# Patient Record
Sex: FEMALE | Race: BLACK OR AFRICAN AMERICAN | NOT HISPANIC OR LATINO | Employment: OTHER | URBAN - METROPOLITAN AREA
[De-identification: names, ages, dates, MRNs, and addresses within clinical notes are randomized per-mention and may not be internally consistent; named-entity substitution may affect disease eponyms.]

---

## 2017-01-03 ENCOUNTER — HOSPITAL ENCOUNTER (OUTPATIENT)
Dept: CARDIOLOGY | Facility: HOSPITAL | Age: 67
Discharge: HOME | End: 2017-01-03
Attending: INTERNAL MEDICINE
Payer: MEDICARE

## 2017-01-03 ENCOUNTER — TELEPHONE (OUTPATIENT)
Dept: CARDIOLOGY | Facility: CLINIC | Age: 67
End: 2017-01-03

## 2017-01-03 DIAGNOSIS — R01.1 MURMUR: ICD-10-CM

## 2017-01-03 DIAGNOSIS — R94.31 ABNORMAL ECG: ICD-10-CM

## 2017-01-03 LAB
DIASTOLIC DYSFUNCTION: YES
ESTIMATED PA SYSTOLIC PRESSURE: 23.43
MITRAL VALVE MOBILITY: NORMAL
RETIRED EF AND QEF - SEE NOTES: 65 (ref 55–65)
TRICUSPID VALVE REGURGITATION: ABNORMAL

## 2017-01-03 PROCEDURE — 93306 TTE W/DOPPLER COMPLETE: CPT | Mod: 26,,, | Performed by: INTERNAL MEDICINE

## 2017-01-03 PROCEDURE — 93306 TTE W/DOPPLER COMPLETE: CPT

## 2017-01-03 NOTE — TELEPHONE ENCOUNTER
Patient notified that echo is normal except for slow relaxation which is secondary to aging, weight or blood pressure and we will monitor this. No concerning findings.     Patient verbalized understanding.

## 2017-01-18 ENCOUNTER — PATIENT MESSAGE (OUTPATIENT)
Dept: FAMILY MEDICINE | Facility: CLINIC | Age: 67
End: 2017-01-18

## 2017-01-24 ENCOUNTER — OFFICE VISIT (OUTPATIENT)
Dept: INTERNAL MEDICINE | Facility: CLINIC | Age: 67
End: 2017-01-24
Payer: MEDICARE

## 2017-01-24 VITALS
BODY MASS INDEX: 34.5 KG/M2 | WEIGHT: 194.69 LBS | SYSTOLIC BLOOD PRESSURE: 120 MMHG | DIASTOLIC BLOOD PRESSURE: 80 MMHG | OXYGEN SATURATION: 94 % | HEART RATE: 96 BPM | HEIGHT: 63 IN | TEMPERATURE: 98 F

## 2017-01-24 DIAGNOSIS — J06.9 UPPER RESPIRATORY TRACT INFECTION, UNSPECIFIED TYPE: Primary | ICD-10-CM

## 2017-01-24 DIAGNOSIS — H60.21 ACUTE MALIGNANT OTITIS EXTERNA OF RIGHT EAR: ICD-10-CM

## 2017-01-24 DIAGNOSIS — I10 ESSENTIAL HYPERTENSION: ICD-10-CM

## 2017-01-24 DIAGNOSIS — E11.9 TYPE 2 DIABETES MELLITUS WITHOUT COMPLICATION, UNSPECIFIED LONG TERM INSULIN USE STATUS: ICD-10-CM

## 2017-01-24 PROCEDURE — 99213 OFFICE O/P EST LOW 20 MIN: CPT | Mod: S$GLB,,, | Performed by: INTERNAL MEDICINE

## 2017-01-24 PROCEDURE — 99999 PR PBB SHADOW E&M-EST. PATIENT-LVL III: CPT | Mod: PBBFAC,,, | Performed by: INTERNAL MEDICINE

## 2017-01-24 PROCEDURE — 1157F ADVNC CARE PLAN IN RCRD: CPT | Mod: S$GLB,,, | Performed by: INTERNAL MEDICINE

## 2017-01-24 PROCEDURE — 3074F SYST BP LT 130 MM HG: CPT | Mod: S$GLB,,, | Performed by: INTERNAL MEDICINE

## 2017-01-24 PROCEDURE — 4010F ACE/ARB THERAPY RXD/TAKEN: CPT | Mod: S$GLB,,, | Performed by: INTERNAL MEDICINE

## 2017-01-24 PROCEDURE — 1160F RVW MEDS BY RX/DR IN RCRD: CPT | Mod: S$GLB,,, | Performed by: INTERNAL MEDICINE

## 2017-01-24 PROCEDURE — 3079F DIAST BP 80-89 MM HG: CPT | Mod: S$GLB,,, | Performed by: INTERNAL MEDICINE

## 2017-01-24 PROCEDURE — 1159F MED LIST DOCD IN RCRD: CPT | Mod: S$GLB,,, | Performed by: INTERNAL MEDICINE

## 2017-01-24 PROCEDURE — 3046F HEMOGLOBIN A1C LEVEL >9.0%: CPT | Mod: S$GLB,,, | Performed by: INTERNAL MEDICINE

## 2017-01-24 PROCEDURE — 1126F AMNT PAIN NOTED NONE PRSNT: CPT | Mod: S$GLB,,, | Performed by: INTERNAL MEDICINE

## 2017-01-24 PROCEDURE — 2022F DILAT RTA XM EVC RTNOPTHY: CPT | Mod: S$GLB,,, | Performed by: INTERNAL MEDICINE

## 2017-01-24 RX ORDER — CODEINE PHOSPHATE AND GUAIFENESIN 10; 100 MG/5ML; MG/5ML
5 SOLUTION ORAL 3 TIMES DAILY PRN
Qty: 180 ML | Refills: 0 | Status: SHIPPED | OUTPATIENT
Start: 2017-01-24 | End: 2017-02-03

## 2017-01-24 RX ORDER — LEVOFLOXACIN 500 MG/1
500 TABLET, FILM COATED ORAL DAILY
Qty: 10 TABLET | Refills: 0 | Status: SHIPPED | OUTPATIENT
Start: 2017-01-24 | End: 2017-02-03

## 2017-01-24 NOTE — MR AVS SNAPSHOT
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       These Medications        Disp Refills Start End    guaifenesin-codeine 100-10 mg/5 ml (CHERATUSSIN AC)  mg/5 mL syrup 180 mL 0 2017 2/3/2017    Take 5 mLs by mouth 3 (three) times daily as needed for Cough. - Oral    Pharmacy: [facility] [address] Ph #: [phone]       levoFLOXacin (LEVAQUIN) 500 MG tablet 10 tablet 0 2017 2/3/2017    Take 1 tablet (500 mg total) by mouth once daily. - Oral    Pharmacy: [facility] [address] Ph #: [phone]         [facility]     [facility] Care [facility] -  Assistance  Registered nurses in the [facility] provide clinical advisement, health education, appointment booking, and other advisory services.  Call for this free service at [phone].             Medications           Message regarding Medications     Verify the changes and/or additions to your medication regime listed below are the same as discussed with your clinician today.  If any of these changes  or additions are incorrect, please notify your healthcare provider.        START taking these NEW medications        Refills    guaifenesin-codeine 100-10 mg/5 ml (CHERATUSSIN AC)  mg/5 mL syrup 0    Sig: Take 5 mLs by mouth 3 (three) times daily as needed for Cough.    Class: Normal    Route: Oral    levoFLOXacin (LEVAQUIN) 500 MG tablet 0    Sig: Take 1 tablet (500 mg total) by mouth once daily.    Class: Normal    Route: Oral           Verify that the below list of medications is an accurate representation of the medications you are currently taking.  If none reported, the list may be blank. If incorrect, please contact your healthcare provider. Carry this list with you in case of emergency.           Current Medications     amlodipine (NORVASC) 5 MG tablet take 1 tablet by mouth once daily    aspirin (ECOTRIN) 81 MG EC tablet Take 81 mg by mouth once daily.    azelastine (ASTELIN) 137 mcg (0.1 %) nasal spray 1 spray (137 mcg total) by Nasal route 2 (two) times daily.    blood sugar diagnostic Strp 1 strip by Misc.(Non-Drug; Combo Route) route 2 (two) times daily.    citalopram (CELEXA) 10 MG tablet Take 1 tablet (10 mg total) by mouth once daily.    ergocalciferol (ERGOCALCIFEROL) 50,000 unit Cap take 1 capsule by mouth every week    famotidine (PEPCID) 20 MG tablet take 1 tablet by mouth every evening    fluocinonide (LIDEX) 0.05 % external solution AAA scalp qd prn pruritus    gabapentin (NEURONTIN) 100 MG capsule take 1 capsule by mouth every evening    glimepiride (AMARYL) 4 MG tablet Take 1 tablet (4 mg total) by mouth daily with breakfast.    ketoconazole (NIZORAL) 2 % shampoo Wash hair with medicated shampoo at least 2x/week - let sit on scalp at least 5 minutes prior to rinsing    lancets (ONETOUCH DELICA LANCETS) 33 gauge Misc 1 lancet by Misc.(Non-Drug; Combo Route) route 2 (two) times daily.    lidocaine HCL 2% (XYLOCAINE) 2 % jelly Apply topically once daily.    losartan (COZAAR) 50 MG tablet  "take 1 tablet by mouth once daily    magnesium oxide (MAG-OX) 400 mg tablet take 1 tablet by mouth twice a day    meloxicam (MOBIC) 15 MG tablet Take 1 tablet (15 mg total) by mouth once daily.    methylPREDNISolone (MEDROL, DAMASO,) 4 mg tablet use as directed    MICROLET LANCET Misc USE ONCE DAILY    omeprazole (PRILOSEC) 20 MG capsule TAKE 2 CAPSULES BY MOUTH DAILY AS NEEDED    pravastatin (PRAVACHOL) 10 MG tablet take 1 tablet by mouth once daily    sitagliptin (JANUVIA) 50 MG Tab Take 1 tablet (50 mg total) by mouth once daily.    tramadol (ULTRAM) 50 mg tablet Take 50 mg by mouth every 6 (six) hours as needed for Pain.    trazodone (DESYREL) 50 MG tablet Take 1 tablet (50 mg total) by mouth nightly as needed.    guaifenesin-codeine 100-10 mg/5 ml (CHERATUSSIN AC)  mg/5 mL syrup Take 5 mLs by mouth 3 (three) times daily as needed for Cough.    levoFLOXacin (LEVAQUIN) 500 MG tablet Take 1 tablet (500 mg total) by mouth once daily.           Clinical Reference Information           Vital Signs - Last Recorded  Most recent update: 1/24/2017  4:52 PM by Zohra Perea LPN    BP Pulse Temp Ht Wt SpO2    120/80 (BP Location: Right arm, Patient Position: Sitting, BP Method: Manual) 96 97.8 °F (36.6 °C) (Oral) 5' 3" (1.6 m) 88.3 kg (194 lb 10.7 oz) (!) 94%    BMI                34.48 kg/m2          Blood Pressure          Most Recent Value    BP  120/80      Allergies as of 1/24/2017     Prednisone    Protonix [Pantoprazole]    Ace Inhibitors    Latex, Natural Rubber    Other      Immunizations Administered on Date of Encounter - 1/24/2017     None      "

## 2017-01-24 NOTE — PROGRESS NOTES
Subjective:       Patient ID: Chelly Ortiz is a 66 y.o. female.    Chief Complaint: Sinusitis; Otalgia; Cough; and Generalized Body Aches    HPI  Pt with 5 days of R earache, 3 days of nasal congestion, nonprod cough, chills, myalgias.  Got flu vax.  BSs c. 160.  Review of Systems    Objective:      Physical Exam   Constitutional: She appears well-developed. No distress.   HENT:   Head: Normocephalic.   Mouth/Throat: Oropharynx is clear and moist.   R ext aud canal inflamed  Sinuses nontender, TMs OK   Eyes: EOM are normal.   Neck: Normal range of motion. No tracheal deviation present.   Cardiovascular: Normal rate, regular rhythm and intact distal pulses.    Pulmonary/Chest: Effort normal and breath sounds normal. No respiratory distress.   Abdominal: She exhibits no distension.   Musculoskeletal: Normal range of motion. She exhibits no edema.   Neurological: She is alert. No cranial nerve deficit. She exhibits normal muscle tone. Coordination normal.   Skin: Skin is warm and dry. No rash noted. She is not diaphoretic. No erythema.   Psychiatric: She has a normal mood and affect. Her behavior is normal.   Vitals reviewed.      Assessment:       1. Upper respiratory tract infection, unspecified type    2. 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 Essential hypertension    4. Type 2 diabetes mellitus without complication, unspecified long term insulin use status        Plan:       Chelly was seen today for sinusitis, otalgia, cough and generalized body aches.    Diagnoses and all orders for this visit:    Upper respiratory tract infection, unspecified type  -     guaifenesin-codeine 100-10 mg/5 ml (CHERATUSSIN AC)  mg/5 mL syrup; Take 5 mLs by mouth 3 (three) times daily as needed for Cough.    Acute malignant otitis externa of right ear  -     levoFLOXacin (LEVAQUIN) 500 MG tablet; Take 1 tablet (500 mg total) by mouth once daily.    Essential hypertension   OK    Type 2 diabetes mellitus without  complication, unspecified long term insulin use status      Return in about 1 week (around 1/31/2017).

## 2017-01-25 DIAGNOSIS — E11.65 TYPE 2 DIABETES MELLITUS WITH HYPERGLYCEMIA, WITHOUT LONG-TERM CURRENT USE OF INSULIN: ICD-10-CM

## 2017-01-25 DIAGNOSIS — E78.5 DYSLIPIDEMIA: ICD-10-CM

## 2017-01-26 RX ORDER — AMLODIPINE BESYLATE 5 MG/1
5 TABLET ORAL DAILY
Qty: 90 TABLET | Refills: 3 | Status: SHIPPED | OUTPATIENT
Start: 2017-01-26 | End: 2017-11-19 | Stop reason: SDUPTHER

## 2017-01-26 RX ORDER — LOSARTAN POTASSIUM 50 MG/1
50 TABLET ORAL DAILY
Qty: 90 TABLET | Refills: 3 | Status: SHIPPED | OUTPATIENT
Start: 2017-01-26 | End: 2017-03-17

## 2017-01-26 RX ORDER — GLIMEPIRIDE 4 MG/1
4 TABLET ORAL
Qty: 90 TABLET | Refills: 3 | Status: SHIPPED | OUTPATIENT
Start: 2017-01-26 | End: 2017-11-19 | Stop reason: SDUPTHER

## 2017-01-26 RX ORDER — PRAVASTATIN SODIUM 10 MG/1
10 TABLET ORAL DAILY
Qty: 90 TABLET | Refills: 3 | Status: SHIPPED | OUTPATIENT
Start: 2017-01-26 | End: 2017-11-19 | Stop reason: SDUPTHER

## 2017-01-26 RX ORDER — GABAPENTIN 100 MG/1
CAPSULE ORAL
Qty: 30 CAPSULE | Refills: 2 | Status: SHIPPED | OUTPATIENT
Start: 2017-01-26 | End: 2017-04-13 | Stop reason: SDUPTHER

## 2017-01-30 RX ORDER — INSULIN PUMP SYRINGE, 3 ML
EACH MISCELLANEOUS
Qty: 1 EACH | Refills: 0 | Status: SHIPPED | OUTPATIENT
Start: 2017-01-30 | End: 2018-01-30

## 2017-01-30 RX ORDER — LANCETS
1 EACH MISCELLANEOUS DAILY
Qty: 100 EACH | Refills: 3 | Status: SHIPPED | OUTPATIENT
Start: 2017-01-30 | End: 2018-01-23 | Stop reason: SDUPTHER

## 2017-02-10 ENCOUNTER — OFFICE VISIT (OUTPATIENT)
Dept: FAMILY MEDICINE | Facility: CLINIC | Age: 67
End: 2017-02-10
Payer: MEDICARE

## 2017-02-10 ENCOUNTER — HOSPITAL ENCOUNTER (OUTPATIENT)
Dept: RADIOLOGY | Facility: HOSPITAL | Age: 67
Discharge: HOME | End: 2017-02-10
Attending: ORTHOPAEDIC SURGERY
Payer: MEDICARE

## 2017-02-10 ENCOUNTER — TELEPHONE (OUTPATIENT)
Dept: FAMILY MEDICINE | Facility: CLINIC | Age: 67
End: 2017-02-10

## 2017-02-10 VITALS
DIASTOLIC BLOOD PRESSURE: 78 MMHG | BODY MASS INDEX: 34.91 KG/M2 | WEIGHT: 197.06 LBS | OXYGEN SATURATION: 98 % | SYSTOLIC BLOOD PRESSURE: 124 MMHG | HEART RATE: 86 BPM | HEIGHT: 63 IN

## 2017-02-10 DIAGNOSIS — M25.562 LEFT MEDIAL KNEE PAIN: ICD-10-CM

## 2017-02-10 DIAGNOSIS — M17.12 ARTHRITIS OF LEFT KNEE: ICD-10-CM

## 2017-02-10 DIAGNOSIS — M25.562 LEFT MEDIAL KNEE PAIN: Primary | ICD-10-CM

## 2017-02-10 DIAGNOSIS — M25.561 KNEE PAIN, RIGHT: ICD-10-CM

## 2017-02-10 DIAGNOSIS — E11.9 TYPE 2 DIABETES MELLITUS WITHOUT COMPLICATION, UNSPECIFIED LONG TERM INSULIN USE STATUS: ICD-10-CM

## 2017-02-10 PROCEDURE — 73560 X-RAY EXAM OF KNEE 1 OR 2: CPT | Mod: 26,59,LT, | Performed by: RADIOLOGY

## 2017-02-10 PROCEDURE — 73560 X-RAY EXAM OF KNEE 1 OR 2: CPT | Mod: TC,59,LT,RT

## 2017-02-10 PROCEDURE — 3078F DIAST BP <80 MM HG: CPT | Mod: S$GLB,,, | Performed by: FAMILY MEDICINE

## 2017-02-10 PROCEDURE — 73562 X-RAY EXAM OF KNEE 3: CPT | Mod: 26,RT,, | Performed by: RADIOLOGY

## 2017-02-10 PROCEDURE — 1159F MED LIST DOCD IN RCRD: CPT | Mod: S$GLB,,, | Performed by: FAMILY MEDICINE

## 2017-02-10 PROCEDURE — 99999 PR PBB SHADOW E&M-EST. PATIENT-LVL V: CPT | Mod: PBBFAC,,, | Performed by: FAMILY MEDICINE

## 2017-02-10 PROCEDURE — 1160F RVW MEDS BY RX/DR IN RCRD: CPT | Mod: S$GLB,,, | Performed by: FAMILY MEDICINE

## 2017-02-10 PROCEDURE — 4010F ACE/ARB THERAPY RXD/TAKEN: CPT | Mod: S$GLB,,, | Performed by: FAMILY MEDICINE

## 2017-02-10 PROCEDURE — 2022F DILAT RTA XM EVC RTNOPTHY: CPT | Mod: S$GLB,,, | Performed by: FAMILY MEDICINE

## 2017-02-10 PROCEDURE — 73564 X-RAY EXAM KNEE 4 OR MORE: CPT | Mod: 26,LT,, | Performed by: RADIOLOGY

## 2017-02-10 PROCEDURE — 73564 X-RAY EXAM KNEE 4 OR MORE: CPT | Mod: TC,LT

## 2017-02-10 PROCEDURE — 3074F SYST BP LT 130 MM HG: CPT | Mod: S$GLB,,, | Performed by: FAMILY MEDICINE

## 2017-02-10 PROCEDURE — 99214 OFFICE O/P EST MOD 30 MIN: CPT | Mod: S$GLB,,, | Performed by: FAMILY MEDICINE

## 2017-02-10 PROCEDURE — 99499 UNLISTED E&M SERVICE: CPT | Mod: S$GLB,,, | Performed by: FAMILY MEDICINE

## 2017-02-10 PROCEDURE — 3046F HEMOGLOBIN A1C LEVEL >9.0%: CPT | Mod: S$GLB,,, | Performed by: FAMILY MEDICINE

## 2017-02-10 PROCEDURE — 1125F AMNT PAIN NOTED PAIN PRSNT: CPT | Mod: S$GLB,,, | Performed by: FAMILY MEDICINE

## 2017-02-10 PROCEDURE — 1157F ADVNC CARE PLAN IN RCRD: CPT | Mod: S$GLB,,, | Performed by: FAMILY MEDICINE

## 2017-02-10 RX ORDER — METHYLPREDNISOLONE 4 MG/1
TABLET ORAL
Qty: 1 PACKAGE | Refills: 0 | Status: SHIPPED | OUTPATIENT
Start: 2017-02-10 | End: 2017-03-27

## 2017-02-10 NOTE — PROGRESS NOTES
Office Visit    Patient Name: Chelly Ortiz    : 1950  MRN: [id number]    Subjective:  Chelly is a 66 y.o. female who presents today for:    Knee Pain (left) and Foot Pain (left)    67 yo patient of Dr Garcia here with left knee pain that came on suddenly on Tuesday.  Pain came on while she was laying down-- she felt a sudden squeezing sensation of the left knee that was not accompanied by redness or warmth but the next day she noticed it was swollen.  The swelling has persisted and she has pain with bending and straightening the leg.   Prior to Tuesday she was doing fine.  No complains of stiffness with walking but severe pain with going from sitting to standing and or extremes of ROM.  Does have history of knee arthritis and has problems with the left foot followed by Dr Swain podiatry.  No calf pain.  Tried putting some biofreeze on her knee and foot and this helped a little.  No locking catching or instability of the left knee      Past Medical History  Past Medical History   Diagnosis Date    Allergy     Cataract     DDD (degenerative disc disease), lumbar     Diabetes mellitus      diet controlled    GERD (gastroesophageal reflux disease)     History of subconjunctival hemorrhage 11     left eye    Hyperlipidemia     Hypertension     IBS (irritable bowel syndrome)     Obesity     MYRIAM (obstructive sleep apnea)      on CPAP    Rotator cuff impingement syndrome     Seasonal allergic conjunctivitis        Past Surgical History  Past Surgical History   Procedure Laterality Date     section       x2    Hysterectomy       ovaries intact, due to DUB    Cholecystectomy      Cataract extraction w/  intraocular lens implant  10/3/13     OD (dr. gardner)       Family History  Family History   Problem Relation Age of Onset    Mental illness Mother     Heart disease Father     Diabetes Sister     Amblyopia Neg Hx     Blindness Neg Hx     Cancer Neg Hx     Cataracts Neg Hx      "Glaucoma Neg Hx     Hypertension Neg Hx     Macular degeneration Neg Hx     Retinal detachment Neg Hx     Strabismus Neg Hx     Stroke Neg Hx     Thyroid disease Neg Hx        Social History  Social History     Social History    Marital status:      Spouse name: N/A    Number of children: N/A    Years of education: N/A     Occupational History    Not on file.     Social History Main Topics    Smoking status: Former Smoker     Quit date: 2/15/1983    Smokeless tobacco: Never Used    Alcohol use No    Drug use: No    Sexual activity: Not on file     Other Topics Concern    Not on file     Social History Narrative       Current Medications  Medications reviewed and updated.     Allergies   Review of patient's allergies indicates:   Allergen Reactions    Prednisone Other (See Comments)     hipper    Protonix [pantoprazole] Itching    Ace inhibitors Hives    Latex, natural rubber Itching    Other Hives     steriods       Review of Systems (Pertinent positives)  Review of Systems   Musculoskeletal: Positive for arthralgias and joint swelling.   Skin: Negative for color change.       Visit Vitals    /78 (BP Location: Left arm, Patient Position: Sitting, BP Method: Manual)    Pulse 86    Ht 5' 3" (1.6 m)    Wt 89.4 kg (197 lb 1.5 oz)    SpO2 98%    BMI 34.91 kg/m2       Physical Exam   Constitutional: She is oriented to person, place, and time. She appears well-developed and well-nourished. No distress.   HENT:   Head: Normocephalic and atraumatic.   Eyes: Conjunctivae are normal.   Musculoskeletal:        Left knee: She exhibits effusion. She exhibits no erythema. Tenderness found. Medial joint line tenderness noted.        Legs:  Neurological: She is alert and oriented to person, place, and time.   Skin: Skin is warm and dry.   Psychiatric: She has a normal mood and affect.   Vitals reviewed.        Assessment/Plan:  Chelly Ortiz is a 66 y.o. female who presents today for " :    Chelly was seen today for knee pain and foot pain.    Diagnoses and all orders for this visit:    Left medial knee pain  Comments:  ICE/ELEVATION/COMPRESSION  Orders:  -     X-Ray Knee Complete 4 or More Views Left; Future  -     methylPREDNISolone (MEDROL, DAMASO,) 4 mg tablet; use as directed  -     Ambulatory Referral to Physical/Occupational Therapy    Arthritis of left knee  Comments:  IF SYMPTOMS PERSIST AND DEPENDING ON IMAGING RESULTS WOULD RECOMMEND TRIAL OF CORTISONE INJECTION OF KNEE JOINT  Orders:  -     X-Ray Knee Complete 4 or More Views Left; Future  -     methylPREDNISolone (MEDROL, DAMASO,) 4 mg tablet; use as directed  -     Ambulatory Referral to Physical/Occupational Therapy    Type 2 diabetes mellitus without complication, unspecified long term insulin use status  Comments:  NEED TO LIMIT STEROID DOSE PACKS. FOLLOW UP DIABETIC VISIT PER PCP            ICD-10-CM ICD-9-CM    1. Left medial knee pain M25.562 719.46 X-Ray Knee Complete 4 or More Views Left      methylPREDNISolone (MEDROL, DAMASO,) 4 mg tablet      Ambulatory Referral to Physical/Occupational Therapy    ICE/ELEVATION/COMPRESSION   2. Arthritis of left knee M19.90 716.96 X-Ray Knee Complete 4 or More Views Left      methylPREDNISolone (MEDROL, DAMASO,) 4 mg tablet      Ambulatory Referral to Physical/Occupational Therapy    IF SYMPTOMS PERSIST AND DEPENDING ON IMAGING RESULTS WOULD RECOMMEND TRIAL OF CORTISONE INJECTION OF KNEE JOINT   3. Type 2 diabetes mellitus without complication, unspecified long term insulin use status E11.9 250.00     NEED TO LIMIT STEROID DOSE PACKS. FOLLOW UP DIABETIC VISIT PER PCP       Return for FOLLOW UP IF SYMPTOMS PERSIST.

## 2017-02-10 NOTE — MR AVS SNAPSHOT
[address]  [place] [address]  Phone: [phone]  Fax: [phone]                  Chelly Ortiz   2/10/2017 2:40 PM   Office Visit    Description:  Female : 1950   Provider:  Kathy Antony MD   Department:  [facility]           Reason for Visit     Knee Pain     Foot Pain           Diagnoses this Visit        Comments    Left medial knee pain    -  Primary ICE/ELEVATION/COMPRESSION    Arthritis of left knee     IF SYMPTOMS PERSIST AND DEPENDING ON IMAGING RESULTS WOULD RECOMMEND TRIAL OF CORTISONE INJECTION OF KNEE JOINT    Type 2 diabetes mellitus without complication, unspecified long term insulin use status     NEED TO LIMIT STEROID DOSE PACKS           To Do List           Future Appointments        Provider Department Dept Phone    2/10/2017 4:00 PM [facility] [phone]      Goals (5 Years of Data)     None      Follow-Up and Disposition     Return for FOLLOW UP IF SYMPTOMS PERSIST.       These Medications        Disp Refills Start End    methylPREDNISolone (MEDROL, DAMASO,) 4 mg tablet 1 Package 0 2/10/2017     use as directed    Pharmacy: [facility] - [place] - [address] [facility] #: [phone]         [facility]     [facility] Care [facility] -  Assistance  Registered nurses in the [facility] provide clinical advisement, health education, appointment booking, and other advisory services.  Call for this free service at [phone].             Medications           Message regarding Medications     Verify the changes and/or additions to your medication regime listed below are the same as discussed with your clinician today.  If any of these changes or additions are incorrect, please notify your healthcare provider.             Verify that the below list of medications is an accurate representation of the medications you are  currently taking.  If none reported, the list may be blank. If incorrect, please contact your healthcare provider. Carry this list with you in case of emergency.           Current Medications     amlodipine (NORVASC) 5 MG tablet Take 1 tablet (5 mg total) by mouth once daily.    aspirin (ECOTRIN) 81 MG EC tablet Take 81 mg by mouth once daily.    azelastine (ASTELIN) 137 mcg (0.1 %) nasal spray 1 spray (137 mcg total) by Nasal route 2 (two) times daily.    blood sugar diagnostic Strp 1 strip by Misc.(Non-Drug; Combo Route) route 2 (two) times daily.    blood-glucose meter kit Use as instructed    citalopram (CELEXA) 10 MG tablet Take 1 tablet (10 mg total) by mouth once daily.    ergocalciferol (ERGOCALCIFEROL) 50,000 unit Cap take 1 capsule by mouth every week    famotidine (PEPCID) 20 MG tablet take 1 tablet by mouth every evening    fluocinonide (LIDEX) 0.05 % external solution AAA scalp qd prn pruritus    gabapentin (NEURONTIN) 100 MG capsule take 1 capsule by mouth every evening    glimepiride (AMARYL) 4 MG tablet Take 1 tablet (4 mg total) by mouth daily with breakfast.    ketoconazole (NIZORAL) 2 % shampoo Wash hair with medicated shampoo at least 2x/week - let sit on scalp at least 5 minutes prior to rinsing    lancets (MICROLET LANCET) Misc Inject 1 lancet into the skin once daily.    lancets (ONETOUCH DELICA LANCETS) 33 gauge Misc 1 lancet by Misc.(Non-Drug; Combo Route) route 2 (two) times daily.    lidocaine HCL 2% (XYLOCAINE) 2 % jelly Apply topically once daily.    losartan (COZAAR) 50 MG tablet Take 1 tablet (50 mg total) by mouth once daily.    magnesium oxide (MAG-OX) 400 mg tablet take 1 tablet by mouth twice a day    meloxicam (MOBIC) 15 MG tablet Take 1 tablet (15 mg total) by mouth once daily.    methylPREDNISolone (MEDROL, DAMASO,) 4 mg tablet use as directed    omeprazole (PRILOSEC) 20 MG capsule TAKE 2 CAPSULES BY MOUTH DAILY AS NEEDED    pravastatin (PRAVACHOL) 10 MG tablet Take 1 tablet (10 mg  "total) by mouth once daily.    SITagliptan (JANUVIA) 50 MG Tab Take 1 tablet (50 mg total) by mouth once daily.    tramadol (ULTRAM) 50 mg tablet Take 50 mg by mouth every 6 (six) hours as needed for Pain.    trazodone (DESYREL) 50 MG tablet Take 1 tablet (50 mg total) by mouth nightly as needed.           Clinical Reference Information           Your Vitals Were     BP Pulse Height Weight SpO2 BMI    124/78 (BP Location: Left arm, Patient Position: Sitting, BP Method: Manual) 86 5' 3" (1.6 m) 89.4 kg (197 lb 1.5 oz) 98% 34.91 kg/m2      Blood Pressure          Most Recent Value    BP  124/78      Allergies as of 2/10/2017     Prednisone    Protonix [Pantoprazole]    Ace Inhibitors    Latex, Natural Rubber    Other      Immunizations Administered on Date of Encounter - 2/10/2017     None      Orders Placed During Today's Visit      Normal Orders This Visit    Ambulatory Referral to Physical/Occupational Therapy     Future Labs/Procedures Expected by Expires    X-Ray Knee Complete 4 or More Views Left  2/10/2017 2/10/2018      Language Assistance Services     ATTENTION: Language assistance services are available, free of charge. Please call [phone].      ATENCIÓN: Si coltla silvano, tiene a white disposición servicios gratuitos de asistencia lingüística. Llame al [phone].     CONOR Ý: N?u b?n nói Ti?ng Vi?t, có các d?ch v? h? tr? ngôn ng? mi?n phí dành cho b?n. G?i s? [phone].         [facility] complies with applicable Federal civil rights laws and does not discriminate on the basis of race, color, national origin, age, disability, or sex.        "

## 2017-02-15 ENCOUNTER — TELEPHONE (OUTPATIENT)
Dept: FAMILY MEDICINE | Facility: CLINIC | Age: 67
End: 2017-02-15

## 2017-02-16 ENCOUNTER — TELEPHONE (OUTPATIENT)
Dept: FAMILY MEDICINE | Facility: CLINIC | Age: 67
End: 2017-02-16

## 2017-02-16 NOTE — TELEPHONE ENCOUNTER
----- Message from Elba Kumar sent at 2/16/2017 11:41 AM CST -----  Contact: Self [phone]  Patient Returning Your Phone Call

## 2017-02-16 NOTE — TELEPHONE ENCOUNTER
----- Message from Marleny Beth sent at 2/15/2017 11:28 AM CST -----  Patient no. [phone]    Patient returned your call.

## 2017-02-16 NOTE — TELEPHONE ENCOUNTER
Returned patient's call. I reviewed xray results with her. She stated she cannot take the medrol dose pack. She doesn't like effects. She states that is causes her to have insomnia and she would like to know if something else could be called in. Please advise.

## 2017-02-17 RX ORDER — TRAMADOL HYDROCHLORIDE 50 MG/1
50 TABLET ORAL EVERY 6 HOURS PRN
Qty: 30 TABLET | Refills: 1 | Status: SHIPPED | OUTPATIENT
Start: 2017-02-17 | End: 2017-03-31

## 2017-02-17 NOTE — TELEPHONE ENCOUNTER
Called patient to inform her her prescription has been sent to the pharmacy. She verbalized understanding.

## 2017-02-17 NOTE — TELEPHONE ENCOUNTER
I refilled a prescription for tramadol that she has previously had and she can also take over the counter aleve at the prescription strength of 2 pills up to twice daily as needed for pain/swelling- she should take this with food and water to avoid irritation to stomach and kidneys thanks

## 2017-02-23 ENCOUNTER — PATIENT MESSAGE (OUTPATIENT)
Dept: FAMILY MEDICINE | Facility: CLINIC | Age: 67
End: 2017-02-23

## 2017-02-27 ENCOUNTER — TELEPHONE (OUTPATIENT)
Dept: FAMILY MEDICINE | Facility: CLINIC | Age: 67
End: 2017-02-27

## 2017-02-27 DIAGNOSIS — Z01.00 EXAMINATION OF EYES AND VISION: Primary | ICD-10-CM

## 2017-03-03 ENCOUNTER — CLINICAL SUPPORT (OUTPATIENT)
Dept: REHABILITATION | Facility: HOSPITAL | Age: 67
End: 2017-03-03
Attending: FAMILY MEDICINE
Payer: MEDICARE

## 2017-03-03 DIAGNOSIS — M25.662 DECREASED ROM OF LEFT KNEE: ICD-10-CM

## 2017-03-03 DIAGNOSIS — M25.562 LEFT KNEE PAIN, UNSPECIFIED CHRONICITY: ICD-10-CM

## 2017-03-03 PROCEDURE — G8978 MOBILITY CURRENT STATUS: HCPCS | Mod: CL,PN

## 2017-03-03 PROCEDURE — 97110 THERAPEUTIC EXERCISES: CPT | Mod: PN

## 2017-03-03 PROCEDURE — 97161 PT EVAL LOW COMPLEX 20 MIN: CPT | Mod: PN

## 2017-03-03 PROCEDURE — G8979 MOBILITY GOAL STATUS: HCPCS | Mod: CK,PN

## 2017-03-03 NOTE — PLAN OF CARE
TIME RECORD    Date: 03/03/2017    Start Time:  11:08 am   Stop Time:  11:43 am     PROCEDURES:    TIMED  Procedure Min.   TE 8'                     UNTIMED  Procedure Min.   Initial Evaluation 27'         Total Timed Minutes:  8'  Total Timed Units:  1  Total Untimed Units:  1  Charges Billed/# of units:  2 (1 IE, 1 TE)     OUTPATIENT PHYSICAL THERAPY   PATIENT EVALUATION  Onset Date: 1-2 months ago   Primary Diagnosis:   1. Left knee pain, unspecified chronicity     2. Decreased ROM of left knee       Treatment Diagnosis: (L) knee pain and swelling   Past Medical History:   Diagnosis Date    Allergy     Cataract     DDD (degenerative disc disease), lumbar     Diabetes mellitus     diet controlled    GERD (gastroesophageal reflux disease)     History of subconjunctival hemorrhage 12/2/11    left eye    Hyperlipidemia     Hypertension     IBS (irritable bowel syndrome)     Obesity     MYRIAM (obstructive sleep apnea)     on CPAP    Rotator cuff impingement syndrome     Seasonal allergic conjunctivitis      Precautions: DM, HTN  Prior Therapy: pt stated that she attended therapy in the past for her shoulder and (R) knee  Medications: Chelly Ortiz has a current medication list which includes the following prescription(s): amlodipine, aspirin, azelastine, blood sugar diagnostic, blood-glucose meter, citalopram, ergocalciferol, famotidine, fluocinonide, gabapentin, glimepiride, ketoconazole, lancets, lancets, lidocaine hcl 2%, losartan, magnesium oxide, meloxicam, methylprednisolone, omeprazole, pravastatin, sitagliptan, tramadol, and trazodone.  Nutrition:  Obese  History of Present Illness: 1-2 months  Prior Level of Function: Independent  Social History: Pt stated she is retired.   Place of Residence (Steps/Adaptations): Pt stated that she two steps to enter her two story house. Pt stated that she does not go upstairs very often. Pt stated that she does have difficulty ascending/descending stairs  because of (L) knee.   Functional Deficits Leading to Referral/Nature of Injury: difficulty bending (L) knee, difficulty kneeling, difficulty getting in/out of bed, difficulty getting on and off toilet, difficulty getting in/out of tub  Patient Therapy Goals:     Subjective     Chelly Ortiz states that her (L) knee is swollen and she is having difficulty bending her (L) knee. Pt stated that this started approximately 1-2 months ago. Pt denies particular injury. Pt stated initially felt tigtness/squeezing feeling in (L) knee. Pt stated that currently she is not experiencing much pain in (L) knee. Pt reported that she has been icing her (L) knee. Pt stated that she underwent xray on (L) knee. Pt reported that currently she is having difficulty kneeling, getting in bed, getting in/out tub, getting on and off toilet.    Pain:  Location: (L) knee    Description: Tight  Activities Which Increase Pain: Bending, Getting out of bed/chair and stairs, getting in/out of tub   Activities Which Decrease Pain: pain medication, ice, rest and elevation  Pain Scale: 0/10 at best 0/10 now  4/10 at worst    Objective     Palpation: pt c/o tenderness to palpation along medial (L) knee       Range of Motion/Strength: strength measurements taken within available ROM    Hip Right  Left  Pain/Dysfunction with Movement    AROM MMT AROM MMT    Flexion WFL 4+/5 WFL 4+/5    Extension NT NT NT NT Able to perform good bridge against gravity    Abduction WFL 4/5 WFL 4/5      Knee Right  Left   Pain/Dysfunction with Movement    AROM MMT AROM PROM MMT    Flexion 120 4/5 110 125 4/5    Extension 2 (lacking) 4/5 5 (lacking)  2 4/5      Ankle Right  Left  Pain/Dysfunction with Movement    AROM MMT AROM MMT    Plantarflexion WFL 5/5 WFL 5/5    Dorsiflexion WFL 5/5 WFL 5/5      Circumferential measurements:   cm Right Left   Mid patella 37.5 38.5   5 cm above patella 40.5 42   5 cm below patella 33 34     Flexibility: decreased hamstring and gastroc  flexibility   Gait: Without AD  Analysis: (B) toeing out noted  Bed Mobility:Independent  Transfers: Independent  Special Tests: (L) Baribe test IR and ER: negative; (L) Lachman test: negative; (L) Varus Stress test:  Negative; (L)  Valgus Stress test: negative  Other: FOTO knee survey: 65% limited  Treatment: Treatment to consist of manual therapy, (B) LE strengthening/stretching, IASTM, ASTYM, FDN, and modalities prn. POC was discussed with pt and pt verbalized understanding and was agreeable to POC. Pt was educated on and participated in HEP consisting of quad sets and SLR. Pt demo and verbalized understanding.     Assessment       Initial Assessment (Pertinent finding, problem list and factors affecting outcome): Ms. Ortiz is a 66 year old female with c/o (L) knee pain. Pt presents with decreased (L) knee AROM, decreased (B) LE strength/flexibility, c/o tenderness to palpation along medial (L) knee, and FOTO knee survey score of 65% limited. Pt will benefit from skilled PT to address the impairments listed above in order to return pt to her highest level of function with decreased pain and limitation.     History  Co-morbidities and personal factors that may impact the plan of care Examination  Body Structures and Functions, activity limitations and participation restrictions that may impact the plan of care Clinical Presentation   Decision Making/ Complexity Score   Co-morbidities:       DM, HTN          Personal Factors:    Body Regions: (B) LE    Body Systems:  ROM, strength, gait          Activity limitations: difficulty bending (L) knee, difficulty kneeling, difficulty getting in/out of bed, difficulty getting on and off toilet, difficulty getting in/out of tub      Participation Restrictions:          Stable and uncomplicated FOTO: 65% limited    Low           Rehab Potiential: good    Short Term Goals = Long Term Goals  1. Pt will independent with HEP   2. Pt will improve (B) LE strength to 5/5 on MMT  in order to improve functional mobility  3. Pt will improve FOTO knee survey score to < /45% limited in order to have improvement in functional mobility  4. Pt will improve (L) knee AROM = (R) knee AROM in order to be able to perform ADLs with less difficulty   5. Pt's (L) knee circumferential measurements will = (R) knee circumferential measurements in order to demo decreased swelling in (L) knee  6. Pt will report experiencing 0/10 pain in (L) knee when performing ADLs in order to be able to perform ADLs with less difficulty.     Plan     Certification Period: 3/3/17 to 5/3/17  Recommended Treatment Plan: 2-3 times per week for 8 weeks: Electrical Stimulation IFC, Gait Training, Group Therapy, Locomotor Training, Manual Therapy, Moist Heat/ Ice, Neuromuscular Re-ed, Patient Education, Self Care, Therapeutic Activites, Therapeutic Exercise and Other IASTM, ASTYM, FDN, and modalities prn  Other Recommendations: n/a      Therapist: Bessie Brambila, PT    I CERTIFY THE NEED FOR THESE SERVICES FURNISHED UNDER THIS PLAN OF TREATMENT AND WHILE UNDER MY CARE    Physician's comments: ________________________________________________________________________________________________________________________________________________      Physician's Name: ___________________________________

## 2017-03-06 ENCOUNTER — CLINICAL SUPPORT (OUTPATIENT)
Dept: REHABILITATION | Facility: HOSPITAL | Age: 67
End: 2017-03-06
Attending: FAMILY MEDICINE
Payer: MEDICARE

## 2017-03-06 DIAGNOSIS — M25.562 LEFT KNEE PAIN, UNSPECIFIED CHRONICITY: ICD-10-CM

## 2017-03-06 DIAGNOSIS — M25.662 DECREASED ROM OF LEFT KNEE: ICD-10-CM

## 2017-03-06 PROCEDURE — 97110 THERAPEUTIC EXERCISES: CPT | Mod: PN

## 2017-03-06 PROCEDURE — 97140 MANUAL THERAPY 1/> REGIONS: CPT | Mod: PN

## 2017-03-06 NOTE — PROGRESS NOTES
"TIME RECORD    Date:  03/06/2017    Start Time:  4:02 pm   Stop Time:  5:10 pm     PROCEDURES:    TIMED  Procedure Min.   TE supervised 35' NC   TE 8'   MT 15'             UNTIMED  Procedure Min.   CP 10'          Total Timed Minutes:  23'  Total Timed Units:  2  Total Untimed Units:  0  Charges Billed/# of units:  2 ( 1 TE, 1 MT)       Progress/Current Status    Subjective:     Patient ID: Chelly Ortiz is a 66 y.o. female.  Diagnosis:   1. Left knee pain, unspecified chronicity     2. Decreased ROM of left knee       Pain: 3 /10  Pt c/o experiencing 3/10 pain in (L) knee prior to therapy session.     Objective:     Pt initiated therapy session on stationary bike x 5' supervised by PT. Pt received MT consisting of (B) LE elevated on wedge for retrograde edema reduction to (L) knee, STM to (L) quad, (L) gastroc, (L) hamstring x 15'. Pt participated in therex per log below 1:1 with PT x 8' and supervised therex x 30'. CP to (L) knee at end of therapy session with (L) LE elevated on chair.     Date  3/6/17   VISIT 2   Gcode 2/10   FOTO 2/5   Cap Visit   Cap Total 61.84  169.42   MT 15'   Long Sit HS 3x30" strap    Gastroc Str. 3x30" stairs   Bike 5'   QS 5"x10   SAQ 2x10 B   SLR 1x10 B   SL Abd 2x10 B   Heel Slides -   Sada Hip Add 5"x10   LAQs 2x10 B   Seated Hip Flex 2x10 B   Cybex   Leg Press -   TKE -   Hip Abd -   Hip Flex -   Hip Ext -   HS Curls -   Knee Ext -   Step Ups -   Step Downs -   Gait -   CP 10'   Initials CK         Assessment:     Pt received VC for proper technique when performing therex. Pt tolerated therapy session without any c/o increased pain in (L) knee.     Patient Education/Response:     Pt was educated on and given handout on HEP consisting of quad sets, SAQ, and SL hip abduction. Pt demo and verbalized understanding.     Plans and Goals:   Continue per POC, progress pt as tolerated  Short Term Goals = Long Term Goals  1. Pt will independent with HEP   2. Pt will improve (B) LE strength " to 5/5 on MMT in order to improve functional mobility  3. Pt will improve FOTO knee survey score to < /45% limited in order to have improvement in functional mobility  4. Pt will improve (L) knee AROM = (R) knee AROM in order to be able to perform ADLs with less difficulty   5. Pt's (L) knee circumferential measurements will = (R) knee circumferential measurements in order to demo decreased swelling in (L) knee  6. Pt will report experiencing 0/10 pain in (L) knee when performing ADLs in order to be able to perform ADLs with less difficulty.

## 2017-03-09 ENCOUNTER — CLINICAL SUPPORT (OUTPATIENT)
Dept: REHABILITATION | Facility: HOSPITAL | Age: 67
End: 2017-03-09
Attending: FAMILY MEDICINE
Payer: MEDICARE

## 2017-03-09 DIAGNOSIS — M25.562 LEFT KNEE PAIN, UNSPECIFIED CHRONICITY: ICD-10-CM

## 2017-03-09 DIAGNOSIS — M25.662 DECREASED ROM OF LEFT KNEE: ICD-10-CM

## 2017-03-09 PROCEDURE — 97140 MANUAL THERAPY 1/> REGIONS: CPT | Mod: PN

## 2017-03-09 PROCEDURE — 97110 THERAPEUTIC EXERCISES: CPT | Mod: PN

## 2017-03-09 NOTE — PROGRESS NOTES
"TIME RECORD    Date:  03/09/2017    Start Time:  1200  Stop Time:  0115    PROCEDURES:    TIMED  Procedure Min.   TE supervised 35' NC   TE 15   MT 15'             UNTIMED  Procedure Min.   CP 10'          Total Timed Minutes:  30'  Total Timed Units:  2  Total Untimed Units:  0  Charges Billed/# of units:  2 ( 1 TE, 1 MT)       Progress/Current Status    Subjective:     Patient ID: Chelly Ortiz is a 66 y.o. female.  Diagnosis:   1. Left knee pain, unspecified chronicity     2. Decreased ROM of left knee       Pain:   Patient reports pain 3/10 L knee.  Objective:     Patient performed therex per log below with supervision x 35 minutes and 1:1 with PTA x 15 minutes with added standing exercises.  Pt received MT consisting of (B) LE elevated on wedge for retrograde edema reduction to (L) knee, STM to (L) quad, (L) gastroc, (L) hamstring x 15'. Pt CP to (L) knee at end of therapy session with (L) LE elevated on chair.     Date  3/9/17 3/6/17   VISIT 3 2   Gcode 3/10 2/10   FOTO 3/5 2/5   Cap Visit   Cap Total 61.84  231.26 61.84  169.42   MT 15' 15'   Long Sit HS 3x30"   strap 3x30" strap    Gastroc Str. Fitter L1  30"x3 3x30" stairs   Bike 7' 5'   QS 5"x10 5"x10   SAQ 3x10 B 2x10 B   SLR 2x10 B 1x10 B   SL Abd 2x10 B 2x10 B   Heel Slides  -   Sada Hip Add 5"x10 5"x10   LAQs 3x10 B 2x10 B   Seated Hip Flex 3x10 B 2x10 B   Cybex   Leg Press - -   TKE - -   Hip Abd // 2x10 -   Hip Flex // 2x10 -   Hip Ext // 2x10 -   HS Curls - -   Knee Ext - -   Step Ups - -   Step Downs - -   Gait - -   CP 10 10'   Initials CS 1/6 CK         Assessment:     Patient performed addition of standing therex with minimal difficulty.    Patient Education/Response:     Continue with HEP.    Plans and Goals:   Continue per POC, progress pt as tolerated  Short Term Goals = Long Term Goals  1. Pt will independent with HEP   2. Pt will improve (B) LE strength to 5/5 on MMT in order to improve functional mobility  3. Pt will improve FOTO knee " survey score to < /45% limited in order to have improvement in functional mobility  4. Pt will improve (L) knee AROM = (R) knee AROM in order to be able to perform ADLs with less difficulty   5. Pt's (L) knee circumferential measurements will = (R) knee circumferential measurements in order to demo decreased swelling in (L) knee  6. Pt will report experiencing 0/10 pain in (L) knee when performing ADLs in order to be able to perform ADLs with less difficulty.

## 2017-03-14 ENCOUNTER — CLINICAL SUPPORT (OUTPATIENT)
Dept: REHABILITATION | Facility: HOSPITAL | Age: 67
End: 2017-03-14
Attending: FAMILY MEDICINE
Payer: MEDICARE

## 2017-03-14 DIAGNOSIS — M25.562 LEFT KNEE PAIN, UNSPECIFIED CHRONICITY: ICD-10-CM

## 2017-03-14 DIAGNOSIS — M25.662 DECREASED ROM OF LEFT KNEE: ICD-10-CM

## 2017-03-14 PROCEDURE — 97110 THERAPEUTIC EXERCISES: CPT | Mod: PN

## 2017-03-14 PROCEDURE — 97140 MANUAL THERAPY 1/> REGIONS: CPT | Mod: PN

## 2017-03-14 NOTE — PROGRESS NOTES
"TIME RECORD    Date:  03/14/2017    Start Time:  100  Stop Time:  200    PROCEDURES:    TIMED  Procedure Min.   Manual therapy 15   Therex 25     Total Timed Minutes:  40  Total Timed Units:  3  Total Untimed Units:  0  Charges Billed/# of units:  3 MTx1, TEx2      Progress/Current Status    Subjective:     Patient ID: Chelly Ortiz is a 66 y.o. female.  Diagnosis:   1. Left knee pain, unspecified chronicity     2. Decreased ROM of left knee       Pain: 2.5 /10  "less swollen - but I can't tell the difference in the pain."    Objective:     Pt received MT consisting of (B) LE elevated on wedge for retrograde edema reduction to (L) knee, STM to (L) quad, (L) gastroc, (L) hamstring x 15'  Kinesiotape applied ro left knee for mechanical correction "double C". Patient performed therex per log below with 1:1 by PTA x 25 minutes and 1:1 with supervision x 10 minutes with added reps and trial with weight as noted.      Date  3/14/17 3/9/17 3/6/17   VISIT 4 3 2   Gcode 4/10 3/10 2/10   FOTO 4/5 3/5 2/5   Cap Visit   Cap Total   93.82  325.08 61.84  231.26 61.84  169.42   MT 15' 15' 15'   Long Sit HS Stairs  30:x3 B 3x30"   strap 3x30" strap    Gastroc Str. Stairs  30"x3 B Fitter L1  30"x3 3x30" stairs   Bike  7' 5'   QS 5"x12 5"x10 5"x10   SAQ 2#   2x10 B 3x10 B 2x10 B   SLR 2x12 B 2x10 B 1x10 B   SL Abd 2x12 B 2x10 B 2x10 B   Heel Slides   -   Sada Hip Add 5"x12 5"x10 5"x10   LAQs 2x15 B 3x10 B 2x10 B   Seated Hip Flex 2x15 B 3x10 B 2x10 B   Cybex   Leg Press  - -   TKE  - -   Hip Abd // 2x15 B // 2x10 -   Hip Flex // 2x15 B // 2x10 -   Hip Ext // 2x15 B // 2x10 -   HS Curls -- - -   Knee Ext -- - -   Step Ups -- - -   Step Downs -- - -   Gait -- - -   CP declined 10 10'   Initials DH 2/6 CS 1/6 CK       Assessment:     Patient able to increase activity with added weight and reps with reports of "pretty good" after treatment.    Patient Education/Response:     Patient educated to continue HEP.  Verbalized " understanding.      Plans and Goals:     Continue per POC, progress pt as tolerated.    Short Term Goals = Long Term Goals  1. Pt will independent with HEP   2. Pt will improve (B) LE strength to 5/5 on MMT in order to improve functional mobility  3. Pt will improve FOTO knee survey score to < /45% limited in order to have improvement in functional mobility  4. Pt will improve (L) knee AROM = (R) knee AROM in order to be able to perform ADLs with less difficulty   5. Pt's (L) knee circumferential measurements will = (R) knee circumferential measurements in order to demo decreased swelling in (L) knee  6. Pt will report experiencing 0/10 pain in (L) knee when performing ADLs in order to be able to perform ADLs with less difficulty.

## 2017-03-17 RX ORDER — LOSARTAN POTASSIUM 50 MG/1
TABLET ORAL
Qty: 90 TABLET | Refills: 3 | Status: SHIPPED | OUTPATIENT
Start: 2017-03-17 | End: 2017-11-19 | Stop reason: SDUPTHER

## 2017-03-21 ENCOUNTER — CLINICAL SUPPORT (OUTPATIENT)
Dept: REHABILITATION | Facility: HOSPITAL | Age: 67
End: 2017-03-21
Attending: FAMILY MEDICINE
Payer: MEDICARE

## 2017-03-21 DIAGNOSIS — M25.662 DECREASED ROM OF LEFT KNEE: ICD-10-CM

## 2017-03-21 DIAGNOSIS — M25.562 LEFT KNEE PAIN, UNSPECIFIED CHRONICITY: ICD-10-CM

## 2017-03-21 PROCEDURE — 97110 THERAPEUTIC EXERCISES: CPT | Mod: PN

## 2017-03-21 NOTE — PROGRESS NOTES
"TIME RECORD    Date:  03/21/2017    Start Time:  100  Stop Time:  200    PROCEDURES:    TIMED  Procedure Min.       Therex 55     Total Timed Minutes:  55  Total Timed Units:  4  Total Untimed Units:  0  Charges Billed/# of units:  , Roberto 4      Progress/Current Status    Subjective:     Patient ID: Chelly Ortiz is a 66 y.o. female.  Diagnosis:   1. Left knee pain, unspecified chronicity     2. Decreased ROM of left knee       Pain: 2.5 /10; 0/10 following interventions  Chelly reports her Left hip has began to hurt since beginning PT. Chelly declined, ice, manual therapy and Kinisiotape.     Objective:     Treatment initiated with Bike. Instructed patient to notify if pain occurs with any exercise. Pt DECLINED  MT consisting of (B) LE elevated on wedge for retrograde edema reduction to (L) knee, STM to (L) quad, (L) gastroc, (L) hamstring x0'  knee for mechanical . Patient was offered KT but declined. Patient performed therex per log below with 1:1 by PTA x 55 minutes. HS produced pain so SKC performed which abolished pain. Right closed chain Left hip flex also produced pain so also deferred and pain abolished.     Date  3/21/17 3/14/17 3/9/17 3/6/17   VISIT 5 4 3 2   Gcode 5/10 4/10 3/10 2/10   FOTO 5/5 4/5 3/5 2/5   Cap Visit   Cap Total 127.92  453.00   93.82  325.08 61.84  231.26 61.84  169.42   MT declined 15' 15' 15'   Supine HSS w/strap 3x30" Stairs  30:x3 B 3x30"   strap 3x30" strap    SKC stretch w/towel 3x30"      Gastroc Str. 3x30" EOS Stairs  30"x3 B Fitter L1  30"x3 3x30" stairs   Bike 5'  7' 5'   QS 5"x10 5"x12 5"x10 5"x10   SAQ 3#  2x10 B 2#   2x10 B 3x10 B 2x10 B   SLR 2x10 B 2x12 B 2x10 B 1x10 B   SL Abd  2x12 B 2x10 B 2x10 B   Heel Slides 2x10 (P)   -   Sada Hip Add 5"x12 5"x12 5"x10 5"x10   LAQs 2x15 B 2x15 B 3x10 B 2x10 B   Seated Hip Flex - 2x15 B 3x10 B 2x10 B   Cybex   Leg Press   - -   Heel raises //2x10 B      TKE   - -   Hip Abd //2x15 B // 2x15 B // 2x10 -   Hip Flex //2x15 B // 2x15 B // " 2x10 -   Hip Ext //2x15 B // 2x15 B // 2x10 -   HS Curls  -- - -   Knee Ext  -- - -   Step Ups  -- - -   Step Downs  -- - -   Gait  -- - -   CP declined declined 10 10'   Initials MB 3/6 DH 2/6 CS 1/6 CK       Assessment:     Patient tolerated fair. Pain produced with supine HS and Right SL Left hip abd so both were deferred upon patient reporting pain.     Patient Education/Response:     Patient educated to continue HEP.  Verbalized understanding. Reviewed importance of HEP and that exercises benefit her hip and LB.      Plans and Goals:     Continue per POC, progress pt as tolerated.    Short Term Goals = Long Term Goals  1. Pt will independent with HEP   2. Pt will improve (B) LE strength to 5/5 on MMT in order to improve functional mobility  3. Pt will improve FOTO knee survey score to < /45% limited in order to have improvement in functional mobility  4. Pt will improve (L) knee AROM = (R) knee AROM in order to be able to perform ADLs with less difficulty   5. Pt's (L) knee circumferential measurements will = (R) knee circumferential measurements in order to demo decreased swelling in (L) knee  6. Pt will report experiencing 0/10 pain in (L) knee when performing ADLs in order to be able to perform ADLs with less difficulty.

## 2017-03-23 ENCOUNTER — CLINICAL SUPPORT (OUTPATIENT)
Dept: REHABILITATION | Facility: HOSPITAL | Age: 67
End: 2017-03-23
Attending: FAMILY MEDICINE
Payer: MEDICARE

## 2017-03-23 DIAGNOSIS — M25.662 DECREASED ROM OF LEFT KNEE: ICD-10-CM

## 2017-03-23 DIAGNOSIS — M25.562 LEFT KNEE PAIN, UNSPECIFIED CHRONICITY: ICD-10-CM

## 2017-03-23 PROCEDURE — 97110 THERAPEUTIC EXERCISES: CPT | Mod: PN

## 2017-03-23 NOTE — PROGRESS NOTES
"TIME RECORD    Date:  03/23/2017    Start Time:  1:00  Stop Time:   1:45    PROCEDURES:    TIMED  Procedure Min.   TE supervised 15   Therex 30     Total Timed Minutes:  45  Total Timed Units:  3  Total Untimed Units:  0  Charges Billed/# of units:  , Roberto 2      Progress/Current Status    Subjective:     Patient ID: Chelly Ortiz is a 66 y.o. female.  Diagnosis:   1. Left knee pain, unspecified chronicity     2. Decreased ROM of left knee       Pain:  0/10 pre visit; 0/10 following interventions  Chelly reports her Left hip pain has gone. Chelly declined, ice, manual therapy and Kinisiotape.     Objective:     Treatment initiated with Bike. Instructed patient to notify if pain occurs with any exercise. Pt DECLINED  MT consisting of (B) LE elevated on wedge for retrograde edema reduction to (L) knee, STM to (L) quad, (L) gastroc, (L) hamstring x0'  knee for mechanical . Patient was offered KT but declined. Patient performed therex per log below with 1:1 by PTA x 45 minutes.      Date  3/23/17 3/21/17 3/14/17 3/9/17 3/6/17   VISIT 6 5 4 3 2   Gcode 6/10 5/10 4/10 3/10 2/10   FOTO 6/ 5/5 4/5 3/5 2/5   Cap Visit   Cap Total  127.92  453.00   93.82  325.08 61.84  231.26 61.84  169.42   MT  declined 15' 15' 15'   Supine HSS w/strap 3x30" 3x30" Stairs  30:x3 B 3x30"   strap 3x30" strap    SKC stretch w/towel  3x30"      Gastroc Str. 3x30"  EOS 3x30" EOS Stairs  30"x3 B Fitter L1  30"x3 3x30" stairs   Bike 5' 5'  7' 5'   QS  5"x10 5"x12 5"x10 5"x10   SAQ 3#  2x10 B 3#  2x10 B 2#   2x10 B 3x10 B 2x10 B   SLR 2x10 B 2x10 B 2x12 B 2x10 B 1x10 B   SL Abd 2x15 B  2x12 B 2x10 B 2x10 B   Heel Slides  2x10 (P)   -   Sada Hip Add 5"x12 5"x12 5"x12 5"x10 5"x10   LAQs 2x15 B 3# 2x15 B 2x15 B 3x10 B 2x10 B   Seated Hip Flex 2x15 B - 2x15 B 3x10 B 2x10 B   Cybex   Leg Press    - -   Heel raises 2 x 10 B //2x10 B      TKE    - -   Hip Abd 2x15 B //2x15 B // 2x15 B // 2x10 -   Hip Flex 2x15 B //2x15 B // 2x15 B // 2x10 -   Hip Ext 2x15 B " //2x15 B // 2x15 B // 2x10 -   HS Curls   -- - -   Knee Ext   -- - -   Step Ups   -- - -   Step Downs   -- - -   Gait   -- - -   CP  declined declined 10 10'   Initials MB 4/6 MB 3/6 DH 2/6 CS 1/6 CK       Assessment:     Patient tolerated well. No pain throughout treatment.    Patient Education/Response:     Patient educated to continue HEP.  Verbalized understanding. Reviewed importance of HEP and that exercises benefit her hip and LB.      Plans and Goals:     Continue per POC, progress pt as tolerated.    Short Term Goals = Long Term Goals  1. Pt will independent with HEP   2. Pt will improve (B) LE strength to 5/5 on MMT in order to improve functional mobility  3. Pt will improve FOTO knee survey score to < /45% limited in order to have improvement in functional mobility  4. Pt will improve (L) knee AROM = (R) knee AROM in order to be able to perform ADLs with less difficulty   5. Pt's (L) knee circumferential measurements will = (R) knee circumferential measurements in order to demo decreased swelling in (L) knee  6. Pt will report experiencing 0/10 pain in (L) knee when performing ADLs in order to be able to perform ADLs with less difficulty.

## 2017-03-27 ENCOUNTER — OFFICE VISIT (OUTPATIENT)
Dept: OPTOMETRY | Facility: CLINIC | Age: 67
End: 2017-03-27
Payer: MEDICARE

## 2017-03-27 DIAGNOSIS — H04.123 BILATERAL DRY EYES: ICD-10-CM

## 2017-03-27 DIAGNOSIS — E11.9 TYPE 2 DIABETES MELLITUS WITHOUT COMPLICATION, UNSPECIFIED LONG TERM INSULIN USE STATUS: Primary | ICD-10-CM

## 2017-03-27 DIAGNOSIS — H52.7 REFRACTIVE ERROR: ICD-10-CM

## 2017-03-27 DIAGNOSIS — H25.12 NUCLEAR SCLEROTIC CATARACT OF LEFT EYE: ICD-10-CM

## 2017-03-27 PROCEDURE — 99499 UNLISTED E&M SERVICE: CPT | Mod: S$GLB,,, | Performed by: OPTOMETRIST

## 2017-03-27 PROCEDURE — 92014 COMPRE OPH EXAM EST PT 1/>: CPT | Mod: S$GLB,,, | Performed by: OPTOMETRIST

## 2017-03-27 PROCEDURE — 99999 PR PBB SHADOW E&M-EST. PATIENT-LVL III: CPT | Mod: PBBFAC,,, | Performed by: OPTOMETRIST

## 2017-03-27 RX ORDER — FLUOROMETHOLONE 1 MG/ML
1 SUSPENSION/ DROPS OPHTHALMIC 3 TIMES DAILY
Qty: 5 ML | Refills: 0 | Status: SHIPPED | OUTPATIENT
Start: 2017-03-27 | End: 2017-03-29

## 2017-03-27 NOTE — PROGRESS NOTES
HPI     Concerns About Ocular Health    Additional comments: here for annual exam last seen by Dr Colvin 7/2016             Comments   Diabetic eye exam  Eyes ich burn and water, started in Jan. This year tried zaditor not much   help  Using AT prn with min relief  Last seen Dr Holden  4/11/14  No flashes or floaters  No diplopia  Diabetes  Pseudophakia od Dr Hurtado           Last edited by Agusto Holden, OD on 3/27/2017  2:23 PM. (History)        ROS     Negative for: Constitutional, Gastrointestinal, Neurological, Skin,   Genitourinary, Musculoskeletal, HENT, Endocrine, Cardiovascular, Eyes,   Respiratory, Psychiatric, Allergic/Imm, Heme/Lymph    Last edited by Agusto Holden, OD on 3/27/2017  2:21 PM. (History)        Assessment /Plan     For exam results, see Encounter Report.    Type 2 diabetes mellitus without complication, unspecified long term insulin use status    Bilateral dry eyes  -     fluorometholone 0.1% (FML) 0.1 % DrpS; Place 1 drop into both eyes 3 (three) times daily.  Dispense: 5 mL; Refill: 0    Nuclear sclerotic cataract of left eye    Refractive error      1. No diabetic retinopathy, no csme. Return in 1 year for dilated eye exam.  2. Causing discomfort, start fml drops 1 drop 3x/day x 10 days. Then restart otc allergy drops.  3. Educated pt on presence of cataracts and effects on vision. No surgery at this time. Recheck in one year.  4. Spec Rx given. Different lens options discussed with patient. RTC 1 year full exam.           3/29/17 fml too expensive, can try Maxitrol.

## 2017-03-28 ENCOUNTER — CLINICAL SUPPORT (OUTPATIENT)
Dept: REHABILITATION | Facility: HOSPITAL | Age: 67
End: 2017-03-28
Attending: FAMILY MEDICINE
Payer: MEDICARE

## 2017-03-28 DIAGNOSIS — M25.662 DECREASED ROM OF LEFT KNEE: ICD-10-CM

## 2017-03-28 PROCEDURE — G8980 MOBILITY D/C STATUS: HCPCS | Mod: CK,PN

## 2017-03-28 PROCEDURE — G8979 MOBILITY GOAL STATUS: HCPCS | Mod: CK,PN

## 2017-03-28 PROCEDURE — 97110 THERAPEUTIC EXERCISES: CPT | Mod: PN

## 2017-03-28 NOTE — PROGRESS NOTES
"TIME RECORD    Date:  03/28/2017    Start Time:  2:00 pm   Stop Time:  2:44 pm     PROCEDURES:    TIMED  Procedure Min.   TE supervised 5' NC   TE 39'                 UNTIMED  Procedure Min.             Total Timed Minutes:  39'  Total Timed Units:  3  Total Untimed Units:  0  Charges Billed/# of units:  3 TE      Progress/Current Status    Subjective:     Patient ID: Chelly Ortiz is a 66 y.o. female.  Diagnosis:   1. Decreased ROM of left knee       Pain: 0 /10  Pt reported that her (L) knee felt swollen prior to therapy session today.     Objective:     Treatment initiated with Bike x 5' supervised by PT. Objective measurements were taken and pt participated in therex per log below 1:1 with PT x 39'.     Date  3/28/17 3/23/17 3/21/17 3/14/17 3/9/17 3/6/17   VISIT 7 6 5 4 3 2   Gcode 7/10 6/10 5/10 4/10 3/10 2/10   FOTO 7 6/ 5/5 4/5 3/5 2/5   Cap Visit   Cap Total   127.92  453.00   93.82  325.08 61.84  231.26 61.84  169.42   MT   declined 15' 15' 15'   Supine HSS w/strap 3x30" B 3x30" 3x30" Stairs  30:x3 B 3x30"   strap 3x30" strap    SKC stretch w/towel -  3x30"      Gastroc Str.  3x30"  EOS 3x30" EOS Stairs  30"x3 B Fitter L1  30"x3 3x30" stairs   Bike 5' 5' 5'  7' 5'   QS 5"x15   5"x10 5"x12 5"x10 5"x10   SAQ - 3#  2x10 B 3#  2x10 B 2#   2x10 B 3x10 B 2x10 B   SLR RTB 2x10 2x10 B 2x10 B 2x12 B 2x10 B 1x10 B   SL Abd RTB 2x10 2x15 B  2x12 B 2x10 B 2x10 B   Heel Slides -  2x10 (P)   -   Sada Hip Add  5"x12 5"x12 5"x12 5"x10 5"x10   LAQs RTB 2x10 B 2x15 B 3# 2x15 B 2x15 B 3x10 B 2x10 B   Seated Hip Flex - 2x15 B - 2x15 B 3x10 B 2x10 B   Cybex   Leg Press -    - -   Heel raises - 2 x 10 B //2x10 B      TKE -    - -   Hip Abd 2x15 B 2x15 B //2x15 B // 2x15 B // 2x10 -   Hip Flex 2x15 B 2x15 B //2x15 B // 2x15 B // 2x10 -   Hip Ext 2x15 B 2x15 B //2x15 B // 2x15 B // 2x10 -   HS Curls -   -- - -   Knee Ext -   -- - -   Step Ups -   -- - -   Step Downs -   -- - -   Gait -   -- - -   CP   declined declined 10 10' "   Initials CK MB 4/6 MB 3/6 DH 2/6 CS 1/6 CK       Assessment:     See DC summary below    Patient Education/Response:     See DC summary below    Plans and Goals:     DC from PT     REHAB SERVICES OUTPATIENT DISCHARGE SUMMARY  Physical Therapy      Name:  Chelly Ortiz  Date:  3/28/17  Date of Evaluation:  3/3/17  Physician:  Kathy Antony MD  Total # Of Visits:  7  Diagnosis:    1. Decreased ROM of left knee         Physical/Functional Status:  At time of discharge, patient stated that she has not been experiencing pain in (L) knee. Pt demo improved (L) knee AROM, improved (B) LE strength, and improved FOTO knee survey score compared to initial evaluation. Pt was agreeable to being discharged with HEP at this time. See objective measurements below.     Range of Motion/Strength: 3/28/17     Hip Right   Left   Pain/Dysfunction with Movement     AROM MMT AROM MMT     Flexion WFL 5/5 WFL 4+/5     Extension NT NT NT NT Able to perform good bridge against gravity    Abduction WFL 5/5 WFL 5/5        Knee Right   Left     Pain/Dysfunction with Movement     AROM MMT AROM PROM MMT     Flexion 120 4+/5 120 NT 4+/5     Extension 2 (lacking) 4+/5 5 (lacking)  2 4+/5        Ankle Right   Left   Pain/Dysfunction with Movement     AROM MMT AROM MMT     Plantarflexion WFL 5/5 WFL 5/5     Dorsiflexion WFL 5/5 WFL 5/5        FOTO knee survey: 49% limited    Range of Motion/Strength: initial evaluation  strength measurements taken within available ROM     Hip Right   Left   Pain/Dysfunction with Movement     AROM MMT AROM MMT     Flexion WFL 4+/5 WFL 4+/5     Extension NT NT NT NT Able to perform good bridge against gravity    Abduction WFL 4/5 WFL 4/5        Knee Right   Left     Pain/Dysfunction with Movement     AROM MMT AROM PROM MMT     Flexion 120 4/5 110 125 4/5     Extension 2 (lacking) 4/5 5 (lacking)  2 4/5        Ankle Right   Left   Pain/Dysfunction with Movement     AROM MMT AROM MMT     Plantarflexion WFL 5/5 WFL  5/5     Dorsiflexion WFL 5/5 WFL 5/5      FOTO knee survey score: 65% limited   The patient is to be discharged from our Therapy service for the following reason(s):  Patient has reached the maximum rehab potential for the present time    Degree of Goal Achievement:  Patient has partially met goals    Short Term Goals = Long Term Goals  1. Pt will independent with HEP - MET  2. Pt will improve (B) LE strength to 5/5 on MMT in order to improve functional mobility - PARTIALLY MET  3. Pt will improve FOTO knee survey score to < /45% limited in order to have improvement in functional mobility - PARTIALLY MET  4. Pt will improve (L) knee AROM = (R) knee AROM in order to be able to perform ADLs with less difficulty  - MET  5. Pt's (L) knee circumferential measurements will = (R) knee circumferential measurements in order to demo decreased swelling in (L) knee - NT due to pt wearing jeans   6. Pt will report experiencing 0/10 pain in (L) knee when performing ADLs in order to be able to perform ADLs with less difficulty. - PARTIALLY MET    Patient Education:  Pt was educated on and given handout on updated HEP consisting of quad sets, SLR, SL abduction, supine hamstring stretch, LAQ, seated gastroc stretch, standing hip flexion, standing hip abduction, standing hip extension. Pt was given RTB to use for resistance. Pt demo and verbalized understanding.     Discharge Plan:  Home Program:  See above. Follow up with MD

## 2017-03-28 NOTE — Clinical Note
Please see PT DC summary for Chelly Ortiz,  [birth year]. Thank you for this referral.   Bessie Brambila, PT 3/28/2017

## 2017-03-29 ENCOUNTER — TELEPHONE (OUTPATIENT)
Dept: OPTOMETRY | Facility: CLINIC | Age: 67
End: 2017-03-29

## 2017-03-29 RX ORDER — NEOMYCIN SULFATE, POLYMYXIN B SULFATE AND DEXAMETHASONE 3.5; 10000; 1 MG/ML; [USP'U]/ML; MG/ML
1 SUSPENSION/ DROPS OPHTHALMIC 4 TIMES DAILY
Qty: 5 ML | Refills: 0 | Status: SHIPPED | OUTPATIENT
Start: 2017-03-29 | End: 2017-04-05

## 2017-03-29 NOTE — TELEPHONE ENCOUNTER
----- Message from Agusto Holden, OD sent at 3/29/2017  1:08 PM CDT -----  Contact: Chelly Ortiz [[id number]]  The fml I Rx'd was a generic, but even the generic can be expensive. I called in Maxitrol which is cheaper, it has steroid and antibiotic.     Thanks    Dr. HENDRICKS  ----- Message -----     From: Susie Bartlett     Sent: 3/29/2017  12:37 PM       To: Agusto Holden, OD    Pt. Would like a generic for FML drops. If you authorize it, I will call.  ----- Message -----     From: Crescencio Drummond     Sent: 3/29/2017  11:48 AM       To: Adina Michael    Pt states she needs to know if alternate eye drops can be prescribed, stating the one given is too expensive,please call back at [phone],thanks

## 2017-03-29 NOTE — TELEPHONE ENCOUNTER
----- Message from Crescencio Drummond sent at 3/29/2017 11:48 AM CDT -----  Contact: Chelly Ortiz [[id number]]  Pt states she needs to know if alternate eye drops can be prescribed, stating the one given is too expensive,please call back at [phone],thanks    I will verify with Dr. Holden.

## 2017-03-31 ENCOUNTER — OFFICE VISIT (OUTPATIENT)
Dept: INTERNAL MEDICINE | Facility: CLINIC | Age: 67
End: 2017-03-31
Payer: MEDICARE

## 2017-03-31 VITALS
SYSTOLIC BLOOD PRESSURE: 120 MMHG | HEIGHT: 63 IN | TEMPERATURE: 98 F | BODY MASS INDEX: 34.38 KG/M2 | DIASTOLIC BLOOD PRESSURE: 84 MMHG | HEART RATE: 86 BPM | WEIGHT: 194 LBS

## 2017-03-31 DIAGNOSIS — M75.41 ROTATOR CUFF IMPINGEMENT SYNDROME, RIGHT: Primary | ICD-10-CM

## 2017-03-31 DIAGNOSIS — M65.332 TRIGGER MIDDLE FINGER OF LEFT HAND: ICD-10-CM

## 2017-03-31 DIAGNOSIS — M75.41 IMPINGEMENT SYNDROME OF RIGHT SHOULDER: ICD-10-CM

## 2017-03-31 PROCEDURE — 1125F AMNT PAIN NOTED PAIN PRSNT: CPT | Mod: S$GLB,,, | Performed by: FAMILY MEDICINE

## 2017-03-31 PROCEDURE — 1159F MED LIST DOCD IN RCRD: CPT | Mod: S$GLB,,, | Performed by: FAMILY MEDICINE

## 2017-03-31 PROCEDURE — 1160F RVW MEDS BY RX/DR IN RCRD: CPT | Mod: S$GLB,,, | Performed by: FAMILY MEDICINE

## 2017-03-31 PROCEDURE — 3079F DIAST BP 80-89 MM HG: CPT | Mod: S$GLB,,, | Performed by: FAMILY MEDICINE

## 2017-03-31 PROCEDURE — 3074F SYST BP LT 130 MM HG: CPT | Mod: S$GLB,,, | Performed by: FAMILY MEDICINE

## 2017-03-31 PROCEDURE — 99214 OFFICE O/P EST MOD 30 MIN: CPT | Mod: S$GLB,,, | Performed by: FAMILY MEDICINE

## 2017-03-31 PROCEDURE — 1157F ADVNC CARE PLAN IN RCRD: CPT | Mod: S$GLB,,, | Performed by: FAMILY MEDICINE

## 2017-03-31 PROCEDURE — 99999 PR PBB SHADOW E&M-EST. PATIENT-LVL III: CPT | Mod: PBBFAC,,, | Performed by: FAMILY MEDICINE

## 2017-03-31 RX ORDER — TRAMADOL HYDROCHLORIDE 50 MG/1
50 TABLET ORAL EVERY 6 HOURS PRN
Qty: 60 TABLET | Refills: 0 | Status: SHIPPED | OUTPATIENT
Start: 2017-03-31 | End: 2017-04-10

## 2017-03-31 RX ORDER — MELOXICAM 15 MG/1
15 TABLET ORAL DAILY
Qty: 30 TABLET | Refills: 3 | Status: SHIPPED | OUTPATIENT
Start: 2017-03-31 | End: 2017-07-07 | Stop reason: SDUPTHER

## 2017-03-31 NOTE — PROGRESS NOTES
Subjective:       Patient ID: Chelly Ortiz is a 66 y.o. female.    Chief Complaint: Shoulder Pain (right shoulder) and Hand Pain (middle finger)    HPI 66-year-old -American female with known right shoulder supraspinatus tear and tendinitis presents to clinic today secondary to a complaint of continued right shoulder pain which has been worsening since January.  She uses Tylenol with mild relief but continues to note pain to the shoulder and weakness with overhead activities.  Secondarily she has begun noticing pain to her left middle finger with activity.  Review of Systems   Constitutional: Negative for appetite change, chills, fatigue and fever.   HENT: Negative for congestion, ear pain, hearing loss, postnasal drip, rhinorrhea, sinus pressure, sore throat and tinnitus.    Eyes: Negative for redness, itching and visual disturbance.   Respiratory: Negative for cough, chest tightness and shortness of breath.    Cardiovascular: Negative for chest pain and palpitations.   Gastrointestinal: Negative for abdominal pain, constipation, diarrhea, nausea and vomiting.   Genitourinary: Negative for decreased urine volume, difficulty urinating, dysuria, frequency, hematuria and urgency.   Musculoskeletal: Positive for arthralgias (right shoulder and the left middle finger). Negative for back pain, myalgias, neck pain and neck stiffness.   Skin: Negative for rash.   Neurological: Negative for dizziness, light-headedness and headaches.   Psychiatric/Behavioral: Negative.        Objective:      Physical Exam   Constitutional: She is oriented to person, place, and time. She appears well-developed and well-nourished. No distress.   HENT:   Head: Normocephalic and atraumatic.   Right Ear: External ear normal.   Left Ear: External ear normal.   Nose: Nose normal.   Mouth/Throat: Oropharynx is clear and moist. No oropharyngeal exudate.   Eyes: Conjunctivae and EOM are normal. Pupils are equal, round, and reactive to light.  Right eye exhibits no discharge. Left eye exhibits no discharge. No scleral icterus.   Neck: Normal range of motion. Neck supple. No JVD present. No tracheal deviation present. No thyromegaly present.   Cardiovascular: Normal rate, regular rhythm, normal heart sounds and intact distal pulses.  Exam reveals no gallop and no friction rub.    No murmur heard.  Pulmonary/Chest: Effort normal and breath sounds normal. No stridor. No respiratory distress. She has no wheezes. She has no rales.   Abdominal: Soft. Bowel sounds are normal. She exhibits no distension and no mass. There is no tenderness. There is no rebound and no guarding.   Musculoskeletal: She exhibits no edema.        Right shoulder: She exhibits decreased range of motion, tenderness, pain, spasm and decreased strength (2/5 strength with external rotation ).        Left hand: She exhibits tenderness (middle finger). Decreased sensation noted.   Lymphadenopathy:     She has no cervical adenopathy.   Neurological: She is alert and oriented to person, place, and time.   Skin: Skin is warm and dry. No rash noted. She is not diaphoretic. No erythema. No pallor.   Psychiatric: She has a normal mood and affect. Her behavior is normal. Judgment and thought content normal.   Nursing note and vitals reviewed.      Assessment:       1. Rotator cuff impingement syndrome, right    2. Impingement syndrome of right shoulder    3. Trigger middle finger of left hand        Plan:       Rotator cuff impingement syndrome, right  -     Ambulatory Referral to Orthopedics  -     meloxicam (MOBIC) 15 MG tablet; Take 1 tablet (15 mg total) by mouth once daily.  Dispense: 30 tablet; Refill: 3  -     tramadol (ULTRAM) 50 mg tablet; Take 1 tablet (50 mg total) by mouth every 6 (six) hours as needed for Pain.  Dispense: 60 tablet; Refill: 0    Impingement syndrome of right shoulder  -     Ambulatory Referral to Orthopedics  -     meloxicam (MOBIC) 15 MG tablet; Take 1 tablet (15 mg  total) by mouth once daily.  Dispense: 30 tablet; Refill: 3  -     tramadol (ULTRAM) 50 mg tablet; Take 1 tablet (50 mg total) by mouth every 6 (six) hours as needed for Pain.  Dispense: 60 tablet; Refill: 0    Trigger middle finger of left hand  -     Ambulatory Referral to Orthopedics  -     meloxicam (MOBIC) 15 MG tablet; Take 1 tablet (15 mg total) by mouth once daily.  Dispense: 30 tablet; Refill: 3  -     tramadol (ULTRAM) 50 mg tablet; Take 1 tablet (50 mg total) by mouth every 6 (six) hours as needed for Pain.  Dispense: 60 tablet; Refill: 0      Return to clinic as needed if symptoms persist or worsen.

## 2017-03-31 NOTE — MR AVS SNAPSHOT
[facility]   [facility]  [place] [address]  Phone: [phone]  Fax: [phone]                  Chelly Ortiz   3/31/2017 2:40 PM   Office Visit    Description:  Female : 1950   Provider:  Be Aguirre MD   Department:  [facility]           Reason for Visit     Shoulder Pain     Hand Pain           Diagnoses this Visit        Comments    Rotator cuff impingement syndrome, right    -  Primary     Impingement syndrome of right shoulder         Trigger middle finger of left hand                To Do List           Goals (5 Years of Data)     None      Follow-Up and Disposition     Return if symptoms worsen or fail to improve.       These Medications        Disp Refills Start End    meloxicam (MOBIC) 15 MG tablet 30 tablet 3 3/31/2017     Take 1 tablet (15 mg total) by mouth once daily. - Oral    Pharmacy: [facility] [place] [address] - [address] [facility] Ph #: [phone]       tramadol (ULTRAM) 50 mg tablet 60 tablet 0 3/31/2017 4/10/2017    Take 1 tablet (50 mg total) by mouth every 6 (six) hours as needed for Pain. - Oral    Pharmacy: [facility] [place] [address] - [address] [facility] Ph #: [phone]         [facility]     [facility] Nurse Care Line - 24/ Assistance  Unless otherwise directed by your provider, please contact [facility], our nurse care line that is available for / assistance.     Registered nurses in the [facility] On Call Center provide: appointment scheduling, clinical advisement, health education, and other advisory services.  Call: [phone] (toll free)               Medications           Message regarding Medications     Verify the changes and/or additions to your medication regime listed below are the same as discussed with your clinician today.  If any of these changes or additions are incorrect, please notify your healthcare provider.        START taking these  NEW medications        Refills    meloxicam (MOBIC) 15 MG tablet 3    Sig: Take 1 tablet (15 mg total) by mouth once daily.    Class: Print    Route: Oral    tramadol (ULTRAM) 50 mg tablet 0    Sig: Take 1 tablet (50 mg total) by mouth every 6 (six) hours as needed for Pain.    Class: Print    Route: Oral      STOP taking these medications     ketoconazole (NIZORAL) 2 % shampoo Wash hair with medicated shampoo at least 2x/week - let sit on scalp at least 5 minutes prior to rinsing    omeprazole (PRILOSEC) 20 MG capsule TAKE 2 CAPSULES BY MOUTH DAILY AS NEEDED           Verify that the below list of medications is an accurate representation of the medications you are currently taking.  If none reported, the list may be blank. If incorrect, please contact your healthcare provider. Carry this list with you in case of emergency.           Current Medications     amlodipine (NORVASC) 5 MG tablet Take 1 tablet (5 mg total) by mouth once daily.    aspirin (ECOTRIN) 81 MG EC tablet Take 81 mg by mouth once daily.    blood sugar diagnostic Strp 1 strip by Misc.(Non-Drug; Combo Route) route 2 (two) times daily.    blood-glucose meter kit Use as instructed    citalopram (CELEXA) 10 MG tablet Take 1 tablet (10 mg total) by mouth once daily.    ergocalciferol (ERGOCALCIFEROL) 50,000 unit Cap take 1 capsule by mouth every week    famotidine (PEPCID) 20 MG tablet take 1 tablet by mouth every evening    gabapentin (NEURONTIN) 100 MG capsule take 1 capsule by mouth every evening    glimepiride (AMARYL) 4 MG tablet Take 1 tablet (4 mg total) by mouth daily with breakfast.    lancets (MICROLET LANCET) Misc Inject 1 lancet into the skin once daily.    lancets (ONETOUCH DELICA LANCETS) 33 gauge Misc 1 lancet by Misc.(Non-Drug; Combo Route) route 2 (two) times daily.    losartan (COZAAR) 50 MG tablet take 1 tablet by mouth once daily    magnesium oxide (MAG-OX) 400 mg tablet take 1 tablet by mouth twice a day     "neomycin-polymyxin-dexamethasone (MAXITROL) 3.5mg/mL-10,000 unit/mL-0.1 % DrpS Place 1 drop into both eyes 4 (four) times daily.    pravastatin (PRAVACHOL) 10 MG tablet Take 1 tablet (10 mg total) by mouth once daily.    SITagliptan (JANUVIA) 50 MG Tab Take 1 tablet (50 mg total) by mouth once daily.    trazodone (DESYREL) 50 MG tablet Take 1 tablet (50 mg total) by mouth nightly as needed.    azelastine (ASTELIN) 137 mcg (0.1 %) nasal spray 1 spray (137 mcg total) by Nasal route 2 (two) times daily.    fluocinonide (LIDEX) 0.05 % external solution AAA scalp qd prn pruritus    lidocaine HCL 2% (XYLOCAINE) 2 % jelly Apply topically once daily.    meloxicam (MOBIC) 15 MG tablet Take 1 tablet (15 mg total) by mouth once daily.    tramadol (ULTRAM) 50 mg tablet Take 1 tablet (50 mg total) by mouth every 6 (six) hours as needed for Pain.           Clinical Reference Information           Your Vitals Were     BP Pulse Temp Height Weight BMI    120/84 86 98 °F (36.7 °C) 5' 3" (1.6 m) 88 kg (194 lb 0.1 oz) 34.37 kg/m2      Blood Pressure          Most Recent Value    BP  120/84      Allergies as of 3/31/2017     Prednisone    Protonix [Pantoprazole]    Ace Inhibitors    Latex, Natural Rubber    Other      Immunizations Administered on Date of Encounter - 3/31/2017     None      Orders Placed During Today's Visit      Normal Orders This Visit    Ambulatory Referral to Orthopedics       Language Assistance Services     ATTENTION: Language assistance services are available, free of charge. Please call [phone].      ATENCIÓN: Si habla silvano, tiene a white disposición servicios gratuitos de asistencia lingüística. Llame al [phone].     CONOR Ý: N?u b?n nói Ti?ng Vi?t, có các d?ch v? h? tr? ngôn ng? mi?n phí dành cho b?n. G?i s? [phone].         [facility] complies with applicable Federal civil rights laws and does not discriminate on the basis of race, color, national origin, age, disability, " or sex.

## 2017-04-05 ENCOUNTER — OFFICE VISIT (OUTPATIENT)
Dept: ORTHOPEDICS | Facility: CLINIC | Age: 67
End: 2017-04-05
Payer: MEDICARE

## 2017-04-05 ENCOUNTER — HOSPITAL ENCOUNTER (OUTPATIENT)
Dept: RADIOLOGY | Facility: HOSPITAL | Age: 67
Discharge: HOME | End: 2017-04-05
Attending: ORTHOPAEDIC SURGERY
Payer: MEDICARE

## 2017-04-05 VITALS — WEIGHT: 193.44 LBS | HEIGHT: 63 IN | BODY MASS INDEX: 34.27 KG/M2

## 2017-04-05 DIAGNOSIS — M65.332 TRIGGER MIDDLE FINGER OF LEFT HAND: Primary | ICD-10-CM

## 2017-04-05 DIAGNOSIS — M75.101 TEAR OF RIGHT ROTATOR CUFF, UNSPECIFIED TEAR EXTENT: ICD-10-CM

## 2017-04-05 DIAGNOSIS — M79.642 LEFT HAND PAIN: ICD-10-CM

## 2017-04-05 PROCEDURE — 1125F AMNT PAIN NOTED PAIN PRSNT: CPT | Mod: S$GLB,,, | Performed by: PHYSICIAN ASSISTANT

## 2017-04-05 PROCEDURE — 1160F RVW MEDS BY RX/DR IN RCRD: CPT | Mod: S$GLB,,, | Performed by: PHYSICIAN ASSISTANT

## 2017-04-05 PROCEDURE — 99214 OFFICE O/P EST MOD 30 MIN: CPT | Mod: 25,S$GLB,, | Performed by: PHYSICIAN ASSISTANT

## 2017-04-05 PROCEDURE — 1159F MED LIST DOCD IN RCRD: CPT | Mod: S$GLB,,, | Performed by: PHYSICIAN ASSISTANT

## 2017-04-05 PROCEDURE — 73130 X-RAY EXAM OF HAND: CPT | Mod: TC,LT

## 2017-04-05 PROCEDURE — 1157F ADVNC CARE PLAN IN RCRD: CPT | Mod: S$GLB,,, | Performed by: PHYSICIAN ASSISTANT

## 2017-04-05 PROCEDURE — 20550 NJX 1 TENDON SHEATH/LIGAMENT: CPT | Mod: F2,S$GLB,, | Performed by: PHYSICIAN ASSISTANT

## 2017-04-05 PROCEDURE — 73130 X-RAY EXAM OF HAND: CPT | Mod: 26,LT,, | Performed by: RADIOLOGY

## 2017-04-05 PROCEDURE — 99999 PR PBB SHADOW E&M-EST. PATIENT-LVL IV: CPT | Mod: PBBFAC,,, | Performed by: PHYSICIAN ASSISTANT

## 2017-04-05 RX ORDER — BETAMETHASONE SODIUM PHOSPHATE AND BETAMETHASONE ACETATE 3; 3 MG/ML; MG/ML
3 INJECTION, SUSPENSION INTRA-ARTICULAR; INTRALESIONAL; INTRAMUSCULAR; SOFT TISSUE
Status: COMPLETED | OUTPATIENT
Start: 2017-04-05 | End: 2017-04-05

## 2017-04-05 RX ADMIN — BETAMETHASONE SODIUM PHOSPHATE AND BETAMETHASONE ACETATE 3 MG: 3; 3 INJECTION, SUSPENSION INTRA-ARTICULAR; INTRALESIONAL; INTRAMUSCULAR; SOFT TISSUE at 03:04

## 2017-04-05 NOTE — LETTER
April 5, 2017      Be Aguirre MD  [address] [facility] [place] [address]           [facility]  [address]  [place] [address]  Phone: [phone]          Patient: Chelly Ortiz   MR Number: [id number]   YOB: 1950   Date of Visit: 4/5/2017       Dear Dr. Be Aguirre:    Thank you for referring Chelly Ortiz to me for evaluation. Attached you will find relevant portions of my assessment and plan of care.    If you have questions, please do not hesitate to call me. I look forward to following Chelly Ortiz along with you.    Sincerely,    Ca Egan PA-C    Enclosure  CC:  No Recipients    If you would like to receive this communication electronically, please contact [email or web address] or [phone] to request more information on [employer] Link access.    For providers and/or their staff who would like to refer a patient to [facility], please contact us through our one-stop-shop provider referral line, [facility] , at [phone].    If you feel you have received this communication in error or would no longer like to receive these types of communications, please e-mail [email or web address]

## 2017-04-05 NOTE — MR AVS SNAPSHOT
[facility]  [address]  [facility] [address]  Phone: [phone]                  Chelly Ortiz   2017 2:15 PM   Appointment    Description:  Female : 1950   Provider:  Ca Egan PA-C   Department:  [facility]                To Do List           Future Appointments        Provider Department Dept Phone    2017 2:15 PM Ca Egan PA-C [facility] [phone]      Goals (5 Years of Data)     None      [facility]     [facility] On Call Nurse Care Line -  Assistance  Unless otherwise directed by your provider, please contact [facility], our nurse care line that is available for  assistance.     Registered nurses in the [facility] On Call Center provide: appointment scheduling, clinical advisement, health education, and other advisory services.  Call: [phone] (toll free)               Medications           Message regarding Medications     Verify the changes and/or additions to your medication regime listed below are the same as discussed with your clinician today.  If any of these changes or additions are incorrect, please notify your healthcare provider.             Verify that the below list of medications is an accurate representation of the medications you are currently taking.  If none reported, the list may be blank. If incorrect, please contact your healthcare provider. Carry this list with you in case of emergency.           Current Medications     amlodipine (NORVASC) 5 MG tablet Take 1 tablet (5 mg total) by mouth once daily.    aspirin (ECOTRIN) 81 MG EC tablet Take 81 mg by mouth once daily.    azelastine (ASTELIN) 137 mcg (0.1 %) nasal spray 1 spray (137 mcg total) by Nasal route 2 (two) times daily.    blood sugar diagnostic Strp 1 strip by Misc.(Non-Drug; Combo Route) route 2 (two) times daily.    blood-glucose meter kit Use as instructed    citalopram (CELEXA) 10 MG tablet Take 1 tablet (10 mg total)  by mouth once daily.    ergocalciferol (ERGOCALCIFEROL) 50,000 unit Cap take 1 capsule by mouth every week    famotidine (PEPCID) 20 MG tablet take 1 tablet by mouth every evening    fluocinonide (LIDEX) 0.05 % external solution AAA scalp qd prn pruritus    gabapentin (NEURONTIN) 100 MG capsule take 1 capsule by mouth every evening    glimepiride (AMARYL) 4 MG tablet Take 1 tablet (4 mg total) by mouth daily with breakfast.    lancets (MICROLET LANCET) Misc Inject 1 lancet into the skin once daily.    lancets (ONETOUCH DELICA LANCETS) 33 gauge Misc 1 lancet by Misc.(Non-Drug; Combo Route) route 2 (two) times daily.    lidocaine HCL 2% (XYLOCAINE) 2 % jelly Apply topically once daily.    losartan (COZAAR) 50 MG tablet take 1 tablet by mouth once daily    magnesium oxide (MAG-OX) 400 mg tablet take 1 tablet by mouth twice a day    meloxicam (MOBIC) 15 MG tablet Take 1 tablet (15 mg total) by mouth once daily.    neomycin-polymyxin-dexamethasone (MAXITROL) 3.5mg/mL-10,000 unit/mL-0.1 % DrpS Place 1 drop into both eyes 4 (four) times daily.    pravastatin (PRAVACHOL) 10 MG tablet Take 1 tablet (10 mg total) by mouth once daily.    SITagliptan (JANUVIA) 50 MG Tab Take 1 tablet (50 mg total) by mouth once daily.    tramadol (ULTRAM) 50 mg tablet Take 1 tablet (50 mg total) by mouth every 6 (six) hours as needed for Pain.    trazodone (DESYREL) 50 MG tablet Take 1 tablet (50 mg total) by mouth nightly as needed.           Clinical Reference Information           Allergies as of 4/5/2017     Prednisone    Protonix [Pantoprazole]    Ace Inhibitors    Latex, Natural Rubber    Other      Immunizations Administered on Date of Encounter - 4/5/2017     None      Language Assistance Services     ATTENTION: Language assistance services are available, free of charge. Please call [phone].      ATENCIÓN: Si habla silvano, tiene a white disposición servicios gratuitos de asistencia lingüística. Llame al [phone].     CHÚ Ý:  N?u b?n nói Ti?ng Vi?t, có các d?ch v? h? tr? ngôn ng? mi?n phí dành cho b?n. G?i s? [phone].         [facility] complies with applicable Federal civil rights laws and does not discriminate on the basis of race, color, national origin, age, disability, or sex.

## 2017-04-05 NOTE — PROGRESS NOTES
Subjective:      Patient ID: Chelly Ortiz is a 66 y.o. female.    Chief Complaint: No chief complaint on file.    HPI  66 year old female returns regarding her right shoulder pain. She has a known h/o rotator cuff tear, tendinitis, and arthritis. She did PT in the past with no relief. She has received cortisone injections. She saw Dr. Cuenca last July and surgery was discussed but patient is not interested at this time. She received an injection that helped for about 6 months. She has been taking mobic for a few days and says it helps.   Patient also reports left MF pain x 3 weeks. No trauma. Sometimes she can't close her hand. She reports swelling. She denies N/T.   Review of Systems   Constitution: Negative for chills, fever and night sweats.   Cardiovascular: Negative for chest pain.   Respiratory: Negative for cough and shortness of breath.    Hematologic/Lymphatic: Does not bruise/bleed easily.   Skin: Negative for color change.   Gastrointestinal: Negative for heartburn.   Genitourinary: Negative for dysuria.   Neurological: Negative for numbness and paresthesias.   Psychiatric/Behavioral: Negative for altered mental status.   Allergic/Immunologic: Negative for persistent infections.         Objective:            General    Vitals reviewed.  Constitutional: She is oriented to person, place, and time. She appears well-developed and well-nourished.   Cardiovascular: Normal rate.    Neurological: She is alert and oriented to person, place, and time.         Left Hand/Wrist Exam     Tenderness   The patient is tender to palpation of the patel area.     Range of Motion     Wrist   Extension: normal   Flexion: normal   Pronation: normal   Supination: normal     Tests   Tinels Sign (Medial Nerve): negative      Other     Sensory Exam  Median Distribution: normal  Ulnar Distribution: normal  Radial Distribution: normal    Comments:  TTP MF A1 pulley.       Right Shoulder Exam     Range of Motion   Active Abduction:   120 abnormal   Forward Flexion:  110 abnormal   External Rotation 0 degrees: abnormal   Internal Rotation 0 degrees: abnormal     Tests & Signs   Hawkin's test: positive  Impingement: positive  Speed's Test: negative    Other   Sensation: normal    Comments:  Positive empty can test.     Muscle Strength   Right Upper Extremity   Shoulder Abduction: 5/5   Supraspinatus: 4/5/5   Biceps: 5/5/5     Vascular Exam     Right Pulses      Radial:                    2+      Capillary Refill  Left Hand: normal capillary refill      X-ray hand: ordered and reviewed by myself. Mild DJD.  No fracture or bone destruction identified.  Mild negative ulna variance.  X-ray shoulder: reviewed by myself. AC joint arthritis.       Assessment:       Encounter Diagnoses   Name Primary?    Trigger middle finger of left hand Yes    Tear of right rotator cuff, unspecified tear extent           Plan:       Discussed treatment options with patient. She would like an injection for the trigger finger. She is not interested in shoulder injection at this time. She will continue mobic for 2 weeks. RTC prn.     PROCEDURE:  I have explained the risks, benefits, and alternatives of the procedure in detail.  The patient voices understanding and all questions have been answered. So after I performed a steril prep of the skin in the normal fashion, the level of there A-1 pulley of the left long finger is injected using a 27 gauge needle from the volar approach with a  combination of 1/2cc 1% plain Lidocaine and 3 mg of celestone.  The patient is cautioned and immediate relief of pain is secondary to the local anesthetic and will be temporary.  After the anesthetic wears off there may be a increase in pain that may last for a few hours or a few days and they should use ice to help alleviate this flair up of pain.

## 2017-04-13 ENCOUNTER — OFFICE VISIT (OUTPATIENT)
Dept: FAMILY MEDICINE | Facility: CLINIC | Age: 67
End: 2017-04-13
Payer: MEDICARE

## 2017-04-13 VITALS
BODY MASS INDEX: 34.22 KG/M2 | HEIGHT: 63 IN | SYSTOLIC BLOOD PRESSURE: 120 MMHG | OXYGEN SATURATION: 96 % | DIASTOLIC BLOOD PRESSURE: 78 MMHG | TEMPERATURE: 98 F | WEIGHT: 193.13 LBS | HEART RATE: 89 BPM

## 2017-04-13 DIAGNOSIS — E56.9 VITAMIN DEFICIENCY: ICD-10-CM

## 2017-04-13 DIAGNOSIS — I10 ESSENTIAL HYPERTENSION: ICD-10-CM

## 2017-04-13 DIAGNOSIS — E08.9 DIABETES MELLITUS DUE TO UNDERLYING CONDITION WITHOUT COMPLICATION, WITHOUT LONG-TERM CURRENT USE OF INSULIN: ICD-10-CM

## 2017-04-13 DIAGNOSIS — M79.10 MYALGIA: Primary | ICD-10-CM

## 2017-04-13 PROCEDURE — 1160F RVW MEDS BY RX/DR IN RCRD: CPT | Mod: S$GLB,,, | Performed by: FAMILY MEDICINE

## 2017-04-13 PROCEDURE — 1126F AMNT PAIN NOTED NONE PRSNT: CPT | Mod: S$GLB,,, | Performed by: FAMILY MEDICINE

## 2017-04-13 PROCEDURE — 3074F SYST BP LT 130 MM HG: CPT | Mod: S$GLB,,, | Performed by: FAMILY MEDICINE

## 2017-04-13 PROCEDURE — 1159F MED LIST DOCD IN RCRD: CPT | Mod: S$GLB,,, | Performed by: FAMILY MEDICINE

## 2017-04-13 PROCEDURE — 99999 PR PBB SHADOW E&M-EST. PATIENT-LVL III: CPT | Mod: PBBFAC,,, | Performed by: FAMILY MEDICINE

## 2017-04-13 PROCEDURE — 99499 UNLISTED E&M SERVICE: CPT | Mod: S$GLB,,, | Performed by: FAMILY MEDICINE

## 2017-04-13 PROCEDURE — 99214 OFFICE O/P EST MOD 30 MIN: CPT | Mod: S$GLB,,, | Performed by: FAMILY MEDICINE

## 2017-04-13 PROCEDURE — 3078F DIAST BP <80 MM HG: CPT | Mod: S$GLB,,, | Performed by: FAMILY MEDICINE

## 2017-04-13 RX ORDER — GABAPENTIN 100 MG/1
100 CAPSULE ORAL 3 TIMES DAILY
Qty: 90 CAPSULE | Refills: 2 | Status: SHIPPED | OUTPATIENT
Start: 2017-04-13 | End: 2018-05-30 | Stop reason: SDUPTHER

## 2017-04-13 NOTE — MR AVS SNAPSHOT
[facility]   [place]  [place] [address]  Phone: [phone]  Fax: [phone]                  Chelly Ortiz   2017 5:20 PM   Office Visit    Description:  Female : 1950   Provider:  Nichol Sarabia MD   Department:  [facility]           Reason for Visit     Generalized Body Aches           Diagnoses this Visit        Comments    Myalgia    -  Primary     Diabetes mellitus due to underlying condition without complication, without long-term current use of insulin         Essential hypertension         Vitamin deficiency                To Do List           Goals (5 Years of Data)     None       These Medications        Disp Refills Start End    gabapentin (NEURONTIN) 100 MG capsule 90 capsule 2 2017     Take 1 capsule (100 mg total) by mouth 3 (three) times daily. - Oral    Pharmacy: [facility] [address] [place] [address] KAYLAH PALACIOS AT [facility] [address] Ph #: [phone]         [facility]     [facility] Nurse Care [facility] -  Assistance  Unless otherwise directed by your provider, please contact [facility], our nurse care line that is available for  assistance.     Registered nurses in the [facility] provide: appointment scheduling, clinical advisement, health education, and other advisory services.  Call: [phone] (toll free)               Medications           Message regarding Medications     Verify the changes and/or additions to your medication regime listed below are the same as discussed with your clinician today.  If any of these changes or additions are incorrect, please notify your healthcare provider.        CHANGE how you are taking these medications     Start Taking Instead of    gabapentin (NEURONTIN) 100 MG capsule gabapentin (NEURONTIN) 100 MG capsule    Dosage:  Take 1 capsule (100 mg total) by mouth 3 (three) times daily. Dosage:  take 1 capsule by mouth  every evening    Reason for Change:  Reorder            Verify that the below list of medications is an accurate representation of the medications you are currently taking.  If none reported, the list may be blank. If incorrect, please contact your healthcare provider. Carry this list with you in case of emergency.           Current Medications     amlodipine (NORVASC) 5 MG tablet Take 1 tablet (5 mg total) by mouth once daily.    aspirin (ECOTRIN) 81 MG EC tablet Take 81 mg by mouth once daily.    azelastine (ASTELIN) 137 mcg (0.1 %) nasal spray 1 spray (137 mcg total) by Nasal route 2 (two) times daily.    blood sugar diagnostic Strp 1 strip by Misc.(Non-Drug; Combo Route) route 2 (two) times daily.    blood-glucose meter kit Use as instructed    citalopram (CELEXA) 10 MG tablet Take 1 tablet (10 mg total) by mouth once daily.    ergocalciferol (ERGOCALCIFEROL) 50,000 unit Cap take 1 capsule by mouth every week    famotidine (PEPCID) 20 MG tablet take 1 tablet by mouth every evening    fluocinonide (LIDEX) 0.05 % external solution AAA scalp qd prn pruritus    gabapentin (NEURONTIN) 100 MG capsule Take 1 capsule (100 mg total) by mouth 3 (three) times daily.    glimepiride (AMARYL) 4 MG tablet Take 1 tablet (4 mg total) by mouth daily with breakfast.    lancets (MICROLET LANCET) Misc Inject 1 lancet into the skin once daily.    lancets (ONETOUCH DELICA LANCETS) 33 gauge Misc 1 lancet by Misc.(Non-Drug; Combo Route) route 2 (two) times daily.    lidocaine HCL 2% (XYLOCAINE) 2 % jelly Apply topically once daily.    losartan (COZAAR) 50 MG tablet take 1 tablet by mouth once daily    magnesium oxide (MAG-OX) 400 mg tablet take 1 tablet by mouth twice a day    meloxicam (MOBIC) 15 MG tablet Take 1 tablet (15 mg total) by mouth once daily.    pravastatin (PRAVACHOL) 10 MG tablet Take 1 tablet (10 mg total) by mouth once daily.    SITagliptan (JANUVIA) 50 MG Tab Take 1 tablet (50 mg total) by mouth once daily.     "trazodone (DESYREL) 50 MG tablet Take 1 tablet (50 mg total) by mouth nightly as needed.           Clinical Reference Information           Your Vitals Were     BP Pulse Temp Height Weight SpO2    120/78 (BP Location: Left arm, Patient Position: Sitting, BP Method: Manual) 89 98.2 °F (36.8 °C) (Oral) 5' 3" (1.6 m) 87.6 kg (193 lb 2 oz) 96%    BMI                34.21 kg/m2          Blood Pressure          Most Recent Value    BP  120/78      Allergies as of 4/13/2017     Prednisone    Protonix [Pantoprazole]    Ace Inhibitors    Latex, Natural Rubber    Other      Immunizations Administered on Date of Encounter - 4/13/2017     None      Orders Placed During Today's Visit     Future Labs/Procedures Expected by Expires    TSH  4/13/2017 6/12/2018      Language Assistance Services     ATTENTION: Language assistance services are available, free of charge. Please call [phone].      ATENCIÓN: Si habla balajiañol, tiene a white disposición servicios gratuitos de asistencia lingüística. Llame al [phone].     CHÚ Ý: N?u b?n nói Ti?ng Vi?t, có các d?ch v? h? tr? ngôn ng? mi?n phí alexander cho b?n. G?i s? [phone].         [facility] complies with applicable Federal civil rights laws and does not discriminate on the basis of race, color, national origin, age, disability, or sex.        "

## 2017-04-13 NOTE — PROGRESS NOTES
HPI:  Chelly Ortiz is a 66 y.o. year old female that  presents with complaint of generalized achy feeling that has been going on since December. She feels body aches and also a itchy feelin in her mid back.Yesterday she had the aching and her feet began to ache and then her feet began to get cold.  Chief Complaint   Patient presents with    Generalized Body Aches     QUESTIONS IF CAUSED BY STATINS   .     HPI    Past Medical History:   Diagnosis Date    Allergy     Cataract     DDD (degenerative disc disease), lumbar     Diabetes mellitus     diet controlled    GERD (gastroesophageal reflux disease)     History of subconjunctival hemorrhage 11    left eye    Hyperlipidemia     Hypertension     IBS (irritable bowel syndrome)     Obesity     MYRIAM (obstructive sleep apnea)     on CPAP    Rotator cuff impingement syndrome     Seasonal allergic conjunctivitis      Social History     Social History    Marital status:      Spouse name: N/A    Number of children: N/A    Years of education: N/A     Occupational History    Not on file.     Social History Main Topics    Smoking status: Former Smoker     Quit date: 2/15/1983    Smokeless tobacco: Never Used    Alcohol use No    Drug use: No    Sexual activity: Not on file     Other Topics Concern    Not on file     Social History Narrative     Past Surgical History:   Procedure Laterality Date    CATARACT EXTRACTION W/  INTRAOCULAR LENS IMPLANT  10/3/13    OD (dr. gardner)     SECTION      x2    CHOLECYSTECTOMY      HYSTERECTOMY      ovaries intact, due to DUB     Family History   Problem Relation Age of Onset    Mental illness Mother     Heart disease Father     Diabetes Sister     Amblyopia Neg Hx     Blindness Neg Hx     Cancer Neg Hx     Cataracts Neg Hx     Glaucoma Neg Hx     Hypertension Neg Hx     Macular degeneration Neg Hx     Retinal detachment Neg Hx     Strabismus Neg Hx     Stroke Neg Hx     Thyroid  "disease Neg Hx            Review of Systems  General ROS: negative for chills, fever or weight loss  Psychological ROS: negative for hallucination, depression or suicidal ideation  Ophthalmic ROS: negative for blurry vision, photophobia or eye pain  ENT ROS: negative for epistaxis, sore throat or rhinorrhea  Respiratory ROS: no cough, shortness of breath, or wheezing  Cardiovascular ROS: no chest pain or dyspnea on exertion  Gastrointestinal ROS: no abdominal pain, change in bowel habits, or black/ bloody stools  Genito-Urinary ROS: no dysuria, trouble voiding, or hematuria  Musculoskeletal ROS: negative for gait disturbance or muscular weakness  Neurological ROS: no syncope or seizures; no ataxia, pruritis  Dermatological ROS: negative for pruritis, rash and jaundice      Physical Exam:  /78 (BP Location: Left arm, Patient Position: Sitting, BP Method: Manual)  Pulse 89  Temp 98.2 °F (36.8 °C) (Oral)   Ht 5' 3" (1.6 m)  Wt 87.6 kg (193 lb 2 oz)  SpO2 96%  BMI 34.21 kg/m2  General appearance: alert, cooperative, no distress  Constitutional:Oriented to person, place, and time.appears well-developed and well-nourished.  HEENT: Normocephalic, atraumatic, neck symmetrical, no nasal discharge, TM - clear bilaterally   Eyes: conjunctivae/corneas clear, PERRL, EOM's intact  Lungs: clear to auscultation bilaterally, no dullness to percussion bilaterally  Heart: regular rate and rhythm without rub; no displacement of the PMI   Abdomen: soft, non-tender; bowel sounds normoactive; no organomegaly  Extremities: extremities symmetric; no clubbing, cyanosis, or edema  Integument: Skin color, texture, turgor normal; no rashes; hair distrubution normal, slight hyper pigmentation in middle of back from scratching  Neurologic: Alert and oriented X 3, normal strength, normal coordination and gait  Psychiatric: no pressured speech; normal affect; no evidence of impaired cognition   Physical Exam  LABS:    Complete Blood " Count  Lab Results   Component Value Date    RBC 5.01 06/10/2015    HGB 12.7 06/10/2015    HCT 38.3 06/10/2015    MCV 76 (L) 06/10/2015    MCH 25.3 (L) 06/10/2015    MCHC 33.2 06/10/2015    RDW 15.3 (H) 06/10/2015     06/10/2015    MPV 10.0 06/10/2015    GRAN 3.3 06/10/2015    GRAN 50.2 06/10/2015    LYMPH 2.7 06/10/2015    LYMPH 41.2 06/10/2015    MONO 0.5 06/10/2015    MONO 6.9 06/10/2015    EOS 0.1 06/10/2015    BASO 0.02 06/10/2015    EOSINOPHIL 1.4 06/10/2015    BASOPHIL 0.3 06/10/2015    DIFFMETHOD Automated 06/10/2015       Comprehensive Metabolic Panel  Lab Results   Component Value Date     (H) 12/27/2016    BUN 14 12/27/2016    CREATININE 1.0 12/27/2016     12/27/2016    K 3.9 12/27/2016     12/27/2016    PROT 7.2 12/27/2016    ALBUMIN 3.6 12/27/2016    BILITOT 0.4 12/27/2016    AST 17 12/27/2016    ALKPHOS 105 12/27/2016    CO2 26 12/27/2016    ALT 20 12/27/2016    ANIONGAP 9 12/27/2016    EGFRNONAA 58.0 (A) 12/27/2016    ESTGFRAFRICA >60.0 12/27/2016       LIPID  No components found for: LIPIDPANEL    TSH  Lab Results   Component Value Date    TSH 2.367 03/22/2016         Assessment:    ICD-10-CM ICD-9-CM    1. Myalgia M79.1 729.1 gabapentin (NEURONTIN) 100 MG capsule   2. Diabetes mellitus due to underlying condition without complication, without long-term current use of insulin E08.9 249.00    3. Essential hypertension I10 401.9 TSH   4. Vitamin deficiency E56.9 269.2          Plan:    Return in 1 month (on 5/13/2017), or with PCP.          Nichol Sarabia MD

## 2017-04-17 ENCOUNTER — LAB VISIT (OUTPATIENT)
Dept: LAB | Facility: HOSPITAL | Age: 67
End: 2017-04-17
Attending: FAMILY MEDICINE
Payer: MEDICARE

## 2017-04-17 DIAGNOSIS — I10 ESSENTIAL HYPERTENSION: ICD-10-CM

## 2017-04-17 LAB — TSH SERPL DL<=0.005 MIU/L-ACNC: 2.43 UIU/ML

## 2017-04-17 PROCEDURE — 36415 COLL VENOUS BLD VENIPUNCTURE: CPT | Mod: PO

## 2017-04-17 PROCEDURE — 84443 ASSAY THYROID STIM HORMONE: CPT

## 2017-04-28 DIAGNOSIS — E11.9 DIABETES MELLITUS WITHOUT COMPLICATION: ICD-10-CM

## 2017-06-13 RX ORDER — FAMOTIDINE 20 MG/1
TABLET, FILM COATED ORAL
Qty: 90 TABLET | Refills: 0 | Status: SHIPPED | OUTPATIENT
Start: 2017-06-13 | End: 2017-08-15 | Stop reason: SDUPTHER

## 2017-06-16 RX ORDER — ERGOCALCIFEROL 1.25 MG/1
CAPSULE ORAL
Qty: 12 CAPSULE | Refills: 1 | Status: SHIPPED | OUTPATIENT
Start: 2017-06-16 | End: 2017-10-09 | Stop reason: SDUPTHER

## 2017-07-06 DIAGNOSIS — F32.A DEPRESSION, UNSPECIFIED DEPRESSION TYPE: ICD-10-CM

## 2017-07-06 RX ORDER — CITALOPRAM 10 MG/1
10 TABLET ORAL DAILY
Qty: 90 TABLET | Refills: 0 | Status: SHIPPED | OUTPATIENT
Start: 2017-07-06 | End: 2018-01-23 | Stop reason: SDUPTHER

## 2017-07-06 NOTE — TELEPHONE ENCOUNTER
----- Message from Joyce Viramontes sent at 7/6/2017 12:49 PM CDT -----  Contact: [phone]  Patient is requesting to speak with you, she did not elaborate

## 2017-07-07 DIAGNOSIS — M65.332 TRIGGER MIDDLE FINGER OF LEFT HAND: ICD-10-CM

## 2017-07-07 DIAGNOSIS — M75.41 ROTATOR CUFF IMPINGEMENT SYNDROME, RIGHT: ICD-10-CM

## 2017-07-07 DIAGNOSIS — M75.41 IMPINGEMENT SYNDROME OF RIGHT SHOULDER: ICD-10-CM

## 2017-07-07 DIAGNOSIS — E11.9 TYPE 2 DIABETES MELLITUS WITHOUT COMPLICATION: ICD-10-CM

## 2017-07-07 RX ORDER — MELOXICAM 15 MG/1
TABLET ORAL
Qty: 90 TABLET | Refills: 3 | Status: SHIPPED | OUTPATIENT
Start: 2017-07-07 | End: 2018-01-16

## 2017-08-11 ENCOUNTER — LAB VISIT (OUTPATIENT)
Dept: LAB | Facility: HOSPITAL | Age: 67
End: 2017-08-11
Attending: FAMILY MEDICINE
Payer: MEDICARE

## 2017-08-11 DIAGNOSIS — E11.9 TYPE 2 DIABETES MELLITUS WITHOUT COMPLICATION: ICD-10-CM

## 2017-08-11 LAB
CHOLEST/HDLC SERPL: 4 {RATIO}
HDL/CHOLESTEROL RATIO: 24.8 %
HDLC SERPL-MCNC: 226 MG/DL
HDLC SERPL-MCNC: 56 MG/DL
LDLC SERPL CALC-MCNC: 139.2 MG/DL
NONHDLC SERPL-MCNC: 170 MG/DL
TRIGL SERPL-MCNC: 154 MG/DL

## 2017-08-11 PROCEDURE — 83036 HEMOGLOBIN GLYCOSYLATED A1C: CPT

## 2017-08-11 PROCEDURE — 36415 COLL VENOUS BLD VENIPUNCTURE: CPT | Mod: PO

## 2017-08-11 PROCEDURE — 80061 LIPID PANEL: CPT

## 2017-08-12 LAB
ESTIMATED AVG GLUCOSE: 223 MG/DL
HBA1C MFR BLD HPLC: 9.4 %

## 2017-08-15 ENCOUNTER — OFFICE VISIT (OUTPATIENT)
Dept: FAMILY MEDICINE | Facility: CLINIC | Age: 67
End: 2017-08-15
Payer: MEDICARE

## 2017-08-15 VITALS
WEIGHT: 196.19 LBS | SYSTOLIC BLOOD PRESSURE: 120 MMHG | HEART RATE: 74 BPM | HEIGHT: 63 IN | BODY MASS INDEX: 34.76 KG/M2 | DIASTOLIC BLOOD PRESSURE: 70 MMHG

## 2017-08-15 DIAGNOSIS — E11.65 TYPE 2 DIABETES MELLITUS WITH HYPERGLYCEMIA, WITHOUT LONG-TERM CURRENT USE OF INSULIN: ICD-10-CM

## 2017-08-15 DIAGNOSIS — E78.5 DYSLIPIDEMIA: ICD-10-CM

## 2017-08-15 DIAGNOSIS — I10 ESSENTIAL HYPERTENSION: Primary | ICD-10-CM

## 2017-08-15 DIAGNOSIS — K21.9 GASTROESOPHAGEAL REFLUX DISEASE, ESOPHAGITIS PRESENCE NOT SPECIFIED: ICD-10-CM

## 2017-08-15 PROCEDURE — 3008F BODY MASS INDEX DOCD: CPT | Mod: S$GLB,,, | Performed by: FAMILY MEDICINE

## 2017-08-15 PROCEDURE — 99999 PR PBB SHADOW E&M-EST. PATIENT-LVL III: CPT | Mod: PBBFAC,,, | Performed by: FAMILY MEDICINE

## 2017-08-15 PROCEDURE — 3074F SYST BP LT 130 MM HG: CPT | Mod: S$GLB,,, | Performed by: FAMILY MEDICINE

## 2017-08-15 PROCEDURE — 99499 UNLISTED E&M SERVICE: CPT | Mod: S$GLB,,, | Performed by: FAMILY MEDICINE

## 2017-08-15 PROCEDURE — 99214 OFFICE O/P EST MOD 30 MIN: CPT | Mod: S$GLB,,, | Performed by: FAMILY MEDICINE

## 2017-08-15 PROCEDURE — 4010F ACE/ARB THERAPY RXD/TAKEN: CPT | Mod: S$GLB,,, | Performed by: FAMILY MEDICINE

## 2017-08-15 PROCEDURE — 1159F MED LIST DOCD IN RCRD: CPT | Mod: S$GLB,,, | Performed by: FAMILY MEDICINE

## 2017-08-15 PROCEDURE — 3046F HEMOGLOBIN A1C LEVEL >9.0%: CPT | Mod: S$GLB,,, | Performed by: FAMILY MEDICINE

## 2017-08-15 PROCEDURE — 1126F AMNT PAIN NOTED NONE PRSNT: CPT | Mod: S$GLB,,, | Performed by: FAMILY MEDICINE

## 2017-08-15 PROCEDURE — 3078F DIAST BP <80 MM HG: CPT | Mod: S$GLB,,, | Performed by: FAMILY MEDICINE

## 2017-08-15 RX ORDER — FAMOTIDINE 20 MG/1
20 TABLET, FILM COATED ORAL NIGHTLY
Qty: 90 TABLET | Refills: 3 | Status: SHIPPED | OUTPATIENT
Start: 2017-08-15 | End: 2018-01-23 | Stop reason: SDUPTHER

## 2017-08-15 NOTE — PATIENT INSTRUCTIONS
"  Exercise to Manage Your Blood Sugar    Being physically active every day can help you manage your blood sugar. Thats because an active lifestyle can improve your bodys ability to use insulin. Daily activity can also help delay or prevent complications of diabetes. And its a great way to relieve stress. If you arent normally active, be sure to consult your healthcare provider before getting started.  How much activity do you need?  If daily activity is new to you, start slow and steady. Begin with 10 minutes of activity each day. Then work up to at least 40 minutes of moderate to high intensity physical activity on at least 3 to 4 days each week. Do this by adding a few minutes each week. It doesnt have to be done all at once. Each active period throughout the day adds up.  Just move!  You dont have to join a gym or own pricey sports equipment. Just get out and walk. Walking is an aerobic exercise that makes your heart and lungs work hard. It helps your heart and blood vessels. Walking needs only a sturdy pair of sneakers and your own two feet. The more you walk, the easier it gets:  · Schedule time every day to move your feet.  · Make it part of your daily routine.  · Walk with a friend or a group to keep it interesting and fun.  · Try taking several short walks during the day to meet your daily activity goal.  A pedometer makes every step count  A pedometer is a small device that keeps track of how many steps you take. You can clip it to your belt (or a strap on your arm or leg) and go about your daily routine. "Smartphones" now also have apps to record your walking. At the end of the day, the pedometer shows the total number of steps you took. Use a pedometer to set daily goals for yourself. For instance, if you walk 4,000 steps a day, try adding 200 more steps each day. Aim for a goal of 7,500. With every step, youre doing a little more to help your body use insulin.   Adding resistance " exercise  Resistance exercise (also called strength training), makes muscles stronger. It also helps muscles use insulin better. Ask your healthcare provider whether this type of exercise is right for you. If it is, your healthcare provider can help you work it in to your activity plan.  Staying safe  Being active may cause blood sugar to drop faster than usual. This is especially true if you take medicine to manage your blood sugar. But there are things you can do to help reduce the risk of accidental lows. Keep these tips in mind:  · Always carry identification when you exercise outside your home. Carry a cell phone to use in case of emergency.  · If you can, include friends and family in your activities.  · Wear a medical ID bracelet that says you have diabetes.  · Use the right safety equipment for the activity you do (such as a bicycle helmet when you ride a bicycle outdoors). Wear closed-toed shoes that fit your feet well.  · Drink plenty of water before and during activity.  · Keep a fast-acting sugar (such as glucose tablets) on hand in case of low blood sugar.  · Dress properly for the weather. Wear a hat if its david, or wait until evening if its too hot.  · Avoid being active for long periods in very hot or very cold weather.  · Skip activity if youre sick.     Notice how activity affects blood sugar  Physical activity is important when you have diabetes. But you need to keep an eye on your blood sugar level. Check often if you have been active for longer than usual, or if the activity was unplanned. Make it a habit to check your blood sugar before being active. And check again a few hours later. Use your log book to write down how activity affects your numbers. If you take insulin, you may be able to adjust your dose before a planned activity. This can help prevent lows. You may also need to take a small carbohydrate snack before the exercise. Talk to your healthcare provider to learn more.    Date  Last Reviewed: 6/1/2016  © 9368-3504 [employer]. [address] [place] [address]. All rights reserved. This information is not intended as a substitute for professional medical care. Always follow your healthcare professional's instructions.        Tips to Control Acid Reflux    To control acid reflux, youll need to make some basic diet and lifestyle changes. The simple steps outlined below may be all youll need to ease discomfort.  Watch what you eat  · Avoid fatty foods and spicy foods.  · Eat fewer acidic foods, such as citrus and tomato-based foods. These can increase symptoms.  · Limit drinking alcohol, caffeine, and fizzy beverages. All increase acid reflux.  · Try limiting chocolate, peppermint, and spearmint. These can worsen acid reflux in some people.  Watch when you eat  · Avoid lying down for 3 hours after eating.  · Do not snack before going to bed.  Raise your head  Raising your head and upper body by 4 to 6 inches helps limit reflux when youre lying down. Put blocks under the head of your bed frame to raise it.  Other changes  · Lose weight, if you need to  · Dont exercise near bedtime  · Avoid tight-fitting clothes  · Limit aspirin and ibuprofen  · Stop smoking   Date Last Reviewed: 7/1/2016  © 2024-1793 [employer]. [address] [place] [address]. All rights reserved. This information is not intended as a substitute for professional medical care. Always follow your healthcare professional's instructions.

## 2017-08-15 NOTE — PROGRESS NOTES
Subjective:       Patient ID: Chelly Ortiz is a 66 y.o. female.    Chief Complaint: Follow-up    66 years old female who came to the clinic for diabetes check.  Patient with elevated glucose for the last 3 months.  Patient did not want insulin.  Patient reports possible poor compliance with diet.  Patient with a BMI of 34 currently trying to lose weight.  Patient requests refill of her medicine for reflux.  Blood pressure today stable.  No chest pain palpitations orthopnea or PND.      Review of Systems   Constitutional: Negative.    HENT: Negative.    Eyes: Negative.    Respiratory: Negative.    Cardiovascular: Negative.  Negative for chest pain, palpitations and leg swelling.   Gastrointestinal: Negative.    Endocrine: Negative for cold intolerance, heat intolerance, polydipsia, polyphagia and polyuria.   Genitourinary: Negative.    Musculoskeletal: Negative.    Skin: Negative.    Neurological: Negative.    Psychiatric/Behavioral: Negative.        Objective:      Physical Exam   Constitutional: She is oriented to person, place, and time. She appears well-developed and well-nourished. No distress.   HENT:   Head: Normocephalic and atraumatic.   Right Ear: External ear normal.   Left Ear: External ear normal.   Nose: Nose normal.   Mouth/Throat: Oropharynx is clear and moist. No oropharyngeal exudate.   Eyes: Conjunctivae and EOM are normal. Pupils are equal, round, and reactive to light. Right eye exhibits no discharge. Left eye exhibits no discharge. No scleral icterus.   Neck: Normal range of motion. Neck supple. No JVD present. No tracheal deviation present. No thyromegaly present.   Cardiovascular: Normal rate, regular rhythm, normal heart sounds and intact distal pulses.  Exam reveals no gallop and no friction rub.    No murmur heard.  Pulmonary/Chest: Effort normal and breath sounds normal. No stridor. No respiratory distress. She has no wheezes. She has no rales. She exhibits no tenderness.   Abdominal:  Soft. Bowel sounds are normal. She exhibits no distension and no mass. There is no tenderness. There is no rebound and no guarding.   Musculoskeletal: Normal range of motion. She exhibits no edema or tenderness.   Feet:   Right Foot:   Protective Sensation: 10 sites tested. 10 sites sensed.   Skin Integrity: Negative for ulcer, blister, skin breakdown, erythema, warmth, callus or dry skin.   Left Foot:   Protective Sensation: 10 sites tested. 10 sites sensed.   Skin Integrity: Negative for ulcer, blister, skin breakdown, erythema, warmth, callus or dry skin.   Lymphadenopathy:     She has no cervical adenopathy.   Neurological: She is alert and oriented to person, place, and time. She has normal reflexes. No cranial nerve deficit. She exhibits normal muscle tone. Coordination normal.   Skin: Skin is warm and dry. No rash noted. She is not diaphoretic. No erythema. No pallor.   Psychiatric: She has a normal mood and affect. Her behavior is normal. Judgment and thought content normal.       Assessment:       1. Essential hypertension    2. Type 2 diabetes mellitus with hyperglycemia, without long-term current use of insulin    3. Dyslipidemia    4. Gastroesophageal reflux disease, esophagitis presence not specified        Plan:         Chelly was seen today for follow-up.    Diagnoses and all orders for this visit:    Essential hypertension  -     Comprehensive metabolic panel; Future  -     Lipid panel; Future    Type 2 diabetes mellitus with hyperglycemia, without long-term current use of insulin  -     Comprehensive metabolic panel; Future  -     Hemoglobin A1c; Future  -     Ambulatory consult to Diabetic Education    Dyslipidemia  -     Comprehensive metabolic panel; Future  -     Lipid panel; Future    Gastroesophageal reflux disease, esophagitis presence not specified  -     famotidine (PEPCID) 20 MG tablet; Take 1 tablet (20 mg total) by mouth every evening.    Other orders  -     INV dulaglutide 0.75 mg/0.5 mL  injection; Inject 0.5 mLs (0.75 mg total) into the skin once a week.

## 2017-09-18 ENCOUNTER — CLINICAL SUPPORT (OUTPATIENT)
Dept: DIABETES | Facility: CLINIC | Age: 67
End: 2017-09-18
Payer: MEDICARE

## 2017-09-18 DIAGNOSIS — E11.9 TYPE 2 DIABETES MELLITUS WITHOUT COMPLICATION, WITHOUT LONG-TERM CURRENT USE OF INSULIN: ICD-10-CM

## 2017-09-18 PROCEDURE — G0108 DIAB MANAGE TRN  PER INDIV: HCPCS | Mod: S$GLB,,, | Performed by: DIETITIAN, REGISTERED

## 2017-09-18 NOTE — PROGRESS NOTES
"Diabetes Education  Author: Vicky Jones RD  Date: 9/18/2017    Diabetes Education Visit  Diabetes Education Record Assessment/Progress: Initial    Diabetes Type  Diabetes Type : Type II    Diabetes History  Diabetes Diagnosis: 5-10 years    Nutrition  Meal Planning: 3 meals per day, water (verbalized she has been working on counting carbohydrates and aiming for 15g carb for each meal - states this is what she was told to follow in the past. Also reports "bingeing" on ice cream sometimes. )    Monitoring   Self Monitoring : SMBG once daily, mostly fasting - reports BG this am was 120  Blood Glucose Logs: No    Exercise   Exercise Type:  (has stationary bike but not using regularly)    Current Diabetes Treatment   Current Treatment: Injectable, Oral Medication (Reports currently taking Januvia twice daily and Glimepiride 4mg once daily. Discussed Januvia is typically dosed once daily and according to last PCP note she was to discontinue Januvia and start Trulicity 0.75mg once weekly. She verbalized has not yet received Trulicity from her mail order pharmacy. )    Social History  Preferred Learning Method: Face to Face, Reading Materials  Primary Support: Self            DDS Score  ( > 3 = SIGNIFICANT DISTRESS): 1.76  Emotional Mertens Score: 1.6  Physician-Related Distress: 2.25  Regimen-Related Distress: 2  Interpersonal Distress: 1    Barriers to Change  Barriers to Change: None  Learning Challenges : None    Readiness to Learn   Readiness to Learn : Acceptance    Cultural Influences  Cultural Influences: No    Diabetes Education Assessment/Progress    Acute Complications (preventing, detecting, and treating acute complications): Discussion, Instructed, Competent (verbalizes/demonstrates), Written Materials Provided, Individual Session (Reviewed causes, s/s and appropriate treatment of hypoglycemia with "rule of 15." She denies any hypos. Discussed do not skip meals with glimepiride d/t risk of hypo.)    Chronic " Complications (preventing, detecting, and treating chronic complications): Discussion, Instructed, Competent (verbalizes/demonstrates), Written Materials Provided, Individual Session (She is up to date on eye exam. Reviewed care schedule and home foot care guidelines.)    Diabetes Disease Process (diabetes disease process and treatment options): Discussion, Instructed, Competent (verbalizes/demonstrates), Written Materials Provided, Individual Session    Nutrition (Incorporating nutritional management into one's lifestyle): Discussion, Instructed, Competent (verbalizes/demonstrates), Written Materials Provided, Individual Session (Has attended diabetes education class previously. Shows good understanding of sources of CHO, portion sizes, and label reading. Verbalized feeling confused because class focused on CHO counting but then [facility] told her CHO counting not necessary. Explained CHO counting can be a useful tool to follow consistent CHO intake but each individual's needs are different. Reviewed portion sizes demonstrated with food models and label reading. She demonstrated good ability to carb count. Applauded efforts. Rec'd 30-45g CHO/meal, 3 meals daily and 15-20g CHO at a snack. )    Physical Activity (incorporating physical activity into one's lifestyle): Discussion, Instructed, Competent (verbalizes/demonstrates), Written Materials Provided, Individual Session (Discussed benefits and goals. Encouraged at least 150 min per week of activity. )    Medications (states correct name, dose, onset, peak, duration, side effects & timing of meds): Discussion, Instructed, Competent (verbalizes/demonstrates), Written Materials Provided, Individual Session, Demonstration, Return Demonstration (Reviewed PCP's instructions to discontinue Januvia and start once weekly Trulicity. She has not yet received Trulicity from mail order. She verbalized will call [facility]. Provided Trulicity training.     The patient  is here in clinic today to attend an individual appointment for training on once a week Trulicity Pen injections. Patient was given background information on Trulicity and how it works. Covered in detail how to use the Trulicity pen (timing - when to take it, meal planning, storage, and pen replacement). Discussed what to expect: side effects, possible hypoglycemia, and blood sugar control. Advised if severe or persistent s/e occur, call PCP for instructions. Discussed contraindicated for use in people with personal or family hx of medullary thyroid cancer - she denies personal or family hx. Reviewed causes of hypoglycemia including signs, symptoms and treatment options.     Instructed to remove gray base and then place the clear base flat and firmly against the skin, unlock the pen by turning the lock ring, press the green button and hold for 5-10 seconds. Patient advised that a click sound will be heard when the green button is pressed and will hear a second click sound when the dose is complete and the gray part of the pen inside of the pen will be visible.     Patient performed successful return demonstration. )    Monitoring (monitoring blood glucose/other parameters & using results): Discussion, Instructed, Competent (verbalizes/demonstrates), Individual Session, Written Materials Provided (Reviewed SMBG daily, alternating times, goal BG readings, and bringing log to appts. Provided log sheets. )    Goal Setting and Problem Solving (verbalizes behavior change strategies & sets realistic goals): Discussion, Individual Session    Behavior Change (developing personal strategies to health & behavior change): Discussion, Individual Session    Goals  Healthy Eating: Set (Limit snack to 15-20g CHO.)  Start Date: 09/18/17  Target Date: 12/18/17  Medications: Set (Start taking Trulicity and discontinue Januvia per PCP prescription.)  Start Date: 09/18/17  Target Date: 12/18/17         Diabetes Care  Plan/Intervention  Education Plan/Intervention: Individual Follow-Up DSMT (3 month follow-up scheduled. )    Diabetes Meal Plan  Restrictions: Restricted Carbohydrate  Carbohydrate Per Meal: 30-45g  Carbohydrate Per Snack : 15-20g    Education Units of Time   Time Spent: 60 min      Health Maintenance Topics with due status: Not Due       Topic Last Completion Date    Colonoscopy 07/26/2013    DEXA SCAN 03/29/2016    Mammogram 11/21/2016    Eye Exam 03/27/2017    Lipid Panel 08/11/2017    Hemoglobin A1c 08/11/2017    Foot Exam 08/15/2017     Health Maintenance Due   Topic Date Due    TETANUS VACCINE  10/29/1966    Zoster Vaccine  10/29/2008    Pneumococcal (65+) (2 of 2 - PPSV23) 03/22/2017    Influenza Vaccine  08/01/2017

## 2017-09-19 ENCOUNTER — PATIENT MESSAGE (OUTPATIENT)
Dept: FAMILY MEDICINE | Facility: CLINIC | Age: 67
End: 2017-09-19

## 2017-09-21 ENCOUNTER — TELEPHONE (OUTPATIENT)
Dept: FAMILY MEDICINE | Facility: CLINIC | Age: 67
End: 2017-09-21

## 2017-09-21 NOTE — TELEPHONE ENCOUNTER
Pt phoned states trulicity is $200/month, she cannot afford this med, per dr zelaya continue amaryl and januvia 100mg, will repeat labs in 3 mon for diabetes, verb understanding

## 2017-09-21 NOTE — TELEPHONE ENCOUNTER
----- Message from Shawnee Schuler sent at 9/21/2017  9:30 AM CDT -----  Contact: self, [phone]  Patient called in requesting to speak with you regarding medication you asked her to have filled. Please advise.

## 2017-10-09 ENCOUNTER — OFFICE VISIT (OUTPATIENT)
Dept: FAMILY MEDICINE | Facility: CLINIC | Age: 67
End: 2017-10-09
Payer: MEDICARE

## 2017-10-09 VITALS
WEIGHT: 195.13 LBS | OXYGEN SATURATION: 98 % | BODY MASS INDEX: 34.57 KG/M2 | DIASTOLIC BLOOD PRESSURE: 74 MMHG | HEART RATE: 65 BPM | SYSTOLIC BLOOD PRESSURE: 124 MMHG | HEIGHT: 63 IN

## 2017-10-09 DIAGNOSIS — E11.59 HYPERTENSION ASSOCIATED WITH DIABETES: ICD-10-CM

## 2017-10-09 DIAGNOSIS — E78.5 HYPERLIPIDEMIA ASSOCIATED WITH TYPE 2 DIABETES MELLITUS: ICD-10-CM

## 2017-10-09 DIAGNOSIS — E11.65 UNCONTROLLED TYPE 2 DIABETES MELLITUS WITH HYPERGLYCEMIA, WITHOUT LONG-TERM CURRENT USE OF INSULIN: ICD-10-CM

## 2017-10-09 DIAGNOSIS — E11.69 HYPERLIPIDEMIA ASSOCIATED WITH TYPE 2 DIABETES MELLITUS: ICD-10-CM

## 2017-10-09 DIAGNOSIS — M47.816 FACET ARTHRITIS OF LUMBAR REGION: ICD-10-CM

## 2017-10-09 DIAGNOSIS — Z00.00 ENCOUNTER FOR PREVENTIVE HEALTH EXAMINATION: Primary | ICD-10-CM

## 2017-10-09 DIAGNOSIS — E11.42 DIABETIC POLYNEUROPATHY ASSOCIATED WITH TYPE 2 DIABETES MELLITUS: ICD-10-CM

## 2017-10-09 DIAGNOSIS — E66.9 OBESITY (BMI 30.0-34.9): ICD-10-CM

## 2017-10-09 DIAGNOSIS — I15.2 HYPERTENSION ASSOCIATED WITH DIABETES: ICD-10-CM

## 2017-10-09 DIAGNOSIS — Z12.31 SCREENING MAMMOGRAM, ENCOUNTER FOR: ICD-10-CM

## 2017-10-09 DIAGNOSIS — K21.9 GASTROESOPHAGEAL REFLUX DISEASE WITHOUT ESOPHAGITIS: ICD-10-CM

## 2017-10-09 PROCEDURE — 99499 UNLISTED E&M SERVICE: CPT | Mod: S$GLB,,, | Performed by: NURSE PRACTITIONER

## 2017-10-09 PROCEDURE — 99999 PR PBB SHADOW E&M-EST. PATIENT-LVL V: CPT | Mod: PBBFAC,,, | Performed by: NURSE PRACTITIONER

## 2017-10-09 PROCEDURE — G0439 PPPS, SUBSEQ VISIT: HCPCS | Mod: S$GLB,,, | Performed by: NURSE PRACTITIONER

## 2017-10-09 RX ORDER — AZELASTINE 1 MG/ML
1 SPRAY, METERED NASAL 2 TIMES DAILY
Qty: 30 ML | Refills: 1 | Status: SHIPPED | OUTPATIENT
Start: 2017-10-09 | End: 2018-03-29

## 2017-10-09 RX ORDER — ERGOCALCIFEROL 1.25 MG/1
50000 CAPSULE ORAL
Qty: 12 CAPSULE | Refills: 1 | Status: SHIPPED | OUTPATIENT
Start: 2017-10-09 | End: 2018-01-23 | Stop reason: SDUPTHER

## 2017-10-09 NOTE — Clinical Note
Primary Care Providers: Gabriel Wilson MD, MD (General)  Your patient was seen today for a HRA visit. Gap(s) in care (HEDIS gaps) have been identified during this visit that require additional testing and possible follow up.  Orders Placed This Encounter     Mammo Digital Screening Bilat with CAD         Standing Status: Future         Standing Expiration Date: 12/9/2018         Order Specific Question: May the Radiologist modify the order per protocol to meet the clinical needs of the patient?         Answer: Yes   These orders were placed using [facility] approved protocol and any results will be forwarded to your office for appropriate follow up. I have included a copy of my visit note; please review the note and feel free to contact me with any questions.   Thank you for allowing me to participate in the care of your patients. Cecilia Limon NP

## 2017-10-09 NOTE — PATIENT INSTRUCTIONS
Counseling and Referral of Other Preventative  (Italic type indicates deductible and co-insurance are waived)    Patient Name: Chelly Ortiz  Today's Date: 10/9/2017      SERVICE LIMITATIONS RECOMMENDATION    Vaccines    · Pneumococcal (once after 65)    · Influenza (annually)    · Hepatitis B (if medium/high risk)    · Prevnar 13      Hepatitis B medium/high risk factors:       - End-stage renal disease       - Hemophiliacs who received Factor VII or         IX concentrates       - Clients of institutions for the mentally             retarded       - Persons who live in the same house as          a HepB carrier       - Homosexual men       - Illicit injectable drug abusers     Pneumococcal: Done, no repeat necessary     Influenza: Scheduled - see appointments     Hepatitis B: N/A     Prevnar 13: Done, no repeat necessary    Mammogram (biennial age 50-74)  Annually (age 40 or over)  Done this year, repeat every year    Pap (up to age 70 and after 70 if unknown history or abnormal study last 10 years)    N/A     The USPSTF recommends against screening for cervical cancer in women older than age 65 years who have had adequate prior screening and are not otherwise at high risk for cervical cancer.   and The USPSTF recommends against screening for cervical cancer in women who have had a hysterectomy with removal of the cervix and who do not have a history of a high-grade precancerous lesion (cervical intraepithelial neoplasia [GORDO] grade 2 or 3) or cervical cancer.     Colorectal cancer screening (to age 75)    · Fecal occult blood test (annual)  · Flexible sigmoidoscopy (5y)  · Screening colonoscopy (10y)  · Barium enema   Last done 07/26/13, recommend to repeat every 10  years    Diabetes self-management training (no USPSTF recommendations)  Requires referral by treating physician for patient with diabetes or renal disease. 10 hours of initial DSMT sessions of no less than 30 minutes each in a continuous 12-month  period. 2 hours of follow-up DSMT in subsequent years.  Done this year, repeat every year    Bone mass measurements (age 65 & older, biennial)  Requires diagnosis related to osteoporosis or estrogen deficiency. Biennial benefit unless patient has history of long-term glucocorticoid  Last done 03/29/2016, recommend to repeat every 3  years    Glaucoma screening (no USPSTF recommendation)  Diabetes mellitus, family history   , age 50 or over    American, age 65 or over  Done this year, repeat every year    Medical nutrition therapy for diabetes or renal disease (no recommended schedule)  Requires referral by treating physician for patient with diabetes or renal disease or kidney transplant within the past 3 years.  Can be provided in same year as diabetes self-management training (DSMT), and CMS recommends medical nutrition therapy take place after DSMT. Up to 3 hours for initial year and 2 hours in subsequent years.  N/A    Cardiovascular screening blood tests (every 5 years)  · Fasting lipid panel  Order as a panel if possible  Done this year, repeat every year    Diabetes screening tests (at least every 3 years, Medicare covers annually or at 6-month intervals for prediabetic patients)  · Fasting blood sugar (FBS) or glucose tolerance test (GTT)  Patient must be diagnosed with one of the following:       - Hypertension       - Dyslipidemia       - Obesity (BMI 30kg/m2)       - Previous elevated impaired FBS or GTT       ... or any two of the following:       - Overweight (BMI 25 but <30)       - Family history of diabetes       - Age 65 or older       - History of gestational diabetes or birth of baby weighing more than 9 pounds  Done this year, repeat every year    HIV screening (annually for increased risk patients)  · HIV-1 and HIV-2 by EIA, or ADRIANO, rapid antibody test or oral mucosa transudate  Patients must be at increased risk for HIV infection per USPSTF guidelines or pregnant.  Tests covered annually for patient at increased risk or as requested by the patient. Pregnant patients may receive up to 3 tests during pregnancy.  Risks discussed, screening is not recommended    Smoking cessation counseling (up to 8 sessions per year)  Patients must be asymptomatic of tobacco-related conditions to receive as a preventative service.  Non-smoker    Subsequent annual wellness visit  At least 12 months since last AWV  Return in one year     The following information is provided to all patients.  This information is to help you find resources for any of the problems found today that may be affecting your health:                Living healthy guide: www.[facility].[place].[facility]      Understanding Diabetes: www.diabetes.org      Eating healthy: www.cdc.gov/healthyweight      CDC home safety checklist: www.cdc.gov/steadi/patient.html      Agency on Aging: www.goea.[place].[facility]      Alcoholics anonymous (AA): www.aa.org      Physical Activity: www.donaldo.nih.gov/ru8cvyj      Tobacco use: www.quitwithusla.org

## 2017-10-12 PROBLEM — E66.9 OBESITY (BMI 30.0-34.9): Status: ACTIVE | Noted: 2017-10-12

## 2017-10-12 PROBLEM — E66.811 OBESITY (BMI 30.0-34.9): Status: ACTIVE | Noted: 2017-10-12

## 2017-10-12 PROBLEM — E78.5 HYPERLIPIDEMIA ASSOCIATED WITH TYPE 2 DIABETES MELLITUS: Status: ACTIVE | Noted: 2017-10-12

## 2017-10-12 PROBLEM — E11.65 UNCONTROLLED TYPE 2 DIABETES MELLITUS WITH HYPERGLYCEMIA, WITH LONG-TERM CURRENT USE OF INSULIN: Status: ACTIVE | Noted: 2017-10-12

## 2017-10-12 PROBLEM — E11.42 DIABETIC POLYNEUROPATHY ASSOCIATED WITH TYPE 2 DIABETES MELLITUS: Status: ACTIVE | Noted: 2017-10-12

## 2017-10-12 PROBLEM — E11.69 HYPERLIPIDEMIA ASSOCIATED WITH TYPE 2 DIABETES MELLITUS: Status: ACTIVE | Noted: 2017-10-12

## 2017-10-12 PROBLEM — Z79.4 UNCONTROLLED TYPE 2 DIABETES MELLITUS WITH HYPERGLYCEMIA, WITH LONG-TERM CURRENT USE OF INSULIN: Status: ACTIVE | Noted: 2017-10-12

## 2017-10-12 NOTE — PROGRESS NOTES
"Chelly Ortiz presented for a  Medicare AWV and comprehensive Health Risk Assessment today. The following components were reviewed and updated:    · Medical history  · Family History  · Social history  · Allergies and Current Medications  · Health Risk Assessment  · Health Maintenance  · Care Team     ** See Completed Assessments for Annual Wellness Visit within the encounter summary.**       The following assessments were completed:  · Living Situation  · CAGE  · Depression Screening  · Timed Get Up and Go  · Whisper Test  · Cognitive Function Screening  · Nutrition Screening  · ADL Screening  · PAQ Screening    Vitals:    10/09/17 1309   BP: 124/74   BP Location: Left arm   Patient Position: Sitting   Pulse: 65   SpO2: 98%   Weight: 88.5 kg (195 lb 1.7 oz)   Height: 5' 3" (1.6 m)     Body mass index is 34.56 kg/m².     Physical Exam   Constitutional: She is oriented to person, place, and time. She appears well-developed. No distress.   obese   HENT:   Head: Normocephalic and atraumatic.   Eyes: EOM are normal. Pupils are equal, round, and reactive to light.   Neck: Neck supple. No JVD present. No tracheal deviation present.   Cardiovascular: Normal rate, regular rhythm, normal heart sounds and intact distal pulses.    No murmur heard.  Pulmonary/Chest: Effort normal and breath sounds normal. No respiratory distress. She has no wheezes. She has no rales.   Abdominal: Soft. Bowel sounds are normal. She exhibits no distension and no mass. There is no tenderness.   Musculoskeletal: Normal range of motion. She exhibits no edema or tenderness.   Neurological: She is alert and oriented to person, place, and time. Coordination normal.   Skin: Skin is warm and dry. No erythema. No pallor.   Psychiatric: She has a normal mood and affect. Her behavior is normal. Judgment and thought content normal. Cognition and memory are normal. She expresses no homicidal and no suicidal ideation.   Nursing note and vitals reviewed.    "     Diagnoses and health risks identified today and associated recommendations/orders:    1. Encounter for preventive health examination    2. Uncontrolled type 2 diabetes mellitus with hyperglycemia, without long-term current use of insulin  Chronic; uncontrolled on medication.  Last HgA1c was 9.4.  Followed by PCP.    3. Diabetic polyneuropathy associated with type 2 diabetes mellitus  Chronic; stable on medication.  Followed by Podiatry.    4. Hypertension associated with diabetes  Chronic; stable on medication.  Followed by PCP.    5. Hyperlipidemia associated with type 2 diabetes mellitus  Chronic; stable on medication.  Followed by PCP.    6. Gastroesophageal reflux disease without esophagitis  Chronic; stable on medication.  Followed by PCP.    7. Facet arthritis of lumbar region  Chronic; stable on medication.  As seen on imaging dated 03/08/13.  Followed by Ortho.    8. Obesity (BMI 30.0-34.9)  Chronic, stable. Therapeutic lifestyle changes discussed. Followed by PCP.    9. Screening mammogram, encounter for  - Mammo Digital Screening Bilat with CAD; Future      Provided Chelly with a 5-10 year written screening schedule and personal prevention plan. Recommendations were developed using the USPSTF age appropriate recommendations. Education, counseling, and referrals were provided as needed. After Visit Summary printed and given to patient which includes a list of additional screenings\tests needed.    Return in 6 weeks (on 11/17/2017) for follow-up with PCP, HRA visit in 1 year.    Cecilia Limon NP

## 2017-10-20 ENCOUNTER — TELEPHONE (OUTPATIENT)
Dept: SLEEP MEDICINE | Facility: CLINIC | Age: 67
End: 2017-10-20

## 2017-10-20 DIAGNOSIS — G47.33 OSA (OBSTRUCTIVE SLEEP APNEA): Primary | ICD-10-CM

## 2017-10-23 ENCOUNTER — OFFICE VISIT (OUTPATIENT)
Dept: OPTOMETRY | Facility: CLINIC | Age: 67
End: 2017-10-23
Payer: MEDICARE

## 2017-10-23 DIAGNOSIS — H10.33 ACUTE BACTERIAL CONJUNCTIVITIS OF BOTH EYES: Primary | ICD-10-CM

## 2017-10-23 PROCEDURE — 99999 PR PBB SHADOW E&M-EST. PATIENT-LVL II: CPT | Mod: PBBFAC,,, | Performed by: OPTOMETRIST

## 2017-10-23 PROCEDURE — 92012 INTRM OPH EXAM EST PATIENT: CPT | Mod: S$GLB,,, | Performed by: OPTOMETRIST

## 2017-10-23 RX ORDER — NEOMYCIN SULFATE, POLYMYXIN B SULFATE AND DEXAMETHASONE 3.5; 10000; 1 MG/ML; [USP'U]/ML; MG/ML
1 SUSPENSION/ DROPS OPHTHALMIC 4 TIMES DAILY
Qty: 5 ML | Refills: 0 | Status: SHIPPED | OUTPATIENT
Start: 2017-10-23 | End: 2017-10-30

## 2017-10-23 NOTE — PROGRESS NOTES
HPI     Red itchy eyelids OU  Helped with steroid in past  Tried systane gel with min relief  Zaditor with no relief  Some discharge, stuck in am  Has seasonal allergies, uses zyrtec        Last edited by Agusto Holden, OD on 10/23/2017  4:06 PM. (History)              Assessment /Plan     For exam results, see Encounter Report.    Acute bacterial conjunctivitis of both eyes  -     neomycin-polymyxin-dexamethasone (MAXITROL) 3.5mg/mL-10,000 unit/mL-0.1 % DrpS; Place 1 drop into both eyes 4 (four) times daily.  Dispense: 5 mL; Refill: 0      1. Start Maxitrol drops 1 drop 4x/day x 1 week. Also can do claridex wipes for eyelids. RTC 1 week if no better, if better, cont lid wipes on a regular basis. RTc full eye exam when due.

## 2017-10-25 ENCOUNTER — TELEPHONE (OUTPATIENT)
Dept: OPTOMETRY | Facility: CLINIC | Age: 67
End: 2017-10-25

## 2017-10-25 NOTE — TELEPHONE ENCOUNTER
----- Message from Agusto Holden, OD sent at 10/24/2017  4:05 PM CDT -----  Contact: Chelly  She can try using Ocusoft lid wipes.   ----- Message -----  From: Susie Bartlett  Sent: 10/24/2017   1:37 PM  To: Agusto Holden, MIKA        ----- Message -----  From: Isaiah Cuevas  Sent: 10/24/2017  12:38 PM  To: Adina CHINO Staff    Ms. Ortiz would like to know if there is something else other then the cliradex because she is unable to locate it at any pharmacy. She can be reached at [phone].

## 2017-11-01 ENCOUNTER — TELEPHONE (OUTPATIENT)
Dept: SLEEP MEDICINE | Facility: CLINIC | Age: 67
End: 2017-11-01

## 2017-11-13 ENCOUNTER — OFFICE VISIT (OUTPATIENT)
Dept: SLEEP MEDICINE | Facility: CLINIC | Age: 67
End: 2017-11-13
Payer: MEDICARE

## 2017-11-13 VITALS
DIASTOLIC BLOOD PRESSURE: 67 MMHG | HEART RATE: 82 BPM | WEIGHT: 196.75 LBS | HEIGHT: 63 IN | BODY MASS INDEX: 34.86 KG/M2 | SYSTOLIC BLOOD PRESSURE: 122 MMHG

## 2017-11-13 DIAGNOSIS — G47.09 OTHER INSOMNIA: ICD-10-CM

## 2017-11-13 DIAGNOSIS — G47.33 OSA (OBSTRUCTIVE SLEEP APNEA): Primary | ICD-10-CM

## 2017-11-13 PROCEDURE — 99499 UNLISTED E&M SERVICE: CPT | Mod: S$GLB,,, | Performed by: PSYCHIATRY & NEUROLOGY

## 2017-11-13 PROCEDURE — 99999 PR PBB SHADOW E&M-EST. PATIENT-LVL III: CPT | Mod: PBBFAC,,, | Performed by: PSYCHIATRY & NEUROLOGY

## 2017-11-13 PROCEDURE — 99204 OFFICE O/P NEW MOD 45 MIN: CPT | Mod: S$GLB,,, | Performed by: PSYCHIATRY & NEUROLOGY

## 2017-11-13 NOTE — PATIENT INSTRUCTIONS
PLAN:    1. Continue  14/10 BPAP.    2. Continue Trazodone 50 mg mg at bedtime (she can take it more often) . Alternate with Alteril or RICCI (smaple provided - picture taken)  3. Will reorder supplies - consider nasal pillows other then Wisp  4. Will provide OA for MYRIAM referral.  5. Please make your bedroom darker.  6. Please exercise more/faster      Call      Dr. Chente VÁZQUEZ  [facility] [phone]   Or   Dr. Don Chaudhry [phone] for dental appliances   Dr. Humberto Lindo [phone]    Or   Dr. Humphrey Raya, DDS [phone]    Or Dr. Kp Garcia [phone]

## 2017-11-18 ENCOUNTER — OFFICE VISIT (OUTPATIENT)
Dept: URGENT CARE | Facility: CLINIC | Age: 67
End: 2017-11-18
Payer: MEDICARE

## 2017-11-18 VITALS
BODY MASS INDEX: 34.73 KG/M2 | HEART RATE: 73 BPM | HEIGHT: 63 IN | TEMPERATURE: 98 F | WEIGHT: 196 LBS | DIASTOLIC BLOOD PRESSURE: 66 MMHG | RESPIRATION RATE: 18 BRPM | OXYGEN SATURATION: 97 % | SYSTOLIC BLOOD PRESSURE: 109 MMHG

## 2017-11-18 DIAGNOSIS — R04.0 EPISTAXIS: Primary | ICD-10-CM

## 2017-11-18 PROCEDURE — 99214 OFFICE O/P EST MOD 30 MIN: CPT | Mod: S$GLB,,, | Performed by: PHYSICIAN ASSISTANT

## 2017-11-18 NOTE — PATIENT INSTRUCTIONS
- Rest.    - Drink plenty of fluids.    - Tylenol or Ibuprofen as directed as needed for fever/pain.    - Apply pressure if bleeding resumes.  - Afrin nasal spray as directed if bleeding resumes.  - Follow up with your PCP or specialty clinic as directed in the next 1-2 weeks if not improved or as needed.  You can call [phone] to schedule an appointment with the appropriate provider.    - Go to the ED if your symptoms worsen.    Nosebleed (Adult)    Bleeding from the nose most commonly occurs because of injury or drying and cracking of the inner lining of the nose. Most nosebleeds are because of dry air or nose-picking. They can occur during a common cold or an allergy attack. They can also occur on a very hot day, or from dry air in the winter.  If the bleeding site is found, it may be cauterized. This means it is treated to cause a blood clot to form. This may be done with a chemical, heat, or electricity. If the bleeding continues after the site is cauterized, or if the site cannot be found, packing may be put in your nose. This is to apply pressure and stop the bleeding. The packing may be made of gauze or sponge. A small balloon catheter is sometimes used. These must be removed by your doctor. Some types of packing dissolve on their own.  Home care  · If packing was put in your nose, unless told otherwise, do not pull on it or try to remove it yourself. You will be given an appointment to have it removed. You may also have been given antibiotics to prevent a sinus infection. If so, finish all of the medicine.  · Do not blow your nose for 12 hours after the bleeding stops. This will allow a strong blood clot to form. Do not pick your nose. This may restart bleeding.  · Avoid drinking alcohol and hot liquids for the next 2 days. Alcohol or hot liquids in your mouth can dilate blood vessels in your nose. This can cause bleeding to start again.  · Do not take ibuprofen, naproxen, or medicines that contain  aspirin. These thin the blood and may cause your nose to bleed. You may take acetaminophen for pain, unless another pain medicine was prescribed.  · If the bleeding starts again, sit up and lean forward to prevent swallowing blood. Pinch your nose tightly on both sides, as shown above, for 10 to 15 minutes. Time yourself. Dont release the pressure on your nose until 10 minutes is up. If bleeding does not stop, continue to pinch your nose and call your healthcare provider or return to this facility.  · If you have a cold, allergies, or dry nasal membranes, lubricate the nasal passages. Apply a small amount of petroleum jelly inside the nose with a cotton swab twice a day (morning and night).  · Avoid overheating your home. This can dry the air and make your condition worse.  · Put a humidifier in the room where you sleep. This will add moisture to the air.  · Use a saline nasal spray to keep nasal passages moist.  · Do not pick your nose. Keep fingernails trimmed to decrease risk of bleeds.  · Do not smoke.  Follow-up care  Follow up with your healthcare provider, or as advised. Nasal packing should be rechecked or removed within 2 to 3 days.  When to seek medical advice  Call your healthcare provider right away if any of these occur.  · You have another nosebleed that you cannot control  · Dizziness, weakness, or fainting  · You become tired or confused  · Fever of 100.4ºF (38ºC) or higher, or as directed by your healthcare provider  · Headache  · Sinus or facial pain  · Shortness of breath or trouble breathing  Date Last Reviewed: 3/22/2015  © 5935-9058 [employer]. [address] [place] [address], [place] [address]. All rights reserved. This information is not intended as a substitute for professional medical care. Always follow your healthcare professional's instructions.

## 2017-11-18 NOTE — PROGRESS NOTES
"Subjective:       Patient ID: Chelly Ortiz is a 67 y.o. female.    Vitals:  height is 5' 3" (1.6 m) and weight is 88.9 kg (196 lb). Her tympanic temperature is 97.7 °F (36.5 °C). Her blood pressure is 109/66 and her pulse is 73. Her respiration is 18 and oxygen saturation is 97%.     Chief Complaint: Epistaxis (left twice thsi week )    Present with nose bleed today on left side and Tuesday.        Epistaxis    The bleeding has been from the left nare. This is a recurrent problem. The current episode started today (and tuesday?). She has experienced no nasal trauma. The problem occurs every several days. The problem has been resolved. The bleeding is associated with nose-picking. She has tried pressure for the symptoms. The treatment provided significant relief. Her past medical history is significant for frequent nosebleeds.     Review of Systems   Constitution: Negative for chills and fever.   HENT: Positive for nosebleeds (left and pain ). Negative for congestion, ear pain, hoarse voice and sore throat.    Eyes: Negative for discharge and redness.   Cardiovascular: Negative for chest pain, dyspnea on exertion and leg swelling.   Respiratory: Negative for cough, shortness of breath, sputum production and wheezing.    Musculoskeletal: Negative for myalgias.   Gastrointestinal: Negative for abdominal pain and nausea.   Neurological: Negative for headaches.       Objective:      Physical Exam   Constitutional: She is oriented to person, place, and time. She appears well-developed and well-nourished.   HENT:   Head: Normocephalic and atraumatic.   Nose: Epistaxis (evidence of recent nose blees, but no active bleeding at this time on the left) is observed.   Eyes: Conjunctivae are normal.   Neck: Normal range of motion. Neck supple. No thyromegaly present.   Cardiovascular: Normal rate and regular rhythm.  Exam reveals no gallop and no friction rub.    No murmur heard.  Pulmonary/Chest: Effort normal and breath " sounds normal. She has no wheezes. She has no rales.   Musculoskeletal: Normal range of motion.   Lymphadenopathy:     She has no cervical adenopathy.   Neurological: She is alert and oriented to person, place, and time.   Skin: Skin is warm and dry. No rash noted. No erythema.   Psychiatric: She has a normal mood and affect. Her behavior is normal. Judgment and thought content normal.   Nursing note and vitals reviewed.      Assessment:       1. Epistaxis        Plan:         Epistaxis      Chelly was seen today for epistaxis.    Diagnoses and all orders for this visit:    Epistaxis      Patient Instructions   - Rest.    - Drink plenty of fluids.    - Tylenol or Ibuprofen as directed as needed for fever/pain.    - Apply pressure if bleeding resumes.  - Afrin nasal spray as directed if bleeding resumes.  - Follow up with your PCP or specialty clinic as directed in the next 1-2 weeks if not improved or as needed.  You can call [phone] to schedule an appointment with the appropriate provider.    - Go to the ED if your symptoms worsen.    Nosebleed (Adult)    Bleeding from the nose most commonly occurs because of injury or drying and cracking of the inner lining of the nose. Most nosebleeds are because of dry air or nose-picking. They can occur during a common cold or an allergy attack. They can also occur on a very hot day, or from dry air in the winter.  If the bleeding site is found, it may be cauterized. This means it is treated to cause a blood clot to form. This may be done with a chemical, heat, or electricity. If the bleeding continues after the site is cauterized, or if the site cannot be found, packing may be put in your nose. This is to apply pressure and stop the bleeding. The packing may be made of gauze or sponge. A small balloon catheter is sometimes used. These must be removed by your doctor. Some types of packing dissolve on their own.  Home care  · If packing was put in your nose, unless told  otherwise, do not pull on it or try to remove it yourself. You will be given an appointment to have it removed. You may also have been given antibiotics to prevent a sinus infection. If so, finish all of the medicine.  · Do not blow your nose for 12 hours after the bleeding stops. This will allow a strong blood clot to form. Do not pick your nose. This may restart bleeding.  · Avoid drinking alcohol and hot liquids for the next 2 days. Alcohol or hot liquids in your mouth can dilate blood vessels in your nose. This can cause bleeding to start again.  · Do not take ibuprofen, naproxen, or medicines that contain aspirin. These thin the blood and may cause your nose to bleed. You may take acetaminophen for pain, unless another pain medicine was prescribed.  · If the bleeding starts again, sit up and lean forward to prevent swallowing blood. Pinch your nose tightly on both sides, as shown above, for 10 to 15 minutes. Time yourself. Dont release the pressure on your nose until 10 minutes is up. If bleeding does not stop, continue to pinch your nose and call your healthcare provider or return to this facility.  · If you have a cold, allergies, or dry nasal membranes, lubricate the nasal passages. Apply a small amount of petroleum jelly inside the nose with a cotton swab twice a day (morning and night).  · Avoid overheating your home. This can dry the air and make your condition worse.  · Put a humidifier in the room where you sleep. This will add moisture to the air.  · Use a saline nasal spray to keep nasal passages moist.  · Do not pick your nose. Keep fingernails trimmed to decrease risk of bleeds.  · Do not smoke.  Follow-up care  Follow up with your healthcare provider, or as advised. Nasal packing should be rechecked or removed within 2 to 3 days.  When to seek medical advice  Call your healthcare provider right away if any of these occur.  · You have another nosebleed that you cannot control  · Dizziness,  weakness, or fainting  · You become tired or confused  · Fever of 100.4ºF (38ºC) or higher, or as directed by your healthcare provider  · Headache  · Sinus or facial pain  · Shortness of breath or trouble breathing  Date Last Reviewed: 3/22/2015  © 9548-6478 [employer]. [address] [place] [id number]. All rights reserved. This information is not intended as a substitute for professional medical care. Always follow your healthcare professional's instructions.

## 2017-11-19 ENCOUNTER — TELEPHONE (OUTPATIENT)
Dept: FAMILY MEDICINE | Facility: CLINIC | Age: 67
End: 2017-11-19

## 2017-11-19 DIAGNOSIS — R04.0 EPISTAXIS: Primary | ICD-10-CM

## 2017-11-19 DIAGNOSIS — E78.5 DYSLIPIDEMIA: ICD-10-CM

## 2017-11-19 DIAGNOSIS — E11.65 TYPE 2 DIABETES MELLITUS WITH HYPERGLYCEMIA, WITHOUT LONG-TERM CURRENT USE OF INSULIN: ICD-10-CM

## 2017-11-20 RX ORDER — PRAVASTATIN SODIUM 10 MG/1
TABLET ORAL
Qty: 90 TABLET | Refills: 0 | Status: SHIPPED | OUTPATIENT
Start: 2017-11-20 | End: 2018-01-23 | Stop reason: SDUPTHER

## 2017-11-20 RX ORDER — AMLODIPINE BESYLATE 5 MG/1
TABLET ORAL
Qty: 90 TABLET | Refills: 0 | Status: SHIPPED | OUTPATIENT
Start: 2017-11-20 | End: 2018-01-23 | Stop reason: SDUPTHER

## 2017-11-20 RX ORDER — LOSARTAN POTASSIUM 50 MG/1
TABLET ORAL
Qty: 90 TABLET | Refills: 0 | Status: SHIPPED | OUTPATIENT
Start: 2017-11-20 | End: 2018-01-23 | Stop reason: SDUPTHER

## 2017-11-20 RX ORDER — GLIMEPIRIDE 4 MG/1
TABLET ORAL
Qty: 90 TABLET | Refills: 0 | Status: SHIPPED | OUTPATIENT
Start: 2017-11-20 | End: 2018-01-23 | Stop reason: SDUPTHER

## 2017-11-20 RX ORDER — SITAGLIPTIN 50 MG/1
TABLET, FILM COATED ORAL
Qty: 90 TABLET | Refills: 0 | Status: SHIPPED | OUTPATIENT
Start: 2017-11-20 | End: 2018-01-16 | Stop reason: SDUPTHER

## 2017-11-20 NOTE — TELEPHONE ENCOUNTER
----- Message from Laney Matthews sent at 11/20/2017 11:26 AM CST -----  Contact: Self/ [phone]  Patient called in returning your call.     Please call and advise.

## 2017-11-20 NOTE — TELEPHONE ENCOUNTER
Spoke with pt inform her referral order is in pt report she will call [facility] and schedule appt when she's ready. Pt voices understanding.

## 2017-11-20 NOTE — TELEPHONE ENCOUNTER
Spoke with pt report she recently went to Urgent care for nose bleed. Pt was advice by urgent care to see a ENT. Pt needs a referral. Pt was seen last with Dr. Garcia 08-. Please advice.

## 2017-11-26 ENCOUNTER — HOSPITAL ENCOUNTER (EMERGENCY)
Facility: HOSPITAL | Age: 67
Discharge: HOME | End: 2017-11-26
Attending: EMERGENCY MEDICINE
Payer: MEDICARE

## 2017-11-26 VITALS
BODY MASS INDEX: 34.73 KG/M2 | TEMPERATURE: 99 F | SYSTOLIC BLOOD PRESSURE: 137 MMHG | DIASTOLIC BLOOD PRESSURE: 72 MMHG | RESPIRATION RATE: 18 BRPM | HEIGHT: 63 IN | HEART RATE: 82 BPM | WEIGHT: 196 LBS | OXYGEN SATURATION: 97 %

## 2017-11-26 DIAGNOSIS — R04.0 LEFT-SIDED EPISTAXIS: Primary | ICD-10-CM

## 2017-11-26 PROCEDURE — 82962 GLUCOSE BLOOD TEST: CPT

## 2017-11-26 PROCEDURE — 99283 EMERGENCY DEPT VISIT LOW MDM: CPT | Mod: 25

## 2017-11-27 LAB — POCT GLUCOSE: 190 MG/DL (ref 70–110)

## 2017-11-27 NOTE — ED PROVIDER NOTES
Encounter Date: 11/26/2017       History     Chief Complaint   Patient presents with    Epistaxis     about 1.5 hrs ago. Pt states this is 4th episode in 2 weeks.Pt has been evaluated by ENT in the past. Pt was evaluated at  for same last Saturday. Bleeding controlled at triage.      67-year-old female with GERD, HT and, HLD, DM, IBS, DDD, MYRIAM, seasonal ALLERGIES presents to the ED for evaluation of nosebleeds.  She states that over the past one week and a half she has had several nosebleeds.  He was seen and evaluated at urgent care last Saturday with no active bleeding at that time and was sent home with Afrin spray and instructions on epistaxis control.  She did report some initial improvement with symptoms however states that the after began to irritate her nose and she discontinued.  She then began using saline mist but also found that this irritated the nose and that she continued with infrequent nosebleeds.  The last nosebleed was today before arrival and was controlled prior to arrival.  She endorses some continued nasal congestion and postnasal drip.  She does report that she will follow-up with ENT in the near future.  She states that she has seen an ear nose and throat physician in the past with cauterization of right-sided epistaxis several years ago.  She denies any spontaneous gum bleeding, ecchymosis, petechiae, hematuria or hemoptysis.  She reports her blood pressure is usually in good control.      The history is provided by the patient.     Review of patient's allergies indicates:   Allergen Reactions    Prednisone Other (See Comments)     hipper    Protonix [pantoprazole] Itching    Ace inhibitors Hives    Latex, natural rubber Itching    Other Hives     steriods     Past Medical History:   Diagnosis Date    Allergy     Cataract     DDD (degenerative disc disease), lumbar     Diabetes mellitus     diet controlled    Diabetes mellitus, type 2     GERD (gastroesophageal reflux disease)      History of subconjunctival hemorrhage 11    left eye    Hyperlipidemia     Hypertension     IBS (irritable bowel syndrome)     Obesity     YMRIAM (obstructive sleep apnea)     on CPAP    Rotator cuff impingement syndrome     Seasonal allergic conjunctivitis      Past Surgical History:   Procedure Laterality Date    CATARACT EXTRACTION W/  INTRAOCULAR LENS IMPLANT  10/3/13    OD (dr. gardner)     SECTION      x2    CHOLECYSTECTOMY      EYE SURGERY      HYSTERECTOMY      ovaries intact, due to DUB    TUBAL LIGATION       Family History   Problem Relation Age of Onset    Alzheimer's disease Mother     Heart disease Father     Diabetes Sister     Deep vein thrombosis Brother     Diabetes Daughter     Cancer Brother      Lung    Lung cancer Brother     Amblyopia Neg Hx     Blindness Neg Hx     Cataracts Neg Hx     Glaucoma Neg Hx     Hypertension Neg Hx     Macular degeneration Neg Hx     Retinal detachment Neg Hx     Strabismus Neg Hx     Stroke Neg Hx     Thyroid disease Neg Hx      Social History   Substance Use Topics    Smoking status: Former Smoker     Quit date: 2/15/1983    Smokeless tobacco: Never Used    Alcohol use No     Review of Systems   Constitutional: Negative for chills and fever.   HENT: Positive for congestion, nosebleeds and postnasal drip. Negative for sore throat.    Respiratory: Negative for cough.    Cardiovascular: Negative for chest pain.   Genitourinary: Negative for hematuria.   Musculoskeletal: Negative for back pain.   Skin: Negative for color change, pallor and rash.   Neurological: Negative for weakness.   Hematological: Does not bruise/bleed easily.       Physical Exam     Initial Vitals [17 1731]   BP Pulse Resp Temp SpO2   (!) 175/80 99 18 98.5 °F (36.9 °C) 97 %      MAP       111.67         Physical Exam    Nursing note and vitals reviewed.  Constitutional: Vital signs are normal. She appears well-developed and well-nourished. She  is cooperative.  Non-toxic appearance. She does not appear ill. No distress.   HENT:   Head: Normocephalic and atraumatic.   Nose: Mucosal edema present. No epistaxis.   2 small abrasions noted to the left distal nare; 1 about the medial aspect; 1about the lateral aspect no active bleeding at this time.  Both nares with moderate edema and erythema   Eyes: Conjunctivae and lids are normal.   Neck: Neck supple. No neck rigidity.   Cardiovascular: Normal rate and regular rhythm.   Pulmonary/Chest: Breath sounds normal. No respiratory distress. She has no wheezes. She has no rhonchi.   Abdominal: Soft. Normal appearance and bowel sounds are normal. There is no tenderness. There is no rigidity and no guarding.   Musculoskeletal: Normal range of motion.   Neurological: She is alert and oriented to person, place, and time. GCS eye subscore is 4. GCS verbal subscore is 5. GCS motor subscore is 6.   Skin: Skin is warm, dry and intact. No rash noted.   Psychiatric: She has a normal mood and affect. Her speech is normal and behavior is normal. Thought content normal.         ED Course   Procedures  Labs Reviewed - No data to display          Medical Decision Making:   Initial Assessment:   Well-appearing 67-year-old female with history of infrequent nosebleeds presented to the ED for evaluation of nosebleeds that have been occurring for approximately one and half weeks.  She states that bleeding is controlled with direct pressure.  She was seen at urgent care and was sent home with Afrin.  She discontinued use due to irritation.  Physical exam reveals well-appearing female in no obvious distress.  No active bleeding visualized at this time. 2 small abrasions noted to the left distal nare; 1 about the medial aspect; 1about the lateral aspect no active bleeding at this time.  Both nares with moderate edema and erythema.   Differential Diagnosis:   Epistaxis anterior versus posterior, ALLERGIC rhinitis  ED Management:  Patient is  well-appearing in no distress.  Clinical exam findings consistent with resolved epistaxis likely due to mucosal irritation.  We did discuss adding Aquaphor or Vaseline to the regimen and possibly a humidifier to help moisten the area and prevent drying and further irritation.  Patient's blood pressure was initially elevated however improved markedly throughout ED course.  No further labs were warranted at this time as it does not appear to be an emergent process as she does not endorse any easy bleeding history and does not have any other contributing symptoms.  She was instructed to follow-up with her primary care and ENT for further evaluation. Patient was cautioned on when to return to ED. Patient verbalized understanding and agreement with the treatment plan                     ED Course      Clinical Impression:   The encounter diagnosis was Left-sided epistaxis.                           RENÉ Mccallum  11/26/17 1942

## 2017-11-27 NOTE — ED NOTES
Patient has verified the spelling of their name and  on armband  LOC: The patient is awake, alert, and aware of environment with an appropriate affect, the patient is oriented x 3 and speaking appropriately.   APPEARANCE: Patient resting comfortably and in no acute distress, patient is clean and well groomed, patient's clothing is properly fastened.   SKIN: The skin is warm and dry, color consistent with ethnicity, patient has normal skin turgor and moist mucus membranes, skin intact, no breakdown or bruising noted.   :   Voids without difficulty  MUSCULOSKELETAL: Patient moving all extremities spontaneously, no obvious swelling or deformities noted.   RESPIRATORY: Airway is open and patent, respirations are spontaneous, patient has a normal effort and rate, no accessory muscle use noted, bilateral breath sounds clear, denies SOB   ABDOMEN: Soft and non tender to palpation, no distention noted, normoactive bowel sounds present in all four quadrants.   CARDIAC:  Normal rate and rhythm, no peripheral edema noted, less then 3 second capillary refill, denies chest pain  COMPLAINT:  Nose bleed that is now controlled, patient states she was seen last week at urgent care at prescribed Afrin nose spray but stopped using it because it irritated her nose, denies pain and gave 0 out of 10 on pain scale rating

## 2017-11-28 ENCOUNTER — HOSPITAL ENCOUNTER (OUTPATIENT)
Dept: RADIOLOGY | Facility: HOSPITAL | Age: 67
Discharge: HOME | End: 2017-11-28
Attending: NURSE PRACTITIONER
Payer: MEDICARE

## 2017-11-28 DIAGNOSIS — Z12.31 SCREENING MAMMOGRAM, ENCOUNTER FOR: ICD-10-CM

## 2017-11-28 PROCEDURE — 77067 SCR MAMMO BI INCL CAD: CPT | Mod: TC

## 2017-11-28 PROCEDURE — 77067 SCR MAMMO BI INCL CAD: CPT | Mod: 26,,, | Performed by: RADIOLOGY

## 2017-11-28 PROCEDURE — 77063 BREAST TOMOSYNTHESIS BI: CPT | Mod: 26,,, | Performed by: RADIOLOGY

## 2017-12-04 ENCOUNTER — TELEPHONE (OUTPATIENT)
Dept: FAMILY MEDICINE | Facility: CLINIC | Age: 67
End: 2017-12-04

## 2017-12-05 ENCOUNTER — OFFICE VISIT (OUTPATIENT)
Dept: OTOLARYNGOLOGY | Facility: CLINIC | Age: 67
End: 2017-12-05
Payer: MEDICARE

## 2017-12-05 VITALS
DIASTOLIC BLOOD PRESSURE: 87 MMHG | HEIGHT: 63 IN | BODY MASS INDEX: 34.76 KG/M2 | SYSTOLIC BLOOD PRESSURE: 142 MMHG | WEIGHT: 196.19 LBS

## 2017-12-05 DIAGNOSIS — R04.0 EPISTAXIS: Primary | ICD-10-CM

## 2017-12-05 PROCEDURE — 99214 OFFICE O/P EST MOD 30 MIN: CPT | Mod: S$GLB,,, | Performed by: OTOLARYNGOLOGY

## 2017-12-05 PROCEDURE — 99999 PR PBB SHADOW E&M-EST. PATIENT-LVL III: CPT | Mod: PBBFAC,,, | Performed by: OTOLARYNGOLOGY

## 2017-12-05 NOTE — PROGRESS NOTES
Subjective:       Patient ID: Chelly Ortiz is a 67 y.o. female.    Chief Complaint: Epistaxis (DR [facility]ARP)    Chelly Ortiz is a 68 y/o AAF who presents with about a three week h/o epistaxis (L>R). Durations is minutes to one hour. Frequency is 2-3 times per week. Symptoms are improving and she has not had a nose bleed in about a week. She denies nasal trauma or nasal picking. No blood thinners. Treatments tried include nasal saline spray, AYR nasal gel, Afrin, and pressure. She has a h/o HTN, DM, HLD, GERD, MYRIAM, and Anxiety.   Epistaxis    The bleeding has been from both (L>R) nares. This is a recurrent problem. The current episode started 1 to 4 weeks ago. She has experienced no nasal trauma. Episode frequency: 2-3 times per week. The problem has been gradually improving. The bleeding is associated with dry air. She has tried pressure (nasal saline spray, AYR nasal gel, Afrin) for the symptoms. The treatment provided moderate relief. There is no history of allergies, a bleeding disorder, colds, frequent nosebleeds or sinus problems.        Past Medical History: Patient has a past medical history of Allergy; Cataract; DDD (degenerative disc disease), lumbar; Diabetes mellitus; Diabetes mellitus, type 2; GERD (gastroesophageal reflux disease); History of subconjunctival hemorrhage (11); Hyperlipidemia; Hypertension; IBS (irritable bowel syndrome); Obesity; MYRIAM (obstructive sleep apnea); Rotator cuff impingement syndrome; and Seasonal allergic conjunctivitis.    Past Surgical History: Patient has a past surgical history that includes  section; Cholecystectomy; Cataract extraction w/  intraocular lens implant (10/3/13); Eye surgery; Tubal ligation; and Hysterectomy.    Social History: Patient reports that she quit smoking about 34 years ago. She has never used smokeless tobacco. She reports that she does not drink alcohol or use drugs.    Family History: family history includes Alzheimer's disease in her  mother; Cancer in her brother; Deep vein thrombosis in her brother; Diabetes in her daughter and sister; Heart disease in her father; Lung cancer in her brother.    Medications:   Current Outpatient Prescriptions   Medication Sig    amLODIPine (NORVASC) 5 MG tablet TAKE 1 TABLET ONE TIME DAILY    aspirin (ECOTRIN) 81 MG EC tablet Take 81 mg by mouth once daily.    azelastine (ASTELIN) 137 mcg (0.1 %) nasal spray 1 spray (137 mcg total) by Nasal route 2 (two) times daily.    blood sugar diagnostic Strp 1 strip by Misc.(Non-Drug; Combo Route) route 2 (two) times daily.    blood-glucose meter kit Use as instructed    citalopram (CELEXA) 10 MG tablet Take 1 tablet (10 mg total) by mouth once daily.    ergocalciferol (VITAMIN D2) 50,000 unit Cap Take 1 capsule (50,000 Units total) by mouth every 7 days.    famotidine (PEPCID) 20 MG tablet Take 1 tablet (20 mg total) by mouth every evening.    gabapentin (NEURONTIN) 100 MG capsule Take 1 capsule (100 mg total) by mouth 3 (three) times daily.    glimepiride (AMARYL) 4 MG tablet TAKE 1 TABLET EVERY DAY WITH BREAKFAST    JANUVIA 50 mg Tab TAKE 1 TABLET ONE TIME DAILY    lancets (MICROLET LANCET) Misc Inject 1 lancet into the skin once daily.    lidocaine HCL 2% (XYLOCAINE) 2 % jelly Apply topically once daily.    losartan (COZAAR) 50 MG tablet TAKE 1 TABLET ONE TIME DAILY    magnesium oxide (MAG-OX) 400 mg tablet take 1 tablet by mouth twice a day    meloxicam (MOBIC) 15 MG tablet TAKE 1 TABLET BY MOUTH ONCE DAILY    pravastatin (PRAVACHOL) 10 MG tablet TAKE 1 TABLET ONE TIME DAILY    trazodone (DESYREL) 50 MG tablet Take 1 tablet (50 mg total) by mouth nightly as needed.     No current facility-administered medications for this visit.        Allergies: Patient is allergic to prednisone; protonix [pantoprazole]; ace inhibitors; latex, natural rubber; and other.    Review of Systems   Constitutional: Negative for activity change, appetite change, chills,  "diaphoresis, fatigue, fever and unexpected weight change.   HENT: Positive for nosebleeds. Negative for congestion, dental problem, drooling, ear discharge, ear pain, facial swelling, hearing loss, mouth sores, postnasal drip, rhinorrhea, sinus pain, sinus pressure, sneezing, sore throat, tinnitus, trouble swallowing and voice change.    Eyes: Negative for pain and visual disturbance.   Respiratory: Negative for cough, chest tightness, shortness of breath, wheezing and stridor.    Cardiovascular: Negative for chest pain.   Musculoskeletal: Negative for gait problem and neck pain.   Skin: Negative for color change and rash.   Allergic/Immunologic: Negative for environmental allergies.   Neurological: Negative for dizziness, seizures, syncope, facial asymmetry, speech difficulty, weakness, light-headedness, numbness and headaches.   Psychiatric/Behavioral: Negative for agitation and confusion. The patient is not nervous/anxious.        Objective:       BP (!) 142/87 (BP Location: Right arm, Patient Position: Sitting)   Ht 5' 3" (1.6 m)   Wt 89 kg (196 lb 3.4 oz)   LMP  (LMP Unknown)   BMI 34.76 kg/m²     Physical Exam   Constitutional: She is oriented to person, place, and time. She appears well-developed and well-nourished.   HENT:   Head: Normocephalic and atraumatic. Not macrocephalic and not microcephalic. Head is without raccoon's eyes, without Ly's sign, without abrasion, without contusion, without laceration, without right periorbital erythema and without left periorbital erythema. Hair is normal.   Right Ear: Tympanic membrane, external ear and ear canal normal. No lacerations. No drainage, swelling or tenderness. No foreign bodies. No mastoid tenderness. Tympanic membrane is not injected, not scarred, not perforated, not erythematous, not retracted and not bulging. Tympanic membrane mobility is normal. No middle ear effusion. No hemotympanum. No decreased hearing is noted.   Left Ear: Tympanic " membrane, external ear and ear canal normal. No lacerations. No drainage, swelling or tenderness. No foreign bodies. No mastoid tenderness. Tympanic membrane is not injected, not scarred, not perforated, not erythematous, not retracted and not bulging. Tympanic membrane mobility is normal.  No middle ear effusion. No hemotympanum. No decreased hearing is noted.   Nose: Nose normal. No mucosal edema, rhinorrhea, nose lacerations, sinus tenderness, nasal deformity, septal deviation or nasal septal hematoma. No epistaxis.  No foreign bodies. Right sinus exhibits no maxillary sinus tenderness and no frontal sinus tenderness. Left sinus exhibits no maxillary sinus tenderness and no frontal sinus tenderness.   Mouth/Throat: Uvula is midline, oropharynx is clear and moist and mucous membranes are normal. Mucous membranes are not pale, not dry and not cyanotic. She does not have dentures. No oral lesions. No trismus in the jaw. No dental abscesses, uvula swelling, lacerations or dental caries. No oropharyngeal exudate, posterior oropharyngeal edema, posterior oropharyngeal erythema or tonsillar abscesses. Tonsils are 1+ on the right. Tonsils are 1+ on the left. No tonsillar exudate.       Eyes: Conjunctivae, EOM and lids are normal. Pupils are equal, round, and reactive to light.   Neck: Trachea normal and normal range of motion. Neck supple. No spinous process tenderness and no muscular tenderness present. No neck rigidity. No edema, no erythema and normal range of motion present. No thyroid mass and no thyromegaly present.   Pulmonary/Chest: Effort normal.   Abdominal: Soft.   Musculoskeletal: Normal range of motion.   Lymphadenopathy:        Head (right side): No submental, no submandibular, no tonsillar, no preauricular and no posterior auricular adenopathy present.        Head (left side): No submental, no submandibular, no tonsillar, no preauricular, no posterior auricular and no occipital adenopathy present.     She  has no cervical adenopathy.   Neurological: She is alert and oriented to person, place, and time. No cranial nerve deficit or sensory deficit.   Skin: Skin is warm and dry.   Psychiatric: She has a normal mood and affect. Her behavior is normal. Judgment and thought content normal.   Nursing note and vitals reviewed.      Assessment:       1. Epistaxis        Plan:       Nose Bleed regimen provided.  Afrin regimen provided.  Bedside Humidifier.  Report to Emergency Department for severe nose bleed.  RTC prn.

## 2017-12-05 NOTE — PATIENT INSTRUCTIONS
Nose Bleed regimen provided.  Afrin regimen provided.  Bedside Humidifier.  Report to Emergency Department for severe nose bleed.  RTC prn.

## 2018-01-12 ENCOUNTER — TELEPHONE (OUTPATIENT)
Dept: FAMILY MEDICINE | Facility: CLINIC | Age: 68
End: 2018-01-12

## 2018-01-12 DIAGNOSIS — E11.65 TYPE 2 DIABETES MELLITUS WITH HYPERGLYCEMIA, WITHOUT LONG-TERM CURRENT USE OF INSULIN: Primary | ICD-10-CM

## 2018-01-12 NOTE — TELEPHONE ENCOUNTER
----- Message from Xuan Hernandez MA sent at 1/12/2018 11:22 AM CST -----  Contact: Self. [phone]      ----- Message -----  From: Aminta Fong  Sent: 1/12/2018   9:14 AM  To: Steve JOHNSON Staff    Patient would like to speak with you about getting orders for A1C test. Please advise.

## 2018-01-15 ENCOUNTER — LAB VISIT (OUTPATIENT)
Dept: LAB | Facility: HOSPITAL | Age: 68
End: 2018-01-15
Attending: FAMILY MEDICINE
Payer: MEDICARE

## 2018-01-15 DIAGNOSIS — E11.65 TYPE 2 DIABETES MELLITUS WITH HYPERGLYCEMIA, WITHOUT LONG-TERM CURRENT USE OF INSULIN: ICD-10-CM

## 2018-01-15 LAB
CHOLEST SERPL-MCNC: 237 MG/DL
CHOLEST/HDLC SERPL: 4.7 {RATIO}
HDLC SERPL-MCNC: 50 MG/DL
HDLC SERPL: 21.1 %
LDLC SERPL CALC-MCNC: 144.4 MG/DL
NONHDLC SERPL-MCNC: 187 MG/DL
TRIGL SERPL-MCNC: 213 MG/DL

## 2018-01-15 PROCEDURE — 80061 LIPID PANEL: CPT

## 2018-01-15 PROCEDURE — 36415 COLL VENOUS BLD VENIPUNCTURE: CPT | Mod: PO

## 2018-01-16 ENCOUNTER — OFFICE VISIT (OUTPATIENT)
Dept: FAMILY MEDICINE | Facility: CLINIC | Age: 68
End: 2018-01-16
Payer: MEDICARE

## 2018-01-16 VITALS
BODY MASS INDEX: 35.47 KG/M2 | SYSTOLIC BLOOD PRESSURE: 120 MMHG | OXYGEN SATURATION: 96 % | WEIGHT: 200.19 LBS | DIASTOLIC BLOOD PRESSURE: 66 MMHG | HEIGHT: 63 IN | HEART RATE: 79 BPM

## 2018-01-16 DIAGNOSIS — E66.01 SEVERE OBESITY (BMI 35.0-39.9): ICD-10-CM

## 2018-01-16 DIAGNOSIS — E11.65 TYPE 2 DIABETES MELLITUS WITH HYPERGLYCEMIA, WITHOUT LONG-TERM CURRENT USE OF INSULIN: ICD-10-CM

## 2018-01-16 DIAGNOSIS — M54.2 NECK PAIN OF OVER 3 MONTHS DURATION: Primary | ICD-10-CM

## 2018-01-16 PROCEDURE — 99499 UNLISTED E&M SERVICE: CPT | Mod: S$GLB,,, | Performed by: FAMILY MEDICINE

## 2018-01-16 PROCEDURE — 99999 PR PBB SHADOW E&M-EST. PATIENT-LVL IV: CPT | Mod: PBBFAC,,, | Performed by: FAMILY MEDICINE

## 2018-01-16 PROCEDURE — 99214 OFFICE O/P EST MOD 30 MIN: CPT | Mod: S$GLB,,, | Performed by: FAMILY MEDICINE

## 2018-01-16 RX ORDER — PIROXICAM 20 MG/1
20 CAPSULE ORAL DAILY
Qty: 30 CAPSULE | Refills: 0 | Status: SHIPPED | OUTPATIENT
Start: 2018-01-16 | End: 2018-04-14

## 2018-01-16 RX ORDER — CYCLOBENZAPRINE HCL 5 MG
5 TABLET ORAL 3 TIMES DAILY PRN
Qty: 30 TABLET | Refills: 0 | Status: SHIPPED | OUTPATIENT
Start: 2018-01-16 | End: 2018-01-26

## 2018-01-16 NOTE — PATIENT INSTRUCTIONS
Diabetes: Activity Tips    Being more active can help you manage your diabetes. The tips on this sheet can help you get the most from your exercise. They can also help you stay safe.  Staying Active  Its important for adults to spend less time sitting and being inactive. This is especially true if you have type 2 diabetes. When you are sitting for long periods of time, get up for short sessions of light activity every 30 minutes.  You should aim for at least 150 minutes a week of exercise or physical activity. Dont let more than 2 days go by without being active.  Benefit from briskness  Brisk activity gets your heart beating faster. This can help you increase your fitness, lose extra weight, and manage your blood sugar level. Try brisk walking. Or, if you have foot or leg problems, you can try swimming or bike riding. You can break up your exercise into chunks throughout the day. Work up to at least 30 minutes of steady, brisk exercise on most days.  Warm up and cool down  Warming up and cooling down reduce your risk of injury. They also help limit muscle soreness. Do a mild version of your activity for 5 minutes before and after your routine. You can also learn stretches that will help keep your muscles loose. Your healthcare provider may show you good ways to warm up and stretch.  Do the talk-sing test  The talk-sing test is a simple way to tell how hard youre exercising. If you can talk while exercising, youre in a safe range. If youre out of breath, slow down. If you can carry a tune, its time to  the pace. Walk up a hill. Increase the resistance on your stationary bike. Or swim faster.  What about eating?  You may be told to plan your exercise for 1 to 2 hours after a meal. In most cases, you dont need to eat while being active. If you take insulin or medicine that can cause low blood sugar, test your blood sugar before exercising. And carry a fast-acting sugar that will raise your blood sugar  level quickly. This includes glucose tablets or hard candy. Use it if you feel low blood sugar symptoms.  Safety tips  These tips can help you stay safe as you become fit:  · Exercise with a friend or carry a cell phone if you have one.  · Carry or wear identification, such as a necklace or bracelet, that says you have diabetes.  · Use the proper footwear and safety equipment for your activity.  · Drink water before, during, and after exercise.  · Dress properly for the weather.  · Dont exercise in very hot or very cold weather.  · Dont exercise if you are sick.  · If you are instructed to do so, test your blood sugar before and after you exercise. Have a small carbohydrate snack if your blood sugar is low before you start exercising.   When to stop exercising and call your healthcare provider  Stop exercising and call your healthcare provider right away if you notice any of the following:  · Pain, pressure, tightness, or heaviness in the chest  · Pain or heaviness in the neck, shoulders, back, arms, legs, or feet  · Unusual shortness of breath  · Dizziness or lightheadedness  · Unusually rapid or slow pulse  · Increased joint or muscle pain  · Nausea or vomiting  Date Last Reviewed: 5/1/2016  © 7702-0425 [employer]. [address] [place] [address], [place] [address]. All rights reserved. This information is not intended as a substitute for professional medical care. Always follow your healthcare professional's instructions.

## 2018-01-16 NOTE — PROGRESS NOTES
Subjective:       Patient ID: Chelly Ortiz is a 67 y.o. female.    Chief Complaint: Diabetes (follow up)    67 years old female came to the clinic for diabetes check.  Patient last A1c was elevated.  Patient currently not very active.  Patient with a BMI of 35 currently trying to lose weight.  Patient with neck pain for the last year.  The pain is 6 out of 10 of intensity on and off aggravated with activity and better with rest.      Diabetes   She has type 2 diabetes mellitus. No MedicAlert identification noted. The initial diagnosis of diabetes was made 7 years ago. Associated symptoms include blurred vision and visual change. Pertinent negatives for hypoglycemia complications include no blackouts, no hospitalization, no nocturnal hypoglycemia, no required assistance and no required glucagon injection. Symptoms are stable. Pertinent negatives for diabetic complications include no CVA, heart disease, impotence, nephropathy, peripheral neuropathy, PVD or retinopathy. Risk factors for coronary artery disease include dyslipidemia, family history, hypertension, obesity, stress, tobacco exposure and diabetes mellitus. Current diabetic treatment includes oral agent (monotherapy). She is compliant with treatment most of the time. Her weight is stable. She is following a generally healthy diet. Meal planning includes carbohydrate counting. She has had a previous visit with a dietitian. She participates in exercise weekly. She monitors blood glucose at home 1-2 x per day. Blood glucose monitoring compliance is adequate. Her home blood glucose trend is fluctuating minimally. She sees a podiatrist.Eye exam is current.     Review of Systems   Constitutional: Negative.    HENT: Negative.    Eyes: Positive for blurred vision.   Respiratory: Negative.    Cardiovascular: Negative.    Gastrointestinal: Negative.    Genitourinary: Negative.  Negative for impotence.   Musculoskeletal: Negative.    Skin: Negative.    Neurological:  Negative.    Psychiatric/Behavioral: Negative.        Objective:      Physical Exam   Constitutional: She is oriented to person, place, and time. She appears well-developed and well-nourished. No distress.   HENT:   Head: Normocephalic and atraumatic.   Right Ear: External ear normal.   Left Ear: External ear normal.   Nose: Nose normal.   Mouth/Throat: Oropharynx is clear and moist. No oropharyngeal exudate.   Eyes: Conjunctivae and EOM are normal. Pupils are equal, round, and reactive to light. Right eye exhibits no discharge. Left eye exhibits no discharge. No scleral icterus.   Neck: Normal range of motion. Neck supple. No JVD present. No tracheal deviation present. No thyromegaly present.   Cardiovascular: Normal rate, regular rhythm, normal heart sounds and intact distal pulses.  Exam reveals no gallop and no friction rub.    No murmur heard.  Pulmonary/Chest: Effort normal and breath sounds normal. No stridor. No respiratory distress. She has no wheezes. She has no rales. She exhibits no tenderness.   Abdominal: Soft. Bowel sounds are normal. She exhibits no distension and no mass. There is no tenderness. There is no rebound and no guarding.   Musculoskeletal: Normal range of motion. She exhibits no edema.        Cervical back: She exhibits tenderness.   Lymphadenopathy:     She has no cervical adenopathy.   Neurological: She is alert and oriented to person, place, and time. She has normal reflexes. No cranial nerve deficit. She exhibits normal muscle tone. Coordination normal.   Skin: Skin is warm and dry. No rash noted. She is not diaphoretic. No erythema. No pallor.   Psychiatric: She has a normal mood and affect. Her behavior is normal. Judgment and thought content normal.       Assessment:       1. Neck pain of over 3 months duration    2. Type 2 diabetes mellitus with hyperglycemia, without long-term current use of insulin        Plan:         Chelly was seen today for diabetes.    Diagnoses and all  orders for this visit:    Neck pain of over 3 months duration  -     piroxicam (FELDENE) 20 MG capsule; Take 1 capsule (20 mg total) by mouth once daily.  -     cyclobenzaprine (FLEXERIL) 5 MG tablet; Take 1 tablet (5 mg total) by mouth 3 (three) times daily as needed.  -     Ambulatory Referral to Physical/Occupational Therapy  -     MRI Cervical Spine Without Contrast; Future  -     Ambulatory consult to Pain Clinic    Type 2 diabetes mellitus with hyperglycemia, without long-term current use of insulin  -     SITagliptin (JANUVIA) 100 MG Tab; Take 1 tablet (100 mg total) by mouth once daily.  -     Comprehensive metabolic panel; Future  -     Lipid panel; Future  -     Hemoglobin A1c; Future    Severe obesity (BMI 35.0-39.9)    Continue monitoring blood sugar at home,ADA diet.  Continue monitoring blood pressure at home, low sodium diet.   Diet and physical activity to promote weight loss.

## 2018-01-19 ENCOUNTER — OFFICE VISIT (OUTPATIENT)
Dept: OPTOMETRY | Facility: CLINIC | Age: 68
End: 2018-01-19
Payer: MEDICARE

## 2018-01-19 DIAGNOSIS — H01.02B SQUAMOUS BLEPHARITIS OF UPPER AND LOWER EYELIDS OF BOTH EYES: ICD-10-CM

## 2018-01-19 DIAGNOSIS — H04.123 BILATERAL DRY EYES: ICD-10-CM

## 2018-01-19 DIAGNOSIS — H25.12 NUCLEAR SCLEROTIC CATARACT OF LEFT EYE: ICD-10-CM

## 2018-01-19 DIAGNOSIS — H26.491 RIGHT POSTERIOR CAPSULAR OPACIFICATION: Primary | ICD-10-CM

## 2018-01-19 DIAGNOSIS — H01.02A SQUAMOUS BLEPHARITIS OF UPPER AND LOWER EYELIDS OF BOTH EYES: ICD-10-CM

## 2018-01-19 DIAGNOSIS — H52.7 REFRACTIVE ERROR: ICD-10-CM

## 2018-01-19 PROCEDURE — 92015 DETERMINE REFRACTIVE STATE: CPT | Mod: S$GLB,,, | Performed by: OPTOMETRIST

## 2018-01-19 PROCEDURE — 99999 PR PBB SHADOW E&M-EST. PATIENT-LVL III: CPT | Mod: PBBFAC,,, | Performed by: OPTOMETRIST

## 2018-01-19 PROCEDURE — 92014 COMPRE OPH EXAM EST PT 1/>: CPT | Mod: S$GLB,,, | Performed by: OPTOMETRIST

## 2018-01-19 RX ORDER — TOBRAMYCIN 3 MG/ML
1 SOLUTION/ DROPS OPHTHALMIC 4 TIMES DAILY
Qty: 5 ML | Refills: 0 | Status: SHIPPED | OUTPATIENT
Start: 2018-01-19 | End: 2018-01-22 | Stop reason: SDUPTHER

## 2018-01-19 NOTE — PROGRESS NOTES
KYM KITCHEN 03/2017  Blurred vision distance and near. OD past week. Both eyes   stuck together in the morning.  Uses lid scrubs at night. Past 5 yrs.OD   always red and itching, zaditor has helped with the itching. Diabetic BS   146 yesterday.    Hemoglobin A1C       Date                     Value               Ref Range             Status                01/15/2018               8.6 (H)             4.0 - 5.6 %           Final              08/11/2017               9.4 (H)             4.0 - 5.6 %           Final                12/27/2016               9.6 (H)             4.5 - 6.2 %           Final             Last edited by Susie Bartlett on 1/19/2018  3:33 PM. (History)            Assessment /Plan     For exam results, see Encounter Report.    Right posterior capsular opacification    Nuclear sclerotic cataract of left eye    Bilateral dry eyes    Refractive error    Squamous blepharitis of upper and lower eyelids of both eyes  -     tobramycin sulfate 0.3% (TOBREX) 0.3 % ophthalmic solution; Place 1 drop into both eyes 4 (four) times daily.  Dispense: 5 mL; Refill: 0      1. Refer to Dr. Stafford for evaluation and possible removal.   2. Monitor condition. Patient to report any changes. RTC 1 year recheck.  3. Recommend artificial tears. 1 drop 3-4x per day. Chronicity of disease and treatment discussed.       4. No Spec Rx given. Different lens options discussed with patient.   5. Tobrex drops 4x/day x 1 week.

## 2018-01-22 ENCOUNTER — TELEPHONE (OUTPATIENT)
Dept: OPTOMETRY | Facility: CLINIC | Age: 68
End: 2018-01-22

## 2018-01-22 RX ORDER — TOBRAMYCIN 3 MG/ML
1 SOLUTION/ DROPS OPHTHALMIC 4 TIMES DAILY
Qty: 5 ML | Refills: 0 | Status: SHIPPED | OUTPATIENT
Start: 2018-01-22 | End: 2018-01-29

## 2018-01-22 NOTE — TELEPHONE ENCOUNTER
----- Message from Anne Rosa sent at 1/22/2018 11:50 AM CST -----  Contact: Chelly Ortiz   Pt would like to speak with  nurse about the eye drop prescription ,pt can be reached at [phone] please thank you.

## 2018-01-23 DIAGNOSIS — F32.A DEPRESSION, UNSPECIFIED DEPRESSION TYPE: ICD-10-CM

## 2018-01-23 DIAGNOSIS — E78.5 DYSLIPIDEMIA: ICD-10-CM

## 2018-01-23 DIAGNOSIS — K21.9 GASTROESOPHAGEAL REFLUX DISEASE, ESOPHAGITIS PRESENCE NOT SPECIFIED: ICD-10-CM

## 2018-01-23 DIAGNOSIS — E11.65 TYPE 2 DIABETES MELLITUS WITH HYPERGLYCEMIA, WITHOUT LONG-TERM CURRENT USE OF INSULIN: ICD-10-CM

## 2018-01-23 DIAGNOSIS — T46.6X5A STATIN-INDUCED MYOSITIS: ICD-10-CM

## 2018-01-23 DIAGNOSIS — M60.9 STATIN-INDUCED MYOSITIS: ICD-10-CM

## 2018-01-23 RX ORDER — FAMOTIDINE 20 MG/1
20 TABLET, FILM COATED ORAL NIGHTLY
Qty: 90 TABLET | Refills: 3 | Status: SHIPPED | OUTPATIENT
Start: 2018-01-23 | End: 2018-11-27

## 2018-01-23 RX ORDER — CITALOPRAM 10 MG/1
10 TABLET ORAL DAILY
Qty: 90 TABLET | Refills: 3 | Status: SHIPPED | OUTPATIENT
Start: 2018-01-23 | End: 2018-03-29 | Stop reason: SDUPTHER

## 2018-01-23 RX ORDER — LOSARTAN POTASSIUM 50 MG/1
50 TABLET ORAL DAILY
Qty: 90 TABLET | Refills: 3 | Status: SHIPPED | OUTPATIENT
Start: 2018-01-23 | End: 2019-01-25 | Stop reason: SDUPTHER

## 2018-01-23 RX ORDER — LANCETS
1 EACH MISCELLANEOUS DAILY
Qty: 100 EACH | Refills: 3 | Status: SHIPPED | OUTPATIENT
Start: 2018-01-23 | End: 2018-07-18 | Stop reason: SDUPTHER

## 2018-01-23 RX ORDER — TRAZODONE HYDROCHLORIDE 50 MG/1
50 TABLET ORAL NIGHTLY PRN
Qty: 90 TABLET | Refills: 3 | Status: SHIPPED | OUTPATIENT
Start: 2018-01-23 | End: 2019-01-25 | Stop reason: SDUPTHER

## 2018-01-23 RX ORDER — LANOLIN ALCOHOL/MO/W.PET/CERES
1 CREAM (GRAM) TOPICAL 2 TIMES DAILY
Qty: 180 TABLET | Refills: 3 | Status: SHIPPED | OUTPATIENT
Start: 2018-01-23

## 2018-01-23 RX ORDER — GLIMEPIRIDE 4 MG/1
4 TABLET ORAL
Qty: 90 TABLET | Refills: 3 | Status: SHIPPED | OUTPATIENT
Start: 2018-01-23 | End: 2019-01-25 | Stop reason: SDUPTHER

## 2018-01-23 RX ORDER — AMLODIPINE BESYLATE 5 MG/1
5 TABLET ORAL DAILY
Qty: 90 TABLET | Refills: 3 | Status: SHIPPED | OUTPATIENT
Start: 2018-01-23 | End: 2019-01-25 | Stop reason: SDUPTHER

## 2018-01-23 RX ORDER — PRAVASTATIN SODIUM 10 MG/1
10 TABLET ORAL DAILY
Qty: 90 TABLET | Refills: 3 | Status: SHIPPED | OUTPATIENT
Start: 2018-01-23 | End: 2019-04-12 | Stop reason: SDUPTHER

## 2018-01-23 NOTE — PROGRESS NOTES
[facility] New Patient Evaluation    Referred by: Gabriel Wilson  Reason for referral: neck pain    CC:   Chief Complaint   Patient presents with    Neck Pain    Shoulder Pain     bilateral       HPI: Chelly Ortiz is a 67 y.o. female who complains of neck and bilateral shoulder pain as characterized below.    Location: neck and bilateral shoulder  Severity: Currently: 6/10   Typical Range: 6/10     Exacerbation: 10/10   Onset: long-standing baseline pain exacerbated 3 months ago associated with reaching overhead to clean ceiling fans  Quality: Dull, achy, throbbing  Radiation: bilat shoulders  Axial/Extremity Percentage of Pain: 50/50  Exacerbating Factors: extension and flexion  Mitigating Factors: heat  Assoc: denies night fever/night sweats, urinary incontinence, bowel incontinence, significant weight loss, significant motor weakness and loss of sensations    Previous Therapies:  PT: Scheduled to start Feb 7, 2018  HEP:   TENS:  Injections: LYNN 3x per Ca Oconnell  Surgery: Denies  Medications:   - NSAIDS: Ibuprofen  - MSK Relaxants:   - TCAs:   - SNRIs:   - Topicals:   - Anticonvulsants: Current  - Opioids: No    Current Pain Medications:  1. Gabapentin 100 TID  2. Flexeril - hasn't started  3. Ibuprofen - helps a little  4. Piroxicam 20 mg - hasn't started yet    Full Medication List:    Current Outpatient Prescriptions:     amLODIPine (NORVASC) 5 MG tablet, Take 1 tablet (5 mg total) by mouth once daily., Disp: 90 tablet, Rfl: 3    aspirin (ECOTRIN) 81 MG EC tablet, Take 81 mg by mouth once daily., Disp: , Rfl:     blood sugar diagnostic Strp, 1 strip by Misc.(Non-Drug; Combo Route) route 2 (two) times daily., Disp: 100 strip, Rfl: 3    blood-glucose meter kit, Use as instructed, Disp: 1 each, Rfl: 0    citalopram (CELEXA) 10 MG tablet, Take 1 tablet (10 mg total) by mouth once daily., Disp: 90 tablet, Rfl: 3    cyclobenzaprine (FLEXERIL) 5 MG tablet, Take 1 tablet (5 mg total) by  mouth 3 (three) times daily as needed., Disp: 30 tablet, Rfl: 0    ergocalciferol (VITAMIN D2) 50,000 unit Cap, Take 1 capsule (50,000 Units total) by mouth every 7 days., Disp: 12 capsule, Rfl: 1    famotidine (PEPCID) 20 MG tablet, Take 1 tablet (20 mg total) by mouth every evening., Disp: 90 tablet, Rfl: 3    gabapentin (NEURONTIN) 100 MG capsule, Take 1 capsule (100 mg total) by mouth 3 (three) times daily., Disp: 90 capsule, Rfl: 2    glimepiride (AMARYL) 4 MG tablet, Take 1 tablet (4 mg total) by mouth daily with breakfast., Disp: 90 tablet, Rfl: 3    lancets (MICROLET LANCET) Misc, Inject 1 lancet into the skin once daily., Disp: 100 each, Rfl: 3    losartan (COZAAR) 50 MG tablet, Take 1 tablet (50 mg total) by mouth once daily., Disp: 90 tablet, Rfl: 3    magnesium oxide (MAG-OX) 400 mg tablet, Take 1 tablet (400 mg total) by mouth 2 (two) times daily., Disp: 180 tablet, Rfl: 3    piroxicam (FELDENE) 20 MG capsule, Take 1 capsule (20 mg total) by mouth once daily., Disp: 30 capsule, Rfl: 0    pravastatin (PRAVACHOL) 10 MG tablet, Take 1 tablet (10 mg total) by mouth once daily., Disp: 90 tablet, Rfl: 3    SITagliptin (JANUVIA) 100 MG Tab, Take 1 tablet (100 mg total) by mouth once daily., Disp: 90 tablet, Rfl: 3    tobramycin sulfate 0.3% (TOBREX) 0.3 % ophthalmic solution, Place 1 drop into both eyes 4 (four) times daily., Disp: 5 mL, Rfl: 0    traZODone (DESYREL) 50 MG tablet, Take 1 tablet (50 mg total) by mouth nightly as needed., Disp: 90 tablet, Rfl: 3    azelastine (ASTELIN) 137 mcg (0.1 %) nasal spray, 1 spray (137 mcg total) by Nasal route 2 (two) times daily., Disp: 30 mL, Rfl: 1    lidocaine HCL 2% (XYLOCAINE) 2 % jelly, Apply topically once daily., Disp: 30 mL, Rfl: 2     Review of Systems:  Review of Systems   Constitutional: Negative for chills and fever.   HENT: Negative for nosebleeds.    Eyes: Positive for blurred vision. Negative for pain.   Respiratory: Negative for  "hemoptysis.    Cardiovascular: Negative for chest pain.   Gastrointestinal: Negative for nausea and vomiting.   Genitourinary: Negative for dysuria.   Musculoskeletal: Positive for neck pain. Negative for falls.   Skin: Negative for rash.   Neurological: Negative for focal weakness.   Endo/Heme/Allergies: Bruises/bleeds easily.   Psychiatric/Behavioral: The patient is nervous/anxious.        Allergies:  Prednisone; Protonix [pantoprazole]; Ace inhibitors; Latex, natural rubber; and Other     Medical History:  Past Medical History:   Diagnosis Date    Allergy     Cataract     DDD (degenerative disc disease), lumbar     Diabetes mellitus     diet controlled    Diabetes mellitus, type 2     GERD (gastroesophageal reflux disease)     History of subconjunctival hemorrhage 11    left eye    Hyperlipidemia     Hypertension     IBS (irritable bowel syndrome)     Obesity     MYRIAM (obstructive sleep apnea)     on CPAP    Rotator cuff impingement syndrome     Seasonal allergic conjunctivitis         Surgical History:  Past Surgical History:   Procedure Laterality Date    CATARACT EXTRACTION W/  INTRAOCULAR LENS IMPLANT  10/3/13    OD (dr. gardner)     SECTION      x2    CHOLECYSTECTOMY      EYE SURGERY      HYSTERECTOMY      ovaries intact, due to DUB    TUBAL LIGATION          Social History:  Social History     Social History    Marital status:      Spouse name: N/A    Number of children: N/A    Years of education: N/A     Occupational History    Not on file.     Social History Main Topics    Smoking status: Former Smoker     Quit date: 2/15/1983    Smokeless tobacco: Never Used    Alcohol use No    Drug use: No    Sexual activity: Yes     Partners: Male     Other Topics Concern    Not on file     Social History Narrative    No narrative on file       Physical Exam:  Vitals:    18 1102   BP: 117/76   Pulse: 78   Resp: 18   Weight: 89.1 kg (196 lb 8.6 oz)   Height: 5' 3" " (1.6 m)   PainSc:   6     General    Nursing note and vitals reviewed.  Constitutional: She is oriented to person, place, and time. She appears well-developed and well-nourished. No distress.   HENT:   Head: Normocephalic and atraumatic.   Nose: Nose normal.   Eyes: Conjunctivae and EOM are normal. Pupils are equal, round, and reactive to light. Right eye exhibits no discharge. Left eye exhibits no discharge. No scleral icterus.   Neck: No JVD present.   Cardiovascular: Intact distal pulses.    Pulmonary/Chest: Effort normal. No respiratory distress.   Abdominal: She exhibits no distension.   Neurological: She is alert and oriented to person, place, and time. Coordination normal.   Psychiatric: She has a normal mood and affect. Her behavior is normal. Judgment and thought content normal.     General Musculoskeletal Exam   Gait: normal     Back (L-Spine & T-Spine) / Neck (C-Spine) Exam     Tenderness Posterior midline palpation reveals tenderness of the Upper C-Spine and Lower C-Spine. Right paramedian tenderness of the Lower C-Spine and Upper C-Spine. Left paramedian tenderness of the Upper C-Spine and Lower C-Spine.     Back (L-Spine & T-Spine) Range of Motion   Extension: abnormal   Flexion: abnormal     Neck (C-Spine) Range of Motion   Flexion:     Limited  Extension: Limited  Right Lateral Bend: abnormal  Left Lateral Bend: abnormal  Right Rotation: abnormal  Left Rotation: abnormal    Spinal Sensation   Right Side Sensation  C-Spine Level: normal   Left Side Sensation  C-Spine Level: normal    Neck (C-Spine) Tests   Spurling's Test   Left:  Negative  Right: negative    Other She has no scoliosis .      Muscle Strength   Right Upper Extremity   Biceps: 5/5/5   Deltoid:  5/5  Triceps:  5/5  Wrist Extension: 5/5/5   Wrist Flexion: 5/5/5   Finger Flexors:  5/5  Finger Extensors:  5/5  Left Upper Extremity  Biceps: 5/5/5   Deltoid:  5/5  Triceps:  5/5  Wrist Extension: 5/5/5   Wrist Flexion: 5/5/5   Finger Flexors:   5/5  Finger Extensors:  5/5  Right Lower Extremity   Hip Flexion: 5/5   Hip Extensors: 5/5  Quadriceps:  5/5   Hamstrin/5   Gastrocsoleus:  5/5/5  Left Lower Extremity   Hip Flexion: 5/5   Hip Extensors: 5/5  Quadriceps:  5/5   Hamstrin/5   Gastrocsoleus:  5/5/5    Reflexes     Left Side  Biceps:  2+    Right Side   Biceps:  2+      Imaging:X-Ray Cervical Spine AP And Lateral  2 views: Alignment is normal.  Odontoid is intact as are the prevertebral soft tissues and posterior elements.  No fracture dislocation bone destruction seen.  There is mild DJD.   Impression      Mild DJD.      Electronically signed by: LETTY WHITE  Date: 16  Time: 12:26          X-Ray Shoulder Trauma 3 view Right  Comparison: 16.    Findings: Internal rotation, Y view and axillary views of the right shoulder demonstrate osteophyte formation and loss of joint space at the acromioclavicular joint.  Otherwise the osseous structures, soft tissues and joint spaces are normal.  Specifically no fracture or dislocation.  The visualized right upper lobe is normal.   Impression      Acromioclavicular osteoarthritis otherwise normal exam  ______________________________________     Electronically signed by: ANGEL GARCIA MD  Date: 16  Time: 10:58          MRI Upper Extremity Joint Without Contraast Right  TECHNIQUE: MRI of right shoulder was performed on a 1.5T magnet utilizing the following sequences: Localizer; axial T2 FS; coronal T2 FS and PD FS; sagittal T1, T2 FS and PD FS.    COMPARISON: Right shoulder radiograph 16.    FINDINGS:    Rotator cuff: There is thickening and increased signal of the insertional fibers of the supraspinatus consistent with tendinosis with partial thickness undersurface tear.  There is small full-thickness component involving the anterior fibers measuring approximately 8 x 6 mm.  Mild infraspinatus tendinosis with undersurface fraying.  Teres minor tendon is intact.  There is mild  tendinosis with interstitial tear involving the insertional fibers of the subscapularis.  Muscle bulk of the rotator cuff is within normal limits.     Labrum: Global degeneration of the glenoid labrum.      Biceps: Thickening and increased signal within the intra-articular portion of the long head biceps tendon consistent with tendinosis.    Bone: There is no fracture.  Bone marrow signal is unremarkable.    Acromioclavicular joint: Severe degenerative changes are seen at the acromioclavicular joint with osseous spurring, edema, and subchondral cystic change.    Cartilage: Articular cartilage of the glenohumeral joint is preserved.    Miscellaneous: Small joint effusion with increased fluid seen in the subscapularis recess.  There is increased fluid seen within the subacromial/subdeltoid bursa.   Impression       1.  Supraspinatus tendinosis with partial thickness articular surface tear and small full-thickness component involving the anterior fibers.    2.  Mild subscapularis and infraspinatus tendinosis.    3.  Severe AC joint arthrosis.    4.  Long head biceps tendinosis.    5.  Joint effusion and moderate subacromial/subdeltoid bursitis.  ______________________________________     Electronically signed by resident: Hosea Costello MD  Date: 06/16/16  Time: 16:29     ______________________________________     Electronically signed by: MADISON WINTERS MD  Date: 06/17/16  Time: 08:39          Labs:  BMP  Lab Results   Component Value Date     01/15/2018    K 4.0 01/15/2018     01/15/2018    CO2 29 01/15/2018    BUN 13 01/15/2018    CREATININE 1.0 01/15/2018    CALCIUM 9.7 01/15/2018    ANIONGAP 8 01/15/2018    ESTGFRAFRICA >60.0 01/15/2018    EGFRNONAA 57.6 (A) 01/15/2018     Lab Results   Component Value Date    ALT 16 01/15/2018    AST 16 01/15/2018    GGT 30 10/27/2004    ALKPHOS 125 01/15/2018    BILITOT 0.4 01/15/2018       Assessment:  Problem List Items Addressed This Visit     Insomnia    Obesity  (BMI 30.0-34.9)    Spondylosis of cervical region without myelopathy or radiculopathy    Cervical myofascial pain syndrome    Chronic neck pain - Primary        Chelly is a 67-year-old female with chronic neck pain exacerbated by increased physical activity 3 months ago.  Her exam and imaging are consistent with facet arthropathy and myofascial dysfunction.  I recommended that we start with physical therapy to eliminate as much pain as possible related to myofascial dysfunction then move on to interventional procedures should a significant amount of pain persist.  I also recommended that she complete the MRI ordered by her primary care physician and start a trial of piroxicam.  She also states significant insomnia not related to her chronic neck pain and I have recommended that she try tizanidine 4-8 mg nightly as a muscle relaxant and sleep aid.    Treatment Plan:   PT/OT/HEP: proceed with physical therapy as scheduled  Procedures: none at this time.  Consider MBB/RFA after completion of MRI and physical therapy.  Medications: continue gabapentin; trial tizanidine 4-8 mg QHS PRN in place of Flexeril; I endorse a trial of piroxicam  Imaging: we will assist her with scheduling of her  MRI    Follow Up: RTC in 8 weeks after completion of PT    Aisha Perez Jr, MD  Interventional Pain Medicine / Anesthesiology    Disclaimer: This note was partly generated using dictation software which may occasionally result in transcription errors.

## 2018-01-24 RX ORDER — ERGOCALCIFEROL 1.25 MG/1
50000 CAPSULE ORAL
Qty: 12 CAPSULE | Refills: 1 | Status: SHIPPED | OUTPATIENT
Start: 2018-01-24 | End: 2019-05-17 | Stop reason: SDUPTHER

## 2018-01-26 ENCOUNTER — OFFICE VISIT (OUTPATIENT)
Dept: PAIN MEDICINE | Facility: CLINIC | Age: 68
End: 2018-01-26
Payer: MEDICARE

## 2018-01-26 VITALS
SYSTOLIC BLOOD PRESSURE: 117 MMHG | WEIGHT: 196.56 LBS | BODY MASS INDEX: 34.83 KG/M2 | HEIGHT: 63 IN | HEART RATE: 78 BPM | DIASTOLIC BLOOD PRESSURE: 76 MMHG | RESPIRATION RATE: 18 BRPM

## 2018-01-26 DIAGNOSIS — M54.2 CHRONIC NECK PAIN: Primary | ICD-10-CM

## 2018-01-26 DIAGNOSIS — E66.9 OBESITY (BMI 30.0-34.9): ICD-10-CM

## 2018-01-26 DIAGNOSIS — G47.09 OTHER INSOMNIA: ICD-10-CM

## 2018-01-26 DIAGNOSIS — G89.29 CHRONIC NECK PAIN: Primary | ICD-10-CM

## 2018-01-26 DIAGNOSIS — M47.812 SPONDYLOSIS OF CERVICAL REGION WITHOUT MYELOPATHY OR RADICULOPATHY: ICD-10-CM

## 2018-01-26 DIAGNOSIS — M79.18 CERVICAL MYOFASCIAL PAIN SYNDROME: ICD-10-CM

## 2018-01-26 PROCEDURE — 99204 OFFICE O/P NEW MOD 45 MIN: CPT | Mod: S$GLB,,, | Performed by: PAIN MEDICINE

## 2018-01-26 PROCEDURE — 99999 PR PBB SHADOW E&M-EST. PATIENT-LVL III: CPT | Mod: PBBFAC,,, | Performed by: PAIN MEDICINE

## 2018-01-26 RX ORDER — TIZANIDINE 4 MG/1
4-8 TABLET ORAL NIGHTLY PRN
Qty: 60 TABLET | Refills: 1 | Status: SHIPPED | OUTPATIENT
Start: 2018-01-26 | End: 2018-02-05

## 2018-01-26 RX ORDER — DIAZEPAM 5 MG/1
5 TABLET ORAL
Qty: 1 TABLET | Refills: 0 | Status: SHIPPED | OUTPATIENT
Start: 2018-01-26 | End: 2018-04-14

## 2018-01-26 NOTE — LETTER
January 26, 2018      Gabriel Wilson MD  [address] [facility] [place] [address]           [facility]  [address] [place] [address] [address]  [facility] [address]  Phone: [phone]          Patient: Chelly Ortiz   MR Number: [id number]   YOB: 1950   Date of Visit: 1/26/2018       Dear Dr. Gabriel Wilson:    Thank you for referring Chelly Ortiz to me for evaluation. Attached you will find relevant portions of my assessment and plan of care.    If you have questions, please do not hesitate to call me. I look forward to following Chelly Ortiz along with you.    Sincerely,    Aisha Perez Jr., MD    Enclosure  CC:  No Recipients    If you would like to receive this communication electronically, please contact [email or web address] or [phone] to request more information on [employer] access.    For providers and/or their staff who would like to refer a patient to [facility], please contact us through our one-stop-shop provider referral line, [facility], at [phone].    If you feel you have received this communication in error or would no longer like to receive these types of communications, please e-mail [email or web address]

## 2018-01-29 ENCOUNTER — OFFICE VISIT (OUTPATIENT)
Dept: OPHTHALMOLOGY | Facility: CLINIC | Age: 68
End: 2018-01-29
Payer: MEDICARE

## 2018-01-29 VITALS — SYSTOLIC BLOOD PRESSURE: 126 MMHG | DIASTOLIC BLOOD PRESSURE: 79 MMHG | HEART RATE: 73 BPM

## 2018-01-29 DIAGNOSIS — E11.9 DM TYPE 2 WITHOUT RETINOPATHY: ICD-10-CM

## 2018-01-29 DIAGNOSIS — H04.129 DRY EYE SYNDROME: ICD-10-CM

## 2018-01-29 DIAGNOSIS — H25.12 NUCLEAR SCLEROTIC CATARACT OF LEFT EYE: ICD-10-CM

## 2018-01-29 DIAGNOSIS — Z96.1 PSEUDOPHAKIA: ICD-10-CM

## 2018-01-29 DIAGNOSIS — H26.491 PCO (POSTERIOR CAPSULAR OPACIFICATION), RIGHT: Primary | ICD-10-CM

## 2018-01-29 PROCEDURE — 99999 PR PBB SHADOW E&M-EST. PATIENT-LVL III: CPT | Mod: PBBFAC,,, | Performed by: OPHTHALMOLOGY

## 2018-01-29 PROCEDURE — 92012 INTRM OPH EXAM EST PATIENT: CPT | Mod: 57,S$GLB,, | Performed by: OPHTHALMOLOGY

## 2018-01-29 PROCEDURE — 66821 AFTER CATARACT LASER SURGERY: CPT | Mod: RT,S$GLB,, | Performed by: OPHTHALMOLOGY

## 2018-01-29 PROCEDURE — 99499 UNLISTED E&M SERVICE: CPT | Mod: S$GLB,,, | Performed by: OPHTHALMOLOGY

## 2018-01-29 NOTE — PROGRESS NOTES
Subjective:       Patient ID: Chelly Ortiz is a 67 y.o. female.    Chief Complaint: Laser Treatment (OD)    HPI     Laser Treatment    Additional comments: OD           Comments   DSL- 1/18/18     Pt states she is not here for Cataract Eval. Pt is only here for Yag Laser   OD. Pt states VA OD is blurry. Glare is a bother at night. Pt has eye   allergies.     Eyemeds  Tobrex QID OU       Last edited by Santa Roberts on 1/29/2018  9:28 AM. (History)             Assessment:       1. PCO (posterior capsular opacification), right    2. Nuclear sclerotic cataract of left eye    3. DM type 2 without retinopathy    4. Dry eye syndrome    5. Pseudophakia        Plan:       Visually significant  PCO OD- Pt. Wants Laser.     Cataract OS- Not visually significant.  DM-No NPDR OU.  BREEZY-Needs more AT's.        YAG CAP OD today. TE=   153.0 mj, Avg. 0.7mj, 219 pulses.  PF taper OD.  Control DM.  AT's.  RTC 2 wks.

## 2018-02-03 ENCOUNTER — HOSPITAL ENCOUNTER (OUTPATIENT)
Dept: RADIOLOGY | Facility: HOSPITAL | Age: 68
Discharge: HOME | End: 2018-02-03
Attending: FAMILY MEDICINE
Payer: MEDICARE

## 2018-02-03 DIAGNOSIS — M54.2 NECK PAIN OF OVER 3 MONTHS DURATION: ICD-10-CM

## 2018-02-03 PROCEDURE — 72141 MRI NECK SPINE W/O DYE: CPT | Mod: 26,,, | Performed by: RADIOLOGY

## 2018-02-03 PROCEDURE — 72141 MRI NECK SPINE W/O DYE: CPT | Mod: TC

## 2018-02-07 ENCOUNTER — CLINICAL SUPPORT (OUTPATIENT)
Dept: REHABILITATION | Facility: HOSPITAL | Age: 68
End: 2018-02-07
Payer: MEDICARE

## 2018-02-07 DIAGNOSIS — G89.29 CHRONIC NECK PAIN: Primary | ICD-10-CM

## 2018-02-07 DIAGNOSIS — M54.2 CHRONIC NECK PAIN: Primary | ICD-10-CM

## 2018-02-07 DIAGNOSIS — Z74.09 IMPAIRED MOBILITY AND ADLS: ICD-10-CM

## 2018-02-07 DIAGNOSIS — R29.898 DECREASED STRENGTH OF UPPER EXTREMITY: ICD-10-CM

## 2018-02-07 DIAGNOSIS — Z78.9 IMPAIRED MOBILITY AND ADLS: ICD-10-CM

## 2018-02-07 PROCEDURE — 97110 THERAPEUTIC EXERCISES: CPT | Mod: PN

## 2018-02-07 PROCEDURE — 97161 PT EVAL LOW COMPLEX 20 MIN: CPT | Mod: PN

## 2018-02-07 NOTE — PLAN OF CARE
TIME RECORD    Date: 2/7/2018    Start Time:  1411  Stop Time:  1505    PROCEDURES:    TIMED  Procedure Min.   TE 8                     UNTIMED  Procedure Min.   IE 38   MHP 10     Total Timed Minutes:  8  Total Timed Units:  1  Total Untimed Units:  2  Charges Billed/# of units:  LCE-1, TE-1    OUTPATIENT PHYSICAL THERAPY   PATIENT EVALUATION  Onset Date: >/=3 months ago  Primary Diagnosis:   1. Chronic neck pain     2. Impaired mobility and ADLs     3. Decreased strength of upper extremity       Treatment Diagnosis: L sided neck pain, decreased cervical ROM, impaired flexibility of chest and neck/upper back mm, poor posture, decreased UE strength and ROM, impaired mobility and ADLs  Past Medical History:   Diagnosis Date    Allergy     Cataract     DDD (degenerative disc disease), lumbar     Diabetes mellitus     diet controlled    Diabetes mellitus, type 2     GERD (gastroesophageal reflux disease)     History of subconjunctival hemorrhage 12/2/11    left eye    Hyperlipidemia     Hypertension     IBS (irritable bowel syndrome)     Obesity     MYRIAM (obstructive sleep apnea)     on CPAP    Rotator cuff impingement syndrome     Seasonal allergic conjunctivitis      Precautions: Standard, allergies-Prednisone  Prior Therapy: PT for low back and B shoulder; chiropractor for neck and low back  Medications: Chelly Ortiz has a current medication list which includes the following prescription(s): amlodipine, aspirin, azelastine, blood sugar diagnostic, blood-glucose meter, citalopram, diazepam, ergocalciferol, famotidine, gabapentin, glimepiride, lancets, lidocaine hcl 2%, losartan, magnesium oxide, piroxicam, pravastatin, sitagliptin, and trazodone.  Nutrition:  Obese  History of Present Illness: Chronic neck pain  Prior Level of Function: Independent  Social History: Retired clerical worker   Place of Residence (Steps/Adaptations): Difficulty reported with vacuuming, moping, and sweeping.   Functional  Deficits Leading to Referral/Nature of Injury: Chronic neck pain  Patient Therapy Goals: Decrease pain, eliminate pain with cervical flexion    Subjective     Chelly Ortiz reports a catching sensation accompanied with sharp pain to L side of neck that traveled to head after flexing neck to shampoo hair, about 3 months ago. Pt reports since this occurred it happens consistently with cervical flexion. Pt able to slowly get out of caught flexed position by performing slow rotation/sidebending movements of the neck, which also decreases sharpness of pain. Pt also reports pain to B shoulders as a result. MRI performed, pt read report but does not know of actual findings. No numbness/tingling or HA reported.     Pain:  Location: base of skull on L  Description: sharp and shooting  Activities Which Increase Pain: above  Activities Which Decrease Pain: above  Pain Scale: 3/10 at best 4/10 now  10/10 at worst    Objective     Posture: Sitting: Increased thoracic kyphosis, forward head, rounded shoulders  Palpation: TTP to L cervical paraspinals, B UT and levator scap L>R  Range of Motion/Strength:      Cervical AROM: Pain/Dysfunction with Movement:   Flexion 68 deg no pain   Extension 33 deg no pain   Right side bending 30 deg pain in R UT   Left side bending 25 deg pain from L subocciptial area to mid back   Right rotation 55 deg no pain   Left rotation 45 deg no pain      Shoulder  Right   Left  Pain/Dysfunction with Movement    AROM PROM MMT AROM PROM MMT    flexion 125 165 4-/5 WNL NT 4/5 Pain in B shoulder with MMT and EROM   abduction 120 160 4-/5 WFL WNL 4/5 Pain in B shoulder with MMT and EROM   IR WNL at 90 abd  NT 4/5 WNL at 90 abd NT  4/5 Pain in B shoulder with MMT, pain in L shoulder at EROM   ER WNL at 90 abd NT 4-/5 72 at 90 abd 85 at 90 abd 4/5 Pain in B shoulder with MMT, pain with R shoulder at EROM     Flexibility: B UT, LV, pec mm impaired  Bed Mobility:Independent  Special Tests: Spurling's (+) on L  "for neck pan  Maximal cervical compression (+) B for neck pain, R>L; reported pain on R traveling into jaw, on L traveling to mid back  Cervical distraction test (+) for relief in neck pain  Other: Chin tuck + lift (test for deep neck flexor strength): able to perform for 5 sec, poor strength     Functional Limitation Reports: G codes  Tool: FOTO Neck SURVEY  Category: Changing and maintaining body position ()  Limitation: 54%  Current: CK = at least 40% but < 60% impaired, limited or restricted  Goal: CK = at least 40% but < 60% impaired, limited or restricted    TREATMENT     Time In: 1411  Time Out: 1505    PT Evaluation Completed? Yes  Discussed Plan of Care with patient: Yes    Chelly received 8 minutes of therapeutic exercise & instruction including: underlying mechanisms contributing to pain and exercises per log below    Date 1/12/18   Visit  Exp 12/31/18 1/50   POC 4/7/18    GCODE  1/10   FOTO 1/5   Visit Amount  Visit Total 107.58  107.58       MHP        MT        Stretches    UT Str. 3x30" B   LV Str. Held due to concordant pain occurring in flexion   Corner Str. 3x30" B       Supine    Chin Tuck 5"x15   DNF    Cervical Sada Lat flex/rot    Protraction        Sidelying    Gator    Shld Abd    Shld Flex    Shld ER        Seated    Scapular Retraction/Depression        Standing    Lats    Rows    ER/IR    Serratus wall Slides        Initials CC     Written Home Exercises Provided: above  Chelly demo good understanding of the education provided. Patient demo good return demo of skill of exercises.    Assessment     Initial Assessment (Pertinent finding, problem list and factors affecting outcome): Pt is a 66 yo female presenting to PT with impairments possibly related to cervical disc dysfunction. Impairments include pain, decreased cervical and shoulder ROM, decreased UE strength, poor posture, and functional deficits with prolonged cervical flexion. Pt would benefit from skilled PT to address " "limitations and increase functional mobility.    History  Co-morbidities and personal factors that may impact the plan of care Examination  Body Structures and Functions, activity limitations and participation restrictions that may impact the plan of care    Clinical Presentation   Co-morbidities:   Allergies, Anxiety or Panic Disorders, Arthritis, Back pain, BMI over 30, Diabetes  Type I or II, Gastrointestinal Disease, High Blood Pressure, Sleep dysfunction    Personal Factors: Body Regions:   head  neck  upper extremities    Body Systems:    gross symmetry  ROM  strength  gross coordinated movement            Participation Restrictions:   Participation involving prolonged cervical flexion, household duties Activity limitations:   Learning and applying knowledge  no deficits    General Tasks and Commands  no deficits    Communication  no deficits    Mobility  Prolonged cervical flexion    Self care  washing oneself (bathing, drying, washing hands)    Domestic Life  doing house work (cleaning house, washing dishes, laundry)    Interactions/Relationships  no deficits    Life Areas  no deficits    Community and Social Life  no deficits         stable and uncomplicated                      low   high  high Decision Making/ Complexity Score:  low     Rehab Potiential: good  Barriers to Rehab: none identified at this time  Short Term Goals (4 Weeks): 3/7/18  1. Pt will be independent with HEP to supplement PT in improving pain free cervical mobility  2. Pt will improve cervical AROM 10 deg in all planes to improve cervical mobility.  3. Pt will improve UE MMTs by 1/2 grade in all planes to improve strength for functional tasks.  4. Pt will demonstrate improved sitting posture to decrease pain experienced in head and neck.  5. Pt will perform chin tuck + lift for 15" to improve DNF strength.  Long Term Goals (8 Weeks): 4/7/18  1. Pt will improve FOTO to </=42% limitation to improve perceived limitation with changing and " "maintaining mobility.  2. Pt will improve cervical AROM to WFL in all planes to improve cervical mobility.  3. Pt will improve UE MMTs 1 grade in all planes to improve strength for functional tasks.  4. Pt will report no pain with cervical flexion to promote functional QOL.  5. Pt will perform chin tuck + lift for 30" to improve DNF strength.    Plan     Certification Period: 2/7/18 to 4/7/18  Recommended Treatment Plan: 2 times per week for 8 weeks: Manual Therapy, Moist Heat/ Ice, Neuromuscular Re-ed, Patient Education, Therapeutic Activites and Therapeutic Exercise  Other Recommendations: modalities prn, ASTYM prn, kinesiotape prn      Therapist: Elizabeth Campos, PT    I CERTIFY THE NEED FOR THESE SERVICES FURNISHED UNDER THIS PLAN OF TREATMENT AND WHILE UNDER MY CARE    Physician's comments: ________________________________________________________________________________________________________________________________________________      Physician's Name: ___________________________________  "

## 2018-02-09 ENCOUNTER — TELEPHONE (OUTPATIENT)
Dept: OPHTHALMOLOGY | Facility: CLINIC | Age: 68
End: 2018-02-09

## 2018-02-14 ENCOUNTER — CLINICAL SUPPORT (OUTPATIENT)
Dept: REHABILITATION | Facility: HOSPITAL | Age: 68
End: 2018-02-14
Payer: MEDICARE

## 2018-02-14 ENCOUNTER — TELEPHONE (OUTPATIENT)
Dept: PAIN MEDICINE | Facility: CLINIC | Age: 68
End: 2018-02-14

## 2018-02-14 DIAGNOSIS — R29.898 DECREASED STRENGTH OF UPPER EXTREMITY: ICD-10-CM

## 2018-02-14 DIAGNOSIS — M54.2 CHRONIC NECK PAIN: Primary | ICD-10-CM

## 2018-02-14 DIAGNOSIS — Z74.09 IMPAIRED MOBILITY AND ADLS: ICD-10-CM

## 2018-02-14 DIAGNOSIS — Z78.9 IMPAIRED MOBILITY AND ADLS: ICD-10-CM

## 2018-02-14 DIAGNOSIS — G89.29 CHRONIC NECK PAIN: Primary | ICD-10-CM

## 2018-02-14 PROCEDURE — 97110 THERAPEUTIC EXERCISES: CPT | Mod: PN

## 2018-02-14 PROCEDURE — 97140 MANUAL THERAPY 1/> REGIONS: CPT | Mod: PN

## 2018-02-14 NOTE — PROGRESS NOTES
"TIME RECORD    Date: 2/14/2018      Start Time:  1304  Stop Time:  1358    PROCEDURES:    TIMED  Procedure Min.   TE 23   TE supervised 14   MT 15             UNTIMED  Procedure Min.             Total Timed Minutes:  38  Total Timed Units:  3  Total Untimed Units:  1  Charges Billed/# of units:  TE-2, MT-1      Progress/Current Status    Subjective:     Patient ID: Chelly Ortiz is a 67 y.o. female.  Diagnosis:   1. Chronic neck pain     2. Impaired mobility and ADLs     3. Decreased strength of upper extremity       Pain: 6/10 B sides of neck pre PT, R>L; 4/10 post PT    Pt reports neck pain is bad today. Pt reports HEP compliance.     Objective:     MT x 15' including STM/MFR to B UT, LV, and scalenes; STM to cervical paraspinals with elongation f/b gentle suboccipital release; CFM to suboccipital mm; cervical traction; . TE 1:1 with PT x 15' f/b TE supervised x 14' f/b TE 1:1 with PT x 8' per log to increase cervical ROM and periscapular strength.     Date 2/14/18 2/7/18   Visit  Exp 12/31/18 2/50 1/50   POC 4/7/18      GCODE  2/10 1/10   FOTO 2/5 1/5   Visit Amount  Visit Total 93.82  201.4 107.58  107.58          MHP             MT 15'            Stretches      UT Str. 3x30" B 3x30" B   LV Str. 3x30" B Held due to concordant pain occurring in flexion   Corner Str. 3x30" B 3x30" B          Supine      Chin Tuck 5"x15 5"x15   DNF      Cervical Sada Lat flex/rot 5"x10 B     Protraction No weight x20  -perform with weighted ball on L next, not on R 2/2 shoulder pain            Sidelying      Gator Next?     Shld Abd x10 B     Shld Flex Next?     Shld ER x10 B            Seated      Scapular Retraction/Depression 5"x15            Standing      Lats RTC 2x10     Rows RTC 2x10     IR/ER     Serratus wall Slides             Initials  CC      Assessment:     Increased pain in R shoulder with protraction, adjust to use of weighted ball in L UE next. Increased pain in B pec area with corner stretch, eliminated after VC " "to perform through pain free range. VC for posture throughout session. Pt performed all other exercises well with reports of improved pain at end of session. Continue to focus on posture and periscapular strengthening.     Patient Education/Response:     Cont HEP. Pt verbalized understanding.    Plans and Goals:     Cont POC to improve cervical ROM/flexibility and periscapular strengthening. Progress as tolerated.     Short Term Goals (4 Weeks): 3/7/18  1. Pt will be independent with HEP to supplement PT in improving pain free cervical mobility  2. Pt will improve cervical AROM 10 deg in all planes to improve cervical mobility.  3. Pt will improve UE MMTs by 1/2 grade in all planes to improve strength for functional tasks.  4. Pt will demonstrate improved sitting posture to decrease pain experienced in head and neck.  5. Pt will perform chin tuck + lift for 15" to improve DNF strength.  Long Term Goals (8 Weeks): 4/7/18  1. Pt will improve FOTO to </=42% limitation to improve perceived limitation with changing and maintaining mobility.  2. Pt will improve cervical AROM to WFL in all planes to improve cervical mobility.  3. Pt will improve UE MMTs 1 grade in all planes to improve strength for functional tasks.  4. Pt will report no pain with cervical flexion to promote functional QOL.  5. Pt will perform chin tuck + lift for 30" to improve DNF strength.      "

## 2018-02-15 ENCOUNTER — TELEPHONE (OUTPATIENT)
Dept: PAIN MEDICINE | Facility: CLINIC | Age: 68
End: 2018-02-15

## 2018-02-15 NOTE — TELEPHONE ENCOUNTER
I spoke with Ms Ortiz and relayed the results of her cervical MRI which is negative for concerning pathology and further supports my decision to send her to PT for treatment of myofascial dysfunction.  I answered her questions and encouraged her to complete PT and adhere to a weight loss goal.    Aisha Perez Jr, MD  Interventional Pain Medicine / Anesthesiology

## 2018-02-15 NOTE — TELEPHONE ENCOUNTER
----- Message from Melonie Ragsdale MA sent at 2/9/2018  2:39 PM CST -----  Contact: Self/ [phone]      ----- Message -----  From: Laney Matthews  Sent: 2/9/2018  11:47 AM  To: [facility] Staff    Patient would like to get test results.     Please call and advise.

## 2018-02-16 ENCOUNTER — CLINICAL SUPPORT (OUTPATIENT)
Dept: REHABILITATION | Facility: HOSPITAL | Age: 68
End: 2018-02-16
Payer: MEDICARE

## 2018-02-16 DIAGNOSIS — R29.898 DECREASED STRENGTH OF UPPER EXTREMITY: ICD-10-CM

## 2018-02-16 DIAGNOSIS — G89.29 CHRONIC NECK PAIN: Primary | ICD-10-CM

## 2018-02-16 DIAGNOSIS — M54.2 CHRONIC NECK PAIN: Primary | ICD-10-CM

## 2018-02-16 DIAGNOSIS — Z74.09 IMPAIRED MOBILITY AND ADLS: ICD-10-CM

## 2018-02-16 DIAGNOSIS — Z78.9 IMPAIRED MOBILITY AND ADLS: ICD-10-CM

## 2018-02-16 PROCEDURE — 97140 MANUAL THERAPY 1/> REGIONS: CPT | Mod: PN

## 2018-02-16 PROCEDURE — 97110 THERAPEUTIC EXERCISES: CPT | Mod: PN

## 2018-02-16 NOTE — PROGRESS NOTES
"TIME RECORD    Date: 2/16/2018      Start Time: ***  Stop Time: ***    PROCEDURES:    TIMED  Procedure Min.   TE ***   TE supervised ***   MT ***             UNTIMED  Procedure Min.             Total Timed Minutes: ***  Total Timed Units: ***  Total Untimed Units: ***  Charges Billed/# of units:  TE-***, MT-***      Progress/Current Status    Subjective:     Patient ID: Chelly Ortiz is a 67 y.o. female.  Diagnosis:   No diagnosis found.  Pain: ***/10 B sides of neck pre PT, R>L; ***/10 post PT    Pt reports neck pain is bad today. Pt reports HEP compliance.     Objective:     TE supervised x ***' per log. MT x 15' including STM/MFR to B UT, LV, and scalenes; STM to cervical paraspinals with elongation f/b gentle suboccipital release; CFM to suboccipital mm; cervical traction; . TE 1:1 with PT x ***' per log.     Date 02/16/2018 2/14/18 2/7/18   Visit  Exp 12/31/18 3/50 2/50 1/50   POC 4/7/18       GCODE  3/10 2/10 1/10   FOTO 3/5 2/5 1/5   Visit Amount  Visit Total *** 93.82  201.4 107.58  107.58           MHP               MT  15'             Stretches       UT Str.  3x30" B 3x30" B   LV Str.  3x30" B Held due to concordant pain occurring in flexion   Corner Str.  3x30" B 3x30" B           Supine       Chin Tuck  5"x15 5"x15   DNF       Cervical Sada Lat flex/rot  5"x10 B     Protraction  No weight x20  -perform with weighted ball on L next, not on R 2/2 shoulder pain             Sidelying       Gator  Next?     Shld Abd  x10 B     Shld Flex  Next?     Shld ER  x10 B             Seated       Scapular Retraction/Depression  5"x15             Standing       Lats  RTC 2x10     Rows  RTC 2x10     IR/ER      Serratus wall Slides               Initials  CC CC      Assessment:     Increased pain in R shoulder with protraction, adjust to use of weighted ball in L UE next. Increased pain in B pec area with corner stretch, eliminated after VC to perform through pain free range. VC for posture throughout session. Pt " "performed all other exercises well with reports of improved pain at end of session. Continue to focus on posture and periscapular strengthening.     Patient Education/Response:     Cont HEP. Pt verbalized understanding.    Plans and Goals:     Cont POC to improve cervical ROM/flexibility and periscapular strengthening. Progress as tolerated.     Short Term Goals (4 Weeks): 3/7/18  1. Pt will be independent with HEP to supplement PT in improving pain free cervical mobility  2. Pt will improve cervical AROM 10 deg in all planes to improve cervical mobility.  3. Pt will improve UE MMTs by 1/2 grade in all planes to improve strength for functional tasks.  4. Pt will demonstrate improved sitting posture to decrease pain experienced in head and neck.  5. Pt will perform chin tuck + lift for 15" to improve DNF strength.  Long Term Goals (8 Weeks): 4/7/18  1. Pt will improve FOTO to </=42% limitation to improve perceived limitation with changing and maintaining mobility.  2. Pt will improve cervical AROM to WFL in all planes to improve cervical mobility.  3. Pt will improve UE MMTs 1 grade in all planes to improve strength for functional tasks.  4. Pt will report no pain with cervical flexion to promote functional QOL.  5. Pt will perform chin tuck + lift for 30" to improve DNF strength.      "

## 2018-02-16 NOTE — PROGRESS NOTES
"TIME RECORD    Date: 2/16/2018      Start Time:  200  Stop Time:  305  PROCEDURES:    TIMED  Procedure Min.   TE 15   TE supervised 25   MT 15             UNTIMED  Procedure Min.             Total Timed Minutes:  30  Total Timed Units:  2  Total Untimed Units:  1  Charges Billed/# of units:  TE-1, MT-1      Progress/Current Status    Subjective:     Patient ID: Chelly Ortiz is a 67 y.o. female.  Diagnosis:   1. Chronic neck pain     2. Impaired mobility and ADLs     3. Decreased strength of upper extremity       Pain: 6/10 B sides of neck        Objective:     Patient performed therex with supervision x 25 minutes.  MT x 15' including STM/MFR to B UT, LV, and scalenes; STM to cervical paraspinals with elongation f/b gentle suboccipital release; CFM to suboccipital mm. TE 1:1 with PTA x 15.  MH to c-spine x 10 minutes.     Date 2/16/18 2/14/18 2/7/18   Visit  Exp 12/31/18 3/50 2/50 1/50   POC 4/7/18       GCODE  3/10 2/10 1/10   FOTO 3/5 2/5 1/5   Visit Amount  Visit Total 61.84  263.24 93.82  201.4 107.58  107.58           MHP               MT 15' 15'             Stretches       UT Str. 3x30" B 3x30" B 3x30" B   LV Str. 3x30" B 3x30" B Held due to concordant pain occurring in flexion   Corner Str. 3x30" B 3x30" B 3x30" B           Supine       Chin Tuck 5"x15 5"x15 5"x15   DNF       Cervical Sada Lat flex/rot 5"x10 B 5"x10 B     Protraction R no wt. X 20    L 500 gr (green)   ball x20  No weight x20  -perform with weighted ball on L next, not on R 2/2 shoulder pain             Sidelying       Gator x10 B Next?     Shld Abd 2x10 B x10 B     Shld Flex x10 B Next?     Shld ER 2x10 B x10 B             Seated       Scapular Retraction/Depression 5"x15 5"x15             Standing       Lats RTC 2x10 RTC 2x10     Rows RTC 2x10 RTC 2x10     IR/ER      Serratus wall Slides x5              Initials CS  1/6 CC CC      Assessment:     Patient able to  Perform added SL exercises without report of increased pain but with " "fatigue.    Patient Education/Response:     Cont HEP. Pt verbalized understanding.    Plans and Goals:     Cont POC to improve cervical ROM/flexibility and periscapular strengthening. Progress as tolerated.     Short Term Goals (4 Weeks): 3/7/18  1. Pt will be independent with HEP to supplement PT in improving pain free cervical mobility  2. Pt will improve cervical AROM 10 deg in all planes to improve cervical mobility.  3. Pt will improve UE MMTs by 1/2 grade in all planes to improve strength for functional tasks.  4. Pt will demonstrate improved sitting posture to decrease pain experienced in head and neck.  5. Pt will perform chin tuck + lift for 15" to improve DNF strength.  Long Term Goals (8 Weeks): 4/7/18  1. Pt will improve FOTO to </=42% limitation to improve perceived limitation with changing and maintaining mobility.  2. Pt will improve cervical AROM to WFL in all planes to improve cervical mobility.  3. Pt will improve UE MMTs 1 grade in all planes to improve strength for functional tasks.  4. Pt will report no pain with cervical flexion to promote functional QOL.  5. Pt will perform chin tuck + lift for 30" to improve DNF strength.      "

## 2018-02-19 ENCOUNTER — OFFICE VISIT (OUTPATIENT)
Dept: OPHTHALMOLOGY | Facility: CLINIC | Age: 68
End: 2018-02-19
Payer: MEDICARE

## 2018-02-19 ENCOUNTER — CLINICAL SUPPORT (OUTPATIENT)
Dept: REHABILITATION | Facility: HOSPITAL | Age: 68
End: 2018-02-19
Payer: MEDICARE

## 2018-02-19 DIAGNOSIS — Z74.09 IMPAIRED MOBILITY AND ADLS: ICD-10-CM

## 2018-02-19 DIAGNOSIS — Z78.9 IMPAIRED MOBILITY AND ADLS: ICD-10-CM

## 2018-02-19 DIAGNOSIS — Z96.1 PSEUDOPHAKIA: ICD-10-CM

## 2018-02-19 DIAGNOSIS — Z98.890 POST-OPERATIVE STATE: Primary | ICD-10-CM

## 2018-02-19 DIAGNOSIS — H10.13 ALLERGIC CONJUNCTIVITIS OF BOTH EYES: ICD-10-CM

## 2018-02-19 DIAGNOSIS — G89.29 CHRONIC NECK PAIN: Primary | ICD-10-CM

## 2018-02-19 DIAGNOSIS — R29.898 DECREASED STRENGTH OF UPPER EXTREMITY: ICD-10-CM

## 2018-02-19 DIAGNOSIS — M54.2 CHRONIC NECK PAIN: Primary | ICD-10-CM

## 2018-02-19 DIAGNOSIS — H04.129 DRY EYE SYNDROME: ICD-10-CM

## 2018-02-19 DIAGNOSIS — H25.12 NUCLEAR SCLEROTIC CATARACT OF LEFT EYE: ICD-10-CM

## 2018-02-19 PROCEDURE — 97140 MANUAL THERAPY 1/> REGIONS: CPT | Mod: PN

## 2018-02-19 PROCEDURE — 97110 THERAPEUTIC EXERCISES: CPT | Mod: PN

## 2018-02-19 PROCEDURE — 99999 PR PBB SHADOW E&M-EST. PATIENT-LVL II: CPT | Mod: PBBFAC,,, | Performed by: OPHTHALMOLOGY

## 2018-02-19 PROCEDURE — 99024 POSTOP FOLLOW-UP VISIT: CPT | Mod: S$GLB,,, | Performed by: OPHTHALMOLOGY

## 2018-02-19 NOTE — PROGRESS NOTES
"TIME RECORD    Date: 2/19/2018      Start Time: 1407  Stop Time: 1501    PROCEDURES:    TIMED  Procedure Min.   TE 13   TE supervised 26   MT 15             UNTIMED  Procedure Min.             Total Timed Minutes: 28  Total Timed Units: 2  Total Untimed Units: 2  Charges Billed/# of units:  TE-1, MT-1      Progress/Current Status    Subjective:     Patient ID: Chelly Ortiz is a 67 y.o. female.  Diagnosis:   1. Chronic neck pain     2. Impaired mobility and ADLs     3. Decreased strength of upper extremity       Pain: 0/10 B sides of neck pre PT, R>L    Pt reports neck pain is improving. Pain is worse at night compared to the day    Objective:     MT x 15' including alt sidelying for MFR to B UT/LV; supine for STM/MFR to B UT, LV, and scalenes, and cervical paraspinals with elongation f/b gentle suboccipital release; CFM to suboccipital mm; cervical traction; TE 1:1 with PT x 13' f/b TE supervised x 26' per log. Added SL gator and shoulder flexion w/ scap retract to increase periscapular strength.     Date 2/19/18 2/14/18 2/7/18   Visit  Exp 12/31/18 4/50  2/50 1/50   POC 4/7/18        GCODE  4/10  2/10 1/10   FOTO 4/5 next  2/5 1/5   Visit Amount  Visit Total 61.84  Add total next  93.82  201.4 107.58  107.58            MHP                 MT 15'  15'              Stretches        UT Str. 3x30" B  3x30" B 3x30" B   LV Str. 3x30" B  3x30" B Held due to concordant pain occurring in flexion   Corner Str. 3x30" B  3x30" B 3x30" B            Supine        Chin Tuck 10"x10  5"x15 5"x15   DNF Next       Cervical Sada Lat flex/rot   5"x10 B     Protraction 500 gr ball x20 L, NP on R 2/2 increased shoulder pain  No weight x20  -perform with weighted ball on L next, not on R 2/2 shoulder pain              Sidelying        Gator 2x10 B  Next?     Shld Abd 2x10 B  x10 B     Shld Flex 2x10 B  Next?     Shld ER 2x10 B  x10 B              Seated        Scapular Retraction/Depression 10"x10  5"x15              Standing      " "  Lats RTC 2x12  RTC 2x10     Rows RTC 2x12  RTC 2x10     Brueggers  Next      IR/ER       Serratus wall Slides                 Initials CC   CC      Assessment:     Pt performed new/progressed exercises well with no increased pain. Continue to focus on posture and periscapular strengthening.     Patient Education/Response:     Cont HEP. Pt verbalized understanding.    Plans and Goals:     Cont POC to improve cervical ROM/flexibility and periscapular strengthening. Progress as tolerated.     Short Term Goals (4 Weeks): 3/7/18  1. Pt will be independent with HEP to supplement PT in improving pain free cervical mobility  2. Pt will improve cervical AROM 10 deg in all planes to improve cervical mobility.  3. Pt will improve UE MMTs by 1/2 grade in all planes to improve strength for functional tasks.  4. Pt will demonstrate improved sitting posture to decrease pain experienced in head and neck.  5. Pt will perform chin tuck + lift for 15" to improve DNF strength.  Long Term Goals (8 Weeks): 4/7/18  1. Pt will improve FOTO to </=42% limitation to improve perceived limitation with changing and maintaining mobility.  2. Pt will improve cervical AROM to WFL in all planes to improve cervical mobility.  3. Pt will improve UE MMTs 1 grade in all planes to improve strength for functional tasks.  4. Pt will report no pain with cervical flexion to promote functional QOL.  5. Pt will perform chin tuck + lift for 30" to improve DNF strength.      "

## 2018-02-19 NOTE — PROGRESS NOTES
Subjective:       Patient ID: Chelly Ortiz is a 67 y.o. female.    Chief Complaint: Post-op Evaluation (Yag po od)    HPI     Post-op Evaluation    Additional comments: Yag po od           Comments   DSL- 1/29/18     Pt states Va OD improved Yag Laser. Pt has itchy eyes. Pt denies pain and   discomfort.        Last edited by Santa Roberts on 2/19/2018 11:22 AM. (History)             Assessment:       1. Post-operative state    2. Allergic conjunctivitis of both eyes    3. Nuclear sclerotic cataract of left eye    4. Dry eye syndrome    5. Pseudophakia        Plan:       S/p YAG CAP OD-Doing well.  Allergic conj OU-Doing well with [place].  Cataract OS- Not visually significant.     BREEZY-Stable.      Cont P[place]o OU.  AT's.  RTC Dr Holden in 6 mos.

## 2018-02-20 ENCOUNTER — TELEPHONE (OUTPATIENT)
Dept: OPHTHALMOLOGY | Facility: CLINIC | Age: 68
End: 2018-02-20

## 2018-02-20 NOTE — TELEPHONE ENCOUNTER
----- Message from Lakesha Matos sent at 2/20/2018  2:45 PM CST -----  Contact: Chelly  Pt called to ask for a GENERIC form of olopatadine (PAZEO) 0.7 % Drop. She can't afford the co-pay.     [id number] - [facility] [address]  [address] [address]  Phone: [phone] Fax: [phone]  Not a 24 hour pharmacy; exact hours not known    Pt can be reached at [phone].

## 2018-02-21 ENCOUNTER — CLINICAL SUPPORT (OUTPATIENT)
Dept: REHABILITATION | Facility: HOSPITAL | Age: 68
End: 2018-02-21
Payer: MEDICARE

## 2018-02-21 ENCOUNTER — CLINICAL SUPPORT (OUTPATIENT)
Dept: DIABETES | Facility: CLINIC | Age: 68
End: 2018-02-21
Payer: MEDICARE

## 2018-02-21 DIAGNOSIS — E11.42 DIABETIC POLYNEUROPATHY ASSOCIATED WITH TYPE 2 DIABETES MELLITUS: ICD-10-CM

## 2018-02-21 DIAGNOSIS — Z74.09 IMPAIRED MOBILITY AND ADLS: ICD-10-CM

## 2018-02-21 DIAGNOSIS — M54.2 CHRONIC NECK PAIN: Primary | ICD-10-CM

## 2018-02-21 DIAGNOSIS — G89.29 CHRONIC NECK PAIN: Primary | ICD-10-CM

## 2018-02-21 DIAGNOSIS — Z78.9 IMPAIRED MOBILITY AND ADLS: ICD-10-CM

## 2018-02-21 DIAGNOSIS — R29.898 DECREASED STRENGTH OF UPPER EXTREMITY: ICD-10-CM

## 2018-02-21 PROCEDURE — 97110 THERAPEUTIC EXERCISES: CPT | Mod: PN

## 2018-02-21 PROCEDURE — 97140 MANUAL THERAPY 1/> REGIONS: CPT | Mod: PN

## 2018-02-21 PROCEDURE — G0108 DIAB MANAGE TRN  PER INDIV: HCPCS | Mod: S$GLB,,, | Performed by: INTERNAL MEDICINE

## 2018-02-21 PROCEDURE — 99999 PR PBB SHADOW E&M-EST. PATIENT-LVL I: CPT | Mod: PBBFAC,,,

## 2018-02-21 NOTE — PROGRESS NOTES
"TIME RECORD    Date: 2/21/2018      Start Time: 211  Stop Time: 255    PROCEDURES:    TIMED  Procedure Min.   MT 15   TE 9   TE supervised 24  NC             UNTIMED  Procedure Min.             Total Timed Minutes: 24  Total Timed Units: 2  Total Untimed Units: 2  Charges Billed/# of units:  TE-1, MT-1      Progress/Current Status    Subjective:     Patient ID: Chelly Ortiz is a 67 y.o. female.  Diagnosis:   1. Chronic neck pain     2. Impaired mobility and ADLs     3. Decreased strength of upper extremity       Pain:   Patient reports no pain upon arrival to session.    Objective:     MT x 15' supine for STM/MFR to B UT, LV, and scalenes, and cervical paraspinals with elongation f/b gentle suboccipital release; CFM to suboccipital mm; cervical traction; TE 1:1 with PT x 13' f/b TE supervised x 26' per log. Added SL gator and shoulder flexion w/ scap retract to increase periscapular strength.     Date 2/23/18 2/19/18 2/16/18 2/14/18 2/7/18   Visit  Exp 12/31/18 5/50 4/50  2/50 1/50   POC 4/7/18         GCODE  5/10 4/10  2/10 1/10   FOTO done 4/5 next  2/5 1/5   Visit Amount  Visit Total 61.84  386.92 61.84  Add total next 61.84 93.82  201.4 107.58  107.58             MHP                   MT 15' 15'  15'               Stretches         UT Str. 3x30" B 3x30" B  3x30" B 3x30" B   LV Str. 3x30" B 3x30" B  3x30" B Held due to concordant pain occurring in flexion   Corner Str. 30"x3 B 3x30" B  3x30" B 3x30" B             Supine         Chin Tuck 10"x10 10"x10  5"x15 5"x15   DNF 3"x5 Next       Cervical Sada Lat flex/rot    5"x10 B     Protraction 500 gr ball x20 L,   R no weight x 20 500 gr ball x20 L, NP on R 2/2 increased shoulder pain  No weight x20  -perform with weighted ball on L next, not on R 2/2 shoulder pain               Sidelying         Gator 2x10 B 2x10 B  Next?     Shld Abd 2x10 B 2x10 B  x10 B     Shld Flex 2x10 B 2x10 B  Next?     Shld ER 2x10 B 2x10 B  x10 B               Seated         Scapular " "Retraction/Depression 10"x10 10"x10  5"x15               Standing         Lats RTC 2x12 RTC 2x12  RTC 2x10     Rows RTC 2x12 RTC 2x12  RTC 2x10     Brueggers  next Next      IR/ER        Serratus wall Slides                   Initials CS CC CS CC CC      Assessment:     Patient performed all of above with cues for proper technique without increased pain.    Patient Education/Response:     Cont HEP. Pt verbalized understanding.    Plans and Goals:     Cont POC to improve cervical ROM/flexibility and periscapular strengthening. Progress as tolerated.     Short Term Goals (4 Weeks): 3/7/18  1. Pt will be independent with HEP to supplement PT in improving pain free cervical mobility  2. Pt will improve cervical AROM 10 deg in all planes to improve cervical mobility.  3. Pt will improve UE MMTs by 1/2 grade in all planes to improve strength for functional tasks.  4. Pt will demonstrate improved sitting posture to decrease pain experienced in head and neck.  5. Pt will perform chin tuck + lift for 15" to improve DNF strength.  Long Term Goals (8 Weeks): 4/7/18  1. Pt will improve FOTO to </=42% limitation to improve perceived limitation with changing and maintaining mobility.  2. Pt will improve cervical AROM to WFL in all planes to improve cervical mobility.  3. Pt will improve UE MMTs 1 grade in all planes to improve strength for functional tasks.  4. Pt will report no pain with cervical flexion to promote functional QOL.  5. Pt will perform chin tuck + lift for 30" to improve DNF strength.      "

## 2018-02-22 VITALS — BODY MASS INDEX: 35.43 KG/M2 | WEIGHT: 200 LBS

## 2018-02-22 NOTE — PROGRESS NOTES
Diabetes Education  Author: Stephani Jordan RN  Date: 2/22/2018    Diabetes Education Visit  Diabetes Education Record Assessment/Progress: Initial  Diabetes Type  Diabetes Type : Type II  Diabetes History  Diabetes Diagnosis: 5-10 years  Nutrition  Meal Planning: skipping meals, drinks regular soda, water, eats out often  What type of beverages do you drink?: regular soda/tea, juice, water  Meal Plan 24 Hour Recall - Breakfast: 2 toast, peanut butter, milk  Meal Plan 24 Hour Recall - Lunch: nothing  Meal Plan 24 Hour Recall - Dinner: black eye peas, chicken  Meal Plan 24 Hour Recall - Snack: 2 turtles  Monitoring   Self Monitoring : pt has a meter and test 3 times a weejk. Pt instructed to test once a day fasting. instructed pt on the normal bs ranges. instructed pt to keep a record of her bs's and to bring the record to her md visits  Blood Glucose Logs: No  In the last month, how often have you had a low blood sugar reaction?: never  Can you tell when your blood sugar is too high?: yes  How do you treat high blood sugar?: rest  Exercise   Exercise Type: bike riding, walking  Intensity: Low  Frequency: 3-5 Times per week  Duration: 15 min  Current Diabetes Treatment   Current Treatment: Oral Medication, Exercise, Diet  Social History  Preferred Learning Method: Face to Face, Group Education, Demonstration, Reading Materials  Primary Support: Spouse  Educational Level: High School  Occupation: retired  Smoking Status: Never a Smoker  Alcohol Use: Never  DDS-2 Score  ( > 3 = SIGNIFICANT DISTRESS): 2   Barriers to Change  Barriers to Change: None  Learning Challenges : None  Readiness to Learn   Readiness to Learn : Acceptance  Cultural Influences  Cultural Influences: None  Diabetes Education Assessment/Progress  Diabetes Disease Process (diabetes disease process and treatment options): Discussion, Instructed, Individual Session, Demonstrates Understanding/Competency(verbalizes/demonstrates), Written Materials  Provided  Nutrition (Incorporating nutritional management into one's lifestyle): Discussion, Instructed, Individual Session, Demonstrates Understanding/Competency (verbalizes/demonstrates), Written Materials Provided  Physical Activity (incorporating physical activity into one's lifestyle): Discussion, Instructed, Individual Session, Demonstrates Understanding/Competency (verbalizes/demonstrates), Written Materials Provided  Medications (states correct name, dose, onset, peak, duration, side effects & timing of meds): Discussion, Instructed, Individual Session, Demonstrates Understanding/Competency(verbalizes/demonstrates), Written Materials Provided  Monitoring (monitoring blood glucose/other parameters & using results): Discussion, Instructed, Individual Session, Demonstrates Understanding/Competency (verbalizes/demonstrates), Written Materials Provided  Acute Complications (preventing, detecting, and treating acute complications): Discussion, Instructed, Individual Session, Demonstrates Understanding/Competency (verbalizes/demonstrates), Written Materials Provided  Chronic Complications (preventing, detecting, and treating chronic complications): Discussion, Instructed, Individual Session, Demonstrates Understanding/Competency (verbalizes/demonstrates), Written Materials Provided  Clinical (diabetes, other pertinent medical history, and relevant comorbidities reviewed during visit): Discussion, Instructed, Individual Session, Demonstrates Understanding/Competency (verbalizes/demonstrates), Written Materials Provided  Cognitive (knowledge of self-management skills, functional health literacy): Discussion, Instructed, Individual Session, Demonstrates Understanding/Competency (verbalizes/demonstrates)  Psychosocial (emotional response to diabetes): Discussion, Instructed, Individual Session, Demonstrates Understanding/Competency (verbalizes/demonstrates)  Diabetes Distress and Support Systems: Discussion,  Instructed, Individual Session, Demonstrates Understanding/Competency (verbalizes/demonstrates)  Behavioral (readiness for change, lifestyle practices, self-care behaviors): Discussion, Instructed, Individual Session, Demonstrates Understanding/Competency (verbalizes/demonstrates)  Pt is known to me. She comes to clinic today because she has not been managing her bs's very well and her A1C has elevated. Pt understands basic concepts about meal plan however she tends to get confused when putting her meals together. Reviewed meal plan in depth with pt and pt states that it is clearer to her. Compliance is hoped for.  Goals  Patient has selected/evaluated goals during today's session: Yes, selected  Healthy Eating: Set  Start Date: 02/22/18  Target Date: 05/22/18  Diabetes Care Plan/Intervention  Education Plan/Intervention: Group Follow-Up DSMT, Foot Exam  Diabetes Meal Plan  Restrictions: Restricted Carbohydrate  Calories: 1800  Carbohydrate Per Meal: 30-45g  Carbohydrate Per Snack : 15-20g  Education Units of Time   Time Spent: 60 min    Health Maintenance was reviewed today with patient. Discussed with patient importance of routine eye exams, foot exams/foot care, blood work (i.e.: A1c, microalbumin, and lipid), dental visits, yearly flu vaccine, and pneumonia vaccine as indicated by PCP. Patient verbalized understanding.     Health Maintenance Topics with due status: Not Due       Topic Last Completion Date    TETANUS VACCINE 06/21/2010    Colonoscopy 07/26/2013    DEXA SCAN 03/29/2016    Foot Exam 08/15/2017    Mammogram 11/28/2017    Lipid Panel 01/15/2018    Hemoglobin A1c 01/15/2018    Eye Exam 01/29/2018     Health Maintenance Due   Topic Date Due    Pneumococcal (65+) (2 of 2 - PPSV23) 03/22/2017

## 2018-02-26 ENCOUNTER — CLINICAL SUPPORT (OUTPATIENT)
Dept: REHABILITATION | Facility: HOSPITAL | Age: 68
End: 2018-02-26
Payer: MEDICARE

## 2018-02-26 DIAGNOSIS — Z74.09 IMPAIRED MOBILITY AND ADLS: ICD-10-CM

## 2018-02-26 DIAGNOSIS — Z78.9 IMPAIRED MOBILITY AND ADLS: ICD-10-CM

## 2018-02-26 DIAGNOSIS — R29.898 DECREASED STRENGTH OF UPPER EXTREMITY: ICD-10-CM

## 2018-02-26 DIAGNOSIS — M54.2 CHRONIC NECK PAIN: Primary | ICD-10-CM

## 2018-02-26 DIAGNOSIS — G89.29 CHRONIC NECK PAIN: Primary | ICD-10-CM

## 2018-02-26 PROCEDURE — 97110 THERAPEUTIC EXERCISES: CPT | Mod: PN

## 2018-02-26 PROCEDURE — 97140 MANUAL THERAPY 1/> REGIONS: CPT | Mod: PN

## 2018-02-26 NOTE — PROGRESS NOTES
TIME RECORD    Date:  2/26/2018      Start Time:  1430  Stop Time:  1440    PROCEDURES:    TIMED  Procedure Min.   MT 10                     UNTIMED  Procedure Min.             Total Timed Minutes:  10  Total Timed Units:  1  Total Untimed Units:  0  Charges Billed/# of units:  1 MT       Progress/Current Status    Subjective:     Patient ID: Chelly Ortiz is a 67 y.o. female.  Diagnosis:   1. Chronic neck pain     2. Impaired mobility and ADLs     3. Decreased strength of upper extremity       Reports B upper trap pain and shoulder pain. (see note by Radha Senior PTA for further details)    Objective:   Palpation: (+) TTP with increased tissue tension B upper traps R>L.   Patient provided written and verbal consent to FDN. MT x 10 consisting of FDN to B upper traps with estim in prone    Assessment:     Patient demonstrated appropriate response to FDN with decreased tissue tension noted in B upper traps after FDN    Patient Education/Response:     Patient educated on FDN initially. Patient then educated on response to FDN with handout issued. See patient instructions for further details.     Plans and Goals:     Monitor response to FDN. Continue with FDN in POC as tolerated.

## 2018-02-26 NOTE — PROGRESS NOTES
"TIME RECORD    Date: 2/26/2018      Start Time: 200  Stop Time: 305    PROCEDURES:    TIMED  Procedure Min.   MT 15 + FDN   TE 25   TE supervised 15  NC             UNTIMED  Procedure Min.             Total Timed Minutes: 40  Total Timed Units: 2  Total Untimed Units: 0  Charges Billed/# of units:  TE-1, MT-1      Progress/Current Status    Subjective:     Patient ID: Chelly Ortiz is a 67 y.o. female.  Diagnosis:   1. Chronic neck pain     2. Impaired mobility and ADLs     3. Decreased strength of upper extremity       Pain:   Patient reports no pain upon arrival to session.    Objective:     MT x 15' consisting of STM cervical paraspinals with elongation f/b gentle suboccipital release; CFM to suboccipital region with focus R.  Patient then performed therex 1:1 with PTA x 15 minutes..  Patient then seen by Be Graham, PT, DPT for FDN (see his note).  Patient then completed therex with supervision x 15 minutes and 1:1 with PTA x 10 minutes.    Date 2/26/18 2/23/18 2/19/18 2/16/18 2/14/18 2/7/18   Visit  Exp 12/31/18 6/50 5/50 4/50  2/50 1/50   POC 4/7/18          GCODE  6/10 5/10 4/10  2/10 1/10   FOTO  done 4/5 next  2/5 1/5   Visit Amount  Visit Total 57.78  444.70 61.84  386.92 61.84  Add total next 61.84 93.82  201.4 107.58  107.58              MHP                     MT 10' + FDN 15' 15'  15'                Stretches          UT Str. 3x30" B 3x30" B 3x30" B  3x30" B 3x30" B   LV Str. 3x30" B 3x30" B 3x30" B  3x30" B Held due to concordant pain occurring in flexion   Corner Str. 30"x3 B 30"x3 B 3x30" B  3x30" B 3x30" B              Supine          Chin Tuck 10"x10 10"x10 10"x10  5"x15 5"x15   DNF 3"x5 3"x5 Next       Cervical Sada Lat flex/rot     5"x10 B     Protraction 500 gr ball x20 L,   R no weight x 20 500 gr ball x20 L,   R no weight x 20 500 gr ball x20 L, NP on R 2/2 increased shoulder pain  No weight x20  -perform with weighted ball on L next, not on R 2/2 shoulder pain              " "  Sidelying          Gator 2x12 B 2x10 B 2x10 B  Next?     Shld Abd 2x12 B 2x10 B 2x10 B  x10 B     Shld Flex 2x12 B 2x10 B 2x10 B  Next?     Shld ER 2x12 B 2x10 B 2x10 B  x10 B                Seated          Scapular Retraction/Depression 10"x10 10"x10 10"x10  5"x15                Standing          Lats RTB 2x15 RTC 2x12 RTC 2x12  RTC 2x10     Rows RTB 2x15 RTC 2x12 RTC 2x12  RTC 2x10     Brueggers  next next Next      IR/ER         Serratus wall Slides                     Initials CS  2/6 CS 1/6 CC CS CC CC      Assessment:     Patient performed all of above with cues for proper technique without increased pain.    Patient Education/Response:     Cont HEP. Pt verbalized understanding.    Plans and Goals:     Cont POC to improve cervical ROM/flexibility and periscapular strengthening. Progress as tolerated.     Short Term Goals (4 Weeks): 3/7/18  1. Pt will be independent with HEP to supplement PT in improving pain free cervical mobility  2. Pt will improve cervical AROM 10 deg in all planes to improve cervical mobility.  3. Pt will improve UE MMTs by 1/2 grade in all planes to improve strength for functional tasks.  4. Pt will demonstrate improved sitting posture to decrease pain experienced in head and neck.  5. Pt will perform chin tuck + lift for 15" to improve DNF strength.  Long Term Goals (8 Weeks): 4/7/18  1. Pt will improve FOTO to </=42% limitation to improve perceived limitation with changing and maintaining mobility.  2. Pt will improve cervical AROM to WFL in all planes to improve cervical mobility.  3. Pt will improve UE MMTs 1 grade in all planes to improve strength for functional tasks.  4. Pt will report no pain with cervical flexion to promote functional QOL.  5. Pt will perform chin tuck + lift for 30" to improve DNF strength.      "

## 2018-02-27 ENCOUNTER — DOCUMENTATION ONLY (OUTPATIENT)
Dept: REHABILITATION | Facility: HOSPITAL | Age: 68
End: 2018-02-27

## 2018-02-27 DIAGNOSIS — Z78.9 IMPAIRED MOBILITY AND ADLS: ICD-10-CM

## 2018-02-27 DIAGNOSIS — Z74.09 IMPAIRED MOBILITY AND ADLS: ICD-10-CM

## 2018-02-27 DIAGNOSIS — R29.898 DECREASED STRENGTH OF UPPER EXTREMITY: ICD-10-CM

## 2018-02-27 DIAGNOSIS — G89.29 CHRONIC NECK PAIN: Primary | ICD-10-CM

## 2018-02-27 DIAGNOSIS — M54.2 CHRONIC NECK PAIN: Primary | ICD-10-CM

## 2018-02-27 NOTE — PROGRESS NOTES
Face to Face PTA Conference performed with Radha Senior PTA regarding patient's current status, overall progress, and plan of care    Elizabeth Campos, PT

## 2018-02-28 ENCOUNTER — TELEPHONE (OUTPATIENT)
Dept: OPHTHALMOLOGY | Facility: CLINIC | Age: 68
End: 2018-02-28

## 2018-02-28 ENCOUNTER — CLINICAL SUPPORT (OUTPATIENT)
Dept: REHABILITATION | Facility: HOSPITAL | Age: 68
End: 2018-02-28
Payer: MEDICARE

## 2018-02-28 DIAGNOSIS — Z74.09 IMPAIRED MOBILITY AND ADLS: ICD-10-CM

## 2018-02-28 DIAGNOSIS — R29.898 DECREASED STRENGTH OF UPPER EXTREMITY: ICD-10-CM

## 2018-02-28 DIAGNOSIS — M54.2 CHRONIC NECK PAIN: Primary | ICD-10-CM

## 2018-02-28 DIAGNOSIS — G89.29 CHRONIC NECK PAIN: Primary | ICD-10-CM

## 2018-02-28 DIAGNOSIS — Z78.9 IMPAIRED MOBILITY AND ADLS: ICD-10-CM

## 2018-02-28 PROCEDURE — 97110 THERAPEUTIC EXERCISES: CPT | Mod: PN

## 2018-02-28 PROCEDURE — 97140 MANUAL THERAPY 1/> REGIONS: CPT | Mod: PN

## 2018-02-28 NOTE — PROGRESS NOTES
"TIME RECORD    Date: 2/28/2018      Start Time: 200  Stop Time: 300    PROCEDURES:    TIMED  Procedure Min.   MT 20    TE 20   TE supervised 20  NC             UNTIMED  Procedure Min.             Total Timed Minutes: 40  Total Timed Units: 3  Total Untimed Units: 0  Charges Billed/# of units:  1 MT, 2 TE      Progress/Current Status    Subjective:     Patient ID: Chelly Ortiz is a 67 y.o. female.  Diagnosis:   1. Chronic neck pain     2. Impaired mobility and ADLs     3. Decreased strength of upper extremity       Pain:   Patient reports continued R suboccipital pain.    Objective:     MT x 20 minutes consisting of STM cervical paraspinals with elongation f/b gentle suboccipital release; CFM to suboccipital region with focus R and STM/MFR to B UT's and STM B SCM.  Patient then performed therex 1:1 with PTA x 20  minutes..   Patient then completed therex with supervision x 20 min    Date 2/28/16 2/26/18 2/23/18 2/19/18 2/16/18 2/14/18 2/7/18   Visit  Exp 12/31/18 7/50 6/50 5/50 4/50  2/50 1/50   POC 4/7/18           GCODE  7/10 6/10 5/10 4/10  2/10 1/10   FOTO   done 4/5 next  2/5 1/5   Visit Amount  Visit Total 88.10  532.80 57.78  444.70 61.84  386.92 61.84  Add total next 61.84 93.82  201.4 107.58  107.58               MHP                       MT 15' 15' + FDN 15' 15'  15'                 Stretches           UT Str. 30"x3 B 30"x3 B 3x30" B 3x30" B  3x30" B 3x30" B   LV Str. 30"x3 B 3x30" B 3x30" B 3x30" B  3x30" B Held due to concordant pain occurring in flexion   Corner Str. 30"x3 B 30"x3 B 30"x3 B 3x30" B  3x30" B 3x30" B               Supine           Chin Tuck 10"x10 10"x10 10"x10 10"x10  5"x15 5"x15   DNF 5"x5 3"x5 3"x5 Next       Cervical Sada Lat flex/rot      5"x10 B     Protraction 500 gr ball x20 L,   R no weight x 20 500 gr ball x20 L,   R no weight x 20 500 gr ball x20 L,   R no weight x 20 500 gr ball x20 L, NP on R 2/2 increased shoulder pain  No weight x20  -perform with weighted ball on L next, " "not on R 2/2 shoulder pain                 Sidelying           Gator 2x12 L  1x12 R  1x5 2x12 B 2x10 B 2x10 B  Next?     Shld Abd 2x12 B 2x12 B 2x10 B 2x10 B  x10 B     Shld Flex 2x12 B 2x12 B 2x10 B 2x10 B  Next?     Shld ER 2x12 B 2x12 B 2x10 B 2x10 B  x10 B                 Seated           Scapular Retraction/Depression 10"x10 10"x10 10"x10 10"x10  5"x15                 Standing           Lats RTB 2x15 RTB 2x15 RTC 2x12 RTC 2x12  RTC 2x10     Rows RTB 2x15 RTB 2x15 RTC 2x12 RTC 2x12  RTC 2x10     Brueggers  RTB 2x10 next next Next      IR/ER          Serratus wall Slides                       Initials CS  3/6 CS  2/6 CS 1/6 CC CS CC CC      Assessment:     Patient performed all of above with cues for proper technique without increased pain.    Patient Education/Response:     Cont HEP. Pt verbalized understanding.    Plans and Goals:     Cont POC to improve cervical ROM/flexibility and periscapular strengthening. Progress as tolerated.     Short Term Goals (4 Weeks): 3/7/18  1. Pt will be independent with HEP to supplement PT in improving pain free cervical mobility  2. Pt will improve cervical AROM 10 deg in all planes to improve cervical mobility.  3. Pt will improve UE MMTs by 1/2 grade in all planes to improve strength for functional tasks.  4. Pt will demonstrate improved sitting posture to decrease pain experienced in head and neck.  5. Pt will perform chin tuck + lift for 15" to improve DNF strength.  Long Term Goals (8 Weeks): 4/7/18  1. Pt will improve FOTO to </=42% limitation to improve perceived limitation with changing and maintaining mobility.  2. Pt will improve cervical AROM to WFL in all planes to improve cervical mobility.  3. Pt will improve UE MMTs 1 grade in all planes to improve strength for functional tasks.  4. Pt will report no pain with cervical flexion to promote functional QOL.  5. Pt will perform chin tuck + lift for 30" to improve DNF strength.      "

## 2018-03-08 ENCOUNTER — CLINICAL SUPPORT (OUTPATIENT)
Dept: REHABILITATION | Facility: HOSPITAL | Age: 68
End: 2018-03-08
Payer: MEDICARE

## 2018-03-08 DIAGNOSIS — M54.2 CHRONIC NECK PAIN: Primary | ICD-10-CM

## 2018-03-08 DIAGNOSIS — R29.898 DECREASED STRENGTH OF UPPER EXTREMITY: ICD-10-CM

## 2018-03-08 DIAGNOSIS — Z78.9 IMPAIRED MOBILITY AND ADLS: ICD-10-CM

## 2018-03-08 DIAGNOSIS — G89.29 CHRONIC NECK PAIN: Primary | ICD-10-CM

## 2018-03-08 DIAGNOSIS — Z74.09 IMPAIRED MOBILITY AND ADLS: ICD-10-CM

## 2018-03-08 PROCEDURE — 97110 THERAPEUTIC EXERCISES: CPT | Mod: PN

## 2018-03-08 PROCEDURE — 97140 MANUAL THERAPY 1/> REGIONS: CPT | Mod: PN

## 2018-03-08 NOTE — PROGRESS NOTES
"TIME RECORD    Date: 3/8/2018      Start Time: 2:00  Stop Time: 3:00    PROCEDURES:    TIMED  Procedure Min.   MT 25    TE 30   TE supervised              UNTIMED  Procedure Min.             Total Timed Minutes: 55  Total Timed Units: 4  Total Untimed Units: 0  Charges Billed/# of units: 4 ( MT-2,  TE-2)      Progress/Current Status    Subjective:     Patient ID: Chelly Ortiz is a 67 y.o. female.  Diagnosis:   1. Chronic neck pain     2. Impaired mobility and ADLs     3. Decreased strength of upper extremity       Pain:   Patient reports continued R suboccipital pain. "Feels like my shoulders are too high."    Objective:     MT x 25 minutes consisting of STM cervical paraspinals with elongation f/b gentle suboccipital release; CFM to suboccipital region with focus R and STM/MFR to B UT's and STM B SCM.  Patient then performed therex 1:1 with PTA x 30  minutes..       Date 3/8/18 2/28/16 2/26/18 2/23/18 2/19/18 2/16/18 2/14/18 2/7/18   Visit  Exp 12/31/18 8/50 7/50 6/50 5/50 4/50  2/50 1/50   POC 4/7/18            GCODE  8/10 7/10 6/10 5/10 4/10  2/10 1/10   FOTO    done 4/5 next  2/5 1/5   Visit Amount  Visit Total 115.56  648.32 88.10  532.80 57.78  444.70 61.84  386.92 61.84  Add total next 61.84 93.82  201.4 107.58  107.58                MHP                         MT 25' 15' 15' + FDN 15' 15'  15'                  Stretches            UT Str. 2x30" B 30"x3 B 30"x3 B 3x30" B 3x30" B  3x30" B 3x30" B   LV Str. 2x30" B 30"x3 B 3x30" B 3x30" B 3x30" B  3x30" B Held due to concordant pain occurring in flexion   Corner Str. deferred 30"x3 B 30"x3 B 30"x3 B 3x30" B  3x30" B 3x30" B                Supine            Chin Tuck 10"x10 10"x10 10"x10 10"x10 10"x10  5"x15 5"x15   DNF  5"x5 3"x5 3"x5 Next       Cervical Sada Lat flex/rot       5"x10 B     Protraction 500 gr ball x20 L    R no weight x20       500 gr ball x20 L,   R no weight x 20 500 gr ball x20 L,   R no weight x 20 500 gr ball x20 L,   R no weight x 20 500 " "gr ball x20 L, NP on R 2/2 increased shoulder pain  No weight x20  -perform with weighted ball on L next, not on R 2/2 shoulder pain                  Sidelying            Gator 2x12 L 2x12 L  1x12 R  1x5 2x12 B 2x10 B 2x10 B  Next?     Shld Abd 2x12 B 2x12 B 2x12 B 2x10 B 2x10 B  x10 B     Shld Flex 2x12 B 2x12 B 2x12 B 2x10 B 2x10 B  Next?     Shld ER 2x12 B 2x12 B 2x12 B 2x10 B 2x10 B  x10 B                  Seated            Scapular Retraction/Depression 10"x10 10"x10 10"x10 10"x10 10"x10  5"x15                  Standing            Lats GTB 2x15 B RTB 2x15 RTB 2x15 RTC 2x12 RTC 2x12  RTC 2x10     Rows GTB 2x15 B RTB 2x15 RTB 2x15 RTC 2x12 RTC 2x12  RTC 2x10     Brueggers  RTB 2x15 B RTB 2x10 next next Next      IR/ER           Serratus wall Slides                         Initials MB 4/6 CS  3/6 CS  2/6 CS 1/6 CC CS CC CC      Assessment:     Patient performed all of above requiring VC and demo at times for modified technique.     Patient Education/Response:     Cont HEP. Pt verbalized understanding.    Plans and Goals:     Cont POC to improve cervical ROM/flexibility and periscapular strengthening. Progress as tolerated.     Short Term Goals (4 Weeks): 3/7/18  1. Pt will be independent with HEP to supplement PT in improving pain free cervical mobility  2. Pt will improve cervical AROM 10 deg in all planes to improve cervical mobility.  3. Pt will improve UE MMTs by 1/2 grade in all planes to improve strength for functional tasks.  4. Pt will demonstrate improved sitting posture to decrease pain experienced in head and neck.  5. Pt will perform chin tuck + lift for 15" to improve DNF strength.  Long Term Goals (8 Weeks): 4/7/18  1. Pt will improve FOTO to </=42% limitation to improve perceived limitation with changing and maintaining mobility.  2. Pt will improve cervical AROM to WFL in all planes to improve cervical mobility.  3. Pt will improve UE MMTs 1 grade in all planes to improve strength for functional " "tasks.  4. Pt will report no pain with cervical flexion to promote functional QOL.  5. Pt will perform chin tuck + lift for 30" to improve DNF strength.      "

## 2018-03-12 ENCOUNTER — CLINICAL SUPPORT (OUTPATIENT)
Dept: REHABILITATION | Facility: HOSPITAL | Age: 68
End: 2018-03-12
Payer: MEDICARE

## 2018-03-12 DIAGNOSIS — Z78.9 IMPAIRED MOBILITY AND ADLS: ICD-10-CM

## 2018-03-12 DIAGNOSIS — Z74.09 IMPAIRED MOBILITY AND ADLS: ICD-10-CM

## 2018-03-12 DIAGNOSIS — M54.2 CHRONIC NECK PAIN: Primary | ICD-10-CM

## 2018-03-12 DIAGNOSIS — G89.29 CHRONIC NECK PAIN: Primary | ICD-10-CM

## 2018-03-12 DIAGNOSIS — R29.898 DECREASED STRENGTH OF UPPER EXTREMITY: ICD-10-CM

## 2018-03-12 PROCEDURE — 97110 THERAPEUTIC EXERCISES: CPT | Mod: PN

## 2018-03-12 PROCEDURE — 97140 MANUAL THERAPY 1/> REGIONS: CPT | Mod: PN

## 2018-03-12 NOTE — PROGRESS NOTES
"TIME RECORD    Date: 3/12/2018      Start Time: 2:00  Stop Time: 3:00    PROCEDURES:    TIMED  Procedure Min.   MT 25    TE 30   TE supervised              UNTIMED  Procedure Min.             Total Timed Minutes: 55  Total Timed Units: 4  Total Untimed Units: 0  Charges Billed/# of units: 4 ( MT-2,  TE-2)      Progress/Current Status    Subjective:     Patient ID: Chelly Ortiz is a 67 y.o. female.  Diagnosis:   1. Chronic neck pain     2. Impaired mobility and ADLs     3. Decreased strength of upper extremity       Pain:   Patient reports continued R suboccipital pain. "Feels like my shoulders are too high."    Objective:     MT x 25 minutes consisting of STM cervical paraspinals with elongation f/b gentle suboccipital release; CFM to suboccipital region with focus R and STM/MFR to B UT's and STM B SCM.  Patient then performed therex 1:1 with PTA x 30  minutes..       Date 3/12/18 3/8/18 2/28/16 2/26/18 2/23/18 2/19/18 2/16/18 2/14/18 2/7/18   Visit  Exp 12/31/18  8/50 7/50 6/50 5/50 4/50  2/50 1/50   POC 4/7/18             GCODE   8/10 7/10 6/10 5/10 4/10  2/10 1/10   FOTO     done 4/5 next  2/5 1/5   Visit Amount  Visit Total  115.56  648.32 88.10  532.80 57.78  444.70 61.84  386.92 61.84  Add total next 61.84 93.82  201.4 107.58  107.58                 MHP                           MT  25' 15' 15' + FDN 15' 15'  15'                   Stretches             UT Str.  2x30" B 30"x3 B 30"x3 B 3x30" B 3x30" B  3x30" B 3x30" B   LV Str.  2x30" B 30"x3 B 3x30" B 3x30" B 3x30" B  3x30" B Held due to concordant pain occurring in flexion   Corner Str.  deferred 30"x3 B 30"x3 B 30"x3 B 3x30" B  3x30" B 3x30" B                 Supine             Chin Tuck  10"x10 10"x10 10"x10 10"x10 10"x10  5"x15 5"x15   DNF   5"x5 3"x5 3"x5 Next       Cervical Sada Lat flex/rot        5"x10 B     Protraction  500 gr ball x20 L    R no weight x20       500 gr ball x20 L,   R no weight x 20 500 gr ball x20 L,   R no weight x 20 500 gr ball " "x20 L,   R no weight x 20 500 gr ball x20 L, NP on R 2/2 increased shoulder pain  No weight x20  -perform with weighted ball on L next, not on R 2/2 shoulder pain                   Sidelying             Gator  2x12 L 2x12 L  1x12 R  1x5 2x12 B 2x10 B 2x10 B  Next?     Shld Abd  2x12 B 2x12 B 2x12 B 2x10 B 2x10 B  x10 B     Shld Flex  2x12 B 2x12 B 2x12 B 2x10 B 2x10 B  Next?     Shld ER  2x12 B 2x12 B 2x12 B 2x10 B 2x10 B  x10 B                   Seated             Scapular Retraction/Depression  10"x10 10"x10 10"x10 10"x10 10"x10  5"x15                   Standing             Lats  GTB 2x15 B RTB 2x15 RTB 2x15 RTC 2x12 RTC 2x12  RTC 2x10     Rows  GTB 2x15 B RTB 2x15 RTB 2x15 RTC 2x12 RTC 2x12  RTC 2x10     Brueggers   RTB 2x15 B RTB 2x10 next next Next      IR/ER            Serratus wall Slides                           Initials  MB 4/6 CS  3/6 CS  2/6 CS 1/6 CC CS CC CC      Assessment:     Patient performed all of above requiring VC and demo at times for modified technique.     Patient Education/Response:     Cont HEP. Pt verbalized understanding.    Plans and Goals:     Cont POC to improve cervical ROM/flexibility and periscapular strengthening. Progress as tolerated.     Short Term Goals (4 Weeks): 3/7/18  1. Pt will be independent with HEP to supplement PT in improving pain free cervical mobility  2. Pt will improve cervical AROM 10 deg in all planes to improve cervical mobility.  3. Pt will improve UE MMTs by 1/2 grade in all planes to improve strength for functional tasks.  4. Pt will demonstrate improved sitting posture to decrease pain experienced in head and neck.  5. Pt will perform chin tuck + lift for 15" to improve DNF strength.  Long Term Goals (8 Weeks): 4/7/18  1. Pt will improve FOTO to </=42% limitation to improve perceived limitation with changing and maintaining mobility.  2. Pt will improve cervical AROM to WFL in all planes to improve cervical mobility.  3. Pt will improve UE MMTs 1 grade in " "all planes to improve strength for functional tasks.  4. Pt will report no pain with cervical flexion to promote functional QOL.  5. Pt will perform chin tuck + lift for 30" to improve DNF strength.      "

## 2018-03-12 NOTE — PROGRESS NOTES
"TIME RECORD    Date: 3/12/2018      Start Time: 1406  Stop Time: 1502    PROCEDURES:    TIMED  Procedure Min.   MT 10   TE 22   TE supervised 24             UNTIMED  Procedure Min.             Total Timed Minutes: 32  Total Timed Units: 2  Total Untimed Units: 2  Charges Billed/# of units: MT-1,  TE-1      Progress/Current Status    Subjective:     Patient ID: Chelly Ortiz is a 67 y.o. female.  Diagnosis:   1. Chronic neck pain     2. Impaired mobility and ADLs     3. Decreased strength of upper extremity       Pain: 5/10 B neck and UT; 2/10 post PT    Pt reports she overdid housework this weekend. Pt reports she has been feeling improvements in PT otherwise    Objective:     TE supervised x 24' f/b TE 1:1 x 22'. MT x 10' including MFR to B UT and LV in alternate sidelying. FOTO and GCODE next.    FDN next if available.    Date 3/12/18 3/8/18 2/28/16 2/26/18 2/23/18 2/19/18 2/16/18 2/14/18 2/7/18   Visit  Exp 12/31/18 9/50 8/50 7/50 6/50 5/50 4/50  2/50 1/50   POC 4/7/18             GCODE  9/10 next 8/10 7/10 6/10 5/10 4/10  2/10 1/10   FOTO     done 4/5 next  2/5 1/5   Visit Amount  Visit Total 57.78  706.1 115.56  648.32 88.10  532.80 57.78  444.70 61.84  386.92 61.84  Add total next 61.84 93.82  201.4 107.58  107.58                 MHP                           MT 10' 25' 15' 15' + FDN 15' 15'  15'                   Stretches             UT Str. 3x30" B 2x30" B 30"x3 B 30"x3 B 3x30" B 3x30" B  3x30" B 3x30" B   LV Str. 3x30" B 2x30" B 30"x3 B 3x30" B 3x30" B 3x30" B  3x30" B Held due to concordant pain occurring in flexion   Corner Str. OOT next deferred 30"x3 B 30"x3 B 30"x3 B 3x30" B  3x30" B 3x30" B                 Supine             Chin Tuck 10"x10 10"x10 10"x10 10"x10 10"x10 10"x10  5"x15 5"x15   DNF 10"x5  5"x5 3"x5 3"x5 Next       Cervical Sada Lat flex/rot        5"x10 B     Protraction D/c 500 gr ball x20 L    R no weight x20       500 gr ball x20 L,   R no weight x 20 500 gr ball x20 L,   R no weight " "x 20 500 gr ball x20 L,   R no weight x 20 500 gr ball x20 L, NP on R 2/2 increased shoulder pain  No weight x20  -perform with weighted ball on L next, not on R 2/2 shoulder pain                   Sidelying             Gator 2x12 B  ^next 2x12 L 2x12 L  1x12 R  1x5 2x12 B 2x10 B 2x10 B  Next?     Shld Abd 2x12 B  ^next 2x12 B 2x12 B 2x12 B 2x10 B 2x10 B  x10 B     Shld Flex 2x12 B  ^next 2x12 B 2x12 B 2x12 B 2x10 B 2x10 B  Next?     Shld ER 2x12 B  ^next 2x12 B 2x12 B 2x12 B 2x10 B 2x10 B  x10 B                   Seated             Scapular Retraction/Depression HEP 10"x10 10"x10 10"x10 10"x10 10"x10  5"x15                   Standing             Lats GTB 2x15 B GTB 2x15 B RTB 2x15 RTB 2x15 RTC 2x12 RTC 2x12  RTC 2x10     Rows GTB 2x15 B GTB 2x15 B RTB 2x15 RTB 2x15 RTC 2x12 RTC 2x12  RTC 2x10     Brueggers  GTB 2x10 B RTB 2x15 B RTB 2x10 next next Next      IR/ER            Serratus wall slides Next            Wall angels w/ scap retract Next                         Initials CC MB 4/6 CS  3/6 CS  2/6 CS 1/6 CC CS CC CC      Assessment:     Increased pain in R shoulder with gator exercise, reviewed scapular retraction/depress to be maintained during all sidelying shoulder exercises f/b improved activation and decreased shoulder pain. Moderate VC/MC during rows for technique. Pt performed all other exercises well with decreased pain at end of session. Pt may benefit from FDN next to decrease tissue tension in B UT, noted on R>L.    Patient Education/Response:     Cont HEP. Pt verbalized understanding.    Plans and Goals:     Cont POC to improve cervical ROM/flexibility and periscapular strengthening. Progress as tolerated.     Short Term Goals (4 Weeks): 3/7/18  1. Pt will be independent with HEP to supplement PT in improving pain free cervical mobility  2. Pt will improve cervical AROM 10 deg in all planes to improve cervical mobility.  3. Pt will improve UE MMTs by 1/2 grade in all planes to improve strength for " "functional tasks.  4. Pt will demonstrate improved sitting posture to decrease pain experienced in head and neck.  5. Pt will perform chin tuck + lift for 15" to improve DNF strength.  Long Term Goals (8 Weeks): 4/7/18  1. Pt will improve FOTO to </=42% limitation to improve perceived limitation with changing and maintaining mobility.  2. Pt will improve cervical AROM to WFL in all planes to improve cervical mobility.  3. Pt will improve UE MMTs 1 grade in all planes to improve strength for functional tasks.  4. Pt will report no pain with cervical flexion to promote functional QOL.  5. Pt will perform chin tuck + lift for 30" to improve DNF strength.      "

## 2018-03-14 ENCOUNTER — CLINICAL SUPPORT (OUTPATIENT)
Dept: REHABILITATION | Facility: HOSPITAL | Age: 68
End: 2018-03-14
Payer: MEDICARE

## 2018-03-14 DIAGNOSIS — G89.29 CHRONIC NECK PAIN: Primary | ICD-10-CM

## 2018-03-14 DIAGNOSIS — R29.898 DECREASED STRENGTH OF UPPER EXTREMITY: ICD-10-CM

## 2018-03-14 DIAGNOSIS — Z74.09 IMPAIRED MOBILITY AND ADLS: ICD-10-CM

## 2018-03-14 DIAGNOSIS — Z78.9 IMPAIRED MOBILITY AND ADLS: ICD-10-CM

## 2018-03-14 DIAGNOSIS — M54.2 CHRONIC NECK PAIN: Primary | ICD-10-CM

## 2018-03-14 PROCEDURE — G8980 MOBILITY D/C STATUS: HCPCS | Mod: CK,PN

## 2018-03-14 PROCEDURE — G8979 MOBILITY GOAL STATUS: HCPCS | Mod: CI,PN

## 2018-03-14 PROCEDURE — G8978 MOBILITY CURRENT STATUS: HCPCS | Mod: CJ,PN

## 2018-03-14 NOTE — PROGRESS NOTES
"TIME RECORD    Date: 3/14/2018      Start Time: 205  Stop Time: 215      PROCEDURES:    TIMED  Procedure Min.           TE supervised 10             UNTIMED  Procedure Min.             Total Timed Minutes: 0  Total Timed Units: 0  Total Untimed Units:0  Charges Billed/# of units:0      Progress/Current Status    Subjective:     Patient ID: Chelly Ortiz is a 67 y.o. female.  Diagnosis:   1. Chronic neck pain     2. Impaired mobility and ADLs     3. Decreased strength of upper extremity       Pain: Pt reports she is not feeling well today, experiencing sinus problems.    Objective:     Patient performed B UT and Levator stretching with supervision..  Reported that she was not feeling well enough to stay so session ended.      Date 3/14/18 3/12/18 3/8/18 2/28/16 2/26/18 2/23/18 2/19/18 2/16/18 2/14/18 2/7/18   Visit  Exp 12/31/18 10 9/50 8/50 7/50 6/50 5/50 4/50 2/50 1/50   POC 4/7/18              GCODE  done 9/10 next 8/10 7/10 6/10 5/10 4/10  2/10 1/10   FOTO      done 4/5 next  2/5 1/5   Visit Amount  Visit Total  57.78  706.1 115.56  648.32 88.10  532.80 57.78  444.70 61.84  386.92 61.84  Add total next 61.84 93.82  201.4 107.58  107.58                  MHP                             MT  10' 25' 15' 15' + FDN 15' 15'  15'                    Stretches              UT Str. 3x30" B 3x30" B 2x30" B 30"x3 B 30"x3 B 3x30" B 3x30" B  3x30" B 3x30" B   LV Str. 3x30" B 3x30" B 2x30" B 30"x3 B 3x30" B 3x30" B 3x30" B  3x30" B Held due to concordant pain occurring in flexion   Corner Str.  OOT next deferred 30"x3 B 30"x3 B 30"x3 B 3x30" B  3x30" B 3x30" B                  Supine              Chin Tuck  10"x10 10"x10 10"x10 10"x10 10"x10 10"x10  5"x15 5"x15   DNF  10"x5  5"x5 3"x5 3"x5 Next       Cervical Sada Lat flex/rot         5"x10 B     Protraction  D/c 500 gr ball x20 L    R no weight x20       500 gr ball x20 L,   R no weight x 20 500 gr ball x20 L,   R no weight x 20 500 gr ball x20 L,   R no weight x 20 500 gr " "ball x20 L, NP on R 2/2 increased shoulder pain  No weight x20  -perform with weighted ball on L next, not on R 2/2 shoulder pain                    Sidelying              Gator  2x12 B  ^next 2x12 L 2x12 L  1x12 R  1x5 2x12 B 2x10 B 2x10 B  Next?     Shld Abd  2x12 B  ^next 2x12 B 2x12 B 2x12 B 2x10 B 2x10 B  x10 B     Shld Flex  2x12 B  ^next 2x12 B 2x12 B 2x12 B 2x10 B 2x10 B  Next?     Shld ER  2x12 B  ^next 2x12 B 2x12 B 2x12 B 2x10 B 2x10 B  x10 B                    Seated              Scapular Retraction/Depression  HEP 10"x10 10"x10 10"x10 10"x10 10"x10  5"x15                    Standing              Lats  GTB 2x15 B GTB 2x15 B RTB 2x15 RTB 2x15 RTC 2x12 RTC 2x12  RTC 2x10     Rows  GTB 2x15 B GTB 2x15 B RTB 2x15 RTB 2x15 RTC 2x12 RTC 2x12  RTC 2x10     Brueggers   GTB 2x10 B RTB 2x15 B RTB 2x10 next next Next      IR/ER             Serratus wall slides  Next            Wall angels w/ scap retract  Next                          Initials CS   CC MB 4/6 CS  3/6 CS  2/6 CS 1/6 CC CS CC CC      Assessment:     Session ended session after 10 minutes 2/2 patient not feeling well.  Patient Education/Response:     Cont HEP. Pt verbalized understanding.    Plans and Goals:     Cont POC to improve cervical ROM/flexibility and periscapular strengthening. Progress as tolerated.     Short Term Goals (4 Weeks): 3/7/18  1. Pt will be independent with HEP to supplement PT in improving pain free cervical mobility  2. Pt will improve cervical AROM 10 deg in all planes to improve cervical mobility.  3. Pt will improve UE MMTs by 1/2 grade in all planes to improve strength for functional tasks.  4. Pt will demonstrate improved sitting posture to decrease pain experienced in head and neck.  5. Pt will perform chin tuck + lift for 15" to improve DNF strength.  Long Term Goals (8 Weeks): 4/7/18  1. Pt will improve FOTO to </=42% limitation to improve perceived limitation with changing and maintaining mobility.  2. Pt will improve " "cervical AROM to WFL in all planes to improve cervical mobility.  3. Pt will improve UE MMTs 1 grade in all planes to improve strength for functional tasks.  4. Pt will report no pain with cervical flexion to promote functional QOL.  5. Pt will perform chin tuck + lift for 30" to improve DNF strength.      "

## 2018-03-15 ENCOUNTER — CLINICAL SUPPORT (OUTPATIENT)
Dept: DIABETES | Facility: CLINIC | Age: 68
End: 2018-03-15
Payer: MEDICARE

## 2018-03-15 DIAGNOSIS — E11.65 UNCONTROLLED TYPE 2 DIABETES MELLITUS WITH HYPERGLYCEMIA, WITHOUT LONG-TERM CURRENT USE OF INSULIN: ICD-10-CM

## 2018-03-15 PROCEDURE — G0108 DIAB MANAGE TRN  PER INDIV: HCPCS | Mod: S$GLB,,, | Performed by: INTERNAL MEDICINE

## 2018-03-15 PROCEDURE — 99999 PR PBB SHADOW E&M-EST. PATIENT-LVL I: CPT | Mod: PBBFAC,,,

## 2018-03-15 NOTE — LETTER
March 16, 2018      Gabriel Wilson MD  [address] [facility] [place] [address]         Patient: Chelly Ortiz   MR Number: [id number]   YOB: 1950   Date of Visit: 3/15/2018       Dear     Thank you for referring Chelly for diabetes self-management education and support. She has completed all components of our Diabetes Management Program and her Self-Management Support Plan. Below is a summary of her clinical outcomes and goal progress.    Patient Outcomes:    A1c Status:   Lab Results   Component Value Date    HGBA1C 8.6 (H) 01/15/2018    HGBA1C 9.4 (H) 08/11/2017     Goals  Patient has selected/evaluated goals during today's session: Yes, evaluated  Healthy Eating: % Met  Met Percentage : 75%    Diabetes Self-Management Support Plan  Exercise/Nutrition: use a local track  Review Status: Patient has selected and agrees to support plan., Patient was provided Diabetes Self-Management Support Plan document that includes support options.    Follow up:   · Chelly to follow diabetes support plan indicated above  · Chelly to attend medical appointments as scheduled  · Chelly to update you on her DM education progress as needed      If you have questions, please do not hesitate to call me. I look forward to providing additional education and support as needed.    Sincerely,    Stephani Jordan RN

## 2018-03-16 VITALS — WEIGHT: 198 LBS | BODY MASS INDEX: 35.07 KG/M2

## 2018-03-16 NOTE — PROGRESS NOTES
10th visit GCODE    Functional Limitation Reports: G codes  Tool: FOTO Neck SURVEY  Category: Changing & maintaining body position ()  Limitation: 35% (54% at eval, 42% goal for d/c)    LTG #1 met, adjust to <20%

## 2018-03-16 NOTE — PROGRESS NOTES
Diabetes Education  Author: Stephani Jordan RN  Date: 3/16/2018    Diabetes Education Visit  Diabetes Education Record Assessment/Progress: Post Program/Follow-up  Diabetes Type  Diabetes Type : Type II  Nutrition  Meal Planning: drinks regular soda, water, 3 meals per day, eats out often, snacks between meal  What type of sweetener do you use?: Splenda  What type of beverages do you drink?: regular soda/tea, water  Meal Plan 24 Hour Recall - Breakfast: 1/2 moan bread, pork chop, margarine, coffee, brown sugar  Meal Plan 24 Hour Recall - Lunch: green beans, mashed potatoes, 2 chicken wings, 8 oz sweet tea  Meal Plan 24 Hour Recall - Dinner: dumplings, pork chop, green beans, margarine  Meal Plan 24 Hour Recall - Snack: chocolate chip cookie  Monitoring   Self Monitoring : pt has a meter and is testing once a day fasting  Blood Glucose Logs: Yes  In the last month, how often have you had a low blood sugar reaction?: never  How do you treat low blood sugar?: peppermints  Can you tell when your blood sugar is too high?: no  Exercise   Exercise Type: walking  Intensity: Low  Frequency: 3-5 Times per week  Duration: 15 min  Current Diabetes Treatment   Current Treatment: Oral Medication, Diet, Exercise  Social History  Preferred Learning Method: Face to Face, Reading Materials, Demonstration  Primary Support: Spouse  DDS-2 Score  ( > 3 = SIGNIFICANT DISTRESS): 1     Barriers to Change  Barriers to Change: None  Learning Challenges : None  Readiness to Learn   Readiness to Learn : Acceptance  Cultural Influences  Cultural Influences: None  Diabetes Education Assessment/Progress  Diabetes Disease Process (diabetes disease process and treatment options): Discussion, Instructed, Individual Session, Demonstrates Understanding/Competency(verbalizes/demonstrates)  Nutrition (Incorporating nutritional management into one's lifestyle): Discussion, Demonstration, Instructed, Individual Session, Demonstrates  Understanding/Competency (verbalizes/demonstrates), Written Materials Provided  Physical Activity (incorporating physical activity into one's lifestyle): Discussion, Instructed, Individual Session, Demonstrates Understanding/Competency (verbalizes/demonstrates)  Medications (states correct name, dose, onset, peak, duration, side effects & timing of meds): Discussion, Instructed, Individual Session, Demonstrates Understanding/Competency(verbalizes/demonstrates)  Monitoring (monitoring blood glucose/other parameters & using results): Discussion, Instructed, Individual Session, Demonstrates Understanding/Competency (verbalizes/demonstrates)  Acute Complications (preventing, detecting, and treating acute complications): Discussion, Instructed, Individual Session, Demonstrates Understanding/Competency (verbalizes/demonstrates)  Chronic Complications (preventing, detecting, and treating chronic complications): Not Covered/Deferred  Cognitive (knowledge of self-management skills, functional health literacy): Discussion, Instructed, Individual Session, Demonstrates Understanding/Competency (verbalizes/demonstrates)  Psychosocial (emotional response to diabetes): Discussion, Instructed, Individual Session, Demonstrates Understanding/Competency (verbalizes/demonstrates)  Diabetes Distress and Support Systems: Discussion, Instructed, Individual Session, Demonstrates Understanding/Competency (verbalizes/demonstrates)  Behavioral (readiness for change, lifestyle practices, self-care behaviors): Discussion, Instructed, Individual Session, Demonstrates Understanding/Competency (verbalizes/demonstrates)  Goals  Patient has selected/evaluated goals during today's session: Yes, evaluated  Healthy Eating: % Met  Met Percentage : 75%  Diabetes Self-Management Support Plan  Exercise/Nutrition: use a local track  Review Status: Patient has selected and agrees to support plan., Patient was provided Diabetes Self-Management Support Plan  document that includes support options.  Diabetes Meal Plan  Restrictions: Restricted Carbohydrate  Calories: 1800  Carbohydrate Per Meal: 30-45g  Carbohydrate Per Snack : 15-20g  24 hour meal recall reviewed with pt. Pt is eating 3 meals per day and they are essentially balanced. Pt states that she is having a hard time giving up sweet tea and soft drinks however she has them only once a day. Reviewed meal plan and putting meals together with pt. Encouraged pt to attend group class. Pt will follow up as needed.  Education Units of Time   Time Spent: 60 min    Health Maintenance was reviewed today with patient. Discussed with patient importance of routine eye exams, foot exams/foot care, blood work (i.e.: A1c, microalbumin, and lipid), dental visits, yearly flu vaccine, and pneumonia vaccine as indicated by PCP. Patient verbalized understanding.     Health Maintenance Topics with due status: Not Due       Topic Last Completion Date    TETANUS VACCINE 06/21/2010    Colonoscopy 07/26/2013    DEXA SCAN 03/29/2016    Foot Exam 08/15/2017    Mammogram 11/28/2017    Lipid Panel 01/15/2018    Hemoglobin A1c 01/15/2018    Eye Exam 01/29/2018     Health Maintenance Due   Topic Date Due    Pneumococcal (65+) (2 of 2 - PPSV23) 03/22/2017

## 2018-03-19 ENCOUNTER — CLINICAL SUPPORT (OUTPATIENT)
Dept: REHABILITATION | Facility: HOSPITAL | Age: 68
End: 2018-03-19
Payer: MEDICARE

## 2018-03-19 DIAGNOSIS — M54.2 CHRONIC NECK PAIN: Primary | ICD-10-CM

## 2018-03-19 DIAGNOSIS — R29.898 DECREASED STRENGTH OF UPPER EXTREMITY: ICD-10-CM

## 2018-03-19 DIAGNOSIS — G89.29 CHRONIC NECK PAIN: Primary | ICD-10-CM

## 2018-03-19 DIAGNOSIS — Z78.9 IMPAIRED MOBILITY AND ADLS: ICD-10-CM

## 2018-03-19 DIAGNOSIS — Z74.09 IMPAIRED MOBILITY AND ADLS: ICD-10-CM

## 2018-03-19 PROCEDURE — 97110 THERAPEUTIC EXERCISES: CPT | Mod: PN

## 2018-03-19 NOTE — PROGRESS NOTES
"TIME RECORD    Date: 3/19/2018      Start Time: 1405  Stop Time: 1450      PROCEDURES:    TIMED  Procedure Min.   MT    TE 23   TE supervised 22             UNTIMED  Procedure Min.             Total Timed Minutes: 23  Total Timed Units: 2  Total Untimed Units: 2  Charges Billed/# of units: TE-2      Progress/Current Status    Subjective:     Patient ID: Chelly Ortiz is a 67 y.o. female.  Diagnosis:   1. Chronic neck pain     2. Impaired mobility and ADLs     3. Decreased strength of upper extremity       Pain: 0/10    Pt reports she feels much better from last visit, thinks resolution of sinus sx improved neck pain.    Objective:     TE 1:1 with PT x 23' f/b TE supervised x 22' per log.     Date 3/19/18 3/14/18 3/12/18 3/8/18 2/28/16 2/26/18 2/23/18 2/19/18 2/16/18 2/14/18 2/7/18   Visit  Exp 12/31/18 11 10 9/50 8/50 7/50 6/50 5/50 4/50  2/50 1/50   POC 4/7/18               GCODE  1/10 done 9/10 next 8/10 7/10 6/10 5/10 4/10  2/10 1/10   FOTO W/ GCode      done 4/5 next  2/5 1/5   Visit Amount  Visit Total 60.64  766.74  57.78  706.1 115.56  648.32 88.10  532.80 57.78  444.70 61.84  386.92 61.84  Add total next 61.84 93.82  201.4 107.58  107.58                   MHP                               MT NT  10' 25' 15' 15' + FDN 15' 15'  15'                     Stretches               UT Str. 3x30" B 3x30" B 3x30" B 2x30" B 30"x3 B 30"x3 B 3x30" B 3x30" B  3x30" B 3x30" B   LV Str. 3x30" B 3x30" B 3x30" B 2x30" B 30"x3 B 3x30" B 3x30" B 3x30" B  3x30" B Held due to concordant pain occurring in flexion   Corner Str. 3x30"  OOT next deferred 30"x3 B 30"x3 B 30"x3 B 3x30" B  3x30" B 3x30" B                   Supine               Chin Tuck HEP  10"x10 10"x10 10"x10 10"x10 10"x10 10"x10  5"x15 5"x15   DNF 10"x5  10"x5  5"x5 3"x5 3"x5 Next       Cervical Sada Lat flex/rot          5"x10 B                     Sidelying               Gator 2x15 B  2x12 B  ^next 2x12 L 2x12 L  1x12 R  1x5 2x12 B 2x10 B 2x10 B  Next?     Shld " "Abd 2x15 B  2x12 B  ^next 2x12 B 2x12 B 2x12 B 2x10 B 2x10 B  x10 B     Shld Flex 2x15 B  2x12 B  ^next 2x12 B 2x12 B 2x12 B 2x10 B 2x10 B  Next?     Shld ER 2x15 B  2x12 B  ^next 2x12 B 2x12 B 2x12 B 2x10 B 2x10 B  x10 B                                     Standing               Lats BTB 2x10  GTB 2x15 B GTB 2x15 B RTB 2x15 RTB 2x15 RTC 2x12 RTC 2x12  RTC 2x10     Rows BTB 2x10  GTB 2x15 B GTB 2x15 B RTB 2x15 RTB 2x15 RTC 2x12 RTC 2x12  RTC 2x10     Brueggers  GTB 2x10  GTB 2x10 B RTB 2x15 B RTB 2x10 next next Next      IR/ER              Serratus wall slides 2x10  Next            Wall angels w/ scap retract 2x10  Next                           Initials CC CS   CC MB 4/6 CS  3/6 CS  2/6 CS 1/6 CC CS CC CC      Assessment:     Increased fatigue in L forearm during serratus wall slides, overall increased difficulty during this exercise and wall angels. Pt performed all other progressed TE well without increased neck pain.    Patient Education/Response:     Cont HEP. Pt verbalized understanding.    Plans and Goals:     Cont POC to improve cervical ROM/flexibility and periscapular strengthening. Progress as tolerated.     Short Term Goals (4 Weeks): 3/7/18  1. Pt will be independent with HEP to supplement PT in improving pain free cervical mobility  2. Pt will improve cervical AROM 10 deg in all planes to improve cervical mobility.  3. Pt will improve UE MMTs by 1/2 grade in all planes to improve strength for functional tasks.  4. Pt will demonstrate improved sitting posture to decrease pain experienced in head and neck.  5. Pt will perform chin tuck + lift for 15" to improve DNF strength.  Long Term Goals (8 Weeks): 4/7/18  1. Pt will improve FOTO to </=42% limitation to improve perceived limitation with changing and maintaining mobility.  2. Pt will improve cervical AROM to WFL in all planes to improve cervical mobility.  3. Pt will improve UE MMTs 1 grade in all planes to improve strength for functional tasks.  4. " "Pt will report no pain with cervical flexion to promote functional QOL.  5. Pt will perform chin tuck + lift for 30" to improve DNF strength.      "

## 2018-03-21 ENCOUNTER — CLINICAL SUPPORT (OUTPATIENT)
Dept: REHABILITATION | Facility: HOSPITAL | Age: 68
End: 2018-03-21
Payer: MEDICARE

## 2018-03-21 DIAGNOSIS — G89.29 CHRONIC NECK PAIN: Primary | ICD-10-CM

## 2018-03-21 DIAGNOSIS — R29.898 DECREASED STRENGTH OF UPPER EXTREMITY: ICD-10-CM

## 2018-03-21 DIAGNOSIS — M54.2 CHRONIC NECK PAIN: Primary | ICD-10-CM

## 2018-03-21 DIAGNOSIS — Z78.9 IMPAIRED MOBILITY AND ADLS: ICD-10-CM

## 2018-03-21 DIAGNOSIS — Z74.09 IMPAIRED MOBILITY AND ADLS: ICD-10-CM

## 2018-03-21 PROCEDURE — 97140 MANUAL THERAPY 1/> REGIONS: CPT | Mod: PN

## 2018-03-21 PROCEDURE — 97110 THERAPEUTIC EXERCISES: CPT | Mod: PN

## 2018-03-21 NOTE — PROGRESS NOTES
"TIME RECORD    Date: 3/21/2018      Start Time: 1405  Stop Time: 1459      PROCEDURES:    TIMED  Procedure Min.   MT 12   TE 12   TE supervised 30             UNTIMED  Procedure Min.             Total Timed Minutes: 24  Total Timed Units: 2  Total Untimed Units: 2  Charges Billed/# of units: TE-1, MT-1      Progress/Current Status    Subjective:     Patient ID: Chelly Ortiz is a 67 y.o. female.  Diagnosis:   1. Chronic neck pain     2. Impaired mobility and ADLs     3. Decreased strength of upper extremity       Pain: Not specific to scale, improved at end of session    Pt reports increased "pinching pain" in L shoulder yesterday while sitting in recliner, has lasted through today. Pt performed exercises following this which did not relieve pain.     Objective:     TE supervised x 30' f/b TE 1:1 with PT x 12' per log. MT x 12' with pt in R SL including SCM/TPR to L UT and LV.     Date 3/21/18 3/19/18 3/14/18 3/12/18 3/8/18 2/28/16 2/26/18 2/23/18 2/19/18 2/16/18 2/14/18 2/7/18   Visit  Exp 12/31/18 12 11 10 9/50 8/50 7/50 6/50 5/50 4/50  2/50 1/50   POC 4/7/18                GCODE  2/10 1/10 done 9/10 next 8/10 7/10 6/10 5/10 4/10  2/10 1/10   FOTO W/ GCode W/ GCode      done 4/5 next  2/5 1/5   Visit Amount  Visit Total 60.64  827.38 60.64  766.74  57.78  706.1 115.56  648.32 88.10  532.80 57.78  444.70 61.84  386.92 61.84  Add total next 61.84 93.82  201.4 107.58  107.58                    MHP                                 MT 12' NT  10' 25' 15' 15' + FDN 15' 15'  15'                      Stretches                UT Str. 3x30" B 3x30" B 3x30" B 3x30" B 2x30" B 30"x3 B 30"x3 B 3x30" B 3x30" B  3x30" B 3x30" B   LV Str. 3x30" B 3x30" B 3x30" B 3x30" B 2x30" B 30"x3 B 3x30" B 3x30" B 3x30" B  3x30" B Held due to concordant pain occurring in flexion   Corner Str. 3x30" 3x30"  OOT next deferred 30"x3 B 30"x3 B 30"x3 B 3x30" B  3x30" B 3x30" B                    Supine                Chin Tuck  HEP  10"x10 10"x10 " "10"x10 10"x10 10"x10 10"x10  5"x15 5"x15   DNF 15"x4 10"x5  10"x5  5"x5 3"x5 3"x5 Next       Cervical Sada Lat flex/rot           5"x10 B                      Sidelying                Gator 1# 2x10 B 2x15 B  2x12 B  ^next 2x12 L 2x12 L  1x12 R  1x5 2x12 B 2x10 B 2x10 B  Next?     Shld Abd 1# 2x10 B 2x15 B  2x12 B  ^next 2x12 B 2x12 B 2x12 B 2x10 B 2x10 B  x10 B     Shld Flex 1# 2x10 B 2x15 B  2x12 B  ^next 2x12 B 2x12 B 2x12 B 2x10 B 2x10 B  Next?     Shld ER 1# 2x10 B 2x15 B  2x12 B  ^next 2x12 B 2x12 B 2x12 B 2x10 B 2x10 B  x10 B                                       Standing                Lats BTB 2x10  ^next BTB 2x10  GTB 2x15 B GTB 2x15 B RTB 2x15 RTB 2x15 RTC 2x12 RTC 2x12  RTC 2x10     Rows BTB 2x10  ^next BTB 2x10  GTB 2x15 B GTB 2x15 B RTB 2x15 RTB 2x15 RTC 2x12 RTC 2x12  RTC 2x10     Brueggers  GTB 2x10  ^next GTB 2x10  GTB 2x10 B RTB 2x15 B RTB 2x10 next next Next      PNF D2 flexion               IR/ER               Serratus wall slides 2x10 2x10  Next            Wall angels w/ scap retract 2x10 2x10  Next                            Initials CC CC CS   CC MB 4/6 CS  3/6 CS  2/6 CS 1/6 CC CS CC CC      Assessment:     Mild increase pain in L side of neck during SL shoulder exercises and chin tucks + lifts. Increased challenge performing serratus wall slides and wall angels. Pt performed all other exercises well without increased pain. Soft tissue restrictions in L UT, improved following MT along with subjective pain report.    Patient Education/Response:     Cont HEP. Pt verbalized understanding.    Plans and Goals:     Cont POC to improve cervical ROM/flexibility and periscapular strengthening. Progress as tolerated.     Short Term Goals (4 Weeks): 3/7/18  1. Pt will be independent with HEP to supplement PT in improving pain free cervical mobility  2. Pt will improve cervical AROM 10 deg in all planes to improve cervical mobility.  3. Pt will improve UE MMTs by 1/2 grade in all planes to improve " "strength for functional tasks.  4. Pt will demonstrate improved sitting posture to decrease pain experienced in head and neck.  5. Pt will perform chin tuck + lift for 15" to improve DNF strength.  Long Term Goals (8 Weeks): 4/7/18  1. Pt will improve FOTO to </=42% limitation to improve perceived limitation with changing and maintaining mobility.  2. Pt will improve cervical AROM to WFL in all planes to improve cervical mobility.  3. Pt will improve UE MMTs 1 grade in all planes to improve strength for functional tasks.  4. Pt will report no pain with cervical flexion to promote functional QOL.  5. Pt will perform chin tuck + lift for 30" to improve DNF strength.      "

## 2018-03-26 ENCOUNTER — CLINICAL SUPPORT (OUTPATIENT)
Dept: REHABILITATION | Facility: HOSPITAL | Age: 68
End: 2018-03-26
Payer: MEDICARE

## 2018-03-26 DIAGNOSIS — Z74.09 IMPAIRED MOBILITY AND ADLS: ICD-10-CM

## 2018-03-26 DIAGNOSIS — R29.898 DECREASED STRENGTH OF UPPER EXTREMITY: ICD-10-CM

## 2018-03-26 DIAGNOSIS — G89.29 CHRONIC NECK PAIN: Primary | ICD-10-CM

## 2018-03-26 DIAGNOSIS — M54.2 CHRONIC NECK PAIN: Primary | ICD-10-CM

## 2018-03-26 DIAGNOSIS — Z78.9 IMPAIRED MOBILITY AND ADLS: ICD-10-CM

## 2018-03-26 PROCEDURE — 97110 THERAPEUTIC EXERCISES: CPT | Mod: PN

## 2018-03-26 NOTE — PROGRESS NOTES
"TIME RECORD    Date: 3/26/2018      Start Time: 1405  Stop Time: 1500      PROCEDURES:    TIMED  Procedure Min.   MT    TE 29   TE supervised 26             UNTIMED  Procedure Min.             Total Timed Minutes: 26  Total Timed Units: 2  Total Untimed Units: 2  Charges Billed/# of units: TE-2      Progress/Current Status    Subjective:     Patient ID: Chelly Ortiz is a 67 y.o. female.  Diagnosis:   1. Chronic neck pain     2. Impaired mobility and ADLs     3. Decreased strength of upper extremity       Pain: 0/10 neck     Pt reports 75% improvement in neck pain/mobility since SOC. Pt reports she mainly has pain when sitting>30'-1hr; however relief consistently when performing HEP/self massage.     Objective:     TE supervised x 29' f/b TE 1:1 with PT x 26' per log. Added IR/ER to TE to increase shoulder strength.     Date 3/26/18 3/21/18 3/19/18 3/14/18 3/12/18 3/8/18 2/28/16 2/26/18 2/23/18 2/19/18 2/16/18 2/14/18 2/7/18   Visit  Exp 12/31/18 13 12 11 10 9/50 8/50 7/50 6/50 5/50 4/50  2/50 1/50   POC 4/7/18                 GCODE  3/10 2/10 1/10 done 9/10 next 8/10 7/10 6/10 5/10 4/10  2/10 1/10   FOTO w/gcode W/ GCode W/ GCode      done 4/5 next  2/5 1/5   Visit Amount  Visit Total 60.64  888.02 60.64  827.38 60.64  766.74  57.78  706.1 115.56  648.32 88.10  532.80 57.78  444.70 61.84  386.92 61.84  Add total next 61.84 93.82  201.4 107.58  107.58                     MHP                                   MT NT 12' NT  10' 25' 15' 15' + FDN 15' 15'  15'                       Stretches                 UT Str. 3x30" B 3x30" B 3x30" B 3x30" B 3x30" B 2x30" B 30"x3 B 30"x3 B 3x30" B 3x30" B  3x30" B 3x30" B   LV Str. 3x30" B 3x30" B 3x30" B 3x30" B 3x30" B 2x30" B 30"x3 B 3x30" B 3x30" B 3x30" B  3x30" B Held due to concordant pain occurring in flexion   Corner Str. 3x30" 3x30" 3x30"  OOT next deferred 30"x3 B 30"x3 B 30"x3 B 3x30" B  3x30" B 3x30" B                     Supine                 DNF 15"x5 15"x4 10"x5  " "10"x5  5"x5 3"x5 3"x5 Next                         Sidelying                 Gator 1# 2x12 B 1# 2x10 B 2x15 B  2x12 B  ^next 2x12 L 2x12 L  1x12 R  1x5 2x12 B 2x10 B 2x10 B  Next?     Shld Abd 1# 2x12 B 1# 2x10 B 2x15 B  2x12 B  ^next 2x12 B 2x12 B 2x12 B 2x10 B 2x10 B  x10 B     Shld Flex 1# 2x12 B 1# 2x10 B 2x15 B  2x12 B  ^next 2x12 B 2x12 B 2x12 B 2x10 B 2x10 B  Next?     Shld ER 1# 2x12 B 1# 2x10 B 2x15 B  2x12 B  ^next 2x12 B 2x12 B 2x12 B 2x10 B 2x10 B  x10 B                       Standing                 Lats BTB 2x15 BTB 2x10  ^next BTB 2x10  GTB 2x15 B GTB 2x15 B RTB 2x15 RTB 2x15 RTC 2x12 RTC 2x12  RTC 2x10     Rows BTB 2x15 BTB 2x10  ^next BTB 2x10  GTB 2x15 B GTB 2x15 B RTB 2x15 RTB 2x15 RTC 2x12 RTC 2x12  RTC 2x10     Brueggers  GTB 2x15 GTB 2x10  ^next GTB 2x10  GTB 2x10 B RTB 2x15 B RTB 2x10 next next Next      PNF D2 flexion Attempted w/ YTB, held 2/2 pain               Scaption Attempted w/ YTB, held 2/2 pain               IR/ER RTC x10 ea B               Serratus wall slides Held 2/2 pain reported prior 2x10 2x10  Next            Wall angels w/ scap retract 2x10 2x10 2x10  Next                             Initials CC CC CC CS   CC MB 4/6 CS  3/6 CS  2/6 CS 1/6 CC CS CC CC      Assessment:     Wall angels held 2/2 increased pain in L shoulder reported with performance during previous sessions. Attempted resisted scaption and D2 flexion, held 2/2 pain. Pt performed all other new and progressed exercises well without increased pain. Pt has shown great improvements in pain, mobility, and strength since SOC and may benefit from d/c this/next week.    Patient Education/Response:     Cont HEP. Pt given BTB for lats/rows and GTB for breugger's at home. Pt verbalized understanding.    Plans and Goals:     Cont POC to improve periscapular strengthening. Possible d/c next visit/week.     Short Term Goals (4 Weeks): 3/7/18  1. Pt will be independent with HEP to supplement PT in improving pain free cervical " "mobility  2. Pt will improve cervical AROM 10 deg in all planes to improve cervical mobility.  3. Pt will improve UE MMTs by 1/2 grade in all planes to improve strength for functional tasks.  4. Pt will demonstrate improved sitting posture to decrease pain experienced in head and neck.  5. Pt will perform chin tuck + lift for 15" to improve DNF strength.  Long Term Goals (8 Weeks): 4/7/18  1. Pt will improve FOTO to </=42% limitation to improve perceived limitation with changing and maintaining mobility.  2. Pt will improve cervical AROM to WFL in all planes to improve cervical mobility.  3. Pt will improve UE MMTs 1 grade in all planes to improve strength for functional tasks.  4. Pt will report no pain with cervical flexion to promote functional QOL.  5. Pt will perform chin tuck + lift for 30" to improve DNF strength.      "

## 2018-03-27 ENCOUNTER — DOCUMENTATION ONLY (OUTPATIENT)
Dept: REHABILITATION | Facility: HOSPITAL | Age: 68
End: 2018-03-27

## 2018-03-27 DIAGNOSIS — Z74.09 IMPAIRED MOBILITY AND ADLS: ICD-10-CM

## 2018-03-27 DIAGNOSIS — R29.898 DECREASED STRENGTH OF UPPER EXTREMITY: ICD-10-CM

## 2018-03-27 DIAGNOSIS — M54.2 CHRONIC NECK PAIN: Primary | ICD-10-CM

## 2018-03-27 DIAGNOSIS — Z78.9 IMPAIRED MOBILITY AND ADLS: ICD-10-CM

## 2018-03-27 DIAGNOSIS — G89.29 CHRONIC NECK PAIN: Primary | ICD-10-CM

## 2018-03-27 NOTE — PROGRESS NOTES
Face to Face PTA Conference performed with Aquiles Castle PTA regarding patient's current status, overall progress, and plan of care    Elizabeth Campos, PT     Face to face meeting completed with Elizabeth Campos PT regarding current status and progress of   Chelly Ortiz .  Aquiles Castle, PTA

## 2018-03-28 ENCOUNTER — CLINICAL SUPPORT (OUTPATIENT)
Dept: REHABILITATION | Facility: HOSPITAL | Age: 68
End: 2018-03-28
Payer: MEDICARE

## 2018-03-28 DIAGNOSIS — M54.2 CHRONIC NECK PAIN: Primary | ICD-10-CM

## 2018-03-28 DIAGNOSIS — R29.898 DECREASED STRENGTH OF UPPER EXTREMITY: ICD-10-CM

## 2018-03-28 DIAGNOSIS — Z78.9 IMPAIRED MOBILITY AND ADLS: ICD-10-CM

## 2018-03-28 DIAGNOSIS — Z74.09 IMPAIRED MOBILITY AND ADLS: ICD-10-CM

## 2018-03-28 DIAGNOSIS — G89.29 CHRONIC NECK PAIN: Primary | ICD-10-CM

## 2018-03-28 PROCEDURE — 97110 THERAPEUTIC EXERCISES: CPT | Mod: PN

## 2018-03-28 PROCEDURE — G8978 MOBILITY CURRENT STATUS: HCPCS | Mod: CJ,PN

## 2018-03-28 NOTE — PROGRESS NOTES
"TIME RECORD    Date: 3/28/2018      Start Time: 1410  Stop Time: 1510      PROCEDURES:    TIMED  Procedure Min.   MT    TE 35   TE supervised 25             UNTIMED  Procedure Min.             Total Timed Minutes: 35  Total Timed Units: 2  Total Untimed Units: 2  Charges Billed/# of units: TE-2      Progress/Current Status    Subjective:     Patient ID: Chelly Ortiz is a 67 y.o. female.  Diagnosis:   1. Chronic neck pain     2. Impaired mobility and ADLs     3. Decreased strength of upper extremity       Pain: 0/10 neck     Pt reports 90% improvement in neck pain/mobility since SOC. Improvements include pain, ROM, strength, and functional mobility. Pt reports her biggest issue was her neck locking in flexion while performing activities involving cervical + thoracic flexion. Several attempts with PT resulting in no pain or stiffness. PT encouraged pt to isolate cervical flexion while maintaining an upright posture when performing activities that warrant neck flexion. Pt ready for d/c at this time with update HEP.    Objective:     TE supervised x 25' per log f/b TE 1:1 with PT x 35' including updated objective measurements and exercises per log.     Date 3/28/18 3/26/18 3/21/18 3/19/18 3/14/18 3/12/18 3/8/18 2/28/16 2/26/18 2/23/18 2/19/18 2/16/18 2/14/18 2/7/18   Visit  Exp 12/31/18 14 13 12 11 10 9/50 8/50 7/50 6/50 5/50 4/50  2/50 1/50   POC 4/7/18                  GCODE  4/10 done 3/10 2/10 1/10 done 9/10 next 8/10 7/10 6/10 5/10 4/10  2/10 1/10   FOTO done w/gcode W/ GCode W/ GCode      done 4/5 next  2/5 1/5   Visit Amount  Visit Total 60.64  948.66 60.64  888.02 60.64  827.38 60.64  766.74  57.78  706.1 115.56  648.32 88.10  532.80 57.78  444.70 61.84  386.92 61.84  Add total next 61.84 93.82  201.4 107.58  107.58                      MHP                                     MT  NT 12' NT  10' 25' 15' 15' + FDN 15' 15'  15'                        Stretches                  UT Str. 3x30" B 3x30" B 3x30" B " "3x30" B 3x30" B 3x30" B 2x30" B 30"x3 B 30"x3 B 3x30" B 3x30" B  3x30" B 3x30" B   LV Str. 3x30" B 3x30" B 3x30" B 3x30" B 3x30" B 3x30" B 2x30" B 30"x3 B 3x30" B 3x30" B 3x30" B  3x30" B Held due to concordant pain occurring in flexion   Corner Str.  3x30" 3x30" 3x30"  OOT next deferred 30"x3 B 30"x3 B 30"x3 B 3x30" B  3x30" B 3x30" B                      Supine                  DNF 15"x5  30" 15"x5 15"x4 10"x5  10"x5  5"x5 3"x5 3"x5 Next                          Sidelying                  Gator 1# 2x12 B 1# 2x12 B 1# 2x10 B 2x15 B  2x12 B  ^next 2x12 L 2x12 L  1x12 R  1x5 2x12 B 2x10 B 2x10 B  Next?     Shld Abd 1# 2x12 B 1# 2x12 B 1# 2x10 B 2x15 B  2x12 B  ^next 2x12 B 2x12 B 2x12 B 2x10 B 2x10 B  x10 B     Shld Flex 1# 2x12 B 1# 2x12 B 1# 2x10 B 2x15 B  2x12 B  ^next 2x12 B 2x12 B 2x12 B 2x10 B 2x10 B  Next?     Shld ER 1# 2x12 B 1# 2x12 B 1# 2x10 B 2x15 B  2x12 B  ^next 2x12 B 2x12 B 2x12 B 2x10 B 2x10 B  x10 B                        Standing                  Lats  BTB 2x15 BTB 2x10  ^next BTB 2x10  GTB 2x15 B GTB 2x15 B RTB 2x15 RTB 2x15 RTC 2x12 RTC 2x12  RTC 2x10     Rows  BTB 2x15 BTB 2x10  ^next BTB 2x10  GTB 2x15 B GTB 2x15 B RTB 2x15 RTB 2x15 RTC 2x12 RTC 2x12  RTC 2x10     Brueggers   GTB 2x15 GTB 2x10  ^next GTB 2x10  GTB 2x10 B RTB 2x15 B RTB 2x10 next next Next      PNF D2 flexion  Attempted w/ YTB, held 2/2 pain               Scaption  Attempted w/ YTB, held 2/2 pain               IR/ER  RTC x10 ea B               Serratus wall slides  Held 2/2 pain reported prior 2x10 2x10  Next            Wall angels w/ scap retract  2x10 2x10 2x10  Next                              Initials CC CC CC CC CS   CC MB 4/6 CS  3/6 CS  2/6 CS 1/6 CC CS CC CC      Assessment:     See d/c summary below.    Patient Education/Response:     See d/c summary below.    Plans and Goals:     REHAB SERVICES OUTPATIENT DISCHARGE SUMMARY  Physical Therapy    Name:  Chelly Ortiz  Date:  3/28/18  Date of Evaluation:  " 2/7/18  Physician:  Gabriel Wilson MD  Total # Of Visits:  14  Diagnosis:    1. Chronic neck pain     2. Impaired mobility and ADLs     3. Decreased strength of upper extremity       Physical/Functional Status:  Since beginning PT, pt has been seen 14 times since initial evaluation on 2/7/18. Overall, she has made good, steady progress with her PT treatments and has worked hard towards all of her PT goals as evidenced by subjective and objective improvements. Since attending PT she has reached most of her PT goals. She will be discharged with an updated HEP and was instructed to contact us with any other questions or concerns. Pt agreeable to d/c.    Posture: Sitting: forward head  Palpation: TTP to B UT   Range of Motion/Strength:       Cervical AROM: Pain/Dysfunction with Movement:   Flexion 50 deg no pain   Extension 45 deg no pain   Right side bending 40 deg no pain   Left side bending 40 deg no pain   Right rotation 55 deg no pain   Left rotation 65 deg no pain       Shoulder   Right     Left   Pain/Dysfunction with Movement     AROM PROM MMT AROM PROM MMT     flexion 165 180 4-/5 WNL NT 4/5 No pain   abduction 160 180 4/5 WFL WNL 4/5 No pain   IR WNL at 90 abd  NT 4+/5 WNL at 90 abd NT  4+/5 No pain   ER WNL at 90 abd NT 4/5 72 at 90 abd 85 at 90 abd 4+/5 No pain     Bed Mobility:Independent  Other: Chin tuck + lift (test for deep neck flexor strength): able to perform for 30 sec, good + strength    Functional Limitation Reports: G codes  Tool: FOTO Neck SURVEY  Category: Changing and maintaining body position ()  Limitation: 31%    The patient is to be discharged from our Therapy service for the following reason(s):  Patient is now asymptomatic and Patient has reached the maximum rehab potential for the present time    Degree of Goal Achievement:  Patient has partially met goals  Short Term Goals (4 Weeks): 3/7/18  1. Pt will be independent with HEP to supplement PT in improving pain free  "cervical mobility MET  2. Pt will improve cervical AROM 10 deg in all planes to improve cervical mobility. PARTIALLY MET  3. Pt will improve UE MMTs by 1/2 grade in all planes to improve strength for functional tasks.  4. Pt will demonstrate improved sitting posture to decrease pain experienced in head and neck. MET  5. Pt will perform chin tuck + lift for 15" to improve DNF strength. MET  Long Term Goals (8 Weeks): 4/7/18  1. Pt will improve FOTO to </=42% limitation to improve perceived limitation with changing and maintaining mobility. MET  2. Pt will improve cervical AROM to WFL in all planes to improve cervical mobility. PARTIALLY MET  3. Pt will improve UE MMTs 1 grade in all planes to improve strength for functional tasks. PARTIALLY MET  4. Pt will report no pain with cervical flexion to promote functional QOL. MET  5. Pt will perform chin tuck + lift for 30" to improve DNF strength. MET    Patient Education:  Pt given updated HEP including UT, LV, and corner stretches; lats, rows, chin tucks, chin tucks + lift, breuggers, and wall angels to be performed 1x/day. Pt verbalized understanding    Discharge Plan:  Home Program:  See above    "

## 2018-03-29 ENCOUNTER — OFFICE VISIT (OUTPATIENT)
Dept: FAMILY MEDICINE | Facility: CLINIC | Age: 68
End: 2018-03-29
Payer: MEDICARE

## 2018-03-29 VITALS
HEART RATE: 78 BPM | HEIGHT: 63 IN | WEIGHT: 194 LBS | DIASTOLIC BLOOD PRESSURE: 78 MMHG | SYSTOLIC BLOOD PRESSURE: 140 MMHG | BODY MASS INDEX: 34.38 KG/M2

## 2018-03-29 DIAGNOSIS — F32.A DEPRESSION, UNSPECIFIED DEPRESSION TYPE: ICD-10-CM

## 2018-03-29 DIAGNOSIS — E11.65 TYPE 2 DIABETES MELLITUS WITH HYPERGLYCEMIA, WITHOUT LONG-TERM CURRENT USE OF INSULIN: Primary | ICD-10-CM

## 2018-03-29 DIAGNOSIS — R00.2 HEART PALPITATIONS: ICD-10-CM

## 2018-03-29 PROCEDURE — 3078F DIAST BP <80 MM HG: CPT | Mod: CPTII,S$GLB,, | Performed by: FAMILY MEDICINE

## 2018-03-29 PROCEDURE — 3077F SYST BP >= 140 MM HG: CPT | Mod: CPTII,S$GLB,, | Performed by: FAMILY MEDICINE

## 2018-03-29 PROCEDURE — 99499 UNLISTED E&M SERVICE: CPT | Mod: S$GLB,,, | Performed by: FAMILY MEDICINE

## 2018-03-29 PROCEDURE — 3045F PR MOST RECENT HEMOGLOBIN A1C LEVEL 7.0-9.0%: CPT | Mod: CPTII,S$GLB,, | Performed by: FAMILY MEDICINE

## 2018-03-29 PROCEDURE — 99999 PR PBB SHADOW E&M-EST. PATIENT-LVL III: CPT | Mod: PBBFAC,,, | Performed by: FAMILY MEDICINE

## 2018-03-29 PROCEDURE — 99214 OFFICE O/P EST MOD 30 MIN: CPT | Mod: S$GLB,,, | Performed by: FAMILY MEDICINE

## 2018-03-29 RX ORDER — METFORMIN HYDROCHLORIDE 500 MG/1
500 TABLET ORAL 2 TIMES DAILY WITH MEALS
Qty: 180 TABLET | Refills: 0 | Status: SHIPPED | OUTPATIENT
Start: 2018-03-29 | End: 2018-04-14

## 2018-03-29 RX ORDER — CITALOPRAM 10 MG/1
10 TABLET ORAL DAILY
Qty: 90 TABLET | Refills: 3 | Status: SHIPPED | OUTPATIENT
Start: 2018-03-29 | End: 2019-08-01 | Stop reason: SDUPTHER

## 2018-03-29 NOTE — PROGRESS NOTES
Subjective:       Patient ID: Chelly Ortiz is a 67 y.o. female.    Chief Complaint: Diabetes and Follow-up    67 years old female came to the clinic for diabetes check.  Last A1c was elevated.  No polyuria polydipsia or polyphagia.  Patient reports sometimes palpitations especially during the night.  No chest pain orthopnea or PND.  Blood pressure today stable.  Patient requests refill of her medicine for depression.  No suicidal or homicidal ideations.      Diabetes   Pertinent negatives for diabetes include no chest pain, no polydipsia, no polyphagia and no polyuria.     Review of Systems   Constitutional: Negative.    HENT: Negative.    Eyes: Negative.    Respiratory: Negative.    Cardiovascular: Positive for palpitations. Negative for chest pain and leg swelling.   Gastrointestinal: Negative.    Endocrine: Negative for polydipsia, polyphagia and polyuria.   Genitourinary: Negative.    Musculoskeletal: Negative.    Skin: Negative.    Neurological: Negative.    Psychiatric/Behavioral: Negative.        Objective:      Physical Exam   Constitutional: She is oriented to person, place, and time. She appears well-developed and well-nourished. No distress.   HENT:   Head: Normocephalic and atraumatic.   Right Ear: External ear normal.   Left Ear: External ear normal.   Nose: Nose normal.   Mouth/Throat: Oropharynx is clear and moist. No oropharyngeal exudate.   Eyes: Conjunctivae and EOM are normal. Pupils are equal, round, and reactive to light. Right eye exhibits no discharge. Left eye exhibits no discharge. No scleral icterus.   Neck: Normal range of motion. Neck supple. No JVD present. No tracheal deviation present. No thyromegaly present.   Cardiovascular: Normal rate, regular rhythm, normal heart sounds and intact distal pulses.  Exam reveals no gallop and no friction rub.    No murmur heard.  Pulmonary/Chest: Effort normal and breath sounds normal. No stridor. No respiratory distress. She has no wheezes. She  has no rales. She exhibits no tenderness.   Abdominal: Soft. Bowel sounds are normal. She exhibits no distension and no mass. There is no tenderness. There is no rebound and no guarding.   Musculoskeletal: Normal range of motion. She exhibits no edema or tenderness.   Lymphadenopathy:     She has no cervical adenopathy.   Neurological: She is alert and oriented to person, place, and time. She has normal reflexes. No cranial nerve deficit. She exhibits normal muscle tone. Coordination normal.   Skin: Skin is warm and dry. No rash noted. She is not diaphoretic. No erythema. No pallor.   Psychiatric: Her behavior is normal. Judgment and thought content normal. Her mood appears anxious. Her affect is not angry, not blunt, not labile and not inappropriate. She exhibits a depressed mood.       Assessment:       1. Type 2 diabetes mellitus with hyperglycemia, without long-term current use of insulin    2. Heart palpitations    3. Depression, unspecified depression type        Plan:         Chelly was seen today for diabetes and follow-up.    Diagnoses and all orders for this visit:    Type 2 diabetes mellitus with hyperglycemia, without long-term current use of insulin  -     metFORMIN (GLUCOPHAGE) 500 MG tablet; Take 1 tablet (500 mg total) by mouth 2 (two) times daily with meals.    Heart palpitations  -     Holter monitor - 48 hour; Future    Depression, unspecified depression type  -     citalopram (CELEXA) 10 MG tablet; Take 1 tablet (10 mg total) by mouth once daily.    Continue monitoring blood sugar at home,ADA diet.

## 2018-03-29 NOTE — PATIENT INSTRUCTIONS
Diabetes: Activity Tips    Being more active can help you manage your diabetes. The tips on this sheet can help you get the most from your exercise. They can also help you stay safe.  Staying Active  Its important for adults to spend less time sitting and being inactive. This is especially true if you have type 2 diabetes. When you are sitting for long periods of time, get up for short sessions of light activity every 30 minutes.  You should aim for at least 150 minutes a week of exercise or physical activity. Dont let more than 2 days go by without being active.  Benefit from briskness  Brisk activity gets your heart beating faster. This can help you increase your fitness, lose extra weight, and manage your blood sugar level. Try brisk walking. Or, if you have foot or leg problems, you can try swimming or bike riding. You can break up your exercise into chunks throughout the day. Work up to at least 30 minutes of steady, brisk exercise on most days.  Warm up and cool down  Warming up and cooling down reduce your risk of injury. They also help limit muscle soreness. Do a mild version of your activity for 5 minutes before and after your routine. You can also learn stretches that will help keep your muscles loose. Your healthcare provider may show you good ways to warm up and stretch.  Do the talk-sing test  The talk-sing test is a simple way to tell how hard youre exercising. If you can talk while exercising, youre in a safe range. If youre out of breath, slow down. If you can carry a tune, its time to  the pace. Walk up a hill. Increase the resistance on your stationary bike. Or swim faster.  What about eating?  You may be told to plan your exercise for 1 to 2 hours after a meal. In most cases, you dont need to eat while being active. If you take insulin or medicine that can cause low blood sugar, test your blood sugar before exercising. And carry a fast-acting sugar that will raise your blood sugar  level quickly. This includes glucose tablets or hard candy. Use it if you feel low blood sugar symptoms.  Safety tips  These tips can help you stay safe as you become fit:  · Exercise with a friend or carry a cell phone if you have one.  · Carry or wear identification, such as a necklace or bracelet, that says you have diabetes.  · Use the proper footwear and safety equipment for your activity.  · Drink water before, during, and after exercise.  · Dress properly for the weather.  · Dont exercise in very hot or very cold weather.  · Dont exercise if you are sick.  · If you are instructed to do so, test your blood sugar before and after you exercise. Have a small carbohydrate snack if your blood sugar is low before you start exercising.   When to stop exercising and call your healthcare provider  Stop exercising and call your healthcare provider right away if you notice any of the following:  · Pain, pressure, tightness, or heaviness in the chest  · Pain or heaviness in the neck, shoulders, back, arms, legs, or feet  · Unusual shortness of breath  · Dizziness or lightheadedness  · Unusually rapid or slow pulse  · Increased joint or muscle pain  · Nausea or vomiting  Date Last Reviewed: 5/1/2016  © 0115-4732 [employer]. [address] [place] [address], [place] [address]. All rights reserved. This information is not intended as a substitute for professional medical care. Always follow your healthcare professional's instructions.         Chronic tonsillitis  09/01/2017    Active  Philip Germain

## 2018-04-05 ENCOUNTER — PATIENT MESSAGE (OUTPATIENT)
Dept: FAMILY MEDICINE | Facility: CLINIC | Age: 68
End: 2018-04-05

## 2018-04-09 ENCOUNTER — OFFICE VISIT (OUTPATIENT)
Dept: PAIN MEDICINE | Facility: CLINIC | Age: 68
End: 2018-04-09
Payer: MEDICARE

## 2018-04-09 VITALS
DIASTOLIC BLOOD PRESSURE: 74 MMHG | SYSTOLIC BLOOD PRESSURE: 128 MMHG | HEART RATE: 84 BPM | WEIGHT: 195.75 LBS | BODY MASS INDEX: 34.68 KG/M2

## 2018-04-09 DIAGNOSIS — M47.812 SPONDYLOSIS OF CERVICAL REGION WITHOUT MYELOPATHY OR RADICULOPATHY: ICD-10-CM

## 2018-04-09 DIAGNOSIS — F41.9 ANXIETY: ICD-10-CM

## 2018-04-09 DIAGNOSIS — M79.18 CERVICAL MYOFASCIAL PAIN SYNDROME: Primary | ICD-10-CM

## 2018-04-09 PROCEDURE — 99999 PR PBB SHADOW E&M-EST. PATIENT-LVL III: CPT | Mod: PBBFAC,,, | Performed by: PAIN MEDICINE

## 2018-04-09 PROCEDURE — 3078F DIAST BP <80 MM HG: CPT | Mod: CPTII,S$GLB,, | Performed by: PAIN MEDICINE

## 2018-04-09 PROCEDURE — 99213 OFFICE O/P EST LOW 20 MIN: CPT | Mod: S$GLB,,, | Performed by: PAIN MEDICINE

## 2018-04-09 PROCEDURE — 3074F SYST BP LT 130 MM HG: CPT | Mod: CPTII,S$GLB,, | Performed by: PAIN MEDICINE

## 2018-04-09 NOTE — PROGRESS NOTES
[facility] Established Patient Evaluation    Referred by: Gabriel Wilson  Reason for referral: neck pain    CC:   Chief Complaint   Patient presents with    Neck Pain    Shoulder Pain     bilateral      Interval Update:   4/9/18 - Pt returns to complaining of MRI results, neck and bilateral shoulder pain.  Her day time pain improved with PT but she continues to complain of bilat neck and shoulder pain at night.  The results of the MRI were shared with her.    Background:  Chelly Ortiz is a 67 y.o. female who complains of neck and bilateral shoulder pain as characterized below.    Location: neck and bilateral shoulder  Severity: Currently: 2/10   Typical Range: 6/10     Exacerbation: 10/10   Onset: long-standing baseline pain exacerbated 3 months ago associated with reaching overhead to clean ceiling fans  Quality: Dull, achy, throbbing  Radiation: bilat shoulders  Axial/Extremity Percentage of Pain: 50/50  Exacerbating Factors: extension and flexion  Mitigating Factors: heat  Assoc: denies night fever/night sweats, urinary incontinence, bowel incontinence, significant weight loss, significant motor weakness and loss of sensations    Previous Therapies:  PT: Scheduled to start Feb 7, 2018  HEP:   TENS:  Injections: LYNN 3x per Ca Oconnell  Surgery: Denies  Medications:   - NSAIDS: Ibuprofen  - MSK Relaxants: Current  - TCAs:   - SNRIs:   - Topicals:   - Anticonvulsants: Current  - Opioids: No    Current Pain Medications:  1. Gabapentin 100 TID - she is taking it prn  2. Cyclobenzaprine - still hasn't started but considering  3. Ibuprofen - helps a little  4. Piroxicam 20 mg - hasn't started yet    Full Medication List:    Current Outpatient Prescriptions:     ACCU-CHEK TERI Misc, USE AS DIRECTED, Disp: 1 each, Rfl: 0    amLODIPine (NORVASC) 5 MG tablet, Take 1 tablet (5 mg total) by mouth once daily., Disp: 90 tablet, Rfl: 3    aspirin (ECOTRIN) 81 MG EC tablet, Take 81 mg by mouth once  daily., Disp: , Rfl:     blood sugar diagnostic Strp, 1 strip by Misc.(Non-Drug; Combo Route) route 2 (two) times daily., Disp: 100 strip, Rfl: 3    citalopram (CELEXA) 10 MG tablet, Take 1 tablet (10 mg total) by mouth once daily., Disp: 90 tablet, Rfl: 3    ergocalciferol (VITAMIN D2) 50,000 unit Cap, Take 1 capsule (50,000 Units total) by mouth every 7 days., Disp: 12 capsule, Rfl: 1    famotidine (PEPCID) 20 MG tablet, Take 1 tablet (20 mg total) by mouth every evening., Disp: 90 tablet, Rfl: 3    gabapentin (NEURONTIN) 100 MG capsule, Take 1 capsule (100 mg total) by mouth 3 (three) times daily., Disp: 90 capsule, Rfl: 2    glimepiride (AMARYL) 4 MG tablet, Take 1 tablet (4 mg total) by mouth daily with breakfast., Disp: 90 tablet, Rfl: 3    lancets (MICROLET LANCET) Misc, Inject 1 lancet into the skin once daily., Disp: 100 each, Rfl: 3    lidocaine HCL 2% (XYLOCAINE) 2 % jelly, Apply topically once daily., Disp: 30 mL, Rfl: 2    losartan (COZAAR) 50 MG tablet, Take 1 tablet (50 mg total) by mouth once daily., Disp: 90 tablet, Rfl: 3    magnesium oxide (MAG-OX) 400 mg tablet, Take 1 tablet (400 mg total) by mouth 2 (two) times daily., Disp: 180 tablet, Rfl: 3    metFORMIN (GLUCOPHAGE) 500 MG tablet, Take 1 tablet (500 mg total) by mouth 2 (two) times daily with meals., Disp: 180 tablet, Rfl: 0    piroxicam (FELDENE) 20 MG capsule, Take 1 capsule (20 mg total) by mouth once daily., Disp: 30 capsule, Rfl: 0    pravastatin (PRAVACHOL) 10 MG tablet, Take 1 tablet (10 mg total) by mouth once daily., Disp: 90 tablet, Rfl: 3    SITagliptin (JANUVIA) 100 MG Tab, Take 1 tablet (100 mg total) by mouth once daily., Disp: 90 tablet, Rfl: 3    traZODone (DESYREL) 50 MG tablet, Take 1 tablet (50 mg total) by mouth nightly as needed., Disp: 90 tablet, Rfl: 3    diazePAM (VALIUM) 5 MG tablet, Take 1 tablet (5 mg total) by mouth On call Procedure for Anxiety (For anxiety related to MRI)., Disp: 1 tablet, Rfl: 0      Review of Systems:  Review of Systems   Constitutional: Negative for chills and fever.   HENT: Negative for nosebleeds.    Eyes: Positive for blurred vision. Negative for pain.   Respiratory: Negative for hemoptysis.    Cardiovascular: Negative for chest pain.   Gastrointestinal: Negative for nausea and vomiting.   Genitourinary: Negative for dysuria.   Musculoskeletal: Positive for neck pain. Negative for falls.   Skin: Negative for rash.   Neurological: Negative for focal weakness.   Endo/Heme/Allergies: Bruises/bleeds easily.   Psychiatric/Behavioral: The patient is nervous/anxious.        Allergies:  Prednisone; Protonix [pantoprazole]; Ace inhibitors; Latex, natural rubber; and Other     Medical History:  Past Medical History:   Diagnosis Date    Allergy     Cataract     DDD (degenerative disc disease), lumbar     Diabetes mellitus     diet controlled    Diabetes mellitus, type 2     GERD (gastroesophageal reflux disease)     History of subconjunctival hemorrhage 11    left eye    Hyperlipidemia     Hypertension     IBS (irritable bowel syndrome)     Obesity     MYRIAM (obstructive sleep apnea)     on CPAP    Rotator cuff impingement syndrome     Seasonal allergic conjunctivitis         Surgical History:  Past Surgical History:   Procedure Laterality Date    CATARACT EXTRACTION W/  INTRAOCULAR LENS IMPLANT  10/3/13    OD (dr. gardner)     SECTION      x2    CHOLECYSTECTOMY      EYE SURGERY      HYSTERECTOMY      ovaries intact, due to DUB    TUBAL LIGATION          Social History:  Social History     Social History    Marital status:      Spouse name: N/A    Number of children: N/A    Years of education: N/A     Occupational History    Not on file.     Social History Main Topics    Smoking status: Former Smoker     Quit date: 2/15/1983    Smokeless tobacco: Never Used    Alcohol use No    Drug use: No    Sexual activity: Yes     Partners: Male     Other Topics  Concern    Not on file     Social History Narrative    No narrative on file       Physical Exam:  Vitals:    04/09/18 1312   BP: 128/74   Pulse: 84   Weight: 88.8 kg (195 lb 12.3 oz)   PainSc:   2     General    Nursing note and vitals reviewed.  Constitutional: She is oriented to person, place, and time. She appears well-developed and well-nourished. No distress.   HENT:   Head: Normocephalic and atraumatic.   Nose: Nose normal.   Eyes: Conjunctivae and EOM are normal. Pupils are equal, round, and reactive to light. Right eye exhibits no discharge. Left eye exhibits no discharge. No scleral icterus.   Neck: No JVD present.   Cardiovascular: Intact distal pulses.    Pulmonary/Chest: Effort normal. No respiratory distress.   Abdominal: She exhibits no distension.   Neurological: She is alert and oriented to person, place, and time. Coordination normal.   Psychiatric: She has a normal mood and affect. Her behavior is normal. Judgment and thought content normal.     General Musculoskeletal Exam   Gait: normal     Back (L-Spine & T-Spine) / Neck (C-Spine) Exam     Tenderness Posterior midline palpation reveals tenderness of the Upper C-Spine and Lower C-Spine. Right paramedian tenderness of the Lower C-Spine and Upper C-Spine. Left paramedian tenderness of the Upper C-Spine and Lower C-Spine.     Back (L-Spine & T-Spine) Range of Motion   Extension: abnormal   Flexion: abnormal     Neck (C-Spine) Range of Motion   Flexion:     Limited  Extension: Limited  Right Lateral Bend: abnormal  Left Lateral Bend: abnormal  Right Rotation: abnormal  Left Rotation: abnormal    Spinal Sensation   Right Side Sensation  C-Spine Level: normal   Left Side Sensation  C-Spine Level: normal    Neck (C-Spine) Tests   Spurling's Test   Left:  Negative  Right: negative    Other She has no scoliosis .      Muscle Strength   Right Upper Extremity   Biceps: 5/5/5   Deltoid:  5/5  Triceps:  5/5  Wrist Extension: 5/5/5   Wrist Flexion: 5/5/5    Finger Flexors:  5/5  Finger Extensors:  5/5  Left Upper Extremity  Biceps: 5/5/5   Deltoid:  5/5  Triceps:  5/5  Wrist Extension: 5/5/5   Wrist Flexion: 5/5/5   Finger Flexors:  5/5  Finger Extensors:  5/5  Right Lower Extremity   Hip Flexion: 5/5   Hip Extensors: 5/5  Quadriceps:  5/5   Hamstrin/5   Gastrocsoleus:  5/5/5  Left Lower Extremity   Hip Flexion: 5/5   Hip Extensors: 5/5  Quadriceps:  5/5   Hamstrin/5   Gastrocsoleus:  5/5/5    Reflexes     Left Side  Biceps:  2+    Right Side   Biceps:  2+      Imagin/3/18 MRI Cervical Spine Without Contrast    Narrative     Comparison: Cervical spine series 16    Technique: Multiplanar and multi-sequence MR images were obtained through the cervical spine without intravenous contrast.    Results: Patient artifact slightly degrades some sequences. The cervical vertebral bodies demonstrate adequate alignment.  The vertebral body heights are well-maintained.  No displaced fracture, dislocation or significant listhesis.  Probable small hemangioma at the inferior aspect of C7 vertebral body. No bone marrow infiltrative process.  No prevertebral soft tissue swelling or paraspinal hematoma.  The spinal cord is normal in morphology and signal.  The craniocervical junction is without significant abnormality.    Mild degenerative disc disease with slight loss of disc height and anterior marginal osteophytes with endplate changes at C5-6 level. Multilevel vertebral and facet arthrosis. Mild to moderate degenerative change at the atlantodental interval.    C2-3: Posterior disc osteophyte complex resulting in borderline acquired canal stenosis. No significant neuroforaminal narrowing.  C3-4: Mild left neuroforaminal narrowing. No significant spinal canal stenosis or right neuroforaminal narrowing.  C4-5: Minimal bilateral neuroforaminal narrowing. No significant spinal canal stenosis.  C5-6: Posterior disc osteophyte complex resulting in mild acquired canal  stenosis without associated cord abutment. Minimal bilateral neuroforaminal narrowing.  C6-7: No significant spinal canal stenosis or neuroforaminal narrowing.  C7-T1: No significant spinal canal stenosis or neuroforaminal narrowing.    The soft tissue structures incidentally surveyed demonstrate no significant abnormality.   Impression          1. No cervical spine acute abnormality identified.    2. Mild cervical spondylosis most prominent at C5-6, as above.      Electronically signed by: WILLIAMS FULTON MD, MD  Date: 02/03/18  Time: 15:51     Encounter     View Encounter          Signed by     Signed Credentials Date/Time  Phone Pager   WILLIAMS FULTON MD 2/03/2018 15:51 [phone]   Reviewed By     Gabriel Wilson MD on 2/5/2018 09:42   Exam Details     Performed Procedure Technologist Supporting Staff Performing Physician   MRI Cervical Spine Without Contrast RT [facility]        Appointment Date/Status Modality Department    2/3/2018     Completed [facility] MRI1 [facility]       Begin Exam End Exam Begin Exam Questionnaires End Exam Questionnaires   2/3/2018 10:35 AM 2/3/2018 11:08 AM MRI TECH NAVIGATOR QUESTIONS IMAGING END ALL      Reason For Exam   Priority: Routine   Dx: Neck pain of over 3 months duration [M54.2, G89.29 (ICD-10-CM)]   Order Report      Order Details         X-Ray Cervical Spine AP And Lateral  2 views: Alignment is normal.  Odontoid is intact as are the prevertebral soft tissues and posterior elements.  No fracture dislocation bone destruction seen.  There is mild DJD.   Impression      Mild DJD.      Electronically signed by: LETTY WHITE  Date: 12/27/16  Time: 12:26          X-Ray Shoulder Trauma 3 view Right  Comparison: 4/20/16.    Findings: Internal rotation, Y view and axillary views of the right shoulder demonstrate osteophyte formation and loss of joint space at the acromioclavicular joint.  Otherwise the osseous structures, soft tissues and joint spaces are  normal.  Specifically no fracture or dislocation.  The visualized right upper lobe is normal.   Impression      Acromioclavicular osteoarthritis otherwise normal exam  ______________________________________     Electronically signed by: ANGEL GARCIA MD  Date: 07/07/16  Time: 10:58          MRI Upper Extremity Joint Without Contraast Right  TECHNIQUE: MRI of right shoulder was performed on a 1.5T magnet utilizing the following sequences: Localizer; axial T2 FS; coronal T2 FS and PD FS; sagittal T1, T2 FS and PD FS.    COMPARISON: Right shoulder radiograph 4/20/16.    FINDINGS:    Rotator cuff: There is thickening and increased signal of the insertional fibers of the supraspinatus consistent with tendinosis with partial thickness undersurface tear.  There is small full-thickness component involving the anterior fibers measuring approximately 8 x 6 mm.  Mild infraspinatus tendinosis with undersurface fraying.  Teres minor tendon is intact.  There is mild tendinosis with interstitial tear involving the insertional fibers of the subscapularis.  Muscle bulk of the rotator cuff is within normal limits.     Labrum: Global degeneration of the glenoid labrum.      Biceps: Thickening and increased signal within the intra-articular portion of the long head biceps tendon consistent with tendinosis.    Bone: There is no fracture.  Bone marrow signal is unremarkable.    Acromioclavicular joint: Severe degenerative changes are seen at the acromioclavicular joint with osseous spurring, edema, and subchondral cystic change.    Cartilage: Articular cartilage of the glenohumeral joint is preserved.    Miscellaneous: Small joint effusion with increased fluid seen in the subscapularis recess.  There is increased fluid seen within the subacromial/subdeltoid bursa.   Impression       1.  Supraspinatus tendinosis with partial thickness articular surface tear and small full-thickness component involving the anterior fibers.    2.  Mild  subscapularis and infraspinatus tendinosis.    3.  Severe AC joint arthrosis.    4.  Long head biceps tendinosis.    5.  Joint effusion and moderate subacromial/subdeltoid bursitis.  ______________________________________     Electronically signed by resident: Hosea Costello MD  Date: 06/16/16  Time: 16:29     ______________________________________     Electronically signed by: MADISON WINTERS MD  Date: 06/17/16  Time: 08:39          Labs:  BMP  Lab Results   Component Value Date     01/15/2018    K 4.0 01/15/2018     01/15/2018    CO2 29 01/15/2018    BUN 13 01/15/2018    CREATININE 1.0 01/15/2018    CALCIUM 9.7 01/15/2018    ANIONGAP 8 01/15/2018    ESTGFRAFRICA >60.0 01/15/2018    EGFRNONAA 57.6 (A) 01/15/2018     Lab Results   Component Value Date    ALT 16 01/15/2018    AST 16 01/15/2018    GGT 30 10/27/2004    ALKPHOS 125 01/15/2018    BILITOT 0.4 01/15/2018       Assessment:  Problem List Items Addressed This Visit     Anxiety    Spondylosis of cervical region without myelopathy or radiculopathy    Cervical myofascial pain syndrome - Primary        Chelly is a 67-year-old female with chronic neck pain exacerbated by increased physical activity 3 months ago.  Her exam and imaging are consistent with facet arthropathy and myofascial dysfunction.  I recommended that we start with physical therapy to eliminate as much pain as possible related to myofascial dysfunction then move on to interventional procedures should a significant amount of pain persist.  I also recommended that she complete the MRI ordered by her primary care physician and start a trial of piroxicam.  She also states significant insomnia not related to her chronic neck pain and I have recommended that she try tizanidine 4-8 mg nightly as a muscle relaxant and sleep aid.    4/9/18 - she completed PT with improved pain after each session.  Pain remains most bothersome at night which is common for muscle issues.  She was encouraged to  perform neck stretching 3 times daily and especially at night to minimize pain at night.    Treatment Plan:   PT/OT/HEP: I strongly encouraged compliance with HEP recommendations from PT.  The patient understands that her issues is primarily muscle related.  Procedures: none at this time.  Consider MBB/RFA after completion of MRI and physical therapy.  Medications:    - continue gabapentin   -  trial tizanidine 4-8 mg QHS PRN in place of Flexeril   - I endorse a trial of piroxicam  Imaging: Reviewed with patient.    Follow Up: RTC prn    Aisha Perez Jr, MD  Interventional Pain Medicine / Anesthesiology    Disclaimer: This note was partly generated using dictation software which may occasionally result in transcription errors.

## 2018-04-14 ENCOUNTER — OFFICE VISIT (OUTPATIENT)
Dept: URGENT CARE | Facility: CLINIC | Age: 68
End: 2018-04-14
Payer: MEDICARE

## 2018-04-14 ENCOUNTER — NURSE TRIAGE (OUTPATIENT)
Dept: ADMINISTRATIVE | Facility: CLINIC | Age: 68
End: 2018-04-14

## 2018-04-14 VITALS
HEART RATE: 98 BPM | HEIGHT: 63 IN | BODY MASS INDEX: 34.55 KG/M2 | DIASTOLIC BLOOD PRESSURE: 76 MMHG | WEIGHT: 195 LBS | OXYGEN SATURATION: 95 % | TEMPERATURE: 98 F | SYSTOLIC BLOOD PRESSURE: 127 MMHG | RESPIRATION RATE: 18 BRPM

## 2018-04-14 DIAGNOSIS — K52.9 GASTROENTERITIS: Primary | ICD-10-CM

## 2018-04-14 PROCEDURE — 99214 OFFICE O/P EST MOD 30 MIN: CPT | Mod: S$GLB,,, | Performed by: PHYSICIAN ASSISTANT

## 2018-04-14 PROCEDURE — 3074F SYST BP LT 130 MM HG: CPT | Mod: CPTII,S$GLB,, | Performed by: PHYSICIAN ASSISTANT

## 2018-04-14 PROCEDURE — 3078F DIAST BP <80 MM HG: CPT | Mod: CPTII,S$GLB,, | Performed by: PHYSICIAN ASSISTANT

## 2018-04-14 PROCEDURE — S0119 ONDANSETRON 4 MG: HCPCS | Mod: S$GLB,,, | Performed by: EMERGENCY MEDICINE

## 2018-04-14 RX ORDER — HYOSCYAMINE SULFATE 0.125 MG
125 TABLET ORAL EVERY 6 HOURS PRN
Qty: 20 TABLET | Refills: 0 | Status: SHIPPED | OUTPATIENT
Start: 2018-04-14 | End: 2018-04-14 | Stop reason: SDUPTHER

## 2018-04-14 RX ORDER — ONDANSETRON 8 MG/1
8 TABLET, ORALLY DISINTEGRATING ORAL EVERY 8 HOURS PRN
Qty: 15 TABLET | Refills: 0 | Status: SHIPPED | OUTPATIENT
Start: 2018-04-14 | End: 2018-04-19

## 2018-04-14 RX ORDER — ONDANSETRON 8 MG/1
8 TABLET, ORALLY DISINTEGRATING ORAL EVERY 8 HOURS PRN
Qty: 15 TABLET | Refills: 0 | Status: SHIPPED | OUTPATIENT
Start: 2018-04-14 | End: 2018-04-14 | Stop reason: SDUPTHER

## 2018-04-14 RX ORDER — HYOSCYAMINE SULFATE 0.125 MG
125 TABLET ORAL EVERY 6 HOURS PRN
Qty: 20 TABLET | Refills: 0 | Status: SHIPPED | OUTPATIENT
Start: 2018-04-14 | End: 2019-02-11

## 2018-04-14 RX ORDER — ONDANSETRON 8 MG/1
8 TABLET, ORALLY DISINTEGRATING ORAL ONCE
Status: COMPLETED | OUTPATIENT
Start: 2018-04-14 | End: 2018-04-14

## 2018-04-14 RX ORDER — DIPHENOXYLATE HYDROCHLORIDE AND ATROPINE SULFATE 2.5; .025 MG/1; MG/1
1 TABLET ORAL 4 TIMES DAILY PRN
Qty: 20 TABLET | Refills: 0 | Status: SHIPPED | OUTPATIENT
Start: 2018-04-14 | End: 2018-04-19

## 2018-04-14 RX ADMIN — ONDANSETRON 8 MG: 8 TABLET, ORALLY DISINTEGRATING ORAL at 10:04

## 2018-04-14 NOTE — PATIENT INSTRUCTIONS
- Please return here or go to the Emergency Department for any concerns or worsening of condition.   - Please follow up with your primary care provider (PCP) or discussed specialist(s) as needed.             Food Poisoning or Viral Gastroenteritis (Adult)  You have a stomach illness that is likely either food poisoning or viral gastroenteritis.  Food poisoning is illness that is passed along in food and affects the stomach and intestinal tract. It usually occurs from 1 to 24 hours after eating food that has spoiled. When it happens within a few hours of eating, it is often caused by toxins from bacteria in food that has not been cooked or refrigerated properly.  Viral gastroenteritis is also an illness from a virus that affects the stomach and intestinal tract. Many people call it the stomach flu, but it has nothing to do with influenza. In fact, it can happen from food poisoning, but it can also happen when germs are passed from person-to-person or contaminated surface (toothbrush, cutting board, toilet) to a person.  Either illness can cause these symptoms:  · Abdominal pain and cramping  · Nausea  · Vomiting  · Diarrhea  · Fever and chills  · Loss of bowel control  The symptoms of food poisoning usually last 1 to 2 days. The symptoms of viral gastroenteritis can sometimes last up to 7 days but usually end sooner.  Antibiotics are not effective for either illness.  Other causes of gastroenteritis include bacteria and parasites which are not discussed here.  Home care  Follow all instructions given by your healthcare provider. Rest at home for the next 24 hours, or until you feel better. Avoid caffeine, tobacco, and alcohol. These can make diarrhea, cramping, and pain worse.  If taking medicines:  · Dont take over-the-counter diarrhea or nausea medicines unless your healthcare provider tells you to.  · You may use acetaminophen or NSAID medicines like ibuprofen or naproxen to reduce pain and fever. Dont use  these if you have chronic liver or kidney disease, or ever had a stomach ulcer or GI bleeding. Talk with your healthcare provider first. Don't use NSAID medicines if you are already taking one for another condition (like arthritis) or are on daily aspirin therapy (such as for heart disease or after a stroke).  To prevent the spread of illness:  · Remember that washing with soap and water is the best way to prevent the spread of infection. Wash your hands before and after caring for a sick person.  · Clean the toilet after each use.  · Wash your hands or use alcohol-based hand  before eating.  · Wash your hands or use alcohol-based hand  before and after preparing food. Keep in mind that people with diarrhea or vomiting should not prepare food for others.  · Wash your hands or use alcohol-based hand  after using cutting boards, counter-tops, and knives (and other utensils) that have been in contact with raw foods.  · Wash and then peel fruits and vegetables.  · Keep uncooked meats away from cooked and ready-to-eat foods.  · Use a food thermometer when cooking. Cook poultry to at least 165°F (74°C). Cook ground meat (beef, veal, pork, lamb) to at least 160°F (71°C). Cook fresh beef, veal, lamb, and pork to at least 145°F (63°C).  · Dont eat raw or undercooked eggs (poached or david side up), poultry, meat or unpasteurized milk and juices.  Food and drinks  The main goal while treating vomiting or diarrhea is to prevent dehydration. This is done by taking small amounts of liquids often.  · Keep in mind that liquids are more important than food right now.  · Drink only small amounts of liquids at a time.  · Dont force yourself to eat, especially if you are having cramping, vomiting, or diarrhea. Dont eat large amounts at a time, even if you are hungry.  · If you eat, avoid fatty, greasy, spicy, or fried foods.  · Dont eat dairy foods or drink milk if you have diarrhea. These can  make diarrhea worse.  The first 24 hours you can try:  · Oral rehydration solutions, available at grocery stores and pharmacies. Sports drinks are not a good choice if you are very dehydrated. They have too much sugar and not enough electrolytes.  · Soft drinks without caffeine  · Ginger ale  · Water (plain or flavored)  · Decaf tea or coffee  · Clear broth, consommé, or bouillon  · Gelatin, popsicles, or frozen fruit juice bars   The second 24 hours, if you are feeling better, you can add:  · Hot cereal, plain toast, bread, rolls, or crackers  · Plain noodles, rice, mashed potatoes, chicken noodle soup, or rice soup  · Unsweetened canned fruit (no pineapple)  · Bananas  As you recover:  · Limit fat intake to less than 15 grams per day. Dont eat margarine, butter, oils, mayonnaise, sauces, gravies, fried foods, peanut butter, meat, poultry, or fish.  · Limit fiber. Dont eat raw or cooked vegetables, fresh fruits except bananas, and bran cereals.  · Limit caffeine and chocolate.  · Dont use spices or seasonings except salt.  · Resume a normal diet over time, as you feel better and your symptoms improve.  · If the symptoms come back, go back to a simple diet or clear liquids.  Follow-up care  Follow up with your healthcare provider, or as advised. If a stool sample was taken or cultures were done, call the healthcare provider for the results as instructed.  Call 911  Call 911 if you have any of these symptoms:  · Trouble breathing  · Confusion  · Extreme drowsiness or trouble walking  · Loss of consciousness  · Rapid heart rate  · Chest pain  · Stiff neck  · Seizure  When to seek medical advice  Call your healthcare provider right away if any of these occur:  · Abdominal pain that gets worse  · Constant lower right abdominal pain  · Continued vomiting and inability to keep liquids down  · Diarrhea more than 5 times a day  · Blood in vomit or stool  · Dark urine or no urine for 8 hours, dry mouth and tongue,  tiredness, weakness, or dizziness  · New rash  · You dont get better in 2 to 3 days  · Fever of 100.4°F (38°C) or higher that doesnt get lower with medicine  · You have new symptoms of arthritis  Date Last Reviewed: 1/3/2016  © 0366-4279 [employer]. [address] [place] [address], [place] [address]. All rights reserved. This information is not intended as a substitute for professional medical care. Always follow your healthcare professional's instructions.

## 2018-04-14 NOTE — PROGRESS NOTES
"Subjective:       Patient ID: Chelly Ortiz is a 67 y.o. female.    Vitals:  height is 5' 3" (1.6 m) and weight is 88.5 kg (195 lb). Her temperature is 98.1 °F (36.7 °C). Her blood pressure is 127/76 and her pulse is 98. Her respiration is 18 and oxygen saturation is 95%.     Chief Complaint: Diarrhea    This is a 67 y.o. female with Past Medical History:  No date: Allergy  No date: Cataract  No date: DDD (degenerative disc disease), lumbar  No date: Diabetes mellitus      Comment: diet controlled  No date: Diabetes mellitus, type 2  No date: GERD (gastroesophageal reflux disease)  11: History of subconjunctival hemorrhage      Comment: left eye  No date: Hyperlipidemia  No date: Hypertension  No date: IBS (irritable bowel syndrome)  No date: Obesity  No date: MYRIAM (obstructive sleep apnea)      Comment: on CPAP  No date: Rotator cuff impingement syndrome  No date: Seasonal allergic conjunctivitis   Past Surgical History:  10/3/13: CATARACT EXTRACTION W/  INTRAOCULAR LENS IMPLA*      Comment: OD (dr. gardner)  No date:  SECTION      Comment: x2  No date: CHOLECYSTECTOMY  No date: EYE SURGERY  No date: HYSTERECTOMY      Comment: ovaries intact, due to DUB  No date: TUBAL LIGATION  who presents today with a chief complaint of diarrhea since during the night pt ate some shrimp and corn soup last night and pt may be lactose intol        Diarrhea    This is a new problem. The current episode started today. The problem occurs more than 10 times per day. The problem has been gradually worsening. The stool consistency is described as watery. The patient states that diarrhea awakens her from sleep. Associated symptoms include abdominal pain, bloating and increased flatus. Pertinent negatives include no chills, coughing, fever, headaches, myalgias or vomiting. The symptoms are aggravated by dairy products. Risk factors include suspect food intake. She has tried nothing for the symptoms. The treatment provided no " relief. There is no history of bowel resection or a recent abdominal surgery.     Review of Systems   Constitution: Positive for decreased appetite and malaise/fatigue. Negative for chills and fever.   HENT: Negative for congestion, ear pain and sore throat.    Eyes: Negative for blurred vision, pain and visual disturbance.   Cardiovascular: Negative for chest pain, irregular heartbeat, near-syncope, palpitations and syncope.   Respiratory: Negative for cough, shortness of breath, sputum production and wheezing.    Hematologic/Lymphatic: Negative for adenopathy.   Skin: Negative for itching and rash.   Musculoskeletal: Negative for back pain, joint pain, myalgias, neck pain and stiffness.   Gastrointestinal: Positive for bloating, abdominal pain, diarrhea (watery. No blood, pus, mucous, or rice water stools reported.), flatus and nausea. Negative for bowel incontinence, constipation, heartburn, hematemesis, hematochezia, hemorrhoids, melena and vomiting.   Genitourinary: Negative for dysuria.   Neurological: Negative for dizziness, headaches, light-headedness, numbness, paresthesias and vertigo.   Psychiatric/Behavioral: Negative for altered mental status. The patient is not nervous/anxious.    Allergic/Immunologic: Negative for hives.   All other systems reviewed and are negative.      Objective:      Physical Exam   Constitutional: She is oriented to person, place, and time. She appears well-developed and well-nourished.  Non-toxic appearance. She has a sickly appearance. She appears distressed.   HENT:   Head: Normocephalic and atraumatic.   Right Ear: External ear normal.   Left Ear: External ear normal.   Nose: Nose normal.   Mouth/Throat: Mucous membranes are normal.   Eyes: Conjunctivae and lids are normal.   Neck: Trachea normal and full passive range of motion without pain. Neck supple.   Cardiovascular: Normal rate, regular rhythm and normal heart sounds.    Pulmonary/Chest: Effort normal and breath sounds  normal. No respiratory distress.   Abdominal: Soft. Normal appearance. She exhibits no distension, no abdominal bruit, no pulsatile midline mass and no mass. Bowel sounds are increased. There is no hepatosplenomegaly, splenomegaly or hepatomegaly. There is generalized tenderness. There is no rigidity, no rebound, no guarding, no CVA tenderness, no tenderness at McBurney's point and negative Payne's sign. No hernia. Hernia confirmed negative in the ventral area.   Musculoskeletal: Normal range of motion. She exhibits no edema.   Neurological: She is alert and oriented to person, place, and time. She has normal strength.   Skin: Skin is warm, dry and intact. She is not diaphoretic. No pallor.   Psychiatric: She has a normal mood and affect. Her speech is normal and behavior is normal. Judgment and thought content normal. Cognition and memory are normal.   Nursing note and vitals reviewed.      Assessment:       1. Gastroenteritis        Plan:         Gastroenteritis  -     diphenoxylate-atropine 2.5-0.025 mg (LOMOTIL) 2.5-0.025 mg per tablet; Take 1 tablet by mouth 4 (four) times daily as needed for Diarrhea.  Dispense: 20 tablet; Refill: 0  -     ondansetron disintegrating tablet 8 mg; Take 1 tablet (8 mg total) by mouth once.  -     ondansetron (ZOFRAN-ODT) 8 MG TbDL; Take 1 tablet (8 mg total) by mouth every 8 (eight) hours as needed (nausea or vomiting).  Dispense: 15 tablet; Refill: 0  -     hyoscyamine (ANASPAZ,LEVSIN) 0.125 mg Tab; Take 1 tablet (125 mcg total) by mouth every 6 (six) hours as needed (abdominal cramps/pain).  Dispense: 20 tablet; Refill: 0    - Please return here or go to the Emergency Department for any concerns or worsening of condition.   - Please follow up with your primary care provider (PCP) or discussed specialist(s) as needed.

## 2018-04-14 NOTE — TELEPHONE ENCOUNTER
"No available appt pt will go to walk in near home    Reason for Disposition   [1] SEVERE diarrhea (e.g., 7 or more times / day more than normal) AND [2]  age > 60 years    Answer Assessment - Initial Assessment Questions  1. DIARRHEA SEVERITY: "How bad is the diarrhea?" "How many extra stools have you had in the past 24 hours than normal?"     - MILD: Few loose or mushy BMs; increase of 1-3 stools over normal daily number of stools; mild increase in ostomy output.    - MODERATE: Increase of 4-6 stools daily over normal; moderate increase in ostomy output.    - SEVERE (or Worst Possible): Increase of 7 or more stools daily over normal; moderate increase in ostomy output; incontinence.      More than seven  2. ONSET: "When did the diarrhea begin?"       10 pm  3. BM CONSISTENCY: "How loose or watery is the diarrhea?"       watery  4. VOMITING: "Are you also vomiting?" If so, ask: "How many times in the past 24 hours?"       no  5. ABDOMINAL PAIN: "Are you having any abdominal pain?" If yes: "What does it feel like?" (e.g., crampy, dull, intermittent, constant)       With diarrhea only  6. ABDOMINAL PAIN SEVERITY: If present, ask: "How bad is the pain?"  (e.g., Scale 1-10; mild, moderate, or severe)     - MILD (1-3): doesn't interfere with normal activities, abdomen soft and not tender to touch      - MODERATE (4-7): interferes with normal activities or awakens from sleep, tender to touch      - SEVERE (8-10): excruciating pain, doubled over, unable to do any normal activities        Related to diarrhea stools coming on  7. ORAL INTAKE: If vomiting, "Have you been able to drink liquids?" "How much fluids have you had in the past 24 hours?"      yes  8. HYDRATION: "Any signs of dehydration?" (e.g., dry mouth [not just dry lips], too weak to stand, dizziness, new weight loss) "When did you last urinate?"      Just not  9. EXPOSURE: "Have you traveled to a foreign country recently?" "Have you been exposed to anyone with " "diarrhea?" "Could you have eaten any food that was spoiled?"      May have eaten something spoiled corn and crab soup  10. OTHER SYMPTOMS: "Do you have any other symptoms?" (e.g., fever, blood in stool)        denies  11. PREGNANCY: "Is there any chance you are pregnant?" "When was your last menstrual period?"        no    Protocols used: ST DIARRHEA-A-      "

## 2018-04-17 ENCOUNTER — HOSPITAL ENCOUNTER (OUTPATIENT)
Dept: CARDIOLOGY | Facility: HOSPITAL | Age: 68
Discharge: HOME | End: 2018-04-17
Attending: FAMILY MEDICINE
Payer: MEDICARE

## 2018-04-17 DIAGNOSIS — R00.2 HEART PALPITATIONS: ICD-10-CM

## 2018-04-17 PROCEDURE — 93227 XTRNL ECG REC<48 HR R&I: CPT | Mod: ,,, | Performed by: INTERNAL MEDICINE

## 2018-04-17 PROCEDURE — 93225 XTRNL ECG REC<48 HRS REC: CPT

## 2018-04-26 ENCOUNTER — HOSPITAL ENCOUNTER (OUTPATIENT)
Dept: RADIOLOGY | Facility: HOSPITAL | Age: 68
Discharge: HOME | End: 2018-04-26
Attending: FAMILY MEDICINE
Payer: MEDICARE

## 2018-04-26 ENCOUNTER — OFFICE VISIT (OUTPATIENT)
Dept: FAMILY MEDICINE | Facility: CLINIC | Age: 68
End: 2018-04-26
Payer: MEDICARE

## 2018-04-26 VITALS
BODY MASS INDEX: 34.68 KG/M2 | HEART RATE: 77 BPM | HEIGHT: 63 IN | TEMPERATURE: 98 F | SYSTOLIC BLOOD PRESSURE: 110 MMHG | DIASTOLIC BLOOD PRESSURE: 70 MMHG | OXYGEN SATURATION: 96 % | WEIGHT: 195.75 LBS

## 2018-04-26 DIAGNOSIS — M54.50 ACUTE LEFT-SIDED LOW BACK PAIN WITHOUT SCIATICA: Primary | ICD-10-CM

## 2018-04-26 DIAGNOSIS — M54.50 ACUTE LEFT-SIDED LOW BACK PAIN WITHOUT SCIATICA: ICD-10-CM

## 2018-04-26 DIAGNOSIS — N18.30 CKD (CHRONIC KIDNEY DISEASE) STAGE 3, GFR 30-59 ML/MIN: ICD-10-CM

## 2018-04-26 PROCEDURE — 99999 PR PBB SHADOW E&M-EST. PATIENT-LVL IV: CPT | Mod: PBBFAC,,, | Performed by: FAMILY MEDICINE

## 2018-04-26 PROCEDURE — 3078F DIAST BP <80 MM HG: CPT | Mod: CPTII,S$GLB,, | Performed by: FAMILY MEDICINE

## 2018-04-26 PROCEDURE — 99499 UNLISTED E&M SERVICE: CPT | Mod: S$GLB,,, | Performed by: FAMILY MEDICINE

## 2018-04-26 PROCEDURE — 72114 X-RAY EXAM L-S SPINE BENDING: CPT | Mod: TC,FY

## 2018-04-26 PROCEDURE — 72114 X-RAY EXAM L-S SPINE BENDING: CPT | Mod: 26,,, | Performed by: RADIOLOGY

## 2018-04-26 PROCEDURE — 99214 OFFICE O/P EST MOD 30 MIN: CPT | Mod: S$GLB,,, | Performed by: FAMILY MEDICINE

## 2018-04-26 PROCEDURE — 3074F SYST BP LT 130 MM HG: CPT | Mod: CPTII,S$GLB,, | Performed by: FAMILY MEDICINE

## 2018-04-26 RX ORDER — IBUPROFEN 600 MG/1
600 TABLET ORAL
Qty: 21 TABLET | Refills: 0 | Status: SHIPPED | OUTPATIENT
Start: 2018-04-26 | End: 2018-05-03

## 2018-04-26 RX ORDER — TIZANIDINE 4 MG/1
4 TABLET ORAL NIGHTLY PRN
Qty: 30 TABLET | Refills: 0 | Status: SHIPPED | OUTPATIENT
Start: 2018-04-26 | End: 2018-05-06

## 2018-04-26 NOTE — PROGRESS NOTES
Subjective:       Patient ID: Chelly Ortiz is a 67 y.o. female.    Chief Complaint: Abdominal Pain (left side)    Chelly is a 67 y.o. female who presents today for an urgent care visit for flank and back pain. This all seemed to start about two weeks ago. This seemed to start after a bout of food poisoning that lasted for about 1 week that consisted of diarrhea only. Walking, sitting makes it worse. She has tried gabapentin and ibuprofen and this isn't helping. She expressly denies any issues with urination such as urinary frequency or dysuria.       Back Pain   This is a new problem. The current episode started 1 to 4 weeks ago. The problem occurs daily. The problem has been gradually worsening since onset. The pain is present in the lumbar spine, costovertebral angle and thoracic spine. The pain does not radiate. The pain is moderate. The pain is worse during the day. Exacerbated by: walking, sitting. Pertinent negatives include no abdominal pain, bladder incontinence, bowel incontinence, chest pain, dysuria, fever, headaches, leg pain, numbness, pelvic pain, perianal numbness, tingling, weakness or weight loss. Risk factors include obesity. She has tried NSAIDs for the symptoms. The treatment provided no relief.     Review of Systems   Constitutional: Negative for fever and weight loss.   Cardiovascular: Negative for chest pain.   Gastrointestinal: Negative for abdominal pain and bowel incontinence.   Genitourinary: Negative for bladder incontinence, dysuria and pelvic pain.   Musculoskeletal: Positive for back pain.   Neurological: Negative for tingling, weakness, numbness and headaches.         Results for orders placed or performed in visit on 01/15/18   Comprehensive metabolic panel   Result Value Ref Range    Creatinine 1.0 0.5 - 1.4 mg/dL    eGFR if African American >60.0 >60 mL/min/1.73 m^2    eGFR if non  57.6 (A) >60 mL/min/1.73 m^2   Hemoglobin A1c   Result Value Ref Range    Hemoglobin  A1C 8.6 (H) 4.0 - 5.6 %    Estimated Avg Glucose 200 (H) 68 - 131 mg/dL       Objective:     Vitals:    04/26/18 1040   BP: 110/70   Pulse: 77   Temp: 98.3 °F (36.8 °C)        Physical Exam   Constitutional: She is oriented to person, place, and time. She appears well-developed and well-nourished.   HENT:   Head: Normocephalic and atraumatic.   Nose: Nose normal.   Mouth/Throat: Oropharynx is clear and moist.   Eyes: Conjunctivae and EOM are normal. Pupils are equal, round, and reactive to light.   Neck: Normal range of motion. Neck supple.   Cardiovascular: Normal rate and regular rhythm.    Pulmonary/Chest: Effort normal and breath sounds normal.   Abdominal: Soft. She exhibits no distension. There is no tenderness.   Musculoskeletal: She exhibits no edema.        Lumbar back: She exhibits decreased range of motion, tenderness and spasm. She exhibits no bony tenderness.        Back:    Neurological: She is alert and oriented to person, place, and time. No cranial nerve deficit or sensory deficit. She exhibits normal muscle tone.   Finger to nose intact  Heel to shin intact  Strength and sensation grossly intract BL UE/LE  CN 2-12 grossly intact  PERRLA  Rapid alternating movement intact    Patellar reflex: diminished on the left (chronic per patient)     Psychiatric: She has a normal mood and affect. Her behavior is normal. Judgment and thought content normal.   Nursing note and vitals reviewed.      Assessment:       1. Acute left-sided low back pain without sciatica    2. CKD (chronic kidney disease) stage 3, GFR 30-59 ml/min        Plan:           Ibuprofen TID WM x 1 week total then tylenol given history of CKD  Tylenol 500 mg up to TID also for pain  PT  Muscle relaxer, can cut in 1/2. This will make you sleepy. Please take precautions prior to driving until you know how this will affect you.  Imaging ordered    Orders as bleow    Acute left-sided low back pain without sciatica  -     tiZANidine (ZANAFLEX) 4  MG tablet; Take 1 tablet (4 mg total) by mouth nightly as needed.  Dispense: 30 tablet; Refill: 0  -     ibuprofen (ADVIL,MOTRIN) 600 MG tablet; Take 1 tablet (600 mg total) by mouth 3 (three) times daily with meals.  Dispense: 21 tablet; Refill: 0  -     Ambulatory Referral to Physical/Occupational Therapy  -     X-Ray Lumbar Complete With Flex And Ext; Future; Expected date: 04/26/2018    CKD (chronic kidney disease) stage 3, GFR 30-59 ml/min      Warning signs discussed, patient to call with any further issues or worsening of symptoms.

## 2018-04-26 NOTE — PATIENT INSTRUCTIONS
Ibuprofen TID WM  PT  Tylenol 500 mg up to TID also for pain  Muscle relaxer, can cut in 1/2. This will make you sleepy. Please take precautions prior to driving until you know how this will affect you.     Exercises to Strengthen Your Lower Back  Strong lower back and abdominal muscles work together to support your spine. The exercises below will help strengthen the lower back. It is important that you begin exercising slowly and increase levels gradually.  Always begin any exercise program with stretching. If you feel pain while doing any of these exercises, stop and talk to your doctor about a more specific exercise program that better suits your condition.   Low back stretch  The point of stretching is to make you more flexible and increase your range of motion. Stretch only as much as you are able. Stretch slowly. Do not push your stretch to the limit. If at any point you feel pain while stretching, this is your (temporary) limit.  · Lie on your back with your knees bent and both feet on the ground.  · Slowly raise your left knee to your chest as you flatten your lower back against the floor. Hold for 5 seconds.  · Relax and repeat the exercise with your right knee.  · Do 10 of these exercises for each leg.  · Repeat hugging both knees to your chest at the same time.  Building lower back strength  Start your exercise routine with 10 to 30 minutes a day, 1 to 3 times a day.  Initial exercises  Lying on your back:  1. Ankle pumps: Move your foot up and down, towards your head, and then away. Repeat 10 times with each foot.  2. Heel slides: Slowly bend your knee, drawing the heel of your foot towards you. Then slide your heel/foot from you, straightening your knee. Do not lift your foot off the floor (this is not a leg lift).  3. Abdominal contraction: Bend your knees and put your hands on your stomach. Tighten your stomach muscles. Hold for 5 seconds, then relax. Repeat 10 times.  4. Straight leg raise: Bend  one leg at the knee and keep the other leg straight. Tighten your stomach muscles. Slowly lift your straight leg 6 to 12 inches off the floor and hold for up to 5 seconds. Repeat 10 times on each side.  Standin. Wall squats: Stand with your back against the wall. Move your feet about 12 inches away from the wall. Tighten your stomach muscles, and slowly bend your knees until they are at about a 45 degree angle. Do not go down too far. Hold about 5 seconds. Then slowly return to your starting position. Repeat 10 times.  2. Heel raises: Stand facing the wall. Slowly raise the heels of your feet up and down, while keeping your toes on the floor. If you have trouble balancing, you can touch the wall with your hands. Repeat 10 times.  More advanced exercises  When you feel comfortable enough, try these exercises.  1. Kneeling lumbar extension: Begin on your hands and knees. At the same time, raise and straighten your right arm and left leg until they are parallel to the ground. Hold for 2 seconds and come back slowly to a starting position. Repeat with left arm and right leg, alternating 10 times.  2. Prone lumbar extension: Lie face down, arms extended overhead, palms on the floor. At the same time, raise your right arm and left leg as high as comfortably possible. Hold for 10 seconds and slowly return to start. Repeat with left arm and right leg, alternating 10 times. Gradually build up to 20 times. (Advanced: Repeat this exercise raising both arms and both legs a few inches off the floor at the same time. Hold for 5 seconds and release.)  3. Pelvic tilt: Lie on the floor on your back with your knees bent at 90 degrees. Your feet should be flat on the floor. Inhale, exhale, then slowly contract your abdominal muscles bringing your navel toward your spine. Let your pelvis rock back until your lower back is flat on the floor. Hold for 10 seconds while breathing smoothly.  4. Abdominal crunch: Perform a pelvic tilt  (above) flattening your lower back against the floor. Holding the tension in your abdominal muscles, take another breath and raise your shoulder blades off the ground (this is not a full sit-up). Keep your head in line with your body (dont bend your neck forward). Hold for 2 seconds, then slowly lower.  Date Last Reviewed: 6/1/2016  © 4723-8307 [employer]. [address] [place] [address], [place] [address]. All rights reserved. This information is not intended as a substitute for professional medical care. Always follow your healthcare professional's instructions.        Self-Care for Low Back Pain    Most people have low back pain now and then. In many cases, it isnt serious and self-care can help. Sometimes low back pain can be a sign of a bigger problem. Call your healthcare provider if your pain returns often or gets worse over time. For the long-term care of your back, get regular exercise, lose any excess weight and learn good posture.  Take a short rest  Lying down during the day may be beneficial for short periods of time if severe pain increases with sitting or standing. Long-term bed rest could be detrimental.  Reduce pain and swelling  Cold reduces swelling. Both cold and heat can reduce pain. Protect your skin by placing a towel between your body and the ice or heat source.  · For the first few days, apply an ice pack for 15 to 20 minutes .  · After the first few days, try heat for 15 minutes at a time to ease pain. Never sleep on a heating pad.  · Over-the-counter medicine can help control pain and swelling. Try aspirin or ibuprofen.  Exercise  Exercise can help your back heal. It also helps your back get stronger and more flexible, preventing any reinjury. Ask your healthcare provider about specific exercises for your back.  Use good posture to avoid reinjury  · When moving, bend at the hips and knees. Dont bend at the waist or twist around.  · When lifting, keep the object close to your body.  Dont try to lift more than you can handle.  · When sitting, keep your lower back supported. Use a rolled-up towel as needed.  Seek immediate medical care if:  · Youre unable to stand or walk.  · You have a temperature over 100.4°F (38.0°C)  · You have frequent, painful, or bloody urination.  · You have severe abdominal pain.  · You have a sharp, stabbing pain.  · Your pain is constant.  · You have pain or numbness in your leg.  · You feel pain in a new area of your back.  · You notice that the pain isnt decreasing after more than a week.   Date Last Reviewed: 9/29/2015  © 1157-1388 [employer]. [address] [place] [address], [place] [address]. All rights reserved. This information is not intended as a substitute for professional medical care. Always follow your healthcare professional's instructions.

## 2018-05-25 ENCOUNTER — TELEPHONE (OUTPATIENT)
Dept: PAIN MEDICINE | Facility: CLINIC | Age: 68
End: 2018-05-25

## 2018-05-25 NOTE — TELEPHONE ENCOUNTER
Spoke with patient regarding scheduling an appt. Patient stated that she is having pain to her left side and would like to be seen by Dr. Perez on today. Patient was informed that Dr. Perez is currently out of the office on today and will return on Monday. Patient stated that she will keep her appt that she has on Monday 5/28/18 at 1130 AM.

## 2018-05-25 NOTE — TELEPHONE ENCOUNTER
----- Message from Varsha Sarabia sent at 5/25/2018 11:04 AM CDT -----  Contact: [phone]/ self  Patient would like to know if she can schedule a same day appt today 5/25/18. Patient currently has appt with Dr. Perez on 5/28/18, but is experiencing pain and discomfort. Please advise.

## 2018-05-30 ENCOUNTER — OFFICE VISIT (OUTPATIENT)
Dept: FAMILY MEDICINE | Facility: CLINIC | Age: 68
End: 2018-05-30
Payer: MEDICARE

## 2018-05-30 VITALS
HEART RATE: 83 BPM | OXYGEN SATURATION: 97 % | TEMPERATURE: 98 F | WEIGHT: 196.63 LBS | DIASTOLIC BLOOD PRESSURE: 82 MMHG | HEIGHT: 63 IN | BODY MASS INDEX: 34.84 KG/M2 | SYSTOLIC BLOOD PRESSURE: 120 MMHG

## 2018-05-30 DIAGNOSIS — R10.9 LEFT FLANK PAIN: Primary | ICD-10-CM

## 2018-05-30 DIAGNOSIS — M79.10 MYALGIA: ICD-10-CM

## 2018-05-30 PROCEDURE — 99214 OFFICE O/P EST MOD 30 MIN: CPT | Mod: S$GLB,,, | Performed by: FAMILY MEDICINE

## 2018-05-30 PROCEDURE — 99999 PR PBB SHADOW E&M-EST. PATIENT-LVL III: CPT | Mod: PBBFAC,,, | Performed by: FAMILY MEDICINE

## 2018-05-30 PROCEDURE — 3074F SYST BP LT 130 MM HG: CPT | Mod: CPTII,S$GLB,, | Performed by: FAMILY MEDICINE

## 2018-05-30 PROCEDURE — 3079F DIAST BP 80-89 MM HG: CPT | Mod: CPTII,S$GLB,, | Performed by: FAMILY MEDICINE

## 2018-05-30 RX ORDER — TIZANIDINE 4 MG/1
1 TABLET ORAL
Refills: 0 | COMMUNITY
Start: 2018-04-26 | End: 2018-05-30

## 2018-05-30 RX ORDER — GABAPENTIN 100 MG/1
100 CAPSULE ORAL 3 TIMES DAILY
Qty: 90 CAPSULE | Refills: 2 | Status: SHIPPED | OUTPATIENT
Start: 2018-05-30 | End: 2019-06-01 | Stop reason: SDUPTHER

## 2018-05-30 RX ORDER — IBUPROFEN 600 MG/1
1 TABLET ORAL
Refills: 0 | COMMUNITY
Start: 2018-04-26 | End: 2018-09-17

## 2018-05-30 RX ORDER — CYCLOBENZAPRINE HCL 5 MG
5 TABLET ORAL 3 TIMES DAILY PRN
Qty: 21 TABLET | Refills: 0 | Status: SHIPPED | OUTPATIENT
Start: 2018-05-30 | End: 2018-05-30 | Stop reason: CLARIF

## 2018-05-30 RX ORDER — METFORMIN HYDROCHLORIDE 500 MG/1
1 TABLET ORAL 2 TIMES DAILY
Refills: 0 | COMMUNITY
Start: 2018-03-29 | End: 2018-09-17

## 2018-05-30 RX ORDER — METHOCARBAMOL 500 MG/1
500 TABLET, FILM COATED ORAL 3 TIMES DAILY
Qty: 21 TABLET | Refills: 0 | Status: SHIPPED | OUTPATIENT
Start: 2018-05-30 | End: 2018-06-06

## 2018-05-30 NOTE — PROGRESS NOTES
HPI:  Chelly Otriz is a 67 y.o. year old female that  presents with complaint of left sided flank pain since April. She was given a muscle relaxant , which she takes as needed at night because they make her sleepy.She denies any known trauma.  Chief Complaint   Patient presents with    Back Pain     LLQ   .     HPI      Past Medical History:   Diagnosis Date    Allergy     Cataract     DDD (degenerative disc disease), lumbar     Diabetes mellitus     diet controlled    Diabetes mellitus, type 2     GERD (gastroesophageal reflux disease)     History of subconjunctival hemorrhage 11    left eye    Hyperlipidemia     Hypertension     IBS (irritable bowel syndrome)     Obesity     MYRIAM (obstructive sleep apnea)     on CPAP    Rotator cuff impingement syndrome     Seasonal allergic conjunctivitis      Social History     Social History    Marital status:      Spouse name: N/A    Number of children: N/A    Years of education: N/A     Occupational History    Not on file.     Social History Main Topics    Smoking status: Former Smoker     Quit date: 2/15/1983    Smokeless tobacco: Never Used    Alcohol use No    Drug use: No    Sexual activity: Yes     Partners: Male     Other Topics Concern    Not on file     Social History Narrative    No narrative on file     Past Surgical History:   Procedure Laterality Date    CATARACT EXTRACTION W/  INTRAOCULAR LENS IMPLANT  10/3/13    OD (dr. gardner)     SECTION      x2    CHOLECYSTECTOMY      EYE SURGERY      HYSTERECTOMY      ovaries intact, due to DUB    TUBAL LIGATION       Family History   Problem Relation Age of Onset    Alzheimer's disease Mother     Heart disease Father     Diabetes Sister     Deep vein thrombosis Brother     Diabetes Daughter     Cancer Brother         Lung    Lung cancer Brother     Amblyopia Neg Hx     Blindness Neg Hx     Cataracts Neg Hx     Glaucoma Neg Hx     Hypertension Neg Hx      "Macular degeneration Neg Hx     Retinal detachment Neg Hx     Strabismus Neg Hx     Stroke Neg Hx     Thyroid disease Neg Hx            Review of Systems  General ROS: negative for chills, fever or weight loss  ENT ROS: negative for epistaxis, sore throat or rhinorrhea  Respiratory ROS: no cough, shortness of breath, or wheezing  Cardiovascular ROS: no chest pain or dyspnea on exertion  Gastrointestinal ROS: no abdominal pain, change in bowel habits, or black/ bloody stools  Back ROS: left flank pain    Physical Exam:  /82 (BP Location: Right arm, Patient Position: Sitting, BP Method: Medium (Manual))   Pulse 83   Temp 97.9 °F (36.6 °C) (Oral)   Ht 5' 3" (1.6 m)   Wt 89.2 kg (196 lb 10.4 oz)   LMP  (LMP Unknown)   SpO2 97%   BMI 34.84 kg/m²   General appearance: alert, cooperative, no distress  Constitutional:Oriented to person, place, and time.appears well-developed and well-nourished.  Lungs: clear to auscultation bilaterally, no dullness to percussion bilaterally  Heart: regular rate and rhythm without rub; no displacement of the PMI , S1&S2 present  Abdomen: soft, non-tender; bowel sounds normoactive; no organomegaly  Back : no CVA tenderness, pos left para spinal pain at the level of L2-L3  Physical Exam    LABS:    Complete Blood Count  Lab Results   Component Value Date    RBC 5.01 06/10/2015    HGB 12.7 06/10/2015    HCT 38.3 06/10/2015    MCV 76 (L) 06/10/2015    MCH 25.3 (L) 06/10/2015    MCHC 33.2 06/10/2015    RDW 15.3 (H) 06/10/2015     06/10/2015    MPV 10.0 06/10/2015    GRAN 3.3 06/10/2015    GRAN 50.2 06/10/2015    LYMPH 2.7 06/10/2015    LYMPH 41.2 06/10/2015    MONO 0.5 06/10/2015    MONO 6.9 06/10/2015    EOS 0.1 06/10/2015    BASO 0.02 06/10/2015    EOSINOPHIL 1.4 06/10/2015    BASOPHIL 0.3 06/10/2015    DIFFMETHOD Automated 06/10/2015       Comprehensive Metabolic Panel  Lab Results   Component Value Date     (H) 01/15/2018    BUN 13 01/15/2018    CREATININE 1.0 " 01/15/2018     01/15/2018    K 4.0 01/15/2018     01/15/2018    PROT 7.3 01/15/2018    ALBUMIN 3.6 01/15/2018    BILITOT 0.4 01/15/2018    AST 16 01/15/2018    ALKPHOS 125 01/15/2018    CO2 29 01/15/2018    ALT 16 01/15/2018    ANIONGAP 8 01/15/2018    EGFRNONAA 57.6 (A) 01/15/2018    ESTGFRAFRICA >60.0 01/15/2018       LIPID  Lab Results   Component Value Date    CHOL 237 (H) 01/15/2018    HDL 50 01/15/2018         TSH  Lab Results   Component Value Date    TSH 2.433 04/17/2017       Current Outpatient Prescriptions   Medication Sig Dispense Refill    ACCU-CHEK TERI [facility] USE AS DIRECTED 1 each 0    amLODIPine (NORVASC) 5 MG tablet Take 1 tablet (5 mg total) by mouth once daily. 90 tablet 3    aspirin (ECOTRIN) 81 MG EC tablet Take 81 mg by mouth once daily.      blood sugar diagnostic Strp 1 strip by Misc.(Non-Drug; Combo Route) route 2 (two) times daily. 100 strip 3    citalopram (CELEXA) 10 MG tablet Take 1 tablet (10 mg total) by mouth once daily. 90 tablet 3    ergocalciferol (VITAMIN D2) 50,000 unit Cap Take 1 capsule (50,000 Units total) by mouth every 7 days. 12 capsule 1    famotidine (PEPCID) 20 MG tablet Take 1 tablet (20 mg total) by mouth every evening. 90 tablet 3    gabapentin (NEURONTIN) 100 MG capsule Take 1 capsule (100 mg total) by mouth 3 (three) times daily. 90 capsule 2    glimepiride (AMARYL) 4 MG tablet Take 1 tablet (4 mg total) by mouth daily with breakfast. 90 tablet 3    ibuprofen (ADVIL,MOTRIN) 600 MG tablet Take 1 tablet by mouth as needed.  0    lancets (MICROLET LANCET) Misc Inject 1 lancet into the skin once daily. 100 each 3    lidocaine HCL 2% (XYLOCAINE) 2 % jelly Apply topically once daily. 30 mL 2    losartan (COZAAR) 50 MG tablet Take 1 tablet (50 mg total) by mouth once daily. 90 tablet 3    magnesium oxide (MAG-OX) 400 mg tablet Take 1 tablet (400 mg total) by mouth 2 (two) times daily. 180 tablet 3    pravastatin (PRAVACHOL) 10 MG tablet Take 1  tablet (10 mg total) by mouth once daily. 90 tablet 3    SITagliptin (JANUVIA) 100 MG Tab Take 1 tablet (100 mg total) by mouth once daily. 90 tablet 3    traZODone (DESYREL) 50 MG tablet Take 1 tablet (50 mg total) by mouth nightly as needed. 90 tablet 3    cyclobenzaprine (FLEXERIL) 5 MG tablet Take 1 tablet (5 mg total) by mouth 3 (three) times daily as needed for Muscle spasms. 21 tablet 0    hyoscyamine (ANASPAZ,LEVSIN) 0.125 mg Tab Take 1 tablet (125 mcg total) by mouth every 6 (six) hours as needed (abdominal cramps/pain). 20 tablet 0    metFORMIN (GLUCOPHAGE) 500 MG tablet Take 1 tablet by mouth 2 (two) times daily.  0     No current facility-administered medications for this visit.        Assessment:    ICD-10-CM ICD-9-CM    1. Left flank pain R10.9 789.09 cyclobenzaprine (FLEXERIL) 5 MG tablet   2. Myalgia M79.1 729.1 gabapentin (NEURONTIN) 100 MG capsule         Plan:    Follow-up if symptoms worsen or fail to improve.          Nichol Sarabia MD

## 2018-06-06 ENCOUNTER — PES CALL (OUTPATIENT)
Dept: ADMINISTRATIVE | Facility: CLINIC | Age: 68
End: 2018-06-06

## 2018-06-20 ENCOUNTER — LAB VISIT (OUTPATIENT)
Dept: LAB | Facility: HOSPITAL | Age: 68
End: 2018-06-20
Attending: FAMILY MEDICINE
Payer: MEDICARE

## 2018-06-20 DIAGNOSIS — E11.65 TYPE 2 DIABETES MELLITUS WITH HYPERGLYCEMIA, WITHOUT LONG-TERM CURRENT USE OF INSULIN: ICD-10-CM

## 2018-06-20 LAB
ALBUMIN SERPL BCP-MCNC: 3.9 G/DL
ALP SERPL-CCNC: 108 U/L
ALT SERPL W/O P-5'-P-CCNC: 16 U/L
ANION GAP SERPL CALC-SCNC: 9 MMOL/L
AST SERPL-CCNC: 18 U/L
BILIRUB SERPL-MCNC: 0.8 MG/DL
BUN SERPL-MCNC: 14 MG/DL
CALCIUM SERPL-MCNC: 9.4 MG/DL
CHLORIDE SERPL-SCNC: 106 MMOL/L
CHOLEST SERPL-MCNC: 225 MG/DL
CHOLEST/HDLC SERPL: 4.4 {RATIO}
CO2 SERPL-SCNC: 27 MMOL/L
CREAT SERPL-MCNC: 1 MG/DL
EST. GFR  (AFRICAN AMERICAN): >60 ML/MIN/1.73 M^2
EST. GFR  (NON AFRICAN AMERICAN): 57.6 ML/MIN/1.73 M^2
ESTIMATED AVG GLUCOSE: 206 MG/DL
GLUCOSE SERPL-MCNC: 140 MG/DL
HBA1C MFR BLD HPLC: 8.8 %
HDLC SERPL-MCNC: 51 MG/DL
HDLC SERPL: 22.7 %
LDLC SERPL CALC-MCNC: 151 MG/DL
NONHDLC SERPL-MCNC: 174 MG/DL
POTASSIUM SERPL-SCNC: 3.6 MMOL/L
PROT SERPL-MCNC: 7.4 G/DL
SODIUM SERPL-SCNC: 142 MMOL/L
TRIGL SERPL-MCNC: 115 MG/DL

## 2018-06-20 PROCEDURE — 80061 LIPID PANEL: CPT

## 2018-06-20 PROCEDURE — 80053 COMPREHEN METABOLIC PANEL: CPT

## 2018-06-20 PROCEDURE — 83036 HEMOGLOBIN GLYCOSYLATED A1C: CPT

## 2018-06-20 PROCEDURE — 36415 COLL VENOUS BLD VENIPUNCTURE: CPT | Mod: PO

## 2018-06-21 ENCOUNTER — PATIENT MESSAGE (OUTPATIENT)
Dept: FAMILY MEDICINE | Facility: CLINIC | Age: 68
End: 2018-06-21

## 2018-06-29 ENCOUNTER — PATIENT MESSAGE (OUTPATIENT)
Dept: FAMILY MEDICINE | Facility: CLINIC | Age: 68
End: 2018-06-29

## 2018-07-12 DIAGNOSIS — R10.9 LEFT FLANK PAIN: ICD-10-CM

## 2018-07-13 RX ORDER — METHOCARBAMOL 500 MG/1
TABLET, FILM COATED ORAL
Qty: 21 TABLET | Refills: 0 | OUTPATIENT
Start: 2018-07-13

## 2018-07-17 ENCOUNTER — OFFICE VISIT (OUTPATIENT)
Dept: FAMILY MEDICINE | Facility: CLINIC | Age: 68
End: 2018-07-17
Payer: MEDICARE

## 2018-07-17 VITALS
HEIGHT: 63 IN | WEIGHT: 195.56 LBS | DIASTOLIC BLOOD PRESSURE: 74 MMHG | HEART RATE: 64 BPM | BODY MASS INDEX: 34.65 KG/M2 | SYSTOLIC BLOOD PRESSURE: 122 MMHG

## 2018-07-17 DIAGNOSIS — G89.29 CHRONIC LEFT-SIDED THORACIC BACK PAIN: ICD-10-CM

## 2018-07-17 DIAGNOSIS — J06.9 UPPER RESPIRATORY TRACT INFECTION, UNSPECIFIED TYPE: ICD-10-CM

## 2018-07-17 DIAGNOSIS — N18.30 CKD (CHRONIC KIDNEY DISEASE) STAGE 3, GFR 30-59 ML/MIN: Primary | ICD-10-CM

## 2018-07-17 DIAGNOSIS — M54.6 CHRONIC LEFT-SIDED THORACIC BACK PAIN: ICD-10-CM

## 2018-07-17 DIAGNOSIS — E11.65 TYPE 2 DIABETES MELLITUS WITH HYPERGLYCEMIA, WITHOUT LONG-TERM CURRENT USE OF INSULIN: ICD-10-CM

## 2018-07-17 DIAGNOSIS — M54.6 THORACIC SPINE PAIN: ICD-10-CM

## 2018-07-17 DIAGNOSIS — R10.9 LEFT FLANK PAIN: ICD-10-CM

## 2018-07-17 LAB
BACTERIA #/AREA URNS AUTO: NORMAL /HPF
BILIRUB UR QL STRIP: NEGATIVE
CLARITY UR REFRACT.AUTO: ABNORMAL
COLOR UR AUTO: YELLOW
GLUCOSE UR QL STRIP: NEGATIVE
HGB UR QL STRIP: NEGATIVE
KETONES UR QL STRIP: NEGATIVE
LEUKOCYTE ESTERASE UR QL STRIP: NEGATIVE
MICROSCOPIC COMMENT: NORMAL
NITRITE UR QL STRIP: NEGATIVE
PH UR STRIP: 5 [PH] (ref 5–8)
PROT UR QL STRIP: NEGATIVE
RBC #/AREA URNS AUTO: 0 /HPF (ref 0–4)
SP GR UR STRIP: 1.01 (ref 1–1.03)
SQUAMOUS #/AREA URNS AUTO: 0 /HPF
URN SPEC COLLECT METH UR: ABNORMAL
UROBILINOGEN UR STRIP-ACNC: NEGATIVE EU/DL
WBC #/AREA URNS AUTO: 3 /HPF (ref 0–5)

## 2018-07-17 PROCEDURE — 3078F DIAST BP <80 MM HG: CPT | Mod: CPTII,S$GLB,, | Performed by: FAMILY MEDICINE

## 2018-07-17 PROCEDURE — 3074F SYST BP LT 130 MM HG: CPT | Mod: CPTII,S$GLB,, | Performed by: FAMILY MEDICINE

## 2018-07-17 PROCEDURE — 99214 OFFICE O/P EST MOD 30 MIN: CPT | Mod: S$GLB,,, | Performed by: FAMILY MEDICINE

## 2018-07-17 PROCEDURE — 3045F PR MOST RECENT HEMOGLOBIN A1C LEVEL 7.0-9.0%: CPT | Mod: CPTII,S$GLB,, | Performed by: FAMILY MEDICINE

## 2018-07-17 PROCEDURE — 99999 PR PBB SHADOW E&M-EST. PATIENT-LVL V: CPT | Mod: PBBFAC,,, | Performed by: FAMILY MEDICINE

## 2018-07-17 PROCEDURE — 81001 URINALYSIS AUTO W/SCOPE: CPT

## 2018-07-17 PROCEDURE — 87086 URINE CULTURE/COLONY COUNT: CPT

## 2018-07-17 RX ORDER — BENZONATATE 100 MG/1
100 CAPSULE ORAL 3 TIMES DAILY PRN
Qty: 30 CAPSULE | Refills: 0 | Status: SHIPPED | OUTPATIENT
Start: 2018-07-17 | End: 2018-08-16

## 2018-07-17 NOTE — PROGRESS NOTES
Subjective:       Patient ID: Chelly Ortiz is a 67 y.o. female.    Chief Complaint: Diabetes (follow up)    67 years old female came to the clinic for diabetes check.  Last A1c was elevated.  No polyuria, polydipsia or polyphagia.  Patient with left flank pain for the last couple of months.  The pain is 7/10 of intensity on and off aggravated with activity and better with rest.  Patient did not want physical therapy for now.  Patient with dry cough for the last week.  Patient decreased kidney function but stable in comparison with previous reports.      Diabetes   She has type 2 diabetes mellitus. No MedicAlert identification noted. The initial diagnosis of diabetes was made 8 years ago. Hypoglycemia symptoms include sleepiness. Pertinent negatives for hypoglycemia include no confusion, dizziness, headaches, hunger, mood changes, nervousness/anxiousness, pallor, seizures, speech difficulty, sweats or tremors. Pertinent negatives for hypoglycemia complications include no blackouts, no hospitalization, no nocturnal hypoglycemia, no required assistance and no required glucagon injection. Pertinent negatives for diabetic complications include no autonomic neuropathy, CVA, heart disease, impotence, nephropathy, peripheral neuropathy, PVD or retinopathy. Risk factors for coronary artery disease include dyslipidemia, family history, hypertension, obesity and diabetes mellitus. Current diabetic treatment includes oral agent (dual therapy). She is compliant with treatment all of the time. Her weight is fluctuating minimally. She is following a generally healthy, low fiber and low salt diet. Meal planning includes ADA exchanges and carbohydrate counting. She has had a previous visit with a dietitian. She participates in exercise intermittently. She monitors blood glucose at home 3-4 x per week. Blood glucose monitoring compliance is good. Her home blood glucose trend is fluctuating minimally. She sees a podiatrist.Eye exam  is current.     Review of Systems   Constitutional: Negative.    HENT: Negative.    Eyes: Negative.    Respiratory: Positive for cough.    Cardiovascular: Negative.    Gastrointestinal: Negative.    Genitourinary: Negative.  Negative for impotence.   Musculoskeletal: Positive for back pain.   Skin: Negative.  Negative for pallor.   Neurological: Negative.  Negative for dizziness, tremors, seizures, speech difficulty and headaches.   Psychiatric/Behavioral: Negative.  Negative for confusion. The patient is not nervous/anxious.        Objective:      Physical Exam   Constitutional: She is oriented to person, place, and time. She appears well-developed and well-nourished. No distress.   HENT:   Head: Normocephalic and atraumatic.   Right Ear: External ear normal.   Left Ear: External ear normal.   Nose: Nose normal.   Mouth/Throat: Oropharynx is clear and moist. No oropharyngeal exudate.   Eyes: Conjunctivae and EOM are normal. Pupils are equal, round, and reactive to light. Right eye exhibits no discharge. Left eye exhibits no discharge. No scleral icterus.   Neck: Normal range of motion. Neck supple. No JVD present. No tracheal deviation present. No thyromegaly present.   Cardiovascular: Normal rate, regular rhythm, normal heart sounds and intact distal pulses.  Exam reveals no gallop and no friction rub.    No murmur heard.  Pulmonary/Chest: Effort normal and breath sounds normal. No stridor. No respiratory distress. She has no wheezes. She has no rales. She exhibits no tenderness.   Abdominal: Soft. Bowel sounds are normal. She exhibits no distension and no mass. There is no tenderness. There is no rebound and no guarding.   Musculoskeletal: Normal range of motion. She exhibits no edema.        Thoracic back: She exhibits tenderness, pain and spasm.   Lymphadenopathy:     She has no cervical adenopathy.   Neurological: She is alert and oriented to person, place, and time. She has normal reflexes. No cranial nerve  deficit. She exhibits normal muscle tone. Coordination normal.   Skin: Skin is warm and dry. No rash noted. She is not diaphoretic. No erythema. No pallor.   Psychiatric: She has a normal mood and affect. Her behavior is normal. Judgment and thought content normal.       Assessment:       1. CKD (chronic kidney disease) stage 3, GFR 30-59 ml/min    2. Type 2 diabetes mellitus with hyperglycemia, without long-term current use of insulin    3. Chronic left-sided thoracic back pain    4. Left flank pain    5. Thoracic spine pain    6. Upper respiratory tract infection, unspecified type        Plan:         Chelly was seen today for diabetes.    Diagnoses and all orders for this visit:    CKD (chronic kidney disease) stage 3, GFR 30-59 ml/min  -     Comprehensive metabolic panel; Future  -     Urinalysis  -     Urine culture    Type 2 diabetes mellitus with hyperglycemia, without long-term current use of insulin  -     dulaglutide 0.75 mg/0.5 mL PnIj; Inject 0.5 mLs (0.75 mg total) into the skin every 7 days.  -     Comprehensive metabolic panel; Future  -     Lipid panel; Future  -     Hemoglobin A1c; Future    Chronic left-sided thoracic back pain    Left flank pain  -     MRI Thoracic Spine Without Contrast; Future  -     Ambulatory referral to Pain Clinic  -     Urinalysis  -     Urine culture    Thoracic spine pain  -     MRI Thoracic Spine Without Contrast; Future  -     Ambulatory referral to Pain Clinic    Upper respiratory tract infection, unspecified type    Other orders  -     benzonatate (TESSALON) 100 MG capsule; Take 1 capsule (100 mg total) by mouth 3 (three) times daily as needed for Cough.

## 2018-07-17 NOTE — PATIENT INSTRUCTIONS
Diabetes: Activity Tips    Being more active can help you manage your diabetes. The tips on this sheet can help you get the most from your exercise. They can also help you stay safe.  Staying Active  Its important for adults to spend less time sitting and being inactive. This is especially true if you have type 2 diabetes. When you are sitting for long periods of time, get up for short sessions of light activity every 30 minutes.  You should aim for at least 150 minutes a week of exercise or physical activity. Dont let more than 2 days go by without being active.  Benefit from briskness  Brisk activity gets your heart beating faster. This can help you increase your fitness, lose extra weight, and manage your blood sugar level. Try brisk walking. Or, if you have foot or leg problems, you can try swimming or bike riding. You can break up your exercise into chunks throughout the day. Work up to at least 30 minutes of steady, brisk exercise on most days.  Warm up and cool down  Warming up and cooling down reduce your risk of injury. They also help limit muscle soreness. Do a mild version of your activity for 5 minutes before and after your routine. You can also learn stretches that will help keep your muscles loose. Your healthcare provider may show you good ways to warm up and stretch.  Do the talk-sing test  The talk-sing test is a simple way to tell how hard youre exercising. If you can talk while exercising, youre in a safe range. If youre out of breath, slow down. If you can carry a tune, its time to  the pace. Walk up a hill. Increase the resistance on your stationary bike. Or swim faster.  What about eating?  You may be told to plan your exercise for 1 to 2 hours after a meal. In most cases, you dont need to eat while being active. If you take insulin or medicine that can cause low blood sugar, test your blood sugar before exercising. And carry a fast-acting sugar that will raise your blood sugar  level quickly. This includes glucose tablets or hard candy. Use it if you feel low blood sugar symptoms.  Safety tips  These tips can help you stay safe as you become fit:  · Exercise with a friend or carry a cell phone if you have one.  · Carry or wear identification, such as a necklace or bracelet, that says you have diabetes.  · Use the proper footwear and safety equipment for your activity.  · Drink water before, during, and after exercise.  · Dress properly for the weather.  · Dont exercise in very hot or very cold weather.  · Dont exercise if you are sick.  · If you are instructed to do so, test your blood sugar before and after you exercise. Have a small carbohydrate snack if your blood sugar is low before you start exercising.   When to stop exercising and call your healthcare provider  Stop exercising and call your healthcare provider right away if you notice any of the following:  · Pain, pressure, tightness, or heaviness in the chest  · Pain or heaviness in the neck, shoulders, back, arms, legs, or feet  · Unusual shortness of breath  · Dizziness or lightheadedness  · Unusually rapid or slow pulse  · Increased joint or muscle pain  · Nausea or vomiting  Date Last Reviewed: 5/1/2016  © 7922-9856 [employer]. [address] [place] [address], [place] [address]. All rights reserved. This information is not intended as a substitute for professional medical care. Always follow your healthcare professional's instructions.

## 2018-07-18 RX ORDER — LANCETS
EACH MISCELLANEOUS
Qty: 102 EACH | Refills: 3 | Status: SHIPPED | OUTPATIENT
Start: 2018-07-18 | End: 2019-10-04

## 2018-07-19 LAB — BACTERIA UR CULT: NO GROWTH

## 2018-08-06 ENCOUNTER — TELEPHONE (OUTPATIENT)
Dept: FAMILY MEDICINE | Facility: CLINIC | Age: 68
End: 2018-08-06

## 2018-08-06 NOTE — TELEPHONE ENCOUNTER
----- Message from Varsha Sarabia sent at 8/6/2018 12:22 PM CDT -----  Contact: [phone]/ self  Patient requesting to speak with you regarding new medication. Pt didn't have name of medication. Please advise.

## 2018-08-10 ENCOUNTER — HOSPITAL ENCOUNTER (OUTPATIENT)
Dept: RADIOLOGY | Facility: HOSPITAL | Age: 68
Discharge: HOME | End: 2018-08-10
Attending: FAMILY MEDICINE
Payer: MEDICARE

## 2018-08-10 DIAGNOSIS — R10.9 LEFT FLANK PAIN: ICD-10-CM

## 2018-08-10 DIAGNOSIS — M54.6 THORACIC SPINE PAIN: ICD-10-CM

## 2018-08-10 PROCEDURE — 72146 MRI CHEST SPINE W/O DYE: CPT | Mod: 26,,, | Performed by: RADIOLOGY

## 2018-08-10 PROCEDURE — 72146 MRI CHEST SPINE W/O DYE: CPT | Mod: TC

## 2018-08-13 ENCOUNTER — TELEPHONE (OUTPATIENT)
Dept: PAIN MEDICINE | Facility: CLINIC | Age: 68
End: 2018-08-13

## 2018-08-13 NOTE — TELEPHONE ENCOUNTER
----- Message from Elba Kumar sent at 8/13/2018  8:03 AM CDT -----  Contact: Self [phone]  Patient is requesting to talk to nurse in regards to her test results. Please advice

## 2018-08-13 NOTE — TELEPHONE ENCOUNTER
Spoke with patient regarding MRI results. Patient stated that she did not see her MRI results in the system. Patient was informed that the doctor has the results and that he will go over them with her at her appt today. Patient verbalized understanding.

## 2018-08-14 ENCOUNTER — TELEPHONE (OUTPATIENT)
Dept: PAIN MEDICINE | Facility: CLINIC | Age: 68
End: 2018-08-14

## 2018-08-14 NOTE — TELEPHONE ENCOUNTER
----- Message from Aminta Fong sent at 8/14/2018 12:40 PM CDT -----  Contact: Self [phone]  Patient would like to speak with you about her visit. Please advise

## 2018-08-14 NOTE — TELEPHONE ENCOUNTER
Spoke with patient regarding message left for the staff. Patient stated that she was told that her refund for yesterday's visit was supposed to be refunded to her by today and its not. Patient was transferred to .

## 2018-08-21 ENCOUNTER — TELEPHONE (OUTPATIENT)
Dept: FAMILY MEDICINE | Facility: CLINIC | Age: 68
End: 2018-08-21

## 2018-08-21 NOTE — TELEPHONE ENCOUNTER
Patient calling to get MRI results also wants to know if she needs to go to pain management. Please advice

## 2018-08-21 NOTE — TELEPHONE ENCOUNTER
----- Message from Wilfred Mccabe sent at 8/21/2018  4:33 PM CDT -----  Contact: [phone]  MRI results

## 2018-08-22 ENCOUNTER — TELEPHONE (OUTPATIENT)
Dept: PAIN MEDICINE | Facility: CLINIC | Age: 68
End: 2018-08-22

## 2018-08-22 ENCOUNTER — PATIENT MESSAGE (OUTPATIENT)
Dept: PAIN MEDICINE | Facility: CLINIC | Age: 68
End: 2018-08-22

## 2018-08-22 NOTE — TELEPHONE ENCOUNTER
Called to speak with patient regarding same day appointment on Dr. Nava's schedule today.  She is also a [facility] and Tani Pittman NP patient.  NO answer, left message for her to call back or message me on the patient portal.  Patient does need to cancel this appointment and reschedule with the [place] location.

## 2018-08-22 NOTE — TELEPHONE ENCOUNTER
Per Rachelle Chan, she has been speaking with Mrs. Ortiz regarding an appointment and saw she had an appt with .  Rachelle cancelled the current appointment and rescheduled with Tani Pittman NP, in [facility], for tomorrow.

## 2018-08-22 NOTE — TELEPHONE ENCOUNTER
----- Message from Rose Mary Paul sent at 8/22/2018  1:22 PM CDT -----  Contact: [phone]/ self   Pt its requesting MRI test results . Please advise

## 2018-08-22 NOTE — PROGRESS NOTES
[facility] Established Patient Evaluation    Referred by: Gabriel Wilson  Reason for referral: neck pain    CC:   Chief Complaint   Patient presents with    Low-back Pain     left side     Interval Update:  8/23/18 pt returns today for MRI result and left sided low back pain.  Patient presents with acute mid to low left-sided back pain that has been ongoing, she reports some radicular symptoms in her left thigh but not very often.  Her primary source of pain is again mid to low left-side only.  Pain is constant, pain is described as sore throbbing the pain pain is aggravated with sitting standing and walking, patient states nothing mitigate her pain at this point.  Pain score today is 10/10.  Thoracic imaging multilevel facet hypertrophy anterior spondylosis facet joint arthropathy the dorsal spine without significant spinal or foraminal stenosis.  Discussed that we will scheduled for a left thoracic median branch block, level to be determined upon exam prior to procedure.  I will schedule for trigger points today to help with mid to low back muscular pain. Previous imaging reviewed and discussed with patient.      4/9/18 - Pt returns to complaining of MRI results, neck and bilateral shoulder pain.  Her day time pain improved with PT but she continues to complain of bilat neck and shoulder pain at night.  The results of the MRI were shared with her.    Background:  Chelly Ortiz is a 67 y.o. female who complains of neck and bilateral shoulder pain as characterized below.    Location: neck and bilateral shoulder  Severity: Currently: 10-2/10   Typical Range: 6/10     Exacerbation: 10/10   Onset: long-standing baseline pain exacerbated 3 months ago associated with reaching overhead to clean ceiling fans  Quality: Dull, achy, throbbing  Radiation: bilat shoulders  Axial/Extremity Percentage of Pain: 50/50  Exacerbating Factors: extension and flexion  Mitigating Factors: heat  Assoc: denies night  fever/night sweats, urinary incontinence, bowel incontinence, significant weight loss, significant motor weakness and loss of sensations    Previous Therapies:  PT: Scheduled to start Feb 7, 2018  HEP:   TENS:  Injections: LYNN 3x per Ca Oconnell  Surgery: Denies  Medications:   - NSAIDS: Ibuprofen  - MSK Relaxants: Current  - TCAs:   - SNRIs:   - Topicals:   - Anticonvulsants: Current  - Opioids: No    Current Pain Medications:  1. Gabapentin 100 TID - she is taking it prn  2. Cyclobenzaprine - still hasn't started but considering  3. Ibuprofen - helps a little  4. Piroxicam 20 mg - hasn't started yet    Full Medication List:    Current Outpatient Medications:     ACCU-CHEK FASTCLIX LANCET DRUM Misc, USE TO TEST BLOOD SUGAR ONCE DAILY , Disp: 102 each, Rfl: 3    ACCU-CHEK TERI Misc, USE AS DIRECTED, Disp: 1 each, Rfl: 0    amLODIPine (NORVASC) 5 MG tablet, Take 1 tablet (5 mg total) by mouth once daily., Disp: 90 tablet, Rfl: 3    aspirin (ECOTRIN) 81 MG EC tablet, Take 81 mg by mouth once daily., Disp: , Rfl:     blood sugar diagnostic Strp, 1 strip by Misc.(Non-Drug; Combo Route) route 2 (two) times daily., Disp: 100 strip, Rfl: 3    citalopram (CELEXA) 10 MG tablet, Take 1 tablet (10 mg total) by mouth once daily., Disp: 90 tablet, Rfl: 3    dulaglutide 0.75 mg/0.5 mL PnIj, Inject 0.5 mLs (0.75 mg total) into the skin every 7 days., Disp: 2 mL, Rfl: 11    ergocalciferol (VITAMIN D2) 50,000 unit Cap, Take 1 capsule (50,000 Units total) by mouth every 7 days., Disp: 12 capsule, Rfl: 1    famotidine (PEPCID) 20 MG tablet, Take 1 tablet (20 mg total) by mouth every evening., Disp: 90 tablet, Rfl: 3    gabapentin (NEURONTIN) 100 MG capsule, Take 1 capsule (100 mg total) by mouth 3 (three) times daily., Disp: 90 capsule, Rfl: 2    glimepiride (AMARYL) 4 MG tablet, Take 1 tablet (4 mg total) by mouth daily with breakfast., Disp: 90 tablet, Rfl: 3    ibuprofen (ADVIL,MOTRIN) 600 MG tablet, Take 1 tablet by  mouth as needed., Disp: , Rfl: 0    lidocaine HCL 2% (XYLOCAINE) 2 % jelly, Apply topically once daily., Disp: 30 mL, Rfl: 2    losartan (COZAAR) 50 MG tablet, Take 1 tablet (50 mg total) by mouth once daily., Disp: 90 tablet, Rfl: 3    magnesium oxide (MAG-OX) 400 mg tablet, Take 1 tablet (400 mg total) by mouth 2 (two) times daily., Disp: 180 tablet, Rfl: 3    metFORMIN (GLUCOPHAGE) 500 MG tablet, Take 1 tablet by mouth 2 (two) times daily., Disp: , Rfl: 0    pravastatin (PRAVACHOL) 10 MG tablet, Take 1 tablet (10 mg total) by mouth once daily., Disp: 90 tablet, Rfl: 3    traZODone (DESYREL) 50 MG tablet, Take 1 tablet (50 mg total) by mouth nightly as needed., Disp: 90 tablet, Rfl: 3    hyoscyamine (ANASPAZ,LEVSIN) 0.125 mg Tab, Take 1 tablet (125 mcg total) by mouth every 6 (six) hours as needed (abdominal cramps/pain)., Disp: 20 tablet, Rfl: 0  No current facility-administered medications for this visit.      Review of Systems:  Review of Systems   Constitutional: Negative for chills and fever.   HENT: Negative for nosebleeds.    Eyes: Positive for blurred vision. Negative for pain.   Respiratory: Negative for hemoptysis.    Cardiovascular: Negative for chest pain.   Gastrointestinal: Negative for nausea and vomiting.   Genitourinary: Negative for dysuria.   Musculoskeletal: Positive for neck pain. Negative for falls.   Skin: Negative for rash.   Neurological: Negative for focal weakness.   Endo/Heme/Allergies: Bruises/bleeds easily.   Psychiatric/Behavioral: The patient is nervous/anxious.        Allergies:  Prednisone; Protonix [pantoprazole]; Ace inhibitors; Latex, natural rubber; and Other     Medical History:  Past Medical History:   Diagnosis Date    Allergy     Cataract     DDD (degenerative disc disease), lumbar     Diabetes mellitus     diet controlled    Diabetes mellitus, type 2     GERD (gastroesophageal reflux disease)     History of subconjunctival hemorrhage 12/2/11    left eye     Hyperlipidemia     Hypertension     IBS (irritable bowel syndrome)     Obesity     MYRIAM (obstructive sleep apnea)     on CPAP    Rotator cuff impingement syndrome     Seasonal allergic conjunctivitis         Surgical History:  Past Surgical History:   Procedure Laterality Date    CATARACT EXTRACTION W/  INTRAOCULAR LENS IMPLANT  10/3/13    OD (dr. gardner)     SECTION      x2    CHOLECYSTECTOMY      EYE SURGERY      HYSTERECTOMY      ovaries intact, due to DUB    TUBAL LIGATION          Social History:  Social History     Socioeconomic History    Marital status:      Spouse name: Not on file    Number of children: Not on file    Years of education: Not on file    Highest education level: Not on file   Social Needs    Financial resource strain: Not on file    Food insecurity - worry: Not on file    Food insecurity - inability: Not on file    Transportation needs - medical: Not on file    Transportation needs - non-medical: Not on file   Occupational History    Not on file   Tobacco Use    Smoking status: Former Smoker     Last attempt to quit: 2/15/1983     Years since quittin.5    Smokeless tobacco: Never Used   Substance and Sexual Activity    Alcohol use: No    Drug use: No    Sexual activity: Yes     Partners: Male   Other Topics Concern    Are you pregnant or think you may be? Not Asked    Breast-feeding Not Asked   Social History Narrative    Not on file       Physical Exam:  Vitals:    18 1355   BP: 135/81   Pulse: 80   Weight: 90.1 kg (198 lb 10.2 oz)   PainSc: 10-Worst pain ever     General    Nursing note and vitals reviewed.  Constitutional: She is oriented to person, place, and time. She appears well-developed and well-nourished. No distress.   HENT:   Head: Normocephalic and atraumatic.   Nose: Nose normal.   Eyes: Conjunctivae and EOM are normal. Pupils are equal, round, and reactive to light. Right eye exhibits no discharge. Left eye exhibits no  discharge. No scleral icterus.   Neck: No JVD present.   Cardiovascular: Intact distal pulses.    Pulmonary/Chest: Effort normal. No respiratory distress.   Abdominal: She exhibits no distension.   Neurological: She is alert and oriented to person, place, and time. Coordination normal.   Psychiatric: She has a normal mood and affect. Her behavior is normal. Judgment and thought content normal.     General Musculoskeletal Exam   Gait: normal     Back (L-Spine & T-Spine) / Neck (C-Spine) Exam     Tenderness Posterior midline palpation reveals tenderness of the Upper C-Spine and Lower C-Spine. Right paramedian tenderness of the Lower C-Spine and Upper C-Spine. Left paramedian tenderness of the Upper C-Spine and Lower C-Spine.     Back (L-Spine & T-Spine) Range of Motion   Extension: abnormal   Flexion: abnormal     Neck (C-Spine) Range of Motion   Flexion:     Limited  Extension: Limited  Right Lateral Bend: abnormal  Left Lateral Bend: abnormal  Right Rotation: abnormal  Left Rotation: abnormal    Spinal Sensation   Right Side Sensation  C-Spine Level: normal   Left Side Sensation  C-Spine Level: normal    Neck (C-Spine) Tests   Spurling's Test   Left:  Negative  Right: negative    Other She has no scoliosis .      Muscle Strength   Right Upper Extremity   Biceps: 5/5/5   Deltoid:  5/5  Triceps:  5/5  Wrist extension: 5/5/5   Wrist flexion: 5/5/5   Finger Flexors:  5/5  Finger Extensors:  5/5  Left Upper Extremity  Biceps: 5/5/5   Deltoid:  5/5  Triceps:  5/5  Wrist extension: 5/5/5   Wrist flexion: 5/5/5   Finger Flexors:  5/5  Finger Extensors:  5/5  Right Lower Extremity   Hip Flexion: 5/5   Hip Extensors: 5/5  Quadriceps:  5/5   Hamstrin/5   Gastrocsoleus:  5/5/5  Left Lower Extremity   Hip Flexion: 5/5   Hip Extensors: 5/5  Quadriceps:  5/5   Hamstrin/5   Gastrocsoleus:  5/5/5    Reflexes     Left Side  Biceps:  2+    Right Side   Biceps:  2+      Imagin/3/18 MRI Cervical Spine Without  Contrast    Narrative     Comparison: Cervical spine series 12/27/16    Technique: Multiplanar and multi-sequence MR images were obtained through the cervical spine without intravenous contrast.    Results: Patient artifact slightly degrades some sequences. The cervical vertebral bodies demonstrate adequate alignment.  The vertebral body heights are well-maintained.  No displaced fracture, dislocation or significant listhesis.  Probable small hemangioma at the inferior aspect of C7 vertebral body. No bone marrow infiltrative process.  No prevertebral soft tissue swelling or paraspinal hematoma.  The spinal cord is normal in morphology and signal.  The craniocervical junction is without significant abnormality.    Mild degenerative disc disease with slight loss of disc height and anterior marginal osteophytes with endplate changes at C5-6 level. Multilevel vertebral and facet arthrosis. Mild to moderate degenerative change at the atlantodental interval.    C2-3: Posterior disc osteophyte complex resulting in borderline acquired canal stenosis. No significant neuroforaminal narrowing.  C3-4: Mild left neuroforaminal narrowing. No significant spinal canal stenosis or right neuroforaminal narrowing.  C4-5: Minimal bilateral neuroforaminal narrowing. No significant spinal canal stenosis.  C5-6: Posterior disc osteophyte complex resulting in mild acquired canal stenosis without associated cord abutment. Minimal bilateral neuroforaminal narrowing.  C6-7: No significant spinal canal stenosis or neuroforaminal narrowing.  C7-T1: No significant spinal canal stenosis or neuroforaminal narrowing.    The soft tissue structures incidentally surveyed demonstrate no significant abnormality.   Impression          1. No cervical spine acute abnormality identified.    2. Mild cervical spondylosis most prominent at C5-6, as above.      Electronically signed by: WILLIAMS FULTON MD, MD  Date: 02/03/18  Time: 15:51     Encounter     View  Encounter          Signed by     Signed Credentials Date/Time  Phone Pager   WILLIAMS FULTON MD 2/03/2018 15:51 [phone]   Reviewed By     Gabriel Wilson MD on 2/5/2018 09:42   Exam Details     Performed Procedure Technologist Supporting Staff Performing Physician   MRI Cervical Spine Without Contrast Keri Aviles, RT        Appointment Date/Status Modality Department    2/3/2018     Completed [facility] MRI1 [facility]       Begin Exam End Exam Begin Exam Questionnaires End Exam Questionnaires   2/3/2018 10:35 AM 2/3/2018 11:08 AM MRI TECH NAVIGATOR QUESTIONS IMAGING END ALL      Reason For Exam   Priority: Routine   Dx: Neck pain of over 3 months duration [M54.2, G89.29 (ICD-10-CM)]   Order Report      Order Details         X-Ray Cervical Spine AP And Lateral  2 views: Alignment is normal.  Odontoid is intact as are the prevertebral soft tissues and posterior elements.  No fracture dislocation bone destruction seen.  There is mild DJD.   Impression      Mild DJD.      Electronically signed by: LETTY WHITE  Date: 12/27/16  Time: 12:26          X-Ray Shoulder Trauma 3 view Right  Comparison: 4/20/16.    Findings: Internal rotation, Y view and axillary views of the right shoulder demonstrate osteophyte formation and loss of joint space at the acromioclavicular joint.  Otherwise the osseous structures, soft tissues and joint spaces are normal.  Specifically no fracture or dislocation.  The visualized right upper lobe is normal.   Impression      Acromioclavicular osteoarthritis otherwise normal exam  ______________________________________     Electronically signed by: ANGEL GARCIA MD  Date: 07/07/16  Time: 10:58          MRI Upper Extremity Joint Without Contraast Right  TECHNIQUE: MRI of right shoulder was performed on a 1.5T magnet utilizing the following sequences: Localizer; axial T2 FS; coronal T2 FS and PD FS; sagittal T1, T2 FS and PD FS.    COMPARISON: Right shoulder radiograph  4/20/16.    FINDINGS:    Rotator cuff: There is thickening and increased signal of the insertional fibers of the supraspinatus consistent with tendinosis with partial thickness undersurface tear.  There is small full-thickness component involving the anterior fibers measuring approximately 8 x 6 mm.  Mild infraspinatus tendinosis with undersurface fraying.  Teres minor tendon is intact.  There is mild tendinosis with interstitial tear involving the insertional fibers of the subscapularis.  Muscle bulk of the rotator cuff is within normal limits.     Labrum: Global degeneration of the glenoid labrum.      Biceps: Thickening and increased signal within the intra-articular portion of the long head biceps tendon consistent with tendinosis.    Bone: There is no fracture.  Bone marrow signal is unremarkable.    Acromioclavicular joint: Severe degenerative changes are seen at the acromioclavicular joint with osseous spurring, edema, and subchondral cystic change.    Cartilage: Articular cartilage of the glenohumeral joint is preserved.    Miscellaneous: Small joint effusion with increased fluid seen in the subscapularis recess.  There is increased fluid seen within the subacromial/subdeltoid bursa.   Impression       1.  Supraspinatus tendinosis with partial thickness articular surface tear and small full-thickness component involving the anterior fibers.    2.  Mild subscapularis and infraspinatus tendinosis.    3.  Severe AC joint arthrosis.    4.  Long head biceps tendinosis.    5.  Joint effusion and moderate subacromial/subdeltoid bursitis.  ______________________________________     Electronically signed by resident: Hosea Costello MD  Date: 06/16/16  Time: 16:29     ______________________________________     Electronically signed by: MADISON WINTERS MD  Date: 06/17/16  Time: 08:39          Labs:  BMP  Lab Results   Component Value Date     06/20/2018    K 3.6 06/20/2018     06/20/2018    CO2 27 06/20/2018     BUN 14 06/20/2018    CREATININE 1.0 06/20/2018    CALCIUM 9.4 06/20/2018    ANIONGAP 9 06/20/2018    ESTGFRAFRICA >60.0 06/20/2018    EGFRNONAA 57.6 (A) 06/20/2018     Lab Results   Component Value Date    ALT 16 06/20/2018    AST 18 06/20/2018    GGT 30 10/27/2004    ALKPHOS 108 06/20/2018    BILITOT 0.8 06/20/2018       Assessment:  Problem List Items Addressed This Visit     None        Chelly is a 67-year-old female with chronic neck pain exacerbated by increased physical activity 3 months ago.  Her exam and imaging are consistent with facet arthropathy and myofascial dysfunction.  I recommended that we start with physical therapy to eliminate as much pain as possible related to myofascial dysfunction then move on to interventional procedures should a significant amount of pain persist.  I also recommended that she complete the MRI ordered by her primary care physician and start a trial of piroxicam.  She also states significant insomnia not related to her chronic neck pain and I have recommended that she try tizanidine 4-8 mg nightly as a muscle relaxant and sleep aid.    4/9/18 - she completed PT with improved pain after each session.  Pain remains most bothersome at night which is common for muscle issues.  She was encouraged to perform neck stretching 3 times daily and especially at night to minimize pain at night.    08/23/2018- all imaging reviewed and discussed with patient today her primary source of pain was left mid to low back pain, trigger point injections today for muscular pain on the left also scheduled for left Thoracic/low back MBB injections levels to be determined upon exam prior to procedure. Imaging reviewed and discussed with patient    Treatment Plan:   PT/OT/HEP:  Continue HEP recommendations from PT.  The patient understands that her issues is primarily muscle related.  Procedures: none at this time. Schedule for thoracic MBB diagnostic injections levels to be determined upon exam  prior to procedure  Medications:    - continue gabapentin   -  trial tizanidine 4-8 mg QHS PRN in place of Flexeril   - I endorse a trial of piroxicam  Imaging: Reviewed with patient.    Follow Up: RTC prn    AMANDA Henderson  Interventional Pain Management      Disclaimer: This note was partly generated using dictation software which may occasionally result in transcription errors.    Procedures Patient Name: Chelly Ortiz   MRN: [id number]     INFORMED CONSENT: The procedure, risks, benefits and options were discussed with patient. There are no contraindications to the procedure. The patient expressed understanding and agreed to proceed. The personnel performing the procedure was discussed. I verify that I personally obtained  consent prior to the start of the procedure and the signed consent can be found on the patient's chart.     Procedure Date: 8/23/18     Anesthesia: Topical     Pre Procedure diagnosis:   1. Myositis, unspecified myositis type, unspecified site    2. Myalgia          Post-Procedure diagnosis: same        Sedation: None     PROCEDURE: TRIGGER POINT INJECTION  The patient was placed in a seated position. The site of pain and procedure were confirmed with the patient prior to starting the procedure. After performing time out. The patient's trigger points were identified and marked. The skin was prepped with chlorhexidine three times. After performing time out A 25-gauge 1.5 inch needle was advanced through the skin and subcutaneous tissues. Aspiration for blood, air and CSF was negative. A total of 10 ml of Bupivacaine 0.25% and 40 mg Kenalog was injected at all trigger point. No complications were evident. No specimens collected.     Blood Loss: Nill  Specimen: None

## 2018-08-22 NOTE — TELEPHONE ENCOUNTER
Spoke with pt regarding her appointment she missed today. Pt stated she would like to be rescheduled to get her MRI results. Pt was rescheduled to come back tomorrow at 1:45 pm. Pt verbalized understanding and confirmed appt date and time.

## 2018-08-23 ENCOUNTER — OFFICE VISIT (OUTPATIENT)
Dept: PAIN MEDICINE | Facility: CLINIC | Age: 68
End: 2018-08-23
Payer: MEDICARE

## 2018-08-23 ENCOUNTER — TELEPHONE (OUTPATIENT)
Dept: PAIN MEDICINE | Facility: CLINIC | Age: 68
End: 2018-08-23

## 2018-08-23 VITALS
HEART RATE: 80 BPM | SYSTOLIC BLOOD PRESSURE: 135 MMHG | DIASTOLIC BLOOD PRESSURE: 81 MMHG | WEIGHT: 198.63 LBS | BODY MASS INDEX: 35.19 KG/M2

## 2018-08-23 DIAGNOSIS — M54.5 CHRONIC LEFT-SIDED LOW BACK PAIN, WITH SCIATICA PRESENCE UNSPECIFIED: Primary | ICD-10-CM

## 2018-08-23 DIAGNOSIS — G89.29 CHRONIC LEFT-SIDED LOW BACK PAIN, WITH SCIATICA PRESENCE UNSPECIFIED: Primary | ICD-10-CM

## 2018-08-23 PROCEDURE — 99214 OFFICE O/P EST MOD 30 MIN: CPT | Mod: 25,S$GLB,, | Performed by: NURSE PRACTITIONER

## 2018-08-23 PROCEDURE — 3075F SYST BP GE 130 - 139MM HG: CPT | Mod: CPTII,S$GLB,, | Performed by: NURSE PRACTITIONER

## 2018-08-23 PROCEDURE — 3079F DIAST BP 80-89 MM HG: CPT | Mod: CPTII,S$GLB,, | Performed by: NURSE PRACTITIONER

## 2018-08-23 PROCEDURE — 99999 PR PBB SHADOW E&M-EST. PATIENT-LVL V: CPT | Mod: PBBFAC,,, | Performed by: NURSE PRACTITIONER

## 2018-08-23 PROCEDURE — 20553 NJX 1/MLT TRIGGER POINTS 3/>: CPT | Mod: S$GLB,,, | Performed by: NURSE PRACTITIONER

## 2018-08-23 RX ORDER — TRIAMCINOLONE ACETONIDE 40 MG/ML
40 INJECTION, SUSPENSION INTRA-ARTICULAR; INTRAMUSCULAR
Status: COMPLETED | OUTPATIENT
Start: 2018-08-23 | End: 2018-08-23

## 2018-08-23 RX ADMIN — TRIAMCINOLONE ACETONIDE 40 MG: 40 INJECTION, SUSPENSION INTRA-ARTICULAR; INTRAMUSCULAR at 03:08

## 2018-08-23 NOTE — TELEPHONE ENCOUNTER
Spoke with patient regarding appt. Patient stated that she will be about ten mintunes late. She is currently waiting on [employer]. Patient was informed that she will need to check in by 2 PM. Patient verbalized understanding.

## 2018-08-23 NOTE — TELEPHONE ENCOUNTER
----- Message from Aminta Fong sent at 8/23/2018 12:59 PM CDT -----  Contact: Self. [phone]  Patient would like to inform you she may be 10 minutes late for her appointment today. Please advise

## 2018-08-24 ENCOUNTER — PATIENT MESSAGE (OUTPATIENT)
Dept: PAIN MEDICINE | Facility: CLINIC | Age: 68
End: 2018-08-24

## 2018-08-28 ENCOUNTER — TELEPHONE (OUTPATIENT)
Dept: PAIN MEDICINE | Facility: CLINIC | Age: 68
End: 2018-08-28

## 2018-08-28 ENCOUNTER — PATIENT MESSAGE (OUTPATIENT)
Dept: PAIN MEDICINE | Facility: CLINIC | Age: 68
End: 2018-08-28

## 2018-08-28 DIAGNOSIS — G89.29 CHRONIC BILATERAL LOW BACK PAIN WITHOUT SCIATICA: Primary | ICD-10-CM

## 2018-08-28 DIAGNOSIS — M54.50 CHRONIC BILATERAL LOW BACK PAIN WITHOUT SCIATICA: Primary | ICD-10-CM

## 2018-08-28 RX ORDER — TIZANIDINE 4 MG/1
4 TABLET ORAL NIGHTLY PRN
Qty: 30 TABLET | Refills: 2 | Status: SHIPPED | OUTPATIENT
Start: 2018-08-28 | End: 2018-09-27

## 2018-08-28 RX ORDER — PIROXICAM 10 MG/1
10 CAPSULE ORAL DAILY PRN
Qty: 30 CAPSULE | Refills: 1 | Status: SHIPPED | OUTPATIENT
Start: 2018-08-28 | End: 2018-09-17

## 2018-08-28 NOTE — TELEPHONE ENCOUNTER
Spoke with patient regarding question for staff. Patient stated that she need something for pain.Patient was informed that she was scheduled to have a procedure and that the MD does not want to prescribe opioids. Patient stated that she did not want to do the procedure and that she wanted to have PT first.  Patient was also informed that the NP prescribed Piroxicam, Tizanidine is to be taken at night as needed in place of the Flexeril,    and that an order for PT was placed. Patient verbalized understanding.

## 2018-08-28 NOTE — TELEPHONE ENCOUNTER
----- Message from Elba Kumar sent at 8/28/2018 11:38 AM CDT -----  Contact: Self [phone]  Patient is calling to talk to nurse has personal questions. Please advice

## 2018-08-30 ENCOUNTER — OFFICE VISIT (OUTPATIENT)
Dept: FAMILY MEDICINE | Facility: CLINIC | Age: 68
End: 2018-08-30
Payer: MEDICARE

## 2018-08-30 ENCOUNTER — TELEPHONE (OUTPATIENT)
Dept: SLEEP MEDICINE | Facility: CLINIC | Age: 68
End: 2018-08-30

## 2018-08-30 ENCOUNTER — LAB VISIT (OUTPATIENT)
Dept: LAB | Facility: HOSPITAL | Age: 68
End: 2018-08-30
Attending: FAMILY MEDICINE
Payer: MEDICARE

## 2018-08-30 ENCOUNTER — TELEPHONE (OUTPATIENT)
Dept: FAMILY MEDICINE | Facility: CLINIC | Age: 68
End: 2018-08-30

## 2018-08-30 VITALS
DIASTOLIC BLOOD PRESSURE: 76 MMHG | WEIGHT: 192 LBS | HEART RATE: 80 BPM | OXYGEN SATURATION: 96 % | TEMPERATURE: 99 F | HEIGHT: 63 IN | BODY MASS INDEX: 34.02 KG/M2 | SYSTOLIC BLOOD PRESSURE: 142 MMHG

## 2018-08-30 DIAGNOSIS — K21.9 GASTROESOPHAGEAL REFLUX DISEASE, ESOPHAGITIS PRESENCE NOT SPECIFIED: ICD-10-CM

## 2018-08-30 DIAGNOSIS — R10.13 EPIGASTRIC PAIN: Primary | ICD-10-CM

## 2018-08-30 DIAGNOSIS — R10.13 EPIGASTRIC PAIN: ICD-10-CM

## 2018-08-30 DIAGNOSIS — Z12.11 COLON CANCER SCREENING: ICD-10-CM

## 2018-08-30 LAB
ALBUMIN SERPL BCP-MCNC: 4.1 G/DL
ALP SERPL-CCNC: 120 U/L
ALT SERPL W/O P-5'-P-CCNC: 11 U/L
AMYLASE SERPL-CCNC: 107 U/L
ANION GAP SERPL CALC-SCNC: 10 MMOL/L
AST SERPL-CCNC: 15 U/L
BILIRUB SERPL-MCNC: 0.5 MG/DL
BUN SERPL-MCNC: 12 MG/DL
CALCIUM SERPL-MCNC: 9.8 MG/DL
CHLORIDE SERPL-SCNC: 102 MMOL/L
CO2 SERPL-SCNC: 28 MMOL/L
CREAT SERPL-MCNC: 1 MG/DL
EST. GFR  (AFRICAN AMERICAN): >60 ML/MIN/1.73 M^2
EST. GFR  (NON AFRICAN AMERICAN): 57.6 ML/MIN/1.73 M^2
GLUCOSE SERPL-MCNC: 156 MG/DL
LIPASE SERPL-CCNC: 83 U/L
POTASSIUM SERPL-SCNC: 3.8 MMOL/L
PROT SERPL-MCNC: 7.8 G/DL
SODIUM SERPL-SCNC: 140 MMOL/L

## 2018-08-30 PROCEDURE — 3078F DIAST BP <80 MM HG: CPT | Mod: CPTII,S$GLB,, | Performed by: FAMILY MEDICINE

## 2018-08-30 PROCEDURE — 83690 ASSAY OF LIPASE: CPT

## 2018-08-30 PROCEDURE — 99214 OFFICE O/P EST MOD 30 MIN: CPT | Mod: S$GLB,,, | Performed by: FAMILY MEDICINE

## 2018-08-30 PROCEDURE — 82150 ASSAY OF AMYLASE: CPT

## 2018-08-30 PROCEDURE — 80053 COMPREHEN METABOLIC PANEL: CPT

## 2018-08-30 PROCEDURE — 3077F SYST BP >= 140 MM HG: CPT | Mod: CPTII,S$GLB,, | Performed by: FAMILY MEDICINE

## 2018-08-30 PROCEDURE — 99999 PR PBB SHADOW E&M-EST. PATIENT-LVL IV: CPT | Mod: PBBFAC,,, | Performed by: FAMILY MEDICINE

## 2018-08-30 PROCEDURE — 36415 COLL VENOUS BLD VENIPUNCTURE: CPT | Mod: PO

## 2018-08-30 RX ORDER — LANSOPRAZOLE 30 MG/1
30 CAPSULE, DELAYED RELEASE ORAL
Qty: 30 CAPSULE | Refills: 0 | Status: SHIPPED | OUTPATIENT
Start: 2018-08-30 | End: 2018-08-31

## 2018-08-30 RX ORDER — DEXLANSOPRAZOLE 60 MG/1
60 CAPSULE, DELAYED RELEASE ORAL DAILY
Qty: 90 CAPSULE | Refills: 0 | Status: SHIPPED | OUTPATIENT
Start: 2018-08-30 | End: 2018-08-30

## 2018-08-30 NOTE — PATIENT INSTRUCTIONS
Blood tests today  Start PPI today, daily, prior to any food  Follow up with GI for possible EGD and colonoscopy that is needed for screening  Follow tips as below      Tips to Control Acid Reflux    To control acid reflux, youll need to make some basic diet and lifestyle changes. The simple steps outlined below may be all youll need to ease discomfort.  Watch what you eat  · Avoid fatty foods and spicy foods.  · Eat fewer acidic foods, such as citrus and tomato-based foods. These can increase symptoms.  · Limit drinking alcohol, caffeine, and fizzy beverages. All increase acid reflux.  · Try limiting chocolate, peppermint, and spearmint. These can worsen acid reflux in some people.  Watch when you eat  · Avoid lying down for 3 hours after eating.  · Do not snack before going to bed.  Raise your head  Raising your head and upper body by 4 to 6 inches helps limit reflux when youre lying down. Put blocks under the head of your bed frame to raise it.  Other changes  · Lose weight, if you need to  · Dont exercise near bedtime  · Avoid tight-fitting clothes  · Limit aspirin and ibuprofen  · Stop smoking   Date Last Reviewed: 7/1/2016  © 5385-0973 [employer]. [address] [place] [address], [place] [address]. All rights reserved. This information is not intended as a substitute for professional medical care. Always follow your healthcare professional's instructions.

## 2018-08-30 NOTE — TELEPHONE ENCOUNTER
----- Message from Aliza Aviles sent at 8/29/2018  6:27 PM CDT -----  Contact: InMyRoom message  ----- Message from [facility], System Message sent at 8/29/2018  4:41 PM CDT -----    Appointment Request From: Chelly Ortiz    With Provider: Cherry Quezada MD [[facility]]    Preferred Date Range: 9/4/2018 - 9/28/2018    Preferred Times: Any time    Reason for visit: Office visit    Comments:  Check Cpap machine for cooling

## 2018-08-30 NOTE — TELEPHONE ENCOUNTER
----- Message from Dolores Mccray MA sent at 8/30/2018  2:34 PM CDT -----  Contact: [phone]/self      ----- Message -----  From: Leti Warren  Sent: 8/30/2018   2:25 PM  To: Kriss [facility] Staff    Pt requesting to speak with you concerning her prescription being too expensive  (name of prescription unknown)   Please call and advise

## 2018-08-30 NOTE — TELEPHONE ENCOUNTER
Left detailed message for the patient to either return call to the office or schedule an appointment on [facility] per her request to be scheduled with Dr. Quezada soon.     Please advise.

## 2018-08-30 NOTE — TELEPHONE ENCOUNTER
I spoke with patient and informed her a new Rx was sent to [facility]. Patient voiced understanding.

## 2018-08-30 NOTE — PROGRESS NOTES
Subjective:       Patient ID: Chelly Ortiz is a 67 y.o. female.    Chief Complaint: Abdominal Pain    Chelly is a 67 y.o. female who presents today for an urgent care visit. This is for abdominal pain. This started 7 days ago after she received some steroid shots through pain management for back and side pain. She is having some cramping pain and associated loose stools but not watery. This is about 3 times per day. She has chronic back pain. Appetite is good. Nothing that she is aware of makes this pain better or worse. She has tried pepto bismol and this hasn't helped. She has not been taking ibuprofen or naproxen. She is taking pepcid as previously prescribed. The pain is mostly epigastric.   No recent travel.       GI Problem   The primary symptoms include abdominal pain and diarrhea. Primary symptoms do not include fever, nausea, vomiting, melena, hematemesis, hematochezia, dysuria, myalgias, arthralgias or rash. The illness began 6 to 7 days ago. The problem has been gradually worsening.   The diarrhea began 6 to 7 days ago. The diarrhea is semi-solid. The diarrhea occurs 2 to 4 times per day.   The illness is also significant for bloating and back pain. The illness does not include chills, anorexia, dysphagia, odynophagia, constipation, tenesmus or itching.     Review of Systems   Constitutional: Negative for chills and fever.   Respiratory: Negative for chest tightness and shortness of breath.    Cardiovascular: Negative for chest pain and palpitations.   Gastrointestinal: Positive for abdominal pain, bloating and diarrhea. Negative for anorexia, constipation, dysphagia, hematemesis, hematochezia, melena, nausea and vomiting.   Genitourinary: Negative for dysuria.   Musculoskeletal: Positive for back pain. Negative for arthralgias and myalgias.   Skin: Negative for itching and rash.         Objective:     Vitals:    08/30/18 1307   BP: (!) 142/76   Pulse: 80   Temp: 98.8 °F (37.1 °C)        Physical Exam    Constitutional: She is oriented to person, place, and time. She appears well-developed and well-nourished. No distress.   HENT:   Head: Normocephalic and atraumatic.   Cardiovascular: Normal rate and regular rhythm.   Pulmonary/Chest: Effort normal and breath sounds normal.   Abdominal: Soft. Bowel sounds are normal. There is tenderness in the epigastric area. There is no rigidity, no rebound, no guarding and no CVA tenderness.   Musculoskeletal: She exhibits no edema.   Neurological: She is alert and oriented to person, place, and time.   Skin: Skin is warm. She is not diaphoretic.   Psychiatric: She has a normal mood and affect. Her behavior is normal. Judgment and thought content normal.   Nursing note and vitals reviewed.      Assessment:       1. Epigastric pain    2. Gastroesophageal reflux disease, esophagitis presence not specified    3. Colon cancer screening        Plan:       Blood tests today  This is likely 2/2 to reflux, exacerbated by recent steroid injections?  Start PPI today, daily, prior to any food  Follow up with GI for possible EGD and colonoscopy that is needed for screening  GERD tips on AVS  Follow up with PCP as scheduled   If this persists, she may benefit from imaging.     Epigastric pain  -     Comprehensive metabolic panel; Future; Expected date: 08/30/2018  -     Amylase; Future; Expected date: 08/30/2018  -     Lipase; Future; Expected date: 08/30/2018  -     dexlansoprazole (DEXILANT) 60 mg capsule; Take 1 capsule (60 mg total) by mouth once daily.  Dispense: 90 capsule; Refill: 0  -     Ambulatory referral to Gastroenterology    Gastroesophageal reflux disease, esophagitis presence not specified  -     Comprehensive metabolic panel; Future; Expected date: 08/30/2018  -     Amylase; Future; Expected date: 08/30/2018  -     Lipase; Future; Expected date: 08/30/2018  -     dexlansoprazole (DEXILANT) 60 mg capsule; Take 1 capsule (60 mg total) by mouth once daily.  Dispense: 90  capsule; Refill: 0  -     Ambulatory referral to Gastroenterology    Colon cancer screening  -     Ambulatory referral to Gastroenterology      Warning signs discussed, patient to call with any further issues or worsening of symptoms.

## 2018-08-31 ENCOUNTER — TELEPHONE (OUTPATIENT)
Dept: FAMILY MEDICINE | Facility: CLINIC | Age: 68
End: 2018-08-31

## 2018-08-31 DIAGNOSIS — R74.8 ELEVATED LIPASE: Primary | ICD-10-CM

## 2018-08-31 DIAGNOSIS — R10.13 EPIGASTRIC PAIN: ICD-10-CM

## 2018-08-31 RX ORDER — LANSOPRAZOLE 30 MG/1
30 CAPSULE, DELAYED RELEASE ORAL
Qty: 30 CAPSULE | Refills: 0 | Status: SHIPPED | OUTPATIENT
Start: 2018-08-31 | End: 2018-10-10

## 2018-08-31 NOTE — TELEPHONE ENCOUNTER
[place], I have called patient with her results.     Please schedule her repeat labs on Tuesday for 11 AM  Of note, when I spoke to her on the phone, she has no pain. She is afebrile. She denied any recent alcohol intake. Her last triglycerides were only mildly elevated at 213.         Message sent via portal: As we discussed on the phone your 1 marker of pancreatic function was slightly elevated.  This is a little bit more nonspecific.  I would like to repeat this test on Tuesday.  If these continue to be elevated, we will have to do a further workup.  If in between now and then you continue to have abdominal pain or if it worsens, please go to the emergency room.  Please contact my office with any further questions

## 2018-09-04 ENCOUNTER — LAB VISIT (OUTPATIENT)
Dept: LAB | Facility: HOSPITAL | Age: 68
End: 2018-09-04
Attending: FAMILY MEDICINE
Payer: MEDICARE

## 2018-09-04 DIAGNOSIS — R10.13 EPIGASTRIC PAIN: ICD-10-CM

## 2018-09-04 DIAGNOSIS — R74.8 ELEVATED LIPASE: ICD-10-CM

## 2018-09-04 LAB
AMYLASE SERPL-CCNC: 110 U/L
LIPASE SERPL-CCNC: 88 U/L

## 2018-09-04 PROCEDURE — 82150 ASSAY OF AMYLASE: CPT

## 2018-09-04 PROCEDURE — 36415 COLL VENOUS BLD VENIPUNCTURE: CPT | Mod: PO

## 2018-09-04 PROCEDURE — 83690 ASSAY OF LIPASE: CPT

## 2018-09-05 ENCOUNTER — PATIENT MESSAGE (OUTPATIENT)
Dept: FAMILY MEDICINE | Facility: CLINIC | Age: 68
End: 2018-09-05

## 2018-09-05 ENCOUNTER — TELEPHONE (OUTPATIENT)
Dept: FAMILY MEDICINE | Facility: CLINIC | Age: 68
End: 2018-09-05

## 2018-09-05 DIAGNOSIS — R74.8 ELEVATED LIPASE: Primary | ICD-10-CM

## 2018-09-05 DIAGNOSIS — R10.13 EPIGASTRIC PAIN: ICD-10-CM

## 2018-09-05 NOTE — TELEPHONE ENCOUNTER
I spoke with patient and scheduled CT on 09/07/2018 at 10 am. Patient had question about insurance approval. I advised patient that I will send a message to Marie (referral coordinator ) . Patient voiced understanding

## 2018-09-05 NOTE — TELEPHONE ENCOUNTER
I called patient to discuss lab results.  She has continued to have abdominal pain, but she is having no other symptoms such as fevers, chills, nausea, vomiting.  Due to her consistently  Elevated lipase, I will order a CT of the abdomen.    Boling, please call her and schedule this. She needs to NOT take metformin for 48 hours prior to this test being done.     I called her and asked her to do so but she did not  so a voicemail was left. I will also send a message via the portal.     She was counseled if her symptoms worsen to go to the ED.

## 2018-09-07 ENCOUNTER — HOSPITAL ENCOUNTER (OUTPATIENT)
Dept: RADIOLOGY | Facility: HOSPITAL | Age: 68
Discharge: HOME | End: 2018-09-07
Attending: FAMILY MEDICINE
Payer: MEDICARE

## 2018-09-07 ENCOUNTER — PATIENT MESSAGE (OUTPATIENT)
Dept: FAMILY MEDICINE | Facility: CLINIC | Age: 68
End: 2018-09-07

## 2018-09-07 ENCOUNTER — TELEPHONE (OUTPATIENT)
Dept: GASTROENTEROLOGY | Facility: CLINIC | Age: 68
End: 2018-09-07

## 2018-09-07 DIAGNOSIS — R10.13 EPIGASTRIC PAIN: ICD-10-CM

## 2018-09-07 DIAGNOSIS — R74.8 ELEVATED LIPASE: ICD-10-CM

## 2018-09-07 DIAGNOSIS — R93.5 ABNORMAL ABDOMINAL CT SCAN: ICD-10-CM

## 2018-09-07 DIAGNOSIS — R10.13 ABDOMINAL PAIN, EPIGASTRIC: Primary | ICD-10-CM

## 2018-09-07 DIAGNOSIS — Q45.3 PANCREATIC ABNORMALITY: ICD-10-CM

## 2018-09-07 PROCEDURE — 74177 CT ABD & PELVIS W/CONTRAST: CPT | Mod: TC

## 2018-09-07 PROCEDURE — 74177 CT ABD & PELVIS W/CONTRAST: CPT | Mod: 26,,, | Performed by: RADIOLOGY

## 2018-09-07 PROCEDURE — 25500020 PHARM REV CODE 255: Performed by: FAMILY MEDICINE

## 2018-09-07 RX ADMIN — IOHEXOL 30 ML: 350 INJECTION, SOLUTION INTRAVENOUS at 08:09

## 2018-09-07 RX ADMIN — IOHEXOL 100 ML: 350 INJECTION, SOLUTION INTRAVENOUS at 09:09

## 2018-09-07 NOTE — TELEPHONE ENCOUNTER
Dr. FANG called to discuss recent CT findings.    I reviewed with Dr. España re: epigastric pain, elevated lipase, abn ct scan.    He recommends EUS.  Called patient and discussed findings, indications, risks, benefit.  She would like to proceed.    Please call to schedule EUS for next week.

## 2018-09-07 NOTE — TELEPHONE ENCOUNTER
Spoke with pt and was able to schedule EUS with Dr. Moise on 9/19/18. Prep and instructions were reviewed with pt and mailed out to home address. Verbal Understanding.

## 2018-09-10 ENCOUNTER — PATIENT MESSAGE (OUTPATIENT)
Dept: FAMILY MEDICINE | Facility: CLINIC | Age: 68
End: 2018-09-10

## 2018-09-13 ENCOUNTER — TELEPHONE (OUTPATIENT)
Dept: GASTROENTEROLOGY | Facility: CLINIC | Age: 68
End: 2018-09-13

## 2018-09-14 ENCOUNTER — TELEPHONE (OUTPATIENT)
Dept: PODIATRY | Facility: CLINIC | Age: 68
End: 2018-09-14

## 2018-09-14 NOTE — TELEPHONE ENCOUNTER
----- Message from Zelda Manley sent at 9/14/2018  1:22 PM CDT -----  Contact: self - [phone]  Patient is returning a call from your office. Please advise

## 2018-09-14 NOTE — TELEPHONE ENCOUNTER
olive pt with DR. Vincent at [facility] pt did not wan to wait for Dr. Martinez

## 2018-09-17 ENCOUNTER — TELEPHONE (OUTPATIENT)
Dept: ENDOSCOPY | Facility: HOSPITAL | Age: 68
End: 2018-09-17

## 2018-09-17 ENCOUNTER — OFFICE VISIT (OUTPATIENT)
Dept: OBSTETRICS AND GYNECOLOGY | Facility: CLINIC | Age: 68
End: 2018-09-17
Payer: MEDICARE

## 2018-09-17 VITALS
HEIGHT: 63 IN | WEIGHT: 193.56 LBS | BODY MASS INDEX: 34.3 KG/M2 | DIASTOLIC BLOOD PRESSURE: 76 MMHG | SYSTOLIC BLOOD PRESSURE: 122 MMHG

## 2018-09-17 DIAGNOSIS — Z12.31 ENCOUNTER FOR SCREENING MAMMOGRAM FOR MALIGNANT NEOPLASM OF BREAST: ICD-10-CM

## 2018-09-17 DIAGNOSIS — Z01.411 ENCOUNTER FOR GYNECOLOGICAL EXAMINATION (GENERAL) (ROUTINE) WITH ABNORMAL FINDINGS: Primary | ICD-10-CM

## 2018-09-17 DIAGNOSIS — N90.89 VULVAR LESION: ICD-10-CM

## 2018-09-17 PROCEDURE — G0101 CA SCREEN;PELVIC/BREAST EXAM: HCPCS | Mod: S$PBB,,, | Performed by: OBSTETRICS & GYNECOLOGY

## 2018-09-17 PROCEDURE — 99999 PR PBB SHADOW E&M-EST. PATIENT-LVL III: CPT | Mod: PBBFAC,,, | Performed by: OBSTETRICS & GYNECOLOGY

## 2018-09-17 PROCEDURE — G0101 CA SCREEN;PELVIC/BREAST EXAM: HCPCS | Mod: PBBFAC,PO | Performed by: OBSTETRICS & GYNECOLOGY

## 2018-09-17 PROCEDURE — 99213 OFFICE O/P EST LOW 20 MIN: CPT | Mod: PBBFAC,PO | Performed by: OBSTETRICS & GYNECOLOGY

## 2018-09-17 RX ORDER — SITAGLIPTIN 100 MG/1
TABLET, FILM COATED ORAL
Refills: 3 | COMMUNITY
Start: 2018-08-06 | End: 2019-01-28 | Stop reason: SDUPTHER

## 2018-09-17 NOTE — TELEPHONE ENCOUNTER
Spoke with patient about arrival time @. 0800    NPO status reviewed: Patient must have nothing to eat after midnight.  Pt may have CLEAR liquids ONLY until completely NPO 3 hrs @ 0500    Medications: Do not take Insulin or oral diabetic medications the day of the procedure.  Take as prescribed: heart, seizure and blood pressure medication in the morning with a sip of water (less than an ounce).  Take any breathing medications and bring inhalers to hospital with you Leave all valuables and jewelry at home.     Wear comfortable clothes to procedure to change into hospital gown You cannot drive for 24 hours after your procedure because you will receive sedation for your procedure to make you comfortable.  A ride must be provided at discharge.

## 2018-09-17 NOTE — PROGRESS NOTES
"OBSTETRIC HISTORY:   OB History      Para Term  AB Living    2 2 1 1        SAB TAB Ectopic Multiple Live Births                        COMPREHENSIVE GYN HISTORY:  PAP History: Denies abnormal Paps.  Infection History: Denies STDs. Denies PID.  Benign History: Denies uterine fibroids. Denies ovarian cysts. Denies endometriosis.   Cancer History: Denies cervical cancer. Denies uterine cancer or hyperplasia. Denies ovarian cancer. Denies vulvar cancer or pre-cancer. Denies vaginal cancer or pre-cancer. Denies breast cancer. Denies colon cancer.  Sexual Activity History:   has no sexual activity history on file.   Last GYN exam with Medicare 2/18/15      HPI:   67 y.o. No LMP recorded (lmp unknown). Patient has had a hysterectomy.    Patient is  here for Medicare breast and pelvic exam. She has vulvar lesions that bother when she wipes and she has dryness. She has an old tube of Estrace cream at home. She denies bladder, bowel and breast complaints.    ROS:  GENERAL: Denies weight gain or weight loss. Feeling well overall.   SKIN: Denies rash or lesions.   HEAD: Denies headache.   NODES: Denies enlarged lymph nodes.   CHEST: Denies shortness of breath.   ABDOMEN: No abdominal pain, constipation, diarrhea, nausea, vomiting or rectal bleeding.   URINARY: No frequency, dysuria, hematuria, or burning on urination.  REPRODUCTIVE: See HPI.   BREASTS: The patient denies pain, lumps, or nipple discharge.   HEMATOLOGIC: No easy bruisability.   MUSCULOSKELETAL: Denies joint pain or back pain.   NEUROLOGIC: Denies weakness.   PSYCHIATRIC: Denies depression, anxiety or mood swings.        PE:   /76 (BP Location: Right arm)   Ht 5' 3" (1.6 m)   Wt 87.8 kg (193 lb 9 oz)   LMP  (LMP Unknown)   BMI 34.29 kg/m²   APPEARANCE: Well nourished, well developed, in no acute distress.  NECK: Neck symmetric without thyromegaly.  NODES: No inguinal or cervical lymph node enlargement.  CHEST: Lungs clear to " auscultation.  HEART: Regular rate and rhythm, no murmurs, rubs or gallops.  ABDOMEN: Soft. No tenderness or masses. No hernias.  BREASTS: Symmetrical, no skin changes or visible lesions. No palpable masses, nipple discharge or adenopathy bilaterally.  VULVA: Enlarging epidermal inclusion cysts. Normal female genitalia.  URETHRAL MEATUS: Normal size and location, no lesions, no prolapse.  URETHRA: No masses, tenderness, prolapse or scarring.  VAGINA: Atrophic and not well rugated, no discharge, no significant cystocele or rectocele.  CERVIX AND UTERUS SURGICALLY ABSENT.   ADNEXA: No masses or tenderness.    PROCEDURES:  Pap smear -- NOT INDICATED    Assessment/Plan:  Medicare pelvic and breast exam  Vulvar lesion-- excision in OR  Vaginal atrophy Ok to use old tube

## 2018-09-17 NOTE — TELEPHONE ENCOUNTER
Left message about 0700 arrival time. Clear liquids past 7pm the day before the procedure. NPO past midnight. Please take blood pressure, heart seizure medication the morning of the procedure. Hold all diabetic medication the morning of the procedure. Leave all valuable at home.  Please have a ride available the day of the procedure. Please call to confirmed.

## 2018-09-18 ENCOUNTER — TELEPHONE (OUTPATIENT)
Dept: GASTROENTEROLOGY | Facility: CLINIC | Age: 68
End: 2018-09-18

## 2018-09-18 ENCOUNTER — TELEPHONE (OUTPATIENT)
Dept: OPTOMETRY | Facility: CLINIC | Age: 68
End: 2018-09-18

## 2018-09-18 ENCOUNTER — TELEPHONE (OUTPATIENT)
Dept: OBSTETRICS AND GYNECOLOGY | Facility: CLINIC | Age: 68
End: 2018-09-18

## 2018-09-18 ENCOUNTER — PATIENT MESSAGE (OUTPATIENT)
Dept: FAMILY MEDICINE | Facility: CLINIC | Age: 68
End: 2018-09-18

## 2018-09-18 ENCOUNTER — TELEPHONE (OUTPATIENT)
Dept: FAMILY MEDICINE | Facility: CLINIC | Age: 68
End: 2018-09-18

## 2018-09-18 ENCOUNTER — PATIENT MESSAGE (OUTPATIENT)
Dept: GASTROENTEROLOGY | Facility: CLINIC | Age: 68
End: 2018-09-18

## 2018-09-18 ENCOUNTER — PATIENT MESSAGE (OUTPATIENT)
Dept: OPHTHALMOLOGY | Facility: CLINIC | Age: 68
End: 2018-09-18

## 2018-09-18 NOTE — TELEPHONE ENCOUNTER
----- Message from Susan Reese MA sent at 9/18/2018  2:45 PM CDT -----      ----- Message -----  From: Marie Polanco  Sent: 9/18/2018   2:40 PM  To: Kriss Guidry Staff    Just letting you know I spoke with Ms. Ortiz regarding her procedure that was scheduled with Dr. Moise on 09/19/18. As of now the procedure is cancelled and Nichol from the gastro [place] office has sent a message to pre services to investigate whether or not Dr. Moise is in network. I advised Ms. Ortiz to wait to hear from them about rescheduling the procedure.     I also sent a message to Dr. Jesse Simpson's office at [facility] at the request of the patient because she stated she wanted an appointment with her. I did advised the patient that that doctor only sees patients with IBD but Ms. Ortiz stated she also has that condition. I told her they would contact her if an appointment could be made.    Thank you

## 2018-09-18 NOTE — TELEPHONE ENCOUNTER
Message sent to [employer] to investigate if procedure was approved.  Patient reports she was told by [employer] that Dr. Moise was out of network.  Patient would like to be sure procedure is covered.

## 2018-09-18 NOTE — TELEPHONE ENCOUNTER
----- Message from Marleny Beth sent at 9/18/2018 12:25 PM CDT -----  No. [phone]    Patient has a question about the notes written after her appointment on 9/17/18.   Patient read them on [facility].

## 2018-09-18 NOTE — TELEPHONE ENCOUNTER
----- Message from Marie Polanco sent at 9/18/2018  2:38 PM CDT -----  Contact: [phone] / self   Patient would like to get an appointment with the doctor as a new patient. Patient has a referral with a diagnosis of Epigastric pain, gastroesophageal reflux disease, esophagitis presence not specified, and Colon cancer screening. I advised the patient that Dr. Simpson only sees IBD patients and Ms. Ortiz stated she also has that issue as well. Advised patient I would send the message and someone from the office would contact her to let her know if an appointment could be made.    Thank you

## 2018-09-18 NOTE — TELEPHONE ENCOUNTER
Patient was questioning the after visit rebeca (AVS).  She states there was something in red lettering.  After reviewing patient's AVS the only thing that was listed in red was an order Dr. Odell placed for her mammogram to be done after 11/28/18.  Patient states that was not what she thought she saw but was unable to say what she thought it was.  Notified again according to the AVS and Dr. Odell's office note the only thing ordered was her mammogram.  Patient verbalized understanding

## 2018-09-18 NOTE — TELEPHONE ENCOUNTER
----- Message from Marie Polanco sent at 9/18/2018 11:00 AM CDT -----  Good Morning, the patient sent the following message through [facility] to Dr. Monterroso's office.     Dr. Monterroso, I checked with my insurance and Dr. Alexx Moise is not in [facility] and the surgery is scheduled for tomorrow. Thanks     Can someone assist her in rescheduling?    Thank you

## 2018-09-19 NOTE — TELEPHONE ENCOUNTER
Pt is not currently experiencing and GI S/S. No IBD dx on file. Pt wishes to establish with a General GI and schedule a Colonoscopy. Transferred to GI MD, [id number].

## 2018-09-21 ENCOUNTER — TELEPHONE (OUTPATIENT)
Dept: ADMINISTRATIVE | Facility: OTHER | Age: 68
End: 2018-09-21

## 2018-09-21 DIAGNOSIS — N90.89 VULVAR LESION: Primary | ICD-10-CM

## 2018-09-24 ENCOUNTER — PATIENT MESSAGE (OUTPATIENT)
Dept: FAMILY MEDICINE | Facility: CLINIC | Age: 68
End: 2018-09-24

## 2018-09-26 ENCOUNTER — PATIENT MESSAGE (OUTPATIENT)
Dept: FAMILY MEDICINE | Facility: CLINIC | Age: 68
End: 2018-09-26

## 2018-09-28 ENCOUNTER — CLINICAL SUPPORT (OUTPATIENT)
Dept: REHABILITATION | Facility: HOSPITAL | Age: 68
End: 2018-09-28
Payer: MEDICARE

## 2018-09-28 DIAGNOSIS — R53.1 DECREASED STRENGTH: ICD-10-CM

## 2018-09-28 DIAGNOSIS — R26.89 DECREASED MOBILITY: ICD-10-CM

## 2018-09-28 DIAGNOSIS — M53.86 DECREASED ROM OF LUMBAR SPINE: ICD-10-CM

## 2018-09-28 PROCEDURE — G8978 MOBILITY CURRENT STATUS: HCPCS | Mod: CK,PN

## 2018-09-28 PROCEDURE — G8979 MOBILITY GOAL STATUS: HCPCS | Mod: CJ,PN

## 2018-09-28 PROCEDURE — 97110 THERAPEUTIC EXERCISES: CPT | Mod: PN

## 2018-09-28 PROCEDURE — 97161 PT EVAL LOW COMPLEX 20 MIN: CPT | Mod: PN

## 2018-09-28 NOTE — PLAN OF CARE
TIME RECORD    Date: 9/28/2018    Start Time:  3:15  Stop Time:  4:00    PROCEDURES:    TIMED  Procedure Min.   TE 10                     UNTIMED  Procedure Min.   IE 35         Total Timed Minutes:  10  Total Timed Units:  1 TE  Total Untimed Units:  1 LCE  Charges Billed/# of units:  1 TE + 1 LCE    [facility]    PHYSICAL THERAPY EVALUATION  Onset Date: 4/2018  Medical Diagnosis: M54.5,G89.29 (ICD-10-CM) - Chronic bilateral low back pain without sciatica  Treatment Diagnosis:   1. Decreased ROM of lumbar spine     2. Decreased strength     3. Decreased mobility       Physician: Tani Pittman FNP  Past Medical History:   Diagnosis Date    Allergy     Cataract     DDD (degenerative disc disease), lumbar     Diabetes mellitus     diet controlled    Diabetes mellitus, type 2     GERD (gastroesophageal reflux disease)     History of subconjunctival hemorrhage 12/2/11    left eye    Hyperlipidemia     Hypertension     IBS (irritable bowel syndrome)     Obesity     MYRIAM (obstructive sleep apnea)     on CPAP    Rotator cuff impingement syndrome     Seasonal allergic conjunctivitis      Precautions: DDD of lumbar spine, DM II, GERD, HTN, IBS, MYRIAM, RCT syndrome  Prior Therapy: yes for neck PT  Medications: Chelly Ortiz has a current medication list which includes the following prescription(s): accu-chek fastclix lancet drum, accu-chek sean, amlodipine, aspirin, blood sugar diagnostic, citalopram, ergocalciferol, famotidine, gabapentin, glimepiride, hyoscyamine, januvia, lansoprazole, lidocaine hcl 2%, losartan, magnesium oxide, pravastatin, and trazodone.  Nutrition:  Overweight  History of Present Illness: See subjective below  Prior Level of Function: Independent  Social History: retired   Place of Residence (Steps/Adaptations): 2 story home  Functional Deficits Leading to Referral/Nature of Injury: prolonged standing and walking, stairs, bending forward  Patient Therapy Goals: To  make me feel better    Subjective     Chelly Ortiz states her back pain started this past April of 2018 that was significantly worse than it is today. She still has a lot of trouble with prolonged standing and walking. Pain is mainly at her L side of her upper lumbar spine laterally that seldom descends down distally. She received low back injections in August that greatly helped with her pain, and still wanted to come to therapy in case there is anything she needs to do here to help it or prevent increased future pain.    Pain:  Location: L mid back   Description: Aching  Activities Which Increase Pain: Bending and Walking  Activities Which Decrease Pain: rest  Pain Scale: 4/10 at best 4/10 now  10/10 at worst    Objective     Posture: anterior pelvic tilt, L thoracic curve with R lumbar curve  Palpation: +TTP at T10-T12 L proximal ribs and musculature  Sensation: decreased light touch to finger tips in R hand only  DTRs: NT  Range of Motion/Strength:   * = back pain with testing  AROM: LUMBAR   Flexion 50%*   Extension 25%   Right side bending 80%   Left side bending 80%   Right rotation 100%   Left rotation 80%*     Hip  Right   Left  Pain/Dysfunction with Movement    AROM PROM MMT AROM PROM MMT    Flexion WFL WFL 4+/5 WFL WFL 3+/5    Extension WFL WFL 4/5 WFL WFL 3+/5    Abduction WFL WFL 4/5 WFL WFL 3+/5    Adduction WFL WFL 4+/5 WFL WFL 4/5    Internal rotation WFL WFL 5/5 WFL WFL 4+/5    External rotation WFL WFL 4/5 WFL WFL 4/5       Knee  Right   Left  Pain/Dysfunction with Movement    AROM PROM MMT AROM PROM MMT    Flexion WFL WFL 5/5 WFL WFL 4/5    Extension WFL WFL 5/5 WFL WFL 4/5      Ankle  Right   Left  Pain/Dysfunction with Movement    AROM PROM MMT AROM PROM MMT    Plantarflexion WFL WFL 5/5 WFL WFL 5/5    Dorsiflexion WFL WFL 5/5 WFL WFL 4+/5    Inversion WFL WFL NT WFL WFL NT    Eversion WFL WFL NT WFL WFL NT      Flexibility: decreased L quad length  Gait: Without AD  Analysis: slight R trunk  lean, increased forward lean  Bed Mobility:Independent  Transfers: Independent  Special Tests:   Slump test = negative B  Quadrant test = NT  SLR Test = negative B  SL Bridge Test (glut med strength) = NT  Ely's test = positive on L with decreased quad length, negative on R with good quad length  Prone Instability Test = NT  Joesph Test = NT   J-Sign= NT  HS Length 90-90 test = NT    SI Tests:  SI distraction test = NT  SI compression test (sidelying) = NT  Flick test = NT  Thigh Thrust Test = NT  Sacral Spring = NT    Repeated Measures:  Lumbar flexion = NT  Lumbar extension = NT      CMS Impairment/Limitation/Restriction for FOTO Lumbar Spine Survey    Therapist reviewed FOTO scores for Chelly Ortiz on 9/28/2018.   FOTO documents entered into [employer] - see Media section.    Limitation Score: 48%  Category: Mobility    Current : CK = at least 40% but < 60% impaired, limited or restricted  Goal: CJ = at least 20% but < 40% impaired, limited or restricted       TREATMENT     Time In: 3:50  Time Out: 4:00    PT Evaluation Completed? Yes  Discussed Plan of Care with patient: Yes    Chelly received 10 minutes of therapeutic exercise & instruction including:  DATE 9/28/2018   VISIT 1   POC 11/28/18    G CODE 1/10   FOTO 1/5   Cap Visit  Cap Total 113.20  113.20   MHP    MT    Stretches:    HS stretch    Piriformis stretch    Hip flexor stretch    Supine:    LTRs    TAs 10x5''   Brace knee fall outs    Brace marching    Bridges 10x   SL hip abd 10x R   Clams    Thoracic rotations    Dead bug        Prone:    Glut sets    Butt winks    Prone contra UE/LE ext     Quadruped contra UE/LE ext    Cat-Camel    Isometric multifidi submax contraction        Standing:    Rows    Lat pull downs    Calf stretch on fitter    Leg Press    Table turns on SB    Lifts    Chops    Gait    CP    INITIALS AZEB     Written Home Exercises Provided: Yes  Chelly demo good understanding of the education provided. Patient demo good return demo of  skill of exercises.    See EMR in Patient Instructions for HEP given: Yes      Assessment       Initial Assessment (Pertinent finding, problem list and factors affecting outcome):   Pt is a 68 yo female dx with Chronic bilateral low back pain without sciatica presenting to PT at [facility]. Pt currently presents with L side of lower and upper thoracic/lumbar pain, decreased lumbar AROM with pain, decreased BLE and trunk strength, poor posture, impaired balance and gait, and functional deficits with ADLs. Pt would benefit from skilled PT consisting of gait training, muscular skeletal stretching/strengthening, manual therapy, neuro muscular re-education, and modalities prn to address limitations and increase functional mobility.      History  Co-morbidities and personal factors that may impact the plan of care Co-morbidities:   anxiety, arthritis, back pain, BMI over 30, DMII, GI disease, HTN      Personal Factors:   no deficits     high   Examination  Body Structures and Functions, activity limitations and participation restrictions that may impact the plan of care Body Regions:   neck  back  lower extremities  trunk    Body Systems:    gross symmetry  ROM  strength  gross coordinated movement  balance  gait  transfers  transitions  motor control    Participation Restrictions:   Prolonged walking and standing activities    Activity limitations:   Learning and applying knowledge  no deficits    General Tasks and Commands  no deficits    Communication  no deficits    Mobility  lifting and carrying objects  walking    Self care  no deficits    Domestic Life  shopping  cooking  doing house work (cleaning house, washing dishes, laundry)    Interactions/Relationships  no deficits    Life Areas  no deficits    Community and Social Life  no deficits         high   Clinical Presentation stable and uncomplicated low   Decision Making/ Complexity Score: low       Rehab Potiential:  excellent  Spiritual/Cultural Needs: None  Barriers to Rehab: None  GOALS: Short Term Goals: 4 weeks  1. Pt will demo good TA muscle contraction for improved deep abdominal strength and lumbar stability.  3. Increase lumbar ROM to WNL loss in order to improve functional mobility.    4. Pt will demo good sitting/standing posture and body mechanics for improved spine health and decreased risk of future injury.  5. Pt to tolerate HEP to improve ROM and independence with ADL's.    Long Term Goals: 8 weeks  1. Report decreased low back pain without radiculopathy to </= 2/10  to increase tolerance for ADLs  2. Increase strength to >/= 4/5 MMT grade for BLE to increase tolerance for ADL and work activities.  3. Pt to demonstrate negative SLR and/or Slump Test in order to show improved core strength and decreased nerve/dural tension.  4. Patient's goal: To make me feel better  5. Pt will report at CJlevel (20-40% impaired) on FOTO lumbar score for low back pain disability to demonstrate decrease in disability and improvement in back pain.      Plan     Certification Period: 9/28/18 to 11/28/2018  Recommended Treatment Plan: 2 times per week for 8 weeks: Aquatic Therapy, Cervical/Lumbar Traction, Electrical Stimulation IFC/Russian, Gait Training, Manual Therapy, Moist Heat/ Ice, Neuromuscular Re-ed, Orthotic Management and Training, Patient Education, Self Care, Therapeutic Activites and Therapeutic Exercise  Other Recommendations: modalities prn, ASTYM prn, kinesiotape prn, Functional Dry Needling prn       Ajay Arias, PT  9/28/2018      I CERTIFY THE NEED FOR THESE SERVICES FURNISHED UNDER THIS PLAN OF TREATMENT AND WHILE UNDER MY CARE    Physician's comments: ________________________________________________________________________________________________________________________________________________      Physician's Name: ___________________________________

## 2018-09-30 ENCOUNTER — OFFICE VISIT (OUTPATIENT)
Dept: URGENT CARE | Facility: CLINIC | Age: 68
End: 2018-09-30
Payer: MEDICARE

## 2018-09-30 VITALS
SYSTOLIC BLOOD PRESSURE: 134 MMHG | TEMPERATURE: 99 F | HEIGHT: 63 IN | DIASTOLIC BLOOD PRESSURE: 76 MMHG | RESPIRATION RATE: 18 BRPM | WEIGHT: 191 LBS | HEART RATE: 83 BPM | OXYGEN SATURATION: 95 % | BODY MASS INDEX: 33.84 KG/M2

## 2018-09-30 DIAGNOSIS — R10.13 EPIGASTRIC PAIN: Primary | ICD-10-CM

## 2018-09-30 PROCEDURE — 1101F PT FALLS ASSESS-DOCD LE1/YR: CPT | Mod: CPTII,S$GLB,, | Performed by: FAMILY MEDICINE

## 2018-09-30 PROCEDURE — 3078F DIAST BP <80 MM HG: CPT | Mod: CPTII,S$GLB,, | Performed by: FAMILY MEDICINE

## 2018-09-30 PROCEDURE — 3075F SYST BP GE 130 - 139MM HG: CPT | Mod: CPTII,S$GLB,, | Performed by: FAMILY MEDICINE

## 2018-09-30 PROCEDURE — 99214 OFFICE O/P EST MOD 30 MIN: CPT | Mod: S$GLB,,, | Performed by: FAMILY MEDICINE

## 2018-09-30 NOTE — PROGRESS NOTES
"Subjective:       Patient ID: Chelly Ortiz is a 67 y.o. female.    Vitals:  height is 5' 3" (1.6 m) and weight is 86.6 kg (191 lb). Her oral temperature is 99.4 °F (37.4 °C). Her blood pressure is 134/76 and her pulse is 83. Her respiration is 18 and oxygen saturation is 95%.     Chief Complaint: Abdominal Pain    This is a 67 y.o. female who presents today with a chief complaint of   Stomach issues, pain, soft stools, belching with dull pain . Taking Prevacid but no relief. Reports being seen by her PCP and referred for CT for evaluation. MRCP recommended. Awaiting referral.       Abdominal Pain   This is a new problem. The current episode started in the past 7 days. The onset quality is undetermined. The problem occurs intermittently. The problem has been gradually worsening. The pain is located in the epigastric region. Pain scale: anoying discompfort like hunger pains  The pain is mild. The quality of the pain is dull and cramping. The abdominal pain radiates to the epigastric region. Associated symptoms include diarrhea. Pertinent negatives include no constipation, dysuria, fever, hematochezia, melena, nausea or vomiting. Nothing aggravates the pain. The pain is relieved by nothing. She has tried antacids and proton pump inhibitors for the symptoms. The treatment provided no relief. Prior diagnostic workup includes CT scan. Her past medical history is significant for GERD.     Review of Systems   Constitution: Negative for chills and fever.   Cardiovascular: Negative for chest pain.   Respiratory: Negative for shortness of breath.    Musculoskeletal: Negative for back pain.   Gastrointestinal: Positive for abdominal pain, diarrhea and heartburn. Negative for constipation, hematochezia, melena, nausea and vomiting.   Genitourinary: Negative for dysuria.       Objective:      Physical Exam   Constitutional: She appears well-developed and well-nourished.   HENT:   Head: Normocephalic and atraumatic.   Eyes: EOM " are normal. Pupils are equal, round, and reactive to light.   Cardiovascular: Normal rate, regular rhythm and normal heart sounds.   Pulmonary/Chest: Effort normal and breath sounds normal.   Abdominal: Soft. Bowel sounds are normal. She exhibits no distension and no mass. There is tenderness (epigastric tenderness, no rebound or guarding). There is no guarding.   Nursing note and vitals reviewed.      Assessment:       1. Epigastric pain        Plan:         Epigastric pain  -     Ambulatory referral to Gastroenterology    to go to the ER for worsening pain

## 2018-09-30 NOTE — PATIENT INSTRUCTIONS
Epigastric Pain (Uncertain Cause)     Epigastric pain can be a sign of disease in the upper abdomen. Common causes include:  · Acid reflux (stomach acid flowing up into the esophagus)  · Gastritis (irritation of the stomach lining)  · Peptic Ulcer Disease  · Inflammation of the pancreas  · Gallstone  · Infection in the gallbladder  Pain may be dull or burning. It may spread upward to the chest or to the back. There may be other symptoms such as belching, bloating, cramps or hunger pains. There may be weight loss or poor appetite, nausea or vomiting.  Since the diagnosis of your pain is not certain yet, further tests may sometimes be needed. Sometimes the doctor will treat you for the most likely condition to see if there is improvement before doing further tests.  Home care  Medicines  · Antacids help neutralize the normal acids in your stomach. Examples are Maalox, Mylanta, Rolaids, and Tums. If you dont like the liquid, you can also try a chewable one. You may find one works better than another for you. Overuse can cause diarrhea or constipation.  · Acid blockers (H2 blockers) decrease acid production. Examples are cimetidine (Tagamet), famotidine (Pepcid) and ranitidine (Zantac).  · Acid inhibitors (PPIs) decrease acid production in a different way than the blockers. You may find they work better, but can take a little longer to take effect.  Examples are omeprazole (Prilosec), lansoprazole (Prevacid), pantoprazole (Protonix), rabeprazole (Aciphex), and esomeprazole (Nexium).  · Take an antacid 30-60 minutes after eating and at bedtime, but not at the same time as an acid blocker.  · Try not to take NSAIDs. Aspirin may also cause problems, but if taking it for your heart or other medical reasons, talk to your doctor before stopping it; you do not want to cause a worse problem, like a heart attack or stroke.  Diet  · If certain foods seem to cause your spasm, try to avoid them.   · Eat slowly and chew food well  before swallowing. Symptoms of gastritis can be worsened by certain foods. Limit or avoid fatty, fried, and spicy foods, as well as coffee, chocolate, mint, and foods with high acid content such as tomatoes and citrus fruit and juices (orange, grapefruit, lemon).  · Avoid alcohol, caffeine, and tobacco, which can delay healing and worsen your problem.  · Try eating smaller meals with snacks in between  Follow-up care  Follow up with your healthcare provider or as advised.  When to seek medical advice  Call your healthcare provider right away if any of the following occur:  · Stomach pain worsens or moves to the right lower part of the abdomen  · Chest pain appears, or if it worsens or spreads to the chest, back, neck, shoulder, or arm  · Frequent vomiting (cant keep down liquids)  · Blood in the stool or vomit (red or black color)  · Feeling weak or dizzy, fainting, or having trouble breathing  · Fever of 100.4ºF (38ºC) or higher, or as directed by your healthcare provider  · Abdominal swelling  Date Last Reviewed: 9/25/2015  © 5528-4539 [employer]. [address] [place] [address]. All rights reserved. This information is not intended as a substitute for professional medical care. Always follow your healthcare professional's instructions.

## 2018-10-01 ENCOUNTER — TELEPHONE (OUTPATIENT)
Dept: GASTROENTEROLOGY | Facility: CLINIC | Age: 68
End: 2018-10-01

## 2018-10-01 NOTE — TELEPHONE ENCOUNTER
EUS scheduled on 10/09/2018 with Dr. Simpson.  Location and instructions explained to patient. NPO after midnight.  Clear liquids after 700pm. Lab will call two days before with arrival time. Must have transportation due to sedation. Verbalized understanding.

## 2018-10-01 NOTE — TELEPHONE ENCOUNTER
----- Message from Swati Brower sent at 10/1/2018 10:16 AM CDT -----  Contact: self  Pt is requesting to speak with Nichol. Per pt, she would like to discuss scheduling a endoscopy.    Pt can be reached at [phone].    Thank you

## 2018-10-02 ENCOUNTER — OFFICE VISIT (OUTPATIENT)
Dept: PODIATRY | Facility: CLINIC | Age: 68
End: 2018-10-02
Payer: MEDICARE

## 2018-10-02 VITALS
WEIGHT: 196 LBS | HEART RATE: 70 BPM | HEIGHT: 64 IN | DIASTOLIC BLOOD PRESSURE: 83 MMHG | SYSTOLIC BLOOD PRESSURE: 145 MMHG | BODY MASS INDEX: 33.46 KG/M2

## 2018-10-02 DIAGNOSIS — E11.49 TYPE II DIABETES MELLITUS WITH NEUROLOGICAL MANIFESTATIONS: Primary | ICD-10-CM

## 2018-10-02 DIAGNOSIS — B35.1 ONYCHOMYCOSIS DUE TO DERMATOPHYTE: ICD-10-CM

## 2018-10-02 DIAGNOSIS — E11.42 DIABETIC POLYNEUROPATHY ASSOCIATED WITH TYPE 2 DIABETES MELLITUS: ICD-10-CM

## 2018-10-02 PROCEDURE — 99214 OFFICE O/P EST MOD 30 MIN: CPT | Mod: PBBFAC | Performed by: PODIATRIST

## 2018-10-02 PROCEDURE — 3045F PR MOST RECENT HEMOGLOBIN A1C LEVEL 7.0-9.0%: CPT | Mod: CPTII,,, | Performed by: PODIATRIST

## 2018-10-02 PROCEDURE — 3077F SYST BP >= 140 MM HG: CPT | Mod: CPTII,,, | Performed by: PODIATRIST

## 2018-10-02 PROCEDURE — 1101F PT FALLS ASSESS-DOCD LE1/YR: CPT | Mod: CPTII,,, | Performed by: PODIATRIST

## 2018-10-02 PROCEDURE — 3079F DIAST BP 80-89 MM HG: CPT | Mod: CPTII,,, | Performed by: PODIATRIST

## 2018-10-02 PROCEDURE — 99213 OFFICE O/P EST LOW 20 MIN: CPT | Mod: S$PBB,,, | Performed by: PODIATRIST

## 2018-10-02 PROCEDURE — 99999 PR PBB SHADOW E&M-EST. PATIENT-LVL IV: CPT | Mod: PBBFAC,,, | Performed by: PODIATRIST

## 2018-10-02 NOTE — PROGRESS NOTES
Subjective:      Patient ID: Chelly Ortiz is a 67 y.o. female.    Chief Complaint: Diabetes Mellitus (dr saravia07/17/2018) and Diabetic Foot Exam    Chelly is a 67 y.o. female who presents to the clinic upon referral from Dr. Scott  for evaluation and treatment of diabetic feet. Chelly has a past medical history of Allergy, Cataract, DDD (degenerative disc disease), lumbar, Diabetes mellitus, Diabetes mellitus, type 2, GERD (gastroesophageal reflux disease), History of subconjunctival hemorrhage (12/2/11), Hyperlipidemia, Hypertension, IBS (irritable bowel syndrome), Obesity, MYRIAM (obstructive sleep apnea), Rotator cuff impingement syndrome, and Seasonal allergic conjunctivitis. Patient relates no major problem with feet. Only complaints today consist of here for diabetic foot exam and diabetic shoes.     PCP: Gabriel Wilson MD    Date Last Seen by PCP: 7/17/18    Current shoe gear: Casual shoes    Hemoglobin A1C   Date Value Ref Range Status   06/20/2018 8.8 (H) 4.0 - 5.6 % Final     Comment:     ADA Screening Guidelines:  5.7-6.4%  Consistent with prediabetes  >or=6.5%  Consistent with diabetes  High levels of fetal hemoglobin interfere with the HbA1C  assay. Heterozygous hemoglobin variants (HbS, HgC, etc)do  not significantly interfere with this assay.   However, presence of multiple variants may affect accuracy.     01/15/2018 8.6 (H) 4.0 - 5.6 % Final     Comment:     According to ADA guidelines, hemoglobin A1c <7.0% represents  optimal control in non-pregnant diabetic patients. Different  metrics may apply to specific patient populations.   Standards of Medical Care in Diabetes-2016.  For the purpose of screening for the presence of diabetes:  <5.7%     Consistent with the absence of diabetes  5.7-6.4%  Consistent with increasing risk for diabetes   (prediabetes)  >or=6.5%  Consistent with diabetes  Currently, no consensus exists for use of hemoglobin A1c  for diagnosis of diabetes for children.  This  Hemoglobin A1c assay has significant interference with fetal   hemoglobin   (HbF). The results are invalid for patients with abnormal amounts of   HbF,   including those with known Hereditary Persistence   of Fetal Hemoglobin. Heterozygous hemoglobin variants (HbAS, HbAC,   HbAD, HbAE, HbA2) do not significantly interfere with this assay;   however, presence of multiple variants in a sample may impact the %   interference.     08/11/2017 9.4 (H) 4.0 - 5.6 % Final     Comment:     According to ADA guidelines, hemoglobin A1c <7.0% represents  optimal control in non-pregnant diabetic patients. Different  metrics may apply to specific patient populations.   Standards of Medical Care in Diabetes-2016.  For the purpose of screening for the presence of diabetes:  <5.7%     Consistent with the absence of diabetes  5.7-6.4%  Consistent with increasing risk for diabetes   (prediabetes)  >or=6.5%  Consistent with diabetes  Currently, no consensus exists for use of hemoglobin A1c  for diagnosis of diabetes for children.  This Hemoglobin A1c assay has significant interference with fetal   hemoglobin   (HbF). The results are invalid for patients with abnormal amounts of   HbF,   including those with known Hereditary Persistence   of Fetal Hemoglobin. Heterozygous hemoglobin variants (HbAS, HbAC,   HbAD, HbAE, HbA2) do not significantly interfere with this assay;   however, presence of multiple variants in a sample may impact the %   interference.             Review of Systems   Constitution: Negative for chills, decreased appetite, diaphoresis, fever, night sweats and weight loss.   Cardiovascular: Negative for chest pain and claudication.   Respiratory: Negative for cough and shortness of breath.    Skin: Positive for dry skin, nail changes and unusual hair distribution.   Musculoskeletal: Positive for back pain.   Gastrointestinal: Negative for nausea and vomiting.   Psychiatric/Behavioral: Negative for altered mental status.            Objective:      Physical Exam   Constitutional: She is oriented to person, place, and time. She appears well-developed and well-nourished.   Cardiovascular: Intact distal pulses.   Pulses:       Dorsalis pedis pulses are 2+ on the right side, and 2+ on the left side.        Posterior tibial pulses are 1+ on the right side, and 1+ on the left side.   Edema noted to BLE, no hair growth noted to BLE, no varicosities noted, skin is dry   Musculoskeletal: She exhibits edema and deformity. She exhibits no tenderness.        Right foot: There is decreased range of motion and deformity.        Left foot: There is decreased range of motion and deformity.   Mild pes planus foot types with mild HAV and reducible hammer toes 2-4, hari.    Feet:   Right Foot:   Protective Sensation: 10 sites tested. 9 sites sensed.   Skin Integrity: Positive for dry skin. Negative for ulcer, blister, skin breakdown, erythema, warmth or callus.   Left Foot:   Protective Sensation: 10 sites tested. 9 sites sensed.   Skin Integrity: Positive for dry skin. Negative for ulcer, blister, skin breakdown, erythema, warmth or callus.   Neurological: She is alert and oriented to person, place, and time. A sensory deficit is present.   Skin: Skin is warm and dry. Capillary refill takes 2 to 3 seconds. No erythema.   Nails are thickened and dystrophic to hallux hari. All other nails are dystrohpic and of normal thickness and color, 2-5, hari.     Otherwise, skin is WDS without open lesions or SOI noted, stable             Assessment:       Encounter Diagnoses   Name Primary?    Type II diabetes mellitus with neurological manifestations Yes    Diabetic polyneuropathy associated with type 2 diabetes mellitus     Onychomycosis due to dermatophyte          Plan:       Chelly was seen today for diabetes mellitus and diabetic foot exam.    Diagnoses and all orders for this visit:    Type II diabetes mellitus with neurological manifestations    Diabetic  polyneuropathy associated with type 2 diabetes mellitus    Onychomycosis due to dermatophyte      I counseled the patient on her conditions, their implications and medical management.    - Shoe inspection. Diabetic Foot Education. Patient reminded of the importance of good nutrition and blood sugar control to help prevent podiatric complications of diabetes. Patient instructed on proper foot hygeine. We discussed wearing proper shoe gear, daily foot inspections, never walking without protective shoe gear, never putting sharp instruments to feet, routine podiatric nail visits every 2-3 months.      - With patient's permission, nails were aggressively reduced and debrided x 10 to their soft tissue attachment mechanically and with electric , removing all offending nail and debris. Patient relates relief following the procedure. She will continue to monitor the areas daily, inspect her feet, wear protective shoe gear when ambulatory, moisturizer to maintain skin integrity and follow in this office in approximately 2-3 months, sooner p.r.n.    - Prescription for diabetic shoes dispensed    -RTC in 3 mo    Berenice Yeung, PGY3

## 2018-10-04 ENCOUNTER — OFFICE VISIT (OUTPATIENT)
Dept: OPTOMETRY | Facility: CLINIC | Age: 68
End: 2018-10-04
Payer: MEDICARE

## 2018-10-04 ENCOUNTER — TELEPHONE (OUTPATIENT)
Dept: GASTROENTEROLOGY | Facility: CLINIC | Age: 68
End: 2018-10-04

## 2018-10-04 ENCOUNTER — TELEPHONE (OUTPATIENT)
Dept: ENDOSCOPY | Facility: HOSPITAL | Age: 68
End: 2018-10-04

## 2018-10-04 ENCOUNTER — CLINICAL SUPPORT (OUTPATIENT)
Dept: REHABILITATION | Facility: HOSPITAL | Age: 68
End: 2018-10-04
Payer: MEDICARE

## 2018-10-04 DIAGNOSIS — H10.13 ALLERGIC CONJUNCTIVITIS, BILATERAL: ICD-10-CM

## 2018-10-04 DIAGNOSIS — E11.9 TYPE 2 DIABETES MELLITUS WITHOUT RETINOPATHY: Primary | ICD-10-CM

## 2018-10-04 DIAGNOSIS — R26.89 DECREASED MOBILITY: ICD-10-CM

## 2018-10-04 DIAGNOSIS — M53.86 DECREASED ROM OF LUMBAR SPINE: ICD-10-CM

## 2018-10-04 DIAGNOSIS — H25.12 NUCLEAR SCLEROTIC CATARACT OF LEFT EYE: ICD-10-CM

## 2018-10-04 DIAGNOSIS — H52.7 REFRACTIVE ERROR: ICD-10-CM

## 2018-10-04 DIAGNOSIS — R53.1 DECREASED STRENGTH: ICD-10-CM

## 2018-10-04 PROCEDURE — 97110 THERAPEUTIC EXERCISES: CPT | Mod: PN

## 2018-10-04 PROCEDURE — 99999 PR PBB SHADOW E&M-EST. PATIENT-LVL II: CPT | Mod: PBBFAC,,, | Performed by: OPTOMETRIST

## 2018-10-04 PROCEDURE — 92014 COMPRE OPH EXAM EST PT 1/>: CPT | Mod: S$PBB,,, | Performed by: OPTOMETRIST

## 2018-10-04 PROCEDURE — 99212 OFFICE O/P EST SF 10 MIN: CPT | Mod: PBBFAC,PO | Performed by: OPTOMETRIST

## 2018-10-04 PROCEDURE — 97140 MANUAL THERAPY 1/> REGIONS: CPT | Mod: PN

## 2018-10-04 PROCEDURE — 92015 DETERMINE REFRACTIVE STATE: CPT | Mod: ,,, | Performed by: OPTOMETRIST

## 2018-10-04 NOTE — PROGRESS NOTES
"TIME RECORD    Date:  10/04/2018    Start Time:  3:30 PM   Stop Time:  4:15 PM    PROCEDURES:    TIMED  Procedure Min.   TE 35   MT 10                   Total Timed Minutes:  45  Total Timed Units:  3  Total Untimed Units:  0  Charges Billed/# of units:  2TE,1 MT      Progress/Current Status    Subjective:     Patient ID: Chelly Ortiz is a 67 y.o. female.  Diagnosis:   1. Decreased ROM of lumbar spine     2. Decreased strength     3. Decreased mobility       Pain: pt report she has mid lower back pain today in Left side (sub costal). Pt agreeable to PT session.     Objective:   Pt instructed on and completed all therex as per logged below 1:1 w/ PTA, manual therapy provided with pt in right sidelying for R lumbar paraspinals and QL STm  DATE 10/4/18 9/28/2018   VISIT 2 1   POC 11/28/18      G CODE 2/10 1/10   FOTO 2/5 1/5   Cap Visit  Cap Total 88.10  201.30 113.20  113.20   MHP -     MT      Stretches:      HS stretch 30"X3 B w strap     Piriformis stretch 30"X3 B     Hip flexor stretch      Supine:      LTRs 5"X10     TAs 5"x10 10x5''   Brace knee fall outs      Brace marching      Bridges 2x10 10x   SL hip abd 2x10 B 10x R   Clams 2x10 VB     Thoracic rotations      Dead bug             Prone:      Glut sets      Butt winks      Prone contra UE/LE ext       Quadruped contra UE/LE ext      Cat-Camel      Isometric multifidi submax contraction             Standing:      Rows      Lat pull downs      Calf stretch on fitter      Leg Press      Table turns on SB      Lifts      Chops      Gait      CP      INITIALS JA 1/6 AZEB         Assessment:     Pt reports feeling like "right spots" were targeted with manual therapy. Pt completed session with no adverse reactions, no complaints of pain     GOALS: Short Term Goals: 4 weeks  1. Pt will demo good TA muscle contraction for improved deep abdominal strength and lumbar stability.  3. Increase lumbar ROM to WNL loss in order to improve functional mobility.    4. Pt will " demo good sitting/standing posture and body mechanics for improved spine health and decreased risk of future injury.  5. Pt to tolerate HEP to improve ROM and independence with ADL's.     Long Term Goals: 8 weeks  1. Report decreased low back pain without radiculopathy to </= 2/10  to increase tolerance for ADLs  2. Increase strength to >/= 4/5 MMT grade for BLE to increase tolerance for ADL and work activities.  3. Pt to demonstrate negative SLR and/or Slump Test in order to show improved core strength and decreased nerve/dural tension.  4. Patient's goal: To make me feel better  5. Pt will report at CJlevel (20-40% impaired) on FOTO lumbar score for low back pain disability to demonstrate decrease in disability and improvement in back pain.          Patient Education/Response:     Pt to cont HEP as tolerated.     Plans and Goals:     Cont to advance PT as per POC.  Jim Dutton, PTA

## 2018-10-04 NOTE — TELEPHONE ENCOUNTER
Received authorization for EUS scheduled for 10/09/2018 with Dr. Simpson. Auth [id number].  Patient does have a $50 copayment.  The provider and facility has been authorized. Another authorization number beginning with [id number] attempted to be given but phone disconnected.  Unable to get back to representative.

## 2018-10-04 NOTE — PROGRESS NOTES
KYM KITCHEN 01/2018 Here for 6 month f/u S/P Yag Cap OD.  Patient would like   updated eyeglass RX. Patient lost one pair of glasses. Using zaditor prn   for allergy itch with releif    Last edited by Agusto Holden, OD on 10/4/2018  2:31 PM. (History)            Assessment /Plan     For exam results, see Encounter Report.    Type 2 diabetes mellitus without retinopathy    Nuclear sclerotic cataract of left eye    Allergic conjunctivitis, bilateral    Refractive error      1. No diabetic retinopathy, no csme. Return in 1 year for dilated eye exam.  2. Educated pt on presence of cataracts and effects on vision. No surgery at this time. Recheck in one year.  3. Recommended OTC Zaditor bid OU for itching  4. Spec Rx given. Different lens options discussed with patient. RTC 1 year full exam.

## 2018-10-04 NOTE — TELEPHONE ENCOUNTER
Spoke with patient about arrival time @ 0730.     NPO status reviewed: Patient must have nothing to eat after 1900.  Pt may have CLEAR liquids ONLY until completely NPO  @ MN.     Medications: Do not take Insulin or oral diabetic medications the day of the procedure.  Take as prescribed: heart, seizure and blood pressure medication in the morning with a sip of water (less than an ounce) by 0500.  Take any breathing medications and bring inhalers to hospital with you Leave all valuables and jewelry at home.     Wear comfortable clothes to procedure to change into hospital gown You cannot drive for 24 hours after your procedure because you will receive sedation for your procedure to make you comfortable.  A ride must be provided at discharge.

## 2018-10-09 ENCOUNTER — ANESTHESIA EVENT (OUTPATIENT)
Dept: ENDOSCOPY | Facility: HOSPITAL | Age: 68
End: 2018-10-09
Payer: MEDICARE

## 2018-10-09 ENCOUNTER — ANESTHESIA (OUTPATIENT)
Dept: ENDOSCOPY | Facility: HOSPITAL | Age: 68
End: 2018-10-09
Payer: MEDICARE

## 2018-10-09 ENCOUNTER — HOSPITAL ENCOUNTER (OUTPATIENT)
Facility: HOSPITAL | Age: 68
Discharge: HOME | End: 2018-10-09
Attending: INTERNAL MEDICINE | Admitting: INTERNAL MEDICINE
Payer: MEDICARE

## 2018-10-09 VITALS
WEIGHT: 191 LBS | HEART RATE: 72 BPM | SYSTOLIC BLOOD PRESSURE: 125 MMHG | BODY MASS INDEX: 33.84 KG/M2 | DIASTOLIC BLOOD PRESSURE: 72 MMHG | RESPIRATION RATE: 18 BRPM | TEMPERATURE: 98 F | HEIGHT: 63 IN | OXYGEN SATURATION: 94 %

## 2018-10-09 DIAGNOSIS — K83.8 DILATED BILE DUCT: Primary | ICD-10-CM

## 2018-10-09 LAB — POCT GLUCOSE: 133 MG/DL (ref 70–110)

## 2018-10-09 PROCEDURE — 37000009 HC ANESTHESIA EA ADD 15 MINS: Performed by: INTERNAL MEDICINE

## 2018-10-09 PROCEDURE — 25000003 PHARM REV CODE 250: Performed by: NURSE ANESTHETIST, CERTIFIED REGISTERED

## 2018-10-09 PROCEDURE — 63600175 PHARM REV CODE 636 W HCPCS: Performed by: NURSE ANESTHETIST, CERTIFIED REGISTERED

## 2018-10-09 PROCEDURE — 25000003 PHARM REV CODE 250: Performed by: INTERNAL MEDICINE

## 2018-10-09 PROCEDURE — 43259 EGD US EXAM DUODENUM/JEJUNUM: CPT | Performed by: INTERNAL MEDICINE

## 2018-10-09 PROCEDURE — 37000008 HC ANESTHESIA 1ST 15 MINUTES: Performed by: INTERNAL MEDICINE

## 2018-10-09 PROCEDURE — 43259 EGD US EXAM DUODENUM/JEJUNUM: CPT | Mod: ,,, | Performed by: INTERNAL MEDICINE

## 2018-10-09 PROCEDURE — 82962 GLUCOSE BLOOD TEST: CPT | Performed by: INTERNAL MEDICINE

## 2018-10-09 RX ORDER — PROPOFOL 10 MG/ML
VIAL (ML) INTRAVENOUS CONTINUOUS PRN
Status: DISCONTINUED | OUTPATIENT
Start: 2018-10-09 | End: 2018-10-09

## 2018-10-09 RX ORDER — PROPOFOL 10 MG/ML
VIAL (ML) INTRAVENOUS
Status: DISCONTINUED | OUTPATIENT
Start: 2018-10-09 | End: 2018-10-09

## 2018-10-09 RX ORDER — SODIUM CHLORIDE 9 MG/ML
INJECTION, SOLUTION INTRAVENOUS CONTINUOUS
Status: DISCONTINUED | OUTPATIENT
Start: 2018-10-09 | End: 2018-10-09 | Stop reason: HOSPADM

## 2018-10-09 RX ORDER — SODIUM CHLORIDE 0.9 % (FLUSH) 0.9 %
3 SYRINGE (ML) INJECTION
Status: DISCONTINUED | OUTPATIENT
Start: 2018-10-09 | End: 2018-10-09 | Stop reason: HOSPADM

## 2018-10-09 RX ORDER — LIDOCAINE HCL/PF 100 MG/5ML
SYRINGE (ML) INTRAVENOUS
Status: DISCONTINUED | OUTPATIENT
Start: 2018-10-09 | End: 2018-10-09

## 2018-10-09 RX ADMIN — SODIUM CHLORIDE: 0.9 INJECTION, SOLUTION INTRAVENOUS at 08:10

## 2018-10-09 RX ADMIN — PROPOFOL 70 MG: 10 INJECTION, EMULSION INTRAVENOUS at 09:10

## 2018-10-09 RX ADMIN — LIDOCAINE HYDROCHLORIDE 100 MG: 20 INJECTION, SOLUTION INTRAVENOUS at 09:10

## 2018-10-09 RX ADMIN — TOPICAL ANESTHETIC 1 EACH: 200 SPRAY DENTAL; PERIODONTAL at 09:10

## 2018-10-09 RX ADMIN — PROPOFOL 20 MG: 10 INJECTION, EMULSION INTRAVENOUS at 09:10

## 2018-10-09 RX ADMIN — PROPOFOL 150 MCG/KG/MIN: 10 INJECTION, EMULSION INTRAVENOUS at 09:10

## 2018-10-09 NOTE — ANESTHESIA POSTPROCEDURE EVALUATION
"Anesthesia Post Evaluation    Patient: Chelly Ortiz    Procedure(s) Performed: Procedure(s) (LRB):  ULTRASOUND, UPPER GI TRACT, ENDOSCOPIC (N/A)    Final Anesthesia Type: MAC  Patient location during evaluation: GI PACU  Patient participation: Yes- Able to Participate  Level of consciousness: awake and alert and oriented  Post-procedure vital signs: reviewed and stable  Pain management: adequate  Airway patency: patent  PONV status at discharge: No PONV  Anesthetic complications: no      Cardiovascular status: stable, hemodynamically stable and blood pressure returned to baseline  Respiratory status: room air, unassisted and spontaneous ventilation  Hydration status: euvolemic  Follow-up not needed.        Visit Vitals  /64 (BP Location: Left arm, Patient Position: Lying)   Pulse 70   Temp 36.6 °C (97.9 °F) (Temporal)   Resp 18   Ht 5' 3" (1.6 m)   Wt 86.6 kg (191 lb)   LMP  (LMP Unknown)   SpO2 (!) 94%   Breastfeeding? No   BMI 33.83 kg/m²       Pain/Benjamin Score: Pain Assessment Performed: Yes (10/9/2018  8:04 AM)  Presence of Pain: denies (10/9/2018  8:04 AM)        "

## 2018-10-09 NOTE — ANESTHESIA PREPROCEDURE EVALUATION
10/09/2018  Chelly Ortiz is a 67 y.o., female presents for eus under MAC.    Anesthesia Evaluation    I have reviewed the Patient Summary Reports.    I have reviewed the Nursing Notes.   I have reviewed the Medications.     Review of Systems  Anesthesia Hx:  No problems with previous Anesthesia  History of prior surgery of interest to airway management or planning: Previous anesthesia: General Denies Family Hx of Anesthesia complications.   Denies Personal Hx of Anesthesia complications.   Social:  Non-Smoker    Hematology/Oncology:  Hematology Normal   Oncology Normal     EENT/Dental:EENT/Dental Normal   Cardiovascular:   Hypertension, well controlled    Pulmonary:   Sleep Apnea, CPAP    Hepatic/GI:   GERD, well controlled    Musculoskeletal:   Arthritis     Neurological:   Neuromuscular Disease,    Endocrine:   Diabetes, well controlled, type 2        Physical Exam  General:  Well nourished    Airway/Jaw/Neck:  Airway Findings: Mouth Opening: Normal Tongue: Normal  Mallampati: II       Chest/Lungs:  Chest/Lungs Findings: Clear to auscultation, Normal Respiratory Rate     Heart/Vascular:  Heart Findings: Rate: Normal  Rhythm: Regular Rhythm        Mental Status:  Mental Status Findings:  Cooperative, Alert and Oriented         Anesthesia Plan  Type of Anesthesia, risks & benefits discussed:  Anesthesia Type:  MAC  Patient's Preference:   Intra-op Monitoring Plan: standard ASA monitors  Intra-op Monitoring Plan Comments:   Post Op Pain Control Plan: per primary service following discharge from PACU  Post Op Pain Control Plan Comments:   Induction:    Beta Blocker:  Patient is not currently on a Beta-Blocker (No further documentation required).       Informed Consent: Patient understands risks and agrees with Anesthesia plan.  Questions answered. Anesthesia consent signed with patient.  ASA Score: 2     Day  of Surgery Review of History & Physical:  There are no significant changes.          Ready For Surgery From Anesthesia Perspective.

## 2018-10-09 NOTE — [employer] PATIENT INSTRUCTIONS
Discharge Summary/Instructions after an Endoscopic Procedure  Patient Name: Chelly Ortiz  Patient MRN: [id number]  Patient YOB: 1950  Tuesday, October 09, 2018  Javy Simpson MD  RESTRICTIONS:  During your procedure today, you received medications for sedation.  These   medications may affect your judgment, balance and coordination.  Therefore,   for 24 hours, you have the following restrictions:   - DO NOT drive a car, operate machinery, make legal/financial decisions,   sign important papers or drink alcohol.    ACTIVITY:  Today: no heavy lifting, straining or running due to procedural   sedation/anesthesia.  The following day: return to full activity including work.  DIET:  Eat and drink normally unless instructed otherwise.     TREATMENT FOR COMMON SIDE EFFECTS:  - Mild abdominal pain, nausea, belching, bloating or excessive gas:  rest,   eat lightly and use a heating pad.  - Sore Throat: treat with throat lozenges and/or gargle with warm salt   water.  - Because air was used during the procedure, expelling large amounts of air   from your rectum or belching is normal.  - If a bowel prep was taken, you may not have a bowel movement for 1-3 days.    This is normal.  SYMPTOMS TO WATCH FOR AND REPORT TO YOUR PHYSICIAN:  1. Abdominal pain or bloating, other than gas cramps.  2. Chest pain.  3. Back pain.  4. Signs of infection such as: chills or fever occurring within 24 hours   after the procedure.  5. Rectal bleeding, which would show as bright red, maroon, or black stools.   (A tablespoon of blood from the rectum is not serious, especially if   hemorrhoids are present.)  6. Vomiting.  7. Weakness or dizziness.  GO DIRECTLY TO THE NEAREST EMERGENCY ROOM IF YOU HAVE ANY OF THE FOLLOWING:      Difficulty breathing              Chills and/or fever over 101 F   Persistent vomiting and/or vomiting blood   Severe abdominal pain   Severe chest pain   Black, tarry stools   Bleeding- more than one  tablespoon   Any other symptom or condition that you feel may need urgent attention  Your doctor recommends these additional instructions:  If any biopsies were taken, your doctors clinic will contact you in 1 to 2   weeks with any results.  - Discharge patient to home (ambulatory).   - Patient has a contact number available for emergencies.  The signs and   symptoms of potential delayed complications were discussed with the   patient.  Return to normal activities tomorrow.  Written discharge   instructions were provided to the patient.   - Will plan for interval MRI/MRCP and LFTs in 3 months for follow-up of mild   intrahepatic duct dilation.  For questions, problems or results please call your physician - Javy Simpson MD at Work:  [phone].  EMERGENCY PHONE NUMBER: [phone],  LAB RESULTS: [phone]  IF A COMPLICATION OR EMERGENCY SITUATION ARISES AND YOU ARE UNABLE TO REACH   YOUR PHYSICIAN - GO DIRECTLY TO THE EMERGENCY ROOM.  Javy Simpson MD  10/9/2018 9:55:52 AM  This report has been verified and signed electronically.  [employer]

## 2018-10-09 NOTE — TRANSFER OF CARE
"Anesthesia Transfer of Care Note    Patient: Chelly Ortiz    Procedure(s) Performed: Procedure(s) (LRB):  ULTRASOUND, UPPER GI TRACT, ENDOSCOPIC (N/A)    Patient location: GI    Anesthesia Type: MAC    Transport from OR: Transported from OR on room air with adequate spontaneous ventilation    Post pain: adequate analgesia    Post assessment: no apparent anesthetic complications    Post vital signs: stable    Level of consciousness: awake, alert and oriented    Nausea/Vomiting: no nausea/vomiting    Complications: none    Transfer of care protocol was followed      Last vitals:   Visit Vitals  /64 (BP Location: Left arm, Patient Position: Lying)   Pulse 70   Temp 36.6 °C (97.9 °F) (Temporal)   Resp 18   Ht 5' 3" (1.6 m)   Wt 86.6 kg (191 lb)   LMP  (LMP Unknown)   SpO2 (!) 94%   Breastfeeding? No   BMI 33.83 kg/m²     "

## 2018-10-09 NOTE — H&P
Short Stay Endoscopy History and Physical    PCP - Gabriel Wilson MD  Referring Physician - Elba Morales, FNP  [address] [place] [address]    Procedure - EGD/EUS  ASA - per anesthesia  Mallampati - per anesthesia  History of Anesthesia problems - no  Family history Anesthesia problems -  no   Plan of anesthesia - General    HPI:  This is a 67 y.o. female here for evaluation of: abd pain/abnl CT    Reflux - no  Dysphagia - no  Abdominal pain - POS  Diarrhea - no    ROS:  Constitutional: No fevers, chills, No weight loss  CV: No chest pain  Pulm: No cough, No shortness of breath  Ophtho: No vision changes  GI: see HPI  Derm: No rash    Medical History:  has a past medical history of Allergy, Cataract, DDD (degenerative disc disease), lumbar, Diabetes mellitus, Diabetes mellitus, type 2, GERD (gastroesophageal reflux disease), History of subconjunctival hemorrhage (11), Hyperlipidemia, Hypertension, IBS (irritable bowel syndrome), Obesity, MYRIAM (obstructive sleep apnea), Rotator cuff impingement syndrome, and Seasonal allergic conjunctivitis.    Surgical History:  has a past surgical history that includes  section; Cholecystectomy; Cataract extraction w/  intraocular lens implant (10/3/13); Eye surgery; Tubal ligation; Hysterectomy; PHACOEMULSIFICATION, CATARACT (Right, 10/3/2013); INSERTION, IOL PROSTHESIS (Right, 10/3/2013); and COLONOSCOPY (N/A, 2013).    Family History: family history includes Alzheimer's disease in her mother; Cancer in her brother; Deep vein thrombosis in her brother; Diabetes in her daughter and sister; Heart disease in her father; Lung cancer in her brother..    Social History:  reports that she quit smoking about 35 years ago. she has never used smokeless tobacco. She reports that she does not drink alcohol or use drugs.    Review of patient's allergies indicates:   Allergen Reactions    Prednisone Other (See Comments)     hipper    Protonix  [pantoprazole] Itching    Ace inhibitors Hives    Latex, natural rubber Itching       Medications:   Medications Prior to Admission   Medication Sig Dispense Refill Last Dose    ACCU-CHEK FASTCLIX LANCET DRUM Misc USE TO TEST BLOOD SUGAR ONCE DAILY  102 each 3 10/9/2018 at Unknown time    ACCU-CHEK TERI Misc USE AS DIRECTED 1 each 0 10/9/2018 at Unknown time    amLODIPine (NORVASC) 5 MG tablet Take 1 tablet (5 mg total) by mouth once daily. 90 tablet 3 10/9/2018 at Unknown time    aspirin (ECOTRIN) 81 MG EC tablet Take 81 mg by mouth once daily.   10/8/2018 at Unknown time    blood sugar diagnostic Strp 1 strip by Misc.(Non-Drug; Combo Route) route 2 (two) times daily. 100 strip 3 10/9/2018 at Unknown time    famotidine (PEPCID) 20 MG tablet Take 1 tablet (20 mg total) by mouth every evening. 90 tablet 3 10/8/2018 at Unknown time    gabapentin (NEURONTIN) 100 MG capsule Take 1 capsule (100 mg total) by mouth 3 (three) times daily. 90 capsule 2 Past Week at Unknown time    glimepiride (AMARYL) 4 MG tablet Take 1 tablet (4 mg total) by mouth daily with breakfast. 90 tablet 3 10/8/2018 at Unknown time    JANUVIA 100 mg Tab   3 10/8/2018 at Unknown time    lansoprazole (PREVACID) 30 MG capsule Take 1 capsule (30 mg total) by mouth before breakfast. 30 capsule 0 Past Week at Unknown time    losartan (COZAAR) 50 MG tablet Take 1 tablet (50 mg total) by mouth once daily. 90 tablet 3 10/8/2018 at Unknown time    magnesium oxide (MAG-OX) 400 mg tablet Take 1 tablet (400 mg total) by mouth 2 (two) times daily. 180 tablet 3 10/8/2018 at Unknown time    pravastatin (PRAVACHOL) 10 MG tablet Take 1 tablet (10 mg total) by mouth once daily. 90 tablet 3 Past Week at Unknown time    traZODone (DESYREL) 50 MG tablet Take 1 tablet (50 mg total) by mouth nightly as needed. 90 tablet 3 Past Month at Unknown time    citalopram (CELEXA) 10 MG tablet Take 1 tablet (10 mg total) by mouth once daily. 90 tablet 3 10/6/2018     ergocalciferol (VITAMIN D2) 50,000 unit Cap Take 1 capsule (50,000 Units total) by mouth every 7 days. 12 capsule 1 10/7/2018    hyoscyamine (ANASPAZ,LEVSIN) 0.125 mg Tab Take 1 tablet (125 mcg total) by mouth every 6 (six) hours as needed (abdominal cramps/pain). 20 tablet 0 More than a month at Unknown time    lidocaine HCL 2% (XYLOCAINE) 2 % jelly Apply topically once daily. 30 mL 2 More than a month at Unknown time       Physical Exam:    Vital Signs:   Vitals:    10/09/18 0804   BP: 123/64   Pulse: 70   Resp: 18   Temp: 97.9 °F (36.6 °C)       General Appearance: Well appearing in no acute distress  Eyes:    No scleral icterus  ENT: Neck supple  Lungs: CTA anteriorly  Heart:  Regular rate  Abdomen: Soft, non tender, non distended with normal bowel sounds.  Extremities: No edema  Skin: No rash    Labs:  Lab Results   Component Value Date    WBC 6.55 06/10/2015    HGB 12.7 06/10/2015    HCT 38.3 06/10/2015     06/10/2015    CHOL 225 (H) 06/20/2018    TRIG 115 06/20/2018    HDL 51 06/20/2018    ALT 11 08/30/2018    AST 15 08/30/2018     08/30/2018    K 3.8 08/30/2018     08/30/2018    CREATININE 1.0 08/30/2018    BUN 12 08/30/2018    CO2 28 08/30/2018    TSH 2.433 04/17/2017    INR 0.9 04/19/2012    GLUF 105 10/27/2004    HGBA1C 8.8 (H) 06/20/2018       I have explained the risks and benefits of this endoscopic procedure to the patient including but not limited to bleeding, inflammation, infection, perforation, and death.      Javy Simpson MD

## 2018-10-10 ENCOUNTER — OFFICE VISIT (OUTPATIENT)
Dept: GASTROENTEROLOGY | Facility: CLINIC | Age: 68
End: 2018-10-10
Payer: MEDICARE

## 2018-10-10 ENCOUNTER — HOSPITAL ENCOUNTER (OUTPATIENT)
Dept: RADIOLOGY | Facility: HOSPITAL | Age: 68
Discharge: HOME | End: 2018-10-10
Attending: INTERNAL MEDICINE
Payer: MEDICARE

## 2018-10-10 VITALS
SYSTOLIC BLOOD PRESSURE: 131 MMHG | WEIGHT: 196 LBS | BODY MASS INDEX: 34.73 KG/M2 | HEART RATE: 97 BPM | DIASTOLIC BLOOD PRESSURE: 80 MMHG | HEIGHT: 63 IN

## 2018-10-10 DIAGNOSIS — K59.00 CONSTIPATION, UNSPECIFIED CONSTIPATION TYPE: ICD-10-CM

## 2018-10-10 DIAGNOSIS — Z86.010 HISTORY OF COLON POLYPS: ICD-10-CM

## 2018-10-10 DIAGNOSIS — K21.9 GASTROESOPHAGEAL REFLUX DISEASE WITHOUT ESOPHAGITIS: Primary | ICD-10-CM

## 2018-10-10 PROCEDURE — 99213 OFFICE O/P EST LOW 20 MIN: CPT | Mod: PBBFAC,25,PO | Performed by: INTERNAL MEDICINE

## 2018-10-10 PROCEDURE — 99214 OFFICE O/P EST MOD 30 MIN: CPT | Mod: S$PBB,,, | Performed by: INTERNAL MEDICINE

## 2018-10-10 PROCEDURE — 74019 RADEX ABDOMEN 2 VIEWS: CPT | Mod: TC,FY

## 2018-10-10 PROCEDURE — 74019 RADEX ABDOMEN 2 VIEWS: CPT | Mod: 26,,, | Performed by: RADIOLOGY

## 2018-10-10 PROCEDURE — 99999 PR PBB SHADOW E&M-EST. PATIENT-LVL III: CPT | Mod: PBBFAC,,, | Performed by: INTERNAL MEDICINE

## 2018-10-10 RX ORDER — SODIUM, POTASSIUM,MAG SULFATES 17.5-3.13G
SOLUTION, RECONSTITUTED, ORAL ORAL ONCE
Qty: 354 ML | Refills: 0 | Status: SHIPPED | OUTPATIENT
Start: 2018-10-10 | End: 2018-10-11

## 2018-10-10 RX ORDER — ESOMEPRAZOLE MAGNESIUM 40 MG/1
40 CAPSULE, DELAYED RELEASE ORAL
Qty: 90 CAPSULE | Refills: 3 | Status: SHIPPED | OUTPATIENT
Start: 2018-10-10 | End: 2020-03-31 | Stop reason: SDUPTHER

## 2018-10-10 RX ORDER — POLYETHYLENE GLYCOL 3350 17 G/17G
17 POWDER, FOR SOLUTION ORAL DAILY
Qty: 1 BOTTLE | Refills: 11 | Status: SHIPPED | OUTPATIENT
Start: 2018-10-10 | End: 2020-02-18

## 2018-10-10 NOTE — LETTER
October 11, 2018      Chao Monterroso, DO  [address] [facility]  [place] [address]           [facility]  [address] [place] [address]  [place] [address]  Phone: [phone]          Patient: Chelly Ortiz   MR Number: [id number]   YOB: 1950   Date of Visit: 10/10/2018       Dear Dr. Chao Monterroso:    Thank you for referring Chelly Ortiz to me for evaluation. Attached you will find relevant portions of my assessment and plan of care.    If you have questions, please do not hesitate to call me. I look forward to following Chelly Ortiz along with you.    Sincerely,    Marie Mejia MD    Enclosure  CC:  No Recipients    If you would like to receive this communication electronically, please contact [email or web address] or [phone] to request more information on [employer] access.    For providers and/or their staff who would like to refer a patient to [facility], please contact us through our one-stop-shop provider referral line, [facility] , at [phone].    If you feel you have received this communication in error or would no longer like to receive these types of communications, please e-mail [email or web address]

## 2018-10-10 NOTE — PATIENT INSTRUCTIONS
PREPOPIK Instructions    You are scheduled for a colonoscopy with Dr. Mejia on Wednesday, December 5 at [facility] located at [address] [place] [address].  Check in at the admit desk, first floor of the hospital (which is the building on the left).     You will receive a call 2-3 days before your colonoscopy to tell you the time to arrive.  If you have not received a call by the day before your procedure, call the Endoscopy Lab at [phone].    To ensure that your test is accurate and complete, you MUST follow these instructions listed below.  If you have any questions, please call our office at [phone].  Plan on being at the hospital for your procedure for 3-4 hours.    1.  Follow a CLEAR LIQUID DIET for the entire day before your scheduled colonoscopy.  This means no solid food the entire day starting when you wake.  You may have as much of the clear liquids as you want throughout the day.   CLEAR LIQUID DIET:   - Avoid Red, Orange, Purple, and/or Blue food coloring   - NO DAIRY   - You can have:  Coffee with sugar (no creamer), tea, water, soda, apple or white grape juice, chicken or beef broth/bouillon (no meat, noodles, or veggies), green/yellow popsicles, green/yellow Jell-O, lemonade.    2.  AT 5 pm the evening before your colonoscopy, FILL THE DOSING CUP (provided inside the Prepopik box) WITH COOL WATER TO THE LOWER LINE. POUR PACKET #1 INTO CONTAINER AND STIR FOR 3 MINUTES TO MIX WELL. (it is normal for the cup to feel warm as the medicine dissolves). DRINK THE ENTIRE MIXTURE. THEN DRINK FIVE (5) DOSING CUPS OF WATER TO THE UPPER LINE OVER THE NEXT FIVE (5) HOURS.     3.  The endoscopy department will call you 2 days before your colonoscopy to tell you the exact time to arrive, AND to tell you the exact time to drink the 2nd portion of your prep (which will be FIVE HOURS BEFORE YOUR ARRIVAL TIME).  At this time given to you, FILL THE DOSING CUP (provided inside the Prepopik box) WITH  COOL WATER TO THE LOWER LINE. POUR PACKET #1 INTO CONTAINER AND STIR FOR 3 MINUTES TO MIX WELL. (it is normal for the cup to feel warm as the medicine dissolves). DRINK THE ENTIRE MIXTURE. THEN DRINK THREE (3) DOSING CUPS OF WATER TO THE UPPER LINE OVER THE NEXT ONE (1) HOUR. Once this is complete, you may not have ANYTHING else by mouth!    4.  You must have someone with you to DRIVE YOU HOME since you will be receiving IV sedation for the colonoscopy.    5.  It is ok to take your heart, blood pressure, and seizure medications in the morning of your test with a SIP of water.  Hold other medications until after your procedure.  Do NOT have anything else to eat or drink the morning of your colonoscopy.  It is ok to brush your teeth.    6.  If you are on blood thinners THAT YOU HAVE BEEN INSTRUCTED TO HOLD BY YOUR DOCTOR FOR THIS PROCEDURE, then do NOT take this the morning of your colonoscopy.  Do NOT stop these medications on your own, they must be approved to be held by your doctor.  Your colonoscopy can NOT be done if you are on these medications.  Examples of blood thinners include: Coumadin, Aggrenox, Plavix, Pradaxa, Reapro, Pletal, Xarelto, Ticagrelor, Brilinta, Eliquis, and high dose aspirin (325 mg).  You do not have to stop baby aspirin 81 mg.    7.  IF YOU ARE DIABETIC:  NO INSULIN OR ORAL MEDICATIONS THE MORNING OF THE COLONOSCOPY.  TAKE ONLY HALF THE DOSE OF YOUR INSULIN THE DAY BEFORE THE COLONOSCOPY.  DO NOT TAKE ANY ORAL DIABETIC MEDICATIONS THE DAY BEFORE THE COLONOSCOPY.  IF YOU ARE AN INSULIN DEPENDENT DIABETIC WITH UNSTABLE BLOOD SUGARDS, NOTIFY YOUR PRIMARY CARE PHYSICIAN FOR INSTRUCTIONS.

## 2018-10-11 ENCOUNTER — OFFICE VISIT (OUTPATIENT)
Dept: URGENT CARE | Facility: CLINIC | Age: 68
End: 2018-10-11
Payer: MEDICARE

## 2018-10-11 ENCOUNTER — PATIENT MESSAGE (OUTPATIENT)
Dept: OBSTETRICS AND GYNECOLOGY | Facility: CLINIC | Age: 68
End: 2018-10-11

## 2018-10-11 ENCOUNTER — PATIENT MESSAGE (OUTPATIENT)
Dept: FAMILY MEDICINE | Facility: CLINIC | Age: 68
End: 2018-10-11

## 2018-10-11 VITALS
OXYGEN SATURATION: 97 % | SYSTOLIC BLOOD PRESSURE: 132 MMHG | HEIGHT: 63 IN | RESPIRATION RATE: 20 BRPM | DIASTOLIC BLOOD PRESSURE: 78 MMHG | HEART RATE: 85 BPM | BODY MASS INDEX: 33.84 KG/M2 | TEMPERATURE: 98 F | WEIGHT: 191 LBS

## 2018-10-11 DIAGNOSIS — R05.9 COUGH: Primary | ICD-10-CM

## 2018-10-11 DIAGNOSIS — M54.50 ACUTE BILATERAL LOW BACK PAIN WITHOUT SCIATICA: ICD-10-CM

## 2018-10-11 PROCEDURE — 3075F SYST BP GE 130 - 139MM HG: CPT | Mod: CPTII,S$GLB,, | Performed by: PHYSICIAN ASSISTANT

## 2018-10-11 PROCEDURE — 3078F DIAST BP <80 MM HG: CPT | Mod: CPTII,S$GLB,, | Performed by: PHYSICIAN ASSISTANT

## 2018-10-11 PROCEDURE — 99214 OFFICE O/P EST MOD 30 MIN: CPT | Mod: S$GLB,,, | Performed by: PHYSICIAN ASSISTANT

## 2018-10-11 RX ORDER — ALBUTEROL SULFATE 90 UG/1
1-2 AEROSOL, METERED RESPIRATORY (INHALATION) EVERY 4 HOURS PRN
Qty: 1 INHALER | Refills: 1 | Status: SHIPPED | OUTPATIENT
Start: 2018-10-11 | End: 2020-08-05 | Stop reason: SDUPTHER

## 2018-10-11 RX ORDER — PREDNISONE 20 MG/1
20 TABLET ORAL DAILY
Qty: 5 TABLET | Refills: 0 | Status: SHIPPED | OUTPATIENT
Start: 2018-10-11 | End: 2018-10-16

## 2018-10-11 RX ORDER — PROMETHAZINE HYDROCHLORIDE AND DEXTROMETHORPHAN HYDROBROMIDE 6.25; 15 MG/5ML; MG/5ML
5 SYRUP ORAL EVERY 6 HOURS PRN
Qty: 120 ML | Refills: 0 | Status: SHIPPED | OUTPATIENT
Start: 2018-10-11 | End: 2018-10-21

## 2018-10-11 NOTE — PROGRESS NOTES
"Subjective:       Patient ID: Chelly Ortiz is a 67 y.o. female.    Vitals:  height is 5' 3" (1.6 m) and weight is 86.6 kg (191 lb). Her oral temperature is 98.1 °F (36.7 °C). Her blood pressure is 132/78 and her pulse is 85. Her respiration is 20 and oxygen saturation is 97%.     Chief Complaint: Cough and Back Pain (Lower Back)    This is a 67 y.o. female who presents today with a chief complaint of cough since June and lower back pain for one week.  Patient states the cough started in June and is getting worse.  She was given Tessalon Pearles without relief.  Patient states she was in PT last week (for upper/mid back).  She stated the "PT rolled something long and round on my back then massaged my back.  The massage was very intense".   She reports occasional rhinorrhea and postnasal drip.  She denies fever or chills.  She denies chest pain or shortness of breath.  She says she was diagnosed with asthma and 1 time in her life but she does not take any medications daily for this.  She is an ex-smoker.      Cough   This is a recurrent problem. The current episode started more than 1 month ago (June, 2018). The problem has been gradually worsening. The problem occurs every few minutes. The cough is non-productive. Pertinent negatives include no chest pain, chills, fever, headaches, rash, sore throat or shortness of breath. Treatments tried: Tessalon Pearles. The treatment provided no relief.   Back Pain   This is a new problem. The current episode started 1 to 4 weeks ago (Thursday). The problem occurs constantly. The problem is unchanged. The pain is present in the sacro-iliac and thoracic spine. The quality of the pain is described as aching. The pain does not radiate. The pain is at a severity of 8/10. The pain is severe. The pain is the same all the time. The symptoms are aggravated by standing and sitting (Walking). Pertinent negatives include no abdominal pain, chest pain, fever or headaches.     Review of " Systems   Constitution: Negative for chills and fever.   HENT: Negative for sore throat.    Eyes: Negative for blurred vision.   Cardiovascular: Negative for chest pain.   Respiratory: Positive for cough. Negative for shortness of breath.    Skin: Negative for rash.   Musculoskeletal: Positive for back pain. Negative for joint pain.        Lower Back Pain   Gastrointestinal: Negative for abdominal pain, diarrhea, nausea and vomiting.   Neurological: Negative for headaches.   Psychiatric/Behavioral: The patient is not nervous/anxious.        Objective:      Physical Exam   Constitutional: She is oriented to person, place, and time. She appears well-developed and well-nourished.   HENT:   Head: Normocephalic and atraumatic.   Eyes: Conjunctivae are normal.   Neck: Normal range of motion. Neck supple. No thyromegaly present.   Cardiovascular: Normal rate and regular rhythm. Exam reveals no gallop and no friction rub.   No murmur heard.  Pulmonary/Chest: Effort normal and breath sounds normal. She has no wheezes. She has no rales.   She does have bronchospasms throughout the exam.   Musculoskeletal: Normal range of motion.        Lumbar back: She exhibits tenderness. She exhibits normal range of motion.   Lymphadenopathy:     She has no cervical adenopathy.   Neurological: She is alert and oriented to person, place, and time. She has normal strength. No sensory deficit.   Skin: Skin is warm and dry. No rash noted. No erythema.   Psychiatric: She has a normal mood and affect. Her behavior is normal. Judgment and thought content normal.   Nursing note and vitals reviewed.      5:00 PM - Patient with nonproductive cough for 4 months.  No chest pain or SOB.  Mild runny nose and post nasal drip.  We do not have an xray tech at this time, but I do not feel that an xrays is necessary at this time.  I do not suspect pneumonia as the patient has not had fever.   Will treat with prednisone and albuterol.  She should follow up  with PCP.    Assessment:       1. Cough    2. Acute bilateral low back pain without sciatica        Plan:         Cough  -     predniSONE (DELTASONE) 20 MG tablet; Take 1 tablet (20 mg total) by mouth once daily. for 5 days  Dispense: 5 tablet; Refill: 0  -     promethazine-dextromethorphan (PROMETHAZINE-DM) 6.25-15 mg/5 mL Syrp; Take 5 mLs by mouth every 6 (six) hours as needed (cough).  Dispense: 120 mL; Refill: 0  -     albuterol (PROVENTIL/VENTOLIN HFA) 90 mcg/actuation inhaler; Inhale 1-2 puffs into the lungs every 4 (four) hours as needed for Wheezing.  Dispense: 1 Inhaler; Refill: 1    Acute bilateral low back pain without sciatica        Chelly was seen today for cough and back pain.    Diagnoses and all orders for this visit:    Cough  -     predniSONE (DELTASONE) 20 MG tablet; Take 1 tablet (20 mg total) by mouth once daily. for 5 days  -     promethazine-dextromethorphan (PROMETHAZINE-DM) 6.25-15 mg/5 mL Syrp; Take 5 mLs by mouth every 6 (six) hours as needed (cough).  -     albuterol (PROVENTIL/VENTOLIN HFA) 90 mcg/actuation inhaler; Inhale 1-2 puffs into the lungs every 4 (four) hours as needed for Wheezing.    Acute bilateral low back pain without sciatica      Patient Instructions   - Rest.    - Drink plenty of fluids.    - Tylenol or Ibuprofen as directed as needed for fever/pain.    - Take over-the-counter claritin, zyrtec, allegra, or xyzal as directed.  You may use a decongestant form (D) of this medication if you DO NOT have a history of hypertension or heart disease.   - Use over the counter Flonase as directed for sinus congestion and postnasal drip.  - use nasal saline prior to Flonase.  - Use Ocean Spray Nasal Saline 1-3 puffs each nostril every 2-3 hours then blow out onto tissue. This is to irrigate the nasal passage way to clear the sinus openings. Use until sinus problem resolved.   - Follow up with your PCP or specialty clinic as directed in the next 1-2 weeks if not improved or as  needed.  You can call [phone] to schedule an appointment with the appropriate provider.    - Go to the ED if your symptoms worsen.  - You must understand that you have received an Urgent Care treatment only and that you may be released before all of your medical problems are known or treated.   - You, the patient, will arrange for follow up care as instructed.   - If your condition worsens or fails to improve we recommend that you receive another evaluation at the ER immediately or contact your PCP to discuss your concerns or return here.      Cough, Chronic, Uncertain Cause (Adult)    Everyone has had a cough as part of the common cold, flu, or bronchitis. This kind of cough occurs along with an achy feeling, low-grade fever, nasal and sinus congestion, and a scratchy or sore throat. This usually gets better in 2 to 3 weeks. A cough that lasts longer than 3 weeks may be due to other causes.  If your cough does not improve over the next 2 weeks, further testing may be needed. Follow up with your healthcare provider as advised. Cough suppressants may be recommended. Based on your exam today, the exact cause of your cough is not certain. Below are some common causes for persistent cough.  Smokers cough  Smokers cough doesnt go away. If you continue to smoke, it only gets worse. The cough is from irritation in the air passages. Talk to your healthcare provider about quitting. Medicines or nicotine-replacement products, like gum or the patch, may make quitting easier.  Postnasal drip  A cough that is worse at night may be due to postnasal drip. Excess mucus in the nose drains from the back of your nose to your throat. This triggers the cough reflex. Postnasal drip may be due to a sinus infection or allergy. Common allergens include dust, tobacco smoke (both inhaled and secondhand smoke), environmental pollutants, pollen, mold, pets, cleaning agents, room deodorizers, and chemical fumes. Over-the-counter  antihistamines or decongestants may be helpful for allergies. A sinus infection may requires antibiotic treatment. See your healthcare provider if symptoms continue.  Medicines  Certain prescribed medicines can cause a chronic cough in some people:  · ACE inhibitors for high blood pressure. These include benazepril, captopril, enalapril, fosinopril, lisinopril, quinapril, ramipril, and others.  · Beta-blockers for high blood pressure and other conditions. These include propranolol, atenolol, metoprolol, nadolol, and others.  Let your healthcare provider know if you are taking any of these.  Asthma  Cough may be the only sign of mild asthma. You may have tests to find out if asthma is causing your cough. You may also take asthma medicine on a trial basis.  Acid reflux (heartburn, GERD)  The esophagus is the tube that carries food from the mouth to the stomach. A valve at its lower end prevents stomach acids from flowing upward. If this valve does not work properly, acid from the stomach enters the esophagus. This may cause a burning pain in the upper abdomen or lower chest, belching, or cough. Symptoms are often worse when lying flat. Avoid eating or drinking before bedtime. Try using extra pillows to raise your upper body, or place 4-inch blocks under the head of your bed. You may try an over-the-counter antacid or an acid-blocking medicine such as famotidine, cimetidine, ranitidine, esomeprazole, lansoprazole, or omeprazole. Stronger medicines for this condition can be prescribed by your healthcare provider.  Follow-up care  Follow up with your healthcare provider, or as advised, if your cough does not improve. Further testing may be needed.  Note: If an X-ray was taken, a specialist will review it. You will be notified of any new findings that may affect your care.  When to seek medical advice  Call your healthcare provider right away if any of these occur:  · Mild wheezing or difficulty breathing  · Fever of  100.4ºF (38ºC) or higher, or as directed by your healthcare provider  · Unexpected weight loss  · Coughing up large amounts of colored sputum  · Night sweats (sheets and pajamas get soaking wet)  Call 911, or get immediate medical care  Contact emergency services right away if any of these occur:  · Coughing up blood  · Moderate to severe trouble breathing or wheezing  Date Last Reviewed: 9/13/2015  © 9856-9933 [employer]. [address] [place] [address]. All rights reserved. This information is not intended as a substitute for professional medical care. Always follow your healthcare professional's instructions.        Back Pain (Acute or Chronic)    Back pain is one of the most common problems. The good news is that most people feel better in 1 to 2 weeks, and most of the rest in 1 to 2 months. Most people can remain active.  People experience and describe pain differently; not everyone is the same.  · The pain can be sharp, stabbing, shooting, aching, cramping or burning.  · Movement, standing, bending, lifting, sitting, or walking may worsen pain.  · It can be localized to one spot or area, or it can be more generalized.  · It can spread or radiate upwards, to the front, or go down your arms or legs (sciatica).  · It can cause muscle spasm.  Most of the time, mechanical problems with the muscles or spine cause the pain. Mechanical problems are usually caused by an injury to the muscles or ligaments. While illness can cause back pain, it is usually not caused by a serious illness. Mechanical problems include:   · Physical activity such as sports, exercise, work, or normal activity  · Overexertion, lifting, pushing, pulling incorrectly or too aggressively  · Sudden twisting, bending, or stretching from an accident, or accidental movement  · Poor posture  · Stretching or moving wrong, without noticing pain at the time  · Poor coordination, lack of regular exercise (check with your doctor  about this)  · Spinal disc disease or arthritis  · Stress  Pain can also be related to pregnancy, or illness like appendicitis, bladder or kidney infections, pelvic infections, and many other things.  Acute back pain usually gets better in 1 to 2 weeks. Back pain related to disk disease, arthritis in the spinal joints or spinal stenosis (narrowing of the spinal canal) can become chronic and last for months or years.  Unless you had a physical injury (for example, a car accident or fall) X-rays are usually not needed for the initial evaluation of back pain. If pain continues and does not respond to medical treatment, X-rays and other tests may be needed.  Home care  Try these home care recommendations:  · When in bed, try to find a position of comfort. A firm mattress is best. Try lying flat on your back with pillows under your knees. You can also try lying on your side with your knees bent up towards your chest and a pillow between your knees.  · At first, do not try to stretch out the sore spots. If there is a strain, it is not like the good soreness you get after exercising without an injury. In this case, stretching may make it worse.  · Avoid prolong sitting, long car rides, or travel. This puts more stress on the lower back than standing or walking.  · During the first 24 to 72 hours after an acute injury or flare up of chronic back pain, apply an ice pack to the painful area for 20 minutes and then remove it for 20 minutes. Do this over a period of 60 to 90 minutes or several times a day. This will reduce swelling and pain. Wrap the ice pack in a thin towel or plastic to protect your skin.  · You can start with ice, then switch to heat. Heat (hot shower, hot bath, or heating pad) reduces pain and works well for muscle spasms. Heat can be applied to the painful area for 20 minutes then remove it for 20 minutes. Do this over a period of 60 to 90 minutes or several times a day. Do not sleep on a heating pad. It  can lead to skin burns or tissue damage.  · You can alternate ice and heat therapy. Talk with your doctor about the best treatment for your back pain.  · Therapeutic massage can help relax the back muscles without stretching them.  · Be aware of safe lifting methods and do not lift anything without stretching first.  Medicines  Talk to your doctor before using medicine, especially if you have other medical problems or are taking other medicines.  · You may use over-the-counter medicine as directed on the bottle to control pain, unless another pain medicine was prescribed. If you have chronic conditions like diabetes, liver or kidney disease, stomach ulcers, or gastrointestinal bleeding, or are taking blood thinners, talk to your doctor before taking any medicine.  · Be careful if you are given a prescription medicines, narcotics, or medicine for muscle spasms. They can cause drowsiness, affect your coordination, reflexes, and judgement. Do not drive or operate heavy machinery.  Follow-up care  Follow up with your healthcare provider, or as advised.   A radiologist will review any X-rays that were taken. Your provide will notify you of any new findings that may affect your care.  Call 911  Call emergency services if any of the following occur:  · Trouble breathing  · Confusion  · Very drowsy or trouble awakening  · Fainting or loss of consciousness  · Rapid or very slow heart rate  · Loss of bowel or bladder control  When to seek medical advice  Call your healthcare provider right away if any of these occur:   · Pain becomes worse or spreads to your legs  · Weakness or numbness in one or both legs  · Numbness in the groin or genital area  Date Last Reviewed: 7/1/2016  © 9175-9307 [employer]. [address] [place] [address], [place] [address]. All rights reserved. This information is not intended as a substitute for professional medical care. Always follow your healthcare professional's instructions.

## 2018-10-11 NOTE — PATIENT INSTRUCTIONS
- Rest.    - Drink plenty of fluids.    - Tylenol or Ibuprofen as directed as needed for fever/pain.    - Take over-the-counter claritin, zyrtec, allegra, or xyzal as directed.  You may use a decongestant form (D) of this medication if you DO NOT have a history of hypertension or heart disease.   - Use over the counter Flonase as directed for sinus congestion and postnasal drip.  - use nasal saline prior to Flonase.  - Use Ocean Spray Nasal Saline 1-3 puffs each nostril every 2-3 hours then blow out onto tissue. This is to irrigate the nasal passage way to clear the sinus openings. Use until sinus problem resolved.   - Follow up with your PCP or specialty clinic as directed in the next 1-2 weeks if not improved or as needed.  You can call [phone] to schedule an appointment with the appropriate provider.    - Go to the ED if your symptoms worsen.  - You must understand that you have received an Urgent Care treatment only and that you may be released before all of your medical problems are known or treated.   - You, the patient, will arrange for follow up care as instructed.   - If your condition worsens or fails to improve we recommend that you receive another evaluation at the ER immediately or contact your PCP to discuss your concerns or return here.      Cough, Chronic, Uncertain Cause (Adult)    Everyone has had a cough as part of the common cold, flu, or bronchitis. This kind of cough occurs along with an achy feeling, low-grade fever, nasal and sinus congestion, and a scratchy or sore throat. This usually gets better in 2 to 3 weeks. A cough that lasts longer than 3 weeks may be due to other causes.  If your cough does not improve over the next 2 weeks, further testing may be needed. Follow up with your healthcare provider as advised. Cough suppressants may be recommended. Based on your exam today, the exact cause of your cough is not certain. Below are some common causes for persistent cough.  Smokers  cough  Smokers cough doesnt go away. If you continue to smoke, it only gets worse. The cough is from irritation in the air passages. Talk to your healthcare provider about quitting. Medicines or nicotine-replacement products, like gum or the patch, may make quitting easier.  Postnasal drip  A cough that is worse at night may be due to postnasal drip. Excess mucus in the nose drains from the back of your nose to your throat. This triggers the cough reflex. Postnasal drip may be due to a sinus infection or allergy. Common allergens include dust, tobacco smoke (both inhaled and secondhand smoke), environmental pollutants, pollen, mold, pets, cleaning agents, room deodorizers, and chemical fumes. Over-the-counter antihistamines or decongestants may be helpful for allergies. A sinus infection may requires antibiotic treatment. See your healthcare provider if symptoms continue.  Medicines  Certain prescribed medicines can cause a chronic cough in some people:  · ACE inhibitors for high blood pressure. These include benazepril, captopril, enalapril, fosinopril, lisinopril, quinapril, ramipril, and others.  · Beta-blockers for high blood pressure and other conditions. These include propranolol, atenolol, metoprolol, nadolol, and others.  Let your healthcare provider know if you are taking any of these.  Asthma  Cough may be the only sign of mild asthma. You may have tests to find out if asthma is causing your cough. You may also take asthma medicine on a trial basis.  Acid reflux (heartburn, GERD)  The esophagus is the tube that carries food from the mouth to the stomach. A valve at its lower end prevents stomach acids from flowing upward. If this valve does not work properly, acid from the stomach enters the esophagus. This may cause a burning pain in the upper abdomen or lower chest, belching, or cough. Symptoms are often worse when lying flat. Avoid eating or drinking before bedtime. Try using extra pillows to raise  your upper body, or place 4-inch blocks under the head of your bed. You may try an over-the-counter antacid or an acid-blocking medicine such as famotidine, cimetidine, ranitidine, esomeprazole, lansoprazole, or omeprazole. Stronger medicines for this condition can be prescribed by your healthcare provider.  Follow-up care  Follow up with your healthcare provider, or as advised, if your cough does not improve. Further testing may be needed.  Note: If an X-ray was taken, a specialist will review it. You will be notified of any new findings that may affect your care.  When to seek medical advice  Call your healthcare provider right away if any of these occur:  · Mild wheezing or difficulty breathing  · Fever of 100.4ºF (38ºC) or higher, or as directed by your healthcare provider  · Unexpected weight loss  · Coughing up large amounts of colored sputum  · Night sweats (sheets and pajamas get soaking wet)  Call 911, or get immediate medical care  Contact emergency services right away if any of these occur:  · Coughing up blood  · Moderate to severe trouble breathing or wheezing  Date Last Reviewed: 9/13/2015  © 0363-9613 [employer]. [address] [place] [address]. All rights reserved. This information is not intended as a substitute for professional medical care. Always follow your healthcare professional's instructions.        Back Pain (Acute or Chronic)    Back pain is one of the most common problems. The good news is that most people feel better in 1 to 2 weeks, and most of the rest in 1 to 2 months. Most people can remain active.  People experience and describe pain differently; not everyone is the same.  · The pain can be sharp, stabbing, shooting, aching, cramping or burning.  · Movement, standing, bending, lifting, sitting, or walking may worsen pain.  · It can be localized to one spot or area, or it can be more generalized.  · It can spread or radiate upwards, to the front, or go down  your arms or legs (sciatica).  · It can cause muscle spasm.  Most of the time, mechanical problems with the muscles or spine cause the pain. Mechanical problems are usually caused by an injury to the muscles or ligaments. While illness can cause back pain, it is usually not caused by a serious illness. Mechanical problems include:   · Physical activity such as sports, exercise, work, or normal activity  · Overexertion, lifting, pushing, pulling incorrectly or too aggressively  · Sudden twisting, bending, or stretching from an accident, or accidental movement  · Poor posture  · Stretching or moving wrong, without noticing pain at the time  · Poor coordination, lack of regular exercise (check with your doctor about this)  · Spinal disc disease or arthritis  · Stress  Pain can also be related to pregnancy, or illness like appendicitis, bladder or kidney infections, pelvic infections, and many other things.  Acute back pain usually gets better in 1 to 2 weeks. Back pain related to disk disease, arthritis in the spinal joints or spinal stenosis (narrowing of the spinal canal) can become chronic and last for months or years.  Unless you had a physical injury (for example, a car accident or fall) X-rays are usually not needed for the initial evaluation of back pain. If pain continues and does not respond to medical treatment, X-rays and other tests may be needed.  Home care  Try these home care recommendations:  · When in bed, try to find a position of comfort. A firm mattress is best. Try lying flat on your back with pillows under your knees. You can also try lying on your side with your knees bent up towards your chest and a pillow between your knees.  · At first, do not try to stretch out the sore spots. If there is a strain, it is not like the good soreness you get after exercising without an injury. In this case, stretching may make it worse.  · Avoid prolong sitting, long car rides, or travel. This puts more stress  on the lower back than standing or walking.  · During the first 24 to 72 hours after an acute injury or flare up of chronic back pain, apply an ice pack to the painful area for 20 minutes and then remove it for 20 minutes. Do this over a period of 60 to 90 minutes or several times a day. This will reduce swelling and pain. Wrap the ice pack in a thin towel or plastic to protect your skin.  · You can start with ice, then switch to heat. Heat (hot shower, hot bath, or heating pad) reduces pain and works well for muscle spasms. Heat can be applied to the painful area for 20 minutes then remove it for 20 minutes. Do this over a period of 60 to 90 minutes or several times a day. Do not sleep on a heating pad. It can lead to skin burns or tissue damage.  · You can alternate ice and heat therapy. Talk with your doctor about the best treatment for your back pain.  · Therapeutic massage can help relax the back muscles without stretching them.  · Be aware of safe lifting methods and do not lift anything without stretching first.  Medicines  Talk to your doctor before using medicine, especially if you have other medical problems or are taking other medicines.  · You may use over-the-counter medicine as directed on the bottle to control pain, unless another pain medicine was prescribed. If you have chronic conditions like diabetes, liver or kidney disease, stomach ulcers, or gastrointestinal bleeding, or are taking blood thinners, talk to your doctor before taking any medicine.  · Be careful if you are given a prescription medicines, narcotics, or medicine for muscle spasms. They can cause drowsiness, affect your coordination, reflexes, and judgement. Do not drive or operate heavy machinery.  Follow-up care  Follow up with your healthcare provider, or as advised.   A radiologist will review any X-rays that were taken. Your provide will notify you of any new findings that may affect your care.  Call 911  Call emergency services  if any of the following occur:  · Trouble breathing  · Confusion  · Very drowsy or trouble awakening  · Fainting or loss of consciousness  · Rapid or very slow heart rate  · Loss of bowel or bladder control  When to seek medical advice  Call your healthcare provider right away if any of these occur:   · Pain becomes worse or spreads to your legs  · Weakness or numbness in one or both legs  · Numbness in the groin or genital area  Date Last Reviewed: 7/1/2016 © 2000-2017 [employer]. [address] [place] [id number]. All rights reserved. This information is not intended as a substitute for professional medical care. Always follow your healthcare professional's instructions.

## 2018-10-11 NOTE — PROGRESS NOTES
"Subjective:       Patient ID: Chelly Ortiz is a 67 y.o. female.    Chief Complaint: Gastroesophageal Reflux    68 yo F complains of nausea and heartburn.  She denies vomiting.  She has h/o "deep gnawing pain" in the upper abdomen for which she was given Prevacid, but the medicine seemed to make the pain worse so she stopped.  She is now having moderate intensity heartburn daily, worse at night associated with nausea but no vomiting.  She just had Upper EUS yesterday due to dilated intrahepatic bile ducts and was noted to have a hiatal hernia, but no esophagitis.  She denies FH of colon cancer, but states she will get rectal bleeding if she takes NSAIDS.  She also notes new onset constipation for the past few months.  She has 1 BM per day and despite the stool being soft, she has to strain to get the stool out and feels sensation of incomplete evacuation requiring her to need a laxative.  She needs laxatives 1-2 times per month.      Review of Systems   Constitutional: Negative for appetite change.   Respiratory: Negative for chest tightness, shortness of breath and wheezing.    Cardiovascular: Negative for chest pain, palpitations and leg swelling.       Objective:       /80 (BP Location: Right arm, Patient Position: Sitting)   Pulse 97   Ht 5' 3" (1.6 m)   Wt 88.9 kg (196 lb)   LMP  (LMP Unknown)   BMI 34.72 kg/m²     Physical Exam   Constitutional: She is oriented to person, place, and time. She appears well-developed and well-nourished.   Cardiovascular: Normal rate and regular rhythm.   Pulmonary/Chest: Effort normal and breath sounds normal.   Abdominal: Soft. Bowel sounds are normal. She exhibits no distension and no mass. There is no tenderness. There is no rebound and no guarding.   Musculoskeletal: Normal range of motion. She exhibits no edema.   Neurological: She is alert and oriented to person, place, and time.   Psychiatric: She has a normal mood and affect. Her behavior is normal. Judgment " and thought content normal.       CMP  Sodium   Date Value Ref Range Status   08/30/2018 140 136 - 145 mmol/L Final     Potassium   Date Value Ref Range Status   08/30/2018 3.8 3.5 - 5.1 mmol/L Final     Chloride   Date Value Ref Range Status   08/30/2018 102 95 - 110 mmol/L Final     CO2   Date Value Ref Range Status   08/30/2018 28 23 - 29 mmol/L Final     Glucose   Date Value Ref Range Status   08/30/2018 156 (H) 70 - 110 mg/dL Final     BUN, Bld   Date Value Ref Range Status   08/30/2018 12 8 - 23 mg/dL Final     Creatinine   Date Value Ref Range Status   08/30/2018 1.0 0.5 - 1.4 mg/dL Final     Calcium   Date Value Ref Range Status   08/30/2018 9.8 8.7 - 10.5 mg/dL Final     Total Protein   Date Value Ref Range Status   08/30/2018 7.8 6.0 - 8.4 g/dL Final     Albumin   Date Value Ref Range Status   08/30/2018 4.1 3.5 - 5.2 g/dL Final     Total Bilirubin   Date Value Ref Range Status   08/30/2018 0.5 0.1 - 1.0 mg/dL Final     Comment:     For infants and newborns, interpretation of results should be based  on gestational age, weight and in agreement with clinical  observations.  Premature Infant recommended reference ranges:  Up to 24 hours.............<8.0 mg/dL  Up to 48 hours............<12.0 mg/dL  3-5 days..................<15.0 mg/dL  6-29 days.................<15.0 mg/dL       Alkaline Phosphatase   Date Value Ref Range Status   08/30/2018 120 55 - 135 U/L Final     AST   Date Value Ref Range Status   08/30/2018 15 10 - 40 U/L Final     ALT   Date Value Ref Range Status   08/30/2018 11 10 - 44 U/L Final     Anion Gap   Date Value Ref Range Status   08/30/2018 10 8 - 16 mmol/L Final     eGFR if    Date Value Ref Range Status   08/30/2018 >60.0 >60 mL/min/1.73 m^2 Final     eGFR if non    Date Value Ref Range Status   08/30/2018 57.6 (A) >60 mL/min/1.73 m^2 Final     Comment:     Calculation used to obtain the estimated glomerular filtration  rate (eGFR) is the CKD-EPI  equation.        Cscope 2013 with tubular adenomas    Assessment:       1. Gastroesophageal reflux disease without esophagitis    2. Constipation, unspecified constipation type    3. History of colon polyps        Plan:       Gastroesophageal reflux disease without esophagitis  -     esomeprazole (NEXIUM) 40 MG capsule; Take 1 capsule (40 mg total) by mouth before breakfast.  Dispense: 90 capsule; Refill: 3  -     I will try a different PPI than was given previously    Constipation, unspecified constipation type  -     polyethylene glycol (GLYCOLAX) 17 gram/dose powder; Take 17 g by mouth once daily.  Dispense: 1 Bottle; Refill: 11  -     If a large amount of stool is seen on xray, she will do clean out with 2 bottles of magnesium citrate  -     X-Ray Abdomen Flat And Erect; Future; Expected date: 10/10/2018    History of colon polyps  -     Case request GI: COLONOSCOPY  -     sodium,potassium,mag sulfates (SUPREP BOWEL PREP KIT) 17.5-3.13-1.6 gram SolR; Take by mouth once. for 1 dose  Dispense: 354 mL; Refill: 0

## 2018-10-12 ENCOUNTER — TELEPHONE (OUTPATIENT)
Dept: GASTROENTEROLOGY | Facility: CLINIC | Age: 68
End: 2018-10-12

## 2018-10-12 ENCOUNTER — TELEPHONE (OUTPATIENT)
Dept: ENDOSCOPY | Facility: HOSPITAL | Age: 68
End: 2018-10-12

## 2018-10-12 ENCOUNTER — TELEPHONE (OUTPATIENT)
Dept: OBSTETRICS AND GYNECOLOGY | Facility: CLINIC | Age: 68
End: 2018-10-12

## 2018-10-12 DIAGNOSIS — R93.89 ABNORMAL FINDING ON IMAGING: Primary | ICD-10-CM

## 2018-10-12 DIAGNOSIS — R93.2 ABNORMAL FINDINGS ON DIAGNOSTIC IMAGING OF LIVER AND BILIARY TRACT: ICD-10-CM

## 2018-10-12 NOTE — TELEPHONE ENCOUNTER
----- Message from Rose Mary Paul sent at 10/12/2018  3:04 PM CDT -----  Contact: [phone]/ self   Pt requesting to speak with you in regarding a personal question  . Please advise

## 2018-10-12 NOTE — TELEPHONE ENCOUNTER
----- Message from Rose Mary Paul sent at 10/12/2018  2:41 PM CDT -----  Contact: [phone]/ self   Pt requesting to speak with you in regarding her procedure  . Please advise

## 2018-10-13 ENCOUNTER — PATIENT MESSAGE (OUTPATIENT)
Dept: ENDOSCOPY | Facility: HOSPITAL | Age: 68
End: 2018-10-13

## 2018-10-13 ENCOUNTER — PATIENT MESSAGE (OUTPATIENT)
Dept: OBSTETRICS AND GYNECOLOGY | Facility: CLINIC | Age: 68
End: 2018-10-13

## 2018-10-19 ENCOUNTER — TELEPHONE (OUTPATIENT)
Dept: ENDOSCOPY | Facility: HOSPITAL | Age: 68
End: 2018-10-19

## 2018-10-19 NOTE — TELEPHONE ENCOUNTER
----- Message from Jeri Armas sent at 10/19/2018  9:33 AM CDT -----  Contact: self -  [phone]  Tatiana - calling for f/u appt - please call patient at [phone]

## 2018-10-22 ENCOUNTER — TELEPHONE (OUTPATIENT)
Dept: OBSTETRICS AND GYNECOLOGY | Facility: CLINIC | Age: 68
End: 2018-10-22

## 2018-10-22 NOTE — TELEPHONE ENCOUNTER
----- Message from Liana Antony sent at 10/22/2018  1:25 PM CDT -----  Contact: [phone]/self  Patient requesting to speak with you regarding scheduling her surgery. Please advise.

## 2018-10-24 ENCOUNTER — TELEPHONE (OUTPATIENT)
Dept: SLEEP MEDICINE | Facility: CLINIC | Age: 68
End: 2018-10-24

## 2018-10-24 ENCOUNTER — TELEPHONE (OUTPATIENT)
Dept: OBSTETRICS AND GYNECOLOGY | Facility: CLINIC | Age: 68
End: 2018-10-24

## 2018-10-24 NOTE — TELEPHONE ENCOUNTER
Patient called concerning paperwork for oral appliance. Stated that Verna informed her that [employer] would have to send in an authorization form in order for them to cover it.   Advised patient per [employer], that it is actually on the patient to find a dentist that supplies that service. Once she goes to visit that dentist,she will hand over the prescription for the oral appliance as well as referral and sleep study and it is then up to the dentist to submit that paperwork to the insurance company.

## 2018-10-24 NOTE — TELEPHONE ENCOUNTER
----- Message from Elba Kumar sent at 10/24/2018  2:30 PM CDT -----  Contact: Self [phone]  Patient is calling to get an authorization form sent to [employer] for her c-pap machine. Please advice

## 2018-10-24 NOTE — TELEPHONE ENCOUNTER
Patient notified because she had canceled her surgery a new case request is placed and she has been placed in order of newest ordered last unless an emergency surgery.  I apologized but reminded it could take 7-10 business days for the surgical coordinator to contact her to reschedule.  Patient verbalized understanding

## 2018-10-24 NOTE — TELEPHONE ENCOUNTER
----- Message from Alana Sarabia sent at 10/24/2018 12:46 PM CDT -----  Contact: self/[phone]  Patient called to schedule a date for the surgery that was previously discussed.    Please call and advise.

## 2018-10-24 NOTE — TELEPHONE ENCOUNTER
----- Message from Jackelyn Scott sent at 10/24/2018  2:23 PM CDT -----  Contact: [phone]/self  Patient called in returning your call. Please advise.

## 2018-11-07 ENCOUNTER — TELEPHONE (OUTPATIENT)
Dept: ADMINISTRATIVE | Facility: OTHER | Age: 68
End: 2018-11-07

## 2018-11-21 ENCOUNTER — LAB VISIT (OUTPATIENT)
Dept: LAB | Facility: HOSPITAL | Age: 68
End: 2018-11-21
Attending: FAMILY MEDICINE
Payer: MEDICARE

## 2018-11-21 DIAGNOSIS — E11.65 TYPE 2 DIABETES MELLITUS WITH HYPERGLYCEMIA, WITHOUT LONG-TERM CURRENT USE OF INSULIN: ICD-10-CM

## 2018-11-21 DIAGNOSIS — N18.30 CKD (CHRONIC KIDNEY DISEASE) STAGE 3, GFR 30-59 ML/MIN: ICD-10-CM

## 2018-11-21 LAB
ALBUMIN SERPL BCP-MCNC: 3.6 G/DL
ALP SERPL-CCNC: 105 U/L
ALT SERPL W/O P-5'-P-CCNC: 14 U/L
ANION GAP SERPL CALC-SCNC: 10 MMOL/L
AST SERPL-CCNC: 19 U/L
BILIRUB SERPL-MCNC: 0.8 MG/DL
BUN SERPL-MCNC: 14 MG/DL
CALCIUM SERPL-MCNC: 9.2 MG/DL
CHLORIDE SERPL-SCNC: 104 MMOL/L
CHOLEST SERPL-MCNC: 219 MG/DL
CHOLEST/HDLC SERPL: 4.2 {RATIO}
CO2 SERPL-SCNC: 25 MMOL/L
CREAT SERPL-MCNC: 0.9 MG/DL
EST. GFR  (AFRICAN AMERICAN): >60 ML/MIN/1.73 M^2
EST. GFR  (NON AFRICAN AMERICAN): >60 ML/MIN/1.73 M^2
ESTIMATED AVG GLUCOSE: 186 MG/DL
GLUCOSE SERPL-MCNC: 148 MG/DL
HBA1C MFR BLD HPLC: 8.1 %
HDLC SERPL-MCNC: 52 MG/DL
HDLC SERPL: 23.7 %
LDLC SERPL CALC-MCNC: 134 MG/DL
NONHDLC SERPL-MCNC: 167 MG/DL
POTASSIUM SERPL-SCNC: 3.4 MMOL/L
PROT SERPL-MCNC: 6.9 G/DL
SODIUM SERPL-SCNC: 139 MMOL/L
TRIGL SERPL-MCNC: 165 MG/DL

## 2018-11-21 PROCEDURE — 80061 LIPID PANEL: CPT | Mod: HCNC

## 2018-11-21 PROCEDURE — 80053 COMPREHEN METABOLIC PANEL: CPT | Mod: HCNC

## 2018-11-21 PROCEDURE — 83036 HEMOGLOBIN GLYCOSYLATED A1C: CPT | Mod: HCNC

## 2018-11-21 PROCEDURE — 36415 COLL VENOUS BLD VENIPUNCTURE: CPT | Mod: HCNC,PO

## 2018-11-27 ENCOUNTER — OFFICE VISIT (OUTPATIENT)
Dept: FAMILY MEDICINE | Facility: CLINIC | Age: 68
End: 2018-11-27
Payer: MEDICARE

## 2018-11-27 VITALS
SYSTOLIC BLOOD PRESSURE: 140 MMHG | HEART RATE: 71 BPM | WEIGHT: 196.19 LBS | HEIGHT: 63 IN | BODY MASS INDEX: 34.76 KG/M2 | DIASTOLIC BLOOD PRESSURE: 70 MMHG

## 2018-11-27 DIAGNOSIS — E78.5 DYSLIPIDEMIA: ICD-10-CM

## 2018-11-27 DIAGNOSIS — E11.65 TYPE 2 DIABETES MELLITUS WITH HYPERGLYCEMIA, WITHOUT LONG-TERM CURRENT USE OF INSULIN: Primary | ICD-10-CM

## 2018-11-27 PROCEDURE — 1101F PT FALLS ASSESS-DOCD LE1/YR: CPT | Mod: CPTII,HCNC,S$GLB, | Performed by: FAMILY MEDICINE

## 2018-11-27 PROCEDURE — 3045F PR MOST RECENT HEMOGLOBIN A1C LEVEL 7.0-9.0%: CPT | Mod: CPTII,HCNC,S$GLB, | Performed by: FAMILY MEDICINE

## 2018-11-27 PROCEDURE — 99214 OFFICE O/P EST MOD 30 MIN: CPT | Mod: HCNC,S$GLB,, | Performed by: FAMILY MEDICINE

## 2018-11-27 PROCEDURE — 3078F DIAST BP <80 MM HG: CPT | Mod: CPTII,HCNC,S$GLB, | Performed by: FAMILY MEDICINE

## 2018-11-27 PROCEDURE — 3077F SYST BP >= 140 MM HG: CPT | Mod: CPTII,HCNC,S$GLB, | Performed by: FAMILY MEDICINE

## 2018-11-27 PROCEDURE — 99999 PR PBB SHADOW E&M-EST. PATIENT-LVL III: CPT | Mod: PBBFAC,HCNC,, | Performed by: FAMILY MEDICINE

## 2018-11-27 NOTE — PROGRESS NOTES
Subjective:       Patient ID: Chelly Ortiz is a 68 y.o. female.    Chief Complaint: Follow-up and Diabetes    68 years old female came to the clinic for diabetes check.  Last A1c and cholesterol were elevated but better than before.  Patient was not able to tolerate metformin before.  Patient did not want insulin for now.  Patient is willing to try  A fitness program.  No chest pain, palpitation, orthopnea or PND.  Patient with polyphagia.  No polyuria or polydipsia.  Patient with a BMI 34 currently trying to lose weight.      Review of Systems   Constitutional: Negative.    HENT: Negative.    Eyes: Negative.    Respiratory: Negative.    Cardiovascular: Negative.  Negative for chest pain, palpitations and leg swelling.   Gastrointestinal: Negative.    Endocrine: Positive for polyphagia. Negative for polydipsia and polyuria.   Genitourinary: Negative.    Musculoskeletal: Negative.    Skin: Negative.    Neurological: Negative.    Psychiatric/Behavioral: Negative.        Objective:      Physical Exam   Constitutional: She is oriented to person, place, and time. She appears well-developed and well-nourished. No distress.   HENT:   Head: Normocephalic and atraumatic.   Right Ear: External ear normal.   Left Ear: External ear normal.   Nose: Nose normal.   Mouth/Throat: Oropharynx is clear and moist. No oropharyngeal exudate.   Eyes: Conjunctivae and EOM are normal. Pupils are equal, round, and reactive to light. Right eye exhibits no discharge. Left eye exhibits no discharge. No scleral icterus.   Neck: Normal range of motion. Neck supple. No JVD present. No tracheal deviation present. No thyromegaly present.   Cardiovascular: Normal rate, regular rhythm, normal heart sounds and intact distal pulses. Exam reveals no gallop and no friction rub.   No murmur heard.  Pulmonary/Chest: Effort normal and breath sounds normal. No stridor. No respiratory distress. She has no wheezes. She has no rales. She exhibits no  tenderness.   Abdominal: Soft. Bowel sounds are normal. She exhibits no distension and no mass. There is no tenderness. There is no rebound and no guarding.   Musculoskeletal: Normal range of motion. She exhibits no edema or tenderness.   Lymphadenopathy:     She has no cervical adenopathy.   Neurological: She is alert and oriented to person, place, and time. She has normal reflexes. No cranial nerve deficit. She exhibits normal muscle tone. Coordination normal.   Skin: Skin is warm and dry. No rash noted. She is not diaphoretic. No erythema. No pallor.   Psychiatric: She has a normal mood and affect. Her behavior is normal. Judgment and thought content normal.       Assessment:       1. Type 2 diabetes mellitus with hyperglycemia, without long-term current use of insulin    2. Dyslipidemia        Plan:         Chelly was seen today for follow-up and diabetes.    Diagnoses and all orders for this visit:    Type 2 diabetes mellitus with hyperglycemia, without long-term current use of insulin  -     Ambulatory Referral to [facility] ([facility])  -     [facility] ASSIGN QUESTIONNAIRE SERIES ([facility])  -     [facility] Patient Entered [facility] ([facility])  -     Comprehensive metabolic panel; Future  -     Lipid panel; Future  -     Hemoglobin A1c; Future    Dyslipidemia  -     Comprehensive metabolic panel; Future  -     Lipid panel; Future  -     Hemoglobin A1c; Future    Continue monitoring blood sugar at home,ADA diet.

## 2018-11-30 ENCOUNTER — HOSPITAL ENCOUNTER (OUTPATIENT)
Dept: RADIOLOGY | Facility: HOSPITAL | Age: 68
Discharge: HOME | End: 2018-11-30
Attending: OBSTETRICS & GYNECOLOGY
Payer: MEDICARE

## 2018-11-30 DIAGNOSIS — Z12.31 ENCOUNTER FOR SCREENING MAMMOGRAM FOR MALIGNANT NEOPLASM OF BREAST: ICD-10-CM

## 2018-11-30 PROCEDURE — 77067 SCR MAMMO BI INCL CAD: CPT | Mod: 26,HCNC,, | Performed by: RADIOLOGY

## 2018-11-30 PROCEDURE — 77067 SCR MAMMO BI INCL CAD: CPT | Mod: TC,HCNC

## 2018-11-30 PROCEDURE — 77063 BREAST TOMOSYNTHESIS BI: CPT | Mod: 26,HCNC,, | Performed by: RADIOLOGY

## 2018-11-30 PROCEDURE — 77063 BREAST TOMOSYNTHESIS BI: CPT | Mod: TC,HCNC

## 2018-12-03 ENCOUNTER — TELEPHONE (OUTPATIENT)
Dept: ENDOSCOPY | Facility: HOSPITAL | Age: 68
End: 2018-12-03

## 2018-12-03 NOTE — TELEPHONE ENCOUNTER
Left message instructing patient to call dept @ [phone] between 8am-4pm.    Arrival time @ 1200

## 2018-12-03 NOTE — TELEPHONE ENCOUNTER
Spoke with patient about arrival time @.930  .     Prep instructions reviewed: the day before the procedure, follow a clear liquid diet all day, then start the first 1/2 of prep at 5pm and take 2nd 1/2 of prep @ 0430     .  Pt must be completely NPO when prep completed @    0430      .    Medications: Do not take Insulin or oral diabetic medications the day of the procedure.  Take as prescribed: heart, seizure and blood pressure medication in the morning with a sip of water (less than an ounce).  Take any breathing medications and bring inhalers to hospital with you Leave all valuables and jewelry at home.     Wear comfortable clothes to procedure to change into hospital gown You cannot drive for 24 hours after your procedure because you will receive sedation for your procedure to make you comfortable.  A ride must be provided at discharge.

## 2018-12-05 ENCOUNTER — ANESTHESIA (OUTPATIENT)
Dept: ENDOSCOPY | Facility: HOSPITAL | Age: 68
End: 2018-12-05
Payer: MEDICARE

## 2018-12-05 ENCOUNTER — HOSPITAL ENCOUNTER (OUTPATIENT)
Facility: HOSPITAL | Age: 68
Discharge: HOME | End: 2018-12-05
Attending: INTERNAL MEDICINE | Admitting: INTERNAL MEDICINE
Payer: MEDICARE

## 2018-12-05 ENCOUNTER — ANESTHESIA EVENT (OUTPATIENT)
Dept: ENDOSCOPY | Facility: HOSPITAL | Age: 68
End: 2018-12-05
Payer: MEDICARE

## 2018-12-05 VITALS
TEMPERATURE: 98 F | DIASTOLIC BLOOD PRESSURE: 70 MMHG | BODY MASS INDEX: 34.55 KG/M2 | WEIGHT: 195 LBS | RESPIRATION RATE: 59 BRPM | OXYGEN SATURATION: 95 % | HEART RATE: 16 BPM | SYSTOLIC BLOOD PRESSURE: 126 MMHG | HEIGHT: 63 IN

## 2018-12-05 DIAGNOSIS — Z86.010 PERSONAL HISTORY OF COLONIC POLYPS: ICD-10-CM

## 2018-12-05 PROBLEM — Z86.0100 PERSONAL HISTORY OF COLONIC POLYPS: Status: ACTIVE | Noted: 2018-12-05

## 2018-12-05 LAB
GLUCOSE SERPL-MCNC: 156 MG/DL (ref 70–110)
POCT GLUCOSE: 156 MG/DL (ref 70–110)

## 2018-12-05 PROCEDURE — 37000008 HC ANESTHESIA 1ST 15 MINUTES: Performed by: INTERNAL MEDICINE

## 2018-12-05 PROCEDURE — 88305 TISSUE EXAM BY PATHOLOGIST: CPT | Mod: 26,,, | Performed by: PATHOLOGY

## 2018-12-05 PROCEDURE — 45385 COLONOSCOPY W/LESION REMOVAL: CPT | Mod: PT,,, | Performed by: INTERNAL MEDICINE

## 2018-12-05 PROCEDURE — 25000003 PHARM REV CODE 250: Mod: HCNC | Performed by: INTERNAL MEDICINE

## 2018-12-05 PROCEDURE — 25000003 PHARM REV CODE 250: Performed by: NURSE ANESTHETIST, CERTIFIED REGISTERED

## 2018-12-05 PROCEDURE — 63600175 PHARM REV CODE 636 W HCPCS: Performed by: NURSE ANESTHETIST, CERTIFIED REGISTERED

## 2018-12-05 PROCEDURE — 82962 GLUCOSE BLOOD TEST: CPT | Performed by: INTERNAL MEDICINE

## 2018-12-05 PROCEDURE — 45385 COLONOSCOPY W/LESION REMOVAL: CPT | Performed by: INTERNAL MEDICINE

## 2018-12-05 PROCEDURE — 37000009 HC ANESTHESIA EA ADD 15 MINS: Performed by: INTERNAL MEDICINE

## 2018-12-05 PROCEDURE — 88305 TISSUE EXAM BY PATHOLOGIST: CPT | Mod: 59

## 2018-12-05 PROCEDURE — 27201089 HC SNARE, DISP (ANY): Performed by: INTERNAL MEDICINE

## 2018-12-05 RX ORDER — GLYCOPYRROLATE 0.2 MG/ML
INJECTION INTRAMUSCULAR; INTRAVENOUS
Status: DISCONTINUED | OUTPATIENT
Start: 2018-12-05 | End: 2018-12-05

## 2018-12-05 RX ORDER — LIDOCAINE HCL/PF 100 MG/5ML
SYRINGE (ML) INTRAVENOUS
Status: DISCONTINUED | OUTPATIENT
Start: 2018-12-05 | End: 2018-12-05

## 2018-12-05 RX ORDER — SODIUM CHLORIDE 9 MG/ML
INJECTION, SOLUTION INTRAVENOUS CONTINUOUS
Status: DISCONTINUED | OUTPATIENT
Start: 2018-12-05 | End: 2018-12-05 | Stop reason: HOSPADM

## 2018-12-05 RX ORDER — PROPOFOL 10 MG/ML
VIAL (ML) INTRAVENOUS
Status: DISCONTINUED | OUTPATIENT
Start: 2018-12-05 | End: 2018-12-05

## 2018-12-05 RX ORDER — SODIUM CHLORIDE 0.9 % (FLUSH) 0.9 %
3 SYRINGE (ML) INJECTION
Status: DISCONTINUED | OUTPATIENT
Start: 2018-12-05 | End: 2018-12-05 | Stop reason: HOSPADM

## 2018-12-05 RX ORDER — PROPOFOL 10 MG/ML
VIAL (ML) INTRAVENOUS CONTINUOUS PRN
Status: DISCONTINUED | OUTPATIENT
Start: 2018-12-05 | End: 2018-12-05

## 2018-12-05 RX ADMIN — PROPOFOL 50 MG: 10 INJECTION, EMULSION INTRAVENOUS at 11:12

## 2018-12-05 RX ADMIN — PROPOFOL 150 MCG/KG/MIN: 10 INJECTION, EMULSION INTRAVENOUS at 11:12

## 2018-12-05 RX ADMIN — GLYCOPYRROLATE 0.2 MG: 0.2 INJECTION, SOLUTION INTRAMUSCULAR; INTRAVENOUS at 11:12

## 2018-12-05 RX ADMIN — LIDOCAINE HYDROCHLORIDE 50 MG: 20 INJECTION, SOLUTION INTRAVENOUS at 11:12

## 2018-12-05 RX ADMIN — SODIUM CHLORIDE: 0.9 INJECTION, SOLUTION INTRAVENOUS at 10:12

## 2018-12-05 NOTE — PLAN OF CARE
PIV discontinued with catheter intact. PIV insertion site clean, dry, and intact with no signs/symptoms of redness or swelling. Pressure dressing applied with gauze and coban. Instructed patient to keep dressing on for at least 20-30 minutes. She verbalized understanding.

## 2018-12-05 NOTE — TRANSFER OF CARE
"Anesthesia Transfer of Care Note    Patient: Chelly Ortiz    Procedure(s) Performed: Procedure(s) (LRB):  COLONOSCOPY (N/A)    Patient location: GI    Anesthesia Type: MAC    Transport from OR: Transported from OR on room air with adequate spontaneous ventilation    Post pain: adequate analgesia    Post assessment: no apparent anesthetic complications and tolerated procedure well    Post vital signs: stable    Level of consciousness: responds to stimulation and sedated    Nausea/Vomiting: no nausea/vomiting    Complications: none    Transfer of care protocol was followed      Last vitals:   Visit Vitals  /72   Pulse 76   Temp 37.3 °C (99.1 °F)   Resp 18   Ht 5' 3" (1.6 m)   Wt 88.5 kg (195 lb)   LMP  (LMP Unknown)   SpO2 96%   Breastfeeding? No   BMI 34.54 kg/m²     "

## 2018-12-05 NOTE — PLAN OF CARE
Recovery complete. Dr. Mejia spoke to patient at bedside abut outcome of the procedure. Patient recovered to baseline. Discharge instructions reviewed with patient. She verbalized understanding. VSS and no complaints of pain or discomfort at this time. Patient ready for discharge.

## 2018-12-05 NOTE — ANESTHESIA POSTPROCEDURE EVALUATION
"Anesthesia Post Evaluation    Patient: Chelly Ortiz    Procedure(s) Performed: Procedure(s) (LRB):  COLONOSCOPY (N/A)    Final Anesthesia Type: MAC  Patient location during evaluation: GI PACU  Patient participation: Yes- Able to Participate  Level of consciousness: awake and alert  Post-procedure vital signs: reviewed and stable  Pain management: adequate  Airway patency: patent  PONV status at discharge: No PONV  Anesthetic complications: no      Cardiovascular status: hemodynamically stable  Respiratory status: unassisted, spontaneous ventilation and room air  Hydration status: euvolemic  Follow-up not needed.        Visit Vitals  /72   Pulse 76   Temp 37.3 °C (99.1 °F)   Resp 18   Ht 5' 3" (1.6 m)   Wt 88.5 kg (195 lb)   LMP  (LMP Unknown)   SpO2 96%   Breastfeeding? No   BMI 34.54 kg/m²       Pain/Benjamin Score: Pain Assessment Performed: Yes (12/5/2018 10:14 AM)  Presence of Pain: denies (12/5/2018 10:14 AM)        "

## 2018-12-05 NOTE — H&P
CC: Personal h/o colon polyps    67 yo F with personal h/o colon polyps on colonoscopy 2013.  She denies FH of colon cancer, change in bowel habits, rectal bleeding, or weight loss.    ROS:  No headache, no fever/chills, no chest pain/SOB, no nausea/vomiting/diarrhea/constipation/GI bleeding/abdominal pain, no dysuria/hematuria.    VSSAF   Exam:   Alert and oriented x 3; no apparent distress   PERRLA, sclera anicteric  CV: Regular rate/rhythm, normal PMI   Lungs: Clear bilaterally with no wheeze/rales   Abdomen: Soft, NT/ND, normal bowel sounds   Ext: No cyanosis, clubbing     Impression:   Personal h/o colon polyps    Plan:   Proceed with endoscopy. Further recs to follow.

## 2018-12-05 NOTE — [employer]ATION PATIENT INSTRUCTIONS
Discharge Summary/Instructions after an Endoscopic Procedure  Patient Name: Chelly Ortiz  Patient MRN: [id number]  Patient YOB: 1950  Wednesday, December 05, 2018  Marie Mejia MD  RESTRICTIONS:  During your procedure today, you received medications for sedation.  These   medications may affect your judgment, balance and coordination.  Therefore,   for 24 hours, you have the following restrictions:   - DO NOT drive a car, operate machinery, make legal/financial decisions,   sign important papers or drink alcohol.    ACTIVITY:  Today: no heavy lifting, straining or running due to procedural   sedation/anesthesia.  The following day: return to full activity including work.  DIET:  Eat and drink normally unless instructed otherwise.     TREATMENT FOR COMMON SIDE EFFECTS:  - Mild abdominal pain, nausea, belching, bloating or excessive gas:  rest,   eat lightly and use a heating pad.  - Sore Throat: treat with throat lozenges and/or gargle with warm salt   water.  - Because air was used during the procedure, expelling large amounts of air   from your rectum or belching is normal.  - If a bowel prep was taken, you may not have a bowel movement for 1-3 days.    This is normal.  SYMPTOMS TO WATCH FOR AND REPORT TO YOUR PHYSICIAN:  1. Abdominal pain or bloating, other than gas cramps.  2. Chest pain.  3. Back pain.  4. Signs of infection such as: chills or fever occurring within 24 hours   after the procedure.  5. Rectal bleeding, which would show as bright red, maroon, or black stools.   (A tablespoon of blood from the rectum is not serious, especially if   hemorrhoids are present.)  6. Vomiting.  7. Weakness or dizziness.  GO DIRECTLY TO THE NEAREST EMERGENCY ROOM IF YOU HAVE ANY OF THE FOLLOWING:      Difficulty breathing              Chills and/or fever over 101 F   Persistent vomiting and/or vomiting blood   Severe abdominal pain   Severe chest pain   Black, tarry stools   Bleeding- more than one  tablespoon   Any other symptom or condition that you feel may need urgent attention  Your doctor recommends these additional instructions:  If any biopsies were taken, your doctors clinic will contact you in 1 to 2   weeks with any results.  - Discharge patient to home.   - Patient has a contact number available for emergencies.  The signs and   symptoms of potential delayed complications were discussed with the   patient.  Return to normal activities tomorrow.  Written discharge   instructions were provided to the patient.   - Resume previous diet.   - Continue present medications.   - Await pathology results.   - Repeat colonoscopy in 3 years for surveillance based on pathology results.     - Return to GI clinic PRN.  For questions, problems or results please call your physician - Marie Mejia MD at Work:  [phone].  EMERGENCY PHONE NUMBER: [phone],  LAB RESULTS: [phone]  IF A COMPLICATION OR EMERGENCY SITUATION ARISES AND YOU ARE UNABLE TO REACH   YOUR PHYSICIAN - GO DIRECTLY TO THE EMERGENCY ROOM.  MD Marie Macdonald MD  12/5/2018 12:11:53 PM  This report has been verified and signed electronically.  [employer]

## 2018-12-05 NOTE — ANESTHESIA PREPROCEDURE EVALUATION
12/05/2018  Chelly Ortiz is a 68 y.o., female presents for eus under MAC.    Pre-op Assessment    I have reviewed the Patient Summary Reports.     I have reviewed the Nursing Notes.   I have reviewed the Medications.     Review of Systems  Anesthesia Hx:  No problems with previous Anesthesia  History of prior surgery of interest to airway management or planning: Previous anesthesia: General Denies Family Hx of Anesthesia complications.   Denies Personal Hx of Anesthesia complications.   Social:  Non-Smoker    Hematology/Oncology:  Hematology Normal   Oncology Normal     EENT/Dental:EENT/Dental Normal   Cardiovascular:   Hypertension, well controlled    Pulmonary:   Sleep Apnea, CPAP    Hepatic/GI:   GERD, well controlled    Musculoskeletal:   Arthritis     Neurological:   Neuromuscular Disease,    Endocrine:   Diabetes, well controlled, type 2        Physical Exam  General:  Well nourished    Airway/Jaw/Neck:  Airway Findings: Mouth Opening: Normal Tongue: Normal  Mallampati: II       Chest/Lungs:  Chest/Lungs Findings: Clear to auscultation, Normal Respiratory Rate     Heart/Vascular:  Heart Findings: Rate: Normal  Rhythm: Regular Rhythm        Mental Status:  Mental Status Findings:  Cooperative, Alert and Oriented         Anesthesia Plan  Type of Anesthesia, risks & benefits discussed:  Anesthesia Type:  MAC  Patient's Preference:   Intra-op Monitoring Plan: standard ASA monitors  Intra-op Monitoring Plan Comments:   Post Op Pain Control Plan: per primary service following discharge from PACU  Post Op Pain Control Plan Comments:   Induction:    Beta Blocker:  Patient is not currently on a Beta-Blocker (No further documentation required).       Informed Consent: Patient understands risks and agrees with Anesthesia plan.  Questions answered. Anesthesia consent signed with patient.  ASA Score: 3     Day of  Surgery Review of History & Physical:  There are no significant changes.          Ready For Surgery From Anesthesia Perspective.

## 2018-12-07 ENCOUNTER — TELEPHONE (OUTPATIENT)
Dept: FAMILY MEDICINE | Facility: CLINIC | Age: 68
End: 2018-12-07

## 2018-12-07 ENCOUNTER — PATIENT MESSAGE (OUTPATIENT)
Dept: FAMILY MEDICINE | Facility: CLINIC | Age: 68
End: 2018-12-07

## 2018-12-07 DIAGNOSIS — E66.9 CLASS 1 OBESITY WITH BODY MASS INDEX (BMI) OF 34.0 TO 34.9 IN ADULT, UNSPECIFIED OBESITY TYPE, UNSPECIFIED WHETHER SERIOUS COMORBIDITY PRESENT: ICD-10-CM

## 2018-12-07 DIAGNOSIS — I10 ESSENTIAL HYPERTENSION: Primary | ICD-10-CM

## 2018-12-28 DIAGNOSIS — E11.65 UNCONTROLLED TYPE 2 DIABETES MELLITUS WITH HYPERGLYCEMIA: Primary | ICD-10-CM

## 2018-12-31 ENCOUNTER — PATIENT MESSAGE (OUTPATIENT)
Dept: FAMILY MEDICINE | Facility: CLINIC | Age: 68
End: 2018-12-31

## 2018-12-31 ENCOUNTER — TELEPHONE (OUTPATIENT)
Dept: FAMILY MEDICINE | Facility: CLINIC | Age: 68
End: 2018-12-31

## 2018-12-31 DIAGNOSIS — E66.9 CLASS 1 OBESITY WITH BODY MASS INDEX (BMI) OF 34.0 TO 34.9 IN ADULT, UNSPECIFIED OBESITY TYPE, UNSPECIFIED WHETHER SERIOUS COMORBIDITY PRESENT: ICD-10-CM

## 2018-12-31 DIAGNOSIS — I10 ESSENTIAL HYPERTENSION: ICD-10-CM

## 2018-12-31 DIAGNOSIS — E11.65 UNCONTROLLED TYPE 2 DIABETES MELLITUS WITH HYPERGLYCEMIA: Primary | ICD-10-CM

## 2019-01-10 ENCOUNTER — NUTRITION (OUTPATIENT)
Dept: NUTRITION | Facility: CLINIC | Age: 69
End: 2019-01-10
Payer: MEDICARE

## 2019-01-10 VITALS — BODY MASS INDEX: 34.73 KG/M2 | HEIGHT: 63 IN | WEIGHT: 196 LBS

## 2019-01-10 DIAGNOSIS — Z71.3 DIETARY COUNSELING: ICD-10-CM

## 2019-01-10 DIAGNOSIS — E11.69 DIABETES MELLITUS TYPE 2 IN OBESE: Primary | ICD-10-CM

## 2019-01-10 DIAGNOSIS — E66.9 OBESITY (BMI 30-39.9): ICD-10-CM

## 2019-01-10 DIAGNOSIS — E66.9 DIABETES MELLITUS TYPE 2 IN OBESE: Primary | ICD-10-CM

## 2019-01-10 DIAGNOSIS — I10 ESSENTIAL HYPERTENSION: ICD-10-CM

## 2019-01-10 DIAGNOSIS — E78.5 DYSLIPIDEMIA: ICD-10-CM

## 2019-01-10 PROCEDURE — 97802 MEDICAL NUTRITION INDIV IN: CPT | Mod: HCNC,S$GLB,, | Performed by: DIETITIAN, REGISTERED

## 2019-01-10 PROCEDURE — 99999 PR PBB SHADOW E&M-EST. PATIENT-LVL III: ICD-10-PCS | Mod: PBBFAC,HCNC,,

## 2019-01-10 PROCEDURE — 97802 PR MED NUTR THER, 1ST, INDIV, EA 15 MIN: ICD-10-PCS | Mod: HCNC,S$GLB,, | Performed by: DIETITIAN, REGISTERED

## 2019-01-10 PROCEDURE — 99999 PR PBB SHADOW E&M-EST. PATIENT-LVL III: CPT | Mod: PBBFAC,HCNC,,

## 2019-01-10 NOTE — PROGRESS NOTES
"Referring Physician:Gabriel Wilson*     Reason for visit:  Chief Complaint   Patient presents with    Diabetes    Dyslipidemia    Nutrition Counseling    Obesity    Hypertension      Initial Visit    :1950     Allergies Reviewed  Meds Reviewed    Anthropometrics  Weight:88.9 kg (195 lb 15.8 oz)  Height:5' 3" (1.6 m)  BMI:Body mass index is 34.72 kg/m².   IBW:   52.3 kg  +/-10%    Meds:  Outpatient Medications Prior to Visit   Medication Sig Dispense Refill    ACCU-CHEK FASTCLIX LANCET DRUM Misc USE TO TEST BLOOD SUGAR ONCE DAILY  102 each 3    ACCU-CHEK TERI Misc USE AS DIRECTED 1 each 0    albuterol (PROVENTIL/VENTOLIN HFA) 90 mcg/actuation inhaler Inhale 1-2 puffs into the lungs every 4 (four) hours as needed for Wheezing. 1 Inhaler 1    amLODIPine (NORVASC) 5 MG tablet Take 1 tablet (5 mg total) by mouth once daily. 90 tablet 3    aspirin (ECOTRIN) 81 MG EC tablet Take 81 mg by mouth once daily.      blood sugar diagnostic Strp 1 strip by Misc.(Non-Drug; Combo Route) route 2 (two) times daily. 100 strip 3    citalopram (CELEXA) 10 MG tablet Take 1 tablet (10 mg total) by mouth once daily. 90 tablet 3    ergocalciferol (VITAMIN D2) 50,000 unit Cap Take 1 capsule (50,000 Units total) by mouth every 7 days. 12 capsule 1    esomeprazole (NEXIUM) 40 MG capsule Take 1 capsule (40 mg total) by mouth before breakfast. 90 capsule 3    gabapentin (NEURONTIN) 100 MG capsule Take 1 capsule (100 mg total) by mouth 3 (three) times daily. 90 capsule 2    glimepiride (AMARYL) 4 MG tablet Take 1 tablet (4 mg total) by mouth daily with breakfast. 90 tablet 3    hyoscyamine (ANASPAZ,LEVSIN) 0.125 mg Tab Take 1 tablet (125 mcg total) by mouth every 6 (six) hours as needed (abdominal cramps/pain). 20 tablet 0    JANUVIA 100 mg Tab   3    lidocaine HCL 2% (XYLOCAINE) 2 % jelly Apply topically once daily. 30 mL 2    losartan (COZAAR) 50 MG tablet Take 1 tablet (50 mg total) by mouth once daily. 90 " "tablet 3    magnesium oxide (MAG-OX) 400 mg tablet Take 1 tablet (400 mg total) by mouth 2 (two) times daily. 180 tablet 3    polyethylene glycol (GLYCOLAX) 17 gram/dose powder Take 17 g by mouth once daily. 1 Bottle 11    pravastatin (PRAVACHOL) 10 MG tablet Take 1 tablet (10 mg total) by mouth once daily. 90 tablet 3    traZODone (DESYREL) 50 MG tablet Take 1 tablet (50 mg total) by mouth nightly as needed. 90 tablet 3     No facility-administered medications prior to visit.        Food/Drug Interactions Noted:  n/a    Vitamins/Supplements/Herbs:    Alive MVI; mentioned an [place] supplement which is advertised to be an appetite suppressant and pt states it works.    Labs: HgbA1c  8.1   Chol  219   TG  165     Nutrition Prescription:   1569 Kcals/day( 30 kcal/kg IBW),    42 g protein( 0.8 g/kg IBW)     Support System:    Pt lives with ; pt does grocery shopping and cooking    Diet Hx:   Pt states she has met several times with Diabetes Educator one-on-one and also attended diabetes classes.  States she tries to follow recommendations from those meetings:  45 gm carb/meal and 30 gm carb/snack; when asked about current diet regimen pt stated "I'm supposed to be carb counting".    Pt states she can't stay away from sweets, particularly in the evening.  She will read food label on package of cookies and calculate 30 gm carb portion, and then eat more.  Recently found Halo ice cream on sale at grocery, and bought 4 containers to try in lieu of regular ice cream.  States she has been told that having diabetes makes you crave sugar even more.  Pt states she tried Trulicity a few months ago, and didn't tolerate.    Doesn't usually eat until 11 am-12 noon: Breakfast: bowl of cereal (Cheerios/Honey Bunches of Oats) with almond milk.  Coffee with two packs of brown sugar (it doesn't effect me like white sugar does)  Around 4 pm:  Lunch:   When she's out, [employer] fish sandwich (only eats with half of the bun) and " small sweet tea.  Around 8 pm:  Dinner:   Last night:  1/3 cup cooked brown rice with red sauce and ground meat.  Diet cream soda  Snack:  Ice cream/cookies    Current activity level and/or physical limitations:    Met with [facility]  last week, and has been sporadically mall walking 30 minutes; one day she walked 60 minutes because she felt good and the next day hurt too bad to walk for a few days.  She was scheduled to meet with  again today after this encounter.    Motivation to make changes/anticipated barriers and/or expected adherence:    Pt seems very well educated on carb counting and diabetic diet regimen; does not seem motivated to give up her sweets    Nutrition-Focus Physical Findings:    Appears well nourished.      Assessment:   Pt attentive and voiced frustration with not being able to eat and drink whatever she likes.  We discussed importance of reading food labels; carb counting; eating three meals/day.  Pt verbalized understanding of information.    Nutrition Diagnosis:   Excessive carbohydrate intake RT lack of willingness to modify carb intake in response to recommendations from diabetes educator AEB HgbA1c > 6% and food recall    Recommendations: Carbohydrate controlled, low sodium, low cholesterol, high fiber diet.  Exercise goal:  30 minutes per day, 3-5 days per week.  Handouts provided & reviewed:  Diabetes and Heart Healthy Nutrition Therapy; Diabetes Snack List      Strategies Implemented:    Avoid sugar/sugary beverages    Consultation Time:30 minutes.  Communicated with referring healthcare provider:  Consult note available in pt's Epic chart per MD discretion  Follow Up:  Pt provided with dietitian contact number and advised to call with questions or make future appointment if further intervention needed.  Note:  Pt scheduled to meet with Diabetes Educator on January 15, 2019

## 2019-01-10 NOTE — PATIENT INSTRUCTIONS
Carbohydrate controlled, low sodium, low cholesterol, high fiber diet.  Exercise goal:  30 minutes per day, 3-5 days per week.

## 2019-01-16 ENCOUNTER — LAB VISIT (OUTPATIENT)
Dept: LAB | Facility: HOSPITAL | Age: 69
End: 2019-01-16
Attending: INTERNAL MEDICINE
Payer: MEDICARE

## 2019-01-16 DIAGNOSIS — R93.89 ABNORMAL FINDING ON IMAGING: ICD-10-CM

## 2019-01-16 LAB
ALBUMIN SERPL BCP-MCNC: 3.8 G/DL
ALP SERPL-CCNC: 116 U/L
ALT SERPL W/O P-5'-P-CCNC: 16 U/L
AST SERPL-CCNC: 17 U/L
BILIRUB DIRECT SERPL-MCNC: 0.2 MG/DL
BILIRUB SERPL-MCNC: 0.5 MG/DL
PROT SERPL-MCNC: 7.3 G/DL

## 2019-01-16 PROCEDURE — 36415 COLL VENOUS BLD VENIPUNCTURE: CPT | Mod: HCNC

## 2019-01-16 PROCEDURE — 80076 HEPATIC FUNCTION PANEL: CPT | Mod: HCNC

## 2019-01-18 ENCOUNTER — PES CALL (OUTPATIENT)
Dept: ADMINISTRATIVE | Facility: CLINIC | Age: 69
End: 2019-01-18

## 2019-01-25 ENCOUNTER — PATIENT MESSAGE (OUTPATIENT)
Dept: GASTROENTEROLOGY | Facility: CLINIC | Age: 69
End: 2019-01-25

## 2019-01-25 ENCOUNTER — PATIENT MESSAGE (OUTPATIENT)
Dept: FAMILY MEDICINE | Facility: CLINIC | Age: 69
End: 2019-01-25

## 2019-01-25 DIAGNOSIS — E11.65 TYPE 2 DIABETES MELLITUS WITH HYPERGLYCEMIA, WITHOUT LONG-TERM CURRENT USE OF INSULIN: ICD-10-CM

## 2019-01-25 RX ORDER — AMLODIPINE BESYLATE 5 MG/1
TABLET ORAL
Qty: 90 TABLET | Refills: 3 | Status: SHIPPED | OUTPATIENT
Start: 2019-01-25 | End: 2019-01-28 | Stop reason: SDUPTHER

## 2019-01-25 RX ORDER — TRAZODONE HYDROCHLORIDE 50 MG/1
TABLET ORAL
Qty: 90 TABLET | Refills: 3 | Status: SHIPPED | OUTPATIENT
Start: 2019-01-25 | End: 2020-08-06 | Stop reason: SDUPTHER

## 2019-01-25 RX ORDER — GLIMEPIRIDE 4 MG/1
TABLET ORAL
Qty: 90 TABLET | Refills: 3 | Status: SHIPPED | OUTPATIENT
Start: 2019-01-25 | End: 2019-01-28 | Stop reason: SDUPTHER

## 2019-01-25 RX ORDER — LOSARTAN POTASSIUM 50 MG/1
TABLET ORAL
Qty: 90 TABLET | Refills: 3 | Status: SHIPPED | OUTPATIENT
Start: 2019-01-25 | End: 2019-01-28 | Stop reason: SDUPTHER

## 2019-01-28 DIAGNOSIS — E11.65 TYPE 2 DIABETES MELLITUS WITH HYPERGLYCEMIA, WITHOUT LONG-TERM CURRENT USE OF INSULIN: ICD-10-CM

## 2019-01-28 RX ORDER — GLIMEPIRIDE 4 MG/1
4 TABLET ORAL
Qty: 90 TABLET | Refills: 3 | Status: SHIPPED | OUTPATIENT
Start: 2019-01-28 | End: 2019-05-27

## 2019-01-28 RX ORDER — SITAGLIPTIN 100 MG/1
100 TABLET, FILM COATED ORAL DAILY
Qty: 90 TABLET | Refills: 3 | Status: SHIPPED | OUTPATIENT
Start: 2019-01-28 | End: 2020-01-14

## 2019-01-28 RX ORDER — AMLODIPINE BESYLATE 5 MG/1
5 TABLET ORAL DAILY
Qty: 90 TABLET | Refills: 3 | Status: SHIPPED | OUTPATIENT
Start: 2019-01-28 | End: 2020-02-19

## 2019-01-28 RX ORDER — LOSARTAN POTASSIUM 50 MG/1
50 TABLET ORAL DAILY
Qty: 90 TABLET | Refills: 3 | Status: SHIPPED | OUTPATIENT
Start: 2019-01-28 | End: 2020-02-19

## 2019-02-01 ENCOUNTER — PATIENT MESSAGE (OUTPATIENT)
Dept: FAMILY MEDICINE | Facility: CLINIC | Age: 69
End: 2019-02-01

## 2019-02-11 ENCOUNTER — CLINICAL SUPPORT (OUTPATIENT)
Dept: LAB | Facility: HOSPITAL | Age: 69
End: 2019-02-11
Attending: OBSTETRICS & GYNECOLOGY
Payer: MEDICARE

## 2019-02-11 ENCOUNTER — HOSPITAL ENCOUNTER (OUTPATIENT)
Dept: PREADMISSION TESTING | Facility: HOSPITAL | Age: 69
Discharge: HOME | End: 2019-02-11
Attending: OBSTETRICS & GYNECOLOGY
Payer: MEDICARE

## 2019-02-11 ENCOUNTER — ANESTHESIA EVENT (OUTPATIENT)
Dept: SURGERY | Facility: HOSPITAL | Age: 69
End: 2019-02-11
Payer: MEDICARE

## 2019-02-11 ENCOUNTER — OFFICE VISIT (OUTPATIENT)
Dept: OBSTETRICS AND GYNECOLOGY | Facility: CLINIC | Age: 69
End: 2019-02-11
Payer: MEDICARE

## 2019-02-11 VITALS
SYSTOLIC BLOOD PRESSURE: 120 MMHG | DIASTOLIC BLOOD PRESSURE: 76 MMHG | HEIGHT: 63 IN | WEIGHT: 196.88 LBS | BODY MASS INDEX: 34.88 KG/M2

## 2019-02-11 DIAGNOSIS — L72.0 EIC (EPIDERMAL INCLUSION CYST): ICD-10-CM

## 2019-02-11 DIAGNOSIS — Z86.010 PERSONAL HISTORY OF COLONIC POLYPS: ICD-10-CM

## 2019-02-11 DIAGNOSIS — Z01.818 PREOPERATIVE EXAMINATION: ICD-10-CM

## 2019-02-11 DIAGNOSIS — Z86.010 PERSONAL HISTORY OF COLONIC POLYPS: Primary | ICD-10-CM

## 2019-02-11 DIAGNOSIS — N90.89 VULVAR LESION: Primary | ICD-10-CM

## 2019-02-11 PROCEDURE — 93010 EKG 12-LEAD: ICD-10-PCS | Mod: HCNC,,, | Performed by: INTERNAL MEDICINE

## 2019-02-11 PROCEDURE — 93005 ELECTROCARDIOGRAM TRACING: CPT | Mod: HCNC

## 2019-02-11 PROCEDURE — 93010 ELECTROCARDIOGRAM REPORT: CPT | Mod: HCNC,,, | Performed by: INTERNAL MEDICINE

## 2019-02-11 PROCEDURE — 99499 NO LOS: ICD-10-PCS | Mod: S$GLB,,, | Performed by: OBSTETRICS & GYNECOLOGY

## 2019-02-11 PROCEDURE — 99499 UNLISTED E&M SERVICE: CPT | Mod: S$GLB,,, | Performed by: OBSTETRICS & GYNECOLOGY

## 2019-02-11 PROCEDURE — 99999 PR PBB SHADOW E&M-EST. PATIENT-LVL III: CPT | Mod: PBBFAC,HCNC,, | Performed by: OBSTETRICS & GYNECOLOGY

## 2019-02-11 PROCEDURE — 99999 PR PBB SHADOW E&M-EST. PATIENT-LVL III: ICD-10-PCS | Mod: PBBFAC,HCNC,, | Performed by: OBSTETRICS & GYNECOLOGY

## 2019-02-11 RX ORDER — LIDOCAINE HYDROCHLORIDE 10 MG/ML
1 INJECTION, SOLUTION EPIDURAL; INFILTRATION; INTRACAUDAL; PERINEURAL ONCE
Status: CANCELLED | OUTPATIENT
Start: 2019-02-11 | End: 2019-02-11

## 2019-02-11 RX ORDER — SODIUM CHLORIDE, SODIUM LACTATE, POTASSIUM CHLORIDE, CALCIUM CHLORIDE 600; 310; 30; 20 MG/100ML; MG/100ML; MG/100ML; MG/100ML
INJECTION, SOLUTION INTRAVENOUS CONTINUOUS
Status: CANCELLED | OUTPATIENT
Start: 2019-02-11

## 2019-02-11 NOTE — PRE-PROCEDURE INSTRUCTIONS
jackie Caballero - [phone]    Allergies, medical, surgical, family and psychosocial histories reviewed with patient. Periop plan of care reviewed. Preop instructions given, including medications to take and to hold. Hibiclens soap and instructions on use given. Time allotted for questions to be addressed.  Patient verbalized understanding.

## 2019-02-11 NOTE — H&P (VIEW-ONLY)
CC: Pre-op    HPI: 68 y.o. female patient presents for excision of EIC  surgery.    MEDICATIONS: MEDICATION LIST    Current Outpatient Medications:     ACCU-CHEK FASTCLIX LANCET DRUM Misc, USE TO TEST BLOOD SUGAR ONCE DAILY , Disp: 102 each, Rfl: 3    ACCU-CHEK TERI Misc, USE AS DIRECTED, Disp: 1 each, Rfl: 0    albuterol (PROVENTIL/VENTOLIN HFA) 90 mcg/actuation inhaler, Inhale 1-2 puffs into the lungs every 4 (four) hours as needed for Wheezing., Disp: 1 Inhaler, Rfl: 1    amLODIPine (NORVASC) 5 MG tablet, Take 1 tablet (5 mg total) by mouth once daily., Disp: 90 tablet, Rfl: 3    aspirin (ECOTRIN) 81 MG EC tablet, Take 81 mg by mouth once daily., Disp: , Rfl:     blood sugar diagnostic Strp, 1 strip by Misc.(Non-Drug; Combo Route) route 2 (two) times daily., Disp: 100 strip, Rfl: 3    citalopram (CELEXA) 10 MG tablet, Take 1 tablet (10 mg total) by mouth once daily., Disp: 90 tablet, Rfl: 3    ergocalciferol (VITAMIN D2) 50,000 unit Cap, Take 1 capsule (50,000 Units total) by mouth every 7 days., Disp: 12 capsule, Rfl: 1    esomeprazole (NEXIUM) 40 MG capsule, Take 1 capsule (40 mg total) by mouth before breakfast., Disp: 90 capsule, Rfl: 3    gabapentin (NEURONTIN) 100 MG capsule, Take 1 capsule (100 mg total) by mouth 3 (three) times daily., Disp: 90 capsule, Rfl: 2    glimepiride (AMARYL) 4 MG tablet, Take 1 tablet (4 mg total) by mouth daily with breakfast., Disp: 90 tablet, Rfl: 3    JANUVIA 100 mg Tab, Take 1 tablet (100 mg total) by mouth once daily., Disp: 90 tablet, Rfl: 3    lidocaine HCL 2% (XYLOCAINE) 2 % jelly, Apply topically once daily., Disp: 30 mL, Rfl: 2    losartan (COZAAR) 50 MG tablet, Take 1 tablet (50 mg total) by mouth once daily., Disp: 90 tablet, Rfl: 3    magnesium oxide (MAG-OX) 400 mg tablet, Take 1 tablet (400 mg total) by mouth 2 (two) times daily., Disp: 180 tablet, Rfl: 3    polyethylene glycol (GLYCOLAX) 17 gram/dose powder, Take 17 g by mouth once daily., Disp: 1  Bottle, Rfl: 11    pravastatin (PRAVACHOL) 10 MG tablet, Take 1 tablet (10 mg total) by mouth once daily., Disp: 90 tablet, Rfl: 3    traZODone (DESYREL) 50 MG tablet, TAKE 1 TABLET EVERY NIGHT AS NEEDED, Disp: 90 tablet, Rfl: 3     ALLERGIES: ALLERGY/ADVERSE REACTION LIST  Prednisone; Latex; Ace inhibitors; and Latex, natural rubber     HISTORY:     PAST MEDICAL HISTORY:   Active Ambulatory Problems     Diagnosis Date Noted    GERD (gastroesophageal reflux disease)     Hypertension     Hyperlipidemia     MYRIAM (obstructive sleep apnea)     IBS (irritable bowel syndrome)     DDD (degenerative disc disease), lumbar     Rotator cuff impingement syndrome     Insomnia 07/26/2012    Anxiety 07/26/2012    Hearing loss, sensorineural 10/12/2012    Nuclear sclerotic cataract of left eye 08/27/2013    Status post cataract extraction and insertion of intraocular lens 10/04/2013    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 11/19/2013    Diarrhea 05/06/2014    Conjunctival lesion 05/12/2014    Impingement syndrome of right shoulder 08/14/2014    Constipation 05/06/2015    History of colon polyps 12/02/2015    Hypertension associated with diabetes 03/12/2016    Facet arthritis of lumbar region 03/08/2013    Uncontrolled type 2 diabetes mellitus with hyperglycemia, without long-term current use of insulin 10/12/2017    Hyperlipidemia associated with type 2 diabetes mellitus 10/12/2017    Obesity (BMI 30.0-34.9) 10/12/2017    Diabetic polyneuropathy associated with type 2 diabetes mellitus 10/12/2017    Spondylosis of cervical region without myelopathy or radiculopathy 01/26/2018    Cervical myofascial pain syndrome 01/26/2018    PCO (posterior capsular opacification), right 01/29/2018    DM type 2 without retinopathy 01/29/2018    Dry eye syndrome 01/29/2018    Pseudophakia 01/29/2018    Post-operative state 02/19/2018    Allergic conjunctivitis of both eyes 02/19/2018    Decreased ROM of lumbar  spine 2018    Decreased strength 2018    Decreased mobility 2018    Dilated bile duct 10/09/2018    Personal history of colonic polyps 2018     Resolved Ambulatory Problems     Diagnosis Date Noted    Nuclear sclerotic cataract of right eye 2013    Thoracic spine pain 2014    Muscle weakness 2014    Lumbago 2014    Poor posture 2014    Cervical spine pain 10/06/2015    Right knee pain 10/06/2015    Pain of right lower extremity 10/06/2015    Chronic neck pain 2018    Impaired mobility and ADLs 2018    Decreased strength of upper extremity 2018     Past Medical History:   Diagnosis Date    Allergy     Cataract     DDD (degenerative disc disease), lumbar     Diabetes mellitus     Diabetes mellitus, type 2     GERD (gastroesophageal reflux disease)     History of subconjunctival hemorrhage 11    Hyperlipidemia     Hypertension     IBS (irritable bowel syndrome)     Obesity     MYRIAM (obstructive sleep apnea)     Rotator cuff impingement syndrome     Seasonal allergic conjunctivitis           PAST SURGICAL HISTORY:   Past Surgical History:   Procedure Laterality Date    CATARACT EXTRACTION W/  INTRAOCULAR LENS IMPLANT  10/3/13    OD (dr. gardner)     SECTION      x2    CHOLECYSTECTOMY      COLONOSCOPY N/A 2018    [facility]    COLONOSCOPY N/A 2013    [facility] at [facility]    EYE SURGERY      HYSTERECTOMY      ovaries intact, due to DUB    INSERTION, IOL PROSTHESIS Right 10/3/2013    [facility]    PHACOEMULSIFICATION, CATARACT Right 10/3/2013    [facility]    TUBAL LIGATION      ULTRASOUND, UPPER GI TRACT, ENDOSCOPIC N/A 10/9/2018    Performed by Javy Simpson [facility] at [facility] END[facility]        FAMILY HISTORY: family history includes Alzheimer's  "disease in her mother; Cancer in her brother; Deep vein thrombosis in her brother; Diabetes in her daughter and sister; Heart disease in her father; Lung cancer in her brother.     SOCIAL HISTORY:   Social History     Tobacco Use    Smoking status: Former Smoker     Last attempt to quit: 2/15/1983     Years since quittin.0    Smokeless tobacco: Never Used   Substance Use Topics    Alcohol use: No        Data Reviewed:     VITAL SIGNS:   Vitals:    19 1045   BP: 120/76   Weight: 89.3 kg (196 lb 13.9 oz)   Height: 5' 3" (1.6 m)        ROS:      Negative other than HPI       PE:   APPEARANCE: Well nourished, well developed, in no acute distress.  CHEST: Lungs clear to auscultation.  HEART: Regular rate and rhythm, no murmurs, rubs or gallops.  PELVIC: Deferred.      ASSESSMENT:  1.   1. Vulvar lesion    2. EIC (epidermal inclusion cyst)    3. Preoperative examination         PLAN FOR SURGERY:       The risks, benefits and alternatives were discussed and patient was consented for these procedures in usual fashion. All questions were answered. Surgery scheduled for  19.            "

## 2019-02-11 NOTE — ANESTHESIA PREPROCEDURE EVALUATION
2019  Chelly Ortiz is a 68 y.o., female is scheduled for excision of lesion on vulva under local/MAC on 19.    Past Medical History:   Diagnosis Date    Allergy     Cataract     DDD (degenerative disc disease), lumbar     Diabetes mellitus     diet controlled    Diabetes mellitus, type 2     GERD (gastroesophageal reflux disease)     History of subconjunctival hemorrhage 11    left eye    Hyperlipidemia     Hypertension     IBS (irritable bowel syndrome)     Obesity     MYRIAM (obstructive sleep apnea)     on CPAP    Rotator cuff impingement syndrome     Seasonal allergic conjunctivitis      Past Surgical History:   Procedure Laterality Date    CATARACT EXTRACTION W/  INTRAOCULAR LENS IMPLANT  10/3/13    OD (dr. gardner)     SECTION      x2    CHOLECYSTECTOMY      COLONOSCOPY N/A 2018    Performed by Marie Mejia MD at [facility]    COLONOSCOPY N/A 2013    [facility]    EYE SURGERY      HYSTERECTOMY      ovaries intact, due to DUB    INSERTION, IOL PROSTHESIS Right 10/3/2013    Performed by Mirella Gardner MD at [facility] OR [facility]    PHACOEMULSIFICATION, CATARACT Right 10/3/2013    Performed by [facility]lla Gardner MD at [facility]    TUBAL LIGATION      ULTRASOUND, UPPER GI TRACT, ENDOSCOPIC N/A 10/9/2018    Performed by Javy Simpson MD at [facility]     Review of patient's allergies indicates:   Allergen Reactions    Prednisone Other (See Comments)     hipper    Latex      Other reaction(s): Itching    Ace inhibitors Hives     Other reaction(s): Cough    Latex, natural rubber Itching     ANES-RELATED HOME Rx [id number]  amlodipine Albuterol Jamuvia  losartan  ASA  esomeprazole    Anesthesia Evaluation    I have reviewed the Patient Summary Reports.    I have reviewed the Nursing Notes.   I have  reviewed the Medications.     Review of Systems  Anesthesia Hx:  No problems with previous Anesthesia  History of prior surgery of interest to airway management or planning: Previous anesthesia: General   Social:  Non-Smoker, Social Alcohol Use    Hematology/Oncology:  Hematology Normal   Oncology Normal     EENT/Dental:EENT/Dental Normal   Cardiovascular:   Hypertension, well controlled  Denies Angina.        Pulmonary:   Sleep Apnea, CPAP    Renal/:  Renal/ Normal     Hepatic/GI:   GERD, well controlled    Musculoskeletal:   Arthritis     Neurological:   Denies TIA. Denies CVA. Denies Seizures.    Endocrine:  Diabetes, Type 2 Diabetes , Complications include Diabetic Neuropathy , controlled by oral hypoglycemics. , most recent HgA1c value was 8.1 on 11/2018.      Wt Readings from Last 1 Encounters:   02/11/19 89.3 kg (196 lb 13.9 oz)     Temp Readings from Last 1 Encounters:   12/05/18 36.4 °C (97.5 °F) (Temporal)     BP Readings from Last 1 Encounters:   02/11/19 120/76     Pulse Readings from Last 1 Encounters:   12/05/18 (!) 16     SpO2 Readings from Last 1 Encounters:   12/05/18 95%         Physical Exam  General:  Obesity    Airway/Jaw/Neck:  Airway Findings: Mouth Opening: Normal Tongue: Normal  Airway distances (cm)    mandible angle - mentum 8    mandible angle - incisors 8    mMandible angle - top of thyroid cartilage 4    Incisors - vocal cords    Mallampati: II  Jaw/Neck Findings:  Neck ROM: Normal ROM      Dental:  Dental Findings: upper front caps    Chest/Lungs:  Chest/Lungs Findings: Clear to auscultation, Normal Respiratory Rate     Heart/Vascular:  Heart Findings: Rate: Normal  Rhythm: Regular Rhythm  Heart murmur: negative       Mental Status:  Mental Status Findings:  Cooperative, Alert and Oriented       Lab Results   Component Value Date    WBC 6.55 06/10/2015    HGB 12.7 06/10/2015    HCT 38.3 06/10/2015    MCV 76 (L) 06/10/2015     06/10/2015        Chemistry        Component  Value Date/Time     02/11/2019 1252    K 3.8 02/11/2019 1252     02/11/2019 1252    CO2 28 02/11/2019 1252    BUN 13 02/11/2019 1252    CREATININE 1.0 02/11/2019 1252     (H) 02/11/2019 1252        Component Value Date/Time    CALCIUM 9.3 02/11/2019 1252    ALKPHOS 132 02/11/2019 1252    AST 16 02/11/2019 1252    ALT 15 02/11/2019 1252    BILITOT 0.5 02/11/2019 1252    ESTGFRAFRICA >60 02/11/2019 1252    EGFRNONAA 57 (A) 02/11/2019 1252        Lab Results   Component Value Date    ALBUMIN 4.0 02/11/2019      Lab Results   Component Value Date    TSH 2.433 04/17/2017      Lab Results   Component Value Date    APTT 26.0 04/19/2012      Lab Results   Component Value Date    INR 0.9 04/19/2012       CXR  none    EKG [id number]  Normal sinus rhythm  Anterior infarct ,age undetermined  Abnormal ECG  When compared with ECG of 27-DEC-2016 11:37,  No significant change was found  Confirmed by Gabo      ECHO      Echo 1/3/17:  CONCLUSIONS     1 - Normal left ventricular systolic function (EF 60-65%).     2 - Left ventricular diastolic dysfunction.     3 - Mild left atrial enlargement.     4 - Normal right ventricular systolic function     5 - The estimated PA systolic pressure is 23 mmHg.       Anesthesia Plan  Type of Anesthesia, risks & benefits discussed:  Anesthesia Type:  MAC  Patient's Preference:   Intra-op Monitoring Plan:   Intra-op Monitoring Plan Comments:   Post Op Pain Control Plan:   Post Op Pain Control Plan Comments:   Induction:    Beta Blocker:         Informed Consent: Patient understands risks and agrees with Anesthesia plan.  Questions answered.   ASA Score: 3     Day of Surgery Review of History & Physical:        Anesthesia Plan Notes: [id number]   - anes consent signed   - NPO   - took AM Rx   - upper fixed bridge anterior          Ready For Surgery From Anesthesia Perspective.

## 2019-02-11 NOTE — DISCHARGE INSTRUCTIONS
Your surgery is scheduled for 2/13/19.    Please report to Outpatient Surgery Intake Office on the 2nd FLOOR at 6:00 a.m.          INSTRUCTIONS IMPORTANT!!!  ¨ Do not eat or drink after 12 midnight-including water. OK to brush teeth, no   gum, candy or mints!    ¨ Take only these medicines with a small swallow of water-morning of surgery: amlodipine, losartan, and nexium        ____  Proceed to [facility] on 2/11/19 for additional blood test.        ____  Do not wear makeup, including mascara.  ____  No powder, lotions or creams to surgical area.  ____  Please remove all jewelry, including piercings and leave at home.  ____  No money or valuables needed. Please leave at home.  ____  Please bring any documents given by your doctor.  ____  If going home the same day, arrange for a ride home. You will not be able to             drive if Anesthesia was used.  ____  Wear loose fitting clothing. Allow for dressings, bandages.  ____  Stop Aspirin, Ibuprofen, Motrin and Aleve at least 3-5 days before surgery, unless otherwise instructed by your doctor, or the nurse.   You MAY use Tylenol/acetaminophen until day of surgery.  ____  If you take diabetic medication, do not take am of surgery unless instructed by Doctor.  ____  Call MD for temperature above 101 degrees or any other signs of infection such as Urinary (bladder) infection, Upper respiratory infection, skin boils, etc.  ____ Stop taking any Fish Oil supplement or any Vitamins that contain Vitamin E at least 5 days prior to surgery.  ____ Do Not wear your contact lenses the day of your procedure.  You may wear your glasses.      ____Do not shave for 3 days prior to surgery.  ____ Practice Good hand washing before, during, and after procedure.      I have read or had read and explained to me, and understand the above information.  Additional comments or instructions:  For additional questions call [phone]

## 2019-02-11 NOTE — H&P
CC: Pre-op    HPI: 68 y.o. female patient presents for excision of EIC  surgery.    MEDICATIONS: MEDICATION LIST    Current Outpatient Medications:     ACCU-CHEK FASTCLIX LANCET DRUM Misc, USE TO TEST BLOOD SUGAR ONCE DAILY , Disp: 102 each, Rfl: 3    ACCU-CHEK TERI Misc, USE AS DIRECTED, Disp: 1 each, Rfl: 0    albuterol (PROVENTIL/VENTOLIN HFA) 90 mcg/actuation inhaler, Inhale 1-2 puffs into the lungs every 4 (four) hours as needed for Wheezing., Disp: 1 Inhaler, Rfl: 1    amLODIPine (NORVASC) 5 MG tablet, Take 1 tablet (5 mg total) by mouth once daily., Disp: 90 tablet, Rfl: 3    aspirin (ECOTRIN) 81 MG EC tablet, Take 81 mg by mouth once daily., Disp: , Rfl:     blood sugar diagnostic Strp, 1 strip by Misc.(Non-Drug; Combo Route) route 2 (two) times daily., Disp: 100 strip, Rfl: 3    citalopram (CELEXA) 10 MG tablet, Take 1 tablet (10 mg total) by mouth once daily., Disp: 90 tablet, Rfl: 3    ergocalciferol (VITAMIN D2) 50,000 unit Cap, Take 1 capsule (50,000 Units total) by mouth every 7 days., Disp: 12 capsule, Rfl: 1    esomeprazole (NEXIUM) 40 MG capsule, Take 1 capsule (40 mg total) by mouth before breakfast., Disp: 90 capsule, Rfl: 3    gabapentin (NEURONTIN) 100 MG capsule, Take 1 capsule (100 mg total) by mouth 3 (three) times daily., Disp: 90 capsule, Rfl: 2    glimepiride (AMARYL) 4 MG tablet, Take 1 tablet (4 mg total) by mouth daily with breakfast., Disp: 90 tablet, Rfl: 3    JANUVIA 100 mg Tab, Take 1 tablet (100 mg total) by mouth once daily., Disp: 90 tablet, Rfl: 3    lidocaine HCL 2% (XYLOCAINE) 2 % jelly, Apply topically once daily., Disp: 30 mL, Rfl: 2    losartan (COZAAR) 50 MG tablet, Take 1 tablet (50 mg total) by mouth once daily., Disp: 90 tablet, Rfl: 3    magnesium oxide (MAG-OX) 400 mg tablet, Take 1 tablet (400 mg total) by mouth 2 (two) times daily., Disp: 180 tablet, Rfl: 3    polyethylene glycol (GLYCOLAX) 17 gram/dose powder, Take 17 g by mouth once daily., Disp: 1  Bottle, Rfl: 11    pravastatin (PRAVACHOL) 10 MG tablet, Take 1 tablet (10 mg total) by mouth once daily., Disp: 90 tablet, Rfl: 3    traZODone (DESYREL) 50 MG tablet, TAKE 1 TABLET EVERY NIGHT AS NEEDED, Disp: 90 tablet, Rfl: 3     ALLERGIES: ALLERGY/ADVERSE REACTION LIST  Prednisone; Latex; Ace inhibitors; and Latex, natural rubber     HISTORY:     PAST MEDICAL HISTORY:   Active Ambulatory Problems     Diagnosis Date Noted    GERD (gastroesophageal reflux disease)     Hypertension     Hyperlipidemia     MYRIAM (obstructive sleep apnea)     IBS (irritable bowel syndrome)     DDD (degenerative disc disease), lumbar     Rotator cuff impingement syndrome     Insomnia 07/26/2012    Anxiety 07/26/2012    Hearing loss, sensorineural 10/12/2012    Nuclear sclerotic cataract of left eye 08/27/2013    Status post cataract extraction and insertion of intraocular lens 10/04/2013    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 11/19/2013    Diarrhea 05/06/2014    Conjunctival lesion 05/12/2014    Impingement syndrome of right shoulder 08/14/2014    Constipation 05/06/2015    History of colon polyps 12/02/2015    Hypertension associated with diabetes 03/12/2016    Facet arthritis of lumbar region 03/08/2013    Uncontrolled type 2 diabetes mellitus with hyperglycemia, without long-term current use of insulin 10/12/2017    Hyperlipidemia associated with type 2 diabetes mellitus 10/12/2017    Obesity (BMI 30.0-34.9) 10/12/2017    Diabetic polyneuropathy associated with type 2 diabetes mellitus 10/12/2017    Spondylosis of cervical region without myelopathy or radiculopathy 01/26/2018    Cervical myofascial pain syndrome 01/26/2018    PCO (posterior capsular opacification), right 01/29/2018    DM type 2 without retinopathy 01/29/2018    Dry eye syndrome 01/29/2018    Pseudophakia 01/29/2018    Post-operative state 02/19/2018    Allergic conjunctivitis of both eyes 02/19/2018    Decreased ROM of lumbar  spine 2018    Decreased strength 2018    Decreased mobility 2018    Dilated bile duct 10/09/2018    Personal history of colonic polyps 2018     Resolved Ambulatory Problems     Diagnosis Date Noted    Nuclear sclerotic cataract of right eye 2013    Thoracic spine pain 2014    Muscle weakness 2014    Lumbago 2014    Poor posture 2014    Cervical spine pain 10/06/2015    Right knee pain 10/06/2015    Pain of right lower extremity 10/06/2015    Chronic neck pain 2018    Impaired mobility and ADLs 2018    Decreased strength of upper extremity 2018     Past Medical History:   Diagnosis Date    Allergy     Cataract     DDD (degenerative disc disease), lumbar     Diabetes mellitus     Diabetes mellitus, type 2     GERD (gastroesophageal reflux disease)     History of subconjunctival hemorrhage 11    Hyperlipidemia     Hypertension     IBS (irritable bowel syndrome)     Obesity     MYRIAM (obstructive sleep apnea)     Rotator cuff impingement syndrome     Seasonal allergic conjunctivitis           PAST SURGICAL HISTORY:   Past Surgical History:   Procedure Laterality Date    CATARACT EXTRACTION W/  INTRAOCULAR LENS IMPLANT  10/3/13    OD (dr. gardner)     SECTION      x2    CHOLECYSTECTOMY      COLONOSCOPY N/A 2018    [facility]formed [facility]    COLONOSCOPY N/A 2013    Performed by [facility] at [facility]    EYE SURGERY      HYSTERECTOMY      ovaries intact, due to DUB    INSERTION, IOL PROSTHESIS Right 10/3/2013    [facility]    PHACOEMULSIFICATION, CATARACT Right 10/3/2013    [facility]    TUBAL LIGATION      ULTRASOUND, UPPER GI TRACT, ENDOSCOPIC N/A 10/9/2018    Performed by Javy Simpson [facility] at [facility] END[facility]        FAMILY HISTORY: family history includes Alzheimer's  "disease in her mother; Cancer in her brother; Deep vein thrombosis in her brother; Diabetes in her daughter and sister; Heart disease in her father; Lung cancer in her brother.     SOCIAL HISTORY:   Social History     Tobacco Use    Smoking status: Former Smoker     Last attempt to quit: 2/15/1983     Years since quittin.0    Smokeless tobacco: Never Used   Substance Use Topics    Alcohol use: No        Data Reviewed:     VITAL SIGNS:   Vitals:    19 1045   BP: 120/76   Weight: 89.3 kg (196 lb 13.9 oz)   Height: 5' 3" (1.6 m)        ROS:      Negative other than HPI       PE:   APPEARANCE: Well nourished, well developed, in no acute distress.  CHEST: Lungs clear to auscultation.  HEART: Regular rate and rhythm, no murmurs, rubs or gallops.  PELVIC: Deferred.      ASSESSMENT:  1.   1. Vulvar lesion    2. EIC (epidermal inclusion cyst)    3. Preoperative examination         PLAN FOR SURGERY:       The risks, benefits and alternatives were discussed and patient was consented for these procedures in usual fashion. All questions were answered. Surgery scheduled for  19.            "

## 2019-02-11 NOTE — PROGRESS NOTES
CC: Pre-op    HPI: 68 y.o. female patient presents for excision of EIC  surgery.    MEDICATIONS: MEDICATION LIST    Current Outpatient Medications:     ACCU-CHEK FASTCLIX LANCET DRUM Misc, USE TO TEST BLOOD SUGAR ONCE DAILY , Disp: 102 each, Rfl: 3    ACCU-CHEK TERI Misc, USE AS DIRECTED, Disp: 1 each, Rfl: 0    albuterol (PROVENTIL/VENTOLIN HFA) 90 mcg/actuation inhaler, Inhale 1-2 puffs into the lungs every 4 (four) hours as needed for Wheezing., Disp: 1 Inhaler, Rfl: 1    amLODIPine (NORVASC) 5 MG tablet, Take 1 tablet (5 mg total) by mouth once daily., Disp: 90 tablet, Rfl: 3    aspirin (ECOTRIN) 81 MG EC tablet, Take 81 mg by mouth once daily., Disp: , Rfl:     blood sugar diagnostic Strp, 1 strip by Misc.(Non-Drug; Combo Route) route 2 (two) times daily., Disp: 100 strip, Rfl: 3    citalopram (CELEXA) 10 MG tablet, Take 1 tablet (10 mg total) by mouth once daily., Disp: 90 tablet, Rfl: 3    ergocalciferol (VITAMIN D2) 50,000 unit Cap, Take 1 capsule (50,000 Units total) by mouth every 7 days., Disp: 12 capsule, Rfl: 1    esomeprazole (NEXIUM) 40 MG capsule, Take 1 capsule (40 mg total) by mouth before breakfast., Disp: 90 capsule, Rfl: 3    gabapentin (NEURONTIN) 100 MG capsule, Take 1 capsule (100 mg total) by mouth 3 (three) times daily., Disp: 90 capsule, Rfl: 2    glimepiride (AMARYL) 4 MG tablet, Take 1 tablet (4 mg total) by mouth daily with breakfast., Disp: 90 tablet, Rfl: 3    JANUVIA 100 mg Tab, Take 1 tablet (100 mg total) by mouth once daily., Disp: 90 tablet, Rfl: 3    lidocaine HCL 2% (XYLOCAINE) 2 % jelly, Apply topically once daily., Disp: 30 mL, Rfl: 2    losartan (COZAAR) 50 MG tablet, Take 1 tablet (50 mg total) by mouth once daily., Disp: 90 tablet, Rfl: 3    magnesium oxide (MAG-OX) 400 mg tablet, Take 1 tablet (400 mg total) by mouth 2 (two) times daily., Disp: 180 tablet, Rfl: 3    polyethylene glycol (GLYCOLAX) 17 gram/dose powder, Take 17 g by mouth once daily., Disp: 1  Bottle, Rfl: 11    pravastatin (PRAVACHOL) 10 MG tablet, Take 1 tablet (10 mg total) by mouth once daily., Disp: 90 tablet, Rfl: 3    traZODone (DESYREL) 50 MG tablet, TAKE 1 TABLET EVERY NIGHT AS NEEDED, Disp: 90 tablet, Rfl: 3     ALLERGIES: ALLERGY/ADVERSE REACTION LIST  Prednisone; Latex; Ace inhibitors; and Latex, natural rubber     HISTORY:     PAST MEDICAL HISTORY:   Active Ambulatory Problems     Diagnosis Date Noted    GERD (gastroesophageal reflux disease)     Hypertension     Hyperlipidemia     MYRIAM (obstructive sleep apnea)     IBS (irritable bowel syndrome)     DDD (degenerative disc disease), lumbar     Rotator cuff impingement syndrome     Insomnia 07/26/2012    Anxiety 07/26/2012    Hearing loss, sensorineural 10/12/2012    Nuclear sclerotic cataract of left eye 08/27/2013    Status post cataract extraction and insertion of intraocular lens 10/04/2013    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 11/19/2013    Diarrhea 05/06/2014    Conjunctival lesion 05/12/2014    Impingement syndrome of right shoulder 08/14/2014    Constipation 05/06/2015    History of colon polyps 12/02/2015    Hypertension associated with diabetes 03/12/2016    Facet arthritis of lumbar region 03/08/2013    Uncontrolled type 2 diabetes mellitus with hyperglycemia, without long-term current use of insulin 10/12/2017    Hyperlipidemia associated with type 2 diabetes mellitus 10/12/2017    Obesity (BMI 30.0-34.9) 10/12/2017    Diabetic polyneuropathy associated with type 2 diabetes mellitus 10/12/2017    Spondylosis of cervical region without myelopathy or radiculopathy 01/26/2018    Cervical myofascial pain syndrome 01/26/2018    PCO (posterior capsular opacification), right 01/29/2018    DM type 2 without retinopathy 01/29/2018    Dry eye syndrome 01/29/2018    Pseudophakia 01/29/2018    Post-operative state 02/19/2018    Allergic conjunctivitis of both eyes 02/19/2018    Decreased ROM of lumbar  spine 2018    Decreased strength 2018    Decreased mobility 2018    Dilated bile duct 10/09/2018    Personal history of colonic polyps 2018     Resolved Ambulatory Problems     Diagnosis Date Noted    Nuclear sclerotic cataract of right eye 2013    Thoracic spine pain 2014    Muscle weakness 2014    Lumbago 2014    Poor posture 2014    Cervical spine pain 10/06/2015    Right knee pain 10/06/2015    Pain of right lower extremity 10/06/2015    Chronic neck pain 2018    Impaired mobility and ADLs 2018    Decreased strength of upper extremity 2018     Past Medical History:   Diagnosis Date    Allergy     Cataract     DDD (degenerative disc disease), lumbar     Diabetes mellitus     Diabetes mellitus, type 2     GERD (gastroesophageal reflux disease)     History of subconjunctival hemorrhage 11    Hyperlipidemia     Hypertension     IBS (irritable bowel syndrome)     Obesity     MYRIAM (obstructive sleep apnea)     Rotator cuff impingement syndrome     Seasonal allergic conjunctivitis           PAST SURGICAL HISTORY:   Past Surgical History:   Procedure Laterality Date    CATARACT EXTRACTION W/  INTRAOCULAR LENS IMPLANT  10/3/13    OD (dr. gardner)     SECTION      x2    CHOLECYSTECTOMY      COLONOSCOPY N/A 2018    [facility]    COLONOSCOPY N/A 2013    [facility] at [facility]    EYE SURGERY      HYSTERECTOMY      ovaries intact, due to DUB    INSERTION, IOL PROSTHESIS Right 10/3/2013    [facility]    PHACOEMULSIFICATION, CATARACT Right 10/3/2013    [facility]    TUBAL LIGATION      ULTRASOUND, UPPER GI TRACT, ENDOSCOPIC N/A 10/9/2018    Performed by Javy Simpson MD at [facility]        FAMILY HISTORY: family history includes Alzheimer's  "disease in her mother; Cancer in her brother; Deep vein thrombosis in her brother; Diabetes in her daughter and sister; Heart disease in her father; Lung cancer in her brother.     SOCIAL HISTORY:   Social History     Tobacco Use    Smoking status: Former Smoker     Last attempt to quit: 2/15/1983     Years since quittin.0    Smokeless tobacco: Never Used   Substance Use Topics    Alcohol use: No        Data Reviewed:     VITAL SIGNS:   Vitals:    19 1045   BP: 120/76   Weight: 89.3 kg (196 lb 13.9 oz)   Height: 5' 3" (1.6 m)        ROS:      Negative other than HPI       PE:   APPEARANCE: Well nourished, well developed, in no acute distress.  CHEST: Lungs clear to auscultation.  HEART: Regular rate and rhythm, no murmurs, rubs or gallops.  PELVIC: Deferred.      ASSESSMENT:  1.   1. Vulvar lesion    2. EIC (epidermal inclusion cyst)    3. Preoperative examination         PLAN FOR SURGERY:       The risks, benefits and alternatives were discussed and patient was consented for these procedures in usual fashion. All questions were answered. Surgery scheduled for  19.            "

## 2019-02-13 ENCOUNTER — ANESTHESIA (OUTPATIENT)
Dept: SURGERY | Facility: HOSPITAL | Age: 69
End: 2019-02-13
Payer: MEDICARE

## 2019-02-13 ENCOUNTER — HOSPITAL ENCOUNTER (OUTPATIENT)
Facility: HOSPITAL | Age: 69
Discharge: HOME | End: 2019-02-13
Attending: OBSTETRICS & GYNECOLOGY | Admitting: OBSTETRICS & GYNECOLOGY
Payer: MEDICARE

## 2019-02-13 ENCOUNTER — TELEPHONE (OUTPATIENT)
Dept: OBSTETRICS AND GYNECOLOGY | Facility: HOSPITAL | Age: 69
End: 2019-02-13

## 2019-02-13 DIAGNOSIS — N90.89 VULVAR LESION: ICD-10-CM

## 2019-02-13 DIAGNOSIS — L72.0 EIC (EPIDERMAL INCLUSION CYST): ICD-10-CM

## 2019-02-13 LAB — POCT GLUCOSE: 162 MG/DL (ref 70–110)

## 2019-02-13 PROCEDURE — 88304 TISSUE EXAM BY PATHOLOGIST: CPT | Mod: 26,HCNC,, | Performed by: PATHOLOGY

## 2019-02-13 PROCEDURE — 36000706: Mod: HCNC | Performed by: OBSTETRICS & GYNECOLOGY

## 2019-02-13 PROCEDURE — 88304 TISSUE SPECIMEN TO PATHOLOGY - SURGERY: ICD-10-PCS | Mod: 26,HCNC,, | Performed by: PATHOLOGY

## 2019-02-13 PROCEDURE — 37000009 HC ANESTHESIA EA ADD 15 MINS: Mod: HCNC | Performed by: OBSTETRICS & GYNECOLOGY

## 2019-02-13 PROCEDURE — 25000003 PHARM REV CODE 250: Mod: HCNC | Performed by: NURSE PRACTITIONER

## 2019-02-13 PROCEDURE — 11423 EXC H-F-NK-SP B9+MARG 2.1-3: CPT | Mod: 51,HCNC,LT, | Performed by: OBSTETRICS & GYNECOLOGY

## 2019-02-13 PROCEDURE — 63600175 PHARM REV CODE 636 W HCPCS: Mod: HCNC | Performed by: NURSE ANESTHETIST, CERTIFIED REGISTERED

## 2019-02-13 PROCEDURE — 11424 PR EXC SKIN BENIG 3.1-4 CM REMAINDR BODY: ICD-10-PCS | Mod: HCNC,RT,, | Performed by: OBSTETRICS & GYNECOLOGY

## 2019-02-13 PROCEDURE — 11424 EXC H-F-NK-SP B9+MARG 3.1-4: CPT | Mod: HCNC,RT,, | Performed by: OBSTETRICS & GYNECOLOGY

## 2019-02-13 PROCEDURE — 88304 TISSUE EXAM BY PATHOLOGIST: CPT | Mod: HCNC | Performed by: PATHOLOGY

## 2019-02-13 PROCEDURE — 71000016 HC POSTOP RECOV ADDL HR: Mod: HCNC | Performed by: OBSTETRICS & GYNECOLOGY

## 2019-02-13 PROCEDURE — 25000003 PHARM REV CODE 250: Mod: HCNC | Performed by: OBSTETRICS & GYNECOLOGY

## 2019-02-13 PROCEDURE — 71000015 HC POSTOP RECOV 1ST HR: Mod: HCNC | Performed by: OBSTETRICS & GYNECOLOGY

## 2019-02-13 PROCEDURE — 11423 PR EXC SKIN BENIG 2.1-3 CM REMAINDR BODY: ICD-10-PCS | Mod: 51,HCNC,LT, | Performed by: OBSTETRICS & GYNECOLOGY

## 2019-02-13 PROCEDURE — 36000707: Mod: HCNC | Performed by: OBSTETRICS & GYNECOLOGY

## 2019-02-13 PROCEDURE — 37000008 HC ANESTHESIA 1ST 15 MINUTES: Mod: HCNC | Performed by: OBSTETRICS & GYNECOLOGY

## 2019-02-13 RX ORDER — SODIUM CHLORIDE 0.9 % (FLUSH) 0.9 %
3 SYRINGE (ML) INJECTION
Status: DISCONTINUED | OUTPATIENT
Start: 2019-02-13 | End: 2019-02-13 | Stop reason: SDUPTHER

## 2019-02-13 RX ORDER — HYDROMORPHONE HYDROCHLORIDE 2 MG/ML
0.4 INJECTION, SOLUTION INTRAMUSCULAR; INTRAVENOUS; SUBCUTANEOUS EVERY 5 MIN PRN
Status: DISCONTINUED | OUTPATIENT
Start: 2019-02-13 | End: 2019-02-13 | Stop reason: SDUPTHER

## 2019-02-13 RX ORDER — SODIUM CHLORIDE 0.9 % (FLUSH) 0.9 %
3 SYRINGE (ML) INJECTION EVERY 8 HOURS
Status: DISCONTINUED | OUTPATIENT
Start: 2019-02-13 | End: 2019-02-13 | Stop reason: SDUPTHER

## 2019-02-13 RX ORDER — FENTANYL CITRATE 50 UG/ML
INJECTION, SOLUTION INTRAMUSCULAR; INTRAVENOUS
Status: DISCONTINUED | OUTPATIENT
Start: 2019-02-13 | End: 2019-02-13

## 2019-02-13 RX ORDER — DIPHENHYDRAMINE HCL 25 MG
25 CAPSULE ORAL EVERY 4 HOURS PRN
Status: DISCONTINUED | OUTPATIENT
Start: 2019-02-13 | End: 2019-02-13 | Stop reason: HOSPADM

## 2019-02-13 RX ORDER — OXYCODONE HYDROCHLORIDE 5 MG/1
10 TABLET ORAL EVERY 4 HOURS PRN
Status: DISCONTINUED | OUTPATIENT
Start: 2019-02-13 | End: 2019-02-13 | Stop reason: HOSPADM

## 2019-02-13 RX ORDER — ONDANSETRON 8 MG/1
8 TABLET, ORALLY DISINTEGRATING ORAL EVERY 8 HOURS PRN
Status: DISCONTINUED | OUTPATIENT
Start: 2019-02-13 | End: 2019-02-13 | Stop reason: HOSPADM

## 2019-02-13 RX ORDER — HYDROMORPHONE HYDROCHLORIDE 2 MG/ML
0.2 INJECTION, SOLUTION INTRAMUSCULAR; INTRAVENOUS; SUBCUTANEOUS EVERY 5 MIN PRN
Status: DISCONTINUED | OUTPATIENT
Start: 2019-02-13 | End: 2019-02-13 | Stop reason: HOSPADM

## 2019-02-13 RX ORDER — MIDAZOLAM HYDROCHLORIDE 1 MG/ML
INJECTION, SOLUTION INTRAMUSCULAR; INTRAVENOUS
Status: DISCONTINUED | OUTPATIENT
Start: 2019-02-13 | End: 2019-02-13

## 2019-02-13 RX ORDER — LIDOCAINE HCL/PF 100 MG/5ML
SYRINGE (ML) INTRAVENOUS
Status: DISCONTINUED | OUTPATIENT
Start: 2019-02-13 | End: 2019-02-13

## 2019-02-13 RX ORDER — OXYCODONE HYDROCHLORIDE 5 MG/1
5 TABLET ORAL EVERY 4 HOURS PRN
Status: DISCONTINUED | OUTPATIENT
Start: 2019-02-13 | End: 2019-02-13 | Stop reason: HOSPADM

## 2019-02-13 RX ORDER — BUPIVACAINE HYDROCHLORIDE 2.5 MG/ML
INJECTION, SOLUTION EPIDURAL; INFILTRATION; INTRACAUDAL
Status: DISCONTINUED | OUTPATIENT
Start: 2019-02-13 | End: 2019-02-13 | Stop reason: HOSPADM

## 2019-02-13 RX ORDER — HYDROMORPHONE HYDROCHLORIDE 2 MG/ML
0.4 INJECTION, SOLUTION INTRAMUSCULAR; INTRAVENOUS; SUBCUTANEOUS EVERY 5 MIN PRN
Status: DISCONTINUED | OUTPATIENT
Start: 2019-02-13 | End: 2019-02-13 | Stop reason: HOSPADM

## 2019-02-13 RX ORDER — SODIUM CHLORIDE, SODIUM LACTATE, POTASSIUM CHLORIDE, CALCIUM CHLORIDE 600; 310; 30; 20 MG/100ML; MG/100ML; MG/100ML; MG/100ML
INJECTION, SOLUTION INTRAVENOUS CONTINUOUS
Status: DISCONTINUED | OUTPATIENT
Start: 2019-02-13 | End: 2019-02-13 | Stop reason: HOSPADM

## 2019-02-13 RX ORDER — SODIUM CHLORIDE, SODIUM LACTATE, POTASSIUM CHLORIDE, CALCIUM CHLORIDE 600; 310; 30; 20 MG/100ML; MG/100ML; MG/100ML; MG/100ML
INJECTION, SOLUTION INTRAVENOUS CONTINUOUS
Status: DISCONTINUED | OUTPATIENT
Start: 2019-02-13 | End: 2019-02-13

## 2019-02-13 RX ORDER — HYDROMORPHONE HYDROCHLORIDE 2 MG/ML
0.2 INJECTION, SOLUTION INTRAMUSCULAR; INTRAVENOUS; SUBCUTANEOUS EVERY 5 MIN PRN
Status: DISCONTINUED | OUTPATIENT
Start: 2019-02-13 | End: 2019-02-13 | Stop reason: SDUPTHER

## 2019-02-13 RX ORDER — SODIUM CHLORIDE 0.9 % (FLUSH) 0.9 %
3 SYRINGE (ML) INJECTION
Status: DISCONTINUED | OUTPATIENT
Start: 2019-02-13 | End: 2019-02-13 | Stop reason: HOSPADM

## 2019-02-13 RX ORDER — SODIUM CHLORIDE 0.9 % (FLUSH) 0.9 %
3 SYRINGE (ML) INJECTION EVERY 8 HOURS
Status: DISCONTINUED | OUTPATIENT
Start: 2019-02-13 | End: 2019-02-13 | Stop reason: HOSPADM

## 2019-02-13 RX ORDER — PROPOFOL 10 MG/ML
VIAL (ML) INTRAVENOUS
Status: DISCONTINUED | OUTPATIENT
Start: 2019-02-13 | End: 2019-02-13

## 2019-02-13 RX ORDER — LIDOCAINE HYDROCHLORIDE 10 MG/ML
1 INJECTION, SOLUTION EPIDURAL; INFILTRATION; INTRACAUDAL; PERINEURAL ONCE
Status: DISCONTINUED | OUTPATIENT
Start: 2019-02-13 | End: 2019-02-13

## 2019-02-13 RX ORDER — PROPOFOL 10 MG/ML
VIAL (ML) INTRAVENOUS CONTINUOUS PRN
Status: DISCONTINUED | OUTPATIENT
Start: 2019-02-13 | End: 2019-02-13

## 2019-02-13 RX ORDER — HYDROCODONE BITARTRATE AND ACETAMINOPHEN 5; 325 MG/1; MG/1
1 TABLET ORAL EVERY 6 HOURS PRN
Qty: 10 TABLET | Refills: 0 | Status: SHIPPED | OUTPATIENT
Start: 2019-02-13 | End: 2019-06-27

## 2019-02-13 RX ADMIN — FENTANYL CITRATE 25 MCG: 50 INJECTION, SOLUTION INTRAMUSCULAR; INTRAVENOUS at 08:02

## 2019-02-13 RX ADMIN — PROPOFOL 50 MG: 10 INJECTION, EMULSION INTRAVENOUS at 08:02

## 2019-02-13 RX ADMIN — PROPOFOL 100 MCG/KG/MIN: 10 INJECTION, EMULSION INTRAVENOUS at 07:02

## 2019-02-13 RX ADMIN — LIDOCAINE HYDROCHLORIDE 80 MG: 20 INJECTION, SOLUTION INTRAVENOUS at 08:02

## 2019-02-13 RX ADMIN — SODIUM CHLORIDE, SODIUM LACTATE, POTASSIUM CHLORIDE, AND CALCIUM CHLORIDE: .6; .31; .03; .02 INJECTION, SOLUTION INTRAVENOUS at 06:02

## 2019-02-13 RX ADMIN — MIDAZOLAM 2 MG: 1 INJECTION INTRAMUSCULAR; INTRAVENOUS at 07:02

## 2019-02-13 NOTE — DISCHARGE INSTRUCTIONS
***  BATHING:                   You may shower after your dressing is removed, but no tub baths, hot tubs, saunas or swimming until you see the doctor.    DRESSING:  ? Remove your bandage ________________. If there are skin tapes over the incision, leave them in place. They will start to come off in 5-7 days.  ACTIVITY LEVEL: If you have received sedation or an anesthetic, you may feel sleepy for   several hours. Rest until you are more awake. Gradually resume your normal activities  ? No heavy lifting or straining, nothing over 10 lbs., like a gallon of milk.  ? Pelvic rest- no sex, tampons or douching until follow up or instructed by doctor.  DIET:  You may resume your home diet. If nausea is present, increase your diet gradually with fluids and bland foods.    Medications:  Pain medication should be taken only if needed and as directed. If antibiotics are prescribed, the medication should be taken until completed. You will be given an updated list of you medications.  ? No driving, alcoholic beverages or signing legal documents for next 24 hours or while taking pain medication    CALL THE DOCTOR:    For any obvious bleeding (some dried blood over the incision is normal).      Redness, swelling, foul smell around incision or fever over 101.   Shortness of breath, Coughing up Bloody Sputum or Pains or Swelling in your calves.   Persistent pain or nausea not relieved by medication.   If vaginal bleeding is in excess of a normal period.   Problems urinating    If any unusual problems or difficulties occur contact your doctor. If you cannot contact your doctor but feel your signs and symptoms warrant a physicians attention return to the emergency room.      ANESTHESIA  -For the first 24 hours after surgery:  Do not drive, use heavy equipment, make important decisions, or drink alcohol  -It is normal to feel sleepy for several hours.  Rest until you are more awake.  -Have someone stay with you, if needed.  They  can watch for problems and help keep you safe.  -Some possible post anesthesia side effects include: nausea and vomiting, sore throat and hoarseness, sleepiness, and dizziness.    PAIN  -If you have pain after surgery, pain medicine will help you feel better.  Take it as directed, before pain becomes severe.  Most pain relievers taken by mouth need at least 20-30 minutes to start working.  -Do not drive or drink alcohol while taking pain medicine.  -Pain medication can upset your stomach.  Taking them with a little food may help.  -Other ways to help control pain: elevation, ice, and relaxation  -Call your surgeon if still having unmanageable pain an hour after taking pain medicine.  -Pain medicine can cause constipation.  Taking an over-the counter stool softener while on prescription pain medicine and drinking plenty of fluids can prevent this side effect.  -Call your surgeon if you have severe side effects like: breathing problems, trouble waking up, dizziness, confusion, or severe constipation.    NAUSEA  -Some people have nausea after surgery.  This is often because of anesthesia, pain, pain medicine, or the stress of surgery.  -Do not push yourself to eat.  Start off with clear liquids and soup.  Slowly move to solid foods.  Don't eat fatty, rich, spicy foods at first.  Eat smaller amounts.  -If you develop persistent nausea and vomiting please notify your surgeon immediately.    BLEEDING  -Different types of surgery require different types of care and dressing changes.  It is important to follow all instructions and advice from your surgeon.  Change dressing as directed.  Call your surgeon for any concerns regarding postop bleeding.    SIGNS OF INFECTION  -Signs of infection include: fever, swelling, drainage, and redness  -Notify your surgeon if you have a fever of 100.4 F (38.0 C) or higher.  -Notify your surgeon if you notice redness, swelling, increased pain, pus, or a foul smell at the incision site.

## 2019-02-13 NOTE — PLAN OF CARE
Discharge criteria met,voicing desire to go home. Discharge instructions given to patient per Cynthia Jules Rn/ops. Discharge home via wheelchair in care of family.

## 2019-02-13 NOTE — TRANSFER OF CARE
"Anesthesia Transfer of Care Note    Patient: Chelly Ortiz    Procedure(s) Performed: Procedure(s) (LRB):  EXCISION, LESION VULVA (N/A)    Patient location: OPS    Anesthesia Type: MAC    Transport from OR: Transported from OR on room air with adequate spontaneous ventilation    Post pain: adequate analgesia    Post assessment: no apparent anesthetic complications and tolerated procedure well    Post vital signs: stable    Level of consciousness: awake, alert and oriented    Nausea/Vomiting: no nausea/vomiting    Complications: none    Transfer of care protocol was followed      Last vitals:   Visit Vitals  /70 (BP Location: Right arm, Patient Position: Sitting)   Pulse 79   Temp 36.7 °C (98.1 °F) (Skin)   Resp 18   Ht 5' 3" (1.6 m)   Wt 89.3 kg (196 lb 13.9 oz)   LMP  (LMP Unknown)   SpO2 97%   Breastfeeding? No   BMI 34.87 kg/m²     "

## 2019-02-13 NOTE — OP NOTE
DATE OF PROCEDURE:  2/13/19     PREOPERATIVE DIAGNOSIS:  Vulvar lesions (epidermal inclusion cysts)     POSTOPERATIVE DIAGNOSIS:  Same     SURGERY: Excision of epidermal inclusion cysts.     SURGEON:  Liv Odell M.D.     ASSISTANT:  None.     ANESTHESIA:  MAC and local     ESTIMATED BLOOD LOSS: 10 mL.     FINDINGS:  Left 2.5cm vulvar lesion at 3 o'clock sent for pathology, right vulvar lesion 2.5cm at 9 o'clock sent for pathology, right 1cm lesion at 8 o'clock not sent for pathology and right  2mm lesion at 8 o'clock desiccated with bovie.     SPECIMENS: 2 EIC.     COMPLICATIONS:  None.     OPERATIVE REPORT IN DETAIL:  After assuring informed consent, the patient was taken to the Operating Room where anesthesia was administered.  She was then placed in lithotomy position and then her vulva was prepped and draped in usual sterile fashion. A surgical marking pen was used to outline the surgical field then bupivacaine 0.25% without epinephrine was used to infiltrate under the lesion. The scalpel was used to incise the skin then the Metzenbaum scissors were used to undermine the cyst and cyst sac. The bed of the excision site was cauterized then closed using 4-0 chromic suture. This was repeated for all cysts except the smaller cyst that was desiccated. Excellent hemostasis was noted. The specimen was sent for final pathology.  All instruments were removed.  The patient tolerated the procedure well.  All counts were correct x2.  The patient was taken to Outpatient surgery in stable condition.

## 2019-02-13 NOTE — DISCHARGE SUMMARY
Admit Date: 2/13/19    Discharge Date:Same    Attending Physician: LULÚ Odell MD    Procedures: Excision of EIC    Admit Diagnosis: EIC (vulvar lesions)  Discharge Diagnosis: Same     Hospital course: Afebrile and vital signs stable throughout course. Routine progressive care.  No nausea/vomiting.    Discharged Condition: Improving    Disposition: Discharge to home or self care    Patient Instructions:   Pelvic rest x 4-6 weeks  Follow up 4 weeks  No heavy lifting  Call for excessive vaginal bleeding, temperature greater than 100.6, redness or increasing pain to incision.  Discharge Medications: Given to patient or in chart     Diet: Regular

## 2019-02-13 NOTE — INTERVAL H&P NOTE
The patient has been examined and the H&P has been reviewed:    I concur with the findings and no changes have occurred since H&P was written.    Anesthesia/Surgery risks, benefits and alternative options discussed and understood by patient/family.          Active Hospital Problems    Diagnosis  POA    Vulvar lesion [N90.89]  Yes      Resolved Hospital Problems   No resolved problems to display.

## 2019-02-14 VITALS
OXYGEN SATURATION: 97 % | WEIGHT: 196.88 LBS | SYSTOLIC BLOOD PRESSURE: 116 MMHG | HEIGHT: 63 IN | TEMPERATURE: 98 F | DIASTOLIC BLOOD PRESSURE: 59 MMHG | HEART RATE: 80 BPM | RESPIRATION RATE: 18 BRPM | BODY MASS INDEX: 34.88 KG/M2

## 2019-02-20 ENCOUNTER — PATIENT MESSAGE (OUTPATIENT)
Dept: OBSTETRICS AND GYNECOLOGY | Facility: CLINIC | Age: 69
End: 2019-02-20

## 2019-02-22 ENCOUNTER — OFFICE VISIT (OUTPATIENT)
Dept: OBSTETRICS AND GYNECOLOGY | Facility: CLINIC | Age: 69
End: 2019-02-22
Payer: MEDICARE

## 2019-02-22 VITALS
SYSTOLIC BLOOD PRESSURE: 120 MMHG | DIASTOLIC BLOOD PRESSURE: 72 MMHG | HEIGHT: 63 IN | BODY MASS INDEX: 34.98 KG/M2 | WEIGHT: 197.44 LBS

## 2019-02-22 DIAGNOSIS — Z98.890 POST-OPERATIVE STATE: Primary | ICD-10-CM

## 2019-02-22 PROCEDURE — 99999 PR PBB SHADOW E&M-EST. PATIENT-LVL III: CPT | Mod: PBBFAC,HCNC,, | Performed by: OBSTETRICS & GYNECOLOGY

## 2019-02-22 PROCEDURE — 99024 POSTOP FOLLOW-UP VISIT: CPT | Mod: HCNC,S$GLB,, | Performed by: OBSTETRICS & GYNECOLOGY

## 2019-02-22 PROCEDURE — 99999 PR PBB SHADOW E&M-EST. PATIENT-LVL III: ICD-10-PCS | Mod: PBBFAC,HCNC,, | Performed by: OBSTETRICS & GYNECOLOGY

## 2019-02-22 PROCEDURE — 99024 PR POST-OP FOLLOW-UP VISIT: ICD-10-PCS | Mod: HCNC,S$GLB,, | Performed by: OBSTETRICS & GYNECOLOGY

## 2019-02-22 NOTE — PROGRESS NOTES
CC: Post-op    HPI: 68 y.o.  female patient presents today following excision of EIC on vulva surgery.  There are no complaints and the procedure and hospitalization occured without complications.     MEDICATIONS: See Med Card    ALLERGIES: See Allergy Card    MEDICAL HISTORY:   PAST MEDICAL HISTORY:   Past Medical History:   Diagnosis Date    Allergy     Cataract     DDD (degenerative disc disease), lumbar     Diabetes mellitus     diet controlled    Diabetes mellitus, type 2     GERD (gastroesophageal reflux disease)     History of subconjunctival hemorrhage 11    left eye    Hyperlipidemia     Hypertension     IBS (irritable bowel syndrome)     Obesity     MYRIAM (obstructive sleep apnea)     on CPAP    Rotator cuff impingement syndrome     Seasonal allergic conjunctivitis         PAST SURGICAL HISTORY:   Past Surgical History:   Procedure Laterality Date    CATARACT EXTRACTION W/  INTRAOCULAR LENS IMPLANT  10/3/13    OD (dr. gardner)     SECTION      x2    CHOLECYSTECTOMY      COLONOSCOPY N/A 2018    [facility]formed [facility]    COLONOSCOPY N/A 2013    [facility] Nena [facility] at [facility]    EXCISION, LESION VULVA N/A 2019    [facility] Liv [facility] OR    EYE SURGERY      HYSTERECTOMY      ovaries intact, due to DUB    INSERTION, IOL PROSTHESIS Right 10/3/2013    [facility]    PHACOEMULSIFICATION, CATARACT Right 10/3/2013    [facility]    TUBAL LIGATION      ULTRASOUND, UPPER GI TRACT, ENDOSCOPIC N/A 10/9/2018    Performed by Javy Simpson [facility] at [facility] ENDO        FAMILY HISTORY:   Family History   Problem Relation Age of Onset    Alzheimer's disease Mother     Heart disease Father     Diabetes Sister     Deep vein thrombosis Brother     Diabetes Daughter     Cancer Brother         Lung    Lung cancer Brother      Amblyopia Neg Hx     Blindness Neg Hx     Cataracts Neg Hx     Glaucoma Neg Hx     Hypertension Neg Hx     Macular degeneration Neg Hx     Retinal detachment Neg Hx     Strabismus Neg Hx     Stroke Neg Hx     Thyroid disease Neg Hx         SOCIAL HISTORY:   Social History     Tobacco Use    Smoking status: Former Smoker     Last attempt to quit: 2/15/1983     Years since quittin.0    Smokeless tobacco: Never Used   Substance Use Topics    Alcohol use: No    Drug use: No          ROS: Negative other than HPI.    PE:   General: Appears well  Abdomen: Soft, no tenderness, no distention, no hepatosplenomegaly.   Genitourinary:  External genitalia healing well clean with hydrogen peroxide and apply hydrocortisone cream.    ASSESSMENT: Routine postoperative follow-up exam    PLAN:   Follow-up with me prn.

## 2019-04-08 ENCOUNTER — LAB VISIT (OUTPATIENT)
Dept: LAB | Facility: HOSPITAL | Age: 69
End: 2019-04-08
Attending: FAMILY MEDICINE
Payer: MEDICARE

## 2019-04-08 DIAGNOSIS — E78.5 DYSLIPIDEMIA: ICD-10-CM

## 2019-04-08 DIAGNOSIS — E11.65 TYPE 2 DIABETES MELLITUS WITH HYPERGLYCEMIA, WITHOUT LONG-TERM CURRENT USE OF INSULIN: ICD-10-CM

## 2019-04-08 LAB
ALBUMIN SERPL BCP-MCNC: 3.9 G/DL (ref 3.5–5.2)
ALP SERPL-CCNC: 117 U/L (ref 55–135)
ALT SERPL W/O P-5'-P-CCNC: 16 U/L (ref 10–44)
ANION GAP SERPL CALC-SCNC: 11 MMOL/L (ref 8–16)
AST SERPL-CCNC: 16 U/L (ref 10–40)
BILIRUB SERPL-MCNC: 0.7 MG/DL (ref 0.1–1)
BUN SERPL-MCNC: 15 MG/DL (ref 8–23)
CALCIUM SERPL-MCNC: 9.7 MG/DL (ref 8.7–10.5)
CHLORIDE SERPL-SCNC: 106 MMOL/L (ref 95–110)
CHOLEST SERPL-MCNC: 233 MG/DL (ref 120–199)
CHOLEST/HDLC SERPL: 4.2 {RATIO} (ref 2–5)
CO2 SERPL-SCNC: 25 MMOL/L (ref 23–29)
CREAT SERPL-MCNC: 1 MG/DL (ref 0.5–1.4)
EST. GFR  (AFRICAN AMERICAN): >60 ML/MIN/1.73 M^2
EST. GFR  (NON AFRICAN AMERICAN): 57.2 ML/MIN/1.73 M^2
ESTIMATED AVG GLUCOSE: 203 MG/DL (ref 68–131)
GLUCOSE SERPL-MCNC: 180 MG/DL (ref 70–110)
HBA1C MFR BLD HPLC: 8.7 % (ref 4–5.6)
HDLC SERPL-MCNC: 55 MG/DL (ref 40–75)
HDLC SERPL: 23.6 % (ref 20–50)
LDLC SERPL CALC-MCNC: 148 MG/DL (ref 63–159)
NONHDLC SERPL-MCNC: 178 MG/DL
POTASSIUM SERPL-SCNC: 3.6 MMOL/L (ref 3.5–5.1)
PROT SERPL-MCNC: 7.2 G/DL (ref 6–8.4)
SODIUM SERPL-SCNC: 142 MMOL/L (ref 136–145)
TRIGL SERPL-MCNC: 150 MG/DL (ref 30–150)

## 2019-04-08 PROCEDURE — 80053 COMPREHEN METABOLIC PANEL: CPT | Mod: HCNC

## 2019-04-08 PROCEDURE — 83036 HEMOGLOBIN GLYCOSYLATED A1C: CPT | Mod: HCNC

## 2019-04-08 PROCEDURE — 80061 LIPID PANEL: CPT | Mod: HCNC

## 2019-04-08 PROCEDURE — 36415 COLL VENOUS BLD VENIPUNCTURE: CPT | Mod: HCNC,PO

## 2019-04-12 DIAGNOSIS — E78.5 DYSLIPIDEMIA: ICD-10-CM

## 2019-04-12 RX ORDER — PRAVASTATIN SODIUM 10 MG/1
TABLET ORAL
Qty: 90 TABLET | Refills: 3 | Status: SHIPPED | OUTPATIENT
Start: 2019-04-12 | End: 2020-06-25

## 2019-04-21 ENCOUNTER — NURSE TRIAGE (OUTPATIENT)
Dept: ADMINISTRATIVE | Facility: CLINIC | Age: 69
End: 2019-04-21

## 2019-04-21 ENCOUNTER — HOSPITAL ENCOUNTER (EMERGENCY)
Facility: HOSPITAL | Age: 69
Discharge: HOME | End: 2019-04-21
Attending: EMERGENCY MEDICINE
Payer: MEDICARE

## 2019-04-21 VITALS
TEMPERATURE: 99 F | WEIGHT: 195 LBS | SYSTOLIC BLOOD PRESSURE: 131 MMHG | HEIGHT: 63 IN | RESPIRATION RATE: 16 BRPM | BODY MASS INDEX: 34.55 KG/M2 | HEART RATE: 74 BPM | OXYGEN SATURATION: 97 % | DIASTOLIC BLOOD PRESSURE: 67 MMHG

## 2019-04-21 DIAGNOSIS — K59.00 CONSTIPATION, UNSPECIFIED CONSTIPATION TYPE: ICD-10-CM

## 2019-04-21 DIAGNOSIS — R10.9 ABDOMINAL PAIN: ICD-10-CM

## 2019-04-21 DIAGNOSIS — R10.30 LOWER ABDOMINAL PAIN: Primary | ICD-10-CM

## 2019-04-21 LAB
ALBUMIN SERPL BCP-MCNC: 4.1 G/DL (ref 3.5–5.2)
ALP SERPL-CCNC: 118 U/L (ref 55–135)
ALT SERPL W/O P-5'-P-CCNC: 16 U/L (ref 10–44)
ANION GAP SERPL CALC-SCNC: 11 MMOL/L (ref 8–16)
AST SERPL-CCNC: 18 U/L (ref 10–40)
BASOPHILS # BLD AUTO: 0.01 K/UL (ref 0–0.2)
BASOPHILS NFR BLD: 0.1 % (ref 0–1.9)
BILIRUB SERPL-MCNC: 0.5 MG/DL (ref 0.1–1)
BILIRUB UR QL STRIP: NEGATIVE
BUN SERPL-MCNC: 8 MG/DL (ref 8–23)
CALCIUM SERPL-MCNC: 9.7 MG/DL (ref 8.7–10.5)
CHLORIDE SERPL-SCNC: 105 MMOL/L (ref 95–110)
CLARITY UR: CLEAR
CO2 SERPL-SCNC: 23 MMOL/L (ref 23–29)
COLOR UR: YELLOW
CREAT SERPL-MCNC: 0.8 MG/DL (ref 0.5–1.4)
DIFFERENTIAL METHOD: ABNORMAL
EOSINOPHIL # BLD AUTO: 0.1 K/UL (ref 0–0.5)
EOSINOPHIL NFR BLD: 0.8 % (ref 0–8)
ERYTHROCYTE [DISTWIDTH] IN BLOOD BY AUTOMATED COUNT: 15.5 % (ref 11.5–14.5)
EST. GFR  (AFRICAN AMERICAN): >60 ML/MIN/1.73 M^2
EST. GFR  (NON AFRICAN AMERICAN): >60 ML/MIN/1.73 M^2
GLUCOSE SERPL-MCNC: 176 MG/DL (ref 70–110)
GLUCOSE UR QL STRIP: NEGATIVE
HCT VFR BLD AUTO: 37.5 % (ref 37–48.5)
HGB BLD-MCNC: 12.6 G/DL (ref 12–16)
HGB UR QL STRIP: NEGATIVE
KETONES UR QL STRIP: NEGATIVE
LEUKOCYTE ESTERASE UR QL STRIP: NEGATIVE
LIPASE SERPL-CCNC: 67 U/L (ref 4–60)
LYMPHOCYTES # BLD AUTO: 2 K/UL (ref 1–4.8)
LYMPHOCYTES NFR BLD: 21.7 % (ref 18–48)
MCH RBC QN AUTO: 25.8 PG (ref 27–31)
MCHC RBC AUTO-ENTMCNC: 33.6 G/DL (ref 32–36)
MCV RBC AUTO: 77 FL (ref 82–98)
MONOCYTES # BLD AUTO: 0.7 K/UL (ref 0.3–1)
MONOCYTES NFR BLD: 7.4 % (ref 4–15)
NEUTROPHILS # BLD AUTO: 6.4 K/UL (ref 1.8–7.7)
NEUTROPHILS NFR BLD: 69.8 % (ref 38–73)
NITRITE UR QL STRIP: NEGATIVE
PH UR STRIP: 6 [PH] (ref 5–8)
PLATELET # BLD AUTO: 274 K/UL (ref 150–350)
PMV BLD AUTO: 9.2 FL (ref 9.2–12.9)
POTASSIUM SERPL-SCNC: 3.9 MMOL/L (ref 3.5–5.1)
PROT SERPL-MCNC: 7.2 G/DL (ref 6–8.4)
PROT UR QL STRIP: NEGATIVE
RBC # BLD AUTO: 4.89 M/UL (ref 4–5.4)
SODIUM SERPL-SCNC: 139 MMOL/L (ref 136–145)
SP GR UR STRIP: 1.01 (ref 1–1.03)
TROPONIN I SERPL DL<=0.01 NG/ML-MCNC: <0.006 NG/ML (ref 0–0.03)
URN SPEC COLLECT METH UR: NORMAL
UROBILINOGEN UR STRIP-ACNC: NEGATIVE EU/DL
WBC # BLD AUTO: 9.21 K/UL (ref 3.9–12.7)

## 2019-04-21 PROCEDURE — 81003 URINALYSIS AUTO W/O SCOPE: CPT | Mod: HCNC

## 2019-04-21 PROCEDURE — 85025 COMPLETE CBC W/AUTO DIFF WBC: CPT | Mod: HCNC

## 2019-04-21 PROCEDURE — 80053 COMPREHEN METABOLIC PANEL: CPT | Mod: HCNC

## 2019-04-21 PROCEDURE — 25000003 PHARM REV CODE 250: Mod: HCNC | Performed by: EMERGENCY MEDICINE

## 2019-04-21 PROCEDURE — 63600175 PHARM REV CODE 636 W HCPCS: Mod: HCNC | Performed by: EMERGENCY MEDICINE

## 2019-04-21 PROCEDURE — 96374 THER/PROPH/DIAG INJ IV PUSH: CPT | Mod: HCNC

## 2019-04-21 PROCEDURE — 96361 HYDRATE IV INFUSION ADD-ON: CPT | Mod: HCNC

## 2019-04-21 PROCEDURE — 99285 EMERGENCY DEPT VISIT HI MDM: CPT | Mod: 25,HCNC

## 2019-04-21 PROCEDURE — 83690 ASSAY OF LIPASE: CPT | Mod: HCNC

## 2019-04-21 PROCEDURE — 84484 ASSAY OF TROPONIN QUANT: CPT | Mod: HCNC

## 2019-04-21 RX ORDER — SYRING-NEEDL,DISP,INSUL,0.3 ML 29 G X1/2"
296 SYRINGE, EMPTY DISPOSABLE MISCELLANEOUS ONCE AS NEEDED
Qty: 2 BOTTLE | Refills: 0 | Status: SHIPPED | OUTPATIENT
Start: 2019-04-21 | End: 2019-04-21

## 2019-04-21 RX ORDER — HYDROMORPHONE HYDROCHLORIDE 1 MG/ML
0.5 INJECTION, SOLUTION INTRAMUSCULAR; INTRAVENOUS; SUBCUTANEOUS
Status: DISCONTINUED | OUTPATIENT
Start: 2019-04-21 | End: 2019-04-21

## 2019-04-21 RX ORDER — POLYETHYLENE GLYCOL 3350 17 G/17G
17 POWDER, FOR SOLUTION ORAL DAILY
Qty: 30 EACH | Refills: 0 | Status: SHIPPED | OUTPATIENT
Start: 2019-04-21 | End: 2021-11-11

## 2019-04-21 RX ORDER — ONDANSETRON 2 MG/ML
4 INJECTION INTRAMUSCULAR; INTRAVENOUS
Status: DISCONTINUED | OUTPATIENT
Start: 2019-04-21 | End: 2019-04-21

## 2019-04-21 RX ORDER — DOCUSATE SODIUM 100 MG/1
100 CAPSULE, LIQUID FILLED ORAL 2 TIMES DAILY PRN
Qty: 60 CAPSULE | Refills: 0 | Status: SHIPPED | OUTPATIENT
Start: 2019-04-21 | End: 2019-11-19

## 2019-04-21 RX ORDER — METOCLOPRAMIDE HYDROCHLORIDE 5 MG/ML
10 INJECTION INTRAMUSCULAR; INTRAVENOUS
Status: COMPLETED | OUTPATIENT
Start: 2019-04-21 | End: 2019-04-21

## 2019-04-21 RX ORDER — ACETAMINOPHEN 500 MG
1000 TABLET ORAL
Status: COMPLETED | OUTPATIENT
Start: 2019-04-21 | End: 2019-04-21

## 2019-04-21 RX ADMIN — METOCLOPRAMIDE 10 MG: 5 INJECTION, SOLUTION INTRAMUSCULAR; INTRAVENOUS at 04:04

## 2019-04-21 RX ADMIN — ACETAMINOPHEN 1000 MG: 500 TABLET ORAL at 04:04

## 2019-04-21 RX ADMIN — SODIUM CHLORIDE 500 ML: 0.9 INJECTION, SOLUTION INTRAVENOUS at 04:04

## 2019-04-21 NOTE — ED TRIAGE NOTES
"68y F ambulatory to ED from home with c/o lower abdominal pain, 3/10, x1 week, pt reports pain worsening today, and thinking she may be constipated. Pt also reports "lightheadedness" when sitting up in her bed this morning that has not yet resolved. Pt also c/o 1/10 occipital headache, described as dull pain since waking this morning.   "

## 2019-04-21 NOTE — TELEPHONE ENCOUNTER
164/95 no chest pain SOB. No numbness or weakness on one side and not the other.  Lightheadedness..    Reason for Disposition   [1] MILD-MODERATE pain AND [2] constant AND [3] present > 2 hours    Protocols used: ABDOMINAL PAIN - FEMALE-A-AH

## 2019-04-21 NOTE — ED PROVIDER NOTES
Encounter Date: 4/21/2019    SCRIBE #1 NOTE: I, Lalo Mae, am scribing for, and in the presence of,  Dr. Crisostomo. I have scribed the entire note.       History     Chief Complaint   Patient presents with    Abdominal Pain     PT C/O PAIN ACROSS LOWER ABDOMEN ONSET TONIGHT. DENIES URINARY SYMPTOMS.     Chelly Ortiz is a 68 y.o. female who  has a past medical history of Allergy, Cataract, DDD (degenerative disc disease), lumbar, Diabetes mellitus, Diabetes mellitus, type 2, GERD (gastroesophageal reflux disease), History of subconjunctival hemorrhage (12/2/11), Hyperlipidemia, Hypertension, IBS (irritable bowel syndrome), Obesity, MYRIAM (obstructive sleep apnea), Rotator cuff impingement syndrome, and Seasonal allergic conjunctivitis.    The patient presents to the ED due to Abdominal Pain.   Patient reports symptoms started this morning after waking up. She notes that this has been happening all week, but this episode is the worst. She reports having had pain like this, but it has never been consistent like the current episode. Pt endorses lightheadedness, dizziness, abdominal pain , but denies any vaginal discharge, vaginal bleeding , urinary symptoms , numbness , tingling , unilateral weakness or N/V/D.  She states that standing up exacerbates the dizziness. She says she took Gas X to treat the abdominal pain with no relief.    The history is provided by the patient.     Review of patient's allergies indicates:   Allergen Reactions    Prednisone Other (See Comments)     hipper    Latex      Other reaction(s): Itching    Ace inhibitors Hives     Other reaction(s): Cough    Latex, natural rubber Itching     Past Medical History:   Diagnosis Date    Allergy     Cataract     DDD (degenerative disc disease), lumbar     Diabetes mellitus     diet controlled    Diabetes mellitus, type 2     GERD (gastroesophageal reflux disease)     History of subconjunctival hemorrhage 12/2/11    left eye     Hyperlipidemia     Hypertension     IBS (irritable bowel syndrome)     Obesity     MYRIAM (obstructive sleep apnea)     on CPAP    Rotator cuff impingement syndrome     Seasonal allergic conjunctivitis      Past Surgical History:   Procedure Laterality Date    CATARACT EXTRACTION W/  INTRAOCULAR LENS IMPLANT  10/3/13    OD (dr. gardner)     SECTION      x2    CHOLECYSTECTOMY      COLONOSCOPY N/A 2018    [facility]    COLONOSCOPY N/A 2013    [facility] at [facility]    EXCISION, LESION VULVA N/A 2019    [facility]ell [facility] at [facility] OR    EYE SURGERY      HYSTERECTOMY      ovaries intact, due to DUB    INSERTION, IOL PROSTHESIS Right 10/3/2013    [facility]    PHACOEMULSIFICATION, CATARACT Right 10/3/2013    [facility]    TUBAL LIGATION      ULTRASOUND, UPPER GI TRACT, ENDOSCOPIC N/A 10/9/2018    Performed by Javy Simpson [facility] at [facility] END[facility]     Family History   Problem Relation Age of Onset    Alzheimer's disease Mother     Heart disease Father     Diabetes Sister     Deep vein thrombosis Brother     Diabetes Daughter     Cancer Brother         Lung    Lung cancer Brother     Amblyopia Neg Hx     Blindness Neg Hx     Cataracts Neg Hx     Glaucoma Neg Hx     Hypertension Neg Hx     Macular degeneration Neg Hx     Retinal detachment Neg Hx     Strabismus Neg Hx     Stroke Neg Hx     Thyroid disease Neg Hx      Social History     Tobacco Use    Smoking status: Former Smoker     Last attempt to quit: 2/15/1983     Years since quittin.2    Smokeless tobacco: Never Used   Substance Use Topics    Alcohol use: No    Drug use: No     Review of Systems   Constitutional: Negative for chills and fever.   HENT: Negative for sore throat.    Respiratory: Negative for cough and shortness of breath.     Cardiovascular: Negative for chest pain.   Gastrointestinal: Positive for abdominal pain. Negative for nausea and vomiting.   Genitourinary: Negative for dysuria, frequency and urgency.   Musculoskeletal: Negative for back pain, neck pain and neck stiffness.   Skin: Negative for rash and wound.   Neurological: Positive for dizziness and light-headedness. Negative for syncope and weakness.   Hematological: Does not bruise/bleed easily.   Psychiatric/Behavioral: Negative for agitation, behavioral problems and confusion.       Physical Exam     Initial Vitals [04/21/19 0401]   BP Pulse Resp Temp SpO2   (!) 148/70 95 18 98.8 °F (37.1 °C) 95 %      MAP       --         Physical Exam    Nursing note and vitals reviewed.  Constitutional: She appears well-developed and well-nourished. She is not diaphoretic. No distress.   Appears anxious   HENT:   Head: Normocephalic and atraumatic.   Mouth/Throat: Oropharynx is clear and moist and mucous membranes are normal.   Moist mucous membrane   Eyes: EOM are normal. Pupils are equal, round, and reactive to light.   Neck: No tracheal deviation present.   Cardiovascular: Normal rate, regular rhythm, normal heart sounds and intact distal pulses.   Pulmonary/Chest: Breath sounds normal. No stridor. No respiratory distress. She has no wheezes.   Abdominal: Soft. Bowel sounds are normal. She exhibits no distension and no mass. There is tenderness. There is no rebound and no guarding.   Diffuse abdominal tenderness   Musculoskeletal: Normal range of motion. She exhibits no edema.   Neurological: She is alert and oriented to person, place, and time. No cranial nerve deficit or sensory deficit.   CN 2-12 intact  Finger to nose within normal limits  Negative romberg  Gait normal  Equal strength to bilateral upper extremities, SILT  Equal strength to bilateral lower extremities, SILT     Skin: Skin is warm and dry. Capillary refill takes less than 2 seconds. No rash noted.         ED Course    Procedures  Labs Reviewed   CBC W/ AUTO DIFFERENTIAL - Abnormal; Notable for the following components:       Result Value    MCV 77 (*)     MCH 25.8 (*)     RDW 15.5 (*)     All other components within normal limits   COMPREHENSIVE METABOLIC PANEL - Abnormal; Notable for the following components:    Glucose 176 (*)     All other components within normal limits   LIPASE - Abnormal; Notable for the following components:    Lipase 67 (*)     All other components within normal limits   URINALYSIS, REFLEX TO URINE CULTURE    Narrative:     Preferred Collection Type->Urine, Clean Catch   TROPONIN I     EKG Readings: (Independently Interpreted)   Initial Reading: No STEMI. Rhythm: Normal Sinus Rhythm. Heart Rate: 84.   No ST segment changes.        Imaging Results          X-Ray Abdomen Flat And Erect (Final result)  Result time 04/21/19 05:24:34    Final result by Brittni Coley MD (04/21/19 05:24:34)                 Impression:      Please see above.      Electronically signed by: Brittni Coley MD  Date:    04/21/2019  Time:    05:24             Narrative:    EXAMINATION:  XR ABDOMEN FLAT AND ERECT    CLINICAL HISTORY:  Unspecified abdominal pain    TECHNIQUE:  Flat and erect AP views of the abdomen were performed.    COMPARISON:  None    FINDINGS:  Scattered air is seen in nondilated loops of small and large bowel.There is scattered fecal material throughout the colon.  There are no dilated loops of small bowel or small bowel air-fluid levels.  No evidence of free intraperitoneal air.  Visualized osseous structures demonstrate degenerative change as well as underlying scoliotic curvature thoracolumbar spine.The visualized lung bases appear clear.  There are multiple pelvic phleboliths present.                                 Medical Decision Making:   Differential Diagnosis:   Differential Diagnosis includes, but is not limited to:  AAA, aortic dissection, mesenteric ischemia, perforated viscous, MI/ACS, SBO/volvulus,  incarcerated/strangulated hernia, intussusception, ileus, appendicitis, cholecystitis, cholangitis, diverticulitis, esophagitis, hepatitis, nephrolithiasis, pancreatitis, gastroenteritis, colitis, IBD/IBS, biliary colic, GERD, PUD, constipation, UTI/pyelonephritis,  disorder.    ED Management:  68 female presents with positional dizziness and abdominal pain. Vital signs stable. Non focal abdominal exam with generalized tenderness. Labs do not show significant abnormality. KUB shows moderate stool by my interpretation    Patient was given IVF reglan with improvement of her symptoms. She no longer has dizziness. I do not suspect CVA or acute life threatening pathology today.  Encouraged to drink plenty of fluids and to follow up with PCP in 2-4 days.  Return precautions for worsening dizziness fever worsening abdominal pain dysuria vomiting or any other concern     After complete evaluation, including thorough history and physical exam, the patient's symptoms are most consistent with benign cause of abdominal pain. There is no rebound/guarding or other peritoneal signs to suggest perforation or other emergent surgical process. There is no fever or leukocytosis to suggest acute bacterial infection. There is no significant focal abdominal tenderness to suggest cholecystitis, appendicitis, diverticulitis, or  source, and the patient's current symptoms and clinical presentation do not warrant other targeted diagnostics at this time. CT A/P unlikely to outweigh risks of contrast/radiation at this time. The patient was treated with supportive care  and improved.                       Clinical Impression:     1. Lower abdominal pain    2. Abdominal pain    3. Constipation, unspecified constipation type      Disposition:   Disposition: Discharged  Condition: Stable    [facility], Sujit Crisostomo,  personally performed the services described in this documentation. All medical record entries made by the kaiser were  at my direction and in my presence.  I have reviewed the chart and agree that the record reflects my personal performance and is accurate and complete. Sujit Crisostomo M.D. 4:39 PM04/23/2019                      Sujit Crisostomo Jr., MD  04/23/19 1640

## 2019-04-29 ENCOUNTER — CLINICAL SUPPORT (OUTPATIENT)
Dept: DIABETES | Facility: CLINIC | Age: 69
End: 2019-04-29
Payer: MEDICARE

## 2019-04-29 VITALS — BODY MASS INDEX: 34.19 KG/M2 | WEIGHT: 193 LBS

## 2019-04-29 DIAGNOSIS — E11.9 DM TYPE 2 WITHOUT RETINOPATHY: ICD-10-CM

## 2019-04-29 PROCEDURE — G0108 PR DIAB MANAGE TRN  PER INDIV: ICD-10-PCS | Mod: HCNC,S$GLB,, | Performed by: INTERNAL MEDICINE

## 2019-04-29 PROCEDURE — 99999 PR PBB SHADOW E&M-EST. PATIENT-LVL I: CPT | Mod: PBBFAC,HCNC,,

## 2019-04-29 PROCEDURE — 99999 PR PBB SHADOW E&M-EST. PATIENT-LVL I: ICD-10-PCS | Mod: PBBFAC,HCNC,,

## 2019-04-29 PROCEDURE — G0108 DIAB MANAGE TRN  PER INDIV: HCPCS | Mod: HCNC,S$GLB,, | Performed by: INTERNAL MEDICINE

## 2019-04-29 NOTE — PROGRESS NOTES
Diabetes Education  Author: Stephani Jordan RN  Date: 4/29/2019  Diabetes Care Management Summary  Diabetes Education Record Assessment/Progress: Initial  Current Diabetes Risk Level: High   Last A1c:   Lab Results   Component Value Date    HGBA1C 8.7 (H) 04/08/2019     Last Visit with Diabetes Educator: : 03/15/2018  Last OPCM Referral: : Not Found   Enrolled in OPCM: No  Diabetes Type  Diabetes Type : Type II  Diabetes History  Diabetes Diagnosis: 5-10 years  Current Treatment: Oral Medication, Diet, Exercise  Health Maintenance was reviewed today with patient. Discussed with patient importance of routine eye exams, foot exams/foot care, blood work (i.e.: A1c, microalbumin, and lipid), dental visits, yearly flu vaccine, and pneumonia vaccine as indicated by PCP. Patient verbalized understanding.     Health Maintenance Topics with due status: Not Due       Topic Last Completion Date    TETANUS VACCINE 06/21/2010    Foot Exam 10/02/2018    Eye Exam 10/04/2018    Mammogram 11/30/2018    Colonoscopy 12/05/2018    Lipid Panel 04/08/2019    Hemoglobin A1c 04/08/2019     Health Maintenance Due   Topic Date Due    Pneumococcal Vaccine (65+ Low/Medium Risk) (2 of 2 - PPSV23) 11/01/2018    DEXA SCAN  03/29/2019   Nutrition  Meal Planning: skipping meals, water, artificial sweeteners, eats out often, snacks between meal  What type of sweetener do you use?: Splenda  What type of beverages do you drink?: milk, water  Meal Plan 24 Hour Recall - Breakfast: pancake, sausage, almond milk, margarine, small amt syrup  Meal Plan 24 Hour Recall - Lunch: nothing  Meal Plan 24 Hour Recall - Dinner: turkey, stuffing, 1/2 sweet potato    Monitoring   Self Monitoring : pt has a meter and test a few times a week fasting in am. instructed pt to test once a day fasting in the am. instructed pt on the normal bs ranges. instructed pt to keep a record of her bs's and to bring the record to her md visits.  Blood Glucose Logs: No  Do you use a  personal continuous glucose monitor?: No  In the last month, how often have you had a low blood sugar reaction?: never  Can you tell when your blood sugar is too high?: no  Exercise   Exercise Type: none  Current Diabetes Treatment   Current Treatment: Oral Medication, Diet, Exercise  Social History  Preferred Learning Method: Face to Face, Group Education, Demonstration, Reading Materials  Primary Support: Spouse  Educational Level: High School  Smoking Status: Ex Smoker  Alcohol Use: Never  DDS-2 Score  ( > 3 = SIGNIFICANT DISTRESS): 2   Barriers to Change  Barriers to Change: None  Learning Challenges : None  Readiness to Learn   Readiness to Learn : Acceptance  Cultural Influences  Cultural Influences: None  Diabetes Education Assessment/Progress  Diabetes Disease Process (diabetes disease process and treatment options): Discussion, Instructed, Demonstrates Understanding/Competency(verbalizes/demonstrates), Individual Session, Written Materials Provided  Nutrition (Incorporating nutritional management into one's lifestyle): Discussion, Demonstration, Instructed, Demonstrates Understanding/Competency (verbalizes/demonstrates), Individual Session, Written Materials Provided  Physical Activity (incorporating physical activity into one's lifestyle): Discussion, Instructed, Demonstrates Understanding/Competency (verbalizes/demonstrates), Individual Session, Written Materials Provided  Medications (states correct name, dose, onset, peak, duration, side effects & timing of meds): Discussion, Instructed, Demonstrates Understanding/Competency(verbalizes/demonstrates), Individual Session, Written Materials Provided  Monitoring (monitoring blood glucose/other parameters & using results): Instructed, Demonstrates Understanding/Competency (verbalizes/demonstrates), Individual Session, Written Materials Provided  Acute Complications (preventing, detecting, and treating acute complications): Discussion, Instructed,  Demonstrates Understanding/Competency (verbalizes/demonstrates), Written Materials Provided, Individual Session  Chronic Complications (preventing, detecting, and treating chronic complications): Discussion, Instructed, Demonstrates Understanding/Competency (verbalizes/demonstrates), Individual Session, Written Materials Provided  Clinical (diabetes, other pertinent medical history, and relevant comorbidities reviewed during visit): Discussion, Instructed, Demonstrates Understanding/Competency (verbalizes/demonstrates), Individual Session  Cognitive (knowledge of self-management skills, functional health literacy): Discussion, Instructed, Demonstrates Understanding/Competency (verbalizes/demonstrates), Individual Session  Psychosocial (emotional response to diabetes): Discussion, Instructed, Demonstrates Understanding/Competency (verbalizes/demonstrates), Individual Session  Diabetes Distress and Support Systems: Discussion, Instructed, Demonstrates Understanding/Competency (verbalizes/demonstrates), Individual Session  Behavioral (readiness for change, lifestyle practices, self-care behaviors): Discussion, Instructed, Demonstrates Understanding/Competency (verbalizes/demonstrates), Individual Session  Goals  Patient has selected/evaluated goals during today's session: Yes, selected  Healthy Eating: Set  Start Date: 04/29/19  Target Date: 07/29/19  Monitoring: Set  Start Date: 04/29/19  Target Date: 07/29/19  Diabetes Care Plan/Intervention  Education Plan/Intervention: Group Follow-Up DSMT, Eye Exam, Foot Exam  Diabetes Meal Plan  Restrictions: Restricted Carbohydrate  Calories: 1800  Carbohydrate Per Meal: 30-45g  Carbohydrate Per Snack : 15-20g  Pt is known to me. Instructed pt on the food groups, how to read labels and count carbs. Pt was given sample menus and meal plans as examples. Discussed with pt the importance of eating 3 balanced and portioned meals per day. Pt usually skips lunch because she sleeps late  and eats breakfast late. Discussed with pt that she should have 3 meals and if she starts eating late then her dinner will be later at night. Discussed planning meals with pt because she states that this is a problem for her. Discussed how to put her meals together. Discussed foods that she likes that she could include in her meal plan. Pt had good understanding of meal plan and compliance is expected. Pt was advised to call for any problems or questions.  Today's Self-Management Care Plan was developed with the patient's input and is based on barriers identified during today's assessment.    The long and short-term goals in the care plan were written with the patient/caregiver's input. The patient has agreed to work toward these goals to improve her overall diabetes control.      The patient received a copy of today's self-management plan and verbalized understanding of the care plan, goals, and all of today's instructions.      The patient was encouraged to communicate with her physician and care team regarding her condition(s) and treatment.  I provided the patient with my contact information today and encouraged her to contact me via phone or patient portal as needed.     Education Units of Time   Time Spent: 90 min

## 2019-05-02 ENCOUNTER — PES CALL (OUTPATIENT)
Dept: ADMINISTRATIVE | Facility: CLINIC | Age: 69
End: 2019-05-02

## 2019-05-16 ENCOUNTER — OFFICE VISIT (OUTPATIENT)
Dept: SLEEP MEDICINE | Facility: CLINIC | Age: 69
End: 2019-05-16
Payer: MEDICARE

## 2019-05-16 VITALS
HEART RATE: 82 BPM | BODY MASS INDEX: 35.03 KG/M2 | HEIGHT: 63 IN | SYSTOLIC BLOOD PRESSURE: 142 MMHG | WEIGHT: 197.69 LBS | DIASTOLIC BLOOD PRESSURE: 80 MMHG

## 2019-05-16 DIAGNOSIS — G47.33 OBSTRUCTIVE SLEEP APNEA: Primary | ICD-10-CM

## 2019-05-16 PROCEDURE — 99213 OFFICE O/P EST LOW 20 MIN: CPT | Mod: HCNC,S$GLB,, | Performed by: NURSE PRACTITIONER

## 2019-05-16 PROCEDURE — 99999 PR PBB SHADOW E&M-EST. PATIENT-LVL IV: CPT | Mod: PBBFAC,HCNC,, | Performed by: NURSE PRACTITIONER

## 2019-05-16 PROCEDURE — 1101F PT FALLS ASSESS-DOCD LE1/YR: CPT | Mod: HCNC,CPTII,S$GLB, | Performed by: NURSE PRACTITIONER

## 2019-05-16 PROCEDURE — 3079F DIAST BP 80-89 MM HG: CPT | Mod: HCNC,CPTII,S$GLB, | Performed by: NURSE PRACTITIONER

## 2019-05-16 PROCEDURE — 99213 PR OFFICE/OUTPT VISIT, EST, LEVL III, 20-29 MIN: ICD-10-PCS | Mod: HCNC,S$GLB,, | Performed by: NURSE PRACTITIONER

## 2019-05-16 PROCEDURE — 3077F PR MOST RECENT SYSTOLIC BLOOD PRESSURE >= 140 MM HG: ICD-10-PCS | Mod: HCNC,CPTII,S$GLB, | Performed by: NURSE PRACTITIONER

## 2019-05-16 PROCEDURE — 3077F SYST BP >= 140 MM HG: CPT | Mod: HCNC,CPTII,S$GLB, | Performed by: NURSE PRACTITIONER

## 2019-05-16 PROCEDURE — 3079F PR MOST RECENT DIASTOLIC BLOOD PRESSURE 80-89 MM HG: ICD-10-PCS | Mod: HCNC,CPTII,S$GLB, | Performed by: NURSE PRACTITIONER

## 2019-05-16 PROCEDURE — 99999 PR PBB SHADOW E&M-EST. PATIENT-LVL IV: ICD-10-PCS | Mod: PBBFAC,HCNC,, | Performed by: NURSE PRACTITIONER

## 2019-05-16 PROCEDURE — 1101F PR PT FALLS ASSESS DOC 0-1 FALLS W/OUT INJ PAST YR: ICD-10-PCS | Mod: HCNC,CPTII,S$GLB, | Performed by: NURSE PRACTITIONER

## 2019-05-16 NOTE — PROGRESS NOTES
SLEEP CLINIC FOLLOW UP NOTE:  cc: CPAP check  1/20/14 Dr. Ignacio: This 59 year-old female returns for management of obstructive sleep apnea and CPAP equipment check. The patient has symptoms of snoring and disrupted sleep.  Medical co-morbidities include obesity (BMI >30) and hypertension.  The patient reports nightly use. She still does not like CPAP, but uses it because of the potential benefits.  Overall CPAP use: nightly  Mask comfort / fit: ok, some leaks with movement - has tried several masks  Pressure tolerance: feels that it is still too strong, but improved since lowering it  Humidification: max setting  Nasal / Oral drying: none  CPAP Interrogation: Total usage: 3248 hours, rubber seal at water tank is frayed? Manometer reads 7.5 cm.  After some discussion, patient reports that her main sleep complaint is her 's snoring. She reports that this keeps her awake most of the night. She does not  have an alternative room to sleep in. According to her, he won't tolerate white noise, and if she tries to sleep with ear plugs she would not hear her alarm. She reports  he has refused to seek treatment for his condition. She is very distressed by this.    01/21/2015 Dr. Quezada - switched care to [facility]. SHe has always been a light sleeper.  The patient has mentioned difficulty falling and staying asleep.    The patient states that she has already made some changes in regard to sleep hygiene, making sure that the bedroom is dark, features comfortable temperature and humidity level. The patient does watch TV and read in bed. she goes to bed before she is sleepy and stays in bed if not asleep within 30 minutes watching TV. she avoids clock watching and tries not to worry about her ability to fall asleep.     The patient has tried the following sleeping aids: Trazodone -does not remember the dose.    Still using CPAP compliantly.    CPAP pressure: 8 cm H2O  Mask comfort / fit:  Eson - does not like it    Pressure  tolerance: ear pop - believed hearing decreased on the left ear.  Humidification: OK   CPAP Interrogation:    Ave daily usage:8 hours /7days  Ave daily usage:8 hours /30days  Days >4 hours usage: 8 /7 days  Days >4 hours usage: 30/30 days   Machine condition: good     90-%tile pressure: n/a  Large leak n/a  AHI n/a      12/02/2015: She comes to get a follow up for BPAP titration results. She preferred BPAP machine to CPAP. Still compliant with using CPAP 8  Cm H2O>.  Averaging 8 hrs per night. Her CPAP is about 5 years old and needs replacement. She also expressed interest in getting an oral appliance for travel. Still watching TV in bed. Taking Trazodone PRN. Concerned that she can not sleep for 8-9 hrs.  BPAP 13/7 was ordered after BPAP titration, but the patient never received it.       05/02/2016:  The patient has not presented any new complaints since the previous visit. Did some progress on BPAP - pressure still feels low. Interested in OA for OA. Still taking Trazodone sometimes. Avoids watching TV in bed. Prefers her old FP mask to Eson.    CPAP pressure: 13/9 cm H2O  Mask comfort / fit:  OK    Pressure tolerance: OK   Humidification: OK   CPAP Interrogation:    Ave daily usage:8 hours /7days  Ave daily usage:9 hours /30days  Days >4 hours usage: 7 /7 days  Days >4 hours usage: 30/30 days   Machine condition: good     90-%tile pressure: n/a cm H2O  Large leak 2-3%  AHI 5-6    09/21/2016:  The patient has not presented any new complaints since the previous visit.   She does not like the hose attached on top her Dreamwear.  No longer considering OA - getting dental work dose.  ESS 1/24. Still Trazodone 3-4 times per week. Taking Melatonin PRN.At times ZZquill.  Not sleepy.    BPAP pressure: 14/10 cm H2O  Mask comfort / fit:  Nasal pillows Dream Wear     Pressure tolerance: OK   Humidification: OK- sometime does not use it   CPAP Interrogation:    Ave daily usage:8 hours /7days  Ave daily usage:9 hours  "/30days  Days >4 hours usage: 7 /7 days  Days >4 hours usage: 30/30 days   Machine condition: good     90-%tile pressure: n/a cm H2O  Large leak 6-7%  AHI 4-5    11/13/2017    The patient reports improved sleep continuity and daytime sleepiness on PAP. ESS today is 0/24.  Denies break through snoring. No dry mouth. Still interested in OA for travel.   BPAP pressure: 14/10 cm H2O  Mask comfort / fit:  Wisp    Pressure tolerance: OK   Humidification: OK- sometime does not use it   CPAP Interrogation:    Ave daily usage:8 hours /7days  Ave daily usage:8:30 hours /30days  Days >4 hours usage: 7 /7 days  Days >4 hours usage: 30/30 days   Machine condition: good     90-%tile pressure: n/a cm H2O  Large leak 2-3%  AHI 2-3    Still has trouble falling and staying asleep. Taking Trazodone PRN.   Bedroom is cold, not totally dark. Not exercising enough. Walking some.   ! Cup coffee in AM    11/13/17:  Continued nightly use. May want different nose mask. Using L Wisp mask, no chin strap. Prefers cooler air. Denies interval medical change. Wgt stable. Not yet taken bp meds today. Continued resolution of snoring, disrupted sleep and daytime sleepiness  Interrogation- ahi 3.4, avg use 8:24h/night. 0% leak, 0% periodic. 30/30d>4hr. Takes trazadone prn.     BASELINE SLEEP STUDY: AHI 54.1, titrated to 12 cm, improved at 8 cm.        PHYSICAL EXAM:  BP (!) 142/80   Pulse 82   Ht 5' 3" (1.6 m)   Wt 89.7 kg (197 lb 11.2 oz)   LMP  (LMP Unknown)   BMI 35.02 kg/m²   Obese, well groomed, W/D    ASSESSMENT:  1. Obstructive sleep apnea with prior symptoms of snoring and disrupted sleep, now improved with CPAP use. The patient is adherent on CPAP and experiencing  symptomatic benefit. BPAP 14/10. AHI <5.       Medical co-morbidities: obesity (BMI >30) and hypertension.    2. Insomnia NEC. Multi-factorial -  excess time in bed, poor sleep hygiene, and likely paradoxical insomnia play a role. Improved on CPAP.     PLAN:    1. Continue  " 14/10 BPAP. Consider alternative nose mask    2. Continue Trazodone 50 mg mg at bedtime PRN  3. Discussed effectiveness of her therapy  and potential ramifications of untreated MYRIAM, including heart disease, hypertension, cognitive difficulties, stroke, and diabetes  4. rtc annually, sooner if needed

## 2019-05-17 RX ORDER — ERGOCALCIFEROL 1.25 MG/1
CAPSULE ORAL
Qty: 12 CAPSULE | Refills: 1 | Status: SHIPPED | OUTPATIENT
Start: 2019-05-17 | End: 2022-10-05 | Stop reason: SDUPTHER

## 2019-05-21 ENCOUNTER — HOSPITAL ENCOUNTER (OUTPATIENT)
Dept: RADIOLOGY | Facility: HOSPITAL | Age: 69
Discharge: HOME | End: 2019-05-21
Attending: INTERNAL MEDICINE
Payer: MEDICARE

## 2019-05-21 ENCOUNTER — PATIENT MESSAGE (OUTPATIENT)
Dept: ADMINISTRATIVE | Facility: OTHER | Age: 69
End: 2019-05-21

## 2019-05-21 DIAGNOSIS — R93.2 ABNORMAL FINDINGS ON DIAGNOSTIC IMAGING OF LIVER AND BILIARY TRACT: ICD-10-CM

## 2019-05-21 DIAGNOSIS — R93.89 ABNORMAL FINDING ON IMAGING: ICD-10-CM

## 2019-05-21 PROCEDURE — 76376 3D RENDER W/INTRP POSTPROCES: CPT | Mod: TC,HCNC

## 2019-05-21 PROCEDURE — 76376 MRI ABDOMEN WITHOUT CONTRAST MRCP: ICD-10-PCS | Mod: 26,HCNC,, | Performed by: RADIOLOGY

## 2019-05-21 PROCEDURE — 74181 MRI ABDOMEN W/O CONTRAST: CPT | Mod: 26,HCNC,, | Performed by: RADIOLOGY

## 2019-05-21 PROCEDURE — 76376 3D RENDER W/INTRP POSTPROCES: CPT | Mod: 26,HCNC,, | Performed by: RADIOLOGY

## 2019-05-21 PROCEDURE — 74181 MRI ABDOMEN WITHOUT CONTRAST MRCP: ICD-10-PCS | Mod: 26,HCNC,, | Performed by: RADIOLOGY

## 2019-05-21 PROCEDURE — 74181 MRI ABDOMEN W/O CONTRAST: CPT | Mod: TC,HCNC

## 2019-05-23 ENCOUNTER — TELEPHONE (OUTPATIENT)
Dept: ENDOSCOPY | Facility: HOSPITAL | Age: 69
End: 2019-05-23

## 2019-05-23 NOTE — TELEPHONE ENCOUNTER
----- Message from Claudia Birch sent at 5/23/2019 10:28 AM CDT -----  Contact: Self- [phone]  Tatiana- pt called to determine if biopsy results were in yet- please contact pt at [phone]

## 2019-05-24 ENCOUNTER — TELEPHONE (OUTPATIENT)
Dept: GASTROENTEROLOGY | Facility: CLINIC | Age: 69
End: 2019-05-24

## 2019-05-27 ENCOUNTER — OFFICE VISIT (OUTPATIENT)
Dept: FAMILY MEDICINE | Facility: CLINIC | Age: 69
End: 2019-05-27
Payer: MEDICARE

## 2019-05-27 VITALS
HEART RATE: 85 BPM | DIASTOLIC BLOOD PRESSURE: 70 MMHG | BODY MASS INDEX: 34.57 KG/M2 | WEIGHT: 195.13 LBS | HEIGHT: 63 IN | SYSTOLIC BLOOD PRESSURE: 128 MMHG | OXYGEN SATURATION: 97 %

## 2019-05-27 DIAGNOSIS — N18.30 CKD (CHRONIC KIDNEY DISEASE) STAGE 3, GFR 30-59 ML/MIN: ICD-10-CM

## 2019-05-27 DIAGNOSIS — I10 ESSENTIAL HYPERTENSION: Primary | ICD-10-CM

## 2019-05-27 DIAGNOSIS — Z78.0 ASYMPTOMATIC MENOPAUSE: ICD-10-CM

## 2019-05-27 DIAGNOSIS — E11.65 TYPE 2 DIABETES MELLITUS WITH HYPERGLYCEMIA, WITHOUT LONG-TERM CURRENT USE OF INSULIN: ICD-10-CM

## 2019-05-27 PROCEDURE — 99999 PR PBB SHADOW E&M-EST. PATIENT-LVL III: CPT | Mod: PBBFAC,HCNC,, | Performed by: FAMILY MEDICINE

## 2019-05-27 PROCEDURE — 3078F DIAST BP <80 MM HG: CPT | Mod: HCNC,CPTII,S$GLB, | Performed by: FAMILY MEDICINE

## 2019-05-27 PROCEDURE — 3045F PR MOST RECENT HEMOGLOBIN A1C LEVEL 7.0-9.0%: ICD-10-PCS | Mod: HCNC,CPTII,S$GLB, | Performed by: FAMILY MEDICINE

## 2019-05-27 PROCEDURE — 1101F PT FALLS ASSESS-DOCD LE1/YR: CPT | Mod: HCNC,CPTII,S$GLB, | Performed by: FAMILY MEDICINE

## 2019-05-27 PROCEDURE — 3074F PR MOST RECENT SYSTOLIC BLOOD PRESSURE < 130 MM HG: ICD-10-PCS | Mod: HCNC,CPTII,S$GLB, | Performed by: FAMILY MEDICINE

## 2019-05-27 PROCEDURE — 3074F SYST BP LT 130 MM HG: CPT | Mod: HCNC,CPTII,S$GLB, | Performed by: FAMILY MEDICINE

## 2019-05-27 PROCEDURE — 3045F PR MOST RECENT HEMOGLOBIN A1C LEVEL 7.0-9.0%: CPT | Mod: HCNC,CPTII,S$GLB, | Performed by: FAMILY MEDICINE

## 2019-05-27 PROCEDURE — 3078F PR MOST RECENT DIASTOLIC BLOOD PRESSURE < 80 MM HG: ICD-10-PCS | Mod: HCNC,CPTII,S$GLB, | Performed by: FAMILY MEDICINE

## 2019-05-27 PROCEDURE — 99214 PR OFFICE/OUTPT VISIT, EST, LEVL IV, 30-39 MIN: ICD-10-PCS | Mod: HCNC,S$GLB,, | Performed by: FAMILY MEDICINE

## 2019-05-27 PROCEDURE — 99499 UNLISTED E&M SERVICE: CPT | Mod: HCNC,S$GLB,, | Performed by: FAMILY MEDICINE

## 2019-05-27 PROCEDURE — 99499 RISK ADDL DX/OHS AUDIT: ICD-10-PCS | Mod: HCNC,S$GLB,, | Performed by: FAMILY MEDICINE

## 2019-05-27 PROCEDURE — 99214 OFFICE O/P EST MOD 30 MIN: CPT | Mod: HCNC,S$GLB,, | Performed by: FAMILY MEDICINE

## 2019-05-27 PROCEDURE — 1101F PR PT FALLS ASSESS DOC 0-1 FALLS W/OUT INJ PAST YR: ICD-10-PCS | Mod: HCNC,CPTII,S$GLB, | Performed by: FAMILY MEDICINE

## 2019-05-27 PROCEDURE — 99999 PR PBB SHADOW E&M-EST. PATIENT-LVL III: ICD-10-PCS | Mod: PBBFAC,HCNC,, | Performed by: FAMILY MEDICINE

## 2019-05-27 NOTE — PROGRESS NOTES
Subjective:       Patient ID: Chelly Ortiz is a 68 y.o. female.    Chief Complaint: Follow-up; Diabetes; Hypertension; Back Pain (on and off); and Abdominal Pain (on and off)    68 years old female came to the clinic for diabetes check.  Patient with elevated blood sugar for the last year.  Patient is willing to start insulin .  No polyuria, polydipsia polyphagia.  Patient with a BMI of 34 currently trying to lose weight.  Patient with decreased kidney function but stable in comparison with previous reports.  Patient due for her bone density.  Blood pressure today stable .  No chest pain, palpitation orthopnea or PND.    Review of Systems   Constitutional: Negative.    HENT: Negative.    Eyes: Negative.    Respiratory: Negative.    Cardiovascular: Negative.  Negative for chest pain, palpitations and leg swelling.   Gastrointestinal: Negative.    Endocrine: Negative for polydipsia, polyphagia and polyuria.   Genitourinary: Negative.    Musculoskeletal: Negative.    Skin: Negative.    Neurological: Negative.    Psychiatric/Behavioral: Negative.        Objective:      Physical Exam   Constitutional: She is oriented to person, place, and time. She appears well-developed and well-nourished. No distress.   HENT:   Head: Normocephalic and atraumatic.   Right Ear: External ear normal.   Left Ear: External ear normal.   Nose: Nose normal.   Mouth/Throat: Oropharynx is clear and moist. No oropharyngeal exudate.   Eyes: Pupils are equal, round, and reactive to light. Conjunctivae and EOM are normal. Right eye exhibits no discharge. Left eye exhibits no discharge. No scleral icterus.   Neck: Normal range of motion. Neck supple. No JVD present. No tracheal deviation present. No thyromegaly present.   Cardiovascular: Normal rate, regular rhythm, normal heart sounds and intact distal pulses. Exam reveals no gallop and no friction rub.   No murmur heard.  Pulmonary/Chest: Effort normal and breath sounds normal. No stridor. No  respiratory distress. She has no wheezes. She has no rales. She exhibits no tenderness.   Abdominal: Soft. Bowel sounds are normal. She exhibits no distension and no mass. There is no tenderness. There is no rebound and no guarding.   Musculoskeletal: Normal range of motion. She exhibits no edema or tenderness.   Lymphadenopathy:     She has no cervical adenopathy.   Neurological: She is alert and oriented to person, place, and time. She has normal reflexes. No cranial nerve deficit. She exhibits normal muscle tone. Coordination normal.   Skin: Skin is warm and dry. No rash noted. She is not diaphoretic. No erythema. No pallor.   Psychiatric: She has a normal mood and affect. Her behavior is normal. Judgment and thought content normal.       Assessment:       1. Essential hypertension    2. Type 2 diabetes mellitus with hyperglycemia, without long-term current use of insulin    3. CKD (chronic kidney disease) stage 3, GFR 30-59 ml/min    4. Asymptomatic menopause        Plan:         Chelly was seen today for follow-up, diabetes, hypertension, back pain and abdominal pain.    Diagnoses and all orders for this visit:    Essential hypertension    Type 2 diabetes mellitus with hyperglycemia, without long-term current use of insulin  -     insulin detemir U-100 (LEVEMIR FLEXTOUCH U-100 INSULN) 100 unit/mL (3 mL) SubQ InPn pen; Inject 15 Units into the skin every evening.    CKD (chronic kidney disease) stage 3, GFR 30-59 ml/min    Asymptomatic menopause  -     DXA Bone Density Spine And Hip; Future    Continue monitoring blood pressure at home, low sodium diet.  Continue monitoring blood sugar at home,ADA diet.  Follow-up in 4 weeks with blood sugar chart.

## 2019-05-28 ENCOUNTER — HOSPITAL ENCOUNTER (OUTPATIENT)
Dept: RADIOLOGY | Facility: HOSPITAL | Age: 69
Discharge: HOME | End: 2019-05-28
Attending: FAMILY MEDICINE
Payer: MEDICARE

## 2019-05-28 DIAGNOSIS — Z78.0 ASYMPTOMATIC MENOPAUSE: ICD-10-CM

## 2019-05-28 PROCEDURE — 77080 DXA BONE DENSITY AXIAL: CPT | Mod: TC,HCNC

## 2019-05-28 PROCEDURE — 77080 DXA BONE DENSITY AXIAL: CPT | Mod: 26,HCNC,, | Performed by: RADIOLOGY

## 2019-05-28 PROCEDURE — 77080 DEXA BONE DENSITY SPINE HIP: ICD-10-PCS | Mod: 26,HCNC,, | Performed by: RADIOLOGY

## 2019-05-29 ENCOUNTER — PES CALL (OUTPATIENT)
Dept: ADMINISTRATIVE | Facility: CLINIC | Age: 69
End: 2019-05-29

## 2019-06-01 DIAGNOSIS — M79.10 MYALGIA: ICD-10-CM

## 2019-06-03 DIAGNOSIS — E11.65 TYPE 2 DIABETES MELLITUS WITH HYPERGLYCEMIA, WITHOUT LONG-TERM CURRENT USE OF INSULIN: ICD-10-CM

## 2019-06-03 RX ORDER — GABAPENTIN 100 MG/1
CAPSULE ORAL
Qty: 90 CAPSULE | Refills: 1 | Status: SHIPPED | OUTPATIENT
Start: 2019-06-03 | End: 2019-06-26 | Stop reason: SDUPTHER

## 2019-06-03 NOTE — TELEPHONE ENCOUNTER
----- Message from Rhett Gavin, PharmD sent at 6/3/2019  3:14 PM CDT -----  Pt was prescribed levemir flexpen..... She is trying to qualify for pt assistance through [facility] which pt said she needs some time to get her paperwork together. If possible can we do a 1 month supply of the levemir in a vial for pt. If ok  Please send over updated rx to [facility] in [place]. Thank you

## 2019-06-04 ENCOUNTER — LAB VISIT (OUTPATIENT)
Dept: LAB | Facility: HOSPITAL | Age: 69
End: 2019-06-04
Attending: FAMILY MEDICINE
Payer: MEDICARE

## 2019-06-04 ENCOUNTER — OFFICE VISIT (OUTPATIENT)
Dept: FAMILY MEDICINE | Facility: CLINIC | Age: 69
End: 2019-06-04
Payer: MEDICARE

## 2019-06-04 ENCOUNTER — TELEPHONE (OUTPATIENT)
Dept: DERMATOLOGY | Facility: CLINIC | Age: 69
End: 2019-06-04

## 2019-06-04 VITALS
HEART RATE: 72 BPM | WEIGHT: 193.31 LBS | HEIGHT: 63 IN | BODY MASS INDEX: 34.25 KG/M2 | TEMPERATURE: 98 F | OXYGEN SATURATION: 96 % | DIASTOLIC BLOOD PRESSURE: 78 MMHG | SYSTOLIC BLOOD PRESSURE: 132 MMHG

## 2019-06-04 DIAGNOSIS — Z11.4 ENCOUNTER FOR SCREENING FOR HIV: ICD-10-CM

## 2019-06-04 DIAGNOSIS — L29.9 PRURITUS: Primary | ICD-10-CM

## 2019-06-04 DIAGNOSIS — L29.9 PRURITUS: ICD-10-CM

## 2019-06-04 DIAGNOSIS — L85.3 XEROSIS OF SKIN: ICD-10-CM

## 2019-06-04 DIAGNOSIS — M54.2 NECK PAIN OF OVER 3 MONTHS DURATION: ICD-10-CM

## 2019-06-04 LAB
ALBUMIN SERPL BCP-MCNC: 3.9 G/DL (ref 3.5–5.2)
ALP SERPL-CCNC: 132 U/L (ref 55–135)
ALT SERPL W/O P-5'-P-CCNC: 13 U/L (ref 10–44)
ANION GAP SERPL CALC-SCNC: 10 MMOL/L (ref 8–16)
AST SERPL-CCNC: 17 U/L (ref 10–40)
BASOPHILS # BLD AUTO: 0.03 K/UL (ref 0–0.2)
BASOPHILS NFR BLD: 0.5 % (ref 0–1.9)
BILIRUB SERPL-MCNC: 0.5 MG/DL (ref 0.1–1)
BUN SERPL-MCNC: 14 MG/DL (ref 8–23)
CALCIUM SERPL-MCNC: 9.8 MG/DL (ref 8.7–10.5)
CHLORIDE SERPL-SCNC: 103 MMOL/L (ref 95–110)
CO2 SERPL-SCNC: 27 MMOL/L (ref 23–29)
CREAT SERPL-MCNC: 1 MG/DL (ref 0.5–1.4)
DIFFERENTIAL METHOD: ABNORMAL
EOSINOPHIL # BLD AUTO: 0.1 K/UL (ref 0–0.5)
EOSINOPHIL NFR BLD: 1.1 % (ref 0–8)
ERYTHROCYTE [DISTWIDTH] IN BLOOD BY AUTOMATED COUNT: 15.8 % (ref 11.5–14.5)
EST. GFR  (AFRICAN AMERICAN): >60 ML/MIN/1.73 M^2
EST. GFR  (NON AFRICAN AMERICAN): 57.2 ML/MIN/1.73 M^2
GLUCOSE SERPL-MCNC: 171 MG/DL (ref 70–110)
HCT VFR BLD AUTO: 40.3 % (ref 37–48.5)
HGB BLD-MCNC: 13 G/DL (ref 12–16)
IMM GRANULOCYTES # BLD AUTO: 0.01 K/UL (ref 0–0.04)
IMM GRANULOCYTES NFR BLD AUTO: 0.2 % (ref 0–0.5)
LYMPHOCYTES # BLD AUTO: 2.9 K/UL (ref 1–4.8)
LYMPHOCYTES NFR BLD: 43.8 % (ref 18–48)
MCH RBC QN AUTO: 25.3 PG (ref 27–31)
MCHC RBC AUTO-ENTMCNC: 32.3 G/DL (ref 32–36)
MCV RBC AUTO: 78 FL (ref 82–98)
MONOCYTES # BLD AUTO: 0.5 K/UL (ref 0.3–1)
MONOCYTES NFR BLD: 7.1 % (ref 4–15)
NEUTROPHILS # BLD AUTO: 3.1 K/UL (ref 1.8–7.7)
NEUTROPHILS NFR BLD: 47.3 % (ref 38–73)
NRBC BLD-RTO: 0 /100 WBC
PLATELET # BLD AUTO: 333 K/UL (ref 150–350)
PMV BLD AUTO: 10.1 FL (ref 9.2–12.9)
POTASSIUM SERPL-SCNC: 3.5 MMOL/L (ref 3.5–5.1)
PROT SERPL-MCNC: 7.5 G/DL (ref 6–8.4)
RBC # BLD AUTO: 5.14 M/UL (ref 4–5.4)
SODIUM SERPL-SCNC: 140 MMOL/L (ref 136–145)
TSH SERPL DL<=0.005 MIU/L-ACNC: 1.44 UIU/ML (ref 0.4–4)
WBC # BLD AUTO: 6.6 K/UL (ref 3.9–12.7)

## 2019-06-04 PROCEDURE — 99999 PR PBB SHADOW E&M-EST. PATIENT-LVL IV: CPT | Mod: PBBFAC,HCNC,, | Performed by: FAMILY MEDICINE

## 2019-06-04 PROCEDURE — 99999 PR PBB SHADOW E&M-EST. PATIENT-LVL IV: ICD-10-PCS | Mod: PBBFAC,HCNC,, | Performed by: FAMILY MEDICINE

## 2019-06-04 PROCEDURE — 1101F PT FALLS ASSESS-DOCD LE1/YR: CPT | Mod: HCNC,CPTII,S$GLB, | Performed by: FAMILY MEDICINE

## 2019-06-04 PROCEDURE — 1101F PR PT FALLS ASSESS DOC 0-1 FALLS W/OUT INJ PAST YR: ICD-10-PCS | Mod: HCNC,CPTII,S$GLB, | Performed by: FAMILY MEDICINE

## 2019-06-04 PROCEDURE — 99214 OFFICE O/P EST MOD 30 MIN: CPT | Mod: HCNC,S$GLB,, | Performed by: FAMILY MEDICINE

## 2019-06-04 PROCEDURE — 86703 HIV-1/HIV-2 1 RESULT ANTBDY: CPT | Mod: HCNC

## 2019-06-04 PROCEDURE — 84443 ASSAY THYROID STIM HORMONE: CPT | Mod: HCNC

## 2019-06-04 PROCEDURE — 3078F PR MOST RECENT DIASTOLIC BLOOD PRESSURE < 80 MM HG: ICD-10-PCS | Mod: HCNC,CPTII,S$GLB, | Performed by: FAMILY MEDICINE

## 2019-06-04 PROCEDURE — 99214 PR OFFICE/OUTPT VISIT, EST, LEVL IV, 30-39 MIN: ICD-10-PCS | Mod: HCNC,S$GLB,, | Performed by: FAMILY MEDICINE

## 2019-06-04 PROCEDURE — 36415 COLL VENOUS BLD VENIPUNCTURE: CPT | Mod: HCNC,PO

## 2019-06-04 PROCEDURE — 3075F PR MOST RECENT SYSTOLIC BLOOD PRESS GE 130-139MM HG: ICD-10-PCS | Mod: HCNC,CPTII,S$GLB, | Performed by: FAMILY MEDICINE

## 2019-06-04 PROCEDURE — 3078F DIAST BP <80 MM HG: CPT | Mod: HCNC,CPTII,S$GLB, | Performed by: FAMILY MEDICINE

## 2019-06-04 PROCEDURE — 80053 COMPREHEN METABOLIC PANEL: CPT | Mod: HCNC

## 2019-06-04 PROCEDURE — 3075F SYST BP GE 130 - 139MM HG: CPT | Mod: HCNC,CPTII,S$GLB, | Performed by: FAMILY MEDICINE

## 2019-06-04 PROCEDURE — 85025 COMPLETE CBC W/AUTO DIFF WBC: CPT | Mod: HCNC

## 2019-06-04 RX ORDER — CYCLOBENZAPRINE HCL 5 MG
5 TABLET ORAL 3 TIMES DAILY PRN
Qty: 30 TABLET | Refills: 0 | Status: SHIPPED | OUTPATIENT
Start: 2019-06-04 | End: 2019-06-14

## 2019-06-04 RX ORDER — CETIRIZINE HYDROCHLORIDE 10 MG/1
10 TABLET ORAL NIGHTLY
Qty: 90 TABLET | Refills: 0 | Status: SHIPPED | OUTPATIENT
Start: 2019-06-04 | End: 2023-08-03

## 2019-06-04 RX ORDER — HYDROCORTISONE 25 MG/G
CREAM TOPICAL 2 TIMES DAILY
Qty: 28 G | Refills: 0 | Status: SHIPPED | OUTPATIENT
Start: 2019-06-04 | End: 2020-08-05

## 2019-06-04 NOTE — PATIENT INSTRUCTIONS
Check labs  Start zyrtec QHS, can increase to BID if itching persists  Can use benadryl qhs PRN  Hydrocortisone 2.5% to affected area, not face  Follow up with dermatology.     Xerosis is common where home heating during the winter results in very low relative humidity. Interventions that may improve xerosis and related pruritus include:    ?Use of mild cleansers - Traditional soaps (eg, Ivory, Dial, Jennifer Spring, Zest) alkalinize the skin and can cause damage to the natural skin moisture barrier, thereby worsening xerosis and aggravating pruritus. Synthetic detergent (syndet) cleansers (eg, Dove, Olay, Cetaphil) or other mild cleansers are preferred. Syndet cleansers typically have a low pH that approximates the normal acidic pH of the skin. They tend to be less irritating than traditional soaps and may optimize skin barrier function. In addition, there is some evidence to suggest that serine proteases involved in the pathogenesis of pruritus are inhibited by low pH agents.     ?Routine use of skin moisturizers - Daily use of moisturizers, which contain substances that promote epidermal hydration (humectants such as glycerin, lactic acid, or topical urea) and/or substances that reduce water loss from the skin (occlusives such as petrolatum), is a crucial component of xerosis management. Moisturizers and/or occlusives should be applied immediately after bathing and gentle drying of the skin.  Thicker, greasier products tend to be more effective for maintaining skin moisture, but may be perceived as uncomfortable or unsightly by some patients. Greasier preparations may be better accepted by patients for use at bedtime.    ?Avoidance of excessive and aggressive skin washing - Excessive washing can worsen dry skin, particularly when hot water is used to bathe. Lukewarm or warm water is preferable for bathing, and patients should be instructed to avoid aggressive scrubbing of the skin.    ?Use of a humidifier -  Increasing the relative humidity of indoor air during the winter may be beneficial for patients who are prone to xerosis.

## 2019-06-04 NOTE — TELEPHONE ENCOUNTER
----- Message from Rossana Sarabia sent at 6/4/2019  3:07 PM CDT -----  Contact: pt  Please call above patient returning a call to the nurse thanks

## 2019-06-04 NOTE — TELEPHONE ENCOUNTER
----- Message from Marie Polanco sent at 6/4/2019  1:59 PM CDT -----  Contact: [phone] / self   Good afternoon,   The patient has a referral from Dr. Chao Monterroso, the diagnosis is Pruritus and xerosis of skin. Patient states she saw Dr. Gambino a few years back and really liked her and was hoping to get an appointment with her. Can someone contact the patient to schedule?    Thank you

## 2019-06-04 NOTE — PROGRESS NOTES
"Subjective:       Patient ID: Chelly Ortiz is a 68 y.o. female.    Chief Complaint: Recurrent Skin Infections    Chelly is a 68 y.o. female who presents today for an urgent care visit for "itching all over." She switched back to dove and this seemed to help a little. This started 4 days ago. No new pets at home. No rash. She has dry skin. She is itching all over. No new mattresses. No lesions.     Rash   This is a new problem. The current episode started in the past 7 days. The problem is unchanged. The rash is diffuse. The rash is characterized by itchiness. She was exposed to nothing. Pertinent negatives include no anorexia, congestion, cough, diarrhea, eye pain, facial edema, fatigue, fever, joint pain, nail changes, rhinorrhea, shortness of breath, sore throat or vomiting. Past treatments include nothing. The treatment provided no relief.     Review of Systems   Constitutional: Negative for fatigue and fever.   HENT: Negative for congestion, rhinorrhea and sore throat.    Eyes: Negative for pain.   Respiratory: Negative for cough and shortness of breath.    Gastrointestinal: Negative for anorexia, diarrhea and vomiting.   Musculoskeletal: Negative for joint pain.   Skin: Positive for rash. Negative for nail changes.               Objective:     Vitals:    06/04/19 1326   BP: 132/78   Pulse: 72   Temp: 98 °F (36.7 °C)        Physical Exam   Constitutional: She is oriented to person, place, and time. She appears well-developed and well-nourished.   HENT:   Head: Normocephalic and atraumatic.   Cardiovascular: Normal rate and regular rhythm.   Pulmonary/Chest: Effort normal and breath sounds normal.   Neurological: She is alert and oriented to person, place, and time.   Skin: No rash noted.   No visible rash noted on back, arms, legs, chest, under breasts, or on abdomen  Oropharynx was clear  No rash in between fingers  Excoriations noted from scratching on the arms.    Psychiatric: She has a normal mood and " affect. Her behavior is normal. Judgment and thought content normal.   Nursing note and vitals reviewed.      Assessment:       1. Pruritus    2. Xerosis of skin    3. Encounter for screening for HIV    4. Neck pain of over 3 months duration        Plan:       Check labs  Start zyrtec QHS, can increase to BID if itching persists  Can use benadryl qhs PRN  Hydrocortisone 2.5% to affected area, not face  Follow up with dermatology.   See AVS for full handout details    Flexeril refilled per patient request. Was taking this until January on this year. Further refills per PCP    Pruritus  -     Comprehensive metabolic panel; Future; Expected date: 06/04/2019  -     CBC auto differential; Future; Expected date: 06/04/2019  -     TSH; Future; Expected date: 06/04/2019  -     HIV 1/2 Ag/Ab (4th Gen); Future; Expected date: 06/04/2019  -     cetirizine (ZYRTEC) 10 MG tablet; Take 1 tablet (10 mg total) by mouth every evening.  Dispense: 90 tablet; Refill: 0  -     hydrocortisone 2.5 % cream; Apply topically 2 (two) times daily.  Dispense: 28 g; Refill: 0  -     Ambulatory Referral to Dermatology    Xerosis of skin  -     cetirizine (ZYRTEC) 10 MG tablet; Take 1 tablet (10 mg total) by mouth every evening.  Dispense: 90 tablet; Refill: 0  -     hydrocortisone 2.5 % cream; Apply topically 2 (two) times daily.  Dispense: 28 g; Refill: 0  -     Ambulatory Referral to Dermatology    Encounter for screening for HIV  -     HIV 1/2 Ag/Ab (4th Gen); Future; Expected date: 06/04/2019    Neck pain of over 3 months duration  -     cyclobenzaprine (FLEXERIL) 5 MG tablet; Take 1 tablet (5 mg total) by mouth 3 (three) times daily as needed for Muscle spasms.  Dispense: 30 tablet; Refill: 0        Warning signs discussed, patient to call with any further issues or worsening of symptoms.

## 2019-06-05 LAB — HIV 1+2 AB+HIV1 P24 AG SERPL QL IA: NEGATIVE

## 2019-06-07 ENCOUNTER — PATIENT MESSAGE (OUTPATIENT)
Dept: FAMILY MEDICINE | Facility: CLINIC | Age: 69
End: 2019-06-07

## 2019-06-11 ENCOUNTER — DOCUMENTATION ONLY (OUTPATIENT)
Dept: PHARMACY | Facility: CLINIC | Age: 69
End: 2019-06-11

## 2019-06-11 NOTE — PROGRESS NOTES
Received patient's POI and insurance documents, filled out  Levemir  application and faxed to Dr Wilson  for signiture

## 2019-06-20 ENCOUNTER — TELEPHONE (OUTPATIENT)
Dept: FAMILY MEDICINE | Facility: CLINIC | Age: 69
End: 2019-06-20

## 2019-06-20 DIAGNOSIS — E11.65 TYPE 2 DIABETES MELLITUS WITH HYPERGLYCEMIA, WITHOUT LONG-TERM CURRENT USE OF INSULIN: ICD-10-CM

## 2019-06-20 NOTE — TELEPHONE ENCOUNTER
Pt phoned states she is on levemir 15u in the pm, her sugars are running high in am, 190, 161, 186, 174, 182, should she be concerned, should she increase the insulin ?  Please advise

## 2019-06-20 NOTE — TELEPHONE ENCOUNTER
----- Message from Zelda Manley sent at 6/20/2019 12:05 PM CDT -----  Contact: self / [phone]  Patient is requesting a call back regarding, the below is not working well for her. Please advise           insulin detemir U-100 (LEVEMIR) 100 unit/mL injection

## 2019-06-24 ENCOUNTER — DOCUMENTATION ONLY (OUTPATIENT)
Dept: PHARMACY | Facility: CLINIC | Age: 69
End: 2019-06-24

## 2019-06-24 ENCOUNTER — PATIENT MESSAGE (OUTPATIENT)
Dept: FAMILY MEDICINE | Facility: CLINIC | Age: 69
End: 2019-06-24

## 2019-06-26 DIAGNOSIS — M79.10 MYALGIA: ICD-10-CM

## 2019-06-27 ENCOUNTER — OFFICE VISIT (OUTPATIENT)
Dept: FAMILY MEDICINE | Facility: CLINIC | Age: 69
End: 2019-06-27
Payer: MEDICARE

## 2019-06-27 VITALS
OXYGEN SATURATION: 96 % | HEART RATE: 90 BPM | BODY MASS INDEX: 34.38 KG/M2 | WEIGHT: 194 LBS | DIASTOLIC BLOOD PRESSURE: 60 MMHG | HEIGHT: 63 IN | SYSTOLIC BLOOD PRESSURE: 130 MMHG

## 2019-06-27 DIAGNOSIS — H91.93 BILATERAL HEARING LOSS, UNSPECIFIED HEARING LOSS TYPE: ICD-10-CM

## 2019-06-27 DIAGNOSIS — E11.65 TYPE 2 DIABETES MELLITUS WITH HYPERGLYCEMIA, WITHOUT LONG-TERM CURRENT USE OF INSULIN: Primary | ICD-10-CM

## 2019-06-27 PROCEDURE — 3075F PR MOST RECENT SYSTOLIC BLOOD PRESS GE 130-139MM HG: ICD-10-PCS | Mod: CPTII,S$GLB,, | Performed by: FAMILY MEDICINE

## 2019-06-27 PROCEDURE — 99999 PR PBB SHADOW E&M-EST. PATIENT-LVL IV: CPT | Mod: PBBFAC,,, | Performed by: FAMILY MEDICINE

## 2019-06-27 PROCEDURE — 1101F PT FALLS ASSESS-DOCD LE1/YR: CPT | Mod: CPTII,S$GLB,, | Performed by: FAMILY MEDICINE

## 2019-06-27 PROCEDURE — 3045F PR MOST RECENT HEMOGLOBIN A1C LEVEL 7.0-9.0%: ICD-10-PCS | Mod: CPTII,S$GLB,, | Performed by: FAMILY MEDICINE

## 2019-06-27 PROCEDURE — 3078F PR MOST RECENT DIASTOLIC BLOOD PRESSURE < 80 MM HG: ICD-10-PCS | Mod: CPTII,S$GLB,, | Performed by: FAMILY MEDICINE

## 2019-06-27 PROCEDURE — 99214 PR OFFICE/OUTPT VISIT, EST, LEVL IV, 30-39 MIN: ICD-10-PCS | Mod: S$GLB,,, | Performed by: FAMILY MEDICINE

## 2019-06-27 PROCEDURE — 3045F PR MOST RECENT HEMOGLOBIN A1C LEVEL 7.0-9.0%: CPT | Mod: CPTII,S$GLB,, | Performed by: FAMILY MEDICINE

## 2019-06-27 PROCEDURE — 99999 PR PBB SHADOW E&M-EST. PATIENT-LVL IV: ICD-10-PCS | Mod: PBBFAC,,, | Performed by: FAMILY MEDICINE

## 2019-06-27 PROCEDURE — 99214 OFFICE O/P EST MOD 30 MIN: CPT | Mod: S$GLB,,, | Performed by: FAMILY MEDICINE

## 2019-06-27 PROCEDURE — 3075F SYST BP GE 130 - 139MM HG: CPT | Mod: CPTII,S$GLB,, | Performed by: FAMILY MEDICINE

## 2019-06-27 PROCEDURE — 3078F DIAST BP <80 MM HG: CPT | Mod: CPTII,S$GLB,, | Performed by: FAMILY MEDICINE

## 2019-06-27 PROCEDURE — 1101F PR PT FALLS ASSESS DOC 0-1 FALLS W/OUT INJ PAST YR: ICD-10-PCS | Mod: CPTII,S$GLB,, | Performed by: FAMILY MEDICINE

## 2019-06-27 RX ORDER — GABAPENTIN 100 MG/1
CAPSULE ORAL
Qty: 90 CAPSULE | Refills: 0 | Status: SHIPPED | OUTPATIENT
Start: 2019-06-27 | End: 2021-07-18 | Stop reason: SDUPTHER

## 2019-06-27 NOTE — PROGRESS NOTES
Subjective:       Patient ID: Chelly Ortiz is a 68 y.o. female.    Chief Complaint: Medication Refill    68 years old female who came to the clinic for diabetes check.  Blood sugar are sometimes over 200s sometimes .  Patient was using Levemir only 20 units.  Patient did not want short-acting insulin right now.  No polyuria, polydipsia or polyphagia.  Patient with a BMI of 34 currently trying to lose weight.  Patient requests hearing evaluation.  She reports losing hearing for the last year.    Diabetes   Pertinent negatives for hypoglycemia include no confusion, dizziness, hunger, mood changes, pallor, seizures, speech difficulty or sweats. Pertinent negatives for diabetes include no polydipsia, no polyphagia and no polyuria. Pertinent negatives for hypoglycemia complications include no blackouts, no hospitalization, no required assistance and no required glucagon injection. Pertinent negatives for diabetic complications include no autonomic neuropathy, CVA, heart disease, nephropathy, peripheral neuropathy or retinopathy. Risk factors for coronary artery disease include dyslipidemia, family history, hypertension, obesity, stress and diabetes mellitus. Current diabetic treatment includes insulin injections. She is compliant with treatment all of the time. She is currently taking insulin at bedtime. Insulin injections are given by patient. Rotation sites for injection include the abdominal wall. Her weight is stable. She is following a generally healthy diet. Meal planning includes carbohydrate counting. She has had a previous visit with a dietitian. She participates in exercise three times a week. She monitors blood glucose at home 1-2 x per day. Blood glucose monitoring compliance is good. Her home blood glucose trend is fluctuating minimally. She sees a podiatrist.Eye exam is current.     Review of Systems   Constitutional: Negative.    HENT: Positive for hearing loss.    Eyes: Negative.    Respiratory:  Negative.    Cardiovascular: Negative.    Gastrointestinal: Negative.    Endocrine: Negative for polydipsia, polyphagia and polyuria.   Genitourinary: Negative.    Musculoskeletal: Negative.    Skin: Negative.  Negative for pallor.   Neurological: Negative.  Negative for dizziness, seizures and speech difficulty.   Psychiatric/Behavioral: Negative.  Negative for confusion.       Objective:      Physical Exam   Constitutional: She is oriented to person, place, and time. She appears well-developed and well-nourished. No distress.   HENT:   Head: Normocephalic and atraumatic.   Right Ear: External ear normal.   Left Ear: External ear normal.   Nose: Nose normal.   Mouth/Throat: Oropharynx is clear and moist. No oropharyngeal exudate.   Eyes: Pupils are equal, round, and reactive to light. Conjunctivae and EOM are normal. Right eye exhibits no discharge. Left eye exhibits no discharge. No scleral icterus.   Neck: Normal range of motion. Neck supple. No JVD present. No tracheal deviation present. No thyromegaly present.   Cardiovascular: Normal rate, regular rhythm, normal heart sounds and intact distal pulses. Exam reveals no gallop and no friction rub.   No murmur heard.  Pulmonary/Chest: Effort normal and breath sounds normal. No stridor. No respiratory distress. She has no wheezes. She has no rales. She exhibits no tenderness.   Abdominal: Soft. Bowel sounds are normal. She exhibits no distension and no mass. There is no tenderness. There is no rebound and no guarding.   Musculoskeletal: Normal range of motion. She exhibits no edema or tenderness.   Lymphadenopathy:     She has no cervical adenopathy.   Neurological: She is alert and oriented to person, place, and time. She has normal reflexes. No cranial nerve deficit. She exhibits normal muscle tone. Coordination normal.   Skin: Skin is warm and dry. No rash noted. She is not diaphoretic. No erythema. No pallor.   Psychiatric: She has a normal mood and affect. Her  behavior is normal. Judgment and thought content normal.       Assessment:       1. Type 2 diabetes mellitus with hyperglycemia, without long-term current use of insulin    2. Bilateral hearing loss, unspecified hearing loss type        Plan:         Chelly was seen today for medication refill.    Diagnoses and all orders for this visit:    Type 2 diabetes mellitus with hyperglycemia, without long-term current use of insulin  -     insulin detemir U-100 (LEVEMIR FLEXTOUCH U-100 INSULN) 100 unit/mL (3 mL) SubQ InPn pen; Inject 30 Units into the skin every evening.    Bilateral hearing loss, unspecified hearing loss type  -     Ambulatory referral to ENT    Continue monitoring blood sugar at home,ADA diet.  Follow-up in 8 weeks.

## 2019-06-28 ENCOUNTER — TELEPHONE (OUTPATIENT)
Dept: ADMINISTRATIVE | Facility: OTHER | Age: 69
End: 2019-06-28

## 2019-07-01 ENCOUNTER — PATIENT MESSAGE (OUTPATIENT)
Dept: FAMILY MEDICINE | Facility: CLINIC | Age: 69
End: 2019-07-01

## 2019-07-03 ENCOUNTER — TELEPHONE (OUTPATIENT)
Dept: PHARMACY | Facility: CLINIC | Age: 69
End: 2019-07-03

## 2019-07-03 NOTE — TELEPHONE ENCOUNTER
Per [employer] Patient Assistance [employer]: Patient must provide a denial letter from low income subsidy to appeal decesion

## 2019-07-09 ENCOUNTER — PATIENT OUTREACH (OUTPATIENT)
Dept: ADMINISTRATIVE | Facility: OTHER | Age: 69
End: 2019-07-09

## 2019-07-09 DIAGNOSIS — E11.65 UNCONTROLLED TYPE 2 DIABETES MELLITUS WITH HYPERGLYCEMIA, WITHOUT LONG-TERM CURRENT USE OF INSULIN: Primary | ICD-10-CM

## 2019-07-11 ENCOUNTER — OFFICE VISIT (OUTPATIENT)
Dept: OBSTETRICS AND GYNECOLOGY | Facility: CLINIC | Age: 69
End: 2019-07-11
Payer: MEDICARE

## 2019-07-11 VITALS
HEIGHT: 63 IN | WEIGHT: 193.25 LBS | SYSTOLIC BLOOD PRESSURE: 130 MMHG | DIASTOLIC BLOOD PRESSURE: 82 MMHG | BODY MASS INDEX: 34.24 KG/M2

## 2019-07-11 DIAGNOSIS — R82.90 ABNORMAL URINE ODOR: Primary | ICD-10-CM

## 2019-07-11 DIAGNOSIS — N89.8 VAGINAL ODOR: ICD-10-CM

## 2019-07-11 PROCEDURE — 87510 GARDNER VAG DNA DIR PROBE: CPT

## 2019-07-11 PROCEDURE — 87480 CANDIDA DNA DIR PROBE: CPT

## 2019-07-11 PROCEDURE — 99999 PR PBB SHADOW E&M-EST. PATIENT-LVL III: CPT | Mod: PBBFAC,,, | Performed by: OBSTETRICS & GYNECOLOGY

## 2019-07-11 PROCEDURE — 99213 OFFICE O/P EST LOW 20 MIN: CPT | Mod: S$GLB,,, | Performed by: OBSTETRICS & GYNECOLOGY

## 2019-07-11 PROCEDURE — 99213 PR OFFICE/OUTPT VISIT, EST, LEVL III, 20-29 MIN: ICD-10-PCS | Mod: S$GLB,,, | Performed by: OBSTETRICS & GYNECOLOGY

## 2019-07-11 PROCEDURE — 3079F PR MOST RECENT DIASTOLIC BLOOD PRESSURE 80-89 MM HG: ICD-10-PCS | Mod: CPTII,S$GLB,, | Performed by: OBSTETRICS & GYNECOLOGY

## 2019-07-11 PROCEDURE — 1101F PR PT FALLS ASSESS DOC 0-1 FALLS W/OUT INJ PAST YR: ICD-10-PCS | Mod: CPTII,S$GLB,, | Performed by: OBSTETRICS & GYNECOLOGY

## 2019-07-11 PROCEDURE — 3079F DIAST BP 80-89 MM HG: CPT | Mod: CPTII,S$GLB,, | Performed by: OBSTETRICS & GYNECOLOGY

## 2019-07-11 PROCEDURE — 87077 CULTURE AEROBIC IDENTIFY: CPT

## 2019-07-11 PROCEDURE — 3075F SYST BP GE 130 - 139MM HG: CPT | Mod: CPTII,S$GLB,, | Performed by: OBSTETRICS & GYNECOLOGY

## 2019-07-11 PROCEDURE — 3075F PR MOST RECENT SYSTOLIC BLOOD PRESS GE 130-139MM HG: ICD-10-PCS | Mod: CPTII,S$GLB,, | Performed by: OBSTETRICS & GYNECOLOGY

## 2019-07-11 PROCEDURE — 87186 SC STD MICRODIL/AGAR DIL: CPT

## 2019-07-11 PROCEDURE — 99999 PR PBB SHADOW E&M-EST. PATIENT-LVL III: ICD-10-PCS | Mod: PBBFAC,,, | Performed by: OBSTETRICS & GYNECOLOGY

## 2019-07-11 PROCEDURE — 87086 URINE CULTURE/COLONY COUNT: CPT

## 2019-07-11 PROCEDURE — 87088 URINE BACTERIA CULTURE: CPT

## 2019-07-11 PROCEDURE — 1101F PT FALLS ASSESS-DOCD LE1/YR: CPT | Mod: CPTII,S$GLB,, | Performed by: OBSTETRICS & GYNECOLOGY

## 2019-07-11 NOTE — PROGRESS NOTES
"OBSTETRIC HISTORY:   OB History        2    Para   2    Term   1       1    AB        Living           SAB        TAB        Ectopic        Multiple        Live Births                    COMPREHENSIVE GYN HISTORY:  PAP History: Denies abnormal Paps.  Infection History: Denies STDs. Denies PID.  Benign History: Denies uterine fibroids. Denies ovarian cysts. Denies endometriosis.   Cancer History: Denies cervical cancer. Denies uterine cancer or hyperplasia. Denies ovarian cancer. Denies vulvar cancer or pre-cancer. Denies vaginal cancer or pre-cancer. Denies breast cancer. Denies colon cancer.  Sexual Activity History:   has no sexual activity history on file.   Last GYN exam with Medicare 2/18/15        HPI:   68 y.o.  No LMP recorded (lmp unknown). Patient has had a hysterectomy.   Patient is  here complaining of abnormal vaginal odor during urination that is musky. No discharge and no itching. Thinks clitoris doesn't feel the same since surgery to excise EIC on vulva. She denies bladder, breast and bowel complaints.    ROS:  GENERAL: Denies weight gain or weight loss. Feeling well overall.   SKIN: Denies rash or lesions.   HEAD: Denies headache.   NODES: Denies enlarged lymph nodes.   CHEST: Denies shortness of breath.   ABDOMEN: No abdominal pain, constipation, diarrhea, nausea, vomiting or rectal bleeding.   URINARY: No frequency, dysuria, hematuria, or burning on urination.  REPRODUCTIVE: See HPI.   BREASTS: The patient denies pain, lumps, or nipple discharge.   HEMATOLOGIC: No easy bruisability.   MUSCULOSKELETAL: Denies joint pain or back pain.   NEUROLOGIC: Denies weakness.   PSYCHIATRIC: Denies depression, anxiety or mood swings.    PE:   /82   Ht 5' 3" (1.6 m)   Wt 87.6 kg (193 lb 3.7 oz)   LMP  (LMP Unknown)   BMI 34.23 kg/m²   APPEARANCE: Well nourished, well developed, in no acute distress.  ABDOMEN: Soft. No tenderness or masses. No hernias.  PELVIC:   VULVA: No lesions. " "Clitoral ceja appears normal (it does not appear to be "snipped" as patient states) Normal female genitalia.  URETHRAL MEATUS: Normal size and location, no lesions, no prolapse.  URETHRA: No masses, tenderness, prolapse or scarring.  VAGINA: Moist and well rugated, no discharge, no significant cystocele or rectocele.  CERVIX: and UTERUS: surgically absent.  ADNEXA: No masses or tenderness.    ASSESSMENT:  Abnormal vaginal odor  Abnormal urine odor    PLAN:  RTO prn      "

## 2019-07-12 ENCOUNTER — PATIENT MESSAGE (OUTPATIENT)
Dept: FAMILY MEDICINE | Facility: CLINIC | Age: 69
End: 2019-07-12

## 2019-07-12 DIAGNOSIS — Z79.4 TYPE 2 DIABETES MELLITUS WITH HYPERGLYCEMIA, WITH LONG-TERM CURRENT USE OF INSULIN: Primary | ICD-10-CM

## 2019-07-12 DIAGNOSIS — E11.65 TYPE 2 DIABETES MELLITUS WITH HYPERGLYCEMIA, WITH LONG-TERM CURRENT USE OF INSULIN: Primary | ICD-10-CM

## 2019-07-12 RX ORDER — INSULIN GLARGINE 100 [IU]/ML
30 INJECTION, SOLUTION SUBCUTANEOUS NIGHTLY
Qty: 27 ML | Refills: 3 | Status: SHIPPED | OUTPATIENT
Start: 2019-07-12 | End: 2019-07-12 | Stop reason: SDUPTHER

## 2019-07-12 RX ORDER — INSULIN GLARGINE 100 [IU]/ML
30 INJECTION, SOLUTION SUBCUTANEOUS NIGHTLY
Qty: 27 ML | Refills: 3 | Status: SHIPPED | OUTPATIENT
Start: 2019-07-12 | End: 2019-08-27

## 2019-07-12 NOTE — TELEPHONE ENCOUNTER
Patient reports a reaction with Levemir.    Levemir was changed for Lantus.    Please notify the patient.

## 2019-07-13 ENCOUNTER — PATIENT MESSAGE (OUTPATIENT)
Dept: OBSTETRICS AND GYNECOLOGY | Facility: CLINIC | Age: 69
End: 2019-07-13

## 2019-07-13 LAB
BACTERIAL VAGINOSIS DNA: NEGATIVE
CANDIDA GLABRATA DNA: NEGATIVE
CANDIDA KRUSEI DNA: NEGATIVE
CANDIDA RRNA VAG QL PROBE: NEGATIVE
T VAGINALIS RRNA GENITAL QL PROBE: NEGATIVE

## 2019-07-14 ENCOUNTER — PATIENT MESSAGE (OUTPATIENT)
Dept: OBSTETRICS AND GYNECOLOGY | Facility: CLINIC | Age: 69
End: 2019-07-14

## 2019-07-14 LAB — BACTERIA UR CULT: ABNORMAL

## 2019-07-14 RX ORDER — NITROFURANTOIN 25; 75 MG/1; MG/1
100 CAPSULE ORAL 2 TIMES DAILY
Qty: 14 CAPSULE | Refills: 0 | Status: SHIPPED | OUTPATIENT
Start: 2019-07-14 | End: 2019-07-21

## 2019-07-15 ENCOUNTER — PATIENT MESSAGE (OUTPATIENT)
Dept: FAMILY MEDICINE | Facility: CLINIC | Age: 69
End: 2019-07-15

## 2019-07-29 ENCOUNTER — PATIENT MESSAGE (OUTPATIENT)
Dept: FAMILY MEDICINE | Facility: CLINIC | Age: 69
End: 2019-07-29

## 2019-08-01 DIAGNOSIS — F32.A DEPRESSION, UNSPECIFIED DEPRESSION TYPE: ICD-10-CM

## 2019-08-01 RX ORDER — CITALOPRAM 10 MG/1
10 TABLET ORAL DAILY
Qty: 90 TABLET | Refills: 1 | Status: SHIPPED | OUTPATIENT
Start: 2019-08-01 | End: 2020-02-19

## 2019-08-07 ENCOUNTER — PATIENT MESSAGE (OUTPATIENT)
Dept: FAMILY MEDICINE | Facility: CLINIC | Age: 69
End: 2019-08-07

## 2019-08-11 ENCOUNTER — PATIENT OUTREACH (OUTPATIENT)
Dept: ADMINISTRATIVE | Facility: OTHER | Age: 69
End: 2019-08-11

## 2019-08-13 ENCOUNTER — CLINICAL SUPPORT (OUTPATIENT)
Dept: OTOLARYNGOLOGY | Facility: CLINIC | Age: 69
End: 2019-08-13
Payer: MEDICARE

## 2019-08-13 ENCOUNTER — OFFICE VISIT (OUTPATIENT)
Dept: OTOLARYNGOLOGY | Facility: CLINIC | Age: 69
End: 2019-08-13
Payer: MEDICARE

## 2019-08-13 VITALS
WEIGHT: 193.13 LBS | TEMPERATURE: 99 F | HEIGHT: 63 IN | SYSTOLIC BLOOD PRESSURE: 128 MMHG | BODY MASS INDEX: 34.22 KG/M2 | DIASTOLIC BLOOD PRESSURE: 78 MMHG | HEART RATE: 82 BPM

## 2019-08-13 DIAGNOSIS — H90.3 SENSORINEURAL HEARING LOSS (SNHL) OF BOTH EARS: Primary | ICD-10-CM

## 2019-08-13 DIAGNOSIS — H90.3 SENSORINEURAL HEARING LOSS (SNHL), BILATERAL: Primary | ICD-10-CM

## 2019-08-13 PROCEDURE — 99999 PR PBB SHADOW E&M-EST. PATIENT-LVL I: CPT | Mod: PBBFAC,HCNC,, | Performed by: AUDIOLOGIST-HEARING AID FITTER

## 2019-08-13 PROCEDURE — 92567 PR TYMPA2METRY: ICD-10-PCS | Mod: HCNC,S$GLB,, | Performed by: AUDIOLOGIST-HEARING AID FITTER

## 2019-08-13 PROCEDURE — 1101F PR PT FALLS ASSESS DOC 0-1 FALLS W/OUT INJ PAST YR: ICD-10-PCS | Mod: HCNC,CPTII,S$GLB, | Performed by: OTOLARYNGOLOGY

## 2019-08-13 PROCEDURE — 3074F PR MOST RECENT SYSTOLIC BLOOD PRESSURE < 130 MM HG: ICD-10-PCS | Mod: HCNC,CPTII,S$GLB, | Performed by: OTOLARYNGOLOGY

## 2019-08-13 PROCEDURE — 92557 COMPREHENSIVE HEARING TEST: CPT | Mod: HCNC,S$GLB,, | Performed by: AUDIOLOGIST-HEARING AID FITTER

## 2019-08-13 PROCEDURE — 99999 PR PBB SHADOW E&M-EST. PATIENT-LVL I: ICD-10-PCS | Mod: PBBFAC,HCNC,, | Performed by: AUDIOLOGIST-HEARING AID FITTER

## 2019-08-13 PROCEDURE — 99214 PR OFFICE/OUTPT VISIT, EST, LEVL IV, 30-39 MIN: ICD-10-PCS | Mod: HCNC,S$GLB,, | Performed by: OTOLARYNGOLOGY

## 2019-08-13 PROCEDURE — 99999 PR PBB SHADOW E&M-EST. PATIENT-LVL V: ICD-10-PCS | Mod: PBBFAC,HCNC,, | Performed by: OTOLARYNGOLOGY

## 2019-08-13 PROCEDURE — 3078F DIAST BP <80 MM HG: CPT | Mod: HCNC,CPTII,S$GLB, | Performed by: OTOLARYNGOLOGY

## 2019-08-13 PROCEDURE — 99214 OFFICE O/P EST MOD 30 MIN: CPT | Mod: HCNC,S$GLB,, | Performed by: OTOLARYNGOLOGY

## 2019-08-13 PROCEDURE — 3078F PR MOST RECENT DIASTOLIC BLOOD PRESSURE < 80 MM HG: ICD-10-PCS | Mod: HCNC,CPTII,S$GLB, | Performed by: OTOLARYNGOLOGY

## 2019-08-13 PROCEDURE — 92557 PR COMPREHENSIVE HEARING TEST: ICD-10-PCS | Mod: HCNC,S$GLB,, | Performed by: AUDIOLOGIST-HEARING AID FITTER

## 2019-08-13 PROCEDURE — 99999 PR PBB SHADOW E&M-EST. PATIENT-LVL V: CPT | Mod: PBBFAC,HCNC,, | Performed by: OTOLARYNGOLOGY

## 2019-08-13 PROCEDURE — 3074F SYST BP LT 130 MM HG: CPT | Mod: HCNC,CPTII,S$GLB, | Performed by: OTOLARYNGOLOGY

## 2019-08-13 PROCEDURE — 92567 TYMPANOMETRY: CPT | Mod: HCNC,S$GLB,, | Performed by: AUDIOLOGIST-HEARING AID FITTER

## 2019-08-13 PROCEDURE — 1101F PT FALLS ASSESS-DOCD LE1/YR: CPT | Mod: HCNC,CPTII,S$GLB, | Performed by: OTOLARYNGOLOGY

## 2019-08-13 NOTE — PROGRESS NOTES
Chief Complaint   Patient presents with    Hearing Loss     Referred By .   .     HPI:  Chelly Ortiz is a very pleasant 68 y.o. female here to see me today for the first time for evaluation of hearing loss.  She reports hearing loss that has been gradually progressing over the last 5 years.  She has noted any difference in hearing between the ears, with the right ear being the worse hearing ear.  She has not noted any tinnitus in either ear.  She has not had any recent issues with ear pain or ear drainage.  She denies a family history of hearing loss, and has not had any previous otologic surgery.  She denies any history of significant loud noise exposure.She denies issues with dizziness.      Past Medical History:   Diagnosis Date    Allergy     Cataract     DDD (degenerative disc disease), lumbar     Diabetes mellitus     diet controlled    Diabetes mellitus, type 2     GERD (gastroesophageal reflux disease)     History of subconjunctival hemorrhage 11    left eye    Hyperlipidemia     Hypertension     IBS (irritable bowel syndrome)     Obesity     MYRIAM (obstructive sleep apnea)     on CPAP    Rotator cuff impingement syndrome     Seasonal allergic conjunctivitis      Social History     Socioeconomic History    Marital status:      Spouse name: Not on file    Number of children: Not on file    Years of education: Not on file    Highest education level: Not on file   Occupational History    Not on file   Social Needs    Financial resource strain: Not on file    Food insecurity:     Worry: Not on file     Inability: Not on file    Transportation needs:     Medical: No     Non-medical: No   Tobacco Use    Smoking status: Former Smoker     Last attempt to quit: 2/15/1983     Years since quittin.5    Smokeless tobacco: Never Used   Substance and Sexual Activity    Alcohol use: No     Frequency: Never     Binge frequency: Never    Drug use: No    Sexual activity:  Yes     Partners: Male   Lifestyle    Physical activity:     Days per week: 3 days     Minutes per session: 30 min    Stress: To some extent   Relationships    Social connections:     Talks on phone: Three times a week     Gets together: Not on file     Attends [employer] service: Not on file     Active member of club or organization: No     Attends meetings of clubs or organizations: Not on file     Relationship status:    Other Topics Concern    Are you pregnant or think you may be? Not Asked    Breast-feeding Not Asked   Social History Narrative    Not on file     Past Surgical History:   Procedure Laterality Date    CATARACT EXTRACTION W/  INTRAOCULAR LENS IMPLANT  10/3/13    OD (dr. gardner)     SECTION      x2    CHOLECYSTECTOMY      COLONOSCOPY N/A 2018    [facility]    COLONOSCOPY N/A 2013    [facility]    EXCISION, LESION VULVA N/A 2019    [facility] OR    EYE SURGERY      HYSTERECTOMY      ovaries intact, due to DUB    INSERTION, IOL PROSTHESIS Right 10/3/2013    [facility]    PHACOEMULSIFICATION, CATARACT Right 10/3/2013    [facility]    TUBAL LIGATION      ULTRASOUND, UPPER GI TRACT, ENDOSCOPIC N/A 10/9/2018    [facility] Simpson [facility]     Family History   Problem Relation Age of Onset    Alzheimer's disease Mother     Heart disease Father     Diabetes Sister     Deep vein thrombosis Brother     Diabetes Daughter     Cancer Brother         Lung    Lung cancer Brother     Amblyopia Neg Hx     Blindness Neg Hx     Cataracts Neg Hx     Glaucoma Neg Hx     Hypertension Neg Hx     Macular degeneration Neg Hx     Retinal detachment Neg Hx     Strabismus Neg Hx     Stroke Neg Hx     Thyroid disease Neg Hx          Review of Systems  General:  negative for chills, fever or weight loss  Psychological: negative for mood changes or depression  Ophthalmic: negative for blurry vision, photophobia or eye pain  ENT: see HPI  Respiratory: no cough, shortness of breath, or wheezing  Cardiovascular: no chest pain or dyspnea on exertion  Gastrointestinal: no abdominal pain, change in bowel habits, or black/ bloody stools  Musculoskeletal: negative for gait disturbance or muscular weakness  Neurological: no syncope or seizures; no ataxia  Dermatological: negative for puritis,  rash and jaundice  Hematologic/lymphatic: no easy bruising, no new lumps or bumps      Physical Exam:    Vitals:    08/13/19 1343   BP: 128/78   Pulse: 82   Temp: 98.6 °F (37 °C)       Constitutional: Well appearing / communicating without difficutly.  NAD.  Eyes: EOM I Bilaterally  Head/Face: Normocephalic.  Negative paranasal sinus pressure/tenderness.  Salivary glands WNL.  House Brackmann I Bilaterally.    Right Ear: Auricle normal appearance. External Auditory Canal within normal limits no lesions or masses,TM w/o masses/lesions/perforations. TM mobility noted.   Left Ear: Auricle normal appearance. External Auditory Canal within normal limits no lesions or masses,TM w/o masses/lesions/perforations. TM mobility noted.  Rinne Air conduction >bone conduction bilaterally, [place] midline.   Nose: No gross nasal septal deviation. Inferior Turbinates 3+ bilaterally. No septal perforation. No masses/lesions. External nasal skin appears normal without masses/lesions.  Oral Cavity: Gingiva/lips within normal limits.  Dentition/gingiva healthy appearing. Mucus membranes moist. Floor of mouth soft, no masses palpated. Oral Tongue mobile. Hard Palate appears normal.    Oropharynx: Base of tongue appears normal. No masses/lesions noted. Tonsillar fossa/pharyngeal wall without lesions. Posterior oropharynx WNL.  Soft palate without masses. Midline uvula.   Neck/Lymphatic: No LAD I-VI bilaterally.  No  thyromegaly.  No masses noted on exam.    Mirror laryngoscopy/nasopharyngoscopy: Active gag reflex.  Unable to perform.    Neuro/Psychiatric: AOx3.  Normal mood and affect.   Cardiovascular: Normal carotid pulses bilaterally, no increasing jugular venous distention noted at cervical region bilaterally.    Respiratory: Normal respiratory effort, no stridor, no retractions noted.      Audiogram reviewed personally by myself and in detail with the patient today.           Assessment:    ICD-10-CM ICD-9-CM    1. Sensorineural hearing loss (SNHL), bilateral H90.3 389.18      The encounter diagnosis was Sensorineural hearing loss (SNHL), bilateral.      Plan:  No orders of the defined types were placed in this encounter.     We reviewed the patient's recent audiogram and hearing loss in detail.  We also discussed that she is a good candidate for hearing aid in the right ear, if and when she  is motivated.  She was given handouts with information and pricing of hearing aids, and will contact audiology when ready to proceed.  We also discussed the use hearing protection when exposed to loud noise, including lawn equipment.     Thank you kindly for allowing me to participate in the patient's care.       Suzette Romo MD

## 2019-08-13 NOTE — LETTER
August 13, 2019      Gabriel Wilson MD  [address] [facility]  [place] [address]           [place] [facility] O[facility]hinolaryngology  [address]  [place] [address]  Phone: [phone]  Fax: [phone]          Patient: Chelly Ortiz   MR Number: [id number]   YOB: 1950   Date of Visit: 8/13/2019       Dear Dr. Gabriel Wilson:    Thank you for referring Chelly Ortiz to me for evaluation. Attached you will find relevant portions of my assessment and plan of care.    If you have questions, please do not hesitate to call me. I look forward to following Chelly Ortiz along with you.    Sincerely,    Suzette Romo MD    Enclosure  CC:  No Recipients    If you would like to receive this communication electronically, please contact [email or web address] or [phone] to request more information on [facility] Link access.    For providers and/or their staff who would like to refer a patient to [facility], please contact us through our one-stop-shop provider referral line, [facility], at [phone].    If you feel you have received this communication in error or would no longer like to receive these types of communications, please e-mail [email or web address]

## 2019-08-14 NOTE — PROGRESS NOTES
Karly Mendoza, CCC-A  [facility] [address]  [address]  [place] [address]      Patient: Chelly Ortiz   MRN: [id number]  : 1950  CARLSON: 2019       AUDIOLOGICAL EVALUATION    CASE HISTORY:    Chelly Ortiz, 68 y.o., was seen on the above date for an audiological evaluation. Her last audiogram in  revealed borderline normal to mild hearing loss in her left ear and a mild to moderate mixed hearing loss in her right ear.    TEST RESULTS:    Otoscopy was unremarkable for both ears.   Imittance testing revealed normal middle ear compliance (Type A) in both ears.  Speech reception thresholds were obtained at 35 dB HL and 25 dB HL, in the right and left ears, respectively, which was consistent with pure tone results.   Word recognitions scores of 100% were obtained in both ears using a presentation level of 75 dB HL in the right ear and 65 dB HL in the left ear.    IMPRESSION:   Audiological testing indicated that Chelly Ortiz has a mild to moderate sensorineural hearing loss in her right ear and a mild sensorineural hearing loss in her left ear.     RECOMMENDATIONS:   It is recommended that she:  Continue to receive audiological monitoring annually.  Consider amplification in her right ear.   Follow up medically with a physician to assess asymmetrical hearing loss.    If you should have any questions or concerns regarding the above information, please do not hesitate to contact me at [phone].      _______________________________  Sammie Mendoza, CCC-A  Clinical Audiologist    enclosure: audiogram

## 2019-08-23 ENCOUNTER — OFFICE VISIT (OUTPATIENT)
Dept: URGENT CARE | Facility: CLINIC | Age: 69
End: 2019-08-23
Payer: MEDICARE

## 2019-08-23 VITALS
HEART RATE: 77 BPM | BODY MASS INDEX: 32.61 KG/M2 | RESPIRATION RATE: 18 BRPM | TEMPERATURE: 98 F | SYSTOLIC BLOOD PRESSURE: 123 MMHG | WEIGHT: 191 LBS | HEIGHT: 64 IN | DIASTOLIC BLOOD PRESSURE: 70 MMHG | OXYGEN SATURATION: 96 %

## 2019-08-23 DIAGNOSIS — R07.89 CHEST PAIN, ATYPICAL: Primary | ICD-10-CM

## 2019-08-23 PROCEDURE — 93005 EKG 12-LEAD: ICD-10-PCS | Mod: S$GLB,,, | Performed by: FAMILY MEDICINE

## 2019-08-23 PROCEDURE — 93010 EKG 12-LEAD: ICD-10-PCS | Mod: S$GLB,,, | Performed by: INTERNAL MEDICINE

## 2019-08-23 PROCEDURE — 93005 ELECTROCARDIOGRAM TRACING: CPT | Mod: S$GLB,,, | Performed by: FAMILY MEDICINE

## 2019-08-23 PROCEDURE — 3074F PR MOST RECENT SYSTOLIC BLOOD PRESSURE < 130 MM HG: ICD-10-PCS | Mod: CPTII,S$GLB,, | Performed by: FAMILY MEDICINE

## 2019-08-23 PROCEDURE — 3074F SYST BP LT 130 MM HG: CPT | Mod: CPTII,S$GLB,, | Performed by: FAMILY MEDICINE

## 2019-08-23 PROCEDURE — 1101F PR PT FALLS ASSESS DOC 0-1 FALLS W/OUT INJ PAST YR: ICD-10-PCS | Mod: CPTII,S$GLB,, | Performed by: FAMILY MEDICINE

## 2019-08-23 PROCEDURE — 3078F PR MOST RECENT DIASTOLIC BLOOD PRESSURE < 80 MM HG: ICD-10-PCS | Mod: CPTII,S$GLB,, | Performed by: FAMILY MEDICINE

## 2019-08-23 PROCEDURE — 99214 OFFICE O/P EST MOD 30 MIN: CPT | Mod: S$GLB,,, | Performed by: FAMILY MEDICINE

## 2019-08-23 PROCEDURE — 93010 ELECTROCARDIOGRAM REPORT: CPT | Mod: S$GLB,,, | Performed by: INTERNAL MEDICINE

## 2019-08-23 PROCEDURE — 3078F DIAST BP <80 MM HG: CPT | Mod: CPTII,S$GLB,, | Performed by: FAMILY MEDICINE

## 2019-08-23 PROCEDURE — 1101F PT FALLS ASSESS-DOCD LE1/YR: CPT | Mod: CPTII,S$GLB,, | Performed by: FAMILY MEDICINE

## 2019-08-23 PROCEDURE — 99214 PR OFFICE/OUTPT VISIT, EST, LEVL IV, 30-39 MIN: ICD-10-PCS | Mod: S$GLB,,, | Performed by: FAMILY MEDICINE

## 2019-08-23 NOTE — PROGRESS NOTES
"Subjective:       Patient ID: Chelly Ortiz is a 68 y.o. female.    Vitals:  height is 5' 3.5" (1.613 m) and weight is 86.6 kg (191 lb). Her oral temperature is 98.4 °F (36.9 °C). Her blood pressure is 123/70 and her pulse is 77. Her respiration is 18 and oxygen saturation is 96%.     Chief Complaint: Chest Pain    This is a 68 y.o. female who presents today with a chief complaint of   Chest discomfort. More on the left side of chest. Pt states she has been stressed a lot lately. The discomfort is not constant it just comes and goes. Reports conflict with a medical provider at another clinic and believes the situation is causing her stress and chest pain    Chest Pain    This is a new problem. The current episode started 1 to 4 weeks ago. The onset quality is undetermined. The problem occurs intermittently. The problem has been gradually worsening. The pain is present in the substernal region. The pain is at a severity of 4/10. The pain is mild. The quality of the pain is described as tightness. The pain does not radiate. Pertinent negatives include no abdominal pain, back pain, fever, nausea, palpitations, shortness of breath, syncope or vomiting. The pain is aggravated by emotional upset (stress). She has tried antacids for the symptoms. The treatment provided no relief.       Constitution: Negative for chills, fatigue, fever and international travel in last 60 days.   HENT: Negative for trouble swallowing.    Neck: Negative for neck pain.   Cardiovascular: Positive for chest pain. Negative for chest trauma, leg swelling, palpitations and passing out.   Respiratory: Negative for sleep apnea and shortness of breath.    Gastrointestinal: Negative for abdominal pain, nausea, vomiting and heartburn.   Genitourinary: Negative for history of kidney stones.   Musculoskeletal: Negative for back pain.   Skin: Negative for rash.   Neurological: Negative for light-headedness and passing out.   Hematologic/Lymphatic: " Negative for history of blood clots.   Psychiatric/Behavioral: Negative for nervous/anxious. The patient is not nervous/anxious.        Objective:      Physical Exam   Constitutional: She appears well-developed and well-nourished.   HENT:   Head: Normocephalic and atraumatic.   Eyes: Pupils are equal, round, and reactive to light. EOM are normal.   Neck: Normal range of motion. Neck supple.   Cardiovascular: Normal rate, regular rhythm and normal heart sounds.   Pulmonary/Chest: Effort normal and breath sounds normal.   Abdominal: Soft. Bowel sounds are normal.   Vitals reviewed.      Assessment:       1. Chest pain, atypical      no evidence of cardiac event - EKG unchanged from previous and symptoms are atypical  Plan:         Chest pain, atypical    Discussed worrisome symptoms and stress management and conflict resolution issues. RTC as needed. To followup with PCP

## 2019-08-23 NOTE — PATIENT INSTRUCTIONS

## 2019-08-27 ENCOUNTER — OFFICE VISIT (OUTPATIENT)
Dept: FAMILY MEDICINE | Facility: CLINIC | Age: 69
End: 2019-08-27
Payer: MEDICARE

## 2019-08-27 VITALS
HEIGHT: 64 IN | HEART RATE: 82 BPM | WEIGHT: 193.81 LBS | SYSTOLIC BLOOD PRESSURE: 110 MMHG | BODY MASS INDEX: 33.09 KG/M2 | DIASTOLIC BLOOD PRESSURE: 70 MMHG | OXYGEN SATURATION: 95 %

## 2019-08-27 DIAGNOSIS — L29.9 PRURITUS: ICD-10-CM

## 2019-08-27 DIAGNOSIS — I10 ESSENTIAL HYPERTENSION: ICD-10-CM

## 2019-08-27 DIAGNOSIS — E11.65 TYPE 2 DIABETES MELLITUS WITH HYPERGLYCEMIA, WITHOUT LONG-TERM CURRENT USE OF INSULIN: Primary | ICD-10-CM

## 2019-08-27 DIAGNOSIS — E78.5 DYSLIPIDEMIA: ICD-10-CM

## 2019-08-27 DIAGNOSIS — R07.89 ATYPICAL CHEST PAIN: ICD-10-CM

## 2019-08-27 DIAGNOSIS — N18.30 CKD (CHRONIC KIDNEY DISEASE) STAGE 3, GFR 30-59 ML/MIN: ICD-10-CM

## 2019-08-27 PROCEDURE — 3045F PR MOST RECENT HEMOGLOBIN A1C LEVEL 7.0-9.0%: ICD-10-PCS | Mod: HCNC,CPTII,S$GLB, | Performed by: FAMILY MEDICINE

## 2019-08-27 PROCEDURE — 99999 PR PBB SHADOW E&M-EST. PATIENT-LVL IV: CPT | Mod: PBBFAC,HCNC,, | Performed by: FAMILY MEDICINE

## 2019-08-27 PROCEDURE — 99214 PR OFFICE/OUTPT VISIT, EST, LEVL IV, 30-39 MIN: ICD-10-PCS | Mod: HCNC,S$GLB,, | Performed by: FAMILY MEDICINE

## 2019-08-27 PROCEDURE — 3074F SYST BP LT 130 MM HG: CPT | Mod: HCNC,CPTII,S$GLB, | Performed by: FAMILY MEDICINE

## 2019-08-27 PROCEDURE — 3078F PR MOST RECENT DIASTOLIC BLOOD PRESSURE < 80 MM HG: ICD-10-PCS | Mod: HCNC,CPTII,S$GLB, | Performed by: FAMILY MEDICINE

## 2019-08-27 PROCEDURE — 1101F PR PT FALLS ASSESS DOC 0-1 FALLS W/OUT INJ PAST YR: ICD-10-PCS | Mod: HCNC,CPTII,S$GLB, | Performed by: FAMILY MEDICINE

## 2019-08-27 PROCEDURE — 99999 PR PBB SHADOW E&M-EST. PATIENT-LVL IV: ICD-10-PCS | Mod: PBBFAC,HCNC,, | Performed by: FAMILY MEDICINE

## 2019-08-27 PROCEDURE — 3078F DIAST BP <80 MM HG: CPT | Mod: HCNC,CPTII,S$GLB, | Performed by: FAMILY MEDICINE

## 2019-08-27 PROCEDURE — 3074F PR MOST RECENT SYSTOLIC BLOOD PRESSURE < 130 MM HG: ICD-10-PCS | Mod: HCNC,CPTII,S$GLB, | Performed by: FAMILY MEDICINE

## 2019-08-27 PROCEDURE — 99214 OFFICE O/P EST MOD 30 MIN: CPT | Mod: HCNC,S$GLB,, | Performed by: FAMILY MEDICINE

## 2019-08-27 PROCEDURE — 1101F PT FALLS ASSESS-DOCD LE1/YR: CPT | Mod: HCNC,CPTII,S$GLB, | Performed by: FAMILY MEDICINE

## 2019-08-27 PROCEDURE — 3045F PR MOST RECENT HEMOGLOBIN A1C LEVEL 7.0-9.0%: CPT | Mod: HCNC,CPTII,S$GLB, | Performed by: FAMILY MEDICINE

## 2019-08-27 NOTE — PROGRESS NOTES
Subjective:       Patient ID: Chelly Ortiz is a 68 y.o. female.    Chief Complaint: Follow-up (on meds); Diabetes; and Knee Pain (left side x 4 days)    68 years old female came to the for diabetes check.  Last A1c was elevated.  Patient with Levemir since the last appointment.  She reports episodic itching around her belly during insulin administration.  Patient prefers to continue with the insulin.  No polyuria, polydipsia, or polyphagia.  Patient feels anxious most of the time.  She is taking Celexa only as needed.  No suicidal or homicidal ideations.  Blood pressure today stable .  No  palpitation, orthopnea or PND.  Patient with decreased kidney function but stable in comparison with previous reports.  She reports episodic chest pain with previous normal cardiovascular workup.  Patient requests appointment with a new cardiologist for a 2nd opinion.    Review of Systems   Constitutional: Negative.    HENT: Negative.    Eyes: Negative.    Respiratory: Negative.    Cardiovascular: Positive for chest pain. Negative for palpitations and leg swelling.   Gastrointestinal: Negative.    Genitourinary: Negative.    Musculoskeletal: Negative.    Skin: Negative.    Neurological: Negative.    Psychiatric/Behavioral: Negative.        Objective:      Physical Exam   Constitutional: She is oriented to person, place, and time. She appears well-developed and well-nourished. No distress.   HENT:   Head: Normocephalic and atraumatic.   Right Ear: External ear normal.   Left Ear: External ear normal.   Nose: Nose normal.   Mouth/Throat: Oropharynx is clear and moist. No oropharyngeal exudate.   Eyes: Pupils are equal, round, and reactive to light. Conjunctivae and EOM are normal. Right eye exhibits no discharge. Left eye exhibits no discharge. No scleral icterus.   Neck: Normal range of motion. Neck supple. No JVD present. No tracheal deviation present. No thyromegaly present.   Cardiovascular: Normal rate, regular rhythm, normal  heart sounds and intact distal pulses. Exam reveals no gallop and no friction rub.   No murmur heard.  Pulmonary/Chest: Effort normal and breath sounds normal. No stridor. No respiratory distress. She has no wheezes. She has no rales. She exhibits no tenderness.   Abdominal: Soft. Bowel sounds are normal. She exhibits no distension and no mass. There is no tenderness. There is no rebound and no guarding.   Musculoskeletal: Normal range of motion. She exhibits no edema or tenderness.   Lymphadenopathy:     She has no cervical adenopathy.   Neurological: She is alert and oriented to person, place, and time. She has normal reflexes. No cranial nerve deficit. She exhibits normal muscle tone. Coordination normal.   Skin: Skin is warm and dry. No rash noted. She is not diaphoretic. No erythema. No pallor.   Psychiatric: She has a normal mood and affect. Her behavior is normal. Judgment and thought content normal.       Assessment:       1. Type 2 diabetes mellitus with hyperglycemia, without long-term current use of insulin    2. Pruritus    3. Essential hypertension    4. Dyslipidemia    5. CKD (chronic kidney disease) stage 3, GFR 30-59 ml/min    6. Atypical chest pain        Plan:         Chelly was seen today for follow-up, diabetes and knee pain.    Diagnoses and all orders for this visit:    Type 2 diabetes mellitus with hyperglycemia, without long-term current use of insulin  -     Hemoglobin A1c; Future    Pruritus    Essential hypertension  -     Comprehensive metabolic panel; Future  -     Lipid panel; Future    Dyslipidemia  -     Comprehensive metabolic panel; Future  -     Lipid panel; Future    CKD (chronic kidney disease) stage 3, GFR 30-59 ml/min  -     Comprehensive metabolic panel; Future    Atypical chest pain  -     Ambulatory referral to Cardiology    Continue monitoring blood sugar at home,ADA diet.  Continue monitoring blood pressure at home, low sodium diet.

## 2019-08-27 NOTE — PATIENT INSTRUCTIONS
Diabetes: Activity Tips    Being more active can help you manage your diabetes. The tips on this sheet can help you get the most from your exercise. They can also help you stay safe.  Staying Active  Its important for adults to spend less time sitting and being inactive. This is especially true if you have type 2 diabetes. When you are sitting for long periods of time, get up for short sessions of light activity every 30 minutes.  You should aim for at least 150 minutes a week of exercise or physical activity. Dont let more than 2 days go by without being active.  Benefit from briskness  Brisk activity gets your heart beating faster. This can help you increase your fitness, lose extra weight, and manage your blood sugar level. Try brisk walking. Or, if you have foot or leg problems, you can try swimming or bike riding. You can break up your exercise into chunks throughout the day. Work up to at least 30 minutes of steady, brisk exercise on most days.  Warm up and cool down  Warming up and cooling down reduce your risk of injury. They also help limit muscle soreness. Do a mild version of your activity for 5 minutes before and after your routine. You can also learn stretches that will help keep your muscles loose. Your healthcare provider may show you good ways to warm up and stretch.  Do the talk-sing test  The talk-sing test is a simple way to tell how hard youre exercising. If you can talk while exercising, youre in a safe range. If youre out of breath, slow down. If you can carry a tune, its time to  the pace. Walk up a hill. Increase the resistance on your stationary bike. Or swim faster.  What about eating?  You may be told to plan your exercise for 1 to 2 hours after a meal. In most cases, you dont need to eat while being active. If you take insulin or medicine that can cause low blood sugar, test your blood sugar before exercising. And carry a fast-acting sugar that will raise your blood sugar  level quickly. This includes glucose tablets or hard candy. Use it if you feel low blood sugar symptoms.  Safety tips  These tips can help you stay safe as you become fit:  · Exercise with a friend or carry a cell phone if you have one.  · Carry or wear identification, such as a necklace or bracelet, that says you have diabetes.  · Use the proper footwear and safety equipment for your activity.  · Drink water before, during, and after exercise.  · Dress properly for the weather.  · Dont exercise in very hot or very cold weather.  · Dont exercise if you are sick.  · If you are instructed to do so, test your blood sugar before and after you exercise. Have a small carbohydrate snack if your blood sugar is low before you start exercising.   When to stop exercising and call your healthcare provider  Stop exercising and call your healthcare provider right away if you notice any of the following:  · Pain, pressure, tightness, or heaviness in the chest  · Pain or heaviness in the neck, shoulders, back, arms, legs, or feet  · Unusual shortness of breath  · Dizziness or lightheadedness  · Unusually rapid or slow pulse  · Increased joint or muscle pain  · Nausea or vomiting  Date Last Reviewed: 5/1/2016  © [phone] [employer]. [address] [place] [address], [place] [address]. All rights reserved. This information is not intended as a substitute for professional medical care. Always follow your healthcare professional's instructions.

## 2019-08-30 ENCOUNTER — LAB VISIT (OUTPATIENT)
Dept: LAB | Facility: HOSPITAL | Age: 69
End: 2019-08-30
Attending: FAMILY MEDICINE
Payer: MEDICARE

## 2019-08-30 DIAGNOSIS — E78.5 DYSLIPIDEMIA: ICD-10-CM

## 2019-08-30 DIAGNOSIS — N18.30 CKD (CHRONIC KIDNEY DISEASE) STAGE 3, GFR 30-59 ML/MIN: ICD-10-CM

## 2019-08-30 DIAGNOSIS — I10 ESSENTIAL HYPERTENSION: ICD-10-CM

## 2019-08-30 DIAGNOSIS — E11.65 TYPE 2 DIABETES MELLITUS WITH HYPERGLYCEMIA, WITHOUT LONG-TERM CURRENT USE OF INSULIN: ICD-10-CM

## 2019-08-30 LAB
ALBUMIN SERPL BCP-MCNC: 3.9 G/DL (ref 3.5–5.2)
ALP SERPL-CCNC: 111 U/L (ref 55–135)
ALT SERPL W/O P-5'-P-CCNC: 15 U/L (ref 10–44)
ANION GAP SERPL CALC-SCNC: 11 MMOL/L (ref 8–16)
AST SERPL-CCNC: 18 U/L (ref 10–40)
BILIRUB SERPL-MCNC: 0.7 MG/DL (ref 0.1–1)
BUN SERPL-MCNC: 13 MG/DL (ref 8–23)
CALCIUM SERPL-MCNC: 9.2 MG/DL (ref 8.7–10.5)
CHLORIDE SERPL-SCNC: 104 MMOL/L (ref 95–110)
CHOLEST SERPL-MCNC: 237 MG/DL (ref 120–199)
CHOLEST/HDLC SERPL: 4.2 {RATIO} (ref 2–5)
CO2 SERPL-SCNC: 24 MMOL/L (ref 23–29)
CREAT SERPL-MCNC: 1.2 MG/DL (ref 0.5–1.4)
EST. GFR  (AFRICAN AMERICAN): 52.9 ML/MIN/1.73 M^2
EST. GFR  (NON AFRICAN AMERICAN): 45.9 ML/MIN/1.73 M^2
ESTIMATED AVG GLUCOSE: 235 MG/DL (ref 68–131)
GLUCOSE SERPL-MCNC: 154 MG/DL (ref 70–110)
HBA1C MFR BLD HPLC: 9.8 % (ref 4–5.6)
HDLC SERPL-MCNC: 57 MG/DL (ref 40–75)
HDLC SERPL: 24.1 % (ref 20–50)
LDLC SERPL CALC-MCNC: 156.2 MG/DL (ref 63–159)
NONHDLC SERPL-MCNC: 180 MG/DL
POTASSIUM SERPL-SCNC: 3.3 MMOL/L (ref 3.5–5.1)
PROT SERPL-MCNC: 7.4 G/DL (ref 6–8.4)
SODIUM SERPL-SCNC: 139 MMOL/L (ref 136–145)
TRIGL SERPL-MCNC: 119 MG/DL (ref 30–150)

## 2019-08-30 PROCEDURE — 80053 COMPREHEN METABOLIC PANEL: CPT | Mod: HCNC

## 2019-08-30 PROCEDURE — 80061 LIPID PANEL: CPT | Mod: HCNC

## 2019-08-30 PROCEDURE — 83036 HEMOGLOBIN GLYCOSYLATED A1C: CPT | Mod: HCNC

## 2019-08-30 PROCEDURE — 36415 COLL VENOUS BLD VENIPUNCTURE: CPT | Mod: HCNC,PO

## 2019-09-03 DIAGNOSIS — E87.6 HYPOKALEMIA: Primary | ICD-10-CM

## 2019-09-03 RX ORDER — POTASSIUM CHLORIDE 750 MG/1
10 TABLET, EXTENDED RELEASE ORAL 2 TIMES DAILY
Qty: 6 TABLET | Refills: 0 | Status: SHIPPED | OUTPATIENT
Start: 2019-09-03 | End: 2019-09-06

## 2019-10-04 RX ORDER — INSULIN PUMP SYRINGE, 3 ML
EACH MISCELLANEOUS
Qty: 1 EACH | Refills: 0 | Status: SHIPPED | OUTPATIENT
Start: 2019-10-04 | End: 2020-02-10

## 2019-10-04 RX ORDER — LANCETS
1 EACH MISCELLANEOUS 3 TIMES DAILY
Qty: 100 EACH | Refills: 3 | Status: SHIPPED | OUTPATIENT
Start: 2019-10-04 | End: 2019-11-22 | Stop reason: SDUPTHER

## 2019-10-04 NOTE — TELEPHONE ENCOUNTER
----- Message from Alana Sarabia sent at 10/4/2019  9:57 AM CDT -----  Contact: Nilsa from [facility]/[phone]  Nilsa called to check the status of a request sent to your office for an order of the patient's diabetic supplies including the meter and test strips.      It was sent last on 09/30/19 with no response received back.    Please call [phone] to discuss today.

## 2019-10-15 ENCOUNTER — PATIENT OUTREACH (OUTPATIENT)
Dept: ADMINISTRATIVE | Facility: OTHER | Age: 69
End: 2019-10-15

## 2019-10-16 ENCOUNTER — PATIENT MESSAGE (OUTPATIENT)
Dept: FAMILY MEDICINE | Facility: CLINIC | Age: 69
End: 2019-10-16

## 2019-10-21 ENCOUNTER — CLINICAL SUPPORT (OUTPATIENT)
Dept: DIABETES | Facility: CLINIC | Age: 69
End: 2019-10-21
Payer: MEDICARE

## 2019-10-21 VITALS — BODY MASS INDEX: 33.65 KG/M2 | WEIGHT: 193 LBS

## 2019-10-21 DIAGNOSIS — E11.9 DM TYPE 2 WITHOUT RETINOPATHY: ICD-10-CM

## 2019-10-21 PROCEDURE — G0108 DIAB MANAGE TRN  PER INDIV: HCPCS | Mod: S$GLB,,, | Performed by: INTERNAL MEDICINE

## 2019-10-21 PROCEDURE — G0108 PR DIAB MANAGE TRN  PER INDIV: ICD-10-PCS | Mod: S$GLB,,, | Performed by: INTERNAL MEDICINE

## 2019-10-21 PROCEDURE — 99999 PR PBB SHADOW E&M-EST. PATIENT-LVL I: CPT | Mod: PBBFAC,,,

## 2019-10-21 PROCEDURE — 99999 PR PBB SHADOW E&M-EST. PATIENT-LVL I: ICD-10-PCS | Mod: PBBFAC,,,

## 2019-10-21 NOTE — PROGRESS NOTES
Diabetes Education  Author: Stephani Jordan RN  Date: 10/21/2019  Diabetes Care Management Summary  Diabetes Education Record Assessment/Progress: Post Program/Follow-up  Current Diabetes Risk Level: High   Last A1c:   Lab Results   Component Value Date    HGBA1C 9.8 (H) 08/30/2019     Last Visit with Diabetes Educator: : 03/15/2018  Last OPCM Referral: : Not Found   Enrolled in OPCM: No  Diabetes Type  Diabetes Type : Type II  Diabetes History  Diabetes Diagnosis: >10 years  Current Treatment: Oral Medication, Diet, Injectable, Exercise  Health Maintenance was reviewed today with patient. Discussed with patient importance of routine eye exams, foot exams/foot care, blood work (i.e.: A1c, microalbumin, and lipid), dental visits, yearly flu vaccine, and pneumonia vaccine as indicated by PCP. Patient verbalized understanding.     Health Maintenance Topics with due status: Not Due       Topic Last Completion Date    TETANUS VACCINE 06/21/2010    Colonoscopy 12/05/2018    DEXA SCAN 05/28/2019    Lipid Panel 08/30/2019    Hemoglobin A1c 08/30/2019     Health Maintenance Due   Topic Date Due    Pneumococcal Vaccine (65+ High/Highest Risk) (2 of 2 - PPSV23) 12/27/2017    Foot Exam  10/02/2019    Eye Exam  10/04/2019    Mammogram  11/30/2019   Nutrition  Meal Planning: skipping meals, water, eats out often, snacks between meal  What type of beverages do you drink?: water, milk  Meal Plan 24 Hour Recall - Breakfast: grits, margarine, 1 cup milk, jackson  Meal Plan 24 Hour Recall - Lunch: protein steamer box 1/2, chicken drumstick  Meal Plan 24 Hour Recall - Dinner: protein steamer 1/2 box, sardines  Meal Plan 24 Hour Recall - Snack: apple  Monitoring   Self Monitoring : pt has a meter and is testing once a day fasting in am. Pt reports that bs's are in the 290's. Pt is concerned about the continued elevated bs's  Do you use a personal continuous glucose monitor?: No  In the last month, how often have you had a low blood  sugar reaction?: never  Can you tell when your blood sugar is too high?: no  Exercise   Exercise Type: none  Current Diabetes Treatment   Current Treatment: Oral Medication, Diet, Injectable, Exercise  Social History  Preferred Learning Method: Face to Face, Demonstration, Reading Materials  Primary Support: Spouse    DDS-2 Score  ( > 3 = SIGNIFICANT DISTRESS): 2   Barriers to Change  Barriers to Change: None  Learning Challenges : None  Readiness to Learn   Readiness to Learn : Acceptance  Cultural Influences  Cultural Influences: None  Diabetes Education Assessment/Progress  Diabetes Disease Process (diabetes disease process and treatment options): Not Covered/Deferred  Nutrition (Incorporating nutritional management into one's lifestyle): Discussion, Demonstration, Instructed, Demonstrates Understanding/Competency (verbalizes/demonstrates), Individual Session, Written Materials Provided  Physical Activity (incorporating physical activity into one's lifestyle): Not Covered/Deferred  Medications (states correct name, dose, onset, peak, duration, side effects & timing of meds): Discussion, Instructed, Demonstrates Understanding/Competency(verbalizes/demonstrates), Individual Session  Monitoring (monitoring blood glucose/other parameters & using results): Discussion, Instructed, Demonstrates Understanding/Competency (verbalizes/demonstrates), Individual Session  Acute Complications (preventing, detecting, and treating acute complications): Discussion, Instructed, Demonstrates Understanding/Competency (verbalizes/demonstrates), Individual Session  Chronic Complications (preventing, detecting, and treating chronic complications): Discussion, Instructed, Demonstrates Understanding/Competency (verbalizes/demonstrates), Individual Session  Clinical (diabetes, other pertinent medical history, and relevant comorbidities reviewed during visit): Discussion, Instructed, Demonstrates Understanding/Competency  (verbalizes/demonstrates), Individual Session  Cognitive (knowledge of self-management skills, functional health literacy): Discussion, Instructed, Demonstrates Understanding/Competency (verbalizes/demonstrates), Individual Session  Psychosocial (emotional response to diabetes): Discussion, Instructed, Demonstrates Understanding/Competency (verbalizes/demonstrates), Individual Session  Diabetes Distress and Support Systems: Discussion, Instructed, Demonstrates Understanding/Competency (verbalizes/demonstrates), Individual Session  Behavioral (readiness for change, lifestyle practices, self-care behaviors): Discussion, Instructed, Individual Session  Goals  Patient has selected/evaluated goals during today's session: Yes, selected  Healthy Eating: Set  Start Date: 10/21/19  Target Date: 12/21/19  Diabetes Meal Plan  Restrictions: Restricted Carbohydrate  Calories: 1800  Carbohydrate Per Meal: 30-45g  Carbohydrate Per Snack : 15-20g  Pt is known to me. Pt came to clinic for fu visit because she is concerned about her bs's. Pt is not happy taking the Levemir. She states it makes her itch and she is getting bruises. She will discuss with md. Pt has the general concepts about the meal plan however she still gets mixed up and confused when putting her meals together. Reviewed the meal plan and the portions with pt. Wrote out some suggestions for meals with pt. Pt does look at the labels and knows the carb count for her foods. Pt does start eating late in the day because she sleeps late. Pt does not eat complete meals. Discussed with pt the importance of eating 3 balanced and portioned meals per day. Discussed with pt how to put meals together. Discussed with pt that she might benefit from keeping a food diary and writing down her foods to be more accountable. Pt will try this. Pt does nibble at times and admits that she does not always eat correctly. Pt will work on changes and fu as needed. Pt had a little better  understanding of meal plan. Pt was advised to call for any problems or questions.  Today's Self-Management Care Plan was developed with the patient's input and is based on barriers identified during today's assessment.    The long and short-term goals in the care plan were written with the patient/caregiver's input. The patient has agreed to work toward these goals to improve her overall diabetes control.      The patient received a copy of today's self-management plan and verbalized understanding of the care plan, goals, and all of today's instructions.      The patient was encouraged to communicate with her physician and care team regarding her condition(s) and treatment.  I provided the patient with my contact information today and encouraged her to contact me via phone or patient portal as needed.     Education Units of Time   Time Spent: 60 min

## 2019-10-23 ENCOUNTER — PATIENT OUTREACH (OUTPATIENT)
Dept: ADMINISTRATIVE | Facility: OTHER | Age: 69
End: 2019-10-23

## 2019-10-24 ENCOUNTER — TELEPHONE (OUTPATIENT)
Dept: CARDIOLOGY | Facility: CLINIC | Age: 69
End: 2019-10-24

## 2019-10-24 DIAGNOSIS — R07.9 CHEST PAIN, UNSPECIFIED TYPE: Primary | ICD-10-CM

## 2019-10-28 ENCOUNTER — PATIENT MESSAGE (OUTPATIENT)
Dept: ADMINISTRATIVE | Facility: OTHER | Age: 69
End: 2019-10-28

## 2019-10-28 ENCOUNTER — PATIENT MESSAGE (OUTPATIENT)
Dept: FAMILY MEDICINE | Facility: CLINIC | Age: 69
End: 2019-10-28

## 2019-10-28 ENCOUNTER — OFFICE VISIT (OUTPATIENT)
Dept: FAMILY MEDICINE | Facility: CLINIC | Age: 69
End: 2019-10-28
Payer: MEDICARE

## 2019-10-28 VITALS
BODY MASS INDEX: 33 KG/M2 | SYSTOLIC BLOOD PRESSURE: 138 MMHG | HEART RATE: 86 BPM | DIASTOLIC BLOOD PRESSURE: 80 MMHG | WEIGHT: 193.31 LBS | HEIGHT: 64 IN | OXYGEN SATURATION: 98 %

## 2019-10-28 DIAGNOSIS — I10 ESSENTIAL HYPERTENSION: Primary | ICD-10-CM

## 2019-10-28 DIAGNOSIS — N18.30 CKD (CHRONIC KIDNEY DISEASE) STAGE 3, GFR 30-59 ML/MIN: ICD-10-CM

## 2019-10-28 DIAGNOSIS — E11.65 TYPE 2 DIABETES MELLITUS WITH HYPERGLYCEMIA, WITHOUT LONG-TERM CURRENT USE OF INSULIN: ICD-10-CM

## 2019-10-28 PROCEDURE — 99213 OFFICE O/P EST LOW 20 MIN: CPT | Mod: PBBFAC,PO | Performed by: FAMILY MEDICINE

## 2019-10-28 PROCEDURE — 99214 OFFICE O/P EST MOD 30 MIN: CPT | Mod: HCNC,S$GLB,, | Performed by: FAMILY MEDICINE

## 2019-10-28 PROCEDURE — 99499 RISK ADDL DX/OHS AUDIT: ICD-10-PCS | Mod: HCNC,S$GLB,, | Performed by: FAMILY MEDICINE

## 2019-10-28 PROCEDURE — 99499 UNLISTED E&M SERVICE: CPT | Mod: HCNC,S$GLB,, | Performed by: FAMILY MEDICINE

## 2019-10-28 PROCEDURE — 99214 PR OFFICE/OUTPT VISIT, EST, LEVL IV, 30-39 MIN: ICD-10-PCS | Mod: HCNC,S$GLB,, | Performed by: FAMILY MEDICINE

## 2019-10-28 PROCEDURE — 99999 PR PBB SHADOW E&M-EST. PATIENT-LVL III: ICD-10-PCS | Mod: PBBFAC,HCNC,, | Performed by: FAMILY MEDICINE

## 2019-10-28 PROCEDURE — 99999 PR PBB SHADOW E&M-EST. PATIENT-LVL III: CPT | Mod: PBBFAC,HCNC,, | Performed by: FAMILY MEDICINE

## 2019-10-29 DIAGNOSIS — E11.9 TYPE 2 DIABETES MELLITUS: ICD-10-CM

## 2019-10-29 NOTE — PROGRESS NOTES
Subjective:       Patient ID: Chelly Ortiz is a 69 y.o. female.    Chief Complaint: Follow-up; Diabetes; and Hypertension    69 years old female came to the clinic for diabetes check.  Last A1c was elevated.  Blood sugar at home over 200s.  Patient is not physically active.  Patient with a BMI of 33 no able to lose weight.  Blood pressure today stable.  No chest pain, palpitation, orthopnea or PND.  Patient with decreased kidney function but stable in comparison with previous reports .    Diabetes   She has type 2 diabetes mellitus. No MedicAlert identification noted. The initial diagnosis of diabetes was made 9 years ago. Hypoglycemia symptoms include sleepiness. Pertinent negatives for hypoglycemia include no confusion, dizziness, headaches, hunger, mood changes, nervousness/anxiousness, pallor, seizures, speech difficulty, sweats or tremors. Pertinent negatives for diabetes include no blurred vision, no chest pain, no fatigue, no foot paresthesias, no foot ulcerations, no polydipsia, no polyphagia, no polyuria, no visual change, no weakness and no weight loss. Pertinent negatives for hypoglycemia complications include no blackouts, no hospitalization, no nocturnal hypoglycemia, no required assistance and no required glucagon injection. Symptoms are stable. Pertinent negatives for diabetic complications include no autonomic neuropathy, CVA, heart disease, impotence, nephropathy, peripheral neuropathy, PVD or retinopathy. Risk factors for coronary artery disease include family history, hypertension, obesity, stress, tobacco exposure and diabetes mellitus. Current diabetic treatment includes insulin injections. She is compliant with treatment most of the time. She is currently taking insulin at bedtime. Insulin injections are given by patient. Rotation sites for injection include the abdominal wall and thighs. Her weight is fluctuating minimally. She is following a low fat/cholesterol, low fiber and low salt diet.  Meal planning includes carbohydrate counting. She has had a previous visit with a dietitian. She participates in exercise three times a week. She monitors blood glucose at home 1-2 x per day. Blood glucose monitoring compliance is excellent. Her home blood glucose trend is fluctuating minimally. She sees a podiatrist.Eye exam is current.     Review of Systems   Constitutional: Negative.  Negative for fatigue and weight loss.   HENT: Negative.    Eyes: Negative.  Negative for blurred vision.   Respiratory: Negative.    Cardiovascular: Negative.  Negative for chest pain, palpitations and leg swelling.   Gastrointestinal: Negative.    Endocrine: Negative for polydipsia, polyphagia and polyuria.   Genitourinary: Negative.  Negative for impotence.   Musculoskeletal: Negative.    Skin: Negative.  Negative for pallor.   Neurological: Negative.  Negative for dizziness, tremors, seizures, speech difficulty, weakness and headaches.   Psychiatric/Behavioral: Negative.  Negative for confusion. The patient is not nervous/anxious.        Objective:      Physical Exam   Constitutional: She is oriented to person, place, and time. She appears well-developed and well-nourished. No distress.   HENT:   Head: Normocephalic and atraumatic.   Right Ear: External ear normal.   Left Ear: External ear normal.   Nose: Nose normal.   Mouth/Throat: Oropharynx is clear and moist. No oropharyngeal exudate.   Eyes: Pupils are equal, round, and reactive to light. Conjunctivae and EOM are normal. Right eye exhibits no discharge. Left eye exhibits no discharge. No scleral icterus.   Neck: Normal range of motion. Neck supple. No JVD present. No tracheal deviation present. No thyromegaly present.   Cardiovascular: Normal rate, regular rhythm, normal heart sounds and intact distal pulses. Exam reveals no gallop and no friction rub.   No murmur heard.  Pulmonary/Chest: Effort normal and breath sounds normal. No stridor. No respiratory distress. She has  no wheezes. She has no rales. She exhibits no tenderness.   Abdominal: Soft. Bowel sounds are normal. She exhibits no distension and no mass. There is no tenderness. There is no rebound and no guarding.   Musculoskeletal: Normal range of motion. She exhibits no edema or tenderness.   Feet:   Right Foot:   Protective Sensation: 10 sites tested. 10 sites sensed.   Skin Integrity: Negative for ulcer, blister, skin breakdown, erythema, warmth, callus or dry skin.   Left Foot:   Protective Sensation: 10 sites tested. 10 sites sensed.   Skin Integrity: Negative for ulcer, blister, skin breakdown, erythema, warmth, callus or dry skin.   Lymphadenopathy:     She has no cervical adenopathy.   Neurological: She is alert and oriented to person, place, and time. She has normal reflexes. No cranial nerve deficit. She exhibits normal muscle tone. Coordination normal.   Skin: Skin is warm and dry. No rash noted. She is not diaphoretic. No erythema. No pallor.   Psychiatric: She has a normal mood and affect. Her behavior is normal. Judgment and thought content normal.       Assessment:       1. Essential hypertension    2. Type 2 diabetes mellitus with hyperglycemia, without long-term current use of insulin    3. CKD (chronic kidney disease) stage 3, GFR 30-59 ml/min        Plan:         Chelly was seen today for follow-up, diabetes and hypertension.    Diagnoses and all orders for this visit:    Essential hypertension  -     Comprehensive metabolic panel; Future  -     Lipid panel; Future    Type 2 diabetes mellitus with hyperglycemia, without long-term current use of insulin  -     Comprehensive metabolic panel; Future  -     Lipid panel; Future  -     Hemoglobin A1c; Future    CKD (chronic kidney disease) stage 3, GFR 30-59 ml/min  -     Comprehensive metabolic panel; Future    Other orders  -     insulin detemir U-100 (LEVEMIR) 100 unit/mL injection; Inject 20 Units into the skin 2 (two) times daily.    Continue monitoring blood  pressure at home, low sodium diet.  Continue monitoring blood sugar at home,ADA diet.  Patient was not able to afford the other medicines.

## 2019-11-05 ENCOUNTER — PATIENT MESSAGE (OUTPATIENT)
Dept: FAMILY MEDICINE | Facility: CLINIC | Age: 69
End: 2019-11-05

## 2019-11-05 ENCOUNTER — IMMUNIZATION (OUTPATIENT)
Dept: URGENT CARE | Facility: CLINIC | Age: 69
End: 2019-11-05
Payer: MEDICARE

## 2019-11-05 DIAGNOSIS — Z12.31 ENCOUNTER FOR SCREENING MAMMOGRAM FOR BREAST CANCER: Primary | ICD-10-CM

## 2019-11-05 DIAGNOSIS — Z23 FLU VACCINE NEED: Primary | ICD-10-CM

## 2019-11-05 PROCEDURE — G0008 ADMIN INFLUENZA VIRUS VAC: HCPCS | Mod: S$GLB,,, | Performed by: NURSE PRACTITIONER

## 2019-11-05 PROCEDURE — 90662 IIV NO PRSV INCREASED AG IM: CPT | Mod: S$GLB,,, | Performed by: NURSE PRACTITIONER

## 2019-11-05 PROCEDURE — 90662 FLU VACCINE - HIGH DOSE (65+) PRESERVATIVE FREE IM: ICD-10-PCS | Mod: S$GLB,,, | Performed by: NURSE PRACTITIONER

## 2019-11-05 PROCEDURE — G0008 FLU VACCINE - HIGH DOSE (65+) PRESERVATIVE FREE IM: ICD-10-PCS | Mod: S$GLB,,, | Performed by: NURSE PRACTITIONER

## 2019-11-13 ENCOUNTER — OFFICE VISIT (OUTPATIENT)
Dept: URGENT CARE | Facility: CLINIC | Age: 69
End: 2019-11-13
Payer: MEDICARE

## 2019-11-13 ENCOUNTER — PATIENT MESSAGE (OUTPATIENT)
Dept: FAMILY MEDICINE | Facility: CLINIC | Age: 69
End: 2019-11-13

## 2019-11-13 VITALS
WEIGHT: 193 LBS | HEIGHT: 64 IN | TEMPERATURE: 97 F | SYSTOLIC BLOOD PRESSURE: 146 MMHG | HEART RATE: 69 BPM | BODY MASS INDEX: 32.95 KG/M2 | RESPIRATION RATE: 18 BRPM | OXYGEN SATURATION: 96 % | DIASTOLIC BLOOD PRESSURE: 78 MMHG

## 2019-11-13 DIAGNOSIS — S39.012A STRAIN OF LUMBAR REGION, INITIAL ENCOUNTER: Primary | ICD-10-CM

## 2019-11-13 DIAGNOSIS — M62.838 MUSCLE SPASM: ICD-10-CM

## 2019-11-13 PROCEDURE — 3077F SYST BP >= 140 MM HG: CPT | Mod: CPTII,S$GLB,, | Performed by: PHYSICIAN ASSISTANT

## 2019-11-13 PROCEDURE — 3078F DIAST BP <80 MM HG: CPT | Mod: CPTII,S$GLB,, | Performed by: PHYSICIAN ASSISTANT

## 2019-11-13 PROCEDURE — 1101F PT FALLS ASSESS-DOCD LE1/YR: CPT | Mod: CPTII,S$GLB,, | Performed by: PHYSICIAN ASSISTANT

## 2019-11-13 PROCEDURE — 3078F PR MOST RECENT DIASTOLIC BLOOD PRESSURE < 80 MM HG: ICD-10-PCS | Mod: CPTII,S$GLB,, | Performed by: PHYSICIAN ASSISTANT

## 2019-11-13 PROCEDURE — 3077F PR MOST RECENT SYSTOLIC BLOOD PRESSURE >= 140 MM HG: ICD-10-PCS | Mod: CPTII,S$GLB,, | Performed by: PHYSICIAN ASSISTANT

## 2019-11-13 PROCEDURE — 99214 OFFICE O/P EST MOD 30 MIN: CPT | Mod: S$GLB,,, | Performed by: PHYSICIAN ASSISTANT

## 2019-11-13 PROCEDURE — 1101F PR PT FALLS ASSESS DOC 0-1 FALLS W/OUT INJ PAST YR: ICD-10-PCS | Mod: CPTII,S$GLB,, | Performed by: PHYSICIAN ASSISTANT

## 2019-11-13 PROCEDURE — 99214 PR OFFICE/OUTPT VISIT, EST, LEVL IV, 30-39 MIN: ICD-10-PCS | Mod: S$GLB,,, | Performed by: PHYSICIAN ASSISTANT

## 2019-11-13 RX ORDER — METHOCARBAMOL 500 MG/1
500 TABLET, FILM COATED ORAL 3 TIMES DAILY
Qty: 20 TABLET | Refills: 0 | Status: SHIPPED | OUTPATIENT
Start: 2019-11-13 | End: 2019-11-20

## 2019-11-13 RX ORDER — IBUPROFEN 600 MG/1
600 TABLET ORAL EVERY 6 HOURS PRN
Qty: 30 TABLET | Refills: 0 | Status: SHIPPED | OUTPATIENT
Start: 2019-11-13 | End: 2020-07-14

## 2019-11-13 NOTE — PROGRESS NOTES
"Subjective:       Patient ID: Chelly Ortiz is a 69 y.o. female.    Vitals:  height is 5' 3.5" (1.613 m) and weight is 87.5 kg (193 lb). Her temperature is 97.4 °F (36.3 °C). Her blood pressure is 146/78 (abnormal) and her pulse is 69. Her respiration is 18 and oxygen saturation is 96%.     Chief Complaint: Back Pain    Patient presents with a 2 week history of constant low back pain. Her pain is predominantly on her right side.  She denies any numbness or paresthesias.  Afebrile.  No loss of bowel or bladder control.  Denies any trauma or injury that could have caused this pain. She has tried heat and ibuprofen with some relief.  She denies any dysuria, frequency or urgency.  Denies any abdominal pain.  Denies any chest pain, shortness breath, hemoptysis.    Back Pain   This is a new problem. The current episode started 1 to 4 weeks ago (2 Weeks Ago ). The problem occurs constantly. The problem is unchanged. The pain is present in the gluteal. The quality of the pain is described as aching. Radiates to: Right Buttocks  The pain is at a severity of 4/10. The pain is moderate. The pain is the same all the time. The symptoms are aggravated by lying down, sitting and standing. Pertinent negatives include no abdominal pain, bladder incontinence, bowel incontinence, dysuria or numbness. Treatments tried: Tylenol and Gabepentin  The treatment provided no relief.       Constitution: Negative for fatigue.   Gastrointestinal: Negative for abdominal pain and bowel incontinence.   Genitourinary: Negative for dysuria, urgency, bladder incontinence and hematuria.   Musculoskeletal: Positive for back pain. Negative for muscle cramps and history of spine disorder.   Skin: Negative for rash.   Neurological: Negative for coordination disturbances, numbness and tingling.       Objective:      Physical Exam   Constitutional: She is oriented to person, place, and time. Vital signs are normal. She appears well-developed and " well-nourished. She is active and cooperative. No distress.   HENT:   Head: Normocephalic and atraumatic.   Nose: Nose normal.   Mouth/Throat: Oropharynx is clear and moist and mucous membranes are normal.   Eyes: Conjunctivae and lids are normal.   Neck: Trachea normal, normal range of motion, full passive range of motion without pain and phonation normal. Neck supple.   Cardiovascular: Normal rate, regular rhythm, normal heart sounds, intact distal pulses and normal pulses. Exam reveals no gallop and no friction rub.   No murmur heard.  Pulmonary/Chest: Effort normal and breath sounds normal. No stridor. No respiratory distress. She has no wheezes. She has no rales.   Abdominal: Soft. Normal appearance and bowel sounds are normal. She exhibits no abdominal bruit, no pulsatile midline mass and no mass.   Musculoskeletal: She exhibits no edema or deformity.        Thoracic back: She exhibits normal range of motion, no tenderness, no bony tenderness, no swelling, no edema, no deformity, no laceration, no pain, no spasm and normal pulse.        Lumbar back: She exhibits tenderness, pain and spasm. She exhibits normal range of motion, no bony tenderness, no swelling, no edema, no deformity, no laceration and normal pulse.        Back:    NEG ST LEG RAISE  FULL ROM B LE WITH 5/5 STRENGTH  2+DTR PATELLA AND ACHILLES  NVIT DISTALLY WITH SILT AND 2+BCR  ABLE TO AMBULATE WITH SMOOTH RHYTHMIC GAIT     Neurological: She is alert and oriented to person, place, and time. She has normal strength and normal reflexes. No sensory deficit.   Skin: Skin is warm, dry, intact and not diaphoretic.   Psychiatric: She has a normal mood and affect. Her speech is normal and behavior is normal. Judgment and thought content normal. Cognition and memory are normal.   Nursing note and vitals reviewed.        Assessment:       1. Strain of lumbar region, initial encounter    2. Muscle spasm        Plan:       Due to examine history patient  treated as muscle spasm and low back strain today.  Prescribed Robaxin for symptom relief and discussed that this can cause sedation and impairment.  Ibuprofen may be taken throughout the day for pain relief.  Discussed that if symptoms persist for more than a week she should follow up with the primary care provider.  Strain of lumbar region, initial encounter  -     ibuprofen (ADVIL,MOTRIN) 600 MG tablet; Take 1 tablet (600 mg total) by mouth every 6 (six) hours as needed for Pain.  Dispense: 30 tablet; Refill: 0    Muscle spasm  -     methocarbamol (ROBAXIN) 500 MG Tab; Take 1 tablet (500 mg total) by mouth 3 (three) times daily. As needed for muscle spasm. May cause drowsiness for 7 days  Dispense: 20 tablet; Refill: 0      Patient Instructions     Back Spasm (No Trauma)    Spasm of the back muscles can occur after a sudden forceful twisting or bending force (such as in a car accident), after a simple awkward movement, or after lifting something heavy with poor body positioning. In any case, muscle spasm adds to the pain. Sleeping in an awkward position or on a poor quality mattress can also cause this. Some people respond to emotional stress by tensing the muscles of their back.  Pain that continues may need further evaluation or other types of treatment such as physical therapy.  You don't always need X-rays for the initial evaluation of back pain, unless you had a physical injury such as from a car accident or fall. If your pain continues and doesn't respond to medical treatment, X-rays and other tests may then be done.   Home care  · As soon as possible, start sitting or walking again to avoid problems from prolonged bed rest (muscle weakness, worsening back stiffness and pain, blood clots in the legs).  · When in bed, try to find a position of comfort. A firm mattress is best. Try lying flat on your back with pillows under your knees. You can also try lying on your side with your knees bent up toward your  chest and a pillow between your knees.  · Avoid prolonged sitting, long car rides, or travel. This puts more stress on the lower back than standing or walking.   · During the first 24 to 72 hours after an injury or flare-up, apply an ice pack to the painful area for 20 minutes, then remove it for 20 minutes. Do this over a period of 60 to 90 minutes or several times a day. This will reduce swelling and pain. Always wrap ice packs in a thin towel.  · You can start with ice, then switch to heat. Heat (hot shower, hot bath, or heating pad) reduces pain, and works well for muscle spasms. Apply heat to the painful area for 20 minutes, then remove it for 20 minutes. Do this over a period of 60 to 90 minutes or several times a day. Do not sleep on a heating pad as it can burn or damage skin.  · Alternate ice and heat therapies.  · Be aware of safe lifting methods and do not lift anything over 15 pounds until all the pain is gone.  Gentle stretching will help your back heal faster. Do this simple routine 2 to 3 times a day until your back is feeling better.  · Lie on your back with your knees bent and both feet on the ground  · Slowly raise your left knee to your chest as you flatten your lower back against the floor. Hold for 20 to 30 seconds.  · Relax and repeat the exercise with your right knee.  · Do 2 to 3 of these exercises for each leg.  · Repeat, hugging both knees to your chest at the same time.  · Do not bounce, but use a gentle pull.  Medicines  Talk to your doctor before using medicine, especially if you have other medical problems or are taking other medicines.  You may use acetaminophen or ibuprofen to control pain, unless your healthcare provider prescribed another pain medicine. If you have a chronic condition such as diabetes, liver or kidney disease, stomach ulcer, or gastrointestinal bleeding, or are taking blood thinners, talk with your healthcare provider before taking any medicines.  Be careful if you  are given prescription pain medicine, narcotics, or medicine for muscle spasm. They can cause drowsiness, affect your coordination, reflexes, or judgment. Do not drive or operate heavy machinery when taking these medicines. Take pain medicine only as prescribed by your healthcare provider.  Follow-up care  Follow up with your doctor, or as advised. Physical therapy or further tests may be needed.  If X-rays were taken, they may be reviewed by a radiologist. You will be notified of any new findings that may affect your care.  Call 911  Seek emergency medical care if any of these occur:  · Trouble breathing  · Confusion  · Drowsiness or trouble awakening  · Fainting or loss of consciousness  · Rapid or very slow heart rate  · Loss of bowel or bladder control  When to seek medical advice  Call your healthcare provider right away if any of these occur:  · Pain becomes worse or spreads to your legs  · Weakness or numbness in one or both legs  · Numbness in the groin or genital area  · Unexplained fever over 100.4ºF (38.0ºC)  · Burning or pain when passing urine  Date Last Reviewed: 6/1/2016  © 3925-3537 [employer]. [address] [place] [address]. All rights reserved. This information is not intended as a substitute for professional medical care. Always follow your healthcare professional's instructions.        Back Sprain or Strain    Injury to the muscles (strain) or ligaments (sprain) around the spine can be troubling. Injury may occur after a sudden forceful twisting or bending force such as in a car accident, after a simple awkward movement, or after lifting something heavy with poor body positioning. In any case, muscle spasm is often present and adds to the pain.  Thankfully, most people feel better in 1 to 2 weeks, and most of the rest in 1 to 2 months. Most people can remain active. Unless you had a forceful or traumatic physical injury such as a car accident or fall, X-rays may not be  ordered for the first evaluation of a back sprain or strain. If pain continues and does not respond to medical treatment, your healthcare provider may then order X-rays and other tests.  Home care  The following guidelines will help you care for your injury at home:  · When in bed, try to find a comfortable position. A firm mattress is best. Try lying flat on your back with pillows under your knees. You can also try lying on your side with your knees bent up toward your chest and a pillow between your knees.  · Don't sit for long periods. Try not to take long car rides or take other trips that have you sitting for a long time. This puts more stress on the lower back than standing or walking.  · During the first 24 to 72 hours after an injury or flare-up, apply an ice pack to the painful area for 20 minutes. Then remove it for 20 minutes. Do this for 60 to 90 minutes, or several times a day. This will reduce swelling and pain. Be sure to wrap the ice pack in a thin towel or plastic to protect your skin.  · You can start with ice, then switch to heat. Heat from a hot shower, hot bath, or heating pad reduces pain and works well for muscle spasms. Put heat on the painful area for 20 minutes, then remove for 20 minutes. Do this for 60 to 90 minutes, or several times a day. Do not use a heating pad while sleeping. It can burn the skin.  · You can alternate the ice and heat. Talk with your healthcare provider to find out the best treatment or therapy for your back pain.  · Therapeutic massage will help relax the back muscles without stretching them.  · Be aware of safe lifting methods. Do not lift anything over 15 pounds until all of the pain is gone.  Medicines  Talk to your healthcare provider before using medicines, especially if you have other health problems or are taking other medicines.  · You may use acetaminophen or ibuprofen to control pain, unless another pain medicine was prescribed. If you have chronic  conditions like diabetes, liver or kidney disease, stomach ulcers, or gastrointestinal bleeding, or are taking blood-thinner medicines, talk with your doctor before taking any medicines.  · Be careful if you are given prescription medicines, narcotics, or medicine for muscle spasm. They can cause drowsiness, and affect your coordination, reflexes, and judgment. Do not drive or operate heavy machinery when taking these types of medicines. Only take pain medicine as prescribed by your healthcare provider.  Follow-up care  Follow up with your healthcare provider, or as advised. You may need physical therapy or more tests if your symptoms get worse.  If you had X-rays your healthcare provider may be checking for any broken bones, breaks, or fractures. Bruises and sprains can sometimes hurt as much as a fracture. These injuries can take time to heal completely. If your symptoms dont improve or they get worse, talk with your healthcare provider. You may need a repeat X-ray or other tests.  Call 911  Call for emergency care if any of the following occur:  · Trouble breathing  · Confused  · Very drowsy or trouble awakening  · Fainting or loss of consciousness  · Rapid or very slow heart rate  · Loss of bowel or bladder control  When to seek medical advice  Call your healthcare provider right away if any of the following occur:  · Pain gets worse or spreads to your arms or legs  · Weakness or numbness in one or both arms or legs  · Numbness in the groin or genital area  Date Last Reviewed: 6/1/2016  © 0317-5326 [employer]. [address] [place] [address], [place] [address]. All rights reserved. This information is not intended as a substitute for professional medical care. Always follow your healthcare professional's instructions.      Sheeder symptoms worsen or persist longer than a week follow-up with primary care provider.  Ear being prescribed a muscle relaxer today so do not drive or operate machinery while on  this medication because it does cause impairment.    Please follow up with your Primary care provider within 2-5 days if your signs and symptoms have not resolved or worsen.     If your condition worsens or fails to improve we recommend that you receive another evaluation at the emergency room immediately or contact your primary medical clinic to discuss your concerns.   You must understand that you have received an Urgent Care treatment only and that you may be released before all of your medical problems are known or treated. You, the patient, will arrange for follow up care as instructed.     RED FLAGS/WARNING SYMPTOMS DISCUSSED WITH PATIENT THAT WOULD WARRANT EMERGENT MEDICAL ATTENTION. PATIENT VERBALIZED UNDERSTANDING.

## 2019-11-13 NOTE — PATIENT INSTRUCTIONS
Back Spasm (No Trauma)    Spasm of the back muscles can occur after a sudden forceful twisting or bending force (such as in a car accident), after a simple awkward movement, or after lifting something heavy with poor body positioning. In any case, muscle spasm adds to the pain. Sleeping in an awkward position or on a poor quality mattress can also cause this. Some people respond to emotional stress by tensing the muscles of their back.  Pain that continues may need further evaluation or other types of treatment such as physical therapy.  You don't always need X-rays for the initial evaluation of back pain, unless you had a physical injury such as from a car accident or fall. If your pain continues and doesn't respond to medical treatment, X-rays and other tests may then be done.   Home care  · As soon as possible, start sitting or walking again to avoid problems from prolonged bed rest (muscle weakness, worsening back stiffness and pain, blood clots in the legs).  · When in bed, try to find a position of comfort. A firm mattress is best. Try lying flat on your back with pillows under your knees. You can also try lying on your side with your knees bent up toward your chest and a pillow between your knees.  · Avoid prolonged sitting, long car rides, or travel. This puts more stress on the lower back than standing or walking.   · During the first 24 to 72 hours after an injury or flare-up, apply an ice pack to the painful area for 20 minutes, then remove it for 20 minutes. Do this over a period of 60 to 90 minutes or several times a day. This will reduce swelling and pain. Always wrap ice packs in a thin towel.  · You can start with ice, then switch to heat. Heat (hot shower, hot bath, or heating pad) reduces pain, and works well for muscle spasms. Apply heat to the painful area for 20 minutes, then remove it for 20 minutes. Do this over a period of 60 to 90 minutes or several times a day. Do not sleep on a heating  pad as it can burn or damage skin.  · Alternate ice and heat therapies.  · Be aware of safe lifting methods and do not lift anything over 15 pounds until all the pain is gone.  Gentle stretching will help your back heal faster. Do this simple routine 2 to 3 times a day until your back is feeling better.  · Lie on your back with your knees bent and both feet on the ground  · Slowly raise your left knee to your chest as you flatten your lower back against the floor. Hold for 20 to 30 seconds.  · Relax and repeat the exercise with your right knee.  · Do 2 to 3 of these exercises for each leg.  · Repeat, hugging both knees to your chest at the same time.  · Do not bounce, but use a gentle pull.  Medicines  Talk to your doctor before using medicine, especially if you have other medical problems or are taking other medicines.  You may use acetaminophen or ibuprofen to control pain, unless your healthcare provider prescribed another pain medicine. If you have a chronic condition such as diabetes, liver or kidney disease, stomach ulcer, or gastrointestinal bleeding, or are taking blood thinners, talk with your healthcare provider before taking any medicines.  Be careful if you are given prescription pain medicine, narcotics, or medicine for muscle spasm. They can cause drowsiness, affect your coordination, reflexes, or judgment. Do not drive or operate heavy machinery when taking these medicines. Take pain medicine only as prescribed by your healthcare provider.  Follow-up care  Follow up with your doctor, or as advised. Physical therapy or further tests may be needed.  If X-rays were taken, they may be reviewed by a radiologist. You will be notified of any new findings that may affect your care.  Call 911  Seek emergency medical care if any of these occur:  · Trouble breathing  · Confusion  · Drowsiness or trouble awakening  · Fainting or loss of consciousness  · Rapid or very slow heart rate  · Loss of bowel or bladder  control  When to seek medical advice  Call your healthcare provider right away if any of these occur:  · Pain becomes worse or spreads to your legs  · Weakness or numbness in one or both legs  · Numbness in the groin or genital area  · Unexplained fever over 100.4ºF (38.0ºC)  · Burning or pain when passing urine  Date Last Reviewed: 6/1/2016 © 2000-2017 [employer] [address] [place] [address], [place] [address]. All rights reserved. This information is not intended as a substitute for professional medical care. Always follow your healthcare professional's instructions.        Back Sprain or Strain    Injury to the muscles (strain) or ligaments (sprain) around the spine can be troubling. Injury may occur after a sudden forceful twisting or bending force such as in a car accident, after a simple awkward movement, or after lifting something heavy with poor body positioning. In any case, muscle spasm is often present and adds to the pain.  Thankfully, most people feel better in 1 to 2 weeks, and most of the rest in 1 to 2 months. Most people can remain active. Unless you had a forceful or traumatic physical injury such as a car accident or fall, X-rays may not be ordered for the first evaluation of a back sprain or strain. If pain continues and does not respond to medical treatment, your healthcare provider may then order X-rays and other tests.  Home care  The following guidelines will help you care for your injury at home:  · When in bed, try to find a comfortable position. A firm mattress is best. Try lying flat on your back with pillows under your knees. You can also try lying on your side with your knees bent up toward your chest and a pillow between your knees.  · Don't sit for long periods. Try not to take long car rides or take other trips that have you sitting for a long time. This puts more stress on the lower back than standing or walking.  · During the first 24 to 72 hours after an injury or  flare-up, apply an ice pack to the painful area for 20 minutes. Then remove it for 20 minutes. Do this for 60 to 90 minutes, or several times a day. This will reduce swelling and pain. Be sure to wrap the ice pack in a thin towel or plastic to protect your skin.  · You can start with ice, then switch to heat. Heat from a hot shower, hot bath, or heating pad reduces pain and works well for muscle spasms. Put heat on the painful area for 20 minutes, then remove for 20 minutes. Do this for 60 to 90 minutes, or several times a day. Do not use a heating pad while sleeping. It can burn the skin.  · You can alternate the ice and heat. Talk with your healthcare provider to find out the best treatment or therapy for your back pain.  · Therapeutic massage will help relax the back muscles without stretching them.  · Be aware of safe lifting methods. Do not lift anything over 15 pounds until all of the pain is gone.  Medicines  Talk to your healthcare provider before using medicines, especially if you have other health problems or are taking other medicines.  · You may use acetaminophen or ibuprofen to control pain, unless another pain medicine was prescribed. If you have chronic conditions like diabetes, liver or kidney disease, stomach ulcers, or gastrointestinal bleeding, or are taking blood-thinner medicines, talk with your doctor before taking any medicines.  · Be careful if you are given prescription medicines, narcotics, or medicine for muscle spasm. They can cause drowsiness, and affect your coordination, reflexes, and judgment. Do not drive or operate heavy machinery when taking these types of medicines. Only take pain medicine as prescribed by your healthcare provider.  Follow-up care  Follow up with your healthcare provider, or as advised. You may need physical therapy or more tests if your symptoms get worse.  If you had X-rays your healthcare provider may be checking for any broken bones, breaks, or fractures.  Bruises and sprains can sometimes hurt as much as a fracture. These injuries can take time to heal completely. If your symptoms dont improve or they get worse, talk with your healthcare provider. You may need a repeat X-ray or other tests.  Call 911  Call for emergency care if any of the following occur:  · Trouble breathing  · Confused  · Very drowsy or trouble awakening  · Fainting or loss of consciousness  · Rapid or very slow heart rate  · Loss of bowel or bladder control  When to seek medical advice  Call your healthcare provider right away if any of the following occur:  · Pain gets worse or spreads to your arms or legs  · Weakness or numbness in one or both arms or legs  · Numbness in the groin or genital area  Date Last Reviewed: 6/1/2016 © 2000-2017 "[employer]. [address] [place] [address], [place] [address]. All rights reserved. This information is not intended as a substitute for professional medical care. Always follow your healthcare professional's instructions.      Sheeder symptoms worsen or persist longer than a week follow-up with primary care provider.  Ear being prescribed a muscle relaxer today so do not drive or operate machinery while on this medication because it does cause impairment.    Please follow up with your Primary care provider within 2-5 days if your signs and symptoms have not resolved or worsen.     If your condition worsens or fails to improve we recommend that you receive another evaluation at the emergency room immediately or contact your primary medical clinic to discuss your concerns.   You must understand that you have received an Urgent Care treatment only and that you may be released before all of your medical problems are known or treated. You, the patient, will arrange for follow up care as instructed.     RED FLAGS/WARNING SYMPTOMS DISCUSSED WITH PATIENT THAT WOULD WARRANT EMERGENT MEDICAL ATTENTION. PATIENT VERBALIZED UNDERSTANDING.

## 2019-11-14 ENCOUNTER — PATIENT MESSAGE (OUTPATIENT)
Dept: FAMILY MEDICINE | Facility: CLINIC | Age: 69
End: 2019-11-14

## 2019-11-18 ENCOUNTER — PATIENT OUTREACH (OUTPATIENT)
Dept: ADMINISTRATIVE | Facility: OTHER | Age: 69
End: 2019-11-18

## 2019-11-19 ENCOUNTER — OFFICE VISIT (OUTPATIENT)
Dept: FAMILY MEDICINE | Facility: CLINIC | Age: 69
End: 2019-11-19
Payer: MEDICARE

## 2019-11-19 VITALS
DIASTOLIC BLOOD PRESSURE: 78 MMHG | HEIGHT: 63 IN | SYSTOLIC BLOOD PRESSURE: 138 MMHG | WEIGHT: 194 LBS | HEART RATE: 89 BPM | OXYGEN SATURATION: 96 % | BODY MASS INDEX: 34.38 KG/M2

## 2019-11-19 DIAGNOSIS — E11.65 UNCONTROLLED TYPE 2 DIABETES MELLITUS WITH HYPERGLYCEMIA, WITHOUT LONG-TERM CURRENT USE OF INSULIN: ICD-10-CM

## 2019-11-19 DIAGNOSIS — E78.5 HYPERLIPIDEMIA ASSOCIATED WITH TYPE 2 DIABETES MELLITUS: ICD-10-CM

## 2019-11-19 DIAGNOSIS — G47.09 OTHER INSOMNIA: ICD-10-CM

## 2019-11-19 DIAGNOSIS — K21.9 GASTROESOPHAGEAL REFLUX DISEASE WITHOUT ESOPHAGITIS: ICD-10-CM

## 2019-11-19 DIAGNOSIS — E66.9 OBESITY (BMI 30.0-34.9): ICD-10-CM

## 2019-11-19 DIAGNOSIS — E11.42 DIABETIC POLYNEUROPATHY ASSOCIATED WITH TYPE 2 DIABETES MELLITUS: ICD-10-CM

## 2019-11-19 DIAGNOSIS — E11.59 HYPERTENSION ASSOCIATED WITH DIABETES: ICD-10-CM

## 2019-11-19 DIAGNOSIS — Z23 NEED FOR PROPHYLACTIC VACCINATION AGAINST STREPTOCOCCUS PNEUMONIAE (PNEUMOCOCCUS) AND INFLUENZA: ICD-10-CM

## 2019-11-19 DIAGNOSIS — I15.2 HYPERTENSION ASSOCIATED WITH DIABETES: ICD-10-CM

## 2019-11-19 DIAGNOSIS — M46.96 UNSPECIFIED INFLAMMATORY SPONDYLOPATHY, LUMBAR REGION: ICD-10-CM

## 2019-11-19 DIAGNOSIS — F32.0 MILD MAJOR DEPRESSION: ICD-10-CM

## 2019-11-19 DIAGNOSIS — E11.69 HYPERLIPIDEMIA ASSOCIATED WITH TYPE 2 DIABETES MELLITUS: ICD-10-CM

## 2019-11-19 DIAGNOSIS — E66.3 OVERWEIGHT: ICD-10-CM

## 2019-11-19 DIAGNOSIS — N18.30 CKD (CHRONIC KIDNEY DISEASE) STAGE 3, GFR 30-59 ML/MIN: ICD-10-CM

## 2019-11-19 DIAGNOSIS — Z00.00 ENCOUNTER FOR PREVENTIVE HEALTH EXAMINATION: Primary | ICD-10-CM

## 2019-11-19 PROBLEM — E66.01 SEVERE OBESITY WITH BODY MASS INDEX (BMI) OF 35.0 TO 39.9 WITH SERIOUS COMORBIDITY: Status: ACTIVE | Noted: 2019-11-19

## 2019-11-19 PROCEDURE — G0009 ADMIN PNEUMOCOCCAL VACCINE: HCPCS | Mod: HCNC,S$GLB,, | Performed by: NURSE PRACTITIONER

## 2019-11-19 PROCEDURE — 90732 PNEUMOCOCCAL POLYSACCHARIDE VACCINE 23-VALENT =>2YO SQ IM: ICD-10-PCS | Mod: HCNC,S$GLB,, | Performed by: NURSE PRACTITIONER

## 2019-11-19 PROCEDURE — G0439 PPPS, SUBSEQ VISIT: HCPCS | Mod: HCNC,S$GLB,, | Performed by: NURSE PRACTITIONER

## 2019-11-19 PROCEDURE — 3046F HEMOGLOBIN A1C LEVEL >9.0%: CPT | Mod: HCNC,CPTII,S$GLB, | Performed by: NURSE PRACTITIONER

## 2019-11-19 PROCEDURE — 3075F PR MOST RECENT SYSTOLIC BLOOD PRESS GE 130-139MM HG: ICD-10-PCS | Mod: HCNC,CPTII,S$GLB, | Performed by: NURSE PRACTITIONER

## 2019-11-19 PROCEDURE — 3078F PR MOST RECENT DIASTOLIC BLOOD PRESSURE < 80 MM HG: ICD-10-PCS | Mod: HCNC,CPTII,S$GLB, | Performed by: NURSE PRACTITIONER

## 2019-11-19 PROCEDURE — G0439 PR MEDICARE ANNUAL WELLNESS SUBSEQUENT VISIT: ICD-10-PCS | Mod: HCNC,S$GLB,, | Performed by: NURSE PRACTITIONER

## 2019-11-19 PROCEDURE — 3075F SYST BP GE 130 - 139MM HG: CPT | Mod: HCNC,CPTII,S$GLB, | Performed by: NURSE PRACTITIONER

## 2019-11-19 PROCEDURE — 3078F DIAST BP <80 MM HG: CPT | Mod: HCNC,CPTII,S$GLB, | Performed by: NURSE PRACTITIONER

## 2019-11-19 PROCEDURE — G0009 PNEUMOCOCCAL POLYSACCHARIDE VACCINE 23-VALENT =>2YO SQ IM: ICD-10-PCS | Mod: HCNC,S$GLB,, | Performed by: NURSE PRACTITIONER

## 2019-11-19 PROCEDURE — 90732 PPSV23 VACC 2 YRS+ SUBQ/IM: CPT | Mod: HCNC,S$GLB,, | Performed by: NURSE PRACTITIONER

## 2019-11-19 PROCEDURE — 99999 PR PBB SHADOW E&M-EST. PATIENT-LVL V: ICD-10-PCS | Mod: PBBFAC,HCNC,, | Performed by: NURSE PRACTITIONER

## 2019-11-19 PROCEDURE — 99499 UNLISTED E&M SERVICE: CPT | Mod: HCNC,S$GLB,, | Performed by: NURSE PRACTITIONER

## 2019-11-19 PROCEDURE — 3046F PR MOST RECENT HEMOGLOBIN A1C LEVEL > 9.0%: ICD-10-PCS | Mod: HCNC,CPTII,S$GLB, | Performed by: NURSE PRACTITIONER

## 2019-11-19 PROCEDURE — 99999 PR PBB SHADOW E&M-EST. PATIENT-LVL V: CPT | Mod: PBBFAC,HCNC,, | Performed by: NURSE PRACTITIONER

## 2019-11-19 PROCEDURE — 99499 RISK ADDL DX/OHS AUDIT: ICD-10-PCS | Mod: HCNC,S$GLB,, | Performed by: NURSE PRACTITIONER

## 2019-11-19 NOTE — Clinical Note
Primary Care Providers:Gabriel Wilson MD, MD (General)Your patient was seen today for a HRA visit. Gap(s) in care (HEDIS gaps) have been identified during this visit that require additional testing and possible follow up.Orders Placed This Encounter    Pneumococcal polysaccharide vaccine 23-valent greater than or equal to 1yo subcutaneous/IM    Ambulatory referral/consult to Endocrinology        Standing Status: Future        Standing Expiration Date: 12/19/2020        Referral Priority:Routine        Referral Type:Consultation        Requested Specialty:Endocrinology        Number of Visits Requested:1These orders were placed using [facility] approved protocol and any results will be forwarded to your office for appropriate follow up. I have included a copy of my visit note; please review the note and feel free to contact me with any questions. Thank you for allowing me to participate in the care of your patients.Cecilia Limon NP

## 2019-11-19 NOTE — PROGRESS NOTES
"Chelly Ortiz presented for a  Medicare AWV and comprehensive Health Risk Assessment today. The following components were reviewed and updated:    · Medical history  · Family History  · Social history  · Allergies and Current Medications  · Health Risk Assessment  · Health Maintenance  · Care Team     ** See Completed Assessments for Annual Wellness Visit within the encounter summary.**       The following assessments were completed:  · Living Situation  · CAGE  · Depression Screening  · Timed Get Up and Go  · Whisper Test  · Cognitive Function Screening  · Nutrition Screening  · ADL Screening  · PAQ Screening    Vitals:    11/19/19 1458   BP: 138/78   BP Location: Right arm   Patient Position: Sitting   BP Method: Large (Manual)   Pulse: 89   SpO2: 96%   Weight: 88 kg (194 lb 0.1 oz)   Height: 5' 3" (1.6 m)     Body mass index is 34.37 kg/m².     Physical Exam   Constitutional: She is oriented to person, place, and time. She appears well-developed. No distress.   obese   HENT:   Head: Normocephalic and atraumatic.   Eyes: Pupils are equal, round, and reactive to light. EOM are normal.   Neck: Neck supple. No JVD present. No tracheal deviation present.   Cardiovascular: Normal rate, regular rhythm, normal heart sounds and intact distal pulses.   No murmur heard.  Pulmonary/Chest: Effort normal and breath sounds normal. No respiratory distress. She has no wheezes. She has no rales.   Abdominal: Soft. Bowel sounds are normal. She exhibits no distension and no mass. There is no tenderness.   Musculoskeletal: Normal range of motion. She exhibits no edema or tenderness.   Neurological: She is alert and oriented to person, place, and time. Coordination normal.   Skin: Skin is warm and dry. No erythema. No pallor.   Psychiatric: She has a normal mood and affect. Her behavior is normal. Judgment and thought content normal. Cognition and memory are normal. She expresses no homicidal and no suicidal ideation.   Nursing note " and vitals reviewed.        Diagnoses and health risks identified today and associated recommendations/orders:    1. Encounter for preventive health examination    2. Uncontrolled type 2 diabetes mellitus with hyperglycemia, without long-term current use of insulin  Chronic; stable on medication.  Followed by PCP.  - Ambulatory referral/consult to Endocrinology; Future    3. Diabetic polyneuropathy associated with type 2 diabetes mellitus  Chronic; stable on medication.  Followed by PCP.    4. Hypertension associated with diabetes  Chronic; stable on medication.  Followed by PCP.    5. Hyperlipidemia associated with type 2 diabetes mellitus  Chronic; stable on medication.  Followed by PCP.    6. CKD (chronic kidney disease) stage 3, GFR 30-59 ml/min  Chronic; stable.  Followed by PCP.    7. Unspecified inflammatory spondylopathy, lumbar region  Chronic; stable on medication.  Followed by PCP.    8. Gastroesophageal reflux disease without esophagitis  Chronic; stable on medication.  Followed by PCP.    9. Mild major depression  Chronic; stable on medication.  Followed by PCP.    10. Other insomnia  Chronic; stable on medication.  Followed by PCP.    11. Obesity (BMI 30.0-34.9)  Chronic, stable. Therapeutic lifestyle changes discussed. Followed by PCP.    12. Overweight    13. Need for prophylactic vaccination against Streptococcus pneumoniae (pneumococcus) and influenza  Pneumovax vaccination administered today in clinic.  - Pneumococcal polysaccharide vaccine 23-valent greater than or equal to 1yo subcutaneous/IM      Provided Chelly with a 5-10 year written screening schedule and personal prevention plan. Recommendations were developed using the USPSTF age appropriate recommendations. Education, counseling, and referrals were provided as needed. After Visit Summary printed and given to patient which includes a list of additional screenings\tests needed.    Follow up in 7 weeks (on 1/7/2020) for follow-up with PCP,  Annual Wellness Visit in 1 year.    Cecilia Limon NP         I offered to discuss end of life issues, including information on how to make advance directives that the patient could use to name someone who would make medical decisions on their behalf if they became too ill to make themselves.    ___Patient declined  _X_Patient is interested, I provided paper work and offered to discuss.

## 2019-11-19 NOTE — PATIENT INSTRUCTIONS
Controlling High Blood Pressure  High blood pressure (hypertension) is often called the silent killer. This is because many people who have it dont know it. High blood pressure is defined as 140/90 mm Hg or higher. Know your blood pressure and remember to check it regularly. Doing so can save your life. Here are some things you can do to help control your blood pressure.    Choose heart-healthy foods  · Select low-salt, low-fat foods. Limit sodium intake to 2,400 mg per day or the amount suggested by your healthcare provider.  · Limit canned, dried, cured, packaged, and fast foods. These can contain a lot of salt.  · Eat 8 to 10 servings of fruits and vegetables every day.  · Choose lean meats, fish, or chicken.  · Eat whole-grain pasta, brown rice, and beans.  · Eat 2 to 3 servings of low-fat or fat-free dairy products.  · Ask your doctor about the DASH eating plan. This plan helps reduce blood pressure.  · When you go to a restaurant, ask that your meal be prepared with no added salt.  Maintain a healthy weight  · Ask your healthcare provider how many calories to eat a day. Then stick to that number.  · Ask your healthcare provider what weight range is healthiest for you. If you are overweight, a weight loss of only 3% to 5% of your body weight can help lower blood pressure. Generally, a good weight loss goal is to lose 10% of your body weight in a year.  · Limit snacks and sweets.  · Get regular exercise.  Get up and get active  · Choose activities you enjoy. Find ones you can do with friends or family. This includes bicycling, dancing, walking, and jogging.  · Park farther away from building entrances.  · Use stairs instead of the elevator.  · When you can, walk or bike instead of driving.  · [place] leaves, garden, or do household repairs.  · Be active at a moderate to vigorous level of physical activity for at least 40 minutes for a minimum of 3 to 4 days a week.   Manage stress  · Make time to relax and  enjoy life. Find time to laugh.  · Communicate your concerns with your loved ones and your healthcare provider.  · Visit with family and friends, and keep up with hobbies.  Limit alcohol and quit smoking  · Men should have no more than 2 drinks per day.  · Women should have no more than 1 drink per day.  · Talk with your healthcare provider about quitting smoking. Smoking significantly increases your risk for heart disease and stroke. Ask your healthcare provider about community smoking cessation programs and other options.  Medicines  If lifestyle changes arent enough, your healthcare provider may prescribe high blood pressure medicine. Take all medicines as prescribed. If you have any questions about your medicines, ask your healthcare provider before stopping or changing them.   Date Last Reviewed: 4/27/2016 © 2000-2017 [employer]. [address] [place] [address], [place] [address]. All rights reserved. This information is not intended as a substitute for professional medical care. Always follow your healthcare professional's instructions.          Long-Term Complications of Diabetes    Diabetes can cause health problems over time. These are called complications. They are more likely to happen if your blood sugar is often too high. Over time, high blood sugar can damage blood vessels in your body. It is important to keep your blood sugar in your target range. This can help prevent or delay complications from diabetes.  Possible complications  Complications of diabetes include:  · Eye problems, including damage to the blood vessels in the eyes (retinopathy), pressure in the eye (glaucoma), and clouding of the eyes lens (a cataract). Eye problems can eventually lead to irreversible blindness.   · Tooth and gum problems (periodontal disease), causing loss of teeth and bone  · Blood vessel (vascular) disease leading to circulation problems, heart attack or stroke, or a need for amputation of a  limb   · Problems with sexual function leading to erectile dysfunction in men and sexual discomfort in women   · Kidney disease (nephropathy) can eventually lead to kidney failure, which may require dialysis or kidney transplant   · Nerve problems (neuropathy), causing pain or loss of feeling in your feet and other parts of your body, potentially leading to an amputation of a limb   · High blood pressure (hypertension), putting strain on your heart and blood vessels  · Serious infections, possibly leading to loss of toes, feet, or limbs  How to avoid complications  The serious consequences of these complications may be avoidable for most people with diabetes by managing your blood glucose, blood pressure, and cholesterol levels. This can help you feel better and stay healthy. You can manage diabetes by tracking your blood sugar. You can also eat healthy and exercise to avoid gaining weight. And you should take medicine if directed by your healthcare provider.  Date Last Reviewed: 5/1/2016  © 0999-5761 [employer]. [address] [place] [address]. All rights reserved. This information is not intended as a substitute for professional medical care. Always follow your healthcare professional's instructions.          Healthy Meals for Diabetes     A healthcare provider will help you develop a meal plan that fits your needs.   Ask your healthcare team to help you make a meal plan that fits your needs. Your meal plan tells you when to eat your meals and snacks, what kinds of foods to eat, and how much of each food to eat. You dont have to give up all the foods you like. But you do need to follow some guidelines.  Choose healthy carbohydrates  Starches, sugars, and fiber are all types of carbohydrates. Fiber can help lower your cholesterol and triglycerides. Fiber is also healthy for your heart. You should have 20 to 35 grams of total fiber each day. Fiber-rich foods include:  · Whole-grain breads  and cereals  · Bulgur wheat  · Brown rice     · Whole-wheat pasta  · Fruits and vegetables  · Dry beans, and peas   Keep track of the amount of carbohydrates you eat. This can help you keep the right balance of physical activity and medicine. The amount of carbohydrates needed will vary for each person. It depends on many things such as your health, the medicines you take, and how active you are. Your healthcare team will help you figure out the right amount of carbohydrates for you. You may start with around 45 to 60 grams of carbohydrates per meal, depending on your situation.   Here are some examples of foods containing about 15 grams of carbohydrates (1 serving of carbohydrates):  · 1/2 cup of canned or frozen fruit  · A small piece of fresh fruit (4 ounces)  · 1 slice of bread  · 1/2 cup of oatmeal  · 1/3 cup of rice  · 4 to 6 crackers  · 1/2 English muffin  · 1/2 cup of black beans  · 1/4 of a large baked potato (3 ounces)  · 2/3 cup of plain fat-free yogurt  · 1 cup of soup  · 1/2 cup of casserole  · 6 chicken nuggets  · 2-inch-square brownie or cake without frosting  · 2 small cookies  · 1/2 cup of ice cream or sherbet  Choose healthy protein foods  Eating protein that is low in fat can help you control your weight. It also helps keep your heart healthy. Low-fat protein foods include:  · Fish  · Plant proteins, such as dry beans and peas, nuts, and soy products like tofu and soymilk  · Lean meat with all visible fat removed  · Poultry with the skin removed  · Low-fat or nonfat milk, cheese, and yogurt  Limit unhealthy fats and sugar  Saturated and trans fats are unhealthy for your heart. They raise LDL (bad) cholesterol. Fat is also high in calories, so it can make you gain weight. To cut down on unhealthy fats and sugar, limit these foods:  · Butter or margarine  · Palm and palm kernel oils and coconut oil  · Cream  · Cheese  · Mandujano  · Lunch meats     · Ice cream  · Sweet bakery goods such as pies,  muffins, and donuts  · Jams and jellies  · Candy bars  · Regular sodas   How much to eat  The amount of food you eat affects your blood sugar. It also affects your weight. Your healthcare team will tell you how much of each type of food you should eat.  · Use measuring cups and spoons and a food scale to measure serving sizes.  · Learn what a correct serving size looks like on your plate. This will help when you are away from home and cant measure your servings.  · Eat only the number of servings given on your meal plan for each food. Dont take seconds.  · Learn to read food labels. Be sure to look at serving size, total carbohydrates, fiber, calories, sugar, and saturated and trans fats. Look for healthier alternatives to foods that have added sugar.  · Plan ahead for parties so you can still have a good time without going overboard with unhealthy food choices. Set a good example yourself by bringing a healthy dish to pot lucks.   Choose healthy snacks  When it comes to snacks, we usually think about foods with added sugar and fats. But there are many other options for healthier snack choices. Here are a few snack ideas to choose from:  Snacks with less than 5 grams of carbohydrates  · 1 piece of string cheese  · 3 celery sticks plus 1 tablespoon of peanut butter  · 5 cherry tomatoes plus 1 tablespoon of ranch dressing  · 1 hard-boiled egg  · 1/4 cup of fresh blueberries  ·  5 baby carrots  · 1 cup of light popcorn  · 1/2 cup of sugar-free gelatin  · 15 almonds  Snacks with about 10 to 20 grams of carbohydrates  · 1/3 cup of hummus plus 1 cup of fresh cut nonstarchy vegetables (carrots, green peppers, broccoli, celery, or a combination)  · 1/2 cup of fresh or canned fruit plus 1/4 cup of cottage cheese  · 1/2 cup of tuna salad with 4 crackers  · 2 rice cakes and a tablespoon of peanut butter  · 1 small apple or orange  · 3 cups light popcorn  · 1/2 of a turkey sandwich (1 slice of whole-wheat bread, 2 ounces of  turkey, and mustard)  Portion sizes are important to controlling your blood sugar and staying at a healthy weight. Stock up on healthy snack items so you always have them on hand.  When to eat  Your meal plan will likely include breakfast, lunch, dinner, and some snacks.  · Try to eat your meals and snacks at about the same times each day.  · Eat all your meals and snacks. Skipping a meal or snack can make your blood sugar drop too low. It can also cause you to eat too much at the next meal or snack. Then your blood sugar could get too high.  Date Last Reviewed: 7/1/2016  © 6124-2623 [employer]. [address] [place] [address], [place] [address]. All rights reserved. This information is not intended as a substitute for professional medical care. Always follow your healthcare professional's instructions.        Counseling and Referral of Other Preventative  (Italic type indicates deductible and co-insurance are waived)    Patient Name: Chelly Ortiz  Today's Date: 11/21/2019    Health Maintenance       Date Due Completion Date    Shingles Vaccine (2 of 3) 03/15/2013 1/18/2013    Pneumococcal Vaccine (65+ High/Highest Risk) (2 of 2 - PPSV23) 12/27/2017 11/1/2017    Eye Exam 10/04/2019 10/4/2018    Override on 1/29/2016: (N/S)    Mammogram 11/30/2019 11/30/2018    Hemoglobin A1c 11/30/2019 8/30/2019    TETANUS VACCINE 06/21/2020 6/21/2010    Lipid Panel 08/30/2020 8/30/2019    Foot Exam 10/28/2020 10/28/2019    Override on 8/15/2017: Done    Colonoscopy 12/05/2021 12/5/2018    DEXA SCAN 05/28/2022 5/28/2019        Orders Placed This Encounter   Procedures    Pneumococcal polysaccharide vaccine 23-valent greater than or equal to 3yo subcutaneous/IM    Ambulatory referral/consult to Endocrinology     The following information is provided to all patients.  This information is to help you find resources for any of the problems found today that may be affecting your health:                Living healthy guide:  www.[facility].[place].[facility]      Understanding Diabetes: www.diabetes.org      Eating healthy: www.cdc.gov/healthyweight      CDC home safety checklist: www.cdc.gov/steadi/patient.html      Agency on Aging: www.goea.[place].[facility]      Alcoholics anonymous (AA): www.aa.org      Physical Activity: www.donaldo.nih.gov/le4elay      Tobacco use: www.quitwithusla.org

## 2019-11-20 ENCOUNTER — PATIENT MESSAGE (OUTPATIENT)
Dept: FAMILY MEDICINE | Facility: CLINIC | Age: 69
End: 2019-11-20

## 2019-11-21 PROBLEM — F32.0 MILD MAJOR DEPRESSION: Status: ACTIVE | Noted: 2019-11-21

## 2019-11-21 PROBLEM — E11.9 DM TYPE 2 WITHOUT RETINOPATHY: Status: RESOLVED | Noted: 2018-01-29 | Resolved: 2019-11-21

## 2019-11-21 PROBLEM — E66.01 SEVERE OBESITY WITH BODY MASS INDEX (BMI) OF 35.0 TO 39.9 WITH SERIOUS COMORBIDITY: Status: RESOLVED | Noted: 2019-11-19 | Resolved: 2019-11-21

## 2019-11-21 PROBLEM — N18.30 CKD (CHRONIC KIDNEY DISEASE) STAGE 3, GFR 30-59 ML/MIN: Status: ACTIVE | Noted: 2019-11-21

## 2019-11-22 RX ORDER — LANCETS
EACH MISCELLANEOUS
Qty: 100 EACH | Refills: 0 | Status: SHIPPED | OUTPATIENT
Start: 2019-11-22 | End: 2020-02-05

## 2019-11-22 NOTE — TELEPHONE ENCOUNTER
Called patient, no answer, left VM to call office. Levimir was sent to [facility] on 11/20/19.        ----- Message from Rossana Owen sent at 11/22/2019  2:01 PM CST -----  Pt is calling about her refill for Levimir medication. Please contact pt at [phone]      Thank you!

## 2019-11-23 ENCOUNTER — NURSE TRIAGE (OUTPATIENT)
Dept: ADMINISTRATIVE | Facility: CLINIC | Age: 69
End: 2019-11-23

## 2019-11-23 NOTE — TELEPHONE ENCOUNTER
Short script of one vial Levimir is requested by patient. [phone] [facility] in [place].  on call I informed him of the order on 11/20 that went to [facility]. He has approved I call in a short script of ONE vial of Levimir I have called in the Levimir to [facility] to [phone].

## 2019-11-23 NOTE — TELEPHONE ENCOUNTER
Reason for Disposition   No answer.  First attempt to contact caller.  Follow-up call scheduled within 15 minutes.    Protocols used: NO CONTACT OR DUPLICATE CONTACT CALL-A-AH    Phone just rang, no voicmail.

## 2019-11-25 NOTE — TELEPHONE ENCOUNTER
----- Message from Amor Winter sent at 11/25/2019 11:55 AM CST -----  Contact: [phone] / kejr   Patient states that the medication Levimir still has yet to be filled. Please Advise.

## 2019-11-25 NOTE — TELEPHONE ENCOUNTER
----- Message from Amor Winter sent at 11/25/2019 11:55 AM CST -----  Contact: [phone] / rnoz   Patient states that the medication Levimir still has yet to be filled. Please Advise.

## 2019-11-26 ENCOUNTER — TELEPHONE (OUTPATIENT)
Dept: ADMINISTRATIVE | Facility: OTHER | Age: 69
End: 2019-11-26

## 2019-11-26 ENCOUNTER — PATIENT MESSAGE (OUTPATIENT)
Dept: FAMILY MEDICINE | Facility: CLINIC | Age: 69
End: 2019-11-26

## 2019-11-26 NOTE — TELEPHONE ENCOUNTER
----- Message from Gris Valero sent at 11/26/2019  4:48 PM CST -----  Contact: patient   Patient called because she went to the pharmacy and they told her that the medication did not go through with the insurance and its expensive. They told her to try to go directly to [facility].    She is requesting to try it with [facility]. She needs the medication as soon as possible.     Please call [phone] to discuss today.

## 2019-11-26 NOTE — TELEPHONE ENCOUNTER
----- Message from Madiha Lr sent at 11/26/2019  2:28 PM CST -----  Contact: Pt   Pt needs a refill on insulin detemir U-100 (LEVEMIR) 100 unit/mL injection    called into pharmacy at [id number] [address] AT [facility] [address].     Pt is completley out of medication and would like a refill on medication as soon possible. Pt stated she has called office for over a week.     Pt can be reached at [phone].

## 2019-11-26 NOTE — TELEPHONE ENCOUNTER
----- Message from Madiha Lr sent at 11/26/2019  2:28 PM CST -----  Contact: Pt   Pt needs a refill on insulin detemir U-100 (LEVEMIR) 100 unit/mL injection    called into pharmacy at [facility] #[id number] [facility] [address] AT [facility] [address].     Pt is completley out of medication and would like a refill on medication as soon possible. Pt stated she has called office for over a week.     Pt can be reached at [phone].

## 2019-11-27 ENCOUNTER — PATIENT OUTREACH (OUTPATIENT)
Dept: ADMINISTRATIVE | Facility: OTHER | Age: 69
End: 2019-11-27

## 2019-12-02 ENCOUNTER — TELEPHONE (OUTPATIENT)
Dept: PODIATRY | Facility: CLINIC | Age: 69
End: 2019-12-02

## 2019-12-02 ENCOUNTER — OFFICE VISIT (OUTPATIENT)
Dept: PODIATRY | Facility: CLINIC | Age: 69
End: 2019-12-02
Payer: MEDICARE

## 2019-12-02 ENCOUNTER — HOSPITAL ENCOUNTER (OUTPATIENT)
Dept: RADIOLOGY | Facility: HOSPITAL | Age: 69
Discharge: HOME | End: 2019-12-02
Attending: FAMILY MEDICINE
Payer: MEDICARE

## 2019-12-02 VITALS
HEART RATE: 80 BPM | SYSTOLIC BLOOD PRESSURE: 127 MMHG | HEIGHT: 63 IN | BODY MASS INDEX: 34.38 KG/M2 | WEIGHT: 194 LBS | DIASTOLIC BLOOD PRESSURE: 72 MMHG

## 2019-12-02 DIAGNOSIS — Z12.31 ENCOUNTER FOR SCREENING MAMMOGRAM FOR BREAST CANCER: ICD-10-CM

## 2019-12-02 DIAGNOSIS — E11.49 TYPE II DIABETES MELLITUS WITH NEUROLOGICAL MANIFESTATIONS: Primary | ICD-10-CM

## 2019-12-02 DIAGNOSIS — L85.3 DRY SKIN: ICD-10-CM

## 2019-12-02 PROCEDURE — 99213 OFFICE O/P EST LOW 20 MIN: CPT | Mod: HCNC,S$GLB,, | Performed by: PODIATRIST

## 2019-12-02 PROCEDURE — 1159F PR MEDICATION LIST DOCUMENTED IN MEDICAL RECORD: ICD-10-PCS | Mod: HCNC,S$GLB,, | Performed by: PODIATRIST

## 2019-12-02 PROCEDURE — 3078F DIAST BP <80 MM HG: CPT | Mod: HCNC,CPTII,S$GLB, | Performed by: PODIATRIST

## 2019-12-02 PROCEDURE — 77067 MAMMO DIGITAL SCREENING BILAT WITH TOMOSYNTHESIS_CAD: ICD-10-PCS | Mod: 26,HCNC,, | Performed by: RADIOLOGY

## 2019-12-02 PROCEDURE — 1126F PR PAIN SEVERITY QUANTIFIED, NO PAIN PRESENT: ICD-10-PCS | Mod: HCNC,S$GLB,, | Performed by: PODIATRIST

## 2019-12-02 PROCEDURE — 3074F PR MOST RECENT SYSTOLIC BLOOD PRESSURE < 130 MM HG: ICD-10-PCS | Mod: HCNC,CPTII,S$GLB, | Performed by: PODIATRIST

## 2019-12-02 PROCEDURE — 3046F HEMOGLOBIN A1C LEVEL >9.0%: CPT | Mod: HCNC,CPTII,S$GLB, | Performed by: PODIATRIST

## 2019-12-02 PROCEDURE — 77067 SCR MAMMO BI INCL CAD: CPT | Mod: 26,HCNC,, | Performed by: RADIOLOGY

## 2019-12-02 PROCEDURE — 99999 PR PBB SHADOW E&M-EST. PATIENT-LVL IV: ICD-10-PCS | Mod: PBBFAC,HCNC,, | Performed by: PODIATRIST

## 2019-12-02 PROCEDURE — 77063 MAMMO DIGITAL SCREENING BILAT WITH TOMOSYNTHESIS_CAD: ICD-10-PCS | Mod: 26,HCNC,, | Performed by: RADIOLOGY

## 2019-12-02 PROCEDURE — 77067 SCR MAMMO BI INCL CAD: CPT | Mod: TC,HCNC

## 2019-12-02 PROCEDURE — 3046F PR MOST RECENT HEMOGLOBIN A1C LEVEL > 9.0%: ICD-10-PCS | Mod: HCNC,CPTII,S$GLB, | Performed by: PODIATRIST

## 2019-12-02 PROCEDURE — 1101F PR PT FALLS ASSESS DOC 0-1 FALLS W/OUT INJ PAST YR: ICD-10-PCS | Mod: HCNC,CPTII,S$GLB, | Performed by: PODIATRIST

## 2019-12-02 PROCEDURE — 3078F PR MOST RECENT DIASTOLIC BLOOD PRESSURE < 80 MM HG: ICD-10-PCS | Mod: HCNC,CPTII,S$GLB, | Performed by: PODIATRIST

## 2019-12-02 PROCEDURE — 1159F MED LIST DOCD IN RCRD: CPT | Mod: HCNC,S$GLB,, | Performed by: PODIATRIST

## 2019-12-02 PROCEDURE — 99999 PR PBB SHADOW E&M-EST. PATIENT-LVL IV: CPT | Mod: PBBFAC,HCNC,, | Performed by: PODIATRIST

## 2019-12-02 PROCEDURE — 77063 BREAST TOMOSYNTHESIS BI: CPT | Mod: 26,HCNC,, | Performed by: RADIOLOGY

## 2019-12-02 PROCEDURE — 3074F SYST BP LT 130 MM HG: CPT | Mod: HCNC,CPTII,S$GLB, | Performed by: PODIATRIST

## 2019-12-02 PROCEDURE — 99213 PR OFFICE/OUTPT VISIT, EST, LEVL III, 20-29 MIN: ICD-10-PCS | Mod: HCNC,S$GLB,, | Performed by: PODIATRIST

## 2019-12-02 PROCEDURE — 1126F AMNT PAIN NOTED NONE PRSNT: CPT | Mod: HCNC,S$GLB,, | Performed by: PODIATRIST

## 2019-12-02 PROCEDURE — 1101F PT FALLS ASSESS-DOCD LE1/YR: CPT | Mod: HCNC,CPTII,S$GLB, | Performed by: PODIATRIST

## 2019-12-02 NOTE — PROGRESS NOTES
Subjective:      Patient ID: Chelly Ortiz is a 69 y.o. female.    Chief Complaint: Diabetic Foot Exam (10/19/18 dr gabriel steven)    Chelly is a 69 y.o. female who presents to the clinic upon referral from Dr. Millard ref. provider found  for evaluation and treatment of diabetic feet. Chelly has a past medical history of Allergy, Cataract, DDD (degenerative disc disease), lumbar, Diabetes mellitus, Diabetes mellitus, type 2, GERD (gastroesophageal reflux disease), History of subconjunctival hemorrhage (12/2/11), Hyperlipidemia, Hypertension, IBS (irritable bowel syndrome), Obesity, MYRIAM (obstructive sleep apnea), Rotator cuff impingement syndrome, and Seasonal allergic conjunctivitis. Patient relates no major problem with feet. Only complaints today consist of yearly comprehensive diabetic  foot examination  .    PCP: Gabriel Steven MD    Date Last Seen by PCP:   Chief Complaint   Patient presents with    Diabetic Foot Exam     10/19/18 dr gabriel steven       Hemoglobin A1C   Date Value Ref Range Status   08/30/2019 9.8 (H) 4.0 - 5.6 % Final     Comment:     ADA Screening Guidelines:  5.7-6.4%  Consistent with prediabetes  >or=6.5%  Consistent with diabetes  High levels of fetal hemoglobin interfere with the HbA1C  assay. Heterozygous hemoglobin variants (HbS, HgC, etc)do  not significantly interfere with this assay.   However, presence of multiple variants may affect accuracy.     04/08/2019 8.7 (H) 4.0 - 5.6 % Final     Comment:     ADA Screening Guidelines:  5.7-6.4%  Consistent with prediabetes  >or=6.5%  Consistent with diabetes  High levels of fetal hemoglobin interfere with the HbA1C  assay. Heterozygous hemoglobin variants (HbS, HgC, etc)do  not significantly interfere with this assay.   However, presence of multiple variants may affect accuracy.     11/21/2018 8.1 (H) 4.0 - 5.6 % Final     Comment:     ADA Screening Guidelines:  5.7-6.4%  Consistent with prediabetes  >or=6.5%  Consistent with  diabetes  High levels of fetal hemoglobin interfere with the HbA1C  assay. Heterozygous hemoglobin variants (HbS, HgC, etc)do  not significantly interfere with this assay.   However, presence of multiple variants may affect accuracy.             Review of Systems   Constitution: Negative for chills, decreased appetite and fever.   Cardiovascular: Negative for leg swelling.   Musculoskeletal: Negative for arthritis, joint pain, joint swelling and myalgias.   Gastrointestinal: Negative for nausea and vomiting.   Neurological: Negative for loss of balance, numbness and paresthesias.         Patient Active Problem List   Diagnosis    GERD (gastroesophageal reflux disease)    Hypertension    Hyperlipidemia    MYRIAM (obstructive sleep apnea)    IBS (irritable bowel syndrome)    DDD (degenerative disc disease), lumbar    Rotator cuff impingement syndrome    Insomnia    Anxiety    Hearing loss, sensorineural    Nuclear sclerotic cataract of left eye    Status post cataract extraction and insertion of intraocular lens    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    Diarrhea    Conjunctival lesion    Impingement syndrome of right shoulder    Constipation    History of colon polyps    Hypertension associated with diabetes    Facet arthritis of lumbar region    Uncontrolled type 2 diabetes mellitus with hyperglycemia, without long-term current use of insulin    Hyperlipidemia associated with type 2 diabetes mellitus    Obesity (BMI 30.0-34.9)    Diabetic polyneuropathy associated with type 2 diabetes mellitus    Spondylosis of cervical region without myelopathy or radiculopathy    Cervical myofascial pain syndrome    PCO (posterior capsular opacification), right    Dry eye syndrome    Pseudophakia    Allergic conjunctivitis of both eyes    Decreased ROM of lumbar spine    Decreased strength    Decreased mobility    Dilated bile duct    Personal history of colonic polyps    Vulvar lesion    EIC  "(epidermal inclusion cyst)    Unspecified inflammatory spondylopathy, lumbar region    CKD (chronic kidney disease) stage 3, GFR 30-59 ml/min    Mild major depression       Current Outpatient Medications on File Prior to Visit   Medication Sig Dispense Refill    ACCU-CHEK FASTCLIX LANCET DRUM [facility] USE TO TEST BLOOD SUGAR ONCE DAILY 100 each 0    albuterol (PROVENTIL/VENTOLIN HFA) 90 mcg/actuation inhaler Inhale 1-2 puffs into the lungs every 4 (four) hours as needed for Wheezing. 1 Inhaler 1    amLODIPine (NORVASC) 5 MG tablet Take 1 tablet (5 mg total) by mouth once daily. 90 tablet 3    aspirin (ECOTRIN) 81 MG EC tablet Take 81 mg by mouth once daily.      blood sugar diagnostic Strp 1 strip by Misc.(Non-Drug; Combo Route) route 2 (two) times daily. 100 strip 3    blood-glucose meter kit Use as instructed 1 each 0    cetirizine (ZYRTEC) 10 MG tablet Take 1 tablet (10 mg total) by mouth every evening. 90 tablet 0    citalopram (CELEXA) 10 MG tablet Take 1 tablet (10 mg total) by mouth once daily. 90 tablet 1    ergocalciferol (ERGOCALCIFEROL) 50,000 unit Cap TAKE 1 CAPSULE EVERY 7 DAYS. 12 capsule 1    gabapentin (NEURONTIN) 100 MG capsule TAKE ONE CAPSULE BY MOUTH 3 TIMES A DAY 90 capsule 0    hydrocortisone 2.5 % cream Apply topically 2 (two) times daily. 28 g 0    ibuprofen (ADVIL,MOTRIN) 600 MG tablet Take 1 tablet (600 mg total) by mouth every 6 (six) hours as needed for Pain. 30 tablet 0    insulin detemir U-100 (LEVEMIR) 100 unit/mL injection Inject 20 Units into the skin 2 (two) times daily. 36 mL 0    insulin syr/ndl U100 half jesus (BD INSULIN SYRINGE HALF UNIT) 0.3 mL 31 gauge x 5/16" Syrg use as directed to inject levemir every evening 30 Syringe 0    JANUVIA 100 mg Tab Take 1 tablet (100 mg total) by mouth once daily. 90 tablet 3    lidocaine HCL 2% (XYLOCAINE) 2 % jelly Apply topically once daily. 30 mL 2    losartan (COZAAR) 50 MG tablet Take 1 tablet (50 mg total) by mouth once " "daily. 90 tablet 3    magnesium oxide (MAG-OX) 400 mg tablet Take 1 tablet (400 mg total) by mouth 2 (two) times daily. 180 tablet 3    pen needle, diabetic 31 gauge x 3/16" Ndle USE WITH INSULIN EVERY EVENING 100 each 0    polyethylene glycol (GLYCOLAX) 17 gram PwPk Take 17 g by mouth once daily. 30 each 0    polyethylene glycol (GLYCOLAX) 17 gram/dose powder Take 17 g by mouth once daily. 1 Bottle 11    pravastatin (PRAVACHOL) 10 MG tablet TAKE 1 TABLET EVERY DAY 90 tablet 3    traZODone (DESYREL) 50 MG tablet TAKE 1 TABLET EVERY NIGHT AS NEEDED 90 tablet 3    esomeprazole (NEXIUM) 40 MG capsule Take 1 capsule (40 mg total) by mouth before breakfast. 90 capsule 3     No current facility-administered medications on file prior to visit.        Review of patient's allergies indicates:   Allergen Reactions    Prednisone Other (See Comments)     hipper    Latex      Other reaction(s): Itching    Ace inhibitors Hives     Other reaction(s): Cough    Latex, natural rubber Itching       Past Surgical History:   Procedure Laterality Date    CATARACT EXTRACTION W/  INTRAOCULAR LENS IMPLANT  10/3/13    OD (dr. gardner)     SECTION      x2    CHOLECYSTECTOMY      COLONOSCOPY N/A 2018    Procedure: COLONOSCOPY;  Surgeon: Marie Mejia MD;  Location: [facility] ENDO;  Service: Endoscopy;  Laterality: N/A;    ENDOSCOPIC ULTRASOUND OF UPPER GASTROINTESTINAL TRACT N/A 10/9/2018    Procedure: ULTRASOUND, UPPER GI TRACT, ENDOSCOPIC;  Surgeon: Javy Simpson MD;  Location: [facility] ENDO;  Service: Endoscopy;  Laterality: N/A;    EXCISION OF LESION N/A 2019    Procedure: EXCISION, LESION VULVA;  Surgeon: Liv Odell MD;  Location: [facility] OR;  Service: OB/GYN;  Laterality: N/A;  latex allergy    EYE SURGERY      HYSTERECTOMY      ovaries intact, due to DUB    TUBAL LIGATION         Family History   Problem Relation Age of Onset    Alzheimer's disease Mother     Heart disease Father     Diabetes " "Sister     Deep vein thrombosis Brother     Diabetes Daughter     Cancer Brother         Lung    Lung cancer Brother     Amblyopia Neg Hx     Blindness Neg Hx     Cataracts Neg Hx     Glaucoma Neg Hx     Hypertension Neg Hx     Macular degeneration Neg Hx     Retinal detachment Neg Hx     Strabismus Neg Hx     Stroke Neg Hx     Thyroid disease Neg Hx        Social History     Socioeconomic History    Marital status:      Spouse name: Not on file    Number of children: Not on file    Years of education: Not on file    Highest education level: Not on file   Occupational History    Not on file   Social Needs    Financial resource strain: Not on file    Food insecurity:     Worry: Not on file     Inability: Not on file    Transportation needs:     Medical: No     Non-medical: No   Tobacco Use    Smoking status: Former Smoker     Last attempt to quit: 2/15/1983     Years since quittin.8    Smokeless tobacco: Never Used   Substance and Sexual Activity    Alcohol use: No     Frequency: Never     Binge frequency: Never    Drug use: No    Sexual activity: Yes     Partners: Male   Lifestyle    Physical activity:     Days per week: 3 days     Minutes per session: 30 min    Stress: To some extent   Relationships    Social connections:     Talks on phone: Three times a week     Gets together: Not on file     Attends Baptism service: Not on file     Active member of club or organization: No     Attends meetings of clubs or organizations: Not on file     Relationship status:    Other Topics Concern    Are you pregnant or think you may be? Not Asked    Breast-feeding Not Asked   Social History Narrative    Not on file               Objective:       Vitals:    19 1459   BP: 127/72   Pulse: 80   Weight: 88 kg (194 lb)   Height: 5' 3" (1.6 m)   PainSc: 0-No pain        Physical Exam   Constitutional: She is oriented to person, place, and time. She appears well-developed and " well-nourished.  Non-toxic appearance. She does not have a sickly appearance. No distress.   alert and oriented x 3.    Cardiovascular:   Pulses:       Dorsalis pedis pulses are 2+ on the right side, and 2+ on the left side.        Posterior tibial pulses are 2+ on the right side, and 2+ on the left side.    Capillary refill time is within normal limits. Digital hair present.    Pulmonary/Chest: No respiratory distress.   Musculoskeletal: She exhibits no edema, tenderness or deformity.        Right ankle: Normal. No tenderness. No lateral malleolus, no medial malleolus, no AITFL, no CF ligament and no posterior TFL tenderness found. Achilles tendon exhibits no pain, no defect and normal Gonzalez's test results.        Left ankle: Normal. No tenderness. No lateral malleolus, no medial malleolus, no AITFL, no CF ligament and no posterior TFL tenderness found. Achilles tendon exhibits no pain, no defect and normal Gonzalez's test results.        Right foot: There is no tenderness, no bony tenderness, no swelling, no crepitus and no deformity.        Left foot: There is no tenderness, no bony tenderness, no swelling, no crepitus and no deformity.   Adequate joint range of motion without pain, limitation, nor crepitation Bilateral feet and ankle joints. Muscle strength is 5/5 in all groups bilaterally.         Feet:   Right Foot:   Protective Sensation: 5 sites tested. 5 sites sensed.   Left Foot:   Protective Sensation: 5 sites tested. 5 sites sensed.   Lymphadenopathy:   No palpable lymph nodes   Neurological: She is alert and oriented to person, place, and time. She has normal strength. She displays no atrophy. No sensory deficit.   Light touch present     Skin: Skin is warm, dry and intact. No rash noted. She is not diaphoretic. No cyanosis or erythema. No pallor. Nails show no clubbing.   Skin is of normal turgor.   Normal temperature gradient.  Examination of the skin reveals no evidence of significant rashes, open  lesions, suspicious appearing nevi or other concerning lesions.      Toenails 1-5 bilaterally are neatly trimmed; of normal color and thickness  Xerosis b/l calcaneal rim      Psychiatric: She has a normal mood and affect. Her behavior is normal. Her mood appears not anxious. Her affect is not inappropriate. Her speech is not slurred. She is not combative. She is communicative. She is attentive.   Nursing note and vitals reviewed.            Assessment:       Encounter Diagnoses   Name Primary?    Type II diabetes mellitus with neurological manifestations Yes    Dry skin          Plan:       Chelly was seen today for diabetic foot exam.    Diagnoses and all orders for this visit:    Type II diabetes mellitus with neurological manifestations    Dry skin      I counseled the patient on her conditions, their implications and medical management.      - Shoe inspection. Diabetic Foot Education. Patient reminded of the importance of good nutrition and blood sugar control to help prevent podiatric complications of diabetes. Patient instructed on proper foot hygeine. We discussed wearing proper shoe gear, daily foot inspections, never walking without protective shoe gear, caution putting sharp instruments to feet     - Discussed DM foot care:  Wear comfortable, proper fitting shoes. Wash feet daily. Dry well. After drying, apply moisturizer to feet (no lotion to webspaces). Inspect feet daily for skin breaks, blisters, swelling, or redness. Wear cotton socks (preferably white)  Change socks every day. Do NOT walk barefoot. Do NOT use heating pads or warm/hot water soaks     - Discussed importance of daily moisturizer to the feet such as Gold bonds diabetic foot cream    - Patient is low risk for developing lower extremity issues secondary to diabetes. I recommend continued yearly diabetic foot examinations.     - Patients PCP can perform yearly foot checks . Currently  patient has no pedal manifestations of DM    -  Patients with out pedal manifestations of DM, do not qualify for nail/callus trimming     - RTC in  PRN     .

## 2019-12-02 NOTE — TELEPHONE ENCOUNTER
----- Message from Valentin Porter sent at 12/2/2019  3:59 PM CST -----  Contact: pt   Please call pt at [phone]    Patient has questions regarding the endocrinology referral    Thank you

## 2019-12-03 ENCOUNTER — TELEPHONE (OUTPATIENT)
Dept: PODIATRY | Facility: CLINIC | Age: 69
End: 2019-12-03

## 2019-12-03 NOTE — TELEPHONE ENCOUNTER
----- Message from Lyric Gutierrez sent at 12/3/2019  3:55 PM CST -----  Contact: self  's Nurse    Pt would like to speak with nurse about an endocrinologist she has some questions and needs some additional information.     Contact Info

## 2019-12-03 NOTE — TELEPHONE ENCOUNTER
Dr. Marley Patient will like to know the name of the Endocrinologist because she forgot them one is [facility] and the other one ?

## 2019-12-05 ENCOUNTER — PATIENT OUTREACH (OUTPATIENT)
Dept: ADMINISTRATIVE | Facility: OTHER | Age: 69
End: 2019-12-05

## 2019-12-31 ENCOUNTER — OFFICE VISIT (OUTPATIENT)
Dept: INTERNAL MEDICINE | Facility: CLINIC | Age: 69
End: 2019-12-31
Payer: MEDICARE

## 2019-12-31 VITALS
HEIGHT: 63 IN | WEIGHT: 194 LBS | DIASTOLIC BLOOD PRESSURE: 80 MMHG | BODY MASS INDEX: 34.38 KG/M2 | SYSTOLIC BLOOD PRESSURE: 142 MMHG

## 2019-12-31 DIAGNOSIS — E66.9 OBESITY (BMI 30.0-34.9): ICD-10-CM

## 2019-12-31 DIAGNOSIS — E11.42 DIABETIC POLYNEUROPATHY ASSOCIATED WITH TYPE 2 DIABETES MELLITUS: ICD-10-CM

## 2019-12-31 DIAGNOSIS — N18.30 CKD (CHRONIC KIDNEY DISEASE) STAGE 3, GFR 30-59 ML/MIN: ICD-10-CM

## 2019-12-31 DIAGNOSIS — I15.2 HYPERTENSION ASSOCIATED WITH DIABETES: ICD-10-CM

## 2019-12-31 DIAGNOSIS — M51.36 DDD (DEGENERATIVE DISC DISEASE), LUMBAR: ICD-10-CM

## 2019-12-31 DIAGNOSIS — E11.59 HYPERTENSION ASSOCIATED WITH DIABETES: ICD-10-CM

## 2019-12-31 DIAGNOSIS — E78.5 HYPERLIPIDEMIA ASSOCIATED WITH TYPE 2 DIABETES MELLITUS: ICD-10-CM

## 2019-12-31 DIAGNOSIS — E11.69 HYPERLIPIDEMIA ASSOCIATED WITH TYPE 2 DIABETES MELLITUS: ICD-10-CM

## 2019-12-31 DIAGNOSIS — E11.65 UNCONTROLLED TYPE 2 DIABETES MELLITUS WITH HYPERGLYCEMIA, WITHOUT LONG-TERM CURRENT USE OF INSULIN: Primary | ICD-10-CM

## 2019-12-31 DIAGNOSIS — G47.33 OSA (OBSTRUCTIVE SLEEP APNEA): ICD-10-CM

## 2019-12-31 PROCEDURE — 1101F PT FALLS ASSESS-DOCD LE1/YR: CPT | Mod: HCNC,CPTII,S$GLB, | Performed by: NURSE PRACTITIONER

## 2019-12-31 PROCEDURE — 3079F DIAST BP 80-89 MM HG: CPT | Mod: HCNC,CPTII,S$GLB, | Performed by: NURSE PRACTITIONER

## 2019-12-31 PROCEDURE — 1159F PR MEDICATION LIST DOCUMENTED IN MEDICAL RECORD: ICD-10-PCS | Mod: HCNC,S$GLB,, | Performed by: NURSE PRACTITIONER

## 2019-12-31 PROCEDURE — 99999 PR PBB SHADOW E&M-EST. PATIENT-LVL IV: ICD-10-PCS | Mod: PBBFAC,HCNC,, | Performed by: NURSE PRACTITIONER

## 2019-12-31 PROCEDURE — 3077F PR MOST RECENT SYSTOLIC BLOOD PRESSURE >= 140 MM HG: ICD-10-PCS | Mod: HCNC,CPTII,S$GLB, | Performed by: NURSE PRACTITIONER

## 2019-12-31 PROCEDURE — 99214 PR OFFICE/OUTPT VISIT, EST, LEVL IV, 30-39 MIN: ICD-10-PCS | Mod: HCNC,S$GLB,, | Performed by: NURSE PRACTITIONER

## 2019-12-31 PROCEDURE — 3079F PR MOST RECENT DIASTOLIC BLOOD PRESSURE 80-89 MM HG: ICD-10-PCS | Mod: HCNC,CPTII,S$GLB, | Performed by: NURSE PRACTITIONER

## 2019-12-31 PROCEDURE — 99999 PR PBB SHADOW E&M-EST. PATIENT-LVL IV: CPT | Mod: PBBFAC,HCNC,, | Performed by: NURSE PRACTITIONER

## 2019-12-31 PROCEDURE — 3077F SYST BP >= 140 MM HG: CPT | Mod: HCNC,CPTII,S$GLB, | Performed by: NURSE PRACTITIONER

## 2019-12-31 PROCEDURE — 99214 OFFICE O/P EST MOD 30 MIN: CPT | Mod: HCNC,S$GLB,, | Performed by: NURSE PRACTITIONER

## 2019-12-31 PROCEDURE — 1159F MED LIST DOCD IN RCRD: CPT | Mod: HCNC,S$GLB,, | Performed by: NURSE PRACTITIONER

## 2019-12-31 PROCEDURE — 3046F PR MOST RECENT HEMOGLOBIN A1C LEVEL > 9.0%: ICD-10-PCS | Mod: HCNC,CPTII,S$GLB, | Performed by: NURSE PRACTITIONER

## 2019-12-31 PROCEDURE — 3046F HEMOGLOBIN A1C LEVEL >9.0%: CPT | Mod: HCNC,CPTII,S$GLB, | Performed by: NURSE PRACTITIONER

## 2019-12-31 PROCEDURE — 1101F PR PT FALLS ASSESS DOC 0-1 FALLS W/OUT INJ PAST YR: ICD-10-PCS | Mod: HCNC,CPTII,S$GLB, | Performed by: NURSE PRACTITIONER

## 2019-12-31 RX ORDER — GLIPIZIDE 10 MG/1
10 TABLET, FILM COATED, EXTENDED RELEASE ORAL
Qty: 90 TABLET | Refills: 3 | Status: SHIPPED | OUTPATIENT
Start: 2019-12-31 | End: 2020-01-03

## 2019-12-31 RX ORDER — NAPROXEN SODIUM 220 MG
TABLET ORAL
Qty: 100 EACH | Refills: 3
Start: 2019-12-31 | End: 2022-11-14 | Stop reason: SDUPTHER

## 2019-12-31 NOTE — PROGRESS NOTES
CHIEF COMPLAINT: Type 2 Diabetes     HPI: Mrs. Chelly Ortiz is a 69 y.o. female who was diagnosed with Type 2 DM x 9 years.  H/o PN, CKD 3, MYRIAM, DDD, obesity.  Being seen by me for the first time.  Started insulin x 6 mos.   Pt assistance via novonordisk.-3 vials (90 day)   [facility]- $47 1 vial   [facility] $81 for 1 vial   -300s   Not many hypoglycemia     Lab Results   Component Value Date    HGBA1C 9.8 (H) 08/30/2019     Needs lab work        PREVIOUS DIABETES MEDICATIONS TRIED  Glipizide  glimepiride  januvia  lantus   Metformin -diarrhea     CURRENT DIABETIC MEDS: levemir 20 units bid, januvia 100 mg daily      Pt is monitoring blood glucose readings 2 times a day.  Needs ~100 strips per month related to fluctuations with blood glucose reading, a1c trends, and activity level.    Diabetes Management Status    Statin: Taking  ACE/ARB: Taking    Screening or Prevention Patient's value Goal Complete/Controlled?   HgA1C Testing and Control   Lab Results   Component Value Date    HGBA1C 9.8 (H) 08/30/2019      Annually/Less than 8% No   Lipid profile : 08/30/2019 Annually Yes   LDL control Lab Results   Component Value Date    LDLCALC 156.2 08/30/2019    Annually/Less than 100 mg/dl  No   Nephropathy screening Lab Results   Component Value Date    LABMICR 7.0 01/13/2015     Lab Results   Component Value Date    PROTEINUA Negative 04/21/2019    Annually Yes   Blood pressure BP Readings from Last 1 Encounters:   12/02/19 127/72    Less than 140/90 Yes   Dilated retinal exam : 10/04/2018 Annually No   Foot exam   : 12/02/2019 Annually Yes     REVIEW OF SYSTEMS  General: no weakness, fatigue, + weight changes #3 (gain)   Eyes: no double or blurred vision, eye pain, or redness  Cardiovascular: no chest pain, palpitations, edema, or murmurs.   Respiratory: no cough or dyspnea.   GI: no heartburn, nausea, + changes in bowel patterns; good appetite. +IBS  Skin: no rashes, dryness, itching, or reactions at insulin  "injection sites.  Neuro: + numbness, tingling, tremors, or vertigo.   Endocrine: + polyuria, polydipsia, polyphagia, heat or cold intolerance.     Vital Signs  BP (!) 142/80   Ht 5' 3" (1.6 m)   Wt 88 kg (194 lb)   LMP  (LMP Unknown)   BMI 34.37 kg/m²     Hemoglobin A1C   Date Value Ref Range Status   08/30/2019 9.8 (H) 4.0 - 5.6 % Final     Comment:     ADA Screening Guidelines:  5.7-6.4%  Consistent with prediabetes  >or=6.5%  Consistent with diabetes  High levels of fetal hemoglobin interfere with the HbA1C  assay. Heterozygous hemoglobin variants (HbS, HgC, etc)do  not significantly interfere with this assay.   However, presence of multiple variants may affect accuracy.     04/08/2019 8.7 (H) 4.0 - 5.6 % Final     Comment:     ADA Screening Guidelines:  5.7-6.4%  Consistent with prediabetes  >or=6.5%  Consistent with diabetes  High levels of fetal hemoglobin interfere with the HbA1C  assay. Heterozygous hemoglobin variants (HbS, HgC, etc)do  not significantly interfere with this assay.   However, presence of multiple variants may affect accuracy.     11/21/2018 8.1 (H) 4.0 - 5.6 % Final     Comment:     ADA Screening Guidelines:  5.7-6.4%  Consistent with prediabetes  >or=6.5%  Consistent with diabetes  High levels of fetal hemoglobin interfere with the HbA1C  assay. Heterozygous hemoglobin variants (HbS, HgC, etc)do  not significantly interfere with this assay.   However, presence of multiple variants may affect accuracy.          Chemistry        Component Value Date/Time     08/30/2019 1015    K 3.3 (L) 08/30/2019 1015     08/30/2019 1015    CO2 24 08/30/2019 1015    BUN 13 08/30/2019 1015    CREATININE 1.2 08/30/2019 1015     (H) 08/30/2019 1015        Component Value Date/Time    CALCIUM 9.2 08/30/2019 1015    ALKPHOS 111 08/30/2019 1015    AST 18 08/30/2019 1015    ALT 15 08/30/2019 1015    BILITOT 0.7 08/30/2019 1015    ESTGFRAFRICA 52.9 (A) 08/30/2019 1015    EGFRNONAA 45.9 (A) " 08/30/2019 1015           Lab Results   Component Value Date    TSH 1.436 06/04/2019      Lab Results   Component Value Date    CHOL 237 (H) 08/30/2019    CHOL 233 (H) 04/08/2019    CHOL 219 (H) 11/21/2018     Lab Results   Component Value Date    HDL 57 08/30/2019    HDL 55 04/08/2019    HDL 52 11/21/2018     Lab Results   Component Value Date    LDLCALC 156.2 08/30/2019    LDLCALC 148.0 04/08/2019    LDLCALC 134.0 11/21/2018     Lab Results   Component Value Date    TRIG 119 08/30/2019    TRIG 150 04/08/2019    TRIG 165 (H) 11/21/2018     Lab Results   Component Value Date    CHOLHDL 24.1 08/30/2019    CHOLHDL 23.6 04/08/2019    CHOLHDL 23.7 11/21/2018         PHYSICAL EXAMINATION  Constitutional: Appears well, no distress  Neck: Supple, trachea midline.   Respiratory: CTA without wheezes, even and unlabored.  Cardiovascular: RRR; no edema.   Lymph: deferred   Skin: warm and dry; no injection site reactions, +sl acanthosis nigracans observed.  Neuro:patient alert and cooperative, normal affect; steady gait.    Diabetes Foot Exam:   Deferred    Assessment/Plan    1. Uncontrolled type 2 diabetes mellitus with hyperglycemia, without long-term current use of insulin  Hemoglobin A1c    Ambulatory Referral to Diabetes Education    Renal function panel   2. Diabetic polyneuropathy associated with type 2 diabetes mellitus     3. CKD (chronic kidney disease) stage 3, GFR 30-59 ml/min     4. Hyperlipidemia associated with type 2 diabetes mellitus     5. Hypertension associated with diabetes     6. Obesity (BMI 30.0-34.9)     7. MYRIAM (obstructive sleep apnea)     8. DDD (degenerative disc disease), lumbar       1.-2. F/u in 3 mos w/ me  New contact info given  Discussed vgo 20, 3 clicks with meals, 1 click -w/ snack -defer for now  DE for nutrition, other options  Send message to [facility]    Add glipizide 10 mg XR w/ breakfast  Change levemir to 45 units at night  Continue januvia- discussed glp1a- may be issue with  IBS  Use pt assistance in past   a1c goal less than 7.5%  Carb counting book given, info on nutrition given  Send message to [facility] for levemir -novonordisk issue    3. Avoid hypoglycemia  Reviewed last trend  Add renal function panel to fri lab  4.   Lab Results   Component Value Date    LDLCALC 156.2 08/30/2019     Above goal   On pravastatin  5. Controlled, continue med(s)  6. Body mass index is 34.37 kg/m². may increase insulin resistance   7. May increase insulin resistance  8. May increase insulin resistance    FOLLOW UP  Follow up in about 3 months (around 3/31/2020).

## 2019-12-31 NOTE — PATIENT INSTRUCTIONS
Snacks can be an important part of a balanced, healthy meal plan. They allow you to eat more frequently, feeling full and satisfied throughout the day. Also, they allow you to spread carbohydrates evenly, which may stabilize blood sugars.  Plus, snacks are enjoyable!     The amount of carbohydrate needed at snacks varies. Generally, about 15-30 grams of carbohydrate per snack is recommended.  Below you will find some tasty treats.       0-5 gm carb   Crystal Light   Vitamin Water Zero   Herbal tea, unsweetened   2 tsp peanut butter on celery   1./2 cup sugar-free jell-o   1 sugar-free popsicle   ¼ cup blueberries   8oz Blue Kait unsweetened almond milk   5 baby carrots & celery sticks, cucumbers, bell peppers dipped in ¼ cup salsa, 2Tbsp light ranch dressing or 2Tbsp plain Greek yogurt   10 Goldfish crackers   ½ oz low-fat cheese or string cheese   1 closed handful of nuts, unsalted   1 Tbsp of sunflower seeds, unsalted   1 cup Smart Pop popcorn   1 whole grain brown rice cake        15 gm carb   1 small piece of fruit or ½ banana or 1/2 cup lite canned fruit   3 bora cracker squares   3 cups Smart Pop popcorn, top spray butter, [place] lite salt or cinnamon and Truvia   5 Vanilla Wafers   ½ cup low fat, no added sugar ice cream or frozen yogurt (Blue bell, Blue Bunny, Weight Watchers, Skinny Cow)   ½ turkey, ham, or chicken sandwich   ½ c fruit with ½ c Cottage cheese   4-6 unsalted wheat crackers with 1 oz low fat cheese or 1 tbsp peanut butter    30-45 goldfish crackers (depending on flavor)    7-8 Gnosticism mini brown rice cakes (caramel, apple cinnamon, chocolate)    12 Gnosticism mini brown rice cakes (cheddar, bbq, ranch)    1/3 cup hummus dip with raw veg   1/2 whole wheat yolis, 1Tbsp hummus   Mini Pizza (1/2 whole wheat English muffin, low-fat  cheese, tomato sauce)   100 calorie snack pack (Oreo, Chips Ahoy, Ritz Mix, Baked Cheetos)   4-6 oz. light or Greek Style yogurt  (Jacquie, Edgardo, Tona, [facility])   ½ cup sugar-free pudding     6 in. wheat tortilla or yolis oven toasted chips (topped with spray butter flavoring, cinnamon, Truvia OR spray butter, garlic powder, chili powder)    18 BBQ Popchips (available at [employer], [employer], [employer])                   Dulaglutide injection  What is this medicine?  DULAGLUTIDE (DOO la GLOO tide) is used to improve blood sugar control in adults with type 2 diabetes. This medicine may be used with other oral diabetes medicines.  How should I use this medicine?  This medicine is for injection under the skin of your upper leg (thigh), stomach area, or upper arm. It is usually given once every week (every 7 days). You will be taught how to prepare and give this medicine. Use exactly as directed. Take your medicine at regular intervals. Do not take it more often than directed.  If you use this medicine with insulin, you should inject this medicine and the insulin separately. Do not mix them together. Do not give the injections right next to each other. Change (rotate) injection sites with each injection.  It is important that you put your used needles and syringes in a special sharps container. Do not put them in a trash can. If you do not have a sharps container, call your pharmacist or healthcare provider to get one.  A special MedGuide will be given to you by the pharmacist with each prescription and refill. Be sure to read this information carefully each time.  Talk to your pediatrician regarding the use of this medicine in children. Special care may be needed.  What side effects may I notice from receiving this medicine?  Side effects that you should report to your doctor or health care professional as soon as possible:  · allergic reactions like skin rash, itching or hives, swelling of the face, lips, or tongue  · breathing problems  · signs and symptoms of low blood sugar such as feeling anxious, confusion, dizziness, increased  hunger, unusually weak or tired, sweating, shakiness, cold, irritable, headache, blurred vision, fast heartbeat, loss of consciousness  · unusual stomach upset or pain  · vomiting  Side effects that usually do not require medical attention (Report these to your doctor or health care professional if they continue or are bothersome.):diarrhea  · heartburn  · loss of appetite  · nausea  · pain, redness, or irritation at site where injected  What may interact with this medicine?  Do not take this medicine with any of the following medications:  · gatifloxacin  Many medications may cause changes in blood sugar, these include:  · alcohol containing beverages  · aspirin and aspirin-like drugs  · chloramphenicol  · chromium  · diuretics  · female hormones, such as estrogens or progestins, birth control pills  · heart medicines  · isoniazid  · male hormones or anabolic steroids  · medications for weight loss  · medicines for allergies, asthma, cold, or cough  · medicines for mental problems  · medicines called MAO inhibitors - Nardil, Parnate, Marplan, Eldepryl  · niacin  · NSAIDS, such as ibuprofen  · pentamidine  · phenytoin  · probenecid  · quinolone antibiotics such as ciprofloxacin, levofloxacin, ofloxacin  · some herbal dietary supplements  · steroid medicines such as prednisone or cortisone  · thyroid hormonesSome medications can hide the warning symptoms of low blood sugar (hypoglycemia). You may need to monitor your blood sugar more closely if you are taking one of these medications. These include:  · beta-blockers, often used for high blood pressure or heart problems (examples include atenolol, metoprolol, propranolol)  · clonidine  · guanethidine  · reserpine  What if I miss a dose?  If you miss a dose, take it as soon as you can within 3 days after the missed dose. Then take your next dose at your regular weekly time. If it has been longer than 3 days after the missed dose, do not take the missed dose. Take the  next dose at your regular time. Do not take double or extra doses. If you have questions about a missed dose, contact your health care provider for advice.  Where should I keep my medicine?  Keep out of the reach of children.  Store this medicine in a refrigerator between 2 and 8 degrees C (36 and 46 degrees F). Do not freeze or use if the medicine has been frozen. Protect from light and excessive heat. Each single-dose pen or prefilled syringe can be kept at room temperature, not to exceed 30 degrees C (86 degrees F) for a total of 14 days, if needed. Store in the carton until use. Throw away any unused medicine after the expiration date.  What should I tell my health care provider before I take this medicine?  They need to know if you have any of these conditions:  · endocrine tumors (MEN 2) or if someone in your family had these tumors  · history of pancreatitis  · kidney disease  · liver disease  · stomach problems  · thyroid cancer or if someone in your family had thyroid cancer  · an unusual or allergic reaction to dulaglutide, other medicines, foods, dyes, or preservatives  · pregnant or trying to get pregnant  · breast-feeding  What should I watch for while using this medicine?  Visit your doctor or health care professional for regular checks on your progress.  A test called the HbA1C (A1C) will be monitored. This is a simple blood test. It measures your blood sugar control over the last 2 to 3 months. You will receive this test every 3 to 6 months.  Learn how to check your blood sugar. Learn the symptoms of low and high blood sugar and how to manage them.  Always carry a quick-source of sugar with you in case you have symptoms of low blood sugar. Examples include hard sugar candy or glucose tablets. Make sure others know that you can choke if you eat or drink when you develop serious symptoms of low blood sugar, such as seizures or unconsciousness. They must get medical help at once.  Tell your doctor or  health care professional if you have high blood sugar. You might need to change the dose of your medicine. If you are sick or exercising more than usual, you might need to change the dose of your medicine.  Do not skip meals. Ask your doctor or health care professional if you should avoid alcohol. Many nonprescription cough and cold products contain sugar or alcohol. These can affect blood sugar.  Wear a medical ID bracelet or chain, and carry a card that describes your disease and details of your medicine and dosage times.  NOTE:This sheet is a summary. It may not cover all possible information. If you have questions about this medicine, talk to your doctor, pharmacist, or health care provider. Copyright© 2017 [employer]        Hypoglycemia (Low Blood Sugar)     Fast-acting sugar includes a cup of nonfat milk.     Too little sugar (glucose) in your blood is called hypoglycemia or low blood sugar. Low blood sugar usually means anything lower than 70 mg/dL. Talk with your healthcare provider about your target range and what level is too low for you. Diabetes itself doesnt cause low blood sugar. But some of the treatments for diabetes, such as pills or insulin, may raise your risk for it. Low blood sugar may cause you to pass out or have a seizure. So always treat low blood sugar right away, but don't overeat.  Special note: Always carry a source of fast-acting sugar and a snack in case of hypoglycemia.   What you may notice  If you have low blood sugar, you may have one or more of these symptoms:  · Shakiness or dizziness  · Cold, clammy skin or sweating  · Feelings of hunger  · Headache  · Nervousness  · A hard, fast heartbeat  · Weakness  · Confusion or irritability  · Blurred vision  · Having nightmares or waking up confused or sweating  · Numbness or tingling in the lips or tongue  What you should do  Here are tips to follow if you have hypoglycemia:   · First check your blood sugar. If it is too low (out of  your target range), eat or drink 15 to 20 grams of fast-acting sugar. This may be 3 to 4 glucose tablets, 4 ounces (half a cup) of fruit juice or regular (nondiet) soda, 8 ounces (1 cup) of fat-free milk, or 1 tablespoon of honey. Dont take more than this, or your blood sugar may go too high.  · Wait 15 minutes. Then recheck your blood sugar if you can.  · If your blood sugar is still too low, repeat the steps above and check your blood sugar again. If your blood sugar still has not returned to your target range, contact your healthcare provider or seek emergency care.  · Once your blood sugar returns to target range, eat a snack or meal.  Preventing low blood sugar  Things you can do include the following:   · If your condition needs a strict treatment plan, eat your meals and snacks at the same times each day. Dont skip meals!  · If your treatment plan lets you change when you eat and what you eat, learn how to change the time and dose of your rapid-acting insulin to match this.   · Ask your healthcare provider if it is safe for you to drink alcohol. Never drink on an empty stomach.  · Take your medicine at the prescribed times.  · Always carry a source of fast-acting sugar and a snack when youre away from home.  Other things to do  Additional tips include the following:  · Carry a medical ID card, a compact USB drive, or wear a medical alert bracelet or necklace. It should say that you have diabetes. It should also say what to do if you pass out or have a seizure.  · Make sure your family, friends, and coworkers know the signs of low blood sugar. Tell them what to do if your blood sugar falls very low and you cant treat yourself.  · Keep a glucagon emergency kit handy. Be sure your family, friends, and coworkers know how and when to use it. Check it regularly and replace the glucagon before it expires.  · Talk with your health care team about other things you can do to prevent low blood sugar.     If you  have unexplained hypoglycemia or hypoglycemia several times, call your healthcare provider.   Date Last Reviewed: 5/1/2016  © 6279-7807 [employer]. [address] [place] [address], [place] [address]. All rights reserved. This information is not intended as a substitute for professional medical care. Always follow your healthcare professional's instructions.        Hypoglycemia (Low Blood Sugar)     Fast-acting sugar includes a cup of nonfat milk.     Too little sugar (glucose) in your blood is called hypoglycemia or low blood sugar. Low blood sugar usually means anything lower than 70 mg/dL. Talk with your healthcare provider about your target range and what level is too low for you. Diabetes itself doesnt cause low blood sugar. But some of the treatments for diabetes, such as pills or insulin, may raise your risk for it. Low blood sugar may cause you to pass out or have a seizure. So always treat low blood sugar right away, but don't overeat.  Special note: Always carry a source of fast-acting sugar and a snack in case of hypoglycemia.   What you may notice  If you have low blood sugar, you may have one or more of these symptoms:  · Shakiness or dizziness  · Cold, clammy skin or sweating  · Feelings of hunger  · Headache  · Nervousness  · A hard, fast heartbeat  · Weakness  · Confusion or irritability  · Blurred vision  · Having nightmares or waking up confused or sweating  · Numbness or tingling in the lips or tongue  What you should do  Here are tips to follow if you have hypoglycemia:   · First check your blood sugar. If it is too low (out of your target range), eat or drink 15 to 20 grams of fast-acting sugar. This may be 3 to 4 glucose tablets, 4 ounces (half a cup) of fruit juice or regular (nondiet) soda, 8 ounces (1 cup) of fat-free milk, or 1 tablespoon of honey. Dont take more than this, or your blood sugar may go too high.  · Wait 15 minutes. Then recheck your blood sugar if you can.  · If your  blood sugar is still too low, repeat the steps above and check your blood sugar again. If your blood sugar still has not returned to your target range, contact your healthcare provider or seek emergency care.  · Once your blood sugar returns to target range, eat a snack or meal.  Preventing low blood sugar  Things you can do include the following:   · If your condition needs a strict treatment plan, eat your meals and snacks at the same times each day. Dont skip meals!  · If your treatment plan lets you change when you eat and what you eat, learn how to change the time and dose of your rapid-acting insulin to match this.   · Ask your healthcare provider if it is safe for you to drink alcohol. Never drink on an empty stomach.  · Take your medicine at the prescribed times.  · Always carry a source of fast-acting sugar and a snack when youre away from home.  Other things to do  Additional tips include the following:  · Carry a medical ID card, a compact USB drive, or wear a medical alert bracelet or necklace. It should say that you have diabetes. It should also say what to do if you pass out or have a seizure.  · Make sure your family, friends, and coworkers know the signs of low blood sugar. Tell them what to do if your blood sugar falls very low and you cant treat yourself.  · Keep a glucagon emergency kit handy. Be sure your family, friends, and coworkers know how and when to use it. Check it regularly and replace the glucagon before it expires.  · Talk with your health care team about other things you can do to prevent low blood sugar.     If you have unexplained hypoglycemia or hypoglycemia several times, call your healthcare provider.   Date Last Reviewed: 5/1/2016 © 2000-2017 [employer]. [address] [place] [address], [place] [address]. All rights reserved. This information is not intended as a substitute for professional medical care. Always follow your healthcare professional's  instructions.        Glipizide Extended-release tablets  What is this medicine?  GLIPIZIDE (GLIP i zide) helps to treat type 2 diabetes. It is combined with diet and exercise. The medicine helps your body to use insulin better.  How should I use this medicine?  Take this medicine by mouth. Follow the directions on the prescription label. Swallow the tablets with a drink of water and take with your breakfast. Take your medicine at the same time each day. Do not take more often than directed.  Talk to your pediatrician regarding the use of this medicine in children. Special care may be needed.  Elderly patients over 65 years old may have a stronger reaction and need a smaller dose.  What side effects may I notice from receiving this medicine?  Side effects that you should report to your doctor or health care professional as soon as possible:  · allergic reactions like skin rash, itching or hives, swelling of the face, lips, or tongue  · breathing problems  · dark urine  · fever, chills, sore throat  · signs and symptoms of low blood sugar such as feeling anxious, confusion, dizziness, increased hunger, unusually weak or tired, sweating, shakiness, cold, irritable, headache, blurred vision, fast heartbeat, loss of consciousness  · unusual bleeding or bruising  · yellowing of the eyes or skin  Side effects that usually do not require medical attention (report to your doctor or health care professional if they continue or are bothersome):  · diarrhea  · dizziness  · headache  · heartburn  · nausea  · stomach gas  What may interact with this medicine?  · bosentan  · chloramphenicol  · cisapride  · medicines for fungal or yeast infections  · metoclopramide  · probenecid  · warfarin  Many medications may cause an increase or decrease in blood sugar, these include:  · alcohol containing beverages  · aspirin and aspirin-like drugs  · chloramphenicol  · chromium  · clarithromycin  · female hormones, like estrogens or  progestins and birth control pills  · heart medicines  · isoniazid  · male hormones or anabolic steroids  · medicines for weight loss  · medicines for allergies, asthma, cold, or cough  · medicines for mental problems  · medicines called MAO Inhibitors like Nardil, Parnate, Marplan, Eldepryl  · niacin  · NSAIDs, medicines for pain and inflammation, like ibuprofen or naproxen  · pentamidine  · phenytoin  · probenecid  · quinolone antibiotics like ciprofloxacin, levofloxacin, ofloxacin  · some herbal dietary supplements  · steroid medicines like prednisone or cortisone  · thyroid medicine  · water pills or diuretics  What if I miss a dose?  If you miss a dose, take it as soon as you can. If it is almost time for your next dose, take only that dose. Do not take double or extra doses.  Where should I keep my medicine?  Keep out of the reach of children.  Store at room temperature between 15 to 30 degrees C (59 to 86 degrees F). Protect from moisture and humidity. Throw away any unused medicine after the expiration date.  What should I tell my health care provider before I take this medicine?  They need to know if you have any of these conditions:  · diabetic ketoacidosis  · glucose-6-phosphate dehydrogenase deficiency  · heart disease  · kidney disease  · liver disease  · porphyria  · severe infection or injury  · thyroid disease  · an unusual or allergic reaction to glipizide, sulfa drugs, other medicines, foods, dyes, or preservatives  · pregnant or trying to get pregnant  · breast-feeding  What should I watch for while using this medicine?  Visit your doctor or health care professional for regular checks on your progress.  A test called the HbA1C (A1C) will be monitored. This is a simple blood test. It measures your blood sugar control over the last 2 to 3 months. You will receive this test every 3 to 6 months.  Learn how to check your blood sugar. Learn the symptoms of low and high blood sugar and how to manage  them.  Always carry a quick-source of sugar with you in case you have symptoms of low blood sugar. Examples include hard sugar candy or glucose tablets. Make sure others know that you can choke if you eat or drink when you develop serious symptoms of low blood sugar, such as seizures or unconsciousness. They must get medical help at once.  Tell your doctor or health care professional if you have high blood sugar. You might need to change the dose of your medicine. If you are sick or exercising more than usual, you might need to change the dose of your medicine.  Do not skip meals. Ask your doctor or health care professional if you should avoid alcohol. Many nonprescription cough and cold products contain sugar or alcohol. These can affect blood sugar.  This medicine can make you more sensitive to the sun. Keep out of the sun. If you cannot avoid being in the sun, wear protective clothing and use sunscreen. Do not use sun lamps or tanning beds/booths.  Wear a medical ID bracelet or chain, and carry a card that describes your disease and details of your medicine and dosage times.  NOTE:This sheet is a summary. It may not cover all possible information. If you have questions about this medicine, talk to your doctor, pharmacist, or health care provider. Copyright© 2017 [employer]

## 2020-01-02 ENCOUNTER — TELEPHONE (OUTPATIENT)
Dept: INTERNAL MEDICINE | Facility: CLINIC | Age: 70
End: 2020-01-02

## 2020-01-02 ENCOUNTER — LAB VISIT (OUTPATIENT)
Dept: LAB | Facility: HOSPITAL | Age: 70
End: 2020-01-02
Attending: FAMILY MEDICINE
Payer: MEDICARE

## 2020-01-02 DIAGNOSIS — N18.30 CKD (CHRONIC KIDNEY DISEASE) STAGE 3, GFR 30-59 ML/MIN: ICD-10-CM

## 2020-01-02 DIAGNOSIS — E11.65 TYPE 2 DIABETES MELLITUS WITH HYPERGLYCEMIA, WITHOUT LONG-TERM CURRENT USE OF INSULIN: ICD-10-CM

## 2020-01-02 DIAGNOSIS — I10 ESSENTIAL HYPERTENSION: ICD-10-CM

## 2020-01-02 LAB
ALBUMIN SERPL BCP-MCNC: 3.6 G/DL (ref 3.5–5.2)
ALP SERPL-CCNC: 109 U/L (ref 55–135)
ALT SERPL W/O P-5'-P-CCNC: 14 U/L (ref 10–44)
ANION GAP SERPL CALC-SCNC: 10 MMOL/L (ref 8–16)
AST SERPL-CCNC: 17 U/L (ref 10–40)
BILIRUB SERPL-MCNC: 0.5 MG/DL (ref 0.1–1)
BUN SERPL-MCNC: 10 MG/DL (ref 8–23)
CALCIUM SERPL-MCNC: 9.1 MG/DL (ref 8.7–10.5)
CHLORIDE SERPL-SCNC: 106 MMOL/L (ref 95–110)
CHOLEST SERPL-MCNC: 232 MG/DL (ref 120–199)
CHOLEST/HDLC SERPL: 3.7 {RATIO} (ref 2–5)
CO2 SERPL-SCNC: 27 MMOL/L (ref 23–29)
CREAT SERPL-MCNC: 0.9 MG/DL (ref 0.5–1.4)
EST. GFR  (AFRICAN AMERICAN): >60 ML/MIN/1.73 M^2
EST. GFR  (NON AFRICAN AMERICAN): >60 ML/MIN/1.73 M^2
ESTIMATED AVG GLUCOSE: 275 MG/DL (ref 68–131)
GLUCOSE SERPL-MCNC: 127 MG/DL (ref 70–110)
HBA1C MFR BLD HPLC: 11.2 % (ref 4–5.6)
HDLC SERPL-MCNC: 62 MG/DL (ref 40–75)
HDLC SERPL: 26.7 % (ref 20–50)
LDLC SERPL CALC-MCNC: 137.6 MG/DL (ref 63–159)
NONHDLC SERPL-MCNC: 170 MG/DL
POTASSIUM SERPL-SCNC: 3.3 MMOL/L (ref 3.5–5.1)
PROT SERPL-MCNC: 7.1 G/DL (ref 6–8.4)
SODIUM SERPL-SCNC: 143 MMOL/L (ref 136–145)
TRIGL SERPL-MCNC: 162 MG/DL (ref 30–150)

## 2020-01-02 PROCEDURE — 83036 HEMOGLOBIN GLYCOSYLATED A1C: CPT | Mod: HCNC

## 2020-01-02 PROCEDURE — 36415 COLL VENOUS BLD VENIPUNCTURE: CPT | Mod: HCNC,PO

## 2020-01-02 PROCEDURE — 80053 COMPREHEN METABOLIC PANEL: CPT | Mod: HCNC

## 2020-01-02 PROCEDURE — 80061 LIPID PANEL: CPT | Mod: HCNC

## 2020-01-02 NOTE — TELEPHONE ENCOUNTER
----- Message from Soledad Brothers sent at 1/2/2020  3:42 PM CST -----  Contact: Patient [phone]  Requesting to speak with you regarding the labs from this morning.    Please call and advise.    Thank You

## 2020-01-03 ENCOUNTER — OFFICE VISIT (OUTPATIENT)
Dept: FAMILY MEDICINE | Facility: CLINIC | Age: 70
End: 2020-01-03
Payer: MEDICARE

## 2020-01-03 ENCOUNTER — NURSE TRIAGE (OUTPATIENT)
Dept: ADMINISTRATIVE | Facility: CLINIC | Age: 70
End: 2020-01-03

## 2020-01-03 VITALS
HEIGHT: 63 IN | SYSTOLIC BLOOD PRESSURE: 150 MMHG | BODY MASS INDEX: 34.38 KG/M2 | WEIGHT: 194 LBS | HEART RATE: 89 BPM | DIASTOLIC BLOOD PRESSURE: 78 MMHG | OXYGEN SATURATION: 95 %

## 2020-01-03 DIAGNOSIS — E11.65 UNCONTROLLED TYPE 2 DIABETES MELLITUS WITH HYPERGLYCEMIA: ICD-10-CM

## 2020-01-03 DIAGNOSIS — K83.8 INTRAHEPATIC BILE DUCT DILATION: ICD-10-CM

## 2020-01-03 DIAGNOSIS — K44.9 HIATAL HERNIA: ICD-10-CM

## 2020-01-03 DIAGNOSIS — K21.9 GASTROESOPHAGEAL REFLUX DISEASE WITHOUT ESOPHAGITIS: Primary | ICD-10-CM

## 2020-01-03 DIAGNOSIS — H93.11 TINNITUS AURIUM, RIGHT: ICD-10-CM

## 2020-01-03 PROCEDURE — 3046F PR MOST RECENT HEMOGLOBIN A1C LEVEL > 9.0%: ICD-10-PCS | Mod: HCNC,CPTII,S$GLB, | Performed by: FAMILY MEDICINE

## 2020-01-03 PROCEDURE — 1126F PR PAIN SEVERITY QUANTIFIED, NO PAIN PRESENT: ICD-10-PCS | Mod: HCNC,S$GLB,, | Performed by: FAMILY MEDICINE

## 2020-01-03 PROCEDURE — 1159F PR MEDICATION LIST DOCUMENTED IN MEDICAL RECORD: ICD-10-PCS | Mod: HCNC,S$GLB,, | Performed by: FAMILY MEDICINE

## 2020-01-03 PROCEDURE — 99214 OFFICE O/P EST MOD 30 MIN: CPT | Mod: HCNC,S$GLB,, | Performed by: FAMILY MEDICINE

## 2020-01-03 PROCEDURE — 3077F SYST BP >= 140 MM HG: CPT | Mod: HCNC,CPTII,S$GLB, | Performed by: FAMILY MEDICINE

## 2020-01-03 PROCEDURE — 3078F DIAST BP <80 MM HG: CPT | Mod: HCNC,CPTII,S$GLB, | Performed by: FAMILY MEDICINE

## 2020-01-03 PROCEDURE — 3046F HEMOGLOBIN A1C LEVEL >9.0%: CPT | Mod: HCNC,CPTII,S$GLB, | Performed by: FAMILY MEDICINE

## 2020-01-03 PROCEDURE — 99214 PR OFFICE/OUTPT VISIT, EST, LEVL IV, 30-39 MIN: ICD-10-PCS | Mod: HCNC,S$GLB,, | Performed by: FAMILY MEDICINE

## 2020-01-03 PROCEDURE — 1126F AMNT PAIN NOTED NONE PRSNT: CPT | Mod: HCNC,S$GLB,, | Performed by: FAMILY MEDICINE

## 2020-01-03 PROCEDURE — 1101F PR PT FALLS ASSESS DOC 0-1 FALLS W/OUT INJ PAST YR: ICD-10-PCS | Mod: HCNC,CPTII,S$GLB, | Performed by: FAMILY MEDICINE

## 2020-01-03 PROCEDURE — 3078F PR MOST RECENT DIASTOLIC BLOOD PRESSURE < 80 MM HG: ICD-10-PCS | Mod: HCNC,CPTII,S$GLB, | Performed by: FAMILY MEDICINE

## 2020-01-03 PROCEDURE — 99999 PR PBB SHADOW E&M-EST. PATIENT-LVL IV: ICD-10-PCS | Mod: PBBFAC,HCNC,, | Performed by: FAMILY MEDICINE

## 2020-01-03 PROCEDURE — 3077F PR MOST RECENT SYSTOLIC BLOOD PRESSURE >= 140 MM HG: ICD-10-PCS | Mod: HCNC,CPTII,S$GLB, | Performed by: FAMILY MEDICINE

## 2020-01-03 PROCEDURE — 1101F PT FALLS ASSESS-DOCD LE1/YR: CPT | Mod: HCNC,CPTII,S$GLB, | Performed by: FAMILY MEDICINE

## 2020-01-03 PROCEDURE — 1159F MED LIST DOCD IN RCRD: CPT | Mod: HCNC,S$GLB,, | Performed by: FAMILY MEDICINE

## 2020-01-03 PROCEDURE — 99999 PR PBB SHADOW E&M-EST. PATIENT-LVL IV: CPT | Mod: PBBFAC,HCNC,, | Performed by: FAMILY MEDICINE

## 2020-01-03 RX ORDER — INSULIN ASPART 100 [IU]/ML
10 INJECTION, SOLUTION INTRAVENOUS; SUBCUTANEOUS
Qty: 27 ML | Refills: 3 | Status: SHIPPED | OUTPATIENT
Start: 2020-01-03 | End: 2020-01-14

## 2020-01-03 NOTE — PROGRESS NOTES
Subjective:       Patient ID: Chelly Ortiz is a 69 y.o. female.    Chief Complaint: reflux    69 years old female who came to the clinic with significant reflux for last month.  Patient was previously diagnosed with hiatal hernia and biliary ductal dilatation.  Patient with no follow-up with these medical problems. Last A1c was elevated.  Patient is willing to start insulin with meals.  Patient with ringing in the ear sometimes.  She is requesting evaluation by ENT.    Review of Systems   Constitutional: Negative.    HENT: Positive for tinnitus.    Eyes: Negative.    Respiratory: Negative.    Cardiovascular: Negative.  Negative for chest pain, palpitations and leg swelling.   Gastrointestinal: Negative.    Endocrine: Negative for polydipsia, polyphagia and polyuria.   Genitourinary: Negative.    Musculoskeletal: Negative.    Skin: Negative.    Neurological: Negative.    Psychiatric/Behavioral: Negative.        Objective:      Physical Exam   Constitutional: She is oriented to person, place, and time. She appears well-developed and well-nourished. No distress.   HENT:   Head: Normocephalic and atraumatic.   Right Ear: External ear normal.   Left Ear: External ear normal.   Nose: Nose normal.   Mouth/Throat: Oropharynx is clear and moist. No oropharyngeal exudate.   Eyes: Pupils are equal, round, and reactive to light. Conjunctivae and EOM are normal. Right eye exhibits no discharge. Left eye exhibits no discharge. No scleral icterus.   Neck: Normal range of motion. Neck supple. No JVD present. No tracheal deviation present. No thyromegaly present.   Cardiovascular: Normal rate, regular rhythm, normal heart sounds and intact distal pulses. Exam reveals no gallop and no friction rub.   No murmur heard.  Pulmonary/Chest: Effort normal and breath sounds normal. No stridor. No respiratory distress. She has no wheezes. She has no rales. She exhibits no tenderness.   Abdominal: Soft. Bowel sounds are normal. She exhibits  no distension and no mass. There is no tenderness. There is no rebound and no guarding.   Musculoskeletal: Normal range of motion. She exhibits no edema or tenderness.   Lymphadenopathy:     She has no cervical adenopathy.   Neurological: She is alert and oriented to person, place, and time. She has normal reflexes. No cranial nerve deficit. She exhibits normal muscle tone. Coordination normal.   Skin: Skin is warm and dry. No rash noted. She is not diaphoretic. No erythema. No pallor.   Psychiatric: She has a normal mood and affect. Her behavior is normal. Judgment and thought content normal.       Assessment:       1. Gastroesophageal reflux disease without esophagitis    2. Hiatal hernia    3. Tinnitus aurium, right    4. Intrahepatic bile duct dilation    5. Uncontrolled type 2 diabetes mellitus with hyperglycemia        Plan:         Chelly was seen today for reflux.    Diagnoses and all orders for this visit:    Gastroesophageal reflux disease without esophagitis  -     Ambulatory referral to Gastroenterology    Hiatal hernia  -     Ambulatory referral to Gastroenterology    Tinnitus aurium, right  -     Ambulatory Referral to ENT    Intrahepatic bile duct dilation  -     Ambulatory referral to Gastroenterology    Uncontrolled type 2 diabetes mellitus with hyperglycemia  -     insulin aspart U-100 (NOVOLOG) 100 unit/mL (3 mL) InPn pen; Inject 10 Units into the skin 3 (three) times daily with meals.

## 2020-01-03 NOTE — TELEPHONE ENCOUNTER
She had a change in the dosage of the levemir, now on 45 units at night, was previously on 20u in am and 20u in the pm.  She has been having a funny feeling in her chest, intermittent, since the change in the levemir dosing, approximately 3 days ago.  She also has some episodes of her face getting hot and sweating on her face/head/neck.  She is wondering if it could be related to the change in her levemir or if it could be acid reflux?  Pt is asymptomatic currently, advised she be seen today in clinic, appt given.     Reason for Disposition   Intermittent chest pains persist > 3 days    Additional Information   Negative: Severe difficulty breathing (e.g., struggling for each breath, speaks in single words)   Negative: Passed out (i.e., fainted, collapsed and was not responding)   Negative: Chest pain lasting longer than 5 minutes and ANY of the following:* Over 50 years old* Over 30 years old and at least one cardiac risk factor (i.e., high blood pressure, diabetes, high cholesterol, obesity, smoker or strong family history of heart disease)* Pain is crushing, pressure-like, or heavy * Took nitroglycerin and chest pain was not relieved* History of heart disease (i.e., angina, heart attack, bypass surgery, angioplasty, CHF)   Negative: Visible sweat on face or sweat dripping down face   Negative: Sounds like a life-threatening emergency to the triager   Negative: Followed an injury to chest   Negative: SEVERE chest pain   Negative: Pain also present in shoulder(s) or arm(s) or jaw   Negative: Difficulty breathing   Negative: Cocaine use within last 3 days   Negative: History of prior 'blood clot' in leg or lungs (i.e., deep vein thrombosis, pulmonary embolism)   Negative: Recent illness requiring prolonged bed rest (i.e., immobilization)   Negative: Hip or leg fracture in past 2 months (e.g, or had cast on leg or ankle)   Negative: Major surgery in the past month   Negative: Recent long-distance  travel with prolonged time in car, bus, plane, or train (i.e., within past 2 weeks; 6 or more hours duration)   Negative: Heart beating irregularly or very rapidly   Negative: Chest pain lasting longer than 5 minutes   Negative: Intermittent chest pain and pain has been increasing in severity or frequency   Negative: Dizziness or lightheadedness   Negative: Coughing up blood   Negative: Patient sounds very sick or weak to the triager   Negative: Fever > 100.5 F (38.1 C)    Protocols used: CHEST PAIN-A-OH

## 2020-01-06 ENCOUNTER — TELEPHONE (OUTPATIENT)
Dept: OTOLARYNGOLOGY | Facility: CLINIC | Age: 70
End: 2020-01-06

## 2020-01-06 NOTE — TELEPHONE ENCOUNTER
----- Message from Varsha Huff sent at 1/3/2020  4:00 PM CST -----  Patient has a referral to ENT, can you please schedule thank you

## 2020-01-06 NOTE — TELEPHONE ENCOUNTER
Attempted to call patient to scheduled an appointment due to ringing in the ears referral from Dr Garcia. No answer. Left voicemail to return my call

## 2020-01-07 ENCOUNTER — TELEPHONE (OUTPATIENT)
Dept: FAMILY MEDICINE | Facility: CLINIC | Age: 70
End: 2020-01-07

## 2020-01-08 ENCOUNTER — PATIENT OUTREACH (OUTPATIENT)
Dept: ADMINISTRATIVE | Facility: OTHER | Age: 70
End: 2020-01-08

## 2020-01-13 ENCOUNTER — TELEPHONE (OUTPATIENT)
Dept: PAIN MEDICINE | Facility: CLINIC | Age: 70
End: 2020-01-13

## 2020-01-13 ENCOUNTER — PATIENT MESSAGE (OUTPATIENT)
Dept: PAIN MEDICINE | Facility: CLINIC | Age: 70
End: 2020-01-13

## 2020-01-13 NOTE — TELEPHONE ENCOUNTER
Returned call to patient regarding rescheduling her appointment. Patient put me on hold for over 6 minutes. I rescheduled her appointment to see Tani on 1/14/20 at 9:30. I wrote patient who is active on portal and informed her.     ----- Message from Earlene Lyle sent at 1/13/2020 11:04 AM CST -----  Contact: pt  Pt called to see about rescheduling her appt but there is nothing farida until 2/5/2020 she startes she cannot waitn that nila she had an appt 1/13/2020 and was called to say she needed to reschule that appt    Pt can be reached at [phone]

## 2020-01-14 ENCOUNTER — TELEPHONE (OUTPATIENT)
Dept: FAMILY MEDICINE | Facility: CLINIC | Age: 70
End: 2020-01-14

## 2020-01-14 ENCOUNTER — PATIENT MESSAGE (OUTPATIENT)
Dept: FAMILY MEDICINE | Facility: CLINIC | Age: 70
End: 2020-01-14

## 2020-01-14 DIAGNOSIS — Z79.4 TYPE 2 DIABETES MELLITUS WITH HYPERGLYCEMIA, WITH LONG-TERM CURRENT USE OF INSULIN: Primary | ICD-10-CM

## 2020-01-14 DIAGNOSIS — E11.65 TYPE 2 DIABETES MELLITUS WITH HYPERGLYCEMIA, WITH LONG-TERM CURRENT USE OF INSULIN: Primary | ICD-10-CM

## 2020-01-14 RX ORDER — SITAGLIPTIN 100 MG/1
TABLET, FILM COATED ORAL
Qty: 90 TABLET | Refills: 3 | Status: SHIPPED | OUTPATIENT
Start: 2020-01-14 | End: 2020-02-18

## 2020-01-16 ENCOUNTER — TELEPHONE (OUTPATIENT)
Dept: FAMILY MEDICINE | Facility: CLINIC | Age: 70
End: 2020-01-16

## 2020-01-16 DIAGNOSIS — E11.65 TYPE 2 DIABETES MELLITUS WITH HYPERGLYCEMIA, WITH LONG-TERM CURRENT USE OF INSULIN: ICD-10-CM

## 2020-01-16 DIAGNOSIS — Z79.4 TYPE 2 DIABETES MELLITUS WITH HYPERGLYCEMIA, WITH LONG-TERM CURRENT USE OF INSULIN: ICD-10-CM

## 2020-01-16 NOTE — TELEPHONE ENCOUNTER
Called and spoke w/ patient. She has already received the insulin from the pharmacy.        ----- Message from Jody Aviles MD sent at 1/16/2020  3:03 PM CST -----  Contact: [facility]      ----- Message -----  From: Varsha Mayer  Sent: 1/16/2020   2:38 PM CST  To: tSefan JOHNSON Staff    Type: Patient Call Back    Who called:[facility]    What is the request in detail:[facility] called to request a new Rx for patient's insulin vials. Please call in    Can the clinic reply by [facility]?    Would the patient rather a call back or a response via [facility]? Call    Best call back number:[phone]

## 2020-01-17 ENCOUNTER — PATIENT OUTREACH (OUTPATIENT)
Dept: ADMINISTRATIVE | Facility: OTHER | Age: 70
End: 2020-01-17

## 2020-01-17 NOTE — TELEPHONE ENCOUNTER
Refill was done.    Please check with the pharmacy if they received the medication or any additional problems.

## 2020-01-17 NOTE — TELEPHONE ENCOUNTER
----- Message from Jody Aviles MD sent at 1/16/2020  3:03 PM CST -----  Contact: [facility]      ----- Message -----  From: Varsha Mayer  Sent: 1/16/2020   2:38 PM CST  To: Stefan JOHNSON Staff    Type: Patient Call Back    Who called:[facility]y    What is the request in detail:[facility] called to request a new Rx for patient's insulin vials. Please call in    Can the clinic reply by [facility]?    Would the patient rather a call back or a response via [facility]? Call    Best call back number:[phone]

## 2020-01-20 ENCOUNTER — CLINICAL SUPPORT (OUTPATIENT)
Dept: DIABETES | Facility: CLINIC | Age: 70
End: 2020-01-20
Payer: MEDICARE

## 2020-01-20 ENCOUNTER — TELEPHONE (OUTPATIENT)
Dept: PHARMACY | Facility: CLINIC | Age: 70
End: 2020-01-20

## 2020-01-20 DIAGNOSIS — E11.65 UNCONTROLLED TYPE 2 DIABETES MELLITUS WITH HYPERGLYCEMIA, WITHOUT LONG-TERM CURRENT USE OF INSULIN: ICD-10-CM

## 2020-01-20 PROCEDURE — 99999 PR PBB SHADOW E&M-EST. PATIENT-LVL II: CPT | Mod: PBBFAC,HCNC,, | Performed by: DIETITIAN, REGISTERED

## 2020-01-20 PROCEDURE — G0108 DIAB MANAGE TRN  PER INDIV: HCPCS | Mod: HCNC,S$GLB,, | Performed by: DIETITIAN, REGISTERED

## 2020-01-20 PROCEDURE — 99999 PR PBB SHADOW E&M-EST. PATIENT-LVL II: ICD-10-PCS | Mod: PBBFAC,HCNC,, | Performed by: DIETITIAN, REGISTERED

## 2020-01-20 PROCEDURE — G0108 PR DIAB MANAGE TRN  PER INDIV: ICD-10-PCS | Mod: HCNC,S$GLB,, | Performed by: DIETITIAN, REGISTERED

## 2020-01-20 RX ORDER — PEN NEEDLE, DIABETIC 30 GX3/16"
NEEDLE, DISPOSABLE MISCELLANEOUS
Qty: 400 EACH | Refills: 3 | Status: SHIPPED | OUTPATIENT
Start: 2020-01-20 | End: 2020-03-04 | Stop reason: SDUPTHER

## 2020-01-20 RX ORDER — INSULIN ASPART 100 [IU]/ML
INJECTION, SOLUTION INTRAVENOUS; SUBCUTANEOUS
Qty: 20 ML | Refills: 6 | Status: SHIPPED | OUTPATIENT
Start: 2020-01-20 | End: 2020-03-04

## 2020-01-20 RX ORDER — INSULIN ASPART 100 [IU]/ML
INJECTION, SOLUTION INTRAVENOUS; SUBCUTANEOUS
Qty: 3 BOX | Refills: 2 | Status: SHIPPED | OUTPATIENT
Start: 2020-01-20 | End: 2020-01-20

## 2020-01-20 NOTE — PROGRESS NOTES
Diabetes Education  Author: Susana Robbins RD, CDE  Date: 1/20/2020    Diabetes Care Management Summary  Diabetes Education Record Assessment/Progress: Initial     Last A1c:   Lab Results   Component Value Date    HGBA1C 11.2 (H) 01/02/2020     Last Visit with Diabetes Educator: : 01/20/2020  Last OPCM Referral: : Not Found   Enrolled in OPCM: No    Diabetes Type  Diabetes Type : Type II    Diabetes History  Diabetes Diagnosis: 5-10 years  Current Treatment: Oral Medication, Insulin(Denies missing doses of medication; noticed U-500 was ordered from PCP, but looks like Novolog should have been ordered; ENDO NP ordered it, but now patient might be interested in VGO)    Health Maintenance was reviewed today with patient. Discussed with patient importance of routine eye exams, foot exams/foot care, blood work (i.e.: A1c, microalbumin, and lipid), dental visits, yearly flu vaccine, and pneumonia vaccine as indicated by PCP. Patient verbalized understanding.     Health Maintenance Topics with due status: Not Due       Topic Last Completion Date    TETANUS VACCINE 06/21/2010    Colonoscopy 12/05/2018    DEXA SCAN 05/28/2019    Mammogram 12/02/2019    Foot Exam 12/31/2019    Lipid Panel 01/02/2020    Hemoglobin A1c 01/02/2020     Health Maintenance Due   Topic Date Due    Eye Exam  10/04/2019     Nutrition  Meal Planning: 3 meals per day, snacks between meal, water, drinks regular soda, eats out seldom  What type of sweetener do you use?: sugar  What type of beverages do you drink?: other (see comments), water, milk(Coffee, water and tea, almond milk)    Monitoring   Self Monitoring : (Checks once a day in the morning)  Blood Glucose Logs: No  Do you use a personal continuous glucose monitor?: No  In the last month, how often have you had a low blood sugar reaction?: never  What are your symptoms of low blood sugar?: (N/A)  How do you treat low blood sugar?: (N/A)  Can you tell when your blood sugar is too high?: no  How do  you treat high blood sugar?: (None)    Exercise   Exercise Type: walking(Some walking)    Current Diabetes Treatment   Current Treatment: Oral Medication, Insulin(Denies missing doses of medication; noticed U-500 was ordered from PCP, but looks like Novolog should have been ordered; ENDO NP ordered it, but now patient might be interested in VGO)    Social History  Preferred Learning Method: Face to Face  Primary Support: Self  Smoking Status: Ex Smoker  Alcohol Use: Never    Barriers to Change  Barriers to Change: Financial  Learning Challenges : None    Readiness to Learn   Readiness to Learn : Acceptance    Cultural Influences  Cultural Influences: No    Diabetes Education Assessment/Progress  Diabetes Disease Process (diabetes disease process and treatment options): Instructed, Discussion, Individual Session, Written Materials Provided, Demonstrates Understanding/Competency(verbalizes/demonstrates)  Nutrition (Incorporating nutritional management into one's lifestyle): Instructed, Discussion, Individual Session, Written Materials Provided, Demonstrates Understanding/Competency (verbalizes/demonstrates)  Physical Activity (incorporating physical activity into one's lifestyle): Instructed, Discussion, Individual Session, Written Materials Provided, Demonstrates Understanding/Competency (verbalizes/demonstrates)  Medications (states correct name, dose, onset, peak, duration, side effects & timing of meds): Instructed, Discussion, Individual Session, Written Materials Provided, Demonstrates Understanding/Competency(verbalizes/demonstrates)(Discussed the possible use of VGO in place of MDI (Novolog was being added AC and patient did not like idea of 4 injections a day); patient is possibly interested - it will depend on cost; ENDO NP notified and will send Rx to [facility])  Monitoring (monitoring blood glucose/other parameters & using results): Not Covered/Deferred(Time constraints)  Acute Complications  (preventing, detecting, and treating acute complications): Not Covered/Deferred(Time constraints)  Chronic Complications (preventing, detecting, and treating chronic complications): Not Covered/Deferred(Time constraints)  Clinical (diabetes, other pertinent medical history, and relevant comorbidities reviewed during visit): Not Covered/Deferred(Time constraints)  Cognitive (knowledge of self-management skills, functional health literacy): Not Covered/Deferred(Time constraints)  Psychosocial (emotional response to diabetes): Not Covered/Deferred(Time constraints)  Diabetes Distress and Support Systems: Not Covered/Deferred(Time constraints)  Behavioral (readiness for change, lifestyle practices, self-care behaviors): Not Covered/Deferred(Time constraints)    Goals  Patient has selected/evaluated goals during today's session: No(Time constraints)    Diabetes Care Plan/Intervention  Education Plan/Intervention: Individual Follow-Up DSMT(Follow up to be determined (if patient gets VGO, will schedule for training; if not,will have come back for further DE and training on Novolog)    Diabetes Meal Plan  Carbohydrate Per Meal: 30-45g  Carbohydrate Per Snack : 7-15g    Today's Self-Management Care Plan was developed with the patient's input and is based on barriers identified during today's assessment.    The long and short-term goals in the care plan were written with the patient/caregiver's input. The patient has agreed to work toward these goals to improve her overall diabetes control.      The patient received a copy of today's self-management plan and verbalized understanding of the care plan, goals, and all of today's instructions.      The patient was encouraged to communicate with her physician and care team regarding her condition(s) and treatment.  I provided the patient with my contact information today and encouraged her to contact me via phone or patient portal as needed.     Education Units of Time   Time  Spent: 60 min

## 2020-01-20 NOTE — TELEPHONE ENCOUNTER
I'm sorry but in order for a patient to be approved with [employer] they must spend an out of pocket expense of $1000 first.  Also, according to the notes Le have already contacted and  explain this to the patient.

## 2020-01-20 NOTE — TELEPHONE ENCOUNTER
No u-500   Start novolog flexpen 10 units ac  On levemir 40-45 units at night  DE scheduled today  Has f/u scheduled  3/2020

## 2020-01-24 ENCOUNTER — TELEPHONE (OUTPATIENT)
Dept: INTERNAL MEDICINE | Facility: CLINIC | Age: 70
End: 2020-01-24

## 2020-01-24 NOTE — TELEPHONE ENCOUNTER
Patient referred by Benja PENG for Health coaching (DM, lifestyle changes).  Called patient and gave an explanation about Health Coaching program and invited participation.   Patient states she would like to participate.  Set appointment for 2.18.20.   Gave direct contact number to call if she has any questions [phone].   Kelsie Hoffman RN  Health

## 2020-01-24 NOTE — TELEPHONE ENCOUNTER
----- Message from EDUARDO Escobar, GINETTE sent at 12/31/2019 11:28 AM CST -----  Jasson Iglesias  Pt will like a health - she is fine with you calling her.  Thanks so much,  Bindu

## 2020-01-24 NOTE — TELEPHONE ENCOUNTER
Called patient left message, intro self, referred by Dr. REN Eagle FNP.   Request call back at direct number [phone].  Kelsie Hoffman RN HC

## 2020-01-27 ENCOUNTER — TELEPHONE (OUTPATIENT)
Dept: DERMATOLOGY | Facility: CLINIC | Age: 70
End: 2020-01-27

## 2020-01-27 ENCOUNTER — PATIENT OUTREACH (OUTPATIENT)
Dept: ADMINISTRATIVE | Facility: OTHER | Age: 70
End: 2020-01-27

## 2020-01-27 NOTE — TELEPHONE ENCOUNTER
----- Message from Swati Galeana sent at 1/27/2020  3:19 PM CST -----  Contact: [phone]  Calling for sooner appointment than scheduled on 3/11/2020. Please call

## 2020-01-28 ENCOUNTER — HOSPITAL ENCOUNTER (OUTPATIENT)
Dept: RADIOLOGY | Facility: HOSPITAL | Age: 70
Discharge: HOME | End: 2020-01-28
Attending: PHYSICIAN ASSISTANT
Payer: MEDICARE

## 2020-01-28 ENCOUNTER — TELEPHONE (OUTPATIENT)
Dept: INTERNAL MEDICINE | Facility: CLINIC | Age: 70
End: 2020-01-28

## 2020-01-28 ENCOUNTER — OFFICE VISIT (OUTPATIENT)
Dept: INTERNAL MEDICINE | Facility: CLINIC | Age: 70
End: 2020-01-28
Payer: MEDICARE

## 2020-01-28 ENCOUNTER — PATIENT MESSAGE (OUTPATIENT)
Dept: INTERNAL MEDICINE | Facility: CLINIC | Age: 70
End: 2020-01-28

## 2020-01-28 VITALS
BODY MASS INDEX: 34.45 KG/M2 | HEIGHT: 63 IN | WEIGHT: 194.44 LBS | OXYGEN SATURATION: 97 % | HEART RATE: 95 BPM | DIASTOLIC BLOOD PRESSURE: 74 MMHG | SYSTOLIC BLOOD PRESSURE: 136 MMHG | TEMPERATURE: 99 F

## 2020-01-28 DIAGNOSIS — M53.86 DECREASED ROM OF LUMBAR SPINE: ICD-10-CM

## 2020-01-28 DIAGNOSIS — E11.59 HYPERTENSION ASSOCIATED WITH DIABETES: ICD-10-CM

## 2020-01-28 DIAGNOSIS — M19.90 ARTHRITIS: ICD-10-CM

## 2020-01-28 DIAGNOSIS — E11.69 HYPERLIPIDEMIA ASSOCIATED WITH TYPE 2 DIABETES MELLITUS: ICD-10-CM

## 2020-01-28 DIAGNOSIS — M46.96 UNSPECIFIED INFLAMMATORY SPONDYLOPATHY, LUMBAR REGION: ICD-10-CM

## 2020-01-28 DIAGNOSIS — E11.65 UNCONTROLLED TYPE 2 DIABETES MELLITUS WITH HYPERGLYCEMIA, WITHOUT LONG-TERM CURRENT USE OF INSULIN: ICD-10-CM

## 2020-01-28 DIAGNOSIS — G89.29 CHRONIC LEFT SHOULDER PAIN: ICD-10-CM

## 2020-01-28 DIAGNOSIS — E78.5 HYPERLIPIDEMIA ASSOCIATED WITH TYPE 2 DIABETES MELLITUS: ICD-10-CM

## 2020-01-28 DIAGNOSIS — F32.0 MILD MAJOR DEPRESSION: ICD-10-CM

## 2020-01-28 DIAGNOSIS — R26.89 DECREASED MOBILITY: ICD-10-CM

## 2020-01-28 DIAGNOSIS — M25.551 PAIN OF BOTH HIP JOINTS: Primary | ICD-10-CM

## 2020-01-28 DIAGNOSIS — M25.512 CHRONIC LEFT SHOULDER PAIN: ICD-10-CM

## 2020-01-28 DIAGNOSIS — I15.2 HYPERTENSION ASSOCIATED WITH DIABETES: ICD-10-CM

## 2020-01-28 DIAGNOSIS — M25.551 PAIN OF BOTH HIP JOINTS: ICD-10-CM

## 2020-01-28 DIAGNOSIS — M75.41 ROTATOR CUFF IMPINGEMENT SYNDROME OF RIGHT SHOULDER: ICD-10-CM

## 2020-01-28 DIAGNOSIS — M25.552 PAIN OF BOTH HIP JOINTS: ICD-10-CM

## 2020-01-28 DIAGNOSIS — M25.552 PAIN OF BOTH HIP JOINTS: Primary | ICD-10-CM

## 2020-01-28 PROBLEM — N18.30 CKD (CHRONIC KIDNEY DISEASE) STAGE 3, GFR 30-59 ML/MIN: Status: RESOLVED | Noted: 2019-11-21 | Resolved: 2020-01-28

## 2020-01-28 PROBLEM — N90.89 VULVAR LESION: Status: RESOLVED | Noted: 2019-02-13 | Resolved: 2020-01-28

## 2020-01-28 PROCEDURE — 73030 XR SHOULDER TRAUMA 3 VIEW LEFT: ICD-10-PCS | Mod: 26,HCNC,LT, | Performed by: RADIOLOGY

## 2020-01-28 PROCEDURE — 3078F PR MOST RECENT DIASTOLIC BLOOD PRESSURE < 80 MM HG: ICD-10-PCS | Mod: HCNC,CPTII,S$GLB, | Performed by: PHYSICIAN ASSISTANT

## 2020-01-28 PROCEDURE — 73521 XR HIPS BILATERAL 2 VIEW INCL AP PELVIS: ICD-10-PCS | Mod: 26,HCNC,, | Performed by: RADIOLOGY

## 2020-01-28 PROCEDURE — 1159F PR MEDICATION LIST DOCUMENTED IN MEDICAL RECORD: ICD-10-PCS | Mod: HCNC,S$GLB,, | Performed by: PHYSICIAN ASSISTANT

## 2020-01-28 PROCEDURE — 73521 X-RAY EXAM HIPS BI 2 VIEWS: CPT | Mod: 26,HCNC,, | Performed by: RADIOLOGY

## 2020-01-28 PROCEDURE — 3046F PR MOST RECENT HEMOGLOBIN A1C LEVEL > 9.0%: ICD-10-PCS | Mod: HCNC,CPTII,S$GLB, | Performed by: PHYSICIAN ASSISTANT

## 2020-01-28 PROCEDURE — 3075F PR MOST RECENT SYSTOLIC BLOOD PRESS GE 130-139MM HG: ICD-10-PCS | Mod: HCNC,CPTII,S$GLB, | Performed by: PHYSICIAN ASSISTANT

## 2020-01-28 PROCEDURE — 73030 X-RAY EXAM OF SHOULDER: CPT | Mod: 26,HCNC,LT, | Performed by: RADIOLOGY

## 2020-01-28 PROCEDURE — 1125F PR PAIN SEVERITY QUANTIFIED, PAIN PRESENT: ICD-10-PCS | Mod: HCNC,S$GLB,, | Performed by: PHYSICIAN ASSISTANT

## 2020-01-28 PROCEDURE — 73521 X-RAY EXAM HIPS BI 2 VIEWS: CPT | Mod: TC,HCNC

## 2020-01-28 PROCEDURE — 99499 UNLISTED E&M SERVICE: CPT | Mod: HCNC,S$GLB,, | Performed by: PHYSICIAN ASSISTANT

## 2020-01-28 PROCEDURE — 99999 PR PBB SHADOW E&M-EST. PATIENT-LVL IV: CPT | Mod: PBBFAC,HCNC,, | Performed by: PHYSICIAN ASSISTANT

## 2020-01-28 PROCEDURE — 1101F PT FALLS ASSESS-DOCD LE1/YR: CPT | Mod: HCNC,CPTII,S$GLB, | Performed by: PHYSICIAN ASSISTANT

## 2020-01-28 PROCEDURE — 99214 OFFICE O/P EST MOD 30 MIN: CPT | Mod: HCNC,S$GLB,, | Performed by: PHYSICIAN ASSISTANT

## 2020-01-28 PROCEDURE — 99999 PR PBB SHADOW E&M-EST. PATIENT-LVL IV: ICD-10-PCS | Mod: PBBFAC,HCNC,, | Performed by: PHYSICIAN ASSISTANT

## 2020-01-28 PROCEDURE — 99499 RISK ADDL DX/OHS AUDIT: ICD-10-PCS | Mod: HCNC,S$GLB,, | Performed by: PHYSICIAN ASSISTANT

## 2020-01-28 PROCEDURE — 1159F MED LIST DOCD IN RCRD: CPT | Mod: HCNC,S$GLB,, | Performed by: PHYSICIAN ASSISTANT

## 2020-01-28 PROCEDURE — 3078F DIAST BP <80 MM HG: CPT | Mod: HCNC,CPTII,S$GLB, | Performed by: PHYSICIAN ASSISTANT

## 2020-01-28 PROCEDURE — 3046F HEMOGLOBIN A1C LEVEL >9.0%: CPT | Mod: HCNC,CPTII,S$GLB, | Performed by: PHYSICIAN ASSISTANT

## 2020-01-28 PROCEDURE — 3075F SYST BP GE 130 - 139MM HG: CPT | Mod: HCNC,CPTII,S$GLB, | Performed by: PHYSICIAN ASSISTANT

## 2020-01-28 PROCEDURE — 73030 X-RAY EXAM OF SHOULDER: CPT | Mod: TC,HCNC,LT

## 2020-01-28 PROCEDURE — 1101F PR PT FALLS ASSESS DOC 0-1 FALLS W/OUT INJ PAST YR: ICD-10-PCS | Mod: HCNC,CPTII,S$GLB, | Performed by: PHYSICIAN ASSISTANT

## 2020-01-28 PROCEDURE — 99214 PR OFFICE/OUTPT VISIT, EST, LEVL IV, 30-39 MIN: ICD-10-PCS | Mod: HCNC,S$GLB,, | Performed by: PHYSICIAN ASSISTANT

## 2020-01-28 PROCEDURE — 1125F AMNT PAIN NOTED PAIN PRSNT: CPT | Mod: HCNC,S$GLB,, | Performed by: PHYSICIAN ASSISTANT

## 2020-01-28 RX ORDER — MELOXICAM 7.5 MG/1
7.5 TABLET ORAL DAILY
Qty: 30 TABLET | Refills: 0 | Status: SHIPPED | OUTPATIENT
Start: 2020-01-28 | End: 2020-07-14

## 2020-01-28 NOTE — TELEPHONE ENCOUNTER
----- Message from RENÉ Pisano sent at 1/28/2020  4:16 PM CST -----  Please call patient with x-ray results.  Her shoulder shows arthritis but is otherwise normal.  Her hips show significant joint space loss with bone spur formation and this is likely the pain that she is feeling she continues to have degenerative changes in arthritis in her lower back as well as throughout her pelvis.  I would recommend that she see Orthopedics for the shoulder as well as the hip pain if the Mobic does not give her any relief over the next few days.

## 2020-01-28 NOTE — PROGRESS NOTES
Subjective:       Patient ID: Chelly Ortiz is a 69 y.o. female.    Chief Complaint: Shoulder Pain and Hip Pain    Patient presents to the office chronic bilateral shoulder pain as well as chronic bilateral hip pain. In regards to the shoulder pain she states the symptoms have been present for years but seemed to have become worse in the left shoulder over the last 2 weeks.  She denies any injury or fall.  She states she does have problems with her rotator cuff in the right shoulder.  She complains of pain on lifting and some limited range of movement above the horizontal plane.  She denies any numbness or tingling in the arm.    She states over the last 6 weeks she has also developed a pain in the left buttock that goes into the upper thigh.  She states yesterday she was walking and it felt like both of her hip joints were very heavy.  She denies any muscle cramping.  She describes the pain as sharp and intermittent in both locations.  She has been taking Advil and alternating with Tylenol with no relief.  She has had a history of extensive x-rays and was evaluated by pain management last year.    She is also on pravastatin 10 mg and has been for some time.  She denies any muscle aches.  She also has uncontrolled diabetes and her last A1c was 11.7.  She is being started on a new insulin regimen.    Patient Active Problem List   Diagnosis    GERD (gastroesophageal reflux disease)    MYRIAM (obstructive sleep apnea)    IBS (irritable bowel syndrome)    DDD (degenerative disc disease), lumbar    Rotator cuff impingement syndrome    Insomnia    Anxiety    Hearing loss, sensorineural    Nuclear sclerotic cataract of left eye    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    Conjunctival lesion    Impingement syndrome of right shoulder    Constipation    History of colon polyps    Hypertension associated with diabetes    Uncontrolled type 2 diabetes mellitus with hyperglycemia, without long-term current use of  insulin    Hyperlipidemia associated with type 2 diabetes mellitus    Obesity (BMI 30.0-34.9)    Diabetic polyneuropathy associated with type 2 diabetes mellitus    Spondylosis of cervical region without myelopathy or radiculopathy    Cervical myofascial pain syndrome    PCO (posterior capsular opacification), right    Dry eye syndrome    Pseudophakia    Allergic conjunctivitis of both eyes    Decreased ROM of lumbar spine    Decreased strength    Decreased mobility    Dilated bile duct    Personal history of colonic polyps    EIC (epidermal inclusion cyst)    Unspecified inflammatory spondylopathy, lumbar region    Mild major depression       Review of Systems   Constitutional: Negative for activity change and appetite change.   Respiratory: Negative for chest tightness and shortness of breath.    Cardiovascular: Negative for chest pain, palpitations and leg swelling.   Genitourinary: Negative for difficulty urinating, dysuria and frequency.   Musculoskeletal: Positive for arthralgias, gait problem and myalgias. Negative for back pain, joint swelling, neck pain and neck stiffness.   Skin: Negative.    Neurological: Negative for dizziness, tremors, weakness and numbness.       Objective:      Physical Exam   Constitutional: She appears well-developed and well-nourished. No distress.   HENT:   Head: Normocephalic and atraumatic.   Cardiovascular: Normal rate and regular rhythm.   Pulmonary/Chest: Effort normal and breath sounds normal.   Abdominal: Soft. Bowel sounds are normal.   Musculoskeletal: She exhibits no edema.        Left shoulder: She exhibits decreased range of motion, tenderness, bony tenderness and pain. She exhibits no swelling, no crepitus, no deformity, no laceration, no spasm and normal strength.        Right hip: She exhibits normal range of motion, normal strength, no tenderness, no bony tenderness, no swelling, no crepitus, no deformity and no laceration.        Left hip: She  exhibits normal range of motion, normal strength, no tenderness, no bony tenderness, no swelling, no crepitus and no laceration.        Lumbar back: She exhibits decreased range of motion. She exhibits no tenderness, no bony tenderness, no swelling, no pain and no spasm.   Skin: She is not diaphoretic.       Assessment:       1. Pain of both hip joints    2. Chronic left shoulder pain    3. Arthritis    4. Uncontrolled type 2 diabetes mellitus with hyperglycemia, without long-term current use of insulin    5. Hyperlipidemia associated with type 2 diabetes mellitus    6. Hypertension associated with diabetes    7. Rotator cuff impingement syndrome of right shoulder    8. Unspecified inflammatory spondylopathy, lumbar region    9. Decreased mobility    10. Decreased ROM of lumbar spine    11. Mild major depression        Plan:       Chelly was seen today for shoulder pain and hip pain.    Diagnoses and all orders for this visit:    Pain of both hip joints  Comments:  Chronic, inflamed.  Obtain x-rays.  Mobic as needed.  Possible referral to Orthopedics  Orders:  -     X-Ray Hips Bilateral 2 View Incl AP Pelvis; Future    Chronic left shoulder pain  Comments:  Uncontrolled, x-rays and referral to Orthopedics  Orders:  -     X-Ray Shoulder Trauma 3 view Left; Future    Arthritis  Comments:  Uncontrolled, patient to try Mobic  Orders:  -     meloxicam (MOBIC) 7.5 MG tablet; Take 1 tablet (7.5 mg total) by mouth once daily.    Uncontrolled type 2 diabetes mellitus with hyperglycemia, without long-term current use of insulin  Comments:  Uncontrolled, start a new diabetic regimen and follow up with provider    Hyperlipidemia associated with type 2 diabetes mellitus  Comments:  Uncontrolled, pain not likely related to statin so continue on medication    Hypertension associated with diabetes  Comments:  Controlled, continue current regimen    Rotator cuff impingement syndrome of right shoulder  Comments:  Chronic, stable,  follow up with Orthopedics    Unspecified inflammatory spondylopathy, lumbar region  Comments:  Stable, continue to monitor    Decreased mobility  Comments:  Chronic, continue to monitor    Decreased ROM of lumbar spine    Mild major depression  Comments:  Stable, continue medication     Will call patient with x-ray results and further recommendations

## 2020-01-29 DIAGNOSIS — E11.65 UNCONTROLLED TYPE 2 DIABETES MELLITUS WITH HYPERGLYCEMIA: Primary | ICD-10-CM

## 2020-01-29 NOTE — TELEPHONE ENCOUNTER
Pt informed of x-ray results, understanding verbalized. Pt stated if the mobic doesn't give her any relief she will see orthopedics.

## 2020-02-04 ENCOUNTER — PATIENT OUTREACH (OUTPATIENT)
Dept: ADMINISTRATIVE | Facility: OTHER | Age: 70
End: 2020-02-04

## 2020-02-05 ENCOUNTER — OFFICE VISIT (OUTPATIENT)
Dept: PAIN MEDICINE | Facility: CLINIC | Age: 70
End: 2020-02-05
Payer: MEDICARE

## 2020-02-05 VITALS
BODY MASS INDEX: 34.37 KG/M2 | SYSTOLIC BLOOD PRESSURE: 147 MMHG | HEART RATE: 93 BPM | WEIGHT: 194 LBS | DIASTOLIC BLOOD PRESSURE: 84 MMHG

## 2020-02-05 DIAGNOSIS — R29.898 IMPAIRED FLEXIBILITY OF LOWER EXTREMITY: ICD-10-CM

## 2020-02-05 DIAGNOSIS — M70.72 ISCHIAL BURSITIS OF LEFT SIDE: Primary | ICD-10-CM

## 2020-02-05 DIAGNOSIS — E11.65 TYPE 2 DIABETES MELLITUS WITH HYPERGLYCEMIA, WITHOUT LONG-TERM CURRENT USE OF INSULIN: ICD-10-CM

## 2020-02-05 DIAGNOSIS — Z91.89 SEDENTARY LIFESTYLE: ICD-10-CM

## 2020-02-05 PROCEDURE — 3079F PR MOST RECENT DIASTOLIC BLOOD PRESSURE 80-89 MM HG: ICD-10-PCS | Mod: HCNC,CPTII,S$GLB, | Performed by: PAIN MEDICINE

## 2020-02-05 PROCEDURE — 99999 PR PBB SHADOW E&M-EST. PATIENT-LVL V: CPT | Mod: PBBFAC,HCNC,, | Performed by: PAIN MEDICINE

## 2020-02-05 PROCEDURE — 1125F PR PAIN SEVERITY QUANTIFIED, PAIN PRESENT: ICD-10-PCS | Mod: HCNC,S$GLB,, | Performed by: PAIN MEDICINE

## 2020-02-05 PROCEDURE — 3077F SYST BP >= 140 MM HG: CPT | Mod: HCNC,CPTII,S$GLB, | Performed by: PAIN MEDICINE

## 2020-02-05 PROCEDURE — 1101F PR PT FALLS ASSESS DOC 0-1 FALLS W/OUT INJ PAST YR: ICD-10-PCS | Mod: HCNC,CPTII,S$GLB, | Performed by: PAIN MEDICINE

## 2020-02-05 PROCEDURE — 99999 PR PBB SHADOW E&M-EST. PATIENT-LVL V: ICD-10-PCS | Mod: PBBFAC,HCNC,, | Performed by: PAIN MEDICINE

## 2020-02-05 PROCEDURE — 1159F MED LIST DOCD IN RCRD: CPT | Mod: HCNC,S$GLB,, | Performed by: PAIN MEDICINE

## 2020-02-05 PROCEDURE — 1159F PR MEDICATION LIST DOCUMENTED IN MEDICAL RECORD: ICD-10-PCS | Mod: HCNC,S$GLB,, | Performed by: PAIN MEDICINE

## 2020-02-05 PROCEDURE — 99214 OFFICE O/P EST MOD 30 MIN: CPT | Mod: HCNC,S$GLB,, | Performed by: PAIN MEDICINE

## 2020-02-05 PROCEDURE — 1125F AMNT PAIN NOTED PAIN PRSNT: CPT | Mod: HCNC,S$GLB,, | Performed by: PAIN MEDICINE

## 2020-02-05 PROCEDURE — 3079F DIAST BP 80-89 MM HG: CPT | Mod: HCNC,CPTII,S$GLB, | Performed by: PAIN MEDICINE

## 2020-02-05 PROCEDURE — 3077F PR MOST RECENT SYSTOLIC BLOOD PRESSURE >= 140 MM HG: ICD-10-PCS | Mod: HCNC,CPTII,S$GLB, | Performed by: PAIN MEDICINE

## 2020-02-05 PROCEDURE — 1101F PT FALLS ASSESS-DOCD LE1/YR: CPT | Mod: HCNC,CPTII,S$GLB, | Performed by: PAIN MEDICINE

## 2020-02-05 PROCEDURE — 99214 PR OFFICE/OUTPT VISIT, EST, LEVL IV, 30-39 MIN: ICD-10-PCS | Mod: HCNC,S$GLB,, | Performed by: PAIN MEDICINE

## 2020-02-05 RX ORDER — LANCETS
EACH MISCELLANEOUS
Qty: 300 EACH | Refills: 3 | Status: SHIPPED | OUTPATIENT
Start: 2020-02-05 | End: 2020-02-10 | Stop reason: SDUPTHER

## 2020-02-05 NOTE — PROGRESS NOTES
[facility] Established Patient Evaluation    Referred by: Gabriel Wilson  Reason for referral: neck pain    CC:   Chief Complaint   Patient presents with    Back Pain     left buttock     Interval Update:   02/05/2020 - Mrs. Ortiz returns to clinic for follow up visit reporting left buttock pain. Patient reports radiation down left leg to mid thigh. Pain intensity is currently 5/10.      4/9/18 - Pt returns to complaining of MRI results, neck and bilateral shoulder pain.  Her day time pain improved with PT but she continues to complain of bilat neck and shoulder pain at night.  The results of the MRI were shared with her.    Background:  Chelly Ortiz is a 69 y.o. female who complains of neck and bilateral shoulder pain as characterized below.    Location: neck and bilateral shoulder  Severity: Currently: 5/2/10   Typical Range: 6/10     Exacerbation: 10/10   Onset: long-standing baseline pain exacerbated 3 months ago associated with reaching overhead to clean ceiling fans  Quality: Dull, achy, throbbing  Radiation: bilat shoulders  Axial/Extremity Percentage of Pain: 50/50  Exacerbating Factors: extension and flexion  Mitigating Factors: heat  Assoc: denies night fever/night sweats, urinary incontinence, bowel incontinence, significant weight loss, significant motor weakness and loss of sensations    Previous Therapies:  PT: Scheduled to start Feb 7, 2018  HEP:   TENS:  Injections: LYNN 3x per Ca Oconnell  Surgery: Denies  Medications:   - NSAIDS: Ibuprofen  - MSK Relaxants: Current  - TCAs:   - SNRIs:   - Topicals:   - Anticonvulsants: Current  - Opioids: No    Current Pain Medications:  1. Gabapentin 100 TID - she is taking it prn  2. Cyclobenzaprine - still hasn't started but considering  3. Ibuprofen - helps a little  4. Piroxicam 20 mg - hasn't started yet    Full Medication List:    Current Outpatient Medications:     ACCU-CHEK SHILOH PLUS METER Misc, , Disp: , Rfl:     albuterol  "(PROVENTIL/VENTOLIN HFA) 90 mcg/actuation inhaler, Inhale 1-2 puffs into the lungs every 4 (four) hours as needed for Wheezing., Disp: 1 Inhaler, Rfl: 1    amLODIPine (NORVASC) 5 MG tablet, Take 1 tablet (5 mg total) by mouth once daily., Disp: 90 tablet, Rfl: 3    aspirin (ECOTRIN) 81 MG EC tablet, Take 81 mg by mouth once daily., Disp: , Rfl:     blood sugar diagnostic Strp, 1 strip by Misc.(Non-Drug; Combo Route) route 2 (two) times daily., Disp: 100 strip, Rfl: 3    blood-glucose meter kit, Use as instructed, Disp: 1 each, Rfl: 0    cetirizine (ZYRTEC) 10 MG tablet, Take 1 tablet (10 mg total) by mouth every evening., Disp: 90 tablet, Rfl: 0    citalopram (CELEXA) 10 MG tablet, Take 1 tablet (10 mg total) by mouth once daily., Disp: 90 tablet, Rfl: 1    esomeprazole (NEXIUM) 40 MG capsule, Take 1 capsule (40 mg total) by mouth before breakfast., Disp: 90 capsule, Rfl: 3    gabapentin (NEURONTIN) 100 MG capsule, TAKE ONE CAPSULE BY MOUTH 3 TIMES A DAY, Disp: 90 capsule, Rfl: 0    hydrocortisone 2.5 % cream, Apply topically 2 (two) times daily., Disp: 28 g, Rfl: 0    ibuprofen (ADVIL,MOTRIN) 600 MG tablet, Take 1 tablet (600 mg total) by mouth every 6 (six) hours as needed for Pain., Disp: 30 tablet, Rfl: 0    insulin aspart U-100 (NOVOLOG U-100 INSULIN ASPART) 100 unit/mL injection, Use w/ vgo, max daily 60 units, 2 vials a month, 3 clicks with meals, 1 click with snack, additional click if sugar is >180., Disp: 20 mL, Rfl: 6    insulin detemir U-100 (LEVEMIR) 100 unit/mL injection, Inject 45 units at night, Disp: 36 mL, Rfl: 6    insulin syringe-needle U-100 0.5 mL 31 gauge x 5/16" Syrg, Use nightly. 90 day, Disp: 100 each, Rfl: 3    JANUVIA 100 mg Tab, TAKE 1 TABLET EVERY DAY, Disp: 90 tablet, Rfl: 3    lancets (ACCU-CHEK SOFTCLIX LANCETS) Misc, Uses 3 times a day, Disp: 300 each, Rfl: 3    lidocaine HCL 2% (XYLOCAINE) 2 % jelly, Apply topically once daily., Disp: 30 mL, Rfl: 2    losartan " "(COZAAR) 50 MG tablet, Take 1 tablet (50 mg total) by mouth once daily., Disp: 90 tablet, Rfl: 3    magnesium oxide (MAG-OX) 400 mg tablet, Take 1 tablet (400 mg total) by mouth 2 (two) times daily., Disp: 180 tablet, Rfl: 3    meloxicam (MOBIC) 7.5 MG tablet, Take 1 tablet (7.5 mg total) by mouth once daily., Disp: 30 tablet, Rfl: 0    pen needle, diabetic 31 gauge x 3/16" Ndle, Uses 4 x a day., Disp: 400 each, Rfl: 3    polyethylene glycol (GLYCOLAX) 17 gram PwPk, Take 17 g by mouth once daily., Disp: 30 each, Rfl: 0    polyethylene glycol (GLYCOLAX) 17 gram/dose powder, Take 17 g by mouth once daily., Disp: 1 Bottle, Rfl: 11    pravastatin (PRAVACHOL) 10 MG tablet, TAKE 1 TABLET EVERY DAY, Disp: 90 tablet, Rfl: 3    sub-q insulin device, 20 unit (V-GO 20) Pepper, Change daily., Disp: 30 Device, Rfl: 6    traZODone (DESYREL) 50 MG tablet, TAKE 1 TABLET EVERY NIGHT AS NEEDED, Disp: 90 tablet, Rfl: 3    varicella-zoster gE-AS01B, PF, (SHINGRIX) 50 mcg/0.5 mL injection, Inject into the muscle., Disp: 0.5 mL, Rfl: 1    ergocalciferol (ERGOCALCIFEROL) 50,000 unit Cap, TAKE 1 CAPSULE EVERY 7 DAYS. (Patient not taking: Reported on 2/5/2020), Disp: 12 capsule, Rfl: 1     Review of Systems:  Review of Systems   Constitutional: Negative for chills and fever.   HENT: Negative for nosebleeds.    Eyes: Positive for blurred vision. Negative for pain.   Respiratory: Negative for hemoptysis.    Cardiovascular: Negative for chest pain.   Gastrointestinal: Negative for nausea and vomiting.   Genitourinary: Negative for dysuria.   Musculoskeletal: Positive for neck pain. Negative for falls.   Skin: Negative for rash.   Neurological: Negative for focal weakness.   Endo/Heme/Allergies: Bruises/bleeds easily.   Psychiatric/Behavioral: The patient is nervous/anxious.        Allergies:  Prednisone; Latex; Ace inhibitors; and Latex, natural rubber     Medical History:  Past Medical History:   Diagnosis Date    Allergy     " Cataract     DDD (degenerative disc disease), lumbar     Diabetes mellitus     diet controlled    Diabetes mellitus, type 2     GERD (gastroesophageal reflux disease)     History of subconjunctival hemorrhage 11    left eye    Hyperlipidemia     Hypertension     IBS (irritable bowel syndrome)     Obesity     MYRIAM (obstructive sleep apnea)     on CPAP    Rotator cuff impingement syndrome     Seasonal allergic conjunctivitis         Surgical History:  Past Surgical History:   Procedure Laterality Date    CATARACT EXTRACTION W/  INTRAOCULAR LENS IMPLANT  10/3/13    OD (dr. gardner)     SECTION      x2    CHOLECYSTECTOMY      COLONOSCOPY N/A 2018    Procedure: COLONOSCOPY;  Surgeon: Marie Mejia MD;  Location: [facility] ENDO;  Service: Endoscopy;  Laterality: N/A;    ENDOSCOPIC ULTRASOUND OF UPPER GASTROINTESTINAL TRACT N/A 10/9/2018    Procedure: ULTRASOUND, UPPER GI TRACT, ENDOSCOPIC;  Surgeon: Javy Simpson MD;  Location: [facility] ENDO;  Service: Endoscopy;  Laterality: N/A;    EXCISION OF LESION N/A 2019    Procedure: EXCISION, LESION VULVA;  Surgeon: Liv Odell MD;  Location: [facility] OR;  Service: OB/GYN;  Laterality: N/A;  latex allergy    EYE SURGERY      HYSTERECTOMY      ovaries intact, due to DUB    TUBAL LIGATION          Social History:  Social History     Socioeconomic History    Marital status:      Spouse name: Not on file    Number of children: Not on file    Years of education: Not on file    Highest education level: Not on file   Occupational History    Not on file   Social Needs    Financial resource strain: Not on file    Food insecurity:     Worry: Not on file     Inability: Not on file    Transportation needs:     Medical: No     Non-medical: No   Tobacco Use    Smoking status: Former Smoker     Last attempt to quit: 2/15/1983     Years since quittin.9    Smokeless tobacco: Never Used   Substance and Sexual Activity    Alcohol use:  No     Frequency: Never     Binge frequency: Never    Drug use: No    Sexual activity: Yes     Partners: Male   Lifestyle    Physical activity:     Days per week: 3 days     Minutes per session: 30 min    Stress: To some extent   Relationships    Social connections:     Talks on phone: Three times a week     Gets together: Not on file     Attends [employer] orthodox service: Not on file     Active member of club or organization: No     Attends meetings of clubs or organizations: Not on file     Relationship status:    Other Topics Concern    Are you pregnant or think you may be? Not Asked    Breast-feeding Not Asked   Social History Narrative    Not on file       Physical Exam:  Vitals:    02/05/20 1404   BP: (!) 147/84   Pulse: 93   Weight: 88 kg (194 lb 0.1 oz)   PainSc:   5     General    Nursing note and vitals reviewed.  Constitutional: She is oriented to person, place, and time. She appears well-developed and well-nourished. No distress.   HENT:   Head: Normocephalic and atraumatic.   Nose: Nose normal.   Eyes: Conjunctivae and EOM are normal. Pupils are equal, round, and reactive to light. Right eye exhibits no discharge. Left eye exhibits no discharge. No scleral icterus.   Neck: No JVD present.   Cardiovascular: Intact distal pulses.    Pulmonary/Chest: Effort normal. No respiratory distress.   Abdominal: She exhibits no distension.   Neurological: She is alert and oriented to person, place, and time. Coordination normal.   Psychiatric: She has a normal mood and affect. Her behavior is normal. Judgment and thought content normal.         Left Hip Exam     Comments:  Left ischial bursa tenderness.  Partial reproduction of pain with hip flexion and piriformis-type stretches.            Imaging:  XR HIPS BILATERAL 2 VIEW INCL AP PELVIS    CLINICAL HISTORY:  Pain in right hip    TECHNIQUE:  AP view of the pelvis and frogleg lateral views of both hips were performed.    COMPARISON:  None.    FINDINGS:  No  fracture or dislocation.  Moderate bilateral hip cartilage space loss with osteophyte production.  Degenerative changes are seen in the lower lumbar spine, as well as the sacroiliac joints and pubic symphysis.      2/3/18 MRI Cervical Spine Without Contrast    Narrative     Comparison: Cervical spine series 12/27/16    Technique: Multiplanar and multi-sequence MR images were obtained through the cervical spine without intravenous contrast.    Results: Patient artifact slightly degrades some sequences. The cervical vertebral bodies demonstrate adequate alignment.  The vertebral body heights are well-maintained.  No displaced fracture, dislocation or significant listhesis.  Probable small hemangioma at the inferior aspect of C7 vertebral body. No bone marrow infiltrative process.  No prevertebral soft tissue swelling or paraspinal hematoma.  The spinal cord is normal in morphology and signal.  The craniocervical junction is without significant abnormality.    Mild degenerative disc disease with slight loss of disc height and anterior marginal osteophytes with endplate changes at C5-6 level. Multilevel vertebral and facet arthrosis. Mild to moderate degenerative change at the atlantodental interval.    C2-3: Posterior disc osteophyte complex resulting in borderline acquired canal stenosis. No significant neuroforaminal narrowing.  C3-4: Mild left neuroforaminal narrowing. No significant spinal canal stenosis or right neuroforaminal narrowing.  C4-5: Minimal bilateral neuroforaminal narrowing. No significant spinal canal stenosis.  C5-6: Posterior disc osteophyte complex resulting in mild acquired canal stenosis without associated cord abutment. Minimal bilateral neuroforaminal narrowing.  C6-7: No significant spinal canal stenosis or neuroforaminal narrowing.  C7-T1: No significant spinal canal stenosis or neuroforaminal narrowing.    The soft tissue structures incidentally surveyed demonstrate no significant  abnormality.   Impression          1. No cervical spine acute abnormality identified.    2. Mild cervical spondylosis most prominent at C5-6, as above.      Electronically signed by: WILLIAMS FULTON MD, MD  Date: 02/03/18  Time: 15:51     Encounter     View Encounter          Signed by     Signed Credentials Date/Time  Phone Pager   WILLIAMS FULTON MD 2/03/2018 15:51 [phone]   Reviewed By     Gabriel Wilson MD on 2/5/2018 09:42   Exam Details     Performed Procedure Technologist Supporting Staff Performing Physician   MRI Cervical Spine Without Contrast RT [facility]        Appointment Date/Status Modality Department    2/3/2018     Completed [facility] MRI1 [facility] MRI       Begin Exam End Exam Begin Exam Questionnaires End Exam Questionnaires   2/3/2018 10:35 AM 2/3/2018 11:08 AM MRI TECH NAVIGATOR QUESTIONS IMAGING END ALL      Reason For Exam   Priority: Routine   Dx: Neck pain of over 3 months duration [M54.2, G89.29 (ICD-10-CM)]   Order Report      Order Details         X-Ray Cervical Spine AP And Lateral  2 views: Alignment is normal.  Odontoid is intact as are the prevertebral soft tissues and posterior elements.  No fracture dislocation bone destruction seen.  There is mild DJD.   Impression      Mild DJD.      Electronically signed by: LETTY WHITE  Date: 12/27/16  Time: 12:26          X-Ray Shoulder Trauma 3 view Right  Comparison: 4/20/16.    Findings: Internal rotation, Y view and axillary views of the right shoulder demonstrate osteophyte formation and loss of joint space at the acromioclavicular joint.  Otherwise the osseous structures, soft tissues and joint spaces are normal.  Specifically no fracture or dislocation.  The visualized right upper lobe is normal.   Impression      Acromioclavicular osteoarthritis otherwise normal exam  ______________________________________     Electronically signed by: ANGEL GARCIA MD  Date: 07/07/16  Time: 10:58          MRI Upper Extremity  Joint Without Contraast Right  TECHNIQUE: MRI of right shoulder was performed on a 1.5T magnet utilizing the following sequences: Localizer; axial T2 FS; coronal T2 FS and PD FS; sagittal T1, T2 FS and PD FS.    COMPARISON: Right shoulder radiograph 4/20/16.    FINDINGS:    Rotator cuff: There is thickening and increased signal of the insertional fibers of the supraspinatus consistent with tendinosis with partial thickness undersurface tear.  There is small full-thickness component involving the anterior fibers measuring approximately 8 x 6 mm.  Mild infraspinatus tendinosis with undersurface fraying.  Teres minor tendon is intact.  There is mild tendinosis with interstitial tear involving the insertional fibers of the subscapularis.  Muscle bulk of the rotator cuff is within normal limits.     Labrum: Global degeneration of the glenoid labrum.      Biceps: Thickening and increased signal within the intra-articular portion of the long head biceps tendon consistent with tendinosis.    Bone: There is no fracture.  Bone marrow signal is unremarkable.    Acromioclavicular joint: Severe degenerative changes are seen at the acromioclavicular joint with osseous spurring, edema, and subchondral cystic change.    Cartilage: Articular cartilage of the glenohumeral joint is preserved.    Miscellaneous: Small joint effusion with increased fluid seen in the subscapularis recess.  There is increased fluid seen within the subacromial/subdeltoid bursa.   Impression       1.  Supraspinatus tendinosis with partial thickness articular surface tear and small full-thickness component involving the anterior fibers.    2.  Mild subscapularis and infraspinatus tendinosis.    3.  Severe AC joint arthrosis.    4.  Long head biceps tendinosis.    5.  Joint effusion and moderate subacromial/subdeltoid bursitis.  ______________________________________     Electronically signed by resident: Hosea Costello MD  Date: 06/16/16  Time: 16:29      ______________________________________     Electronically signed by: MADISON WINTERS MD  Date: 06/17/16  Time: 08:39          Labs:  BMP  Lab Results   Component Value Date     01/02/2020    K 3.3 (L) 01/02/2020     01/02/2020    CO2 27 01/02/2020    BUN 10 01/02/2020    CREATININE 0.9 01/02/2020    CALCIUM 9.1 01/02/2020    ANIONGAP 10 01/02/2020    ESTGFRAFRICA >60.0 01/02/2020    EGFRNONAA >60.0 01/02/2020     Lab Results   Component Value Date    ALT 14 01/02/2020    AST 17 01/02/2020    GGT 30 10/27/2004    ALKPHOS 109 01/02/2020    BILITOT 0.5 01/02/2020       Assessment:  Problem List Items Addressed This Visit     Ischial bursitis of left side - Primary    Sedentary lifestyle    Impaired flexibility of lower extremity        Chelly is a 67-year-old female with chronic neck pain exacerbated by increased physical activity 3 months ago.  Her exam and imaging are consistent with facet arthropathy and myofascial dysfunction.  I recommended that we start with physical therapy to eliminate as much pain as possible related to myofascial dysfunction then move on to interventional procedures should a significant amount of pain persist.  I also recommended that she complete the MRI ordered by her primary care physician and start a trial of piroxicam.  She also states significant insomnia not related to her chronic neck pain and I have recommended that she try tizanidine 4-8 mg nightly as a muscle relaxant and sleep aid.    4/9/18 - she completed PT with improved pain after each session.  Pain remains most bothersome at night which is common for muscle issues.  She was encouraged to perform neck stretching 3 times daily and especially at night to minimize pain at night.    2/5/2020 - there is tenderness in the left buttock consistent with ischial bursitis.  There may be a component of tendonitis as well; however, the treatment would be the same.  I have recommended an ischial bursa injection under  fluoroscopic guidance and patient is in agreement.  She was warned that her blood glucose must be controlled and less than 180 g/dL to receive a steroid injection, otherwise we would need to cancel the injection.  Patient was also advised to begin stretching on a daily basis. I demonstrated 3 separate stretches that would target the painful area.  I have encouraged her to walk on a daily basis for exercise as she currently lives a relatively sedentary lifestyle.    Treatment Plan:   PT/OT/HEP: I strongly encouraged compliance with HEP recommendations from PT.  The patient understands that her issues is primarily muscle related.  Procedures: s/f left ischial bursa injection  Medications:    - continue gabapentin   -  trial tizanidine 4-8 mg QHS PRN in place of Flexeril   - I endorse a trial of piroxicam  Imaging: Reviewed with patient.    Follow Up: RTC 1 month    Aisha Perez Jr, MD  Interventional Pain Medicine / Anesthesiology    Disclaimer: This note was partly generated using dictation software which may occasionally result in transcription errors.

## 2020-02-06 ENCOUNTER — TELEPHONE (OUTPATIENT)
Dept: PAIN MEDICINE | Facility: CLINIC | Age: 70
End: 2020-02-06

## 2020-02-06 DIAGNOSIS — M70.72 ISCHIAL BURSITIS OF LEFT SIDE: Primary | ICD-10-CM

## 2020-02-06 NOTE — TELEPHONE ENCOUNTER
Pt scheduled for 2/13/20 at 1045 am for Left Ischial Bursa Injection. Pt aware to check in at registration desk on the first floor of the hospital for 0945 am. Pt is taking ASA and aware she can continue taking for this procedure.

## 2020-02-09 ENCOUNTER — PATIENT MESSAGE (OUTPATIENT)
Dept: PAIN MEDICINE | Facility: HOSPITAL | Age: 70
End: 2020-02-09

## 2020-02-09 ENCOUNTER — PATIENT MESSAGE (OUTPATIENT)
Dept: FAMILY MEDICINE | Facility: CLINIC | Age: 70
End: 2020-02-09

## 2020-02-09 DIAGNOSIS — M54.50 CHRONIC BILATERAL LOW BACK PAIN WITHOUT SCIATICA: Primary | ICD-10-CM

## 2020-02-09 DIAGNOSIS — G89.29 CHRONIC BILATERAL LOW BACK PAIN WITHOUT SCIATICA: Primary | ICD-10-CM

## 2020-02-09 DIAGNOSIS — M70.72 ISCHIAL BURSITIS OF LEFT SIDE: ICD-10-CM

## 2020-02-10 ENCOUNTER — PATIENT OUTREACH (OUTPATIENT)
Dept: ADMINISTRATIVE | Facility: OTHER | Age: 70
End: 2020-02-10

## 2020-02-10 ENCOUNTER — TELEPHONE (OUTPATIENT)
Dept: FAMILY MEDICINE | Facility: CLINIC | Age: 70
End: 2020-02-10

## 2020-02-10 DIAGNOSIS — E11.65 TYPE 2 DIABETES MELLITUS WITH HYPERGLYCEMIA, WITH LONG-TERM CURRENT USE OF INSULIN: Primary | ICD-10-CM

## 2020-02-10 DIAGNOSIS — Z79.4 TYPE 2 DIABETES MELLITUS WITH HYPERGLYCEMIA, WITH LONG-TERM CURRENT USE OF INSULIN: Primary | ICD-10-CM

## 2020-02-10 RX ORDER — LANCETS
EACH MISCELLANEOUS
Qty: 300 EACH | Refills: 3 | Status: SHIPPED | OUTPATIENT
Start: 2020-02-10 | End: 2020-02-12

## 2020-02-10 NOTE — PROGRESS NOTES
Care Everywhere: n/a  Immunization: updated  Health Maintenance: updated  Media Review: n/a  Legacy Review: n/a  Order placed: n/a  Upcoming appts:optometry 2/19/2020

## 2020-02-10 NOTE — TELEPHONE ENCOUNTER
Patient requested physical therapy prior to interventional procedure. An order for physical therapy was placed today.

## 2020-02-12 ENCOUNTER — CLINICAL SUPPORT (OUTPATIENT)
Dept: DIABETES | Facility: CLINIC | Age: 70
End: 2020-02-12
Payer: MEDICARE

## 2020-02-12 ENCOUNTER — OFFICE VISIT (OUTPATIENT)
Dept: DERMATOLOGY | Facility: CLINIC | Age: 70
End: 2020-02-12
Payer: MEDICARE

## 2020-02-12 VITALS — BODY MASS INDEX: 34.37 KG/M2 | WEIGHT: 194 LBS

## 2020-02-12 DIAGNOSIS — E11.65 UNCONTROLLED TYPE 2 DIABETES MELLITUS WITH HYPERGLYCEMIA: ICD-10-CM

## 2020-02-12 DIAGNOSIS — L82.1 SEBORRHEIC KERATOSES: ICD-10-CM

## 2020-02-12 DIAGNOSIS — Z79.4 TYPE 2 DIABETES MELLITUS WITH HYPERGLYCEMIA, WITH LONG-TERM CURRENT USE OF INSULIN: ICD-10-CM

## 2020-02-12 DIAGNOSIS — E11.65 TYPE 2 DIABETES MELLITUS WITH HYPERGLYCEMIA, WITH LONG-TERM CURRENT USE OF INSULIN: ICD-10-CM

## 2020-02-12 DIAGNOSIS — D18.01 CHERRY ANGIOMA: ICD-10-CM

## 2020-02-12 DIAGNOSIS — L91.8 CUTANEOUS SKIN TAGS: ICD-10-CM

## 2020-02-12 DIAGNOSIS — L81.1 MELASMA: Primary | ICD-10-CM

## 2020-02-12 PROCEDURE — G0108 DIAB MANAGE TRN  PER INDIV: HCPCS | Mod: HCNC,S$GLB,, | Performed by: DIETITIAN, REGISTERED

## 2020-02-12 PROCEDURE — 1126F PR PAIN SEVERITY QUANTIFIED, NO PAIN PRESENT: ICD-10-PCS | Mod: HCNC,S$GLB,, | Performed by: DERMATOLOGY

## 2020-02-12 PROCEDURE — 99999 PR PBB SHADOW E&M-EST. PATIENT-LVL II: ICD-10-PCS | Mod: PBBFAC,HCNC,, | Performed by: DIETITIAN, REGISTERED

## 2020-02-12 PROCEDURE — 99202 OFFICE O/P NEW SF 15 MIN: CPT | Mod: HCNC,S$GLB,, | Performed by: DERMATOLOGY

## 2020-02-12 PROCEDURE — 1126F AMNT PAIN NOTED NONE PRSNT: CPT | Mod: HCNC,S$GLB,, | Performed by: DERMATOLOGY

## 2020-02-12 PROCEDURE — 99999 PR PBB SHADOW E&M-EST. PATIENT-LVL III: CPT | Mod: PBBFAC,HCNC,, | Performed by: DERMATOLOGY

## 2020-02-12 PROCEDURE — G0108 PR DIAB MANAGE TRN  PER INDIV: ICD-10-PCS | Mod: HCNC,S$GLB,, | Performed by: DIETITIAN, REGISTERED

## 2020-02-12 PROCEDURE — 1101F PR PT FALLS ASSESS DOC 0-1 FALLS W/OUT INJ PAST YR: ICD-10-PCS | Mod: HCNC,CPTII,S$GLB, | Performed by: DERMATOLOGY

## 2020-02-12 PROCEDURE — 1159F MED LIST DOCD IN RCRD: CPT | Mod: HCNC,S$GLB,, | Performed by: DERMATOLOGY

## 2020-02-12 PROCEDURE — 1101F PT FALLS ASSESS-DOCD LE1/YR: CPT | Mod: HCNC,CPTII,S$GLB, | Performed by: DERMATOLOGY

## 2020-02-12 PROCEDURE — 1159F PR MEDICATION LIST DOCUMENTED IN MEDICAL RECORD: ICD-10-PCS | Mod: HCNC,S$GLB,, | Performed by: DERMATOLOGY

## 2020-02-12 PROCEDURE — 99999 PR PBB SHADOW E&M-EST. PATIENT-LVL III: ICD-10-PCS | Mod: PBBFAC,HCNC,, | Performed by: DERMATOLOGY

## 2020-02-12 PROCEDURE — 99202 PR OFFICE/OUTPT VISIT, NEW, LEVL II, 15-29 MIN: ICD-10-PCS | Mod: HCNC,S$GLB,, | Performed by: DERMATOLOGY

## 2020-02-12 PROCEDURE — 99999 PR PBB SHADOW E&M-EST. PATIENT-LVL II: CPT | Mod: PBBFAC,HCNC,, | Performed by: DIETITIAN, REGISTERED

## 2020-02-12 RX ORDER — HYDROQUINONE 40 MG/G
CREAM TOPICAL
Qty: 28.35 G | Refills: 3 | Status: SHIPPED | OUTPATIENT
Start: 2020-02-12 | End: 2021-07-20

## 2020-02-12 RX ORDER — LANCETS
EACH MISCELLANEOUS
Qty: 300 EACH | Refills: 3 | Status: SHIPPED | OUTPATIENT
Start: 2020-02-12 | End: 2021-05-12 | Stop reason: SDUPTHER

## 2020-02-12 NOTE — PROGRESS NOTES
Diabetes Education  Author: Susana Robbins RD, CDE  Date: 2/12/2020    Diabetes Care Management Summary  Diabetes Education Record Assessment/Progress: Comprehensive/Group(Patient is here for VGO Start)     Last A1c:   Lab Results   Component Value Date    HGBA1C 11.2 (H) 01/02/2020     Last Visit with Diabetes Educator: : 02/12/2020  Last OPCM Referral: : Not Found   Enrolled in OPCM: No    Diabetes Type  Diabetes Type : Type II    Health Maintenance was reviewed today with patient. Discussed with patient importance of routine eye exams, foot exams/foot care, blood work (i.e.: A1c, microalbumin, and lipid), dental visits, yearly flu vaccine, and pneumonia vaccine as indicated by PCP. Patient verbalized understanding.     Health Maintenance Topics with due status: Not Due       Topic Last Completion Date    TETANUS VACCINE 06/21/2010    Colonoscopy 12/05/2018    DEXA SCAN 05/28/2019    Mammogram 12/02/2019    Foot Exam 12/31/2019    Lipid Panel 01/02/2020    Hemoglobin A1c 01/02/2020     There are no preventive care reminders to display for this patient.    Diabetes Education Assessment/Progress  Medications (states correct name, dose, onset, peak, duration, side effects & timing of meds): Instructed, Discussion, Individual Session, Demonstration, Return Demonstration, Video, Written Materials Provided, Demonstrates Understanding/Competency(verbalizes/demonstrates)     Patient is here for instructions on use of the V-GO 20 which will provide 20 units of basal insulin given over 24 hours (0.83 units/hr) and up to 36 units of on-demand bolus dosing in 2-Unit increments. Patient will be giving 3 clicks of Novolog, 1 click for snacks and will add 1 extra click before meals if BG over 180. Patient had been instructed to hold Levemir, but she forgot and took it last night. She will plan to start the VGO tonight. Patient also advised that she will discontinue taking the Levemir and she will only use the Novolog vial with the  V-Go system.     Patient was instructed on the following:    Insert vial into EZ Fill and leave in place until vial is empty   Remove plug and insert V-Go    Draw insulin into EZ Fill and fill V-Go with insulin (check that V-Go is full of insulin)    Remove V-GO and clean and replace plug (advised to wipe the circular opening with an alcohol swab before replacing)    Select site for V-Go (site selection reviewed) and prepare infusion site with aseptic technique    Remove button cover and adhesive liner    Attach V-Go to site selected and insert needle by pushing needle button in to activate basal rate    Instructed patient on how to activate the bolus ready button and to push the bolus delivery button into the V-Go to deliver 2 units of insulin (1 push = 2 units). Patient advised that once all 36 units of the on-demand bolus have been given, the bolus buttons will lock, but the basal insulin will continue for the remainder of the 24 hours    To remove, release needle by sliding and pressing the needle release button    Remove the V-Go and discard (the V-Go is 100% disposable)   Patient instructed that the V-Go needs to be changed every 24 hours.    Patient was able to perform successful return demonstration of all using a [employer] VGO. Patient's cell phone was used to record her getting the VGO set up - she felt this would be helpful for her to watch and assist her with remembering all of the steps involved.     General precautions reviewed with the patient:    V-Go needs to be removed before having an MRI. Replace with a new V-Go after the test or procedure is completed. Patient also advised to check with her doctor before any type of surgical procedure to see if the V-Go can be worn.    Patient advised to monitor her BG 4 times a day (pre-meals and at HS)    The V-Go should not be exposed to direct sunlight, hot tub, whirlpool or sauna use (replace with a new filled V-Go afterward)    Check that the  V-Go is securely in place during and after periods of increased physical activity, exposure to water or gone under water to the depth of 3 feet, 3 inches (the V-Go can go under water and continue to work safely)    Reviewed s/s and tx of hypoglycemia    Diabetes Care Plan/Intervention  Education Plan/Intervention: Individual Follow-Up DSMT(Patient to drop logs off 2/18/20)    Today's Self-Management Care Plan was developed with the patient's input and is based on barriers identified during today's assessment.    The long and short-term goals in the care plan were written with the patient/caregiver's input. The patient has agreed to work toward these goals to improve her overall diabetes control.      The patient received a copy of today's self-management plan and verbalized understanding of the care plan, goals, and all of today's instructions.      The patient was encouraged to communicate with her physician and care team regarding her condition(s) and treatment.  I provided the patient with my contact information today and encouraged her to contact me via phone or patient portal as needed.     Education Units of Time   Time Spent: 75 min

## 2020-02-12 NOTE — PROGRESS NOTES
Subjective:       Patient ID:  Chelly Ortiz is a 69 y.o. female who presents for   Chief Complaint   Patient presents with    Mole     face, raised     Lesions on face for over a year not painful no tx.     Mole  - Initial  Affected locations: face  Signs / symptoms: irritated  Aggravated by: nothing  Relieving factors/Treatments tried: nothing        Review of Systems   Constitutional: Negative for fever, chills, weight loss, weight gain, fatigue, night sweats and malaise.   Skin: Positive for dry skin and activity-related sunscreen use. Negative for daily sunscreen use and wears hat.   Hematologic/Lymphatic: Bruises/bleeds easily.        Objective:    Physical Exam   Constitutional: She appears well-developed and well-nourished. No distress.   Neurological: She is alert and oriented to person, place, and time. She is not disoriented.   Psychiatric: She has a normal mood and affect.   Skin:   Areas Examined (abnormalities noted in diagram):   Head / Face Inspection Performed  Neck Inspection Performed  Chest / Axilla Inspection Performed  Back Inspection Performed  RUE Inspected  LUE Inspection Performed                   Diagram Legend     Erythematous scaling macule/papule c/w actinic keratosis       Vascular papule c/w angioma      Pigmented verrucoid papule/plaque c/w seborrheic keratosis      Yellow umbilicated papule c/w sebaceous hyperplasia      Irregularly shaped tan macule c/w lentigo     1-2 mm smooth white papules consistent with Milia      Movable subcutaneous cyst with punctum c/w epidermal inclusion cyst      Subcutaneous movable cyst c/w pilar cyst      Firm pink to brown papule c/w dermatofibroma      Pedunculated fleshy papule(s) c/w skin tag(s)      Evenly pigmented macule c/w junctional nevus     Mildly variegated pigmented, slightly irregular-bordered macule c/w mildly atypical nevus      Flesh colored to evenly pigmented papule c/w intradermal nevus       Pink pearly papule/plaque c/w  basal cell carcinoma      Erythematous hyperkeratotic cursted plaque c/w SCC      Surgical scar with no sign of skin cancer recurrence      Open and closed comedones      Inflammatory papules and pustules      Verrucoid papule consistent consistent with wart     Erythematous eczematous patches and plaques     Dystrophic onycholytic nail with subungual debris c/w onychomycosis     Umbilicated papule    Erythematous-base heme-crusted tan verrucoid plaque consistent with inflamed seborrheic keratosis     Erythematous Silvery Scaling Plaque c/w Psoriasis     See annotation      Assessment / Plan:        Melasma  -     hydroquinone 4 % Crea; Use hs on dark spots  Dispense: 28.35 g; Refill: 3    Cherry angiomas  reassurance          Seborrheic keratoses  Brochure provided  reassurance      Cutaneous skin tags  reassurance               Follow up in about 1 year (around 2/12/2021).

## 2020-02-18 ENCOUNTER — CLINICAL SUPPORT (OUTPATIENT)
Dept: REHABILITATION | Facility: HOSPITAL | Age: 70
End: 2020-02-18
Attending: PAIN MEDICINE
Payer: MEDICARE

## 2020-02-18 ENCOUNTER — CLINICAL SUPPORT (OUTPATIENT)
Dept: INTERNAL MEDICINE | Facility: CLINIC | Age: 70
End: 2020-02-18
Payer: MEDICARE

## 2020-02-18 VITALS — WEIGHT: 194.44 LBS | BODY MASS INDEX: 34.44 KG/M2

## 2020-02-18 DIAGNOSIS — M70.72 ISCHIAL BURSITIS OF LEFT SIDE: ICD-10-CM

## 2020-02-18 DIAGNOSIS — G89.29 CHRONIC BILATERAL LOW BACK PAIN WITHOUT SCIATICA: ICD-10-CM

## 2020-02-18 DIAGNOSIS — E11.65 TYPE 2 DIABETES MELLITUS WITH HYPERGLYCEMIA, WITH LONG-TERM CURRENT USE OF INSULIN: ICD-10-CM

## 2020-02-18 DIAGNOSIS — Z79.4 TYPE 2 DIABETES MELLITUS WITH HYPERGLYCEMIA, WITH LONG-TERM CURRENT USE OF INSULIN: ICD-10-CM

## 2020-02-18 DIAGNOSIS — M54.50 CHRONIC BILATERAL LOW BACK PAIN WITHOUT SCIATICA: ICD-10-CM

## 2020-02-18 PROCEDURE — 99999 PR PBB SHADOW E&M-EST. PATIENT-LVL II: ICD-10-PCS | Mod: PBBFAC,,,

## 2020-02-18 PROCEDURE — 97161 PT EVAL LOW COMPLEX 20 MIN: CPT | Mod: HCNC,PN

## 2020-02-18 PROCEDURE — 99999 PR PBB SHADOW E&M-EST. PATIENT-LVL II: CPT | Mod: PBBFAC,,,

## 2020-02-18 PROCEDURE — 97110 THERAPEUTIC EXERCISES: CPT | Mod: HCNC,PN

## 2020-02-18 NOTE — PROGRESS NOTES
Date:     2.18.20                 Time:1300  Initial Health  Contact - [x]Clinic visit  []  Phone Visit  ASSEMENT: Information obtained through combination of chart review prior to seeing patient, patient self-report.   Primary Referral diagnosis/reason for referral:      DM, lifestyle changes     (See physician progress note for complete list of diagnoses.)  PCP:  Dr. Delia Gibson      Referent :  [] PCP       [] Transition Navigator    []  JUAN DIEGO    []  EDUARDO  [x]  Other: [facility]    Supports:          Preferred Learning Style  (may be a combination)  [x]  Visual (pictures/ video)     [] Auditory/ Listening   [x]  Reading    [x]  Hands-on/ Demonstration   []  1:1    []  Group        [x]  Alone       []  No preference   []  Combination  []  Other:         Presenting issues from patients point of view:  [x]  Improve nutrition/increase healthy choices.                    []  Improve /maintain current functioning.  [x]  Obtain and maintain healthy blood pressure.                  []  Stop smoking.  [x]  Improve weight management.                                       [x]  Lower cholesterol.  [x]  Improve blood glucose management.                            []  Improve breathing.  []  Increase energy level.                                                      []  Increase/maintain independence.   [x]  Improve sleep.                                                                [x]  Decrease stress.  [x]  Improve pain management.                                             []  Improve mood.  []  Other:                  Pt. Education Level:     2 yrs college     Disease specific education services attended:  yes       Patient Employed:  []yes    [x] No  Where ___Retired [employer]__________________  Days and Hours:                Current Self-help Behaviors  [x]  Checks glucose level    1-2    times a day.  [x]  Tries to modify meals so more healthy.  [x]  Exercises by  Walking 3 x week      [x]   Takes BP   1     times a   day    (insert frequency)  []  Weighs self         (how often)  [x]  Takes all medications as prescribed.  [x]  Rarely misses health care appointments.  [x]  Sleeps 8 hours without waking more than twice.  []  Consistently wears/uses functioning aids as recommended  (ie:  Contacts [] , glasses [] , hearing  Aid [] , prosthesis [] ,  Walker [] ,  Wheelchair []  etc.)  []  Regularly engages in stress management activities I.e.:  [x]  Quit smoking  1983 1 pack a week  [x]  Purposefully educates self about health issues.  [x]  Pt did bring glucose log with him/her.  []  Other  (explain)           []  Comments:       []  Reported Blood Sugar Readings:          Health screenings   Last PCP visit: 1.3.20      GYN/Pap:   Hysterectomy                          MM..                                       DEXA:   ..                      Colon: 12..18   Lipids: 1.2.20   CMP: 1.2.20   HgA1C: 1.2.20 (11.2)   Urine Microalbumin-Creatinine: 1.13.15   On Losartan         Eye Exam: 10..18 has appt set for nalini 2..20     Foot Exam:  12..19        Strengths:  [x]  Confident                       []  Determined/persistent           [x]  Enthusiastic         [x]  Good problem solving           []  Good self-control                   [x]  Hard working        [x]  Hopeful/optimistic                  [x]  Intelligent                                [x]  Self aware  []  Creative                                 [x]  Flexible          [x]  Sense of humor                     [x]  Open/willing          [x]  Spiritual                                  [x] Values health    []  Successful overcoming difficult challenges in the past.     [x]  Pos support network  [x]   Health literate (The degree to which individuals have the capacity to obtain, process, and    understand basic health information and services needed to make appropriate health decisions.- Dept. of Health and Human services.)  []    Other Comments:             Change Markers  Scale 0-10 with 10 representing most Ready 0 least ready, 10 most important 0 least important 10 most confident 0 least confident.     [] NA at this time.   Readiness:   10  ;  Importance:   10  ;  Confidence:  9      INTERVENTIONS:  [x] Given an explanation about health coaching program and invited participation.  [x] Listened reflectively; provided support, encouragement, and validation; respected  and maintained patient self-direction; explored pros/cons of change; personalized health risks of maintaining the status quo; and facilitated recognition of discrepancy between current behaviors and patients goals and values.  [x] Assessed stage of change and employed appropriate motivational interviewing strategies to facilitate movement toward the next stage of change and healthy behavior modification.  [x] Normalized occurrence of relapse, helped patient recognize outcome of new self-learning as a result of the relapse such as triggers, and helped focus on factors to reduce chances of returning to previous behaviors .  [x] Used readiness scale ratings to guide assessment of importance and confidence of successfully reaching goals.  [x] Assisted patient to develop goal, action plan, and address potential barriers to goal     achievement.  [] Patient was given a new (insert glucose meter or other supplies), taught to use, and      successfully demonstrated ability to carry out essential steps of process.    [] Rx for ( monitor/supplies, pen needles, etc.) was obtained.  [x] Provided educational review, written materials/handouts (Meal Planning Counting Carbs, Healthy Plate, 10 Rules for Eating Safely for Life, 10 Tips to Cutting Your Cravings, Probiotics).   [x] Topics discussed/provided:    GI of foods and how it affects your metabolism and helps you burn fat, carb counting    [x] Facilitated completion of needed exams and screenings by reviewing Diabetic/Health Maintenance  Report Card with patient.  [x] Provided pt with my business card.  [x] Informed of/reviewed how to access health  and after hours assistance.  [x] Discussed, planned, and scheduled future contacts.  [x]Challenges/Barriers identified discussed as identified by patient.  [x] Needs identified by this Health :    Education and support     [] Other:            For additional care management support patient was referred to:        []  Community resources for                        []  Medication assistance programs               []  Dietician              []  Diabetes  Boot Camp                                        []  Mental health services                           []                  []  Diabetes Deering                                              []  Home health services                                []  Complex case management              []  PCP/covering provider due to                 []  Emergency Dept.                                  []  GYN              []  Optometrist/ Ophthalmologist                          []  Podiatrist                                                      [x]  N/A        []  Other:           RESPONSE/IMPRESSION  Behavior is consistent with the following stage of change:  []  Pre-contemplation  []  Contemplation  [x]  Preparation  []  Action  []  Maintenance  []  Relapse    Level of participation:  []  Refused to participate  []  Guarded / resistant  []  Passive participant  [x]  Active participant/interactive  [x]  Interested/receptive  [x]  Self-motivated  []  Other          Goal developed by patient today:    She will bring goal sheet home and bring back at next visit.     Plan (needed actions to accomplish goal):          [x] Pt will work on action plan as stated above.        [x] Pt will follow up with Health  on   3.17.20.     [x] Health  will remain available.  [x] Health  will maintain regular contacts with patient to educate, support,  encourage; help problem-solve; assist with development of self-advocacy/increasing independent health management; and provide resources as needed.  [] Pt has declined Health  Program at this time. Provided pt with Health  Program information should they decide to participate at a later time.        [] Pt to facilitate contact with Health        [] Health  to facilitate contact with patient.        [] Other:          Notes:   Met with patient she is interested in losing weight and decreasing her A1c (11.2).  Her best reasons are so she wont have to be worried it is high and to be healthier.   She was dx with DM 10 yrs ago. She is using the VGO with meals.  She would like to get the freestyle or the dexcom meter will send request to [facility].   Patient will work on goals and bring to next visit. HC will continue to work with patient to assist to reach her goals.     Best Method of Contact:  [x] email:   [x] Phone:   [] Mail  [] Office     Kelsie HERRERA

## 2020-02-19 ENCOUNTER — OFFICE VISIT (OUTPATIENT)
Dept: OPTOMETRY | Facility: CLINIC | Age: 70
End: 2020-02-19
Payer: MEDICARE

## 2020-02-19 DIAGNOSIS — H10.13 ALLERGIC CONJUNCTIVITIS, BILATERAL: ICD-10-CM

## 2020-02-19 DIAGNOSIS — H25.12 NUCLEAR SCLEROTIC CATARACT OF LEFT EYE: ICD-10-CM

## 2020-02-19 DIAGNOSIS — E11.9 TYPE 2 DIABETES MELLITUS WITHOUT RETINOPATHY: Primary | ICD-10-CM

## 2020-02-19 PROCEDURE — 99999 PR PBB SHADOW E&M-EST. PATIENT-LVL III: ICD-10-PCS | Mod: PBBFAC,HCNC,, | Performed by: OPTOMETRIST

## 2020-02-19 PROCEDURE — 99999 PR PBB SHADOW E&M-EST. PATIENT-LVL III: CPT | Mod: PBBFAC,HCNC,, | Performed by: OPTOMETRIST

## 2020-02-19 PROCEDURE — 92014 COMPRE OPH EXAM EST PT 1/>: CPT | Mod: HCNC,S$GLB,, | Performed by: OPTOMETRIST

## 2020-02-19 PROCEDURE — 92014 PR EYE EXAM, EST PATIENT,COMPREHESV: ICD-10-PCS | Mod: HCNC,S$GLB,, | Performed by: OPTOMETRIST

## 2020-02-19 RX ORDER — LOSARTAN POTASSIUM 50 MG/1
TABLET ORAL
Qty: 90 TABLET | Refills: 3 | Status: SHIPPED | OUTPATIENT
Start: 2020-02-19 | End: 2021-04-13

## 2020-02-19 RX ORDER — AZELASTINE HYDROCHLORIDE 0.5 MG/ML
1 SOLUTION/ DROPS OPHTHALMIC 2 TIMES DAILY
Qty: 6 ML | Refills: 1 | Status: SHIPPED | OUTPATIENT
Start: 2020-02-19 | End: 2020-03-16

## 2020-02-19 RX ORDER — OLOPATADINE HYDROCHLORIDE 1 MG/ML
1 SOLUTION/ DROPS OPHTHALMIC 2 TIMES DAILY
Qty: 5 ML | Refills: 1 | Status: SHIPPED | OUTPATIENT
Start: 2020-02-19 | End: 2020-02-19

## 2020-02-19 RX ORDER — AMLODIPINE BESYLATE 5 MG/1
TABLET ORAL
Qty: 90 TABLET | Refills: 3 | Status: SHIPPED | OUTPATIENT
Start: 2020-02-19 | End: 2021-02-22

## 2020-02-19 NOTE — PROGRESS NOTES
KYM KITCHEN 10/2020  Diabetic  this morning. Eyes feel itchy and watering.    Allergy drops don't seem to help. Patient is doing lid scrubs in the   morning.  Distance is sometimes not as clear especially during allergies.   Glasses about 2 yrs. Old and still seem to be fine.    Hemoglobin A1C       Date                     Value               Ref Range             Status                01/02/2020               11.2 (H)            4.0 - 5.6 %           Final           08/30/2019               9.8 (H)             4.0 - 5.6 %           Final           04/08/2019               8.7 (H)             4.0 - 5.6 %           Final                  Last edited by Susie Bartlett on 2/19/2020  1:07 PM. (History)            Assessment /Plan     For exam results, see Encounter Report.    Type 2 diabetes mellitus without retinopathy    Nuclear sclerotic cataract of left eye  -     Ambulatory referral/consult to Ophthalmology; Future; Expected date: 02/26/2020    Allergic conjunctivitis, bilateral  -     olopatadine (PATANOL) 0.1 % ophthalmic solution; Place 1 drop into both eyes 2 (two) times daily.  Dispense: 5 mL; Refill: 1      1. No diabetic retinopathy, no csme. Return in 1 year for dilated eye exam.  2. Refer to Dr. Stafford for cataract evaluation and possible removal.   3. Start Patanol drops 1 drop 2x/day x 1 week. RTC or call if no better.

## 2020-02-19 NOTE — PLAN OF CARE
[facility]  Physical Therapy Initial Evaluation    Name: Chelly Ortiz  Clinic Number: [id number]    Therapy Diagnosis: No diagnosis found.  Physician: Aisha Perez Jr.,*    Physician Orders: PT Eval and Treat: 2-3 sessions per week for 6-8 weeks   Medical Diagnosis from Referral:   M54.5,G89.29 (ICD-10-CM) - Chronic bilateral low back pain without sciatica   M70.72 (ICD-10-CM) - Ischial bursitis of left side   Evaluation Date: 2/18/2020  Authorization Period Expiration: 2/6/2021  Plan of Care Expiration: 4/17/2020  Visit # / Visits authorized: 1/ 1    Time In: 315pm  Time Out: 400pm  Total Appointment Time (timed & untimed codes): 45 minutes    Precautions: Standard, Hx of Diabetes     Subjective   Date of onset: Reports that her s/s started gradually over time - started around the new year. No NAKUL.   History of current condition - Chelly reports that she has had ongoing Left buttock pain for some time now. Reports that she followed up with her MD in February and had a subsequent injection planned for 2/13/2020; however, declined and asked for PT. Reports that she has been completing a few stretching which have helped    From MD Note on 2/5/2020: Mrs. Ortiz returns to clinic for follow up visit reporting left buttock pain. Patient reports radiation down left leg to mid thigh. Pain intensity is currently 5/10.        Medical History:   Past Medical History:   Diagnosis Date    Allergy     Cataract     DDD (degenerative disc disease), lumbar     Diabetes mellitus     diet controlled    Diabetes mellitus, type 2     GERD (gastroesophageal reflux disease)     History of subconjunctival hemorrhage 12/2/11    left eye    Hyperlipidemia     Hypertension     IBS (irritable bowel syndrome)     Obesity     MYRIAM (obstructive sleep apnea)     on CPAP    Rotator cuff impingement syndrome     Seasonal allergic conjunctivitis        Surgical History:   Chelly Ortiz  has a past  surgical history that includes  section; Cholecystectomy; Cataract extraction w/  intraocular lens implant (10/3/13); Eye surgery; Tubal ligation; Hysterectomy; Endoscopic ultrasound of upper gastrointestinal tract (N/A, 10/9/2018); Colonoscopy (N/A, 2018); and Excision of lesion (N/A, 2019).    Medications:   Chelly has a current medication list which includes the following prescription(s): accu-chek shira plus meter, albuterol, amlodipine, aspirin, blood sugar diagnostic, cetirizine, citalopram, ergocalciferol, esomeprazole, gabapentin, hydrocortisone, hydroquinone, ibuprofen, insulin aspart u-100, insulin detemir u-100, insulin syringe-needle u-100, januvia, lancets, lidocaine hcl 2%, losartan, magnesium oxide, meloxicam, pen needle, diabetic, polyethylene glycol, polyethylene glycol, pravastatin, sub-q insulin device, 20 unit, trazodone, and varicella-zoster [id number] (pf).    Allergies:   Review of patient's allergies indicates:   Allergen Reactions    Prednisone Other (See Comments)     hipper    Latex      Other reaction(s): Itching    Ace inhibitors Hives     Other reaction(s): Cough    Latex, natural rubber Itching        Imaging, Xray: No fracture or dislocation.  Moderate bilateral hip cartilage space loss with osteophyte production.  Degenerative changes are seen in the lower lumbar spine, as well as the sacroiliac joints and pubic symphysis.    Prior Therapy: No, but prior therapy for her shoulder.   Social History: 1 [facility] home, 2 story home with difficulty going upstairs, lives with their spouse  Occupation: Retired.   Prior Level of Function: Independent with all ADLs  Current Level of Function: Independent with all ADLs though difficulty with prolonged sitting, standing    Pain:  Current 6/10, worst 10/10, best 0/10   Location: left buttocks    Description: Aching and Dull  Aggravating Factors: Sitting too long (immediately), walking right after sitting down for a while, walking.  Mostly with sitting.   Easing Factors: Supine piriformis stretch    Pt also reports with Hx of LBP with difficulty prolonged standing    Pts goals: decrease pain, going up and down stairs without difficulty, complete housework with less difficulty (bending activities and also vacuuming).     Objective     Observations: Pt with increased lumbar lordosis/ anterior pelvic tilt in quiet stance, ASIS/PSIS appears symmetrical. Gait WFL with limited hip extension throughout.   Functional screening:   - Squatting: min c/o  - Crossing legs with discomfort? Minimal - +concordant s/s with leaning forward with L LE crossed  - Pain w/ bending down to tie shoes; crossing leg? No s/s  Lumbar screening: WFL. Mild c/o with lumbar flexion in stance, though no s/s in sitting. No c/o with rotation R/L as well as extension  Knee screening: WFL    - 90/90 + L LE for mild c/o s/s  Hip ROM screening:    - PROM screening WFL though soft tissue approximation noted in deep flexion and mild PROM IR limitations (B) - appear symmetrical     Hip MMT   Shoulder MMT scores Right: X/5 Left: x/5 Comments    Flexion 4 /5  40 /5 no signs or symptoms   Extension 4- /5 3+ /5  no signs or symptoms   Abduction 4- /5 4- /5  no signs or symptoms   IR 4 /5 4 /5  no signs or symptoms   ER 4 /5 4- /5 no signs or symptoms   Prone HS  4  /5 4 /5 no signs or symptoms in neutral, bias medial or lateral HS     Palpation: pt ttp to Ischial tuberosity (+ concordant s/s)  Special testing:   - Sherri (for provocation of s/s):  +  - Scouring (for provocation of s/s): -  - FADIR/Quadrant (for provocation of s/s): +      Limitation/Restriction for FOTO Hip Survey    Therapist reviewed FOTO scores for Chelly Ortiz on 2/18/2020.   FOTO documents entered into [employer] ([employer]) - see Media section.    Limitation Score: 53%   Goal: 42%         TREATMENT   Treatment Time In: 350pm  Treatment Time Out: 400pm  Total Treatment time (time-based codes) separate from Evaluation: 10  "minutes    Chelly received therapeutic exercises to develop strength, endurance, ROM and flexibility for 10 minutes including:  - Seated piriformis stretch  5x30"  - Hamstring stretching  5x30"  - Bridging    1x10   - Standing hip extension  1x10    Home Exercises and Patient Education Provided    Education provided:   - HEP, POC    Written Home Exercises Provided: yes.  Exercises were reviewed and Chelly was able to demonstrate them prior to the end of the session.  Chelly demonstrated good  understanding of the education provided.     See EMR under Patient Instructions for exercises provided 2/18/2020.    Assessment   Chelly is a 69 y.o. female referred to outpatient Physical Therapy with a medical diagnosis of Chronic bilateral low back pain without sciatica and Ischial bursitis of left side.   Pt presents with chief c/o Left sided buttock pain with + reproduction of s/s with palpation to the ischial tuberosity, piriformis stretching and hamstring stretching. Denies any c/o with resistive muscle testing at this time though weakness persists about the L hip. Differential Dx at this time ischial bursitis vs tendinopathy .     Pt prognosis is Good.   Pt will benefit from skilled outpatient Physical Therapy to address the deficits stated above and in the chart below, provide pt/family education, and to maximize pt's level of independence.     Plan of care discussed with patient: Yes  Pt's spiritual, cultural and educational needs considered and patient is agreeable to the plan of care and goals as stated below:     Anticipated Barriers for therapy: chronicity and irritability of s/s    Medical Necessity is demonstrated by the following  History  Co-morbidities and personal factors that may impact the plan of care Co-morbidities:   diabetes and high BMI    Personal Factors:   age     low   Examination  Body Structures and Functions, activity limitations and participation restrictions that may impact the plan of care Body " Regions:   back  lower extremities    Body Systems:    ROM  strength    Participation Restrictions:   Irritability of s/s    Activity limitations:   Learning and applying knowledge  no deficits    General Tasks and Commands  no deficits    Communication  no deficits    Mobility  lifting and carrying objects  walking    Self care  no deficits    Domestic Life  shopping  cooking  doing house work (cleaning house, washing dishes, laundry)  assisting others    Interactions/Relationships  no deficits    Life Areas  no deficits    Community and Social Life  community life  recreation and leisure         moderate   Clinical Presentation stable and uncomplicated low   Decision Making/ Complexity Score: low     Goals:  Short Term Goals: 4 weeks   1.) Pt will become compliant and independent with her initial HEP to promote decreased pain and improved mobility and strength.   2) Pt to report decrease in pain levels from 10/10 at worse to 8/10 at worse.   3.) Pt will improve L hip MMT scores by 1 muscle grade to improve functional stability and strength.   4.) Pt will demonstrate (-) AMRIT test to indicate improvement with hip mobility.   5.) Pt will demonstrate (-) 90/90 hamstring test to indicate improvement with flexibility.      Long Term Goals: 8 weeks   1.) Pt will become compliant and independent with an updated, progressed HEP to promote decreased pain and improved mobility and strength as well as prep for discharge from further PT intervention.   2) Pt to report decrease in pain levels from 10/10 at worse to 5/10 at worse.   3.) Pt will improve L hip MMT scores by 2 muscle grade to improve functional stability and strength.   4.) Pt will improve her FOTO score from 53% impairment to 42% improvement to indicate improvement in overall function.    5.) Pt will be able to improve sitting tolerance to 60 minutes with no c/o s/s.     Plan   Plan of care Certification: 2/18/2020 to 4/17/2020.    Outpatient Physical Therapy 2  times weekly for 8 weeks to include the following interventions: Manual Therapy, Moist Heat/ Ice, Neuromuscular Re-ed, Patient Education, Self Care, Therapeutic Activites, Therapeutic Exercise and FDN, Astym, other soft tissue modalities as needed. .     Jose Lechuga, PT

## 2020-02-21 ENCOUNTER — PATIENT MESSAGE (OUTPATIENT)
Dept: INTERNAL MEDICINE | Facility: CLINIC | Age: 70
End: 2020-02-21

## 2020-02-21 ENCOUNTER — TELEPHONE (OUTPATIENT)
Dept: INTERNAL MEDICINE | Facility: CLINIC | Age: 70
End: 2020-02-21

## 2020-02-21 NOTE — TELEPHONE ENCOUNTER
Called pt she stated she doesn't care for the Vgo, she will finish last 2 weeks she has left. She said wait to send the rx for pens until she is close to being finished with the vgo.   She will call us to let us know when to send the pens     She will continue vgo for now  Will like rx in 4 weeks.   Marion spoke with pt.

## 2020-03-03 ENCOUNTER — CLINICAL SUPPORT (OUTPATIENT)
Dept: REHABILITATION | Facility: HOSPITAL | Age: 70
End: 2020-03-03
Attending: PAIN MEDICINE
Payer: MEDICARE

## 2020-03-03 DIAGNOSIS — R26.89 DECREASED MOBILITY: ICD-10-CM

## 2020-03-03 DIAGNOSIS — R53.1 DECREASED STRENGTH: ICD-10-CM

## 2020-03-03 DIAGNOSIS — M53.86 DECREASED ROM OF LUMBAR SPINE: ICD-10-CM

## 2020-03-03 PROCEDURE — 97110 THERAPEUTIC EXERCISES: CPT | Mod: HCNC,PN,CQ

## 2020-03-03 NOTE — PROGRESS NOTES
"  Physical Therapy Daily Treatment Note     Name: Chelly Ortiz  Clinic Number: [id number]    Therapy Diagnosis: No diagnosis found.  Physician: Aisha Perez Jr.,*     Physician Orders: PT Eval and Treat: 2-3 sessions per week for 6-8 weeks   Medical Diagnosis from Referral:   M54.5,G89.29 (ICD-10-CM) - Chronic bilateral low back pain without sciatica   M70.72 (ICD-10-CM) - Ischial bursitis of left side   Evaluation Date: 2/18/2020  Authorization Period Expiration: 2/6/2021  Plan of Care Expiration: 4/17/2020  Visit # / Visits authorized: 2/ 12       Time In: 2:00  Time Out: 2:55  Total Billable Time: 55 minutes    Precautions: Standard, Hx of Diabetes     Subjective     Pt reports: no improvement as yet.  She was compliant with home exercise program.  Response to previous treatment: a little   Functional change: Ongoing    Pain: 6/10  Location: left back  And hip    Objective     Chelly received therapeutic exercises to develop strength, endurance, ROM, flexibility, posture and core stabilization for 55 minutes including:  Supine:   -Transverse abdominus                     15x5" hold  - Hip Adductor Iso                                15x5" hold w/ball  - Hip Iso Abd                                         15x5" w/belt  - Single knee to chest                         3x30"  - Reverse crunches                            2 minutes w (smaller) red Swiss ball   - Lower trunk rotations                       2 minutes w (smaller red Uzbek ball  - Seated piriformis stretch                     5x30"  - Hamstring stretching                           4x30" B w/strap  - Bridging                                               1x10   Sitting:   - Transverse abdominal                        15x5" hold   Standing:   - hip extension                                       1x10  - calf stretch                                         3x30" B on fitter           Home Exercises Provided and Patient Education Provided     Education " provided:   - HEP review    Written Home Exercises Provided: Patient instructed to cont prior HEP.  Exercises were reviewed and Chelly was able to demonstrate them prior to the end of the session.  Chelly demonstrated good  understanding of the education provided.     See EMR under Patient Instructions for exercises provided prior visit.    Assessment   Chelly tolerated 1st follow up well. Reviewed HEP. Needed cueing for effective technique for hamstring and piriformis stretches. Progressed with interventions in bold.      Chelly is progressing well towards her goals.   Pt prognosis is Good.     Pt will continue to benefit from skilled outpatient physical therapy to address the deficits listed in the problem list box on initial evaluation, provide pt/family education and to maximize pt's level of independence in the home and community environment.     Pt's spiritual, cultural and educational needs considered and pt agreeable to plan of care and goals.     Anticipated barriers to physical therapy: chronicity and irritability of s/s    Goals: Short Term Goals: 4 weeks   1.) Pt will become compliant and independent with her initial HEP to promote decreased pain and improved mobility and strength.   2) Pt to report decrease in pain levels from 10/10 at worse to 8/10 at worse.   3.) Pt will improve L hip MMT scores by 1 muscle grade to improve functional stability and strength.   4.) Pt will demonstrate (-) AMRIT test to indicate improvement with hip mobility.   5.) Pt will demonstrate (-) 90/90 hamstring test to indicate improvement with flexibility.       Long Term Goals: 8 weeks   1.) Pt will become compliant and independent with an updated, progressed HEP to promote decreased pain and improved mobility and strength as well as prep for discharge from further PT intervention.   2) Pt to report decrease in pain levels from 10/10 at worse to 5/10 at worse.   3.) Pt will improve L hip MMT scores by 2 muscle grade to improve  functional stability and strength.   4.) Pt will improve her FOTO score from 53% impairment to 42% improvement to indicate improvement in overall function.    5.) Pt will be able to improve sitting tolerance to 60 minutes with no c/o s/s.       Plan     Continue PT POC    Aquiles Castle, PTA

## 2020-03-04 ENCOUNTER — PATIENT MESSAGE (OUTPATIENT)
Dept: INTERNAL MEDICINE | Facility: CLINIC | Age: 70
End: 2020-03-04

## 2020-03-04 RX ORDER — PEN NEEDLE, DIABETIC 30 GX3/16"
NEEDLE, DISPOSABLE MISCELLANEOUS
Qty: 400 EACH | Refills: 3 | Status: SHIPPED | OUTPATIENT
Start: 2020-03-04 | End: 2020-08-27 | Stop reason: SDUPTHER

## 2020-03-04 RX ORDER — INSULIN ASPART 100 [IU]/ML
INJECTION, SOLUTION INTRAVENOUS; SUBCUTANEOUS
Qty: 15 ML | Refills: 6 | Status: SHIPPED | OUTPATIENT
Start: 2020-03-04 | End: 2020-12-09 | Stop reason: SDUPTHER

## 2020-03-05 ENCOUNTER — PATIENT MESSAGE (OUTPATIENT)
Dept: INTERNAL MEDICINE | Facility: CLINIC | Age: 70
End: 2020-03-05

## 2020-03-05 ENCOUNTER — CLINICAL SUPPORT (OUTPATIENT)
Dept: REHABILITATION | Facility: HOSPITAL | Age: 70
End: 2020-03-05
Attending: PAIN MEDICINE
Payer: MEDICARE

## 2020-03-05 DIAGNOSIS — R53.1 DECREASED STRENGTH: ICD-10-CM

## 2020-03-05 DIAGNOSIS — M53.86 DECREASED ROM OF LUMBAR SPINE: ICD-10-CM

## 2020-03-05 DIAGNOSIS — R26.89 DECREASED MOBILITY: ICD-10-CM

## 2020-03-05 PROCEDURE — 97110 THERAPEUTIC EXERCISES: CPT | Mod: HCNC,PN,CQ

## 2020-03-05 NOTE — PROGRESS NOTES
"  Physical Therapy Daily Treatment Note     Name: Chelly Ortiz  Clinic Number: [id number]    Therapy Diagnosis: No diagnosis found.  Physician: Aisha Perez Jr.,*     Physician Orders: PT Eval and Treat: 2-3 sessions per week for 6-8 weeks   Medical Diagnosis from Referral:   M54.5,G89.29 (ICD-10-CM) - Chronic bilateral low back pain without sciatica   M70.72 (ICD-10-CM) - Ischial bursitis of left side   Evaluation Date: 2/18/2020  Authorization Period Expiration: 2/6/2021  Plan of Care Expiration: 4/17/2020  Visit # / Visits authorized: 3/ 12       Time In: 2:15  Time Out: 3:00  Total Billable Time: 30 minutes    Precautions: Standard, Hx of Diabetes     Subjective     Pt reports:  Fatigued from last session  She was compliant with home exercise program.  Response to previous treatment: a little tired  Functional change: Ongoing    Pain: 6/10  Location: left back  And hip    Objective   15 minutes late for appointment. Unable to accommodate  Chelly received therapeutic exercises to develop strength, endurance, ROM, flexibility, posture and core stabilization for 45 minutes including:  Supine:   -Transverse abdominus                       15x5" hold  - Hip Adductor Iso                                 15x5" hold w/ball  - Hip Iso Abd                                         15x5" w/belt  - Single knee to chest                           3x30"  - Reverse crunches                              2 minutes w (smaller) red Swiss ball   - Lower trunk rotations                          2 minutes w (smaller red Indonesian ball  - Seated piriformis stretch                     5x30"  - Hamstring stretching                           4x30" B w/strap  - Bridging                                               1x10   Sitting:   - Transverse abdominal                        15x5" hold      Standing: (not performed due to out of time and late arrival  - hip extension                                       1x10  - calf stretch                 " "                        3x30" B on fitter     Home Exercises Provided and Patient Education Provided     Education provided:   - HEP review    Written Home Exercises Provided: Patient instructed to cont prior HEP.  Exercises were reviewed and Chelly was able to demonstrate them prior to the end of the session.  Chelly demonstrated good  understanding of the education provided.     See EMR under Patient Instructions for exercises provided prior visit.    Assessment   Chelly tolerated session well.. Progressed with interventions in bold. Less easily fatigued this treatment. Late arrival limited interventions      Chelly is progressing well towards her goals.   Pt prognosis is Good.     Pt will continue to benefit from skilled outpatient physical therapy to address the deficits listed in the problem list box on initial evaluation, provide pt/family education and to maximize pt's level of independence in the home and community environment.     Pt's spiritual, cultural and educational needs considered and pt agreeable to plan of care and goals.     Anticipated barriers to physical therapy: chronicity and irritability of s/s    Goals: Short Term Goals: 4 weeks   1.) Pt will become compliant and independent with her initial HEP to promote decreased pain and improved mobility and strength.   2) Pt to report decrease in pain levels from 10/10 at worse to 8/10 at worse.   3.) Pt will improve L hip MMT scores by 1 muscle grade to improve functional stability and strength.   4.) Pt will demonstrate (-) AMRIT test to indicate improvement with hip mobility.   5.) Pt will demonstrate (-) 90/90 hamstring test to indicate improvement with flexibility.       Long Term Goals: 8 weeks   1.) Pt will become compliant and independent with an updated, progressed HEP to promote decreased pain and improved mobility and strength as well as prep for discharge from further PT intervention.   2) Pt to report decrease in pain levels from 10/10 at " worse to 5/10 at worse.   3.) Pt will improve L hip MMT scores by 2 muscle grade to improve functional stability and strength.   4.) Pt will improve her FOTO score from 53% impairment to 42% improvement to indicate improvement in overall function.    5.) Pt will be able to improve sitting tolerance to 60 minutes with no c/o s/s.       Plan     Continue PT POC    Aquiles Castle, PTA

## 2020-03-06 ENCOUNTER — TELEPHONE (OUTPATIENT)
Dept: INTERNAL MEDICINE | Facility: CLINIC | Age: 70
End: 2020-03-06

## 2020-03-06 NOTE — TELEPHONE ENCOUNTER
----- Message from Mayra Rodriguez sent at 3/6/2020  8:00 AM CST -----  Contact: Self  She would like to speak to someone about her prescription you called in forinsulin aspart U-100 (NOVOLOG FLEXPEN U-100 INSULIN) 100 unit/mL (3 mL) InPn pen and insulin detemir U-100 (LEVEMIR FLEXTOUCH U-100 INSULN) 100 unit/mL (3 mL) SubQ InPn pen   . She has questions about the medication before she goes to pick it up at the pharmacy.

## 2020-03-06 NOTE — TELEPHONE ENCOUNTER
Pt needed pen needles for her insulin pens. Pt stated she got the rx that was sent to w pharm. [facility] called and switch the rx to them. Pt still using vgo at the time until her insulin and pens come in. Pt just giving update about insulin intake. Pt had no further questions.

## 2020-03-09 ENCOUNTER — IMMUNIZATION (OUTPATIENT)
Dept: PHARMACY | Facility: CLINIC | Age: 70
End: 2020-03-09
Payer: MEDICARE

## 2020-03-09 ENCOUNTER — PATIENT MESSAGE (OUTPATIENT)
Dept: PHARMACY | Facility: CLINIC | Age: 70
End: 2020-03-09

## 2020-03-10 ENCOUNTER — PATIENT MESSAGE (OUTPATIENT)
Dept: INTERNAL MEDICINE | Facility: CLINIC | Age: 70
End: 2020-03-10

## 2020-03-14 DIAGNOSIS — H10.13 ALLERGIC CONJUNCTIVITIS, BILATERAL: ICD-10-CM

## 2020-03-16 RX ORDER — AZELASTINE HYDROCHLORIDE 0.5 MG/ML
SOLUTION/ DROPS OPHTHALMIC
Qty: 6 ML | Refills: 1 | Status: SHIPPED | OUTPATIENT
Start: 2020-03-16 | End: 2022-02-15 | Stop reason: SDUPTHER

## 2020-03-25 ENCOUNTER — TELEPHONE (OUTPATIENT)
Dept: REHABILITATION | Facility: HOSPITAL | Age: 70
End: 2020-03-25

## 2020-03-25 NOTE — TELEPHONE ENCOUNTER
Postponed Appointments    Patient: Chelly Ortiz  Date: 3/25/2020  Diagnosis: No diagnosis found.  MRN: [id number]    Spoke with patient due to therapy following updates regarding COVID-19 closely and taking every precaution to ensure the safety of our patients, staff and community.  In an abundance of caution and in an effort to help reduce risk and limit community spread, we have decided to temporarily postpone appointments for patients who may be at increased risk to attend in-person therapy or where therapy is not critically needed at this time. Plan of care and home exercise program were reviewed and patient has what they need to continue therapy at home. All patient questions were answered. Also stated to patient that we are exploring virtual methods of providing care and will be in touch over the next few weeks. Patient verbalized understanding to all.

## 2020-03-31 DIAGNOSIS — K21.9 GASTROESOPHAGEAL REFLUX DISEASE WITHOUT ESOPHAGITIS: ICD-10-CM

## 2020-03-31 RX ORDER — ESOMEPRAZOLE MAGNESIUM 40 MG/1
40 CAPSULE, DELAYED RELEASE ORAL
Qty: 90 CAPSULE | Refills: 3 | Status: SHIPPED | OUTPATIENT
Start: 2020-03-31 | End: 2020-10-21 | Stop reason: SDUPTHER

## 2020-04-01 ENCOUNTER — PATIENT MESSAGE (OUTPATIENT)
Dept: ADMINISTRATIVE | Facility: OTHER | Age: 70
End: 2020-04-01

## 2020-04-02 DIAGNOSIS — E11.9 TYPE 2 DIABETES MELLITUS: ICD-10-CM

## 2020-04-03 ENCOUNTER — TELEPHONE (OUTPATIENT)
Dept: REHABILITATION | Facility: HOSPITAL | Age: 70
End: 2020-04-03

## 2020-04-03 NOTE — PROGRESS NOTES
SLEEP CLINIC FOLLOW UP NOTE:  cc: CPAP check  1/20/14 Dr. Ignacio: This 59 year-old female returns for management of obstructive sleep apnea and CPAP equipment check. The patient has symptoms of snoring and disrupted sleep.  Medical co-morbidities include obesity (BMI >30) and hypertension.  The patient reports nightly use. She still does not like CPAP, but uses it because of the potential benefits.  Overall CPAP use: nightly  Mask comfort / fit: ok, some leaks with movement - has tried several masks  Pressure tolerance: feels that it is still too strong, but improved since lowering it  Humidification: max setting  Nasal / Oral drying: none  CPAP Interrogation: Total usage: 3248 hours, rubber seal at water tank is frayed? Manometer reads 7.5 cm.  After some discussion, patient reports that her main sleep complaint is her 's snoring. She reports that this keeps her awake most of the night. She does not  have an alternative room to sleep in. According to her, he won't tolerate white noise, and if she tries to sleep with ear plugs she would not hear her alarm. She reports  he has refused to seek treatment for his condition. She is very distressed by this.    01/21/2015 Dr. Quezada - switched care to [place]. SHe has always been a light sleeper.  The patient has mentioned difficulty falling and staying asleep.    The patient states that she has already made some changes in regard to sleep hygiene, making sure that the bedroom is dark, features comfortable temperature and humidity level. The patient does watch TV and read in bed. she goes to bed before she is sleepy and stays in bed if not asleep within 30 minutes watching TV. she avoids clock watching and tries not to worry about her ability to fall asleep.     The patient has tried the following sleeping aids: Trazodone -does not remember the dose.    Still using CPAP compliantly.    CPAP pressure: 8 cm H2O  Mask comfort / fit:  Eson - does not like it    Pressure  Therapy Recommended - Formulary adherence tolerance: ear pop - believed hearing decreased on the left ear.  Humidification: OK   CPAP Interrogation:    Ave daily usage:8 hours /7days  Ave daily usage:8 hours /30days  Days >4 hours usage: 8 /7 days  Days >4 hours usage: 30/30 days   Machine condition: good     90-%tile pressure: n/a  Large leak n/a  AHI n/a      12/02/2015: She comes to get a follow up for BPAP titration results. She preferred BPAP machine to CPAP. Still compliant with using CPAP 8  Cm H2O>.  Averaging 8 hrs per night. Her CPAP is about 5 years old and needs replacement. She also expressed interest in getting an oral appliance for travel. Still watching TV in bed. Taking Trazodone PRN. Concerned that she can not sleep for 8-9 hrs.  BPAP 13/7 was ordered after BPAP titration, but the patient never received it.       05/02/2016:  The patient has not presented any new complaints since the previous visit. Did some progress on BPAP - pressure still feels low. Interested in OA for OA. Still taking Trazodone sometimes. Avoids watching TV in bed. Prefers her old FP mask to Eson.    CPAP pressure: 13/9 cm H2O  Mask comfort / fit:  OK    Pressure tolerance: OK   Humidification: OK   CPAP Interrogation:    Ave daily usage:8 hours /7days  Ave daily usage:9 hours /30days  Days >4 hours usage: 7 /7 days  Days >4 hours usage: 30/30 days   Machine condition: good     90-%tile pressure: n/a cm H2O  Large leak 2-3%  AHI 5-6    09/21/2016:  The patient has not presented any new complaints since the previous visit.   She does not like the hose attached on top her Dreamwear.  No longer considering OA - getting dental work dose.  ESS 1/24. Still Trazodone 3-4 times per week. Taking Melatonin PRN.At times ZZquill.  Not sleepy.    BPAP pressure: 14/10 cm H2O  Mask comfort / fit:  Nasal pillows Dream Wear     Pressure tolerance: OK   Humidification: OK- sometime does not use it   CPAP Interrogation:    Ave daily usage:8 hours /7days  Ave daily usage:9 hours  "/30days  Days >4 hours usage: 7 /7 days  Days >4 hours usage: 30/30 days   Machine condition: good     90-%tile pressure: n/a cm H2O  Large leak 6-7%  AHI 4-5    11/13/2017    The patient reports improved sleep continuity and daytime sleepiness on PAP. ESS today is 0/24.  Denies break through snoring. No dry mouth. Still interested in OA for travel.   BPAP pressure: 14/10 cm H2O  Mask comfort / fit:  Wisp    Pressure tolerance: OK   Humidification: OK- sometime does not use it   CPAP Interrogation:    Ave daily usage:8 hours /7days  Ave daily usage:8:30 hours /30days  Days >4 hours usage: 7 /7 days  Days >4 hours usage: 30/30 days   Machine condition: good     90-%tile pressure: n/a cm H2O  Large leak 2-3%  AHI 2-3    Still has trouble falling and staying asleep. Taking Trazodone PRN.   Bedroom is cold, not totally dark. Not exercising enough. Walking some.   ! Cup coffee in AM    SLEEP ROUTINE 11/13/2017:    Bed partner:    Time to bed: 10-12   Sleep onset latency:2 hrs       Disruptions or awakenings: 0-1  Wakeup time:      7-11 AM  Perceived sleep quality:  Ok if she gets enough hours       Daytime naps:      0  Weekend sleep routine:      same          BASELINE SLEEP STUDY: AHI 54.1, titrated to 12 cm, improved at 8 cm.          PHYSICAL EXAM:  /67 (BP Location: Right arm, Patient Position: Sitting, BP Method: Large (Automatic))   Pulse 82   Ht 5' 3" (1.6 m)   Wt 89.3 kg (196 lb 12.2 oz)   BMI 34.85 kg/m²     ASSESSMENT:  1. Obstructive sleep apnea with prior symptoms of snoring and disrupted sleep, now improved with CPAP use. The patient is adherent on CPAP and experiencing  symptomatic benefit. BPAP 14/10. AHI 4-5.      Medical co-morbidities: obesity (BMI >30) and hypertension.    2. Insomnia NEC. Multi-factorial -  excess time in bed, poor sleep hygiene, and likely paradoxical insomnia play a role. Improved on CPAP.     PLAN:    1. Continue  14/10 BPAP.    2. Continue Trazodone 50 mg mg at bedtime " (she can take it more often) . Alternate with Alteril or RICCI (smaple provided - picture taken)  3. Will reorder supplies - consider nasal pillows other then Wisp  4. Will provide OA for MYRIAM referral.  5. Please make your bedroom darker.  6. Please exercise more/faster        Age dependent sleep requirements and sleep hygiene and stimulus control rules were discussed in detail.           F/up  - after 6 months of BPAP use.

## 2020-04-03 NOTE — TELEPHONE ENCOUNTER
Called the pt to check in on her progress, HEP, and screen need/want to virtual visits. I asked the pt to contact the clinic back at her earliest convenience.

## 2020-04-11 ENCOUNTER — PATIENT MESSAGE (OUTPATIENT)
Dept: INTERNAL MEDICINE | Facility: CLINIC | Age: 70
End: 2020-04-11

## 2020-04-24 ENCOUNTER — LAB VISIT (OUTPATIENT)
Dept: LAB | Facility: HOSPITAL | Age: 70
End: 2020-04-24
Attending: FAMILY MEDICINE
Payer: MEDICARE

## 2020-04-24 ENCOUNTER — PATIENT OUTREACH (OUTPATIENT)
Dept: ADMINISTRATIVE | Facility: HOSPITAL | Age: 70
End: 2020-04-24

## 2020-04-24 ENCOUNTER — TELEPHONE (OUTPATIENT)
Dept: FAMILY MEDICINE | Facility: CLINIC | Age: 70
End: 2020-04-24

## 2020-04-24 ENCOUNTER — PATIENT MESSAGE (OUTPATIENT)
Dept: ADMINISTRATIVE | Facility: HOSPITAL | Age: 70
End: 2020-04-24

## 2020-04-24 DIAGNOSIS — E11.65 UNCONTROLLED TYPE 2 DIABETES MELLITUS WITH HYPERGLYCEMIA, WITHOUT LONG-TERM CURRENT USE OF INSULIN: ICD-10-CM

## 2020-04-24 LAB
ALBUMIN SERPL BCP-MCNC: 3.6 G/DL (ref 3.5–5.2)
ANION GAP SERPL CALC-SCNC: 8 MMOL/L (ref 8–16)
BUN SERPL-MCNC: 11 MG/DL (ref 8–23)
CALCIUM SERPL-MCNC: 9.5 MG/DL (ref 8.7–10.5)
CHLORIDE SERPL-SCNC: 103 MMOL/L (ref 95–110)
CO2 SERPL-SCNC: 29 MMOL/L (ref 23–29)
CREAT SERPL-MCNC: 1 MG/DL (ref 0.5–1.4)
EST. GFR  (AFRICAN AMERICAN): >60 ML/MIN/1.73 M^2
EST. GFR  (NON AFRICAN AMERICAN): 56.8 ML/MIN/1.73 M^2
ESTIMATED AVG GLUCOSE: 240 MG/DL (ref 68–131)
GLUCOSE SERPL-MCNC: 175 MG/DL (ref 70–110)
HBA1C MFR BLD HPLC: 10 % (ref 4–5.6)
PHOSPHATE SERPL-MCNC: 3.1 MG/DL (ref 2.7–4.5)
POTASSIUM SERPL-SCNC: 3.7 MMOL/L (ref 3.5–5.1)
SODIUM SERPL-SCNC: 140 MMOL/L (ref 136–145)

## 2020-04-24 PROCEDURE — 83036 HEMOGLOBIN GLYCOSYLATED A1C: CPT

## 2020-04-24 PROCEDURE — 36415 COLL VENOUS BLD VENIPUNCTURE: CPT | Mod: PO

## 2020-04-24 PROCEDURE — 80069 RENAL FUNCTION PANEL: CPT

## 2020-04-24 NOTE — TELEPHONE ENCOUNTER
----- Message from Shawnee Schuler sent at 4/24/2020 11:02 AM CDT -----  Contact: self, [phone]  Patient called in returning Maria Teresa's call. Offered to schedule lab appt but wants to speak with office, states she doesn't know if she should fast or not and wants to know if clinics are taking precautions. Please advise.

## 2020-04-24 NOTE — PROGRESS NOTES
HM audit performed, telephone outreach to schedule A1C - left VM to contact office to schedule, portal message sent

## 2020-04-29 ENCOUNTER — TELEPHONE (OUTPATIENT)
Dept: FAMILY MEDICINE | Facility: CLINIC | Age: 70
End: 2020-04-29

## 2020-04-29 ENCOUNTER — TELEPHONE (OUTPATIENT)
Dept: INTERNAL MEDICINE | Facility: CLINIC | Age: 70
End: 2020-04-29

## 2020-04-29 ENCOUNTER — OFFICE VISIT (OUTPATIENT)
Dept: FAMILY MEDICINE | Facility: CLINIC | Age: 70
End: 2020-04-29
Payer: MEDICARE

## 2020-04-29 DIAGNOSIS — E11.65 TYPE 2 DIABETES MELLITUS WITH HYPERGLYCEMIA, WITH LONG-TERM CURRENT USE OF INSULIN: Primary | ICD-10-CM

## 2020-04-29 DIAGNOSIS — E11.65 UNCONTROLLED TYPE 2 DIABETES MELLITUS WITH HYPERGLYCEMIA, WITHOUT LONG-TERM CURRENT USE OF INSULIN: Primary | ICD-10-CM

## 2020-04-29 DIAGNOSIS — M79.89 LEG SWELLING: ICD-10-CM

## 2020-04-29 DIAGNOSIS — Z79.4 TYPE 2 DIABETES MELLITUS WITH HYPERGLYCEMIA, WITH LONG-TERM CURRENT USE OF INSULIN: Primary | ICD-10-CM

## 2020-04-29 DIAGNOSIS — M79.605 LEG PAIN, INFERIOR, LEFT: ICD-10-CM

## 2020-04-29 PROCEDURE — 99499 RISK ADDL DX/OHS AUDIT: ICD-10-PCS | Mod: HCNC,95,, | Performed by: FAMILY MEDICINE

## 2020-04-29 PROCEDURE — 99211 PR OFFICE/OUTPT VISIT, EST, LEVL I: ICD-10-PCS | Mod: 95,,, | Performed by: FAMILY MEDICINE

## 2020-04-29 PROCEDURE — 99211 OFF/OP EST MAY X REQ PHY/QHP: CPT | Mod: 95,,, | Performed by: FAMILY MEDICINE

## 2020-04-29 PROCEDURE — 99499 UNLISTED E&M SERVICE: CPT | Mod: HCNC,95,, | Performed by: FAMILY MEDICINE

## 2020-04-29 NOTE — TELEPHONE ENCOUNTER
Explained to pt how to begin the visit  with  [employer] trina, pt verbalized an understanding.

## 2020-04-29 NOTE — PROGRESS NOTES
The patient location is:  [place]  The chief complaint leading to consultation is:  Diabetes/leg pain  Visit type: audiovisual  Total time spent with patient: 15min  Each patient to whom he or she provides medical services by telemedicine is:  (1) informed of the relationship between the physician and patient and the respective role of any other health care provider with respect to management of the patient; and (2) notified that he or she may decline to receive medical services by telemedicine and may withdraw from such care at any time.    Notes:  69 years old female who was evaluated by telemedicine.  Patient with left lower extremity swelling around the calf and ankle for the last 6 days.  The pain is 3/10 of intensity on and off aggravated with palpation and better with rest.  Patient is concerned about possible blood clot.  Last A1c was elevated.  Patient with poor compliance with diabetes diet right now.  Patient is willing to change her lifestyle.  No polyuria or polyphagia.  Patient with polydipsia.      Chelly was seen today for leg pain and diabetes.    Diagnoses and all orders for this visit:    Type 2 diabetes mellitus with hyperglycemia, with long-term current use of insulin  -     insulin detemir U-100 (LEVEMIR FLEXTOUCH U-100 INSULN) 100 unit/mL (3 mL) SubQ InPn pen; Inject 45 Units into the skin every evening. Inject 38 units at night.    Leg swelling  -     US Lower Extremity Veins Left; Future    Leg pain, inferior, left  -     US Lower Extremity Veins Left; Future    Continue monitoring blood sugar at home,ADA diet.    Ultrasound to rule out DVT.

## 2020-04-29 NOTE — TELEPHONE ENCOUNTER
----- Message from Yumiko Nolasco sent at 4/29/2020 12:57 PM CDT -----  Contact: Patient  Patient would like to get a call back regarding Virtual visit scheduled for today to make sure she is properly prepared    Please call [phone]

## 2020-04-30 ENCOUNTER — HOSPITAL ENCOUNTER (OUTPATIENT)
Dept: RADIOLOGY | Facility: HOSPITAL | Age: 70
Discharge: HOME | End: 2020-04-30
Attending: FAMILY MEDICINE
Payer: MEDICARE

## 2020-04-30 DIAGNOSIS — M79.89 LEG SWELLING: ICD-10-CM

## 2020-04-30 DIAGNOSIS — M79.605 LEG PAIN, INFERIOR, LEFT: ICD-10-CM

## 2020-04-30 PROCEDURE — 93971 EXTREMITY STUDY: CPT | Mod: 26,LT,, | Performed by: RADIOLOGY

## 2020-04-30 PROCEDURE — 93971 US LOWER EXTREMITY VEINS LEFT: ICD-10-PCS | Mod: 26,LT,, | Performed by: RADIOLOGY

## 2020-04-30 PROCEDURE — 93971 EXTREMITY STUDY: CPT | Mod: TC,LT

## 2020-05-01 DIAGNOSIS — Z79.4 TYPE 2 DIABETES MELLITUS WITH HYPERGLYCEMIA, WITH LONG-TERM CURRENT USE OF INSULIN: ICD-10-CM

## 2020-05-01 DIAGNOSIS — E11.65 TYPE 2 DIABETES MELLITUS WITH HYPERGLYCEMIA, WITH LONG-TERM CURRENT USE OF INSULIN: ICD-10-CM

## 2020-05-01 NOTE — TELEPHONE ENCOUNTER
[facility] pharmacy requesting clarification 45 units into the skin every evening or 38 units at night. Please advise. Thank you.

## 2020-05-04 ENCOUNTER — PATIENT MESSAGE (OUTPATIENT)
Dept: PAIN MEDICINE | Facility: CLINIC | Age: 70
End: 2020-05-04

## 2020-05-06 ENCOUNTER — TELEPHONE (OUTPATIENT)
Dept: REHABILITATION | Facility: HOSPITAL | Age: 70
End: 2020-05-06

## 2020-05-06 NOTE — TELEPHONE ENCOUNTER
Resume Appointments    Patient: Chelly Ortiz  Date: 5/6/2020  MRN: [id number]       Called pt on behalf of treating therapist, Jose.  Left message for patient following updates regarding COVID-19.  Offered resuming in person therapy visits.  Provided call back number.      5/6/2020  Vanessa Finch CCC-SLP

## 2020-05-08 ENCOUNTER — TELEPHONE (OUTPATIENT)
Dept: FAMILY MEDICINE | Facility: CLINIC | Age: 70
End: 2020-05-08

## 2020-05-08 NOTE — TELEPHONE ENCOUNTER
[employer] is calling to clarified levemir insulin u100 flextouch inj pen has two directions is it 45units qpm or injection 38 units qpm. [employer] phone is [phone]. Please advice.

## 2020-05-11 ENCOUNTER — PATIENT MESSAGE (OUTPATIENT)
Dept: FAMILY MEDICINE | Facility: CLINIC | Age: 70
End: 2020-05-11

## 2020-05-11 DIAGNOSIS — Z79.4 TYPE 2 DIABETES MELLITUS WITH HYPERGLYCEMIA, WITH LONG-TERM CURRENT USE OF INSULIN: ICD-10-CM

## 2020-05-11 DIAGNOSIS — E11.65 TYPE 2 DIABETES MELLITUS WITH HYPERGLYCEMIA, WITH LONG-TERM CURRENT USE OF INSULIN: ICD-10-CM

## 2020-06-25 DIAGNOSIS — E11.65 TYPE 2 DIABETES MELLITUS WITH HYPERGLYCEMIA, WITH LONG-TERM CURRENT USE OF INSULIN: ICD-10-CM

## 2020-06-25 DIAGNOSIS — Z79.4 TYPE 2 DIABETES MELLITUS WITH HYPERGLYCEMIA, WITH LONG-TERM CURRENT USE OF INSULIN: ICD-10-CM

## 2020-06-25 RX ORDER — INSULIN DETEMIR 100 [IU]/ML
INJECTION, SOLUTION SUBCUTANEOUS
Qty: 15 ML | Refills: 6 | Status: SHIPPED | OUTPATIENT
Start: 2020-06-25 | End: 2020-07-14

## 2020-06-25 RX ORDER — INSULIN DETEMIR 100 [IU]/ML
45 INJECTION, SOLUTION SUBCUTANEOUS NIGHTLY
Qty: 45 ML | Refills: 3
Start: 2020-06-25 | End: 2020-10-09 | Stop reason: SDUPTHER

## 2020-06-28 ENCOUNTER — OFFICE VISIT (OUTPATIENT)
Dept: URGENT CARE | Facility: CLINIC | Age: 70
End: 2020-06-28
Payer: MEDICARE

## 2020-06-28 VITALS
SYSTOLIC BLOOD PRESSURE: 134 MMHG | TEMPERATURE: 99 F | WEIGHT: 194 LBS | BODY MASS INDEX: 34.37 KG/M2 | DIASTOLIC BLOOD PRESSURE: 78 MMHG | RESPIRATION RATE: 20 BRPM | OXYGEN SATURATION: 96 % | HEART RATE: 91 BPM

## 2020-06-28 DIAGNOSIS — R10.9 ABDOMINAL PAIN, UNSPECIFIED ABDOMINAL LOCATION: Primary | ICD-10-CM

## 2020-06-28 LAB
BILIRUB UR QL STRIP: NEGATIVE
GLUCOSE UR QL STRIP: NEGATIVE
KETONES UR QL STRIP: NEGATIVE
LEUKOCYTE ESTERASE UR QL STRIP: NEGATIVE
PH, POC UA: 7.5 (ref 5–8)
POC BLOOD, URINE: NEGATIVE
POC NITRATES, URINE: NEGATIVE
PROT UR QL STRIP: NEGATIVE
SP GR UR STRIP: 1.01 (ref 1–1.03)
UROBILINOGEN UR STRIP-ACNC: NORMAL (ref 0.1–1.1)

## 2020-06-28 PROCEDURE — 74018 RADEX ABDOMEN 1 VIEW: CPT | Mod: S$GLB,,, | Performed by: RADIOLOGY

## 2020-06-28 PROCEDURE — 81003 POCT URINALYSIS, DIPSTICK, AUTOMATED, W/O SCOPE: ICD-10-PCS | Mod: QW,S$GLB,, | Performed by: FAMILY MEDICINE

## 2020-06-28 PROCEDURE — 99214 PR OFFICE/OUTPT VISIT, EST, LEVL IV, 30-39 MIN: ICD-10-PCS | Mod: 25,S$GLB,, | Performed by: FAMILY MEDICINE

## 2020-06-28 PROCEDURE — 81003 URINALYSIS AUTO W/O SCOPE: CPT | Mod: QW,S$GLB,, | Performed by: FAMILY MEDICINE

## 2020-06-28 PROCEDURE — 74018 XR KUB: ICD-10-PCS | Mod: S$GLB,,, | Performed by: RADIOLOGY

## 2020-06-28 PROCEDURE — 99214 OFFICE O/P EST MOD 30 MIN: CPT | Mod: 25,S$GLB,, | Performed by: FAMILY MEDICINE

## 2020-06-28 RX ORDER — DICYCLOMINE HYDROCHLORIDE 10 MG/1
10 CAPSULE ORAL 3 TIMES DAILY
Qty: 45 CAPSULE | Refills: 0 | Status: SHIPPED | OUTPATIENT
Start: 2020-06-28 | End: 2020-07-13

## 2020-06-28 NOTE — PATIENT INSTRUCTIONS
"  What is Irritable Bowel Syndrome (IBS)?     Muscle contraction moves food through the digestive tract.      People who have irritable bowel syndrome (IBS) have digestive tracts that react abnormally to certain substances or to stress. This leads to symptoms like cramps, gas, bloating, pain, constipation, and diarrhea. Sometimes called spastic colon, IBS is a common condition that is not a disease, but rather a group of symptoms that happen together.  IBS--a motility problem  The muscle movement that passes food through the digestive tract is called motility. When you have IBS, the normal motility of the digestive tract (especially the colon) is disrupted. Motility may speed up, slow down, or become irregular. If stool passes too quickly through the colon, not enough water is absorbed from it. Loose, watery stools (diarrhea) can result. If stool passes through the colon too slowly, too much water is absorbed and the stool becomes hard and dry (constipation). Also, stool and gas may back up and cause painful pressure and cramping. There is no single test that can diagnose IBS. It is a group of symptoms that help your healthcare provider with the diagnosis. Often multiple blood, stool, radiologic tests, or even colonoscopy are performed in the evaluation of people suspected to have IBS. These are done primarily to make sure that there are no other illnesses that can account for your symptoms.   What causes IBS?  A great deal of research has been done on IBS, but the cause is still not known. Some of the possible factors include:   · Smoking, eating certain foods, or drinking alcohol or caffeinated drinks can cause, or "trigger," symptoms of IBS.  · Although no one knows for sure, IBS may be caused by a problem with the nerves or muscles in your digestive tract.  · There is also some evidence that certain bacteria found after a severe gastroinstestinal infection in the small intestine and colon may cause " IBS.  · While stress and anxiety worsen the symptoms of IBS, it is not believed to be the cause.   What you can do  Recommendations include:  · Certain medicines may help regulate the working of your digestive tract. Your healthcare provider may prescribe one or more for you.  · Medicine cant cure IBS, but it may help manage the symptoms.  · Because some medicines may make IBS worse, dont take any medicine, especially laxatives, unless your healthcare provider prescribes it for you.  · Your healthcare provider may suggest some lifestyle changes to help control your IBS. Two of the most important are changing your diet and managing stress. If diet changes are suggested, ask for nutritional guidance from a dietitian so you maintain a healthy nutritional balance in your food intake.   Date Last Reviewed: 7/1/2016 © 2000-2017 [employer]. [address] [place] [address], [place] [address]. All rights reserved. This information is not intended as a substitute for professional medical care. Always follow your healthcare professional's instructions.

## 2020-06-28 NOTE — PROGRESS NOTES
Subjective:       Patient ID: Chelly Ortiz is a 69 y.o. female.    Vitals:  weight is 88 kg (194 lb). Her temperature is 99 °F (37.2 °C). Her blood pressure is 134/78 and her pulse is 91. Her respiration is 20 and oxygen saturation is 96%.     Chief Complaint: Abdominal Pain    Patient presents with lower abdominal pains, cramping that started last night. Denies nausea vomiting and diarrhea. Regular bowel movement. No urinary symptoms. Appetite good. No systemic symptoms. No unusual ingestions. Hx of IBS.     Abdominal Pain  This is a new problem. The current episode started yesterday. The onset quality is sudden. The problem occurs constantly. The problem has been unchanged. The pain is located in the generalized abdominal region. The quality of the pain is cramping. The abdominal pain does not radiate. Pertinent negatives include no constipation, diarrhea, dysuria, fever, nausea or vomiting. Nothing aggravates the pain. The pain is relieved by nothing. She has tried nothing for the symptoms. The treatment provided no relief. There is no history of abdominal surgery.       Constitution: Negative for appetite change, chills, sweating and fever.   HENT: Negative for trouble swallowing.    Cardiovascular: Negative for chest pain.   Respiratory: Negative for shortness of breath.    Gastrointestinal: Positive for abdominal pain. Negative for abdominal trauma, abdominal bloating, history of abdominal surgery, nausea, vomiting, constipation, diarrhea, dark colored stools and heartburn.   Genitourinary: Negative for dysuria, missed menses and pelvic pain.   Musculoskeletal: Negative for back pain.       Objective:      Physical Exam   Neck: Normal range of motion. Neck supple.   Cardiovascular: Normal rate, regular rhythm, normal heart sounds and normal pulses.   Pulmonary/Chest: Effort normal and breath sounds normal.   Abdominal: Normal appearance.   Neurological: She is alert.   Nursing note and vitals reviewed.         Assessment:       1. Abdominal pain, unspecified abdominal location      suspect IBS related. No acute or peritoneal symptoms identified. Trial of bentyl and to followup with PCP management options  Plan:         Abdominal pain, unspecified abdominal location  -     POCT Urinalysis, Dipstick, Automated, W/O Scope  -     XR KUB; Future; Expected date: 06/28/2020  -     dicyclomine (BENTYL) 10 MG capsule; Take 1 capsule (10 mg total) by mouth 3 (three) times daily. for 15 days  Dispense: 45 capsule; Refill: 0

## 2020-07-07 ENCOUNTER — LAB VISIT (OUTPATIENT)
Dept: LAB | Facility: HOSPITAL | Age: 70
End: 2020-07-07
Payer: MEDICARE

## 2020-07-07 DIAGNOSIS — E11.65 UNCONTROLLED TYPE 2 DIABETES MELLITUS WITH HYPERGLYCEMIA, WITHOUT LONG-TERM CURRENT USE OF INSULIN: ICD-10-CM

## 2020-07-07 LAB
ESTIMATED AVG GLUCOSE: 209 MG/DL (ref 68–131)
HBA1C MFR BLD HPLC: 8.9 % (ref 4–5.6)

## 2020-07-07 PROCEDURE — 83036 HEMOGLOBIN GLYCOSYLATED A1C: CPT | Mod: HCNC

## 2020-07-07 PROCEDURE — 36415 COLL VENOUS BLD VENIPUNCTURE: CPT | Mod: HCNC,PO

## 2020-07-14 ENCOUNTER — OFFICE VISIT (OUTPATIENT)
Dept: INTERNAL MEDICINE | Facility: CLINIC | Age: 70
End: 2020-07-14
Payer: MEDICARE

## 2020-07-14 VITALS
HEART RATE: 89 BPM | DIASTOLIC BLOOD PRESSURE: 80 MMHG | WEIGHT: 202 LBS | OXYGEN SATURATION: 96 % | BODY MASS INDEX: 35.79 KG/M2 | SYSTOLIC BLOOD PRESSURE: 130 MMHG | HEIGHT: 63 IN

## 2020-07-14 DIAGNOSIS — E66.9 OBESITY (BMI 30.0-34.9): ICD-10-CM

## 2020-07-14 DIAGNOSIS — G47.33 OSA (OBSTRUCTIVE SLEEP APNEA): ICD-10-CM

## 2020-07-14 DIAGNOSIS — E11.69 HYPERLIPIDEMIA ASSOCIATED WITH TYPE 2 DIABETES MELLITUS: ICD-10-CM

## 2020-07-14 DIAGNOSIS — E11.42 DIABETIC POLYNEUROPATHY ASSOCIATED WITH TYPE 2 DIABETES MELLITUS: ICD-10-CM

## 2020-07-14 DIAGNOSIS — E11.59 HYPERTENSION ASSOCIATED WITH DIABETES: ICD-10-CM

## 2020-07-14 DIAGNOSIS — H25.12 NUCLEAR SCLEROTIC CATARACT OF LEFT EYE: ICD-10-CM

## 2020-07-14 DIAGNOSIS — E11.65 UNCONTROLLED TYPE 2 DIABETES MELLITUS WITH HYPERGLYCEMIA, WITHOUT LONG-TERM CURRENT USE OF INSULIN: Primary | ICD-10-CM

## 2020-07-14 DIAGNOSIS — I15.2 HYPERTENSION ASSOCIATED WITH DIABETES: ICD-10-CM

## 2020-07-14 DIAGNOSIS — E78.5 HYPERLIPIDEMIA ASSOCIATED WITH TYPE 2 DIABETES MELLITUS: ICD-10-CM

## 2020-07-14 PROCEDURE — 1101F PR PT FALLS ASSESS DOC 0-1 FALLS W/OUT INJ PAST YR: ICD-10-PCS | Mod: HCNC,CPTII,S$GLB, | Performed by: NURSE PRACTITIONER

## 2020-07-14 PROCEDURE — 3079F DIAST BP 80-89 MM HG: CPT | Mod: HCNC,CPTII,S$GLB, | Performed by: NURSE PRACTITIONER

## 2020-07-14 PROCEDURE — 1159F PR MEDICATION LIST DOCUMENTED IN MEDICAL RECORD: ICD-10-PCS | Mod: HCNC,S$GLB,, | Performed by: NURSE PRACTITIONER

## 2020-07-14 PROCEDURE — 99214 OFFICE O/P EST MOD 30 MIN: CPT | Mod: HCNC,S$GLB,, | Performed by: NURSE PRACTITIONER

## 2020-07-14 PROCEDURE — 99499 UNLISTED E&M SERVICE: CPT | Mod: HCNC,S$GLB,, | Performed by: NURSE PRACTITIONER

## 2020-07-14 PROCEDURE — 1159F MED LIST DOCD IN RCRD: CPT | Mod: HCNC,S$GLB,, | Performed by: NURSE PRACTITIONER

## 2020-07-14 PROCEDURE — 3052F HG A1C>EQUAL 8.0%<EQUAL 9.0%: CPT | Mod: HCNC,CPTII,S$GLB, | Performed by: NURSE PRACTITIONER

## 2020-07-14 PROCEDURE — 3075F PR MOST RECENT SYSTOLIC BLOOD PRESS GE 130-139MM HG: ICD-10-PCS | Mod: HCNC,CPTII,S$GLB, | Performed by: NURSE PRACTITIONER

## 2020-07-14 PROCEDURE — 99214 PR OFFICE/OUTPT VISIT, EST, LEVL IV, 30-39 MIN: ICD-10-PCS | Mod: HCNC,S$GLB,, | Performed by: NURSE PRACTITIONER

## 2020-07-14 PROCEDURE — 3008F PR BODY MASS INDEX (BMI) DOCUMENTED: ICD-10-PCS | Mod: HCNC,CPTII,S$GLB, | Performed by: NURSE PRACTITIONER

## 2020-07-14 PROCEDURE — 99999 PR PBB SHADOW E&M-EST. PATIENT-LVL V: CPT | Mod: PBBFAC,HCNC,, | Performed by: NURSE PRACTITIONER

## 2020-07-14 PROCEDURE — 99499 RISK ADDL DX/OHS AUDIT: ICD-10-PCS | Mod: HCNC,S$GLB,, | Performed by: NURSE PRACTITIONER

## 2020-07-14 PROCEDURE — 3052F PR MOST RECENT HEMOGLOBIN A1C LEVEL 8.0 - < 9.0%: ICD-10-PCS | Mod: HCNC,CPTII,S$GLB, | Performed by: NURSE PRACTITIONER

## 2020-07-14 PROCEDURE — 3079F PR MOST RECENT DIASTOLIC BLOOD PRESSURE 80-89 MM HG: ICD-10-PCS | Mod: HCNC,CPTII,S$GLB, | Performed by: NURSE PRACTITIONER

## 2020-07-14 PROCEDURE — 3075F SYST BP GE 130 - 139MM HG: CPT | Mod: HCNC,CPTII,S$GLB, | Performed by: NURSE PRACTITIONER

## 2020-07-14 PROCEDURE — 3008F BODY MASS INDEX DOCD: CPT | Mod: HCNC,CPTII,S$GLB, | Performed by: NURSE PRACTITIONER

## 2020-07-14 PROCEDURE — 99999 PR PBB SHADOW E&M-EST. PATIENT-LVL V: ICD-10-PCS | Mod: PBBFAC,HCNC,, | Performed by: NURSE PRACTITIONER

## 2020-07-14 PROCEDURE — 1101F PT FALLS ASSESS-DOCD LE1/YR: CPT | Mod: HCNC,CPTII,S$GLB, | Performed by: NURSE PRACTITIONER

## 2020-07-14 RX ORDER — INSULIN DETEMIR 100 [IU]/ML
INJECTION, SOLUTION SUBCUTANEOUS
Qty: 15 ML | Refills: 1 | Status: SHIPPED | OUTPATIENT
Start: 2020-07-14 | End: 2020-08-05 | Stop reason: SDUPTHER

## 2020-07-14 RX ORDER — INSULIN ASPART 100 [IU]/ML
INJECTION, SOLUTION INTRAVENOUS; SUBCUTANEOUS
Qty: 15 ML | Refills: 1 | Status: SHIPPED | OUTPATIENT
Start: 2020-07-14 | End: 2020-08-05 | Stop reason: SDUPTHER

## 2020-07-14 RX ORDER — DULAGLUTIDE 0.75 MG/.5ML
0.75 INJECTION, SOLUTION SUBCUTANEOUS
Qty: 12 PEN | Refills: 3
Start: 2020-07-14 | End: 2020-10-01 | Stop reason: SDUPTHER

## 2020-07-14 NOTE — PROGRESS NOTES
CHIEF COMPLAINT: Type 2 Diabetes     HPI: Mrs. Chelly Ortiz is a 69 y.o. female who was diagnosed with Type 2 DM x 9 years.  H/o PN, CKD 3, MYRIAM, DDD, obesity.  Past Medical History:   Diagnosis Date    Allergy     Cataract     DDD (degenerative disc disease), lumbar     Diabetes mellitus     diet controlled    Diabetes mellitus, type 2     GERD (gastroesophageal reflux disease)     History of subconjunctival hemorrhage 12/2/11    left eye    Hyperlipidemia     Hypertension     IBS (irritable bowel syndrome)     Obesity     MYRIAM (obstructive sleep apnea)     on CPAP    Rotator cuff impingement syndrome     Seasonal allergic conjunctivitis      a1c went down from 10% to 8.9%.  Lab Results   Component Value Date    HGBA1C 8.9 (H) 07/07/2020     Has used novonordisk---pt assistance.    Started insulin x 6 mos.   Last seen by me in 12/2019 and is now being seen by me again today.   Did not do well on vgo - too many bruises/adhesive issues    PREVIOUS DIABETES MEDICATIONS TRIED  Glipizide  glimepiride  januvia  lantus   Metformin -diarrhea   trulicity- cost issue    CURRENT DIABETIC MEDS: levemir 45 units at night,  novolog 12-12-12 units     Cost issues with insulins   $126  $148  Per month -flexpens    $84 for vials    Pt is monitoring blood glucose readings 2 times a day.  Needs ~100 strips per month related to fluctuations with blood glucose reading, a1c trends, and activity level.  BG mid 100s- 200s  Fasting mostly under 150 mg/dl    Diabetes Management Status    Statin: Taking  ACE/ARB: Taking    Screening or Prevention Patient's value Goal Complete/Controlled?   HgA1C Testing and Control   Lab Results   Component Value Date    HGBA1C 8.9 (H) 07/07/2020      Annually/Less than 8% No   Lipid profile : 01/02/2020 Annually Yes   LDL control Lab Results   Component Value Date    LDLCALC 137.6 01/02/2020    Annually/Less than 100 mg/dl  No   Nephropathy screening Lab Results   Component Value Date     "LABMICR 7.0 01/13/2015     Lab Results   Component Value Date    PROTEINUA Negative 04/21/2019    Annually Yes   Blood pressure BP Readings from Last 1 Encounters:   06/28/20 134/78    Less than 140/90 Yes   Dilated retinal exam : 02/19/2020 Annually No   Foot exam   : 12/31/2019 Annually Yes     REVIEW OF SYSTEMS  General: no weakness, fatigue, + weight changes #8 (gain)   Eyes: no double or blurred vision, eye pain, or redness  Cardiovascular: no chest pain, palpitations, edema, or murmurs.   Respiratory: no cough or dyspnea.   GI: no heartburn, nausea, + changes in bowel patterns; good appetite. +IBS  Skin: no rashes, dryness, itching, or reactions at insulin injection sites.  Neuro: + numbness, tingling, tremors, or vertigo.   Endocrine: + polyuria, polydipsia, polyphagia, heat or cold intolerance.     Vital Signs  /80   Pulse 89   Ht 5' 3" (1.6 m)   Wt 91.6 kg (202 lb)   LMP  (LMP Unknown)   SpO2 96%   BMI 35.78 kg/m²     Hemoglobin A1C   Date Value Ref Range Status   07/07/2020 8.9 (H) 4.0 - 5.6 % Final     Comment:     ADA Screening Guidelines:  5.7-6.4%  Consistent with prediabetes  >or=6.5%  Consistent with diabetes  High levels of fetal hemoglobin interfere with the HbA1C  assay. Heterozygous hemoglobin variants (HbS, HgC, etc)do  not significantly interfere with this assay.   However, presence of multiple variants may affect accuracy.     04/24/2020 10.0 (H) 4.0 - 5.6 % Final     Comment:     ADA Screening Guidelines:  5.7-6.4%  Consistent with prediabetes  >or=6.5%  Consistent with diabetes  High levels of fetal hemoglobin interfere with the HbA1C  assay. Heterozygous hemoglobin variants (HbS, HgC, etc)do  not significantly interfere with this assay.   However, presence of multiple variants may affect accuracy.     01/02/2020 11.2 (H) 4.0 - 5.6 % Final     Comment:     ADA Screening Guidelines:  5.7-6.4%  Consistent with prediabetes  >or=6.5%  Consistent with diabetes  High levels of fetal " hemoglobin interfere with the HbA1C  assay. Heterozygous hemoglobin variants (HbS, HgC, etc)do  not significantly interfere with this assay.   However, presence of multiple variants may affect accuracy.          Chemistry        Component Value Date/Time     04/24/2020 1429    K 3.7 04/24/2020 1429     04/24/2020 1429    CO2 29 04/24/2020 1429    BUN 11 04/24/2020 1429    CREATININE 1.0 04/24/2020 1429     (H) 04/24/2020 1429        Component Value Date/Time    CALCIUM 9.5 04/24/2020 1429    ALKPHOS 109 01/02/2020 0842    AST 17 01/02/2020 0842    ALT 14 01/02/2020 0842    BILITOT 0.5 01/02/2020 0842    ESTGFRAFRICA >60.0 04/24/2020 1429    EGFRNONAA 56.8 (A) 04/24/2020 1429           Lab Results   Component Value Date    TSH 1.436 06/04/2019      Lab Results   Component Value Date    CHOL 232 (H) 01/02/2020    CHOL 237 (H) 08/30/2019    CHOL 233 (H) 04/08/2019     Lab Results   Component Value Date    HDL 62 01/02/2020    HDL 57 08/30/2019    HDL 55 04/08/2019     Lab Results   Component Value Date    LDLCALC 137.6 01/02/2020    LDLCALC 156.2 08/30/2019    LDLCALC 148.0 04/08/2019     Lab Results   Component Value Date    TRIG 162 (H) 01/02/2020    TRIG 119 08/30/2019    TRIG 150 04/08/2019     Lab Results   Component Value Date    CHOLHDL 26.7 01/02/2020    CHOLHDL 24.1 08/30/2019    CHOLHDL 23.6 04/08/2019         PHYSICAL EXAMINATION  Constitutional: Appears well, no distress  Neck: Supple, trachea midline.   Respiratory: No wheezes, even and unlabored.  Cardiovascular: RRR; no edema.   Lymph: deferred   Skin: warm and dry; no injection site reactions, +sl acanthosis nigracans observed.  Neuro:patient alert and cooperative, normal affect; steady gait.    Diabetes Foot Exam:   Deferred    Assessment/Plan    1. Uncontrolled type 2 diabetes mellitus with hyperglycemia, without long-term current use of insulin  Hemoglobin A1C    Ambulatory referral/consult to Diabetes Education   2. Hyperlipidemia  associated with type 2 diabetes mellitus     3. Hypertension associated with diabetes     4. Nuclear sclerotic cataract of left eye     5. Obesity (BMI 30.0-34.9)     6. MYRIAM (obstructive sleep apnea)     7. Diabetic polyneuropathy associated with type 2 diabetes mellitus       1. F/u in 3 mos w/ me  DE in 6 weeks  a1c next time (prior)  a1c goal less than 7.5%  Discussed metformin xr-worried about GI issues   Discussed glp1a   Messaged [facility]- [facility] will be best for pickup   Continue medications above  glp1a will be best   2.   Lab Results   Component Value Date    LDLCALC 137.6 01/02/2020     Above goal   On statin  3. Continue med (s)   Controlled   4. F/u with ophthalmology   5. Body mass index is 35.78 kg/m².   May increase insulin resistance  6. Stable  7. Optimize bg will help with condition     FOLLOW UP  Follow up in about 3 months (around 10/14/2020).

## 2020-07-14 NOTE — PATIENT INSTRUCTIONS
Snacks can be an important part of a balanced, healthy meal plan. They allow you to eat more frequently, feeling full and satisfied throughout the day. Also, they allow you to spread carbohydrates evenly, which may stabilize blood sugars.  Plus, snacks are enjoyable!     The amount of carbohydrate needed at snacks varies. Generally, about 15-30 grams of carbohydrate per snack is recommended.  Below you will find some tasty treats.       0-5 gm carb   Crystal Light   Vitamin Water Zero   Herbal tea, unsweetened   2 tsp peanut butter on celery   1./2 cup sugar-free jell-o   1 sugar-free popsicle   ¼ cup blueberries   8oz Blue Kait unsweetened almond milk   5 baby carrots & celery sticks, cucumbers, bell peppers dipped in ¼ cup salsa, 2Tbsp light ranch dressing or 2Tbsp plain Greek yogurt   10 Goldfish crackers   ½ oz low-fat cheese or string cheese   1 closed handful of nuts, unsalted   1 Tbsp of sunflower seeds, unsalted   1 cup Smart Pop popcorn   1 whole grain brown rice cake        15 gm carb   1 small piece of fruit or ½ banana or 1/2 cup lite canned fruit   3 bora cracker squares   3 cups Smart Pop popcorn, top spray butter, [place] lite salt or cinnamon and Truvia   5 Vanilla Wafers   ½ cup low fat, no added sugar ice cream or frozen yogurt (Blue bell, Blue Bunny, Weight Watchers, Skinny Cow)   ½ turkey, ham, or chicken sandwich   ½ c fruit with ½ c Cottage cheese   4-6 unsalted wheat crackers with 1 oz low fat cheese or 1 tbsp peanut butter    30-45 goldfish crackers (depending on flavor)    7-8 Oriental orthodox mini brown rice cakes (caramel, apple cinnamon, chocolate)    12 Oriental orthodox mini brown rice cakes (cheddar, bbq, ranch)    1/3 cup hummus dip with raw veg   1/2 whole wheat yolis, 1Tbsp hummus   Mini Pizza (1/2 whole wheat English muffin, low-fat  cheese, tomato sauce)   100 calorie snack pack (Oreo, Chips Ahoy, Ritz Mix, Baked Cheetos)   4-6 oz. light or Greek Style yogurt  (Jacquie, Edgardo, Tona, [facility])   ½ cup sugar-free pudding     6 in. wheat tortilla or yolis oven toasted chips (topped with spray butter flavoring, cinnamon, Truvia OR spray butter, garlic powder, chili powder)    18 BBQ Popchips (available at [employer], [employer], Fresh Market)                   Dulaglutide injection  What is this medicine?  DULAGLUTIDE (DOO la GLOO tide) is used to improve blood sugar control in adults with type 2 diabetes. This medicine may be used with other oral diabetes medicines.  How should I use this medicine?  This medicine is for injection under the skin of your upper leg (thigh), stomach area, or upper arm. It is usually given once every week (every 7 days). You will be taught how to prepare and give this medicine. Use exactly as directed. Take your medicine at regular intervals. Do not take it more often than directed.  If you use this medicine with insulin, you should inject this medicine and the insulin separately. Do not mix them together. Do not give the injections right next to each other. Change (rotate) injection sites with each injection.  It is important that you put your used needles and syringes in a special sharps container. Do not put them in a trash can. If you do not have a sharps container, call your pharmacist or healthcare provider to get one.  A special MedGuide will be given to you by the pharmacist with each prescription and refill. Be sure to read this information carefully each time.  Talk to your pediatrician regarding the use of this medicine in children. Special care may be needed.  What side effects may I notice from receiving this medicine?  Side effects that you should report to your doctor or health care professional as soon as possible:  · allergic reactions like skin rash, itching or hives, swelling of the face, lips, or tongue  · breathing problems  · signs and symptoms of low blood sugar such as feeling anxious, confusion, dizziness, increased  hunger, unusually weak or tired, sweating, shakiness, cold, irritable, headache, blurred vision, fast heartbeat, loss of consciousness  · unusual stomach upset or pain  · vomiting  Side effects that usually do not require medical attention (Report these to your doctor or health care professional if they continue or are bothersome.):diarrhea  · heartburn  · loss of appetite  · nausea  · pain, redness, or irritation at site where injected  What may interact with this medicine?  Do not take this medicine with any of the following medications:  · gatifloxacin  Many medications may cause changes in blood sugar, these include:  · alcohol containing beverages  · aspirin and aspirin-like drugs  · chloramphenicol  · chromium  · diuretics  · female hormones, such as estrogens or progestins, birth control pills  · heart medicines  · isoniazid  · male hormones or anabolic steroids  · medications for weight loss  · medicines for allergies, asthma, cold, or cough  · medicines for mental problems  · medicines called MAO inhibitors - Nardil, Parnate, Marplan, Eldepryl  · niacin  · NSAIDS, such as ibuprofen  · pentamidine  · phenytoin  · probenecid  · quinolone antibiotics such as ciprofloxacin, levofloxacin, ofloxacin  · some herbal dietary supplements  · steroid medicines such as prednisone or cortisone  · thyroid hormonesSome medications can hide the warning symptoms of low blood sugar (hypoglycemia). You may need to monitor your blood sugar more closely if you are taking one of these medications. These include:  · beta-blockers, often used for high blood pressure or heart problems (examples include atenolol, metoprolol, propranolol)  · clonidine  · guanethidine  · reserpine  What if I miss a dose?  If you miss a dose, take it as soon as you can within 3 days after the missed dose. Then take your next dose at your regular weekly time. If it has been longer than 3 days after the missed dose, do not take the missed dose. Take the  next dose at your regular time. Do not take double or extra doses. If you have questions about a missed dose, contact your health care provider for advice.  Where should I keep my medicine?  Keep out of the reach of children.  Store this medicine in a refrigerator between 2 and 8 degrees C (36 and 46 degrees F). Do not freeze or use if the medicine has been frozen. Protect from light and excessive heat. Each single-dose pen or prefilled syringe can be kept at room temperature, not to exceed 30 degrees C (86 degrees F) for a total of 14 days, if needed. Store in the carton until use. Throw away any unused medicine after the expiration date.  What should I tell my health care provider before I take this medicine?  They need to know if you have any of these conditions:  · endocrine tumors (MEN 2) or if someone in your family had these tumors  · history of pancreatitis  · kidney disease  · liver disease  · stomach problems  · thyroid cancer or if someone in your family had thyroid cancer  · an unusual or allergic reaction to dulaglutide, other medicines, foods, dyes, or preservatives  · pregnant or trying to get pregnant  · breast-feeding  What should I watch for while using this medicine?  Visit your doctor or health care professional for regular checks on your progress.  A test called the HbA1C (A1C) will be monitored. This is a simple blood test. It measures your blood sugar control over the last 2 to 3 months. You will receive this test every 3 to 6 months.  Learn how to check your blood sugar. Learn the symptoms of low and high blood sugar and how to manage them.  Always carry a quick-source of sugar with you in case you have symptoms of low blood sugar. Examples include hard sugar candy or glucose tablets. Make sure others know that you can choke if you eat or drink when you develop serious symptoms of low blood sugar, such as seizures or unconsciousness. They must get medical help at once.  Tell your doctor or  health care professional if you have high blood sugar. You might need to change the dose of your medicine. If you are sick or exercising more than usual, you might need to change the dose of your medicine.  Do not skip meals. Ask your doctor or health care professional if you should avoid alcohol. Many nonprescription cough and cold products contain sugar or alcohol. These can affect blood sugar.  Wear a medical ID bracelet or chain, and carry a card that describes your disease and details of your medicine and dosage times.  NOTE:This sheet is a summary. It may not cover all possible information. If you have questions about this medicine, talk to your doctor, pharmacist, or health care provider. Copyright© 2017 [employer]        Hypoglycemia (Low Blood Sugar)     Fast-acting sugar includes a cup of nonfat milk.     Too little sugar (glucose) in your blood is called hypoglycemia or low blood sugar. Low blood sugar usually means anything lower than 70 mg/dL. Talk with your healthcare provider about your target range and what level is too low for you. Diabetes itself doesnt cause low blood sugar. But some of the treatments for diabetes, such as pills or insulin, may raise your risk for it. Low blood sugar may cause you to pass out or have a seizure. So always treat low blood sugar right away, but don't overeat.  Special note: Always carry a source of fast-acting sugar and a snack in case of hypoglycemia.   What you may notice  If you have low blood sugar, you may have one or more of these symptoms:  · Shakiness or dizziness  · Cold, clammy skin or sweating  · Feelings of hunger  · Headache  · Nervousness  · A hard, fast heartbeat  · Weakness  · Confusion or irritability  · Blurred vision  · Having nightmares or waking up confused or sweating  · Numbness or tingling in the lips or tongue  What you should do  Here are tips to follow if you have hypoglycemia:   · First check your blood sugar. If it is too low (out of  your target range), eat or drink 15 to 20 grams of fast-acting sugar. This may be 3 to 4 glucose tablets, 4 ounces (half a cup) of fruit juice or regular (nondiet) soda, 8 ounces (1 cup) of fat-free milk, or 1 tablespoon of honey. Dont take more than this, or your blood sugar may go too high.  · Wait 15 minutes. Then recheck your blood sugar if you can.  · If your blood sugar is still too low, repeat the steps above and check your blood sugar again. If your blood sugar still has not returned to your target range, contact your healthcare provider or seek emergency care.  · Once your blood sugar returns to target range, eat a snack or meal.  Preventing low blood sugar  Things you can do include the following:   · If your condition needs a strict treatment plan, eat your meals and snacks at the same times each day. Dont skip meals!  · If your treatment plan lets you change when you eat and what you eat, learn how to change the time and dose of your rapid-acting insulin to match this.   · Ask your healthcare provider if it is safe for you to drink alcohol. Never drink on an empty stomach.  · Take your medicine at the prescribed times.  · Always carry a source of fast-acting sugar and a snack when youre away from home.  Other things to do  Additional tips include the following:  · Carry a medical ID card, a compact USB drive, or wear a medical alert bracelet or necklace. It should say that you have diabetes. It should also say what to do if you pass out or have a seizure.  · Make sure your family, friends, and coworkers know the signs of low blood sugar. Tell them what to do if your blood sugar falls very low and you cant treat yourself.  · Keep a glucagon emergency kit handy. Be sure your family, friends, and coworkers know how and when to use it. Check it regularly and replace the glucagon before it expires.  · Talk with your health care team about other things you can do to prevent low blood sugar.     If you  have unexplained hypoglycemia or hypoglycemia several times, call your healthcare provider.   Date Last Reviewed: 5/1/2016  © 8103-5104 [email or web address] [address] [place] [address], [place] [address]. All rights reserved. This information is not intended as a substitute for professional medical care. Always follow your healthcare professional's instructions.        Managing Diabetes: The A1C Test       Healthy red blood cells have some glucose stuck to them. A high A1C means that unhealthy amounts of glucose are stuck to the cells.   What is the A1C test?  Using your meter helps you track your blood sugar every day. But your glucose meter tells you the value at the time of testing only. You also need to know if your treatment plan is keeping you healthy over time. The hemoglobin A1C (or glycated hemoglobin) test can help. This test measures your average blood sugar level over a few months. A higher A1C result means that you have a higher risk of developing complications.  The A1C test  The A1C is a blood test done by your healthcare provider. You will likely have an A1C test every 3 to 6 months.  Your blood glucose goal  A1C has been shown as a percentage. But it can also be shown as a number representing the estimated Average Glucose (eAG). Unlike the A1C percentage, eAG is a number similar to the numbers listed on your daily glucose monitor. Both A1C and eAG measure the amount of glucose stuck to a protein called hemoglobin in red blood cells. Your healthcare provider will help you figure out what your ideal A1C or eAG should be. Your target number will depend on your age, general health, and other factors. If your current number is too high, your treatment plan may need changes, such as different medicines.  Sample results  Most people aim for an A1c lower than 7%. Thats an eAG less than 154 mg/dL. Or, your healthcare provider may want you to aim for an A1C of 6%. Thats an eAG of 126 mg/dL.     Glucose  calculator  Visit http://professional.diabetes.org/diapro/glucose_calc for a chart that helps convert your A1C percentages into eAG numbers.   Date Last Reviewed: 6/1/2016  © 8817-3920 [employer]. [address] [place] [address], [place] [address]. All rights reserved. This information is not intended as a substitute for professional medical care. Always follow your healthcare professional's instructions.         Home

## 2020-07-16 ENCOUNTER — TELEPHONE (OUTPATIENT)
Dept: PHARMACY | Facility: CLINIC | Age: 70
End: 2020-07-16

## 2020-07-16 NOTE — TELEPHONE ENCOUNTER
----- Message from EDUARDO Escobar, LINSEYP sent at 7/14/2020  4:05 PM CDT -----  Regarding: trulicity 0.75 mg weekly  Hi!  This pt needs help with trulicity  Thanks  IB

## 2020-07-19 ENCOUNTER — PATIENT OUTREACH (OUTPATIENT)
Dept: ADMINISTRATIVE | Facility: OTHER | Age: 70
End: 2020-07-19

## 2020-07-20 NOTE — PROGRESS NOTES
Requested updates within Care Everywhere.  Patient's chart was reviewed for overdue RAJI topics.  Immunizations reconciled.

## 2020-07-27 ENCOUNTER — PES CALL (OUTPATIENT)
Dept: ADMINISTRATIVE | Facility: CLINIC | Age: 70
End: 2020-07-27

## 2020-08-05 ENCOUNTER — OFFICE VISIT (OUTPATIENT)
Dept: FAMILY MEDICINE | Facility: CLINIC | Age: 70
End: 2020-08-05
Payer: MEDICARE

## 2020-08-05 VITALS
HEART RATE: 77 BPM | DIASTOLIC BLOOD PRESSURE: 62 MMHG | WEIGHT: 202.19 LBS | BODY MASS INDEX: 35.82 KG/M2 | OXYGEN SATURATION: 96 % | SYSTOLIC BLOOD PRESSURE: 118 MMHG | HEIGHT: 63 IN | TEMPERATURE: 98 F

## 2020-08-05 DIAGNOSIS — E11.65 UNCONTROLLED TYPE 2 DIABETES MELLITUS WITH HYPERGLYCEMIA: ICD-10-CM

## 2020-08-05 DIAGNOSIS — I15.2 HYPERTENSION ASSOCIATED WITH DIABETES: ICD-10-CM

## 2020-08-05 DIAGNOSIS — J45.20 INTERMITTENT ASTHMA WITHOUT COMPLICATION, UNSPECIFIED ASTHMA SEVERITY: ICD-10-CM

## 2020-08-05 DIAGNOSIS — E66.01 SEVERE OBESITY WITH BODY MASS INDEX (BMI) OF 35.0 TO 39.9 WITH SERIOUS COMORBIDITY: ICD-10-CM

## 2020-08-05 DIAGNOSIS — E11.69 HYPERLIPIDEMIA ASSOCIATED WITH TYPE 2 DIABETES MELLITUS: ICD-10-CM

## 2020-08-05 DIAGNOSIS — G47.09 OTHER INSOMNIA: ICD-10-CM

## 2020-08-05 DIAGNOSIS — F32.0 MILD MAJOR DEPRESSION: ICD-10-CM

## 2020-08-05 DIAGNOSIS — F41.9 ANXIETY: ICD-10-CM

## 2020-08-05 DIAGNOSIS — E78.5 HYPERLIPIDEMIA ASSOCIATED WITH TYPE 2 DIABETES MELLITUS: ICD-10-CM

## 2020-08-05 DIAGNOSIS — E11.59 HYPERTENSION ASSOCIATED WITH DIABETES: ICD-10-CM

## 2020-08-05 DIAGNOSIS — E11.42 DIABETIC POLYNEUROPATHY ASSOCIATED WITH TYPE 2 DIABETES MELLITUS: ICD-10-CM

## 2020-08-05 DIAGNOSIS — Z00.00 ENCOUNTER FOR PREVENTIVE HEALTH EXAMINATION: Primary | ICD-10-CM

## 2020-08-05 PROBLEM — E66.811 OBESITY (BMI 30.0-34.9): Status: RESOLVED | Noted: 2017-10-12 | Resolved: 2020-08-05

## 2020-08-05 PROBLEM — E66.9 OBESITY (BMI 30.0-34.9): Status: RESOLVED | Noted: 2017-10-12 | Resolved: 2020-08-05

## 2020-08-05 PROCEDURE — 3074F PR MOST RECENT SYSTOLIC BLOOD PRESSURE < 130 MM HG: ICD-10-PCS | Mod: HCNC,CPTII,S$GLB, | Performed by: NURSE PRACTITIONER

## 2020-08-05 PROCEDURE — 3078F PR MOST RECENT DIASTOLIC BLOOD PRESSURE < 80 MM HG: ICD-10-PCS | Mod: HCNC,CPTII,S$GLB, | Performed by: NURSE PRACTITIONER

## 2020-08-05 PROCEDURE — G0439 PR MEDICARE ANNUAL WELLNESS SUBSEQUENT VISIT: ICD-10-PCS | Mod: HCNC,S$GLB,, | Performed by: NURSE PRACTITIONER

## 2020-08-05 PROCEDURE — 99499 RISK ADDL DX/OHS AUDIT: ICD-10-PCS | Mod: HCNC,S$GLB,, | Performed by: NURSE PRACTITIONER

## 2020-08-05 PROCEDURE — 3052F HG A1C>EQUAL 8.0%<EQUAL 9.0%: CPT | Mod: HCNC,CPTII,S$GLB, | Performed by: NURSE PRACTITIONER

## 2020-08-05 PROCEDURE — 3074F SYST BP LT 130 MM HG: CPT | Mod: HCNC,CPTII,S$GLB, | Performed by: NURSE PRACTITIONER

## 2020-08-05 PROCEDURE — 99499 UNLISTED E&M SERVICE: CPT | Mod: HCNC,S$GLB,, | Performed by: NURSE PRACTITIONER

## 2020-08-05 PROCEDURE — 99999 PR PBB SHADOW E&M-EST. PATIENT-LVL V: ICD-10-PCS | Mod: PBBFAC,HCNC,, | Performed by: NURSE PRACTITIONER

## 2020-08-05 PROCEDURE — 3078F DIAST BP <80 MM HG: CPT | Mod: HCNC,CPTII,S$GLB, | Performed by: NURSE PRACTITIONER

## 2020-08-05 PROCEDURE — G0439 PPPS, SUBSEQ VISIT: HCPCS | Mod: HCNC,S$GLB,, | Performed by: NURSE PRACTITIONER

## 2020-08-05 PROCEDURE — 99999 PR PBB SHADOW E&M-EST. PATIENT-LVL V: CPT | Mod: PBBFAC,HCNC,, | Performed by: NURSE PRACTITIONER

## 2020-08-05 PROCEDURE — 3052F PR MOST RECENT HEMOGLOBIN A1C LEVEL 8.0 - < 9.0%: ICD-10-PCS | Mod: HCNC,CPTII,S$GLB, | Performed by: NURSE PRACTITIONER

## 2020-08-05 RX ORDER — ALBUTEROL SULFATE 90 UG/1
1-2 AEROSOL, METERED RESPIRATORY (INHALATION) EVERY 4 HOURS PRN
Qty: 18 G | Refills: 2 | Status: SHIPPED | OUTPATIENT
Start: 2020-08-05

## 2020-08-05 NOTE — PATIENT INSTRUCTIONS
Counseling and Referral of Other Preventative  (Italic type indicates deductible and co-insurance are waived)    Patient Name: Chelly Ortiz  Today's Date: 8/5/2020    Health Maintenance       Date Due Completion Date    Shingles Vaccine (3 of 3) 05/04/2020 3/9/2020    TETANUS VACCINE 06/21/2020 6/21/2010    Influenza Vaccine (1) 09/01/2020 11/5/2019    Override on 11/23/2018: Done    Override on 11/30/2017: Done    Override on 12/22/2016: Done    Hemoglobin A1c 10/07/2020 7/7/2020    Mammogram 12/02/2020 12/2/2019    Foot Exam 12/31/2020 12/31/2019    Override on 12/2/2019: Done    Override on 8/15/2017: Done    Lipid Panel 01/02/2021 1/2/2020    Eye Exam 02/19/2021 2/19/2020    Override on 1/29/2016: (N/S)    Colorectal Cancer Screening 12/05/2021 12/5/2018    DEXA SCAN 05/28/2022 5/28/2019        No orders of the defined types were placed in this encounter.    The following information is provided to all patients.  This information is to help you find resources for any of the problems found today that may be affecting your health:                Living healthy guide: www.[facility].[place].gov      Understanding Diabetes: www.diabetes.org      Eating healthy: www.cdc.gov/healthyweight      CDC home safety checklist: www.cdc.gov/steadi/patient.html      Agency on Aging: www.goea.[place].gov      Alcoholics anonymous (AA): www.aa.org      Physical Activity: www.donaldo.nih.gov/ld8pqyx      Tobacco use: www.quitwithusla.org

## 2020-08-05 NOTE — PROGRESS NOTES
"  Chelly Ortiz presented for a  Medicare AWV and comprehensive Health Risk Assessment today. The following components were reviewed and updated:    · Medical history  · Family History  · Social history  · Allergies and Current Medications  · Health Risk Assessment  · Health Maintenance  · Care Team     ** See Completed Assessments for Annual Wellness Visit within the encounter summary.**         The following assessments were completed:  · Living Situation  · CAGE  · Depression Screening  · Timed Get Up and Go  · Whisper Test  · Cognitive Function Screening  · Nutrition Screening  · ADL Screening  · PAQ Screening        Vitals:    08/05/20 1311   BP: 118/62   BP Location: Right arm   Patient Position: Sitting   BP Method: Large (Manual)   Pulse: 77   Temp: 98.4 °F (36.9 °C)   TempSrc: Temporal   SpO2: 96%   Weight: 91.7 kg (202 lb 2.6 oz)   Height: 5' 3" (1.6 m)     Body mass index is 35.81 kg/m².     Physical Exam  Vitals signs and nursing note reviewed.   Constitutional:       General: She is not in acute distress.     Appearance: She is well-developed. She is obese.   HENT:      Head: Normocephalic and atraumatic.   Eyes:      Pupils: Pupils are equal, round, and reactive to light.   Neck:      Musculoskeletal: Neck supple.      Vascular: No JVD.      Trachea: No tracheal deviation.   Cardiovascular:      Rate and Rhythm: Normal rate and regular rhythm.      Heart sounds: Normal heart sounds. No murmur.   Pulmonary:      Effort: Pulmonary effort is normal. No respiratory distress.      Breath sounds: Normal breath sounds. No wheezing or rales.   Abdominal:      General: Bowel sounds are normal. There is no distension.      Palpations: Abdomen is soft. There is no mass.      Tenderness: There is no abdominal tenderness.   Musculoskeletal: Normal range of motion.         General: No tenderness.   Skin:     General: Skin is warm and dry.      Coloration: Skin is not pale.      Findings: No erythema.   Neurological: "      Mental Status: She is alert and oriented to person, place, and time.      Coordination: Coordination normal.   Psychiatric:         Behavior: Behavior normal.         Thought Content: Thought content normal. Thought content does not include homicidal or suicidal ideation.         Judgment: Judgment normal.           Diagnoses and health risks identified today and associated recommendations/orders:    1. Encounter for preventive health examination    2. Uncontrolled type 2 diabetes mellitus with hyperglycemia  Chronic; stable on medication.  Followed by PCP.    3. Hypertension associated with diabetes  Chronic; stable on medication.  Followed by PCP.    4. Hyperlipidemia associated with type 2 diabetes mellitus  Chronic; stable on medication.  Followed by PCP.    5. Intermittent asthma without complication, unspecified asthma severity  Chronic; stable on medication.  Followed by PCP.  - albuterol (PROVENTIL/VENTOLIN HFA) 90 mcg/actuation inhaler; Inhale 1-2 puffs into the lungs every 4 (four) hours as needed for Wheezing.  Dispense: 18 g; Refill: 2    6. Severe obesity with body mass index (BMI) of 35.0 to 39.9 with serious comorbidity  Chronic, stable. Therapeutic lifestyle changes discussed. Followed by PCP.    7. Diabetic polyneuropathy associated with type 2 diabetes mellitus  Chronic; stable on medication.  Followed by Podiatry.    8. Mild major depression  Chronic; stable on medication.  Followed by PCP.    9. Anxiety  Chronic; stable on medication.  Followed by PCP.    10. Other insomnia  Chronic; stable on medication.  Followed by PCP.      Provided Chelly with a 5-10 year written screening schedule and personal prevention plan. Recommendations were developed using the USPSTF age appropriate recommendations. Education, counseling, and referrals were provided as needed. After Visit Summary printed and given to patient which includes a list of additional screenings\tests needed.    Follow up for Annual  Wellness Visit in 1 year.    Cecilia Limon NP         I offered to discuss end of life issues, including information on how to make advance directives that the patient could use to name someone who would make medical decisions on their behalf if they became too ill to make themselves.    ___Patient declined  _X_Patient is interested, I provided paper work and offered to discuss.

## 2020-08-06 RX ORDER — TRAZODONE HYDROCHLORIDE 50 MG/1
TABLET ORAL
Qty: 90 TABLET | Refills: 3 | Status: SHIPPED | OUTPATIENT
Start: 2020-08-06 | End: 2021-05-10

## 2020-08-11 ENCOUNTER — PATIENT OUTREACH (OUTPATIENT)
Dept: ADMINISTRATIVE | Facility: OTHER | Age: 70
End: 2020-08-11

## 2020-08-11 NOTE — PROGRESS NOTES
Updates were requested from care everywhere.  Chart was reviewed for overdue Proactive [facility] Encounters (RAJI) topics.  Health Maintenance has been updated.  LINKS immunization registry triggered.  Immunizations were reconciled.

## 2020-08-12 ENCOUNTER — OFFICE VISIT (OUTPATIENT)
Dept: INTERNAL MEDICINE | Facility: CLINIC | Age: 70
End: 2020-08-12
Payer: MEDICARE

## 2020-08-12 ENCOUNTER — LAB VISIT (OUTPATIENT)
Dept: LAB | Facility: HOSPITAL | Age: 70
End: 2020-08-12
Attending: INTERNAL MEDICINE
Payer: MEDICARE

## 2020-08-12 VITALS
WEIGHT: 202.81 LBS | DIASTOLIC BLOOD PRESSURE: 78 MMHG | HEIGHT: 63 IN | HEART RATE: 91 BPM | TEMPERATURE: 97 F | OXYGEN SATURATION: 96 % | BODY MASS INDEX: 35.93 KG/M2 | SYSTOLIC BLOOD PRESSURE: 130 MMHG

## 2020-08-12 DIAGNOSIS — K55.059 ACUTE (REVERSIBLE) ISCHEMIA OF INTESTINE, PART AND EXTENT UNSPECIFIED: ICD-10-CM

## 2020-08-12 DIAGNOSIS — K55.1 ABDOMINAL ANGINA: ICD-10-CM

## 2020-08-12 DIAGNOSIS — K55.1 ABDOMINAL ANGINA: Primary | ICD-10-CM

## 2020-08-12 LAB
AMYLASE SERPL-CCNC: 55 U/L (ref 20–110)
BASOPHILS # BLD AUTO: 0.02 K/UL (ref 0–0.2)
BASOPHILS NFR BLD: 0.4 % (ref 0–1.9)
CRP SERPL-MCNC: 38.9 MG/L (ref 0–8.2)
DIFFERENTIAL METHOD: ABNORMAL
EOSINOPHIL # BLD AUTO: 0.1 K/UL (ref 0–0.5)
EOSINOPHIL NFR BLD: 1.6 % (ref 0–8)
ERYTHROCYTE [DISTWIDTH] IN BLOOD BY AUTOMATED COUNT: 15.7 % (ref 11.5–14.5)
ERYTHROCYTE [SEDIMENTATION RATE] IN BLOOD BY WESTERGREN METHOD: 39 MM/HR (ref 0–36)
HCT VFR BLD AUTO: 38.1 % (ref 37–48.5)
HGB BLD-MCNC: 11.8 G/DL (ref 12–16)
IMM GRANULOCYTES # BLD AUTO: 0.02 K/UL (ref 0–0.04)
IMM GRANULOCYTES NFR BLD AUTO: 0.4 % (ref 0–0.5)
LIPASE SERPL-CCNC: 59 U/L (ref 4–60)
LYMPHOCYTES # BLD AUTO: 2.2 K/UL (ref 1–4.8)
LYMPHOCYTES NFR BLD: 38.7 % (ref 18–48)
MCH RBC QN AUTO: 25.2 PG (ref 27–31)
MCHC RBC AUTO-ENTMCNC: 31 G/DL (ref 32–36)
MCV RBC AUTO: 81 FL (ref 82–98)
MONOCYTES # BLD AUTO: 0.4 K/UL (ref 0.3–1)
MONOCYTES NFR BLD: 7.7 % (ref 4–15)
NEUTROPHILS # BLD AUTO: 2.9 K/UL (ref 1.8–7.7)
NEUTROPHILS NFR BLD: 51.2 % (ref 38–73)
NRBC BLD-RTO: 0 /100 WBC
PLATELET # BLD AUTO: 294 K/UL (ref 150–350)
PMV BLD AUTO: 10.6 FL (ref 9.2–12.9)
RBC # BLD AUTO: 4.69 M/UL (ref 4–5.4)
WBC # BLD AUTO: 5.68 K/UL (ref 3.9–12.7)

## 2020-08-12 PROCEDURE — 3008F PR BODY MASS INDEX (BMI) DOCUMENTED: ICD-10-PCS | Mod: HCNC,CPTII,S$GLB, | Performed by: INTERNAL MEDICINE

## 2020-08-12 PROCEDURE — 99214 OFFICE O/P EST MOD 30 MIN: CPT | Mod: HCNC,S$GLB,, | Performed by: INTERNAL MEDICINE

## 2020-08-12 PROCEDURE — 99499 UNLISTED E&M SERVICE: CPT | Mod: HCNC,,, | Performed by: INTERNAL MEDICINE

## 2020-08-12 PROCEDURE — 3075F SYST BP GE 130 - 139MM HG: CPT | Mod: HCNC,CPTII,S$GLB, | Performed by: INTERNAL MEDICINE

## 2020-08-12 PROCEDURE — 1126F PR PAIN SEVERITY QUANTIFIED, NO PAIN PRESENT: ICD-10-PCS | Mod: HCNC,S$GLB,, | Performed by: INTERNAL MEDICINE

## 2020-08-12 PROCEDURE — 1101F PR PT FALLS ASSESS DOC 0-1 FALLS W/OUT INJ PAST YR: ICD-10-PCS | Mod: HCNC,CPTII,S$GLB, | Performed by: INTERNAL MEDICINE

## 2020-08-12 PROCEDURE — 3075F PR MOST RECENT SYSTOLIC BLOOD PRESS GE 130-139MM HG: ICD-10-PCS | Mod: HCNC,CPTII,S$GLB, | Performed by: INTERNAL MEDICINE

## 2020-08-12 PROCEDURE — 3008F BODY MASS INDEX DOCD: CPT | Mod: HCNC,CPTII,S$GLB, | Performed by: INTERNAL MEDICINE

## 2020-08-12 PROCEDURE — 83690 ASSAY OF LIPASE: CPT | Mod: HCNC

## 2020-08-12 PROCEDURE — 99499 RISK ADDL DX/OHS AUDIT: ICD-10-PCS | Mod: HCNC,,, | Performed by: INTERNAL MEDICINE

## 2020-08-12 PROCEDURE — 99499 UNLISTED E&M SERVICE: CPT | Mod: HCNC,S$GLB,, | Performed by: INTERNAL MEDICINE

## 2020-08-12 PROCEDURE — 86140 C-REACTIVE PROTEIN: CPT | Mod: HCNC

## 2020-08-12 PROCEDURE — 99999 PR PBB SHADOW E&M-EST. PATIENT-LVL V: ICD-10-PCS | Mod: PBBFAC,HCNC,, | Performed by: INTERNAL MEDICINE

## 2020-08-12 PROCEDURE — 99214 PR OFFICE/OUTPT VISIT, EST, LEVL IV, 30-39 MIN: ICD-10-PCS | Mod: HCNC,S$GLB,, | Performed by: INTERNAL MEDICINE

## 2020-08-12 PROCEDURE — 1101F PT FALLS ASSESS-DOCD LE1/YR: CPT | Mod: HCNC,CPTII,S$GLB, | Performed by: INTERNAL MEDICINE

## 2020-08-12 PROCEDURE — 82150 ASSAY OF AMYLASE: CPT | Mod: HCNC

## 2020-08-12 PROCEDURE — 99999 PR PBB SHADOW E&M-EST. PATIENT-LVL V: CPT | Mod: PBBFAC,HCNC,, | Performed by: INTERNAL MEDICINE

## 2020-08-12 PROCEDURE — 1159F PR MEDICATION LIST DOCUMENTED IN MEDICAL RECORD: ICD-10-PCS | Mod: HCNC,S$GLB,, | Performed by: INTERNAL MEDICINE

## 2020-08-12 PROCEDURE — 36415 COLL VENOUS BLD VENIPUNCTURE: CPT | Mod: HCNC,PO

## 2020-08-12 PROCEDURE — 99499 RISK ADDL DX/OHS AUDIT: ICD-10-PCS | Mod: HCNC,S$GLB,, | Performed by: INTERNAL MEDICINE

## 2020-08-12 PROCEDURE — 1126F AMNT PAIN NOTED NONE PRSNT: CPT | Mod: HCNC,S$GLB,, | Performed by: INTERNAL MEDICINE

## 2020-08-12 PROCEDURE — 1159F MED LIST DOCD IN RCRD: CPT | Mod: HCNC,S$GLB,, | Performed by: INTERNAL MEDICINE

## 2020-08-12 PROCEDURE — 3078F DIAST BP <80 MM HG: CPT | Mod: HCNC,CPTII,S$GLB, | Performed by: INTERNAL MEDICINE

## 2020-08-12 PROCEDURE — 3078F PR MOST RECENT DIASTOLIC BLOOD PRESSURE < 80 MM HG: ICD-10-PCS | Mod: HCNC,CPTII,S$GLB, | Performed by: INTERNAL MEDICINE

## 2020-08-12 PROCEDURE — 85025 COMPLETE CBC W/AUTO DIFF WBC: CPT | Mod: HCNC

## 2020-08-12 PROCEDURE — 85652 RBC SED RATE AUTOMATED: CPT | Mod: HCNC

## 2020-08-12 NOTE — PATIENT INSTRUCTIONS
We were happy to see you today    For your Testing  Please have your labs and imaging test done at your earliest convience      For your Medication   You can continue your bentyl medication  For more information about side effects please visit medlineplus.gov              Please return to clinic in      As needed

## 2020-08-12 NOTE — PROGRESS NOTES
"Subjective:       Patient ID: Chelly Ortiz is a 69 y.o. female.  H/o GERD, IBS, constipation, pancreati divisium      Chief Complaint: No chief complaint on file.  this patient is new to me     Abd pain    Patient reports abdominal pain for 1 week.  Of note she does have a history of GERD, IBS, constipation, pancreatic divisum.  She reports that she has chronic abdominal pain issues but she reports that the pain symptoms for the last week have been different.  She reports that she did have over the last week abdominal pain 5/10.  She reports that the pain is crampy and colicky in nature.  She reports that the symptoms started roughly 1 hr after eating.  They occur in her periumbilical region.  She usually needs to use the bathroom at which point she feels better after.  She is without symptoms in the absence of eating.  She notes no particular foods or liquids that bring about the symptoms.  Symptoms usually last until she has a bowel movement.  She does not have any nausea or vomiting.  He has no melena or hematochezia.  She has not had any fevers or weight loss.  She has not tried any medications for the symptoms.  She does have a Bentyl  from a urgent care visit in June.  She this with her  who was not had any symptoms recently.  She has not been on any recent antibiotics.  She has no urinary symptoms such as dysuria or hematuria.  She has no vaginal symptoms such as vaginal discharge or itching.  Symptoms do not awaken her from sleep and she has no nighttime diarrhea.      HPI  Review of Systems   All other systems reviewed and are negative.        Objective:       /78 (BP Location: Right arm, Patient Position: Sitting, BP Method: Medium (Manual))   Pulse 91   Temp 97.1 °F (36.2 °C)   Ht 5' 3" (1.6 m)   Wt 92 kg (202 lb 13.2 oz)   LMP  (LMP Unknown)   SpO2 96%   BMI 35.93 kg/m²     Physical Exam  Vitals signs and nursing note reviewed.   Constitutional:       General: She is not in acute " distress.     Appearance: She is well-developed. She is not diaphoretic.   HENT:      Head: Normocephalic.      Nose: Nose normal.   Eyes:      General: No scleral icterus.        Right eye: No discharge.         Left eye: No discharge.      Conjunctiva/sclera: Conjunctivae normal.      Pupils: Pupils are equal, round, and reactive to light.   Neck:      Musculoskeletal: Normal range of motion.   Cardiovascular:      Rate and Rhythm: Normal rate and regular rhythm.      Heart sounds: Normal heart sounds. No murmur. No friction rub. No gallop.    Pulmonary:      Effort: Pulmonary effort is normal. No respiratory distress.   Abdominal:      General: Bowel sounds are normal. There is no distension.      Palpations: Abdomen is soft. There is no mass.      Tenderness: There is no abdominal tenderness. There is no right CVA tenderness, left CVA tenderness, guarding or rebound.      Hernia: No hernia is present.   Musculoskeletal: Normal range of motion.         General: No deformity.   Skin:     General: Skin is warm.   Neurological:      Mental Status: She is alert and oriented to person, place, and time.      Cranial Nerves: No cranial nerve deficit.           BMP  Lab Results   Component Value Date     04/24/2020    K 3.7 04/24/2020     04/24/2020    CO2 29 04/24/2020    BUN 11 04/24/2020    CREATININE 1.0 04/24/2020    CALCIUM 9.5 04/24/2020    ANIONGAP 8 04/24/2020    ESTGFRAFRICA >60.0 04/24/2020    EGFRNONAA 56.8 (A) 04/24/2020     Lab Results   Component Value Date    WBC 6.60 06/04/2019    HGB 13.0 06/04/2019    HCT 40.3 06/04/2019    MCV 78 (L) 06/04/2019     06/04/2019       Lab Results   Component Value Date    ALT 14 01/02/2020    AST 17 01/02/2020    GGT 30 10/27/2004    ALKPHOS 109 01/02/2020    BILITOT 0.5 01/02/2020     Lab Results   Component Value Date    HGBA1C 8.9 (H) 07/07/2020       Assessment:       1. Abdominal angina    2. Acute (reversible) ischemia of intestine, part and  extent unspecified        Plan:       Chelly was seen today for abdominal pain.    Diagnoses and all orders for this visit:    Abdominal angina  New uncontroleld  Unclear etiology but abdominal angina would like r/o ichsemia  She is asymptomatic currently and non-sugery physical exam  DDx would include pancreastitis given pancretic diviserum on prior MRI . GERD, IBS and constipation allso on ddx  I provided instruction on supportive care measures   Will start with lab and CTA imaginge   reviewed signs and symptoms that should prompt return to provider or evaluation in the ED    -     CBC auto differential; Future  -     Sedimentation rate; Future  -     C-reactive protein; Future  -     Amylase; Future  -     Lipase; Future  -     CTA Abdomen and Pelvis; Future    Acute (reversible) ischemia of intestine, part and extent unspecified  -     CTA Abdomen and Pelvis; Future        Future Appointments   Date Time Provider Department Center   8/13/2020  8:00 AM [facility] CT2 LIMIT 400 LBS [facility] CT SCAN [facility]   8/13/2020 10:00 AM GINETTE [facility] PAINMGT [facility]   9/2/2020  2:30 PM Agusto Stafford MD [facility] [place]   10/7/2020 12:00 PM [facility] [place]   10/14/2020  2:30 PM EDUARDO Escobar, [facility]   12/24/2020 11:30 AM Gabriel Wilson MD [facility]         Medication List with Changes/Refills   Current Medications    ACCU-CHEK SHILOH PLUS METER MISC    Inject 1 Units into the skin 2 (two) times daily.    ALBUTEROL (PROVENTIL/VENTOLIN HFA) 90 MCG/ACTUATION INHALER    Inhale 1-2 puffs into the lungs every 4 (four) hours as needed for Wheezing.    AMLODIPINE (NORVASC) 5 MG TABLET    TAKE 1 TABLET EVERY DAY    ASPIRIN (ECOTRIN) 81 MG EC TABLET    Take 81 mg by mouth once daily.    AZELASTINE (OPTIVAR) 0.05 % OPHTHALMIC SOLUTION    INSTILL 1 DROP INTO BOTH EYES TWICE A DAY    BLOOD SUGAR DIAGNOSTIC STRP    1 strip by Misc.(Non-Drug; Combo Route)  "route 3 (three) times daily.    CETIRIZINE (ZYRTEC) 10 MG TABLET    Take 1 tablet (10 mg total) by mouth every evening.    CITALOPRAM (CELEXA) 10 MG TABLET    TAKE 1 TABLET (10 MG TOTAL) BY MOUTH ONCE DAILY.    DULAGLUTIDE (TRULICITY) 0.75 MG/0.5 ML PEN INJECTOR    Inject 0.75 mg into the skin every 7 days.    ERGOCALCIFEROL (ERGOCALCIFEROL) 50,000 UNIT CAP    TAKE 1 CAPSULE EVERY 7 DAYS.    ESOMEPRAZOLE (NEXIUM) 40 MG CAPSULE    Take 1 capsule (40 mg total) by mouth before breakfast.    GABAPENTIN (NEURONTIN) 100 MG CAPSULE    TAKE ONE CAPSULE BY MOUTH 3 TIMES A DAY    HYDROQUINONE 4 % CREA    Use hs on dark spots    INSULIN ASPART U-100 (NOVOLOG FLEXPEN U-100 INSULIN) 100 UNIT/ML (3 ML) INPN PEN    Inject 12 units w/ meals plus scale 150-200+2, 201-250+4, 251-300+6, 301-350+8, >350+10. Max daily 50 units.    INSULIN DETEMIR U-100 (LEVEMIR FLEXTOUCH U-100 INSULN) 100 UNIT/ML (3 ML) INPN PEN    Inject 45 Units into the skin every evening.    INSULIN SYRINGE-NEEDLE U-100 0.5 ML 31 GAUGE X 5/16" SYRG    Use nightly. 90 day    LANCETS (ACCU-CHEK SOFTCLIX LANCETS) MISC    Uses in lancing device 3 times a day.    LIDOCAINE HCL 2% (XYLOCAINE) 2 % JELLY    Apply topically once daily.    LOSARTAN (COZAAR) 50 MG TABLET    TAKE 1 TABLET EVERY DAY    MAGNESIUM OXIDE (MAG-OX) 400 MG TABLET    Take 1 tablet (400 mg total) by mouth 2 (two) times daily.    PEN NEEDLE, DIABETIC 31 GAUGE X 3/16" NDLE    Uses 4 x a day.    POLYETHYLENE GLYCOL (GLYCOLAX) 17 GRAM PWPK    Take 17 g by mouth once daily.    PRAVASTATIN (PRAVACHOL) 10 MG TABLET    TAKE 1 TABLET EVERY DAY    TRAZODONE (DESYREL) 50 MG TABLET    TAKE 1 TABLET EVERY NIGHT AS NEEDED    VARICELLA-ZOSTER GE-AS01B, PF, (SHINGRIX) 50 MCG/0.5 ML INJECTION    Inject into the muscle.         Disclaimer:  This note has been generated using voice-recognition software. There may be grammatical or spelling errors that have been missed during proof-reading   "

## 2020-08-13 ENCOUNTER — OFFICE VISIT (OUTPATIENT)
Dept: PAIN MEDICINE | Facility: CLINIC | Age: 70
End: 2020-08-13
Payer: MEDICARE

## 2020-08-13 ENCOUNTER — HOSPITAL ENCOUNTER (OUTPATIENT)
Dept: RADIOLOGY | Facility: HOSPITAL | Age: 70
Discharge: HOME | End: 2020-08-13
Attending: INTERNAL MEDICINE
Payer: MEDICARE

## 2020-08-13 ENCOUNTER — HOSPITAL ENCOUNTER (OUTPATIENT)
Dept: RADIOLOGY | Facility: HOSPITAL | Age: 70
Discharge: HOME | End: 2020-08-13
Attending: NURSE PRACTITIONER
Payer: MEDICARE

## 2020-08-13 VITALS — HEART RATE: 91 BPM | SYSTOLIC BLOOD PRESSURE: 130 MMHG | DIASTOLIC BLOOD PRESSURE: 78 MMHG

## 2020-08-13 DIAGNOSIS — M70.72 ISCHIAL BURSITIS OF LEFT SIDE: Primary | ICD-10-CM

## 2020-08-13 DIAGNOSIS — K55.059 ACUTE (REVERSIBLE) ISCHEMIA OF INTESTINE, PART AND EXTENT UNSPECIFIED: ICD-10-CM

## 2020-08-13 DIAGNOSIS — G89.29 BILATERAL CHRONIC KNEE PAIN: ICD-10-CM

## 2020-08-13 DIAGNOSIS — G89.29 CHRONIC BILATERAL LOW BACK PAIN WITHOUT SCIATICA: ICD-10-CM

## 2020-08-13 DIAGNOSIS — M25.561 BILATERAL CHRONIC KNEE PAIN: ICD-10-CM

## 2020-08-13 DIAGNOSIS — M25.562 BILATERAL CHRONIC KNEE PAIN: ICD-10-CM

## 2020-08-13 DIAGNOSIS — K55.1 ABDOMINAL ANGINA: ICD-10-CM

## 2020-08-13 DIAGNOSIS — M54.50 CHRONIC BILATERAL LOW BACK PAIN WITHOUT SCIATICA: ICD-10-CM

## 2020-08-13 LAB
CREAT SERPL-MCNC: 0.8 MG/DL (ref 0.5–1.4)
SAMPLE: NORMAL

## 2020-08-13 PROCEDURE — 73562 XR KNEE 3 VIEW BILATERAL: ICD-10-PCS | Mod: 26,50,HCNC, | Performed by: RADIOLOGY

## 2020-08-13 PROCEDURE — 99214 OFFICE O/P EST MOD 30 MIN: CPT | Mod: HCNC,S$GLB,, | Performed by: NURSE PRACTITIONER

## 2020-08-13 PROCEDURE — 99499 RISK ADDL DX/OHS AUDIT: ICD-10-PCS | Mod: HCNC,S$GLB,, | Performed by: NURSE PRACTITIONER

## 2020-08-13 PROCEDURE — 1159F PR MEDICATION LIST DOCUMENTED IN MEDICAL RECORD: ICD-10-PCS | Mod: HCNC,S$GLB,, | Performed by: NURSE PRACTITIONER

## 2020-08-13 PROCEDURE — 99999 PR PBB SHADOW E&M-EST. PATIENT-LVL V: CPT | Mod: PBBFAC,HCNC,, | Performed by: NURSE PRACTITIONER

## 2020-08-13 PROCEDURE — 73562 X-RAY EXAM OF KNEE 3: CPT | Mod: TC,50,HCNC,FY

## 2020-08-13 PROCEDURE — 3078F DIAST BP <80 MM HG: CPT | Mod: HCNC,CPTII,S$GLB, | Performed by: NURSE PRACTITIONER

## 2020-08-13 PROCEDURE — 1101F PT FALLS ASSESS-DOCD LE1/YR: CPT | Mod: HCNC,CPTII,S$GLB, | Performed by: NURSE PRACTITIONER

## 2020-08-13 PROCEDURE — 73562 X-RAY EXAM OF KNEE 3: CPT | Mod: 26,50,HCNC, | Performed by: RADIOLOGY

## 2020-08-13 PROCEDURE — 99499 UNLISTED E&M SERVICE: CPT | Mod: HCNC,S$GLB,, | Performed by: NURSE PRACTITIONER

## 2020-08-13 PROCEDURE — 1159F MED LIST DOCD IN RCRD: CPT | Mod: HCNC,S$GLB,, | Performed by: NURSE PRACTITIONER

## 2020-08-13 PROCEDURE — 3075F PR MOST RECENT SYSTOLIC BLOOD PRESS GE 130-139MM HG: ICD-10-PCS | Mod: HCNC,CPTII,S$GLB, | Performed by: NURSE PRACTITIONER

## 2020-08-13 PROCEDURE — 99999 PR PBB SHADOW E&M-EST. PATIENT-LVL V: ICD-10-PCS | Mod: PBBFAC,HCNC,, | Performed by: NURSE PRACTITIONER

## 2020-08-13 PROCEDURE — 3078F PR MOST RECENT DIASTOLIC BLOOD PRESSURE < 80 MM HG: ICD-10-PCS | Mod: HCNC,CPTII,S$GLB, | Performed by: NURSE PRACTITIONER

## 2020-08-13 PROCEDURE — 3075F SYST BP GE 130 - 139MM HG: CPT | Mod: HCNC,CPTII,S$GLB, | Performed by: NURSE PRACTITIONER

## 2020-08-13 PROCEDURE — 99214 PR OFFICE/OUTPT VISIT, EST, LEVL IV, 30-39 MIN: ICD-10-PCS | Mod: HCNC,S$GLB,, | Performed by: NURSE PRACTITIONER

## 2020-08-13 PROCEDURE — 1101F PR PT FALLS ASSESS DOC 0-1 FALLS W/OUT INJ PAST YR: ICD-10-PCS | Mod: HCNC,CPTII,S$GLB, | Performed by: NURSE PRACTITIONER

## 2020-08-13 NOTE — PROGRESS NOTES
[facility] Established Patient Evaluation    Referred by: Gabriel Wilson  Reason for referral: neck pain    CC:   Bilateral Knee pain    Interval Update:   8/13/20 - Ms. Ortiz returns to clinic for follow up visit reporting worse bilateral knee  pain.  Pain intensity is currently 6/10.  Patient reports bilateral knee pain that started approximately 1 week ago, pain is worse with walking, standing and while sleeping. Pain is described as sharp and stabbing, she denies profound weakness, or radiating pain. Worse pain is 6/10. Of note she reports no inciting incident, trauma or falls.     02/05/2020 - Mrs. Ortiz returns to clinic for follow up visit reporting left buttock pain. Patient reports radiation down left leg to mid thigh. Pain intensity is currently 5/10.      4/9/18 - Pt returns to complaining of MRI results, neck and bilateral shoulder pain.  Her day time pain improved with PT but she continues to complain of bilat neck and shoulder pain at night.  The results of the MRI were shared with her.    Background:  Chelly Ortiz is a 69 y.o. female who complains of neck and bilateral shoulder pain as characterized below.    Location: neck and bilateral shoulder  Severity: Currently: 5/2/10   Typical Range: 6/10     Exacerbation: 10/10   Onset: long-standing baseline pain exacerbated 3 months ago associated with reaching overhead to clean ceiling fans  Quality: Dull, achy, throbbing  Radiation: bilat shoulders  Axial/Extremity Percentage of Pain: 50/50  Exacerbating Factors: extension and flexion  Mitigating Factors: heat  Assoc: denies night fever/night sweats, urinary incontinence, bowel incontinence, significant weight loss, significant motor weakness and loss of sensations    Previous Therapies:  PT: Scheduled to start Feb 7, 2018  HEP:   TENS:  Injections: LYNN 3x per Ca Oconnell  Surgery: Denies  Medications:   - NSAIDS: Ibuprofen  - MSK Relaxants: Current  - TCAs:   - SNRIs:   - Topicals:    - Anticonvulsants: Current  - Opioids: No    Current Pain Medications:  1. Gabapentin 100 TID - she is taking it prn  2. Cyclobenzaprine - still hasn't started but considering  3. Ibuprofen - helps a little  4. Piroxicam 20 mg - hasn't started yet    Full Medication List:    Current Outpatient Medications:     ACCU-CHEK SHILOH PLUS METER Misc, Inject 1 Units into the skin 2 (two) times daily., Disp: 1 each, Rfl: 0    albuterol (PROVENTIL/VENTOLIN HFA) 90 mcg/actuation inhaler, Inhale 1-2 puffs into the lungs every 4 (four) hours as needed for Wheezing., Disp: 18 g, Rfl: 2    amLODIPine (NORVASC) 5 MG tablet, TAKE 1 TABLET EVERY DAY, Disp: 90 tablet, Rfl: 3    aspirin (ECOTRIN) 81 MG EC tablet, Take 81 mg by mouth once daily., Disp: , Rfl:     azelastine (OPTIVAR) 0.05 % ophthalmic solution, INSTILL 1 DROP INTO BOTH EYES TWICE A DAY, Disp: 6 mL, Rfl: 1    blood sugar diagnostic Strp, 1 strip by Misc.(Non-Drug; Combo Route) route 3 (three) times daily., Disp: 300 strip, Rfl: 3    cetirizine (ZYRTEC) 10 MG tablet, Take 1 tablet (10 mg total) by mouth every evening., Disp: 90 tablet, Rfl: 0    citalopram (CELEXA) 10 MG tablet, TAKE 1 TABLET (10 MG TOTAL) BY MOUTH ONCE DAILY., Disp: 90 tablet, Rfl: 3    dulaglutide (TRULICITY) 0.75 mg/0.5 mL pen injector, Inject 0.75 mg into the skin every 7 days., Disp: 12 pen, Rfl: 3    ergocalciferol (ERGOCALCIFEROL) 50,000 unit Cap, TAKE 1 CAPSULE EVERY 7 DAYS., Disp: 12 capsule, Rfl: 1    esomeprazole (NEXIUM) 40 MG capsule, Take 1 capsule (40 mg total) by mouth before breakfast., Disp: 90 capsule, Rfl: 3    gabapentin (NEURONTIN) 100 MG capsule, TAKE ONE CAPSULE BY MOUTH 3 TIMES A DAY, Disp: 90 capsule, Rfl: 0    hydroquinone 4 % Crea, Use hs on dark spots, Disp: 28.35 g, Rfl: 3    insulin aspart U-100 (NOVOLOG FLEXPEN U-100 INSULIN) 100 unit/mL (3 mL) InPn pen, Inject 12 units w/ meals plus scale 150-200+2, 201-250+4, 251-300+6, 301-350+8, >350+10. Max daily 50  "units., Disp: 15 mL, Rfl: 6    insulin detemir U-100 (LEVEMIR FLEXTOUCH U-100 INSULN) 100 unit/mL (3 mL) InPn pen, Inject 45 Units into the skin every evening., Disp: 45 mL, Rfl: 3    insulin syringe-needle U-100 0.5 mL 31 gauge x 5/16" Syrg, Use nightly. 90 day, Disp: 100 each, Rfl: 3    lancets (ACCU-CHEK SOFTCLIX LANCETS) Misc, Uses in lancing device 3 times a day., Disp: 300 each, Rfl: 3    lidocaine HCL 2% (XYLOCAINE) 2 % jelly, Apply topically once daily., Disp: 30 mL, Rfl: 2    losartan (COZAAR) 50 MG tablet, TAKE 1 TABLET EVERY DAY, Disp: 90 tablet, Rfl: 3    magnesium oxide (MAG-OX) 400 mg tablet, Take 1 tablet (400 mg total) by mouth 2 (two) times daily., Disp: 180 tablet, Rfl: 3    pen needle, diabetic 31 gauge x 3/16" Ndle, Uses 4 x a day., Disp: 400 each, Rfl: 3    polyethylene glycol (GLYCOLAX) 17 gram PwPk, Take 17 g by mouth once daily., Disp: 30 each, Rfl: 0    pravastatin (PRAVACHOL) 10 MG tablet, TAKE 1 TABLET EVERY DAY, Disp: 90 tablet, Rfl: 3    traZODone (DESYREL) 50 MG tablet, TAKE 1 TABLET EVERY NIGHT AS NEEDED, Disp: 90 tablet, Rfl: 3    varicella-zoster gE-AS01B, PF, (SHINGRIX) 50 mcg/0.5 mL injection, Inject into the muscle., Disp: 0.5 mL, Rfl: 1     Review of Systems:  Review of Systems   Constitutional: Negative for chills and fever.   HENT: Negative for nosebleeds.    Eyes: Positive for blurred vision. Negative for pain.   Respiratory: Negative for hemoptysis.    Cardiovascular: Negative for chest pain.   Gastrointestinal: Negative for nausea and vomiting.   Genitourinary: Negative for dysuria.   Musculoskeletal: Positive for neck pain. Negative for falls.   Skin: Negative for rash.   Neurological: Negative for focal weakness.   Endo/Heme/Allergies: Bruises/bleeds easily.   Psychiatric/Behavioral: The patient is nervous/anxious.        Allergies:  Prednisone; Latex; Ace inhibitors; and Latex, natural rubber     Medical History:  Past Medical History:   Diagnosis Date    " Allergy     Cataract     DDD (degenerative disc disease), lumbar     Diabetes mellitus     diet controlled    Diabetes mellitus, type 2     GERD (gastroesophageal reflux disease)     History of subconjunctival hemorrhage 11    left eye    Hyperlipidemia     Hypertension     IBS (irritable bowel syndrome)     Obesity     MYRIAM (obstructive sleep apnea)     on CPAP    Rotator cuff impingement syndrome     Seasonal allergic conjunctivitis         Surgical History:  Past Surgical History:   Procedure Laterality Date    CATARACT EXTRACTION W/  INTRAOCULAR LENS IMPLANT  10/3/13    OD (dr. gardner)     SECTION      x2    CHOLECYSTECTOMY      COLONOSCOPY N/A 2018    Procedure: COLONOSCOPY;  Surgeon: Marie Mejia MD;  Location: [facility] ENDO;  Service: Endoscopy;  Laterality: N/A;    ENDOSCOPIC ULTRASOUND OF UPPER GASTROINTESTINAL TRACT N/A 10/9/2018    Procedure: ULTRASOUND, UPPER GI TRACT, ENDOSCOPIC;  Surgeon: Javy Simpson MD;  Location: [facility] ENDO;  Service: Endoscopy;  Laterality: N/A;    EXCISION OF LESION N/A 2019    Procedure: EXCISION, LESION VULVA;  Surgeon: Liv Odell MD;  Location: [facility] OR;  Service: OB/GYN;  Laterality: N/A;  latex allergy    EYE SURGERY      HYSTERECTOMY      ovaries intact, due to DUB    TUBAL LIGATION          Social History:  Social History     Socioeconomic History    Marital status:      Spouse name: Not on file    Number of children: Not on file    Years of education: Not on file    Highest education level: Not on file   Occupational History    Not on file   Social Needs    Financial resource strain: Not on file    Food insecurity     Worry: Not on file     Inability: Not on file    Transportation needs     Medical: No     Non-medical: No   Tobacco Use    Smoking status: Former Smoker     Quit date: 2/15/1983     Years since quittin.5    Smokeless tobacco: Never Used   Substance and Sexual Activity    Alcohol use:  No     Frequency: Never     Binge frequency: Never    Drug use: No    Sexual activity: Yes     Partners: Male   Lifestyle    Physical activity     Days per week: 3 days     Minutes per session: 30 min    Stress: To some extent   Relationships    Social connections     Talks on phone: Three times a week     Gets together: Not on file     Attends [employer] service: Not on file     Active member of club or organization: No     Attends meetings of clubs or organizations: Not on file     Relationship status:    Other Topics Concern    Are you pregnant or think you may be? Not Asked    Breast-feeding Not Asked   Social History Narrative    Not on file       Physical Exam:  There were no vitals filed for this visit.  General    Nursing note and vitals reviewed.  Constitutional: She is oriented to person, place, and time. She appears well-developed and well-nourished. No distress.   HENT:   Head: Normocephalic and atraumatic.   Nose: Nose normal.   Eyes: Conjunctivae and EOM are normal. Pupils are equal, round, and reactive to light. Right eye exhibits no discharge. Left eye exhibits no discharge. No scleral icterus.   Neck: No JVD present.   Cardiovascular: Intact distal pulses.    Pulmonary/Chest: Effort normal. No respiratory distress.   Abdominal: She exhibits no distension.   Neurological: She is alert and oriented to person, place, and time. Coordination normal.   Psychiatric: She has a normal mood and affect. Her behavior is normal. Judgment and thought content normal.     General Musculoskeletal Exam   Gait: antalgic       Right Knee Exam     Tenderness   The patient is tender to palpation of the medial joint line.    Crepitus   The patient has crepitus of the patella.    Range of Motion   Extension: normal   Flexion: normal     Left Knee Exam     Tenderness   The patient tender to palpation of the lateral joint line.    Crepitus   The patient has crepitus of the patella.    Range of Motion    Extension: normal   Flexion: normal Left Hip Exam     Comments:  Left ischial bursa tenderness.  Partial reproduction of pain with hip flexion and piriformis-type stretches.            Imaging:  XR HIPS BILATERAL 2 VIEW INCL AP PELVIS    CLINICAL HISTORY:  Pain in right hip    TECHNIQUE:  AP view of the pelvis and frogleg lateral views of both hips were performed.    COMPARISON:  None.    FINDINGS:  No fracture or dislocation.  Moderate bilateral hip cartilage space loss with osteophyte production.  Degenerative changes are seen in the lower lumbar spine, as well as the sacroiliac joints and pubic symphysis.      2/3/18 MRI Cervical Spine Without Contrast    Narrative     Comparison: Cervical spine series 12/27/16    Technique: Multiplanar and multi-sequence MR images were obtained through the cervical spine without intravenous contrast.    Results: Patient artifact slightly degrades some sequences. The cervical vertebral bodies demonstrate adequate alignment.  The vertebral body heights are well-maintained.  No displaced fracture, dislocation or significant listhesis.  Probable small hemangioma at the inferior aspect of C7 vertebral body. No bone marrow infiltrative process.  No prevertebral soft tissue swelling or paraspinal hematoma.  The spinal cord is normal in morphology and signal.  The craniocervical junction is without significant abnormality.    Mild degenerative disc disease with slight loss of disc height and anterior marginal osteophytes with endplate changes at C5-6 level. Multilevel vertebral and facet arthrosis. Mild to moderate degenerative change at the atlantodental interval.    C2-3: Posterior disc osteophyte complex resulting in borderline acquired canal stenosis. No significant neuroforaminal narrowing.  C3-4: Mild left neuroforaminal narrowing. No significant spinal canal stenosis or right neuroforaminal narrowing.  C4-5: Minimal bilateral neuroforaminal narrowing. No significant spinal  canal stenosis.  C5-6: Posterior disc osteophyte complex resulting in mild acquired canal stenosis without associated cord abutment. Minimal bilateral neuroforaminal narrowing.  C6-7: No significant spinal canal stenosis or neuroforaminal narrowing.  C7-T1: No significant spinal canal stenosis or neuroforaminal narrowing.    The soft tissue structures incidentally surveyed demonstrate no significant abnormality.   Impression          1. No cervical spine acute abnormality identified.    2. Mild cervical spondylosis most prominent at C5-6, as above.      Electronically signed by: WILLIAMS FULTON MD, MD  Date: 02/03/18  Time: 15:51     Encounter     View Encounter          Signed by     Signed Credentials Date/Time  Phone Pager   WILLIAMS FULTON MD 2/03/2018 15:51 [phone]   Reviewed By     Gabriel Wilson MD on 2/5/2018 09:42   Exam Details     Performed Procedure Technologist Supporting Staff Performing Physician   MRI Cervical Spine Without Contrast RT [facility]        Appointment Date/Status Modality Department    2/3/2018     Completed [facility] MRI1 [facility] MRI       Begin Exam End Exam Begin Exam Questionnaires End Exam Questionnaires   2/3/2018 10:35 AM 2/3/2018 11:08 AM MRI TECH NAVIGATOR QUESTIONS IMAGING END ALL      Reason For Exam   Priority: Routine   Dx: Neck pain of over 3 months duration [M54.2, G89.29 (ICD-10-CM)]   Order Report      Order Details         X-Ray Cervical Spine AP And Lateral  2 views: Alignment is normal.  Odontoid is intact as are the prevertebral soft tissues and posterior elements.  No fracture dislocation bone destruction seen.  There is mild DJD.   Impression      Mild DJD.      Electronically signed by: LETTY WHITE  Date: 12/27/16  Time: 12:26          X-Ray Shoulder Trauma 3 view Right  Comparison: 4/20/16.    Findings: Internal rotation, Y view and axillary views of the right shoulder demonstrate osteophyte formation and loss of joint space at the  acromioclavicular joint.  Otherwise the osseous structures, soft tissues and joint spaces are normal.  Specifically no fracture or dislocation.  The visualized right upper lobe is normal.   Impression      Acromioclavicular osteoarthritis otherwise normal exam  ______________________________________     Electronically signed by: ANGEL GARCIA MD  Date: 07/07/16  Time: 10:58          MRI Upper Extremity Joint Without Contraast Right  TECHNIQUE: MRI of right shoulder was performed on a 1.5T magnet utilizing the following sequences: Localizer; axial T2 FS; coronal T2 FS and PD FS; sagittal T1, T2 FS and PD FS.    COMPARISON: Right shoulder radiograph 4/20/16.    FINDINGS:    Rotator cuff: There is thickening and increased signal of the insertional fibers of the supraspinatus consistent with tendinosis with partial thickness undersurface tear.  There is small full-thickness component involving the anterior fibers measuring approximately 8 x 6 mm.  Mild infraspinatus tendinosis with undersurface fraying.  Teres minor tendon is intact.  There is mild tendinosis with interstitial tear involving the insertional fibers of the subscapularis.  Muscle bulk of the rotator cuff is within normal limits.     Labrum: Global degeneration of the glenoid labrum.      Biceps: Thickening and increased signal within the intra-articular portion of the long head biceps tendon consistent with tendinosis.    Bone: There is no fracture.  Bone marrow signal is unremarkable.    Acromioclavicular joint: Severe degenerative changes are seen at the acromioclavicular joint with osseous spurring, edema, and subchondral cystic change.    Cartilage: Articular cartilage of the glenohumeral joint is preserved.    Miscellaneous: Small joint effusion with increased fluid seen in the subscapularis recess.  There is increased fluid seen within the subacromial/subdeltoid bursa.   Impression       1.  Supraspinatus tendinosis with partial thickness articular  surface tear and small full-thickness component involving the anterior fibers.    2.  Mild subscapularis and infraspinatus tendinosis.    3.  Severe AC joint arthrosis.    4.  Long head biceps tendinosis.    5.  Joint effusion and moderate subacromial/subdeltoid bursitis.  ______________________________________     Electronically signed by resident: Hosea Costello MD  Date: 06/16/16  Time: 16:29     ______________________________________     Electronically signed by: MADISON WINTERS MD  Date: 06/17/16  Time: 08:39          Labs:  BMP  Lab Results   Component Value Date     04/24/2020    K 3.7 04/24/2020     04/24/2020    CO2 29 04/24/2020    BUN 11 04/24/2020    CREATININE 1.0 04/24/2020    CALCIUM 9.5 04/24/2020    ANIONGAP 8 04/24/2020    ESTGFRAFRICA >60.0 04/24/2020    EGFRNONAA 56.8 (A) 04/24/2020     Lab Results   Component Value Date    ALT 14 01/02/2020    AST 17 01/02/2020    GGT 30 10/27/2004    ALKPHOS 109 01/02/2020    BILITOT 0.5 01/02/2020       Assessment:  Problem List Items Addressed This Visit        Orthopedic    Ischial bursitis of left side - Primary    Relevant Orders    Ambulatory referral/consult to Physical/Occupational Therapy      Other Visit Diagnoses     Bilateral chronic knee pain        Relevant Orders    X-Ray Knee 3 View Bilateral    Ambulatory referral/consult to Physical/Occupational Therapy    Chronic bilateral low back pain without sciatica        Relevant Orders    Ambulatory referral/consult to Physical/Occupational Therapy        Chelly is a 67-year-old female with chronic neck pain exacerbated by increased physical activity 3 months ago.  Her exam and imaging are consistent with facet arthropathy and myofascial dysfunction.  I recommended that we start with physical therapy to eliminate as much pain as possible related to myofascial dysfunction then move on to interventional procedures should a significant amount of pain persist.  I also recommended that she complete  the MRI ordered by her primary care physician and start a trial of piroxicam.  She also states significant insomnia not related to her chronic neck pain and I have recommended that she try tizanidine 4-8 mg nightly as a muscle relaxant and sleep aid.    4/9/18 - she completed PT with improved pain after each session.  Pain remains most bothersome at night which is common for muscle issues.  She was encouraged to perform neck stretching 3 times daily and especially at night to minimize pain at night.    2/5/2020 - there is tenderness in the left buttock consistent with ischial bursitis.  There may be a component of tendonitis as well; however, the treatment would be the same.  I have recommended an ischial bursa injection under fluoroscopic guidance and patient is in agreement.  She was warned that her blood glucose must be controlled and less than 180 g/dL to receive a steroid injection, otherwise we would need to cancel the injection.  Patient was also advised to begin stretching on a daily basis. I demonstrated 3 separate stretches that would target the painful area.  I have encouraged her to walk on a daily basis for exercise as she currently lives a relatively sedentary lifestyle.    08/13/[id number]-old female presents with bilateral knee pain consistent with osteoarthritis, there may be a component of tendonitis as well. I will order bilateral knee x-rays today for further evaluation and consider bilateral knee injections with steroids in the future pt  Is a diabetic so I educated her on how steroids can increase BS so monitoring  And staying at the appropriate level is vital. I will also recommend returning to PT for hari knee pain and  ischial bursitis she was previously attending physical therapy prior to the COVID-19 pandemic for ischial bursitis this was providing her with relief she reports that she only had 2 sessions during that time.   Lastly I did discuss with patient her most recent labs  C  reactive protein, and sedimentation rate results and that they were both abnormal and elevated I recommended she follow-up with her primary care doctor to discussed these results.    Treatment Plan:   PT/OT/HEP:  Return to PT for bilateral knee pain and  Ischial bursitis, the patient understands that her issues is primarily muscle related.  Procedures:  Consider in the future hari IA knee injections in office and left ischial bursa injection  Medications:    - continue medications as they were previously prescribed  Imaging:  X-rays knee 3 views bilaterally    Follow Up: RTC 7 days discussed imaging.    Tani Pittman NP-C  Interventional Pain Management      Disclaimer: This note was partly generated using dictation software which may occasionally result in transcription errors.

## 2020-08-14 ENCOUNTER — CLINICAL SUPPORT (OUTPATIENT)
Dept: REHABILITATION | Facility: HOSPITAL | Age: 70
End: 2020-08-14
Payer: MEDICARE

## 2020-08-14 ENCOUNTER — TELEPHONE (OUTPATIENT)
Dept: FAMILY MEDICINE | Facility: CLINIC | Age: 70
End: 2020-08-14

## 2020-08-14 DIAGNOSIS — M25.562 BILATERAL CHRONIC KNEE PAIN: ICD-10-CM

## 2020-08-14 DIAGNOSIS — G89.29 BILATERAL CHRONIC KNEE PAIN: ICD-10-CM

## 2020-08-14 DIAGNOSIS — M54.50 CHRONIC BILATERAL LOW BACK PAIN WITHOUT SCIATICA: ICD-10-CM

## 2020-08-14 DIAGNOSIS — M70.72 ISCHIAL BURSITIS OF LEFT SIDE: ICD-10-CM

## 2020-08-14 DIAGNOSIS — G89.29 CHRONIC BILATERAL LOW BACK PAIN WITHOUT SCIATICA: ICD-10-CM

## 2020-08-14 DIAGNOSIS — M25.561 BILATERAL CHRONIC KNEE PAIN: ICD-10-CM

## 2020-08-14 PROCEDURE — 97161 PT EVAL LOW COMPLEX 20 MIN: CPT | Mod: HCNC,PN

## 2020-08-14 NOTE — PLAN OF CARE
[facility]  Physical Therapy Initial Evaluation    Name: Chelly Ortiz  Clinic Number: [id number]    Therapy Diagnosis:   Encounter Diagnoses   Name Primary?    Ischial bursitis of left side     Chronic bilateral low back pain without sciatica     Bilateral chronic knee pain      Physician: Tani Pittman FNP    Physician Orders: PT Eval and Treat  Medical Diagnosis from Referral:  M70.72 (ICD-10-CM) - Ischial bursitis of left side   M54.5,G89.29 (ICD-10-CM) - Chronic bilateral low back pain without sciatica   M25.561,M25.562,G89.29 (ICD-10-CM) - Bilateral chronic knee pain     Evaluation Date: 8/14/2020  Authorization Period Expiration: 12/31/2020  Plan of Care Expiration: 10/9/2020  Visit # / Visits authorized: 1/1  FOTO: 1/5  PTA Visit: 0/6    Cap Visit: 113.20  Cap Total: 113.20    Time In: 10:10  Time Out: 11:30  Total Billable Time: 20 minutes (1 LCE)    Precautions: Standard and DM, GERD, HTN, HLD, IBS    Subjective   Date of onset: 8/2020  History of current condition - Mrs. Ortiz reports: chronic low back pain for years and her last flare up was yesterday. Her low back pain is located at B sides of her low back and is episodic in nature. She has increased pain with bending, lifting, and standing. Denies any numbness or tingling. She also has L lower buttock pain that was diagnosed at ischial bursitis, and that has improved to minimal pain overtime. Sitting on hard surfaces increases her pain. She did go to pain management this past February for an injection and therapy, but was not able to get ether due to COVID-19.  History of B knee pain that started this past July when she tried lift a half full bag of potting soil. She has increased pain with walking, bending, squatting, stairs, and prolonged standing/walking. Pain is about the same since and affects her back she thinks.     Medical History:   Past Medical History:   Diagnosis Date    Allergy     Cataract     DDD  (degenerative disc disease), lumbar     Diabetes mellitus     diet controlled    Diabetes mellitus, type 2     GERD (gastroesophageal reflux disease)     History of subconjunctival hemorrhage 11    left eye    Hyperlipidemia     Hypertension     IBS (irritable bowel syndrome)     Obesity     MYRIAM (obstructive sleep apnea)     on CPAP    Rotator cuff impingement syndrome     Seasonal allergic conjunctivitis        Surgical History:   Chelly Ortiz  has a past surgical history that includes  section; Cholecystectomy; Cataract extraction w/  intraocular lens implant (10/3/13); Eye surgery; Tubal ligation; Hysterectomy; Endoscopic ultrasound of upper gastrointestinal tract (N/A, 10/9/2018); Colonoscopy (N/A, 2018); and Excision of lesion (N/A, 2019).    Medications:   Chelly has a current medication list which includes the following prescription(s): accu-chek shira plus meter, albuterol, amlodipine, aspirin, azelastine, accu-chek shira control soln, blood sugar diagnostic, cetirizine, citalopram, trulicity, ergocalciferol, esomeprazole, gabapentin, hydroquinone, insulin aspart u-100, levemir flextouch u-100 insuln, insulin syringe-needle u-100, lancets, lidocaine hcl 2%, losartan, magnesium oxide, metronidazole, pen needle, diabetic, polyethylene glycol, pravastatin, trazodone, and varicella-zoster [id number] (pf).    Allergies:   Review of patient's allergies indicates:   Allergen Reactions    Prednisone Other (See Comments)     hipper    Latex      Other reaction(s): Itching    Ace inhibitors Hives     Other reaction(s): Cough    Latex, natural rubber Itching        Imaging: See imaging in EMR    Prior Therapy: yes for B knees and low back  Social History: 2 story home, bedroom on 2n floor  Occupation: retired  Prior Level of Function: less difficulty with below limitations  Current Level of Function Limitations: prolonged standing, walking, squatting, and knee flexion  activities  Pts goals: to be able to walk    Pain:  Current 5/10, worst 7/10, best 4/10   Location: B knees and low back  Description: Aching  Aggravating Factors: see limitations noted above  Easing Factors: rest    Objective     Gait: decreased gilbert, trendelenburg gait pattern  Posture: increased forward lean, wide Mickey; L lumbar curve and R thoracic curve for scoliosis  Sensation: light touch intact  Palpation: +TTP at anterior B knees, B SI joints, L3-L5 spinous process  Joint mobility: decreased B knee and lumbar mobility    A/PROM and MMT:  * = pain with testing  NT = Not tested  AROM: (degrees) LUMBAR   Flexion (40-50) 80%, B knee pain   Extension (15-20) 60%   Right side bending (~20) 100%   Left side bending  (~20) 100%   Right rotation (5-10) 100%   Left rotation (5-10) 100%     Hip  Right   Left  Pain/Dysfunction with Movement    AROM PROM MMT AROM PROM MMT    Flexion (L2,120 deg) WFL WFL 4-/5 WFL WFL 4-/5    Extension (20 deg) WFL WFL 2+/5 WFL WFL 2+/5    Abduction (L5, 45 deg) WFL WFL 3+/5 WFL WFL 3+/5    Adduction (30 deg) WFL WFL 4/5 WFL WFL 4/5    IR (30 deg) WFL WFL 4/5 WFL WFL 4/5    ER (45 deg) WFL WFL 4-/5 WFL WFL 4-/5       Knee  Right   Left  Pain/Dysfunction with Movement    AROM PROM MMT AROM PROM MMT    Flexion (S2, 140 deg) 105 108 4/5 130 131 4/5    Extension (L3, 0-5 deg) 3 1 4-/5 4 3 4-/5      Ankle  Right   Left  Pain/Dysfunction with Movement    AROM PROM MMT AROM PROM MMT    Dorsiflexion (L4, 20 deg) WFL WFL 4/5 WFL WFL 4/5    Plantarflexion (S1, 50 deg) WFL WFL 4/5 WFL WFL 4/5    Inversion (20-30 deg) WFL WFL NT WFL WFL NT    Eversion (5-10 deg) WFL WFL NT WFL WFL NT    Great toe extension (L5, 70 deg) WFL WFL NT WFL WFL NT      Lumbar Tests:  Slump test = negative B  Quadrant test = mild B low back pain with facet closing B  SLR Test (L4-S3) = negative B  Lumbar distraction = not tested  SL Bridge Test (glut med strength) = not tested  Modified slump test in S/L (femoral nerve) =  not tested  Ely's test (L2,L3,L4) = not tested  Prone Instability Test = not tested  Sign of the buttock (very poor SLR, PROM hip flex with knee bent, and non-capsular pattern) = negative    Cluster for stabilization:   Age < 40,  SLR > 91, +PIT, aberrant motion = Met 1/4    Cluster for spinal stenosis:   Luis s/s, leg > LBP, pain during walking/standing, relief upon sitting, > 47 y/o = Met 4/4    Hip Special Tests:  FADIR = positive on R for groin pain mildly  AMRIT (+ if knee higher than other knee) = negative on R  Scour Test = positive on R for groin pain mildly    CMS Impairment/Limitation/Restriction for FOTO Lumbar Spine Survey    Therapist reviewed FOTO scores for Chelly Ortiz on 8/14/2020.   FOTO documents entered into [employer] - see Media section.    Limitation Score: 53%  Category: Mobility  Predicted: 39%     TREATMENT   No treatment today    Home Exercises and Patient Education Provided:  Education provided:   - abdominal bracing, lifting and carrying body mechanics, avoid activities that increase concordant pain  - course of therapy, prognosis    Written Home Exercises Provided: not today.  Exercises were reviewed and Mrs. Ortiz was able to demonstrate them prior to the end of the session.  Mrs. Ortiz demonstrated good  understanding of the education provided.     See EMR under Media for exercises provided not today.    Assessment   Chelly is a 69 y.o. female referred to outpatient Physical Therapy at [facility] with a medical diagnosis of B chronic knee pain, Chronic bilateral low back pain without sciatica, and Ischial bursitis of left side. Pt currently presents with B knee and B low back pain, decreased lumbar ROM, decreased right hip ROM, decreased BLE strength, impaired posture, impaired balance and gait, and functional deficits with squatting, lifting, prolonged standing/walking, and knee flexion activities. Pt would benefit from skilled PT consisting of gait training, muscular skeletal  stretching/strengthening, manual therapy, neuro muscular re-education, and modalities prn to address limitations and increase functional mobility.    Pt prognosis is Good.   Pt will benefit from skilled outpatient Physical Therapy to address the deficits stated above and in the chart below, provide pt/family education, and to maximize pt's level of independence.     Plan of care discussed with patient: Yes  Pt's spiritual, cultural and educational needs considered and patient is agreeable to the plan of care and goals as stated below:     Anticipated Barriers for therapy: chronicity of low back pain, scoliosis    Medical Necessity is demonstrated by the following  History  Co-morbidities and personal factors that may impact the plan of care Co-morbidities:   DM, GERD, HTN, HLD, IBS    Personal Factors:   no deficits     moderate   Examination  Body Structures and Functions, activity limitations and participation restrictions that may impact the plan of care Body Regions:   back  lower extremities  trunk    Body Systems:    gross symmetry  ROM  strength  gross coordinated movement  balance  gait  transfers  transitions  motor control    Participation Restrictions:   None    Activity limitations:   Learning and applying knowledge  no deficits    General Tasks and Commands  no deficits    Communication  no deficits    Mobility  lifting and carrying objects  walking    Self care  no deficits    Domestic Life  shopping  doing house work (cleaning house, washing dishes, laundry)    Interactions/Relationships  no deficits    Life Areas  no deficits    Community and Social Life  no deficits         high   Clinical Presentation stable and uncomplicated low   Decision Making/ Complexity Score: low     GOALS: Short Term Goals: 4 weeks  1. Pt will demo good TA muscle contraction for improved deep abdominal strength and lumbar stability.  2. Increase B knee AROM to 2-115 degrees in order to improve functional mobility.    3. Pt  will demo good sitting/standing posture and body mechanics for improved spine health and decreased risk of future injury.  4. Pt to tolerate HEP to improve ROM and independence with ADL's.    Long Term Goals: 8 weeks  1. Report decreased low back pain and B knee pain to </= 2/10 for standing and walking activities to increase tolerance for ADLs and increased QoL.  2. Increase strength to >/= 4/5 MMT grade for core and BLE to increase tolerance for ADL and work activities.  3. Pt will increased B knee ROM to 0-120 degrees for increased functional mobility and decreased pain.  4. Patient's goal: to be able to walk  5. Pt will report at </= 39% impaired on FOTO lumbar score for low back pain disability to demonstrate decrease in disability and improvement in back pain.    Plan   Plan of care Certification: 8/14/2020 to 10/9/2020.    Outpatient Physical Therapy 2 times weekly for 8 weeks to include the following interventions: Aquatic Therapy, Cervical/Lumbar Traction, Electrical Stimulation, Gait Training, Manual Therapy, Moist Heat/ Ice, Neuromuscular Re-ed, Orthotic Management and Training, Patient Education, Self Care, Therapeutic Activites and Therapeutic Exercise.     Ajay Arias, PT

## 2020-08-14 NOTE — TELEPHONE ENCOUNTER
----- Message from Wilfred Mccabe sent at 8/13/2020  2:03 PM CDT -----  Type:  Test Results    Who Called: patient  Name of Test (Lab/Mammo/Etc): lab  Date of Test: 8/13/20  Ordering Provider: David  Where the test was performed: [facility]  Would the patient rather a call back or a response via [facility]? call  Best Call Back Number: [phone]  Additional Information:  none

## 2020-08-18 ENCOUNTER — OFFICE VISIT (OUTPATIENT)
Dept: PAIN MEDICINE | Facility: CLINIC | Age: 70
End: 2020-08-18
Payer: MEDICARE

## 2020-08-18 ENCOUNTER — HOSPITAL ENCOUNTER (OUTPATIENT)
Dept: RADIOLOGY | Facility: HOSPITAL | Age: 70
Discharge: HOME | End: 2020-08-18
Attending: INTERNAL MEDICINE
Payer: MEDICARE

## 2020-08-18 VITALS
SYSTOLIC BLOOD PRESSURE: 130 MMHG | DIASTOLIC BLOOD PRESSURE: 78 MMHG | HEART RATE: 64 BPM | BODY MASS INDEX: 35.93 KG/M2 | WEIGHT: 202.81 LBS

## 2020-08-18 DIAGNOSIS — M25.561 BILATERAL CHRONIC KNEE PAIN: ICD-10-CM

## 2020-08-18 DIAGNOSIS — M25.562 BILATERAL CHRONIC KNEE PAIN: ICD-10-CM

## 2020-08-18 DIAGNOSIS — M54.50 CHRONIC BILATERAL LOW BACK PAIN WITHOUT SCIATICA: ICD-10-CM

## 2020-08-18 DIAGNOSIS — G89.29 CHRONIC BILATERAL LOW BACK PAIN WITHOUT SCIATICA: ICD-10-CM

## 2020-08-18 DIAGNOSIS — G89.4 CHRONIC PAIN SYNDROME: ICD-10-CM

## 2020-08-18 DIAGNOSIS — G89.29 BILATERAL CHRONIC KNEE PAIN: ICD-10-CM

## 2020-08-18 DIAGNOSIS — M17.0 PRIMARY OSTEOARTHRITIS OF BOTH KNEES: Primary | ICD-10-CM

## 2020-08-18 DIAGNOSIS — M70.72 ISCHIAL BURSITIS OF LEFT SIDE: ICD-10-CM

## 2020-08-18 PROCEDURE — 1159F PR MEDICATION LIST DOCUMENTED IN MEDICAL RECORD: ICD-10-PCS | Mod: HCNC,S$GLB,, | Performed by: NURSE PRACTITIONER

## 2020-08-18 PROCEDURE — 1101F PR PT FALLS ASSESS DOC 0-1 FALLS W/OUT INJ PAST YR: ICD-10-PCS | Mod: HCNC,CPTII,S$GLB, | Performed by: NURSE PRACTITIONER

## 2020-08-18 PROCEDURE — 1159F MED LIST DOCD IN RCRD: CPT | Mod: HCNC,S$GLB,, | Performed by: NURSE PRACTITIONER

## 2020-08-18 PROCEDURE — 99499 RISK ADDL DX/OHS AUDIT: ICD-10-PCS | Mod: HCNC,S$GLB,, | Performed by: NURSE PRACTITIONER

## 2020-08-18 PROCEDURE — 3008F BODY MASS INDEX DOCD: CPT | Mod: HCNC,CPTII,S$GLB, | Performed by: NURSE PRACTITIONER

## 2020-08-18 PROCEDURE — 25500020 PHARM REV CODE 255: Mod: HCNC | Performed by: INTERNAL MEDICINE

## 2020-08-18 PROCEDURE — 3078F PR MOST RECENT DIASTOLIC BLOOD PRESSURE < 80 MM HG: ICD-10-PCS | Mod: HCNC,CPTII,S$GLB, | Performed by: NURSE PRACTITIONER

## 2020-08-18 PROCEDURE — 99213 PR OFFICE/OUTPT VISIT, EST, LEVL III, 20-29 MIN: ICD-10-PCS | Mod: HCNC,S$GLB,, | Performed by: NURSE PRACTITIONER

## 2020-08-18 PROCEDURE — 1125F PR PAIN SEVERITY QUANTIFIED, PAIN PRESENT: ICD-10-PCS | Mod: HCNC,S$GLB,, | Performed by: NURSE PRACTITIONER

## 2020-08-18 PROCEDURE — 3075F SYST BP GE 130 - 139MM HG: CPT | Mod: HCNC,CPTII,S$GLB, | Performed by: NURSE PRACTITIONER

## 2020-08-18 PROCEDURE — 1125F AMNT PAIN NOTED PAIN PRSNT: CPT | Mod: HCNC,S$GLB,, | Performed by: NURSE PRACTITIONER

## 2020-08-18 PROCEDURE — 99999 PR PBB SHADOW E&M-EST. PATIENT-LVL V: ICD-10-PCS | Mod: PBBFAC,HCNC,, | Performed by: NURSE PRACTITIONER

## 2020-08-18 PROCEDURE — 99499 UNLISTED E&M SERVICE: CPT | Mod: HCNC,S$GLB,, | Performed by: NURSE PRACTITIONER

## 2020-08-18 PROCEDURE — 74174 CTA ABD&PLVS W/CONTRAST: CPT | Mod: 26,HCNC,, | Performed by: RADIOLOGY

## 2020-08-18 PROCEDURE — 3078F DIAST BP <80 MM HG: CPT | Mod: HCNC,CPTII,S$GLB, | Performed by: NURSE PRACTITIONER

## 2020-08-18 PROCEDURE — 3008F PR BODY MASS INDEX (BMI) DOCUMENTED: ICD-10-PCS | Mod: HCNC,CPTII,S$GLB, | Performed by: NURSE PRACTITIONER

## 2020-08-18 PROCEDURE — 74174 CTA ABD&PLVS W/CONTRAST: CPT | Mod: TC,HCNC

## 2020-08-18 PROCEDURE — 74174 CTA ABDOMEN AND PELVIS: ICD-10-PCS | Mod: 26,HCNC,, | Performed by: RADIOLOGY

## 2020-08-18 PROCEDURE — 99213 OFFICE O/P EST LOW 20 MIN: CPT | Mod: HCNC,S$GLB,, | Performed by: NURSE PRACTITIONER

## 2020-08-18 PROCEDURE — 3075F PR MOST RECENT SYSTOLIC BLOOD PRESS GE 130-139MM HG: ICD-10-PCS | Mod: HCNC,CPTII,S$GLB, | Performed by: NURSE PRACTITIONER

## 2020-08-18 PROCEDURE — 1101F PT FALLS ASSESS-DOCD LE1/YR: CPT | Mod: HCNC,CPTII,S$GLB, | Performed by: NURSE PRACTITIONER

## 2020-08-18 PROCEDURE — 99999 PR PBB SHADOW E&M-EST. PATIENT-LVL V: CPT | Mod: PBBFAC,HCNC,, | Performed by: NURSE PRACTITIONER

## 2020-08-18 RX ADMIN — IOHEXOL 100 ML: 350 INJECTION, SOLUTION INTRAVENOUS at 10:08

## 2020-08-18 NOTE — PROGRESS NOTES
[facility] Established Patient Evaluation    Referred by: Gabreil Wilson  Reason for referral: neck pain    CC:   Bilateral Knee pain    Interval Update:     8/18/20 - Ms. Ortiz returns to clinic for follow up visit reporting stable bilateral knee pain and review imaging. Pain intensity is currently 3/10.      8/13/20 - Ms. Ortiz returns to clinic for follow up visit reporting worse bilateral knee  pain.  Pain intensity is currently 6/10.  Patient reports bilateral knee pain that started approximately 1 week ago, pain is worse with walking, standing and while sleeping. Pain is described as sharp and stabbing, she denies profound weakness, or radiating pain. Worse pain is 6/10. Of note she reports no inciting incident, trauma or falls.     02/05/2020 - Mrs. Ortiz returns to clinic for follow up visit reporting left buttock pain. Patient reports radiation down left leg to mid thigh. Pain intensity is currently 5/10.      4/9/18 - Pt returns to complaining of MRI results, neck and bilateral shoulder pain.  Her day time pain improved with PT but she continues to complain of bilat neck and shoulder pain at night.  The results of the MRI were shared with her.    Background:  Chelly Ortiz is a 69 y.o. female who complains of neck and bilateral shoulder pain as characterized below.    Location: neck and bilateral shoulder  Severity: Currently: 5/2/10   Typical Range: 6/10     Exacerbation: 10/10   Onset: long-standing baseline pain exacerbated 3 months ago associated with reaching overhead to clean ceiling fans  Quality: Dull, achy, throbbing  Radiation: bilat shoulders  Axial/Extremity Percentage of Pain: 50/50  Exacerbating Factors: extension and flexion  Mitigating Factors: heat  Assoc: denies night fever/night sweats, urinary incontinence, bowel incontinence, significant weight loss, significant motor weakness and loss of sensations    Previous Therapies:  PT: Scheduled to start Feb 7, 2018  HEP:    TENS:  Injections: LYNN 3x per [facility]  Surgery: Denies  Medications:   - NSAIDS: Ibuprofen  - MSK Relaxants: Current  - TCAs:   - SNRIs:   - Topicals:   - Anticonvulsants: Current  - Opioids: No    Current Pain Medications:  1. Gabapentin 100 TID - she is taking it prn  2. Cyclobenzaprine - still hasn't started but considering  3. Ibuprofen - helps a little  4. Piroxicam 20 mg - hasn't started yet    Full Medication List:    Current Outpatient Medications:     ACCU-CHEK SHILOH PLUS METER Misc, Inject 1 Units into the skin 2 (two) times daily., Disp: 1 each, Rfl: 0    albuterol (PROVENTIL/VENTOLIN HFA) 90 mcg/actuation inhaler, Inhale 1-2 puffs into the lungs every 4 (four) hours as needed for Wheezing., Disp: 18 g, Rfl: 2    amLODIPine (NORVASC) 5 MG tablet, TAKE 1 TABLET EVERY DAY, Disp: 90 tablet, Rfl: 3    aspirin (ECOTRIN) 81 MG EC tablet, Take 81 mg by mouth once daily., Disp: , Rfl:     azelastine (OPTIVAR) 0.05 % ophthalmic solution, INSTILL 1 DROP INTO BOTH EYES TWICE A DAY, Disp: 6 mL, Rfl: 1    blood sugar diagnostic Strp, 1 strip by Misc.(Non-Drug; Combo Route) route 3 (three) times daily., Disp: 300 strip, Rfl: 3    citalopram (CELEXA) 10 MG tablet, TAKE 1 TABLET (10 MG TOTAL) BY MOUTH ONCE DAILY., Disp: 90 tablet, Rfl: 3    ergocalciferol (ERGOCALCIFEROL) 50,000 unit Cap, TAKE 1 CAPSULE EVERY 7 DAYS., Disp: 12 capsule, Rfl: 1    esomeprazole (NEXIUM) 40 MG capsule, Take 1 capsule (40 mg total) by mouth before breakfast., Disp: 90 capsule, Rfl: 3    gabapentin (NEURONTIN) 100 MG capsule, TAKE ONE CAPSULE BY MOUTH 3 TIMES A DAY, Disp: 90 capsule, Rfl: 0    hydroquinone 4 % Crea, Use hs on dark spots, Disp: 28.35 g, Rfl: 3    insulin aspart U-100 (NOVOLOG FLEXPEN U-100 INSULIN) 100 unit/mL (3 mL) InPn pen, Inject 12 units w/ meals plus scale 150-200+2, 201-250+4, 251-300+6, 301-350+8, >350+10. Max daily 50 units., Disp: 15 mL, Rfl: 6    insulin detemir U-100 (LEVEMIR FLEXTOUCH U-100  "INSULN) 100 unit/mL (3 mL) InPn pen, Inject 45 Units into the skin every evening., Disp: 45 mL, Rfl: 3    insulin syringe-needle U-100 0.5 mL 31 gauge x 5/16" Syrg, Use nightly. 90 day, Disp: 100 each, Rfl: 3    lancets (ACCU-CHEK SOFTCLIX LANCETS) Misc, Uses in lancing device 3 times a day., Disp: 300 each, Rfl: 3    lidocaine HCL 2% (XYLOCAINE) 2 % jelly, Apply topically once daily., Disp: 30 mL, Rfl: 2    losartan (COZAAR) 50 MG tablet, TAKE 1 TABLET EVERY DAY, Disp: 90 tablet, Rfl: 3    magnesium oxide (MAG-OX) 400 mg tablet, Take 1 tablet (400 mg total) by mouth 2 (two) times daily., Disp: 180 tablet, Rfl: 3    pen needle, diabetic 31 gauge x 3/16" Ndle, Uses 4 x a day., Disp: 400 each, Rfl: 3    polyethylene glycol (GLYCOLAX) 17 gram PwPk, Take 17 g by mouth once daily., Disp: 30 each, Rfl: 0    pravastatin (PRAVACHOL) 10 MG tablet, TAKE 1 TABLET EVERY DAY, Disp: 90 tablet, Rfl: 3    traZODone (DESYREL) 50 MG tablet, TAKE 1 TABLET EVERY NIGHT AS NEEDED, Disp: 90 tablet, Rfl: 3    varicella-zoster gE-AS01B, PF, (SHINGRIX) 50 mcg/0.5 mL injection, Inject into the muscle., Disp: 0.5 mL, Rfl: 1    cetirizine (ZYRTEC) 10 MG tablet, Take 1 tablet (10 mg total) by mouth every evening., Disp: 90 tablet, Rfl: 0    dulaglutide (TRULICITY) 0.75 mg/0.5 mL pen injector, Inject 0.75 mg into the skin every 7 days., Disp: 12 pen, Rfl: 3     Review of Systems:  Review of Systems   Constitutional: Negative for chills and fever.   HENT: Negative for nosebleeds.    Eyes: Positive for blurred vision. Negative for pain.   Respiratory: Negative for hemoptysis.    Cardiovascular: Negative for chest pain.   Gastrointestinal: Negative for nausea and vomiting.   Genitourinary: Negative for dysuria.   Musculoskeletal: Positive for neck pain. Negative for falls.   Skin: Negative for rash.   Neurological: Negative for focal weakness.   Endo/Heme/Allergies: Bruises/bleeds easily.   Psychiatric/Behavioral: The patient is " nervous/anxious.        Allergies:  Prednisone; Latex; Ace inhibitors; and Latex, natural rubber     Medical History:  Past Medical History:   Diagnosis Date    Allergy     Cataract     DDD (degenerative disc disease), lumbar     Diabetes mellitus     diet controlled    Diabetes mellitus, type 2     GERD (gastroesophageal reflux disease)     History of subconjunctival hemorrhage 11    left eye    Hyperlipidemia     Hypertension     IBS (irritable bowel syndrome)     Obesity     MYRIAM (obstructive sleep apnea)     on CPAP    Rotator cuff impingement syndrome     Seasonal allergic conjunctivitis         Surgical History:  Past Surgical History:   Procedure Laterality Date    CATARACT EXTRACTION W/  INTRAOCULAR LENS IMPLANT  10/3/13    OD (dr. gardner)     SECTION      x2    CHOLECYSTECTOMY      COLONOSCOPY N/A 2018    Procedure: COLONOSCOPY;  Surgeon: Marie Mejia MD;  Location: [facility] ENDO;  Service: Endoscopy;  Laterality: N/A;    ENDOSCOPIC ULTRASOUND OF UPPER GASTROINTESTINAL TRACT N/A 10/9/2018    Procedure: ULTRASOUND, UPPER GI TRACT, ENDOSCOPIC;  Surgeon: Javy Simpson MD;  Location: [facility] ENDO;  Service: Endoscopy;  Laterality: N/A;    EXCISION OF LESION N/A 2019    Procedure: EXCISION, LESION VULVA;  Surgeon: Liv Odell MD;  Location: [facility] OR;  Service: OB/GYN;  Laterality: N/A;  latex allergy    EYE SURGERY      HYSTERECTOMY      ovaries intact, due to DUB    TUBAL LIGATION          Social History:  Social History     Socioeconomic History    Marital status:      Spouse name: Not on file    Number of children: Not on file    Years of education: Not on file    Highest education level: Not on file   Occupational History    Not on file   Social Needs    Financial resource strain: Not on file    Food insecurity     Worry: Not on file     Inability: Not on file    Transportation needs     Medical: No     Non-medical: No   Tobacco Use    Smoking  status: Former Smoker     Quit date: 2/15/1983     Years since quittin.5    Smokeless tobacco: Never Used   Substance and Sexual Activity    Alcohol use: No     Frequency: Never     Binge frequency: Never    Drug use: No    Sexual activity: Yes     Partners: Male   Lifestyle    Physical activity     Days per week: 3 days     Minutes per session: 30 min    Stress: To some extent   Relationships    Social connections     Talks on phone: Three times a week     Gets together: Not on file     Attends L[employer] day service: Not on file     Active member of club or organization: No     Attends meetings of clubs or organizations: Not on file     Relationship status:    Other Topics Concern    Are you pregnant or think you may be? Not Asked    Breast-feeding Not Asked   Social History Narrative    Not on file       Physical Exam:  Vitals:    20 0910   BP: 130/78   Pulse: 64   Weight: 92 kg (202 lb 13.2 oz)   PainSc:   3     General    Nursing note and vitals reviewed.  Constitutional: She is oriented to person, place, and time. She appears well-developed and well-nourished. No distress.   HENT:   Head: Normocephalic and atraumatic.   Nose: Nose normal.   Eyes: Conjunctivae and EOM are normal. Pupils are equal, round, and reactive to light. Right eye exhibits no discharge. Left eye exhibits no discharge. No scleral icterus.   Neck: No JVD present.   Cardiovascular: Intact distal pulses.    Pulmonary/Chest: Effort normal. No respiratory distress.   Abdominal: She exhibits no distension.   Neurological: She is alert and oriented to person, place, and time. Coordination normal.   Psychiatric: She has a normal mood and affect. Her behavior is normal. Judgment and thought content normal.     General Musculoskeletal Exam   Gait: antalgic       Right Knee Exam     Tenderness   The patient is tender to palpation of the medial joint line.    Crepitus   The patient has crepitus of the patella.    Range of Motion    Extension: normal   Flexion: normal     Left Knee Exam     Tenderness   The patient tender to palpation of the lateral joint line.    Crepitus   The patient has crepitus of the patella.    Range of Motion   Extension: normal   Flexion: normal Left Hip Exam     Comments:  Left ischial bursa tenderness.  Partial reproduction of pain with hip flexion and piriformis-type stretches.            Imagin20 X-Ray Knee 3 View Bilateral     Narrative & Impression     EXAMINATION:  XR KNEE 3 VIEW BILATERAL     CLINICAL HISTORY:  Pain in right knee     TECHNIQUE:  AP, lateral, and Merchant views of both knees were performed.     COMPARISON:  Right radiograph dated 02/10/2017     FINDINGS:  Tricompartmental DJD with moderate medial femorotibial joint space narrowing bilaterally.  No acute fracture, dislocation, or osseous destruction.  Bilateral patellar enthesopathic change, more so on the right.     Impression:     As above.        Electronically signed by: Oracio Green  Date:                                            2020  Time:                                           11:20         Last Resulted: 20 11:20   Order Details View Encounter Lab and Collection Details Routing Result History            XR HIPS BILATERAL 2 VIEW INCL AP PELVIS    CLINICAL HISTORY:  Pain in right hip    TECHNIQUE:  AP view of the pelvis and frogleg lateral views of both hips were performed.    COMPARISON:  None.    FINDINGS:  No fracture or dislocation.  Moderate bilateral hip cartilage space loss with osteophyte production.  Degenerative changes are seen in the lower lumbar spine, as well as the sacroiliac joints and pubic symphysis.      2/3/18 MRI Cervical Spine Without Contrast    Narrative     Comparison: Cervical spine series 16    Technique: Multiplanar and multi-sequence MR images were obtained through the cervical spine without intravenous contrast.    Results: Patient artifact slightly degrades some sequences.  The cervical vertebral bodies demonstrate adequate alignment.  The vertebral body heights are well-maintained.  No displaced fracture, dislocation or significant listhesis.  Probable small hemangioma at the inferior aspect of C7 vertebral body. No bone marrow infiltrative process.  No prevertebral soft tissue swelling or paraspinal hematoma.  The spinal cord is normal in morphology and signal.  The craniocervical junction is without significant abnormality.    Mild degenerative disc disease with slight loss of disc height and anterior marginal osteophytes with endplate changes at C5-6 level. Multilevel vertebral and facet arthrosis. Mild to moderate degenerative change at the atlantodental interval.    C2-3: Posterior disc osteophyte complex resulting in borderline acquired canal stenosis. No significant neuroforaminal narrowing.  C3-4: Mild left neuroforaminal narrowing. No significant spinal canal stenosis or right neuroforaminal narrowing.  C4-5: Minimal bilateral neuroforaminal narrowing. No significant spinal canal stenosis.  C5-6: Posterior disc osteophyte complex resulting in mild acquired canal stenosis without associated cord abutment. Minimal bilateral neuroforaminal narrowing.  C6-7: No significant spinal canal stenosis or neuroforaminal narrowing.  C7-T1: No significant spinal canal stenosis or neuroforaminal narrowing.    The soft tissue structures incidentally surveyed demonstrate no significant abnormality.   Impression          1. No cervical spine acute abnormality identified.    2. Mild cervical spondylosis most prominent at C5-6, as above.      Electronically signed by: WILLIAMS FULTON MD, MD  Date: 02/03/18  Time: 15:51     Encounter     View Encounter          Signed by     Signed Credentials Date/Time  Phone Pager   WILLIAMS FULTON MD 2/03/2018 15:51 [phone]   Reviewed By     Gabriel Wilson MD on 2/5/2018 09:42   Exam Details     Performed Procedure Technologist  Supporting Staff Performing Physician   MRI Cervical Spine Without Contrast Keri Aviles, RT        Appointment Date/Status Modality Department    2/3/2018     Completed [facility] MRI1 [facility] MRI       Begin Exam End Exam Begin Exam Questionnaires End Exam Questionnaires   2/3/2018 10:35 AM 2/3/2018 11:08 AM MRI TECH NAVIGATOR QUESTIONS IMAGING END ALL      Reason For Exam   Priority: Routine   Dx: Neck pain of over 3 months duration [M54.2, G89.29 (ICD-10-CM)]   Order Report      Order Details         X-Ray Cervical Spine AP And Lateral  2 views: Alignment is normal.  Odontoid is intact as are the prevertebral soft tissues and posterior elements.  No fracture dislocation bone destruction seen.  There is mild DJD.   Impression      Mild DJD.      Electronically signed by: LETTY WHITE  Date: 12/27/16  Time: 12:26          X-Ray Shoulder Trauma 3 view Right  Comparison: 4/20/16.    Findings: Internal rotation, Y view and axillary views of the right shoulder demonstrate osteophyte formation and loss of joint space at the acromioclavicular joint.  Otherwise the osseous structures, soft tissues and joint spaces are normal.  Specifically no fracture or dislocation.  The visualized right upper lobe is normal.   Impression      Acromioclavicular osteoarthritis otherwise normal exam  ______________________________________     Electronically signed by: ANGEL GARCIA MD  Date: 07/07/16  Time: 10:58          MRI Upper Extremity Joint Without Contraast Right  TECHNIQUE: MRI of right shoulder was performed on a 1.5T magnet utilizing the following sequences: Localizer; axial T2 FS; coronal T2 FS and PD FS; sagittal T1, T2 FS and PD FS.    COMPARISON: Right shoulder radiograph 4/20/16.    FINDINGS:    Rotator cuff: There is thickening and increased signal of the insertional fibers of the supraspinatus consistent with tendinosis with partial thickness undersurface tear.  There is small full-thickness component involving the anterior  fibers measuring approximately 8 x 6 mm.  Mild infraspinatus tendinosis with undersurface fraying.  Teres minor tendon is intact.  There is mild tendinosis with interstitial tear involving the insertional fibers of the subscapularis.  Muscle bulk of the rotator cuff is within normal limits.     Labrum: Global degeneration of the glenoid labrum.      Biceps: Thickening and increased signal within the intra-articular portion of the long head biceps tendon consistent with tendinosis.    Bone: There is no fracture.  Bone marrow signal is unremarkable.    Acromioclavicular joint: Severe degenerative changes are seen at the acromioclavicular joint with osseous spurring, edema, and subchondral cystic change.    Cartilage: Articular cartilage of the glenohumeral joint is preserved.    Miscellaneous: Small joint effusion with increased fluid seen in the subscapularis recess.  There is increased fluid seen within the subacromial/subdeltoid bursa.   Impression       1.  Supraspinatus tendinosis with partial thickness articular surface tear and small full-thickness component involving the anterior fibers.    2.  Mild subscapularis and infraspinatus tendinosis.    3.  Severe AC joint arthrosis.    4.  Long head biceps tendinosis.    5.  Joint effusion and moderate subacromial/subdeltoid bursitis.  ______________________________________     Electronically signed by resident: Hosea Costello MD  Date: 06/16/16  Time: 16:29     ______________________________________     Electronically signed by: MADISON WINTERS MD  Date: 06/17/16  Time: 08:39          Labs:  BMP  Lab Results   Component Value Date     04/24/2020    K 3.7 04/24/2020     04/24/2020    CO2 29 04/24/2020    BUN 11 04/24/2020    CREATININE 1.0 04/24/2020    CALCIUM 9.5 04/24/2020    ANIONGAP 8 04/24/2020    ESTGFRAFRICA >60.0 04/24/2020    EGFRNONAA 56.8 (A) 04/24/2020     Lab Results   Component Value Date    ALT 14 01/02/2020    AST 17 01/02/2020    GGT 30  10/27/2004    ALKPHOS 109 01/02/2020    BILITOT 0.5 01/02/2020       Assessment:  Problem List Items Addressed This Visit     None        Chelly is a 67-year-old female with chronic neck pain exacerbated by increased physical activity 3 months ago.  Her exam and imaging are consistent with facet arthropathy and myofascial dysfunction.  I recommended that we start with physical therapy to eliminate as much pain as possible related to myofascial dysfunction then move on to interventional procedures should a significant amount of pain persist.  I also recommended that she complete the MRI ordered by her primary care physician and start a trial of piroxicam.  She also states significant insomnia not related to her chronic neck pain and I have recommended that she try tizanidine 4-8 mg nightly as a muscle relaxant and sleep aid.    4/9/18 - she completed PT with improved pain after each session.  Pain remains most bothersome at night which is common for muscle issues.  She was encouraged to perform neck stretching 3 times daily and especially at night to minimize pain at night.    2/5/2020 - there is tenderness in the left buttock consistent with ischial bursitis.  There may be a component of tendonitis as well; however, the treatment would be the same.  I have recommended an ischial bursa injection under fluoroscopic guidance and patient is in agreement.  She was warned that her blood glucose must be controlled and less than 180 g/dL to receive a steroid injection, otherwise we would need to cancel the injection.  Patient was also advised to begin stretching on a daily basis. I demonstrated 3 separate stretches that would target the painful area.  I have encouraged her to walk on a daily basis for exercise as she currently lives a relatively sedentary lifestyle.    08/13/[id number]-ykze-old female presents with bilateral knee pain consistent with osteoarthritis, there may be a component of tendonitis as well. I will order  bilateral knee x-rays today for further evaluation and consider bilateral knee injections with steroids in the future pt  Is a diabetic so I educated her on how steroids can increase BS so monitoring  And staying at the appropriate level is vital. I will also recommend returning to PT for hari knee pain and  ischial bursitis she was previously attending physical therapy prior to the COVID-19 pandemic for ischial bursitis this was providing her with relief she reports that she only had 2 sessions during that time.   Lastly I did discuss with patient her most recent labs  C reactive protein, and sedimentation rate results and that they were both abnormal and elevated I recommended she follow-up with her primary care doctor to discussed these results.      8/18/2020- 70 y/o female presents with a hx of chronic hari knee pain secondary to osteoarthritis , and left sided low back pain secondary to ischia bursitis. Upon review of her recent bilateral knee x-rays they show tricompartmental DJD with moderate medial femorotibial joint space narrowing bilaterally. I recommended hari IA knee injections in the future, considering her pain is stable today I will hold off her pain score is 3/10. Explained recent lab results and recommended  that she will need to f/u with her PCP.  Also recommended patient follow up with physical therapy.      Treatment Plan:   PT/OT/HEP:  Return to PT for bilateral knee pain and  Ischial bursitis, the patient understands that her issues is primarily muscle related.  Procedures:  Consider in the future hari IA knee injections in office and left ischial bursa injection  Medications:    - continue medications as they were previously prescribed  Imaging:   Previous imaging was reviewed and discussed with the patient today.     Follow Up: RTC in 8 weeks or before if needed.     Tani Pittman NP-C  [facility]      Disclaimer: This note was partly generated using dictation software which  may occasionally result in transcription errors.

## 2020-08-20 ENCOUNTER — TELEPHONE (OUTPATIENT)
Dept: PODIATRY | Facility: CLINIC | Age: 70
End: 2020-08-20

## 2020-08-20 NOTE — TELEPHONE ENCOUNTER
Nurse called pt regarding message no answer LVM      ----- Message from Lyric Gutierrez sent at 8/20/2020  2:26 PM CDT -----  Regarding: ADVISE  Contact: Self  DR SCHUSTER      Pt ask for a call have questions      Contact info [phone] (home)

## 2020-08-21 ENCOUNTER — TELEPHONE (OUTPATIENT)
Dept: PODIATRY | Facility: CLINIC | Age: 70
End: 2020-08-21

## 2020-08-21 NOTE — TELEPHONE ENCOUNTER
Nurse called pt x2 no answer LVM advising to go to ED if pt feels it is urgent or emergency and can not wait until next week.        ----- Message from Valentin Porter sent at 8/21/2020  1:49 PM CDT -----  Contact: pt  Please call pt at [phone]    Patient is having left heel pain and tingling     Need to discuss with clinic staff    2nd call request     Thank you

## 2020-08-22 NOTE — PROGRESS NOTES
Not sure if patient symptoms improved  Signs of inflammation on labs   CT ? Diverticulitis ?    I not sure who was covering but doesn't look like CT was addressed?

## 2020-08-23 ENCOUNTER — TELEPHONE (OUTPATIENT)
Dept: FAMILY MEDICINE | Facility: CLINIC | Age: 70
End: 2020-08-23

## 2020-08-23 DIAGNOSIS — K57.92 DIVERTICULITIS: Primary | ICD-10-CM

## 2020-08-23 RX ORDER — METRONIDAZOLE 500 MG/1
500 TABLET ORAL 3 TIMES DAILY
Qty: 30 TABLET | Refills: 0 | Status: SHIPPED | OUTPATIENT
Start: 2020-08-23 | End: 2020-09-03

## 2020-08-24 ENCOUNTER — PATIENT MESSAGE (OUTPATIENT)
Dept: PODIATRY | Facility: CLINIC | Age: 70
End: 2020-08-24

## 2020-08-24 NOTE — TELEPHONE ENCOUNTER
Patient with possible diverticulitis .    I ordered antibiotics for 10 days.    Inflammation in the blood work probably secondary to the infection.    I ordered follow-up with the gastroenterologist .    Please notify the patient.

## 2020-08-27 DIAGNOSIS — Z79.4 TYPE 2 DIABETES MELLITUS WITH HYPERGLYCEMIA, WITH LONG-TERM CURRENT USE OF INSULIN: ICD-10-CM

## 2020-08-27 DIAGNOSIS — E11.65 TYPE 2 DIABETES MELLITUS WITH HYPERGLYCEMIA, WITH LONG-TERM CURRENT USE OF INSULIN: ICD-10-CM

## 2020-08-27 RX ORDER — PEN NEEDLE, DIABETIC 30 GX3/16"
NEEDLE, DISPOSABLE MISCELLANEOUS
Qty: 400 EACH | Refills: 3 | Status: SHIPPED | OUTPATIENT
Start: 2020-08-27 | End: 2022-11-14 | Stop reason: SDUPTHER

## 2020-08-27 RX ORDER — BLOOD GLUCOSE CONTROL HIGH,LOW
EACH MISCELLANEOUS
Qty: 1 EACH | Refills: 3 | Status: SHIPPED | OUTPATIENT
Start: 2020-08-27 | End: 2022-09-06

## 2020-08-31 ENCOUNTER — TELEPHONE (OUTPATIENT)
Dept: PODIATRY | Facility: CLINIC | Age: 70
End: 2020-08-31

## 2020-09-01 ENCOUNTER — CLINICAL SUPPORT (OUTPATIENT)
Dept: REHABILITATION | Facility: HOSPITAL | Age: 70
End: 2020-09-01
Payer: MEDICARE

## 2020-09-01 DIAGNOSIS — M70.72 ISCHIAL BURSITIS OF LEFT SIDE: Primary | ICD-10-CM

## 2020-09-01 DIAGNOSIS — M25.561 BILATERAL CHRONIC KNEE PAIN: ICD-10-CM

## 2020-09-01 DIAGNOSIS — G89.29 BILATERAL CHRONIC KNEE PAIN: ICD-10-CM

## 2020-09-01 DIAGNOSIS — M54.50 CHRONIC BILATERAL LOW BACK PAIN WITHOUT SCIATICA: ICD-10-CM

## 2020-09-01 DIAGNOSIS — G89.29 CHRONIC BILATERAL LOW BACK PAIN WITHOUT SCIATICA: ICD-10-CM

## 2020-09-01 DIAGNOSIS — M25.562 BILATERAL CHRONIC KNEE PAIN: ICD-10-CM

## 2020-09-01 PROCEDURE — 97110 THERAPEUTIC EXERCISES: CPT | Mod: HCNC,PN,CQ

## 2020-09-01 NOTE — PROGRESS NOTES
"  Physical Therapy Treatment Note     Name: Chelly Ortiz  Clinic Number: [id number]    Therapy Diagnosis:   Encounter Diagnoses   Name Primary?    Ischial bursitis of left side Yes    Chronic bilateral low back pain without sciatica     Bilateral chronic knee pain      Physician: Tani Pittman, FNP    Visit Date: 9/1/2020    Physician Orders: PT Eval and Treat  Medical Diagnosis from Referral:  M70.72 (ICD-10-CM) - Ischial bursitis of left side   M54.5,G89.29 (ICD-10-CM) - Chronic bilateral low back pain without sciatica   M25.561,M25.562,G89.29 (ICD-10-CM) - Bilateral chronic knee pain      Evaluation Date: 8/14/2020  Authorization Period Expiration: 12/31/2020  Plan of Care Expiration: 10/9/2020  Visit # / Visits authorized: 1/24  FOTO: 2/5  PTA Visit: 1/6    Time In: 2:45 pm  Time Out: 3:25 pm  Total Billable Time: 40 minutes TE-3    Precautions: Standard and DM, GERD, HTN, HLD, IBS    Subjective     Pt reports: her back pain and knee pain is not too bad until she starts moving around.  She was compliant with home exercise program.  Response to previous treatment: first visit after eval.  Functional change: not at this time.    Pain: 4/10  Location: bilateral back and left knee.     Objective     Mrs. Ortiz received therapeutic exercises to develop strength, ROM and flexibility for 40 minutes including:    Quad sets  15x5"  SLR   1x10  Hip adduction 10x5"  Hip abduction 15x RTB  LTR   15x  TA's   10x5"  Bridge w/ TA  10x  March w/ TA  10x  SAQ   10x      Home Exercises Provided and Patient Education Provided     Education provided:   - keep all exercises in a pain free range.    Written Home Exercises Provided: yes.  Exercises were reviewed and Mrs. Ortiz was able to demonstrate them prior to the end of the session.  Mrs. Ortiz demonstrated good  understanding of the education provided.     See EMR under Patient Instructions for exercises provided 9/1/2020.    Assessment     Patient exercises were limited " due to complaints of increased pain when performing each exercise.  Mrs. Ortiz is progressing well towards her goals.   Pt prognosis is Good.     Pt will continue to benefit from skilled outpatient physical therapy to address the deficits listed in the problem list box on initial evaluation, provide pt/family education and to maximize pt's level of independence in the home and community environment.     Pt's spiritual, cultural and educational needs considered and pt agreeable to plan of care and goals.     Anticipated barriers to physical therapy: chronicity of low back pain, scoliosis    GOALS: Short Term Goals: 4 weeks  1. Pt will demo good TA muscle contraction for improved deep abdominal strength and lumbar stability.  2. Increase B knee AROM to 2-115 degrees in order to improve functional mobility.    3. Pt will demo good sitting/standing posture and body mechanics for improved spine health and decreased risk of future injury.  4. Pt to tolerate HEP to improve ROM and independence with ADL's.     Long Term Goals: 8 weeks  1. Report decreased low back pain and B knee pain to </= 2/10 for standing and walking activities to increase tolerance for ADLs and increased QoL.  2. Increase strength to >/= 4/5 MMT grade for core and BLE to increase tolerance for ADL and work activities.  3. Pt will increased B knee ROM to 0-120 degrees for increased functional mobility and decreased pain.  4. Patient's goal: to be able to walk  5. Pt will report at </= 39% impaired on FOTO lumbar score for low back pain disability to demonstrate decrease in disability and improvement in back pain.    Plan     Continue with Plan Of Care and progress toward PT goals.    Edy Berman, PTA

## 2020-09-01 NOTE — PATIENT INSTRUCTIONS
Isometric Abdominal        Lying on back with knees bent, tighten stomach by pressing elbows down. Hold 5 seconds.  Repeat 10 times per set. Do 1 sets per session. Do 2 sessions per day.     [email or web address]     Copyright © [employer]. All rights reserved.         Bridging    Flatten back against floor then slowly raise buttocks from floor, keeping stomach tight. Hold 1 seconds.  Repeat 10 times per set. Do 1 sets per session. Do 2 sessions per day.      Bent Leg Lift (Hook-Lying)    Tighten stomach and slowly raise right leg from floor. Keep trunk rigid. Slowly lower and switch sides.  Repeat 10 times per set. Do 1 sets per session. Do 2 sessions per day.      Lumbar Rotation    Feet on floor, slowly rock knees from side to side in small, pain-free range of motion. Allow lower back to rotate slightly, but keep shoulders flat on ground. Hold 2 seconds.  Repeat 10 times per set. Do 1 sets per session. Do 2 sessions per day.          Quad Set        With other leg bent, foot flat,place a towel roll under your knee and press the back of your knee into the towel and slowly tighten muscles on thigh of straight leg while counting out loud to 5. Repeat with other leg.  Repeat 10 times. Do 2 sessions per day.        Strengthening: Straight Leg Raise (Phase 1)        Tighten muscles on front of right thigh, then lift leg from surface, keeping knee locked.   Repeat 10 times per set. Do 1 sets per session. Do 2 sessions per day.     [email or web address]     Copyright © [employer]. All rights reserved.       Strengthening: Hip Adduction - Isometric    Sit back or lie down with ball or folded pillow between knees, and squeeze knees together. Hold 3 seconds.  Repeat 10 times per set. Do 1 sets per session. Do 2 sessions per day.      Strengthening: Hip Abductor - Resisted    Lie flat with band looped around both legs above knees, and push thighs apart, ensuring right and left move together.  Repeat 10 times per set. Do 1 sets  per session. Do 2 sessions per day.        Short Arc Quad        Place a large can or rolled towel under left leg. Straighten leg. Hold 1 seconds.  Repeat 10 times. Do 2 sessions per day.     [email or web address]

## 2020-09-02 ENCOUNTER — TELEPHONE (OUTPATIENT)
Dept: SLEEP MEDICINE | Facility: CLINIC | Age: 70
End: 2020-09-02

## 2020-09-02 ENCOUNTER — TELEPHONE (OUTPATIENT)
Dept: INTERNAL MEDICINE | Facility: CLINIC | Age: 70
End: 2020-09-02

## 2020-09-02 ENCOUNTER — OFFICE VISIT (OUTPATIENT)
Dept: OPHTHALMOLOGY | Facility: CLINIC | Age: 70
End: 2020-09-02
Payer: MEDICARE

## 2020-09-02 DIAGNOSIS — Z96.1 PSEUDOPHAKIA: ICD-10-CM

## 2020-09-02 DIAGNOSIS — E11.59 HYPERTENSION ASSOCIATED WITH DIABETES: ICD-10-CM

## 2020-09-02 DIAGNOSIS — I15.2 HYPERTENSION ASSOCIATED WITH DIABETES: ICD-10-CM

## 2020-09-02 DIAGNOSIS — E11.9 DM TYPE 2 WITHOUT RETINOPATHY: ICD-10-CM

## 2020-09-02 DIAGNOSIS — H52.7 REFRACTIVE ERROR: ICD-10-CM

## 2020-09-02 DIAGNOSIS — H25.12 NUCLEAR SCLEROTIC CATARACT OF LEFT EYE: Primary | ICD-10-CM

## 2020-09-02 PROCEDURE — 92014 PR EYE EXAM, EST PATIENT,COMPREHESV: ICD-10-PCS | Mod: HCNC,S$GLB,, | Performed by: OPHTHALMOLOGY

## 2020-09-02 PROCEDURE — 92014 COMPRE OPH EXAM EST PT 1/>: CPT | Mod: HCNC,S$GLB,, | Performed by: OPHTHALMOLOGY

## 2020-09-02 PROCEDURE — 99999 PR PBB SHADOW E&M-EST. PATIENT-LVL III: CPT | Mod: PBBFAC,HCNC,, | Performed by: OPHTHALMOLOGY

## 2020-09-02 PROCEDURE — 99999 PR PBB SHADOW E&M-EST. PATIENT-LVL III: ICD-10-PCS | Mod: PBBFAC,HCNC,, | Performed by: OPHTHALMOLOGY

## 2020-09-02 NOTE — TELEPHONE ENCOUNTER
----- Message from Corinne Lucas sent at 9/2/2020 11:48 AM CDT -----  Contact: Neville [phone]--  Needs Advice    Reason for call:--paperwork--        Communication Preference:--Chelly--[phone]--    Additional Information:Pt calling to speak with the nurse to check the status of the paper work for medical information. (No other information was given) Please call to advise.

## 2020-09-02 NOTE — LETTER
September 2, 2020      Agusto Holden, OD  [address] [facility]  [place] [address]           [place] [facility]  [address]  [place] [address]  Phone: [phone]  Fax: [phone]          Patient: Chelly Ortiz   MR Number: [id number]   YOB: 1950   Date of Visit: 9/2/2020       Dear Dr. Agusto Holden:    Thank you for referring Chelly Ortiz to me for evaluation. Attached you will find relevant portions of my assessment and plan of care.    If you have questions, please do not hesitate to call me. I look forward to following Chelly Ortiz along with you.    Sincerely,    Agusto Stafford MD    Enclosure  CC:  No Recipients    If you would like to receive this communication electronically, please contact [email or web address] or [phone] to request more information on [employer] Link access.    For providers and/or their staff who would like to refer a patient to [facility], please contact us through our one-stop-shop provider referral line, [facility], at [phone].    If you feel you have received this communication in error or would no longer like to receive these types of communications, please e-mail [email or web address]

## 2020-09-02 NOTE — PROGRESS NOTES
Subjective:       Patient ID: Chelly Ortiz is a 69 y.o. female.    Chief Complaint: Cataract    HPI     Refereed:     68 y/o female is here for Cataract evaluation of the LT eye. H/o of   Nuclear Sclerotic, LT eye. Pt reports a decreased in vision. BSL was 177 X   2 days ago. Pt c/o of eye allergies. Denies floaters and flashes. PT DOES   NOT WANT TO GO TO [facility] for SX- she only want MA[facility].     Eyemeds   [employer] OU PRN    Last edited by Santa Roberts on 9/2/2020  3:11 PM. (History)             Assessment:       1. Nuclear sclerotic cataract of left eye    2. DM type 2 without retinopathy    3. Hypertension associated with diabetes    4. Refractive error    5. Pseudophakia        Plan:       Visually significant cataract OS -Pt wants to wait on sx.     DM-No NPDR OU.  HTN-No retinopathy OU.  RE      Control DM & HTN.  RTC 1/2021 to schedule CE OS.

## 2020-09-02 NOTE — TELEPHONE ENCOUNTER
----- Message from Shawnee Schuler sent at 9/1/2020  5:17 PM CDT -----  Contact: pt, [phone] (H)  Patient requests to speak with you about coming in and getting a nose mask from you. States she can't get through to anyone at the [phone] number that calls her to let her know she needs to order more. Please advise.

## 2020-09-03 ENCOUNTER — PATIENT MESSAGE (OUTPATIENT)
Dept: INTERNAL MEDICINE | Facility: CLINIC | Age: 70
End: 2020-09-03

## 2020-09-03 RX ORDER — INSULIN DETEMIR 100 [IU]/ML
INJECTION, SOLUTION SUBCUTANEOUS
Qty: 15 ML | Refills: 1 | Status: SHIPPED | OUTPATIENT
Start: 2020-09-03 | End: 2020-12-09 | Stop reason: SDUPTHER

## 2020-09-05 ENCOUNTER — OFFICE VISIT (OUTPATIENT)
Dept: URGENT CARE | Facility: CLINIC | Age: 70
End: 2020-09-05
Payer: MEDICARE

## 2020-09-05 VITALS
SYSTOLIC BLOOD PRESSURE: 143 MMHG | OXYGEN SATURATION: 95 % | WEIGHT: 202 LBS | RESPIRATION RATE: 18 BRPM | DIASTOLIC BLOOD PRESSURE: 83 MMHG | HEART RATE: 92 BPM | BODY MASS INDEX: 37.17 KG/M2 | TEMPERATURE: 98 F | HEIGHT: 62 IN

## 2020-09-05 DIAGNOSIS — J30.2 SEASONAL ALLERGIES: Primary | ICD-10-CM

## 2020-09-05 PROCEDURE — 99214 OFFICE O/P EST MOD 30 MIN: CPT | Mod: S$GLB,,, | Performed by: FAMILY MEDICINE

## 2020-09-05 PROCEDURE — 99214 PR OFFICE/OUTPT VISIT, EST, LEVL IV, 30-39 MIN: ICD-10-PCS | Mod: S$GLB,,, | Performed by: FAMILY MEDICINE

## 2020-09-05 PROCEDURE — 71046 X-RAY EXAM CHEST 2 VIEWS: CPT | Mod: S$GLB,,, | Performed by: RADIOLOGY

## 2020-09-05 PROCEDURE — 71046 XR CHEST PA AND LATERAL: ICD-10-PCS | Mod: S$GLB,,, | Performed by: RADIOLOGY

## 2020-09-05 RX ORDER — PROMETHAZINE HYDROCHLORIDE AND DEXTROMETHORPHAN HYDROBROMIDE 6.25; 15 MG/5ML; MG/5ML
5 SYRUP ORAL NIGHTLY PRN
Qty: 118 ML | Refills: 0 | Status: SHIPPED | OUTPATIENT
Start: 2020-09-05 | End: 2020-09-15

## 2020-09-05 RX ORDER — BENZONATATE 100 MG/1
100 CAPSULE ORAL EVERY 6 HOURS PRN
Qty: 30 CAPSULE | Refills: 1 | Status: SHIPPED | OUTPATIENT
Start: 2020-09-05 | End: 2021-01-14

## 2020-09-05 NOTE — PATIENT INSTRUCTIONS
Seasonal Allergy  Seasonal allergy is also called hay fever. It may occur after a person is exposed to pollens released from grasses, weeds, trees and shrubs. This type of allergy occurs during the spring and summer when the pollen contacts the lining of the nose, eyes, eyelids, sinuses and throat. This causes histamine to be released from the tissues. Histamine causes itching and swelling. This may produce a watery discharge from the eyes or nose. Violent sneezing, nasal congestion, post-nasal drip, itching of the eyes, nose, throat and mouth, scratchy throat, and dry cough may also occur.  Home care  Seasonal allergy cannot be cured, but symptoms can be reduced by these measures:  · Avoid or reduce exposure to the allergen as much as you can:    ¨ Stay indoors on windy days of pollen season.   ¨ Keep windows and doors closed. Use air conditioning instead in your home and car. This filters the air.  ¨ Change air conditioner filters often.  ¨ Take a shower, wash your hair, and change clothes after being outdoors.  ¨ Put on a NIOSH-rated 95 filter mask when working outdoors. Before going outside, take your allergy medicine as advised by your healthcare provider.  · Decongestant pills and sprays reduce tissue swelling and watery discharge. Overuse of nasal decongestant sprays may make symptoms worse. Do not use these more often than recommended. Sometimes you can experience a rebound effect (symptoms worsen), when stopping them. Talk to your healthcare provider or pharmacist about these medicines before taking them, especially if you have high blood pressure or heart problems.   · Antihistamines block the release of histamine during the allergic response. They work better when taken before symptoms develop. Unless a prescription antihistamine was prescribed, you can take over-the-counter antihistamines that do not cause drowsiness.  Ask your pharmacist for suggestions.  · Steroid nasal sprays or oral steroids may  also be prescribed for more severe symptoms. These help to reduce the local inflammation that can add to the allergic response.  · If you have asthma, pollen season may make your asthma symptoms worse. It is important that you use your asthma medicines as directed during this time to prevent or treat attacks. Some persons with asthma have asthma symptoms that get worse when they take antihistamines. This is due to the drying effect on the lungs. If you notice this, stop the antihistamines, drink extra fluids and notify your doctor.  · If you have sinus congestion or drainage, a saline nasal rinse may give relief. A saline nasal rinse lessens the swelling and clears excess mucus. This allows sinuses to drain. Prepackaged kits are sold at most drug stores. These contain pre-mixed salt packets and an irrigation device.  Follow-up care  Follow up with your healthcare provider or as directed. If you have been referred to a specialist, make an appointment promptly.  When to seek medical advice  Call your healthcare provider for any of the following:  · Facial, ear or sinus pain; colored drainage from the nose  · Headaches  · You have asthma and your asthma symptoms do not respond to the usual doses of your medicine  · Cough with colored sputum (mucus)  · Fever of 100.4°F (38°C) or higher, or as directed by the healthcare provider  Call 911 if any of these occur:  · Trouble breathing or swallowing, wheezing  · Hoarse voice, trouble speaking, or drooling  · Confusion  · Very drowsy or trouble awakening  · Fainting or loss of consciousness  · Rapid heart rate, or weak pulse  · Low blood pressure  · Feeling of doom  · Nausea, vomiting, abdominal pain, diarrhea  · Vomiting blood, or large amounts of blood in stool  · Seizure  · Cold, moist, or pale (blue in color) skin  Date Last Reviewed: 5/1/2017  © 2564-3754 [employer]. [address] [place] [address], [place] [address]. All rights reserved. This information is not  intended as a substitute for professional medical care. Always follow your healthcare professional's instructions.

## 2020-09-05 NOTE — PROGRESS NOTES
"Subjective:       Patient ID: Chelly Ortiz is a 69 y.o. female.    Vitals:  height is 5' 2" (1.575 m) and weight is 91.6 kg (202 lb). Her temperature is 98.4 °F (36.9 °C). Her blood pressure is 143/83 (abnormal) and her pulse is 92. Her respiration is 18 and oxygen saturation is 95%.     Chief Complaint: Cough    Patient presents with dry tickle in throat cough x 3 weeks.     Cough  This is a new problem. The current episode started 1 to 4 weeks ago. The problem has been unchanged. The problem occurs every few minutes. The cough is non-productive. Pertinent negatives include no chills, ear pain, eye redness, fever, hemoptysis, myalgias, rash, sore throat, shortness of breath or wheezing. Nothing aggravates the symptoms. She has tried nothing for the symptoms. The treatment provided no relief.       Constitution: Negative for chills, sweating, fatigue and fever.   HENT: Positive for sinus pressure and voice change. Negative for ear pain, congestion, sinus pain and sore throat.    Neck: Negative for painful lymph nodes.   Eyes: Negative for eye redness.   Respiratory: Positive for cough. Negative for chest tightness, sputum production, bloody sputum, COPD, shortness of breath, stridor, wheezing and asthma.    Gastrointestinal: Negative for nausea and vomiting.   Musculoskeletal: Negative for muscle ache.   Skin: Negative for rash.   Allergic/Immunologic: Negative for seasonal allergies and asthma.   Hematologic/Lymphatic: Negative for swollen lymph nodes.       Objective:      Physical Exam   Constitutional: She does not appear ill. No distress.   HENT:   Head: Normocephalic and atraumatic.   Nose: Nose normal.   Mouth/Throat: Mucous membranes are moist. No oropharyngeal exudate or posterior oropharyngeal erythema.   Eyes: Pupils are equal, round, and reactive to light. extraocular movement intact  Neck: Normal range of motion. Neck supple.   Cardiovascular: Normal rate, regular rhythm, normal heart sounds and " normal pulses.   Pulmonary/Chest: Effort normal. She has wheezes (scant).   Abdominal: Soft. Normal appearance.   Neurological: She is alert.   Nursing note and vitals reviewed.        Assessment:       1. Seasonal allergies        Plan:         Seasonal allergies  -     X-Ray Chest PA And Lateral  -     benzonatate (TESSALON PERLES) 100 MG capsule; Take 1 capsule (100 mg total) by mouth every 6 (six) hours as needed for Cough.  Dispense: 30 capsule; Refill: 1  -     promethazine-dextromethorphan (PROMETHAZINE-DM) 6.25-15 mg/5 mL Syrp; Take 5 mLs by mouth nightly as needed.  Dispense: 118 mL; Refill: 0    continue with use of inhaler. RTC prn worsening symptoms

## 2020-09-14 ENCOUNTER — PATIENT OUTREACH (OUTPATIENT)
Dept: ADMINISTRATIVE | Facility: OTHER | Age: 70
End: 2020-09-14

## 2020-09-15 ENCOUNTER — OFFICE VISIT (OUTPATIENT)
Dept: GASTROENTEROLOGY | Facility: CLINIC | Age: 70
End: 2020-09-15
Payer: MEDICARE

## 2020-09-15 DIAGNOSIS — K57.92 DIVERTICULITIS: Primary | ICD-10-CM

## 2020-09-15 DIAGNOSIS — Z12.11 SPECIAL SCREENING FOR MALIGNANT NEOPLASMS, COLON: Primary | ICD-10-CM

## 2020-09-15 DIAGNOSIS — Z01.818 PRE-OP TESTING: ICD-10-CM

## 2020-09-15 DIAGNOSIS — R10.33 PERIUMBILICAL ABDOMINAL PAIN: ICD-10-CM

## 2020-09-15 PROCEDURE — 1101F PR PT FALLS ASSESS DOC 0-1 FALLS W/OUT INJ PAST YR: ICD-10-PCS | Mod: CPTII,95,, | Performed by: FAMILY MEDICINE

## 2020-09-15 PROCEDURE — 99214 OFFICE O/P EST MOD 30 MIN: CPT | Mod: 95,,, | Performed by: FAMILY MEDICINE

## 2020-09-15 PROCEDURE — 99214 PR OFFICE/OUTPT VISIT, EST, LEVL IV, 30-39 MIN: ICD-10-PCS | Mod: 95,,, | Performed by: FAMILY MEDICINE

## 2020-09-15 PROCEDURE — 1159F MED LIST DOCD IN RCRD: CPT | Mod: 95,,, | Performed by: FAMILY MEDICINE

## 2020-09-15 PROCEDURE — 1159F PR MEDICATION LIST DOCUMENTED IN MEDICAL RECORD: ICD-10-PCS | Mod: 95,,, | Performed by: FAMILY MEDICINE

## 2020-09-15 PROCEDURE — 1101F PT FALLS ASSESS-DOCD LE1/YR: CPT | Mod: CPTII,95,, | Performed by: FAMILY MEDICINE

## 2020-09-15 RX ORDER — POLYETHYLENE GLYCOL 3350, SODIUM SULFATE ANHYDROUS, SODIUM BICARBONATE, SODIUM CHLORIDE, POTASSIUM CHLORIDE 236; 22.74; 6.74; 5.86; 2.97 G/4L; G/4L; G/4L; G/4L; G/4L
4 POWDER, FOR SOLUTION ORAL ONCE
Qty: 4000 ML | Refills: 0 | Status: SHIPPED | OUTPATIENT
Start: 2020-09-15 | End: 2020-09-15

## 2020-09-15 NOTE — PATIENT INSTRUCTIONS
Schedule repeat labs.  Schedule colonoscopy to be done within next 2 months (anytime after Oct 15).  Start taking Miralax daily, one capful, to avoid constipation.  Add high fiber foods to your diet (list is below).  [facility] with [facility] to see if they have your nexium prescription - this was sent to them in march with refills.      Women need 25 grams of fiber per day, and men need 38 grams per day, according to the [place] [employer].    If you're not meeting your recommended daily dose of fiber via food, fiber supplementation is beneficial in helping meet those daily recommendations. Be sure to drink plenty of water while taking fiber supplementation. Make sure to read fiber supplementation drug label directions regarding when and how to take the supplementation.    Dietary fiber content of frequently consumed foods  Food Fiber, g/serving   Fruits   Apple (with skin) 3.5/1 medium-sized apple   Apricot (fresh) 1.8/3 apricots   Banana 2.5/1 banana   Cantaloupe 2.7/half edible portion   Dates 13.5/1 cup (chopped)   Grapefruit 1.6/half edible portion   Grapes 2.6/10 grapes   Oranges 2.6/1 orange   Peach (with skin) 2.1/1 peach   Pear (with skin) 4.6/1 pear   Pineapple 2.2/1 cup (diced)   Prunes 11.9/11 dried prunes   Raisins 2.2/packet   Strawberries 3.0/1 cup   Juices   Apple 0.74/1 cup   Grapefruit 1.0/1 cup   Grape 1.3/1 cup   Orange 1.0/1 cup   Vegetables   Cooked   Asparagus 1.5/7 cavanaugh   Beans, string, green 3.4/1 cup   Broccoli 5.0/1 stalk   [place] sprouts 4.6/7-8 sprouts   Cabbage 2.9/1 cup (cooked)   Carrots 4.6/1 cup   Cauliflower 2.1/1 cup   Peas 7.2/1 cup (cooked)   Potato (with skin) 2.3/1 boiled   Spinach 4.1/1 cup (raw)   Squash, summer 3.4/1 cup (cooked, diced)   Sweet potatoes 2.7/1 baked   Zucchini 4.2/1 cup (cooked, diced)   Raw   Cucumber 0.2/6-8 slices with skin   Lettuce 2.0/1 wedge iceberg   Mushrooms 0.8/half cup (sliced)   Onions 1.3/1 cup   Peppers, green 1.0/1 pod    Tomato 1.8/1 tomato   Spinach 8.0/1 cup (chopped)   Legumes   Baked beans 18.6/1 cup   Dried peas 4.7/half cup (cooked)   Kidney beans 7.4/half cup (cooked)   Lima beans 2.6/half cup (cooked)   Lentils 1.9/half cup (cooked)   Breads, pastas, and flours   Bagels 1.1/half bagel   Bran muffins 6.3/muffin   Cracked wheat 4.1/slice   Oatmeal 5.3/1 cup   Pumpernickel bread 1.0/slice   White bread 0.55/slice   Whole-wheat bread 1.66/slice   Pasta and rice cooked   Macaroni 1.0/1 cup (cooked)   Rice, brown 2.4/1 cup (cooked)   Rice, polished 0.6/1 cup (cooked)   Spaghetti (regular) 1.0/1 cup (cooked)   Flours and grains   Bran, oat 8.3/oz   Bran, wheat 12.4/oz   Rolled oats 13.7/1 cup (cooked)   Nuts   Almonds 3.6/half cup (slivered)   Peanuts 11.7/1 cup             Diverticulitis    Some people get pouches along the wall of the colon as they get older. The pouches, called diverticuli, usually cause no symptoms. If the pouches become blocked, you can get an infection. This infection is called diverticulitis. It causes pain in your lower abdomen and fever. If not treated, it can become a serious condition, causing an abscess to form inside the pouch. The abscess may block the intestinal tract even or rupture, spreading infection throughout the abdomen.  When treatment is started early, oral antibiotics alone may be enough to cure diverticulitis. This method is tried first. But, if you don't improve or if your condition gets worse while using oral antibiotics, you may need to be admitted to the hospital for IV antibiotics. Severe cases may require surgery.  Home care  The following guidelines will help you care for yourself at home:  · During the acute illness, rest and follow your healthcare provider's instructions about diet. Sometimes you will need to follow a clear liquid diet to rest your bowel. Once your symptoms are better, you may be told to follow a low-fiber diet for some time. Include foods like:  ¨ Flake cereal,  mashed potatoes, pancakes, waffles, pasta, white bread, rice, applesauce, bananas, eggs, fish, poultry, tofu, and cooked soft vegetables  · Take antibiotics exactly as instructed. Don't miss any doses or stop taking the medication, even if you feel better.  · Monitor your temperature and tell your healthcare provider if you have rising temperatures.  Preventing future attacks  Once you have an episode of diverticulitis, you are at risk for having it again. After you have recovered from this episode, you may be able to lower your risk by eating a high-fiber diet (20 gm/day to 35 gm/day of fiber). This cleans out the colon pouches that already exist and may prevent new ones from forming. Foods high in fiber include fresh fruits and edible peelings, raw or lightly cooked vegetables, whole grain cereals and breads, dried beans and peas, and bran.  Other steps that can help prevent future attacks include:  · Take your medicines, such as antibiotics, as your healthcare provider says.  · Drink 6 to 8 glasses of water every day, unless told otherwise.  · Use a heating pad or hot water bottle to help abdominal cramping or pain.  · Begin an exercise program. Ask your healthcare provider how to get started. You can benefit from simple activities such as walking or gardening.  · Treat diarrhea with a bland diet. Start with liquids only; then slowly add fiber over time.  · Watch for changes in your bowel movements (constipation to diarrhea). Avoid constipation by eating a high fiber diet and taking a stool softener if needed.  · Get plenty of rest and sleep.  Follow-up care  Follow up with your healthcare provider as advised or sooner if you are not getting better in the next 2 days.  When to seek medical advice  Call your healthcare provider right away if any of these occur:  · Fever of 100.4°F (38°C) or higher, or as directed by your healthcare provider  · Repeated vomiting or swelling of the abdomen  · Weakness, dizziness,  light-headedness  · Pain in your abdomen that gets worse, severe, or spreads to your back  · Pain that moves to the right lower abdomen  · Rectal bleeding (stools that are red, black or maroon color)  · Unexpected vaginal bleeding  Date Last Reviewed: 9/1/2016  © 3738-5401 [employer]. [address] [place] [address], [place] [address]. All rights reserved. This information is not intended as a substitute for professional medical care. Always follow your healthcare professional's instructions.

## 2020-09-15 NOTE — PROGRESS NOTES
LINKS immunization registry updated  Care Everywhere updated  Health Maintenance updated  Chart reviewed for overdue Proactive [facility] Encounters ([facility]) health maintenance testing (CRS, Breast Ca, Diabetic Eye Exam)   Orders entered:N/A

## 2020-09-15 NOTE — PROGRESS NOTES
[facility] Consultation Note    Reason for Consult:  The primary encounter diagnosis was Diverticulitis. A diagnosis of Periumbilical abdominal pain was also pertinent to this visit.    PCP:   Gabriel Johns   [address] [facility] / [id number]    Referring MD:  Gabriel Johns Md  [address] [facility]  [place] [address]    The patient location is:  Patient Home   The chief complaint leading to consultation is: Diverticulitis  Visit type: Virtual visit with synchronous audio and video  Total time spent with patient: 25  Each patient to whom he or she provides medical services by telemedicine is:  (1) informed of the relationship between the physician and patient and the respective role of any other health care provider with respect to management of the patient; and (2) notified that he or she may decline to receive medical services by telemedicine and may withdraw from such care at any time.        HPI:  This is a 69 y.o. female here for follow-up for possible diverticulitis. Established pt, new to me.    CT scan on 8/18/2020 that showed possible diverticulitis.  At this time, she was experiencing periumbilical abdominal pain. Prescribed Metronidazole 500 mg TID for this, states she has a few pills left b/c she forgot the medicine a few days. Unsure if she has ever had episodes of diagnosed diverticulitis in the past.  She states she is no longer having abdominal pains. Denies fevers, blood in stools. Does report occasional constipation requiring her to take MiraLax or another laxative to achieve a BM.       Reflux - yes, controlled on esomeprazole (Nexium)  Dysphagia - yes, solid foods, no trigger foods specified  Bowel Habits - occasional constipation  Rectal Bleeding/Melena- no  NSAIDs - none      ROS:  Constitutional: No fevers, chills, No unintentional weight loss  ENT: No allergies  CV: No chest pain  Pulm: No cough, No shortness of breath  Ophtho: No vision  changes  GI: see HPI  Derm: No rash  Heme: No lymphadenopathy, No bruising  MSK: + arthritis, joint pains in the morning  : No dysuria, No hematuria  Endo: No hot or cold intolerance  Neuro: No syncope, No seizure  Psych: No anxiety, No depression    Medical History:  has a past medical history of Allergy, Cataract, DDD (degenerative disc disease), lumbar, Diabetes mellitus, Diabetes mellitus, type 2, GERD (gastroesophageal reflux disease), History of subconjunctival hemorrhage (11), Hyperlipidemia, Hypertension, IBS (irritable bowel syndrome), Obesity, MYRIAM (obstructive sleep apnea), Rotator cuff impingement syndrome, and Seasonal allergic conjunctivitis.    Surgical History:  has a past surgical history that includes  section; Cholecystectomy; Cataract extraction w/  intraocular lens implant (10/3/13); Eye surgery; Tubal ligation; Hysterectomy; Endoscopic ultrasound of upper gastrointestinal tract (N/A, 10/9/2018); Colonoscopy (N/A, 2018); and Excision of lesion (N/A, 2019).    Family History: family history includes Alzheimer's disease in her mother; Cancer in her brother; Deep vein thrombosis in her brother; Diabetes in her daughter and sister; Heart disease in her father; Lung cancer in her brother..     Social History:  reports that she quit smoking about 37 years ago. She has never used smokeless tobacco. She reports that she does not drink alcohol or use drugs.    Review of patient's allergies indicates:   Allergen Reactions    Prednisone Other (See Comments)     hipper    Latex      Other reaction(s): Itching    Ace inhibitors Hives     Other reaction(s): Cough    Latex, natural rubber Itching       Current Outpatient Medications on File Prior to Visit   Medication Sig Dispense Refill    ACCU-CHEK SHILOH PLUS METER Misc Inject 1 Units into the skin 2 (two) times daily. 1 each 0    albuterol (PROVENTIL/VENTOLIN HFA) 90 mcg/actuation inhaler Inhale 1-2 puffs into the lungs every 4  "(four) hours as needed for Wheezing. 18 g 2    amLODIPine (NORVASC) 5 MG tablet TAKE 1 TABLET EVERY DAY 90 tablet 3    aspirin (ECOTRIN) 81 MG EC tablet Take 81 mg by mouth once daily.      azelastine (OPTIVAR) 0.05 % ophthalmic solution INSTILL 1 DROP INTO BOTH EYES TWICE A DAY 6 mL 1    benzonatate (TESSALON PERLES) 100 MG capsule Take 1 capsule (100 mg total) by mouth every 6 (six) hours as needed for Cough. 30 capsule 1    blood glucose control high,low (ACCU-CHEK SHILOH CONTROL SOLN) Soln Use as directed 1 each 3    blood sugar diagnostic Strp 1 strip by Misc.(Non-Drug; Combo Route) route 3 (three) times daily. 300 strip 3    citalopram (CELEXA) 10 MG tablet TAKE 1 TABLET (10 MG TOTAL) BY MOUTH ONCE DAILY. 90 tablet 3    ergocalciferol (ERGOCALCIFEROL) 50,000 unit Cap TAKE 1 CAPSULE EVERY 7 DAYS. 12 capsule 1    esomeprazole (NEXIUM) 40 MG capsule Take 1 capsule (40 mg total) by mouth before breakfast. 90 capsule 3    gabapentin (NEURONTIN) 100 MG capsule TAKE ONE CAPSULE BY MOUTH 3 TIMES A DAY 90 capsule 0    hydroquinone 4 % Crea Use hs on dark spots 28.35 g 3    insulin aspart U-100 (NOVOLOG FLEXPEN U-100 INSULIN) 100 unit/mL (3 mL) InPn pen Inject 12 units w/ meals plus scale 150-200+2, 201-250+4, 251-300+6, 301-350+8, >350+10. Max daily 50 units. 15 mL 6    insulin detemir U-100 (LEVEMIR FLEXTOUCH U-100 INSULN) 100 unit/mL (3 mL) InPn pen Inject 45 Units into the skin every evening. 45 mL 3    insulin detemir U-100 (LEVEMIR FLEXTOUCH U-100 INSULN) 100 unit/mL (3 mL) InPn pen Inject 45 units into the skin at night. 15 mL 1    insulin syringe-needle U-100 0.5 mL 31 gauge x 5/16" Syrg Use nightly. 90 day 100 each 3    lancets (ACCU-CHEK SOFTCLIX LANCETS) Misc Uses in lancing device 3 times a day. 300 each 3    lidocaine HCL 2% (XYLOCAINE) 2 % jelly Apply topically once daily. 30 mL 2    losartan (COZAAR) 50 MG tablet TAKE 1 TABLET EVERY DAY 90 tablet 3    magnesium oxide (MAG-OX) 400 mg " "tablet Take 1 tablet (400 mg total) by mouth 2 (two) times daily. 180 tablet 3    pen needle, diabetic 31 gauge x 3/16" Ndle Uses 4 x a day. 400 each 3    polyethylene glycol (GLYCOLAX) 17 gram PwPk Take 17 g by mouth once daily. 30 each 0    pravastatin (PRAVACHOL) 10 MG tablet TAKE 1 TABLET EVERY DAY 90 tablet 3    promethazine-dextromethorphan (PROMETHAZINE-DM) 6.25-15 mg/5 mL Syrp Take 5 mLs by mouth nightly as needed. 118 mL 0    traZODone (DESYREL) 50 MG tablet TAKE 1 TABLET EVERY NIGHT AS NEEDED 90 tablet 3    varicella-zoster gE-AS01B, PF, (SHINGRIX) 50 mcg/0.5 mL injection Inject into the muscle. 0.5 mL 1    cetirizine (ZYRTEC) 10 MG tablet Take 1 tablet (10 mg total) by mouth every evening. 90 tablet 0    dulaglutide (TRULICITY) 0.75 mg/0.5 mL pen injector Inject 0.75 mg into the skin every 7 days. 12 pen 3     No current facility-administered medications on file prior to visit.          Objective Findings:    Vital Signs Reviewed:  LMP  (LMP Unknown)   There is no height or weight on file to calculate BMI.    Physical Exam:  General Appearance: Well appearing in no acute distress  Head:   Normocephalic, without obvious abnormality  Lungs: respirations even and unlabored, no increased Work of breathing.  Neurologic: AAO x 3      Labs Reviewed:  Lab Results   Component Value Date    WBC 5.68 08/12/2020    HGB 11.8 (L) 08/12/2020    HCT 38.1 08/12/2020     08/12/2020    CRP 38.9 (H) 08/12/2020    CHOL 232 (H) 01/02/2020    TRIG 162 (H) 01/02/2020    HDL 62 01/02/2020    ALT 14 01/02/2020    AST 17 01/02/2020     04/24/2020    K 3.7 04/24/2020     04/24/2020    CREATININE 1.0 04/24/2020    BUN 11 04/24/2020    CO2 29 04/24/2020    TSH 1.436 06/04/2019    INR 0.9 04/19/2012    GLUF 105 10/27/2004    HGBA1C 8.9 (H) 07/07/2020       Imaging reviewed:  CTA Abd Pelvis for mesenteric ischemia, acute  1. No CTA findings to account for reported history of mesenteric ischemia.  Aorta and " major branch vessels are patent without focal high-grade stenosis or occlusion.  2. Colonic diverticulosis with mild focal area of fat stranding at the sigmoid colon possibly reflecting acute inflammation.  Correlation with symptoms of left lower quadrant pain recommended.  No obstruction.  No intra-abdominal free air.  3. Stable intrahepatic bile duct dilatation, possibly related to post cholecystectomy status.  4. Stable pancreatic ductal dilatation.  No discrete pancreatic masses.  No peripancreatic inflammatory changes.  5. Hepatic and right renal hypodensities, similar when compared to the previous exam and favored to represent cysts.    Endoscopy reviewed:  12/5/2018 Colonoscopy for personal history of colon polyps w/ Dr Mejia  1. Good prep, to cecum  2. Multiple diverticula of left colon  3. A 8 mm polyp in ascending colon, TA  4. Three 10-12 mm polyps at hepatic flexure, TAs  5. Internal hemorrhoids  6. Repeat in 3 years    70 yo F here for evaluation of:    Assessment:  1. Diverticulitis    2. Periumbilical abdominal pain         Likely diverticulitis flare around 8/18/2020 - rx'ed metronidazole. CRP and Sed rate elevated at the time. Symptoms have resolved. We did discuss repeat lab work to assess CRP return to normal and f/u colonoscopy since it's >1 year since last colon. She is agreeable to this. We did discuss daily miralax to avoid constipation. Also mentions she hasn't received her nexium from mail-in pharmacy - medication review shows this has been sent in march 2020 by PCP. I suggested she call her pharmacy to see if they have her prescription.    Recommendations:  1. Labs as below.  2. Colonoscopy in a few weeks  3. Start Miralax daily  4. Add high fiber foods.    F/U pending above tests.      Order summary:  Orders Placed This Encounter    C-Reactive Protein    CBC auto differential    Comprehensive metabolic panel    Case request GI: COLONOSCOPY         Thank you so much for allowing me to  participate in the care of GINETTE Morales-C

## 2020-09-21 ENCOUNTER — PATIENT MESSAGE (OUTPATIENT)
Dept: INTERNAL MEDICINE | Facility: CLINIC | Age: 70
End: 2020-09-21

## 2020-09-21 ENCOUNTER — LAB VISIT (OUTPATIENT)
Dept: LAB | Facility: HOSPITAL | Age: 70
End: 2020-09-21
Attending: FAMILY MEDICINE
Payer: MEDICARE

## 2020-09-21 DIAGNOSIS — R10.33 PERIUMBILICAL ABDOMINAL PAIN: ICD-10-CM

## 2020-09-21 DIAGNOSIS — K57.92 DIVERTICULITIS: ICD-10-CM

## 2020-09-21 LAB
BASOPHILS # BLD AUTO: 0.03 K/UL (ref 0–0.2)
BASOPHILS NFR BLD: 0.5 % (ref 0–1.9)
DIFFERENTIAL METHOD: ABNORMAL
EOSINOPHIL # BLD AUTO: 0.1 K/UL (ref 0–0.5)
EOSINOPHIL NFR BLD: 1.6 % (ref 0–8)
ERYTHROCYTE [DISTWIDTH] IN BLOOD BY AUTOMATED COUNT: 16.2 % (ref 11.5–14.5)
HCT VFR BLD AUTO: 39.2 % (ref 37–48.5)
HGB BLD-MCNC: 12.3 G/DL (ref 12–16)
IMM GRANULOCYTES # BLD AUTO: 0.02 K/UL (ref 0–0.04)
IMM GRANULOCYTES NFR BLD AUTO: 0.3 % (ref 0–0.5)
LYMPHOCYTES # BLD AUTO: 2.7 K/UL (ref 1–4.8)
LYMPHOCYTES NFR BLD: 43.1 % (ref 18–48)
MCH RBC QN AUTO: 25.3 PG (ref 27–31)
MCHC RBC AUTO-ENTMCNC: 31.4 G/DL (ref 32–36)
MCV RBC AUTO: 81 FL (ref 82–98)
MONOCYTES # BLD AUTO: 0.5 K/UL (ref 0.3–1)
MONOCYTES NFR BLD: 8.2 % (ref 4–15)
NEUTROPHILS # BLD AUTO: 2.9 K/UL (ref 1.8–7.7)
NEUTROPHILS NFR BLD: 46.3 % (ref 38–73)
NRBC BLD-RTO: 0 /100 WBC
PLATELET # BLD AUTO: 311 K/UL (ref 150–350)
PMV BLD AUTO: 10.4 FL (ref 9.2–12.9)
RBC # BLD AUTO: 4.87 M/UL (ref 4–5.4)
WBC # BLD AUTO: 6.31 K/UL (ref 3.9–12.7)

## 2020-09-21 PROCEDURE — 80053 COMPREHEN METABOLIC PANEL: CPT

## 2020-09-21 PROCEDURE — 36415 COLL VENOUS BLD VENIPUNCTURE: CPT | Mod: PO

## 2020-09-21 PROCEDURE — 86140 C-REACTIVE PROTEIN: CPT

## 2020-09-21 PROCEDURE — 85025 COMPLETE CBC W/AUTO DIFF WBC: CPT

## 2020-09-22 LAB
ALBUMIN SERPL BCP-MCNC: 3.8 G/DL (ref 3.5–5.2)
ALP SERPL-CCNC: 101 U/L (ref 55–135)
ALT SERPL W/O P-5'-P-CCNC: 23 U/L (ref 10–44)
ANION GAP SERPL CALC-SCNC: 10 MMOL/L (ref 8–16)
AST SERPL-CCNC: 24 U/L (ref 10–40)
BILIRUB SERPL-MCNC: 0.4 MG/DL (ref 0.1–1)
BUN SERPL-MCNC: 10 MG/DL (ref 8–23)
CALCIUM SERPL-MCNC: 9.3 MG/DL (ref 8.7–10.5)
CHLORIDE SERPL-SCNC: 101 MMOL/L (ref 95–110)
CO2 SERPL-SCNC: 28 MMOL/L (ref 23–29)
CREAT SERPL-MCNC: 0.9 MG/DL (ref 0.5–1.4)
CRP SERPL-MCNC: 13.7 MG/L (ref 0–8.2)
EST. GFR  (AFRICAN AMERICAN): >60 ML/MIN/1.73 M^2
EST. GFR  (NON AFRICAN AMERICAN): >60 ML/MIN/1.73 M^2
GLUCOSE SERPL-MCNC: 205 MG/DL (ref 70–110)
POTASSIUM SERPL-SCNC: 3.6 MMOL/L (ref 3.5–5.1)
PROT SERPL-MCNC: 7.2 G/DL (ref 6–8.4)
SODIUM SERPL-SCNC: 139 MMOL/L (ref 136–145)

## 2020-09-23 ENCOUNTER — TELEPHONE (OUTPATIENT)
Dept: GASTROENTEROLOGY | Facility: CLINIC | Age: 70
End: 2020-09-23

## 2020-09-23 ENCOUNTER — PATIENT MESSAGE (OUTPATIENT)
Dept: GASTROENTEROLOGY | Facility: CLINIC | Age: 70
End: 2020-09-23

## 2020-09-23 NOTE — TELEPHONE ENCOUNTER
MA called w/ results no answer LVM for pt to call back for her results----- Message from Kamla Goddard DNP sent at 9/22/2020  9:00 AM CDT -----  Overall, labs are normal. CRP (inflammatory lab) is elevated BUT this could be due to any inflammatory process, such as arthritis. Compared to when she had suspected diverticulitis, it has greatly decreased. It is important for her to keep her colonoscopy appt to evaluate her diverticulosis. Also, did she ever contact her pharmacy to see if they have a prescription for her nexium? It appears, according to her med list, that they should.

## 2020-09-25 ENCOUNTER — TELEPHONE (OUTPATIENT)
Dept: PHARMACY | Facility: CLINIC | Age: 70
End: 2020-09-25

## 2020-09-25 NOTE — TELEPHONE ENCOUNTER
Patient as been approved in the [facility] patient assistance program through 12/31/20. A 3 month supply of trulicity will be shipped to providers office within 7 -10 business days

## 2020-09-29 ENCOUNTER — PATIENT MESSAGE (OUTPATIENT)
Dept: OTHER | Facility: OTHER | Age: 70
End: 2020-09-29

## 2020-09-29 ENCOUNTER — OFFICE VISIT (OUTPATIENT)
Dept: URGENT CARE | Facility: CLINIC | Age: 70
End: 2020-09-29
Payer: MEDICARE

## 2020-09-29 ENCOUNTER — PATIENT MESSAGE (OUTPATIENT)
Dept: INTERNAL MEDICINE | Facility: CLINIC | Age: 70
End: 2020-09-29

## 2020-09-29 VITALS
HEART RATE: 87 BPM | SYSTOLIC BLOOD PRESSURE: 139 MMHG | TEMPERATURE: 99 F | RESPIRATION RATE: 20 BRPM | HEIGHT: 63 IN | WEIGHT: 195 LBS | OXYGEN SATURATION: 95 % | DIASTOLIC BLOOD PRESSURE: 72 MMHG | BODY MASS INDEX: 34.55 KG/M2

## 2020-09-29 DIAGNOSIS — J20.9 ACUTE BRONCHITIS, UNSPECIFIED ORGANISM: Primary | ICD-10-CM

## 2020-09-29 PROCEDURE — 99214 PR OFFICE/OUTPT VISIT, EST, LEVL IV, 30-39 MIN: ICD-10-PCS | Mod: S$GLB,,, | Performed by: FAMILY MEDICINE

## 2020-09-29 PROCEDURE — 99214 OFFICE O/P EST MOD 30 MIN: CPT | Mod: S$GLB,,, | Performed by: FAMILY MEDICINE

## 2020-09-29 RX ORDER — BENZONATATE 100 MG/1
100 CAPSULE ORAL EVERY 6 HOURS PRN
Qty: 30 CAPSULE | Refills: 1 | Status: SHIPPED | OUTPATIENT
Start: 2020-09-29 | End: 2021-04-09

## 2020-09-29 RX ORDER — CODEINE PHOSPHATE AND GUAIFENESIN 10; 100 MG/5ML; MG/5ML
5 SOLUTION ORAL 3 TIMES DAILY PRN
Qty: 118 ML | Refills: 0 | Status: SHIPPED | OUTPATIENT
Start: 2020-09-29 | End: 2020-10-09

## 2020-09-29 RX ORDER — AZITHROMYCIN 250 MG/1
TABLET, FILM COATED ORAL
Qty: 6 TABLET | Refills: 0 | Status: SHIPPED | OUTPATIENT
Start: 2020-09-29 | End: 2020-10-04

## 2020-09-29 NOTE — PROGRESS NOTES
"Subjective:       Patient ID: Chelly Ortiz is a 69 y.o. female.    Vitals:  height is 5' 2.5" (1.588 m) and weight is 88.5 kg (195 lb). Her temperature is 98.8 °F (37.1 °C). Her blood pressure is 139/72 and her pulse is 87. Her respiration is 20 and oxygen saturation is 95%.     Chief Complaint: Cough    This is a 69 y.o. female who presents today with a chief complaint of   Cough, wheezing. Pt was here on 09/05/2020 with sx and still not gone. Pt taking Zyrtec, Albuterol inhaler, and Tessalon Pearls,  Cough syrup. Cough just wont go away     Cough  This is a new problem. The current episode started 1 to 4 weeks ago. The problem has been unchanged. The problem occurs every few minutes. The cough is non-productive. Associated symptoms include wheezing. Pertinent negatives include no chills, ear pain, eye redness, fever, hemoptysis, myalgias, rash, sore throat or shortness of breath. Nothing aggravates the symptoms. She has tried prescription cough suppressant (zyrtec, albuteral inhaler, ) for the symptoms. The treatment provided mild relief. Her past medical history is significant for asthma and bronchitis.       Constitution: Negative for chills, sweating, fatigue and fever.   HENT: Negative for ear pain, congestion, sinus pain, sinus pressure, sore throat and voice change.    Neck: Negative for painful lymph nodes.   Eyes: Negative for eye redness.   Respiratory: Positive for cough, wheezing and asthma. Negative for chest tightness, sputum production, bloody sputum, COPD, shortness of breath and stridor.    Gastrointestinal: Negative for nausea and vomiting.   Musculoskeletal: Negative for muscle ache.   Skin: Negative for rash.   Allergic/Immunologic: Positive for asthma. Negative for seasonal allergies.   Hematologic/Lymphatic: Negative for swollen lymph nodes.       Objective:      Physical Exam   Constitutional: She does not appear ill. obesity  HENT:   Head: Normocephalic and atraumatic.   Nose: Nose normal. "   Mouth/Throat: Mucous membranes are moist. Posterior oropharyngeal erythema present.   Eyes: Pupils are equal, round, and reactive to light. extraocular movement intact  Neck: Normal range of motion. Neck supple.   Cardiovascular: Normal rate and regular rhythm.   Pulmonary/Chest: Effort normal and breath sounds normal. She has no wheezes.   Abdominal: Soft. Normal appearance.   Neurological: She is alert.   Nursing note and vitals reviewed.        Assessment:       1. Acute bronchitis, unspecified organism        Plan:         Acute bronchitis, unspecified organism  -     azithromycin (ZITHROMAX) 250 MG tablet; Take 2 tablets (500 mg) on  Day 1,  followed by 1 tablet (250 mg) once daily on Days 2 through 5.  Dispense: 6 tablet; Refill: 0  -     benzonatate (TESSALON PERLES) 100 MG capsule; Take 1 capsule (100 mg total) by mouth every 6 (six) hours as needed for Cough.  Dispense: 30 capsule; Refill: 1  -     guaifenesin-codeine 100-10 mg/5 ml (CHERATUSSIN AC)  mg/5 mL syrup; Take 5 mLs by mouth 3 (three) times daily as needed for Cough.  Dispense: 118 mL; Refill: 0

## 2020-09-29 NOTE — PATIENT INSTRUCTIONS
Bronchitis, Antibiotic Treatment (Adult)    Bronchitis is an infection of the air passages (bronchial tubes) in your lungs. It often occurs when you have a cold. This illness is contagious during the first few days and is spread through the air by coughing and sneezing, or by direct contact (touching the sick person and then touching your own eyes, nose, or mouth).  Symptoms of bronchitis include cough with mucus (phlegm) and low-grade fever. Bronchitis usually lasts 7 to 14 days. Mild cases can be treated with simple home remedies. More severe infection is treated with an antibiotic.  Home care  Follow these guidelines when caring for yourself at home:  · If your symptoms are severe, rest at home for the first 2 to 3 days. When you go back to your usual activities, don't let yourself get too tired.  · Do not smoke. Also avoid being exposed to secondhand smoke.  · You may use over-the-counter medicines to control fever or pain, unless another medicine was prescribed. (Note: If you have chronic liver or kidney disease or have ever had a stomach ulcer or gastrointestinal bleeding, talk with your healthcare provider before using these medicines. Also talk to your provider if you are taking medicine to prevent blood clots.) Aspirin should never be given to anyone younger than 18 years of age who is ill with a viral infection or fever. It may cause severe liver or brain damage.  · Your appetite may be poor, so a light diet is fine. Avoid dehydration by drinking 6 to 8 glasses of fluids per day (such as water, soft drinks, sports drinks, juices, tea, or soup). Extra fluids will help loosen secretions in the nose and lungs.  · Over-the-counter cough, cold, and sore-throat medicines will not shorten the length of the illness, but they may be helpful to reduce symptoms. (Note: Do not use decongestants if you have high blood pressure.)  · Finish all antibiotic medicine. Do this even if you are feeling better after only a  few days.  Follow-up care  Follow up with your healthcare provider, or as advised. If you had an X-ray or ECG (electrocardiogram), a specialist will review it. You will be notified of any new findings that may affect your care.  Note: If you are age 65 or older, or if you have a chronic lung disease or condition that affects your immune system, or you smoke, talk to your healthcare provider about having pneumococcal vaccinations and a yearly influenza vaccination (flu shot).  When to seek medical advice  Call your healthcare provider right away if any of these occur:  · Fever of 100.4°F (38°C) or higher  · Coughing up increased amounts of colored sputum  · Weakness, drowsiness, headache, facial pain, ear pain, or a stiff neck  Call 911, or get immediate medical care  Contact emergency services right away if any of these occur.  · Coughing up blood  · Worsening weakness, drowsiness, headache, or stiff neck  · Trouble breathing, wheezing, or pain with breathing  Date Last Reviewed: 9/13/2015  © 4205-4316 [employer]. [address] [place] [address], [place] [address]. All rights reserved. This information is not intended as a substitute for professional medical care. Always follow your healthcare professional's instructions.

## 2020-10-01 RX ORDER — DULAGLUTIDE 0.75 MG/.5ML
0.75 INJECTION, SOLUTION SUBCUTANEOUS
Qty: 12 PEN | Refills: 3
Start: 2020-10-01 | End: 2020-10-14

## 2020-10-01 NOTE — TELEPHONE ENCOUNTER
----- Message from Marcy Mckinney sent at 10/1/2020  4:39 PM CDT -----  Regarding: refill  Contact: pharmacy  Refill       dulaglutide (TRULICITY) 0.75 mg/0.5 mL pen injector    [facility],  [place], [place]

## 2020-10-07 ENCOUNTER — LAB VISIT (OUTPATIENT)
Dept: LAB | Facility: HOSPITAL | Age: 70
End: 2020-10-07
Attending: NURSE PRACTITIONER
Payer: MEDICARE

## 2020-10-07 DIAGNOSIS — E11.65 UNCONTROLLED TYPE 2 DIABETES MELLITUS WITH HYPERGLYCEMIA, WITHOUT LONG-TERM CURRENT USE OF INSULIN: ICD-10-CM

## 2020-10-07 LAB
ESTIMATED AVG GLUCOSE: 197 MG/DL (ref 68–131)
HBA1C MFR BLD HPLC: 8.5 % (ref 4–5.6)

## 2020-10-07 PROCEDURE — 36415 COLL VENOUS BLD VENIPUNCTURE: CPT | Mod: HCNC,PO

## 2020-10-07 PROCEDURE — 83036 HEMOGLOBIN GLYCOSYLATED A1C: CPT | Mod: HCNC

## 2020-10-08 ENCOUNTER — PATIENT MESSAGE (OUTPATIENT)
Dept: INTERNAL MEDICINE | Facility: CLINIC | Age: 70
End: 2020-10-08

## 2020-10-09 DIAGNOSIS — Z79.4 TYPE 2 DIABETES MELLITUS WITH HYPERGLYCEMIA, WITH LONG-TERM CURRENT USE OF INSULIN: ICD-10-CM

## 2020-10-09 DIAGNOSIS — E11.65 TYPE 2 DIABETES MELLITUS WITH HYPERGLYCEMIA, WITH LONG-TERM CURRENT USE OF INSULIN: ICD-10-CM

## 2020-10-09 RX ORDER — INSULIN DETEMIR 100 [IU]/ML
45 INJECTION, SOLUTION SUBCUTANEOUS NIGHTLY
Qty: 45 ML | Refills: 3 | Status: SHIPPED | OUTPATIENT
Start: 2020-10-09 | End: 2020-10-14

## 2020-10-09 NOTE — TELEPHONE ENCOUNTER
----- Message from Soledad Brothers sent at 10/9/2020  1:53 PM CDT -----  Contact: Patient [phone]  Patient has questions regarding Rx insulin detemir U-100 (LEVEMIR FLEXTOUCH U-100 INSULN) 100 unit/mL (3 mL) InPn pen    Stated that it is due to be filled today and wants to know would she fill it.    Please call and advise.    Thank You

## 2020-10-13 NOTE — PROGRESS NOTES
CHIEF COMPLAINT: Type 2 Diabetes     HPI: Mrs. Chelly Ortiz is a 69 y.o. female who was diagnosed with Type 2 DM x 9 years.  H/o PN, CKD 3, MYRIAM, DDD, obesity.  Has 4 siblings with diabetes  Past Medical History:   Diagnosis Date    Allergy     Cataract     DDD (degenerative disc disease), lumbar     Diabetes mellitus     diet controlled    Diabetes mellitus, type 2     GERD (gastroesophageal reflux disease)     History of subconjunctival hemorrhage 12/2/11    left eye    Hyperlipidemia     Hypertension     IBS (irritable bowel syndrome)     Obesity     MYRIAM (obstructive sleep apnea)     on CPAP    Rotator cuff impingement syndrome     Seasonal allergic conjunctivitis      Lab Results   Component Value Date    HGBA1C 8.5 (H) 10/07/2020     Has used novonordisk---pt assistance.  Pt was last seen by me in 7/2020.   Did not like vgo -r/t adhesive issues  Pt has not started trulicity.   Glp1a -via [employer]/patient assistance program   bp elevated this visit   a1c has gone down over the past few months, 8.9% to 8.5%     PREVIOUS DIABETES MEDICATIONS TRIED  Glipizide  glimepiride  januvia  lantus   Metformin -diarrhea   trulicity- cost issue    CURRENT DIABETIC MEDS: levemir 45 units at night,  novolog 12-12-12 units w/ scale     trulicity 0.75 mg weekly     Cost issues with insulins   $126  $148  Per month -flexpens    $84 for vials    Pt is monitoring blood glucose readings 4 times a day a day .  On MDI injections 3 x a day  Self adjusting per scale  Testing 4 x a day max  Patient is willing and able to use the device  Demonstrated an understanding of the technology and is motivated to use CGM  Patient expected to adhere to a comprehensive diabetes treatment plan and patient has adequate medical supervision  Patient experiences multiple impaired awareness of hypoglycemia (hypoglycemia unawareness)    Needs ~100 strips per month related to fluctuations with blood glucose reading, a1c trends, and activity  "level.  Fastin mg/dl   Lowest 126  Highest: 182, 240     Dietary habits: 2 meals     Diabetes Management Status    Statin: Taking  ACE/ARB: Taking    Screening or Prevention Patient's value Goal Complete/Controlled?   HgA1C Testing and Control   Lab Results   Component Value Date    HGBA1C 8.5 (H) 10/07/2020      Annually/Less than 8% No   Lipid profile : 2020 Annually Yes   LDL control Lab Results   Component Value Date    LDLCALC 137.6 2020    Annually/Less than 100 mg/dl  No   Nephropathy screening Lab Results   Component Value Date    LABMICR 7.0 2015     Lab Results   Component Value Date    PROTEINUA Negative 2019    Annually Yes   Blood pressure BP Readings from Last 1 Encounters:   20 139/72    Less than 140/90 Yes   Dilated retinal exam : 2020 Annually No   Foot exam   : 2019 Annually Yes     REVIEW OF SYSTEMS  General: no weakness, fatigue, + weight stable   Eyes: no double or blurred vision, eye pain, or redness  Cardiovascular: no chest pain, palpitations, edema, or murmurs.   Respiratory: no cough or dyspnea.   GI: no heartburn, nausea, + changes in bowel patterns; good appetite. +IBS  Skin: no rashes, dryness, itching, or reactions at insulin injection sites.  Neuro: + numbness, tingling, tremors, or vertigo.   Endocrine: + polyuria, polydipsia, polyphagia, heat or cold intolerance.     Vital Signs  BP (!) 150/76 (BP Location: Left arm, Patient Position: Sitting, BP Method: Large (Manual))   Pulse 89   Ht 5' 2.5" (1.588 m)   Wt 92.3 kg (203 lb 7.8 oz)   LMP  (LMP Unknown)   SpO2 96%   BMI 36.62 kg/m²     Hemoglobin A1C   Date Value Ref Range Status   10/07/2020 8.5 (H) 4.0 - 5.6 % Final     Comment:     ADA Screening Guidelines:  5.7-6.4%  Consistent with prediabetes  >or=6.5%  Consistent with diabetes  High levels of fetal hemoglobin interfere with the HbA1C  assay. Heterozygous hemoglobin variants (HbS, HgC, etc)do  not significantly interfere " with this assay.   However, presence of multiple variants may affect accuracy.     07/07/2020 8.9 (H) 4.0 - 5.6 % Final     Comment:     ADA Screening Guidelines:  5.7-6.4%  Consistent with prediabetes  >or=6.5%  Consistent with diabetes  High levels of fetal hemoglobin interfere with the HbA1C  assay. Heterozygous hemoglobin variants (HbS, HgC, etc)do  not significantly interfere with this assay.   However, presence of multiple variants may affect accuracy.     04/24/2020 10.0 (H) 4.0 - 5.6 % Final     Comment:     ADA Screening Guidelines:  5.7-6.4%  Consistent with prediabetes  >or=6.5%  Consistent with diabetes  High levels of fetal hemoglobin interfere with the HbA1C  assay. Heterozygous hemoglobin variants (HbS, HgC, etc)do  not significantly interfere with this assay.   However, presence of multiple variants may affect accuracy.          Chemistry        Component Value Date/Time     09/21/2020 1040    K 3.6 09/21/2020 1040     09/21/2020 1040    CO2 28 09/21/2020 1040    BUN 10 09/21/2020 1040    CREATININE 0.9 09/21/2020 1040     (H) 09/21/2020 1040        Component Value Date/Time    CALCIUM 9.3 09/21/2020 1040    ALKPHOS 101 09/21/2020 1040    AST 24 09/21/2020 1040    ALT 23 09/21/2020 1040    BILITOT 0.4 09/21/2020 1040    ESTGFRAFRICA >60.0 09/21/2020 1040    EGFRNONAA >60.0 09/21/2020 1040           Lab Results   Component Value Date    TSH 1.436 06/04/2019      Lab Results   Component Value Date    CHOL 232 (H) 01/02/2020    CHOL 237 (H) 08/30/2019    CHOL 233 (H) 04/08/2019     Lab Results   Component Value Date    HDL 62 01/02/2020    HDL 57 08/30/2019    HDL 55 04/08/2019     Lab Results   Component Value Date    LDLCALC 137.6 01/02/2020    LDLCALC 156.2 08/30/2019    LDLCALC 148.0 04/08/2019     Lab Results   Component Value Date    TRIG 162 (H) 01/02/2020    TRIG 119 08/30/2019    TRIG 150 04/08/2019     Lab Results   Component Value Date    CHOLHDL 26.7 01/02/2020    CHOLHDL  24.1 08/30/2019    CHOLHDL 23.6 04/08/2019         PHYSICAL EXAMINATION  Constitutional: Appears well, no distress  Neck: Supple, trachea midline.   Respiratory: No wheezes, even and unlabored.  Cardiovascular: RRR; no edema.   Lymph: deferred   Skin: warm and dry; no injection site reactions, +sl acanthosis nigracans observed.  Neuro:patient alert and cooperative, normal affect; steady gait.    Diabetes Foot Exam:   Protective Sensation (w/ 10 gram monofilament):  Right: Intact 6/6  Left: Intact 6/6    Visual Inspection:  Normal -  Bilateral, Dry Skin -  Bilateral and Onychomycosis -  Bilateral   5th digits -fungal infection     Pedal Pulses:   Right: Present  Left: Present    Posterior tibialis:   Right:Present  Left: Present        Assessment/Plan    1. Diabetic polyneuropathy associated with type 2 diabetes mellitus  Hemoglobin A1C   2. Severe obesity with body mass index (BMI) of 35.0 to 39.9 with serious comorbidity     3. MYRIAM (obstructive sleep apnea)     4. Hypertension associated with diabetes     5. Hyperlipidemia associated with type 2 diabetes mellitus  Lipid Panel   6. Chronic bilateral low back pain without sciatica       1. F/u in 3 mos w/ me   a1c lipid prior   Will get patient assistance trulicity 0.75 mg weekly   a1c goal less than 7.5%  Discussed MOA, SEs  Info on avs   Demo done in office visit   Send chart notes off for mary 2   Carb counting book given   Fasting goal less than 130   bg goal  mg/dl    Discussed dietary habits- support group info -zoom   DE --- mary 2 training- [facility]    2. Body mass index is 36.62 kg/m². may increase insulin resistance   Encourage exercise 3 x a week   3. Stable   4. Continue med (s), controlled   5.   Lab Results   Component Value Date    LDLCALC 137.6 01/02/2020     Above goal  6. May increase insulin resistance     FOLLOW UP  Follow up in about 3 months (around 1/14/2021).

## 2020-10-14 ENCOUNTER — OFFICE VISIT (OUTPATIENT)
Dept: INTERNAL MEDICINE | Facility: CLINIC | Age: 70
End: 2020-10-14
Payer: MEDICARE

## 2020-10-14 VITALS
HEART RATE: 89 BPM | HEIGHT: 63 IN | WEIGHT: 203.5 LBS | OXYGEN SATURATION: 96 % | BODY MASS INDEX: 36.06 KG/M2 | DIASTOLIC BLOOD PRESSURE: 76 MMHG | SYSTOLIC BLOOD PRESSURE: 150 MMHG

## 2020-10-14 DIAGNOSIS — E66.01 SEVERE OBESITY WITH BODY MASS INDEX (BMI) OF 35.0 TO 39.9 WITH SERIOUS COMORBIDITY: ICD-10-CM

## 2020-10-14 DIAGNOSIS — M54.50 CHRONIC BILATERAL LOW BACK PAIN WITHOUT SCIATICA: ICD-10-CM

## 2020-10-14 DIAGNOSIS — E11.59 HYPERTENSION ASSOCIATED WITH DIABETES: ICD-10-CM

## 2020-10-14 DIAGNOSIS — G89.29 CHRONIC BILATERAL LOW BACK PAIN WITHOUT SCIATICA: ICD-10-CM

## 2020-10-14 DIAGNOSIS — E11.42 DIABETIC POLYNEUROPATHY ASSOCIATED WITH TYPE 2 DIABETES MELLITUS: Primary | ICD-10-CM

## 2020-10-14 DIAGNOSIS — I15.2 HYPERTENSION ASSOCIATED WITH DIABETES: ICD-10-CM

## 2020-10-14 DIAGNOSIS — E11.69 HYPERLIPIDEMIA ASSOCIATED WITH TYPE 2 DIABETES MELLITUS: ICD-10-CM

## 2020-10-14 DIAGNOSIS — E78.5 HYPERLIPIDEMIA ASSOCIATED WITH TYPE 2 DIABETES MELLITUS: ICD-10-CM

## 2020-10-14 DIAGNOSIS — G47.33 OSA (OBSTRUCTIVE SLEEP APNEA): ICD-10-CM

## 2020-10-14 PROCEDURE — 3008F BODY MASS INDEX DOCD: CPT | Mod: HCNC,CPTII,S$GLB, | Performed by: NURSE PRACTITIONER

## 2020-10-14 PROCEDURE — 1101F PR PT FALLS ASSESS DOC 0-1 FALLS W/OUT INJ PAST YR: ICD-10-PCS | Mod: HCNC,CPTII,S$GLB, | Performed by: NURSE PRACTITIONER

## 2020-10-14 PROCEDURE — 1159F MED LIST DOCD IN RCRD: CPT | Mod: HCNC,S$GLB,, | Performed by: NURSE PRACTITIONER

## 2020-10-14 PROCEDURE — 3078F PR MOST RECENT DIASTOLIC BLOOD PRESSURE < 80 MM HG: ICD-10-PCS | Mod: HCNC,CPTII,S$GLB, | Performed by: NURSE PRACTITIONER

## 2020-10-14 PROCEDURE — 99499 RISK ADDL DX/OHS AUDIT: ICD-10-PCS | Mod: S$GLB,,, | Performed by: NURSE PRACTITIONER

## 2020-10-14 PROCEDURE — 99999 PR PBB SHADOW E&M-EST. PATIENT-LVL V: CPT | Mod: PBBFAC,HCNC,, | Performed by: NURSE PRACTITIONER

## 2020-10-14 PROCEDURE — 3052F PR MOST RECENT HEMOGLOBIN A1C LEVEL 8.0 - < 9.0%: ICD-10-PCS | Mod: HCNC,CPTII,S$GLB, | Performed by: NURSE PRACTITIONER

## 2020-10-14 PROCEDURE — 3077F SYST BP >= 140 MM HG: CPT | Mod: HCNC,CPTII,S$GLB, | Performed by: NURSE PRACTITIONER

## 2020-10-14 PROCEDURE — 1126F PR PAIN SEVERITY QUANTIFIED, NO PAIN PRESENT: ICD-10-PCS | Mod: HCNC,S$GLB,, | Performed by: NURSE PRACTITIONER

## 2020-10-14 PROCEDURE — 99499 UNLISTED E&M SERVICE: CPT | Mod: S$GLB,,, | Performed by: NURSE PRACTITIONER

## 2020-10-14 PROCEDURE — 99214 PR OFFICE/OUTPT VISIT, EST, LEVL IV, 30-39 MIN: ICD-10-PCS | Mod: HCNC,S$GLB,, | Performed by: NURSE PRACTITIONER

## 2020-10-14 PROCEDURE — 3052F HG A1C>EQUAL 8.0%<EQUAL 9.0%: CPT | Mod: HCNC,CPTII,S$GLB, | Performed by: NURSE PRACTITIONER

## 2020-10-14 PROCEDURE — 3077F PR MOST RECENT SYSTOLIC BLOOD PRESSURE >= 140 MM HG: ICD-10-PCS | Mod: HCNC,CPTII,S$GLB, | Performed by: NURSE PRACTITIONER

## 2020-10-14 PROCEDURE — 99999 PR PBB SHADOW E&M-EST. PATIENT-LVL V: ICD-10-PCS | Mod: PBBFAC,HCNC,, | Performed by: NURSE PRACTITIONER

## 2020-10-14 PROCEDURE — 1101F PT FALLS ASSESS-DOCD LE1/YR: CPT | Mod: HCNC,CPTII,S$GLB, | Performed by: NURSE PRACTITIONER

## 2020-10-14 PROCEDURE — 3078F DIAST BP <80 MM HG: CPT | Mod: HCNC,CPTII,S$GLB, | Performed by: NURSE PRACTITIONER

## 2020-10-14 PROCEDURE — 99214 OFFICE O/P EST MOD 30 MIN: CPT | Mod: HCNC,S$GLB,, | Performed by: NURSE PRACTITIONER

## 2020-10-14 PROCEDURE — 1159F PR MEDICATION LIST DOCUMENTED IN MEDICAL RECORD: ICD-10-PCS | Mod: HCNC,S$GLB,, | Performed by: NURSE PRACTITIONER

## 2020-10-14 PROCEDURE — 3008F PR BODY MASS INDEX (BMI) DOCUMENTED: ICD-10-PCS | Mod: HCNC,CPTII,S$GLB, | Performed by: NURSE PRACTITIONER

## 2020-10-14 PROCEDURE — 1126F AMNT PAIN NOTED NONE PRSNT: CPT | Mod: HCNC,S$GLB,, | Performed by: NURSE PRACTITIONER

## 2020-10-14 RX ORDER — DULAGLUTIDE 0.75 MG/.5ML
0.75 INJECTION, SOLUTION SUBCUTANEOUS
Qty: 12 PEN | Refills: 3
Start: 2020-10-28 | End: 2022-01-18

## 2020-10-14 NOTE — PATIENT INSTRUCTIONS
Snacks can be an important part of a balanced, healthy meal plan. They allow you to eat more frequently, feeling full and satisfied throughout the day. Also, they allow you to spread carbohydrates evenly, which may stabilize blood sugars.  Plus, snacks are enjoyable!     The amount of carbohydrate needed at snacks varies. Generally, about 15-30 grams of carbohydrate per snack is recommended.  Below you will find some tasty treats.       0-5 gm carb   Crystal Light   Vitamin Water Zero   Herbal tea, unsweetened   2 tsp peanut butter on celery   1./2 cup sugar-free jell-o   1 sugar-free popsicle   ¼ cup blueberries   8oz Blue Kait unsweetened almond milk   5 baby carrots & celery sticks, cucumbers, bell peppers dipped in ¼ cup salsa, 2Tbsp light ranch dressing or 2Tbsp plain Greek yogurt   10 Goldfish crackers   ½ oz low-fat cheese or string cheese   1 closed handful of nuts, unsalted   1 Tbsp of sunflower seeds, unsalted   1 cup Smart Pop popcorn   1 whole grain brown rice cake        15 gm carb   1 small piece of fruit or ½ banana or 1/2 cup lite canned fruit   3 bora cracker squares   3 cups Smart Pop popcorn, top spray butter, [place]alvo lite salt or cinnamon and Truvia   5 Vanilla Wafers   ½ cup low fat, no added sugar ice cream or frozen yogurt (Blue bell, Blue Bunny, Weight Watchers, Skinny Cow)   ½ turkey, ham, or chicken sandwich   ½ c fruit with ½ c Cottage cheese   4-6 unsalted wheat crackers with 1 oz low fat cheese or 1 tbsp peanut butter    30-45 goldfish crackers (depending on flavor)    7-8 Church mini brown rice cakes (caramel, apple cinnamon, chocolate)    12 Church mini brown rice cakes (cheddar, bbq, ranch)    1/3 cup hummus dip with raw veg   1/2 whole wheat yolis, 1Tbsp hummus   Mini Pizza (1/2 whole wheat English muffin, low-fat  cheese, tomato sauce)   100 calorie snack pack (Oreo, Chips Ahoy, Ritz Mix, Baked Cheetos)   4-6 oz. light or Greek Style yogurt  (Chomishai, Yoplait, Okroberto, [facility])   ½ cup sugar-free pudding     6 in. wheat tortilla or yolis oven toasted chips (topped with spray butter flavoring, cinnamon, Truvia OR spray butter, garlic powder, chili powder)    18 BBQ Popchips (available at Target, Whole Foods, Fresh Market)                   Hypoglycemia (Low Blood Sugar)     Fast-acting sugar includes a cup of nonfat milk.     Too little sugar (glucose) in your blood is called hypoglycemia or low blood sugar. Low blood sugar usually means anything lower than 70 mg/dL. Talk with your healthcare provider about your target range and what level is too low for you. Diabetes itself doesnt cause low blood sugar. But some of the treatments for diabetes, such as pills or insulin, may raise your risk for it. Low blood sugar may cause you to pass out or have a seizure. So always treat low blood sugar right away, but don't overeat.  Special note: Always carry a source of fast-acting sugar and a snack in case of hypoglycemia.   What you may notice  If you have low blood sugar, you may have one or more of these symptoms:  · Shakiness or dizziness  · Cold, clammy skin or sweating  · Feelings of hunger  · Headache  · Nervousness  · A hard, fast heartbeat  · Weakness  · Confusion or irritability  · Blurred vision  · Having nightmares or waking up confused or sweating  · Numbness or tingling in the lips or tongue  What you should do  Here are tips to follow if you have hypoglycemia:   · First check your blood sugar. If it is too low (out of your target range), eat or drink 15 to 20 grams of fast-acting sugar. This may be 3 to 4 glucose tablets, 4 ounces (half a cup) of fruit juice or regular (nondiet) soda, 8 ounces (1 cup) of fat-free milk, or 1 tablespoon of honey. Dont take more than this, or your blood sugar may go too high.  · Wait 15 minutes. Then recheck your blood sugar if you can.  · If your blood sugar is still too low, repeat the steps above and check your  blood sugar again. If your blood sugar still has not returned to your target range, contact your healthcare provider or seek emergency care.  · Once your blood sugar returns to target range, eat a snack or meal.  Preventing low blood sugar  Things you can do include the following:   · If your condition needs a strict treatment plan, eat your meals and snacks at the same times each day. Dont skip meals!  · If your treatment plan lets you change when you eat and what you eat, learn how to change the time and dose of your rapid-acting insulin to match this.   · Ask your healthcare provider if it is safe for you to drink alcohol. Never drink on an empty stomach.  · Take your medicine at the prescribed times.  · Always carry a source of fast-acting sugar and a snack when youre away from home.  Other things to do  Additional tips include the following:  · Carry a medical ID card, a compact USB drive, or wear a medical alert bracelet or necklace. It should say that you have diabetes. It should also say what to do if you pass out or have a seizure.  · Make sure your family, friends, and coworkers know the signs of low blood sugar. Tell them what to do if your blood sugar falls very low and you cant treat yourself.  · Keep a glucagon emergency kit handy. Be sure your family, friends, and coworkers know how and when to use it. Check it regularly and replace the glucagon before it expires.  · Talk with your health care team about other things you can do to prevent low blood sugar.     If you have unexplained hypoglycemia or hypoglycemia several times, call your healthcare provider.   Date Last Reviewed: 5/1/2016  © 1001-8005 [employer]. [address] [place] [address], [place] [address]. All rights reserved. This information is not intended as a substitute for professional medical care. Always follow your healthcare professional's instructions.        Managing Diabetes: The A1C Test       Healthy red blood cells have  some glucose stuck to them. A high A1C means that unhealthy amounts of glucose are stuck to the cells.   What is the A1C test?  Using your meter helps you track your blood sugar every day. But your glucose meter tells you the value at the time of testing only. You also need to know if your treatment plan is keeping you healthy over time. The hemoglobin A1C (or glycated hemoglobin) test can help. This test measures your average blood sugar level over a few months. A higher A1C result means that you have a higher risk of developing complications.  The A1C test  The A1C is a blood test done by your healthcare provider. You will likely have an A1C test every 3 to 6 months.  Your blood glucose goal  A1C has been shown as a percentage. But it can also be shown as a number representing the estimated Average Glucose (eAG). Unlike the A1C percentage, eAG is a number similar to the numbers listed on your daily glucose monitor. Both A1C and eAG measure the amount of glucose stuck to a protein called hemoglobin in red blood cells. Your healthcare provider will help you figure out what your ideal A1C or eAG should be. Your target number will depend on your age, general health, and other factors. If your current number is too high, your treatment plan may need changes, such as different medicines.  Sample results  Most people aim for an A1c lower than 7%. Thats an eAG less than 154 mg/dL. Or, your healthcare provider may want you to aim for an A1C of 6%. Thats an eAG of 126 mg/dL.     Glucose calculator  Visit http://[email or web address].diabetes.org/diapro/glucose_calc for a chart that helps convert your A1C percentages into eAG numbers.   Date Last Reviewed: 6/1/2016  © 4688-1335 [employer]. [address] [place] [address]. All rights reserved. This information is not intended as a substitute for professional medical care. Always follow your healthcare professional's instructions.        Exercise: Adding  Intensity  You have been exercising for 30 minutes most days of the week. Now you can move on to the next stage: increasing the intensity. This means doing your activity in one or more of these ways:  · Longer. Exercise for 30 minutes or more without a break.  · Faster. Hike, run, or skate fast enough to raise your heart rate moderately--as if you had walked fast to catch a bus.  · More often. Do your activity 4 to 6 times a week instead of 1 to 3 times.    Not just gym class  Be creative. You can reach your health and fitness goals in many ways. Try some of these activities:  · Team sports, like basketball or soccer  · Social or recreational activities, like hiking or dancing  · Individual exercise, like cycling, swimming, or skating  · Group fitness classes, like aerobic classes or weight training  Safety first  Whatever activity you choose, think about safety:  · Wear the right safety gear and shoes for your activity.  · Drink plenty of water during and after workouts.  · Wear light-colored clothing if youre out when its dark.  · Make time to warm up before you exercise and cool down after.  · Carry ID (identification) with you if youre out alone. And be sure someone knows where youre going.  · If youre on foot, travel against traffic (except on blind corners). If youre on a bike, go with traffic. Obey the rules of the road.   Tips for sticking with it  · Find a workout partner or sports club. If you know someone is expecting you, youll be less likely to skip your workout.  · Pack a workout bag with everything you need. Then its ready when you are.  · Choose a few different activities so youll stay interested. Make it fun!  What will help you to stick with it?  1.   2.   3.   Date Last Reviewed: 8/13/2015  © 4874-6718 [employer]. [address] [place] [address], [place] [address]. All rights reserved. This information is not intended as a substitute for professional medical care. Always follow your  healthcare professional's instructions.        Diabetes: Sick-Day Plan  Infections, the flu, and even a cold, can cause your blood sugar to rise. And, eating less, nausea, and vomiting may cause your blood glucose to fall (hypoglycemia). Ask your healthcare provider to help you develop a sick-day plan. The following information can help.    Donts  Don'ts include the following:  · Diabetes medicines. Dont stop taking your diabetes medicine.  · Other medicines. Dont take other medicines, such as those for colds or the flu, without checking with your healthcare provider.  Dos  Do's include the following:  · Eating. Stick to your meal plan. If you cant eat, try fruit juice, regular gelatin, or frozen juice bars as directed by your healthcare provider.  · Drinking. Drink at least 1 glass of liquid every hour. If youre eating, these liquids should be sugar-free.  · Blood glucose. Check your blood sugar as often as directed by your healthcare provider. You may need to check it more often than usual.  · Ketones. Check your blood or urine for ketones. Ketones are the waste from burning fat instead of glucose for energy. Ketones are a warning sign of ketoacidosis. Ketoacidosis is a medical emergency. Ketoacidosis can happen to anyone with diabetes, but it's very rare in type 2 diabetes. It is usually only an issue if you have type 1 diabetes.  · Diabetes medicines.  ¨ Adjust your insulin according to your sick-day plan. Don't skip insulin. You need insulin even if you can't eat your normal meals.  ¨ If you take pills for diabetes (oral medicines), take your normal dose unless your healthcare provider tells you something different.  · Sugar-free medicines. Look for sugar-free cough drops and other medicines. Ask your healthcare provider if its OK for you to take these.  · Getting help. If you're alone, ask someone to check on you several times a day.  Try to get all these supplies together before you need them.  Call  your healthcare provider  Call your healthcare provider if you have any of the following:  · You vomit or have diarrhea for more than 6 hours.  · Your blood glucose level is higher than usual or more than 250 mg/dL after you have taken extra insulin (if recommended in your sick-day plan).  · You take oral medicine for diabetes, and your blood sugar is higher than usual or over 250 mg/dL, before a meal and stays that high for more than 24 hours.  · Your blood glucose is lower than usual or less than 70 mg/dL  · You have moderate to large amounts of ketones in your blood or urine.  · You arent better after 2 days.  Date Last Reviewed: 6/1/2016  © 5734-2504 [employer]. [address] [place] [address], [place] [address]. All rights reserved. This information is not intended as a substitute for professional medical care. Always follow your healthcare professional's instructions.        Dulaglutide injection  What is this medicine?  DULAGLUTIDE (DOO la GLOO tide) is used to improve blood sugar control in adults with type 2 diabetes. This medicine may be used with other oral diabetes medicines.  How should I use this medicine?  This medicine is for injection under the skin of your upper leg (thigh), stomach area, or upper arm. It is usually given once every week (every 7 days). You will be taught how to prepare and give this medicine. Use exactly as directed. Take your medicine at regular intervals. Do not take it more often than directed.  If you use this medicine with insulin, you should inject this medicine and the insulin separately. Do not mix them together. Do not give the injections right next to each other. Change (rotate) injection sites with each injection.  It is important that you put your used needles and syringes in a special sharps container. Do not put them in a trash can. If you do not have a sharps container, call your pharmacist or healthcare provider to get one.  A special MedGuide will be given  to you by the pharmacist with each prescription and refill. Be sure to read this information carefully each time.  Talk to your pediatrician regarding the use of this medicine in children. Special care may be needed.  What side effects may I notice from receiving this medicine?  Side effects that you should report to your doctor or health care professional as soon as possible:  · allergic reactions like skin rash, itching or hives, swelling of the face, lips, or tongue  · breathing problems  · signs and symptoms of low blood sugar such as feeling anxious, confusion, dizziness, increased hunger, unusually weak or tired, sweating, shakiness, cold, irritable, headache, blurred vision, fast heartbeat, loss of consciousness  · unusual stomach upset or pain  · vomiting  Side effects that usually do not require medical attention (Report these to your doctor or health care professional if they continue or are bothersome.):diarrhea  · heartburn  · loss of appetite  · nausea  · pain, redness, or irritation at site where injected  What may interact with this medicine?  Do not take this medicine with any of the following medications:  · gatifloxacin  Many medications may cause changes in blood sugar, these include:  · alcohol containing beverages  · aspirin and aspirin-like drugs  · chloramphenicol  · chromium  · diuretics  · female hormones, such as estrogens or progestins, birth control pills  · heart medicines  · isoniazid  · male hormones or anabolic steroids  · medications for weight loss  · medicines for allergies, asthma, cold, or cough  · medicines for mental problems  · medicines called MAO inhibitors - Nardil, Parnate, Marplan, Eldepryl  · niacin  · NSAIDS, such as ibuprofen  · pentamidine  · phenytoin  · probenecid  · quinolone antibiotics such as ciprofloxacin, levofloxacin, ofloxacin  · some herbal dietary supplements  · steroid medicines such as prednisone or cortisone  · thyroid hormonesSome medications can hide  the warning symptoms of low blood sugar (hypoglycemia). You may need to monitor your blood sugar more closely if you are taking one of these medications. These include:  · beta-blockers, often used for high blood pressure or heart problems (examples include atenolol, metoprolol, propranolol)  · clonidine  · guanethidine  · reserpine  What if I miss a dose?  If you miss a dose, take it as soon as you can within 3 days after the missed dose. Then take your next dose at your regular weekly time. If it has been longer than 3 days after the missed dose, do not take the missed dose. Take the next dose at your regular time. Do not take double or extra doses. If you have questions about a missed dose, contact your health care provider for advice.  Where should I keep my medicine?  Keep out of the reach of children.  Store this medicine in a refrigerator between 2 and 8 degrees C (36 and 46 degrees F). Do not freeze or use if the medicine has been frozen. Protect from light and excessive heat. Each single-dose pen or prefilled syringe can be kept at room temperature, not to exceed 30 degrees C (86 degrees F) for a total of 14 days, if needed. Store in the carton until use. Throw away any unused medicine after the expiration date.  What should I tell my health care provider before I take this medicine?  They need to know if you have any of these conditions:  · endocrine tumors (MEN 2) or if someone in your family had these tumors  · history of pancreatitis  · kidney disease  · liver disease  · stomach problems  · thyroid cancer or if someone in your family had thyroid cancer  · an unusual or allergic reaction to dulaglutide, other medicines, foods, dyes, or preservatives  · pregnant or trying to get pregnant  · breast-feeding  What should I watch for while using this medicine?  Visit your doctor or health care professional for regular checks on your progress.  A test called the HbA1C (A1C) will be monitored. This is a simple  blood test. It measures your blood sugar control over the last 2 to 3 months. You will receive this test every 3 to 6 months.  Learn how to check your blood sugar. Learn the symptoms of low and high blood sugar and how to manage them.  Always carry a quick-source of sugar with you in case you have symptoms of low blood sugar. Examples include hard sugar candy or glucose tablets. Make sure others know that you can choke if you eat or drink when you develop serious symptoms of low blood sugar, such as seizures or unconsciousness. They must get medical help at once.  Tell your doctor or health care professional if you have high blood sugar. You might need to change the dose of your medicine. If you are sick or exercising more than usual, you might need to change the dose of your medicine.  Do not skip meals. Ask your doctor or health care professional if you should avoid alcohol. Many nonprescription cough and cold products contain sugar or alcohol. These can affect blood sugar.  Wear a medical ID bracelet or chain, and carry a card that describes your disease and details of your medicine and dosage times.  NOTE:This sheet is a summary. It may not cover all possible information. If you have questions about this medicine, talk to your doctor, pharmacist, or health care provider. Copyright© 2017 [employer]

## 2020-10-19 ENCOUNTER — TELEPHONE (OUTPATIENT)
Dept: FAMILY MEDICINE | Facility: CLINIC | Age: 70
End: 2020-10-19

## 2020-10-19 NOTE — TELEPHONE ENCOUNTER
----- Message from Jackelyn Scott sent at 10/19/2020 12:53 PM CDT -----  Regarding: advice/office visit  Contact: [phone]/self  Type:  advice/office visit    Who Called:  self  Symptoms (please be specific):  severe cough not going away  How long has patient had these symptoms:   August  Pharmacy name and phone #:   [id number] - [facility] in [place] - [id number] AT [facility];  Would the patient rather a call back or a response via [facility]?  call  Best Call Back Number:    Additional Information:  She has been treated at  twice and the benzonatate (TESSALON PERLES) 100 MG capsules and Guaifenesin/with codeine cough syrup are not helping at all

## 2020-10-20 ENCOUNTER — TELEPHONE (OUTPATIENT)
Dept: FAMILY MEDICINE | Facility: CLINIC | Age: 70
End: 2020-10-20

## 2020-10-20 NOTE — TELEPHONE ENCOUNTER
----- Message from Adam Monaco sent at 10/20/2020 10:20 AM CDT -----  Type:  Needs Medical Advice    Who Called: Self  Reason:returning call  Would the patient rather a call back or a response via [facility]? call  Best Call Back Number: [phone]  Additional Information: none

## 2020-10-20 NOTE — TELEPHONE ENCOUNTER
Spoke to pt who reports a persistent dry cough that gets worst at night. She denies having any other symptoms. Pt wants to know if there is anything that she can do. Please advise.

## 2020-10-21 ENCOUNTER — OFFICE VISIT (OUTPATIENT)
Dept: URGENT CARE | Facility: CLINIC | Age: 70
End: 2020-10-21
Payer: MEDICARE

## 2020-10-21 VITALS
WEIGHT: 203 LBS | RESPIRATION RATE: 20 BRPM | BODY MASS INDEX: 35.97 KG/M2 | HEART RATE: 98 BPM | TEMPERATURE: 98 F | DIASTOLIC BLOOD PRESSURE: 82 MMHG | HEIGHT: 63 IN | SYSTOLIC BLOOD PRESSURE: 149 MMHG | OXYGEN SATURATION: 96 %

## 2020-10-21 DIAGNOSIS — Z23 INFLUENZA VACCINE ADMINISTERED: ICD-10-CM

## 2020-10-21 DIAGNOSIS — R05.3 CHRONIC COUGH: Primary | ICD-10-CM

## 2020-10-21 DIAGNOSIS — K21.9 GASTROESOPHAGEAL REFLUX DISEASE WITHOUT ESOPHAGITIS: ICD-10-CM

## 2020-10-21 LAB
CTP QC/QA: YES
SARS-COV-2 RDRP RESP QL NAA+PROBE: NEGATIVE

## 2020-10-21 PROCEDURE — 90694 FLU VACCINE - QUADRIVALENT - ADJUVANTED: ICD-10-PCS | Mod: S$GLB,,, | Performed by: FAMILY MEDICINE

## 2020-10-21 PROCEDURE — 99214 PR OFFICE/OUTPT VISIT, EST, LEVL IV, 30-39 MIN: ICD-10-PCS | Mod: 25,S$GLB,ICN, | Performed by: FAMILY MEDICINE

## 2020-10-21 PROCEDURE — G0008 FLU VACCINE - QUADRIVALENT - ADJUVANTED: ICD-10-PCS | Mod: S$GLB,,, | Performed by: FAMILY MEDICINE

## 2020-10-21 PROCEDURE — U0002 COVID-19 LAB TEST NON-CDC: HCPCS | Mod: QW,S$GLB,ICN, | Performed by: FAMILY MEDICINE

## 2020-10-21 PROCEDURE — G0008 ADMIN INFLUENZA VIRUS VAC: HCPCS | Mod: S$GLB,,, | Performed by: FAMILY MEDICINE

## 2020-10-21 PROCEDURE — 90694 VACC AIIV4 NO PRSRV 0.5ML IM: CPT | Mod: S$GLB,,, | Performed by: FAMILY MEDICINE

## 2020-10-21 PROCEDURE — U0002: ICD-10-PCS | Mod: QW,S$GLB,ICN, | Performed by: FAMILY MEDICINE

## 2020-10-21 PROCEDURE — 99214 OFFICE O/P EST MOD 30 MIN: CPT | Mod: 25,S$GLB,ICN, | Performed by: FAMILY MEDICINE

## 2020-10-21 RX ORDER — ESOMEPRAZOLE MAGNESIUM 40 MG/1
40 CAPSULE, DELAYED RELEASE ORAL
Qty: 90 CAPSULE | Refills: 0 | Status: SHIPPED | OUTPATIENT
Start: 2020-10-21 | End: 2021-04-15 | Stop reason: SDUPTHER

## 2020-10-21 NOTE — PROGRESS NOTES
"Subjective:       Patient ID: Chelly Ortiz is a 69 y.o. female.    Vitals:  height is 5' 2.5" (1.588 m) and weight is 92.1 kg (203 lb). Her temperature is 97.9 °F (36.6 °C). Her blood pressure is 149/82 (abnormal) and her pulse is 98. Her respiration is 20 and oxygen saturation is 96%.     Chief Complaint: COVID-19 Concerns    This is a 69 y.o. female   with Past Medical History:  No date: Allergy  No date: Cataract  No date: DDD (degenerative disc disease), lumbar  No date: Diabetes mellitus      Comment:  diet controlled  No date: Diabetes mellitus, type 2  No date: GERD (gastroesophageal reflux disease)  11: History of subconjunctival hemorrhage      Comment:  left eye  No date: Hyperlipidemia  No date: Hypertension  No date: IBS (irritable bowel syndrome)  No date: Obesity  No date: MYRIAM (obstructive sleep apnea)      Comment:  on CPAP  No date: Rotator cuff impingement syndrome  No date: Seasonal allergic conjunctivitis   and Past Surgical History:  10/3/13: CATARACT EXTRACTION W/  INTRAOCULAR LENS IMPLANT      Comment:  OD (dr. gardner)  No date:  SECTION      Comment:  x2  No date: CHOLECYSTECTOMY  2018: COLONOSCOPY; N/A      Comment:  Procedure: COLONOSCOPY;  Surgeon: Marie Mejia MD;  Location: [facility] ENDO;  Service: Endoscopy;                 Laterality: N/A;  10/9/2018: ENDOSCOPIC ULTRASOUND OF UPPER GASTROINTESTINAL TRACT; N/A      Comment:  Procedure: ULTRASOUND, UPPER GI TRACT, ENDOSCOPIC;                 Surgeon: Javy Simpson MD;  Location: [facility] ENDO;                 Service: Endoscopy;  Laterality: N/A;  2019: EXCISION OF LESION; N/A      Comment:  Procedure: EXCISION, LESION VULVA;  Surgeon: Liv Odell MD;  Location: [facility] OR;  Service: OB/GYN;                 Laterality: N/A;  latex allergy  No date: EYE SURGERY  No date: HYSTERECTOMY      Comment:  ovaries intact, due to DUB  No date: TUBAL LIGATION  who presents today with a chief " complaint of COVID 19 concerns. She's had a cough for the past two months. She's been prescribed cough medication and says that she still has a hacking cough.     Cough  This is a new problem. The current episode started more than 1 month ago. The problem has been unchanged. The problem occurs every few minutes. The cough is non-productive. Pertinent negatives include no chills, ear pain, eye redness, fever, hemoptysis, myalgias, rash, sore throat, shortness of breath or wheezing. Nothing aggravates the symptoms. Treatments tried: tessalon perles. The treatment provided mild relief.       Constitution: Negative for chills, sweating, fatigue and fever.   HENT: Negative for ear pain, congestion, sinus pain, sinus pressure, sore throat and voice change.    Neck: Negative for painful lymph nodes.   Eyes: Negative for eye redness.   Respiratory: Positive for cough. Negative for chest tightness, sputum production, bloody sputum, COPD, shortness of breath, stridor, wheezing and asthma.    Gastrointestinal: Negative for nausea and vomiting.   Musculoskeletal: Negative for muscle ache.   Skin: Negative for rash.   Allergic/Immunologic: Negative for seasonal allergies and asthma.   Hematologic/Lymphatic: Negative for swollen lymph nodes.       Objective:      Physical Exam   Constitutional: She does not appear ill. No distress. obesity  HENT:   Head: Normocephalic and atraumatic.   Nose: Nose normal.   Mouth/Throat: Mucous membranes are moist. No posterior oropharyngeal erythema.   Eyes: Pupils are equal, round, and reactive to light. extraocular movement intact  Neck: Normal range of motion. Neck supple.   Cardiovascular: Normal rate, regular rhythm, normal heart sounds and normal pulses.   Pulmonary/Chest: Effort normal and breath sounds normal. She has no wheezes.   Abdominal: Soft. Normal appearance.   Neurological: She is alert.   Nursing note and vitals reviewed.    Results for orders placed or performed in visit on  10/21/20   POCT COVID-19 Rapid Screening   Result Value Ref Range    POC Rapid COVID Negative Negative     Acceptable Yes      *Note: Due to a large number of results and/or encounters for the requested time period, some results have not been displayed. A complete set of results can be found in Results Review.         Assessment:       1. Chronic cough    2. Gastroesophageal reflux disease without esophagitis    3. Influenza vaccine administered      ? Cough relationship to GERD. Refill of nexium given. To discuss with PCP possible conection.   Plan:         Chronic cough  -     POCT COVID-19 Rapid Screening  -     esomeprazole (NEXIUM) 40 MG capsule; Take 1 capsule (40 mg total) by mouth before breakfast.  Dispense: 90 capsule; Refill: 0    Gastroesophageal reflux disease without esophagitis  -     esomeprazole (NEXIUM) 40 MG capsule; Take 1 capsule (40 mg total) by mouth before breakfast.  Dispense: 90 capsule; Refill: 0    Influenza vaccine administered  -     Influenza (FLUAD) - Quadrivalent (Adjuvanted) *Preferred* (65+) (PF)

## 2020-10-21 NOTE — PATIENT INSTRUCTIONS

## 2020-10-30 ENCOUNTER — TELEPHONE (OUTPATIENT)
Dept: SLEEP MEDICINE | Facility: CLINIC | Age: 70
End: 2020-10-30

## 2020-10-30 NOTE — TELEPHONE ENCOUNTER
----- Message from Jesus Alberto Mayer sent at 10/28/2020  2:18 PM CDT -----  Regarding: pt wants to sched. annual  Contact: [phone]  Pt wants to sched annual. Pts states she gets her machine checked, and its past her 6 month f/u  Please call to bill. @[phone]

## 2020-11-02 ENCOUNTER — PATIENT MESSAGE (OUTPATIENT)
Dept: FAMILY MEDICINE | Facility: CLINIC | Age: 70
End: 2020-11-02

## 2020-11-02 ENCOUNTER — PATIENT OUTREACH (OUTPATIENT)
Dept: ADMINISTRATIVE | Facility: OTHER | Age: 70
End: 2020-11-02

## 2020-11-02 DIAGNOSIS — Z12.31 ENCOUNTER FOR SCREENING MAMMOGRAM FOR MALIGNANT NEOPLASM OF BREAST: Primary | ICD-10-CM

## 2020-11-02 NOTE — PROGRESS NOTES
Care Everywhere: updated  Immunization: updated(delay in links)   Health Maintenance: updated  Media Review:   Legacy Review:   Order placed: mammogram   Upcoming appts

## 2020-11-03 ENCOUNTER — TELEPHONE (OUTPATIENT)
Dept: FAMILY MEDICINE | Facility: CLINIC | Age: 70
End: 2020-11-03

## 2020-11-03 DIAGNOSIS — K21.9 GASTROESOPHAGEAL REFLUX DISEASE, UNSPECIFIED WHETHER ESOPHAGITIS PRESENT: Primary | ICD-10-CM

## 2020-11-04 NOTE — TELEPHONE ENCOUNTER
EGD was ordered.      
Spoke to Patient an advised her that an EGD was ordered and she should expect a phone call from GI to schedule an appointment. Patient voiced understanding   
1.97

## 2020-11-10 ENCOUNTER — OFFICE VISIT (OUTPATIENT)
Dept: PULMONOLOGY | Facility: CLINIC | Age: 70
End: 2020-11-10
Payer: MEDICARE

## 2020-11-10 VITALS
HEIGHT: 63 IN | WEIGHT: 202.63 LBS | SYSTOLIC BLOOD PRESSURE: 144 MMHG | BODY MASS INDEX: 35.9 KG/M2 | OXYGEN SATURATION: 95 % | HEART RATE: 83 BPM | DIASTOLIC BLOOD PRESSURE: 70 MMHG

## 2020-11-10 DIAGNOSIS — J45.991 COUGH VARIANT ASTHMA: Primary | ICD-10-CM

## 2020-11-10 PROCEDURE — 3008F PR BODY MASS INDEX (BMI) DOCUMENTED: ICD-10-PCS | Mod: HCNC,CPTII,S$GLB, | Performed by: EMERGENCY MEDICINE

## 2020-11-10 PROCEDURE — 99999 PR PBB SHADOW E&M-EST. PATIENT-LVL V: ICD-10-PCS | Mod: PBBFAC,HCNC,, | Performed by: EMERGENCY MEDICINE

## 2020-11-10 PROCEDURE — 3078F DIAST BP <80 MM HG: CPT | Mod: HCNC,CPTII,S$GLB, | Performed by: EMERGENCY MEDICINE

## 2020-11-10 PROCEDURE — 99999 PR PBB SHADOW E&M-EST. PATIENT-LVL V: CPT | Mod: PBBFAC,HCNC,, | Performed by: EMERGENCY MEDICINE

## 2020-11-10 PROCEDURE — 1126F AMNT PAIN NOTED NONE PRSNT: CPT | Mod: HCNC,S$GLB,, | Performed by: EMERGENCY MEDICINE

## 2020-11-10 PROCEDURE — 3078F PR MOST RECENT DIASTOLIC BLOOD PRESSURE < 80 MM HG: ICD-10-PCS | Mod: HCNC,CPTII,S$GLB, | Performed by: EMERGENCY MEDICINE

## 2020-11-10 PROCEDURE — 1101F PT FALLS ASSESS-DOCD LE1/YR: CPT | Mod: HCNC,CPTII,S$GLB, | Performed by: EMERGENCY MEDICINE

## 2020-11-10 PROCEDURE — 1101F PR PT FALLS ASSESS DOC 0-1 FALLS W/OUT INJ PAST YR: ICD-10-PCS | Mod: HCNC,CPTII,S$GLB, | Performed by: EMERGENCY MEDICINE

## 2020-11-10 PROCEDURE — 3008F BODY MASS INDEX DOCD: CPT | Mod: HCNC,CPTII,S$GLB, | Performed by: EMERGENCY MEDICINE

## 2020-11-10 PROCEDURE — 3077F SYST BP >= 140 MM HG: CPT | Mod: HCNC,CPTII,S$GLB, | Performed by: EMERGENCY MEDICINE

## 2020-11-10 PROCEDURE — 1126F PR PAIN SEVERITY QUANTIFIED, NO PAIN PRESENT: ICD-10-PCS | Mod: HCNC,S$GLB,, | Performed by: EMERGENCY MEDICINE

## 2020-11-10 PROCEDURE — 3077F PR MOST RECENT SYSTOLIC BLOOD PRESSURE >= 140 MM HG: ICD-10-PCS | Mod: HCNC,CPTII,S$GLB, | Performed by: EMERGENCY MEDICINE

## 2020-11-10 PROCEDURE — 1159F PR MEDICATION LIST DOCUMENTED IN MEDICAL RECORD: ICD-10-PCS | Mod: HCNC,S$GLB,, | Performed by: EMERGENCY MEDICINE

## 2020-11-10 PROCEDURE — 1159F MED LIST DOCD IN RCRD: CPT | Mod: HCNC,S$GLB,, | Performed by: EMERGENCY MEDICINE

## 2020-11-10 PROCEDURE — 99204 PR OFFICE/OUTPT VISIT, NEW, LEVL IV, 45-59 MIN: ICD-10-PCS | Mod: HCNC,S$GLB,, | Performed by: EMERGENCY MEDICINE

## 2020-11-10 PROCEDURE — 99204 OFFICE O/P NEW MOD 45 MIN: CPT | Mod: HCNC,S$GLB,, | Performed by: EMERGENCY MEDICINE

## 2020-11-10 RX ORDER — MONTELUKAST SODIUM 10 MG/1
10 TABLET ORAL NIGHTLY
Qty: 30 TABLET | Refills: 5 | Status: SHIPPED | OUTPATIENT
Start: 2020-11-10 | End: 2021-04-05

## 2020-11-10 RX ORDER — FLUTICASONE PROPIONATE AND SALMETEROL 250; 50 UG/1; UG/1
1 POWDER RESPIRATORY (INHALATION) 2 TIMES DAILY
Qty: 60 EACH | Refills: 5 | Status: SHIPPED | OUTPATIENT
Start: 2020-11-10 | End: 2021-11-11 | Stop reason: ALTCHOICE

## 2020-11-10 NOTE — PROGRESS NOTES
Pulmonary & Critical Care Medicine   Clinic Note    HPI:    71 y/o female with h/o HTN, DM HLD + MYRIAM on CPAP who presents with dry cough since the end of August. She sought care with her PCP who has since tried albuterol inhaler, Tessalon perles, and guaifenesin/codeine cough syrup but the cough persists. She states the inhaler has helped the most and she uses this once a day. She saw a pulmonologist several years ago but unsure of what diagnosis and plan resulted but was given an inhaler. She states the cough occurs throughout the day and becomes a barking cough at night. She states her  smokes cigarettes and the smoke will trigger her to cough as will fresh-cut grass and dust. She notices the cough more with eating. She takes Nexium daily for the past 2 years for reflux and had an EGD yesterday that is still pending read. She states she sometimes wheezes with the cough.    Additional Pulmonary History:   Occupational/Environmental Exposures:  Retired, stays home often due to pandemic. Smoke, dust, fresh-cut grass are cough triggers.   Exposure to Animals/Pets: no  Travel History: no  History of exposures to TB: no   Family History of Lung Cancer: Brother  at age 38, no thyroid or throat cancer   Childhood history of Lung Disease: no    Past Medical History:   Diagnosis Date    Allergy     Cataract     DDD (degenerative disc disease), lumbar     Diabetes mellitus     diet controlled    Diabetes mellitus, type 2     GERD (gastroesophageal reflux disease)     History of subconjunctival hemorrhage 11    left eye    Hyperlipidemia     Hypertension     IBS (irritable bowel syndrome)     Obesity     MYRIAM (obstructive sleep apnea)     on CPAP    Rotator cuff impingement syndrome     Seasonal allergic conjunctivitis      Past Surgical History:   Procedure Laterality Date    CATARACT EXTRACTION W/  INTRAOCULAR LENS IMPLANT  10/3/13    OD (dr. gardner)     SECTION      x2     CHOLECYSTECTOMY      COLONOSCOPY N/A 12/5/2018    Procedure: COLONOSCOPY;  Surgeon: Marie Mejia MD;  Location: [facility] ENDO;  Service: Endoscopy;  Laterality: N/A;    ENDOSCOPIC ULTRASOUND OF UPPER GASTROINTESTINAL TRACT N/A 10/9/2018    Procedure: ULTRASOUND, UPPER GI TRACT, ENDOSCOPIC;  Surgeon: Javy Simpson MD;  Location: [facility] ENDO;  Service: Endoscopy;  Laterality: N/A;    EXCISION OF LESION N/A 2/13/2019    Procedure: EXCISION, LESION VULVA;  Surgeon: Liv Odell MD;  Location: [facility] OR;  Service: OB/GYN;  Laterality: N/A;  latex allergy    EYE SURGERY      HYSTERECTOMY      ovaries intact, due to DUB    TUBAL LIGATION       Family/Social History: Reviewed and updated.       Drug Allergies:   Review of patient's allergies indicates:   Allergen Reactions    Prednisone Other (See Comments)     hipper    Latex      Other reaction(s): Itching    Ace inhibitors Hives     Other reaction(s): Cough    Latex, natural rubber Itching     Review of Systems:   Constitutional: Negative for fever, chills, diaphoresis, activity change, appetite change and fatigue.   HENT: Negative for congestion, drooling, facial swelling, hearing loss, rhinorrhea, sinus pressure, tinnitus, trouble swallowing and voice change.    Eyes: Negative for photophobia, pain and visual disturbance.   Respiratory: Negative for cough, chest tightness and shortness of breath.    Cardiovascular: Negative for chest pain, palpitations and leg swelling.   Gastrointestinal: Negative for nausea, vomiting, abdominal pain, diarrhea and abdominal distention.   Endocrine: Negative for cold intolerance, heat intolerance, polydipsia and polyuria.   Genitourinary: Negative for dysuria, frequency and hematuria.   Musculoskeletal: Negative for myalgias, back pain, arthralgias, neck pain and neck stiffness.   Skin: Negative for color change, pallor, rash and wound.   Allergic/Immunologic: Negative for immunocompromised state.   Neurological: Negative  "for dizziness, weakness and headaches.    A comprehensive 12-point review of systems was performed, and is negative except for those items mentioned above in the HPI section of this note.     Vital Signs:    BP (!) 144/70 (BP Location: Right arm, Patient Position: Sitting)   Pulse 83   Ht 5' 3" (1.6 m)   Wt 91.9 kg (202 lb 9.6 oz)   LMP  (LMP Unknown)   SpO2 95%   BMI 35.89 kg/m²       Physical Exam:   GEN- NAD AAOx3 Well Built, Well Appearing   HEENT- ATNC, PERRLA, EOMI, OP-Cl. No JVD, LAD or bruit noted. Trachea Midline.   CV- RRR No M/R/G  RESP- CTA-Bilateral   GI- S/NT/ND. Positive BS X 4. No HSM Noted  BACK- Spine midline. No step off, crepitus or deformity noted. No midline TTP.   Ext- MAEW, No deformity. No edema or rashes noted.   Neuro- Strength 5/5 symmetric. CN 2-12 intact. Normal gait. Normal sensation.      Personal Review and Summary of Prior Diagnostics    Laboratory Studies: Reviewed          POC Rapid COVID Negative Negative           Hemoglobin A1C 4.0 - 5.6 % 8.5High       Sodium 136 - 145 mmol/L 139    Potassium 3.5 - 5.1 mmol/L 3.6    Chloride 95 - 110 mmol/L 101    CO2 23 - 29 mmol/L 28    Glucose 70 - 110 mg/dL 205High     BUN 8 - 23 mg/dL 10    Creatinine 0.5 - 1.4 mg/dL 0.9    Calcium 8.7 - 10.5 mg/dL 9.3    Total Protein 6.0 - 8.4 g/dL 7.2    Albumin 3.5 - 5.2 g/dL 3.8    Total Bilirubin 0.1 - 1.0 mg/dL 0.4    Comment: For infants and newborns, interpretation of results should be based   on gestational age, weight and in agreement with clinical   observations.   Premature Infant recommended reference ranges:   Up to 24 hours.............<8.0 mg/dL   Up to 48 hours............<12.0 mg/dL   3-5 days..................<15.0 mg/dL   6-29 days.................<15.0 mg/dL    Alkaline Phosphatase 55 - 135 U/L 101    AST 10 - 40 U/L 24    ALT 10 - 44 U/L 23    Anion Gap 8 - 16 mmol/L 10    eGFR if African American >60 mL/min/1.73 m^2 >60.0    eGFR if non African American >60 mL/min/1.73 m^2 " >60.0      WBC 3.90 - 12.70 K/uL 6.31    RBC 4.00 - 5.40 M/uL 4.87    Hemoglobin 12.0 - 16.0 g/dL 12.3    Hematocrit 37.0 - 48.5 % 39.2    MCV 82 - 98 fL 81Low     MCH 27.0 - 31.0 pg 25.3Low     MCHC 32.0 - 36.0 g/dL 31.4Low     RDW 11.5 - 14.5 % 16.2High     Platelets 150 - 350 K/uL 311    MPV 9.2 - 12.9 fL 10.4    Immature Granulocytes 0.0 - 0.5 % 0.3    Gran # (ANC) 1.8 - 7.7 K/uL 2.9    Immature Grans (Abs) 0.00 - 0.04 K/uL 0.02    Comment: Mild elevation in immature granulocytes is non specific and   can be seen in a variety of conditions including stress response,   acute inflammation, trauma and pregnancy. Correlation with other   laboratory and clinical findings is essential.    Lymph # 1.0 - 4.8 K/uL 2.7    Mono # 0.3 - 1.0 K/uL 0.5    Eos # 0.0 - 0.5 K/uL 0.1    Baso # 0.00 - 0.20 K/uL 0.03    nRBC 0 /100 WBC 0    Gran % 38.0 - 73.0 % 46.3    Lymph % 18.0 - 48.0 % 43.1    Mono % 4.0 - 15.0 % 8.2    Eosinophil % 0.0 - 8.0 % 1.6    Basophil % 0.0 - 1.9 % 0.5          Microbiology Data: None     Summary of Chest Imaging Personally Reviewed:     CXR- The lungs are clear, with normal appearance of pulmonary vasculature and no pleural effusion or pneumothorax.  The cardiac silhouette is normal in size. The hilar and mediastinal contours are unremarkable.  Bones are intact.  Healed left rib fracture.  Scoliosis of the thoracolumbar spine.    CT A/P- Visualized lungs are clear.  No pleural effusions.    2D Echo: 2017    1 - Normal left ventricular systolic function (EF 60-65%).     2 - Left ventricular diastolic dysfunction.     3 - Mild left atrial enlargement.     4 - Normal right ventricular systolic function .     5 - The estimated PA systolic pressure is 23 mmHg.     Holter-   TEST DESCRIPTION   PREDOMINANT RHYTHM   1. Sinus rhythm with heart rates varying between 61 and 118 bpm with an average of 82 bpm.   VENTRICULAR ARRHYTHMIAS   1. There were very rare PVCs recorded totalling 7 and averaging less than 1  per hour.  There was 1 couplet.   2. There were no episodes of ventricular tachycardia.   SUPRA VENTRICULAR ARRHYTHMIAS   1. There were very rare PACs recorded totalling 12 and averaging less than 1 per hour.   2. There were no episodes of sustained supraventricular tachycardia.   SINUS NODE FUNCTION   1. There was no evidence of high grade SA ambrosio block.   AV CONDUCTION   1. There was no evidence of high grade AV block.   2. The longest RR interval was 1200 msec    PFT's: None       EGD 2018- Impression:           - Medium-sized hiatal hernia.                         - Normal stomach.                         - No gross lesions in the entire examined duodenum.                         - There was no sign of significant pathology in the                         common bile duct.                         - There was mild dilation in the left intrahepatic                         bile duct(s).                         - There was no sign of significant pathology in the                         entire pancreas.                         - No specimens collected.   Recommendation:       - Discharge patient to home (ambulatory).                         - Patient has a contact number available for                         emergencies. The signs and symptoms of potential                         delayed complications were discussed with the                         patient. Return to normal activities tomorrow.                         Written discharge instructions were provided to the                         patient.                         - Will plan for interval MRI/MRCP and LFTs in 3                         months for follow-up of mild intrahepatic duct                         dilation.     Assessment:       1. Cough variant asthma        Outpatient Encounter Medications as of 11/10/2020   Medication Sig Dispense Refill    ACCU-CHEK SHILOH PLUS METER Misc Inject 1 Units into the skin 2 (two) times daily. (Patient not taking:  Reported on 10/21/2020) 1 each 0    albuterol (PROVENTIL/VENTOLIN HFA) 90 mcg/actuation inhaler Inhale 1-2 puffs into the lungs every 4 (four) hours as needed for Wheezing. (Patient not taking: Reported on 10/21/2020) 18 g 2    amLODIPine (NORVASC) 5 MG tablet TAKE 1 TABLET EVERY DAY 90 tablet 3    aspirin (ECOTRIN) 81 MG EC tablet Take 81 mg by mouth once daily.      azelastine (OPTIVAR) 0.05 % ophthalmic solution INSTILL 1 DROP INTO BOTH EYES TWICE A DAY 6 mL 1    benzonatate (TESSALON PERLES) 100 MG capsule Take 1 capsule (100 mg total) by mouth every 6 (six) hours as needed for Cough. 30 capsule 1    benzonatate (TESSALON PERLES) 100 MG capsule Take 1 capsule (100 mg total) by mouth every 6 (six) hours as needed for Cough. (Patient not taking: Reported on 10/21/2020) 30 capsule 1    blood glucose control high,low (ACCU-CHEK SHILOH CONTROL SOLN) Soln Use as directed 1 each 3    blood sugar diagnostic Strp 1 strip by Misc.(Non-Drug; Combo Route) route 3 (three) times daily. 300 strip 3    cetirizine (ZYRTEC) 10 MG tablet Take 1 tablet (10 mg total) by mouth every evening. 90 tablet 0    citalopram (CELEXA) 10 MG tablet TAKE 1 TABLET (10 MG TOTAL) BY MOUTH ONCE DAILY. 90 tablet 3    dulaglutide (TRULICITY) 0.75 mg/0.5 mL pen injector Inject 0.75 mg into the skin every 7 days. 12 pen 3    ergocalciferol (ERGOCALCIFEROL) 50,000 unit Cap TAKE 1 CAPSULE EVERY 7 DAYS. 12 capsule 1    esomeprazole (NEXIUM) 40 MG capsule Take 1 capsule (40 mg total) by mouth before breakfast. 90 capsule 0    gabapentin (NEURONTIN) 100 MG capsule TAKE ONE CAPSULE BY MOUTH 3 TIMES A DAY 90 capsule 0    hydroquinone 4 % Crea Use hs on dark spots 28.35 g 3    insulin aspart U-100 (NOVOLOG FLEXPEN U-100 INSULIN) 100 unit/mL (3 mL) InPn pen Inject 12 units w/ meals plus scale 150-200+2, 201-250+4, 251-300+6, 301-350+8, >350+10. Max daily 50 units. 15 mL 6    insulin detemir U-100 (LEVEMIR FLEXTOUCH U-100 INSULN) 100 unit/mL (3  "mL) InPn pen Inject 45 units into the skin at night. 15 mL 1    insulin syringe-needle U-100 0.5 mL 31 gauge x 5/16" Syrg Use nightly. 90 day 100 each 3    lancets (ACCU-CHEK SOFTCLIX LANCETS) Misc Uses in lancing device 3 times a day. 300 each 3    lidocaine HCL 2% (XYLOCAINE) 2 % jelly Apply topically once daily. 30 mL 2    losartan (COZAAR) 50 MG tablet TAKE 1 TABLET EVERY DAY 90 tablet 3    magnesium oxide (MAG-OX) 400 mg tablet Take 1 tablet (400 mg total) by mouth 2 (two) times daily. 180 tablet 3    pen needle, diabetic 31 gauge x 3/16" Ndle Uses 4 x a day. 400 each 3    polyethylene glycol (GLYCOLAX) 17 gram PwPk Take 17 g by mouth once daily. 30 each 0    pravastatin (PRAVACHOL) 10 MG tablet TAKE 1 TABLET EVERY DAY 90 tablet 3    traZODone (DESYREL) 50 MG tablet TAKE 1 TABLET EVERY NIGHT AS NEEDED 90 tablet 3    varicella-zoster gE-AS01B, PF, (SHINGRIX) 50 mcg/0.5 mL injection Inject into the muscle. 0.5 mL 1     No facility-administered encounter medications on file as of 11/10/2020.      Orders Placed This Encounter   Procedures    Spirometry with/without bronchodilator     Standing Status:   Future     Standing Expiration Date:   11/10/2021       Plan:            Chelly was seen today for cough  Diagnoses and all orders for this visit:    Cough variant asthma  Cough triggered by smoke, dust, grass and unrelieved by PPI, codeine-based cough syrups, Tessalon Perles.  -     Spirometry with/without bronchodilator; Future  -     montelukast (SINGULAIR) 10 mg tablet; Take 1 tablet (10 mg total) by mouth every evening.  -     fluticasone-salmeterol diskus inhaler 250-50 mcg; Inhale 1 puff into the lungs 2 (two) times daily. Controller. Explained use of inhaler in office and also recommended her to speak with pharmacist once she obtains the inhaler to better familiarize herself with proper use.        "

## 2020-11-11 ENCOUNTER — TELEPHONE (OUTPATIENT)
Dept: SLEEP MEDICINE | Facility: CLINIC | Age: 70
End: 2020-11-11

## 2020-11-11 NOTE — TELEPHONE ENCOUNTER
Visit Type: EST PATIENT - VIRTUAL (DT)   Date        Time      Length    Provider                  Department   11/11/2020   2:00 PM  30 mins.  Cherry Quezada MD      [facility]      Reason for Cancellation: Other

## 2020-11-17 ENCOUNTER — PATIENT MESSAGE (OUTPATIENT)
Dept: PHARMACY | Facility: CLINIC | Age: 70
End: 2020-11-17

## 2020-11-17 ENCOUNTER — PATIENT MESSAGE (OUTPATIENT)
Dept: GASTROENTEROLOGY | Facility: CLINIC | Age: 70
End: 2020-11-17

## 2020-11-17 ENCOUNTER — TELEPHONE (OUTPATIENT)
Dept: PHARMACY | Facility: CLINIC | Age: 70
End: 2020-11-17

## 2020-11-23 ENCOUNTER — PATIENT MESSAGE (OUTPATIENT)
Dept: INTERNAL MEDICINE | Facility: CLINIC | Age: 70
End: 2020-11-23

## 2020-12-04 ENCOUNTER — PATIENT MESSAGE (OUTPATIENT)
Dept: INTERNAL MEDICINE | Facility: CLINIC | Age: 70
End: 2020-12-04

## 2020-12-05 ENCOUNTER — PATIENT MESSAGE (OUTPATIENT)
Dept: INTERNAL MEDICINE | Facility: CLINIC | Age: 70
End: 2020-12-05

## 2020-12-08 ENCOUNTER — HOSPITAL ENCOUNTER (OUTPATIENT)
Dept: RADIOLOGY | Facility: HOSPITAL | Age: 70
Discharge: HOME | End: 2020-12-08
Attending: FAMILY MEDICINE
Payer: MEDICARE

## 2020-12-08 DIAGNOSIS — Z12.31 ENCOUNTER FOR SCREENING MAMMOGRAM FOR MALIGNANT NEOPLASM OF BREAST: ICD-10-CM

## 2020-12-08 DIAGNOSIS — N64.4 BREAST PAIN: ICD-10-CM

## 2020-12-09 ENCOUNTER — PATIENT MESSAGE (OUTPATIENT)
Dept: INTERNAL MEDICINE | Facility: CLINIC | Age: 70
End: 2020-12-09

## 2020-12-09 RX ORDER — INSULIN DETEMIR 100 [IU]/ML
INJECTION, SOLUTION SUBCUTANEOUS
Qty: 15 ML | Refills: 1 | Status: SHIPPED | OUTPATIENT
Start: 2020-12-09 | End: 2020-12-09

## 2020-12-09 RX ORDER — INSULIN ASPART 100 [IU]/ML
INJECTION, SOLUTION INTRAVENOUS; SUBCUTANEOUS
Qty: 15 ML | Refills: 6 | Status: SHIPPED | OUTPATIENT
Start: 2020-12-09 | End: 2022-03-17 | Stop reason: SDUPTHER

## 2020-12-09 RX ORDER — INSULIN DETEMIR 100 [IU]/ML
INJECTION, SOLUTION SUBCUTANEOUS
Qty: 15 ML | Refills: 6 | Status: SHIPPED | OUTPATIENT
Start: 2020-12-09 | End: 2022-03-18 | Stop reason: SDUPTHER

## 2020-12-09 RX ORDER — INSULIN ASPART 100 [IU]/ML
INJECTION, SOLUTION INTRAVENOUS; SUBCUTANEOUS
Qty: 15 ML | Refills: 6 | Status: SHIPPED | OUTPATIENT
Start: 2020-12-09 | End: 2020-12-09

## 2020-12-09 RX ORDER — INSULIN DETEMIR 100 [IU]/ML
INJECTION, SOLUTION SUBCUTANEOUS
Qty: 15 ML | Refills: 11 | Status: SHIPPED | OUTPATIENT
Start: 2020-12-09 | End: 2021-01-14

## 2020-12-09 RX ORDER — INSULIN ASPART 100 [IU]/ML
INJECTION, SOLUTION INTRAVENOUS; SUBCUTANEOUS
Qty: 15 ML | Refills: 11 | Status: SHIPPED | OUTPATIENT
Start: 2020-12-09 | End: 2021-01-14

## 2020-12-11 ENCOUNTER — TELEPHONE (OUTPATIENT)
Dept: INTERNAL MEDICINE | Facility: CLINIC | Age: 70
End: 2020-12-11

## 2020-12-11 ENCOUNTER — PATIENT MESSAGE (OUTPATIENT)
Dept: OTHER | Facility: OTHER | Age: 70
End: 2020-12-11

## 2020-12-11 NOTE — TELEPHONE ENCOUNTER
----- Message from Caroline Castro sent at 12/11/2020 10:38 AM CST -----  Regarding: Pt self Mobile/Home [phone]  Patient would like a call back in regards to her saying that she would like to know where should she go to get her script at a primary checkout? I really did not understand what the patient was talking about?

## 2020-12-21 ENCOUNTER — IMMUNIZATION (OUTPATIENT)
Dept: PHARMACY | Facility: CLINIC | Age: 70
End: 2020-12-21
Payer: MEDICARE

## 2021-01-05 ENCOUNTER — PATIENT MESSAGE (OUTPATIENT)
Dept: ADMINISTRATIVE | Facility: HOSPITAL | Age: 71
End: 2021-01-05

## 2021-01-13 ENCOUNTER — PATIENT MESSAGE (OUTPATIENT)
Dept: SLEEP MEDICINE | Facility: CLINIC | Age: 71
End: 2021-01-13

## 2021-01-13 ENCOUNTER — LAB VISIT (OUTPATIENT)
Dept: LAB | Facility: HOSPITAL | Age: 71
End: 2021-01-13
Attending: NURSE PRACTITIONER
Payer: MEDICARE

## 2021-01-13 ENCOUNTER — PATIENT OUTREACH (OUTPATIENT)
Dept: ADMINISTRATIVE | Facility: OTHER | Age: 71
End: 2021-01-13

## 2021-01-13 DIAGNOSIS — E11.69 HYPERLIPIDEMIA ASSOCIATED WITH TYPE 2 DIABETES MELLITUS: ICD-10-CM

## 2021-01-13 DIAGNOSIS — E11.42 DIABETIC POLYNEUROPATHY ASSOCIATED WITH TYPE 2 DIABETES MELLITUS: ICD-10-CM

## 2021-01-13 DIAGNOSIS — E78.5 HYPERLIPIDEMIA ASSOCIATED WITH TYPE 2 DIABETES MELLITUS: ICD-10-CM

## 2021-01-13 PROCEDURE — 36415 COLL VENOUS BLD VENIPUNCTURE: CPT | Mod: PO

## 2021-01-13 PROCEDURE — 80061 LIPID PANEL: CPT

## 2021-01-13 PROCEDURE — 83036 HEMOGLOBIN GLYCOSYLATED A1C: CPT

## 2021-01-14 ENCOUNTER — OFFICE VISIT (OUTPATIENT)
Dept: INTERNAL MEDICINE | Facility: CLINIC | Age: 71
End: 2021-01-14
Payer: MEDICARE

## 2021-01-14 VITALS
HEART RATE: 71 BPM | WEIGHT: 202.19 LBS | BODY MASS INDEX: 35.82 KG/M2 | OXYGEN SATURATION: 95 % | HEIGHT: 63 IN | SYSTOLIC BLOOD PRESSURE: 138 MMHG | DIASTOLIC BLOOD PRESSURE: 72 MMHG

## 2021-01-14 DIAGNOSIS — G89.29 CHRONIC BILATERAL LOW BACK PAIN WITHOUT SCIATICA: ICD-10-CM

## 2021-01-14 DIAGNOSIS — E11.42 DIABETIC POLYNEUROPATHY ASSOCIATED WITH TYPE 2 DIABETES MELLITUS: Primary | ICD-10-CM

## 2021-01-14 DIAGNOSIS — E66.01 SEVERE OBESITY WITH BODY MASS INDEX (BMI) OF 35.0 TO 39.9 WITH SERIOUS COMORBIDITY: ICD-10-CM

## 2021-01-14 DIAGNOSIS — G47.33 OSA (OBSTRUCTIVE SLEEP APNEA): ICD-10-CM

## 2021-01-14 DIAGNOSIS — M54.50 CHRONIC BILATERAL LOW BACK PAIN WITHOUT SCIATICA: ICD-10-CM

## 2021-01-14 DIAGNOSIS — I15.2 HYPERTENSION ASSOCIATED WITH DIABETES: ICD-10-CM

## 2021-01-14 DIAGNOSIS — E78.5 HYPERLIPIDEMIA ASSOCIATED WITH TYPE 2 DIABETES MELLITUS: ICD-10-CM

## 2021-01-14 DIAGNOSIS — E11.69 HYPERLIPIDEMIA ASSOCIATED WITH TYPE 2 DIABETES MELLITUS: ICD-10-CM

## 2021-01-14 DIAGNOSIS — E11.59 HYPERTENSION ASSOCIATED WITH DIABETES: ICD-10-CM

## 2021-01-14 LAB
CHOLEST SERPL-MCNC: 218 MG/DL (ref 120–199)
CHOLEST/HDLC SERPL: 4 {RATIO} (ref 2–5)
ESTIMATED AVG GLUCOSE: 171 MG/DL (ref 68–131)
HBA1C MFR BLD HPLC: 7.6 % (ref 4–5.6)
HDLC SERPL-MCNC: 54 MG/DL (ref 40–75)
HDLC SERPL: 24.8 % (ref 20–50)
LDLC SERPL CALC-MCNC: 131 MG/DL (ref 63–159)
NONHDLC SERPL-MCNC: 164 MG/DL
TRIGL SERPL-MCNC: 165 MG/DL (ref 30–150)

## 2021-01-14 PROCEDURE — 3078F PR MOST RECENT DIASTOLIC BLOOD PRESSURE < 80 MM HG: ICD-10-PCS | Mod: CPTII,S$GLB,, | Performed by: NURSE PRACTITIONER

## 2021-01-14 PROCEDURE — 99499 RISK ADDL DX/OHS AUDIT: ICD-10-PCS | Mod: S$GLB,,, | Performed by: NURSE PRACTITIONER

## 2021-01-14 PROCEDURE — 99214 PR OFFICE/OUTPT VISIT, EST, LEVL IV, 30-39 MIN: ICD-10-PCS | Mod: S$GLB,,, | Performed by: NURSE PRACTITIONER

## 2021-01-14 PROCEDURE — 3072F LOW RISK FOR RETINOPATHY: CPT | Mod: S$GLB,,, | Performed by: NURSE PRACTITIONER

## 2021-01-14 PROCEDURE — 3288F PR FALLS RISK ASSESSMENT DOCUMENTED: ICD-10-PCS | Mod: CPTII,S$GLB,, | Performed by: NURSE PRACTITIONER

## 2021-01-14 PROCEDURE — 3075F SYST BP GE 130 - 139MM HG: CPT | Mod: CPTII,S$GLB,, | Performed by: NURSE PRACTITIONER

## 2021-01-14 PROCEDURE — 3072F PR LOW RISK FOR RETINOPATHY: ICD-10-PCS | Mod: S$GLB,,, | Performed by: NURSE PRACTITIONER

## 2021-01-14 PROCEDURE — 3288F FALL RISK ASSESSMENT DOCD: CPT | Mod: CPTII,S$GLB,, | Performed by: NURSE PRACTITIONER

## 2021-01-14 PROCEDURE — 99499 UNLISTED E&M SERVICE: CPT | Mod: S$GLB,,, | Performed by: NURSE PRACTITIONER

## 2021-01-14 PROCEDURE — 1101F PR PT FALLS ASSESS DOC 0-1 FALLS W/OUT INJ PAST YR: ICD-10-PCS | Mod: CPTII,S$GLB,, | Performed by: NURSE PRACTITIONER

## 2021-01-14 PROCEDURE — 3008F BODY MASS INDEX DOCD: CPT | Mod: CPTII,S$GLB,, | Performed by: NURSE PRACTITIONER

## 2021-01-14 PROCEDURE — 1126F PR PAIN SEVERITY QUANTIFIED, NO PAIN PRESENT: ICD-10-PCS | Mod: S$GLB,,, | Performed by: NURSE PRACTITIONER

## 2021-01-14 PROCEDURE — 99999 PR PBB SHADOW E&M-EST. PATIENT-LVL V: CPT | Mod: PBBFAC,,, | Performed by: NURSE PRACTITIONER

## 2021-01-14 PROCEDURE — 99214 OFFICE O/P EST MOD 30 MIN: CPT | Mod: S$GLB,,, | Performed by: NURSE PRACTITIONER

## 2021-01-14 PROCEDURE — 3008F PR BODY MASS INDEX (BMI) DOCUMENTED: ICD-10-PCS | Mod: CPTII,S$GLB,, | Performed by: NURSE PRACTITIONER

## 2021-01-14 PROCEDURE — 1126F AMNT PAIN NOTED NONE PRSNT: CPT | Mod: S$GLB,,, | Performed by: NURSE PRACTITIONER

## 2021-01-14 PROCEDURE — 3075F PR MOST RECENT SYSTOLIC BLOOD PRESS GE 130-139MM HG: ICD-10-PCS | Mod: CPTII,S$GLB,, | Performed by: NURSE PRACTITIONER

## 2021-01-14 PROCEDURE — 3051F HG A1C>EQUAL 7.0%<8.0%: CPT | Mod: CPTII,S$GLB,, | Performed by: NURSE PRACTITIONER

## 2021-01-14 PROCEDURE — 3051F PR MOST RECENT HEMOGLOBIN A1C LEVEL 7.0 - < 8.0%: ICD-10-PCS | Mod: CPTII,S$GLB,, | Performed by: NURSE PRACTITIONER

## 2021-01-14 PROCEDURE — 1159F MED LIST DOCD IN RCRD: CPT | Mod: S$GLB,,, | Performed by: NURSE PRACTITIONER

## 2021-01-14 PROCEDURE — 1101F PT FALLS ASSESS-DOCD LE1/YR: CPT | Mod: CPTII,S$GLB,, | Performed by: NURSE PRACTITIONER

## 2021-01-14 PROCEDURE — 99999 PR PBB SHADOW E&M-EST. PATIENT-LVL V: ICD-10-PCS | Mod: PBBFAC,,, | Performed by: NURSE PRACTITIONER

## 2021-01-14 PROCEDURE — 3078F DIAST BP <80 MM HG: CPT | Mod: CPTII,S$GLB,, | Performed by: NURSE PRACTITIONER

## 2021-01-14 PROCEDURE — 1159F PR MEDICATION LIST DOCUMENTED IN MEDICAL RECORD: ICD-10-PCS | Mod: S$GLB,,, | Performed by: NURSE PRACTITIONER

## 2021-01-14 RX ORDER — PRAVASTATIN SODIUM 20 MG/1
20 TABLET ORAL NIGHTLY
Qty: 90 TABLET | Refills: 3 | Status: SHIPPED | OUTPATIENT
Start: 2021-01-14 | End: 2021-04-09

## 2021-01-15 ENCOUNTER — HOSPITAL ENCOUNTER (OUTPATIENT)
Dept: RADIOLOGY | Facility: HOSPITAL | Age: 71
Discharge: HOME | End: 2021-01-15
Attending: FAMILY MEDICINE
Payer: MEDICARE

## 2021-01-15 DIAGNOSIS — N64.4 BREAST PAIN: ICD-10-CM

## 2021-01-15 PROCEDURE — 76642 US BREAST RIGHT LIMITED: ICD-10-PCS | Mod: 26,RT,, | Performed by: RADIOLOGY

## 2021-01-15 PROCEDURE — 76642 ULTRASOUND BREAST LIMITED: CPT | Mod: TC,RT

## 2021-01-15 PROCEDURE — 77066 MAMMO DIGITAL DIAGNOSTIC BILAT WITH TOMO: ICD-10-PCS | Mod: 26,,, | Performed by: RADIOLOGY

## 2021-01-15 PROCEDURE — 77062 MAMMO DIGITAL DIAGNOSTIC BILAT WITH TOMO: ICD-10-PCS | Mod: 26,,, | Performed by: RADIOLOGY

## 2021-01-15 PROCEDURE — 76642 ULTRASOUND BREAST LIMITED: CPT | Mod: 26,RT,, | Performed by: RADIOLOGY

## 2021-01-15 PROCEDURE — 77066 DX MAMMO INCL CAD BI: CPT | Mod: 26,,, | Performed by: RADIOLOGY

## 2021-01-15 PROCEDURE — 77066 DX MAMMO INCL CAD BI: CPT | Mod: TC

## 2021-01-15 PROCEDURE — 77062 BREAST TOMOSYNTHESIS BI: CPT | Mod: 26,,, | Performed by: RADIOLOGY

## 2021-01-19 ENCOUNTER — TELEPHONE (OUTPATIENT)
Dept: ENDOSCOPY | Facility: HOSPITAL | Age: 71
End: 2021-01-19

## 2021-01-27 ENCOUNTER — PATIENT MESSAGE (OUTPATIENT)
Dept: FAMILY MEDICINE | Facility: CLINIC | Age: 71
End: 2021-01-27

## 2021-02-10 ENCOUNTER — PATIENT MESSAGE (OUTPATIENT)
Dept: INTERNAL MEDICINE | Facility: CLINIC | Age: 71
End: 2021-02-10

## 2021-02-24 ENCOUNTER — TELEPHONE (OUTPATIENT)
Dept: SLEEP MEDICINE | Facility: CLINIC | Age: 71
End: 2021-02-24

## 2021-03-09 ENCOUNTER — PATIENT OUTREACH (OUTPATIENT)
Dept: ADMINISTRATIVE | Facility: OTHER | Age: 71
End: 2021-03-09

## 2021-03-09 ENCOUNTER — NURSE TRIAGE (OUTPATIENT)
Dept: ADMINISTRATIVE | Facility: CLINIC | Age: 71
End: 2021-03-09

## 2021-03-11 ENCOUNTER — TELEPHONE (OUTPATIENT)
Dept: SLEEP MEDICINE | Facility: CLINIC | Age: 71
End: 2021-03-11

## 2021-03-11 ENCOUNTER — OFFICE VISIT (OUTPATIENT)
Dept: SLEEP MEDICINE | Facility: CLINIC | Age: 71
End: 2021-03-11
Payer: MEDICARE

## 2021-03-11 DIAGNOSIS — G47.33 OSA (OBSTRUCTIVE SLEEP APNEA): Primary | ICD-10-CM

## 2021-03-11 PROCEDURE — 1159F MED LIST DOCD IN RCRD: CPT | Mod: 95,,, | Performed by: PSYCHIATRY & NEUROLOGY

## 2021-03-11 PROCEDURE — 99213 PR OFFICE/OUTPT VISIT, EST, LEVL III, 20-29 MIN: ICD-10-PCS | Mod: 95,,, | Performed by: PSYCHIATRY & NEUROLOGY

## 2021-03-11 PROCEDURE — 99213 OFFICE O/P EST LOW 20 MIN: CPT | Mod: 95,,, | Performed by: PSYCHIATRY & NEUROLOGY

## 2021-03-11 PROCEDURE — 3072F PR LOW RISK FOR RETINOPATHY: ICD-10-PCS | Mod: 95,,, | Performed by: PSYCHIATRY & NEUROLOGY

## 2021-03-11 PROCEDURE — 1159F PR MEDICATION LIST DOCUMENTED IN MEDICAL RECORD: ICD-10-PCS | Mod: 95,,, | Performed by: PSYCHIATRY & NEUROLOGY

## 2021-03-11 PROCEDURE — 3072F LOW RISK FOR RETINOPATHY: CPT | Mod: 95,,, | Performed by: PSYCHIATRY & NEUROLOGY

## 2021-03-16 ENCOUNTER — TELEPHONE (OUTPATIENT)
Dept: OPHTHALMOLOGY | Facility: CLINIC | Age: 71
End: 2021-03-16

## 2021-04-09 ENCOUNTER — PATIENT OUTREACH (OUTPATIENT)
Dept: ADMINISTRATIVE | Facility: OTHER | Age: 71
End: 2021-04-09

## 2021-04-09 ENCOUNTER — OFFICE VISIT (OUTPATIENT)
Dept: FAMILY MEDICINE | Facility: CLINIC | Age: 71
End: 2021-04-09
Payer: MEDICARE

## 2021-04-09 VITALS
TEMPERATURE: 98 F | OXYGEN SATURATION: 97 % | BODY MASS INDEX: 35.9 KG/M2 | WEIGHT: 202.63 LBS | HEART RATE: 82 BPM | SYSTOLIC BLOOD PRESSURE: 126 MMHG | HEIGHT: 63 IN | DIASTOLIC BLOOD PRESSURE: 78 MMHG

## 2021-04-09 DIAGNOSIS — M54.2 NECK PAIN, CHRONIC: ICD-10-CM

## 2021-04-09 DIAGNOSIS — M79.10 MYALGIA DUE TO STATIN: ICD-10-CM

## 2021-04-09 DIAGNOSIS — E66.01 SEVERE OBESITY (BMI 35.0-39.9) WITH COMORBIDITY: ICD-10-CM

## 2021-04-09 DIAGNOSIS — T46.6X5A MYALGIA DUE TO STATIN: ICD-10-CM

## 2021-04-09 DIAGNOSIS — M25.50 ARTHRALGIA, UNSPECIFIED JOINT: ICD-10-CM

## 2021-04-09 DIAGNOSIS — G89.29 NECK PAIN, CHRONIC: ICD-10-CM

## 2021-04-09 DIAGNOSIS — I10 ESSENTIAL HYPERTENSION: Primary | ICD-10-CM

## 2021-04-09 PROCEDURE — 3074F PR MOST RECENT SYSTOLIC BLOOD PRESSURE < 130 MM HG: ICD-10-PCS | Mod: CPTII,S$GLB,, | Performed by: FAMILY MEDICINE

## 2021-04-09 PROCEDURE — 1101F PR PT FALLS ASSESS DOC 0-1 FALLS W/OUT INJ PAST YR: ICD-10-PCS | Mod: CPTII,S$GLB,, | Performed by: FAMILY MEDICINE

## 2021-04-09 PROCEDURE — 3051F HG A1C>EQUAL 7.0%<8.0%: CPT | Mod: CPTII,S$GLB,, | Performed by: FAMILY MEDICINE

## 2021-04-09 PROCEDURE — 3288F FALL RISK ASSESSMENT DOCD: CPT | Mod: CPTII,S$GLB,, | Performed by: FAMILY MEDICINE

## 2021-04-09 PROCEDURE — 99214 PR OFFICE/OUTPT VISIT, EST, LEVL IV, 30-39 MIN: ICD-10-PCS | Mod: S$GLB,,, | Performed by: FAMILY MEDICINE

## 2021-04-09 PROCEDURE — 3008F PR BODY MASS INDEX (BMI) DOCUMENTED: ICD-10-PCS | Mod: CPTII,S$GLB,, | Performed by: FAMILY MEDICINE

## 2021-04-09 PROCEDURE — 1159F MED LIST DOCD IN RCRD: CPT | Mod: S$GLB,,, | Performed by: FAMILY MEDICINE

## 2021-04-09 PROCEDURE — 99999 PR PBB SHADOW E&M-EST. PATIENT-LVL V: CPT | Mod: PBBFAC,,, | Performed by: FAMILY MEDICINE

## 2021-04-09 PROCEDURE — 99214 OFFICE O/P EST MOD 30 MIN: CPT | Mod: S$GLB,,, | Performed by: FAMILY MEDICINE

## 2021-04-09 PROCEDURE — 1126F AMNT PAIN NOTED NONE PRSNT: CPT | Mod: S$GLB,,, | Performed by: FAMILY MEDICINE

## 2021-04-09 PROCEDURE — 99999 PR PBB SHADOW E&M-EST. PATIENT-LVL V: ICD-10-PCS | Mod: PBBFAC,,, | Performed by: FAMILY MEDICINE

## 2021-04-09 PROCEDURE — 3072F LOW RISK FOR RETINOPATHY: CPT | Mod: S$GLB,,, | Performed by: FAMILY MEDICINE

## 2021-04-09 PROCEDURE — 3078F DIAST BP <80 MM HG: CPT | Mod: CPTII,S$GLB,, | Performed by: FAMILY MEDICINE

## 2021-04-09 PROCEDURE — 3074F SYST BP LT 130 MM HG: CPT | Mod: CPTII,S$GLB,, | Performed by: FAMILY MEDICINE

## 2021-04-09 PROCEDURE — 3078F PR MOST RECENT DIASTOLIC BLOOD PRESSURE < 80 MM HG: ICD-10-PCS | Mod: CPTII,S$GLB,, | Performed by: FAMILY MEDICINE

## 2021-04-09 PROCEDURE — 3072F PR LOW RISK FOR RETINOPATHY: ICD-10-PCS | Mod: S$GLB,,, | Performed by: FAMILY MEDICINE

## 2021-04-09 PROCEDURE — 1159F PR MEDICATION LIST DOCUMENTED IN MEDICAL RECORD: ICD-10-PCS | Mod: S$GLB,,, | Performed by: FAMILY MEDICINE

## 2021-04-09 PROCEDURE — 3051F PR MOST RECENT HEMOGLOBIN A1C LEVEL 7.0 - < 8.0%: ICD-10-PCS | Mod: CPTII,S$GLB,, | Performed by: FAMILY MEDICINE

## 2021-04-09 PROCEDURE — 1101F PT FALLS ASSESS-DOCD LE1/YR: CPT | Mod: CPTII,S$GLB,, | Performed by: FAMILY MEDICINE

## 2021-04-09 PROCEDURE — 1126F PR PAIN SEVERITY QUANTIFIED, NO PAIN PRESENT: ICD-10-PCS | Mod: S$GLB,,, | Performed by: FAMILY MEDICINE

## 2021-04-09 PROCEDURE — 3288F PR FALLS RISK ASSESSMENT DOCUMENTED: ICD-10-PCS | Mod: CPTII,S$GLB,, | Performed by: FAMILY MEDICINE

## 2021-04-09 PROCEDURE — 3008F BODY MASS INDEX DOCD: CPT | Mod: CPTII,S$GLB,, | Performed by: FAMILY MEDICINE

## 2021-04-11 ENCOUNTER — PATIENT MESSAGE (OUTPATIENT)
Dept: FAMILY MEDICINE | Facility: CLINIC | Age: 71
End: 2021-04-11

## 2021-04-12 ENCOUNTER — TELEPHONE (OUTPATIENT)
Dept: FAMILY MEDICINE | Facility: CLINIC | Age: 71
End: 2021-04-12

## 2021-04-13 ENCOUNTER — LAB VISIT (OUTPATIENT)
Dept: LAB | Facility: HOSPITAL | Age: 71
End: 2021-04-13
Attending: FAMILY MEDICINE
Payer: MEDICARE

## 2021-04-13 DIAGNOSIS — I10 ESSENTIAL HYPERTENSION: ICD-10-CM

## 2021-04-13 LAB
BILIRUB UR QL STRIP: NEGATIVE
CLARITY UR REFRACT.AUTO: CLEAR
COLOR UR AUTO: NORMAL
GLUCOSE UR QL STRIP: NEGATIVE
HGB UR QL STRIP: NEGATIVE
KETONES UR QL STRIP: NEGATIVE
LEUKOCYTE ESTERASE UR QL STRIP: NEGATIVE
NITRITE UR QL STRIP: NEGATIVE
PH UR STRIP: 6 [PH] (ref 5–8)
PROT UR QL STRIP: NEGATIVE
SP GR UR STRIP: 1 (ref 1–1.03)
URN SPEC COLLECT METH UR: NORMAL

## 2021-04-13 PROCEDURE — 81003 URINALYSIS AUTO W/O SCOPE: CPT | Performed by: FAMILY MEDICINE

## 2021-04-15 ENCOUNTER — LAB VISIT (OUTPATIENT)
Dept: LAB | Facility: HOSPITAL | Age: 71
End: 2021-04-15
Attending: FAMILY MEDICINE
Payer: MEDICARE

## 2021-04-15 ENCOUNTER — PATIENT MESSAGE (OUTPATIENT)
Dept: INTERNAL MEDICINE | Facility: CLINIC | Age: 71
End: 2021-04-15

## 2021-04-15 ENCOUNTER — PATIENT MESSAGE (OUTPATIENT)
Dept: FAMILY MEDICINE | Facility: CLINIC | Age: 71
End: 2021-04-15

## 2021-04-15 DIAGNOSIS — K21.9 GASTROESOPHAGEAL REFLUX DISEASE WITHOUT ESOPHAGITIS: ICD-10-CM

## 2021-04-15 DIAGNOSIS — R05.3 CHRONIC COUGH: ICD-10-CM

## 2021-04-15 DIAGNOSIS — I10 ESSENTIAL HYPERTENSION: ICD-10-CM

## 2021-04-15 LAB
ANION GAP SERPL CALC-SCNC: 11 MMOL/L (ref 8–16)
BASOPHILS # BLD AUTO: 0.05 K/UL (ref 0–0.2)
BASOPHILS NFR BLD: 0.8 % (ref 0–1.9)
BUN SERPL-MCNC: 11 MG/DL (ref 8–23)
CALCIUM SERPL-MCNC: 9.6 MG/DL (ref 8.7–10.5)
CHLORIDE SERPL-SCNC: 104 MMOL/L (ref 95–110)
CO2 SERPL-SCNC: 27 MMOL/L (ref 23–29)
CREAT SERPL-MCNC: 0.9 MG/DL (ref 0.5–1.4)
DIFFERENTIAL METHOD: ABNORMAL
EOSINOPHIL # BLD AUTO: 0.1 K/UL (ref 0–0.5)
EOSINOPHIL NFR BLD: 1.7 % (ref 0–8)
ERYTHROCYTE [DISTWIDTH] IN BLOOD BY AUTOMATED COUNT: 15.9 % (ref 11.5–14.5)
EST. GFR  (AFRICAN AMERICAN): >60 ML/MIN/1.73 M^2
EST. GFR  (NON AFRICAN AMERICAN): >60 ML/MIN/1.73 M^2
GLUCOSE SERPL-MCNC: 172 MG/DL (ref 70–110)
HCT VFR BLD AUTO: 37.9 % (ref 37–48.5)
HGB BLD-MCNC: 12.6 G/DL (ref 12–16)
IMM GRANULOCYTES # BLD AUTO: 0.01 K/UL (ref 0–0.04)
IMM GRANULOCYTES NFR BLD AUTO: 0.2 % (ref 0–0.5)
LYMPHOCYTES # BLD AUTO: 2.8 K/UL (ref 1–4.8)
LYMPHOCYTES NFR BLD: 43.3 % (ref 18–48)
MCH RBC QN AUTO: 26.1 PG (ref 27–31)
MCHC RBC AUTO-ENTMCNC: 33.2 G/DL (ref 32–36)
MCV RBC AUTO: 79 FL (ref 82–98)
MONOCYTES # BLD AUTO: 0.4 K/UL (ref 0.3–1)
MONOCYTES NFR BLD: 6.5 % (ref 4–15)
NEUTROPHILS # BLD AUTO: 3.1 K/UL (ref 1.8–7.7)
NEUTROPHILS NFR BLD: 47.5 % (ref 38–73)
NRBC BLD-RTO: 0 /100 WBC
PLATELET # BLD AUTO: 304 K/UL (ref 150–450)
PMV BLD AUTO: 10.4 FL (ref 9.2–12.9)
POTASSIUM SERPL-SCNC: 3.7 MMOL/L (ref 3.5–5.1)
RBC # BLD AUTO: 4.83 M/UL (ref 4–5.4)
SODIUM SERPL-SCNC: 142 MMOL/L (ref 136–145)
TSH SERPL DL<=0.005 MIU/L-ACNC: 2.52 UIU/ML (ref 0.4–4)
WBC # BLD AUTO: 6.42 K/UL (ref 3.9–12.7)

## 2021-04-15 PROCEDURE — 84443 ASSAY THYROID STIM HORMONE: CPT | Performed by: FAMILY MEDICINE

## 2021-04-15 PROCEDURE — 85025 COMPLETE CBC W/AUTO DIFF WBC: CPT | Performed by: FAMILY MEDICINE

## 2021-04-15 PROCEDURE — 36415 COLL VENOUS BLD VENIPUNCTURE: CPT | Mod: PO | Performed by: FAMILY MEDICINE

## 2021-04-15 PROCEDURE — 80048 BASIC METABOLIC PNL TOTAL CA: CPT | Performed by: FAMILY MEDICINE

## 2021-04-15 RX ORDER — ESOMEPRAZOLE MAGNESIUM 40 MG/1
40 CAPSULE, DELAYED RELEASE ORAL
Qty: 90 CAPSULE | Refills: 3 | Status: SHIPPED | OUTPATIENT
Start: 2021-04-15 | End: 2021-09-16

## 2021-04-20 ENCOUNTER — TELEPHONE (OUTPATIENT)
Dept: PHARMACY | Facility: CLINIC | Age: 71
End: 2021-04-20

## 2021-05-07 ENCOUNTER — TELEPHONE (OUTPATIENT)
Dept: SLEEP MEDICINE | Facility: CLINIC | Age: 71
End: 2021-05-07

## 2021-05-10 ENCOUNTER — TELEPHONE (OUTPATIENT)
Dept: OPHTHALMOLOGY | Facility: CLINIC | Age: 71
End: 2021-05-10

## 2021-05-12 ENCOUNTER — TELEPHONE (OUTPATIENT)
Dept: FAMILY MEDICINE | Facility: CLINIC | Age: 71
End: 2021-05-12

## 2021-05-12 ENCOUNTER — PATIENT MESSAGE (OUTPATIENT)
Dept: FAMILY MEDICINE | Facility: CLINIC | Age: 71
End: 2021-05-12

## 2021-05-12 DIAGNOSIS — E11.65 TYPE 2 DIABETES MELLITUS WITH HYPERGLYCEMIA, WITH LONG-TERM CURRENT USE OF INSULIN: ICD-10-CM

## 2021-05-12 DIAGNOSIS — Z79.4 TYPE 2 DIABETES MELLITUS WITH HYPERGLYCEMIA, WITH LONG-TERM CURRENT USE OF INSULIN: ICD-10-CM

## 2021-05-12 DIAGNOSIS — E11.9 TYPE 2 DIABETES MELLITUS WITHOUT COMPLICATION: ICD-10-CM

## 2021-05-12 RX ORDER — BLOOD SUGAR DIAGNOSTIC
1 STRIP MISCELLANEOUS 2 TIMES DAILY
Qty: 200 STRIP | Refills: 3 | Status: SHIPPED | OUTPATIENT
Start: 2021-05-12 | End: 2022-07-21

## 2021-05-12 RX ORDER — LANCETS
EACH MISCELLANEOUS
Qty: 300 EACH | Refills: 3 | Status: SHIPPED | OUTPATIENT
Start: 2021-05-12 | End: 2021-07-23

## 2021-05-14 ENCOUNTER — OFFICE VISIT (OUTPATIENT)
Dept: INTERNAL MEDICINE | Facility: CLINIC | Age: 71
End: 2021-05-14
Payer: MEDICARE

## 2021-05-14 ENCOUNTER — LAB VISIT (OUTPATIENT)
Dept: LAB | Facility: HOSPITAL | Age: 71
End: 2021-05-14
Attending: NURSE PRACTITIONER
Payer: MEDICARE

## 2021-05-14 VITALS
HEART RATE: 84 BPM | WEIGHT: 202.19 LBS | OXYGEN SATURATION: 99 % | SYSTOLIC BLOOD PRESSURE: 130 MMHG | DIASTOLIC BLOOD PRESSURE: 79 MMHG | HEIGHT: 63 IN | BODY MASS INDEX: 35.82 KG/M2

## 2021-05-14 DIAGNOSIS — E11.42 DIABETIC POLYNEUROPATHY ASSOCIATED WITH TYPE 2 DIABETES MELLITUS: ICD-10-CM

## 2021-05-14 DIAGNOSIS — K58.9 IRRITABLE BOWEL SYNDROME, UNSPECIFIED TYPE: ICD-10-CM

## 2021-05-14 DIAGNOSIS — G47.33 OSA (OBSTRUCTIVE SLEEP APNEA): ICD-10-CM

## 2021-05-14 DIAGNOSIS — E66.01 SEVERE OBESITY WITH BODY MASS INDEX (BMI) OF 35.0 TO 39.9 WITH SERIOUS COMORBIDITY: ICD-10-CM

## 2021-05-14 DIAGNOSIS — E78.5 HYPERLIPIDEMIA ASSOCIATED WITH TYPE 2 DIABETES MELLITUS: ICD-10-CM

## 2021-05-14 DIAGNOSIS — E11.69 HYPERLIPIDEMIA ASSOCIATED WITH TYPE 2 DIABETES MELLITUS: ICD-10-CM

## 2021-05-14 DIAGNOSIS — M54.50 CHRONIC BILATERAL LOW BACK PAIN WITHOUT SCIATICA: ICD-10-CM

## 2021-05-14 DIAGNOSIS — E11.42 DIABETIC POLYNEUROPATHY ASSOCIATED WITH TYPE 2 DIABETES MELLITUS: Primary | ICD-10-CM

## 2021-05-14 DIAGNOSIS — G89.29 CHRONIC BILATERAL LOW BACK PAIN WITHOUT SCIATICA: ICD-10-CM

## 2021-05-14 DIAGNOSIS — E11.59 HYPERTENSION ASSOCIATED WITH DIABETES: ICD-10-CM

## 2021-05-14 DIAGNOSIS — I15.2 HYPERTENSION ASSOCIATED WITH DIABETES: ICD-10-CM

## 2021-05-14 LAB
ESTIMATED AVG GLUCOSE: 206 MG/DL (ref 68–131)
HBA1C MFR BLD: 8.8 % (ref 4–5.6)

## 2021-05-14 PROCEDURE — 1126F PR PAIN SEVERITY QUANTIFIED, NO PAIN PRESENT: ICD-10-PCS | Mod: S$GLB,,, | Performed by: NURSE PRACTITIONER

## 2021-05-14 PROCEDURE — 99214 PR OFFICE/OUTPT VISIT, EST, LEVL IV, 30-39 MIN: ICD-10-PCS | Mod: S$GLB,,, | Performed by: NURSE PRACTITIONER

## 2021-05-14 PROCEDURE — 3051F HG A1C>EQUAL 7.0%<8.0%: CPT | Mod: CPTII,S$GLB,, | Performed by: NURSE PRACTITIONER

## 2021-05-14 PROCEDURE — 3288F FALL RISK ASSESSMENT DOCD: CPT | Mod: CPTII,S$GLB,, | Performed by: NURSE PRACTITIONER

## 2021-05-14 PROCEDURE — 3051F PR MOST RECENT HEMOGLOBIN A1C LEVEL 7.0 - < 8.0%: ICD-10-PCS | Mod: CPTII,S$GLB,, | Performed by: NURSE PRACTITIONER

## 2021-05-14 PROCEDURE — 1101F PT FALLS ASSESS-DOCD LE1/YR: CPT | Mod: CPTII,S$GLB,, | Performed by: NURSE PRACTITIONER

## 2021-05-14 PROCEDURE — 99999 PR PBB SHADOW E&M-EST. PATIENT-LVL V: ICD-10-PCS | Mod: PBBFAC,,, | Performed by: NURSE PRACTITIONER

## 2021-05-14 PROCEDURE — 1126F AMNT PAIN NOTED NONE PRSNT: CPT | Mod: S$GLB,,, | Performed by: NURSE PRACTITIONER

## 2021-05-14 PROCEDURE — 83036 HEMOGLOBIN GLYCOSYLATED A1C: CPT | Performed by: NURSE PRACTITIONER

## 2021-05-14 PROCEDURE — 3008F PR BODY MASS INDEX (BMI) DOCUMENTED: ICD-10-PCS | Mod: CPTII,S$GLB,, | Performed by: NURSE PRACTITIONER

## 2021-05-14 PROCEDURE — 99214 OFFICE O/P EST MOD 30 MIN: CPT | Mod: S$GLB,,, | Performed by: NURSE PRACTITIONER

## 2021-05-14 PROCEDURE — 1159F PR MEDICATION LIST DOCUMENTED IN MEDICAL RECORD: ICD-10-PCS | Mod: S$GLB,,, | Performed by: NURSE PRACTITIONER

## 2021-05-14 PROCEDURE — 3072F LOW RISK FOR RETINOPATHY: CPT | Mod: S$GLB,,, | Performed by: NURSE PRACTITIONER

## 2021-05-14 PROCEDURE — 99999 PR PBB SHADOW E&M-EST. PATIENT-LVL V: CPT | Mod: PBBFAC,,, | Performed by: NURSE PRACTITIONER

## 2021-05-14 PROCEDURE — 1101F PR PT FALLS ASSESS DOC 0-1 FALLS W/OUT INJ PAST YR: ICD-10-PCS | Mod: CPTII,S$GLB,, | Performed by: NURSE PRACTITIONER

## 2021-05-14 PROCEDURE — 36415 COLL VENOUS BLD VENIPUNCTURE: CPT | Performed by: NURSE PRACTITIONER

## 2021-05-14 PROCEDURE — 1159F MED LIST DOCD IN RCRD: CPT | Mod: S$GLB,,, | Performed by: NURSE PRACTITIONER

## 2021-05-14 PROCEDURE — 3008F BODY MASS INDEX DOCD: CPT | Mod: CPTII,S$GLB,, | Performed by: NURSE PRACTITIONER

## 2021-05-14 PROCEDURE — 3288F PR FALLS RISK ASSESSMENT DOCUMENTED: ICD-10-PCS | Mod: CPTII,S$GLB,, | Performed by: NURSE PRACTITIONER

## 2021-05-14 PROCEDURE — 3072F PR LOW RISK FOR RETINOPATHY: ICD-10-PCS | Mod: S$GLB,,, | Performed by: NURSE PRACTITIONER

## 2021-05-20 ENCOUNTER — PATIENT OUTREACH (OUTPATIENT)
Dept: ADMINISTRATIVE | Facility: OTHER | Age: 71
End: 2021-05-20

## 2021-05-21 DIAGNOSIS — F32.A DEPRESSION, UNSPECIFIED DEPRESSION TYPE: ICD-10-CM

## 2021-05-23 RX ORDER — CITALOPRAM 10 MG/1
10 TABLET ORAL DAILY
Qty: 90 TABLET | Refills: 3 | Status: SHIPPED | OUTPATIENT
Start: 2021-05-23 | End: 2021-05-28 | Stop reason: SDUPTHER

## 2021-05-24 ENCOUNTER — CLINICAL SUPPORT (OUTPATIENT)
Dept: DIABETES | Facility: CLINIC | Age: 71
End: 2021-05-24
Payer: MEDICARE

## 2021-05-24 DIAGNOSIS — E11.42 DIABETIC POLYNEUROPATHY ASSOCIATED WITH TYPE 2 DIABETES MELLITUS: ICD-10-CM

## 2021-05-24 PROCEDURE — 99999 PR PBB SHADOW E&M-EST. PATIENT-LVL I: ICD-10-PCS | Mod: PBBFAC,,,

## 2021-05-24 PROCEDURE — 99999 PR PBB SHADOW E&M-EST. PATIENT-LVL I: CPT | Mod: PBBFAC,,,

## 2021-05-24 PROCEDURE — G0108 PR DIAB MANAGE TRN  PER INDIV: ICD-10-PCS | Mod: S$GLB,,, | Performed by: INTERNAL MEDICINE

## 2021-05-24 PROCEDURE — G0108 DIAB MANAGE TRN  PER INDIV: HCPCS | Mod: S$GLB,,, | Performed by: INTERNAL MEDICINE

## 2021-05-24 RX ORDER — FLASH GLUCOSE SENSOR
KIT MISCELLANEOUS
Qty: 2 KIT | Refills: 3 | Status: SHIPPED | OUTPATIENT
Start: 2021-05-24 | End: 2021-09-16

## 2021-05-27 ENCOUNTER — TELEPHONE (OUTPATIENT)
Dept: PHARMACY | Facility: CLINIC | Age: 71
End: 2021-05-27

## 2021-05-28 DIAGNOSIS — F32.A DEPRESSION, UNSPECIFIED DEPRESSION TYPE: ICD-10-CM

## 2021-05-31 RX ORDER — CITALOPRAM 10 MG/1
10 TABLET ORAL DAILY
Qty: 90 TABLET | Refills: 3 | Status: SHIPPED | OUTPATIENT
Start: 2021-05-31 | End: 2023-04-20 | Stop reason: SDUPTHER

## 2021-06-02 ENCOUNTER — PES CALL (OUTPATIENT)
Dept: ADMINISTRATIVE | Facility: CLINIC | Age: 71
End: 2021-06-02

## 2021-06-03 ENCOUNTER — PES CALL (OUTPATIENT)
Dept: ADMINISTRATIVE | Facility: CLINIC | Age: 71
End: 2021-06-03

## 2021-06-07 ENCOUNTER — PES CALL (OUTPATIENT)
Dept: ADMINISTRATIVE | Facility: CLINIC | Age: 71
End: 2021-06-07

## 2021-06-08 ENCOUNTER — OFFICE VISIT (OUTPATIENT)
Dept: PODIATRY | Facility: CLINIC | Age: 71
End: 2021-06-08
Payer: MEDICARE

## 2021-06-08 VITALS
HEIGHT: 63 IN | BODY MASS INDEX: 35.79 KG/M2 | WEIGHT: 202 LBS | RESPIRATION RATE: 18 BRPM | SYSTOLIC BLOOD PRESSURE: 129 MMHG | DIASTOLIC BLOOD PRESSURE: 75 MMHG | HEART RATE: 78 BPM

## 2021-06-08 DIAGNOSIS — E11.49 TYPE II DIABETES MELLITUS WITH NEUROLOGICAL MANIFESTATIONS: Primary | ICD-10-CM

## 2021-06-08 PROCEDURE — 99999 PR PBB SHADOW E&M-EST. PATIENT-LVL IV: ICD-10-PCS | Mod: PBBFAC,,, | Performed by: PODIATRIST

## 2021-06-08 PROCEDURE — 1126F PR PAIN SEVERITY QUANTIFIED, NO PAIN PRESENT: ICD-10-PCS | Mod: S$GLB,,, | Performed by: PODIATRIST

## 2021-06-08 PROCEDURE — 3008F BODY MASS INDEX DOCD: CPT | Mod: CPTII,S$GLB,, | Performed by: PODIATRIST

## 2021-06-08 PROCEDURE — 1126F AMNT PAIN NOTED NONE PRSNT: CPT | Mod: S$GLB,,, | Performed by: PODIATRIST

## 2021-06-08 PROCEDURE — 3008F PR BODY MASS INDEX (BMI) DOCUMENTED: ICD-10-PCS | Mod: CPTII,S$GLB,, | Performed by: PODIATRIST

## 2021-06-08 PROCEDURE — 99999 PR PBB SHADOW E&M-EST. PATIENT-LVL IV: CPT | Mod: PBBFAC,,, | Performed by: PODIATRIST

## 2021-06-08 PROCEDURE — 3052F PR MOST RECENT HEMOGLOBIN A1C LEVEL 8.0 - < 9.0%: ICD-10-PCS | Mod: CPTII,S$GLB,, | Performed by: PODIATRIST

## 2021-06-08 PROCEDURE — 99213 PR OFFICE/OUTPT VISIT, EST, LEVL III, 20-29 MIN: ICD-10-PCS | Mod: S$GLB,,, | Performed by: PODIATRIST

## 2021-06-08 PROCEDURE — 1159F PR MEDICATION LIST DOCUMENTED IN MEDICAL RECORD: ICD-10-PCS | Mod: S$GLB,,, | Performed by: PODIATRIST

## 2021-06-08 PROCEDURE — 3072F LOW RISK FOR RETINOPATHY: CPT | Mod: S$GLB,,, | Performed by: PODIATRIST

## 2021-06-08 PROCEDURE — 99213 OFFICE O/P EST LOW 20 MIN: CPT | Mod: S$GLB,,, | Performed by: PODIATRIST

## 2021-06-08 PROCEDURE — 1159F MED LIST DOCD IN RCRD: CPT | Mod: S$GLB,,, | Performed by: PODIATRIST

## 2021-06-08 PROCEDURE — 3072F PR LOW RISK FOR RETINOPATHY: ICD-10-PCS | Mod: S$GLB,,, | Performed by: PODIATRIST

## 2021-06-08 PROCEDURE — 3052F HG A1C>EQUAL 8.0%<EQUAL 9.0%: CPT | Mod: CPTII,S$GLB,, | Performed by: PODIATRIST

## 2021-06-21 ENCOUNTER — DOCUMENTATION ONLY (OUTPATIENT)
Dept: TRANSPLANT | Facility: CLINIC | Age: 71
End: 2021-06-21

## 2021-06-23 ENCOUNTER — PATIENT MESSAGE (OUTPATIENT)
Dept: INTERNAL MEDICINE | Facility: CLINIC | Age: 71
End: 2021-06-23

## 2021-06-25 ENCOUNTER — OFFICE VISIT (OUTPATIENT)
Dept: OTOLARYNGOLOGY | Facility: CLINIC | Age: 71
End: 2021-06-25
Payer: MEDICARE

## 2021-06-25 VITALS
DIASTOLIC BLOOD PRESSURE: 75 MMHG | HEART RATE: 80 BPM | SYSTOLIC BLOOD PRESSURE: 128 MMHG | BODY MASS INDEX: 36.12 KG/M2 | WEIGHT: 203.94 LBS

## 2021-06-25 DIAGNOSIS — R68.89 OTHER GENERAL SYMPTOMS AND SIGNS: ICD-10-CM

## 2021-06-25 DIAGNOSIS — K21.9 GASTROESOPHAGEAL REFLUX DISEASE WITHOUT ESOPHAGITIS: ICD-10-CM

## 2021-06-25 DIAGNOSIS — M54.2 NECK PAIN: ICD-10-CM

## 2021-06-25 DIAGNOSIS — R13.10 DYSPHAGIA, UNSPECIFIED TYPE: Primary | ICD-10-CM

## 2021-06-25 PROCEDURE — 3008F BODY MASS INDEX DOCD: CPT | Mod: CPTII,S$GLB,, | Performed by: NURSE PRACTITIONER

## 2021-06-25 PROCEDURE — 1126F PR PAIN SEVERITY QUANTIFIED, NO PAIN PRESENT: ICD-10-PCS | Mod: S$GLB,,, | Performed by: NURSE PRACTITIONER

## 2021-06-25 PROCEDURE — 3072F LOW RISK FOR RETINOPATHY: CPT | Mod: S$GLB,,, | Performed by: NURSE PRACTITIONER

## 2021-06-25 PROCEDURE — 1159F PR MEDICATION LIST DOCUMENTED IN MEDICAL RECORD: ICD-10-PCS | Mod: S$GLB,,, | Performed by: NURSE PRACTITIONER

## 2021-06-25 PROCEDURE — 3072F PR LOW RISK FOR RETINOPATHY: ICD-10-PCS | Mod: S$GLB,,, | Performed by: NURSE PRACTITIONER

## 2021-06-25 PROCEDURE — 1126F AMNT PAIN NOTED NONE PRSNT: CPT | Mod: S$GLB,,, | Performed by: NURSE PRACTITIONER

## 2021-06-25 PROCEDURE — 3008F PR BODY MASS INDEX (BMI) DOCUMENTED: ICD-10-PCS | Mod: CPTII,S$GLB,, | Performed by: NURSE PRACTITIONER

## 2021-06-25 PROCEDURE — 99999 PR PBB SHADOW E&M-EST. PATIENT-LVL V: ICD-10-PCS | Mod: PBBFAC,,, | Performed by: NURSE PRACTITIONER

## 2021-06-25 PROCEDURE — 1159F MED LIST DOCD IN RCRD: CPT | Mod: S$GLB,,, | Performed by: NURSE PRACTITIONER

## 2021-06-25 PROCEDURE — 99214 OFFICE O/P EST MOD 30 MIN: CPT | Mod: S$GLB,,, | Performed by: NURSE PRACTITIONER

## 2021-06-25 PROCEDURE — 99214 PR OFFICE/OUTPT VISIT, EST, LEVL IV, 30-39 MIN: ICD-10-PCS | Mod: S$GLB,,, | Performed by: NURSE PRACTITIONER

## 2021-06-25 PROCEDURE — 99999 PR PBB SHADOW E&M-EST. PATIENT-LVL V: CPT | Mod: PBBFAC,,, | Performed by: NURSE PRACTITIONER

## 2021-07-01 ENCOUNTER — PATIENT OUTREACH (OUTPATIENT)
Dept: ADMINISTRATIVE | Facility: HOSPITAL | Age: 71
End: 2021-07-01

## 2021-07-06 ENCOUNTER — PATIENT MESSAGE (OUTPATIENT)
Dept: ADMINISTRATIVE | Facility: HOSPITAL | Age: 71
End: 2021-07-06

## 2021-07-07 ENCOUNTER — TELEPHONE (OUTPATIENT)
Dept: FAMILY MEDICINE | Facility: CLINIC | Age: 71
End: 2021-07-07

## 2021-07-08 ENCOUNTER — TELEPHONE (OUTPATIENT)
Dept: GASTROENTEROLOGY | Facility: CLINIC | Age: 71
End: 2021-07-08

## 2021-07-16 ENCOUNTER — PATIENT MESSAGE (OUTPATIENT)
Dept: FAMILY MEDICINE | Facility: CLINIC | Age: 71
End: 2021-07-16

## 2021-07-16 ENCOUNTER — TELEPHONE (OUTPATIENT)
Dept: ORTHOPEDICS | Facility: CLINIC | Age: 71
End: 2021-07-16

## 2021-07-16 DIAGNOSIS — M79.10 MYALGIA: ICD-10-CM

## 2021-07-16 DIAGNOSIS — M50.30 DDD (DEGENERATIVE DISC DISEASE), CERVICAL: Primary | ICD-10-CM

## 2021-07-18 RX ORDER — GABAPENTIN 100 MG/1
100 CAPSULE ORAL 3 TIMES DAILY
Qty: 270 CAPSULE | Refills: 3 | Status: SHIPPED | OUTPATIENT
Start: 2021-07-18 | End: 2022-12-02 | Stop reason: SDUPTHER

## 2021-07-19 ENCOUNTER — TELEPHONE (OUTPATIENT)
Dept: FAMILY MEDICINE | Facility: CLINIC | Age: 71
End: 2021-07-19

## 2021-07-20 ENCOUNTER — OFFICE VISIT (OUTPATIENT)
Dept: FAMILY MEDICINE | Facility: CLINIC | Age: 71
End: 2021-07-20
Payer: MEDICARE

## 2021-07-20 ENCOUNTER — PES CALL (OUTPATIENT)
Dept: ADMINISTRATIVE | Facility: CLINIC | Age: 71
End: 2021-07-20

## 2021-07-20 VITALS
OXYGEN SATURATION: 97 % | WEIGHT: 204.56 LBS | SYSTOLIC BLOOD PRESSURE: 138 MMHG | DIASTOLIC BLOOD PRESSURE: 86 MMHG | RESPIRATION RATE: 20 BRPM | HEIGHT: 63 IN | BODY MASS INDEX: 36.25 KG/M2 | HEART RATE: 99 BPM

## 2021-07-20 DIAGNOSIS — K21.9 GASTROESOPHAGEAL REFLUX DISEASE, UNSPECIFIED WHETHER ESOPHAGITIS PRESENT: ICD-10-CM

## 2021-07-20 DIAGNOSIS — H90.3 SENSORINEURAL HEARING LOSS (SNHL) OF BOTH EARS: ICD-10-CM

## 2021-07-20 DIAGNOSIS — E66.01 SEVERE OBESITY WITH BODY MASS INDEX (BMI) OF 35.0 TO 39.9 WITH SERIOUS COMORBIDITY: ICD-10-CM

## 2021-07-20 DIAGNOSIS — I70.0 ABDOMINAL AORTIC ATHEROSCLEROSIS: ICD-10-CM

## 2021-07-20 DIAGNOSIS — M46.96 UNSPECIFIED INFLAMMATORY SPONDYLOPATHY, LUMBAR REGION: ICD-10-CM

## 2021-07-20 DIAGNOSIS — F32.0 MILD MAJOR DEPRESSION: ICD-10-CM

## 2021-07-20 DIAGNOSIS — I10 ESSENTIAL HYPERTENSION: ICD-10-CM

## 2021-07-20 DIAGNOSIS — E78.5 HYPERLIPIDEMIA, UNSPECIFIED HYPERLIPIDEMIA TYPE: ICD-10-CM

## 2021-07-20 DIAGNOSIS — E11.65 TYPE 2 DIABETES MELLITUS WITH HYPERGLYCEMIA, WITH LONG-TERM CURRENT USE OF INSULIN: ICD-10-CM

## 2021-07-20 DIAGNOSIS — E11.42 DIABETIC POLYNEUROPATHY ASSOCIATED WITH TYPE 2 DIABETES MELLITUS: ICD-10-CM

## 2021-07-20 DIAGNOSIS — G47.33 OSA (OBSTRUCTIVE SLEEP APNEA): ICD-10-CM

## 2021-07-20 DIAGNOSIS — Z00.00 ENCOUNTER FOR PREVENTIVE HEALTH EXAMINATION: Primary | ICD-10-CM

## 2021-07-20 DIAGNOSIS — F41.9 ANXIETY: ICD-10-CM

## 2021-07-20 DIAGNOSIS — M51.36 DDD (DEGENERATIVE DISC DISEASE), LUMBAR: ICD-10-CM

## 2021-07-20 DIAGNOSIS — G47.00 INSOMNIA, UNSPECIFIED TYPE: ICD-10-CM

## 2021-07-20 DIAGNOSIS — Z79.4 TYPE 2 DIABETES MELLITUS WITH HYPERGLYCEMIA, WITH LONG-TERM CURRENT USE OF INSULIN: ICD-10-CM

## 2021-07-20 PROBLEM — M54.2 NECK PAIN: Status: RESOLVED | Noted: 2018-01-26 | Resolved: 2021-07-20

## 2021-07-20 PROCEDURE — 3079F PR MOST RECENT DIASTOLIC BLOOD PRESSURE 80-89 MM HG: ICD-10-PCS | Mod: CPTII,S$GLB,, | Performed by: NURSE PRACTITIONER

## 2021-07-20 PROCEDURE — 1101F PT FALLS ASSESS-DOCD LE1/YR: CPT | Mod: CPTII,S$GLB,, | Performed by: NURSE PRACTITIONER

## 2021-07-20 PROCEDURE — 1101F PR PT FALLS ASSESS DOC 0-1 FALLS W/OUT INJ PAST YR: ICD-10-PCS | Mod: CPTII,S$GLB,, | Performed by: NURSE PRACTITIONER

## 2021-07-20 PROCEDURE — 3052F HG A1C>EQUAL 8.0%<EQUAL 9.0%: CPT | Mod: CPTII,S$GLB,, | Performed by: NURSE PRACTITIONER

## 2021-07-20 PROCEDURE — 99999 PR PBB SHADOW E&M-EST. PATIENT-LVL V: CPT | Mod: PBBFAC,,, | Performed by: NURSE PRACTITIONER

## 2021-07-20 PROCEDURE — 99999 PR PBB SHADOW E&M-EST. PATIENT-LVL V: ICD-10-PCS | Mod: PBBFAC,,, | Performed by: NURSE PRACTITIONER

## 2021-07-20 PROCEDURE — 3079F DIAST BP 80-89 MM HG: CPT | Mod: CPTII,S$GLB,, | Performed by: NURSE PRACTITIONER

## 2021-07-20 PROCEDURE — 3008F PR BODY MASS INDEX (BMI) DOCUMENTED: ICD-10-PCS | Mod: CPTII,S$GLB,, | Performed by: NURSE PRACTITIONER

## 2021-07-20 PROCEDURE — 3075F PR MOST RECENT SYSTOLIC BLOOD PRESS GE 130-139MM HG: ICD-10-PCS | Mod: CPTII,S$GLB,, | Performed by: NURSE PRACTITIONER

## 2021-07-20 PROCEDURE — 3288F FALL RISK ASSESSMENT DOCD: CPT | Mod: CPTII,S$GLB,, | Performed by: NURSE PRACTITIONER

## 2021-07-20 PROCEDURE — 3288F PR FALLS RISK ASSESSMENT DOCUMENTED: ICD-10-PCS | Mod: CPTII,S$GLB,, | Performed by: NURSE PRACTITIONER

## 2021-07-20 PROCEDURE — 1125F PR PAIN SEVERITY QUANTIFIED, PAIN PRESENT: ICD-10-PCS | Mod: CPTII,S$GLB,, | Performed by: NURSE PRACTITIONER

## 2021-07-20 PROCEDURE — G0439 PPPS, SUBSEQ VISIT: HCPCS | Mod: S$GLB,,, | Performed by: NURSE PRACTITIONER

## 2021-07-20 PROCEDURE — 99499 UNLISTED E&M SERVICE: CPT | Mod: S$GLB,,, | Performed by: NURSE PRACTITIONER

## 2021-07-20 PROCEDURE — 3008F BODY MASS INDEX DOCD: CPT | Mod: CPTII,S$GLB,, | Performed by: NURSE PRACTITIONER

## 2021-07-20 PROCEDURE — G0439 PR MEDICARE ANNUAL WELLNESS SUBSEQUENT VISIT: ICD-10-PCS | Mod: S$GLB,,, | Performed by: NURSE PRACTITIONER

## 2021-07-20 PROCEDURE — 3052F PR MOST RECENT HEMOGLOBIN A1C LEVEL 8.0 - < 9.0%: ICD-10-PCS | Mod: CPTII,S$GLB,, | Performed by: NURSE PRACTITIONER

## 2021-07-20 PROCEDURE — 1125F AMNT PAIN NOTED PAIN PRSNT: CPT | Mod: CPTII,S$GLB,, | Performed by: NURSE PRACTITIONER

## 2021-07-20 PROCEDURE — 99499 RISK ADDL DX/OHS AUDIT: ICD-10-PCS | Mod: S$GLB,,, | Performed by: NURSE PRACTITIONER

## 2021-07-20 PROCEDURE — 3072F PR LOW RISK FOR RETINOPATHY: ICD-10-PCS | Mod: CPTII,S$GLB,, | Performed by: NURSE PRACTITIONER

## 2021-07-20 PROCEDURE — 3072F LOW RISK FOR RETINOPATHY: CPT | Mod: CPTII,S$GLB,, | Performed by: NURSE PRACTITIONER

## 2021-07-20 PROCEDURE — 3075F SYST BP GE 130 - 139MM HG: CPT | Mod: CPTII,S$GLB,, | Performed by: NURSE PRACTITIONER

## 2021-07-20 RX ORDER — CHLORHEXIDINE GLUCONATE ORAL RINSE 1.2 MG/ML
15 SOLUTION DENTAL DAILY PRN
COMMUNITY
Start: 2021-05-21 | End: 2023-08-03

## 2021-07-26 ENCOUNTER — HOSPITAL ENCOUNTER (OUTPATIENT)
Dept: RADIOLOGY | Facility: HOSPITAL | Age: 71
Discharge: HOME | End: 2021-07-26
Attending: ORTHOPAEDIC SURGERY
Payer: MEDICARE

## 2021-07-26 ENCOUNTER — OFFICE VISIT (OUTPATIENT)
Dept: ORTHOPEDICS | Facility: CLINIC | Age: 71
End: 2021-07-26
Payer: MEDICARE

## 2021-07-26 VITALS — WEIGHT: 202.94 LBS | HEIGHT: 63 IN | BODY MASS INDEX: 35.96 KG/M2

## 2021-07-26 DIAGNOSIS — M50.30 DDD (DEGENERATIVE DISC DISEASE), CERVICAL: ICD-10-CM

## 2021-07-26 DIAGNOSIS — M54.2 NECK PAIN: ICD-10-CM

## 2021-07-26 DIAGNOSIS — R13.10 DYSPHAGIA, UNSPECIFIED TYPE: ICD-10-CM

## 2021-07-26 PROCEDURE — 1159F MED LIST DOCD IN RCRD: CPT | Mod: CPTII,S$GLB,, | Performed by: ORTHOPAEDIC SURGERY

## 2021-07-26 PROCEDURE — 1125F PR PAIN SEVERITY QUANTIFIED, PAIN PRESENT: ICD-10-PCS | Mod: CPTII,S$GLB,, | Performed by: ORTHOPAEDIC SURGERY

## 2021-07-26 PROCEDURE — 1101F PR PT FALLS ASSESS DOC 0-1 FALLS W/OUT INJ PAST YR: ICD-10-PCS | Mod: CPTII,S$GLB,, | Performed by: ORTHOPAEDIC SURGERY

## 2021-07-26 PROCEDURE — 3288F PR FALLS RISK ASSESSMENT DOCUMENTED: ICD-10-PCS | Mod: CPTII,S$GLB,, | Performed by: ORTHOPAEDIC SURGERY

## 2021-07-26 PROCEDURE — 1125F AMNT PAIN NOTED PAIN PRSNT: CPT | Mod: CPTII,S$GLB,, | Performed by: ORTHOPAEDIC SURGERY

## 2021-07-26 PROCEDURE — 72050 X-RAY EXAM NECK SPINE 4/5VWS: CPT | Mod: 26,,, | Performed by: RADIOLOGY

## 2021-07-26 PROCEDURE — 99999 PR PBB SHADOW E&M-EST. PATIENT-LVL V: ICD-10-PCS | Mod: PBBFAC,,, | Performed by: ORTHOPAEDIC SURGERY

## 2021-07-26 PROCEDURE — 3072F PR LOW RISK FOR RETINOPATHY: ICD-10-PCS | Mod: CPTII,S$GLB,, | Performed by: ORTHOPAEDIC SURGERY

## 2021-07-26 PROCEDURE — 3072F LOW RISK FOR RETINOPATHY: CPT | Mod: CPTII,S$GLB,, | Performed by: ORTHOPAEDIC SURGERY

## 2021-07-26 PROCEDURE — 3008F BODY MASS INDEX DOCD: CPT | Mod: CPTII,S$GLB,, | Performed by: ORTHOPAEDIC SURGERY

## 2021-07-26 PROCEDURE — 99999 PR PBB SHADOW E&M-EST. PATIENT-LVL V: CPT | Mod: PBBFAC,,, | Performed by: ORTHOPAEDIC SURGERY

## 2021-07-26 PROCEDURE — 3052F PR MOST RECENT HEMOGLOBIN A1C LEVEL 8.0 - < 9.0%: ICD-10-PCS | Mod: CPTII,S$GLB,, | Performed by: ORTHOPAEDIC SURGERY

## 2021-07-26 PROCEDURE — 72050 XR CERVICAL SPINE AP LAT WITH FLEX EXTEN: ICD-10-PCS | Mod: 26,,, | Performed by: RADIOLOGY

## 2021-07-26 PROCEDURE — 99204 PR OFFICE/OUTPT VISIT, NEW, LEVL IV, 45-59 MIN: ICD-10-PCS | Mod: S$GLB,,, | Performed by: ORTHOPAEDIC SURGERY

## 2021-07-26 PROCEDURE — 3288F FALL RISK ASSESSMENT DOCD: CPT | Mod: CPTII,S$GLB,, | Performed by: ORTHOPAEDIC SURGERY

## 2021-07-26 PROCEDURE — 99204 OFFICE O/P NEW MOD 45 MIN: CPT | Mod: S$GLB,,, | Performed by: ORTHOPAEDIC SURGERY

## 2021-07-26 PROCEDURE — 3052F HG A1C>EQUAL 8.0%<EQUAL 9.0%: CPT | Mod: CPTII,S$GLB,, | Performed by: ORTHOPAEDIC SURGERY

## 2021-07-26 PROCEDURE — 1159F PR MEDICATION LIST DOCUMENTED IN MEDICAL RECORD: ICD-10-PCS | Mod: CPTII,S$GLB,, | Performed by: ORTHOPAEDIC SURGERY

## 2021-07-26 PROCEDURE — 1101F PT FALLS ASSESS-DOCD LE1/YR: CPT | Mod: CPTII,S$GLB,, | Performed by: ORTHOPAEDIC SURGERY

## 2021-07-26 PROCEDURE — 72050 X-RAY EXAM NECK SPINE 4/5VWS: CPT | Mod: TC

## 2021-07-26 PROCEDURE — 3008F PR BODY MASS INDEX (BMI) DOCUMENTED: ICD-10-PCS | Mod: CPTII,S$GLB,, | Performed by: ORTHOPAEDIC SURGERY

## 2021-07-26 RX ORDER — TIZANIDINE 2 MG/1
4 TABLET ORAL NIGHTLY PRN
Qty: 60 TABLET | Refills: 0 | Status: SHIPPED | OUTPATIENT
Start: 2021-07-26 | End: 2021-08-25

## 2021-07-30 ENCOUNTER — CLINICAL SUPPORT (OUTPATIENT)
Dept: REHABILITATION | Facility: HOSPITAL | Age: 71
End: 2021-07-30
Payer: MEDICARE

## 2021-07-30 DIAGNOSIS — R29.898 DECREASED STRENGTH OF UPPER EXTREMITY: ICD-10-CM

## 2021-07-30 DIAGNOSIS — M54.2 CHRONIC NECK PAIN: Primary | ICD-10-CM

## 2021-07-30 DIAGNOSIS — G89.29 CHRONIC NECK PAIN: Primary | ICD-10-CM

## 2021-07-30 DIAGNOSIS — R29.898 DECREASED ROM OF NECK: ICD-10-CM

## 2021-07-30 PROBLEM — M54.50 CHRONIC BILATERAL LOW BACK PAIN WITHOUT SCIATICA: Status: RESOLVED | Noted: 2020-09-01 | Resolved: 2021-07-30

## 2021-07-30 PROBLEM — M25.561 BILATERAL CHRONIC KNEE PAIN: Status: RESOLVED | Noted: 2020-09-01 | Resolved: 2021-07-30

## 2021-07-30 PROBLEM — M70.72 ISCHIAL BURSITIS OF LEFT SIDE: Status: RESOLVED | Noted: 2020-02-05 | Resolved: 2021-07-30

## 2021-07-30 PROBLEM — M25.562 BILATERAL CHRONIC KNEE PAIN: Status: RESOLVED | Noted: 2020-09-01 | Resolved: 2021-07-30

## 2021-07-30 PROCEDURE — 97162 PT EVAL MOD COMPLEX 30 MIN: CPT | Mod: PN

## 2021-07-30 PROCEDURE — 97110 THERAPEUTIC EXERCISES: CPT | Mod: PN

## 2021-08-02 ENCOUNTER — CLINICAL SUPPORT (OUTPATIENT)
Dept: REHABILITATION | Facility: HOSPITAL | Age: 71
End: 2021-08-02
Payer: MEDICARE

## 2021-08-02 ENCOUNTER — PATIENT MESSAGE (OUTPATIENT)
Dept: INTERNAL MEDICINE | Facility: CLINIC | Age: 71
End: 2021-08-02

## 2021-08-02 DIAGNOSIS — R29.898 DECREASED ROM OF NECK: ICD-10-CM

## 2021-08-02 DIAGNOSIS — M54.2 CHRONIC NECK PAIN: ICD-10-CM

## 2021-08-02 DIAGNOSIS — R29.898 DECREASED STRENGTH OF UPPER EXTREMITY: ICD-10-CM

## 2021-08-02 DIAGNOSIS — G89.29 CHRONIC NECK PAIN: ICD-10-CM

## 2021-08-02 PROCEDURE — 97140 MANUAL THERAPY 1/> REGIONS: CPT | Mod: PN

## 2021-08-02 PROCEDURE — 97110 THERAPEUTIC EXERCISES: CPT | Mod: PN

## 2021-08-09 ENCOUNTER — PATIENT OUTREACH (OUTPATIENT)
Dept: ADMINISTRATIVE | Facility: OTHER | Age: 71
End: 2021-08-09

## 2021-08-10 ENCOUNTER — OFFICE VISIT (OUTPATIENT)
Dept: OPHTHALMOLOGY | Facility: CLINIC | Age: 71
End: 2021-08-10
Payer: MEDICARE

## 2021-08-10 DIAGNOSIS — H01.02A SQUAMOUS BLEPHARITIS OF BOTH UPPER AND LOWER EYELID OF RIGHT EYE: ICD-10-CM

## 2021-08-10 DIAGNOSIS — H01.02B SQUAMOUS BLEPHARITIS OF BOTH UPPER AND LOWER EYELID OF LEFT EYE: ICD-10-CM

## 2021-08-10 DIAGNOSIS — H25.812 COMBINED FORM OF AGE-RELATED CATARACT, LEFT EYE: Primary | ICD-10-CM

## 2021-08-10 DIAGNOSIS — Z96.1 PSEUDOPHAKIA, RIGHT EYE: ICD-10-CM

## 2021-08-10 PROCEDURE — 1126F AMNT PAIN NOTED NONE PRSNT: CPT | Mod: CPTII,S$GLB,, | Performed by: OPHTHALMOLOGY

## 2021-08-10 PROCEDURE — 99999 PR PBB SHADOW E&M-EST. PATIENT-LVL III: CPT | Mod: PBBFAC,,, | Performed by: OPHTHALMOLOGY

## 2021-08-10 PROCEDURE — 1159F PR MEDICATION LIST DOCUMENTED IN MEDICAL RECORD: ICD-10-PCS | Mod: CPTII,S$GLB,, | Performed by: OPHTHALMOLOGY

## 2021-08-10 PROCEDURE — 3288F FALL RISK ASSESSMENT DOCD: CPT | Mod: CPTII,S$GLB,, | Performed by: OPHTHALMOLOGY

## 2021-08-10 PROCEDURE — 1159F MED LIST DOCD IN RCRD: CPT | Mod: CPTII,S$GLB,, | Performed by: OPHTHALMOLOGY

## 2021-08-10 PROCEDURE — 3288F PR FALLS RISK ASSESSMENT DOCUMENTED: ICD-10-PCS | Mod: CPTII,S$GLB,, | Performed by: OPHTHALMOLOGY

## 2021-08-10 PROCEDURE — 92014 PR EYE EXAM, EST PATIENT,COMPREHESV: ICD-10-PCS | Mod: S$GLB,,, | Performed by: OPHTHALMOLOGY

## 2021-08-10 PROCEDURE — 1126F PR PAIN SEVERITY QUANTIFIED, NO PAIN PRESENT: ICD-10-PCS | Mod: CPTII,S$GLB,, | Performed by: OPHTHALMOLOGY

## 2021-08-10 PROCEDURE — 99999 PR PBB SHADOW E&M-EST. PATIENT-LVL III: ICD-10-PCS | Mod: PBBFAC,,, | Performed by: OPHTHALMOLOGY

## 2021-08-10 PROCEDURE — 1101F PT FALLS ASSESS-DOCD LE1/YR: CPT | Mod: CPTII,S$GLB,, | Performed by: OPHTHALMOLOGY

## 2021-08-10 PROCEDURE — 1101F PR PT FALLS ASSESS DOC 0-1 FALLS W/OUT INJ PAST YR: ICD-10-PCS | Mod: CPTII,S$GLB,, | Performed by: OPHTHALMOLOGY

## 2021-08-10 PROCEDURE — 1160F PR REVIEW ALL MEDS BY PRESCRIBER/CLIN PHARMACIST DOCUMENTED: ICD-10-PCS | Mod: CPTII,S$GLB,, | Performed by: OPHTHALMOLOGY

## 2021-08-10 PROCEDURE — 3052F HG A1C>EQUAL 8.0%<EQUAL 9.0%: CPT | Mod: CPTII,S$GLB,, | Performed by: OPHTHALMOLOGY

## 2021-08-10 PROCEDURE — 92014 COMPRE OPH EXAM EST PT 1/>: CPT | Mod: S$GLB,,, | Performed by: OPHTHALMOLOGY

## 2021-08-10 PROCEDURE — 1160F RVW MEDS BY RX/DR IN RCRD: CPT | Mod: CPTII,S$GLB,, | Performed by: OPHTHALMOLOGY

## 2021-08-10 PROCEDURE — 3052F PR MOST RECENT HEMOGLOBIN A1C LEVEL 8.0 - < 9.0%: ICD-10-PCS | Mod: CPTII,S$GLB,, | Performed by: OPHTHALMOLOGY

## 2021-08-12 ENCOUNTER — PATIENT MESSAGE (OUTPATIENT)
Dept: INTERNAL MEDICINE | Facility: CLINIC | Age: 71
End: 2021-08-12

## 2021-08-14 ENCOUNTER — OFFICE VISIT (OUTPATIENT)
Dept: URGENT CARE | Facility: CLINIC | Age: 71
End: 2021-08-14
Payer: MEDICARE

## 2021-08-14 VITALS
HEIGHT: 63 IN | TEMPERATURE: 99 F | BODY MASS INDEX: 35.44 KG/M2 | SYSTOLIC BLOOD PRESSURE: 160 MMHG | HEART RATE: 88 BPM | OXYGEN SATURATION: 96 % | WEIGHT: 200 LBS | RESPIRATION RATE: 16 BRPM | DIASTOLIC BLOOD PRESSURE: 84 MMHG

## 2021-08-14 DIAGNOSIS — M62.830 MUSCLE SPASM OF BACK: ICD-10-CM

## 2021-08-14 DIAGNOSIS — M54.50 ACUTE LEFT-SIDED LOW BACK PAIN WITHOUT SCIATICA: Primary | ICD-10-CM

## 2021-08-14 LAB
BILIRUB UR QL STRIP: NEGATIVE
GLUCOSE UR QL STRIP: NEGATIVE
KETONES UR QL STRIP: NEGATIVE
LEUKOCYTE ESTERASE UR QL STRIP: NEGATIVE
PH, POC UA: 6 (ref 5–8)
POC BLOOD, URINE: NEGATIVE
POC NITRATES, URINE: NEGATIVE
PROT UR QL STRIP: NEGATIVE
SP GR UR STRIP: 1.01 (ref 1–1.03)
UROBILINOGEN UR STRIP-ACNC: NORMAL (ref 0.1–1.1)

## 2021-08-14 PROCEDURE — 3052F PR MOST RECENT HEMOGLOBIN A1C LEVEL 8.0 - < 9.0%: ICD-10-PCS | Mod: CPTII,S$GLB,, | Performed by: PHYSICIAN ASSISTANT

## 2021-08-14 PROCEDURE — 1159F PR MEDICATION LIST DOCUMENTED IN MEDICAL RECORD: ICD-10-PCS | Mod: CPTII,S$GLB,, | Performed by: PHYSICIAN ASSISTANT

## 2021-08-14 PROCEDURE — 3079F PR MOST RECENT DIASTOLIC BLOOD PRESSURE 80-89 MM HG: ICD-10-PCS | Mod: CPTII,S$GLB,, | Performed by: PHYSICIAN ASSISTANT

## 2021-08-14 PROCEDURE — 3079F DIAST BP 80-89 MM HG: CPT | Mod: CPTII,S$GLB,, | Performed by: PHYSICIAN ASSISTANT

## 2021-08-14 PROCEDURE — 99214 OFFICE O/P EST MOD 30 MIN: CPT | Mod: 25,S$GLB,, | Performed by: PHYSICIAN ASSISTANT

## 2021-08-14 PROCEDURE — 3008F BODY MASS INDEX DOCD: CPT | Mod: CPTII,S$GLB,, | Performed by: PHYSICIAN ASSISTANT

## 2021-08-14 PROCEDURE — 1159F MED LIST DOCD IN RCRD: CPT | Mod: CPTII,S$GLB,, | Performed by: PHYSICIAN ASSISTANT

## 2021-08-14 PROCEDURE — 3008F PR BODY MASS INDEX (BMI) DOCUMENTED: ICD-10-PCS | Mod: CPTII,S$GLB,, | Performed by: PHYSICIAN ASSISTANT

## 2021-08-14 PROCEDURE — 3072F LOW RISK FOR RETINOPATHY: CPT | Mod: CPTII,S$GLB,, | Performed by: PHYSICIAN ASSISTANT

## 2021-08-14 PROCEDURE — 81003 POCT URINALYSIS, DIPSTICK, AUTOMATED, W/O SCOPE: ICD-10-PCS | Mod: QW,S$GLB,, | Performed by: PHYSICIAN ASSISTANT

## 2021-08-14 PROCEDURE — 1160F RVW MEDS BY RX/DR IN RCRD: CPT | Mod: CPTII,S$GLB,, | Performed by: PHYSICIAN ASSISTANT

## 2021-08-14 PROCEDURE — 81003 URINALYSIS AUTO W/O SCOPE: CPT | Mod: QW,S$GLB,, | Performed by: PHYSICIAN ASSISTANT

## 2021-08-14 PROCEDURE — 3072F PR LOW RISK FOR RETINOPATHY: ICD-10-PCS | Mod: CPTII,S$GLB,, | Performed by: PHYSICIAN ASSISTANT

## 2021-08-14 PROCEDURE — 3052F HG A1C>EQUAL 8.0%<EQUAL 9.0%: CPT | Mod: CPTII,S$GLB,, | Performed by: PHYSICIAN ASSISTANT

## 2021-08-14 PROCEDURE — 99214 PR OFFICE/OUTPT VISIT, EST, LEVL IV, 30-39 MIN: ICD-10-PCS | Mod: 25,S$GLB,, | Performed by: PHYSICIAN ASSISTANT

## 2021-08-14 PROCEDURE — 3077F SYST BP >= 140 MM HG: CPT | Mod: CPTII,S$GLB,, | Performed by: PHYSICIAN ASSISTANT

## 2021-08-14 PROCEDURE — 1160F PR REVIEW ALL MEDS BY PRESCRIBER/CLIN PHARMACIST DOCUMENTED: ICD-10-PCS | Mod: CPTII,S$GLB,, | Performed by: PHYSICIAN ASSISTANT

## 2021-08-14 PROCEDURE — 3077F PR MOST RECENT SYSTOLIC BLOOD PRESSURE >= 140 MM HG: ICD-10-PCS | Mod: CPTII,S$GLB,, | Performed by: PHYSICIAN ASSISTANT

## 2021-08-14 RX ORDER — CYCLOBENZAPRINE HCL 5 MG
5 TABLET ORAL 2 TIMES DAILY PRN
Qty: 15 TABLET | Refills: 0 | Status: SHIPPED | OUTPATIENT
Start: 2021-08-14 | End: 2021-08-14 | Stop reason: CLARIF

## 2021-08-24 ENCOUNTER — OFFICE VISIT (OUTPATIENT)
Dept: OTOLARYNGOLOGY | Facility: CLINIC | Age: 71
End: 2021-08-24
Payer: MEDICARE

## 2021-08-24 ENCOUNTER — CLINICAL SUPPORT (OUTPATIENT)
Dept: OTOLARYNGOLOGY | Facility: CLINIC | Age: 71
End: 2021-08-24
Payer: MEDICARE

## 2021-08-24 VITALS
BODY MASS INDEX: 35.43 KG/M2 | HEART RATE: 80 BPM | DIASTOLIC BLOOD PRESSURE: 77 MMHG | HEIGHT: 63 IN | SYSTOLIC BLOOD PRESSURE: 132 MMHG | WEIGHT: 199.94 LBS

## 2021-08-24 DIAGNOSIS — K21.9 LARYNGOPHARYNGEAL REFLUX (LPR): ICD-10-CM

## 2021-08-24 DIAGNOSIS — H69.90 DYSFUNCTION OF EUSTACHIAN TUBE, UNSPECIFIED LATERALITY: Chronic | ICD-10-CM

## 2021-08-24 DIAGNOSIS — H90.3 SENSORINEURAL HEARING LOSS (SNHL) OF BOTH EARS: ICD-10-CM

## 2021-08-24 DIAGNOSIS — J31.0 CHRONIC ATROPHIC RHINITIS: Chronic | ICD-10-CM

## 2021-08-24 DIAGNOSIS — H90.3 SENSORINEURAL HEARING LOSS, BILATERAL: Primary | ICD-10-CM

## 2021-08-24 DIAGNOSIS — H90.3 SENSORINEURAL HEARING LOSS (SNHL) OF BOTH EARS: Primary | ICD-10-CM

## 2021-08-24 PROCEDURE — 1101F PT FALLS ASSESS-DOCD LE1/YR: CPT | Mod: CPTII,S$GLB,, | Performed by: OTOLARYNGOLOGY

## 2021-08-24 PROCEDURE — 99214 OFFICE O/P EST MOD 30 MIN: CPT | Mod: S$GLB,,, | Performed by: OTOLARYNGOLOGY

## 2021-08-24 PROCEDURE — 3052F PR MOST RECENT HEMOGLOBIN A1C LEVEL 8.0 - < 9.0%: ICD-10-PCS | Mod: CPTII,S$GLB,, | Performed by: OTOLARYNGOLOGY

## 2021-08-24 PROCEDURE — 3075F PR MOST RECENT SYSTOLIC BLOOD PRESS GE 130-139MM HG: ICD-10-PCS | Mod: CPTII,S$GLB,, | Performed by: OTOLARYNGOLOGY

## 2021-08-24 PROCEDURE — 3078F DIAST BP <80 MM HG: CPT | Mod: CPTII,S$GLB,, | Performed by: OTOLARYNGOLOGY

## 2021-08-24 PROCEDURE — 3078F PR MOST RECENT DIASTOLIC BLOOD PRESSURE < 80 MM HG: ICD-10-PCS | Mod: CPTII,S$GLB,, | Performed by: OTOLARYNGOLOGY

## 2021-08-24 PROCEDURE — 99214 PR OFFICE/OUTPT VISIT, EST, LEVL IV, 30-39 MIN: ICD-10-PCS | Mod: S$GLB,,, | Performed by: OTOLARYNGOLOGY

## 2021-08-24 PROCEDURE — 99999 PR PBB SHADOW E&M-EST. PATIENT-LVL II: CPT | Mod: PBBFAC,,, | Performed by: AUDIOLOGIST

## 2021-08-24 PROCEDURE — 3008F PR BODY MASS INDEX (BMI) DOCUMENTED: ICD-10-PCS | Mod: CPTII,S$GLB,, | Performed by: OTOLARYNGOLOGY

## 2021-08-24 PROCEDURE — 99999 PR PBB SHADOW E&M-EST. PATIENT-LVL V: ICD-10-PCS | Mod: PBBFAC,,, | Performed by: OTOLARYNGOLOGY

## 2021-08-24 PROCEDURE — 3008F BODY MASS INDEX DOCD: CPT | Mod: CPTII,S$GLB,, | Performed by: OTOLARYNGOLOGY

## 2021-08-24 PROCEDURE — 3052F HG A1C>EQUAL 8.0%<EQUAL 9.0%: CPT | Mod: CPTII,S$GLB,, | Performed by: OTOLARYNGOLOGY

## 2021-08-24 PROCEDURE — 99999 PR PBB SHADOW E&M-EST. PATIENT-LVL V: CPT | Mod: PBBFAC,,, | Performed by: OTOLARYNGOLOGY

## 2021-08-24 PROCEDURE — 1160F RVW MEDS BY RX/DR IN RCRD: CPT | Mod: CPTII,S$GLB,, | Performed by: OTOLARYNGOLOGY

## 2021-08-24 PROCEDURE — 3075F SYST BP GE 130 - 139MM HG: CPT | Mod: CPTII,S$GLB,, | Performed by: OTOLARYNGOLOGY

## 2021-08-24 PROCEDURE — 92567 PR TYMPA2METRY: ICD-10-PCS | Mod: S$GLB,,, | Performed by: AUDIOLOGIST

## 2021-08-24 PROCEDURE — 1101F PR PT FALLS ASSESS DOC 0-1 FALLS W/OUT INJ PAST YR: ICD-10-PCS | Mod: CPTII,S$GLB,, | Performed by: OTOLARYNGOLOGY

## 2021-08-24 PROCEDURE — 3288F FALL RISK ASSESSMENT DOCD: CPT | Mod: CPTII,S$GLB,, | Performed by: OTOLARYNGOLOGY

## 2021-08-24 PROCEDURE — 1126F AMNT PAIN NOTED NONE PRSNT: CPT | Mod: CPTII,S$GLB,, | Performed by: OTOLARYNGOLOGY

## 2021-08-24 PROCEDURE — 92567 TYMPANOMETRY: CPT | Mod: S$GLB,,, | Performed by: AUDIOLOGIST

## 2021-08-24 PROCEDURE — 92557 COMPREHENSIVE HEARING TEST: CPT | Mod: S$GLB,,, | Performed by: AUDIOLOGIST

## 2021-08-24 PROCEDURE — 1159F MED LIST DOCD IN RCRD: CPT | Mod: CPTII,S$GLB,, | Performed by: OTOLARYNGOLOGY

## 2021-08-24 PROCEDURE — 1126F PR PAIN SEVERITY QUANTIFIED, NO PAIN PRESENT: ICD-10-PCS | Mod: CPTII,S$GLB,, | Performed by: OTOLARYNGOLOGY

## 2021-08-24 PROCEDURE — 92557 PR COMPREHENSIVE HEARING TEST: ICD-10-PCS | Mod: S$GLB,,, | Performed by: AUDIOLOGIST

## 2021-08-24 PROCEDURE — 1160F PR REVIEW ALL MEDS BY PRESCRIBER/CLIN PHARMACIST DOCUMENTED: ICD-10-PCS | Mod: CPTII,S$GLB,, | Performed by: OTOLARYNGOLOGY

## 2021-08-24 PROCEDURE — 1159F PR MEDICATION LIST DOCUMENTED IN MEDICAL RECORD: ICD-10-PCS | Mod: CPTII,S$GLB,, | Performed by: OTOLARYNGOLOGY

## 2021-08-24 PROCEDURE — 99999 PR PBB SHADOW E&M-EST. PATIENT-LVL II: ICD-10-PCS | Mod: PBBFAC,,, | Performed by: AUDIOLOGIST

## 2021-08-24 PROCEDURE — 3288F PR FALLS RISK ASSESSMENT DOCUMENTED: ICD-10-PCS | Mod: CPTII,S$GLB,, | Performed by: OTOLARYNGOLOGY

## 2021-08-24 PROCEDURE — 3072F PR LOW RISK FOR RETINOPATHY: ICD-10-PCS | Mod: CPTII,S$GLB,, | Performed by: OTOLARYNGOLOGY

## 2021-08-24 PROCEDURE — 3072F LOW RISK FOR RETINOPATHY: CPT | Mod: CPTII,S$GLB,, | Performed by: OTOLARYNGOLOGY

## 2021-08-24 RX ORDER — AZELASTINE 1 MG/ML
1 SPRAY, METERED NASAL 2 TIMES DAILY
Qty: 30 ML | Refills: 5 | Status: SHIPPED | OUTPATIENT
Start: 2021-08-24 | End: 2022-08-24

## 2021-08-26 ENCOUNTER — CLINICAL SUPPORT (OUTPATIENT)
Dept: REHABILITATION | Facility: HOSPITAL | Age: 71
End: 2021-08-26
Payer: MEDICARE

## 2021-08-26 DIAGNOSIS — R29.898 DECREASED ROM OF NECK: ICD-10-CM

## 2021-08-26 DIAGNOSIS — M54.2 CHRONIC NECK PAIN: ICD-10-CM

## 2021-08-26 DIAGNOSIS — G89.29 CHRONIC NECK PAIN: ICD-10-CM

## 2021-08-26 DIAGNOSIS — R29.898 DECREASED STRENGTH OF UPPER EXTREMITY: ICD-10-CM

## 2021-08-26 PROCEDURE — 97110 THERAPEUTIC EXERCISES: CPT | Mod: PN

## 2021-08-26 PROCEDURE — 97140 MANUAL THERAPY 1/> REGIONS: CPT | Mod: PN

## 2021-09-09 ENCOUNTER — TELEPHONE (OUTPATIENT)
Dept: FAMILY MEDICINE | Facility: CLINIC | Age: 71
End: 2021-09-09

## 2021-09-10 ENCOUNTER — PATIENT MESSAGE (OUTPATIENT)
Dept: INTERNAL MEDICINE | Facility: CLINIC | Age: 71
End: 2021-09-10

## 2021-09-10 ENCOUNTER — LAB VISIT (OUTPATIENT)
Dept: LAB | Facility: HOSPITAL | Age: 71
End: 2021-09-10
Attending: NURSE PRACTITIONER
Payer: MEDICARE

## 2021-09-10 DIAGNOSIS — E11.42 DIABETIC POLYNEUROPATHY ASSOCIATED WITH TYPE 2 DIABETES MELLITUS: ICD-10-CM

## 2021-09-10 LAB
ALBUMIN/CREAT UR: 20.9 UG/MG (ref 0–30)
CREAT UR-MCNC: 148 MG/DL (ref 15–325)
MICROALBUMIN UR DL<=1MG/L-MCNC: 31 UG/ML

## 2021-09-10 PROCEDURE — 82570 ASSAY OF URINE CREATININE: CPT | Performed by: NURSE PRACTITIONER

## 2021-09-15 ENCOUNTER — CLINICAL SUPPORT (OUTPATIENT)
Dept: REHABILITATION | Facility: HOSPITAL | Age: 71
End: 2021-09-15
Payer: MEDICARE

## 2021-09-15 ENCOUNTER — TELEPHONE (OUTPATIENT)
Dept: INTERNAL MEDICINE | Facility: CLINIC | Age: 71
End: 2021-09-15

## 2021-09-15 DIAGNOSIS — G89.29 CHRONIC NECK PAIN: ICD-10-CM

## 2021-09-15 DIAGNOSIS — R29.898 DECREASED ROM OF NECK: ICD-10-CM

## 2021-09-15 DIAGNOSIS — M54.2 CHRONIC NECK PAIN: ICD-10-CM

## 2021-09-15 DIAGNOSIS — R29.898 DECREASED STRENGTH OF UPPER EXTREMITY: ICD-10-CM

## 2021-09-15 PROCEDURE — 97110 THERAPEUTIC EXERCISES: CPT | Mod: PN,CQ

## 2021-09-15 PROCEDURE — 97140 MANUAL THERAPY 1/> REGIONS: CPT | Mod: PN,CQ

## 2021-09-16 ENCOUNTER — OFFICE VISIT (OUTPATIENT)
Dept: INTERNAL MEDICINE | Facility: CLINIC | Age: 71
End: 2021-09-16
Payer: MEDICARE

## 2021-09-16 VITALS
OXYGEN SATURATION: 98 % | WEIGHT: 202.63 LBS | DIASTOLIC BLOOD PRESSURE: 74 MMHG | RESPIRATION RATE: 18 BRPM | BODY MASS INDEX: 35.9 KG/M2 | HEART RATE: 90 BPM | SYSTOLIC BLOOD PRESSURE: 148 MMHG | HEIGHT: 63 IN

## 2021-09-16 DIAGNOSIS — G47.33 OSA (OBSTRUCTIVE SLEEP APNEA): ICD-10-CM

## 2021-09-16 DIAGNOSIS — M51.36 DDD (DEGENERATIVE DISC DISEASE), LUMBAR: ICD-10-CM

## 2021-09-16 DIAGNOSIS — M47.812 SPONDYLOSIS OF CERVICAL REGION WITHOUT MYELOPATHY OR RADICULOPATHY: ICD-10-CM

## 2021-09-16 DIAGNOSIS — E78.5 HYPERLIPIDEMIA, UNSPECIFIED HYPERLIPIDEMIA TYPE: ICD-10-CM

## 2021-09-16 DIAGNOSIS — Z79.4 TYPE 2 DIABETES MELLITUS WITH HYPERGLYCEMIA, WITH LONG-TERM CURRENT USE OF INSULIN: Primary | ICD-10-CM

## 2021-09-16 DIAGNOSIS — E11.65 TYPE 2 DIABETES MELLITUS WITH HYPERGLYCEMIA, WITH LONG-TERM CURRENT USE OF INSULIN: Primary | ICD-10-CM

## 2021-09-16 DIAGNOSIS — I10 ESSENTIAL HYPERTENSION: ICD-10-CM

## 2021-09-16 DIAGNOSIS — E66.01 SEVERE OBESITY WITH BODY MASS INDEX (BMI) OF 35.0 TO 39.9 WITH SERIOUS COMORBIDITY: ICD-10-CM

## 2021-09-16 PROBLEM — R29.898 DECREASED ROM OF NECK: Status: RESOLVED | Noted: 2021-07-30 | Resolved: 2021-09-16

## 2021-09-16 PROBLEM — R29.898 DECREASED STRENGTH OF UPPER EXTREMITY: Status: RESOLVED | Noted: 2021-07-30 | Resolved: 2021-09-16

## 2021-09-16 PROBLEM — R29.898 IMPAIRED FLEXIBILITY OF LOWER EXTREMITY: Status: RESOLVED | Noted: 2020-02-05 | Resolved: 2021-09-16

## 2021-09-16 PROBLEM — R26.89 DECREASED MOBILITY: Status: RESOLVED | Noted: 2018-09-28 | Resolved: 2021-09-16

## 2021-09-16 PROBLEM — H10.13 ALLERGIC CONJUNCTIVITIS OF BOTH EYES: Status: RESOLVED | Noted: 2018-02-19 | Resolved: 2021-09-16

## 2021-09-16 PROBLEM — R53.1 DECREASED STRENGTH: Status: RESOLVED | Noted: 2018-09-28 | Resolved: 2021-09-16

## 2021-09-16 PROBLEM — M53.86 DECREASED ROM OF LUMBAR SPINE: Status: RESOLVED | Noted: 2018-09-28 | Resolved: 2021-09-16

## 2021-09-16 PROCEDURE — 3051F HG A1C>EQUAL 7.0%<8.0%: CPT | Mod: CPTII,S$GLB,, | Performed by: NURSE PRACTITIONER

## 2021-09-16 PROCEDURE — 99999 PR PBB SHADOW E&M-EST. PATIENT-LVL V: CPT | Mod: PBBFAC,,, | Performed by: NURSE PRACTITIONER

## 2021-09-16 PROCEDURE — 1159F PR MEDICATION LIST DOCUMENTED IN MEDICAL RECORD: ICD-10-PCS | Mod: CPTII,S$GLB,, | Performed by: NURSE PRACTITIONER

## 2021-09-16 PROCEDURE — 3078F PR MOST RECENT DIASTOLIC BLOOD PRESSURE < 80 MM HG: ICD-10-PCS | Mod: CPTII,S$GLB,, | Performed by: NURSE PRACTITIONER

## 2021-09-16 PROCEDURE — 99214 PR OFFICE/OUTPT VISIT, EST, LEVL IV, 30-39 MIN: ICD-10-PCS | Mod: S$GLB,,, | Performed by: NURSE PRACTITIONER

## 2021-09-16 PROCEDURE — 1126F AMNT PAIN NOTED NONE PRSNT: CPT | Mod: CPTII,S$GLB,, | Performed by: NURSE PRACTITIONER

## 2021-09-16 PROCEDURE — 4010F ACE/ARB THERAPY RXD/TAKEN: CPT | Mod: CPTII,S$GLB,, | Performed by: NURSE PRACTITIONER

## 2021-09-16 PROCEDURE — 99214 OFFICE O/P EST MOD 30 MIN: CPT | Mod: S$GLB,,, | Performed by: NURSE PRACTITIONER

## 2021-09-16 PROCEDURE — 3066F NEPHROPATHY DOC TX: CPT | Mod: CPTII,S$GLB,, | Performed by: NURSE PRACTITIONER

## 2021-09-16 PROCEDURE — 4010F PR ACE/ARB THEARPY RXD/TAKEN: ICD-10-PCS | Mod: CPTII,S$GLB,, | Performed by: NURSE PRACTITIONER

## 2021-09-16 PROCEDURE — 3288F PR FALLS RISK ASSESSMENT DOCUMENTED: ICD-10-PCS | Mod: CPTII,S$GLB,, | Performed by: NURSE PRACTITIONER

## 2021-09-16 PROCEDURE — 1126F PR PAIN SEVERITY QUANTIFIED, NO PAIN PRESENT: ICD-10-PCS | Mod: CPTII,S$GLB,, | Performed by: NURSE PRACTITIONER

## 2021-09-16 PROCEDURE — 1160F PR REVIEW ALL MEDS BY PRESCRIBER/CLIN PHARMACIST DOCUMENTED: ICD-10-PCS | Mod: CPTII,S$GLB,, | Performed by: NURSE PRACTITIONER

## 2021-09-16 PROCEDURE — 3072F LOW RISK FOR RETINOPATHY: CPT | Mod: CPTII,S$GLB,, | Performed by: NURSE PRACTITIONER

## 2021-09-16 PROCEDURE — 3077F PR MOST RECENT SYSTOLIC BLOOD PRESSURE >= 140 MM HG: ICD-10-PCS | Mod: CPTII,S$GLB,, | Performed by: NURSE PRACTITIONER

## 2021-09-16 PROCEDURE — 3072F PR LOW RISK FOR RETINOPATHY: ICD-10-PCS | Mod: CPTII,S$GLB,, | Performed by: NURSE PRACTITIONER

## 2021-09-16 PROCEDURE — 1160F RVW MEDS BY RX/DR IN RCRD: CPT | Mod: CPTII,S$GLB,, | Performed by: NURSE PRACTITIONER

## 2021-09-16 PROCEDURE — 3078F DIAST BP <80 MM HG: CPT | Mod: CPTII,S$GLB,, | Performed by: NURSE PRACTITIONER

## 2021-09-16 PROCEDURE — 3066F PR DOCUMENTATION OF TREATMENT FOR NEPHROPATHY: ICD-10-PCS | Mod: CPTII,S$GLB,, | Performed by: NURSE PRACTITIONER

## 2021-09-16 PROCEDURE — 1101F PR PT FALLS ASSESS DOC 0-1 FALLS W/OUT INJ PAST YR: ICD-10-PCS | Mod: CPTII,S$GLB,, | Performed by: NURSE PRACTITIONER

## 2021-09-16 PROCEDURE — 99999 PR PBB SHADOW E&M-EST. PATIENT-LVL V: ICD-10-PCS | Mod: PBBFAC,,, | Performed by: NURSE PRACTITIONER

## 2021-09-16 PROCEDURE — 3061F PR NEG MICROALBUMINURIA RESULT DOCUMENTED/REVIEW: ICD-10-PCS | Mod: CPTII,S$GLB,, | Performed by: NURSE PRACTITIONER

## 2021-09-16 PROCEDURE — 3008F BODY MASS INDEX DOCD: CPT | Mod: CPTII,S$GLB,, | Performed by: NURSE PRACTITIONER

## 2021-09-16 PROCEDURE — 3051F PR MOST RECENT HEMOGLOBIN A1C LEVEL 7.0 - < 8.0%: ICD-10-PCS | Mod: CPTII,S$GLB,, | Performed by: NURSE PRACTITIONER

## 2021-09-16 PROCEDURE — 3008F PR BODY MASS INDEX (BMI) DOCUMENTED: ICD-10-PCS | Mod: CPTII,S$GLB,, | Performed by: NURSE PRACTITIONER

## 2021-09-16 PROCEDURE — 3061F NEG MICROALBUMINURIA REV: CPT | Mod: CPTII,S$GLB,, | Performed by: NURSE PRACTITIONER

## 2021-09-16 PROCEDURE — 1159F MED LIST DOCD IN RCRD: CPT | Mod: CPTII,S$GLB,, | Performed by: NURSE PRACTITIONER

## 2021-09-16 PROCEDURE — 1101F PT FALLS ASSESS-DOCD LE1/YR: CPT | Mod: CPTII,S$GLB,, | Performed by: NURSE PRACTITIONER

## 2021-09-16 PROCEDURE — 3077F SYST BP >= 140 MM HG: CPT | Mod: CPTII,S$GLB,, | Performed by: NURSE PRACTITIONER

## 2021-09-16 PROCEDURE — 3288F FALL RISK ASSESSMENT DOCD: CPT | Mod: CPTII,S$GLB,, | Performed by: NURSE PRACTITIONER

## 2021-09-17 ENCOUNTER — CLINICAL SUPPORT (OUTPATIENT)
Dept: REHABILITATION | Facility: HOSPITAL | Age: 71
End: 2021-09-17
Payer: MEDICARE

## 2021-09-17 DIAGNOSIS — G89.29 CHRONIC NECK PAIN: ICD-10-CM

## 2021-09-17 DIAGNOSIS — M54.2 CHRONIC NECK PAIN: ICD-10-CM

## 2021-09-17 PROCEDURE — 97110 THERAPEUTIC EXERCISES: CPT | Mod: PN

## 2021-09-17 PROCEDURE — 97140 MANUAL THERAPY 1/> REGIONS: CPT | Mod: PN

## 2021-09-20 ENCOUNTER — PATIENT MESSAGE (OUTPATIENT)
Dept: INTERNAL MEDICINE | Facility: CLINIC | Age: 71
End: 2021-09-20

## 2021-09-20 ENCOUNTER — TELEPHONE (OUTPATIENT)
Dept: PHARMACY | Facility: CLINIC | Age: 71
End: 2021-09-20

## 2021-09-20 RX ORDER — CALCIUM CARB/VITAMIN D3/VIT K1 500-100-40
TABLET,CHEWABLE ORAL
Qty: 150 EACH | Refills: 1 | Status: SHIPPED | OUTPATIENT
Start: 2021-09-20

## 2021-09-21 ENCOUNTER — TELEPHONE (OUTPATIENT)
Dept: INTERNAL MEDICINE | Facility: CLINIC | Age: 71
End: 2021-09-21

## 2021-09-21 ENCOUNTER — PATIENT MESSAGE (OUTPATIENT)
Dept: INTERNAL MEDICINE | Facility: CLINIC | Age: 71
End: 2021-09-21

## 2021-09-22 ENCOUNTER — PATIENT MESSAGE (OUTPATIENT)
Dept: PHARMACY | Facility: CLINIC | Age: 71
End: 2021-09-22

## 2021-09-22 ENCOUNTER — TELEPHONE (OUTPATIENT)
Dept: INTERNAL MEDICINE | Facility: CLINIC | Age: 71
End: 2021-09-22

## 2021-09-23 ENCOUNTER — CLINICAL SUPPORT (OUTPATIENT)
Dept: REHABILITATION | Facility: HOSPITAL | Age: 71
End: 2021-09-23
Payer: MEDICARE

## 2021-09-23 ENCOUNTER — TELEPHONE (OUTPATIENT)
Dept: INTERNAL MEDICINE | Facility: CLINIC | Age: 71
End: 2021-09-23

## 2021-09-23 DIAGNOSIS — M54.2 CHRONIC NECK PAIN: ICD-10-CM

## 2021-09-23 DIAGNOSIS — G89.29 CHRONIC NECK PAIN: ICD-10-CM

## 2021-09-23 PROCEDURE — 97110 THERAPEUTIC EXERCISES: CPT | Mod: PN

## 2021-09-23 PROCEDURE — 97140 MANUAL THERAPY 1/> REGIONS: CPT | Mod: PN,CQ

## 2021-09-27 ENCOUNTER — OFFICE VISIT (OUTPATIENT)
Dept: FAMILY MEDICINE | Facility: CLINIC | Age: 71
End: 2021-09-27
Payer: MEDICARE

## 2021-09-27 VITALS
OXYGEN SATURATION: 95 % | HEART RATE: 100 BPM | HEIGHT: 63 IN | SYSTOLIC BLOOD PRESSURE: 138 MMHG | DIASTOLIC BLOOD PRESSURE: 78 MMHG | WEIGHT: 202.19 LBS | BODY MASS INDEX: 35.82 KG/M2

## 2021-09-27 DIAGNOSIS — E11.65 TYPE 2 DIABETES MELLITUS WITH HYPERGLYCEMIA, WITH LONG-TERM CURRENT USE OF INSULIN: ICD-10-CM

## 2021-09-27 DIAGNOSIS — Z79.4 TYPE 2 DIABETES MELLITUS WITH HYPERGLYCEMIA, WITH LONG-TERM CURRENT USE OF INSULIN: ICD-10-CM

## 2021-09-27 DIAGNOSIS — I10 ESSENTIAL HYPERTENSION: Primary | ICD-10-CM

## 2021-09-27 DIAGNOSIS — E66.01 SEVERE OBESITY (BMI 35.0-35.9 WITH COMORBIDITY): ICD-10-CM

## 2021-09-27 PROCEDURE — 3078F DIAST BP <80 MM HG: CPT | Mod: CPTII,S$GLB,, | Performed by: FAMILY MEDICINE

## 2021-09-27 PROCEDURE — 1159F MED LIST DOCD IN RCRD: CPT | Mod: CPTII,S$GLB,, | Performed by: FAMILY MEDICINE

## 2021-09-27 PROCEDURE — 3008F BODY MASS INDEX DOCD: CPT | Mod: CPTII,S$GLB,, | Performed by: FAMILY MEDICINE

## 2021-09-27 PROCEDURE — 3075F PR MOST RECENT SYSTOLIC BLOOD PRESS GE 130-139MM HG: ICD-10-PCS | Mod: CPTII,S$GLB,, | Performed by: FAMILY MEDICINE

## 2021-09-27 PROCEDURE — 3061F PR NEG MICROALBUMINURIA RESULT DOCUMENTED/REVIEW: ICD-10-PCS | Mod: CPTII,S$GLB,, | Performed by: FAMILY MEDICINE

## 2021-09-27 PROCEDURE — 3078F PR MOST RECENT DIASTOLIC BLOOD PRESSURE < 80 MM HG: ICD-10-PCS | Mod: CPTII,S$GLB,, | Performed by: FAMILY MEDICINE

## 2021-09-27 PROCEDURE — 3072F LOW RISK FOR RETINOPATHY: CPT | Mod: CPTII,S$GLB,, | Performed by: FAMILY MEDICINE

## 2021-09-27 PROCEDURE — 99214 OFFICE O/P EST MOD 30 MIN: CPT | Mod: S$GLB,,, | Performed by: FAMILY MEDICINE

## 2021-09-27 PROCEDURE — 3075F SYST BP GE 130 - 139MM HG: CPT | Mod: CPTII,S$GLB,, | Performed by: FAMILY MEDICINE

## 2021-09-27 PROCEDURE — 1159F PR MEDICATION LIST DOCUMENTED IN MEDICAL RECORD: ICD-10-PCS | Mod: CPTII,S$GLB,, | Performed by: FAMILY MEDICINE

## 2021-09-27 PROCEDURE — 3288F FALL RISK ASSESSMENT DOCD: CPT | Mod: CPTII,S$GLB,, | Performed by: FAMILY MEDICINE

## 2021-09-27 PROCEDURE — 1101F PR PT FALLS ASSESS DOC 0-1 FALLS W/OUT INJ PAST YR: ICD-10-PCS | Mod: CPTII,S$GLB,, | Performed by: FAMILY MEDICINE

## 2021-09-27 PROCEDURE — 3051F PR MOST RECENT HEMOGLOBIN A1C LEVEL 7.0 - < 8.0%: ICD-10-PCS | Mod: CPTII,S$GLB,, | Performed by: FAMILY MEDICINE

## 2021-09-27 PROCEDURE — 4010F PR ACE/ARB THEARPY RXD/TAKEN: ICD-10-PCS | Mod: CPTII,S$GLB,, | Performed by: FAMILY MEDICINE

## 2021-09-27 PROCEDURE — 3061F NEG MICROALBUMINURIA REV: CPT | Mod: CPTII,S$GLB,, | Performed by: FAMILY MEDICINE

## 2021-09-27 PROCEDURE — 3072F PR LOW RISK FOR RETINOPATHY: ICD-10-PCS | Mod: CPTII,S$GLB,, | Performed by: FAMILY MEDICINE

## 2021-09-27 PROCEDURE — 1101F PT FALLS ASSESS-DOCD LE1/YR: CPT | Mod: CPTII,S$GLB,, | Performed by: FAMILY MEDICINE

## 2021-09-27 PROCEDURE — 3288F PR FALLS RISK ASSESSMENT DOCUMENTED: ICD-10-PCS | Mod: CPTII,S$GLB,, | Performed by: FAMILY MEDICINE

## 2021-09-27 PROCEDURE — 99999 PR PBB SHADOW E&M-EST. PATIENT-LVL V: CPT | Mod: PBBFAC,,, | Performed by: FAMILY MEDICINE

## 2021-09-27 PROCEDURE — 3066F NEPHROPATHY DOC TX: CPT | Mod: CPTII,S$GLB,, | Performed by: FAMILY MEDICINE

## 2021-09-27 PROCEDURE — 4010F ACE/ARB THERAPY RXD/TAKEN: CPT | Mod: CPTII,S$GLB,, | Performed by: FAMILY MEDICINE

## 2021-09-27 PROCEDURE — 1160F RVW MEDS BY RX/DR IN RCRD: CPT | Mod: CPTII,S$GLB,, | Performed by: FAMILY MEDICINE

## 2021-09-27 PROCEDURE — 99214 PR OFFICE/OUTPT VISIT, EST, LEVL IV, 30-39 MIN: ICD-10-PCS | Mod: S$GLB,,, | Performed by: FAMILY MEDICINE

## 2021-09-27 PROCEDURE — 1160F PR REVIEW ALL MEDS BY PRESCRIBER/CLIN PHARMACIST DOCUMENTED: ICD-10-PCS | Mod: CPTII,S$GLB,, | Performed by: FAMILY MEDICINE

## 2021-09-27 PROCEDURE — 3051F HG A1C>EQUAL 7.0%<8.0%: CPT | Mod: CPTII,S$GLB,, | Performed by: FAMILY MEDICINE

## 2021-09-27 PROCEDURE — 3066F PR DOCUMENTATION OF TREATMENT FOR NEPHROPATHY: ICD-10-PCS | Mod: CPTII,S$GLB,, | Performed by: FAMILY MEDICINE

## 2021-09-27 PROCEDURE — 99999 PR PBB SHADOW E&M-EST. PATIENT-LVL V: ICD-10-PCS | Mod: PBBFAC,,, | Performed by: FAMILY MEDICINE

## 2021-09-27 PROCEDURE — 1126F PR PAIN SEVERITY QUANTIFIED, NO PAIN PRESENT: ICD-10-PCS | Mod: CPTII,S$GLB,, | Performed by: FAMILY MEDICINE

## 2021-09-27 PROCEDURE — 3008F PR BODY MASS INDEX (BMI) DOCUMENTED: ICD-10-PCS | Mod: CPTII,S$GLB,, | Performed by: FAMILY MEDICINE

## 2021-09-27 PROCEDURE — 1126F AMNT PAIN NOTED NONE PRSNT: CPT | Mod: CPTII,S$GLB,, | Performed by: FAMILY MEDICINE

## 2021-09-27 RX ORDER — AMLODIPINE BESYLATE 10 MG/1
10 TABLET ORAL DAILY
Qty: 90 TABLET | Refills: 3 | Status: SHIPPED | OUTPATIENT
Start: 2021-09-27 | End: 2022-11-14

## 2021-09-29 ENCOUNTER — DOCUMENTATION ONLY (OUTPATIENT)
Dept: REHABILITATION | Facility: HOSPITAL | Age: 71
End: 2021-09-29

## 2021-10-11 ENCOUNTER — DOCUMENTATION ONLY (OUTPATIENT)
Dept: REHABILITATION | Facility: HOSPITAL | Age: 71
End: 2021-10-11

## 2021-10-11 PROBLEM — M54.2 CHRONIC NECK PAIN: Status: RESOLVED | Noted: 2021-07-30 | Resolved: 2021-10-11

## 2021-10-11 PROBLEM — G89.29 CHRONIC NECK PAIN: Status: RESOLVED | Noted: 2021-07-30 | Resolved: 2021-10-11

## 2021-10-14 ENCOUNTER — PATIENT OUTREACH (OUTPATIENT)
Dept: ADMINISTRATIVE | Facility: OTHER | Age: 71
End: 2021-10-14

## 2021-10-18 ENCOUNTER — OFFICE VISIT (OUTPATIENT)
Dept: SLEEP MEDICINE | Facility: CLINIC | Age: 71
End: 2021-10-18
Payer: MEDICARE

## 2021-10-18 VITALS
WEIGHT: 202 LBS | SYSTOLIC BLOOD PRESSURE: 134 MMHG | HEIGHT: 63 IN | HEART RATE: 75 BPM | BODY MASS INDEX: 35.79 KG/M2 | DIASTOLIC BLOOD PRESSURE: 74 MMHG

## 2021-10-18 DIAGNOSIS — G47.33 OSA (OBSTRUCTIVE SLEEP APNEA): Primary | ICD-10-CM

## 2021-10-18 PROCEDURE — 3072F LOW RISK FOR RETINOPATHY: CPT | Mod: CPTII,S$GLB,, | Performed by: PSYCHIATRY & NEUROLOGY

## 2021-10-18 PROCEDURE — 3075F SYST BP GE 130 - 139MM HG: CPT | Mod: CPTII,S$GLB,, | Performed by: PSYCHIATRY & NEUROLOGY

## 2021-10-18 PROCEDURE — 3066F NEPHROPATHY DOC TX: CPT | Mod: CPTII,S$GLB,, | Performed by: PSYCHIATRY & NEUROLOGY

## 2021-10-18 PROCEDURE — 1101F PR PT FALLS ASSESS DOC 0-1 FALLS W/OUT INJ PAST YR: ICD-10-PCS | Mod: CPTII,S$GLB,, | Performed by: PSYCHIATRY & NEUROLOGY

## 2021-10-18 PROCEDURE — 3288F FALL RISK ASSESSMENT DOCD: CPT | Mod: CPTII,S$GLB,, | Performed by: PSYCHIATRY & NEUROLOGY

## 2021-10-18 PROCEDURE — 99499 UNLISTED E&M SERVICE: CPT | Mod: S$GLB,,, | Performed by: PSYCHIATRY & NEUROLOGY

## 2021-10-18 PROCEDURE — 1126F AMNT PAIN NOTED NONE PRSNT: CPT | Mod: CPTII,S$GLB,, | Performed by: PSYCHIATRY & NEUROLOGY

## 2021-10-18 PROCEDURE — 1101F PT FALLS ASSESS-DOCD LE1/YR: CPT | Mod: CPTII,S$GLB,, | Performed by: PSYCHIATRY & NEUROLOGY

## 2021-10-18 PROCEDURE — 3078F DIAST BP <80 MM HG: CPT | Mod: CPTII,S$GLB,, | Performed by: PSYCHIATRY & NEUROLOGY

## 2021-10-18 PROCEDURE — 1159F MED LIST DOCD IN RCRD: CPT | Mod: CPTII,S$GLB,, | Performed by: PSYCHIATRY & NEUROLOGY

## 2021-10-18 PROCEDURE — 99214 PR OFFICE/OUTPT VISIT, EST, LEVL IV, 30-39 MIN: ICD-10-PCS | Mod: S$GLB,,, | Performed by: PSYCHIATRY & NEUROLOGY

## 2021-10-18 PROCEDURE — 99999 PR PBB SHADOW E&M-EST. PATIENT-LVL III: CPT | Mod: PBBFAC,,, | Performed by: PSYCHIATRY & NEUROLOGY

## 2021-10-18 PROCEDURE — 3061F NEG MICROALBUMINURIA REV: CPT | Mod: CPTII,S$GLB,, | Performed by: PSYCHIATRY & NEUROLOGY

## 2021-10-18 PROCEDURE — 4010F ACE/ARB THERAPY RXD/TAKEN: CPT | Mod: CPTII,S$GLB,, | Performed by: PSYCHIATRY & NEUROLOGY

## 2021-10-18 PROCEDURE — 3008F PR BODY MASS INDEX (BMI) DOCUMENTED: ICD-10-PCS | Mod: CPTII,S$GLB,, | Performed by: PSYCHIATRY & NEUROLOGY

## 2021-10-18 PROCEDURE — 3072F PR LOW RISK FOR RETINOPATHY: ICD-10-PCS | Mod: CPTII,S$GLB,, | Performed by: PSYCHIATRY & NEUROLOGY

## 2021-10-18 PROCEDURE — 3066F PR DOCUMENTATION OF TREATMENT FOR NEPHROPATHY: ICD-10-PCS | Mod: CPTII,S$GLB,, | Performed by: PSYCHIATRY & NEUROLOGY

## 2021-10-18 PROCEDURE — 3078F PR MOST RECENT DIASTOLIC BLOOD PRESSURE < 80 MM HG: ICD-10-PCS | Mod: CPTII,S$GLB,, | Performed by: PSYCHIATRY & NEUROLOGY

## 2021-10-18 PROCEDURE — 3075F PR MOST RECENT SYSTOLIC BLOOD PRESS GE 130-139MM HG: ICD-10-PCS | Mod: CPTII,S$GLB,, | Performed by: PSYCHIATRY & NEUROLOGY

## 2021-10-18 PROCEDURE — 99999 PR PBB SHADOW E&M-EST. PATIENT-LVL III: ICD-10-PCS | Mod: PBBFAC,,, | Performed by: PSYCHIATRY & NEUROLOGY

## 2021-10-18 PROCEDURE — 3051F HG A1C>EQUAL 7.0%<8.0%: CPT | Mod: CPTII,S$GLB,, | Performed by: PSYCHIATRY & NEUROLOGY

## 2021-10-18 PROCEDURE — 3288F PR FALLS RISK ASSESSMENT DOCUMENTED: ICD-10-PCS | Mod: CPTII,S$GLB,, | Performed by: PSYCHIATRY & NEUROLOGY

## 2021-10-18 PROCEDURE — 1160F RVW MEDS BY RX/DR IN RCRD: CPT | Mod: CPTII,S$GLB,, | Performed by: PSYCHIATRY & NEUROLOGY

## 2021-10-18 PROCEDURE — 3008F BODY MASS INDEX DOCD: CPT | Mod: CPTII,S$GLB,, | Performed by: PSYCHIATRY & NEUROLOGY

## 2021-10-18 PROCEDURE — 1159F PR MEDICATION LIST DOCUMENTED IN MEDICAL RECORD: ICD-10-PCS | Mod: CPTII,S$GLB,, | Performed by: PSYCHIATRY & NEUROLOGY

## 2021-10-18 PROCEDURE — 99499 RISK ADDL DX/OHS AUDIT: ICD-10-PCS | Mod: S$GLB,,, | Performed by: PSYCHIATRY & NEUROLOGY

## 2021-10-18 PROCEDURE — 99214 OFFICE O/P EST MOD 30 MIN: CPT | Mod: S$GLB,,, | Performed by: PSYCHIATRY & NEUROLOGY

## 2021-10-18 PROCEDURE — 3061F PR NEG MICROALBUMINURIA RESULT DOCUMENTED/REVIEW: ICD-10-PCS | Mod: CPTII,S$GLB,, | Performed by: PSYCHIATRY & NEUROLOGY

## 2021-10-18 PROCEDURE — 3051F PR MOST RECENT HEMOGLOBIN A1C LEVEL 7.0 - < 8.0%: ICD-10-PCS | Mod: CPTII,S$GLB,, | Performed by: PSYCHIATRY & NEUROLOGY

## 2021-10-18 PROCEDURE — 4010F PR ACE/ARB THEARPY RXD/TAKEN: ICD-10-PCS | Mod: CPTII,S$GLB,, | Performed by: PSYCHIATRY & NEUROLOGY

## 2021-10-18 PROCEDURE — 1126F PR PAIN SEVERITY QUANTIFIED, NO PAIN PRESENT: ICD-10-PCS | Mod: CPTII,S$GLB,, | Performed by: PSYCHIATRY & NEUROLOGY

## 2021-10-18 PROCEDURE — 1160F PR REVIEW ALL MEDS BY PRESCRIBER/CLIN PHARMACIST DOCUMENTED: ICD-10-PCS | Mod: CPTII,S$GLB,, | Performed by: PSYCHIATRY & NEUROLOGY

## 2021-11-11 ENCOUNTER — OFFICE VISIT (OUTPATIENT)
Dept: OPHTHALMOLOGY | Facility: CLINIC | Age: 71
End: 2021-11-11
Payer: MEDICARE

## 2021-11-11 DIAGNOSIS — E11.59 HYPERTENSION ASSOCIATED WITH DIABETES: ICD-10-CM

## 2021-11-11 DIAGNOSIS — H25.812 COMBINED FORM OF AGE-RELATED CATARACT, LEFT EYE: Primary | ICD-10-CM

## 2021-11-11 DIAGNOSIS — I15.2 HYPERTENSION ASSOCIATED WITH DIABETES: ICD-10-CM

## 2021-11-11 DIAGNOSIS — H25.12 NUCLEAR SCLEROTIC CATARACT OF LEFT EYE: ICD-10-CM

## 2021-11-11 DIAGNOSIS — H01.02B SQUAMOUS BLEPHARITIS OF BOTH UPPER AND LOWER EYELID OF LEFT EYE: ICD-10-CM

## 2021-11-11 DIAGNOSIS — H52.7 REFRACTIVE ERROR: ICD-10-CM

## 2021-11-11 DIAGNOSIS — H01.02A SQUAMOUS BLEPHARITIS OF BOTH UPPER AND LOWER EYELID OF RIGHT EYE: ICD-10-CM

## 2021-11-11 DIAGNOSIS — Z96.1 PSEUDOPHAKIA, RIGHT EYE: ICD-10-CM

## 2021-11-11 DIAGNOSIS — E11.9 DM TYPE 2 WITHOUT RETINOPATHY: ICD-10-CM

## 2021-11-11 PROCEDURE — 3288F FALL RISK ASSESSMENT DOCD: CPT | Mod: CPTII,S$GLB,, | Performed by: OPHTHALMOLOGY

## 2021-11-11 PROCEDURE — 3051F PR MOST RECENT HEMOGLOBIN A1C LEVEL 7.0 - < 8.0%: ICD-10-PCS | Mod: CPTII,S$GLB,, | Performed by: OPHTHALMOLOGY

## 2021-11-11 PROCEDURE — 1101F PT FALLS ASSESS-DOCD LE1/YR: CPT | Mod: CPTII,S$GLB,, | Performed by: OPHTHALMOLOGY

## 2021-11-11 PROCEDURE — 3051F HG A1C>EQUAL 7.0%<8.0%: CPT | Mod: CPTII,S$GLB,, | Performed by: OPHTHALMOLOGY

## 2021-11-11 PROCEDURE — 3061F PR NEG MICROALBUMINURIA RESULT DOCUMENTED/REVIEW: ICD-10-PCS | Mod: CPTII,S$GLB,, | Performed by: OPHTHALMOLOGY

## 2021-11-11 PROCEDURE — 3066F PR DOCUMENTATION OF TREATMENT FOR NEPHROPATHY: ICD-10-PCS | Mod: CPTII,S$GLB,, | Performed by: OPHTHALMOLOGY

## 2021-11-11 PROCEDURE — 92012 INTRM OPH EXAM EST PATIENT: CPT | Mod: S$GLB,,, | Performed by: OPHTHALMOLOGY

## 2021-11-11 PROCEDURE — 99999 PR PBB SHADOW E&M-EST. PATIENT-LVL II: ICD-10-PCS | Mod: PBBFAC,,, | Performed by: OPHTHALMOLOGY

## 2021-11-11 PROCEDURE — 4010F PR ACE/ARB THEARPY RXD/TAKEN: ICD-10-PCS | Mod: CPTII,S$GLB,, | Performed by: OPHTHALMOLOGY

## 2021-11-11 PROCEDURE — 1126F AMNT PAIN NOTED NONE PRSNT: CPT | Mod: CPTII,S$GLB,, | Performed by: OPHTHALMOLOGY

## 2021-11-11 PROCEDURE — 1101F PR PT FALLS ASSESS DOC 0-1 FALLS W/OUT INJ PAST YR: ICD-10-PCS | Mod: CPTII,S$GLB,, | Performed by: OPHTHALMOLOGY

## 2021-11-11 PROCEDURE — 1160F RVW MEDS BY RX/DR IN RCRD: CPT | Mod: CPTII,S$GLB,, | Performed by: OPHTHALMOLOGY

## 2021-11-11 PROCEDURE — 1159F PR MEDICATION LIST DOCUMENTED IN MEDICAL RECORD: ICD-10-PCS | Mod: CPTII,S$GLB,, | Performed by: OPHTHALMOLOGY

## 2021-11-11 PROCEDURE — 92012 PR EYE EXAM, EST PATIENT,INTERMED: ICD-10-PCS | Mod: S$GLB,,, | Performed by: OPHTHALMOLOGY

## 2021-11-11 PROCEDURE — 3061F NEG MICROALBUMINURIA REV: CPT | Mod: CPTII,S$GLB,, | Performed by: OPHTHALMOLOGY

## 2021-11-11 PROCEDURE — 3288F PR FALLS RISK ASSESSMENT DOCUMENTED: ICD-10-PCS | Mod: CPTII,S$GLB,, | Performed by: OPHTHALMOLOGY

## 2021-11-11 PROCEDURE — 99999 PR PBB SHADOW E&M-EST. PATIENT-LVL II: CPT | Mod: PBBFAC,,, | Performed by: OPHTHALMOLOGY

## 2021-11-11 PROCEDURE — 1126F PR PAIN SEVERITY QUANTIFIED, NO PAIN PRESENT: ICD-10-PCS | Mod: CPTII,S$GLB,, | Performed by: OPHTHALMOLOGY

## 2021-11-11 PROCEDURE — 1159F MED LIST DOCD IN RCRD: CPT | Mod: CPTII,S$GLB,, | Performed by: OPHTHALMOLOGY

## 2021-11-11 PROCEDURE — 4010F ACE/ARB THERAPY RXD/TAKEN: CPT | Mod: CPTII,S$GLB,, | Performed by: OPHTHALMOLOGY

## 2021-11-11 PROCEDURE — 3066F NEPHROPATHY DOC TX: CPT | Mod: CPTII,S$GLB,, | Performed by: OPHTHALMOLOGY

## 2021-11-11 PROCEDURE — 1160F PR REVIEW ALL MEDS BY PRESCRIBER/CLIN PHARMACIST DOCUMENTED: ICD-10-PCS | Mod: CPTII,S$GLB,, | Performed by: OPHTHALMOLOGY

## 2021-11-11 RX ORDER — CYCLOBENZAPRINE HCL 5 MG
5 TABLET ORAL
COMMUNITY
Start: 2021-08-14 | End: 2022-04-11 | Stop reason: ALTCHOICE

## 2021-12-08 RX ORDER — FLUTICASONE PROPIONATE AND SALMETEROL 250; 50 UG/1; UG/1
1 POWDER RESPIRATORY (INHALATION) 2 TIMES DAILY
Qty: 60 EACH | Refills: 1 | Status: SHIPPED | OUTPATIENT
Start: 2021-12-08 | End: 2022-08-22 | Stop reason: SDUPTHER

## 2021-12-27 ENCOUNTER — LAB VISIT (OUTPATIENT)
Dept: LAB | Facility: HOSPITAL | Age: 71
End: 2021-12-27
Attending: FAMILY MEDICINE
Payer: MEDICARE

## 2021-12-27 DIAGNOSIS — Z79.4 TYPE 2 DIABETES MELLITUS WITH HYPERGLYCEMIA, WITH LONG-TERM CURRENT USE OF INSULIN: ICD-10-CM

## 2021-12-27 DIAGNOSIS — I10 ESSENTIAL HYPERTENSION: ICD-10-CM

## 2021-12-27 DIAGNOSIS — E11.65 TYPE 2 DIABETES MELLITUS WITH HYPERGLYCEMIA, WITH LONG-TERM CURRENT USE OF INSULIN: ICD-10-CM

## 2021-12-27 LAB
ALBUMIN SERPL BCP-MCNC: 3.8 G/DL (ref 3.5–5.2)
ALP SERPL-CCNC: 116 U/L (ref 55–135)
ALT SERPL W/O P-5'-P-CCNC: 15 U/L (ref 10–44)
ANION GAP SERPL CALC-SCNC: 8 MMOL/L (ref 8–16)
AST SERPL-CCNC: 22 U/L (ref 10–40)
BILIRUB SERPL-MCNC: 0.6 MG/DL (ref 0.1–1)
BUN SERPL-MCNC: 11 MG/DL (ref 8–23)
CALCIUM SERPL-MCNC: 9.5 MG/DL (ref 8.7–10.5)
CHLORIDE SERPL-SCNC: 102 MMOL/L (ref 95–110)
CHOLEST SERPL-MCNC: 248 MG/DL (ref 120–199)
CHOLEST/HDLC SERPL: 4.1 {RATIO} (ref 2–5)
CO2 SERPL-SCNC: 28 MMOL/L (ref 23–29)
CREAT SERPL-MCNC: 0.9 MG/DL (ref 0.5–1.4)
EST. GFR  (AFRICAN AMERICAN): >60 ML/MIN/1.73 M^2
EST. GFR  (NON AFRICAN AMERICAN): >60 ML/MIN/1.73 M^2
ESTIMATED AVG GLUCOSE: 169 MG/DL (ref 68–131)
GLUCOSE SERPL-MCNC: 133 MG/DL (ref 70–110)
HBA1C MFR BLD: 7.5 % (ref 4–5.6)
HDLC SERPL-MCNC: 61 MG/DL (ref 40–75)
HDLC SERPL: 24.6 % (ref 20–50)
LDLC SERPL CALC-MCNC: 145.2 MG/DL (ref 63–159)
NONHDLC SERPL-MCNC: 187 MG/DL
POTASSIUM SERPL-SCNC: 3.8 MMOL/L (ref 3.5–5.1)
PROT SERPL-MCNC: 7.7 G/DL (ref 6–8.4)
SODIUM SERPL-SCNC: 138 MMOL/L (ref 136–145)
TRIGL SERPL-MCNC: 209 MG/DL (ref 30–150)

## 2021-12-27 PROCEDURE — 80061 LIPID PANEL: CPT | Performed by: FAMILY MEDICINE

## 2021-12-27 PROCEDURE — 36415 COLL VENOUS BLD VENIPUNCTURE: CPT | Mod: PO | Performed by: FAMILY MEDICINE

## 2021-12-27 PROCEDURE — 80053 COMPREHEN METABOLIC PANEL: CPT | Performed by: FAMILY MEDICINE

## 2021-12-27 PROCEDURE — 83036 HEMOGLOBIN GLYCOSYLATED A1C: CPT | Performed by: FAMILY MEDICINE

## 2022-01-05 ENCOUNTER — TELEPHONE (OUTPATIENT)
Dept: FAMILY MEDICINE | Facility: CLINIC | Age: 72
End: 2022-01-05
Payer: MEDICARE

## 2022-01-05 ENCOUNTER — TELEPHONE (OUTPATIENT)
Dept: INTERNAL MEDICINE | Facility: CLINIC | Age: 72
End: 2022-01-05
Payer: MEDICARE

## 2022-01-05 DIAGNOSIS — Z12.31 ENCOUNTER FOR SCREENING MAMMOGRAM FOR MALIGNANT NEOPLASM OF BREAST: Primary | ICD-10-CM

## 2022-01-05 NOTE — TELEPHONE ENCOUNTER
----- Message from Jackelyn Scott sent at 1/5/2022  1:50 PM CST -----  Regarding: advice  Contact: [phone]  Patient is requesting a call back to confirm she needs new labs drawn.   Would the patient rather a call back or a response via [facility]?  call  Best Call Back Number:  [phone]  Additional Information:
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
## 2022-01-18 ENCOUNTER — PATIENT MESSAGE (OUTPATIENT)
Dept: PHARMACY | Facility: CLINIC | Age: 72
End: 2022-01-18
Payer: MEDICARE

## 2022-01-18 ENCOUNTER — OFFICE VISIT (OUTPATIENT)
Dept: INTERNAL MEDICINE | Facility: CLINIC | Age: 72
End: 2022-01-18
Payer: MEDICARE

## 2022-01-18 ENCOUNTER — TELEPHONE (OUTPATIENT)
Dept: PHARMACY | Facility: CLINIC | Age: 72
End: 2022-01-18
Payer: MEDICARE

## 2022-01-18 VITALS
BODY MASS INDEX: 36.49 KG/M2 | DIASTOLIC BLOOD PRESSURE: 78 MMHG | HEIGHT: 63 IN | HEART RATE: 80 BPM | SYSTOLIC BLOOD PRESSURE: 118 MMHG | WEIGHT: 205.94 LBS | OXYGEN SATURATION: 96 %

## 2022-01-18 DIAGNOSIS — Z12.31 ENCOUNTER FOR SCREENING MAMMOGRAM FOR MALIGNANT NEOPLASM OF BREAST: ICD-10-CM

## 2022-01-18 DIAGNOSIS — I10 ESSENTIAL HYPERTENSION: ICD-10-CM

## 2022-01-18 DIAGNOSIS — E66.01 SEVERE OBESITY WITH BODY MASS INDEX (BMI) OF 35.0 TO 39.9 WITH SERIOUS COMORBIDITY: ICD-10-CM

## 2022-01-18 DIAGNOSIS — M51.36 DDD (DEGENERATIVE DISC DISEASE), LUMBAR: ICD-10-CM

## 2022-01-18 DIAGNOSIS — E11.65 TYPE 2 DIABETES MELLITUS WITH HYPERGLYCEMIA, WITH LONG-TERM CURRENT USE OF INSULIN: Primary | ICD-10-CM

## 2022-01-18 DIAGNOSIS — E11.42 DIABETIC POLYNEUROPATHY ASSOCIATED WITH TYPE 2 DIABETES MELLITUS: ICD-10-CM

## 2022-01-18 DIAGNOSIS — G47.33 OSA (OBSTRUCTIVE SLEEP APNEA): ICD-10-CM

## 2022-01-18 DIAGNOSIS — Z79.4 TYPE 2 DIABETES MELLITUS WITH HYPERGLYCEMIA, WITH LONG-TERM CURRENT USE OF INSULIN: Primary | ICD-10-CM

## 2022-01-18 DIAGNOSIS — E78.5 HYPERLIPIDEMIA, UNSPECIFIED HYPERLIPIDEMIA TYPE: ICD-10-CM

## 2022-01-18 DIAGNOSIS — Z12.11 COLON CANCER SCREENING: ICD-10-CM

## 2022-01-18 PROCEDURE — 1159F PR MEDICATION LIST DOCUMENTED IN MEDICAL RECORD: ICD-10-PCS | Mod: CPTII,S$GLB,, | Performed by: NURSE PRACTITIONER

## 2022-01-18 PROCEDURE — 3288F PR FALLS RISK ASSESSMENT DOCUMENTED: ICD-10-PCS | Mod: CPTII,S$GLB,, | Performed by: NURSE PRACTITIONER

## 2022-01-18 PROCEDURE — 99499 UNLISTED E&M SERVICE: CPT | Mod: S$GLB,,, | Performed by: NURSE PRACTITIONER

## 2022-01-18 PROCEDURE — 99999 PR PBB SHADOW E&M-EST. PATIENT-LVL V: ICD-10-PCS | Mod: PBBFAC,,, | Performed by: NURSE PRACTITIONER

## 2022-01-18 PROCEDURE — 99214 OFFICE O/P EST MOD 30 MIN: CPT | Mod: S$GLB,,, | Performed by: NURSE PRACTITIONER

## 2022-01-18 PROCEDURE — 3051F PR MOST RECENT HEMOGLOBIN A1C LEVEL 7.0 - < 8.0%: ICD-10-PCS | Mod: CPTII,S$GLB,, | Performed by: NURSE PRACTITIONER

## 2022-01-18 PROCEDURE — 1160F RVW MEDS BY RX/DR IN RCRD: CPT | Mod: CPTII,S$GLB,, | Performed by: NURSE PRACTITIONER

## 2022-01-18 PROCEDURE — 3078F PR MOST RECENT DIASTOLIC BLOOD PRESSURE < 80 MM HG: ICD-10-PCS | Mod: CPTII,S$GLB,, | Performed by: NURSE PRACTITIONER

## 2022-01-18 PROCEDURE — 1159F MED LIST DOCD IN RCRD: CPT | Mod: CPTII,S$GLB,, | Performed by: NURSE PRACTITIONER

## 2022-01-18 PROCEDURE — 1101F PT FALLS ASSESS-DOCD LE1/YR: CPT | Mod: CPTII,S$GLB,, | Performed by: NURSE PRACTITIONER

## 2022-01-18 PROCEDURE — 99214 PR OFFICE/OUTPT VISIT, EST, LEVL IV, 30-39 MIN: ICD-10-PCS | Mod: S$GLB,,, | Performed by: NURSE PRACTITIONER

## 2022-01-18 PROCEDURE — 3288F FALL RISK ASSESSMENT DOCD: CPT | Mod: CPTII,S$GLB,, | Performed by: NURSE PRACTITIONER

## 2022-01-18 PROCEDURE — 3008F PR BODY MASS INDEX (BMI) DOCUMENTED: ICD-10-PCS | Mod: CPTII,S$GLB,, | Performed by: NURSE PRACTITIONER

## 2022-01-18 PROCEDURE — 1126F PR PAIN SEVERITY QUANTIFIED, NO PAIN PRESENT: ICD-10-PCS | Mod: CPTII,S$GLB,, | Performed by: NURSE PRACTITIONER

## 2022-01-18 PROCEDURE — 3074F PR MOST RECENT SYSTOLIC BLOOD PRESSURE < 130 MM HG: ICD-10-PCS | Mod: CPTII,S$GLB,, | Performed by: NURSE PRACTITIONER

## 2022-01-18 PROCEDURE — 3051F HG A1C>EQUAL 7.0%<8.0%: CPT | Mod: CPTII,S$GLB,, | Performed by: NURSE PRACTITIONER

## 2022-01-18 PROCEDURE — 1126F AMNT PAIN NOTED NONE PRSNT: CPT | Mod: CPTII,S$GLB,, | Performed by: NURSE PRACTITIONER

## 2022-01-18 PROCEDURE — 1160F PR REVIEW ALL MEDS BY PRESCRIBER/CLIN PHARMACIST DOCUMENTED: ICD-10-PCS | Mod: CPTII,S$GLB,, | Performed by: NURSE PRACTITIONER

## 2022-01-18 PROCEDURE — 99999 PR PBB SHADOW E&M-EST. PATIENT-LVL V: CPT | Mod: PBBFAC,,, | Performed by: NURSE PRACTITIONER

## 2022-01-18 PROCEDURE — 3008F BODY MASS INDEX DOCD: CPT | Mod: CPTII,S$GLB,, | Performed by: NURSE PRACTITIONER

## 2022-01-18 PROCEDURE — 3074F SYST BP LT 130 MM HG: CPT | Mod: CPTII,S$GLB,, | Performed by: NURSE PRACTITIONER

## 2022-01-18 PROCEDURE — 1101F PR PT FALLS ASSESS DOC 0-1 FALLS W/OUT INJ PAST YR: ICD-10-PCS | Mod: CPTII,S$GLB,, | Performed by: NURSE PRACTITIONER

## 2022-01-18 PROCEDURE — 99499 RISK ADDL DX/OHS AUDIT: ICD-10-PCS | Mod: S$GLB,,, | Performed by: NURSE PRACTITIONER

## 2022-01-18 PROCEDURE — 3078F DIAST BP <80 MM HG: CPT | Mod: CPTII,S$GLB,, | Performed by: NURSE PRACTITIONER

## 2022-01-18 RX ORDER — DULAGLUTIDE 1.5 MG/.5ML
1.5 INJECTION, SOLUTION SUBCUTANEOUS
Qty: 12 PEN | Refills: 2
Start: 2022-01-18 | End: 2022-01-21 | Stop reason: SDUPTHER

## 2022-01-18 RX ORDER — ESOMEPRAZOLE MAGNESIUM 40 MG/1
CAPSULE, DELAYED RELEASE ORAL
Status: ON HOLD | COMMUNITY
Start: 2021-12-04 | End: 2022-06-07 | Stop reason: SDUPTHER

## 2022-01-18 NOTE — PATIENT INSTRUCTIONS
Follow up in 4 months w/Irielle   A1c prior (1-7 days before appt)   Increase trulicity 1.5 mg weekly- patient assistance program  levemir 45 units at night   novolog 12 units before meals plus scale 150-200+2, etc.     Sugar goal    Check sugar 5 minutes before meals or 2 hours after     [email or web address]   Eat fit trina   myfitnesspal trina   [email or web address]     a1c goal less than 7.5%, current 7.5% before 7.2%

## 2022-01-18 NOTE — TELEPHONE ENCOUNTER
----- Message from EDUARDO Escobar, LINSEYP sent at 1/18/2022  1:31 PM CST -----  Regarding: trulicilty 1.5 mg weekly  Hi !  Pt needs mohsen trina for trulicity 1.5 mg weekly  Thanks  Bindu

## 2022-01-21 ENCOUNTER — PATIENT MESSAGE (OUTPATIENT)
Dept: INTERNAL MEDICINE | Facility: CLINIC | Age: 72
End: 2022-01-21
Payer: MEDICARE

## 2022-01-21 ENCOUNTER — PATIENT MESSAGE (OUTPATIENT)
Dept: FAMILY MEDICINE | Facility: CLINIC | Age: 72
End: 2022-01-21
Payer: MEDICARE

## 2022-01-21 RX ORDER — DULAGLUTIDE 1.5 MG/.5ML
1.5 INJECTION, SOLUTION SUBCUTANEOUS
Qty: 12 PEN | Refills: 2
Start: 2022-01-21 | End: 2022-03-07 | Stop reason: SDUPTHER

## 2022-01-26 ENCOUNTER — HOSPITAL ENCOUNTER (OUTPATIENT)
Dept: RADIOLOGY | Facility: HOSPITAL | Age: 72
Discharge: HOME | End: 2022-01-26
Attending: FAMILY MEDICINE
Payer: MEDICARE

## 2022-01-26 DIAGNOSIS — Z12.31 ENCOUNTER FOR SCREENING MAMMOGRAM FOR MALIGNANT NEOPLASM OF BREAST: ICD-10-CM

## 2022-01-26 PROCEDURE — 77067 SCR MAMMO BI INCL CAD: CPT | Mod: 26,,, | Performed by: RADIOLOGY

## 2022-01-26 PROCEDURE — 77063 BREAST TOMOSYNTHESIS BI: CPT | Mod: 26,,, | Performed by: RADIOLOGY

## 2022-01-26 PROCEDURE — 77067 MAMMO DIGITAL SCREENING BILAT WITH TOMO: ICD-10-PCS | Mod: 26,,, | Performed by: RADIOLOGY

## 2022-01-26 PROCEDURE — 77063 BREAST TOMOSYNTHESIS BI: CPT | Mod: TC

## 2022-01-26 PROCEDURE — 77067 SCR MAMMO BI INCL CAD: CPT | Mod: TC

## 2022-01-26 PROCEDURE — 77063 MAMMO DIGITAL SCREENING BILAT WITH TOMO: ICD-10-PCS | Mod: 26,,, | Performed by: RADIOLOGY

## 2022-02-10 ENCOUNTER — TELEPHONE (OUTPATIENT)
Dept: FAMILY MEDICINE | Facility: CLINIC | Age: 72
End: 2022-02-10
Payer: MEDICARE

## 2022-02-15 ENCOUNTER — PATIENT MESSAGE (OUTPATIENT)
Dept: FAMILY MEDICINE | Facility: CLINIC | Age: 72
End: 2022-02-15

## 2022-02-15 ENCOUNTER — OFFICE VISIT (OUTPATIENT)
Dept: FAMILY MEDICINE | Facility: CLINIC | Age: 72
End: 2022-02-15
Payer: MEDICARE

## 2022-02-15 ENCOUNTER — LAB VISIT (OUTPATIENT)
Dept: LAB | Facility: HOSPITAL | Age: 72
End: 2022-02-15
Attending: NURSE PRACTITIONER
Payer: MEDICARE

## 2022-02-15 VITALS
BODY MASS INDEX: 36.09 KG/M2 | HEART RATE: 86 BPM | SYSTOLIC BLOOD PRESSURE: 120 MMHG | DIASTOLIC BLOOD PRESSURE: 60 MMHG | OXYGEN SATURATION: 97 % | HEIGHT: 63 IN | WEIGHT: 203.69 LBS

## 2022-02-15 DIAGNOSIS — M25.511 CHRONIC PAIN OF BOTH SHOULDERS: ICD-10-CM

## 2022-02-15 DIAGNOSIS — M79.641 BILATERAL HAND PAIN: ICD-10-CM

## 2022-02-15 DIAGNOSIS — M79.642 BILATERAL HAND PAIN: ICD-10-CM

## 2022-02-15 DIAGNOSIS — Z12.11 SCREENING FOR COLON CANCER: ICD-10-CM

## 2022-02-15 DIAGNOSIS — M13.0 ARTHRITIS OF MULTIPLE SITES: Primary | ICD-10-CM

## 2022-02-15 DIAGNOSIS — G89.29 CHRONIC PAIN OF BOTH SHOULDERS: ICD-10-CM

## 2022-02-15 DIAGNOSIS — H10.13 ALLERGIC CONJUNCTIVITIS, BILATERAL: ICD-10-CM

## 2022-02-15 DIAGNOSIS — M54.50 CHRONIC BILATERAL LOW BACK PAIN WITHOUT SCIATICA: ICD-10-CM

## 2022-02-15 DIAGNOSIS — G89.29 CHRONIC BILATERAL LOW BACK PAIN WITHOUT SCIATICA: ICD-10-CM

## 2022-02-15 DIAGNOSIS — M25.512 CHRONIC PAIN OF BOTH SHOULDERS: ICD-10-CM

## 2022-02-15 LAB
ALBUMIN SERPL BCP-MCNC: 3.9 G/DL (ref 3.5–5.2)
ALP SERPL-CCNC: 108 U/L (ref 55–135)
ALT SERPL W/O P-5'-P-CCNC: 13 U/L (ref 10–44)
ANION GAP SERPL CALC-SCNC: 8 MMOL/L (ref 8–16)
AST SERPL-CCNC: 16 U/L (ref 10–40)
BASOPHILS # BLD AUTO: 0.02 K/UL (ref 0–0.2)
BASOPHILS NFR BLD: 0.4 % (ref 0–1.9)
BILIRUB SERPL-MCNC: 0.5 MG/DL (ref 0.1–1)
BUN SERPL-MCNC: 11 MG/DL (ref 8–23)
CALCIUM SERPL-MCNC: 9.6 MG/DL (ref 8.7–10.5)
CHLORIDE SERPL-SCNC: 102 MMOL/L (ref 95–110)
CO2 SERPL-SCNC: 28 MMOL/L (ref 23–29)
CREAT SERPL-MCNC: 0.9 MG/DL (ref 0.5–1.4)
CRP SERPL-MCNC: 11.9 MG/L (ref 0–8.2)
DIFFERENTIAL METHOD: ABNORMAL
EOSINOPHIL # BLD AUTO: 0.1 K/UL (ref 0–0.5)
EOSINOPHIL NFR BLD: 1.5 % (ref 0–8)
ERYTHROCYTE [DISTWIDTH] IN BLOOD BY AUTOMATED COUNT: 15.5 % (ref 11.5–14.5)
ERYTHROCYTE [SEDIMENTATION RATE] IN BLOOD BY WESTERGREN METHOD: 35 MM/HR (ref 0–36)
EST. GFR  (AFRICAN AMERICAN): >60 ML/MIN/1.73 M^2
EST. GFR  (NON AFRICAN AMERICAN): >60 ML/MIN/1.73 M^2
GLUCOSE SERPL-MCNC: 185 MG/DL (ref 70–110)
HCT VFR BLD AUTO: 39.3 % (ref 37–48.5)
HGB BLD-MCNC: 13.1 G/DL (ref 12–16)
IMM GRANULOCYTES # BLD AUTO: 0.02 K/UL (ref 0–0.04)
IMM GRANULOCYTES NFR BLD AUTO: 0.4 % (ref 0–0.5)
LYMPHOCYTES # BLD AUTO: 1.8 K/UL (ref 1–4.8)
LYMPHOCYTES NFR BLD: 37.6 % (ref 18–48)
MCH RBC QN AUTO: 25.8 PG (ref 27–31)
MCHC RBC AUTO-ENTMCNC: 33.3 G/DL (ref 32–36)
MCV RBC AUTO: 77 FL (ref 82–98)
MONOCYTES # BLD AUTO: 0.3 K/UL (ref 0.3–1)
MONOCYTES NFR BLD: 5.9 % (ref 4–15)
NEUTROPHILS # BLD AUTO: 2.6 K/UL (ref 1.8–7.7)
NEUTROPHILS NFR BLD: 54.2 % (ref 38–73)
NRBC BLD-RTO: 0 /100 WBC
PLATELET # BLD AUTO: 324 K/UL (ref 150–450)
PMV BLD AUTO: 10.3 FL (ref 9.2–12.9)
POTASSIUM SERPL-SCNC: 3.7 MMOL/L (ref 3.5–5.1)
PROT SERPL-MCNC: 7.7 G/DL (ref 6–8.4)
RBC # BLD AUTO: 5.08 M/UL (ref 4–5.4)
RHEUMATOID FACT SERPL-ACNC: <13 IU/ML (ref 0–15)
SODIUM SERPL-SCNC: 138 MMOL/L (ref 136–145)
WBC # BLD AUTO: 4.76 K/UL (ref 3.9–12.7)

## 2022-02-15 PROCEDURE — 3078F PR MOST RECENT DIASTOLIC BLOOD PRESSURE < 80 MM HG: ICD-10-PCS | Mod: CPTII,S$GLB,, | Performed by: NURSE PRACTITIONER

## 2022-02-15 PROCEDURE — 3008F BODY MASS INDEX DOCD: CPT | Mod: CPTII,S$GLB,, | Performed by: NURSE PRACTITIONER

## 2022-02-15 PROCEDURE — 99999 PR PBB SHADOW E&M-EST. PATIENT-LVL V: ICD-10-PCS | Mod: PBBFAC,,, | Performed by: NURSE PRACTITIONER

## 2022-02-15 PROCEDURE — 85025 COMPLETE CBC W/AUTO DIFF WBC: CPT | Performed by: NURSE PRACTITIONER

## 2022-02-15 PROCEDURE — 3078F DIAST BP <80 MM HG: CPT | Mod: CPTII,S$GLB,, | Performed by: NURSE PRACTITIONER

## 2022-02-15 PROCEDURE — 1159F MED LIST DOCD IN RCRD: CPT | Mod: CPTII,S$GLB,, | Performed by: NURSE PRACTITIONER

## 2022-02-15 PROCEDURE — 86140 C-REACTIVE PROTEIN: CPT | Performed by: NURSE PRACTITIONER

## 2022-02-15 PROCEDURE — 1126F AMNT PAIN NOTED NONE PRSNT: CPT | Mod: CPTII,S$GLB,, | Performed by: NURSE PRACTITIONER

## 2022-02-15 PROCEDURE — 1101F PT FALLS ASSESS-DOCD LE1/YR: CPT | Mod: CPTII,S$GLB,, | Performed by: NURSE PRACTITIONER

## 2022-02-15 PROCEDURE — 86039 ANTINUCLEAR ANTIBODIES (ANA): CPT | Performed by: NURSE PRACTITIONER

## 2022-02-15 PROCEDURE — 3288F PR FALLS RISK ASSESSMENT DOCUMENTED: ICD-10-PCS | Mod: CPTII,S$GLB,, | Performed by: NURSE PRACTITIONER

## 2022-02-15 PROCEDURE — 1160F RVW MEDS BY RX/DR IN RCRD: CPT | Mod: CPTII,S$GLB,, | Performed by: NURSE PRACTITIONER

## 2022-02-15 PROCEDURE — 36415 COLL VENOUS BLD VENIPUNCTURE: CPT | Mod: PO | Performed by: NURSE PRACTITIONER

## 2022-02-15 PROCEDURE — 1126F PR PAIN SEVERITY QUANTIFIED, NO PAIN PRESENT: ICD-10-PCS | Mod: CPTII,S$GLB,, | Performed by: NURSE PRACTITIONER

## 2022-02-15 PROCEDURE — 1160F PR REVIEW ALL MEDS BY PRESCRIBER/CLIN PHARMACIST DOCUMENTED: ICD-10-PCS | Mod: CPTII,S$GLB,, | Performed by: NURSE PRACTITIONER

## 2022-02-15 PROCEDURE — 86235 NUCLEAR ANTIGEN ANTIBODY: CPT | Mod: 59 | Performed by: NURSE PRACTITIONER

## 2022-02-15 PROCEDURE — 1159F PR MEDICATION LIST DOCUMENTED IN MEDICAL RECORD: ICD-10-PCS | Mod: CPTII,S$GLB,, | Performed by: NURSE PRACTITIONER

## 2022-02-15 PROCEDURE — 86038 ANTINUCLEAR ANTIBODIES: CPT | Performed by: NURSE PRACTITIONER

## 2022-02-15 PROCEDURE — 99999 PR PBB SHADOW E&M-EST. PATIENT-LVL V: CPT | Mod: PBBFAC,,, | Performed by: NURSE PRACTITIONER

## 2022-02-15 PROCEDURE — 3074F PR MOST RECENT SYSTOLIC BLOOD PRESSURE < 130 MM HG: ICD-10-PCS | Mod: CPTII,S$GLB,, | Performed by: NURSE PRACTITIONER

## 2022-02-15 PROCEDURE — 3074F SYST BP LT 130 MM HG: CPT | Mod: CPTII,S$GLB,, | Performed by: NURSE PRACTITIONER

## 2022-02-15 PROCEDURE — 80053 COMPREHEN METABOLIC PANEL: CPT | Performed by: NURSE PRACTITIONER

## 2022-02-15 PROCEDURE — 85652 RBC SED RATE AUTOMATED: CPT | Performed by: NURSE PRACTITIONER

## 2022-02-15 PROCEDURE — 99214 PR OFFICE/OUTPT VISIT, EST, LEVL IV, 30-39 MIN: ICD-10-PCS | Mod: S$GLB,,, | Performed by: NURSE PRACTITIONER

## 2022-02-15 PROCEDURE — 86431 RHEUMATOID FACTOR QUANT: CPT | Performed by: NURSE PRACTITIONER

## 2022-02-15 PROCEDURE — 1101F PR PT FALLS ASSESS DOC 0-1 FALLS W/OUT INJ PAST YR: ICD-10-PCS | Mod: CPTII,S$GLB,, | Performed by: NURSE PRACTITIONER

## 2022-02-15 PROCEDURE — 99214 OFFICE O/P EST MOD 30 MIN: CPT | Mod: S$GLB,,, | Performed by: NURSE PRACTITIONER

## 2022-02-15 PROCEDURE — 3288F FALL RISK ASSESSMENT DOCD: CPT | Mod: CPTII,S$GLB,, | Performed by: NURSE PRACTITIONER

## 2022-02-15 PROCEDURE — 4010F PR ACE/ARB THEARPY RXD/TAKEN: ICD-10-PCS | Mod: CPTII,S$GLB,, | Performed by: NURSE PRACTITIONER

## 2022-02-15 PROCEDURE — 4010F ACE/ARB THERAPY RXD/TAKEN: CPT | Mod: CPTII,S$GLB,, | Performed by: NURSE PRACTITIONER

## 2022-02-15 PROCEDURE — 3008F PR BODY MASS INDEX (BMI) DOCUMENTED: ICD-10-PCS | Mod: CPTII,S$GLB,, | Performed by: NURSE PRACTITIONER

## 2022-02-15 RX ORDER — MELOXICAM 15 MG/1
15 TABLET ORAL DAILY
Qty: 15 TABLET | Refills: 0 | Status: SHIPPED | OUTPATIENT
Start: 2022-02-15 | End: 2022-02-21 | Stop reason: ALTCHOICE

## 2022-02-15 RX ORDER — AZELASTINE HYDROCHLORIDE 0.5 MG/ML
1 SOLUTION/ DROPS OPHTHALMIC 2 TIMES DAILY
Qty: 6 ML | Refills: 1 | Status: SHIPPED | OUTPATIENT
Start: 2022-02-15 | End: 2024-01-12

## 2022-02-15 NOTE — PROGRESS NOTES
"Subjective:       Patient ID: Chelly Ortiz is a 71 y.o. female.    Chief Complaint: Generalized Body Aches, Shoulder Pain, Back Pain, and Hand Pain    This is a 72 yo female patient of Dr. Garcia who is known to me. She presents today with c/o joint aches to multiple locations. 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     Constipation     History of colon polyps     Essential hypertension     Hyperlipidemia, unspecified     Diabetic polyneuropathy associated with type 2 diabetes mellitus     Spondylosis of cervical region without myelopathy or radiculopathy     Cervical myofascial pain syndrome     PCO (posterior capsular opacification), right     Dry eye syndrome     Pseudophakia     Dilated bile duct     Personal history of colonic polyps     EIC (epidermal inclusion cyst)     Severe obesity with body mass index (BMI) of 35.0 to 39.9 with serious comorbidity     Unspecified inflammatory spondylopathy, lumbar region     Mild major depression     Sedentary lifestyle     Dysphagia     Type 2 diabetes mellitus with hyperglycemia, with long-term current use of insulin     Abdominal aortic atherosclerosis    VSS today. Patient reports she has been experiencing worsening pain to several areas that include bilateral shoulders, bilateral low back, and both hands for the past few months. Pain worsens throughout the course of the day. Denies injury/trauma/fall. Has tried taking Tylenol Arthritis that offers mild relief. Has gone to PT in the past that was helpful. Says she was told she had a rheumatoid issue several years ago, but "not by [facility]". Does not exercise. No other issues or complaints today.    Review of Systems "   Constitutional: Negative for chills, fatigue and fever.   HENT: Negative for trouble swallowing.    Eyes: Negative for visual disturbance.   Respiratory: Negative for cough, chest tightness and shortness of breath.    Cardiovascular: Negative for chest pain, palpitations and leg swelling.   Gastrointestinal: Negative for abdominal pain, diarrhea, nausea and vomiting.   Genitourinary: Negative for difficulty urinating.   Musculoskeletal: Positive for arthralgias (chronic), back pain (chronic) and neck pain (chronic). Negative for gait problem, joint swelling, leg pain and myalgias.   Neurological: Negative for weakness, disturbances in coordination and coordination difficulties.         Objective:      Physical Exam  Vitals reviewed.   Constitutional:       General: She is awake. She is not in acute distress.     Appearance: Normal appearance. She is well-developed and well-groomed. She is obese.   HENT:      Head: Normocephalic and atraumatic.      Right Ear: External ear normal.      Left Ear: External ear normal.   Eyes:      General:         Right eye: No discharge.         Left eye: No discharge.      Extraocular Movements: Extraocular movements intact.   Cardiovascular:      Rate and Rhythm: Normal rate and regular rhythm.      Pulses:           Carotid pulses are 2+ on the right side and 2+ on the left side.       Radial pulses are 2+ on the right side and 2+ on the left side.        Dorsalis pedis pulses are 2+ on the right side and 2+ on the left side.      Heart sounds: Normal heart sounds, S1 normal and S2 normal. No murmur heard.      Pulmonary:      Effort: Pulmonary effort is normal. No respiratory distress.      Breath sounds: No wheezing or rhonchi.   Abdominal:      General: There is no distension.   Musculoskeletal:         General: Tenderness present. No swelling.      Right lower leg: No edema.      Left lower leg: No edema.   Skin:     General: Skin is warm and dry.      Coloration: Skin is not  pale.   Neurological:      Mental Status: She is alert and oriented to person, place, and time.      Coordination: Coordination normal.      Gait: Gait normal.   Psychiatric:         Attention and Perception: Attention normal.         Mood and Affect: Mood and affect normal.         Speech: Speech normal.         Behavior: Behavior normal. Behavior is cooperative.         Assessment:       Problem List Items Addressed This Visit    None     Visit Diagnoses     Arthritis of multiple sites    -  Primary    Relevant Medications    meloxicam (MOBIC) 15 MG tablet    Other Relevant Orders    Ambulatory referral/consult to Physical/Occupational Therapy    Chronic pain of both shoulders        Relevant Orders    Ambulatory referral/consult to Physical/Occupational Therapy    Chronic bilateral low back pain without sciatica        Relevant Orders    Ambulatory referral/consult to Physical/Occupational Therapy    Bilateral hand pain        Relevant Orders    Comprehensive Metabolic Panel    CBC Auto Differential    C-reactive protein    Sedimentation rate    JESSICA Screen w/Reflex    Rheumatoid Factor    Allergic conjunctivitis, bilateral        Relevant Medications    azelastine (OPTIVAR) 0.05 % ophthalmic solution    Screening for colon cancer        Relevant Orders    Case Request Endoscopy: COLONOSCOPY (Completed)          Plan:       Chelly was seen today for generalized body aches, shoulder pain, back pain and hand pain.    Diagnoses and all orders for this visit:    Arthritis of multiple sites  -     Ambulatory referral/consult to Physical/Occupational Therapy; Future  -     meloxicam (MOBIC) 15 MG tablet; Take 1 tablet (15 mg total) by mouth once daily.    Chronic pain of both shoulders  -     Ambulatory referral/consult to Physical/Occupational Therapy; Future    Chronic bilateral low back pain without sciatica  -     Ambulatory referral/consult to Physical/Occupational Therapy; Future    Bilateral hand pain  -      Comprehensive Metabolic Panel; Future  -     CBC Auto Differential; Future  -     C-reactive protein; Future  -     Sedimentation rate; Future  -     JESSICA Screen w/Reflex; Future  -     Rheumatoid Factor; Future    Allergic conjunctivitis, bilateral  -     azelastine (OPTIVAR) 0.05 % ophthalmic solution; Place 1 drop into both eyes 2 (two) times daily.    Screening for colon cancer  -     Case Request Endoscopy: COLONOSCOPY        - Labs ordered today  - Take meloxicam once daily with food  - Continue all other medications as ordered  - Follow up with PT/OT  - Requested refills sent to preferred pharmacy  - May try alternating ice pack/heating pad to affected areas  - RTC as needed if symptoms worsen or fail to improve        I spent a total of 30 minutes on the day of the visit.  This includes face to face time and non-face to face time preparing to see the patient (eg, review of tests), obtaining and/or reviewing separately obtained history, documenting clinical information in the electronic or other health record, independently interpreting results and communicating results to the patient/family/caregiver, or care coordinator.

## 2022-02-16 ENCOUNTER — TELEPHONE (OUTPATIENT)
Dept: FAMILY MEDICINE | Facility: CLINIC | Age: 72
End: 2022-02-16
Payer: MEDICARE

## 2022-02-16 DIAGNOSIS — R76.8 POSITIVE ANA (ANTINUCLEAR ANTIBODY): Primary | ICD-10-CM

## 2022-02-16 DIAGNOSIS — M13.0 ARTHRITIS OF MULTIPLE SITES: ICD-10-CM

## 2022-02-16 LAB
ANA PATTERN 1: NORMAL
ANA SER QL IF: POSITIVE
ANA TITR SER IF: NORMAL {TITER}

## 2022-02-17 LAB
ANTI SM ANTIBODY: 0.07 RATIO (ref 0–0.99)
ANTI SM/RNP ANTIBODY: 0.05 RATIO (ref 0–0.99)
ANTI-SM INTERPRETATION: NEGATIVE
ANTI-SM/RNP INTERPRETATION: NEGATIVE
ANTI-SSA ANTIBODY: 0.05 RATIO (ref 0–0.99)
ANTI-SSA INTERPRETATION: NEGATIVE
ANTI-SSB ANTIBODY: 0.04 RATIO (ref 0–0.99)
ANTI-SSB INTERPRETATION: NEGATIVE
DSDNA AB SER-ACNC: NORMAL [IU]/ML

## 2022-02-21 ENCOUNTER — PATIENT MESSAGE (OUTPATIENT)
Dept: FAMILY MEDICINE | Facility: CLINIC | Age: 72
End: 2022-02-21
Payer: MEDICARE

## 2022-02-21 DIAGNOSIS — M13.0 ARTHRITIS OF MULTIPLE SITES: Primary | ICD-10-CM

## 2022-02-21 RX ORDER — IBUPROFEN 600 MG/1
600 TABLET ORAL EVERY 8 HOURS PRN
Qty: 30 TABLET | Refills: 0 | Status: SHIPPED | OUTPATIENT
Start: 2022-02-21 | End: 2022-03-03

## 2022-02-21 NOTE — TELEPHONE ENCOUNTER
Spoke with patient about concern with meloxicam. Meloxicam d/'d. May use ibuprofen 600mg up to TID PRN pain, take with food and drink plenty water. Verbalized understanding.     Tami Junior NP

## 2022-02-22 ENCOUNTER — OFFICE VISIT (OUTPATIENT)
Dept: PODIATRY | Facility: CLINIC | Age: 72
End: 2022-02-22
Payer: MEDICARE

## 2022-02-22 VITALS
WEIGHT: 208.56 LBS | SYSTOLIC BLOOD PRESSURE: 136 MMHG | DIASTOLIC BLOOD PRESSURE: 74 MMHG | BODY MASS INDEX: 36.94 KG/M2 | HEART RATE: 76 BPM

## 2022-02-22 DIAGNOSIS — E11.49 TYPE II DIABETES MELLITUS WITH NEUROLOGICAL MANIFESTATIONS: Primary | ICD-10-CM

## 2022-02-22 DIAGNOSIS — M20.12 VALGUS DEFORMITY OF BOTH GREAT TOES: ICD-10-CM

## 2022-02-22 DIAGNOSIS — M20.11 VALGUS DEFORMITY OF BOTH GREAT TOES: ICD-10-CM

## 2022-02-22 PROCEDURE — 4010F PR ACE/ARB THEARPY RXD/TAKEN: ICD-10-PCS | Mod: CPTII,S$GLB,, | Performed by: PODIATRIST

## 2022-02-22 PROCEDURE — 99999 PR PBB SHADOW E&M-EST. PATIENT-LVL III: CPT | Mod: PBBFAC,,, | Performed by: PODIATRIST

## 2022-02-22 PROCEDURE — 3078F DIAST BP <80 MM HG: CPT | Mod: CPTII,S$GLB,, | Performed by: PODIATRIST

## 2022-02-22 PROCEDURE — 4010F ACE/ARB THERAPY RXD/TAKEN: CPT | Mod: CPTII,S$GLB,, | Performed by: PODIATRIST

## 2022-02-22 PROCEDURE — 3008F PR BODY MASS INDEX (BMI) DOCUMENTED: ICD-10-PCS | Mod: CPTII,S$GLB,, | Performed by: PODIATRIST

## 2022-02-22 PROCEDURE — 99213 OFFICE O/P EST LOW 20 MIN: CPT | Mod: 25,S$GLB,, | Performed by: PODIATRIST

## 2022-02-22 PROCEDURE — 3051F HG A1C>EQUAL 7.0%<8.0%: CPT | Mod: CPTII,S$GLB,, | Performed by: PODIATRIST

## 2022-02-22 PROCEDURE — 1126F AMNT PAIN NOTED NONE PRSNT: CPT | Mod: CPTII,S$GLB,, | Performed by: PODIATRIST

## 2022-02-22 PROCEDURE — 64455 PR INJECT ANES/STEROID PLANTAR COMMON DIGITAL NERVE: ICD-10-PCS | Mod: LT,S$GLB,, | Performed by: PODIATRIST

## 2022-02-22 PROCEDURE — 3075F PR MOST RECENT SYSTOLIC BLOOD PRESS GE 130-139MM HG: ICD-10-PCS | Mod: CPTII,S$GLB,, | Performed by: PODIATRIST

## 2022-02-22 PROCEDURE — 99213 PR OFFICE/OUTPT VISIT, EST, LEVL III, 20-29 MIN: ICD-10-PCS | Mod: 25,S$GLB,, | Performed by: PODIATRIST

## 2022-02-22 PROCEDURE — 3075F SYST BP GE 130 - 139MM HG: CPT | Mod: CPTII,S$GLB,, | Performed by: PODIATRIST

## 2022-02-22 PROCEDURE — 1126F PR PAIN SEVERITY QUANTIFIED, NO PAIN PRESENT: ICD-10-PCS | Mod: CPTII,S$GLB,, | Performed by: PODIATRIST

## 2022-02-22 PROCEDURE — 3078F PR MOST RECENT DIASTOLIC BLOOD PRESSURE < 80 MM HG: ICD-10-PCS | Mod: CPTII,S$GLB,, | Performed by: PODIATRIST

## 2022-02-22 PROCEDURE — 99999 PR PBB SHADOW E&M-EST. PATIENT-LVL III: ICD-10-PCS | Mod: PBBFAC,,, | Performed by: PODIATRIST

## 2022-02-22 PROCEDURE — 3051F PR MOST RECENT HEMOGLOBIN A1C LEVEL 7.0 - < 8.0%: ICD-10-PCS | Mod: CPTII,S$GLB,, | Performed by: PODIATRIST

## 2022-02-22 PROCEDURE — 64455 NJX AA&/STRD PLTR COM DG NRV: CPT | Mod: LT,S$GLB,, | Performed by: PODIATRIST

## 2022-02-22 PROCEDURE — 3008F BODY MASS INDEX DOCD: CPT | Mod: CPTII,S$GLB,, | Performed by: PODIATRIST

## 2022-02-22 RX ORDER — DEXAMETHASONE SODIUM PHOSPHATE 4 MG/ML
4 INJECTION, SOLUTION INTRA-ARTICULAR; INTRALESIONAL; INTRAMUSCULAR; INTRAVENOUS; SOFT TISSUE ONCE
Status: COMPLETED | OUTPATIENT
Start: 2022-02-22 | End: 2022-03-20

## 2022-02-23 ENCOUNTER — PATIENT MESSAGE (OUTPATIENT)
Dept: PODIATRY | Facility: CLINIC | Age: 72
End: 2022-02-23
Payer: MEDICARE

## 2022-02-23 ENCOUNTER — PATIENT MESSAGE (OUTPATIENT)
Dept: FAMILY MEDICINE | Facility: CLINIC | Age: 72
End: 2022-02-23
Payer: MEDICARE

## 2022-02-24 ENCOUNTER — TELEPHONE (OUTPATIENT)
Dept: ENDOSCOPY | Facility: HOSPITAL | Age: 72
End: 2022-02-24
Payer: MEDICARE

## 2022-02-28 ENCOUNTER — TELEPHONE (OUTPATIENT)
Dept: ENDOSCOPY | Facility: HOSPITAL | Age: 72
End: 2022-02-28
Payer: MEDICARE

## 2022-02-28 DIAGNOSIS — Z01.818 PRE-OP TESTING: ICD-10-CM

## 2022-02-28 NOTE — TELEPHONE ENCOUNTER
Attempted to contact Chelly Ortiz to schedule procedure , no answer, left message with call back number

## 2022-02-28 NOTE — TELEPHONE ENCOUNTER
Endoscopy Scheduling Questionnaire:         1. Are you taking any blood thinners? n               If Yes  Have you been on them for longer than one year?     2. Have you been diagnosed with Diverticulitis in past three months?  n    3. Are you on dialysis or have Kidney Disease? n    4. Previous Colonoscopy?  y         If yes Do you have a history of colon polyps?  y      6. Are you a diabetic?  y     7. Do you have a history of constipation?  y      Procedure scheduled with Dr.Christopher Rivera   on  03/30/2022    The prep being used is 2 Day Golytely Prep     The patient's prep instructions were sent by Mail and [employer]

## 2022-03-04 ENCOUNTER — TELEPHONE (OUTPATIENT)
Dept: INTERNAL MEDICINE | Facility: CLINIC | Age: 72
End: 2022-03-04
Payer: MEDICARE

## 2022-03-04 NOTE — TELEPHONE ENCOUNTER
----- Message from EDUARDO Escobar, FNP sent at 3/4/2022  3:03 PM CST -----  Regarding: diabetic medications  Pt said that she is calling in regards to needing to speak with the nurse about diabetic medication, pt stated that she needs a return call asap. Please advise

## 2022-03-04 NOTE — TELEPHONE ENCOUNTER
----- Message from Flory Brown sent at 3/4/2022  2:19 PM CST -----  Contact: Self/[phone]  Pt said that she is calling in regards to needing to speak with the nurse about diabetic medication, pt stated that she needs a return call asap. Please advise

## 2022-03-07 ENCOUNTER — PATIENT MESSAGE (OUTPATIENT)
Dept: INTERNAL MEDICINE | Facility: CLINIC | Age: 72
End: 2022-03-07
Payer: MEDICARE

## 2022-03-07 RX ORDER — DULAGLUTIDE 1.5 MG/.5ML
1.5 INJECTION, SOLUTION SUBCUTANEOUS
Qty: 4 PEN | Refills: 1 | Status: SHIPPED | OUTPATIENT
Start: 2022-03-07 | End: 2022-05-02

## 2022-03-08 ENCOUNTER — CLINICAL SUPPORT (OUTPATIENT)
Dept: REHABILITATION | Facility: HOSPITAL | Age: 72
End: 2022-03-08
Payer: MEDICARE

## 2022-03-08 DIAGNOSIS — M54.50 CHRONIC BILATERAL LOW BACK PAIN WITHOUT SCIATICA: ICD-10-CM

## 2022-03-08 DIAGNOSIS — M53.86 DECREASED RANGE OF MOTION OF LUMBAR SPINE: ICD-10-CM

## 2022-03-08 DIAGNOSIS — R29.898 DECREASED ROM OF NECK: Primary | ICD-10-CM

## 2022-03-08 DIAGNOSIS — G89.29 CHRONIC BILATERAL LOW BACK PAIN WITHOUT SCIATICA: ICD-10-CM

## 2022-03-08 DIAGNOSIS — G89.29 CHRONIC PAIN OF BOTH SHOULDERS: ICD-10-CM

## 2022-03-08 DIAGNOSIS — M25.511 CHRONIC PAIN OF BOTH SHOULDERS: ICD-10-CM

## 2022-03-08 DIAGNOSIS — M13.0 ARTHRITIS OF MULTIPLE SITES: ICD-10-CM

## 2022-03-08 DIAGNOSIS — R53.1 DECREASED STRENGTH: ICD-10-CM

## 2022-03-08 DIAGNOSIS — M25.512 CHRONIC PAIN OF BOTH SHOULDERS: ICD-10-CM

## 2022-03-08 PROCEDURE — 97162 PT EVAL MOD COMPLEX 30 MIN: CPT | Mod: PN

## 2022-03-09 NOTE — PLAN OF CARE
[facility]  Physical Therapy Initial Evaluation    Name: Chelly Ortiz  Clinic Number: [id number]    Therapy Diagnosis:   Encounter Diagnoses   Name Primary?    Arthritis of multiple sites     Chronic pain of both shoulders     Chronic bilateral low back pain without sciatica     Decreased ROM of neck Yes    Decreased strength     Decreased range of motion of lumbar spine      Physician: Tami Junior NP    Physician Orders: PT Eval only and submit Treat   Medical Diagnosis from Referral:   M13.0 (ICD-10-CM) - Arthritis of multiple sites   M25.511,G89.29,M25.512 (ICD-10-CM) - Chronic pain of both shoulders   M54.50,G89.29 (ICD-10-CM) - Chronic bilateral low back pain without sciatica       Evaluation Date: 3/8/2022  Authorization Period Expiration: 12/31/22  Plan of Care Expiration: 5/3/22  Visit # / Visits authorized: 1/ 1  FOTO: 1/10    Time In: 3:20  Time Out: 4:15  Total Billable Time: 55 minutes (mod Complexity Evaluation, )    Precautions: Standard, diabetes, hypertension, depression     Subjective   Date of onset: several years back/ left shoulder, right shoulder 2 months   History of current condition - Ms. Ortiz reports: chronic pain in low back and hips, and bilateral shoulders. Began with left shoulder and neck pain and now also having right shoulder pain. Pt denies numbness or tingling in upper extremities or lower extremities and denies saddle anesthesia. She reports stabbing pain in shoulders primarily at night when she lays down. She has a hard time finding a comfortable position but can sleep through night. Pain equal in shoulders and back.  Back pain with standing, walking, grocery shopping. Weather also seems to affect joint pain. She has been to PT for neck and reports it locks with rotation sometimes.      Medical History:   Past Medical History:   Diagnosis Date    Allergy     Cataract     DDD (degenerative disc disease), lumbar     Diabetes mellitus      diet controlled    Diabetes mellitus, type 2     GERD (gastroesophageal reflux disease)     History of subconjunctival hemorrhage 11    left eye    Hyperlipidemia     Hypertension     IBS (irritable bowel syndrome)     Obesity     MYRIAM (obstructive sleep apnea)     on CPAP    Rotator cuff impingement syndrome     Seasonal allergic conjunctivitis        Surgical History:   Chelly Ortiz  has a past surgical history that includes  section; Cholecystectomy; Eye surgery; Tubal ligation; Hysterectomy; Endoscopic ultrasound of upper gastrointestinal tract (N/A, 10/9/2018); Colonoscopy (N/A, 2018); Excision of lesion (N/A, 2019); and Cataract extraction w/  intraocular lens implant (Right, 10/03/2013).    Medications:   Chelly has a current medication list which includes the following prescription(s): accu-chek shira plus meter, accu-chek fastclix lancet drum, albuterol, amlodipine, aspirin, azelastine, azelastine, accu-chek shira control soln, accu-chek shira plus test strp, cetirizine, chlorhexidine, citalopram, cyclobenzaprine, trulicity, ergocalciferol, esomeprazole, fluticasone-salmeterol 250-50 mcg/dose, gabapentin, insulin aspart u-100, levemir flextouch u-100 insuln, insulin syringe-needle u-100, insulin syringe-needle u-100, losartan, magnesium oxide, pen needle, diabetic, pravastatin, and trazodone, and the following Facility-Administered Medications: dexamethasone.    Allergies:   Review of patient's allergies indicates:   Allergen Reactions    Prednisone      Causes pt to be hyper    Ace inhibitors Hives     \Cough    Latex, natural rubber Itching        Imaging, no recent imaging    Prior Therapy: PT for neck  Social History:  lives with their spouse, 1 flight of stairs   Occupation: retired  Prior Level of Function: independent with ADLs   Current Level of Function: pain laying, standing, walking, lifting and carrying    Pain:   Current 4/10, worst 9/10, best 4/10    Location: bilateral shoulder, left back > right   Description: Aching, stabbing pain bilateral shoulders   Aggravating Factors: Standing, Laying, Walking, Night Time and Getting out of bed/chair  Easing Factors: CBD cream, pain medication (ibuprofen)    Pts goals: walking bhatt, standing without pain, sleep at night     Objective     Posture: FHP, rounded shoulders, slight scoliosis, left pelvic elevation, increased lumbar lordosis  Palpation: bilateral UTs with reproduction of shoulder pain,  lumbar paraspinals, right paraspinal hypertrophy   Sensation: normal light touch      Range of Motion/Strength:   CERVICAL AROM Pain/Dysfunction with Movement   Flexion 45 right neck pain   Extension 38    Right side bending 28    Left side bending 20    Right rotation 60    Left rotation 65      Thoracolumbar AROM Pain/Dysfunction with Movement   Flexion 80%    Extension 50%* Midline back pain   Right side bending 50%* right side pain    Left side bending 75%    Right quadrant 75%* right side pain    Left quadrant 75%* right side pain      Shoulder Right Left Pain/Dysfunction with Movement   AROM/PROM      flexion 160 160    abduction 160 160    Internal rotation  L2*  T10    external rotation  T2  T4      U/E MMT Right Left Pain/Dysfunction with Movement   Shoulder Flexion 4/5 4/5    Shoulder Abduction 4-/5* 4/5    Shoulder IR 4/5 4/5    Shoulder ER  @ 0* Abduction 4/5 4/5    Elbow Flexion  5/5 5/5    Elbow Extension 5/5 5/5        L/E MMT Right Left Pain/Dysfunction with Movement   Hip Flexion 4+/5 4+/5    Hip Extension 4+/5 4+/5    Hip Abduction 4-/5 4-/5    Knee Flexion 5/5 5/5    Knee Extension 5/5 5/5    Ankle DF 5/5 5/5    Ankle PF 5/5 5/5        Joint Mobility:   - Cervical: hypomobility mid to lower C spine   - Thoracic: hypomobility throughout   - Lumbar: L3-5 hypomobility with pain with lower lumbar p/a assessment  -GHJ: moderate decreased posterior mobility with slight pain, not reproduction of pts  pain    Flexibility: normal hamstring     Special Tests: - compression, - spurlings, - Straight leg raise       CMS Impairment/Limitation/Restriction for FOTO shoulder Survey    Therapist reviewed FOTO scores for Chelly Ortiz on 3/8/2022.   FOTO documents entered into [employer] - see Media section.    Limitation Score: 39%  Category: Carrying         TREATMENT     Home Exercises Provided and Patient Education Provided   scap squeeze  lower trunk rotation  Supine hip abduction- provide band next, left at eval  Rotation snag    Education provided:    Anatomy and Pathology.   Symptom management and plan of care progressions.   Home Exercise Program.      Written Home Exercises Provided: yes.  Exercises were reviewed and Ms. Ortiz was able to demonstrate them prior to the end of the session.  Ms. Ortiz demonstrated good  understanding of the education provided.     See EMR under Media for exercises provided 3/8/2022.      Assessment   Chelly is a 71 y.o. female referred to outpatient Physical Therapy with a medical diagnosis of arthritis of multiple sites. Pt presents with bilateral shoulder pain with localized tenderness in bilateral UTs with reproduction of sx. Pt demonstrates decreased cervical mobility right> left with pain with down-glide of mid cervical spine. She demonstrates impaired thoracic mobility, decreased upper extremity range of motion, decreased upper extremity strength which were relatively pain free. Pt demonstrated impaired lumbar range of motion, decreased lumbar mobility with pain with mobility assessment of lower lumbar spine consistent with spondylosis. Lower extremity strength within normal limits except for moderate weakness of hip abductors.  These impairments impact her ability to sleep at night, stand, walk, carry groceries, and perform household chores without pain. She will benefit from skilled PT to address the above impairments in order to improve functional mobility and quality of  life.     Pt prognosis is Good.   Pt will benefit from skilled outpatient Physical Therapy to address the deficits stated above and in the chart below, provide pt/family education, and to maximize pt's level of independence.     Plan of care discussed with patient: Yes  Pt's spiritual, cultural and educational needs considered and patient is agreeable to the plan of care and goals as stated below:     Anticipated Barriers for therapy: chronicity of pain, many co-morbidities    Medical Necessity is demonstrated by the following  History  Co-morbidities and personal factors that may impact the plan of care Co-morbidities:   GERD (gastroesophageal reflux disease)     MYRIAM (obstructive sleep apnea)     IBS (irritable bowel syndrome)     DDD (degenerative disc disease), lumbar     Insomnia     Anxiety     Hearing loss, sensorineural     Nuclear sclerotic cataract of left eye     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     Constipation     History of colon polyps     Essential hypertension     Hyperlipidemia, unspecified     Diabetic polyneuropathy associated with type 2 diabetes mellitus     Spondylosis of cervical region without myelopathy or radiculopathy     Cervical myofascial pain syndrome     PCO (posterior capsular opacification), right     Dry eye syndrome     Pseudophakia     Dilated bile duct     Personal history of colonic polyps     EIC (epidermal inclusion cyst)     Severe obesity with body mass index (BMI) of 35.0 to 39.9 with serious comorbidity     Unspecified inflammatory spondylopathy, lumbar region     Mild major depression     Sedentary lifestyle     Dysphagia     Type 2 diabetes mellitus with hyperglycemia, with long-term current use of insulin     Abdominal aortic atherosclerosis    Personal Factors:   age  lifestyle     high   Examination  Body Structures and Functions, activity limitations and participation restrictions that may impact the plan of care Body Regions:   neck  back  lower  extremities  upper extremities    Body Systems:    gross symmetry  ROM  strength  gait  transfers  transitions  motor control    Participation Restrictions:   Community ambulation, sleeping    Activity limitations:   Learning and applying knowledge  no deficits    General Tasks and Commands  no deficits    Communication  no deficits    Mobility  lifting and carrying objects  walking    Self care  no deficits    Domestic Life  shopping  cooking  doing house work (cleaning house, washing dishes, laundry)    Interactions/Relationships  no deficits    Life Areas  no deficits    Community and Social Life  community life  recreation and leisure         moderate   Clinical Presentation evolving clinical presentation with changing clinical characteristics moderate   Decision Making/ Complexity Score: moderate       Goals:  Short Term Goals (4 Weeks):  1. Patient will be compliant with home exercise program to supplement therapy in promoting functional mobility.  2. Patient will perform 20 ft carry of 12# with good control to demonstrate improved core strength in order to carry groceries.  3. Patient will report no pain during thoracolumbar active range of motion to promote functional mobility.  4. Patient will improve hip abduction manual muscle tests  to >/=4/5 to improve strength for functional tasks.  5. Pt will demonstrate 10 degree improvement in cervical rotation for driving.  Long Term Goals (8 Weeks):   1. Patient will improve FOTO score to </= 38% limited to decrease perceived limitation with maintaining/changing body position.   2. Patient will perform hip hinge with good control to demonstrate improved core strength.  3. Patient will improve impaired lower extremity manual muscle tests to >/=4+/5 to improve strength for functional tasks.  4. Patient will report no shoulder pain during laying down promote sleeping at night.    Plan   Plan of care Certification: 3/8/2022 to 5/3/22.    Outpatient Physical Therapy 2  times weekly for 8 weeks to include the following interventions: Cervical/Lumbar Traction, Electrical Stimulation  , Gait Training, Manual Therapy, Moist Heat/ Ice, Neuromuscular Re-ed, Patient Education, Therapeutic Activities and Therapeutic Exercise, ASTYM, Kinesiotaping PRN, Functional Dry Needling    ROE VIVEROS, PT, DPT

## 2022-03-10 ENCOUNTER — CLINICAL SUPPORT (OUTPATIENT)
Dept: REHABILITATION | Facility: HOSPITAL | Age: 72
End: 2022-03-10
Payer: MEDICARE

## 2022-03-10 DIAGNOSIS — M53.86 DECREASED RANGE OF MOTION OF LUMBAR SPINE: ICD-10-CM

## 2022-03-10 DIAGNOSIS — R53.1 DECREASED STRENGTH: Primary | ICD-10-CM

## 2022-03-10 PROCEDURE — 97110 THERAPEUTIC EXERCISES: CPT | Mod: PN,CQ

## 2022-03-10 NOTE — PROGRESS NOTES
"  Physical Therapy Daily Treatment Note     Name: Chelly Ortiz  Clinic Number: [id number]    Therapy Diagnosis:   Encounter Diagnoses   Name Primary?    Decreased strength Yes    Decreased range of motion of lumbar spine      Physician: No ref. provider found    Visit Date: 3/10/2022      Physician Orders: PT Eval only and submit Treat   Medical Diagnosis from Referral:   M13.0 (ICD-10-CM) - Arthritis of multiple sites   M25.511,G89.29,M25.512 (ICD-10-CM) - Chronic pain of both shoulders   M54.50,G89.29 (ICD-10-CM) - Chronic bilateral low back pain without sciatica         Evaluation Date: 3/8/2022  Authorization Period Expiration: 12/31/22  Plan of Care Expiration: 5/3/22  Visit # / Visits authorized: 1/TBD + eval  FOTO: 2/5      Time In: 3:00 pm  Time Out: 3:45 pm  Total Billable Time: 45 minutes    Precautions: Standard, diabetes, hypertension and depression    Subjective     Pt reports: feeling "achy"  Felt worse with rain/cold yesterday.  She was partially  compliant with home exercise program.  Response to previous treatment: "tired like I did something"  Functional change: None yet    Pain: 3/10  Location: bilateral shoulders and low back    Objective     Ms. Ortiz received therapeutic exercises to develop strength, endurance, ROM, posture and core stabilization for 45 minutes including:    Lower trunk rotation:   x20  Supine wand flexion:   1# 2x10  Supine serratus push:   1# 2x10  Transverse abdominus activation(TrA)  5 minutes  Supine hip abduction:   red theraband 5" hold 2x10  Seated TrA    5" x 10  Seated Scapular retractions:  5" 2x10  Seated Snags (rotation)  5" hold 1x10 with towel  Standing TrA:   5"x10    Home Exercises Provided and Patient Education Provided     Education provided:   - Log roll technique for supine<>sit   - Postural awareness and transverse abdominus activation with daily activity    Written Home Exercises Provided: Patient instructed to cont prior HEP.  Exercises were reviewed " "and Ms. Ortiz was able to demonstrate them prior to the end of the session.  Ms. Ortiz demonstrated good  understanding of the education provided.     See EMR under Patient Instructions for exercises provided 3/8/2022.      Assessment     Patient required verbal and tactile cues for increased isolation of abdominals and to maintain normal breathing with same.  Technique improved with practice and in seated/standing.   Patient also educated to perform all therex in a pain free range.  States "feels better" but also states some soreness after treatment.  Not specific to scale.  Ms. Ortiz Is progressing well towards her goals.   Pt prognosis is Good.     Pt will continue to benefit from skilled outpatient physical therapy to address the deficits listed in the problem list box on initial evaluation, provide pt/family education and to maximize pt's level of independence in the home and community environment.     Pt's spiritual, cultural and educational needs considered and pt agreeable to plan of care and goals.    Anticipated barriers to physical therapy: chronicity of pain, many co-morbidities    Goals:  Short Term Goals (4 Weeks):  1. Patient will be compliant with home exercise program to supplement therapy in promoting functional mobility.  2. Patient will perform 20 ft carry of 12# with good control to demonstrate improved core strength in order to carry groceries.  3. Patient will report no pain during thoracolumbar active range of motion to promote functional mobility.  4. Patient will improve hip abduction manual muscle tests  to >/=4/5 to improve strength for functional tasks.  5. Pt will demonstrate 10 degree improvement in cervical rotation for driving.  Long Term Goals (8 Weeks):   1. Patient will improve FOTO score to </= 38% limited to decrease perceived limitation with maintaining/changing body position.   2. Patient will perform hip hinge with good control to demonstrate improved core strength.  3. " Patient will improve impaired lower extremity manual muscle tests to >/=4+/5 to improve strength for functional tasks.  4. Patient will report no shoulder pain during laying down promote sleeping at night.      Plan     Continue PT per plan of care, progress as tolerated.    Stacy Bai, PTA

## 2022-03-15 ENCOUNTER — CLINICAL SUPPORT (OUTPATIENT)
Dept: REHABILITATION | Facility: HOSPITAL | Age: 72
End: 2022-03-15
Payer: MEDICARE

## 2022-03-15 DIAGNOSIS — M13.0 ARTHRITIS OF MULTIPLE SITES: ICD-10-CM

## 2022-03-15 DIAGNOSIS — R53.1 DECREASED STRENGTH: Primary | ICD-10-CM

## 2022-03-15 DIAGNOSIS — R29.898 DECREASED ROM OF NECK: ICD-10-CM

## 2022-03-15 DIAGNOSIS — M53.86 DECREASED RANGE OF MOTION OF LUMBAR SPINE: ICD-10-CM

## 2022-03-15 PROCEDURE — 97110 THERAPEUTIC EXERCISES: CPT | Mod: PN

## 2022-03-15 NOTE — PROGRESS NOTES
"  Physical Therapy Daily Treatment Note     Name: Chelly Ortiz  Clinic Number: [id number]    Therapy Diagnosis:   Encounter Diagnoses   Name Primary?    Decreased strength Yes    Decreased range of motion of lumbar spine     Decreased ROM of neck     Arthritis of multiple sites      Physician: No ref. provider found    Visit Date: 3/15/2022    Physician Orders: PT Eval only and submit Treat   Medical Diagnosis from Referral:   M13.0 (ICD-10-CM) - Arthritis of multiple sites   M25.511,G89.29,M25.512 (ICD-10-CM) - Chronic pain of both shoulders   M54.50,G89.29 (ICD-10-CM) - Chronic bilateral low back pain without sciatica   Evaluation Date: 3/8/2022  Authorization Period Expiration: 12/31/22  Plan of Care Expiration: 5/3/2022  Visit # / Visits authorized: 3/TBD + eval  FOTO: 3/5    Time In: 15:00  Time Out: 15:45  Total Billable Time: 45 minutes    Precautions: Standard, diabetes, hypertension and depression    Subjective     Pt reports: she is unable to stand and walk for greater than 15 minutes due to back pain. Symptoms always worst at the end of the day when laying down.  She was partially  compliant with home exercise program.  Response to previous treatment: "tired like I did something"  Functional change: None yet    Pain: 3/10  Location: bilateral shoulders and low back    Objective     Ms. Ortiz received therapeutic exercises to develop strength, endurance, ROM, posture and core stabilization for 45 minutes including: Bulleted Items Only    Nu-Step: 5 minutesl Level 2.0 hills to improve lower extremity strength and cardiovascular endurance utilizing different speeds and resistances.  · Lower trunk rotation: x20  · Bridges with Power Cord pulldown - pink; 2x8  · Seated Lumbar / Shoulder Flexion - x30  · Sit to Stands - from high-low mat; x15 (emphasis on form)  Supine wand flexion: 1# 2x10  Supine serratus push: # 2x10  Transverse abdominus activation(TrA): 5 minutes  Supine hip abduction: red theraband 5" " "hold 2x10  Seated TrA: 5" x 10  Seated Scapular retractions: 5" 2x10  Seated Snags (rotation): 5" hold 1x10 with towel  Standing TrA: 5"x10    Home Exercises Provided and Patient Education Provided     Education provided:    Symptom management and plan of care progressions.  · Home Exercise Program.  · Postural awareness and transverse abdominus activation with daily activity    Written Home Exercises Provided: Patient instructed to cont prior HEP.  Exercises were reviewed and Ms. Ortiz was able to demonstrate them prior to the end of the session.  Ms. Ortiz demonstrated good  understanding of the education provided.     See EMR under Patient Instructions for exercises provided 3/8/2022.    Assessment     Unremarkable changes in symptoms since the previous visit. Session focused on core utilization and strengthening while practicing proprioceptive mindfull.  Frequent cuing required for form, but mild increase in back pain following seated lumbar extension with swiss ball.    Ms. Ortiz Is progressing well towards her goals.   Pt prognosis is Good.     Pt will continue to benefit from skilled outpatient physical therapy to address the deficits listed in the problem list box on initial evaluation, provide pt/family education and to maximize pt's level of independence in the home and community environment.   Pt's spiritual, cultural and educational needs considered and pt agreeable to plan of care and goals.    Anticipated barriers to physical therapy: chronicity of pain, many co-morbidities    Goals:  Short Term Goals (4 Weeks):  1. Patient will be compliant with home exercise program to supplement therapy in promoting functional mobility. (Not Met - Progressing)   2. Patient will perform 20 ft carry of 12# with good control to demonstrate improved core strength in order to carry groceries. (Not Met - Progressing)  3. Patient will report no pain during thoracolumbar active range of motion to promote functional " mobility. (Not Met - Progressing)  4. Patient will improve hip abduction manual muscle tests  to >/=4/5 to improve strength for functional tasks. (Not Met - Progressing)  5. Pt will demonstrate 10 degree improvement in cervical rotation for driving. (Not Met - Progressing)  Long Term Goals (8 Weeks):   1. Patient will improve FOTO score to </= 38% limited to decrease perceived limitation with maintaining/changing body position. (Not Met - Progressing)  2. Patient will perform hip hinge with good control to demonstrate improved core strength. (Not Met - Progressing)  3. Patient will improve impaired lower extremity manual muscle tests to >/=4+/5 to improve strength for functional tasks. (Not Met - Progressing)  4. Patient will report no shoulder pain during laying down promote sleeping at night. (Not Met - Progressing)    Plan     Continue PT per plan of care, progress as tolerated.    Evan Pritchard, PT

## 2022-03-16 ENCOUNTER — TELEPHONE (OUTPATIENT)
Dept: FAMILY MEDICINE | Facility: CLINIC | Age: 72
End: 2022-03-16
Payer: MEDICARE

## 2022-03-17 ENCOUNTER — OFFICE VISIT (OUTPATIENT)
Dept: FAMILY MEDICINE | Facility: CLINIC | Age: 72
End: 2022-03-17
Payer: MEDICARE

## 2022-03-17 ENCOUNTER — TELEPHONE (OUTPATIENT)
Dept: FAMILY MEDICINE | Facility: CLINIC | Age: 72
End: 2022-03-17

## 2022-03-17 VITALS
SYSTOLIC BLOOD PRESSURE: 126 MMHG | HEIGHT: 63 IN | HEART RATE: 74 BPM | DIASTOLIC BLOOD PRESSURE: 66 MMHG | WEIGHT: 206.56 LBS | OXYGEN SATURATION: 97 % | BODY MASS INDEX: 36.6 KG/M2

## 2022-03-17 DIAGNOSIS — I10 ESSENTIAL HYPERTENSION: ICD-10-CM

## 2022-03-17 DIAGNOSIS — Z91.89 SEDENTARY LIFESTYLE: ICD-10-CM

## 2022-03-17 DIAGNOSIS — E66.01 SEVERE OBESITY WITH BODY MASS INDEX (BMI) OF 35.0 TO 39.9 WITH SERIOUS COMORBIDITY: ICD-10-CM

## 2022-03-17 DIAGNOSIS — E11.65 TYPE 2 DIABETES MELLITUS WITH HYPERGLYCEMIA, WITH LONG-TERM CURRENT USE OF INSULIN: Primary | ICD-10-CM

## 2022-03-17 DIAGNOSIS — Z79.4 TYPE 2 DIABETES MELLITUS WITH HYPERGLYCEMIA, WITH LONG-TERM CURRENT USE OF INSULIN: Primary | ICD-10-CM

## 2022-03-17 PROCEDURE — 3074F PR MOST RECENT SYSTOLIC BLOOD PRESSURE < 130 MM HG: ICD-10-PCS | Mod: CPTII,S$GLB,, | Performed by: FAMILY MEDICINE

## 2022-03-17 PROCEDURE — 3074F SYST BP LT 130 MM HG: CPT | Mod: CPTII,S$GLB,, | Performed by: FAMILY MEDICINE

## 2022-03-17 PROCEDURE — 3288F FALL RISK ASSESSMENT DOCD: CPT | Mod: CPTII,S$GLB,, | Performed by: FAMILY MEDICINE

## 2022-03-17 PROCEDURE — 3078F PR MOST RECENT DIASTOLIC BLOOD PRESSURE < 80 MM HG: ICD-10-PCS | Mod: CPTII,S$GLB,, | Performed by: FAMILY MEDICINE

## 2022-03-17 PROCEDURE — 1101F PR PT FALLS ASSESS DOC 0-1 FALLS W/OUT INJ PAST YR: ICD-10-PCS | Mod: CPTII,S$GLB,, | Performed by: FAMILY MEDICINE

## 2022-03-17 PROCEDURE — 99214 PR OFFICE/OUTPT VISIT, EST, LEVL IV, 30-39 MIN: ICD-10-PCS | Mod: S$GLB,,, | Performed by: FAMILY MEDICINE

## 2022-03-17 PROCEDURE — 1125F AMNT PAIN NOTED PAIN PRSNT: CPT | Mod: CPTII,S$GLB,, | Performed by: FAMILY MEDICINE

## 2022-03-17 PROCEDURE — 4010F ACE/ARB THERAPY RXD/TAKEN: CPT | Mod: CPTII,S$GLB,, | Performed by: FAMILY MEDICINE

## 2022-03-17 PROCEDURE — 4010F PR ACE/ARB THEARPY RXD/TAKEN: ICD-10-PCS | Mod: CPTII,S$GLB,, | Performed by: FAMILY MEDICINE

## 2022-03-17 PROCEDURE — 3288F PR FALLS RISK ASSESSMENT DOCUMENTED: ICD-10-PCS | Mod: CPTII,S$GLB,, | Performed by: FAMILY MEDICINE

## 2022-03-17 PROCEDURE — 3051F PR MOST RECENT HEMOGLOBIN A1C LEVEL 7.0 - < 8.0%: ICD-10-PCS | Mod: CPTII,S$GLB,, | Performed by: FAMILY MEDICINE

## 2022-03-17 PROCEDURE — 99214 OFFICE O/P EST MOD 30 MIN: CPT | Mod: S$GLB,,, | Performed by: FAMILY MEDICINE

## 2022-03-17 PROCEDURE — 3008F PR BODY MASS INDEX (BMI) DOCUMENTED: ICD-10-PCS | Mod: CPTII,S$GLB,, | Performed by: FAMILY MEDICINE

## 2022-03-17 PROCEDURE — 99999 PR PBB SHADOW E&M-EST. PATIENT-LVL V: CPT | Mod: PBBFAC,,, | Performed by: FAMILY MEDICINE

## 2022-03-17 PROCEDURE — 3008F BODY MASS INDEX DOCD: CPT | Mod: CPTII,S$GLB,, | Performed by: FAMILY MEDICINE

## 2022-03-17 PROCEDURE — 1101F PT FALLS ASSESS-DOCD LE1/YR: CPT | Mod: CPTII,S$GLB,, | Performed by: FAMILY MEDICINE

## 2022-03-17 PROCEDURE — 3051F HG A1C>EQUAL 7.0%<8.0%: CPT | Mod: CPTII,S$GLB,, | Performed by: FAMILY MEDICINE

## 2022-03-17 PROCEDURE — 3078F DIAST BP <80 MM HG: CPT | Mod: CPTII,S$GLB,, | Performed by: FAMILY MEDICINE

## 2022-03-17 PROCEDURE — 99999 PR PBB SHADOW E&M-EST. PATIENT-LVL V: ICD-10-PCS | Mod: PBBFAC,,, | Performed by: FAMILY MEDICINE

## 2022-03-17 PROCEDURE — 1159F PR MEDICATION LIST DOCUMENTED IN MEDICAL RECORD: ICD-10-PCS | Mod: CPTII,S$GLB,, | Performed by: FAMILY MEDICINE

## 2022-03-17 PROCEDURE — 1125F PR PAIN SEVERITY QUANTIFIED, PAIN PRESENT: ICD-10-PCS | Mod: CPTII,S$GLB,, | Performed by: FAMILY MEDICINE

## 2022-03-17 PROCEDURE — 1159F MED LIST DOCD IN RCRD: CPT | Mod: CPTII,S$GLB,, | Performed by: FAMILY MEDICINE

## 2022-03-17 RX ORDER — INSULIN ASPART 100 [IU]/ML
INJECTION, SOLUTION INTRAVENOUS; SUBCUTANEOUS
Qty: 15 ML | Refills: 6 | Status: SHIPPED | OUTPATIENT
Start: 2022-03-17 | End: 2023-02-14 | Stop reason: SDUPTHER

## 2022-03-17 NOTE — PROGRESS NOTES
Subjective:       Patient ID: Chelly Ortiz is a 71 y.o. female.    Chief Complaint: Follow-up    71 years old female came to the clinic for diabetes follow-up.  Last A1c was near the goal.  Patient with significant problems with the patient assistant program.  She is not receiving the diabetes medicines properly.  Patient with a BMI of 36 currently trying to lose weight.  Blood pressure today stable.  No chest pain, palpitation orthopnea or PND.    Review of Systems   Constitutional: Negative.    HENT: Negative.    Eyes: Negative.    Respiratory: Negative.    Cardiovascular: Negative for chest pain, palpitations, leg swelling and claudication.   Gastrointestinal: Negative.    Endocrine: Negative for polydipsia, polyphagia and polyuria.   Genitourinary: Negative.    Musculoskeletal: Negative.    Neurological: Negative.    Psychiatric/Behavioral: Negative.          Objective:      Physical Exam  Constitutional:       General: She is not in acute distress.     Appearance: She is well-developed. She is not diaphoretic.   HENT:      Head: Normocephalic and atraumatic.      Right Ear: External ear normal.      Left Ear: External ear normal.      Nose: Nose normal.      Mouth/Throat:      Pharynx: No oropharyngeal exudate.   Eyes:      General: No scleral icterus.        Right eye: No discharge.         Left eye: No discharge.      Conjunctiva/sclera: Conjunctivae normal.      Pupils: Pupils are equal, round, and reactive to light.   Neck:      Thyroid: No thyromegaly.      Vascular: No JVD.      Trachea: No tracheal deviation.   Cardiovascular:      Rate and Rhythm: Normal rate and regular rhythm.      Heart sounds: Normal heart sounds. No murmur heard.    No friction rub. No gallop.   Pulmonary:      Effort: Pulmonary effort is normal. No respiratory distress.      Breath sounds: Normal breath sounds. No stridor. No wheezing or rales.   Chest:      Chest wall: No tenderness.   Abdominal:      General: Bowel sounds are  normal. There is no distension.      Palpations: Abdomen is soft. There is no mass.      Tenderness: There is no abdominal tenderness. There is no guarding or rebound.   Musculoskeletal:         General: No tenderness. Normal range of motion.      Cervical back: Normal range of motion and neck supple.   Lymphadenopathy:      Cervical: No cervical adenopathy.   Skin:     General: Skin is warm and dry.      Coloration: Skin is not pale.      Findings: No erythema or rash.   Neurological:      Mental Status: She is alert and oriented to person, place, and time.      Cranial Nerves: No cranial nerve deficit.      Motor: No abnormal muscle tone.      Coordination: Coordination normal.      Deep Tendon Reflexes: Reflexes are normal and symmetric.   Psychiatric:         Behavior: Behavior normal.         Thought Content: Thought content normal.         Judgment: Judgment normal.         Assessment:       Problem List Items Addressed This Visit     Essential hypertension    Relevant Orders    Comprehensive Metabolic Panel    Lipid Panel    Severe obesity with body mass index (BMI) of 35.0 to 39.9 with serious comorbidity    Relevant Orders    Lipid Panel    Sedentary lifestyle    Relevant Orders    Lipid Panel    Type 2 diabetes mellitus with hyperglycemia, with long-term current use of insulin - Primary    Relevant Medications    insulin aspart U-100 (NOVOLOG FLEXPEN U-100 INSULIN) 100 unit/mL (3 mL) InPn pen    Other Relevant Orders    Ambulatory referral/consult to Endocrinology    Comprehensive Metabolic Panel    Hemoglobin A1C    Lipid Panel    Microalbumin/Creatinine Ratio, Urine          Plan:         Chelly was seen today for follow-up.    Diagnoses and all orders for this visit:    Type 2 diabetes mellitus with hyperglycemia, with long-term current use of insulin  -     Ambulatory referral/consult to Endocrinology; Future  -     Comprehensive Metabolic Panel; Future  -     Hemoglobin A1C; Future  -     Lipid Panel;  Future  -     Microalbumin/Creatinine Ratio, Urine; Future    Sedentary lifestyle  -     Lipid Panel; Future    Severe obesity with body mass index (BMI) of 35.0 to 39.9 with serious comorbidity  -     Lipid Panel; Future    Essential hypertension  -     Comprehensive Metabolic Panel; Future  -     Lipid Panel; Future    Other orders  -     insulin aspart U-100 (NOVOLOG FLEXPEN U-100 INSULIN) 100 unit/mL (3 mL) InPn pen; Inject 12 units w/ meals plus scale 150-200+2, 201-250+4, 251-300+6, 301-350+8, >350+10. Max daily 50 units.      Continue monitoring blood sugar at home,ADA diet.   Diet and physical activity to promote weight loss.

## 2022-03-18 ENCOUNTER — CLINICAL SUPPORT (OUTPATIENT)
Dept: REHABILITATION | Facility: HOSPITAL | Age: 72
End: 2022-03-18
Payer: MEDICARE

## 2022-03-18 ENCOUNTER — PATIENT OUTREACH (OUTPATIENT)
Dept: ADMINISTRATIVE | Facility: OTHER | Age: 72
End: 2022-03-18
Payer: MEDICARE

## 2022-03-18 DIAGNOSIS — M53.86 DECREASED RANGE OF MOTION OF LUMBAR SPINE: ICD-10-CM

## 2022-03-18 DIAGNOSIS — M13.0 ARTHRITIS OF MULTIPLE SITES: Primary | ICD-10-CM

## 2022-03-18 DIAGNOSIS — R29.898 DECREASED ROM OF NECK: ICD-10-CM

## 2022-03-18 DIAGNOSIS — R53.1 DECREASED STRENGTH: ICD-10-CM

## 2022-03-18 PROCEDURE — 97110 THERAPEUTIC EXERCISES: CPT | Mod: PN,CQ

## 2022-03-18 NOTE — PROGRESS NOTES
CHW - Initial Contact    This Community Health Worker updated the Social Determinant of Health questionnaire with MRN: [id number] via telephone today.    Pt identified barriers of most importance are: obtaining medication in a timely manner  Referrals to community agencies completed with patient/caregiver consent outside of [facility] include: Patient assistance  Referrals were put through [facility] - no  Support and Services:   Other information discussed the patient needs / wants help with: rental assistance   Follow up required: Yes   Initial Outreach - Due: 3/18/2022

## 2022-03-18 NOTE — TELEPHONE ENCOUNTER
No new care gaps identified.  Powered by [employer]. Reference number: [id number].   3/18/2022 3:51:48 PM CDT

## 2022-03-18 NOTE — PROGRESS NOTES
"  Physical Therapy Daily Treatment Note     Name: Chelly Ortiz  Clinic Number: [id number]    Therapy Diagnosis:   No diagnosis found.  Physician: No ref. provider found    Visit Date: 3/18/2022    Physician Orders: PT Eval only and submit Treat   Medical Diagnosis from Referral:   M13.0 (ICD-10-CM) - Arthritis of multiple sites   M25.511,G89.29,M25.512 (ICD-10-CM) - Chronic pain of both shoulders   M54.50,G89.29 (ICD-10-CM) - Chronic bilateral low back pain without sciatica   Evaluation Date: 3/8/2022  Authorization Period Expiration: 12/31/22  Plan of Care Expiration: 5/3/2022  Visit # / Visits authorized: 3/TBD + eval  FOTO: 4/5  NEXT    Time In: 2:28 pm  Time Out: 3:15 pm  Total Billable Time: 47 minutes TEx3  Precautions: Standard, diabetes, hypertension and depression    Subjective     Pt reports: she is "not feeling right"  Complaint of foot pain and overall malaise.  She was partially  compliant with home exercise program.  Response to previous treatment: "tired like I did something"  Functional change: None yet    Pain: 5/10  Location: bilateral shoulders and low back    Objective     Ms. Ortiz received therapeutic exercises to develop strength, endurance, ROM, posture and core stabilization for 47 minutes including: Bulleted Items Only    Nu-Step: 5 minutesl Level 2.0 hills to improve lower extremity strength and cardiovascular endurance utilizing different speeds and resistances.  · Lower trunk rotation: x20  · Bridges with Power Cord pulldown - pink; 1x10 (right shldr pain)  · Seated Lumbar / Shoulder Flexion - x30  Sit to Stands - from high-low mat; x15 (emphasis on form)  · Supine wand flexion: 1# 2x12  · Supine serratus push: 1# 2x12  · Transverse abdominus activation(TrA): 2 minutes  · Supine hip abduction: red theraband 5" hold 2x10  · Seated TrA: 5" x 10  · Seated Scapular retractions: 5" 2x10  Seated Snags (rotation): 5" hold 1x10 with towel  Standing TrA: 5"x10    Home Exercises Provided and " "Patient Education Provided     Education provided:    Symptom management and plan of care progressions.  · Home Exercise Program.  · Postural awareness and transverse abdominus activation with daily activity    Written Home Exercises Provided: Patient instructed to cont prior HEP.  Exercises were reviewed and Ms. Ortiz was able to demonstrate them prior to the end of the session.  Ms. Ortiz demonstrated good  understanding of the education provided.     See EMR under Patient Instructions for exercises provided 3/8/2022.    Assessment   Session again focused on core utilization and strengthening while performing therex.  Stopped power cord pull downs due to complaint of right shoulder pain. Frequent cuing required for form, no increase in back pain following seated lumbar extension with swiss ball today.  Patient required encouragement to continue with therex today - all as bulleted "Just not right, I don't know what it is" reported again after treatment.  Not specific to scale.  Ms. Ortiz Is progressing well towards her goals.   Pt prognosis is Good.     Pt will continue to benefit from skilled outpatient physical therapy to address the deficits listed in the problem list box on initial evaluation, provide pt/family education and to maximize pt's level of independence in the home and community environment.   Pt's spiritual, cultural and educational needs considered and pt agreeable to plan of care and goals.    Anticipated barriers to physical therapy: chronicity of pain, many co-morbidities    Goals:  Short Term Goals (4 Weeks):  1. Patient will be compliant with home exercise program to supplement therapy in promoting functional mobility. (Not Met - Progressing)   2. Patient will perform 20 ft carry of 12# with good control to demonstrate improved core strength in order to carry groceries. (Not Met - Progressing)  3. Patient will report no pain during thoracolumbar active range of motion to promote functional " mobility. (Not Met - Progressing)  4. Patient will improve hip abduction manual muscle tests  to >/=4/5 to improve strength for functional tasks. (Not Met - Progressing)  5. Pt will demonstrate 10 degree improvement in cervical rotation for driving. (Not Met - Progressing)  Long Term Goals (8 Weeks):   1. Patient will improve FOTO score to </= 38% limited to decrease perceived limitation with maintaining/changing body position. (Not Met - Progressing)  2. Patient will perform hip hinge with good control to demonstrate improved core strength. (Not Met - Progressing)  3. Patient will improve impaired lower extremity manual muscle tests to >/=4+/5 to improve strength for functional tasks. (Not Met - Progressing)  4. Patient will report no shoulder pain during laying down promote sleeping at night. (Not Met - Progressing)    Plan     Continue PT per plan of care, progress as tolerated.    Stacy Bai, PTA

## 2022-03-20 RX ADMIN — DEXAMETHASONE SODIUM PHOSPHATE 4 MG: 4 INJECTION, SOLUTION INTRA-ARTICULAR; INTRALESIONAL; INTRAMUSCULAR; INTRAVENOUS; SOFT TISSUE at 10:03

## 2022-03-21 RX ORDER — INSULIN DETEMIR 100 [IU]/ML
INJECTION, SOLUTION SUBCUTANEOUS
Qty: 15 ML | Refills: 6 | Status: SHIPPED | OUTPATIENT
Start: 2022-03-21 | End: 2022-03-21 | Stop reason: SDUPTHER

## 2022-03-21 RX ORDER — INSULIN DETEMIR 100 [IU]/ML
INJECTION, SOLUTION SUBCUTANEOUS
Qty: 15 ML | Refills: 6 | Status: SHIPPED | OUTPATIENT
Start: 2022-03-21 | End: 2022-12-22 | Stop reason: SDUPTHER

## 2022-03-21 NOTE — PROGRESS NOTES
Subjective:      Patient ID: Chelly Ortiz is a 71 y.o. female.    Chief Complaint: PCP (Tami Junior NP 2/15/22), Diabetic Foot Exam, Foot Problem (Left foot lump on the ball of foot ), and Bunions (Left foot, 1st and 2nd )    Chelly is a 71 y.o. female who presents to the clinic upon referral from Dr. Scott  for evaluation and treatment of diabetic feet. Chelly has a past medical history of Allergy, Cataract, DDD (degenerative disc disease), lumbar, Diabetes mellitus, Diabetes mellitus, type 2, GERD (gastroesophageal reflux disease), History of subconjunctival hemorrhage (12/2/11), Hyperlipidemia, Hypertension, IBS (irritable bowel syndrome), Obesity, MYRIAM (obstructive sleep apnea), Rotator cuff impingement syndrome, and Seasonal allergic conjunctivitis. Patient relates no major problem with feet. Only complaints today consist of yearly comprehensive diabetic  foot examination. Occasional pain L 2nd toe .    PCP: Gabriel Wilson MD    Date Last Seen by PCP:   Chief Complaint   Patient presents with    PCP     Tami Junior NP 2/15/22    Diabetic Foot Exam    Foot Problem     Left foot lump on the ball of foot     Bunions     Left foot, 1st and 2nd          Current shoe gear: Casual shoes    Hemoglobin A1C   Date Value Ref Range Status   12/27/2021 7.5 (H) 4.0 - 5.6 % Final     Comment:     ADA Screening Guidelines:  5.7-6.4%  Consistent with prediabetes  >or=6.5%  Consistent with diabetes    High levels of fetal hemoglobin interfere with the HbA1C  assay. Heterozygous hemoglobin variants (HbS, HgC, etc)do  not significantly interfere with this assay.   However, presence of multiple variants may affect accuracy.     09/10/2021 7.2 (H) 4.0 - 5.6 % Final     Comment:     ADA Screening Guidelines:  5.7-6.4%  Consistent with prediabetes  >or=6.5%  Consistent with diabetes    High levels of fetal hemoglobin interfere with the HbA1C  assay. Heterozygous hemoglobin variants (HbS, HgC, etc)do  not  significantly interfere with this assay.   However, presence of multiple variants may affect accuracy.     05/14/2021 8.8 (H) 4.0 - 5.6 % Final     Comment:     ADA Screening Guidelines:  5.7-6.4%  Consistent with prediabetes  >or=6.5%  Consistent with diabetes    High levels of fetal hemoglobin interfere with the HbA1C  assay. Heterozygous hemoglobin variants (HbS, HgC, etc)do  not significantly interfere with this assay.   However, presence of multiple variants may affect accuracy.             Review of Systems   Constitutional: Negative for chills, decreased appetite and fever.   Cardiovascular: Negative for leg swelling.   Respiratory: Negative for cough.    Skin: Negative for flushing and itching.   Musculoskeletal: Negative for arthritis, joint pain, joint swelling and myalgias.   Gastrointestinal: Negative for nausea and vomiting.   Neurological: Negative for loss of balance, numbness and paresthesias.           Objective:      Physical Exam  Vitals and nursing note reviewed.   Constitutional:       General: She is not in acute distress.     Appearance: She is well-developed. She is not toxic-appearing or diaphoretic.      Comments: alert and oriented x 3.    Cardiovascular:      Pulses:           Dorsalis pedis pulses are 2+ on the right side and 2+ on the left side.        Posterior tibial pulses are 2+ on the right side and 2+ on the left side.      Comments:  Capillary refill time is within normal limits. Digital hair present.   Pulmonary:      Effort: No respiratory distress.   Musculoskeletal:         General: Deformity (b/l hallux ) present.      Right ankle: No tenderness. No lateral malleolus, medial malleolus, AITF ligament, CF ligament or posterior TF ligament tenderness.      Right Achilles Tendon: No defects. Gonzalez's test negative.      Left ankle: No tenderness. No lateral malleolus, medial malleolus, AITF ligament, CF ligament or posterior TF ligament tenderness.      Left Achilles Tendon:  No defects. Gonzalez's test negative.      Right foot: No tenderness or bony tenderness.      Left foot: No tenderness or bony tenderness.      Comments: Adequate joint range of motion without pain, limitation, nor crepitation Bilateral feet and ankle joints. Muscle strength is 5/5 in all groups bilaterally.        Decreased first MPJ range of motion both weightbearing and nonweightbearing, no crepitus observed the first MP joint, - dorsal flag sign.Mild  bunion deformity is observed .    There is pain on palpation of the L 2nd   intermetatarsal space with a positive Merritt's click. Minimal tenderness to palpation of the adjacent metatarsal heads.     Feet:      Right foot:      Protective Sensation: 5 sites tested. 5 sites sensed.      Left foot:      Protective Sensation: 5 sites tested. 5 sites sensed.   Lymphadenopathy:      Comments: No lymphatic streaking     Skin:     General: Skin is warm and dry.      Coloration: Skin is not pale.      Findings: No rash.      Nails: There is no clubbing.      Comments: Skin is of normal turgor.   Normal temperature gradient.  Examination of the skin reveals no evidence of significant rashes, open lesions, suspicious appearing nevi or other concerning lesions.        Toenails 1-5 bilaterally are neatly trimmed; of normal color and thickness     Neurological:      Sensory: No sensory deficit.      Motor: No atrophy.      Comments: Light touch present     Psychiatric:         Attention and Perception: She is attentive.         Mood and Affect: Mood is not anxious. Affect is not inappropriate.         Speech: She is communicative. Speech is not slurred.         Behavior: Behavior is not combative.               Assessment:       Encounter Diagnoses   Name Primary?    Type II diabetes mellitus with neurological manifestations Yes    Valgus deformity of both great toes          Plan:       Chelly was seen today for pcp, diabetic foot exam, foot problem and bunions.    Diagnoses  and all orders for this visit:    Type II diabetes mellitus with neurological manifestations    Valgus deformity of both great toes    Other orders  -     dexamethasone injection 4 mg      I counseled the patient on her conditions, their implications and medical management.      - Shoe inspection. Diabetic Foot Education. Patient reminded of the importance of good nutrition and blood sugar control to help prevent podiatric complications of diabetes. Patient instructed on proper foot hygeine. We discussed wearing proper shoe gear, daily foot inspections, never walking without protective shoe gear, caution putting sharp instruments to feet     - Discussed DM foot care:  Wear comfortable, proper fitting shoes. Wash feet daily. Dry well. After drying, apply moisturizer to feet (no lotion to webspaces). Inspect feet daily for skin breaks, blisters, swelling, or redness. Wear cotton socks (preferably white)  Change socks every day. Do NOT walk barefoot. Do NOT use heating pads or warm/hot water soaks     - Discussed importance of daily moisturizer to the feet such as Gold bonds diabetic foot cream    - Patient is low risk for developing lower extremity issues secondary to diabetes. I recommend continued yearly diabetic foot examinations.     - Patients PCP can perform yearly foot checks . Currently  patient has no pedal manifestations of DM    - Patients with out pedal manifestations of DM, do not qualify for nail/callus trimming     - Discussed surgical and conservative management of b/l hav deformity. Conservatively we did discuss padding, and shoe modifications such as softer shoes with wide toe boxes. Surgically we briefly discussed pre and post operative expectations. 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 Discussed shoe types .     - L foot neuroma, discussed shoes.   Discussed possible side effects from injection including  but NOT limited to discoloration of skin, erosion of soft tissue, and increased likelihood of rupture of a soft tissue structure (ie. Plantar fascia, muscle, tendon, ligament, or capsule in the area of injection). Patient indicates understanding of my explanation, and patient gives verbal consent for injection of affected area. A time out was performed to verify the  correct  patient and site, prior to initiation of procedure.     After sterilizing the area with an alcohol prep, the affected area of the L foot was injected as per MAR  The patient tolerated the injection well and reported comfort to the area     - Advised patient to monitor blood sugar levels. Steroid injections may cause temporary elevations in BS. Patient verbally reports understanding and will closely monitor BS and follow up as needed if any elevated or  uncontrollable BS should occur     - Dispensed gel Silipos toe spacer    - RTC in  PRN     .

## 2022-03-21 NOTE — TELEPHONE ENCOUNTER
No new care gaps identified.  Powered by [employer]. Reference number: [id number].   3/21/2022 7:49:03 AM CDT

## 2022-03-22 ENCOUNTER — DOCUMENTATION ONLY (OUTPATIENT)
Dept: REHABILITATION | Facility: HOSPITAL | Age: 72
End: 2022-03-22
Payer: MEDICARE

## 2022-03-22 NOTE — PROGRESS NOTES
Physical therapist and physical therapy assistant(s) met face to face on 3/22/22 to discuss patient's treatment plan and progress towards established goals. Pt will be seen by a physical therapist minimally every 6th visit or every 30 days.    ROE VIVEROS, PT, DPT    Stacy Bai, PTA

## 2022-03-23 ENCOUNTER — TELEPHONE (OUTPATIENT)
Dept: FAMILY MEDICINE | Facility: CLINIC | Age: 72
End: 2022-03-23
Payer: MEDICARE

## 2022-03-23 ENCOUNTER — TELEPHONE (OUTPATIENT)
Dept: GASTROENTEROLOGY | Facility: CLINIC | Age: 72
End: 2022-03-23
Payer: MEDICARE

## 2022-03-23 DIAGNOSIS — K21.9 GASTROESOPHAGEAL REFLUX DISEASE, UNSPECIFIED WHETHER ESOPHAGITIS PRESENT: Primary | ICD-10-CM

## 2022-03-23 NOTE — TELEPHONE ENCOUNTER
----- Message from Torey Elias MA sent at 3/23/2022 10:25 AM CDT -----  Contact: [phone]    ----- Message -----  From: Raymond Gamez  Sent: 3/23/2022  10:21 AM CDT  To: Tatiana Molina Staff    NP would like to schedule an appt with Dr. Simpson for stomach and throat issues. Pt would like a call back to schedule.    [phone]

## 2022-03-23 NOTE — TELEPHONE ENCOUNTER
----- Message from Clarice Huff MA sent at 3/22/2022 10:23 AM CDT -----  Regarding: FW: Add EGD    ----- Message -----  From: Judy Diaz RN  Sent: 3/22/2022   9:53 AM CDT  To: Steve JOHNSON Staff  Subject: Add EGD                                          Can we please add EGD to this order for you patient scheduled 03/30/2022

## 2022-03-23 NOTE — TELEPHONE ENCOUNTER
Spoke with patient  She does not have IBD  She is scheduled for a scope on 3/30/22  Advised she should see a general GI doctor - she states she was seeing Jessie, [place]  Advised she should keep her scope appt and then see a general GI  Pt verbalized understanding to all and has no further questions at this time.

## 2022-03-24 ENCOUNTER — CLINICAL SUPPORT (OUTPATIENT)
Dept: REHABILITATION | Facility: HOSPITAL | Age: 72
End: 2022-03-24
Payer: MEDICARE

## 2022-03-24 ENCOUNTER — PATIENT OUTREACH (OUTPATIENT)
Dept: ADMINISTRATIVE | Facility: OTHER | Age: 72
End: 2022-03-24
Payer: MEDICARE

## 2022-03-24 DIAGNOSIS — R29.898 DECREASED ROM OF NECK: ICD-10-CM

## 2022-03-24 DIAGNOSIS — M13.0 ARTHRITIS OF MULTIPLE SITES: Primary | ICD-10-CM

## 2022-03-24 DIAGNOSIS — R53.1 DECREASED STRENGTH: ICD-10-CM

## 2022-03-24 DIAGNOSIS — M53.86 DECREASED RANGE OF MOTION OF LUMBAR SPINE: ICD-10-CM

## 2022-03-24 PROCEDURE — 97110 THERAPEUTIC EXERCISES: CPT | Mod: PN,CQ

## 2022-03-24 NOTE — PROGRESS NOTES
"  Physical Therapy Daily Treatment Note     Name: Chelly Ortiz  Clinic Number: [id number]    Therapy Diagnosis:   Encounter Diagnoses   Name Primary?    Arthritis of multiple sites Yes    Decreased ROM of neck     Decreased strength     Decreased range of motion of lumbar spine      Physician: No ref. provider found    Visit Date: 3/24/2022    Physician Orders: PT Eval only and submit Treat   Medical Diagnosis from Referral:   M13.0 (ICD-10-CM) - Arthritis of multiple sites   M25.511,G89.29,M25.512 (ICD-10-CM) - Chronic pain of both shoulders   M54.50,G89.29 (ICD-10-CM) - Chronic bilateral low back pain without sciatica   Evaluation Date: 3/8/2022  Authorization Period Expiration: 12/31/22  Plan of Care Expiration: 5/3/2022  Visit # / Visits authorized: 4/TBD + eval  FOTO: 5/5  Completed 3/24/2022    Time In: 2:15 pm  Time Out: 3:00 pm  Total Billable Time: 45 minutes TEx3  Precautions: Standard, diabetes, hypertension and depression    Subjective     Pt reports:  "I finally came back to life today"  States she thinks she has it together today, but she has no idea why she felt so off last visit and the last few days since.  She was partially  compliant with home exercise program.  Response to previous treatment: "tired like I did something"  Functional change: None yet    Pain: 4/10  Location: bilateral shoulders and low back    Objective     Ms. Ortiz received therapeutic exercises to develop strength, endurance, ROM, posture and core stabilization for 47 minutes including: Bulleted Items Only    Nu-Step: 5 minutesl Level 2.0 hills to improve lower extremity strength and cardiovascular endurance utilizing different speeds and resistances.  · Lower trunk rotation: x20  · Bridges with Power Cord pulldown - Performed with Red theraband today to prevent shoulder pain 2x10  · Supine wand flexion: 1# 2x15  · Supine serratus push: 1# 2x15  · Transverse abdominus activation(TrA): 2 minutes  · Supine hip abduction: red " "theraband 5" hold 2x10  Seated Lumbar / Shoulder Flexion - x30  Out of time  Sit to Stands - from high-low mat; x15 (emphasis on form)  · Seated TrA: 5" x 10  · Seated Scapular retractions: 5" 2x10  · Seated Snags (rotation): 5" hold 1x10 with towel  Standing TrA: 5"x10    Home Exercises Provided and Patient Education Provided     Education provided:    Symptom management and plan of care progressions.  · Home Exercise Program.  · Postural awareness and transverse abdominus activation with daily activity    Written Home Exercises Provided: Patient instructed to cont prior HEP.  Exercises were reviewed and Ms. Ortiz was able to demonstrate them prior to the end of the session.  Ms. Ortiz demonstrated good  understanding of the education provided.     See EMR under Patient Instructions for exercises provided 3/8/2022.    Assessment   Session again focused on core utilization and strengthening while performing therex.  Held power cord with pull downs due to complaint of right shoulder pain - able to perform with red theraband without complaint.  Frequent cuing required for form,  Patient required encouragement to continue with therex today - all as bulleted.  States "much better now"after treatment.  Not specific to scale.  Ms. Ortiz Is progressing well towards her goals.   Pt prognosis is Good.     Pt will continue to benefit from skilled outpatient physical therapy to address the deficits listed in the problem list box on initial evaluation, provide pt/family education and to maximize pt's level of independence in the home and community environment.   Pt's spiritual, cultural and educational needs considered and pt agreeable to plan of care and goals.    Anticipated barriers to physical therapy: chronicity of pain, many co-morbidities    Goals:  Short Term Goals (4 Weeks):  1. Patient will be compliant with home exercise program to supplement therapy in promoting functional mobility. (Not Met - Progressing)   2. " Patient will perform 20 ft carry of 12# with good control to demonstrate improved core strength in order to carry groceries. (Not Met - Progressing)  3. Patient will report no pain during thoracolumbar active range of motion to promote functional mobility. (Not Met - Progressing)  4. Patient will improve hip abduction manual muscle tests  to >/=4/5 to improve strength for functional tasks. (Not Met - Progressing)  5. Pt will demonstrate 10 degree improvement in cervical rotation for driving. (Not Met - Progressing)  Long Term Goals (8 Weeks):   1. Patient will improve FOTO score to </= 38% limited to decrease perceived limitation with maintaining/changing body position. (Not Met - Progressing)  2. Patient will perform hip hinge with good control to demonstrate improved core strength. (Not Met - Progressing)  3. Patient will improve impaired lower extremity manual muscle tests to >/=4+/5 to improve strength for functional tasks. (Not Met - Progressing)  4. Patient will report no shoulder pain during laying down promote sleeping at night. (Not Met - Progressing)    Plan     Continue PT per plan of care, progress as tolerated.    Stacy Bai, PTA

## 2022-03-24 NOTE — PROGRESS NOTES
Health Maintenance Due   Topic Date Due    TETANUS VACCINE  06/21/2020     Updates were requested from care everywhere.  Chart was reviewed for overdue Proactive [facility] Encounters ([facility]) topics (CRS, Breast Cancer Screening, Eye exam)  Health Maintenance has been updated.  [place] immunization registry triggered.  Immunizations were reconciled.

## 2022-03-25 NOTE — PROGRESS NOTES
"  Physical Therapy Daily Treatment Note     Name: Chelly Ortiz  Clinic Number: [id number]    Therapy Diagnosis:   Encounter Diagnoses   Name Primary?    Arthritis of multiple sites Yes    Decreased ROM of neck     Decreased strength     Decreased range of motion of lumbar spine      Physician: Tami Junior NP    Visit Date: 3/28/2022    Physician Orders: PT Eval only and submit Treat   Medical Diagnosis from Referral:   M13.0 (ICD-10-CM) - Arthritis of multiple sites   M25.511,G89.29,M25.512 (ICD-10-CM) - Chronic pain of both shoulders   M54.50,G89.29 (ICD-10-CM) - Chronic bilateral low back pain without sciatica   Evaluation Date: 3/8/2022  Authorization Period Expiration: 12/31/22  Plan of Care Expiration: 5/3/2022  Visit # / Visits authorized: 5/TBD + eval  FOTO: 5/5  Completed 3/24/2022    Time In: 12:00  Time Out: 1:00  Total Billable Time: 60 minutes- 4 TE  Charges: 4 TE    Precautions: Standard, diabetes, hypertension and depression    Subjective     Pt reports: having some stomach pain today. Cleaned ceiling fans this weekend so aggravated her shoulders. "I have been feeling better since coming." Re: left side of neck- "when I turn my head toward the left, it gets stuck!"  She was partially  compliant with home exercise program.  Response to previous treatment: none  Functional change: Able to clean her ceiling fans without pain afterward, 3/28/22    Pain: 0/10  Location: bilateral shoulders, left neck and low back    Objective     Ms. Ortiz received therapeutic exercises to develop strength, endurance, ROM, posture and core stabilization for 60 minutes including: Bulleted Items Only    Nu-Step: 7 minutes Level 4.0 hills to improve lower extremity strength and cardiovascular endurance utilizing different speeds and resistances- progress resistance next session.  · Lower trunk rotation: x30- progress to legs on physioball next session.  · Bridges- Red theraband today to prevent shoulder pain 2x15, " "progress to green band next session.  · Supine wand flexion: 2# 2x15, progress weight.  · Supine serratus push: 2#, 2x10. Progress weight.  · +Paloff press (green band, exhalation with transversus abdominus activation): 1 minute each direction. Progress time.  · Seated Lumbar / Shoulder Flexion with physioball - x20  · Sit to Stands - from chair with arm rests; x10 (emphasis on form, could only do 10 due to pain in knees)  · Standing rows with scapular retractions (green band): 2x10. Progress reps.  · +Standing straight arm pull-downs (green band): 2x10 reps. Progress reps.  · Seated Snags (rotation): 5" hold 1x10 with towel  bilateral(instruction to angle towel toward eyes). Progress reps.  · Next: Lateral stepping with yellow band x 20 ft (bilateral)    Not today, out of time:  Supine hip abduction: red theraband 5" hold 3x10  Seated TrA: 5" x 10    Home Exercises Provided and Patient Education Provided     Education provided:    Symptom management and plan of care progressions.  · Home Exercise Program.  · Postural awareness and transverse abdominus activation with daily activity    Written Home Exercises Provided: Patient instructed to cont prior HEP.  Exercises were reviewed and Ms. Ortiz was able to demonstrate them prior to the end of the session.  Ms. Ortiz demonstrated good  understanding of the education provided.     See EMR under Patient Instructions for exercises provided 3/8/2022.    Assessment   Pt presents today with no pain in the left side of her neck, bilateral shoulders and low back. Today's session focused on core stability, shoulder mobility and periscapular stability and cervical spine mobility via SNAGs (proper technique). Pt would benefit from a progress note to determine the focus of her sessions since she has no pain recently and was able to clean her ceiling fans this wkend with no pain thereafter.     Ms. Ortiz Is progressing well towards her goals.   Pt prognosis is Good.     Pt " will continue to benefit from skilled outpatient physical therapy to address the deficits listed in the problem list box on initial evaluation, provide pt/family education and to maximize pt's level of independence in the home and community environment.   Pt's spiritual, cultural and educational needs considered and pt agreeable to plan of care and goals.    Anticipated barriers to physical therapy: chronicity of pain, many co-morbidities    Goals:  Short Term Goals (4 Weeks):  1. Patient will be compliant with home exercise program to supplement therapy in promoting functional mobility. (Not Met - Progressing)   2. Patient will perform 20 ft carry of 12# with good control to demonstrate improved core strength in order to carry groceries. (Not Met - Progressing)  3. Patient will report no pain during thoracolumbar active range of motion to promote functional mobility. (Not Met - Progressing)  4. Patient will improve hip abduction manual muscle tests  to >/=4/5 to improve strength for functional tasks. (Not Met - Progressing)  5. Pt will demonstrate 10 degree improvement in cervical rotation for driving. (Not Met - Progressing)  Long Term Goals (8 Weeks):   1. Patient will improve FOTO score to </= 38% limited to decrease perceived limitation with maintaining/changing body position. (Not Met - Progressing)  2. Patient will perform hip hinge with good control to demonstrate improved core strength. (Not Met - Progressing)  3. Patient will improve impaired lower extremity manual muscle tests to >/=4+/5 to improve strength for functional tasks. (Not Met - Progressing)  4. Patient will report no shoulder pain during laying down promote sleeping at night. (Not Met - Progressing)    Plan     Continue PT per plan of care, progress as tolerated.  Progressions noted above for next session.  Recommend progress note (objective measures) to determine focus of future therapy sessions.    Tami Ceballos, PT ,  DPT  3/28/2022

## 2022-03-28 ENCOUNTER — TELEPHONE (OUTPATIENT)
Dept: GASTROENTEROLOGY | Facility: CLINIC | Age: 72
End: 2022-03-28
Payer: MEDICARE

## 2022-03-28 ENCOUNTER — CLINICAL SUPPORT (OUTPATIENT)
Dept: REHABILITATION | Facility: HOSPITAL | Age: 72
End: 2022-03-28
Payer: MEDICARE

## 2022-03-28 DIAGNOSIS — R29.898 DECREASED ROM OF NECK: ICD-10-CM

## 2022-03-28 DIAGNOSIS — R53.1 DECREASED STRENGTH: ICD-10-CM

## 2022-03-28 DIAGNOSIS — M13.0 ARTHRITIS OF MULTIPLE SITES: Primary | ICD-10-CM

## 2022-03-28 DIAGNOSIS — M53.86 DECREASED RANGE OF MOTION OF LUMBAR SPINE: ICD-10-CM

## 2022-03-28 PROCEDURE — 97110 THERAPEUTIC EXERCISES: CPT | Mod: PN

## 2022-03-28 RX ORDER — DEXAMETHASONE SODIUM PHOSPHATE 4 MG/ML
4 INJECTION, SOLUTION INTRA-ARTICULAR; INTRALESIONAL; INTRAMUSCULAR; INTRAVENOUS; SOFT TISSUE ONCE
Status: COMPLETED | OUTPATIENT
Start: 2022-02-22 | End: 2022-03-28

## 2022-03-28 RX ADMIN — DEXAMETHASONE SODIUM PHOSPHATE 4 MG: 4 INJECTION, SOLUTION INTRA-ARTICULAR; INTRALESIONAL; INTRAMUSCULAR; INTRAVENOUS; SOFT TISSUE at 08:03

## 2022-03-28 NOTE — TELEPHONE ENCOUNTER
Spoke to patient, canceled procedure that was scheduled on 03/30/2022. Patient scheduled an appointment on, 04/19/2022 @1;45pm with Nicole.

## 2022-03-28 NOTE — TELEPHONE ENCOUNTER
----- Message from Jackelyn Scott sent at 3/28/2022  7:33 AM CDT -----  Regarding: cancel  Contact: [phone]  Patient is requesting a call back regarding canceling her upcoming procedure.   Would the patient rather a call back or a response via [facility]?  call  Best Call Back Number:  [phone]  Additional Information:  she will call back when she is ready to reschedule. Thanks

## 2022-03-29 NOTE — PROGRESS NOTES
CHW - Follow Up    This Community Health Worker completed a follow up visit with MRN: [id number] via telephone today.  Pt reported: That she recvd an approval letter from the [employer] regarding her medication and it stated that she was approved.    Community Health Worker provided: I have has been in touch with the pharmacy tech that is assigned to her case. The pharmacy tech emailed a quick tip form for placing a referral to this CHW.   Follow up required: YES  No future outreach task assigned

## 2022-03-31 ENCOUNTER — CLINICAL SUPPORT (OUTPATIENT)
Dept: REHABILITATION | Facility: HOSPITAL | Age: 72
End: 2022-03-31
Payer: MEDICARE

## 2022-03-31 DIAGNOSIS — R29.898 DECREASED ROM OF NECK: ICD-10-CM

## 2022-03-31 DIAGNOSIS — M53.86 DECREASED RANGE OF MOTION OF LUMBAR SPINE: ICD-10-CM

## 2022-03-31 DIAGNOSIS — R53.1 DECREASED STRENGTH: ICD-10-CM

## 2022-03-31 DIAGNOSIS — M13.0 ARTHRITIS OF MULTIPLE SITES: Primary | ICD-10-CM

## 2022-03-31 PROCEDURE — 97110 THERAPEUTIC EXERCISES: CPT | Mod: PN,CQ

## 2022-03-31 NOTE — PROGRESS NOTES
"  Physical Therapy Daily Treatment Note     Name: Chelly Ortiz  Clinic Number: [id number]    Therapy Diagnosis:   Encounter Diagnoses   Name Primary?    Arthritis of multiple sites Yes    Decreased ROM of neck     Decreased strength     Decreased range of motion of lumbar spine      Physician: Tami Junior NP    Visit Date: 3/31/2022    Physician Orders: PT Eval only and submit Treat   Medical Diagnosis from Referral:   M13.0 (ICD-10-CM) - Arthritis of multiple sites   M25.511,G89.29,M25.512 (ICD-10-CM) - Chronic pain of both shoulders   M54.50,G89.29 (ICD-10-CM) - Chronic bilateral low back pain without sciatica   Evaluation Date: 3/8/2022  Authorization Period Expiration: 12/31/22  Plan of Care Expiration: 5/3/2022  Visit # / Visits authorized: 6/TBD + eval  FOTO: 7/10  Completed 3/24/2022    Time In: 2:15  Time Out: 3:00  Total Billable Time: 45 minutes- 3 TE      Precautions: Standard, diabetes, hypertension and depression    Subjective     Pt reports: she originally cx today due to having "too much pain in my knees"  Agreed to attend.  Also complaint of not being able to get out of the tub without assist  She was partially  compliant with home exercise program.  Response to previous treatment: none  Functional change: Able to clean her ceiling fans without pain afterward, 3/28/22    Pain: 0/10  Location: bilateral shoulders, left neck and low back    Objective     Ms. Ortiz received therapeutic exercises to develop strength, endurance, ROM, posture and core stabilization for 45 minutes including: Bulleted Items Only    Nu-Step: 5 minutes Level 1.0 regressed today due to pain complaints today  - resume hills and progress resistance next session.  · Lower trunk rotation: x30-  legs on green physioball   · Bridges- Red theraband today to prevent shoulder pain 3x10, progress to green band next session.  · Supine wand flexion: 3# 2x10  · Supine serratus push: 3#, 2x10.   · +Paloff press (green band, " "exhalation with transversus abdominus activation): 1 minute each direction. Progress time.  Out of time  · Seated Lumbar / Shoulder Flexion with physioball - x20  · Sit to Stands - from chair with arm rests; x10 (emphasis on form, could only do 10 due to pain in knees)  Not today at patient request due to concern regarding pain.  · Standing rows with scapular retractions (green band): 2x10. Progress reps.  · +Standing straight arm pull-downs (green band): 2x10 reps. Progress reps.  · Seated Snags (rotation): 5" hold 1x10 with towel  bilateral(instruction to angle towel toward eyes). Progress reps.  · Next: Lateral stepping with yellow band x 20 ft (bilateral)    Not today, out of time:  Supine hip abduction: red theraband 5" hold 3x10  Seated TrA: 5" x 10    Home Exercises Provided and Patient Education Provided     Education provided:    Symptom management and plan of care progressions.  · Home Exercise Program.  · Postural awareness and transverse abdominus activation with daily activity    Written Home Exercises Provided: Patient instructed to cont prior HEP.  Exercises were reviewed and Ms. Ortiz was able to demonstrate them prior to the end of the session.  Ms. Ortiz demonstrated good  understanding of the education provided.     See EMR under Patient Instructions for exercises provided 3/8/2022.    Assessment   Pt presents today focused on her knee pain. Initially wanting to cancel today due to same, then agreeable to attend.  Treatment slightly limited by patient concern to not increase knee pain.  Progressed reps with wand therex but held sit<>stand and others as noted.  States continued reports of "8/10" pain after treatment.  No increased pain..      Ms. Ortiz Is progressing well towards her goals.   Pt prognosis is Good.     Pt will continue to benefit from skilled outpatient physical therapy to address the deficits listed in the problem list box on initial evaluation, provide pt/family education and " to maximize pt's level of independence in the home and community environment.   Pt's spiritual, cultural and educational needs considered and pt agreeable to plan of care and goals.    Anticipated barriers to physical therapy: chronicity of pain, many co-morbidities    Goals:  Short Term Goals (4 Weeks):  1. Patient will be compliant with home exercise program to supplement therapy in promoting functional mobility. (Not Met - Progressing)   2. Patient will perform 20 ft carry of 12# with good control to demonstrate improved core strength in order to carry groceries. (Not Met - Progressing)  3. Patient will report no pain during thoracolumbar active range of motion to promote functional mobility. (Not Met - Progressing)  4. Patient will improve hip abduction manual muscle tests  to >/=4/5 to improve strength for functional tasks. (Not Met - Progressing)  5. Pt will demonstrate 10 degree improvement in cervical rotation for driving. (Not Met - Progressing)  Long Term Goals (8 Weeks):   1. Patient will improve FOTO score to </= 38% limited to decrease perceived limitation with maintaining/changing body position. (Not Met - Progressing)  2. Patient will perform hip hinge with good control to demonstrate improved core strength. (Not Met - Progressing)  3. Patient will improve impaired lower extremity manual muscle tests to >/=4+/5 to improve strength for functional tasks. (Not Met - Progressing)  4. Patient will report no shoulder pain during laying down promote sleeping at night. (Not Met - Progressing)    Plan     Continue PT per plan of care, progress as tolerated.  Progressions noted above for next session.  Recommend progress note (objective measures) to determine focus of future therapy sessions.    Stacy Bai, PTA , DPT  3/31/2022

## 2022-04-01 ENCOUNTER — PATIENT OUTREACH (OUTPATIENT)
Dept: ADMINISTRATIVE | Facility: OTHER | Age: 72
End: 2022-04-01
Payer: MEDICARE

## 2022-04-01 NOTE — PROGRESS NOTES
CHW - Follow Up    This Community Health Worker completed a follow up visit with MRN: [id number] in person today.  Pt reported: That she received a letter that stated she would not be eligible for her medications Novolog and Levimer  Community Health Worker: Contacted the patient assistance program of [employer] company to find out why client was denied.  stated that the address that was on the application was a post office box and they can not send medication to a post office box. This CHW gave a correct address for the client and the customer service stated that the medication will be processed and mailed out in about 4 weeks.  Follow up required: Yes

## 2022-04-06 ENCOUNTER — PATIENT MESSAGE (OUTPATIENT)
Dept: DIABETES | Facility: CLINIC | Age: 72
End: 2022-04-06
Payer: MEDICARE

## 2022-04-07 ENCOUNTER — CLINICAL SUPPORT (OUTPATIENT)
Dept: REHABILITATION | Facility: HOSPITAL | Age: 72
End: 2022-04-07
Payer: MEDICARE

## 2022-04-07 DIAGNOSIS — R29.898 DECREASED ROM OF NECK: ICD-10-CM

## 2022-04-07 DIAGNOSIS — M13.0 ARTHRITIS OF MULTIPLE SITES: Primary | ICD-10-CM

## 2022-04-07 DIAGNOSIS — R53.1 DECREASED STRENGTH: ICD-10-CM

## 2022-04-07 DIAGNOSIS — M53.86 DECREASED RANGE OF MOTION OF LUMBAR SPINE: ICD-10-CM

## 2022-04-07 PROCEDURE — 97110 THERAPEUTIC EXERCISES: CPT | Mod: PN,CQ

## 2022-04-07 NOTE — PROGRESS NOTES
Physical Therapy Daily Treatment Note     Name: Chelly Ortiz  Clinic Number: [id number]    Therapy Diagnosis:   Encounter Diagnoses   Name Primary?    Arthritis of multiple sites Yes    Decreased ROM of neck     Decreased strength     Decreased range of motion of lumbar spine      Physician: No ref. provider found    Visit Date: 4/7/2022    Physician Orders: PT Eval only and submit Treat   Medical Diagnosis from Referral:   M13.0 (ICD-10-CM) - Arthritis of multiple sites   M25.511,G89.29,M25.512 (ICD-10-CM) - Chronic pain of both shoulders   M54.50,G89.29 (ICD-10-CM) - Chronic bilateral low back pain without sciatica   Evaluation Date: 3/8/2022  Authorization Period Expiration: 12/31/22  Plan of Care Expiration: 5/3/2022  Visit # / Visits authorized: 6/TBD + eval  FOTO: 8/10  Completed 3/24/2022    Time In: 3:05 pm  Time Out: 3:45 pm  Total Billable Time: 45 minutes- 3 TE      Precautions: Standard, diabetes, hypertension and depression    Subjective     Pt reports: she is feeling better today than last time.    She was partially  compliant with home exercise program.  Response to previous treatment: none  Functional change: Able to clean her ceiling fans without pain afterward, 3/28/22    Pain: 5/10  Location: bilateral shoulders, left neck and low back    Objective     Ms. Ortiz received therapeutic exercises to develop strength, endurance, ROM, posture and core stabilization for 45 minutes including: Bulleted Items Only    Nu-Step: 5 minutes Level 1.0 regressed today due to pain complaints today  - resume hills and progress resistance next session.  Not available today  · Lower trunk rotation: x30-  legs on green physioball   · Bridges- Red theraband today to prevent shoulder pain 3x10, progress to green band next session if able.  · Supine wand flexion: 3# 2x10  · Supine serratus push: 3#, 2x10.   · Paloff press (green band, exhalation with transversus abdominus activation): 1 minute each direction.  "Progress time.    · Seated Lumbar / Shoulder Flexion with physioball - x20  · Sit to Stands - from chair with arm rests;1 x10 (emphasis on form, could only do 10 due to pain in knees)  Not today at patient request due to concern regarding pain.  · Standing rows with scapular retractions (green band): 3x10  · Standing straight arm pull-downs (green band): 3x10.  · Seated Snags (rotation): 5" hold 1x15 with towel  Bilateral (instruction to angle towel toward eyes). Progress reps.  Next: Lateral stepping with yellow band x 20 ft (bilateral)    Not today, out of time:  Supine hip abduction: red theraband 5" hold 3x10  Seated TrA: 5" x 10    Home Exercises Provided and Patient Education Provided     Education provided:    Symptom management and plan of care progressions.  · Home Exercise Program.  · Postural awareness and transverse abdominus activation with daily activity    Written Home Exercises Provided: Patient instructed to cont prior HEP.  Exercises were reviewed and Ms. Ortiz was able to demonstrate them prior to the end of the session.  Ms. Ortiz demonstrated good  understanding of the education provided.     See EMR under Patient Instructions for exercises provided 3/8/2022.    Assessment   Pt presents to PT today with reports of decreased pain overall and agreeable to perform therex.  Able to increase reps with wand therex as well as theraband rows and extension.  Sit to stand limited by patient concern to not increase knee pain, but performed 1x10  States continued reports "about the same" pain after treatment.  No increased pain..      Ms. Ortiz Is progressing well towards her goals.   Pt prognosis is Good.     Pt will continue to benefit from skilled outpatient physical therapy to address the deficits listed in the problem list box on initial evaluation, provide pt/family education and to maximize pt's level of independence in the home and community environment.   Pt's spiritual, cultural and " educational needs considered and pt agreeable to plan of care and goals.    Anticipated barriers to physical therapy: chronicity of pain, many co-morbidities    Goals:  Short Term Goals (4 Weeks):  1. Patient will be compliant with home exercise program to supplement therapy in promoting functional mobility. (Not Met - Progressing)   2. Patient will perform 20 ft carry of 12# with good control to demonstrate improved core strength in order to carry groceries. (Not Met - Progressing)  3. Patient will report no pain during thoracolumbar active range of motion to promote functional mobility. (Not Met - Progressing)  4. Patient will improve hip abduction manual muscle tests  to >/=4/5 to improve strength for functional tasks. (Not Met - Progressing)  5. Pt will demonstrate 10 degree improvement in cervical rotation for driving. (Not Met - Progressing)  Long Term Goals (8 Weeks):   1. Patient will improve FOTO score to </= 38% limited to decrease perceived limitation with maintaining/changing body position. (Not Met - Progressing)  2. Patient will perform hip hinge with good control to demonstrate improved core strength. (Not Met - Progressing)  3. Patient will improve impaired lower extremity manual muscle tests to >/=4+/5 to improve strength for functional tasks. (Not Met - Progressing)  4. Patient will report no shoulder pain during laying down promote sleeping at night. (Not Met - Progressing)    Plan     Continue PT per plan of care, progress as tolerated.  Progressions noted above for next session.  Recommend progress note (objective measures) to determine focus of future therapy sessions.    Stacy Bai, PTA   04/07/2022

## 2022-04-11 ENCOUNTER — OFFICE VISIT (OUTPATIENT)
Dept: ENDOCRINOLOGY | Facility: CLINIC | Age: 72
End: 2022-04-11
Payer: MEDICARE

## 2022-04-11 ENCOUNTER — PATIENT MESSAGE (OUTPATIENT)
Dept: ENDOCRINOLOGY | Facility: CLINIC | Age: 72
End: 2022-04-11

## 2022-04-11 VITALS
WEIGHT: 215.5 LBS | SYSTOLIC BLOOD PRESSURE: 148 MMHG | HEIGHT: 63 IN | DIASTOLIC BLOOD PRESSURE: 70 MMHG | BODY MASS INDEX: 38.18 KG/M2

## 2022-04-11 DIAGNOSIS — E78.5 HYPERLIPIDEMIA, UNSPECIFIED HYPERLIPIDEMIA TYPE: ICD-10-CM

## 2022-04-11 DIAGNOSIS — I10 HYPERTENSION, ESSENTIAL: ICD-10-CM

## 2022-04-11 DIAGNOSIS — M54.9 BACK PAIN, UNSPECIFIED BACK LOCATION, UNSPECIFIED BACK PAIN LATERALITY, UNSPECIFIED CHRONICITY: ICD-10-CM

## 2022-04-11 DIAGNOSIS — E11.65 TYPE 2 DIABETES MELLITUS WITH HYPERGLYCEMIA, WITH LONG-TERM CURRENT USE OF INSULIN: ICD-10-CM

## 2022-04-11 DIAGNOSIS — Z79.4 TYPE 2 DIABETES MELLITUS WITH HYPERGLYCEMIA, WITH LONG-TERM CURRENT USE OF INSULIN: ICD-10-CM

## 2022-04-11 DIAGNOSIS — E11.65 UNCONTROLLED TYPE 2 DIABETES MELLITUS WITH HYPERGLYCEMIA: Primary | ICD-10-CM

## 2022-04-11 PROCEDURE — 1125F AMNT PAIN NOTED PAIN PRSNT: CPT | Mod: CPTII,S$GLB,, | Performed by: GENERAL ACUTE CARE HOSPITAL

## 2022-04-11 PROCEDURE — 1101F PR PT FALLS ASSESS DOC 0-1 FALLS W/OUT INJ PAST YR: ICD-10-PCS | Mod: CPTII,S$GLB,, | Performed by: GENERAL ACUTE CARE HOSPITAL

## 2022-04-11 PROCEDURE — 4010F ACE/ARB THERAPY RXD/TAKEN: CPT | Mod: CPTII,S$GLB,, | Performed by: GENERAL ACUTE CARE HOSPITAL

## 2022-04-11 PROCEDURE — 3008F BODY MASS INDEX DOCD: CPT | Mod: CPTII,S$GLB,, | Performed by: GENERAL ACUTE CARE HOSPITAL

## 2022-04-11 PROCEDURE — 3077F PR MOST RECENT SYSTOLIC BLOOD PRESSURE >= 140 MM HG: ICD-10-PCS | Mod: CPTII,S$GLB,, | Performed by: GENERAL ACUTE CARE HOSPITAL

## 2022-04-11 PROCEDURE — 3051F PR MOST RECENT HEMOGLOBIN A1C LEVEL 7.0 - < 8.0%: ICD-10-PCS | Mod: CPTII,S$GLB,, | Performed by: GENERAL ACUTE CARE HOSPITAL

## 2022-04-11 PROCEDURE — 3051F HG A1C>EQUAL 7.0%<8.0%: CPT | Mod: CPTII,S$GLB,, | Performed by: GENERAL ACUTE CARE HOSPITAL

## 2022-04-11 PROCEDURE — 1159F PR MEDICATION LIST DOCUMENTED IN MEDICAL RECORD: ICD-10-PCS | Mod: CPTII,S$GLB,, | Performed by: GENERAL ACUTE CARE HOSPITAL

## 2022-04-11 PROCEDURE — 1101F PT FALLS ASSESS-DOCD LE1/YR: CPT | Mod: CPTII,S$GLB,, | Performed by: GENERAL ACUTE CARE HOSPITAL

## 2022-04-11 PROCEDURE — 99214 PR OFFICE/OUTPT VISIT, EST, LEVL IV, 30-39 MIN: ICD-10-PCS | Mod: S$GLB,,, | Performed by: GENERAL ACUTE CARE HOSPITAL

## 2022-04-11 PROCEDURE — 3078F DIAST BP <80 MM HG: CPT | Mod: CPTII,S$GLB,, | Performed by: GENERAL ACUTE CARE HOSPITAL

## 2022-04-11 PROCEDURE — 3288F FALL RISK ASSESSMENT DOCD: CPT | Mod: CPTII,S$GLB,, | Performed by: GENERAL ACUTE CARE HOSPITAL

## 2022-04-11 PROCEDURE — 4010F PR ACE/ARB THEARPY RXD/TAKEN: ICD-10-PCS | Mod: CPTII,S$GLB,, | Performed by: GENERAL ACUTE CARE HOSPITAL

## 2022-04-11 PROCEDURE — 3288F PR FALLS RISK ASSESSMENT DOCUMENTED: ICD-10-PCS | Mod: CPTII,S$GLB,, | Performed by: GENERAL ACUTE CARE HOSPITAL

## 2022-04-11 PROCEDURE — 3008F PR BODY MASS INDEX (BMI) DOCUMENTED: ICD-10-PCS | Mod: CPTII,S$GLB,, | Performed by: GENERAL ACUTE CARE HOSPITAL

## 2022-04-11 PROCEDURE — 1159F MED LIST DOCD IN RCRD: CPT | Mod: CPTII,S$GLB,, | Performed by: GENERAL ACUTE CARE HOSPITAL

## 2022-04-11 PROCEDURE — 1160F PR REVIEW ALL MEDS BY PRESCRIBER/CLIN PHARMACIST DOCUMENTED: ICD-10-PCS | Mod: CPTII,S$GLB,, | Performed by: GENERAL ACUTE CARE HOSPITAL

## 2022-04-11 PROCEDURE — 3077F SYST BP >= 140 MM HG: CPT | Mod: CPTII,S$GLB,, | Performed by: GENERAL ACUTE CARE HOSPITAL

## 2022-04-11 PROCEDURE — 99214 OFFICE O/P EST MOD 30 MIN: CPT | Mod: S$GLB,,, | Performed by: GENERAL ACUTE CARE HOSPITAL

## 2022-04-11 PROCEDURE — 99999 PR PBB SHADOW E&M-EST. PATIENT-LVL V: CPT | Mod: PBBFAC,,, | Performed by: GENERAL ACUTE CARE HOSPITAL

## 2022-04-11 PROCEDURE — 3078F PR MOST RECENT DIASTOLIC BLOOD PRESSURE < 80 MM HG: ICD-10-PCS | Mod: CPTII,S$GLB,, | Performed by: GENERAL ACUTE CARE HOSPITAL

## 2022-04-11 PROCEDURE — 99999 PR PBB SHADOW E&M-EST. PATIENT-LVL V: ICD-10-PCS | Mod: PBBFAC,,, | Performed by: GENERAL ACUTE CARE HOSPITAL

## 2022-04-11 PROCEDURE — 1160F RVW MEDS BY RX/DR IN RCRD: CPT | Mod: CPTII,S$GLB,, | Performed by: GENERAL ACUTE CARE HOSPITAL

## 2022-04-11 PROCEDURE — 1125F PR PAIN SEVERITY QUANTIFIED, PAIN PRESENT: ICD-10-PCS | Mod: CPTII,S$GLB,, | Performed by: GENERAL ACUTE CARE HOSPITAL

## 2022-04-11 RX ORDER — DULAGLUTIDE 3 MG/.5ML
3 INJECTION, SOLUTION SUBCUTANEOUS
Qty: 4 PEN | Refills: 11 | Status: CANCELLED | OUTPATIENT
Start: 2022-04-11 | End: 2023-04-11

## 2022-04-11 RX ORDER — METFORMIN HYDROCHLORIDE 500 MG/1
1000 TABLET, EXTENDED RELEASE ORAL 2 TIMES DAILY WITH MEALS
Qty: 360 TABLET | Refills: 3 | Status: CANCELLED | OUTPATIENT
Start: 2022-04-11 | End: 2023-04-11

## 2022-04-11 RX ORDER — EMPAGLIFLOZIN 10 MG/1
10 TABLET, FILM COATED ORAL DAILY
Qty: 30 TABLET | Refills: 4 | Status: CANCELLED | OUTPATIENT
Start: 2022-04-11

## 2022-04-11 RX ORDER — IBUPROFEN 200 MG
16 TABLET ORAL
Qty: 50 TABLET | Refills: 12 | COMMUNITY
Start: 2022-04-11 | End: 2023-08-03

## 2022-04-11 NOTE — PATIENT INSTRUCTIONS
Due to your history of diabetes which is well controlled at this time.  I will recommend the following.     NO need to start new medication or increase your current doses of medication at this time.     We will consider increase Trulicity to 3mg injection once a week which hopes of decreasing your insulin doses in the future once you use your 4 months supply of Trulicity that is being shipped.      We will also consider starting Metformin in the future for the same purpose of decreasing your insulin doses.     We will order a back X ray for your back pain.     150 grams of carbohydrates daily MAX,  (50 grams or less per meal)     Avoid sugary drinks (Sodas, Sweet Tea, Juices with added sugar, Soft drinks)     Check your sugars 3 times daily (Before meals and at bedtime)     Sugar goals before breakfast (70 - 130)  Sugars 2 hours after meals  (less than 180)     Keep sugar log for review at next visit.     Try walking or doing some sort of physical activity at least 30 minutes 3 times a week to increase your sensitivity to insulin, improve sugar levels and hopefully this will help in trying to reduce the doses of your medications in the future.     If having low blood sugar < 70 please correct with 16 grams of carbohydrates or with 4 glucose tablets and re-check your sugars every 15 minutes until symptoms resolve and sugar is above 100.      We will check your A1C and labs prior to next visit.     Ophthalmology appointment once a year.     Diabetes education appointment once a year.     If any questions feel free to contact me.     Have a nice day.     Sincerely,       Ray Mireles MD   Endocrinology   4/11/2022 3:45 PM                  Eating the Right Number of Calories ([id number] Guidelines)    Calories are a measure of the energy you get from food. If you eat more calories than you use, you will gain weight. If you eat fewer calories than you use, you will lose weight. Below are tables that give the number of  calories needed each day. Look for your gender, age, and activity level. If you stick to this number, you should neither gain nor lose weight. Note that this is an estimated number of calories.* Your exact number may differ.    Women  Age in years Low activity level (calories/day) Moderate activity level (calories/day) High activity level (calories/day)   19 to 30 1,800-2,000 2,000-2,200 2,400   31 to 50 1,800 2,000 2,200   51 and older 1,600 1,800 2,000-2,200      Men  Age in years Low activity level  (calories/day) Moderate activity level (calories/day) High activity level (calories/day)   19 to 30 2,400-2,600 2,600-2,800 3,000   31 to 50 2,200-2,400 2,400-2,600 2,800-3,000   51 and older 2,000-2,200 2,200-2,400 2,400-2,800     Activity levels defined  Low. Only light physical activity such as that done during typical daily life.  Moderate. Light physical activity done during typical daily life AND physical activity equal to walking about 1.5 to 3 miles a day at 3 to 4 miles per hour.  High. Light physical activity done during typical daily life AND physical activity equal to walking more than 3 miles a day at 3 to 4 miles per hour.  *From Dietary Guidelines for Americans, 2820-3356, [place] Department of Health and Human Services.    Eat less fat  A gram of fat has almost 2.5 times the calories of a gram of protein or carbohydrates. Try to balance your food choices so that only 20% to 35% of your calories comes from total fat. This means an average of 2½ to 3½ grams of fat for each 100 calories you eat.    Eat more fiber  High-fiber foods are digested more slowly than low-fiber foods, so you feel full longer. Try to get at least 25 grams of fiber each day for a 2000 calorie diet. Foods high in fiber include:  Vegetables and fruits  Whole-grain or bran breads, pastas, and cereals  Legumes (beans) and peas  As you begin to eat more fiber, be sure to drink plenty of water to keep your digestive system working  smoothly.    Tips  Do's and don'ts include:   Dont skip meals. This often leads to overeating later on. Its best to spread your eating throughout the day.  Eat a variety of foods, not just a few favorites.  If you find yourself eating when youre not hungry, ask yourself why. Many of us eat when were bored, stressed, or just to be polite. Listen to your body. If youre not hungry, get busy doing something else instead of eating.  Eat slower, shooting for 20 to 30 minutes for each meal. It takes 20 minutes for your stomach to tell your brain that its full. Slow eaters tend to eat less and are still satisfied, while fast eaters may tend to be overeaters.   Pay attention to what you eat. Dont read or watch TV during your meal.     ---------------------------------------------------------------------------------------------------------------------------------------------------    Losing Weight for Heart Health  Excess weight is a major risk factor for heart disease. Losing weight has many benefits including lowering your blood pressure, improving your cholesterol level, and decreasing your risk for diseases such as diabetes and heart disease. It may help keep your arteries open so that your heart can get the oxygen-rich blood it needs. All in all, losing weight makes you healthier.       Exercise with a friend. When activity is fun, you're more likely to stick with it.     Calories and weight loss  Calories are the fuel your body burns for energy. You get the calories you need from the food you eat. For healthy weight loss, women should eat at least 1,200 calories a day, men at least 1,500.  When you eat more calories than you need, your body stores the extra calories as fat. One pound of fat equals 3,500 calories.  To lose weight, try to reduce your total calorie intake by 500 calories. To do this, eat 250 calories less each day. Add activity to burn the other 250 calories. Walking 2.5 miles burns about 250  calories. Other more intense activities can burn more calories in the time you spend doing them, such as swimming and running. It is important to understand that reducing calorie intake is much more effective at weight loss than is exercise.  Eat a variety of healthy foods to get the nutrients you need.    Tips for losing weight  Drink 8 to 10 glasses of water a day.  Dont skip meals. Instead, eat smaller portions.  Eat your meals earlier in the day.  Cut out sugary drinks such as soda and fruit juices.  Make your later meals lighter than your earlier meals.     What can exercise help?  Blood sugar. Regular exercise improves blood sugar control by helping your body use insulin.  Mental and emotional health. Physical activity relieves stress and helps you sleep better.  Heart health. With regular exercise, you can reduce your risk of heart disease and high blood pressure. You can also improve your cholesterol and triglyceride levels.  Weight. Exercise helps you lose fat, gain muscle, and control your weight.  Health of blood vessels and nerves. Activity helps lower blood sugar. This helps prevent damage to blood vessels and nerves that can cause problems with your brain, eyes, feet, and legs.  Finances. If you manage your blood sugar, you may spend less on medical care.    2 types of exercise  Two types of exercise help your body use blood sugar. Experts advise both types of exercise for people with diabetes:  Aerobic exercise. This is a rhythmic, repeated, continued movement of large muscle groups for at least 10 minutes at a time. You should do this about 30 minutes a day on most days of the week. Examples include walking, bicycling, jogging, swimming, water aerobics, and many sports.  Resistance exercise (strength training). This type of exercise uses muscles to move weight or work against resistance. You can do it with free weights, machines, resistance tubing, or your own body weight. Adults with diabetes  should aim for 2 to 3 sessions of resistance exercise each week. Its best to skip a day in between.    A goal to shoot for  Your main goal is to become more active. Even a little bit helps. Choose an activity that you like. Walking is one great form of exercise that everyone can do. Talk to your healthcare provider about any limits you may have before starting with an exercise program. Then aim for 150 minutes a week of physical activity. Dont let more than 2 days go by without exercise. When you are sitting for long periods of time, get up for short sessions of light activity every 30 minutes.    Getting activity into your day  Being more active doesnt have to be hard work. Try these to get more activity into your day:  Take the stairs instead of the elevator  Garden, do housework, and yard work  Choose a parking space farther from the store  Walk to talk to a co-worker instead of calling  Take a 10-minute walk around the block at lunch  Walk to a bus stop a little farther from your home or office  Walk the dog after dinner     ---------------------------------------------------------------------------------------------------------------------------------------------------    Reading Food Labels  Look for the Nutrition Facts label on packaged foods. Reading labels is a big step toward eating healthier. The tips below help you know what to look for.    Serving size. Read this closely because the package, jar, or can may contain more than 1 serving. This is how to measure 1 serving of the food in the package. If you eat more than 1 serving, you get more of everything on the label -- including fat, cholesterol, and calories.  Total fat. This tells you how many grams (g) of fat are in 1 serving. Fat is high in calories. A healthy goal is to have less than 25% of your daily calories come from fat.  Saturated fat. This tells you how much saturated fat is in 1 serving. Saturated fat raises your cholesterol the most.  Look for foods that have little or no saturated fat.  Trans fat. This tells you how much trans fat is in 1 serving. Even a small amount of trans fat can harm your health. Choose foods that have no trans fat.  Cholesterol. This tells you how much cholesterol is in 1 serving. For many years, it had been recommended to eat less than 300 milligrams (mg) of cholesterol a day. New guidelines have removed this limitation as cholesterol has been recently shown to not raise blood cholesterol levels as significantly as previously thought. However, many foods high in cholesterol are also high in saturated fat. It is recommended to limit saturated fat in your diet.  Calories from fat. This number tells you how many calories from fat are in 1 serving (there are 9 calories per gram of fat). Look for foods with few calories from fat.  % Daily value. The higher the number, the more 1 serving has of that nutrient. Look for foods that have low numbers for total fat, saturated fat, cholesterol, and sodium.  Sodium. This tells you how much sodium (salt) is in 1 serving. Choose foods with low numbers for sodium.  Dietary fiber. This number tells you how much fiber is in 1 serving. Foods that are high in fiber can help you feel full. They can also be good for your heart and digestion. The recommended daily amount of fiber is 25 grams for women and 38 grams for men. After age 50, your daily fiber needs drop to 21 grams for women and 30 grams for men.       ---------------------------------------------------------------------------------------------------------------------------------------------------    Understanding Carbohydrates, Fats, and Protein  Food is a source of fuel and nourishment for your body. Its also a source of pleasure. Having diabetes doesnt mean you have to eat special foods or give up desserts. Instead, your dietitian can show you how to plan meals to suit your body. To start, learn how different foods affect blood  sugar.    Carbohydrates  Carbohydrates are the main source of fuel for the body. Carbohydrates raise blood sugar. Many people think carbohydrates are only found in pasta or bread. But carbohydrates are actually in many kinds of foods:  Sugars occur naturally in foods such as fruit, milk, honey, and molasses. Sugars can also be added to many foods, from cereals and yogurt to candy and desserts. Sugars raise blood sugar.  Starches are found in bread, cereals, pasta, and dried beans. Theyre also found in corn, peas, potatoes, yam, acorn squash, and butternut squash. Starches also raise blood sugar.   Fiber is found in foods such as vegetables, fruits, beans, and whole grains. Unlike other carbs, fiber isnt digested or absorbed. So it doesnt raise blood sugar. In fact, fiber can help keep blood sugar from rising too fast. It also helps keep blood cholesterol at a healthy level.  Did you know?  Even though carbohydrates raise blood sugar, its best to have some in every meal. They are an important part of a healthy diet.     Fat  Fat is an energy source that can be stored until needed. Fat does not raise blood sugar. However, it can raise blood cholesterol, increasing the risk of heart disease. Fat is also high in calories, which can cause weight gain. Not all types of fat are the same.    More Healthy:  Monounsaturated fats are mostly found in vegetable oils, such as olive, canola, and peanut oils. They are also found in avocados and some nuts. Monounsaturated fats are healthy for your heart. Thats because they lower LDL (unhealthy) cholesterol.  Polyunsaturated fats are mostly found in vegetable oils, such as corn, safflower, and soybean oils. They are also found in some seeds, nuts, and fish. Polyunsaturated fats lower LDL (unhealthy) cholesterol. So, choosing them instead of saturated fats is healthy for your heart. Certain unsaturated fats can help lower triglycerides.     Less Healthy:  Saturated fats are found  in animal products, such as meat, poultry, whole milk, lard, and butter. Saturated fats raise LDL cholesterol and are not healthy for your heart.  Hydrogenated oils and trans fats are formed when vegetable oils are processed into solid fats. They are found in many processed foods. Hydrogenated oils and trans fats raise LDL cholesterol and lower HDL (healthy) cholesterol. They are not healthy for your heart.    Protein  Protein helps the body build and repair muscle and other tissue. Protein has little or no effect on blood sugar. However, many foods that contain protein also contain saturated fat. By choosing low-fat protein sources, you can get the benefits of protein without the extra fat:  Plant protein is found in dry beans and peas, nuts, and soy products, such as tofu and soymilk. These sources tend to be cholesterol-free and low in saturated fat.  Animal protein is found in fish, poultry, meat, cheese, milk, and eggs. These contain cholesterol and can be high in saturated fat. Aim for lean, lower-fat choices.    ---------------------------------------------------------------------------------------------------------------------------------------------------    Understanding Carbohydrates    A car needs the right type of fuel to run. And you need the right kind of food to function. To keep your energy level up, your body needs food that has carbohydrates. But carbohydrates raise blood sugar levels higher and faster than other kinds of food. Your dietitian will work with you to figure out the amount of carbohydrates you need.    Starches  Starches are found in grains, some vegetables, and beans. Grain products include bread, pasta, cereal, and tortillas. Starchy vegetables include potatoes, peas, corn, lima beans, yams, and squash. Kidney beans, dwyer beans, black beans, garbanzo beans, and lentils also contain starches.    Sugars  Sugars are found naturally in many foods. Or sugar can be added. Foods that  contain natural sugar include fruits and fruit juices, dairy products, honey, and molasses. Added sugars are found in most desserts, processed foods, candy, regular soda, and fruit drinks. These are very helpful for treating low blood sugar, or hypoglycemia. They provide sugar quickly. Try to keep at least 15 to 20 grams of these simple sugars with you at all times. Eat this if you begin to have low blood sugar symptoms.    Fiber  Fiber comes from plant foods. Most fiber isnt digested by the body. Instead of raising blood sugar levels like other carbohydrates, it actually stops blood sugar from rising too fast. Fiber is found in fruits, vegetables, whole grains, beans, peas, and many nuts.    Carb counting  Keep track of the amount of carbohydrates you eat. This can help you keep the right balance of physical activity and medicine. The amount of carbohydrates needed will vary for each person. It depends on many things such as your health, the medicines you take, and how active you are. Your healthcare team will help you figure out the right amount of carbohydrates for you. You may start with around 45 to 60 grams of carbohydrate per meal, depending on your situation. Carb counting is a system that helps you keep track of the carbohydrates you eat at each meal.  Carbohydrates come from a variety of foods. These include grains, starchy vegetables, fruit, milk, beans, and snack foods. You can either count carbohydrate grams or carbohydrate servings. When you count carbohydrate servings, 1 carbohydrate serving = 15 grams of carbohydrates.  Here are some examples of foods containing about 15 grams of carbohydrates (1 serving of carbohydrates):  1/2 cup of canned or frozen fruit  A small piece of fresh fruit (4 ounces)  1 slice of bread  1/2 cup of oatmeal  1/3 cup of rice  4 to 6 crackers  1/2 English muffin  1/2 cup of black beans  1/4 of a large baked potato (3 ounces)  2/3 cup of plain fat-free yogurt  1 cup of  soup  1/2 cup of casserole  6 chicken nuggets  2-inch-square brownie or cake without frosting  2 small cookies  1/2 cup of ice cream or sherbet    Carb counting is easier when food labels are available. Look at the label to see how many grams of total carbohydrates the food contains. Then you can figure out how much you should eat.  Two very important lines to look at on the label are the serving size and the total carbohydrate amount. Here are some tips for using food labels to count your carbohydrate intake:  Check the serving size. The information on the label is based on that serving size. If you eat more than the listed serving size, you may have to double or triple the other information on the label.   Check the total grams of carbohydrates. Total carbohydrate from the label includes sugar, starch, and fiber. Be sure to use the total carbohydrate number and not sugar alone.  Know how many grams of carbohydrates you can have.  Be familiar with the matching portion sizes.  Compare labels. Compare the labels of different products, looking at serving sizes and total carbohydrates to find the products that work best for you.   Don't forget protein and fat. With all the focus on carb counting, it might be easy to forget protein and fat in your meals. Don't forget to include sources of protein and healthy fat to balance your meals.  Its also important to be consistent with the amount and time you eat when taking a fixed dose of diabetes medicine. Work with your healthcare provider or dietitian if you need additional help. He or she can help you keep track of your carbohydrate intake. He or she can also help you figure out how many grams of carbohydrates you should have.      ---------------------------------------------------------------------------------------------------------------------------------------------------    Diabetes: Learning About Serving and Portion Sizes     A good rule of thumb: Devote half your  plate to vegetables and green salad. Split the other half between protein and starchy carbohydrates. Fruit makes a good dessert.     Servings and portions. Whats the difference? These terms can be very confusing. But learning to measure serving sizes can help you figure out how many carbohydrates (carbs) and other foods you eat each day. They are also powerful tools for managing your weight.    Servings and portions  Many different words are used to describe amounts of food. If your health care provider uses a term youre not sure of, dont be afraid to ask. It helps to know the difference between servings and portions:  A serving size is a fixed size. Food producers use this term to describe their products. For example, the label on a cereal box could say that 1 cup of dry cereal = 1 serving.  A portion (also called a helping) is how much you eat or how much you put on your plate at a meal. For example, you might eat 2 cups of cereal at breakfast.    Using serving information  The portion you choose to eat (such as 2 cups of cereal) may be more than one serving as listed on the food label (such as 1 cup of cereal). Thats why it helps to measure or weigh the food you eat. Because the food label values are based on servings, youll need to know how many servings you eat at one sitting.     Ounces: 2 to 3 ounces is about the size of your palm.       1 Cup: 1 cup (or a medium-sized piece) is about the size of your fist.       1/2 Cup: 1/2 cup is about the size of your cupped hand.      One tablespoon is about the size of your thumb.  One teaspoon is about the size of the tip of your thumb.    Keeping track of serving sizes  When youre planning for a snack or a meal, keep servings in mind. If you dont have measuring cups or a scale handy, there are ways to eyeball serving sizes, such as comparing your food to the size of your hand (see pictures above).    Managing portion sizes  If your weight is a concern,  "reducing your portions can help. You can eat more than one serving of a food at once. But to keep from eating too much at one meal, learn how to manage your portions. A portion is the amount of each type of food on your plate. See the plate diagram for an example of balanced portions.    ---------------------------------------------------------------------------------------------------------------------------------------------------    Diabetes: Shopping for and Preparing Meals    Having diabetes doesnt mean you have to shop in a special aisle or look for special foods. But you will need to make choices. By comparing items and reading food labels, you can find the healthiest foods for you and your family.  Comparing items  When you shop, compare items to find the best ones for your needs. Keep these facts in mind:  No sugar added does not mean a product is sugar-free.  "Sugar-free" means less than 1/2 gram (g) of sugar per serving.  Fat free means less than 1/2 g of fat per serving. This does not necessarily mean the product is low in calories.  Low fat means 3 g fat or less per serving. Reduced fat or less fat means 25% less fat than the regular version. Some of this fat may be saturated or trans fat. And calories per serving may be similar to the regular version.    Making small changes  Dont try to change all of your eating habits at once. Here are some ideas to start with:  Try fat-free or low-fat cheese, milk, and yogurt. Also try leaner cuts of meat. This will help you cut down on saturated fat.  Try whole-grain breads, brown rice, and whole-wheat pasta.  Load up on fresh or frozen vegetables. If you buy canned, choose low-sodium vegetables.  Avoid processed foods as much as possible. They tend to be low in fiber and high in trans fats and sodium.  Try tofu, soymilk, or meat substitutes.?They can help you cut cholesterol and saturated fat out of your diet.    Learning to read food labels  To find " healthy foods that help you control blood sugar, learn how to read food labels. Look for the Nutrition Facts label on packaged foods. It will tell you how much carbohydrate, sugar, fat, and fiber is in each serving. Then, you can decide whether or not the food fits into your meal plan.    Using the food label  So, once you have the food label, what do you do with it? The food label helps in many ways. Use it to:  Compare items and decide which is the best for your health needs.  Track the number of carbohydrates in your portions.  Figure out how many servings of a food you can have and still stay within the number of carbohydrates for that meal.    Planning meals  For good blood sugar control, plan what and when youll eat. Start by creating a meal plan that includes all the food groups. Then, time your meals to help keep your blood sugar level steady. You may need to adjust your plan for special situations.    Eat from all the food groups  The basis of a healthy meal plan is variety (eating many different types of foods). Look for lean meats, fresh fruits and vegetables, whole grains, and low-fat or non-fat dairy products. Eating a wide variety of foods provides the nutrients your body needs. It can also keep you from getting bored with your meal plan.    Reduce liquid sugars  Extra calories from sodas, sports drinks, and fruit drinks make it hard to keep blood sugar in range. Cut as many liquid sugars from your meal plan as you can. This includes most fruit juices, which are often high in natural or added sugar. Instead, drink plenty of water and other sugar-free beverages.    Eat less fat  If you need to lose some weight, try to reduce the amount of fat in your diet. This can also help lower your cholesterol level to keep blood vessels healthier. Cut fat by using only small amounts of liquid oil for cooking. Read food labels carefully to avoid foods with unhealthy trans fats.    Timing your meals  When it comes  to blood sugar control, when you eat is as important as what you eat. You may need to eat several small meals spaced evenly throughout the day to stay in your target range. So dont skip breakfast or wait until late in the day to get most of your calories. Doing so can cause your blood sugar to rise too high or fall too low.    Cooking wisely  Broil, steam, bake, or grill meats and vegetables, instead of frying.  Instead of cream-based sauces or sugary glazes, flavor foods with vegetable purée, lemon or lime juice, or herb seasonings.  Remove skin from chicken and turkey before serving.  Look in cookbooks for easy, low-fat, low-sugar recipes. When making your usual recipes, cut sugar by 1/2 and fat by 1/3.      ---------------------------------------------------------------------------------------------------------------------------------------------------    Healthy Meals for Diabetes    Ask your healthcare team to help you make a meal plan that fits your needs. Your meal plan tells you when to eat your meals and snacks, what kinds of foods to eat, and how much of each food to eat. You dont have to give up all the foods you like. But you do need to follow some guidelines.  Choose healthy carbohydrates  Starches, sugars, and fiber are all types of carbohydrates. Fiber can help lower your cholesterol and triglycerides. Fiber is also healthy for your heart. You should have 20 to 35 grams of total fiber each day. Fiber-rich foods include:  Whole-grain breads and cereals  Bulgur wheat  Brown rice     Whole-wheat pasta  Fruits and vegetables  Dry beans, and peas   Keep track of the amount of carbohydrates you eat. This can help you keep the right balance of physical activity and medicine. The amount of carbohydrates needed will vary for each person. It depends on many things such as your health, the medicines you take, and how active you are. Your healthcare team will help you figure out the right amount of  carbohydrates for you. You may start with around 45 to 60 grams of carbohydrates per meal, depending on your situation.   Here are some examples of foods containing about 15 grams of carbohydrates (1 serving of carbohydrates):  1/2 cup of canned or frozen fruit  A small piece of fresh fruit (4 ounces)  1 slice of bread  1/2 cup of oatmeal  1/3 cup of rice  4 to 6 crackers  1/2 English muffin  1/2 cup of black beans  1/4 of a large baked potato (3 ounces)  2/3 cup of plain fat-free yogurt  1 cup of soup  1/2 cup of casserole  6 chicken nuggets  2-inch-square brownie or cake without frosting  2 small cookies  1/2 cup of ice cream or sherbet    Choose healthy protein foods  Eating protein that is low in fat can help you control your weight. It also helps keep your heart healthy. Low-fat protein foods include:  Fish  Plant proteins, such as dry beans and peas, nuts, and soy products like tofu and soymilk  Lean meat with all visible fat removed  Poultry with the skin removed  Low-fat or nonfat milk, cheese, and yogurt    Limit unhealthy fats and sugar  Saturated and trans fats are unhealthy for your heart. They raise LDL (bad) cholesterol. Fat is also high in calories, so it can make you gain weight. To cut down on unhealthy fats and sugar, limit these foods:  Butter or margarine  Palm and palm kernel oils and coconut oil  Cream  Cheese  Mandujano  Lunch meats     Ice cream  Sweet bakery goods such as pies, muffins, and donuts  Jams and jellies  Candy bars  Regular sodas     How much to eat  The amount of food you eat affects your blood sugar. It also affects your weight. Your healthcare team will tell you how much of each type of food you should eat.  Use measuring cups and spoons and a food scale to measure serving sizes.  Learn what a correct serving size looks like on your plate. This will help when you are away from home and cant measure your servings.  Eat only the number of servings given on your meal plan for each  food. Dont take seconds.  Learn to read food labels. Be sure to look at serving size, total carbohydrates, fiber, calories, sugar, and saturated and trans fats. Look for healthier alternatives to foods that have added sugar.  Plan ahead for parties so you can still have a good time without going overboard with unhealthy food choices. Set a good example yourself by bringing a healthy dish to pot reji.     Choose healthy snacks  When it comes to snacks, we usually think about foods with added sugar and fats. But there are many other options for healthier snack choices. Here are a few snack ideas to choose from:  Snacks with less than 5 grams of carbohydrates  1 piece of string cheese  3 celery sticks plus 1 tablespoon of peanut butter  5 cherry tomatoes plus 1 tablespoon of ranch dressing  1 hard-boiled egg  1/4 cup of fresh blueberries   5 baby carrots  1 cup of light popcorn  1/2 cup of sugar-free gelatin  15 almonds  Snacks with about 10 to 20 grams of carbohydrates  1/3 cup of hummus plus 1 cup of fresh cut nonstarchy vegetables (carrots, green peppers, broccoli, celery, or a combination)  1/2 cup of fresh or canned fruit plus 1/4 cup of cottage cheese  1/2 cup of tuna salad with 4 crackers  2 rice cakes and a tablespoon of peanut butter  1 small apple or orange  3 cups light popcorn  1/2 of a turkey sandwich (1 slice of whole-wheat bread, 2 ounces of turkey, and mustard)  Portion sizes are important to controlling your blood sugar and staying at a healthy weight. Stock up on healthy snack items so you always have them on hand.    When to eat  Your meal plan will likely include breakfast, lunch, dinner, and some snacks.  Try to eat your meals and snacks at about the same times each day.  Eat all your meals and snacks. Skipping a meal or snack can make your blood sugar drop too low. It can also cause you to eat too much at the next meal or snack. Then your blood sugar could get too  high.    ---------------------------------------------------------------------------------------------------------------------------------------------------    Eating Out When You Have Diabetes  Eating right is an important part of keeping your blood sugar in your target range. You just need to make healthy choices.    Be creative when eating out. Many places dont make you stick strictly to the menu. Instead of ordering a large entree, choose an appetizer or two and a bowl of soup. A mix of side orders can also make a good meal. Read the descriptions of other entrees and specials. If another entree comes with baby carrots, ask for a side order of them even if they dont come with your entree. Ask how food is prepared or if it can be made differently.  Tips for making the most of your meal out  Ask to have high-fat, high-calorie extras, such as French fries and potato chips, left off your plate so you wont be tempted.   Ask what substitutions are available. Instead of French fries, you may be able to have a side of salad with low-calorie dressing.  Order low-fat milk instead of cream for your coffee.  Ask for vegetables and main courses to be served without sauces, butter, margarine, or oil.  Be aware of portion size. You dont have to clean your plate. Take half your meal home in a doggie bag to eat the next day.  Look for heart-healthy or low-fat entrees that include whole grains, vegetables, or fruits.  Choose foods that are grilled, broiled, or steamed.  Avoid dishes that are described on the menu as fried, breaded, smothered, rich, or creamy.    Tips for restaurant meals  When you eat away from home try these tips:  Try to schedule your dining-out meal at your normal meal time. Make a reservation if possible, so you don't have to wait to eat. If you can't make a reservation, try to arrive at the restaurant at a less-busy time to cut down your wait time. Eat a small fruit or starch snack at your regular  mealtime if your restaurant meal is going to be later than usual.   Call ahead to see if the restaurant can meet your dietary needs if you've never been there before. Or you can go online to see the menu ahead of time.  Carry some crackers with you in case the restaurant needs you to wait until you can be served.  Ask how foods are prepared before you order.  Instead of fried, sautéed, or breaded foods, choose ones that are broiled, steamed, grilled, or baked.  Ask for sauces, gravies, and dressings on the side.  Only eat an amount that fits your meal plan. Remember: You can take home the leftovers.  Save dessert for special occasions. Then choose a small dessert or share one with a friend or family member.    Make healthy choices  Fast food  Garden salad with light dressing on the side  Baked potato with vegetables or herbs  Broiled, roasted, or grilled chicken sandwich  Sliced turkey or lean roast beef sandwich    Mexican  Chicken enchilada, without cheese or sour cream   Small burrito with whole beans and chicken  Whole beans (not refried) and rice  Chicken or fish fajitas    Steakhouse  Grilled or broiled lean cuts of beef  Baked potato with vegetables or herbs  Broiled or baked chicken. Dont eat the skin.  Steamed vegetables    Asian  Steamed dumplings or potstickers  Broiled, boiled, or steamed meats or fish  Sushi or sashimi  Steamed rice or boiled noodles. One serving is equal to 1/3 cup.      © 6581-4454 [employer]. [address] [place] [address], [place] [address]. All rights reserved. This information is not intended as a substitute for professional medical care. Always follow your healthcare professional's instructions.

## 2022-04-11 NOTE — PROGRESS NOTES
Subjective:      Patient ID: Chelly Ortiz is a 71 y.o. female.    Chief Complaint:  DM2; Initial visit     History of Present Illness  70 YO Female w/ dx of DM2, HTN, HLD and Obesity that presents to the endocrine clinic to establish care.  Pt today reports feels well but express some frustration due to A1C above 7. She denies hyper or hypoglycemic symptoms, chest pain or SOB.     With regards to Diabetes Mellitus:   -Type 2    -Duration:  Dx 10 yrs Ago on blood work   -FH of DM? 2 sisters and 1 brother with DM2   -Microvascular complications: (Nephropath, Neuropathy)  -Macrovascular complications:  DENIES   -DKA?  DENIES   -HHS?  DENIES   -DM Medications:  Trulicity 1.5mg weekly,  Levemir 45 qHS,   Novolog 12 units TID before meals + sliding scale.    -Previously tried medications:  NONE   -Compliance w/ Meds:  Yes  -Hyperglycemia symptoms: DENIES   -Hypoglycemia symptoms:  DENIES   -Know how to correct hypoglycemias: No, will do teaching today.   -Glucose monitoring:   Checks 4 times daily and glucose mainly at goal between 80 - 160.  NO glucose above 180 seen on glucometer review.     -Diet:  B (cereal w/ almond milk, turkey sausage)  L (Rice beans, meat loaf, spagetti)  D ( baked chicken, rice, potatoes)   -Activity:  Sedentary   -Pancreatitis?  DENIES   -CHF?  DENIES   -UTIs?  DENIES   -Thyroid CA?  DENIES   -ETOH Abuse?  DENIES     Diabetes Management Status    Statin: Taking  ACE/ARB: Taking    Screening or Prevention Patient's value Goal Complete/Controlled?   HgA1C Testing and Control   Lab Results   Component Value Date    HGBA1C 7.5 (H) 12/27/2021      Annually/Less than 8% Yes   Lipid profile : 12/27/2021 Annually Yes   LDL control Lab Results   Component Value Date    LDLCALC 145.2 12/27/2021    Annually/Less than 100 mg/dl  No   Nephropathy screening Lab Results   Component Value Date    LABMICR 6.0 12/27/2021     Lab Results   Component Value Date    PROTEINUA Negative 04/13/2021    Annually Yes    Blood pressure BP Readings from Last 1 Encounters:   03/17/22 126/66    Less than 140/90 Yes   Dilated retinal exam : 11/11/2021 Annually Yes   Foot exam   : 02/22/2022 Annually Yes        ROS:   As above    Objective:     LMP  (LMP Unknown)   BP Readings from Last 3 Encounters:   03/17/22 126/66   02/22/22 136/74   02/15/22 120/60     Wt Readings from Last 1 Encounters:   03/17/22 1521 93.7 kg (206 lb 9.1 oz)     There is no height or weight on file to calculate BMI.      Physical Exam  Vitals reviewed.   Constitutional:       General: She is not in acute distress.     Appearance: Normal appearance. She is well-developed. She is not ill-appearing.   HENT:      Nose: Nose normal. No rhinorrhea.      Mouth/Throat:      Mouth: Mucous membranes are moist.   Eyes:      Extraocular Movements: Extraocular movements intact.      Pupils: Pupils are equal, round, and reactive to light.      Comments: No proptosis, No lid lag, No conjunctival erythema    Neck:      Thyroid: No thyromegaly.      Trachea: No tracheal deviation.      Comments: No thyromegaly or Thyroid nodules palpated on exam   Cardiovascular:      Rate and Rhythm: Normal rate and regular rhythm.      Pulses: Normal pulses.   Pulmonary:      Effort: Pulmonary effort is normal.      Breath sounds: Normal breath sounds.   Abdominal:      Palpations: Abdomen is soft. There is no mass.      Tenderness: There is no abdominal tenderness.      Hernia: No hernia is present.   Musculoskeletal:         General: No swelling.      Cervical back: Neck supple. No tenderness.      Right lower leg: No edema.      Left lower leg: No edema.   Skin:     General: Skin is warm.      Findings: No rash.   Neurological:      General: No focal deficit present.      Mental Status: She is alert and oriented to person, place, and time.   Psychiatric:         Mood and Affect: Mood normal.         Judgment: Judgment normal.                Lab Review:   Lab Results   Component Value Date     HGBA1C 7.5 (H) 12/27/2021     Lab Results   Component Value Date    CHOL 248 (H) 12/27/2021    HDL 61 12/27/2021    LDLCALC 145.2 12/27/2021    TRIG 209 (H) 12/27/2021    CHOLHDL 24.6 12/27/2021     Lab Results   Component Value Date     02/15/2022    K 3.7 02/15/2022     02/15/2022    CO2 28 02/15/2022     (H) 02/15/2022    BUN 11 02/15/2022    CREATININE 0.9 02/15/2022    CALCIUM 9.6 02/15/2022    PROT 7.7 02/15/2022    ALBUMIN 3.9 02/15/2022    BILITOT 0.5 02/15/2022    ALKPHOS 108 02/15/2022    AST 16 02/15/2022    ALT 13 02/15/2022    ANIONGAP 8 02/15/2022    ESTGFRAFRICA >60.0 02/15/2022    EGFRNONAA >60.0 02/15/2022    TSH 2.519 04/15/2021     Vit D, 25-Hydroxy   Date Value Ref Range Status   06/25/2013 16 (L) 30 - 96 ng/mL Final     Comment:     Vitamin D deficiency........<10 ng/mL  Vitamin D insufficiency.....10-29 ng/mL  Vitamin D sufficiency.......> or equal to 30 ng/mL  Vitamin D toxicity..........>100 ng/mL       Assessment and Plan     Uncontrolled type 2 diabetes mellitus with hyperglycemia  Lab Results   Component Value Date    HGBA1C 7.5 (H) 12/27/2021      -FS log  AT GOAL   -Diet, exercise, lifestyle modifications and A1c Goals discussed   -Hypoglycemia symptoms and plan of action discussed   -Low carb diet   -Seen DM Education?  NO, Will give referral today     PLAN:     Due to A1C at goal for Age and patient not wanting to adjust medications at this time I will recommend the following.     No medication changes today     Discussed about increasing Trulicity to 3mg weekly and starting low dose Metformin w/ hopes of eventually decrease insulin doses but patient was reluctant to start metformin and informed she will be getting a 4 month supply of Trulicity 1.5mg through medication assistance program and does not want to increase dose now.     Discussed about initiating DEXCOM CMG but patient does not interested at this time but open for discussion at next clinic visit     A1C  prior to next visit in 3 months       Hyperlipidemia, unspecified hyperlipidemia type  LDL above goal on Pravastatin 10mg daily  Diet, exercise and lifestyle modifications discussed   Low Fat diet   Lipid panel prior to next visit and if still above goal will consider increasing statin dose     Hypertension, essential  BP controlled on current BP meds   On ARB for renal protection   Low Na diet

## 2022-04-13 ENCOUNTER — HOSPITAL ENCOUNTER (OUTPATIENT)
Dept: RADIOLOGY | Facility: HOSPITAL | Age: 72
Discharge: HOME | End: 2022-04-13
Attending: GENERAL ACUTE CARE HOSPITAL
Payer: MEDICARE

## 2022-04-13 DIAGNOSIS — M54.9 BACK PAIN, UNSPECIFIED BACK LOCATION, UNSPECIFIED BACK PAIN LATERALITY, UNSPECIFIED CHRONICITY: ICD-10-CM

## 2022-04-13 PROCEDURE — 72080 X-RAY EXAM THORACOLMB 2/> VW: CPT | Mod: TC

## 2022-04-13 PROCEDURE — 72080 XR THORACOLUMBAR SPINE AP LATERAL: ICD-10-PCS | Mod: 26,,, | Performed by: RADIOLOGY

## 2022-04-13 PROCEDURE — 72080 X-RAY EXAM THORACOLMB 2/> VW: CPT | Mod: 26,,, | Performed by: RADIOLOGY

## 2022-04-14 ENCOUNTER — TELEPHONE (OUTPATIENT)
Dept: REHABILITATION | Facility: HOSPITAL | Age: 72
End: 2022-04-14
Payer: MEDICARE

## 2022-04-18 ENCOUNTER — PATIENT MESSAGE (OUTPATIENT)
Dept: ENDOCRINOLOGY | Facility: CLINIC | Age: 72
End: 2022-04-18
Payer: MEDICARE

## 2022-04-19 ENCOUNTER — TELEPHONE (OUTPATIENT)
Dept: REHABILITATION | Facility: HOSPITAL | Age: 72
End: 2022-04-19
Payer: MEDICARE

## 2022-04-19 ENCOUNTER — OFFICE VISIT (OUTPATIENT)
Dept: GASTROENTEROLOGY | Facility: CLINIC | Age: 72
End: 2022-04-19
Payer: MEDICARE

## 2022-04-19 VITALS — HEIGHT: 63 IN | WEIGHT: 197.75 LBS | BODY MASS INDEX: 35.04 KG/M2

## 2022-04-19 DIAGNOSIS — K63.5 POLYP OF COLON, UNSPECIFIED PART OF COLON, UNSPECIFIED TYPE: Primary | ICD-10-CM

## 2022-04-19 DIAGNOSIS — K57.92 DIVERTICULITIS: ICD-10-CM

## 2022-04-19 PROCEDURE — 99214 OFFICE O/P EST MOD 30 MIN: CPT | Mod: S$GLB,,, | Performed by: INTERNAL MEDICINE

## 2022-04-19 PROCEDURE — 4010F PR ACE/ARB THEARPY RXD/TAKEN: ICD-10-PCS | Mod: CPTII,S$GLB,, | Performed by: INTERNAL MEDICINE

## 2022-04-19 PROCEDURE — 1126F PR PAIN SEVERITY QUANTIFIED, NO PAIN PRESENT: ICD-10-PCS | Mod: CPTII,S$GLB,, | Performed by: INTERNAL MEDICINE

## 2022-04-19 PROCEDURE — 1101F PT FALLS ASSESS-DOCD LE1/YR: CPT | Mod: CPTII,S$GLB,, | Performed by: INTERNAL MEDICINE

## 2022-04-19 PROCEDURE — 3008F BODY MASS INDEX DOCD: CPT | Mod: CPTII,S$GLB,, | Performed by: INTERNAL MEDICINE

## 2022-04-19 PROCEDURE — 99214 PR OFFICE/OUTPT VISIT, EST, LEVL IV, 30-39 MIN: ICD-10-PCS | Mod: S$GLB,,, | Performed by: INTERNAL MEDICINE

## 2022-04-19 PROCEDURE — 3288F FALL RISK ASSESSMENT DOCD: CPT | Mod: CPTII,S$GLB,, | Performed by: INTERNAL MEDICINE

## 2022-04-19 PROCEDURE — 1159F PR MEDICATION LIST DOCUMENTED IN MEDICAL RECORD: ICD-10-PCS | Mod: CPTII,S$GLB,, | Performed by: INTERNAL MEDICINE

## 2022-04-19 PROCEDURE — 99999 PR PBB SHADOW E&M-EST. PATIENT-LVL V: ICD-10-PCS | Mod: PBBFAC,,, | Performed by: INTERNAL MEDICINE

## 2022-04-19 PROCEDURE — 4010F ACE/ARB THERAPY RXD/TAKEN: CPT | Mod: CPTII,S$GLB,, | Performed by: INTERNAL MEDICINE

## 2022-04-19 PROCEDURE — 1101F PR PT FALLS ASSESS DOC 0-1 FALLS W/OUT INJ PAST YR: ICD-10-PCS | Mod: CPTII,S$GLB,, | Performed by: INTERNAL MEDICINE

## 2022-04-19 PROCEDURE — 1159F MED LIST DOCD IN RCRD: CPT | Mod: CPTII,S$GLB,, | Performed by: INTERNAL MEDICINE

## 2022-04-19 PROCEDURE — 3008F PR BODY MASS INDEX (BMI) DOCUMENTED: ICD-10-PCS | Mod: CPTII,S$GLB,, | Performed by: INTERNAL MEDICINE

## 2022-04-19 PROCEDURE — 1126F AMNT PAIN NOTED NONE PRSNT: CPT | Mod: CPTII,S$GLB,, | Performed by: INTERNAL MEDICINE

## 2022-04-19 PROCEDURE — 99999 PR PBB SHADOW E&M-EST. PATIENT-LVL V: CPT | Mod: PBBFAC,,, | Performed by: INTERNAL MEDICINE

## 2022-04-19 PROCEDURE — 3288F PR FALLS RISK ASSESSMENT DOCUMENTED: ICD-10-PCS | Mod: CPTII,S$GLB,, | Performed by: INTERNAL MEDICINE

## 2022-04-19 NOTE — PATIENT INSTRUCTIONS
2 day Long Prep GOLYTELY/ COLYTE/ NULYTELY Instructions     [facility] [address]  Clinic Office [phone]  Endoscopy Lab [phone]     You are scheduled for a Colonoscopy with   Dr. Pierce Rivera   on  06/7/2022  at [facility] in [place].    Check in at the Hospital -1st floor, Information desk.         An adult friend/family member must come with you to drive you home.  You cannot drive, take a taxi, Uber/Lyft or bus to leave the Endoscopy Center alone.  If you do not have someone to drive you home, your test will be cancelled.      Please follow the directions of your doctor if you take any pills that thin your blood. If you take these meds: Aggrenox, Brilinta, Effient, Eliquis, Lovenox, Plavix, Pletal, Pradaxa, Ticilid, Xarelto or Coumadin, let the doctor's office know.     DON'T: On the morning of the test do not take insulin or pills for diabetes.      DO: On the morning of the test, do take any pills for blood pressure, heart, anti-rejection and or seizures with a small sip of water. Bring any inhalers with you.     To have a good prep, you must follow these instructions - please do not use the directions from the pharmacy.     You also need to buy a bottle of Magnesium Citrate from the drugstore. You do not need a prescription from the doctor. The doctor will send a prescription for the Golytely.        2 Days Before the procedure;    Eat breakfast and lunch normally. After lunch, start only drinking CLEAR LIQUIDs.  (After you finish your lunch, you can only have clear liquids until the test is over)    You CAN have:  Water, Coffee or decaf coffee (no milk or cream)  Tea  Soft drinks - regular and sugar free  Jello (green or yellow)  Apple Juice, white grape juice, white cranberry juice  Gatorade, Power Aid, Crystal Light, Tee Aid  Lemonade and Limeade  Bouillon, clear soup  Snowball, popsicles  YOU CAN'T DRINK ANYTHING RED, PURPLE, ORANGE or BLUE   YOU CAN'T  DRINK ALCOHOL  ONLY DRINK WHAT IS ON THIS LIST     At 4pm, drink a BOTTLE OF MAGNESIUM CITRATE.     Keep drinking clear liquids.         1 Day Before the procedure;    Drink CLEAR LIQUIDS the whole day before your test.  You CAN NOT eat any solid food for the whole day.     At 5 pm the NIGHT before your test:     Add water up to the line on the jug of GOLYTELY.  You can add a packet of yellow/green powder drink mix to the jug.  Make the jug in the morning. Put the bottle in the refrigerator before you need to drink it. It tastes better if it is cold. Do NOT put this solution over ice. It is ok to drink with a straw.  Drink 1 glass (8 ounces) every 10 minutes until 1/2 of the jug is finished. (Keep it cold and in refrigerator as much as you can while drinking it).  Put the jug in the refrigerator when you finish the first half.     The Day of the test - We will call you 2 days before your test to tell you what time to get there.     5 hours before you come to the hospital (this may be in the middle of the night)  Drink 1 glass (8 ounces) every 10 minutes until the jug is finished. (Keep it cold and in refrigerator as much as you can while drinking it).     YOU CAN'T EAT OR DRINK ANYTHING ELSE ONCE YOU FINISH THE PREP     Leave all valuables and jewelry at home. You will be at the hospital for 2-4 hours.     Call the Endoscopy department at [phone] with any questions about your procedure.

## 2022-04-19 NOTE — PROGRESS NOTES
Subjective:       Patient ID: Chelly Ortiz is a 71 y.o. female.    Chief Complaint: Abdominal Pain, Constipation, and Colonoscopy    Patient here today to follow-up with aforementioned complaints.  She has previously been seen by multiple GI providers throughout the [facility] system, most recently with nurse practitioner  following episode diverticulitis.  She was scheduled to have a colonoscopy last month, however was never performed.  Her most recent exam was performed in 2018 by Dr. Mejia which was significant for multiple large polyps as well as diverticulosis in left colon.  Today patient reports doing better.  Somewhat apprehensive about undergoing colon screening, however is amenable.  Denies further abdominal pain.    Review of Systems   Gastrointestinal: Negative for abdominal distention and abdominal pain.   Psychiatric/Behavioral: The patient is nervous/anxious.        The following portions of the patient's history were reviewed and updated as appropriate: allergies, current medications, past family history, past medical history, past social history, past surgical history and problem list.    Objective:      Physical Exam  Constitutional:       Appearance: She is well-developed.   HENT:      Head: Normocephalic and atraumatic.   Eyes:      Conjunctiva/sclera: Conjunctivae normal.   Pulmonary:      Effort: Pulmonary effort is normal. No respiratory distress.   Musculoskeletal:      Cervical back: Normal range of motion.   Neurological:      Mental Status: She is alert and oriented to person, place, and time.   Psychiatric:         Behavior: Behavior normal.         Thought Content: Thought content normal.         Judgment: Judgment normal.           Pertinent labs and imaging studies reviewed    Assessment:       1. Polyp of colon, unspecified part of colon, unspecified type    2. Diverticulitis        Plan:       Schedule colonoscopy    (Portions of this note were dictated using voice  recognition software and may contain dictation related errors in spelling/grammar/syntax not found on text review)

## 2022-04-21 ENCOUNTER — CLINICAL SUPPORT (OUTPATIENT)
Dept: REHABILITATION | Facility: HOSPITAL | Age: 72
End: 2022-04-21
Payer: MEDICARE

## 2022-04-21 DIAGNOSIS — M53.86 DECREASED RANGE OF MOTION OF LUMBAR SPINE: ICD-10-CM

## 2022-04-21 DIAGNOSIS — R29.898 DECREASED ROM OF NECK: ICD-10-CM

## 2022-04-21 DIAGNOSIS — M13.0 ARTHRITIS OF MULTIPLE SITES: Primary | ICD-10-CM

## 2022-04-21 DIAGNOSIS — R53.1 DECREASED STRENGTH: ICD-10-CM

## 2022-04-21 PROCEDURE — 97110 THERAPEUTIC EXERCISES: CPT | Mod: PN,CQ

## 2022-04-21 NOTE — PROGRESS NOTES
"  Physical Therapy Daily Treatment Note     Name: Chelly Ortiz  Clinic Number: [id number]    Therapy Diagnosis:   Encounter Diagnoses   Name Primary?    Arthritis of multiple sites Yes    Decreased ROM of neck     Decreased strength     Decreased range of motion of lumbar spine      Physician: No ref. provider found    Visit Date: 4/21/2022    Physician Orders: PT Eval only and submit Treat   Medical Diagnosis from Referral:   M13.0 (ICD-10-CM) - Arthritis of multiple sites   M25.511,G89.29,M25.512 (ICD-10-CM) - Chronic pain of both shoulders   M54.50,G89.29 (ICD-10-CM) - Chronic bilateral low back pain without sciatica   Evaluation Date: 3/8/2022  Authorization Period Expiration: 12/31/22  Plan of Care Expiration: 5/3/2022  Visit # / Visits authorized:8/TBD + eval  FOTO: 9/10  Completed 3/24/2022    Time In: 2:15 pm  Time Out:3:00 pm  Total Billable Time: 45 minutes- 3 TE      Precautions: Standard, diabetes, hypertension and depression    Subjective     Pt reports: she is having right hip pain - woke up that way - "I was having right groin pain - that is better today."  She was partially  compliant with home exercise program.  Response to previous treatment: none  Functional change: Able to clean her ceiling fans without pain afterward, 3/28/22    Pain: 6/10  Location: bilateral shoulders, left neck and low back    Objective     Ms. Ortiz received therapeutic exercises to develop strength, endurance, ROM, posture and core stabilization for 45 minutes including: Bulleted Items Only    Nu-Step: 5 minutes Level 2.0 resumed hills today  · Lower trunk rotation: x30-  legs on green physioball   · Bridges-Green theraband today to prevent shoulder pain 2x10,   · Supine wand flexion: 3# 3x10  · Supine serratus push: 3#, 3x10.   · Paloff press (green band, exhalation with transversus abdominus activation): 1 minute each direction. Progress time.    · Seated Lumbar / Shoulder Flexion with physioball - x20  · Sit to Stands " "- from chair with arm rests;1 x10 (emphasis on form, could only do 10 due to pain in knees)  Not today at patient request due to concern regarding pain.  · Standing rows with scapular retractions (green band): 3x10  · Standing straight arm pull-downs (green band): 3x10.  · Seated Snags (rotation): 5" hold 1x15 with towel  Bilateral (instruction to angle towel toward eyes). Progress reps.  Next: Lateral stepping with yellow band x 20 ft (bilateral)    Not today, out of time:  Supine hip abduction: red theraband 5" hold 3x10  Seated TrA: 5" x 10    Home Exercises Provided and Patient Education Provided     Education provided:    Symptom management and plan of care progressions.  · Home Exercise Program.  · Postural awareness and transverse abdominus activation with daily activity    Written Home Exercises Provided: Patient instructed to cont prior HEP.  Exercises were reviewed and Ms. Ortiz was able to demonstrate them prior to the end of the session.  Ms. Ortiz demonstrated good  understanding of the education provided.     See EMR under Patient Instructions for exercises provided 3/8/2022.    Assessment   Pt presents to PT today with reports of right hip pain but agreeable to perform therex.  Able to increase reps with wand therex as well as theraband rows and extension as bolded.    States continued reports "about the same" pain after treatment.  No increased pain..      Ms. Ortiz Is progressing well towards her goals.   Pt prognosis is Good.     Pt will continue to benefit from skilled outpatient physical therapy to address the deficits listed in the problem list box on initial evaluation, provide pt/family education and to maximize pt's level of independence in the home and community environment.   Pt's spiritual, cultural and educational needs considered and pt agreeable to plan of care and goals.    Anticipated barriers to physical therapy: chronicity of pain, many co-morbidities    Goals:  Short Term Goals " (4 Weeks):  1. Patient will be compliant with home exercise program to supplement therapy in promoting functional mobility. (Not Met - Progressing)   2. Patient will perform 20 ft carry of 12# with good control to demonstrate improved core strength in order to carry groceries. (Not Met - Progressing)  3. Patient will report no pain during thoracolumbar active range of motion to promote functional mobility. (Not Met - Progressing)  4. Patient will improve hip abduction manual muscle tests  to >/=4/5 to improve strength for functional tasks. (Not Met - Progressing)  5. Pt will demonstrate 10 degree improvement in cervical rotation for driving. (Not Met - Progressing)  Long Term Goals (8 Weeks):   1. Patient will improve FOTO score to </= 38% limited to decrease perceived limitation with maintaining/changing body position. (Not Met - Progressing)  2. Patient will perform hip hinge with good control to demonstrate improved core strength. (Not Met - Progressing)  3. Patient will improve impaired lower extremity manual muscle tests to >/=4+/5 to improve strength for functional tasks. (Not Met - Progressing)  4. Patient will report no shoulder pain during laying down promote sleeping at night. (Not Met - Progressing)    Plan     Continue PT per plan of care, progress as tolerated.  Progressions noted above for next session.  Recommend progress note (objective measures) to determine focus of future therapy sessions.    Stacy Bai, PTA   04/21/2022

## 2022-04-27 ENCOUNTER — PATIENT OUTREACH (OUTPATIENT)
Dept: ADMINISTRATIVE | Facility: OTHER | Age: 72
End: 2022-04-27
Payer: MEDICARE

## 2022-04-27 RX ORDER — ESOMEPRAZOLE MAGNESIUM 40 MG/1
CAPSULE, DELAYED RELEASE ORAL
Qty: 90 CAPSULE | Refills: 0 | OUTPATIENT
Start: 2022-04-27

## 2022-04-27 NOTE — TELEPHONE ENCOUNTER
[facility] Note  Quick DC. Inappropriate Request   Refill request requires further review by MD: NO   Medication Therapy Plan: Pharmacy is requesting new script(s) for the following medications without required information, (sig/ frequency/qty/etc)     [facility] action(s):  Quick Discontinue      Duplicate Pended Encounter(s)/ Last Prescribed Details:    Pharmacies have been requesting medications for patients without required information, (sig, frequency, qty, etc.). In addition, requests are sent for medication(s) pt. are currently not taking, and medications patients have never taken.    We have spoken to the pharmacies about these request types and advised their teams previously that we are unable to assess these New Script requests and require all details for these requests. This is a known issue and has been reported.        Medication related problems are not assessed for QDC.   Medication Reconciliation Completed? NO Were there pending details that required adjustment? NO     Automatic Epic Generated Protocol Data Below:   Requested Prescriptions   Pending Prescriptions Disp Refills    esomeprazole (NEXIUM) 40 MG capsule [Pharmacy Med Name: ESOMEPRAZOLE MAG DR 40MG CAP] 90 capsule 0              Appointments      Date Provider   Last Visit   3/17/2022 Gabriel Wilson MD   Next Visit   4/27/2022 Gabriel Wilson MD        Note composed:3:38 PM 04/27/2022

## 2022-04-27 NOTE — PROGRESS NOTES
Requested updates within Care Everywhere.  Patient's chart was reviewed for overdue RAJI topics.  Health maintenance:updated  Immunizations:reconciled   Legacy:   Media:  Orders placed:  Tasked appts:  Labs Linked:  Upcoming appt:Hemoglobin A1c 5/10/22, Colonoscopy 6/7/22

## 2022-04-27 NOTE — TELEPHONE ENCOUNTER
[facility] Note  Quick DC. Inappropriate Request   Refill request requires further review by MD: NO   Medication Therapy Plan: Pharmacy is requesting new script(s) for the following medications without required information, (sig/ frequency/qty/etc)     [facility] action(s):  Quick Discontinue      Duplicate Pended Encounter(s)/ Last Prescribed Details:    Pharmacies have been requesting medications for patients without required information, (sig, frequency, qty, etc.). In addition, requests are sent for medication(s) pt. are currently not taking, and medications patients have never taken.    We have spoken to the pharmacies about these request types and advised their teams previously that we are unable to assess these New Script requests and require all details for these requests. This is a known issue and has been reported.        Medication related problems are not assessed for QDC.   Medication Reconciliation Completed? NO Were there pending details that required adjustment? NO     Automatic Epic Generated Protocol Data Below:   Requested Prescriptions   Pending Prescriptions Disp Refills    esomeprazole (NEXIUM) 40 MG capsule [Pharmacy Med Name: ESOMEPRAZOLE MAG DR 40MG CAP] 90 capsule 0              Appointments      Date Provider   Last Visit   3/17/2022 Gabriel Wilson MD   Next Visit   6/15/2022 Gabriel Wilson MD        Note composed:3:39 PM 04/27/2022

## 2022-04-27 NOTE — PROGRESS NOTES
"  HPI     DLS: 11/11/2021    Pt here for 4 Month Check;  S/P PCIOL Dr. Hurtado 2013 OD   (SN60WF - 19.0)   Pt states no eye pain or discomfort.     Meds;  Optivar PRN OU    1. NS OS   2. Blepharitis   3. PC IOL OD - [place] - 2013     Last edited by Clarice Herzog MD on 4/28/2022  3:43 PM. (History)            Assessment /Plan     For exam results, see Encounter Report.    Combined form of age-related cataract, left eye    Nuclear sclerotic cataract of left eye    Squamous blepharitis of upper and lower eyelids of both eyes    DM type 2 without retinopathy    Hypertension associated with diabetes    Pseudophakia, right eye        Patient presents for cataract eval. Patient has seen Dr Stafford in the   past, but states she only wants to have cataract surgery at [facility]. // also seen [facility] - 9/202  Patient complains of crusted eyes in the morning. States that she notices   a collection of mucous throughout the day in the corner of the eye.     S/P PCIOL Dr Hurtado 2013 OD    SN60WF - 19.0    Ocular meds:   Visine PRN for dry eyes     Eyemeds   Optivar OU PRN    Last edited by Jody Amador on 8/10/2021  2:29 PM. (History)     IOL calcs (4/28/2022)   OS  SN60WF 19.5 (has a sn60wf 19.0 in right eye)   MTA4U0 17.0    DIB00 would be 20.0 - but would be preferable to use a sn60wf - like in the first eye        Assessment /Plan     Pt thinks she may want phaco/IOL os -next year - but she is getting by ok for now   She prefers to have her surgery at [facility]   Suggest use a sn60wf - so eyes are "balanced"     Ok to go with out distance glasses for now and use OTC readers for near     Discussion with patient   She is content with her vision for now   F/U 9 months - AR/MR/BAT // DFE  - re-discuss possible cat sx  - pt will call sooner prn   "

## 2022-04-27 NOTE — TELEPHONE ENCOUNTER
Care Due:                  Date            Visit Type   Department     Provider  --------------------------------------------------------------------------------                                EP -                              PRIMARY      [facility]  Last Visit: 03-      CARE ([facility])   MARYLOU Wilson                              EP -                              PRIMARY      [facility]  Next Visit: 06-      CARE ([facility])   MARYLOU Wilson                                                            Last  Test          Frequency    Reason                     Performed    Due Date  --------------------------------------------------------------------------------    HBA1C.......  6 months...  insulin..................  12- 06-    Powered by [employer]. Reference number: [id number].   4/27/2022 2:32:09 PM CDT

## 2022-04-27 NOTE — TELEPHONE ENCOUNTER
No new care gaps identified.  Powered by [employer]. Reference number: [id number].   4/27/2022 2:33:13 PM CDT

## 2022-04-28 ENCOUNTER — OFFICE VISIT (OUTPATIENT)
Dept: OPHTHALMOLOGY | Facility: CLINIC | Age: 72
End: 2022-04-28
Payer: MEDICARE

## 2022-04-28 DIAGNOSIS — H01.02B SQUAMOUS BLEPHARITIS OF UPPER AND LOWER EYELIDS OF BOTH EYES: ICD-10-CM

## 2022-04-28 DIAGNOSIS — H25.12 NUCLEAR SCLEROTIC CATARACT OF LEFT EYE: ICD-10-CM

## 2022-04-28 DIAGNOSIS — I15.2 HYPERTENSION ASSOCIATED WITH DIABETES: ICD-10-CM

## 2022-04-28 DIAGNOSIS — Z96.1 PSEUDOPHAKIA, RIGHT EYE: ICD-10-CM

## 2022-04-28 DIAGNOSIS — E11.9 DM TYPE 2 WITHOUT RETINOPATHY: ICD-10-CM

## 2022-04-28 DIAGNOSIS — E11.59 HYPERTENSION ASSOCIATED WITH DIABETES: ICD-10-CM

## 2022-04-28 DIAGNOSIS — H01.02A SQUAMOUS BLEPHARITIS OF UPPER AND LOWER EYELIDS OF BOTH EYES: ICD-10-CM

## 2022-04-28 DIAGNOSIS — H25.812 COMBINED FORM OF AGE-RELATED CATARACT, LEFT EYE: Primary | ICD-10-CM

## 2022-04-28 PROCEDURE — 92136 OPHTHALMIC BIOMETRY: CPT | Mod: LT,S$GLB,, | Performed by: OPHTHALMOLOGY

## 2022-04-28 PROCEDURE — 1126F PR PAIN SEVERITY QUANTIFIED, NO PAIN PRESENT: ICD-10-PCS | Mod: CPTII,S$GLB,, | Performed by: OPHTHALMOLOGY

## 2022-04-28 PROCEDURE — 4010F ACE/ARB THERAPY RXD/TAKEN: CPT | Mod: CPTII,S$GLB,, | Performed by: OPHTHALMOLOGY

## 2022-04-28 PROCEDURE — 92136 IOL MASTER - OS - LEFT EYE: ICD-10-PCS | Mod: LT,S$GLB,, | Performed by: OPHTHALMOLOGY

## 2022-04-28 PROCEDURE — 99999 PR PBB SHADOW E&M-EST. PATIENT-LVL III: CPT | Mod: PBBFAC,,, | Performed by: OPHTHALMOLOGY

## 2022-04-28 PROCEDURE — 3288F PR FALLS RISK ASSESSMENT DOCUMENTED: ICD-10-PCS | Mod: CPTII,S$GLB,, | Performed by: OPHTHALMOLOGY

## 2022-04-28 PROCEDURE — 1126F AMNT PAIN NOTED NONE PRSNT: CPT | Mod: CPTII,S$GLB,, | Performed by: OPHTHALMOLOGY

## 2022-04-28 PROCEDURE — 1160F PR REVIEW ALL MEDS BY PRESCRIBER/CLIN PHARMACIST DOCUMENTED: ICD-10-PCS | Mod: CPTII,S$GLB,, | Performed by: OPHTHALMOLOGY

## 2022-04-28 PROCEDURE — 1101F PT FALLS ASSESS-DOCD LE1/YR: CPT | Mod: CPTII,S$GLB,, | Performed by: OPHTHALMOLOGY

## 2022-04-28 PROCEDURE — 1101F PR PT FALLS ASSESS DOC 0-1 FALLS W/OUT INJ PAST YR: ICD-10-PCS | Mod: CPTII,S$GLB,, | Performed by: OPHTHALMOLOGY

## 2022-04-28 PROCEDURE — 4010F PR ACE/ARB THEARPY RXD/TAKEN: ICD-10-PCS | Mod: CPTII,S$GLB,, | Performed by: OPHTHALMOLOGY

## 2022-04-28 PROCEDURE — 1159F PR MEDICATION LIST DOCUMENTED IN MEDICAL RECORD: ICD-10-PCS | Mod: CPTII,S$GLB,, | Performed by: OPHTHALMOLOGY

## 2022-04-28 PROCEDURE — 92012 PR EYE EXAM, EST PATIENT,INTERMED: ICD-10-PCS | Mod: S$GLB,,, | Performed by: OPHTHALMOLOGY

## 2022-04-28 PROCEDURE — 3288F FALL RISK ASSESSMENT DOCD: CPT | Mod: CPTII,S$GLB,, | Performed by: OPHTHALMOLOGY

## 2022-04-28 PROCEDURE — 92012 INTRM OPH EXAM EST PATIENT: CPT | Mod: S$GLB,,, | Performed by: OPHTHALMOLOGY

## 2022-04-28 PROCEDURE — 1160F RVW MEDS BY RX/DR IN RCRD: CPT | Mod: CPTII,S$GLB,, | Performed by: OPHTHALMOLOGY

## 2022-04-28 PROCEDURE — 99999 PR PBB SHADOW E&M-EST. PATIENT-LVL III: ICD-10-PCS | Mod: PBBFAC,,, | Performed by: OPHTHALMOLOGY

## 2022-04-28 PROCEDURE — 1159F MED LIST DOCD IN RCRD: CPT | Mod: CPTII,S$GLB,, | Performed by: OPHTHALMOLOGY

## 2022-05-02 ENCOUNTER — DOCUMENTATION ONLY (OUTPATIENT)
Dept: REHABILITATION | Facility: HOSPITAL | Age: 72
End: 2022-05-02
Payer: MEDICARE

## 2022-05-02 NOTE — PROGRESS NOTES
Physical therapist and physical therapy assistant(s) met face to face to discuss patient's treatment plan and progress towards established goals. Pt will be seen by a physical therapist minimally every 6th visit or every 30 days.    ROE VIVEROS, PT, DPT    Stacy Bai, PTA

## 2022-05-03 ENCOUNTER — PATIENT OUTREACH (OUTPATIENT)
Dept: ADMINISTRATIVE | Facility: OTHER | Age: 72
End: 2022-05-03
Payer: MEDICARE

## 2022-05-03 ENCOUNTER — PATIENT MESSAGE (OUTPATIENT)
Dept: FAMILY MEDICINE | Facility: CLINIC | Age: 72
End: 2022-05-03
Payer: MEDICARE

## 2022-05-03 DIAGNOSIS — M13.0 ARTHRITIS OF MULTIPLE SITES: Primary | ICD-10-CM

## 2022-05-03 NOTE — PROGRESS NOTES
CHW - Outreach Attempt    Community Health Worker left a voicemail message for [id number] attempt to contact regarding: follow up on services

## 2022-05-04 ENCOUNTER — PATIENT MESSAGE (OUTPATIENT)
Dept: FAMILY MEDICINE | Facility: CLINIC | Age: 72
End: 2022-05-04
Payer: MEDICARE

## 2022-05-04 ENCOUNTER — PATIENT OUTREACH (OUTPATIENT)
Dept: ADMINISTRATIVE | Facility: OTHER | Age: 72
End: 2022-05-04
Payer: MEDICARE

## 2022-05-04 RX ORDER — IBUPROFEN 600 MG/1
600 TABLET ORAL EVERY 8 HOURS PRN
Qty: 15 TABLET | Refills: 0 | Status: SHIPPED | OUTPATIENT
Start: 2022-05-04 | End: 2022-07-13

## 2022-05-04 NOTE — PROGRESS NOTES
CHW - Outreach Attempt    Community Health Worker left a voicemail message for MRN: [id number] attempt to return missed calls from yesterday evening after office hours.

## 2022-05-10 ENCOUNTER — LAB VISIT (OUTPATIENT)
Dept: LAB | Facility: HOSPITAL | Age: 72
End: 2022-05-10
Attending: NURSE PRACTITIONER
Payer: MEDICARE

## 2022-05-10 ENCOUNTER — PATIENT OUTREACH (OUTPATIENT)
Dept: ADMINISTRATIVE | Facility: OTHER | Age: 72
End: 2022-05-10
Payer: MEDICARE

## 2022-05-10 DIAGNOSIS — Z79.4 TYPE 2 DIABETES MELLITUS WITH HYPERGLYCEMIA, WITH LONG-TERM CURRENT USE OF INSULIN: ICD-10-CM

## 2022-05-10 DIAGNOSIS — E11.65 TYPE 2 DIABETES MELLITUS WITH HYPERGLYCEMIA, WITH LONG-TERM CURRENT USE OF INSULIN: ICD-10-CM

## 2022-05-10 LAB
ESTIMATED AVG GLUCOSE: 157 MG/DL (ref 68–131)
HBA1C MFR BLD: 7.1 % (ref 4–5.6)

## 2022-05-10 PROCEDURE — 83036 HEMOGLOBIN GLYCOSYLATED A1C: CPT | Performed by: NURSE PRACTITIONER

## 2022-05-10 PROCEDURE — 36415 COLL VENOUS BLD VENIPUNCTURE: CPT | Performed by: NURSE PRACTITIONER

## 2022-05-10 NOTE — PROGRESS NOTES
CHW - Follow Up    This Community Health Worker completed a follow up visit with [id number] in person today.  Pt reported: she had just completed her blood work and wanted to know if she is to mail the entire packet of paper work from the [employer] back to them.    Community Health Worker provided: This CHW showed her the pages that the MD had filled out and explained the process, client stated that the company normally sent re certification paperwork in January. I made sure that all pages of the re certification packet was complete and I showed the client the mailing address.     Follow up required: yes  Follow-up Outreach - Due: 6/21/2022

## 2022-05-12 ENCOUNTER — PATIENT MESSAGE (OUTPATIENT)
Dept: INTERNAL MEDICINE | Facility: CLINIC | Age: 72
End: 2022-05-12
Payer: MEDICARE

## 2022-05-31 ENCOUNTER — DOCUMENTATION ONLY (OUTPATIENT)
Dept: REHABILITATION | Facility: HOSPITAL | Age: 72
End: 2022-05-31
Payer: MEDICARE

## 2022-05-31 NOTE — PROGRESS NOTES
Physical Therapy Discharge summary    Pt was evaluated on 3/8/22 and was seen 9 times for PT. Pt has not attended PT since 22. Patient given HEP. Plan of care and/or authorization . Pt to be discharged at this time.    Aurora Harris, PT, DPT

## 2022-06-03 ENCOUNTER — TELEPHONE (OUTPATIENT)
Dept: ENDOSCOPY | Facility: HOSPITAL | Age: 72
End: 2022-06-03
Payer: MEDICARE

## 2022-06-03 NOTE — TELEPHONE ENCOUNTER
Left message instructing patient to call dept @ [phone] or [phone] between 8am-3pm.    Arrival time to be given @ 1200  Colon/2 day Golytely prep  Covid - vacc/boosted  (Message sent via [facility] portal)

## 2022-06-03 NOTE — TELEPHONE ENCOUNTER
Spoke with patient about arrival time @ 1200.   Covid test = vacc/boosted    Prep instructions reviewed: the day before the procedure, follow a clear liquid diet all day, then start the first 1/2 of prep at 5pm and take 2nd 1/2 of prep @ 0700.  Pt must be completely NPO when prep completed @ 0900.              Medications: Do not take Insulin or oral diabetic medications the day of the procedure.  Take as prescribed: heart, seizure and blood pressure medication in the morning with a sip of water (less than an ounce).  Take any breathing medications and bring inhalers to hospital with you Leave all valuables and jewelry at home.     Wear comfortable clothes to procedure to change into hospital gown You cannot drive for 24 hours after your procedure because you will receive sedation for your procedure to make you comfortable.  A ride must be provided at discharge.

## 2022-06-07 ENCOUNTER — ANESTHESIA EVENT (OUTPATIENT)
Dept: ENDOSCOPY | Facility: HOSPITAL | Age: 72
End: 2022-06-07
Payer: MEDICARE

## 2022-06-07 ENCOUNTER — HOSPITAL ENCOUNTER (OUTPATIENT)
Facility: HOSPITAL | Age: 72
Discharge: HOME | End: 2022-06-07
Attending: INTERNAL MEDICINE | Admitting: INTERNAL MEDICINE
Payer: MEDICARE

## 2022-06-07 ENCOUNTER — ANESTHESIA (OUTPATIENT)
Dept: ENDOSCOPY | Facility: HOSPITAL | Age: 72
End: 2022-06-07
Payer: MEDICARE

## 2022-06-07 VITALS
OXYGEN SATURATION: 98 % | DIASTOLIC BLOOD PRESSURE: 73 MMHG | RESPIRATION RATE: 20 BRPM | BODY MASS INDEX: 34.91 KG/M2 | TEMPERATURE: 98 F | HEART RATE: 60 BPM | HEIGHT: 63 IN | SYSTOLIC BLOOD PRESSURE: 139 MMHG | WEIGHT: 197 LBS

## 2022-06-07 DIAGNOSIS — K63.5 COLON POLYP: ICD-10-CM

## 2022-06-07 LAB — POCT GLUCOSE: 94 MG/DL (ref 70–110)

## 2022-06-07 PROCEDURE — 27201012 HC FORCEPS, HOT/COLD, DISP: Performed by: INTERNAL MEDICINE

## 2022-06-07 PROCEDURE — 63600175 PHARM REV CODE 636 W HCPCS: Performed by: NURSE ANESTHETIST, CERTIFIED REGISTERED

## 2022-06-07 PROCEDURE — 25000003 PHARM REV CODE 250: Performed by: NURSE ANESTHETIST, CERTIFIED REGISTERED

## 2022-06-07 PROCEDURE — 88305 TISSUE EXAM BY PATHOLOGIST: ICD-10-PCS | Mod: 26,,, | Performed by: PATHOLOGY

## 2022-06-07 PROCEDURE — 45380 PR COLONOSCOPY,BIOPSY: ICD-10-PCS | Mod: NSCH,33,, | Performed by: INTERNAL MEDICINE

## 2022-06-07 PROCEDURE — 88305 TISSUE EXAM BY PATHOLOGIST: CPT | Mod: 26,,, | Performed by: PATHOLOGY

## 2022-06-07 PROCEDURE — 37000009 HC ANESTHESIA EA ADD 15 MINS: Performed by: INTERNAL MEDICINE

## 2022-06-07 PROCEDURE — 45380 COLONOSCOPY AND BIOPSY: CPT | Mod: PT | Performed by: INTERNAL MEDICINE

## 2022-06-07 PROCEDURE — 88305 TISSUE EXAM BY PATHOLOGIST: CPT | Performed by: PATHOLOGY

## 2022-06-07 PROCEDURE — 37000008 HC ANESTHESIA 1ST 15 MINUTES: Performed by: INTERNAL MEDICINE

## 2022-06-07 PROCEDURE — 82962 GLUCOSE BLOOD TEST: CPT | Performed by: INTERNAL MEDICINE

## 2022-06-07 PROCEDURE — 45380 COLONOSCOPY AND BIOPSY: CPT | Mod: NSCH,33,, | Performed by: INTERNAL MEDICINE

## 2022-06-07 RX ORDER — PROPOFOL 10 MG/ML
VIAL (ML) INTRAVENOUS
Status: DISCONTINUED | OUTPATIENT
Start: 2022-06-07 | End: 2022-06-07

## 2022-06-07 RX ORDER — PROPOFOL 10 MG/ML
VIAL (ML) INTRAVENOUS CONTINUOUS PRN
Status: DISCONTINUED | OUTPATIENT
Start: 2022-06-07 | End: 2022-06-07

## 2022-06-07 RX ORDER — SODIUM CHLORIDE 9 MG/ML
INJECTION, SOLUTION INTRAVENOUS CONTINUOUS
Status: DISCONTINUED | OUTPATIENT
Start: 2022-06-07 | End: 2022-06-07 | Stop reason: HOSPADM

## 2022-06-07 RX ORDER — ESOMEPRAZOLE MAGNESIUM 40 MG/1
40 CAPSULE, DELAYED RELEASE ORAL
Qty: 90 CAPSULE | Refills: 3 | Status: SHIPPED | OUTPATIENT
Start: 2022-06-07 | End: 2023-08-16 | Stop reason: SDUPTHER

## 2022-06-07 RX ORDER — LIDOCAINE HYDROCHLORIDE 20 MG/ML
INJECTION INTRAVENOUS
Status: DISCONTINUED | OUTPATIENT
Start: 2022-06-07 | End: 2022-06-07

## 2022-06-07 RX ORDER — SODIUM CHLORIDE 0.9 % (FLUSH) 0.9 %
10 SYRINGE (ML) INJECTION
Status: DISCONTINUED | OUTPATIENT
Start: 2022-06-07 | End: 2022-06-07 | Stop reason: HOSPADM

## 2022-06-07 RX ADMIN — LIDOCAINE HYDROCHLORIDE 100 MG: 20 INJECTION, SOLUTION INTRAVENOUS at 01:06

## 2022-06-07 RX ADMIN — PROPOFOL 50 MG: 10 INJECTION, EMULSION INTRAVENOUS at 01:06

## 2022-06-07 RX ADMIN — SODIUM CHLORIDE: 0.9 INJECTION, SOLUTION INTRAVENOUS at 01:06

## 2022-06-07 RX ADMIN — PROPOFOL 150 MCG/KG/MIN: 10 INJECTION, EMULSION INTRAVENOUS at 01:06

## 2022-06-07 NOTE — ANESTHESIA POSTPROCEDURE EVALUATION
Anesthesia Post Evaluation    Patient: Chelly Ortiz    Procedure(s) Performed: Procedure(s) (LRB):  COLONOSCOPY (N/A)    Final Anesthesia Type: MAC      Patient location during evaluation: GI PACU  Patient participation: Yes- Able to Participate  Level of consciousness: awake and alert and oriented  Post-procedure vital signs: reviewed and stable  Pain management: adequate  Airway patency: patent    PONV status at discharge: No PONV  Anesthetic complications: no      Cardiovascular status: blood pressure returned to baseline, hemodynamically stable and stable  Respiratory status: unassisted, spontaneous ventilation and room air  Hydration status: euvolemic  Follow-up not needed.          Vitals Value Taken Time   /75 06/07/22 1234   Temp 37.2 °C (99 °F) 06/07/22 1234   Pulse 87 06/07/22 1234   Resp 18 06/07/22 1234   SpO2 95 % 06/07/22 1234         No case tracking events are documented in the log.      Pain/Benjamin Score: No data recorded

## 2022-06-07 NOTE — PROVATION PATIENT INSTRUCTIONS
Discharge Summary/Instructions after an Endoscopic Procedure  Patient Name: Chelly Ortiz  Patient MRN: [id number]  Patient YOB: 1950  Tuesday, June 7, 2022  Pierce Rivera MD  Dear patient,  As a result of recent federal legislation (The Federal Cures Act), you may   receive lab or pathology results from your procedure in your [facility]   account before your physician is able to contact you. Your physician or   their representative will relay the results to you with their   recommendations at their soonest availability.  Thank you,  Your health is very important to us during the Covid Crisis. Following your   procedure today, you will receive a daily text for 2 weeks asking about   signs or symptoms of Covid 19.  Please respond to this text when you   receive it so we can follow up and keep you as safe as possible.   RESTRICTIONS:  During your procedure today, you received medications for sedation.  These   medications may affect your judgment, balance and coordination.  Therefore,   for 24 hours, you have the following restrictions:   - DO NOT drive a car, operate machinery, make legal/financial decisions,   sign important papers or drink alcohol.    ACTIVITY:  Today: no heavy lifting, straining or running due to procedural   sedation/anesthesia.  The following day: return to full activity including work.  DIET:  Eat and drink normally unless instructed otherwise.     TREATMENT FOR COMMON SIDE EFFECTS:  - Mild abdominal pain, nausea, belching, bloating or excessive gas:  rest,   eat lightly and use a heating pad.  - Sore Throat: treat with throat lozenges and/or gargle with warm salt   water.  - Because air was used during the procedure, expelling large amounts of air   from your rectum or belching is normal.  - If a bowel prep was taken, you may not have a bowel movement for 1-3 days.    This is normal.  SYMPTOMS TO WATCH FOR AND REPORT TO YOUR PHYSICIAN:  1. Abdominal pain or bloating, other  than gas cramps.  2. Chest pain.  3. Back pain.  4. Signs of infection such as: chills or fever occurring within 24 hours   after the procedure.  5. Rectal bleeding, which would show as bright red, maroon, or black stools.   (A tablespoon of blood from the rectum is not serious, especially if   hemorrhoids are present.)  6. Vomiting.  7. Weakness or dizziness.  GO DIRECTLY TO THE NEAREST EMERGENCY ROOM IF YOU HAVE ANY OF THE FOLLOWING:      Difficulty breathing              Chills and/or fever over 101 F   Persistent vomiting and/or vomiting blood   Severe abdominal pain   Severe chest pain   Black, tarry stools   Bleeding- more than one tablespoon   Any other symptom or condition that you feel may need urgent attention  Your doctor recommends these additional instructions:  If any biopsies were taken, your doctors clinic will contact you in 1 to 2   weeks with any results.  - Discharge patient to home.   - Resume previous diet.   - Continue present medications.   - Await pathology results.   - Repeat colonoscopy in 5 years for surveillance based on clinical status at   that time.   - Patient has a contact number available for emergencies.  The signs and   symptoms of potential delayed complications were discussed with the   patient.  Return to normal activities tomorrow.  Written discharge   instructions were provided to the patient.  For questions, problems or results please call your physician - Pierce Rivera MD.  EMERGENCY PHONE NUMBER: [phone],  LAB RESULTS: [phone]  IF A COMPLICATION OR EMERGENCY SITUATION ARISES AND YOU ARE UNABLE TO REACH   YOUR PHYSICIAN - GO DIRECTLY TO THE EMERGENCY ROOM.  Pierce Rivera MD  6/7/2022 2:15:49 PM  This report has been verified and signed electronically.  Dear patient,  As a result of recent federal legislation (The Federal Cures Act), you may   receive lab or pathology results from your procedure in your [facility]   account before your  physician is able to contact you. Your physician or   their representative will relay the results to you with their   recommendations at their soonest availability.  Thank you,  PROVATION

## 2022-06-07 NOTE — ANESTHESIA PREPROCEDURE EVALUATION
2022  Chelly Ortiz is a 71 y.o., female.  Past Medical History:   Diagnosis Date    Allergy     Cataract     DDD (degenerative disc disease), lumbar     Diabetes mellitus     diet controlled    Diabetes mellitus, type 2     GERD (gastroesophageal reflux disease)     History of subconjunctival hemorrhage 11    left eye    Hyperlipidemia     Hypertension     IBS (irritable bowel syndrome)     Obesity     MYRIAM (obstructive sleep apnea)     on CPAP    Rotator cuff impingement syndrome     Seasonal allergic conjunctivitis      Past Surgical History:   Procedure Laterality Date    CATARACT EXTRACTION W/  INTRAOCULAR LENS IMPLANT Right 10/03/2013         SECTION      x2    CHOLECYSTECTOMY      COLONOSCOPY N/A 2018    Procedure: COLONOSCOPY;  Surgeon: Marie Mejia MD;  Location: [facility] ENDO;  Service: Endoscopy;  Laterality: N/A;    ENDOSCOPIC ULTRASOUND OF UPPER GASTROINTESTINAL TRACT N/A 10/9/2018    Procedure: ULTRASOUND, UPPER GI TRACT, ENDOSCOPIC;  Surgeon: Javy Simpson MD;  Location: [facility] ENDO;  Service: Endoscopy;  Laterality: N/A;    EXCISION OF LESION N/A 2019    Procedure: EXCISION, LESION VULVA;  Surgeon: Liv Odell MD;  Location: [facility] OR;  Service: OB/GYN;  Laterality: N/A;  latex allergy    EYE SURGERY      HYSTERECTOMY      ovaries intact, due to DUB    TUBAL LIGATION           Pre-op Assessment       I have reviewed the Medications.     Review of Systems  Anesthesia Hx:  Denies Family Hx of Anesthesia complications.    Social:  Former Smoker    Cardiovascular:   Hypertension    Pulmonary:   Sleep Apnea    Hepatic/GI:   GERD    Musculoskeletal:   Arthritis     Neurological:   Neuromuscular Disease,    Endocrine:   Diabetes    Psych:   Psychiatric History          Physical Exam  General: Well nourished    Airway:  Mallampati: II    TM Distance: Normal  Neck ROM: Normal ROM    Dental:  Intact    Chest/Lungs:  Clear to auscultation    Heart:  Rate: Normal  Rhythm: Regular Rhythm        Anesthesia Plan  Type of Anesthesia, risks & benefits discussed:    Anesthesia Type: MAC  Intra-op Monitoring Plan: Standard ASA Monitors  Post Op Pain Control Plan: multimodal analgesia  Informed Consent: Informed consent signed with the Patient and all parties understand the risks and agree with anesthesia plan.  All questions answered.   ASA Score: 2    Ready For Surgery From Anesthesia Perspective.     .

## 2022-06-07 NOTE — H&P
Short Stay Endoscopy History and Physical    PCP - Gabriel Wilson MD    Procedure - Colonoscopy  ASA - III  Mallampati - per anesthesia  History of Anesthesia problems - no  Family history Anesthesia problems - no     HPI:  This is a 71 y.o. female here for evaluation of : Colon polyps    Pt here today for surveillance of prior colon polyps. The most recent exam was in 2018, at which time patients recalls having polyps removed. Since patient denies change in bowel habits or overt blood in stool. Denies weight loss.     ROS:  Constitutional: No fevers, chills, No weight loss  ENT: No allergies  CV: No chest pain  Pulm: No shortness of breath  GI: see HPI  Derm: No rash    Medical History:  has a past medical history of Allergy, Cataract, DDD (degenerative disc disease), lumbar, Diabetes mellitus, Diabetes mellitus, type 2, GERD (gastroesophageal reflux disease), History of subconjunctival hemorrhage (11), Hyperlipidemia, Hypertension, IBS (irritable bowel syndrome), Obesity, MYRIAM (obstructive sleep apnea), Rotator cuff impingement syndrome, and Seasonal allergic conjunctivitis.    Surgical History:  has a past surgical history that includes  section; Cholecystectomy; Eye surgery; Tubal ligation; Hysterectomy; Endoscopic ultrasound of upper gastrointestinal tract (N/A, 10/9/2018); Colonoscopy (N/A, 2018); Excision of lesion (N/A, 2019); and Cataract extraction w/  intraocular lens implant (Right, 10/03/2013).    Family History: family history includes Alzheimer's disease in her mother; Breast cancer in her sister; Cancer in her brother; Deep vein thrombosis in her brother; Diabetes in her daughter and sister; Heart disease in her father; Lung cancer in her brother.. Otherwise no colon cancer, inflammatory bowel disease, or GI malignancies.    Social History:  reports that she quit smoking about 39 years ago. She has never used smokeless tobacco. She reports that she does not drink alcohol  and does not use drugs.    Review of patient's allergies indicates:   Allergen Reactions    Prednisone      Causes pt to be hyper    Ace inhibitors Hives     \Cough    Latex, natural rubber Itching       Medications:   Medications Prior to Admission   Medication Sig Dispense Refill Last Dose    amLODIPine (NORVASC) 10 MG tablet Take 1 tablet (10 mg total) by mouth once daily. 90 tablet 3 6/7/2022 at Unknown time    aspirin (ECOTRIN) 81 MG EC tablet Take 81 mg by mouth once daily.   6/7/2022 at Unknown time    insulin aspart U-100 (NOVOLOG FLEXPEN U-100 INSULIN) 100 unit/mL (3 mL) InPn pen Inject 12 units w/ meals plus scale 150-200+2, 201-250+4, 251-300+6, 301-350+8, >350+10. Max daily 50 units. 15 mL 6 6/6/2022 at Unknown time    insulin detemir U-100 (LEVEMIR FLEXTOUCH U-100 INSULN) 100 unit/mL (3 mL) InPn pen Inject 45 units into the skin at night. 15 mL 6 6/6/2022 at Unknown time    losartan (COZAAR) 50 MG tablet TAKE 1 TABLET EVERY DAY 90 tablet 3 6/7/2022 at Unknown time    ACCU-CHEK SHILOH PLUS METER Misc Inject 1 Units into the skin 2 (two) times daily. 1 each 0     ACCU-CHEK FASTCLIX LANCET DRUM Misc USE AS DIRECTED 204 each 0     albuterol (PROVENTIL/VENTOLIN HFA) 90 mcg/actuation inhaler Inhale 1-2 puffs into the lungs every 4 (four) hours as needed for Wheezing. 18 g 2     azelastine (ASTELIN) 137 mcg (0.1 %) nasal spray 1 spray (137 mcg total) by Nasal route 2 (two) times daily. 30 mL 5     azelastine (OPTIVAR) 0.05 % ophthalmic solution Place 1 drop into both eyes 2 (two) times daily. 6 mL 1     blood glucose control high,low (ACCU-CHEK SHILOH CONTROL SOLN) Soln Use as directed 1 each 3     blood sugar diagnostic (ACCU-CHEK SHILOH PLUS TEST STRP) Strp Inject 1 strip into the skin 2 (two) times a day. Test Blood Sugar 200 strip 3     cetirizine (ZYRTEC) 10 MG tablet Take 1 tablet (10 mg total) by mouth every evening. 90 tablet 0     chlorhexidine (PERIDEX) 0.12 % solution Use as directed  "15 mLs in the mouth or throat daily as needed.       citalopram (CELEXA) 10 MG tablet Take 1 tablet (10 mg total) by mouth once daily. 90 tablet 3 6/5/2022    ergocalciferol (ERGOCALCIFEROL) 50,000 unit Cap TAKE 1 CAPSULE EVERY 7 DAYS. 12 capsule 1     esomeprazole (NEXIUM) 40 MG capsule    Unknown at Unknown time    fluticasone-salmeterol diskus inhaler 250-50 mcg Inhale 1 puff by mouth into the lungs 2 (two) times daily. Controller 60 each 1     gabapentin (NEURONTIN) 100 MG capsule Take 1 capsule (100 mg total) by mouth 3 (three) times daily. 270 capsule 3 6/5/2022    glucose 4 GM chewable tablet Take 4 tablets (16 g total) by mouth as needed for Low blood sugar (If having symptoms of blurry vision, palpitations, confusion, shakiness.  Please check sugars and if sugar below 70 please take 4 tablets and re-check sugar everry 15 minutes until sugars are above 70 and symptoms resolve.). 50 tablet 12     ibuprofen (ADVIL,MOTRIN) 600 MG tablet Take 1 tablet (600 mg total) by mouth every 8 (eight) hours as needed for Pain. 15 tablet 0     insulin syringe-needle U-100 0.3 mL 31 gauge x 5/16" Syrg relion brand, use 5 x a day, if not on levemir or novolog 150 each 1     insulin syringe-needle U-100 0.5 mL 31 gauge x 5/16" Syrg Use nightly. 90 day 100 each 3     magnesium oxide (MAG-OX) 400 mg tablet Take 1 tablet (400 mg total) by mouth 2 (two) times daily. 180 tablet 3 6/5/2022    pen needle, diabetic 31 gauge x 3/16" Ndle Uses 4 x a day. 400 each 3     pravastatin (PRAVACHOL) 10 MG tablet TAKE 1 TABLET EVERY DAY 90 tablet 3 6/5/2022    traZODone (DESYREL) 50 MG tablet TAKE 1 TABLET EVERY NIGHT AS NEEDED 90 tablet 3 6/4/2022    TRULICITY 1.5 mg/0.5 mL pen injector INJECT 1.5MG INTO THE SKIN EVERY 7 DAYS 4 pen 5 6/3/2022         Objective Findings:    Vital Signs: see nursing notes  Physical Exam:  General Appearance: Well appearing in no acute distress  Eyes:    No scleral icterus  ENT: Neck supple  Lungs: " CTA anteriorly  Heart:  S1, S2 normal, no murmurs heard  Abdomen: Soft, non tender, non distended with positive bowel sounds. No hepatosplenomegaly, ascites, or mass  Extremities: no edema  Skin: No rash      Labs:  Lab Results   Component Value Date    WBC 4.76 02/15/2022    HGB 13.1 02/15/2022    HCT 39.3 02/15/2022     02/15/2022    CHOL 248 (H) 12/27/2021    TRIG 209 (H) 12/27/2021    HDL 61 12/27/2021    ALT 13 02/15/2022    AST 16 02/15/2022     02/15/2022    K 3.7 02/15/2022     02/15/2022    CREATININE 0.9 02/15/2022    BUN 11 02/15/2022    CO2 28 02/15/2022    TSH 2.519 04/15/2021    INR 0.9 04/19/2012    GLUF 105 10/27/2004    HGBA1C 7.1 (H) 05/10/2022       I have explained the risks and benefits of endoscopy procedures to the patient including but not limited to bleeding, perforation, infection, and death.    Pierce Rivera MD

## 2022-06-07 NOTE — TRANSFER OF CARE
"Anesthesia Transfer of Care Note    Patient: Chelly Ortiz    Procedure(s) Performed: Procedure(s) (LRB):  COLONOSCOPY (N/A)    Patient location: GI    Anesthesia Type: MAC    Transport from OR: Transported from OR on room air with adequate spontaneous ventilation    Post pain: adequate analgesia    Post assessment: no apparent anesthetic complications and tolerated procedure well    Post vital signs: stable    Level of consciousness: awake, alert and oriented    Nausea/Vomiting: no nausea/vomiting    Complications: none    Transfer of care protocol was followed      Last vitals:   Visit Vitals  BP (!) 162/75   Pulse 87   Temp 37.2 °C (99 °F)   Resp 18   Ht 5' 3" (1.6 m)   Wt 89.4 kg (197 lb)   LMP  (LMP Unknown)   SpO2 95%   Breastfeeding No   BMI 34.90 kg/m²     "

## 2022-06-08 ENCOUNTER — OFFICE VISIT (OUTPATIENT)
Dept: PSYCHIATRY | Facility: CLINIC | Age: 72
End: 2022-06-08
Payer: MEDICARE

## 2022-06-08 DIAGNOSIS — F41.9 ANXIETY: ICD-10-CM

## 2022-06-08 DIAGNOSIS — F32.0 MILD MAJOR DEPRESSION: Primary | ICD-10-CM

## 2022-06-08 PROCEDURE — 1159F MED LIST DOCD IN RCRD: CPT | Mod: CPTII,S$GLB,, | Performed by: SOCIAL WORKER

## 2022-06-08 PROCEDURE — 4010F ACE/ARB THERAPY RXD/TAKEN: CPT | Mod: CPTII,S$GLB,, | Performed by: SOCIAL WORKER

## 2022-06-08 PROCEDURE — 99999 PR PBB SHADOW E&M-EST. PATIENT-LVL I: ICD-10-PCS | Mod: PBBFAC,,, | Performed by: SOCIAL WORKER

## 2022-06-08 PROCEDURE — 3051F PR MOST RECENT HEMOGLOBIN A1C LEVEL 7.0 - < 8.0%: ICD-10-PCS | Mod: CPTII,S$GLB,, | Performed by: SOCIAL WORKER

## 2022-06-08 PROCEDURE — 4010F PR ACE/ARB THEARPY RXD/TAKEN: ICD-10-PCS | Mod: CPTII,S$GLB,, | Performed by: SOCIAL WORKER

## 2022-06-08 PROCEDURE — 90791 PR PSYCHIATRIC DIAGNOSTIC EVALUATION: ICD-10-PCS | Mod: S$GLB,,, | Performed by: SOCIAL WORKER

## 2022-06-08 PROCEDURE — 90791 PSYCH DIAGNOSTIC EVALUATION: CPT | Mod: S$GLB,,, | Performed by: SOCIAL WORKER

## 2022-06-08 PROCEDURE — 3051F HG A1C>EQUAL 7.0%<8.0%: CPT | Mod: CPTII,S$GLB,, | Performed by: SOCIAL WORKER

## 2022-06-08 PROCEDURE — 1159F PR MEDICATION LIST DOCUMENTED IN MEDICAL RECORD: ICD-10-PCS | Mod: CPTII,S$GLB,, | Performed by: SOCIAL WORKER

## 2022-06-08 PROCEDURE — 99999 PR PBB SHADOW E&M-EST. PATIENT-LVL I: CPT | Mod: PBBFAC,,, | Performed by: SOCIAL WORKER

## 2022-06-08 NOTE — PROGRESS NOTES
Psychiatry Initial Visit (PhD/LCSW)  Diagnostic Interview - CPT 69453    Date: 6/8/2022    Site: [facility]    Referral source: Self-referral     Clinical status of patient: Outpatient    HISTORY OF PRESENTING ILLNESS:  Chelly Ortiz, a 71 y.o. female, for initial evaluation visit with history of marital issues. Met with patient.     Patient does not currently have a psychiatrist.    Patient does not currently have a therapist.     Previous Psychiatric Outpatient Treatment:  No  Current medications and drug reactions (include OTC, herbal): see medication list  She is taking Trazadone 50 mg for sleep.     Chief complaint/reason for encounter: Psychological Evaluation and treatment recommendations        Current symptoms:  · Depression: denies.  · Anxiety: denies  · Insomnia: non-restful sleep, using C-PAP with improvement   · Kenzie:  denies.  · Psychosis: denies .    PHQ9 5/16/2019   Little interest or pleasure in doing things: Not at all   Feeling down, depressed or hopeless: Not at all   Trouble falling asleep, staying asleep, or sleeping too much: Nearly every day   Feeling tired or having little energy: Not at all   Poor appetite or overeating: Not at all   Feeling bad about yourself- or that you are a failure or have let yourself or family down Not at all   Trouble concentrating on things, such as reading the newspaper or watching television: Not at all   Moving or speaking so slowly that other people could have noticed. Or the opposite- being so fidgety or restless that you have been moving around a lot more than usual: Not at all   Thoughts that you would be better off dead or hurting yourself in some way: Not at all   If you indicated you have experienced any of the aforementioned problems, how difficult have these problems made it for you to do your work, take care of things at home or get along with other people? Not difficult at all   Total Score 3     GAD7 5/16/2019   1. Feeling nervous,  "anxious, or on edge? 0   2. Not being able to stop or control worrying? 0   3. Worrying too much about different things? 0   4. Trouble relaxing? 0   5. Being so restless that it is hard to sit still? 0   6. Becoming easily annoyed or irritable? 0   7. Feeling afraid as if something awful might happen? 0   8. If you checked off any problems, how difficult have these problems made it for you to do your work, take care of things at home, or get along with other people? 0   DAVID-7 Score 0        Current social stressors:   Pt reported she is in a 52 year marriage and she and her  retired 2 years ago.   Pt reported "things aren't going well." Pt reported the marriage was not going well before.   Pt stated, "I like to talk." Pt stated, "It is just boring." She reported she suspected that her  has been unfaithful    Risk assessment:  Patient reports no suicidal ideation  Patient reports no homicidal ideation  Patient reports no self-injurious behavior  Patient reports no violent behavior    PSYCHIATRIC HISTORY:  Previous Psychiatric Hospitalizations:  No  Previous SI/HI:   No  Previous Suicide Attempts:  No  Head trauma:  No  History of Trauma:  No  Legal Issues: No  Access to a Gun:  Yes, somewhere in the house. Pt stated, "I am afraid of guns."     SUBSTANCE ABUSE HISTORY:  Tobacco:  Yes -  Quit in 1983   Alcohol: occasional  Illicit Substances: No  Misuse of Prescription Medications:  No  Caffeine: coffee, 1 cup a day     MEDICAL HISTORY:  Past Medical History:   Diagnosis Date    Allergy     Cataract     DDD (degenerative disc disease), lumbar     Diabetes mellitus     diet controlled    Diabetes mellitus, type 2     GERD (gastroesophageal reflux disease)     History of subconjunctival hemorrhage 12/2/11    left eye    Hyperlipidemia     Hypertension     IBS (irritable bowel syndrome)     Obesity     MYRIAM (obstructive sleep apnea)     on CPAP    Rotator cuff impingement syndrome     Seasonal " allergic conjunctivitis        SOCIAL HISTORY (MARRIAGE, EMPLOYMENT, etc.):  Living Situation: She and Federico   Family Life Cycle: She has 3 sisters   Nuclear/Marriage: 52 y.o. 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   Hinduism/Spirituality: Hindu   Hobbies and Interests: Like to travel     PSYCHIATRIC FAMILY HISTORY:   Sister- in [place], Schizophrenia     Strengths and liabilities: Strength: Patient accepts guidance/feedback, Strength: Patient is expressive/articulate., Strength: Patient is intelligent., Strength: Patient is motivated for change.    Current Evaluation:     Mental Status Exam:  General Appearance:  age appropriate, obese   Speech: normal tone, normal rate, normal pitch, normal volume      Level of Cooperation: cooperative      Thought Processes: normal and logical   Mood: anxious, depressed      Thought Content: normal, no suicidality, no homicidality, delusions, or paranoia   Affect: congruent and appropriate   Orientation: Oriented x3   Memory: recent >  intact, remote >  intact   Attention Span & Concentration: intact   Fund of General Knowledge: intact and appropriate to age and level of education   Abstract Reasoning: appropriate   Judgment & Insight: intact     Language  intact     Diagnostic Impression - Plan:     No diagnosis found.    TREATMENT GOALS: to be discussed in next session    PLAN: In this session a psychiatric evaluation was conducted to get history and process pt's life. Therapist reviewed limits of confidentiality, missed appt/late show rules, need to arrive on time and expectation they will be an active participant in their care by asking questions, notifying staff of any medical problems. Interpersonal Processing Therapy  will be utilized in future individual therapy sessions to increase support and behavior modification.     Return to Clinic: as scheduled    Length of Service (minutes): 60

## 2022-06-10 ENCOUNTER — PATIENT MESSAGE (OUTPATIENT)
Dept: GASTROENTEROLOGY | Facility: CLINIC | Age: 72
End: 2022-06-10
Payer: MEDICARE

## 2022-06-10 LAB
FINAL PATHOLOGIC DIAGNOSIS: NORMAL
GROSS: NORMAL
Lab: NORMAL

## 2022-06-13 ENCOUNTER — LAB VISIT (OUTPATIENT)
Dept: LAB | Facility: HOSPITAL | Age: 72
End: 2022-06-13
Attending: FAMILY MEDICINE
Payer: MEDICARE

## 2022-06-13 DIAGNOSIS — I10 ESSENTIAL HYPERTENSION: ICD-10-CM

## 2022-06-13 DIAGNOSIS — E66.01 SEVERE OBESITY WITH BODY MASS INDEX (BMI) OF 35.0 TO 39.9 WITH SERIOUS COMORBIDITY: ICD-10-CM

## 2022-06-13 DIAGNOSIS — Z91.89 SEDENTARY LIFESTYLE: ICD-10-CM

## 2022-06-13 DIAGNOSIS — Z79.4 TYPE 2 DIABETES MELLITUS WITH HYPERGLYCEMIA, WITH LONG-TERM CURRENT USE OF INSULIN: ICD-10-CM

## 2022-06-13 DIAGNOSIS — E11.65 TYPE 2 DIABETES MELLITUS WITH HYPERGLYCEMIA, WITH LONG-TERM CURRENT USE OF INSULIN: ICD-10-CM

## 2022-06-13 LAB
ALBUMIN SERPL BCP-MCNC: 3.7 G/DL (ref 3.5–5.2)
ALP SERPL-CCNC: 105 U/L (ref 55–135)
ALT SERPL W/O P-5'-P-CCNC: 21 U/L (ref 10–44)
ANION GAP SERPL CALC-SCNC: 15 MMOL/L (ref 8–16)
AST SERPL-CCNC: 18 U/L (ref 10–40)
BILIRUB SERPL-MCNC: 0.6 MG/DL (ref 0.1–1)
BUN SERPL-MCNC: 9 MG/DL (ref 8–23)
CALCIUM SERPL-MCNC: 8.9 MG/DL (ref 8.7–10.5)
CHLORIDE SERPL-SCNC: 101 MMOL/L (ref 95–110)
CHOLEST SERPL-MCNC: 202 MG/DL (ref 120–199)
CHOLEST/HDLC SERPL: 3.8 {RATIO} (ref 2–5)
CO2 SERPL-SCNC: 24 MMOL/L (ref 23–29)
CREAT SERPL-MCNC: 0.8 MG/DL (ref 0.5–1.4)
EST. GFR  (AFRICAN AMERICAN): >60 ML/MIN/1.73 M^2
EST. GFR  (NON AFRICAN AMERICAN): >60 ML/MIN/1.73 M^2
ESTIMATED AVG GLUCOSE: 154 MG/DL (ref 68–131)
GLUCOSE SERPL-MCNC: 90 MG/DL (ref 70–110)
HBA1C MFR BLD: 7 % (ref 4–5.6)
HDLC SERPL-MCNC: 53 MG/DL (ref 40–75)
HDLC SERPL: 26.2 % (ref 20–50)
LDLC SERPL CALC-MCNC: 111.8 MG/DL (ref 63–159)
NONHDLC SERPL-MCNC: 149 MG/DL
POTASSIUM SERPL-SCNC: 3.3 MMOL/L (ref 3.5–5.1)
PROT SERPL-MCNC: 6.9 G/DL (ref 6–8.4)
SODIUM SERPL-SCNC: 140 MMOL/L (ref 136–145)
TRIGL SERPL-MCNC: 186 MG/DL (ref 30–150)

## 2022-06-13 PROCEDURE — 80061 LIPID PANEL: CPT | Performed by: FAMILY MEDICINE

## 2022-06-13 PROCEDURE — 83036 HEMOGLOBIN GLYCOSYLATED A1C: CPT | Performed by: FAMILY MEDICINE

## 2022-06-13 PROCEDURE — 36415 COLL VENOUS BLD VENIPUNCTURE: CPT | Mod: PO | Performed by: FAMILY MEDICINE

## 2022-06-13 PROCEDURE — 80053 COMPREHEN METABOLIC PANEL: CPT | Performed by: FAMILY MEDICINE

## 2022-06-15 ENCOUNTER — OFFICE VISIT (OUTPATIENT)
Dept: FAMILY MEDICINE | Facility: CLINIC | Age: 72
End: 2022-06-15
Payer: MEDICARE

## 2022-06-15 ENCOUNTER — PATIENT OUTREACH (OUTPATIENT)
Dept: ADMINISTRATIVE | Facility: OTHER | Age: 72
End: 2022-06-15
Payer: MEDICARE

## 2022-06-15 VITALS
BODY MASS INDEX: 35.66 KG/M2 | DIASTOLIC BLOOD PRESSURE: 72 MMHG | OXYGEN SATURATION: 95 % | HEIGHT: 63 IN | SYSTOLIC BLOOD PRESSURE: 120 MMHG | WEIGHT: 201.25 LBS | HEART RATE: 91 BPM

## 2022-06-15 DIAGNOSIS — K57.90 DIVERTICULOSIS: Primary | ICD-10-CM

## 2022-06-15 DIAGNOSIS — M19.90 ARTHRITIS: ICD-10-CM

## 2022-06-15 DIAGNOSIS — Z79.4 TYPE 2 DIABETES MELLITUS WITH HYPERGLYCEMIA, WITH LONG-TERM CURRENT USE OF INSULIN: ICD-10-CM

## 2022-06-15 DIAGNOSIS — R76.8 ANA POSITIVE: ICD-10-CM

## 2022-06-15 DIAGNOSIS — R79.82 CRP ELEVATED: ICD-10-CM

## 2022-06-15 DIAGNOSIS — E11.65 TYPE 2 DIABETES MELLITUS WITH HYPERGLYCEMIA, WITH LONG-TERM CURRENT USE OF INSULIN: ICD-10-CM

## 2022-06-15 PROCEDURE — 3008F BODY MASS INDEX DOCD: CPT | Mod: CPTII,S$GLB,, | Performed by: FAMILY MEDICINE

## 2022-06-15 PROCEDURE — 3051F PR MOST RECENT HEMOGLOBIN A1C LEVEL 7.0 - < 8.0%: ICD-10-PCS | Mod: CPTII,S$GLB,, | Performed by: FAMILY MEDICINE

## 2022-06-15 PROCEDURE — 3051F HG A1C>EQUAL 7.0%<8.0%: CPT | Mod: CPTII,S$GLB,, | Performed by: FAMILY MEDICINE

## 2022-06-15 PROCEDURE — 3066F NEPHROPATHY DOC TX: CPT | Mod: CPTII,S$GLB,, | Performed by: FAMILY MEDICINE

## 2022-06-15 PROCEDURE — 99999 PR PBB SHADOW E&M-EST. PATIENT-LVL V: CPT | Mod: PBBFAC,,, | Performed by: FAMILY MEDICINE

## 2022-06-15 PROCEDURE — 3061F NEG MICROALBUMINURIA REV: CPT | Mod: CPTII,S$GLB,, | Performed by: FAMILY MEDICINE

## 2022-06-15 PROCEDURE — 3074F PR MOST RECENT SYSTOLIC BLOOD PRESSURE < 130 MM HG: ICD-10-PCS | Mod: CPTII,S$GLB,, | Performed by: FAMILY MEDICINE

## 2022-06-15 PROCEDURE — 1125F PR PAIN SEVERITY QUANTIFIED, PAIN PRESENT: ICD-10-PCS | Mod: CPTII,S$GLB,, | Performed by: FAMILY MEDICINE

## 2022-06-15 PROCEDURE — 1159F PR MEDICATION LIST DOCUMENTED IN MEDICAL RECORD: ICD-10-PCS | Mod: CPTII,S$GLB,, | Performed by: FAMILY MEDICINE

## 2022-06-15 PROCEDURE — 1101F PT FALLS ASSESS-DOCD LE1/YR: CPT | Mod: CPTII,S$GLB,, | Performed by: FAMILY MEDICINE

## 2022-06-15 PROCEDURE — 99999 PR PBB SHADOW E&M-EST. PATIENT-LVL V: ICD-10-PCS | Mod: PBBFAC,,, | Performed by: FAMILY MEDICINE

## 2022-06-15 PROCEDURE — 4010F PR ACE/ARB THEARPY RXD/TAKEN: ICD-10-PCS | Mod: CPTII,S$GLB,, | Performed by: FAMILY MEDICINE

## 2022-06-15 PROCEDURE — 1101F PR PT FALLS ASSESS DOC 0-1 FALLS W/OUT INJ PAST YR: ICD-10-PCS | Mod: CPTII,S$GLB,, | Performed by: FAMILY MEDICINE

## 2022-06-15 PROCEDURE — 3078F PR MOST RECENT DIASTOLIC BLOOD PRESSURE < 80 MM HG: ICD-10-PCS | Mod: CPTII,S$GLB,, | Performed by: FAMILY MEDICINE

## 2022-06-15 PROCEDURE — 3066F PR DOCUMENTATION OF TREATMENT FOR NEPHROPATHY: ICD-10-PCS | Mod: CPTII,S$GLB,, | Performed by: FAMILY MEDICINE

## 2022-06-15 PROCEDURE — 3061F PR NEG MICROALBUMINURIA RESULT DOCUMENTED/REVIEW: ICD-10-PCS | Mod: CPTII,S$GLB,, | Performed by: FAMILY MEDICINE

## 2022-06-15 PROCEDURE — 3074F SYST BP LT 130 MM HG: CPT | Mod: CPTII,S$GLB,, | Performed by: FAMILY MEDICINE

## 2022-06-15 PROCEDURE — 1159F MED LIST DOCD IN RCRD: CPT | Mod: CPTII,S$GLB,, | Performed by: FAMILY MEDICINE

## 2022-06-15 PROCEDURE — 3288F PR FALLS RISK ASSESSMENT DOCUMENTED: ICD-10-PCS | Mod: CPTII,S$GLB,, | Performed by: FAMILY MEDICINE

## 2022-06-15 PROCEDURE — 3078F DIAST BP <80 MM HG: CPT | Mod: CPTII,S$GLB,, | Performed by: FAMILY MEDICINE

## 2022-06-15 PROCEDURE — 4010F ACE/ARB THERAPY RXD/TAKEN: CPT | Mod: CPTII,S$GLB,, | Performed by: FAMILY MEDICINE

## 2022-06-15 PROCEDURE — 99215 OFFICE O/P EST HI 40 MIN: CPT | Mod: S$GLB,,, | Performed by: FAMILY MEDICINE

## 2022-06-15 PROCEDURE — 99215 PR OFFICE/OUTPT VISIT, EST, LEVL V, 40-54 MIN: ICD-10-PCS | Mod: S$GLB,,, | Performed by: FAMILY MEDICINE

## 2022-06-15 PROCEDURE — 3288F FALL RISK ASSESSMENT DOCD: CPT | Mod: CPTII,S$GLB,, | Performed by: FAMILY MEDICINE

## 2022-06-15 PROCEDURE — 3008F PR BODY MASS INDEX (BMI) DOCUMENTED: ICD-10-PCS | Mod: CPTII,S$GLB,, | Performed by: FAMILY MEDICINE

## 2022-06-15 PROCEDURE — 1125F AMNT PAIN NOTED PAIN PRSNT: CPT | Mod: CPTII,S$GLB,, | Performed by: FAMILY MEDICINE

## 2022-06-15 RX ORDER — PIROXICAM 20 MG/1
20 CAPSULE ORAL DAILY
Qty: 30 CAPSULE | Refills: 0 | Status: SHIPPED | OUTPATIENT
Start: 2022-06-15 | End: 2022-07-13

## 2022-06-15 RX ORDER — LANCETS
1 EACH MISCELLANEOUS 3 TIMES DAILY
Qty: 200 EACH | Refills: 6 | Status: SHIPPED | OUTPATIENT
Start: 2022-06-15 | End: 2022-07-21

## 2022-06-15 NOTE — PROGRESS NOTES
Follow-up Outreach - Due: 12/12/2022     Client came into the office today and we discussed the resources that were working on and she stated she is really making better strides in her health and wellness care. She stated that she still has obstacles to overcome however, she is doing more exercising and watching what she eats. She has been taking her medication as prescribed and she can feel the effects. She is no longer looking for housing and she is going to try some community classes. I will follow up with this client on her next visit.

## 2022-06-15 NOTE — PROGRESS NOTES
Subjective:       Patient ID: Chelly Ortiz is a 71 y.o. female.    Chief Complaint: Follow-up, Hypertension, Diabetes, Hand Pain, Back Pain, Knee Pain, and Neck Pain    71 years old female came to the clinic for diabetes follow-up.  No polyuria, polydipsia or polyphagia.  Last colonoscopy with evidence of diverticulosis.  Patient with follow-up with the rheumatologist.  Last JESSICA was positive.  Patient with elevated CRP.  Patient with multiple joints pain.  Pain mainly in both shoulders.  The pain is 6/10 of intensity on and off aggravated with activity better with rest.    Review of Systems   Constitutional: Negative.    HENT: Negative.    Eyes: Negative.    Respiratory: Negative.    Gastrointestinal: Negative.    Endocrine: Negative for polydipsia, polyphagia and polyuria.   Genitourinary: Negative.    Musculoskeletal: Negative.    Neurological: Negative.    Psychiatric/Behavioral: Negative.          Objective:      Physical Exam  Constitutional:       General: She is not in acute distress.     Appearance: She is well-developed. She is not diaphoretic.   HENT:      Head: Normocephalic and atraumatic.      Right Ear: External ear normal.      Left Ear: External ear normal.      Nose: Nose normal.      Mouth/Throat:      Pharynx: No oropharyngeal exudate.   Eyes:      General: No scleral icterus.        Right eye: No discharge.         Left eye: No discharge.      Conjunctiva/sclera: Conjunctivae normal.      Pupils: Pupils are equal, round, and reactive to light.   Neck:      Thyroid: No thyromegaly.      Vascular: No JVD.      Trachea: No tracheal deviation.   Cardiovascular:      Rate and Rhythm: Normal rate and regular rhythm.      Heart sounds: Normal heart sounds. No murmur heard.    No friction rub. No gallop.   Pulmonary:      Effort: Pulmonary effort is normal. No respiratory distress.      Breath sounds: Normal breath sounds. No stridor. No wheezing or rales.   Chest:      Chest wall: No tenderness.    Abdominal:      General: Bowel sounds are normal. There is no distension.      Palpations: Abdomen is soft. There is no mass.      Tenderness: There is no abdominal tenderness. There is no guarding or rebound.   Musculoskeletal:         General: No tenderness. Normal range of motion.      Cervical back: Normal range of motion and neck supple.   Lymphadenopathy:      Cervical: No cervical adenopathy.   Skin:     General: Skin is warm and dry.      Coloration: Skin is not pale.      Findings: No erythema or rash.   Neurological:      Mental Status: She is alert and oriented to person, place, and time.      Cranial Nerves: No cranial nerve deficit.      Motor: No abnormal muscle tone.      Coordination: Coordination normal.      Deep Tendon Reflexes: Reflexes are normal and symmetric.   Psychiatric:         Behavior: Behavior normal.         Thought Content: Thought content normal.         Judgment: Judgment normal.         Assessment:       Problem List Items Addressed This Visit     Type 2 diabetes mellitus with hyperglycemia, with long-term current use of insulin    Relevant Medications    lancets (ACCU-CHEK FASTCLIX LANCET DRUM) Misc      Other Visit Diagnoses     Diverticulosis    -  Primary    JESSICA positive        CRP elevated        Arthritis        Relevant Medications    piroxicam (FELDENE) 20 MG capsule          Plan:         Chelly was seen today for follow-up, hypertension, diabetes, hand pain, back pain, knee pain and neck pain.    Diagnoses and all orders for this visit:    Diverticulosis    JESSICA positive    CRP elevated    Arthritis  -     piroxicam (FELDENE) 20 MG capsule; Take 1 capsule (20 mg total) by mouth once daily.    Type 2 diabetes mellitus with hyperglycemia, with long-term current use of insulin  -     lancets (ACCU-CHEK FASTCLIX LANCET DRUM) Misc; Inject 1 lancet into the skin 3 (three) times daily. use as directed    Continue monitoring blood sugar at home,ADA diet.

## 2022-06-16 ENCOUNTER — OFFICE VISIT (OUTPATIENT)
Dept: RHEUMATOLOGY | Facility: CLINIC | Age: 72
End: 2022-06-16
Payer: MEDICARE

## 2022-06-16 ENCOUNTER — HOSPITAL ENCOUNTER (OUTPATIENT)
Dept: RADIOLOGY | Facility: HOSPITAL | Age: 72
Discharge: HOME | End: 2022-06-16
Attending: INTERNAL MEDICINE
Payer: MEDICARE

## 2022-06-16 VITALS
SYSTOLIC BLOOD PRESSURE: 115 MMHG | OXYGEN SATURATION: 95 % | RESPIRATION RATE: 20 BRPM | HEIGHT: 63 IN | HEART RATE: 86 BPM | BODY MASS INDEX: 35.76 KG/M2 | WEIGHT: 201.81 LBS | DIASTOLIC BLOOD PRESSURE: 60 MMHG

## 2022-06-16 DIAGNOSIS — M15.9 PRIMARY OSTEOARTHRITIS INVOLVING MULTIPLE JOINTS: ICD-10-CM

## 2022-06-16 DIAGNOSIS — E66.01 MORBID OBESITY: ICD-10-CM

## 2022-06-16 DIAGNOSIS — R76.8 POSITIVE ANA (ANTINUCLEAR ANTIBODY): Primary | ICD-10-CM

## 2022-06-16 DIAGNOSIS — Z71.89 COUNSELING AND COORDINATION OF CARE: ICD-10-CM

## 2022-06-16 DIAGNOSIS — Z79.1 NSAID LONG-TERM USE: ICD-10-CM

## 2022-06-16 PROCEDURE — 3061F NEG MICROALBUMINURIA REV: CPT | Mod: CPTII,S$GLB,, | Performed by: INTERNAL MEDICINE

## 2022-06-16 PROCEDURE — 73562 X-RAY EXAM OF KNEE 3: CPT | Mod: 26,RT,, | Performed by: RADIOLOGY

## 2022-06-16 PROCEDURE — 3066F PR DOCUMENTATION OF TREATMENT FOR NEPHROPATHY: ICD-10-PCS | Mod: CPTII,S$GLB,, | Performed by: INTERNAL MEDICINE

## 2022-06-16 PROCEDURE — 73030 PR  X-RAY SHOULDER 2+ VW: ICD-10-PCS | Mod: 26,LT,, | Performed by: INTERNAL MEDICINE

## 2022-06-16 PROCEDURE — 1159F MED LIST DOCD IN RCRD: CPT | Mod: CPTII,S$GLB,, | Performed by: INTERNAL MEDICINE

## 2022-06-16 PROCEDURE — 99204 OFFICE O/P NEW MOD 45 MIN: CPT | Mod: S$GLB,,, | Performed by: INTERNAL MEDICINE

## 2022-06-16 PROCEDURE — 73030 X-RAY EXAM OF SHOULDER: CPT | Mod: 26,RT,, | Performed by: INTERNAL MEDICINE

## 2022-06-16 PROCEDURE — 73562 X-RAY EXAM OF KNEE 3: CPT | Mod: TC,50,FY

## 2022-06-16 PROCEDURE — 3008F BODY MASS INDEX DOCD: CPT | Mod: CPTII,S$GLB,, | Performed by: INTERNAL MEDICINE

## 2022-06-16 PROCEDURE — 4010F PR ACE/ARB THEARPY RXD/TAKEN: ICD-10-PCS | Mod: CPTII,S$GLB,, | Performed by: INTERNAL MEDICINE

## 2022-06-16 PROCEDURE — 3051F HG A1C>EQUAL 7.0%<8.0%: CPT | Mod: CPTII,S$GLB,, | Performed by: INTERNAL MEDICINE

## 2022-06-16 PROCEDURE — 3078F DIAST BP <80 MM HG: CPT | Mod: CPTII,S$GLB,, | Performed by: INTERNAL MEDICINE

## 2022-06-16 PROCEDURE — 3074F PR MOST RECENT SYSTOLIC BLOOD PRESSURE < 130 MM HG: ICD-10-PCS | Mod: CPTII,S$GLB,, | Performed by: INTERNAL MEDICINE

## 2022-06-16 PROCEDURE — 1125F AMNT PAIN NOTED PAIN PRSNT: CPT | Mod: CPTII,S$GLB,, | Performed by: INTERNAL MEDICINE

## 2022-06-16 PROCEDURE — 4010F ACE/ARB THERAPY RXD/TAKEN: CPT | Mod: CPTII,S$GLB,, | Performed by: INTERNAL MEDICINE

## 2022-06-16 PROCEDURE — 3066F NEPHROPATHY DOC TX: CPT | Mod: CPTII,S$GLB,, | Performed by: INTERNAL MEDICINE

## 2022-06-16 PROCEDURE — 99999 PR PBB SHADOW E&M-EST. PATIENT-LVL III: CPT | Mod: PBBFAC,,, | Performed by: INTERNAL MEDICINE

## 2022-06-16 PROCEDURE — 1125F PR PAIN SEVERITY QUANTIFIED, PAIN PRESENT: ICD-10-PCS | Mod: CPTII,S$GLB,, | Performed by: INTERNAL MEDICINE

## 2022-06-16 PROCEDURE — 3061F PR NEG MICROALBUMINURIA RESULT DOCUMENTED/REVIEW: ICD-10-PCS | Mod: CPTII,S$GLB,, | Performed by: INTERNAL MEDICINE

## 2022-06-16 PROCEDURE — 3074F SYST BP LT 130 MM HG: CPT | Mod: CPTII,S$GLB,, | Performed by: INTERNAL MEDICINE

## 2022-06-16 PROCEDURE — 73562 PR  X-RAY KNEE 3 VIEW: ICD-10-PCS | Mod: 26,LT,, | Performed by: RADIOLOGY

## 2022-06-16 PROCEDURE — 73030 X-RAY EXAM OF SHOULDER: CPT | Mod: TC,50,FY

## 2022-06-16 PROCEDURE — 3078F PR MOST RECENT DIASTOLIC BLOOD PRESSURE < 80 MM HG: ICD-10-PCS | Mod: CPTII,S$GLB,, | Performed by: INTERNAL MEDICINE

## 2022-06-16 PROCEDURE — 73562 X-RAY EXAM OF KNEE 3: CPT | Mod: 26,LT,, | Performed by: RADIOLOGY

## 2022-06-16 PROCEDURE — 99204 PR OFFICE/OUTPT VISIT, NEW, LEVL IV, 45-59 MIN: ICD-10-PCS | Mod: S$GLB,,, | Performed by: INTERNAL MEDICINE

## 2022-06-16 PROCEDURE — 3008F PR BODY MASS INDEX (BMI) DOCUMENTED: ICD-10-PCS | Mod: CPTII,S$GLB,, | Performed by: INTERNAL MEDICINE

## 2022-06-16 PROCEDURE — 73030 X-RAY EXAM OF SHOULDER: CPT | Mod: 26,LT,, | Performed by: INTERNAL MEDICINE

## 2022-06-16 PROCEDURE — 1159F PR MEDICATION LIST DOCUMENTED IN MEDICAL RECORD: ICD-10-PCS | Mod: CPTII,S$GLB,, | Performed by: INTERNAL MEDICINE

## 2022-06-16 PROCEDURE — 3051F PR MOST RECENT HEMOGLOBIN A1C LEVEL 7.0 - < 8.0%: ICD-10-PCS | Mod: CPTII,S$GLB,, | Performed by: INTERNAL MEDICINE

## 2022-06-16 PROCEDURE — 99999 PR PBB SHADOW E&M-EST. PATIENT-LVL III: ICD-10-PCS | Mod: PBBFAC,,, | Performed by: INTERNAL MEDICINE

## 2022-06-16 NOTE — PROGRESS NOTES
RHEUMATOLOGY OUTPATIENT CLINIC NOTE    6/16/2022    Attending Rheumatologist: Pierce Ledesma  Primary Care Provider: Gabriel Wilson MD   Physician Requesting Consultation: Tami Junior NP  [address] [facility]  [place] [address]  Chief Complaint/Reason For Consultation:  No chief complaint on file.      Subjective:       HPI  Chelly Ortiz is a 71 y.o. Black or  female with medical history noted below who presents for evaluation of +JESSICA.     Patient presents for evaluation of +JESSICA. JESSICA checked in the setting of joint pain. Patient notes joint pain since the age of 26, recalls being pregnant with her 2nd child. Though she reports that in the last 3 months progressive joint pain, affecting daily life. Worse joints are knees and shoulders. Pain is worse at night, was taking NSAIDs with relief. No swelling, no morning stiffness. +Hair thinning, dry eyes, easy bruising, GERD, myalgias.  No Alopecia, Oral/Nasal Ulcers, Rash, Photosensitivity, Dry Mouth, Pleuritis/serositis, LAD, Raynaud's, Miscarriages/Blood clots, Skin tightening, SOB, chest pain, fever, fatigue, wt loss, HA. Mpm with arthritis.     Review of Systems   Constitutional: Negative for chills, fatigue, fever and unexpected weight change.   HENT: Negative for mouth sores.    Eyes: Positive for eye dryness. Negative for redness.   Respiratory: Negative for cough and shortness of breath.    Cardiovascular: Negative for chest pain.   Gastrointestinal: Negative for abdominal distention, constipation, diarrhea, nausea and vomiting.   Genitourinary: Negative for vaginal dryness.   Musculoskeletal: Positive for arthralgias, back pain and leg pain. Negative for gait problem, joint swelling, myalgias, neck pain, neck stiffness and joint deformity.   Integumentary:  Negative for rash.   Neurological: Negative for weakness, numbness and headaches.   Hematological: Negative for adenopathy. Bruises/bleeds easily.    Psychiatric/Behavioral: Negative for confusion, decreased concentration and sleep disturbance. The patient is not nervous/anxious.    All other systems reviewed and are negative.       Chronic comorbid conditions affecting medical decision making today:  Past Medical History:   Diagnosis Date    Allergy     Cataract     DDD (degenerative disc disease), lumbar     Diabetes mellitus     diet controlled    Diabetes mellitus, type 2     GERD (gastroesophageal reflux disease)     History of subconjunctival hemorrhage 11    left eye    Hyperlipidemia     Hypertension     IBS (irritable bowel syndrome)     Obesity     MYRIAM (obstructive sleep apnea)     on CPAP    Rotator cuff impingement syndrome     Seasonal allergic conjunctivitis      Past Surgical History:   Procedure Laterality Date    CATARACT EXTRACTION W/  INTRAOCULAR LENS IMPLANT Right 10/03/2013         SECTION      x2    CHOLECYSTECTOMY      COLONOSCOPY N/A 2018    Procedure: COLONOSCOPY;  Surgeon: Marie Mejia MD;  Location: [facility] ENDO;  Service: Endoscopy;  Laterality: N/A;    COLONOSCOPY N/A 2022    Procedure: COLONOSCOPY;  Surgeon: Pierce Rivera MD;  Location: [facility] ENDO;  Service: Endoscopy;  Laterality: N/A;    ENDOSCOPIC ULTRASOUND OF UPPER GASTROINTESTINAL TRACT N/A 10/9/2018    Procedure: ULTRASOUND, UPPER GI TRACT, ENDOSCOPIC;  Surgeon: Javy Simpson MD;  Location: [facility] ENDO;  Service: Endoscopy;  Laterality: N/A;    EXCISION OF LESION N/A 2019    Procedure: EXCISION, LESION VULVA;  Surgeon: Liv Odell MD;  Location: [facility] OR;  Service: OB/GYN;  Laterality: N/A;  latex allergy    EYE SURGERY      HYSTERECTOMY      ovaries intact, due to DUB    TUBAL LIGATION       Family History   Problem Relation Age of Onset    Alzheimer's disease Mother     Heart disease Father     Diabetes Sister     Breast cancer Sister     Deep vein thrombosis Brother     Diabetes Daughter      Cancer Brother         Lung    Lung cancer Brother     Amblyopia Neg Hx     Blindness Neg Hx     Cataracts Neg Hx     Glaucoma Neg Hx     Hypertension Neg Hx     Macular degeneration Neg Hx     Retinal detachment Neg Hx     Strabismus Neg Hx     Stroke Neg Hx     Thyroid disease Neg Hx      Social History     Substance and Sexual Activity   Alcohol Use No     Social History     Tobacco Use   Smoking Status Former Smoker    Quit date: 2/15/1983    Years since quittin.3   Smokeless Tobacco Never Used     Social History     Substance and Sexual Activity   Drug Use No       Current Outpatient Medications:     ACCU-CHEK SHILOH PLUS METER Misc, Inject 1 Units into the skin 2 (two) times daily., Disp: 1 each, Rfl: 0    albuterol (PROVENTIL/VENTOLIN HFA) 90 mcg/actuation inhaler, Inhale 1-2 puffs into the lungs every 4 (four) hours as needed for Wheezing., Disp: 18 g, Rfl: 2    amLODIPine (NORVASC) 10 MG tablet, Take 1 tablet (10 mg total) by mouth once daily., Disp: 90 tablet, Rfl: 3    aspirin (ECOTRIN) 81 MG EC tablet, Take 81 mg by mouth once daily., Disp: , Rfl:     azelastine (ASTELIN) 137 mcg (0.1 %) nasal spray, 1 spray (137 mcg total) by Nasal route 2 (two) times daily., Disp: 30 mL, Rfl: 5    azelastine (OPTIVAR) 0.05 % ophthalmic solution, Place 1 drop into both eyes 2 (two) times daily., Disp: 6 mL, Rfl: 1    blood glucose control high,low (ACCU-CHEK SHILOH CONTROL SOLN) Soln, Use as directed, Disp: 1 each, Rfl: 3    blood sugar diagnostic (ACCU-CHEK SHILOH PLUS TEST STRP) Strp, Inject 1 strip into the skin 2 (two) times a day. Test Blood Sugar, Disp: 200 strip, Rfl: 3    cetirizine (ZYRTEC) 10 MG tablet, Take 1 tablet (10 mg total) by mouth every evening., Disp: 90 tablet, Rfl: 0    chlorhexidine (PERIDEX) 0.12 % solution, Use as directed 15 mLs in the mouth or throat daily as needed., Disp: , Rfl:     citalopram (CELEXA) 10 MG tablet, Take 1 tablet (10 mg total) by mouth once  "daily., Disp: 90 tablet, Rfl: 3    ergocalciferol (ERGOCALCIFEROL) 50,000 unit Cap, TAKE 1 CAPSULE EVERY 7 DAYS., Disp: 12 capsule, Rfl: 1    esomeprazole (NEXIUM) 40 MG capsule, Take 1 capsule (40 mg total) by mouth before breakfast., Disp: 90 capsule, Rfl: 3    fluticasone-salmeterol diskus inhaler 250-50 mcg, Inhale 1 puff by mouth into the lungs 2 (two) times daily. Controller, Disp: 60 each, Rfl: 1    gabapentin (NEURONTIN) 100 MG capsule, Take 1 capsule (100 mg total) by mouth 3 (three) times daily., Disp: 270 capsule, Rfl: 3    glucose 4 GM chewable tablet, Take 4 tablets (16 g total) by mouth as needed for Low blood sugar (If having symptoms of blurry vision, palpitations, confusion, shakiness.  Please check sugars and if sugar below 70 please take 4 tablets and re-check sugar everry 15 minutes until sugars are above 70 and symptoms resolve.)., Disp: 50 tablet, Rfl: 12    ibuprofen (ADVIL,MOTRIN) 600 MG tablet, Take 1 tablet (600 mg total) by mouth every 8 (eight) hours as needed for Pain., Disp: 15 tablet, Rfl: 0    insulin aspart U-100 (NOVOLOG FLEXPEN U-100 INSULIN) 100 unit/mL (3 mL) InPn pen, Inject 12 units w/ meals plus scale 150-200+2, 201-250+4, 251-300+6, 301-350+8, >350+10. Max daily 50 units., Disp: 15 mL, Rfl: 6    insulin detemir U-100 (LEVEMIR FLEXTOUCH U-100 INSULN) 100 unit/mL (3 mL) InPn pen, Inject 45 units into the skin at night., Disp: 15 mL, Rfl: 6    insulin syringe-needle U-100 0.3 mL 31 gauge x 5/16" Syrg, relion brand, use 5 x a day, if not on levemir or novolog, Disp: 150 each, Rfl: 1    insulin syringe-needle U-100 0.5 mL 31 gauge x 5/16" Syrg, Use nightly. 90 day, Disp: 100 each, Rfl: 3    lancets (ACCU-CHEK FASTCLIX LANCET DRUM) Misc, Inject 1 lancet into the skin 3 (three) times daily. use as directed, Disp: 200 each, Rfl: 6    losartan (COZAAR) 50 MG tablet, TAKE 1 TABLET EVERY DAY, Disp: 90 tablet, Rfl: 3    magnesium oxide (MAG-OX) 400 mg tablet, Take 1 tablet " "(400 mg total) by mouth 2 (two) times daily., Disp: 180 tablet, Rfl: 3    pen needle, diabetic 31 gauge x 3/16" Ndle, Uses 4 x a day., Disp: 400 each, Rfl: 3    piroxicam (FELDENE) 20 MG capsule, Take 1 capsule (20 mg total) by mouth once daily., Disp: 30 capsule, Rfl: 0    pravastatin (PRAVACHOL) 10 MG tablet, TAKE 1 TABLET EVERY DAY, Disp: 90 tablet, Rfl: 3    traZODone (DESYREL) 50 MG tablet, TAKE 1 TABLET EVERY NIGHT AS NEEDED, Disp: 90 tablet, Rfl: 3    TRULICITY 1.5 mg/0.5 mL pen injector, INJECT 1.5MG INTO THE SKIN EVERY 7 DAYS, Disp: 4 pen, Rfl: 5     Objective:         Vitals:    06/16/22 1458   BP: 115/60   Pulse: 86   Resp: 20     Physical Exam   Musculoskeletal:      Comments: Can make fist, no synovitis  Shoulder ROM ok, does have +Cross arm test b/l, right empty can+, negative sub acromial and bicep tendon   +Spurling to right   Knee crepitus   Negative ankle and MTP  Tender Points:  No   Yes  ( x)    ( )   Low cervical anterior aspect    (x )    ( )   Costochondral Junction   ( )    (x )   Lateral Epicondyle  (x )    ( )   Suboccipital   (x )    ( )   Trapezius    x)    ( )   Supraspinatus   (x )    ( )   Gluteal   (x )    ( )   Greater trochanter  ( )    (x )   Knee           Reviewed old and all outside pertinent medical records available.    All lab results personally reviewed and interpreted by me.  Lab Results   Component Value Date    WBC 4.76 02/15/2022    HGB 13.1 02/15/2022    HCT 39.3 02/15/2022    MCV 77 (L) 02/15/2022    MCH 25.8 (L) 02/15/2022    MCHC 33.3 02/15/2022    RDW 15.5 (H) 02/15/2022     02/15/2022    MPV 10.3 02/15/2022    PLTEST Normal 10/19/2007       Lab Results   Component Value Date     06/13/2022    K 3.3 (L) 06/13/2022     06/13/2022    CO2 24 06/13/2022    GLU 90 06/13/2022    BUN 9 06/13/2022    CALCIUM 8.9 06/13/2022    PROT 6.9 06/13/2022    ALBUMIN 3.7 06/13/2022    BILITOT 0.6 06/13/2022    AST 18 06/13/2022    ALKPHOS 105 06/13/2022    ALT " 21 06/13/2022       Lab Results   Component Value Date    COLORU Straw 04/13/2021    APPEARANCEUA Clear 04/13/2021    SPECGRAV 1.005 04/13/2021    PHUR 6.0 08/14/2021    PROTEINUA Negative 04/13/2021    KETONESU Negative 04/13/2021    LEUKOCYTESUR Negative 04/13/2021    NITRITE Negative 04/13/2021    UROBILINOGEN Negative 04/21/2019       Lab Results   Component Value Date    CRP 11.9 (H) 02/15/2022       Lab Results   Component Value Date    SEDRATE 35 02/15/2022       Lab Results   Component Value Date    JESSICA Negative 09/27/2012    RF <13.0 02/15/2022    SEDRATE 35 02/15/2022       No components found for: 25OHVITDTOT, 91EVEWNE2, 73KZKTPQ6, METHODNOTE    Lab Results   Component Value Date    URICACID 5.5 09/27/2012       No components found for: TSPOTTB        Imaging:  All imaging reviewed and independently interpreted by me.         ASSESSMENT / PLAN:     Chelly Ortiz is a 71 y.o. Black or  female with:      1. Positive JESSICA (antinuclear antibody)  - JESSICA 1:160, Homogenous, Profile negative  - discussed results  - she does not have s/s consistent with CTD at this time  - monitor  - reassurance     2. Primary osteoarthritis involving multiple joints  - patients joint pain 2/2 OA  - discussed diagnosis and management  - wt loss  - continue NSAIDs + Tylenol  - update films  - reassurance and exercise   - X-Ray Shoulder Complete Bilateral; Future  - X-Ray Knee 3 View Bilateral; Future    3. Chronic NSAID use  - no history of GI bleed or coronary artery disease.  - previous labs without features of CKD.  - clinical significance side effect of long-term NSAID use discussed in detail.  - recommended for alternative therapies for pain management.    4. Other specified counseling  - over 10 minutes spent regarding below topics:  - Immunization counseling done.  - Weight loss counseling done.  - Nutrition and exercise counseling.  - Limitation of alcohol consumption.  - Regular exercise:  Aerobic and  resistance.  - Medication counseling provided.    5. Morbid Obesity  - would benefit from decreasing at least 10% of body weight.  - recommended goal of losing 1 lb per week.  - consider nutritionist evaluation.      Follow up in about 6 months (around 12/16/2022).    Method of contact with patient concerns: [facility]    Disclaimer:  This note is prepared using voice recognition software and as such is likely to have errors and has not been proof read. Please contact me for questions.     Time spent: 45 minutes in face to face discussion concerning diagnosis, prognosis, review of lab and test results, benefits of treatment as well as management of disease, counseling of patient and coordination of care between various health care providers.  Greater than half the time spent was used for coordination of care and counseling of patient.    Pierce Ledesma M.D.  Rheumatology Department   [facility]

## 2022-07-12 NOTE — TELEPHONE ENCOUNTER
No new care gaps identified.  [facility] Embedded Care Gaps. Reference number: [id number]. 7/12/2022   1:38:40 PM CDT

## 2022-07-13 RX ORDER — PRAVASTATIN SODIUM 10 MG/1
10 TABLET ORAL DAILY
Qty: 90 TABLET | Refills: 3 | Status: SHIPPED | OUTPATIENT
Start: 2022-07-13 | End: 2022-09-12 | Stop reason: SDUPTHER

## 2022-07-13 NOTE — TELEPHONE ENCOUNTER
Refill Decision Note   Chelly Ortiz  is requesting a refill authorization.  Brief Assessment and Rationale for Refill:  Approve     Medication Therapy Plan:       Medication Reconciliation Completed: No   Comments:     No Care Gaps recommended.     Note composed:1:06 PM 07/13/2022

## 2022-07-28 ENCOUNTER — OFFICE VISIT (OUTPATIENT)
Dept: URGENT CARE | Facility: CLINIC | Age: 72
End: 2022-07-28
Payer: MEDICARE

## 2022-07-28 ENCOUNTER — TELEPHONE (OUTPATIENT)
Dept: FAMILY MEDICINE | Facility: CLINIC | Age: 72
End: 2022-07-28
Payer: MEDICARE

## 2022-07-28 VITALS
OXYGEN SATURATION: 95 % | RESPIRATION RATE: 18 BRPM | HEIGHT: 63 IN | SYSTOLIC BLOOD PRESSURE: 149 MMHG | BODY MASS INDEX: 35.44 KG/M2 | TEMPERATURE: 98 F | HEART RATE: 80 BPM | WEIGHT: 200 LBS | DIASTOLIC BLOOD PRESSURE: 55 MMHG

## 2022-07-28 DIAGNOSIS — R07.89 ATYPICAL CHEST PAIN: Primary | ICD-10-CM

## 2022-07-28 LAB
CTP QC/QA: YES
SARS-COV-2 RDRP RESP QL NAA+PROBE: NEGATIVE

## 2022-07-28 PROCEDURE — 3078F DIAST BP <80 MM HG: CPT | Mod: CPTII,S$GLB,, | Performed by: FAMILY MEDICINE

## 2022-07-28 PROCEDURE — U0002 COVID-19 LAB TEST NON-CDC: HCPCS | Mod: QW,S$GLB,, | Performed by: FAMILY MEDICINE

## 2022-07-28 PROCEDURE — 3008F PR BODY MASS INDEX (BMI) DOCUMENTED: ICD-10-PCS | Mod: CPTII,S$GLB,, | Performed by: FAMILY MEDICINE

## 2022-07-28 PROCEDURE — 4010F ACE/ARB THERAPY RXD/TAKEN: CPT | Mod: CPTII,S$GLB,, | Performed by: FAMILY MEDICINE

## 2022-07-28 PROCEDURE — 3078F PR MOST RECENT DIASTOLIC BLOOD PRESSURE < 80 MM HG: ICD-10-PCS | Mod: CPTII,S$GLB,, | Performed by: FAMILY MEDICINE

## 2022-07-28 PROCEDURE — 3077F SYST BP >= 140 MM HG: CPT | Mod: CPTII,S$GLB,, | Performed by: FAMILY MEDICINE

## 2022-07-28 PROCEDURE — 3051F HG A1C>EQUAL 7.0%<8.0%: CPT | Mod: CPTII,S$GLB,, | Performed by: FAMILY MEDICINE

## 2022-07-28 PROCEDURE — 93010 EKG 12-LEAD: ICD-10-PCS | Mod: S$GLB,,, | Performed by: INTERNAL MEDICINE

## 2022-07-28 PROCEDURE — 3077F PR MOST RECENT SYSTOLIC BLOOD PRESSURE >= 140 MM HG: ICD-10-PCS | Mod: CPTII,S$GLB,, | Performed by: FAMILY MEDICINE

## 2022-07-28 PROCEDURE — 93010 ELECTROCARDIOGRAM REPORT: CPT | Mod: S$GLB,,, | Performed by: INTERNAL MEDICINE

## 2022-07-28 PROCEDURE — 3066F PR DOCUMENTATION OF TREATMENT FOR NEPHROPATHY: ICD-10-PCS | Mod: CPTII,S$GLB,, | Performed by: FAMILY MEDICINE

## 2022-07-28 PROCEDURE — 3061F NEG MICROALBUMINURIA REV: CPT | Mod: CPTII,S$GLB,, | Performed by: FAMILY MEDICINE

## 2022-07-28 PROCEDURE — 99214 OFFICE O/P EST MOD 30 MIN: CPT | Mod: S$GLB,,, | Performed by: FAMILY MEDICINE

## 2022-07-28 PROCEDURE — 1125F PR PAIN SEVERITY QUANTIFIED, PAIN PRESENT: ICD-10-PCS | Mod: CPTII,S$GLB,, | Performed by: FAMILY MEDICINE

## 2022-07-28 PROCEDURE — U0002: ICD-10-PCS | Mod: QW,S$GLB,, | Performed by: FAMILY MEDICINE

## 2022-07-28 PROCEDURE — 3008F BODY MASS INDEX DOCD: CPT | Mod: CPTII,S$GLB,, | Performed by: FAMILY MEDICINE

## 2022-07-28 PROCEDURE — 99214 PR OFFICE/OUTPT VISIT, EST, LEVL IV, 30-39 MIN: ICD-10-PCS | Mod: S$GLB,,, | Performed by: FAMILY MEDICINE

## 2022-07-28 PROCEDURE — 3066F NEPHROPATHY DOC TX: CPT | Mod: CPTII,S$GLB,, | Performed by: FAMILY MEDICINE

## 2022-07-28 PROCEDURE — 3051F PR MOST RECENT HEMOGLOBIN A1C LEVEL 7.0 - < 8.0%: ICD-10-PCS | Mod: CPTII,S$GLB,, | Performed by: FAMILY MEDICINE

## 2022-07-28 PROCEDURE — 4010F PR ACE/ARB THEARPY RXD/TAKEN: ICD-10-PCS | Mod: CPTII,S$GLB,, | Performed by: FAMILY MEDICINE

## 2022-07-28 PROCEDURE — 1125F AMNT PAIN NOTED PAIN PRSNT: CPT | Mod: CPTII,S$GLB,, | Performed by: FAMILY MEDICINE

## 2022-07-28 PROCEDURE — 3061F PR NEG MICROALBUMINURIA RESULT DOCUMENTED/REVIEW: ICD-10-PCS | Mod: CPTII,S$GLB,, | Performed by: FAMILY MEDICINE

## 2022-07-28 NOTE — TELEPHONE ENCOUNTER
----- Message from Steph Gutiérrez Patient Care Assistant sent at 7/28/2022 12:05 PM CDT -----  Type:  Same Day Appointment Request    Caller is requesting a same day appointment.  Caller declined first available appointment listed below.    Name of Caller: pt  When is the first available appointment?   Symptoms: anxiety and just not feeling well would like to get check out   Best Call Back Number: [phone]  Additional Information:

## 2022-07-28 NOTE — PROGRESS NOTES
"Subjective:       Patient ID: Chelly Ortiz is a 71 y.o. female.    Vitals:  height is 5' 3" (1.6 m) and weight is 90.7 kg (200 lb). Her temperature is 97.7 °F (36.5 °C). Her blood pressure is 149/55 (abnormal) and her pulse is 80. Her respiration is 18 and oxygen saturation is 95%.     Chief Complaint: Chest Pain    This is a 71 y.o. female who presents today with a chief complaint of   Chest pain last night woke pt from sleep, no pain today. Reports sharp stabbing pain left upper chest similar to previous episodes of reflux per patient.  Pt ate onion rings last night and a brownie, and drank a sprite. Takes Nexium    Chest Pain   This is a new problem. The current episode started yesterday. The onset quality is sudden. The problem has been resolved. The pain is present in the substernal region. The pain is at a severity of 8/10. The pain is severe. The quality of the pain is described as sharp. The pain does not radiate. The pain is aggravated by nothing. She has tried antacids (nexium) for the symptoms. The treatment provided mild relief. Risk factors include obesity and being elderly.       Cardiovascular: Positive for chest pain.       Objective:      Physical Exam   Constitutional: She does not appear ill. No distress. obesity  HENT:   Head: Normocephalic and atraumatic.   Neck: Neck supple.   Cardiovascular: Normal rate, regular rhythm, normal heart sounds and normal pulses.   Pulmonary/Chest: Effort normal and breath sounds normal.   Abdominal: Normal appearance.   Neurological: She is alert.   Nursing note and vitals reviewed.        Assessment:       1. Atypical chest pain        Doubt cardiac nature given typical symptoms to previous episodes of GERD as per patient. Discussed behavioral changes, changes in diet and reinforced medication compliance  Plan:         Atypical chest pain  -     POCT COVID-19 Rapid Screening    discussed ER precautions and early followup with PCP               "

## 2022-07-30 ENCOUNTER — OFFICE VISIT (OUTPATIENT)
Dept: FAMILY MEDICINE | Facility: CLINIC | Age: 72
End: 2022-07-30
Payer: MEDICARE

## 2022-07-30 VITALS
SYSTOLIC BLOOD PRESSURE: 114 MMHG | WEIGHT: 205.25 LBS | OXYGEN SATURATION: 98 % | HEART RATE: 80 BPM | BODY MASS INDEX: 36.37 KG/M2 | DIASTOLIC BLOOD PRESSURE: 68 MMHG | HEIGHT: 63 IN

## 2022-07-30 DIAGNOSIS — Z23 IMMUNIZATION DUE: ICD-10-CM

## 2022-07-30 DIAGNOSIS — Z13.820 ENCOUNTER FOR OSTEOPOROSIS SCREENING IN ASYMPTOMATIC POSTMENOPAUSAL PATIENT: ICD-10-CM

## 2022-07-30 DIAGNOSIS — Z78.0 ENCOUNTER FOR OSTEOPOROSIS SCREENING IN ASYMPTOMATIC POSTMENOPAUSAL PATIENT: ICD-10-CM

## 2022-07-30 DIAGNOSIS — R07.89 OTHER CHEST PAIN: Primary | ICD-10-CM

## 2022-07-30 PROCEDURE — 93010 ELECTROCARDIOGRAM REPORT: CPT | Mod: S$GLB,,, | Performed by: INTERNAL MEDICINE

## 2022-07-30 PROCEDURE — 3066F PR DOCUMENTATION OF TREATMENT FOR NEPHROPATHY: ICD-10-PCS | Mod: CPTII,S$GLB,, | Performed by: FAMILY MEDICINE

## 2022-07-30 PROCEDURE — 93005 ELECTROCARDIOGRAM TRACING: CPT | Mod: S$GLB,,, | Performed by: FAMILY MEDICINE

## 2022-07-30 PROCEDURE — 1159F PR MEDICATION LIST DOCUMENTED IN MEDICAL RECORD: ICD-10-PCS | Mod: CPTII,S$GLB,, | Performed by: FAMILY MEDICINE

## 2022-07-30 PROCEDURE — 3008F PR BODY MASS INDEX (BMI) DOCUMENTED: ICD-10-PCS | Mod: CPTII,S$GLB,, | Performed by: FAMILY MEDICINE

## 2022-07-30 PROCEDURE — 1101F PT FALLS ASSESS-DOCD LE1/YR: CPT | Mod: CPTII,S$GLB,, | Performed by: FAMILY MEDICINE

## 2022-07-30 PROCEDURE — 3061F NEG MICROALBUMINURIA REV: CPT | Mod: CPTII,S$GLB,, | Performed by: FAMILY MEDICINE

## 2022-07-30 PROCEDURE — 1126F PR PAIN SEVERITY QUANTIFIED, NO PAIN PRESENT: ICD-10-PCS | Mod: CPTII,S$GLB,, | Performed by: FAMILY MEDICINE

## 2022-07-30 PROCEDURE — 1101F PR PT FALLS ASSESS DOC 0-1 FALLS W/OUT INJ PAST YR: ICD-10-PCS | Mod: CPTII,S$GLB,, | Performed by: FAMILY MEDICINE

## 2022-07-30 PROCEDURE — 3288F FALL RISK ASSESSMENT DOCD: CPT | Mod: CPTII,S$GLB,, | Performed by: FAMILY MEDICINE

## 2022-07-30 PROCEDURE — 3066F NEPHROPATHY DOC TX: CPT | Mod: CPTII,S$GLB,, | Performed by: FAMILY MEDICINE

## 2022-07-30 PROCEDURE — 3008F BODY MASS INDEX DOCD: CPT | Mod: CPTII,S$GLB,, | Performed by: FAMILY MEDICINE

## 2022-07-30 PROCEDURE — 99213 OFFICE O/P EST LOW 20 MIN: CPT | Mod: S$GLB,,, | Performed by: FAMILY MEDICINE

## 2022-07-30 PROCEDURE — 4010F ACE/ARB THERAPY RXD/TAKEN: CPT | Mod: CPTII,S$GLB,, | Performed by: FAMILY MEDICINE

## 2022-07-30 PROCEDURE — 99999 PR PBB SHADOW E&M-EST. PATIENT-LVL IV: CPT | Mod: PBBFAC,,, | Performed by: FAMILY MEDICINE

## 2022-07-30 PROCEDURE — 99999 PR PBB SHADOW E&M-EST. PATIENT-LVL IV: ICD-10-PCS | Mod: PBBFAC,,, | Performed by: FAMILY MEDICINE

## 2022-07-30 PROCEDURE — 3074F PR MOST RECENT SYSTOLIC BLOOD PRESSURE < 130 MM HG: ICD-10-PCS | Mod: CPTII,S$GLB,, | Performed by: FAMILY MEDICINE

## 2022-07-30 PROCEDURE — 3078F DIAST BP <80 MM HG: CPT | Mod: CPTII,S$GLB,, | Performed by: FAMILY MEDICINE

## 2022-07-30 PROCEDURE — 3061F PR NEG MICROALBUMINURIA RESULT DOCUMENTED/REVIEW: ICD-10-PCS | Mod: CPTII,S$GLB,, | Performed by: FAMILY MEDICINE

## 2022-07-30 PROCEDURE — 3074F SYST BP LT 130 MM HG: CPT | Mod: CPTII,S$GLB,, | Performed by: FAMILY MEDICINE

## 2022-07-30 PROCEDURE — 99213 PR OFFICE/OUTPT VISIT, EST, LEVL III, 20-29 MIN: ICD-10-PCS | Mod: S$GLB,,, | Performed by: FAMILY MEDICINE

## 2022-07-30 PROCEDURE — 1160F RVW MEDS BY RX/DR IN RCRD: CPT | Mod: CPTII,S$GLB,, | Performed by: FAMILY MEDICINE

## 2022-07-30 PROCEDURE — 4010F PR ACE/ARB THEARPY RXD/TAKEN: ICD-10-PCS | Mod: CPTII,S$GLB,, | Performed by: FAMILY MEDICINE

## 2022-07-30 PROCEDURE — 3078F PR MOST RECENT DIASTOLIC BLOOD PRESSURE < 80 MM HG: ICD-10-PCS | Mod: CPTII,S$GLB,, | Performed by: FAMILY MEDICINE

## 2022-07-30 PROCEDURE — 1160F PR REVIEW ALL MEDS BY PRESCRIBER/CLIN PHARMACIST DOCUMENTED: ICD-10-PCS | Mod: CPTII,S$GLB,, | Performed by: FAMILY MEDICINE

## 2022-07-30 PROCEDURE — 1159F MED LIST DOCD IN RCRD: CPT | Mod: CPTII,S$GLB,, | Performed by: FAMILY MEDICINE

## 2022-07-30 PROCEDURE — 3288F PR FALLS RISK ASSESSMENT DOCUMENTED: ICD-10-PCS | Mod: CPTII,S$GLB,, | Performed by: FAMILY MEDICINE

## 2022-07-30 PROCEDURE — 93010 EKG 12-LEAD: ICD-10-PCS | Mod: S$GLB,,, | Performed by: INTERNAL MEDICINE

## 2022-07-30 PROCEDURE — 3051F HG A1C>EQUAL 7.0%<8.0%: CPT | Mod: CPTII,S$GLB,, | Performed by: FAMILY MEDICINE

## 2022-07-30 PROCEDURE — 93005 EKG 12-LEAD: ICD-10-PCS | Mod: S$GLB,,, | Performed by: FAMILY MEDICINE

## 2022-07-30 PROCEDURE — 3051F PR MOST RECENT HEMOGLOBIN A1C LEVEL 7.0 - < 8.0%: ICD-10-PCS | Mod: CPTII,S$GLB,, | Performed by: FAMILY MEDICINE

## 2022-07-30 PROCEDURE — 1126F AMNT PAIN NOTED NONE PRSNT: CPT | Mod: CPTII,S$GLB,, | Performed by: FAMILY MEDICINE

## 2022-07-30 NOTE — PROGRESS NOTES
Subjective:       Patient ID: Chelly Ortiz is a 71 y.o. female.    Chief Complaint: Anxiety    Patient Active Problem List   Diagnosis    GERD (gastroesophageal reflux disease)    MYRIAM (obstructive sleep apnea)    IBS (irritable bowel syndrome)    DDD (degenerative disc disease), lumbar    Insomnia    Anxiety    Hearing loss, sensorineural    Nuclear sclerotic cataract of left eye    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    Constipation    History of colon polyps    Essential hypertension    Hyperlipidemia, unspecified    Diabetic polyneuropathy associated with type 2 diabetes mellitus    Spondylosis of cervical region without myelopathy or radiculopathy    Cervical myofascial pain syndrome    PCO (posterior capsular opacification), right    Dry eye syndrome    Pseudophakia    Decreased range of motion of lumbar spine    Decreased strength    Dilated bile duct    Personal history of colonic polyps    EIC (epidermal inclusion cyst)    Severe obesity with body mass index (BMI) of 35.0 to 39.9 with serious comorbidity    Unspecified inflammatory spondylopathy, lumbar region    Mild major depression    Sedentary lifestyle    Dysphagia    Type 2 diabetes mellitus with hyperglycemia, with long-term current use of insulin    Abdominal aortic atherosclerosis    Decreased ROM of neck    Arthritis of multiple sites      HPI  70 yo female presents today for single self-limited chest pain episode 7/27 night (wed). Reports that while she was sleeping, she noted chest discomfort midline to left that woke her up. Lasted about 20 min. No HA, dizziness, dyspnea. She is unable to describe it more than a deep sharp pain.     On Thursday, the following day, she went to urgent care. Unremarkable work up per patient. Reports EKG was done, but unable to see in system.   Reports no recurrence of pain.   Reports that she has recently been more worried since she had a friend with a sudden cardiac event.      Attempted to figure out patient's level of exertion. Appears most exertional efforts happen with house work, not sustained. Does bend and reach, move things and wipe surfaces. No yardwork. Walking in grocery store. Reports that her limitation is her chronic back pain before any CV symptoms.     Review of Systems   All other systems reviewed and are negative.       Results for orders placed or performed in visit on 07/28/22   POCT COVID-19 Rapid Screening   Result Value Ref Range    POC Rapid COVID Negative Negative     Acceptable Yes      *Note: Due to a large number of results and/or encounters for the requested time period, some results have not been displayed. A complete set of results can be found in Results Review.     Objective:     Vitals:    07/30/22 1104   BP: 114/68   Pulse: 80        Physical Exam  Vitals and nursing note reviewed.   Constitutional:       General: She is not in acute distress.     Appearance: Normal appearance. She is obese. She is not ill-appearing, toxic-appearing or diaphoretic.   HENT:      Head: Normocephalic and atraumatic.   Eyes:      General: No scleral icterus.     Conjunctiva/sclera: Conjunctivae normal.   Cardiovascular:      Rate and Rhythm: Normal rate.      Heart sounds: Normal heart sounds. No murmur heard.  Pulmonary:      Effort: Pulmonary effort is normal. No respiratory distress.      Breath sounds: Normal breath sounds.   Abdominal:      General: Bowel sounds are normal.   Skin:     Coloration: Skin is not pale.   Neurological:      Mental Status: She is alert. Mental status is at baseline.   Psychiatric:         Attention and Perception: Attention and perception normal.         Mood and Affect: Mood and affect normal.         Speech: Speech normal.         Behavior: Behavior normal.         Cognition and Memory: Cognition and memory normal.         Judgment: Judgment normal.         Assessment:       1. Other chest pain    2. Encounter for  osteoporosis screening in asymptomatic postmenopausal patient    3. Immunization due        Plan:       Discussed her diabetes history and potential pharmacological cardiac stress testing. Also discussed increasing physical activity in her daily life and monitoring for recurrence of one-time chest pain. Discussed stationary bike or arm bikes that may have back support for her to challenge her CV system further if walking is not do-able. She states there is a [employer] in her neighborhood.   Gas, GERD, MSK are others on differential, but given obesity, DM, MYRIAM, HTN, HLD, discussed low threshold for stress testing with patient. She wants to watch and wait for now.   Discussed increasing physical demand and following up.     Other chest pain  -     IN OFFICE EKG 12-LEAD (to Muse)    Encounter for osteoporosis screening in asymptomatic postmenopausal patient  -     DXA Bone Density Spine And Hip; Future; Expected date: 07/30/2022    Immunization due  - Schedule for COVID booster.     Patient's questions answered. Plan reviewed with patient at the end of visit. Relevant precautions to chief complaint and reasons to seek medical care or contact the office sooner reviewed with patient.     Follow up in about 6 weeks (around 9/10/2022) for exercise tolerance f/u, w/ PCP.

## 2022-08-02 ENCOUNTER — LAB VISIT (OUTPATIENT)
Dept: LAB | Facility: HOSPITAL | Age: 72
End: 2022-08-02
Attending: GENERAL ACUTE CARE HOSPITAL
Payer: MEDICARE

## 2022-08-02 ENCOUNTER — PES CALL (OUTPATIENT)
Dept: ADMINISTRATIVE | Facility: OTHER | Age: 72
End: 2022-08-02
Payer: MEDICARE

## 2022-08-02 DIAGNOSIS — E11.65 UNCONTROLLED TYPE 2 DIABETES MELLITUS WITH HYPERGLYCEMIA: ICD-10-CM

## 2022-08-02 LAB
ESTIMATED AVG GLUCOSE: 171 MG/DL (ref 68–131)
HBA1C MFR BLD: 7.6 % (ref 4–5.6)

## 2022-08-02 PROCEDURE — 36415 COLL VENOUS BLD VENIPUNCTURE: CPT | Performed by: GENERAL ACUTE CARE HOSPITAL

## 2022-08-02 PROCEDURE — 83036 HEMOGLOBIN GLYCOSYLATED A1C: CPT | Performed by: GENERAL ACUTE CARE HOSPITAL

## 2022-08-03 ENCOUNTER — TELEPHONE (OUTPATIENT)
Dept: FAMILY MEDICINE | Facility: CLINIC | Age: 72
End: 2022-08-03
Payer: MEDICARE

## 2022-08-12 ENCOUNTER — TELEPHONE (OUTPATIENT)
Dept: ENDOCRINOLOGY | Facility: CLINIC | Age: 72
End: 2022-08-12
Payer: MEDICARE

## 2022-08-12 NOTE — TELEPHONE ENCOUNTER
I tried to called patient to see what was her concern , but patient didn't answer so I left her a message asking her to please call me back .

## 2022-08-12 NOTE — TELEPHONE ENCOUNTER
----- Message from Yanira Matos sent at 8/12/2022  2:35 PM CDT -----  Contact: [phone]  Who Called: pt  Regarding: no one has joined virtual visit   Would the patient rather a call back or a response via [facility]? Call back  Best Call Back Number:  [phone]   Additional Information: n/a

## 2022-08-15 ENCOUNTER — TELEPHONE (OUTPATIENT)
Dept: PODIATRY | Facility: CLINIC | Age: 72
End: 2022-08-15
Payer: MEDICARE

## 2022-08-15 ENCOUNTER — HOSPITAL ENCOUNTER (OUTPATIENT)
Dept: RADIOLOGY | Facility: HOSPITAL | Age: 72
Discharge: HOME | End: 2022-08-15
Attending: FAMILY MEDICINE
Payer: MEDICARE

## 2022-08-15 DIAGNOSIS — Z13.820 ENCOUNTER FOR OSTEOPOROSIS SCREENING IN ASYMPTOMATIC POSTMENOPAUSAL PATIENT: ICD-10-CM

## 2022-08-15 DIAGNOSIS — Z78.0 ENCOUNTER FOR OSTEOPOROSIS SCREENING IN ASYMPTOMATIC POSTMENOPAUSAL PATIENT: ICD-10-CM

## 2022-08-15 PROCEDURE — 77080 DEXA BONE DENSITY SPINE HIP: ICD-10-PCS | Mod: 26,,, | Performed by: RADIOLOGY

## 2022-08-15 PROCEDURE — 77080 DXA BONE DENSITY AXIAL: CPT | Mod: TC

## 2022-08-15 PROCEDURE — 77080 DXA BONE DENSITY AXIAL: CPT | Mod: 26,,, | Performed by: RADIOLOGY

## 2022-08-15 NOTE — TELEPHONE ENCOUNTER
----- Message from Jackelyn Scott sent at 8/15/2022  9:32 AM CDT -----  Regarding: sooner  Contact: [phone]  Type:  Sooner Apoointment Request    Caller is requesting a sooner appointment.  Caller is requesting a message be sent to doctor.  Name of Caller: self   When is the first available appointment? 08/24  Symptoms: severe pain in left foot and 2 toes   Would the patient rather a call back or a response via [facility]?  call  Best Call Back Number: [phone]  Additional Information:

## 2022-08-15 NOTE — TELEPHONE ENCOUNTER
Called patient and schedule the next available with Dr Carlton boles at 12:45pm for evaluation of foot pain

## 2022-08-16 ENCOUNTER — OFFICE VISIT (OUTPATIENT)
Dept: ENDOCRINOLOGY | Facility: CLINIC | Age: 72
End: 2022-08-16
Payer: MEDICARE

## 2022-08-16 ENCOUNTER — TELEPHONE (OUTPATIENT)
Dept: ENDOCRINOLOGY | Facility: CLINIC | Age: 72
End: 2022-08-16
Payer: MEDICARE

## 2022-08-16 VITALS
HEIGHT: 64 IN | DIASTOLIC BLOOD PRESSURE: 78 MMHG | WEIGHT: 204.5 LBS | SYSTOLIC BLOOD PRESSURE: 140 MMHG | BODY MASS INDEX: 34.91 KG/M2

## 2022-08-16 DIAGNOSIS — I10 HYPERTENSION, ESSENTIAL: ICD-10-CM

## 2022-08-16 DIAGNOSIS — E78.5 HYPERLIPIDEMIA, UNSPECIFIED HYPERLIPIDEMIA TYPE: ICD-10-CM

## 2022-08-16 DIAGNOSIS — M85.80 OSTEOPENIA, UNSPECIFIED LOCATION: ICD-10-CM

## 2022-08-16 DIAGNOSIS — E11.65 UNCONTROLLED TYPE 2 DIABETES MELLITUS WITH HYPERGLYCEMIA: Primary | ICD-10-CM

## 2022-08-16 PROCEDURE — 3066F PR DOCUMENTATION OF TREATMENT FOR NEPHROPATHY: ICD-10-PCS | Mod: CPTII,S$GLB,, | Performed by: GENERAL ACUTE CARE HOSPITAL

## 2022-08-16 PROCEDURE — 3051F PR MOST RECENT HEMOGLOBIN A1C LEVEL 7.0 - < 8.0%: ICD-10-PCS | Mod: CPTII,S$GLB,, | Performed by: GENERAL ACUTE CARE HOSPITAL

## 2022-08-16 PROCEDURE — 1159F MED LIST DOCD IN RCRD: CPT | Mod: CPTII,S$GLB,, | Performed by: GENERAL ACUTE CARE HOSPITAL

## 2022-08-16 PROCEDURE — 1101F PR PT FALLS ASSESS DOC 0-1 FALLS W/OUT INJ PAST YR: ICD-10-PCS | Mod: CPTII,S$GLB,, | Performed by: GENERAL ACUTE CARE HOSPITAL

## 2022-08-16 PROCEDURE — 4010F PR ACE/ARB THEARPY RXD/TAKEN: ICD-10-PCS | Mod: CPTII,S$GLB,, | Performed by: GENERAL ACUTE CARE HOSPITAL

## 2022-08-16 PROCEDURE — 1160F PR REVIEW ALL MEDS BY PRESCRIBER/CLIN PHARMACIST DOCUMENTED: ICD-10-PCS | Mod: CPTII,S$GLB,, | Performed by: GENERAL ACUTE CARE HOSPITAL

## 2022-08-16 PROCEDURE — 3066F NEPHROPATHY DOC TX: CPT | Mod: CPTII,S$GLB,, | Performed by: GENERAL ACUTE CARE HOSPITAL

## 2022-08-16 PROCEDURE — 3077F SYST BP >= 140 MM HG: CPT | Mod: CPTII,S$GLB,, | Performed by: GENERAL ACUTE CARE HOSPITAL

## 2022-08-16 PROCEDURE — 3061F NEG MICROALBUMINURIA REV: CPT | Mod: CPTII,S$GLB,, | Performed by: GENERAL ACUTE CARE HOSPITAL

## 2022-08-16 PROCEDURE — 99999 PR PBB SHADOW E&M-EST. PATIENT-LVL V: ICD-10-PCS | Mod: PBBFAC,,, | Performed by: GENERAL ACUTE CARE HOSPITAL

## 2022-08-16 PROCEDURE — 1160F RVW MEDS BY RX/DR IN RCRD: CPT | Mod: CPTII,S$GLB,, | Performed by: GENERAL ACUTE CARE HOSPITAL

## 2022-08-16 PROCEDURE — 3061F PR NEG MICROALBUMINURIA RESULT DOCUMENTED/REVIEW: ICD-10-PCS | Mod: CPTII,S$GLB,, | Performed by: GENERAL ACUTE CARE HOSPITAL

## 2022-08-16 PROCEDURE — 3077F PR MOST RECENT SYSTOLIC BLOOD PRESSURE >= 140 MM HG: ICD-10-PCS | Mod: CPTII,S$GLB,, | Performed by: GENERAL ACUTE CARE HOSPITAL

## 2022-08-16 PROCEDURE — 4010F ACE/ARB THERAPY RXD/TAKEN: CPT | Mod: CPTII,S$GLB,, | Performed by: GENERAL ACUTE CARE HOSPITAL

## 2022-08-16 PROCEDURE — 99999 PR PBB SHADOW E&M-EST. PATIENT-LVL V: CPT | Mod: PBBFAC,,, | Performed by: GENERAL ACUTE CARE HOSPITAL

## 2022-08-16 PROCEDURE — 1101F PT FALLS ASSESS-DOCD LE1/YR: CPT | Mod: CPTII,S$GLB,, | Performed by: GENERAL ACUTE CARE HOSPITAL

## 2022-08-16 PROCEDURE — 3078F PR MOST RECENT DIASTOLIC BLOOD PRESSURE < 80 MM HG: ICD-10-PCS | Mod: CPTII,S$GLB,, | Performed by: GENERAL ACUTE CARE HOSPITAL

## 2022-08-16 PROCEDURE — 3008F BODY MASS INDEX DOCD: CPT | Mod: CPTII,S$GLB,, | Performed by: GENERAL ACUTE CARE HOSPITAL

## 2022-08-16 PROCEDURE — 3288F PR FALLS RISK ASSESSMENT DOCUMENTED: ICD-10-PCS | Mod: CPTII,S$GLB,, | Performed by: GENERAL ACUTE CARE HOSPITAL

## 2022-08-16 PROCEDURE — 3008F PR BODY MASS INDEX (BMI) DOCUMENTED: ICD-10-PCS | Mod: CPTII,S$GLB,, | Performed by: GENERAL ACUTE CARE HOSPITAL

## 2022-08-16 PROCEDURE — 1159F PR MEDICATION LIST DOCUMENTED IN MEDICAL RECORD: ICD-10-PCS | Mod: CPTII,S$GLB,, | Performed by: GENERAL ACUTE CARE HOSPITAL

## 2022-08-16 PROCEDURE — 3078F DIAST BP <80 MM HG: CPT | Mod: CPTII,S$GLB,, | Performed by: GENERAL ACUTE CARE HOSPITAL

## 2022-08-16 PROCEDURE — 99214 PR OFFICE/OUTPT VISIT, EST, LEVL IV, 30-39 MIN: ICD-10-PCS | Mod: S$GLB,,, | Performed by: GENERAL ACUTE CARE HOSPITAL

## 2022-08-16 PROCEDURE — 99214 OFFICE O/P EST MOD 30 MIN: CPT | Mod: S$GLB,,, | Performed by: GENERAL ACUTE CARE HOSPITAL

## 2022-08-16 PROCEDURE — 3051F HG A1C>EQUAL 7.0%<8.0%: CPT | Mod: CPTII,S$GLB,, | Performed by: GENERAL ACUTE CARE HOSPITAL

## 2022-08-16 PROCEDURE — 3288F FALL RISK ASSESSMENT DOCD: CPT | Mod: CPTII,S$GLB,, | Performed by: GENERAL ACUTE CARE HOSPITAL

## 2022-08-16 RX ORDER — CALCIUM CARBONATE 600 MG
600 TABLET ORAL ONCE
Qty: 30 TABLET | Refills: 11 | Status: SHIPPED | OUTPATIENT
Start: 2022-08-16 | End: 2023-08-03

## 2022-08-16 RX ORDER — DULAGLUTIDE 3 MG/.5ML
3 INJECTION, SOLUTION SUBCUTANEOUS
Qty: 4 PEN | Refills: 11 | Status: SHIPPED | OUTPATIENT
Start: 2022-08-16 | End: 2023-08-16

## 2022-08-16 RX ORDER — PRAVASTATIN SODIUM 20 MG/1
20 TABLET ORAL DAILY
Qty: 90 TABLET | Refills: 3 | Status: CANCELLED | OUTPATIENT
Start: 2022-08-16 | End: 2023-08-16

## 2022-08-16 NOTE — TELEPHONE ENCOUNTER
----- Message from Ray Carmona MD sent at 8/16/2022  8:20 AM CDT -----  Regarding: Appoointment  Good morning,     Is it possible to overbook Ms. Ortiz for today at 11am?   She had problems logging in to her virtual last Friday and her appointment was marked as No show.   I can see her today since I am doing US if someone can help me rooming.     Thank you

## 2022-08-16 NOTE — PROGRESS NOTES
Subjective:      Patient ID: Chelly Ortiz is a 71 y.o. female.    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    Constipation    History of colon polyps    Essential hypertension    Hyperlipidemia, unspecified    Diabetic polyneuropathy associated with type 2 diabetes mellitus    Spondylosis of cervical region without myelopathy or radiculopathy    Cervical myofascial pain syndrome    PCO (posterior capsular opacification), right    Dry eye syndrome    Pseudophakia    Decreased range of motion of lumbar spine    Decreased strength    Dilated bile duct    Personal history of colonic polyps    EIC (epidermal inclusion cyst)    Severe obesity with body mass index (BMI) of 35.0 to 39.9 with serious comorbidity    Unspecified inflammatory spondylopathy, lumbar region    Mild major depression    Sedentary lifestyle    Dysphagia    Type 2 diabetes mellitus with hyperglycemia, with long-term current use of insulin    Abdominal aortic atherosclerosis    Decreased ROM of neck    Arthritis of multiple sites       History of Present Illness  72 YO Female w/ dx of DM2, HTN, HLD and Obesity that presents to the endocrine clinic for follow up.  Pt today reports feels well and denies any complaints.  Pt reports her glucose levels have been stable and she has not seen 200s in the past month.  Denies hypo or hyperglycemic symptoms.         Interval history:     Pt was last seen on 4/2022 and plan was:     No medication changes today      Discussed about increasing Trulicity to 3mg weekly and starting low dose Metformin w/ hopes of eventually decrease insulin doses but patient was  reluctant to start metformin and informed she will be getting a 4 month supply of Trulicity 1.5mg through medication assistance program and does not want to increase dose now.      Discussed about initiating DEXCOM CMG but patient does not interested at this time but open for discussion at next clinic visit         With regards to Diabetes Mellitus:   -Type 2    -Duration:  Dx 10 yrs Ago on blood work   -FH of DM? 2 sisters and 1 brother with DM2   -Microvascular complications: (Nephropath, Neuropathy)  -Macrovascular complications:  DENIES   -DKA?  DENIES   -HHS?  DENIES   -DM Medications:  Trulicity 1.5mg weekly,  Levemir 45 qHS,   Novolog 12 units TID before meals + sliding scale (Low dose)     -Previously tried medications:  NONE   -Compliance w/ Meds:  Yes  -Hyperglycemia symptoms: DENIES   -Hypoglycemia symptoms:  DENIES   -Know how to correct hypoglycemias: No, will do teaching today.   -Glucose monitoring:   Checks 4 times daily and glucose mainly at goal between 90 - 160.  NO glucose above 180 seen on glucometer review.     -Diet:  B (cereal w/ almond milk, turkey sausage)  L (Rice beans, meat loaf, spagetti)  D ( baked chicken, rice, potatoes), Snacks (Muffins once a month)   -Activity:  Sedentary   -Pancreatitis?  DENIES   -CHF?  DENIES   -UTIs?  DENIES   -Thyroid CA?  DENIES   -ETOH Abuse?  DENIES     Diabetes Management Status    Statin: Taking  ACE/ARB: Taking    Screening or Prevention Patient's value Goal Complete/Controlled?   HgA1C Testing and Control   Lab Results   Component Value Date    HGBA1C 7.6 (H) 08/02/2022      Annually/Less than 8% Yes   Lipid profile : 06/13/2022 Annually Yes   LDL control Lab Results   Component Value Date    LDLCALC 111.8 06/13/2022    Annually/Less than 100 mg/dl  No   Nephropathy screening Lab Results   Component Value Date    LABMICR <5.0 08/02/2022     Lab Results   Component Value Date    PROTEINUA Negative 04/13/2021    Annually Yes   Blood pressure BP Readings  from Last 1 Encounters:   07/30/22 114/68    Less than 140/90 Yes   Dilated retinal exam : 04/28/2022 Annually Yes   Foot exam   : 02/22/2022 Annually Yes        ROS:   As above    Objective:     LMP  (LMP Unknown)   BP Readings from Last 3 Encounters:   07/30/22 114/68   07/28/22 (!) 149/55   06/16/22 115/60     Wt Readings from Last 1 Encounters:   07/30/22 1104 93.1 kg (205 lb 4 oz)     There is no height or weight on file to calculate BMI.      Physical Exam  Vitals reviewed.   Constitutional:       General: She is not in acute distress.     Appearance: Normal appearance. She is well-developed. She is not ill-appearing.   HENT:      Nose: Nose normal. No rhinorrhea.      Mouth/Throat:      Mouth: Mucous membranes are moist.   Eyes:      Extraocular Movements: Extraocular movements intact.      Pupils: Pupils are equal, round, and reactive to light.      Comments: No proptosis, No lid lag, No conjunctival erythema    Neck:      Thyroid: No thyromegaly.      Trachea: No tracheal deviation.      Comments: No thyromegaly or Thyroid nodules palpated on exam   Cardiovascular:      Rate and Rhythm: Normal rate and regular rhythm.      Pulses: Normal pulses.   Pulmonary:      Effort: Pulmonary effort is normal.      Breath sounds: Normal breath sounds.   Abdominal:      Palpations: Abdomen is soft. There is no mass.      Tenderness: There is no abdominal tenderness.      Hernia: No hernia is present.   Musculoskeletal:         General: No swelling.      Cervical back: Neck supple. No tenderness.      Right lower leg: No edema.      Left lower leg: No edema.   Skin:     General: Skin is warm.      Findings: No rash.   Neurological:      General: No focal deficit present.      Mental Status: She is alert and oriented to person, place, and time.   Psychiatric:         Mood and Affect: Mood normal.         Judgment: Judgment normal.       Protective Sensation (w/ 10 gram monofilament):  Right: Intact  Left: Intact    Visual  Inspection:  Normal -  Bilateral    Pedal Pulses:   Right: Present  Left: Present    Posterior tibialis:   Right:Present  Left: Present           Lab Review:   Lab Results   Component Value Date    HGBA1C 7.6 (H) 08/02/2022     Lab Results   Component Value Date    CHOL 202 (H) 06/13/2022    HDL 53 06/13/2022    LDLCALC 111.8 06/13/2022    TRIG 186 (H) 06/13/2022    CHOLHDL 26.2 06/13/2022     Lab Results   Component Value Date     06/13/2022    K 3.3 (L) 06/13/2022     06/13/2022    CO2 24 06/13/2022    GLU 90 06/13/2022    BUN 9 06/13/2022    CREATININE 0.8 06/13/2022    CALCIUM 8.9 06/13/2022    PROT 6.9 06/13/2022    ALBUMIN 3.7 06/13/2022    BILITOT 0.6 06/13/2022    ALKPHOS 105 06/13/2022    AST 18 06/13/2022    ALT 21 06/13/2022    ANIONGAP 15 06/13/2022    ESTGFRAFRICA >60.0 06/13/2022    EGFRNONAA >60.0 06/13/2022    TSH 2.519 04/15/2021     Vit D, 25-Hydroxy   Date Value Ref Range Status   06/25/2013 16 (L) 30 - 96 ng/mL Final     Comment:     Vitamin D deficiency........<10 ng/mL  Vitamin D insufficiency.....10-29 ng/mL  Vitamin D sufficiency.......> or equal to 30 ng/mL  Vitamin D toxicity..........>100 ng/mL       Assessment and Plan     Uncontrolled type 2 diabetes mellitus with hyperglycemia  Lab Results   Component Value Date    HGBA1C 7.6 (H) 08/02/2022      -FS log  AT GOAL   -Diet, exercise, lifestyle modifications and A1c Goals discussed   -Hypoglycemia symptoms and plan of action discussed   -Low carb diet   -Seen DM Education?  NO, Will give referral today     PLAN:     Due to A1C at goal for Age and patient being on high doses of insulin.   I will recommend the following.     Pt is not interested in starting metformin or other oral medications at this time       Increase Trulicity to 3mg SC weekly  (Will need medication assistance program to inquire of cost of higher dose of Trulicity)     C/w  Levemir 45 qHS  C/w  Novolog 12 units TID before meals + sliding scale.    (If Trulicity  is approved at higher dose, will likely decrease Novolog doses)      Due to patient showing commitment w/ DM management, being on MDI , checking glucose 4 times a day and due to the COVID pandemic I believe she will benefit from DEXCOM continuous glucose monitor for better control of DM, hypoglycemia prevention and remote monitoring of glucose in between visit.      Send glucose logs in 2 weeks for review.     Will consider starting low dose metformin or low dose SGLT-2i at next visit with hopes to decrease insulin doses in the future.      A1C prior to next visit in 3 months       Hyperlipidemia, unspecified hyperlipidemia type  LDL above goal on Pravastatin 10mg daily    Statin intolerance on higher doses of statin     C/w  pravastatin 10mg daily   Diet, exercise and lifestyle modifications discussed   Low Fat diet       Hypertension, essential  BP controlled on current BP meds   On ARB for renal protection   Low Na diet

## 2022-08-16 NOTE — PATIENT INSTRUCTIONS
At this time your diabetes is well controlled which is good news but we will try to decrease the A1C to less than 7.5.     For now no medication changes today.     We will try to increase Trulicity to 3mg once a week but we will need to verify with patient assistance before increasing the dose.     Once we hear from the medication assistance program, it they are able to help with Higher dose Trulicity at that time we will increase Trulicitty to 3mg once a week and decrease your novolog doses before your meals.     Send me some sugar numbers in 2-3 weeks once we start the higher dose trulicity.     We will consider starting oral medications at your next visit with hopes of further decreasing insulin doses if possible.     150 grams of carbohydrates daily MAX,  (50 grams or less per meal)     Avoid sugary drinks (Sodas, Sweet Tea, Juices with added sugar, Soft drinks)     Check your sugars 3-4 times daily (Before meals and at bedtime)     Sugar goals before breakfast (70 - 130)  Sugars 2 hours after meals  (less than 180)     Keep sugar log for review at next visit.     Try walking or doing some sort of physical activity at least 30 minutes 3 times a week to increase your sensitivity to insulin, improve sugar levels and hopefully this will help in trying to reduce the doses of your medications in the future.     If having low blood sugar < 70 please correct with 16 grams of carbohydrates or with 4 glucose tablets and re-check your sugars every 15 minutes until symptoms resolve and sugar is above 100.      We will check your A1C and labs prior to next visit.     Ophthalmology appointment once a year.     Diabetes education appointment once a year.     If any questions feel free to contact me.     Have a nice day.     Sincerely,       Ray Mireles MD   Endocrinology   8/16/2022 11:48 AM

## 2022-08-17 ENCOUNTER — PATIENT MESSAGE (OUTPATIENT)
Dept: ENDOCRINOLOGY | Facility: CLINIC | Age: 72
End: 2022-08-17
Payer: MEDICARE

## 2022-08-18 ENCOUNTER — PATIENT MESSAGE (OUTPATIENT)
Dept: ENDOCRINOLOGY | Facility: CLINIC | Age: 72
End: 2022-08-18
Payer: MEDICARE

## 2022-08-18 ENCOUNTER — TELEPHONE (OUTPATIENT)
Dept: ENDOCRINOLOGY | Facility: CLINIC | Age: 72
End: 2022-08-18
Payer: MEDICARE

## 2022-08-18 NOTE — TELEPHONE ENCOUNTER
----- Message from Stephen Guadarrama sent at 8/18/2022 10:10 AM CDT -----  Regarding: verify script  Contact: Pharmacy @ [phone]  Caller calling to verity the script rec'vd (calcium carbonate (OS-ARIC) 600 mg calcium (1,500 mg) Tab  ) pls call     [facility]/pharmacy #[id number] - [place] - [address]  [address] [address]  Phone: [phone] Fax: [phone]

## 2022-08-22 ENCOUNTER — OFFICE VISIT (OUTPATIENT)
Dept: PULMONOLOGY | Facility: CLINIC | Age: 72
End: 2022-08-22
Payer: MEDICARE

## 2022-08-22 ENCOUNTER — PATIENT MESSAGE (OUTPATIENT)
Dept: PULMONOLOGY | Facility: CLINIC | Age: 72
End: 2022-08-22

## 2022-08-22 VITALS
HEART RATE: 93 BPM | BODY MASS INDEX: 34.77 KG/M2 | SYSTOLIC BLOOD PRESSURE: 136 MMHG | DIASTOLIC BLOOD PRESSURE: 82 MMHG | HEIGHT: 64 IN | OXYGEN SATURATION: 95 % | WEIGHT: 203.69 LBS

## 2022-08-22 DIAGNOSIS — E66.9 OBESITY (BMI 30-39.9): ICD-10-CM

## 2022-08-22 DIAGNOSIS — R13.10 DYSPHAGIA, UNSPECIFIED TYPE: ICD-10-CM

## 2022-08-22 DIAGNOSIS — K21.9 GASTROESOPHAGEAL REFLUX DISEASE WITHOUT ESOPHAGITIS: ICD-10-CM

## 2022-08-22 DIAGNOSIS — R05.9 COUGH: Primary | ICD-10-CM

## 2022-08-22 DIAGNOSIS — J45.991 COUGH VARIANT ASTHMA: ICD-10-CM

## 2022-08-22 DIAGNOSIS — G47.33 OSA ON CPAP: ICD-10-CM

## 2022-08-22 PROCEDURE — 3066F PR DOCUMENTATION OF TREATMENT FOR NEPHROPATHY: ICD-10-PCS | Mod: CPTII,S$GLB,, | Performed by: EMERGENCY MEDICINE

## 2022-08-22 PROCEDURE — 3051F PR MOST RECENT HEMOGLOBIN A1C LEVEL 7.0 - < 8.0%: ICD-10-PCS | Mod: CPTII,S$GLB,, | Performed by: EMERGENCY MEDICINE

## 2022-08-22 PROCEDURE — 1126F PR PAIN SEVERITY QUANTIFIED, NO PAIN PRESENT: ICD-10-PCS | Mod: CPTII,S$GLB,, | Performed by: EMERGENCY MEDICINE

## 2022-08-22 PROCEDURE — 3079F PR MOST RECENT DIASTOLIC BLOOD PRESSURE 80-89 MM HG: ICD-10-PCS | Mod: CPTII,S$GLB,, | Performed by: EMERGENCY MEDICINE

## 2022-08-22 PROCEDURE — 1159F PR MEDICATION LIST DOCUMENTED IN MEDICAL RECORD: ICD-10-PCS | Mod: CPTII,S$GLB,, | Performed by: EMERGENCY MEDICINE

## 2022-08-22 PROCEDURE — 99999 PR PBB SHADOW E&M-EST. PATIENT-LVL III: CPT | Mod: PBBFAC,,, | Performed by: EMERGENCY MEDICINE

## 2022-08-22 PROCEDURE — 3066F NEPHROPATHY DOC TX: CPT | Mod: CPTII,S$GLB,, | Performed by: EMERGENCY MEDICINE

## 2022-08-22 PROCEDURE — 3061F PR NEG MICROALBUMINURIA RESULT DOCUMENTED/REVIEW: ICD-10-PCS | Mod: CPTII,S$GLB,, | Performed by: EMERGENCY MEDICINE

## 2022-08-22 PROCEDURE — 1101F PR PT FALLS ASSESS DOC 0-1 FALLS W/OUT INJ PAST YR: ICD-10-PCS | Mod: CPTII,S$GLB,, | Performed by: EMERGENCY MEDICINE

## 2022-08-22 PROCEDURE — 3008F BODY MASS INDEX DOCD: CPT | Mod: CPTII,S$GLB,, | Performed by: EMERGENCY MEDICINE

## 2022-08-22 PROCEDURE — 3288F FALL RISK ASSESSMENT DOCD: CPT | Mod: CPTII,S$GLB,, | Performed by: EMERGENCY MEDICINE

## 2022-08-22 PROCEDURE — 3061F NEG MICROALBUMINURIA REV: CPT | Mod: CPTII,S$GLB,, | Performed by: EMERGENCY MEDICINE

## 2022-08-22 PROCEDURE — 1126F AMNT PAIN NOTED NONE PRSNT: CPT | Mod: CPTII,S$GLB,, | Performed by: EMERGENCY MEDICINE

## 2022-08-22 PROCEDURE — 1101F PT FALLS ASSESS-DOCD LE1/YR: CPT | Mod: CPTII,S$GLB,, | Performed by: EMERGENCY MEDICINE

## 2022-08-22 PROCEDURE — 99214 PR OFFICE/OUTPT VISIT, EST, LEVL IV, 30-39 MIN: ICD-10-PCS | Mod: S$GLB,,, | Performed by: EMERGENCY MEDICINE

## 2022-08-22 PROCEDURE — 3075F SYST BP GE 130 - 139MM HG: CPT | Mod: CPTII,S$GLB,, | Performed by: EMERGENCY MEDICINE

## 2022-08-22 PROCEDURE — 3051F HG A1C>EQUAL 7.0%<8.0%: CPT | Mod: CPTII,S$GLB,, | Performed by: EMERGENCY MEDICINE

## 2022-08-22 PROCEDURE — 3288F PR FALLS RISK ASSESSMENT DOCUMENTED: ICD-10-PCS | Mod: CPTII,S$GLB,, | Performed by: EMERGENCY MEDICINE

## 2022-08-22 PROCEDURE — 4010F ACE/ARB THERAPY RXD/TAKEN: CPT | Mod: CPTII,S$GLB,, | Performed by: EMERGENCY MEDICINE

## 2022-08-22 PROCEDURE — 99999 PR PBB SHADOW E&M-EST. PATIENT-LVL III: ICD-10-PCS | Mod: PBBFAC,,, | Performed by: EMERGENCY MEDICINE

## 2022-08-22 PROCEDURE — 4010F PR ACE/ARB THEARPY RXD/TAKEN: ICD-10-PCS | Mod: CPTII,S$GLB,, | Performed by: EMERGENCY MEDICINE

## 2022-08-22 PROCEDURE — 3075F PR MOST RECENT SYSTOLIC BLOOD PRESS GE 130-139MM HG: ICD-10-PCS | Mod: CPTII,S$GLB,, | Performed by: EMERGENCY MEDICINE

## 2022-08-22 PROCEDURE — 3008F PR BODY MASS INDEX (BMI) DOCUMENTED: ICD-10-PCS | Mod: CPTII,S$GLB,, | Performed by: EMERGENCY MEDICINE

## 2022-08-22 PROCEDURE — 3079F DIAST BP 80-89 MM HG: CPT | Mod: CPTII,S$GLB,, | Performed by: EMERGENCY MEDICINE

## 2022-08-22 PROCEDURE — 1159F MED LIST DOCD IN RCRD: CPT | Mod: CPTII,S$GLB,, | Performed by: EMERGENCY MEDICINE

## 2022-08-22 PROCEDURE — 99214 OFFICE O/P EST MOD 30 MIN: CPT | Mod: S$GLB,,, | Performed by: EMERGENCY MEDICINE

## 2022-08-22 RX ORDER — FLUTICASONE PROPIONATE AND SALMETEROL 250; 50 UG/1; UG/1
1 POWDER RESPIRATORY (INHALATION) 2 TIMES DAILY
Qty: 60 EACH | Refills: 6 | Status: SHIPPED | OUTPATIENT
Start: 2022-08-22 | End: 2023-08-03

## 2022-08-22 NOTE — PROGRESS NOTES
Pulmonary & Critical Care Medicine   Clinic Note       HPI:   69 y/o female with h/o HTN, DM HLD + MYRIAM on CPAP who presents with dry cough since the end of August. She sought care with her PCP who has since tried albuterol inhaler, Tessalon perles, and guaifenesin/codeine cough syrup but the cough persists. She states the inhaler has helped the most and she uses this once a day. She saw a pulmonologist several years ago but unsure of what diagnosis and plan resulted but was given an inhaler. She states the cough occurs throughout the day and becomes a barking cough at night. She states her  smokes cigarettes and the smoke will trigger her to cough as will fresh-cut grass and dust. She notices the cough more with eating. She takes Nexium daily for the past 2 years for reflux and had an EGD yesterday that is still pending read. She states she sometimes wheezes with the cough.   Additional Pulmonary History:   Occupational/Environmental Exposures: Retired, stays home often due to pandemic. Smoke, dust, fresh-cut grass are cough triggers.   Exposure to Animals/Pets: no   Travel History: no   History of exposures to TB: no   Family History of Lung Cancer: Brother  at age 38, no thyroid or throat cancer   Childhood history of Lung Disease: no     INTERVAL HISTORY: Have not seen patient since - No PFTs completed. Cough got much improved.. Returned in May-- Remains dry. No productive sputum.. No blood. Cough random.. Sometimes after eating, but not consistent. No burning/reflux.. Feels like food gets cut off in upper esophagus.   Last EGD - Hiatal hernia.   No additional complaint. Denies CARLSON/SOB/CP   LPR in past                 Past Medical History:   Diagnosis Date    Allergy     Cataract     DDD (degenerative disc disease), lumbar     Diabetes mellitus     diet controlled    Diabetes mellitus, type 2     GERD (gastroesophageal reflux disease)     History of subconjunctival hemorrhage 11     "left eye    Hyperlipidemia     Hypertension     IBS (irritable bowel syndrome)     Obesity     MYRIAM (obstructive sleep apnea)     on CPAP    Rotator cuff impingement syndrome     Seasonal allergic conjunctivitis            Past Surgical History:   Procedure Laterality Date    CATARACT EXTRACTION W/ INTRAOCULAR LENS IMPLANT  10/3/13    OD (dr. gardner)     SECTION      x2    CHOLECYSTECTOMY      COLONOSCOPY N/A 2018    Procedure: COLONOSCOPY; Surgeon: Marie Mejia MD; Location: [facility] ENDO; Service: Endoscopy; Laterality: N/A;    ENDOSCOPIC ULTRASOUND OF UPPER GASTROINTESTINAL TRACT N/A 10/9/2018    Procedure: ULTRASOUND, UPPER GI TRACT, ENDOSCOPIC; Surgeon: Javy Simpson MD; Location: [facility] ENDO; Service: Endoscopy; Laterality: N/A;    EXCISION OF LESION N/A 2019    Procedure: EXCISION, LESION VULVA; Surgeon: Liv Odell MD; Location: [facility] OR; Service: OB/GYN; Laterality: N/A; latex allergy    EYE SURGERY      HYSTERECTOMY      ovaries intact, due to DUB    TUBAL LIGATION       Family/Social History: Reviewed and updated.       Drug Allergies:         Review of patient's allergies indicates:   Allergen Reactions    Prednisone Other (See Comments)     hipper    Latex      Other reaction(s): Itching    Ace inhibitors Hives     Other reaction(s): Cough    Latex, natural rubber Itching     Review of Systems:   A comprehensive 12-point review of systems was performed, and is negative except for those items mentioned above in the HPI section of this note.       Vital Signs:   /82 (BP Location: Right arm, Patient Position: Sitting)   Pulse 93   Ht 5' 3.5" (1.613 m)   Wt 92.4 kg (203 lb 11.3 oz)   LMP  (LMP Unknown)   SpO2 95%   BMI 35.52 kg/m²        Physical Exam:   GEN- NAD AAOx3 Well Built, Well Appearing   HEENT- ATNC, PERRLA, EOMI, OP-Cl. No JVD, LAD or bruit noted. Trachea Midline.   CV- RRR No M/R/G   RESP- CTA-Bilateral   GI- S/NT/ND. Positive BS X 4. No HSM " Noted   BACK- Spine midline. No step off, crepitus or deformity noted. No midline TTP.   Ext- MAEW, No deformity. No edema or rashes noted.   Neuro- Strength 5/5 symmetric. CN 2-12 intact. Normal gait. Normal sensation.       Personal Review and Summary of Prior Diagnostics     Laboratory Studies: Reviewed      Latest Reference Range & Units 02/15/22 10:59 05/10/22 11:40 06/13/22 08:41 07/28/22 17:14 08/02/22 14:51   MPV 9.2 - 12.9 fL 10.3       Gran % 38.0 - 73.0 % 54.2       Lymph % 18.0 - 48.0 % 37.6       Mono % 4.0 - 15.0 % 5.9       Eosinophil % 0.0 - 8.0 % 1.5       Basophil % 0.0 - 1.9 % 0.4       Immature Granulocytes 0.0 - 0.5 % 0.4       Gran # (ANC) 1.8 - 7.7 K/uL 2.6       Lymph # 1.0 - 4.8 K/uL 1.8       Mono # 0.3 - 1.0 K/uL 0.3       Eos # 0.0 - 0.5 K/uL 0.1       Baso # 0.00 - 0.20 K/uL 0.02       Immature Grans (Abs) 0.00 - 0.04 K/uL 0.02       nRBC 0 /100 WBC 0       Differential Method  Automated       Sed Rate 0 - 36 mm/Hr 35       Sodium 136 - 145 mmol/L 138  140     Potassium 3.5 - 5.1 mmol/L 3.7  3.3 (L)     Chloride 95 - 110 mmol/L 102  101     CO2 23 - 29 mmol/L 28  24     Anion Gap 8 - 16 mmol/L 8  15     BUN 8 - 23 mg/dL 11  9     Creatinine 0.5 - 1.4 mg/dL 0.9  0.8     eGFR if non African American >60 mL/min/1.73 m^2 >60.0  >60.0     eGFR if African American >60 mL/min/1.73 m^2 >60.0  >60.0     Glucose 70 - 110 mg/dL 185 (H)  90     Calcium 8.7 - 10.5 mg/dL 9.6  8.9     Alkaline Phosphatase 55 - 135 U/L 108  105     PROTEIN TOTAL 6.0 - 8.4 g/dL 7.7  6.9     Albumin 3.5 - 5.2 g/dL 3.9  3.7     BILIRUBIN TOTAL 0.1 - 1.0 mg/dL 0.5  0.6     AST 10 - 40 U/L 16  18     ALT 10 - 44 U/L 13  21     CRP 0.0 - 8.2 mg/L 11.9 (H)       Cholesterol 120 - 199 mg/dL   202 (H)     HDL 40 - 75 mg/dL   53     HDL/Cholesterol Ratio 20.0 - 50.0 %   26.2     LDL Cholesterol External 63.0 - 159.0 mg/dL   111.8     Non-HDL Cholesterol mg/dL   149     Total Cholesterol/HDL Ratio 2.0 - 5.0    3.8      Triglycerides 30 - 150 mg/dL   186 (H)     Hemoglobin A1C External 4.0 - 5.6 %  7.1 (H) 7.0 (H)  7.6 (H)   Estimated Avg Glucose 68 - 131 mg/dL  157 (H) 154 (H)  171 (H)   JESSICA Screen Negative <1:80  Positive !       JESSICA Titer 1  1:160       JESSICA PATTERN 1  Homogeneous       ds DNA Ab Negative 1:10  Negative 1:10       Anti-SSA Antibody 0.00 - 0.99 Ratio 0.05       Anti-SSA Interpretation Negative  Negative       Anti-SSB Antibody 0.00 - 0.99 Ratio 0.04       Anti-SSB Interpretation Negative  Negative       Anti Sm Antibody 0.00 - 0.99 Ratio 0.07       Anti-Sm Interpretation Negative  Negative       Anti Sm/RNP Antibody 0.00 - 0.99 Ratio 0.05       Anti-Sm/RNP Interpretation Negative  Negative       Rheumatoid Factor 0.0 - 15.0 IU/mL <13.0       SARS-CoV-2 RNA, Amplification, Qual Negative     Negative        Microbiology Data: None       Summary of Chest Imaging Personally Reviewed:   CXR- The lungs are clear, with normal appearance of pulmonary vasculature and no pleural effusion or pneumothorax.   The cardiac silhouette is normal in size. The hilar and mediastinal contours are unremarkable.   Bones are intact. Healed left rib fracture. Scoliosis of the thoracolumbar spine.   CT A/P- Visualized lungs are clear. No pleural effusions.       2D Echo: 2017   1 - Normal left ventricular systolic function (EF 60-65%).   2 - Left ventricular diastolic dysfunction.   3 - Mild left atrial enlargement.   4 - Normal right ventricular systolic function .   5 - The estimated PA systolic pressure is 23 mmHg.     Holter-   TEST DESCRIPTION   PREDOMINANT RHYTHM   1. Sinus rhythm with heart rates varying between 61 and 118 bpm with an average of 82 bpm.   VENTRICULAR ARRHYTHMIAS   1. There were very rare PVCs recorded totalling 7 and averaging less than 1 per hour. There was 1 couplet.   2. There were no episodes of ventricular tachycardia.   SUPRA VENTRICULAR ARRHYTHMIAS   1. There were very rare PACs recorded totalling 12 and  averaging less than 1 per hour.   2. There were no episodes of sustained supraventricular tachycardia.   SINUS NODE FUNCTION   1. There was no evidence of high grade SA ambrosio block.   AV CONDUCTION   1. There was no evidence of high grade AV block.   2. The longest RR interval was 1200 msec   PFT's: None   EGD 2018- Impression: - Medium-sized hiatal hernia.   - Normal stomach.   - No gross lesions in the entire examined duodenum.   - There was no sign of significant pathology in the   common bile duct.   - There was mild dilation in the left intrahepatic   bile duct(s).   - There was no sign of significant pathology in the   entire pancreas.   - No specimens collected.   Recommendation: - Discharge patient to home (ambulatory).   - Patient has a contact number available for   emergencies. The signs and symptoms of potential   delayed complications were discussed with the   patient. Return to normal activities tomorrow.   Written discharge instructions were provided to the   patient.   - Will plan for interval MRI/MRCP and LFTs in 3   months for follow-up of mild intrahepatic duct   dilation.       Assessment:       No diagnosis found.    Outpatient Encounter Medications as of 8/22/2022   Medication Sig Dispense Refill    ACCU-CHEK SHILOH PLUS METER Misc Inject 1 Units into the skin 2 (two) times daily. 1 each 0    ACCU-CHEK SHILOH PLUS TEST STRP Strp TEST BLOOD SUGAR TWICE DAILY 200 strip 3    ACCU-CHEK SOFTCLIX LANCETS Misc TEST BLOOD SUGAR THREE TIMES DAILY 300 each 3    albuterol (PROVENTIL/VENTOLIN HFA) 90 mcg/actuation inhaler Inhale 1-2 puffs into the lungs every 4 (four) hours as needed for Wheezing. 18 g 2    amLODIPine (NORVASC) 10 MG tablet Take 1 tablet (10 mg total) by mouth once daily. 90 tablet 3    aspirin (ECOTRIN) 81 MG EC tablet Take 81 mg by mouth once daily.      azelastine (ASTELIN) 137 mcg (0.1 %) nasal spray 1 spray (137 mcg total) by Nasal route 2 (two) times daily. 30 mL 5     "azelastine (OPTIVAR) 0.05 % ophthalmic solution Place 1 drop into both eyes 2 (two) times daily. 6 mL 1    blood glucose control high,low (ACCU-CHEK SHILOH CONTROL SOLN) Soln Use as directed 1 each 3    calcium carbonate (OS-ARIC) 600 mg calcium (1,500 mg) Tab Take 1 tablet (600 mg total) by mouth once. for 1 dose 30 tablet 11    cetirizine (ZYRTEC) 10 MG tablet Take 1 tablet (10 mg total) by mouth every evening. 90 tablet 0    chlorhexidine (PERIDEX) 0.12 % solution Use as directed 15 mLs in the mouth or throat daily as needed.      citalopram (CELEXA) 10 MG tablet Take 1 tablet (10 mg total) by mouth once daily. 90 tablet 3    dulaglutide (TRULICITY) 3 mg/0.5 mL pen injector Inject 3 mg into the skin every 7 days. 4 pen 11    ergocalciferol (ERGOCALCIFEROL) 50,000 unit Cap TAKE 1 CAPSULE EVERY 7 DAYS. 12 capsule 1    esomeprazole (NEXIUM) 40 MG capsule Take 1 capsule (40 mg total) by mouth before breakfast. 90 capsule 3    fluticasone-salmeterol diskus inhaler 250-50 mcg Inhale 1 puff by mouth into the lungs 2 (two) times daily. Controller 60 each 1    gabapentin (NEURONTIN) 100 MG capsule Take 1 capsule (100 mg total) by mouth 3 (three) times daily. 270 capsule 3    glucose 4 GM chewable tablet Take 4 tablets (16 g total) by mouth as needed for Low blood sugar (If having symptoms of blurry vision, palpitations, confusion, shakiness.  Please check sugars and if sugar below 70 please take 4 tablets and re-check sugar everry 15 minutes until sugars are above 70 and symptoms resolve.). 50 tablet 12    insulin aspart U-100 (NOVOLOG FLEXPEN U-100 INSULIN) 100 unit/mL (3 mL) InPn pen Inject 12 units w/ meals plus scale 150-200+2, 201-250+4, 251-300+6, 301-350+8, >350+10. Max daily 50 units. 15 mL 6    insulin detemir U-100 (LEVEMIR FLEXTOUCH U-100 INSULN) 100 unit/mL (3 mL) InPn pen Inject 45 units into the skin at night. 15 mL 6    insulin syringe-needle U-100 0.3 mL 31 gauge x 5/16" Syrg relion brand, use " "5 x a day, if not on levemir or novolog 150 each 1    insulin syringe-needle U-100 0.5 mL 31 gauge x 5/16" Syrg Use nightly. 90 day 100 each 3    losartan (COZAAR) 50 MG tablet TAKE 1 TABLET EVERY DAY 90 tablet 3    magnesium oxide (MAG-OX) 400 mg tablet Take 1 tablet (400 mg total) by mouth 2 (two) times daily. 180 tablet 3    pen needle, diabetic 31 gauge x 3/16" Ndle Uses 4 x a day. 400 each 3    piroxicam (FELDENE) 20 MG capsule TAKE 1 CAPSULE BY MOUTH EVERY DAY 30 capsule 0    pravastatin (PRAVACHOL) 10 MG tablet Take 1 tablet (10 mg total) by mouth once daily. 90 tablet 3    traZODone (DESYREL) 50 MG tablet TAKE 1 TABLET EVERY NIGHT AS NEEDED 90 tablet 3     No facility-administered encounter medications on file as of 8/22/2022.     No orders of the defined types were placed in this encounter.      Plan:         1. Chronic cough- intermittent wheezing reported, but inconsistent. Does have significant allergic triggers, but not with significant AR/PND features. No offending medications and despite hiatal hernia, GERD is well controlled with use of PPI. Prior CXR without parenchymal abnormalities evident, but no chest imaging since 2020..Initiated on LABA/ICS and nasal corticosteroids. Cough improved initially, but now with recurrence. Mostly after eating.. Feels food sticking.. Apparently scheduled for EGD.     -- Check esophagram   -- CT chest to exclude parenchymal abnormalities.   -- Continue current AR/Asthma therapy.   -- Needs PFTs on follow up  -- GI follow up.      2. MYRIAM- on CPAP and compliant. Continue current therapy.      3. + JESSICA- Expanded panel negative. No overt CTD features.  Seen by rheum- Feel joint pain c/w OA.. No rheumatologic disease noted.     4. GERD- Maintain PPI     5. Obesity- BMI 35. Weight loss and exercise discussed.       RTC 6 months.  Will follow up studies in the interim.       Foreign Gauthier M.D.   [facility]               "

## 2022-08-25 ENCOUNTER — TELEPHONE (OUTPATIENT)
Dept: PHARMACY | Facility: CLINIC | Age: 72
End: 2022-08-25
Payer: MEDICARE

## 2022-08-30 ENCOUNTER — HOSPITAL ENCOUNTER (OUTPATIENT)
Dept: RADIOLOGY | Facility: HOSPITAL | Age: 72
Discharge: HOME | End: 2022-08-30
Attending: EMERGENCY MEDICINE
Payer: MEDICARE

## 2022-08-30 ENCOUNTER — PATIENT OUTREACH (OUTPATIENT)
Dept: ADMINISTRATIVE | Facility: OTHER | Age: 72
End: 2022-08-30
Payer: MEDICARE

## 2022-08-30 ENCOUNTER — PATIENT MESSAGE (OUTPATIENT)
Dept: PULMONOLOGY | Facility: CLINIC | Age: 72
End: 2022-08-30
Payer: MEDICARE

## 2022-08-30 DIAGNOSIS — R05.9 COUGH: ICD-10-CM

## 2022-08-30 PROCEDURE — 25500020 PHARM REV CODE 255: Performed by: EMERGENCY MEDICINE

## 2022-08-30 PROCEDURE — 74220 X-RAY XM ESOPHAGUS 1CNTRST: CPT | Mod: TC

## 2022-08-30 PROCEDURE — 74220 X-RAY XM ESOPHAGUS 1CNTRST: CPT | Mod: 26,,, | Performed by: RADIOLOGY

## 2022-08-30 PROCEDURE — 74220 FL ESOPHAGRAM COMPLETE: ICD-10-PCS | Mod: 26,,, | Performed by: RADIOLOGY

## 2022-08-30 PROCEDURE — 71250 CT THORAX DX C-: CPT | Mod: 26,,, | Performed by: RADIOLOGY

## 2022-08-30 PROCEDURE — 71250 CT CHEST WITHOUT CONTRAST: ICD-10-PCS | Mod: 26,,, | Performed by: RADIOLOGY

## 2022-08-30 PROCEDURE — 71250 CT THORAX DX C-: CPT | Mod: TC

## 2022-08-30 PROCEDURE — A9698 NON-RAD CONTRAST MATERIALNOC: HCPCS | Performed by: EMERGENCY MEDICINE

## 2022-08-30 RX ADMIN — BARIUM SULFATE 355 ML: 0.6 SUSPENSION ORAL at 11:08

## 2022-08-30 NOTE — PROGRESS NOTES
Follow-up Outreach - Due: 12/12/2022     This CHW called to check on this client to see if she was alright or if she had any needs and she stated that she was alright at the moment. She stated that she was on her way out to an appointment and that she would speak with me again later if any needs should arise. I thanked her for her time and ensured that she had my phone number. Follow up completed.

## 2022-08-31 PROBLEM — M85.89 OSTEOPENIA OF MULTIPLE SITES: Status: ACTIVE | Noted: 2022-08-31

## 2022-09-02 ENCOUNTER — PATIENT MESSAGE (OUTPATIENT)
Dept: ENDOCRINOLOGY | Facility: CLINIC | Age: 72
End: 2022-09-02
Payer: MEDICARE

## 2022-09-08 ENCOUNTER — PATIENT MESSAGE (OUTPATIENT)
Dept: OTOLARYNGOLOGY | Facility: CLINIC | Age: 72
End: 2022-09-08
Payer: MEDICARE

## 2022-09-12 ENCOUNTER — OFFICE VISIT (OUTPATIENT)
Dept: FAMILY MEDICINE | Facility: CLINIC | Age: 72
End: 2022-09-12
Payer: MEDICARE

## 2022-09-12 VITALS
HEIGHT: 63 IN | BODY MASS INDEX: 36.17 KG/M2 | HEART RATE: 102 BPM | DIASTOLIC BLOOD PRESSURE: 76 MMHG | SYSTOLIC BLOOD PRESSURE: 126 MMHG | OXYGEN SATURATION: 96 % | WEIGHT: 204.13 LBS

## 2022-09-12 DIAGNOSIS — F32.0 MILD MAJOR DEPRESSION: ICD-10-CM

## 2022-09-12 DIAGNOSIS — Z79.4 TYPE 2 DIABETES MELLITUS WITH HYPERGLYCEMIA, WITH LONG-TERM CURRENT USE OF INSULIN: Primary | ICD-10-CM

## 2022-09-12 DIAGNOSIS — E66.01 SEVERE OBESITY (BMI 35.0-39.9) WITH COMORBIDITY: ICD-10-CM

## 2022-09-12 DIAGNOSIS — E78.5 DYSLIPIDEMIA: ICD-10-CM

## 2022-09-12 DIAGNOSIS — I10 ESSENTIAL HYPERTENSION: ICD-10-CM

## 2022-09-12 DIAGNOSIS — E11.65 TYPE 2 DIABETES MELLITUS WITH HYPERGLYCEMIA, WITH LONG-TERM CURRENT USE OF INSULIN: Primary | ICD-10-CM

## 2022-09-12 PROBLEM — M46.96 UNSPECIFIED INFLAMMATORY SPONDYLOPATHY, LUMBAR REGION: Status: RESOLVED | Noted: 2019-11-19 | Resolved: 2022-09-12

## 2022-09-12 PROBLEM — K55.059 ACUTE (REVERSIBLE) ISCHEMIA OF INTESTINE, PART AND EXTENT UNSPECIFIED: Status: ACTIVE | Noted: 2022-09-12

## 2022-09-12 PROBLEM — K55.059 ACUTE (REVERSIBLE) ISCHEMIA OF INTESTINE, PART AND EXTENT UNSPECIFIED: Status: RESOLVED | Noted: 2022-09-12 | Resolved: 2022-09-12

## 2022-09-12 PROCEDURE — 4010F PR ACE/ARB THEARPY RXD/TAKEN: ICD-10-PCS | Mod: CPTII,S$GLB,, | Performed by: FAMILY MEDICINE

## 2022-09-12 PROCEDURE — 1160F PR REVIEW ALL MEDS BY PRESCRIBER/CLIN PHARMACIST DOCUMENTED: ICD-10-PCS | Mod: CPTII,S$GLB,, | Performed by: FAMILY MEDICINE

## 2022-09-12 PROCEDURE — 4010F ACE/ARB THERAPY RXD/TAKEN: CPT | Mod: CPTII,S$GLB,, | Performed by: FAMILY MEDICINE

## 2022-09-12 PROCEDURE — 1101F PR PT FALLS ASSESS DOC 0-1 FALLS W/OUT INJ PAST YR: ICD-10-PCS | Mod: CPTII,S$GLB,, | Performed by: FAMILY MEDICINE

## 2022-09-12 PROCEDURE — 1125F PR PAIN SEVERITY QUANTIFIED, PAIN PRESENT: ICD-10-PCS | Mod: CPTII,S$GLB,, | Performed by: FAMILY MEDICINE

## 2022-09-12 PROCEDURE — 3066F NEPHROPATHY DOC TX: CPT | Mod: CPTII,S$GLB,, | Performed by: FAMILY MEDICINE

## 2022-09-12 PROCEDURE — 3051F HG A1C>EQUAL 7.0%<8.0%: CPT | Mod: CPTII,S$GLB,, | Performed by: FAMILY MEDICINE

## 2022-09-12 PROCEDURE — 99215 OFFICE O/P EST HI 40 MIN: CPT | Mod: S$GLB,,, | Performed by: FAMILY MEDICINE

## 2022-09-12 PROCEDURE — 99999 PR PBB SHADOW E&M-EST. PATIENT-LVL V: CPT | Mod: PBBFAC,,, | Performed by: FAMILY MEDICINE

## 2022-09-12 PROCEDURE — 99999 PR PBB SHADOW E&M-EST. PATIENT-LVL V: ICD-10-PCS | Mod: PBBFAC,,, | Performed by: FAMILY MEDICINE

## 2022-09-12 PROCEDURE — 3078F DIAST BP <80 MM HG: CPT | Mod: CPTII,S$GLB,, | Performed by: FAMILY MEDICINE

## 2022-09-12 PROCEDURE — 3008F PR BODY MASS INDEX (BMI) DOCUMENTED: ICD-10-PCS | Mod: CPTII,S$GLB,, | Performed by: FAMILY MEDICINE

## 2022-09-12 PROCEDURE — 3074F SYST BP LT 130 MM HG: CPT | Mod: CPTII,S$GLB,, | Performed by: FAMILY MEDICINE

## 2022-09-12 PROCEDURE — 3051F PR MOST RECENT HEMOGLOBIN A1C LEVEL 7.0 - < 8.0%: ICD-10-PCS | Mod: CPTII,S$GLB,, | Performed by: FAMILY MEDICINE

## 2022-09-12 PROCEDURE — 3074F PR MOST RECENT SYSTOLIC BLOOD PRESSURE < 130 MM HG: ICD-10-PCS | Mod: CPTII,S$GLB,, | Performed by: FAMILY MEDICINE

## 2022-09-12 PROCEDURE — 1125F AMNT PAIN NOTED PAIN PRSNT: CPT | Mod: CPTII,S$GLB,, | Performed by: FAMILY MEDICINE

## 2022-09-12 PROCEDURE — 99215 PR OFFICE/OUTPT VISIT, EST, LEVL V, 40-54 MIN: ICD-10-PCS | Mod: S$GLB,,, | Performed by: FAMILY MEDICINE

## 2022-09-12 PROCEDURE — 1160F RVW MEDS BY RX/DR IN RCRD: CPT | Mod: CPTII,S$GLB,, | Performed by: FAMILY MEDICINE

## 2022-09-12 PROCEDURE — 3061F PR NEG MICROALBUMINURIA RESULT DOCUMENTED/REVIEW: ICD-10-PCS | Mod: CPTII,S$GLB,, | Performed by: FAMILY MEDICINE

## 2022-09-12 PROCEDURE — 1101F PT FALLS ASSESS-DOCD LE1/YR: CPT | Mod: CPTII,S$GLB,, | Performed by: FAMILY MEDICINE

## 2022-09-12 PROCEDURE — 3066F PR DOCUMENTATION OF TREATMENT FOR NEPHROPATHY: ICD-10-PCS | Mod: CPTII,S$GLB,, | Performed by: FAMILY MEDICINE

## 2022-09-12 PROCEDURE — 3061F NEG MICROALBUMINURIA REV: CPT | Mod: CPTII,S$GLB,, | Performed by: FAMILY MEDICINE

## 2022-09-12 PROCEDURE — 3078F PR MOST RECENT DIASTOLIC BLOOD PRESSURE < 80 MM HG: ICD-10-PCS | Mod: CPTII,S$GLB,, | Performed by: FAMILY MEDICINE

## 2022-09-12 PROCEDURE — 3288F FALL RISK ASSESSMENT DOCD: CPT | Mod: CPTII,S$GLB,, | Performed by: FAMILY MEDICINE

## 2022-09-12 PROCEDURE — 1159F PR MEDICATION LIST DOCUMENTED IN MEDICAL RECORD: ICD-10-PCS | Mod: CPTII,S$GLB,, | Performed by: FAMILY MEDICINE

## 2022-09-12 PROCEDURE — 3008F BODY MASS INDEX DOCD: CPT | Mod: CPTII,S$GLB,, | Performed by: FAMILY MEDICINE

## 2022-09-12 PROCEDURE — 3288F PR FALLS RISK ASSESSMENT DOCUMENTED: ICD-10-PCS | Mod: CPTII,S$GLB,, | Performed by: FAMILY MEDICINE

## 2022-09-12 PROCEDURE — 1159F MED LIST DOCD IN RCRD: CPT | Mod: CPTII,S$GLB,, | Performed by: FAMILY MEDICINE

## 2022-09-12 RX ORDER — PRAVASTATIN SODIUM 20 MG/1
20 TABLET ORAL NIGHTLY
Qty: 90 TABLET | Refills: 3 | Status: SHIPPED | OUTPATIENT
Start: 2022-09-12 | End: 2022-11-23

## 2022-09-12 NOTE — PROGRESS NOTES
Subjective:       Patient ID: Chelly Ortiz is a 71 y.o. female.    Chief Complaint: Follow-up and Diabetes    71 years old female came to the clinic for blood pressure check.  No chest pain, palpitation, orthopnea or PND.  Patient with a BMI of 36 currently trying to lose weight.  Last cholesterol was elevated.  No suicidal or homicidal ideations.  Patient currently on psychotherapy.    Follow-up    Diabetes  Pertinent negatives for hypoglycemia include no nervousness/anxiousness.   Review of Systems   Constitutional: Negative.    HENT: Negative.     Eyes: Negative.    Respiratory: Negative.     Gastrointestinal: Negative.    Genitourinary: Negative.    Musculoskeletal: Negative.    Neurological: Negative.    Psychiatric/Behavioral:  Positive for decreased concentration and dysphoric mood. The patient is not nervous/anxious.        Objective:      Physical Exam  Constitutional:       General: She is not in acute distress.     Appearance: She is well-developed. She is not diaphoretic.   HENT:      Head: Normocephalic and atraumatic.      Right Ear: External ear normal.      Left Ear: External ear normal.      Nose: Nose normal.      Mouth/Throat:      Pharynx: No oropharyngeal exudate.   Eyes:      General: No scleral icterus.        Right eye: No discharge.         Left eye: No discharge.      Conjunctiva/sclera: Conjunctivae normal.      Pupils: Pupils are equal, round, and reactive to light.   Neck:      Thyroid: No thyromegaly.      Vascular: No JVD.      Trachea: No tracheal deviation.   Cardiovascular:      Rate and Rhythm: Normal rate and regular rhythm.      Heart sounds: Normal heart sounds. No murmur heard.    No friction rub. No gallop.   Pulmonary:      Effort: Pulmonary effort is normal. No respiratory distress.      Breath sounds: Normal breath sounds. No stridor. No wheezing or rales.   Chest:      Chest wall: No tenderness.   Abdominal:      General: Bowel sounds are normal. There is no distension.       Palpations: Abdomen is soft. There is no mass.      Tenderness: There is no abdominal tenderness. There is no guarding or rebound.   Musculoskeletal:         General: No tenderness. Normal range of motion.      Cervical back: Normal range of motion and neck supple.   Lymphadenopathy:      Cervical: No cervical adenopathy.   Skin:     General: Skin is warm and dry.      Coloration: Skin is not pale.      Findings: No erythema or rash.   Neurological:      Mental Status: She is alert and oriented to person, place, and time.      Cranial Nerves: No cranial nerve deficit.      Motor: No abnormal muscle tone.      Coordination: Coordination normal.      Deep Tendon Reflexes: Reflexes are normal and symmetric.   Psychiatric:         Mood and Affect: Mood is depressed.         Behavior: Behavior normal.         Thought Content: Thought content normal.         Judgment: Judgment normal.       Assessment:       Problem List Items Addressed This Visit       Essential hypertension    Relevant Orders    Comprehensive Metabolic Panel    TSH    Mild major depression    Type 2 diabetes mellitus with hyperglycemia, with long-term current use of insulin - Primary    Relevant Orders    Comprehensive Metabolic Panel    Hemoglobin A1C    Microalbumin/Creatinine Ratio, Urine     Other Visit Diagnoses       Severe obesity (BMI 35.0-39.9) with comorbidity        Relevant Orders    Lipid Panel    Dyslipidemia        Relevant Medications    pravastatin (PRAVACHOL) 20 MG tablet    Other Relevant Orders    Comprehensive Metabolic Panel    Lipid Panel              Plan:         Chelly was seen today for follow-up and diabetes.    Diagnoses and all orders for this visit:    Type 2 diabetes mellitus with hyperglycemia, with long-term current use of insulin  -     Comprehensive Metabolic Panel; Future  -     Hemoglobin A1C; Future  -     Microalbumin/Creatinine Ratio, Urine; Future    Essential hypertension  -     Comprehensive Metabolic  Panel; Future  -     TSH; Future    Severe obesity (BMI 35.0-39.9) with comorbidity  -     Lipid Panel; Future    Dyslipidemia  -     pravastatin (PRAVACHOL) 20 MG tablet; Take 1 tablet (20 mg total) by mouth every evening.  -     Comprehensive Metabolic Panel; Future  -     Lipid Panel; Future    Mild major depression   Continue monitoring blood sugar at home,ADA diet.     Continue monitoring blood pressure at home, low sodium diet.      Diet and physical activity to promote weight loss..

## 2022-09-22 ENCOUNTER — PATIENT MESSAGE (OUTPATIENT)
Dept: ENDOCRINOLOGY | Facility: CLINIC | Age: 72
End: 2022-09-22
Payer: MEDICARE

## 2022-09-29 ENCOUNTER — TELEPHONE (OUTPATIENT)
Dept: OPHTHALMOLOGY | Facility: CLINIC | Age: 72
End: 2022-09-29
Payer: MEDICARE

## 2022-09-29 DIAGNOSIS — H25.812 COMBINED FORM OF AGE-RELATED CATARACT, LEFT EYE: Primary | ICD-10-CM

## 2022-09-30 ENCOUNTER — TELEPHONE (OUTPATIENT)
Dept: FAMILY MEDICINE | Facility: CLINIC | Age: 72
End: 2022-09-30
Payer: MEDICARE

## 2022-09-30 NOTE — TELEPHONE ENCOUNTER
Pt was contacted in regards to scheduling her pre op appointment. She has been set with caroline Junior.

## 2022-09-30 NOTE — TELEPHONE ENCOUNTER
----- Message from Alex Lucas MA sent at 9/29/2022 11:23 AM CDT -----  Regarding: surgery clearance  Patient is scheduled for cataract surgery on 11/02/2022 with Dr. Clarice Herzog and will be done under local anesthesia. Patient will need to be cleared for surgery either by chart or please schedule an appointment for patient accordingly. Thank you for your assistance.

## 2022-10-04 ENCOUNTER — PATIENT MESSAGE (OUTPATIENT)
Dept: FAMILY MEDICINE | Facility: CLINIC | Age: 72
End: 2022-10-04
Payer: MEDICARE

## 2022-10-05 ENCOUNTER — TELEPHONE (OUTPATIENT)
Dept: FAMILY MEDICINE | Facility: CLINIC | Age: 72
End: 2022-10-05

## 2022-10-05 ENCOUNTER — CLINICAL SUPPORT (OUTPATIENT)
Dept: AUDIOLOGY | Facility: CLINIC | Age: 72
End: 2022-10-05
Payer: MEDICARE

## 2022-10-05 ENCOUNTER — OFFICE VISIT (OUTPATIENT)
Dept: OTOLARYNGOLOGY | Facility: CLINIC | Age: 72
End: 2022-10-05
Payer: MEDICARE

## 2022-10-05 VITALS — HEART RATE: 81 BPM | SYSTOLIC BLOOD PRESSURE: 126 MMHG | DIASTOLIC BLOOD PRESSURE: 69 MMHG

## 2022-10-05 DIAGNOSIS — H90.3 ASYMMETRICAL SENSORINEURAL HEARING LOSS: Primary | ICD-10-CM

## 2022-10-05 DIAGNOSIS — R79.89 LOW VITAMIN D LEVEL: Primary | ICD-10-CM

## 2022-10-05 DIAGNOSIS — H69.90 DYSFUNCTION OF EUSTACHIAN TUBE, UNSPECIFIED LATERALITY: ICD-10-CM

## 2022-10-05 DIAGNOSIS — H90.3 ASYMMETRIC SNHL (SENSORINEURAL HEARING LOSS): Primary | ICD-10-CM

## 2022-10-05 DIAGNOSIS — H61.22 IMPACTED CERUMEN, LEFT EAR: ICD-10-CM

## 2022-10-05 PROCEDURE — 4010F PR ACE/ARB THEARPY RXD/TAKEN: ICD-10-PCS | Mod: CPTII,S$GLB,, | Performed by: OTOLARYNGOLOGY

## 2022-10-05 PROCEDURE — 3066F PR DOCUMENTATION OF TREATMENT FOR NEPHROPATHY: ICD-10-PCS | Mod: CPTII,S$GLB,, | Performed by: OTOLARYNGOLOGY

## 2022-10-05 PROCEDURE — 3288F PR FALLS RISK ASSESSMENT DOCUMENTED: ICD-10-PCS | Mod: CPTII,S$GLB,, | Performed by: OTOLARYNGOLOGY

## 2022-10-05 PROCEDURE — 1159F PR MEDICATION LIST DOCUMENTED IN MEDICAL RECORD: ICD-10-PCS | Mod: CPTII,S$GLB,, | Performed by: OTOLARYNGOLOGY

## 2022-10-05 PROCEDURE — 99213 OFFICE O/P EST LOW 20 MIN: CPT | Mod: 25,S$GLB,, | Performed by: OTOLARYNGOLOGY

## 2022-10-05 PROCEDURE — 92557 COMPREHENSIVE HEARING TEST: CPT | Mod: S$GLB,,, | Performed by: AUDIOLOGIST

## 2022-10-05 PROCEDURE — 1126F PR PAIN SEVERITY QUANTIFIED, NO PAIN PRESENT: ICD-10-PCS | Mod: CPTII,S$GLB,, | Performed by: OTOLARYNGOLOGY

## 2022-10-05 PROCEDURE — 99999 PR PBB SHADOW E&M-EST. PATIENT-LVL I: ICD-10-PCS | Mod: PBBFAC,,, | Performed by: AUDIOLOGIST

## 2022-10-05 PROCEDURE — 1160F RVW MEDS BY RX/DR IN RCRD: CPT | Mod: CPTII,S$GLB,, | Performed by: OTOLARYNGOLOGY

## 2022-10-05 PROCEDURE — 92567 TYMPANOMETRY: CPT | Mod: S$GLB,,, | Performed by: AUDIOLOGIST

## 2022-10-05 PROCEDURE — 1101F PT FALLS ASSESS-DOCD LE1/YR: CPT | Mod: CPTII,S$GLB,, | Performed by: OTOLARYNGOLOGY

## 2022-10-05 PROCEDURE — 99213 PR OFFICE/OUTPT VISIT, EST, LEVL III, 20-29 MIN: ICD-10-PCS | Mod: 25,S$GLB,, | Performed by: OTOLARYNGOLOGY

## 2022-10-05 PROCEDURE — 92567 PR TYMPA2METRY: ICD-10-PCS | Mod: S$GLB,,, | Performed by: AUDIOLOGIST

## 2022-10-05 PROCEDURE — 69210 REMOVE IMPACTED EAR WAX UNI: CPT | Mod: S$GLB,,, | Performed by: OTOLARYNGOLOGY

## 2022-10-05 PROCEDURE — 99999 PR PBB SHADOW E&M-EST. PATIENT-LVL V: ICD-10-PCS | Mod: PBBFAC,,, | Performed by: OTOLARYNGOLOGY

## 2022-10-05 PROCEDURE — 3061F NEG MICROALBUMINURIA REV: CPT | Mod: CPTII,S$GLB,, | Performed by: OTOLARYNGOLOGY

## 2022-10-05 PROCEDURE — 3078F PR MOST RECENT DIASTOLIC BLOOD PRESSURE < 80 MM HG: ICD-10-PCS | Mod: CPTII,S$GLB,, | Performed by: OTOLARYNGOLOGY

## 2022-10-05 PROCEDURE — 3051F HG A1C>EQUAL 7.0%<8.0%: CPT | Mod: CPTII,S$GLB,, | Performed by: OTOLARYNGOLOGY

## 2022-10-05 PROCEDURE — 92557 PR COMPREHENSIVE HEARING TEST: ICD-10-PCS | Mod: S$GLB,,, | Performed by: AUDIOLOGIST

## 2022-10-05 PROCEDURE — 3066F NEPHROPATHY DOC TX: CPT | Mod: CPTII,S$GLB,, | Performed by: OTOLARYNGOLOGY

## 2022-10-05 PROCEDURE — 1160F PR REVIEW ALL MEDS BY PRESCRIBER/CLIN PHARMACIST DOCUMENTED: ICD-10-PCS | Mod: CPTII,S$GLB,, | Performed by: OTOLARYNGOLOGY

## 2022-10-05 PROCEDURE — 1101F PR PT FALLS ASSESS DOC 0-1 FALLS W/OUT INJ PAST YR: ICD-10-PCS | Mod: CPTII,S$GLB,, | Performed by: OTOLARYNGOLOGY

## 2022-10-05 PROCEDURE — 1159F MED LIST DOCD IN RCRD: CPT | Mod: CPTII,S$GLB,, | Performed by: OTOLARYNGOLOGY

## 2022-10-05 PROCEDURE — 3061F PR NEG MICROALBUMINURIA RESULT DOCUMENTED/REVIEW: ICD-10-PCS | Mod: CPTII,S$GLB,, | Performed by: OTOLARYNGOLOGY

## 2022-10-05 PROCEDURE — 3288F FALL RISK ASSESSMENT DOCD: CPT | Mod: CPTII,S$GLB,, | Performed by: OTOLARYNGOLOGY

## 2022-10-05 PROCEDURE — 69210 PR REMOVAL IMPACTED CERUMEN REQUIRING INSTRUMENTATION, UNILATERAL: ICD-10-PCS | Mod: S$GLB,,, | Performed by: OTOLARYNGOLOGY

## 2022-10-05 PROCEDURE — 3074F PR MOST RECENT SYSTOLIC BLOOD PRESSURE < 130 MM HG: ICD-10-PCS | Mod: CPTII,S$GLB,, | Performed by: OTOLARYNGOLOGY

## 2022-10-05 PROCEDURE — 99999 PR PBB SHADOW E&M-EST. PATIENT-LVL V: CPT | Mod: PBBFAC,,, | Performed by: OTOLARYNGOLOGY

## 2022-10-05 PROCEDURE — 3051F PR MOST RECENT HEMOGLOBIN A1C LEVEL 7.0 - < 8.0%: ICD-10-PCS | Mod: CPTII,S$GLB,, | Performed by: OTOLARYNGOLOGY

## 2022-10-05 PROCEDURE — 3074F SYST BP LT 130 MM HG: CPT | Mod: CPTII,S$GLB,, | Performed by: OTOLARYNGOLOGY

## 2022-10-05 PROCEDURE — 3078F DIAST BP <80 MM HG: CPT | Mod: CPTII,S$GLB,, | Performed by: OTOLARYNGOLOGY

## 2022-10-05 PROCEDURE — 4010F ACE/ARB THERAPY RXD/TAKEN: CPT | Mod: CPTII,S$GLB,, | Performed by: OTOLARYNGOLOGY

## 2022-10-05 PROCEDURE — 1126F AMNT PAIN NOTED NONE PRSNT: CPT | Mod: CPTII,S$GLB,, | Performed by: OTOLARYNGOLOGY

## 2022-10-05 PROCEDURE — 99999 PR PBB SHADOW E&M-EST. PATIENT-LVL I: CPT | Mod: PBBFAC,,, | Performed by: AUDIOLOGIST

## 2022-10-05 RX ORDER — ERGOCALCIFEROL 1.25 MG/1
CAPSULE ORAL
Qty: 12 CAPSULE | Refills: 1 | Status: SHIPPED | OUTPATIENT
Start: 2022-10-05 | End: 2023-03-02

## 2022-10-05 NOTE — PROGRESS NOTES
72 y/o female presents for follow up of her hearing. Last hearing test about a year ago. No using hearing aids. Still feels right worse then left. No big changes. No pain or drainage of ears. Notices some trouble when she uses phone on right ear and people w/ whom she is speaking do not speak into microphone. Her mother had hearing loss. NO other known ear trauma or ear infections.    She was told she has wax in left ear.    PMHx, PSHx, Meds, Allergies, SocHx, FamHx reviewed in EPIC    ROS:  Gen - no fever or chills  Denies any recent hospitalizations or major medical illness  ENT as above    PE: /69   Pulse 81   LMP  (LMP Unknown)    Gen: female, well nourished, well developed, NAD, cooperative, good historian  Ears:  EAC clear on R , mound of  cerumen in left EAC - attached posteriorly (removed - see below) & TM translucent with normal bony landmarks bilaterally with R TM with small monomeric area just lateral to tip of umbo  Respiratory: Breathing comfortably without retractions  Neuro:  facial movement symmetric, speech fluid, gait stable  Psych: alert & oriented x 3, reasonable, normal affect  Voice: good volume, no raspy, no stridor        Audio reviewed with patient and compared to prior audiograms from 2021 & 2019.    Procedure: Magnification with removal of cerumen impaction using microsurgical instrumentation.  After explaining the procedure and obtaining verbal assent, the patient was comfortably positioned and each external auditory canal visualized with magnification. The obstructing cerumen was removed with microsurgical instrumentation, using a suction cannula to reveal patent external auditory canals.  Left canal cleared. The patient tolerated this procedure well without complication.    Impression:   1. Asymmetric SNHL (sensorineural hearing loss) - long term        2. Dysfunction of Eustachian tube, unspecified laterality        3. Impacted cerumen, left ear [H61.22 (ICD-10-CM)]             Discussion and Plan:       Reviewed hearing test with patient and compared to past two hearing tests over past 3 years. Mild decline in 3 years. Speech discrimination very good today. Discussed importance of stimulating verbal centers of the brain. For that reason, consider hearing aids. Discussed hearing aids generally.    Hearing protection recommended when in loud environments.    Recommend follow up for hearing in 1-2 years. Ear care discussed.    Parts or all of this note were created by voice recognition software; typographical errors in translating may be present.

## 2022-10-05 NOTE — PATIENT INSTRUCTIONS
Reviewed hearing test with patient and compared to past two hearing tests over past 3 years. Mild decline in 3 years. Speech discrimination very good today. Discussed importance of stimulating verbal centers of the brain. For that reason, consider hearing aids. Discussed hearing aids generally.    Hearing protection recommended when in loud environments.    Recommend follow up for hearing in 1-2 years. Ear care discussed.

## 2022-10-05 NOTE — PROGRESS NOTES
Audiologic Evaluation 10/5/2022:       Chelly Ortiz, a 71 y.o. female, was seen today in the clinic for an audiologic evaluation for hearing loss.  Ms. Ortiz reported she hears better from her left ear than right ear. Ms. Ortiz denied vertigo, otalgia, tinnitus, and aural fullness.      Tympanometry revealed Type C tympanogram in the right ear and Type A tympanogram in the left ear. Audiogram results revealed moderate sensorineural hearing loss in the right ear and mild sensorineural hearing loss in the left ear.  Speech reception thresholds were noted at 45 dB in the right ear and 30 dB in the left ear.  Speech discrimination scores were 100% in the right ear and 100% in the left ear.    Recommendations:  Otologic evaluation  Hearing aid consultation with medical clearance  Annual audiogram  Hearing protection when in noise

## 2022-10-06 ENCOUNTER — HOSPITAL ENCOUNTER (OUTPATIENT)
Dept: RADIOLOGY | Facility: HOSPITAL | Age: 72
Discharge: HOME | End: 2022-10-06
Attending: PODIATRIST
Payer: MEDICARE

## 2022-10-06 ENCOUNTER — OFFICE VISIT (OUTPATIENT)
Dept: PODIATRY | Facility: CLINIC | Age: 72
End: 2022-10-06
Payer: MEDICARE

## 2022-10-06 VITALS
DIASTOLIC BLOOD PRESSURE: 82 MMHG | SYSTOLIC BLOOD PRESSURE: 135 MMHG | HEIGHT: 63 IN | HEART RATE: 80 BPM | BODY MASS INDEX: 36.16 KG/M2

## 2022-10-06 DIAGNOSIS — M20.10 HALLUX ABDUCTOVALGUS WITH BUNIONS, UNSPECIFIED LATERALITY: ICD-10-CM

## 2022-10-06 DIAGNOSIS — G57.62 MORTON'S NEUROMA OF SECOND INTERSPACE OF LEFT FOOT: ICD-10-CM

## 2022-10-06 DIAGNOSIS — M21.619 HALLUX ABDUCTOVALGUS WITH BUNIONS, UNSPECIFIED LATERALITY: ICD-10-CM

## 2022-10-06 DIAGNOSIS — M21.619 HALLUX ABDUCTOVALGUS WITH BUNIONS, UNSPECIFIED LATERALITY: Primary | ICD-10-CM

## 2022-10-06 DIAGNOSIS — M20.10 HALLUX ABDUCTOVALGUS WITH BUNIONS, UNSPECIFIED LATERALITY: Primary | ICD-10-CM

## 2022-10-06 DIAGNOSIS — M20.42 HAMMERTOE OF SECOND TOE OF LEFT FOOT: ICD-10-CM

## 2022-10-06 PROCEDURE — 4010F PR ACE/ARB THEARPY RXD/TAKEN: ICD-10-PCS | Mod: CPTII,S$GLB,, | Performed by: PODIATRIST

## 2022-10-06 PROCEDURE — 73630 X-RAY EXAM OF FOOT: CPT | Mod: TC,50,PN

## 2022-10-06 PROCEDURE — 99214 OFFICE O/P EST MOD 30 MIN: CPT | Mod: 25,S$GLB,, | Performed by: PODIATRIST

## 2022-10-06 PROCEDURE — 4010F ACE/ARB THERAPY RXD/TAKEN: CPT | Mod: CPTII,S$GLB,, | Performed by: PODIATRIST

## 2022-10-06 PROCEDURE — 3061F PR NEG MICROALBUMINURIA RESULT DOCUMENTED/REVIEW: ICD-10-PCS | Mod: CPTII,S$GLB,, | Performed by: PODIATRIST

## 2022-10-06 PROCEDURE — 64455 NJX AA&/STRD PLTR COM DG NRV: CPT | Mod: LT,S$GLB,, | Performed by: PODIATRIST

## 2022-10-06 PROCEDURE — 3066F NEPHROPATHY DOC TX: CPT | Mod: CPTII,S$GLB,, | Performed by: PODIATRIST

## 2022-10-06 PROCEDURE — 1160F RVW MEDS BY RX/DR IN RCRD: CPT | Mod: CPTII,S$GLB,, | Performed by: PODIATRIST

## 2022-10-06 PROCEDURE — 3061F NEG MICROALBUMINURIA REV: CPT | Mod: CPTII,S$GLB,, | Performed by: PODIATRIST

## 2022-10-06 PROCEDURE — 3008F PR BODY MASS INDEX (BMI) DOCUMENTED: ICD-10-PCS | Mod: CPTII,S$GLB,, | Performed by: PODIATRIST

## 2022-10-06 PROCEDURE — 1160F PR REVIEW ALL MEDS BY PRESCRIBER/CLIN PHARMACIST DOCUMENTED: ICD-10-PCS | Mod: CPTII,S$GLB,, | Performed by: PODIATRIST

## 2022-10-06 PROCEDURE — 3008F BODY MASS INDEX DOCD: CPT | Mod: CPTII,S$GLB,, | Performed by: PODIATRIST

## 2022-10-06 PROCEDURE — 3066F PR DOCUMENTATION OF TREATMENT FOR NEPHROPATHY: ICD-10-PCS | Mod: CPTII,S$GLB,, | Performed by: PODIATRIST

## 2022-10-06 PROCEDURE — 73630 X-RAY EXAM OF FOOT: CPT | Mod: 26,50,, | Performed by: RADIOLOGY

## 2022-10-06 PROCEDURE — 3079F DIAST BP 80-89 MM HG: CPT | Mod: CPTII,S$GLB,, | Performed by: PODIATRIST

## 2022-10-06 PROCEDURE — 99999 PR PBB SHADOW E&M-EST. PATIENT-LVL III: CPT | Mod: PBBFAC,,, | Performed by: PODIATRIST

## 2022-10-06 PROCEDURE — 3079F PR MOST RECENT DIASTOLIC BLOOD PRESSURE 80-89 MM HG: ICD-10-PCS | Mod: CPTII,S$GLB,, | Performed by: PODIATRIST

## 2022-10-06 PROCEDURE — 3075F PR MOST RECENT SYSTOLIC BLOOD PRESS GE 130-139MM HG: ICD-10-PCS | Mod: CPTII,S$GLB,, | Performed by: PODIATRIST

## 2022-10-06 PROCEDURE — 64455 PR INJECT ANES/STEROID PLANTAR COMMON DIGITAL NERVE: ICD-10-PCS | Mod: LT,S$GLB,, | Performed by: PODIATRIST

## 2022-10-06 PROCEDURE — 1125F AMNT PAIN NOTED PAIN PRSNT: CPT | Mod: CPTII,S$GLB,, | Performed by: PODIATRIST

## 2022-10-06 PROCEDURE — 99214 PR OFFICE/OUTPT VISIT, EST, LEVL IV, 30-39 MIN: ICD-10-PCS | Mod: 25,S$GLB,, | Performed by: PODIATRIST

## 2022-10-06 PROCEDURE — 3051F PR MOST RECENT HEMOGLOBIN A1C LEVEL 7.0 - < 8.0%: ICD-10-PCS | Mod: CPTII,S$GLB,, | Performed by: PODIATRIST

## 2022-10-06 PROCEDURE — 1159F PR MEDICATION LIST DOCUMENTED IN MEDICAL RECORD: ICD-10-PCS | Mod: CPTII,S$GLB,, | Performed by: PODIATRIST

## 2022-10-06 PROCEDURE — 3075F SYST BP GE 130 - 139MM HG: CPT | Mod: CPTII,S$GLB,, | Performed by: PODIATRIST

## 2022-10-06 PROCEDURE — 99999 PR PBB SHADOW E&M-EST. PATIENT-LVL III: ICD-10-PCS | Mod: PBBFAC,,, | Performed by: PODIATRIST

## 2022-10-06 PROCEDURE — 73630 XR FOOT COMPLETE 3 VIEW BILATERAL: ICD-10-PCS | Mod: 26,50,, | Performed by: RADIOLOGY

## 2022-10-06 PROCEDURE — 1159F MED LIST DOCD IN RCRD: CPT | Mod: CPTII,S$GLB,, | Performed by: PODIATRIST

## 2022-10-06 PROCEDURE — 3051F HG A1C>EQUAL 7.0%<8.0%: CPT | Mod: CPTII,S$GLB,, | Performed by: PODIATRIST

## 2022-10-06 PROCEDURE — 1125F PR PAIN SEVERITY QUANTIFIED, PAIN PRESENT: ICD-10-PCS | Mod: CPTII,S$GLB,, | Performed by: PODIATRIST

## 2022-10-06 NOTE — PROGRESS NOTES
"Subjective:      Patient ID: Chelly Ortiz is a 71 y.o. female.    Chief Complaint: Toe Pain (Left foot )    Presents complaining of pain underneath the ball the left foot for greater than 6 months duration.  She had a previous steroid injection per Dr. Marley in 2022 with no relief.  She states pain is present with or without shoes and is aggravated by standing and walking.  No pain at rest.    Vitals:    10/06/22 1337   BP: 135/82   Pulse: 80   Height: 5' 3" (1.6 m)   PainSc:   4   PainLoc: Toe      Past Medical History:   Diagnosis Date    Allergy     Cataract     DDD (degenerative disc disease), lumbar     Diabetes mellitus     diet controlled    Diabetes mellitus, type 2     GERD (gastroesophageal reflux disease)     History of subconjunctival hemorrhage 11    left eye    Hyperlipidemia     Hypertension     IBS (irritable bowel syndrome)     Obesity     MYRIAM (obstructive sleep apnea)     on CPAP    Rotator cuff impingement syndrome     Seasonal allergic conjunctivitis        Past Surgical History:   Procedure Laterality Date    CATARACT EXTRACTION W/  INTRAOCULAR LENS IMPLANT Right 10/03/2013         SECTION      x2    CHOLECYSTECTOMY      COLONOSCOPY N/A 2018    Procedure: COLONOSCOPY;  Surgeon: Marie Mejia MD;  Location: [facility] ENDO;  Service: Endoscopy;  Laterality: N/A;    COLONOSCOPY N/A 2022    Procedure: COLONOSCOPY;  Surgeon: Pierce Rivera MD;  Location: [facility] ENDO;  Service: Endoscopy;  Laterality: N/A;    ENDOSCOPIC ULTRASOUND OF UPPER GASTROINTESTINAL TRACT N/A 10/9/2018    Procedure: ULTRASOUND, UPPER GI TRACT, ENDOSCOPIC;  Surgeon: Javy Simpson MD;  Location: [facility] ENDO;  Service: Endoscopy;  Laterality: N/A;    EXCISION OF LESION N/A 2019    Procedure: EXCISION, LESION VULVA;  Surgeon: Liv Odell MD;  Location: [facility] OR;  Service: OB/GYN;  Laterality: N/A;  latex allergy    EYE SURGERY      HYSTERECTOMY      ovaries intact, due " to DUB    TUBAL LIGATION         Family History   Problem Relation Age of Onset    Alzheimer's disease Mother     Heart disease Father     Diabetes Sister     Breast cancer Sister     Deep vein thrombosis Brother     Diabetes Daughter     Cancer Brother         Lung    Lung cancer Brother     Amblyopia Neg Hx     Blindness Neg Hx     Cataracts Neg Hx     Glaucoma Neg Hx     Hypertension Neg Hx     Macular degeneration Neg Hx     Retinal detachment Neg Hx     Strabismus Neg Hx     Stroke Neg Hx     Thyroid disease Neg Hx        Social History     Socioeconomic History    Marital status:    Tobacco Use    Smoking status: Former     Types: Cigarettes     Quit date: 2/15/1983     Years since quittin.6    Smokeless tobacco: Never   Substance and Sexual Activity    Alcohol use: No    Drug use: No    Sexual activity: Yes     Partners: Male     Social Determinants of Health     Financial Resource Strain: Unknown    Difficulty of Paying Living Expenses: Patient refused   Food Insecurity: Unknown    Worried About Running Out of Food in the Last Year: Patient refused    Ran Out of Food in the Last Year: Patient refused   Transportation Needs: No Transportation Needs    Lack of Transportation (Medical): No    Lack of Transportation (Non-Medical): No   Physical Activity: Unknown    Days of Exercise per Week: Patient refused    Minutes of Exercise per Session: 20 min   Stress: Unknown    Feeling of Stress : Patient refused   Social Connections: Unknown    Frequency of Communication with Friends and Family: Patient refused    Frequency of Social Gatherings with Friends and Family: Patient refused    Active Member of Clubs or Organizations: No    Attends Club or Organization Meetings: Patient refused    Marital Status:    Housing Stability: Low Risk     Unable to Pay for Housing in the Last Year: No    Number of Places Lived in the Last Year: 1    Unstable Housing in the Last Year: No       Current Outpatient  Medications   Medication Sig Dispense Refill    ACCU-CHEK FASTCLIX LANCET DRUM Misc TEST BLOOD SUGAR THREE TIMES A  each 3    ACCU-CHEK FASTCLIX LANCING DEV Kit USE AS DIRECTED 1 each 0    albuterol (PROVENTIL/VENTOLIN HFA) 90 mcg/actuation inhaler Inhale 1-2 puffs into the lungs every 4 (four) hours as needed for Wheezing. 18 g 2    aspirin (ECOTRIN) 81 MG EC tablet Take 81 mg by mouth once daily.      azelastine (OPTIVAR) 0.05 % ophthalmic solution Place 1 drop into both eyes 2 (two) times daily. 6 mL 1    blood glucose control high,low (ACCU-CHEK GUIDE L1-L2 CTRL SOL) Soln 3 times a day 300 each 11    blood sugar diagnostic (ACCU-CHEK GUIDE TEST STRIPS) Strp 3 times a day 300 strip 11    blood-glucose meter (ACCU-CHEK GUIDE GLUCOSE METER) Misc Three times a day 1 each 0    chlorhexidine (PERIDEX) 0.12 % solution Use as directed 15 mLs in the mouth or throat daily as needed.      citalopram (CELEXA) 10 MG tablet Take 1 tablet (10 mg total) by mouth once daily. 90 tablet 3    dulaglutide (TRULICITY) 3 mg/0.5 mL pen injector Inject 3 mg into the skin every 7 days. 4 pen 11    ergocalciferol (ERGOCALCIFEROL) 50,000 unit Cap TAKE 1 CAPSULE EVERY 7 DAYS. Strength: 50,000 Units 12 capsule 1    esomeprazole (NEXIUM) 40 MG capsule Take 1 capsule (40 mg total) by mouth before breakfast. 90 capsule 3    fluticasone-salmeterol diskus inhaler 250-50 mcg Inhale 1 puff by mouth into the lungs 2 (two) times daily. Controller 60 each 6    glucose 4 GM chewable tablet Take 4 tablets (16 g total) by mouth as needed for Low blood sugar (If having symptoms of blurry vision, palpitations, confusion, shakiness.  Please check sugars and if sugar below 70 please take 4 tablets and re-check sugar everry 15 minutes until sugars are above 70 and symptoms resolve.). 50 tablet 12    insulin aspart U-100 (NOVOLOG FLEXPEN U-100 INSULIN) 100 unit/mL (3 mL) InPn pen Inject 12 units w/ meals plus scale 150-200+2, 201-250+4, 251-300+6,  "301-350+8, >350+10. Max daily 50 units. 15 mL 6    insulin detemir U-100 (LEVEMIR FLEXTOUCH U-100 INSULN) 100 unit/mL (3 mL) InPn pen Inject 45 units into the skin at night. 15 mL 6    insulin syringe-needle U-100 0.3 mL 31 gauge x 5/16" Syrg relion brand, use 5 x a day, if not on levemir or novolog 150 each 1    insulin syringe-needle U-100 0.5 mL 31 gauge x 5/16" Syrg Use nightly. 90 day 100 each 3    losartan (COZAAR) 50 MG tablet TAKE 1 TABLET EVERY DAY 90 tablet 3    magnesium oxide (MAG-OX) 400 mg tablet Take 1 tablet (400 mg total) by mouth 2 (two) times daily. 180 tablet 3    pen needle, diabetic 31 gauge x 3/16" Ndle Uses 4 x a day. 400 each 3    piroxicam (FELDENE) 20 MG capsule TAKE 1 CAPSULE BY MOUTH EVERY DAY 30 capsule 0    pravastatin (PRAVACHOL) 20 MG tablet Take 1 tablet (20 mg total) by mouth every evening. 90 tablet 3    traZODone (DESYREL) 50 MG tablet TAKE 1 TABLET EVERY NIGHT AS NEEDED 90 tablet 3    amLODIPine (NORVASC) 10 MG tablet Take 1 tablet (10 mg total) by mouth once daily. 90 tablet 3    calcium carbonate (OS-ARIC) 600 mg calcium (1,500 mg) Tab Take 1 tablet (600 mg total) by mouth once. for 1 dose 30 tablet 11    cetirizine (ZYRTEC) 10 MG tablet Take 1 tablet (10 mg total) by mouth every evening. 90 tablet 0    gabapentin (NEURONTIN) 100 MG capsule Take 1 capsule (100 mg total) by mouth 3 (three) times daily. 270 capsule 3     No current facility-administered medications for this visit.       Review of patient's allergies indicates:   Allergen Reactions    Prednisone      Causes pt to be hyper    Ace inhibitors Hives     \Cough    Latex, natural rubber Itching         Review of Systems   Constitutional: Negative for chills, fever and malaise/fatigue.   HENT:  Negative for congestion and hearing loss.    Cardiovascular:  Negative for chest pain, claudication and leg swelling.   Respiratory:  Negative for cough and shortness of breath.    Musculoskeletal:  Negative for back pain, joint " pain, muscle cramps and muscle weakness.   Gastrointestinal:  Negative for nausea and vomiting.   Neurological:  Negative for numbness, paresthesias and weakness.   Psychiatric/Behavioral:  Negative for altered mental status.          Objective:      Physical Exam  Constitutional:       General: She is not in acute distress.     Appearance: She is obese. She is not ill-appearing.   Cardiovascular:      Pulses:           Dorsalis pedis pulses are 2+ on the right side and 2+ on the left side.        Posterior tibial pulses are 2+ on the right side and 2+ on the left side.      Comments: Mild nonpitting edema to lower extremity bilateral.  Skin temp is warm to foot bilateral.  No rubor on dependency bilateral foot.  Musculoskeletal:      Comments: Moderate pain on palpation distal 2nd intermetatarsal space and mild pain distal 3rd intermetatarsal space left foot.  Moderate pain with positive Lachman test 2nd metatarsophalangeal joint left foot.  Semi rigid flexion contracture left 2nd toe PIPJ.  Non track bound hallux abductovalgus under riding 2nd toe bilateral.    Reducible flexion contractures toes 3-4 left and 2-4 right with adductovarus contracture 5th toe bilateral.   Skin:     General: Skin is warm.      Capillary Refill: Capillary refill takes less than 2 seconds.      Findings: No ecchymosis or erythema.      Nails: There is no clubbing.   Neurological:      Mental Status: She is alert and oriented to person, place, and time.      Motor: Motor function is intact.             Assessment:       Encounter Diagnoses   Name Primary?    Hallux abductovalgus with bunions, unspecified laterality Yes    Montalvo's neuroma of second interspace of left foot     Hammertoe of second toe of left foot          Plan:       Chelly was seen today for toe pain.    Diagnoses and all orders for this visit:    Hallux abductovalgus with bunions, unspecified laterality  -     X-Ray Foot Complete 3 view Bilateral; Future    Montalvo's  neuroma of second interspace of left foot  -     X-Ray Foot Complete 3 view Bilateral; Future    Hammertoe of second toe of left foot  -     X-Ray Foot Complete 3 view Bilateral; Future    I counseled the patient on her conditions, their implications and medical management.    Dispensed and applied metatarsal pad to left foot.    Patient prior history of hallux valgus surgery of recurrent hallux valgus under riding the 2nd toe with plantar plate attenuation to the 2nd toe and Montalvo's neuroma distal 2nd intermetatarsal space left foot.  We discussed appropriate sizing shoes to provide sufficient support and room the toe box.  We also discussed use of a toe separator in addition to the metatarsal pad.  We discussed diagnostic injection to ensure that the majority the pain is coming from the neuroma itself enough from a combination of the plantar plate.    With the patient's verbal consent the skin was prepped with alcohol overlying the distal 2nd intermetatarsal space followed by skin anesthesia with ethyl chloride.  Injected mixture containing 2 mL 1:1 mixture containing 0.25% plain Marcaine and 2% plain lidocaine.  She tolerated procedure well without apparent complication.    Ordered bilateral foot weight-bearing x-ray to assess underlying osseous pathology.  We discussed possible correction of the hallux valgus left foot in addition to hammertoe and possible neuroma excision.    RTC within 4 weeks or p.r.n. as discussed.    A portion of this note was generated by voice recognition software and may contain spelling and grammar errors.  .

## 2022-10-20 ENCOUNTER — OFFICE VISIT (OUTPATIENT)
Dept: OPHTHALMOLOGY | Facility: CLINIC | Age: 72
End: 2022-10-20
Payer: MEDICARE

## 2022-10-20 DIAGNOSIS — H25.12 NUCLEAR SCLEROTIC CATARACT OF LEFT EYE: ICD-10-CM

## 2022-10-20 DIAGNOSIS — E11.9 DM TYPE 2 WITHOUT RETINOPATHY: ICD-10-CM

## 2022-10-20 DIAGNOSIS — H25.812 COMBINED FORM OF AGE-RELATED CATARACT, LEFT EYE: Primary | ICD-10-CM

## 2022-10-20 DIAGNOSIS — H01.02A SQUAMOUS BLEPHARITIS OF UPPER AND LOWER EYELIDS OF BOTH EYES: ICD-10-CM

## 2022-10-20 DIAGNOSIS — H52.7 REFRACTIVE ERROR: ICD-10-CM

## 2022-10-20 DIAGNOSIS — E11.59 HYPERTENSION ASSOCIATED WITH DIABETES: ICD-10-CM

## 2022-10-20 DIAGNOSIS — Z96.1 PSEUDOPHAKIA, RIGHT EYE: ICD-10-CM

## 2022-10-20 DIAGNOSIS — H57.89 ABNORMAL RED REFLEX OF EYE: ICD-10-CM

## 2022-10-20 DIAGNOSIS — I15.2 HYPERTENSION ASSOCIATED WITH DIABETES: ICD-10-CM

## 2022-10-20 DIAGNOSIS — H01.02B SQUAMOUS BLEPHARITIS OF UPPER AND LOWER EYELIDS OF BOTH EYES: ICD-10-CM

## 2022-10-20 PROCEDURE — 1159F MED LIST DOCD IN RCRD: CPT | Mod: CPTII,S$GLB,, | Performed by: OPHTHALMOLOGY

## 2022-10-20 PROCEDURE — 3061F NEG MICROALBUMINURIA REV: CPT | Mod: CPTII,S$GLB,, | Performed by: OPHTHALMOLOGY

## 2022-10-20 PROCEDURE — 3051F PR MOST RECENT HEMOGLOBIN A1C LEVEL 7.0 - < 8.0%: ICD-10-PCS | Mod: CPTII,S$GLB,, | Performed by: OPHTHALMOLOGY

## 2022-10-20 PROCEDURE — 1159F PR MEDICATION LIST DOCUMENTED IN MEDICAL RECORD: ICD-10-PCS | Mod: CPTII,S$GLB,, | Performed by: OPHTHALMOLOGY

## 2022-10-20 PROCEDURE — 1160F RVW MEDS BY RX/DR IN RCRD: CPT | Mod: CPTII,S$GLB,, | Performed by: OPHTHALMOLOGY

## 2022-10-20 PROCEDURE — 1126F AMNT PAIN NOTED NONE PRSNT: CPT | Mod: CPTII,S$GLB,, | Performed by: OPHTHALMOLOGY

## 2022-10-20 PROCEDURE — 3066F PR DOCUMENTATION OF TREATMENT FOR NEPHROPATHY: ICD-10-PCS | Mod: CPTII,S$GLB,, | Performed by: OPHTHALMOLOGY

## 2022-10-20 PROCEDURE — 3061F PR NEG MICROALBUMINURIA RESULT DOCUMENTED/REVIEW: ICD-10-PCS | Mod: CPTII,S$GLB,, | Performed by: OPHTHALMOLOGY

## 2022-10-20 PROCEDURE — 1126F PR PAIN SEVERITY QUANTIFIED, NO PAIN PRESENT: ICD-10-PCS | Mod: CPTII,S$GLB,, | Performed by: OPHTHALMOLOGY

## 2022-10-20 PROCEDURE — 3066F NEPHROPATHY DOC TX: CPT | Mod: CPTII,S$GLB,, | Performed by: OPHTHALMOLOGY

## 2022-10-20 PROCEDURE — 1101F PR PT FALLS ASSESS DOC 0-1 FALLS W/OUT INJ PAST YR: ICD-10-PCS | Mod: CPTII,S$GLB,, | Performed by: OPHTHALMOLOGY

## 2022-10-20 PROCEDURE — 1160F PR REVIEW ALL MEDS BY PRESCRIBER/CLIN PHARMACIST DOCUMENTED: ICD-10-PCS | Mod: CPTII,S$GLB,, | Performed by: OPHTHALMOLOGY

## 2022-10-20 PROCEDURE — 99499 UNLISTED E&M SERVICE: CPT | Mod: S$GLB,,, | Performed by: OPHTHALMOLOGY

## 2022-10-20 PROCEDURE — 4010F ACE/ARB THERAPY RXD/TAKEN: CPT | Mod: CPTII,S$GLB,, | Performed by: OPHTHALMOLOGY

## 2022-10-20 PROCEDURE — 1101F PT FALLS ASSESS-DOCD LE1/YR: CPT | Mod: CPTII,S$GLB,, | Performed by: OPHTHALMOLOGY

## 2022-10-20 PROCEDURE — 3288F FALL RISK ASSESSMENT DOCD: CPT | Mod: CPTII,S$GLB,, | Performed by: OPHTHALMOLOGY

## 2022-10-20 PROCEDURE — 3288F PR FALLS RISK ASSESSMENT DOCUMENTED: ICD-10-PCS | Mod: CPTII,S$GLB,, | Performed by: OPHTHALMOLOGY

## 2022-10-20 PROCEDURE — 99499 NO LOS: ICD-10-PCS | Mod: S$GLB,,, | Performed by: OPHTHALMOLOGY

## 2022-10-20 PROCEDURE — 99999 PR PBB SHADOW E&M-EST. PATIENT-LVL II: CPT | Mod: PBBFAC,,, | Performed by: OPHTHALMOLOGY

## 2022-10-20 PROCEDURE — 99999 PR PBB SHADOW E&M-EST. PATIENT-LVL II: ICD-10-PCS | Mod: PBBFAC,,, | Performed by: OPHTHALMOLOGY

## 2022-10-20 PROCEDURE — 4010F PR ACE/ARB THEARPY RXD/TAKEN: ICD-10-PCS | Mod: CPTII,S$GLB,, | Performed by: OPHTHALMOLOGY

## 2022-10-20 PROCEDURE — 3051F HG A1C>EQUAL 7.0%<8.0%: CPT | Mod: CPTII,S$GLB,, | Performed by: OPHTHALMOLOGY

## 2022-10-20 NOTE — PROGRESS NOTES
HPI    DLS: 4/28/2022    Pt here for Pre-Op phaco w/IOL OS- Surgery is 11/02/2022  Pt states no eye pain or discomfort.    Meds;  Optivar PRN OU    1. NS OS   2. Blepharitis   3. PC IOL OD - [facility] - 2013     Last edited by Erika Bryan on 10/20/2022  1:45 PM.            Assessment /Plan     For exam results, see Encounter Report.    Combined form of age-related cataract, left eye    Nuclear sclerotic cataract of left eye    DM type 2 without retinopathy    Hypertension associated with diabetes    Pseudophakia, right eye    Squamous blepharitis of upper and lower eyelids of both eyes    Refractive error    Abnormal red reflex of eye      Cataract os   Patient presents for pre-op phaco/IOL os .. Patient has seen Dr Stafford and [facility]  in the   past, but states she only wants to have cataract surgery at [facility]. // also seen [facility] - 9/202  Patient complains of crusted eyes in the morning. States that she notices   a collection of mucous throughout the day in the corner of the eye.     Blepharitis   WC/LH/AT's     S/P PCIOL Dr Hurtado 2013 OD    SN60WF - 19.0    Ocular meds:   Visine PRN for dry eyes     Eyemeds   Optivar OU PRN     IOL calcs (4/28/2022)   OS  SN60WF 19.5 (has a sn60wf 19.0 in right eye)   MTA4U0 17.0    DIB00 would be 20.0 - but would be preferable to use a sn60wf - like in the first eye      Pt C/O blurry vision and glare problems - left eye .  Would like to have phaco/IOL os (other eye done in 2013    Poor red reflex - trypan blue // ? Dilation     Risks and benefits discussed and consent signed.

## 2022-10-21 ENCOUNTER — LAB VISIT (OUTPATIENT)
Dept: LAB | Facility: HOSPITAL | Age: 72
End: 2022-10-21
Attending: NURSE PRACTITIONER
Payer: MEDICARE

## 2022-10-21 ENCOUNTER — OFFICE VISIT (OUTPATIENT)
Dept: FAMILY MEDICINE | Facility: CLINIC | Age: 72
End: 2022-10-21
Payer: MEDICARE

## 2022-10-21 VITALS
WEIGHT: 201.5 LBS | SYSTOLIC BLOOD PRESSURE: 124 MMHG | HEIGHT: 63 IN | DIASTOLIC BLOOD PRESSURE: 64 MMHG | OXYGEN SATURATION: 95 % | BODY MASS INDEX: 35.7 KG/M2 | HEART RATE: 82 BPM

## 2022-10-21 DIAGNOSIS — I70.0 ABDOMINAL AORTIC ATHEROSCLEROSIS: ICD-10-CM

## 2022-10-21 DIAGNOSIS — Z01.818 PREOP EXAMINATION: Primary | ICD-10-CM

## 2022-10-21 DIAGNOSIS — H25.12 NUCLEAR SCLEROTIC CATARACT OF LEFT EYE: ICD-10-CM

## 2022-10-21 DIAGNOSIS — E11.42 DIABETIC POLYNEUROPATHY ASSOCIATED WITH TYPE 2 DIABETES MELLITUS: ICD-10-CM

## 2022-10-21 DIAGNOSIS — M13.0 ARTHRITIS OF MULTIPLE SITES: ICD-10-CM

## 2022-10-21 DIAGNOSIS — E66.01 SEVERE OBESITY (BMI 35.0-35.9 WITH COMORBIDITY): ICD-10-CM

## 2022-10-21 DIAGNOSIS — Z01.818 PREOP EXAMINATION: ICD-10-CM

## 2022-10-21 DIAGNOSIS — E78.5 HYPERLIPIDEMIA, UNSPECIFIED HYPERLIPIDEMIA TYPE: ICD-10-CM

## 2022-10-21 DIAGNOSIS — I10 ESSENTIAL HYPERTENSION: ICD-10-CM

## 2022-10-21 DIAGNOSIS — E11.65 TYPE 2 DIABETES MELLITUS WITH HYPERGLYCEMIA, WITH LONG-TERM CURRENT USE OF INSULIN: ICD-10-CM

## 2022-10-21 DIAGNOSIS — Z79.4 TYPE 2 DIABETES MELLITUS WITH HYPERGLYCEMIA, WITH LONG-TERM CURRENT USE OF INSULIN: ICD-10-CM

## 2022-10-21 LAB
ALBUMIN SERPL BCP-MCNC: 3.9 G/DL (ref 3.5–5.2)
ALP SERPL-CCNC: 111 U/L (ref 55–135)
ALT SERPL W/O P-5'-P-CCNC: 11 U/L (ref 10–44)
ANION GAP SERPL CALC-SCNC: 9 MMOL/L (ref 8–16)
AST SERPL-CCNC: 17 U/L (ref 10–40)
BASOPHILS # BLD AUTO: 0.02 K/UL (ref 0–0.2)
BASOPHILS NFR BLD: 0.4 % (ref 0–1.9)
BILIRUB SERPL-MCNC: 0.5 MG/DL (ref 0.1–1)
BUN SERPL-MCNC: 11 MG/DL (ref 8–23)
CALCIUM SERPL-MCNC: 9.3 MG/DL (ref 8.7–10.5)
CHLORIDE SERPL-SCNC: 103 MMOL/L (ref 95–110)
CO2 SERPL-SCNC: 27 MMOL/L (ref 23–29)
CREAT SERPL-MCNC: 0.8 MG/DL (ref 0.5–1.4)
DIFFERENTIAL METHOD: ABNORMAL
EOSINOPHIL # BLD AUTO: 0.1 K/UL (ref 0–0.5)
EOSINOPHIL NFR BLD: 1.9 % (ref 0–8)
ERYTHROCYTE [DISTWIDTH] IN BLOOD BY AUTOMATED COUNT: 16 % (ref 11.5–14.5)
EST. GFR  (NO RACE VARIABLE): >60 ML/MIN/1.73 M^2
GLUCOSE SERPL-MCNC: 139 MG/DL (ref 70–110)
HCT VFR BLD AUTO: 39.7 % (ref 37–48.5)
HGB BLD-MCNC: 12.7 G/DL (ref 12–16)
IMM GRANULOCYTES # BLD AUTO: 0.02 K/UL (ref 0–0.04)
IMM GRANULOCYTES NFR BLD AUTO: 0.4 % (ref 0–0.5)
LYMPHOCYTES # BLD AUTO: 2 K/UL (ref 1–4.8)
LYMPHOCYTES NFR BLD: 38.4 % (ref 18–48)
MCH RBC QN AUTO: 25.6 PG (ref 27–31)
MCHC RBC AUTO-ENTMCNC: 32 G/DL (ref 32–36)
MCV RBC AUTO: 80 FL (ref 82–98)
MONOCYTES # BLD AUTO: 0.4 K/UL (ref 0.3–1)
MONOCYTES NFR BLD: 7.3 % (ref 4–15)
NEUTROPHILS # BLD AUTO: 2.7 K/UL (ref 1.8–7.7)
NEUTROPHILS NFR BLD: 51.6 % (ref 38–73)
NRBC BLD-RTO: 0 /100 WBC
PLATELET # BLD AUTO: 323 K/UL (ref 150–450)
PMV BLD AUTO: 10.3 FL (ref 9.2–12.9)
POTASSIUM SERPL-SCNC: 3.4 MMOL/L (ref 3.5–5.1)
PROT SERPL-MCNC: 7.5 G/DL (ref 6–8.4)
RBC # BLD AUTO: 4.96 M/UL (ref 4–5.4)
SODIUM SERPL-SCNC: 139 MMOL/L (ref 136–145)
WBC # BLD AUTO: 5.24 K/UL (ref 3.9–12.7)

## 2022-10-21 PROCEDURE — 3074F PR MOST RECENT SYSTOLIC BLOOD PRESSURE < 130 MM HG: ICD-10-PCS | Mod: CPTII,S$GLB,, | Performed by: NURSE PRACTITIONER

## 2022-10-21 PROCEDURE — 1125F AMNT PAIN NOTED PAIN PRSNT: CPT | Mod: CPTII,S$GLB,, | Performed by: NURSE PRACTITIONER

## 2022-10-21 PROCEDURE — 36415 COLL VENOUS BLD VENIPUNCTURE: CPT | Mod: PO | Performed by: NURSE PRACTITIONER

## 2022-10-21 PROCEDURE — 1159F PR MEDICATION LIST DOCUMENTED IN MEDICAL RECORD: ICD-10-PCS | Mod: CPTII,S$GLB,, | Performed by: NURSE PRACTITIONER

## 2022-10-21 PROCEDURE — 93010 EKG 12-LEAD: ICD-10-PCS | Mod: S$GLB,,, | Performed by: INTERNAL MEDICINE

## 2022-10-21 PROCEDURE — 1160F PR REVIEW ALL MEDS BY PRESCRIBER/CLIN PHARMACIST DOCUMENTED: ICD-10-PCS | Mod: CPTII,S$GLB,, | Performed by: NURSE PRACTITIONER

## 2022-10-21 PROCEDURE — 93010 ELECTROCARDIOGRAM REPORT: CPT | Mod: S$GLB,,, | Performed by: INTERNAL MEDICINE

## 2022-10-21 PROCEDURE — 3288F FALL RISK ASSESSMENT DOCD: CPT | Mod: CPTII,S$GLB,, | Performed by: NURSE PRACTITIONER

## 2022-10-21 PROCEDURE — 3061F NEG MICROALBUMINURIA REV: CPT | Mod: CPTII,S$GLB,, | Performed by: NURSE PRACTITIONER

## 2022-10-21 PROCEDURE — 80053 COMPREHEN METABOLIC PANEL: CPT | Performed by: NURSE PRACTITIONER

## 2022-10-21 PROCEDURE — 85025 COMPLETE CBC W/AUTO DIFF WBC: CPT | Performed by: NURSE PRACTITIONER

## 2022-10-21 PROCEDURE — 99999 PR PBB SHADOW E&M-EST. PATIENT-LVL III: CPT | Mod: PBBFAC,,, | Performed by: NURSE PRACTITIONER

## 2022-10-21 PROCEDURE — 99499 RISK ADDL DX/OHS AUDIT: ICD-10-PCS | Mod: S$GLB,,, | Performed by: NURSE PRACTITIONER

## 2022-10-21 PROCEDURE — 99215 OFFICE O/P EST HI 40 MIN: CPT | Mod: S$GLB,,, | Performed by: NURSE PRACTITIONER

## 2022-10-21 PROCEDURE — 1101F PT FALLS ASSESS-DOCD LE1/YR: CPT | Mod: CPTII,S$GLB,, | Performed by: NURSE PRACTITIONER

## 2022-10-21 PROCEDURE — 3078F DIAST BP <80 MM HG: CPT | Mod: CPTII,S$GLB,, | Performed by: NURSE PRACTITIONER

## 2022-10-21 PROCEDURE — 3051F PR MOST RECENT HEMOGLOBIN A1C LEVEL 7.0 - < 8.0%: ICD-10-PCS | Mod: CPTII,S$GLB,, | Performed by: NURSE PRACTITIONER

## 2022-10-21 PROCEDURE — 3074F SYST BP LT 130 MM HG: CPT | Mod: CPTII,S$GLB,, | Performed by: NURSE PRACTITIONER

## 2022-10-21 PROCEDURE — 1101F PR PT FALLS ASSESS DOC 0-1 FALLS W/OUT INJ PAST YR: ICD-10-PCS | Mod: CPTII,S$GLB,, | Performed by: NURSE PRACTITIONER

## 2022-10-21 PROCEDURE — 3066F PR DOCUMENTATION OF TREATMENT FOR NEPHROPATHY: ICD-10-PCS | Mod: CPTII,S$GLB,, | Performed by: NURSE PRACTITIONER

## 2022-10-21 PROCEDURE — 93005 EKG 12-LEAD: ICD-10-PCS | Mod: S$GLB,,, | Performed by: NURSE PRACTITIONER

## 2022-10-21 PROCEDURE — 1159F MED LIST DOCD IN RCRD: CPT | Mod: CPTII,S$GLB,, | Performed by: NURSE PRACTITIONER

## 2022-10-21 PROCEDURE — 1125F PR PAIN SEVERITY QUANTIFIED, PAIN PRESENT: ICD-10-PCS | Mod: CPTII,S$GLB,, | Performed by: NURSE PRACTITIONER

## 2022-10-21 PROCEDURE — 3051F HG A1C>EQUAL 7.0%<8.0%: CPT | Mod: CPTII,S$GLB,, | Performed by: NURSE PRACTITIONER

## 2022-10-21 PROCEDURE — 4010F ACE/ARB THERAPY RXD/TAKEN: CPT | Mod: CPTII,S$GLB,, | Performed by: NURSE PRACTITIONER

## 2022-10-21 PROCEDURE — 99499 UNLISTED E&M SERVICE: CPT | Mod: S$GLB,,, | Performed by: NURSE PRACTITIONER

## 2022-10-21 PROCEDURE — 3288F PR FALLS RISK ASSESSMENT DOCUMENTED: ICD-10-PCS | Mod: CPTII,S$GLB,, | Performed by: NURSE PRACTITIONER

## 2022-10-21 PROCEDURE — 99215 PR OFFICE/OUTPT VISIT, EST, LEVL V, 40-54 MIN: ICD-10-PCS | Mod: S$GLB,,, | Performed by: NURSE PRACTITIONER

## 2022-10-21 PROCEDURE — 4010F PR ACE/ARB THEARPY RXD/TAKEN: ICD-10-PCS | Mod: CPTII,S$GLB,, | Performed by: NURSE PRACTITIONER

## 2022-10-21 PROCEDURE — 3066F NEPHROPATHY DOC TX: CPT | Mod: CPTII,S$GLB,, | Performed by: NURSE PRACTITIONER

## 2022-10-21 PROCEDURE — 93005 ELECTROCARDIOGRAM TRACING: CPT | Mod: S$GLB,,, | Performed by: NURSE PRACTITIONER

## 2022-10-21 PROCEDURE — 3078F PR MOST RECENT DIASTOLIC BLOOD PRESSURE < 80 MM HG: ICD-10-PCS | Mod: CPTII,S$GLB,, | Performed by: NURSE PRACTITIONER

## 2022-10-21 PROCEDURE — 3061F PR NEG MICROALBUMINURIA RESULT DOCUMENTED/REVIEW: ICD-10-PCS | Mod: CPTII,S$GLB,, | Performed by: NURSE PRACTITIONER

## 2022-10-21 PROCEDURE — 99999 PR PBB SHADOW E&M-EST. PATIENT-LVL III: ICD-10-PCS | Mod: PBBFAC,,, | Performed by: NURSE PRACTITIONER

## 2022-10-21 PROCEDURE — 1160F RVW MEDS BY RX/DR IN RCRD: CPT | Mod: CPTII,S$GLB,, | Performed by: NURSE PRACTITIONER

## 2022-10-21 NOTE — PATIENT INSTRUCTIONS
- Night before surgery, inject 33 UNITS OF LANTUS, instead of 45 units  - Hold Novolog on morning of surgery  - Stop taking aspirin for 1 week prior to surgery  - Hold all vitamins/supplement on day of surgery

## 2022-10-21 NOTE — PROGRESS NOTES
Subjective:       Patient ID: Chelly Ortiz is a 71 y.o. female     Chief Complaint: Pre-op Exam    This is a 71 y.o. female patient of Dr. Garcia who is known to me. She presents today for a pre-op examination for eye surgery. PMH includes   Patient Active Problem List   Diagnosis    GERD (gastroesophageal reflux disease)    MYRIAM (obstructive sleep apnea)    IBS (irritable bowel syndrome)    DDD (degenerative disc disease), lumbar    Insomnia    Anxiety    Hearing loss, sensorineural    Nuclear sclerotic cataract of left eye    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    Constipation    History of colon polyps    Essential hypertension    Hyperlipidemia, unspecified    Diabetic polyneuropathy associated with type 2 diabetes mellitus    Spondylosis of cervical region without myelopathy or radiculopathy    Cervical myofascial pain syndrome    PCO (posterior capsular opacification), right    Dry eye syndrome    Pseudophakia    Decreased range of motion of lumbar spine    Decreased strength    Dilated bile duct    Personal history of colonic polyps    EIC (epidermal inclusion cyst)    Severe obesity with body mass index (BMI) of 35.0 to 39.9 with serious comorbidity    Mild major depression    Sedentary lifestyle    Dysphagia    Type 2 diabetes mellitus with hyperglycemia, with long-term current use of insulin    Abdominal aortic atherosclerosis    Decreased ROM of neck    Arthritis of multiple sites    Osteopenia of multiple sites      Past Medical History:   Diagnosis Date    Allergy     Cataract     DDD (degenerative disc disease), lumbar     Diabetes mellitus     diet controlled    Diabetes mellitus, type 2     GERD (gastroesophageal reflux disease)     History of subconjunctival hemorrhage 12/2/11    left eye    Hyperlipidemia     Hypertension     IBS (irritable bowel syndrome)     Obesity     MYRIAM (obstructive sleep apnea)     on CPAP    Rotator cuff impingement syndrome     Seasonal allergic conjunctivitis        Past Surgical History:   Procedure Laterality Date    CATARACT EXTRACTION W/  INTRAOCULAR LENS IMPLANT Right 10/03/2013         SECTION      x2    CHOLECYSTECTOMY      COLONOSCOPY N/A 2018    Procedure: COLONOSCOPY;  Surgeon: Marie Mejia MD;  Location: [facility] ENDO;  Service: Endoscopy;  Laterality: N/A;    COLONOSCOPY N/A 2022    Procedure: COLONOSCOPY;  Surgeon: Pierce Rivera MD;  Location: [facility] ENDO;  Service: Endoscopy;  Laterality: N/A;    ENDOSCOPIC ULTRASOUND OF UPPER GASTROINTESTINAL TRACT N/A 10/9/2018    Procedure: ULTRASOUND, UPPER GI TRACT, ENDOSCOPIC;  Surgeon: Javy Simpson MD;  Location: [facility] ENDO;  Service: Endoscopy;  Laterality: N/A;    EXCISION OF LESION N/A 2019    Procedure: EXCISION, LESION VULVA;  Surgeon: Liv Odell MD;  Location: [facility] OR;  Service: OB/GYN;  Laterality: N/A;  latex allergy    EYE SURGERY      HYSTERECTOMY      ovaries intact, due to DUB    TUBAL LIGATION          Patient reports she is having left cataract removal surgery on 2022 with Dr. Herzog.     Fever: No  Chills: No  Chest pain: No  SOB: No  Dyspnea: No  Palpitations: No  Smoker: No  Drinks alcohol: No  Using NSAIDs: No    Advised to abstain from alcohol, NSAIDs, ASA, and blood thinners prior to surgery. Patient has been under general anesthesia in the past and had no complications. Patient has no worries or concerns about upcoming procedure. CBC, CMP, U/A, CXR and EKG ordered today.    Review of Systems   Musculoskeletal:  Positive for arthralgias and back pain.     Otherwise negative  Review of patient's allergies indicates:   Allergen Reactions    Prednisone      Causes pt to be hyper    Ace inhibitors Hives     \Cough    Latex, natural rubber Itching          Current Outpatient Medications:     ACCU-CHEK FASTCLIX LANCET DRUM Misc, TEST BLOOD SUGAR THREE TIMES A DAY, Disp: 918 each, Rfl: 3    ACCU-CHEK FASTCLIX LANCING DEV Kit, USE AS DIRECTED, Disp: 1  each, Rfl: 0    albuterol (PROVENTIL/VENTOLIN HFA) 90 mcg/actuation inhaler, Inhale 1-2 puffs into the lungs every 4 (four) hours as needed for Wheezing., Disp: 18 g, Rfl: 2    aspirin (ECOTRIN) 81 MG EC tablet, Take 81 mg by mouth once daily., Disp: , Rfl:     azelastine (OPTIVAR) 0.05 % ophthalmic solution, Place 1 drop into both eyes 2 (two) times daily., Disp: 6 mL, Rfl: 1    blood glucose control high,low (ACCU-CHEK GUIDE L1-L2 CTRL SOL) Soln, 3 times a day, Disp: 300 each, Rfl: 11    blood sugar diagnostic (ACCU-CHEK GUIDE TEST STRIPS) Strp, 3 times a day, Disp: 300 strip, Rfl: 11    blood-glucose meter (ACCU-CHEK GUIDE GLUCOSE METER) Misc, Three times a day, Disp: 1 each, Rfl: 0    chlorhexidine (PERIDEX) 0.12 % solution, Use as directed 15 mLs in the mouth or throat daily as needed., Disp: , Rfl:     citalopram (CELEXA) 10 MG tablet, Take 1 tablet (10 mg total) by mouth once daily., Disp: 90 tablet, Rfl: 3    dulaglutide (TRULICITY) 3 mg/0.5 mL pen injector, Inject 3 mg into the skin every 7 days., Disp: 4 pen, Rfl: 11    ergocalciferol (ERGOCALCIFEROL) 50,000 unit Cap, TAKE 1 CAPSULE EVERY 7 DAYS. Strength: 50,000 Units, Disp: 12 capsule, Rfl: 1    esomeprazole (NEXIUM) 40 MG capsule, Take 1 capsule (40 mg total) by mouth before breakfast., Disp: 90 capsule, Rfl: 3    fluticasone-salmeterol diskus inhaler 250-50 mcg, Inhale 1 puff by mouth into the lungs 2 (two) times daily. Controller, Disp: 60 each, Rfl: 6    glucose 4 GM chewable tablet, Take 4 tablets (16 g total) by mouth as needed for Low blood sugar (If having symptoms of blurry vision, palpitations, confusion, shakiness.  Please check sugars and if sugar below 70 please take 4 tablets and re-check sugar everry 15 minutes until sugars are above 70 and symptoms resolve.)., Disp: 50 tablet, Rfl: 12    insulin aspart U-100 (NOVOLOG FLEXPEN U-100 INSULIN) 100 unit/mL (3 mL) InPn pen, Inject 12 units w/ meals plus scale 150-200+2, 201-250+4, 251-300+6,  "301-350+8, >350+10. Max daily 50 units., Disp: 15 mL, Rfl: 6    insulin detemir U-100 (LEVEMIR FLEXTOUCH U-100 INSULN) 100 unit/mL (3 mL) InPn pen, Inject 45 units into the skin at night., Disp: 15 mL, Rfl: 6    insulin syringe-needle U-100 0.3 mL 31 gauge x 5/16" Syrg, relion brand, use 5 x a day, if not on levemir or novolog, Disp: 150 each, Rfl: 1    insulin syringe-needle U-100 0.5 mL 31 gauge x 5/16" Syrg, Use nightly. 90 day, Disp: 100 each, Rfl: 3    losartan (COZAAR) 50 MG tablet, TAKE 1 TABLET EVERY DAY, Disp: 90 tablet, Rfl: 3    magnesium oxide (MAG-OX) 400 mg tablet, Take 1 tablet (400 mg total) by mouth 2 (two) times daily., Disp: 180 tablet, Rfl: 3    pen needle, diabetic 31 gauge x 3/16" Ndle, Uses 4 x a day., Disp: 400 each, Rfl: 3    pravastatin (PRAVACHOL) 20 MG tablet, Take 1 tablet (20 mg total) by mouth every evening., Disp: 90 tablet, Rfl: 3    traZODone (DESYREL) 50 MG tablet, TAKE 1 TABLET EVERY NIGHT AS NEEDED, Disp: 90 tablet, Rfl: 3    amLODIPine (NORVASC) 10 MG tablet, Take 1 tablet (10 mg total) by mouth once daily., Disp: 90 tablet, Rfl: 3    calcium carbonate (OS-ARIC) 600 mg calcium (1,500 mg) Tab, Take 1 tablet (600 mg total) by mouth once. for 1 dose, Disp: 30 tablet, Rfl: 11    cetirizine (ZYRTEC) 10 MG tablet, Take 1 tablet (10 mg total) by mouth every evening., Disp: 90 tablet, Rfl: 0    gabapentin (NEURONTIN) 100 MG capsule, Take 1 capsule (100 mg total) by mouth 3 (three) times daily., Disp: 270 capsule, Rfl: 3     Advised to inject Lantus 33 units on night before surgery, hold Novolog on morning of surgery and afternoon dose if still fasting. Hold all vitamins/supplements on day of surgery. Hold aspirin for 1 week prior to surgery.   Objective:    Physical Exam  Vitals reviewed.   Constitutional:       General: She is awake. She is not in acute distress.     Appearance: Normal appearance. She is well-developed and well-groomed. She is obese.   HENT:      Head: Normocephalic and " atraumatic.      Right Ear: External ear normal.      Left Ear: External ear normal.      Nose: Nose normal.      Mouth/Throat:      Mouth: Mucous membranes are moist.      Pharynx: No posterior oropharyngeal erythema.   Eyes:      General:         Right eye: No discharge.         Left eye: No discharge.      Pupils: Pupils are equal, round, and reactive to light.   Neck:      Vascular: No carotid bruit.   Cardiovascular:      Rate and Rhythm: Normal rate and regular rhythm.      Pulses:           Carotid pulses are 2+ on the right side and 2+ on the left side.       Radial pulses are 2+ on the right side and 2+ on the left side.        Dorsalis pedis pulses are 2+ on the right side and 2+ on the left side.        Posterior tibial pulses are 2+ on the right side and 2+ on the left side.      Heart sounds: Normal heart sounds, S1 normal and S2 normal. No murmur heard.  Pulmonary:      Effort: Pulmonary effort is normal. No respiratory distress.      Breath sounds: Normal breath sounds. No wheezing or rhonchi.   Abdominal:      General: Bowel sounds are normal. There is no distension.      Palpations: Abdomen is soft.      Tenderness: There is no abdominal tenderness. There is no guarding or rebound.   Musculoskeletal:      Right lower leg: No edema.      Left lower leg: No edema.   Lymphadenopathy:      Cervical: No cervical adenopathy.   Skin:     General: Skin is warm and dry.      Coloration: Skin is not pale.   Neurological:      Mental Status: She is alert and oriented to person, place, and time.      Coordination: Coordination normal.      Gait: Gait normal.   Psychiatric:         Attention and Perception: Attention normal.         Mood and Affect: Mood and affect normal.         Speech: Speech normal.         Behavior: Behavior normal. Behavior is cooperative.         Thought Content: Thought content normal.       Assessment:       1. Preop examination    2. Nuclear sclerotic cataract of left eye    3. Type 2  diabetes mellitus with hyperglycemia, with long-term current use of insulin    4. Diabetic polyneuropathy associated with type 2 diabetes mellitus    5. Abdominal aortic atherosclerosis    6. Essential hypertension    7. Hyperlipidemia, unspecified hyperlipidemia type    8. Arthritis of multiple sites    9. Severe obesity (BMI 35.0-35.9 with comorbidity)              Plan:   Chelly was seen today for pre-op exam.    Diagnoses and all orders for this visit:    Preop examination  -     CBC Auto Differential; Future  -     Comprehensive Metabolic Panel; Future  -     Urinalysis; Future  -     EKG 12-lead; Future    Nuclear sclerotic cataract of left eye    Type 2 diabetes mellitus with hyperglycemia, with long-term current use of insulin    Diabetic polyneuropathy associated with type 2 diabetes mellitus    Abdominal aortic atherosclerosis    Essential hypertension    Hyperlipidemia, unspecified hyperlipidemia type    Arthritis of multiple sites    Severe obesity (BMI 35.0-35.9 with comorbidity)        Cardiac Risk Estimation per [employer]CR[employer]: RISK CLASS II (1 risk factor, risks of major cardiac complications approximately 6%)         Patient is stable for surgery, pending labs/EKG.       - RTC for a follow-up with PCP post-surgery, or sooner if needed            I spent a total of 40 minutes on the day of the visit.  This includes face to face time and non-face to face time preparing to see the patient (eg, review of tests), obtaining and/or reviewing separately obtained history, documenting clinical information in the electronic or other health record, independently interpreting results and communicating results to the patient/family/caregiver, or care coordinator.

## 2022-10-24 ENCOUNTER — PATIENT MESSAGE (OUTPATIENT)
Dept: FAMILY MEDICINE | Facility: CLINIC | Age: 72
End: 2022-10-24
Payer: MEDICARE

## 2022-10-24 RX ORDER — TROPICAMIDE 10 MG/ML
1 SOLUTION/ DROPS OPHTHALMIC
Status: CANCELLED | OUTPATIENT
Start: 2022-10-24

## 2022-10-24 RX ORDER — PROPARACAINE HYDROCHLORIDE 5 MG/ML
1 SOLUTION/ DROPS OPHTHALMIC
Status: CANCELLED | OUTPATIENT
Start: 2022-10-24

## 2022-10-24 RX ORDER — PHENYLEPHRINE HYDROCHLORIDE 100 MG/ML
1 SOLUTION/ DROPS OPHTHALMIC
Status: CANCELLED | OUTPATIENT
Start: 2022-10-24

## 2022-10-24 RX ORDER — KETOROLAC TROMETHAMINE 5 MG/ML
1 SOLUTION OPHTHALMIC
Status: CANCELLED | OUTPATIENT
Start: 2022-10-24

## 2022-10-24 RX ORDER — SODIUM CHLORIDE 0.9 % (FLUSH) 0.9 %
10 SYRINGE (ML) INJECTION
Status: DISCONTINUED | OUTPATIENT
Start: 2022-10-24 | End: 2022-11-11

## 2022-10-24 RX ORDER — MOXIFLOXACIN 5 MG/ML
1 SOLUTION/ DROPS OPHTHALMIC
Status: CANCELLED | OUTPATIENT
Start: 2022-10-24

## 2022-10-24 NOTE — H&P
Subjective:       Patient ID: Chelly Ortiz is a 71 y.o. female.    Chief Complaint: Pre-op Exam    HPI  Review of Systems   Constitutional: Negative.    HENT: Negative.     Eyes:  Positive for visual disturbance.   Respiratory: Negative.     Cardiovascular: Negative.    Neurological: Negative.    Psychiatric/Behavioral: Negative.         Objective:      Physical Exam  Constitutional:       Appearance: She is well-developed.   HENT:      Head: Normocephalic.   Eyes:      Comments: See eye exam   Cardiovascular:      Rate and Rhythm: Normal rate and regular rhythm.   Pulmonary:      Effort: Pulmonary effort is normal.   Neurological:      Mental Status: She is oriented to person, place, and time.       Assessment:       Problem List Items Addressed This Visit       Nuclear sclerotic cataract of left eye     Other Visit Diagnoses       Combined form of age-related cataract, left eye    -  Primary    DM type 2 without retinopathy        Hypertension associated with diabetes        Pseudophakia, right eye        Squamous blepharitis of upper and lower eyelids of both eyes        Refractive error                  Plan:       Phaco/IOL os -   See clinic note for eye exam and clinic eval .

## 2022-10-25 ENCOUNTER — TELEPHONE (OUTPATIENT)
Dept: ADMINISTRATIVE | Facility: CLINIC | Age: 72
End: 2022-10-25
Payer: MEDICARE

## 2022-10-25 ENCOUNTER — PATIENT MESSAGE (OUTPATIENT)
Dept: SURGERY | Facility: HOSPITAL | Age: 72
End: 2022-10-25
Payer: MEDICARE

## 2022-10-26 ENCOUNTER — OFFICE VISIT (OUTPATIENT)
Dept: PAIN MEDICINE | Facility: CLINIC | Age: 72
End: 2022-10-26
Payer: MEDICARE

## 2022-10-26 VITALS
SYSTOLIC BLOOD PRESSURE: 138 MMHG | BODY MASS INDEX: 35.69 KG/M2 | DIASTOLIC BLOOD PRESSURE: 80 MMHG | HEART RATE: 88 BPM | WEIGHT: 201.5 LBS

## 2022-10-26 DIAGNOSIS — M67.912 DYSFUNCTION OF ROTATOR CUFF OF BOTH SHOULDERS: Primary | ICD-10-CM

## 2022-10-26 DIAGNOSIS — M67.911 DYSFUNCTION OF ROTATOR CUFF OF BOTH SHOULDERS: Primary | ICD-10-CM

## 2022-10-26 PROCEDURE — 99499 UNLISTED E&M SERVICE: CPT | Mod: S$GLB,,, | Performed by: EMERGENCY MEDICINE

## 2022-10-26 PROCEDURE — 3061F NEG MICROALBUMINURIA REV: CPT | Mod: CPTII,S$GLB,, | Performed by: PAIN MEDICINE

## 2022-10-26 PROCEDURE — 4010F ACE/ARB THERAPY RXD/TAKEN: CPT | Mod: CPTII,S$GLB,, | Performed by: PAIN MEDICINE

## 2022-10-26 PROCEDURE — 1125F AMNT PAIN NOTED PAIN PRSNT: CPT | Mod: CPTII,S$GLB,, | Performed by: PAIN MEDICINE

## 2022-10-26 PROCEDURE — 3066F PR DOCUMENTATION OF TREATMENT FOR NEPHROPATHY: ICD-10-PCS | Mod: CPTII,S$GLB,, | Performed by: PAIN MEDICINE

## 2022-10-26 PROCEDURE — 1159F MED LIST DOCD IN RCRD: CPT | Mod: CPTII,S$GLB,, | Performed by: PAIN MEDICINE

## 2022-10-26 PROCEDURE — 3051F PR MOST RECENT HEMOGLOBIN A1C LEVEL 7.0 - < 8.0%: ICD-10-PCS | Mod: CPTII,S$GLB,, | Performed by: PAIN MEDICINE

## 2022-10-26 PROCEDURE — 3079F DIAST BP 80-89 MM HG: CPT | Mod: CPTII,S$GLB,, | Performed by: PAIN MEDICINE

## 2022-10-26 PROCEDURE — 1159F PR MEDICATION LIST DOCUMENTED IN MEDICAL RECORD: ICD-10-PCS | Mod: CPTII,S$GLB,, | Performed by: PAIN MEDICINE

## 2022-10-26 PROCEDURE — 4010F PR ACE/ARB THEARPY RXD/TAKEN: ICD-10-PCS | Mod: CPTII,S$GLB,, | Performed by: PAIN MEDICINE

## 2022-10-26 PROCEDURE — 99499 RISK ADDL DX/OHS AUDIT: ICD-10-PCS | Mod: S$GLB,,, | Performed by: EMERGENCY MEDICINE

## 2022-10-26 PROCEDURE — 1125F PR PAIN SEVERITY QUANTIFIED, PAIN PRESENT: ICD-10-PCS | Mod: CPTII,S$GLB,, | Performed by: PAIN MEDICINE

## 2022-10-26 PROCEDURE — 99999 PR PBB SHADOW E&M-EST. PATIENT-LVL II: ICD-10-PCS | Mod: PBBFAC,,, | Performed by: PAIN MEDICINE

## 2022-10-26 PROCEDURE — 3066F NEPHROPATHY DOC TX: CPT | Mod: CPTII,S$GLB,, | Performed by: PAIN MEDICINE

## 2022-10-26 PROCEDURE — 20611 DRAIN/INJ JOINT/BURSA W/US: CPT | Mod: 50,S$GLB,, | Performed by: PAIN MEDICINE

## 2022-10-26 PROCEDURE — 99214 OFFICE O/P EST MOD 30 MIN: CPT | Mod: 25,S$GLB,, | Performed by: PAIN MEDICINE

## 2022-10-26 PROCEDURE — 99999 PR PBB SHADOW E&M-EST. PATIENT-LVL II: CPT | Mod: PBBFAC,,, | Performed by: PAIN MEDICINE

## 2022-10-26 PROCEDURE — 99214 PR OFFICE/OUTPT VISIT, EST, LEVL IV, 30-39 MIN: ICD-10-PCS | Mod: 25,S$GLB,, | Performed by: PAIN MEDICINE

## 2022-10-26 PROCEDURE — 3079F PR MOST RECENT DIASTOLIC BLOOD PRESSURE 80-89 MM HG: ICD-10-PCS | Mod: CPTII,S$GLB,, | Performed by: PAIN MEDICINE

## 2022-10-26 PROCEDURE — 3075F SYST BP GE 130 - 139MM HG: CPT | Mod: CPTII,S$GLB,, | Performed by: PAIN MEDICINE

## 2022-10-26 PROCEDURE — 20611 LARGE JOINT ASPIRATION/INJECTION: BILATERAL SUBACROMIAL BURSA: ICD-10-PCS | Mod: 50,S$GLB,, | Performed by: PAIN MEDICINE

## 2022-10-26 PROCEDURE — 3061F PR NEG MICROALBUMINURIA RESULT DOCUMENTED/REVIEW: ICD-10-PCS | Mod: CPTII,S$GLB,, | Performed by: PAIN MEDICINE

## 2022-10-26 PROCEDURE — 3051F HG A1C>EQUAL 7.0%<8.0%: CPT | Mod: CPTII,S$GLB,, | Performed by: PAIN MEDICINE

## 2022-10-26 PROCEDURE — 3075F PR MOST RECENT SYSTOLIC BLOOD PRESS GE 130-139MM HG: ICD-10-PCS | Mod: CPTII,S$GLB,, | Performed by: PAIN MEDICINE

## 2022-10-26 RX ORDER — TRIAMCINOLONE ACETONIDE 40 MG/ML
20 INJECTION, SUSPENSION INTRA-ARTICULAR; INTRAMUSCULAR
Status: DISCONTINUED | OUTPATIENT
Start: 2022-10-26 | End: 2022-10-26 | Stop reason: HOSPADM

## 2022-10-26 RX ADMIN — TRIAMCINOLONE ACETONIDE 20 MG: 40 INJECTION, SUSPENSION INTRA-ARTICULAR; INTRAMUSCULAR at 11:10

## 2022-10-26 NOTE — PROGRESS NOTES
[facility] Established Patient Evaluation    Referred by: Gabriel Wilson  Reason for referral: neck pain    CC: Bilateral Shoulder pain    Interval Update:     10/26/2022 - Ms. Petersurns to clinic for follow up visit reporting bilateral shoulder pain, worse with lifting something, and working above her head. She has had shoulder pain over the last 10 years, but worse over the last few works. Nothing improves her pain. She is using tylenol arthritis for the pain. No unusual amount of work or activity recently. No paraesthesias or tingling in upper extremities.     8/18/20 - Ms. Ortiz returns to clinic for follow up visit reporting stable bilateral knee pain and review imaging. Pain intensity is currently 3/10.      8/13/20 - Ms. Ortiz returns to clinic for follow up visit reporting worse bilateral knee  pain.  Pain intensity is currently 6/10.  Patient reports bilateral knee pain that started approximately 1 week ago, pain is worse with walking, standing and while sleeping. Pain is described as sharp and stabbing, she denies profound weakness, or radiating pain. Worse pain is 6/10. Of note she reports no inciting incident, trauma or falls.     02/05/2020 - Mrs. Ortiz returns to clinic for follow up visit reporting left buttock pain. Patient reports radiation down left leg to mid thigh. Pain intensity is currently 5/10.      4/9/18 - Pt returns to complaining of MRI results, neck and bilateral shoulder pain.  Her day time pain improved with PT but she continues to complain of bilat neck and shoulder pain at night.  The results of the MRI were shared with her.    Background:  Chelly Ortiz is a 71 y.o. female who complains of neck and bilateral shoulder pain as characterized below.    Location: bilateral shoulder  Severity: Currently: 5/2/10   Typical Range: 6/10     Exacerbation: 10/10   Onset: long-standing baseline pain exacerbated 3 months ago associated with reaching overhead to clean  ceiling fans  Quality: Dull, achy, throbbing  Radiation: bilat shoulders  Axial/Extremity Percentage of Pain: 50/50  Exacerbating Factors: extension and flexion  Mitigating Factors: heat  Assoc: denies night fever/night sweats, urinary incontinence, bowel incontinence, significant weight loss, significant motor weakness and loss of sensations    Previous Therapies:  PT: Completed in march  HEP: Daily  TENS: None  Injections: LYNN 3x per Ca Oconnell  Surgery: Denies  Medications:   - NSAIDS:   - MSK Relaxants:   - TCAs:   - SNRIs:   - Topicals:   - Anticonvulsants: Current  - Opioids: No    Current Pain Medications:  Gabapentin 100 TID - she is taking it prn  Piroxicam 20 mg - stopped due to GI issues    Full Medication List:    Current Outpatient Medications:     ACCU-CHEK FASTCLIX LANCET DRUM Misc, TEST BLOOD SUGAR THREE TIMES A DAY, Disp: 918 each, Rfl: 3    ACCU-CHEK FASTCLIX LANCING DEV Kit, USE AS DIRECTED, Disp: 1 each, Rfl: 0    albuterol (PROVENTIL/VENTOLIN HFA) 90 mcg/actuation inhaler, Inhale 1-2 puffs into the lungs every 4 (four) hours as needed for Wheezing., Disp: 18 g, Rfl: 2    aspirin (ECOTRIN) 81 MG EC tablet, Take 81 mg by mouth once daily., Disp: , Rfl:     azelastine (OPTIVAR) 0.05 % ophthalmic solution, Place 1 drop into both eyes 2 (two) times daily., Disp: 6 mL, Rfl: 1    blood glucose control high,low (ACCU-CHEK GUIDE L1-L2 CTRL SOL) Soln, 3 times a day, Disp: 300 each, Rfl: 11    blood sugar diagnostic (ACCU-CHEK GUIDE TEST STRIPS) Strp, 3 times a day, Disp: 300 strip, Rfl: 11    blood-glucose meter (ACCU-CHEK GUIDE GLUCOSE METER) Misc, Three times a day, Disp: 1 each, Rfl: 0    chlorhexidine (PERIDEX) 0.12 % solution, Use as directed 15 mLs in the mouth or throat daily as needed., Disp: , Rfl:     citalopram (CELEXA) 10 MG tablet, Take 1 tablet (10 mg total) by mouth once daily., Disp: 90 tablet, Rfl: 3    dulaglutide (TRULICITY) 3 mg/0.5 mL pen injector, Inject 3 mg into the skin every  "7 days., Disp: 4 pen, Rfl: 11    ergocalciferol (ERGOCALCIFEROL) 50,000 unit Cap, TAKE 1 CAPSULE EVERY 7 DAYS. Strength: 50,000 Units, Disp: 12 capsule, Rfl: 1    esomeprazole (NEXIUM) 40 MG capsule, Take 1 capsule (40 mg total) by mouth before breakfast., Disp: 90 capsule, Rfl: 3    glucose 4 GM chewable tablet, Take 4 tablets (16 g total) by mouth as needed for Low blood sugar (If having symptoms of blurry vision, palpitations, confusion, shakiness.  Please check sugars and if sugar below 70 please take 4 tablets and re-check sugar everry 15 minutes until sugars are above 70 and symptoms resolve.)., Disp: 50 tablet, Rfl: 12    insulin aspart U-100 (NOVOLOG FLEXPEN U-100 INSULIN) 100 unit/mL (3 mL) InPn pen, Inject 12 units w/ meals plus scale 150-200+2, 201-250+4, 251-300+6, 301-350+8, >350+10. Max daily 50 units., Disp: 15 mL, Rfl: 6    insulin detemir U-100 (LEVEMIR FLEXTOUCH U-100 INSULN) 100 unit/mL (3 mL) InPn pen, Inject 45 units into the skin at night., Disp: 15 mL, Rfl: 6    insulin syringe-needle U-100 0.3 mL 31 gauge x 5/16" Syrg, relion brand, use 5 x a day, if not on levemir or novolog, Disp: 150 each, Rfl: 1    insulin syringe-needle U-100 0.5 mL 31 gauge x 5/16" Syrg, Use nightly. 90 day, Disp: 100 each, Rfl: 3    losartan (COZAAR) 50 MG tablet, TAKE 1 TABLET EVERY DAY, Disp: 90 tablet, Rfl: 3    magnesium oxide (MAG-OX) 400 mg tablet, Take 1 tablet (400 mg total) by mouth 2 (two) times daily., Disp: 180 tablet, Rfl: 3    pen needle, diabetic 31 gauge x 3/16" Ndle, Uses 4 x a day., Disp: 400 each, Rfl: 3    pravastatin (PRAVACHOL) 20 MG tablet, Take 1 tablet (20 mg total) by mouth every evening., Disp: 90 tablet, Rfl: 3    traZODone (DESYREL) 50 MG tablet, TAKE 1 TABLET EVERY NIGHT AS NEEDED, Disp: 90 tablet, Rfl: 3    amLODIPine (NORVASC) 10 MG tablet, Take 1 tablet (10 mg total) by mouth once daily., Disp: 90 tablet, Rfl: 3    calcium carbonate (OS-ARIC) 600 mg calcium (1,500 mg) Tab, Take 1 tablet " (600 mg total) by mouth once. for 1 dose, Disp: 30 tablet, Rfl: 11    cetirizine (ZYRTEC) 10 MG tablet, Take 1 tablet (10 mg total) by mouth every evening., Disp: 90 tablet, Rfl: 0    fluticasone-salmeterol diskus inhaler 250-50 mcg, Inhale 1 puff by mouth into the lungs 2 (two) times daily. Controller, Disp: 60 each, Rfl: 6    gabapentin (NEURONTIN) 100 MG capsule, Take 1 capsule (100 mg total) by mouth 3 (three) times daily., Disp: 270 capsule, Rfl: 3    Current Facility-Administered Medications:     sodium chloride 0.9% flush 10 mL, 10 mL, Intravenous, PRN, Clarice Herzog MD     Review of Systems:  Review of Systems   Constitutional:  Negative for chills and fever.   HENT:  Negative for nosebleeds.    Eyes:  Negative for pain.   Respiratory:  Negative for hemoptysis.    Cardiovascular:  Negative for chest pain.   Gastrointestinal:  Negative for nausea and vomiting.   Genitourinary:  Negative for dysuria.   Musculoskeletal:  Positive for joint pain and myalgias.   Skin:  Negative for rash.   Neurological:  Negative for tingling, tremors, sensory change, focal weakness and weakness.     Allergies:  Prednisone; Ace inhibitors; and Latex, natural rubber     Medical History:  Past Medical History:   Diagnosis Date    Allergy     Cataract     DDD (degenerative disc disease), lumbar     Diabetes mellitus     diet controlled    Diabetes mellitus, type 2     GERD (gastroesophageal reflux disease)     History of subconjunctival hemorrhage 11    left eye    Hyperlipidemia     Hypertension     IBS (irritable bowel syndrome)     Obesity     MYRIAM (obstructive sleep apnea)     on CPAP    Rotator cuff impingement syndrome     Seasonal allergic conjunctivitis         Surgical History:  Past Surgical History:   Procedure Laterality Date    CATARACT EXTRACTION W/  INTRAOCULAR LENS IMPLANT Right 10/03/2013         SECTION      x2    CHOLECYSTECTOMY      COLONOSCOPY N/A 2018    Procedure: COLONOSCOPY;   Surgeon: Marie Mejia MD;  Location: [facility] ENDO;  Service: Endoscopy;  Laterality: N/A;    COLONOSCOPY N/A 2022    Procedure: COLONOSCOPY;  Surgeon: Pierce Rivera MD;  Location: [facility] ENDO;  Service: Endoscopy;  Laterality: N/A;    ENDOSCOPIC ULTRASOUND OF UPPER GASTROINTESTINAL TRACT N/A 10/9/2018    Procedure: ULTRASOUND, UPPER GI TRACT, ENDOSCOPIC;  Surgeon: Javy Simpson MD;  Location: [facility] ENDO;  Service: Endoscopy;  Laterality: N/A;    EXCISION OF LESION N/A 2019    Procedure: EXCISION, LESION VULVA;  Surgeon: Liv Odell MD;  Location: [facility] OR;  Service: OB/GYN;  Laterality: N/A;  latex allergy    EYE SURGERY      HYSTERECTOMY      ovaries intact, due to DUB    TUBAL LIGATION          Social History:  Social History     Socioeconomic History    Marital status:    Tobacco Use    Smoking status: Former     Types: Cigarettes     Quit date: 2/15/1983     Years since quittin.7    Smokeless tobacco: Never   Substance and Sexual Activity    Alcohol use: No    Drug use: No    Sexual activity: Yes     Partners: Male     Social Determinants of Health     Financial Resource Strain: Unknown    Difficulty of Paying Living Expenses: Patient refused   Food Insecurity: Unknown    Worried About Running Out of Food in the Last Year: Patient refused    Ran Out of Food in the Last Year: Patient refused   Transportation Needs: No Transportation Needs    Lack of Transportation (Medical): No    Lack of Transportation (Non-Medical): No   Physical Activity: Unknown    Days of Exercise per Week: Patient refused    Minutes of Exercise per Session: 20 min   Stress: Unknown    Feeling of Stress : Patient refused   Social Connections: Unknown    Frequency of Communication with Friends and Family: Patient refused    Frequency of Social Gatherings with Friends and Family: Patient refused    Active Member of Clubs or Organizations: No    Attends Club or Organization Meetings: Patient refused     Marital Status:    Housing Stability: Low Risk     Unable to Pay for Housing in the Last Year: No    Number of Places Lived in the Last Year: 1    Unstable Housing in the Last Year: No       Physical Exam:  Vitals:    10/26/22 1110   BP: 138/80   Pulse: 88   Weight: 91.4 kg (201 lb 8 oz)   PainSc:   8     General    Nursing note and vitals reviewed.  Constitutional: She is oriented to person, place, and time. She appears well-developed and well-nourished. No distress.   HENT:   Head: Normocephalic and atraumatic.   Nose: Nose normal.   Eyes: Conjunctivae and EOM are normal. Pupils are equal, round, and reactive to light. Right eye exhibits no discharge. Left eye exhibits no discharge. No scleral icterus.   Neck: No JVD present.   Cardiovascular:  Intact distal pulses.            Pulmonary/Chest: Effort normal. No respiratory distress.   Abdominal: She exhibits no distension.   Neurological: She is alert and oriented to person, place, and time. Coordination normal.   Psychiatric: She has a normal mood and affect. Her behavior is normal. Judgment and thought content normal.     General Musculoskeletal Exam   Gait: antalgic       Right Knee Exam     Tenderness   The patient is tender to palpation of the medial joint line.    Crepitus   The patient has crepitus of the patella.    Range of Motion   Extension:  normal   Flexion:  normal     Left Knee Exam     Tenderness   The patient tender to palpation of the lateral joint line.    Crepitus   The patient has crepitus of the patella.    Range of Motion   Extension:  normal   Flexion:  normal Left Hip Exam     Comments:  Left ischial bursa tenderness.  Partial reproduction of pain with hip flexion and piriformis-type stretches.      Right Shoulder Exam     Inspection/Observation   Deformity: absent    Range of Motion   Active abduction:  90 abnormal     Tests & Signs   Drop arm: positive  Saldaña test: positive  Impingement: positive  Rotator Cuff Painful Arc/Range:  moderate    Other   Sensation: normal    Comments:  Positive empty can and impingement sign     Left Shoulder Exam     Inspection/Observation   Deformity: absent    Range of Motion   Active abduction:  90 abnormal     Tests & Signs   Drop arm: positive  Saldaña test: positive  Impingement: positive  Rotator Cuff Painful Arc/Range: moderate    Other   Sensation: normal     Comments:  Positive empty can and impingement sign       Muscle Strength   Right Upper Extremity   Shoulder Abduction: 5/5   Shoulder Internal Rotation: 5/5   Shoulder External Rotation: 5/5   Supraspinatus: 5/5   Subscapularis: 5/5   Biceps: 5/5   Left Upper Extremity  Shoulder Abduction: 5/5   Shoulder Internal Rotation: 5/5   Shoulder External Rotation: 5/5   Supraspinatus: 5/5   Subscapularis: 5/5   Biceps: 5/5     Reflexes     Left Side  Biceps:  2+  Brachioradialis:  2+  Left Roque's Sign:  Absent    Right Side   Biceps:  2+  Brachioradialis:  2+  Right Roque's Sign:  absent    Vascular Exam     Right Pulses      Radial:                    1+      Left Pulses      Radial:                    2+      Capillary Refill  Right Hand: normal capillary refill  Left Hand: normal capillary refill        Imaging:    Order Details       XR SHOULDER COMPLETE 2 OR MORE VIEWS BILATERAL     CLINICAL HISTORY:  Other hypertrophic osteoarthropathy, multiple sites     TECHNIQUE:  Three views of each shoulder were performed.     COMPARISON:  Shoulder radiographs 01/28/2020 and 07/07/2016     FINDINGS:  No fracture or dislocation.  Mild-moderate degenerative changes of the acromioclavicular and glenohumeral joints bilaterally, right greater than left.  No focal soft tissue swelling.     Impression:     As above.        Electronically signed by: Jim Cooper  Date:                                            06/16/2022    MRI Upper Extremity Joint Without Contraast Right  TECHNIQUE: MRI of right shoulder was performed on a 1.5T magnet utilizing the following  sequences: Localizer; axial T2 FS; coronal T2 FS and PD FS; sagittal T1, T2 FS and PD FS.    COMPARISON: Right shoulder radiograph 4/20/16.    FINDINGS:    Rotator cuff: There is thickening and increased signal of the insertional fibers of the supraspinatus consistent with tendinosis with partial thickness undersurface tear.  There is small full-thickness component involving the anterior fibers measuring approximately 8 x 6 mm.  Mild infraspinatus tendinosis with undersurface fraying.  Teres minor tendon is intact.  There is mild tendinosis with interstitial tear involving the insertional fibers of the subscapularis.  Muscle bulk of the rotator cuff is within normal limits.     Labrum: Global degeneration of the glenoid labrum.      Biceps: Thickening and increased signal within the intra-articular portion of the long head biceps tendon consistent with tendinosis.    Bone: There is no fracture.  Bone marrow signal is unremarkable.    Acromioclavicular joint: Severe degenerative changes are seen at the acromioclavicular joint with osseous spurring, edema, and subchondral cystic change.    Cartilage: Articular cartilage of the glenohumeral joint is preserved.    Miscellaneous: Small joint effusion with increased fluid seen in the subscapularis recess.  There is increased fluid seen within the subacromial/subdeltoid bursa.   Impression       1.  Supraspinatus tendinosis with partial thickness articular surface tear and small full-thickness component involving the anterior fibers.    2.  Mild subscapularis and infraspinatus tendinosis.    3.  Severe AC joint arthrosis.    4.  Long head biceps tendinosis.    5.  Joint effusion and moderate subacromial/subdeltoid bursitis.  ______________________________________     Electronically signed by resident: Hosea Costello MD  Date: 06/16/16  Time: 16:29     ______________________________________     Electronically signed by: MADISON WINTERS MD  Date: 06/17/16  Time: 08:39               Assessment:  Problem List Items Addressed This Visit    None  Visit Diagnoses       Dysfunction of rotator cuff of both shoulders    -  Primary    Relevant Orders    Large Joint Aspiration/Injection: bilateral subacromial bursa          Chelly is a 67-year-old female with chronic neck pain exacerbated by increased physical activity 3 months ago.  Her exam and imaging are consistent with facet arthropathy and myofascial dysfunction.  I recommended that we start with physical therapy to eliminate as much pain as possible related to myofascial dysfunction then move on to interventional procedures should a significant amount of pain persist.  I also recommended that she complete the MRI ordered by her primary care physician and start a trial of piroxicam.  She also states significant insomnia not related to her chronic neck pain and I have recommended that she try tizanidine 4-8 mg nightly as a muscle relaxant and sleep aid.    4/9/18 - she completed PT with improved pain after each session.  Pain remains most bothersome at night which is common for muscle issues.  She was encouraged to perform neck stretching 3 times daily and especially at night to minimize pain at night.    2/5/2020 - there is tenderness in the left buttock consistent with ischial bursitis.  There may be a component of tendonitis as well; however, the treatment would be the same.  I have recommended an ischial bursa injection under fluoroscopic guidance and patient is in agreement.  She was warned that her blood glucose must be controlled and less than 180 g/dL to receive a steroid injection, otherwise we would need to cancel the injection.  Patient was also advised to begin stretching on a daily basis. I demonstrated 3 separate stretches that would target the painful area.  I have encouraged her to walk on a daily basis for exercise as she currently lives a relatively sedentary lifestyle.    08/[id number]-old female presents with  bilateral knee pain consistent with osteoarthritis, there may be a component of tendonitis as well. I will order bilateral knee x-rays today for further evaluation and consider bilateral knee injections with steroids in the future pt  Is a diabetic so I educated her on how steroids can increase BS so monitoring  And staying at the appropriate level is vital. I will also recommend returning to PT for hari knee pain and  ischial bursitis she was previously attending physical therapy prior to the COVID-19 pandemic for ischial bursitis this was providing her with relief she reports that she only had 2 sessions during that time.   Lastly I did discuss with patient her most recent labs  C reactive protein, and sedimentation rate results and that they were both abnormal and elevated I recommended she follow-up with her primary care doctor to discussed these results.      8/18/2020- 68 y/o female presents with a hx of chronic hari knee pain secondary to osteoarthritis , and left sided low back pain secondary to ischia bursitis. Upon review of her recent bilateral knee x-rays they show tricompartmental DJD with moderate medial femorotibial joint space narrowing bilaterally. I recommended hari IA knee injections in the future, considering her pain is stable today I will hold off her pain score is 3/10. Explained recent lab results and recommended  that she will need to f/u with her PCP.  Also recommended patient follow up with physical therapy.    10/26/2022 - Chelly Ortiz is a 71 y.o. female who  has a past medical history of Allergy, Cataract, DDD (degenerative disc disease), lumbar, Diabetes mellitus, Diabetes mellitus, type 2, GERD (gastroesophageal reflux disease), History of subconjunctival hemorrhage (12/2/11), Hyperlipidemia, Hypertension, IBS (irritable bowel syndrome), Obesity, MYRIAM (obstructive sleep apnea), Rotator cuff impingement syndrome, and Seasonal allergic conjunctivitis.  By history and examination this  patient has chronic and acute on chronic bilateral shoulder pain. The underlying cause cause is bilateral rotator cuff pathology causing bilateral bursitis.  Pathology is confirmed by imaging.  We discussed the underlying diagnoses and multiple treatment options including interventional procedures.  The risks and benefits of each treatment option were discussed and all questions were answered.        Treatment Plan:   Bilateral subacromial bursa injections in clinic  Follow up in 4 weeks    Disclaimer: This note was partly generated using dictation software which may occasionally result in transcription errors.

## 2022-10-26 NOTE — PROGRESS NOTES
[facility] Established Patient Evaluation    Chief Complaint  No chief complaint on file.      Last 3 PDI Scores 8/18/2020 2/5/2020 8/23/2018   Pain Disability Index (PDI) 21 35 38       Interval Update  Chelly Ortiz presents today complaining of low back and shoulder pain.       Background from Chart Review  ***    Treatment History  PT/OT:   HEP:   TENS:  Injections:   Surgery:  Medications:   - NSAIDS:   - MSK Relaxants:   - TCAs:   - SNRIs:   - Topicals:   - Opioids:   - Anticonvulsants:     Current Pain Medications  ***     Full Medication List    Current Outpatient Medications:     ACCU-CHEK FASTCLIX LANCET DRUM Misc, TEST BLOOD SUGAR THREE TIMES A DAY, Disp: 918 each, Rfl: 3    ACCU-CHEK FASTCLIX LANCING DEV Kit, USE AS DIRECTED, Disp: 1 each, Rfl: 0    albuterol (PROVENTIL/VENTOLIN HFA) 90 mcg/actuation inhaler, Inhale 1-2 puffs into the lungs every 4 (four) hours as needed for Wheezing., Disp: 18 g, Rfl: 2    amLODIPine (NORVASC) 10 MG tablet, Take 1 tablet (10 mg total) by mouth once daily., Disp: 90 tablet, Rfl: 3    aspirin (ECOTRIN) 81 MG EC tablet, Take 81 mg by mouth once daily., Disp: , Rfl:     azelastine (OPTIVAR) 0.05 % ophthalmic solution, Place 1 drop into both eyes 2 (two) times daily., Disp: 6 mL, Rfl: 1    blood glucose control high,low (ACCU-CHEK GUIDE L1-L2 CTRL SOL) Soln, 3 times a day, Disp: 300 each, Rfl: 11    blood sugar diagnostic (ACCU-CHEK GUIDE TEST STRIPS) Strp, 3 times a day, Disp: 300 strip, Rfl: 11    blood-glucose meter (ACCU-CHEK GUIDE GLUCOSE METER) Misc, Three times a day, Disp: 1 each, Rfl: 0    calcium carbonate (OS-ARIC) 600 mg calcium (1,500 mg) Tab, Take 1 tablet (600 mg total) by mouth once. for 1 dose, Disp: 30 tablet, Rfl: 11    cetirizine (ZYRTEC) 10 MG tablet, Take 1 tablet (10 mg total) by mouth every evening., Disp: 90 tablet, Rfl: 0    chlorhexidine (PERIDEX) 0.12 % solution, Use as directed 15 mLs in the mouth or throat daily as needed., Disp: ,  "Rfl:     citalopram (CELEXA) 10 MG tablet, Take 1 tablet (10 mg total) by mouth once daily., Disp: 90 tablet, Rfl: 3    dulaglutide (TRULICITY) 3 mg/0.5 mL pen injector, Inject 3 mg into the skin every 7 days., Disp: 4 pen, Rfl: 11    ergocalciferol (ERGOCALCIFEROL) 50,000 unit Cap, TAKE 1 CAPSULE EVERY 7 DAYS. Strength: 50,000 Units, Disp: 12 capsule, Rfl: 1    esomeprazole (NEXIUM) 40 MG capsule, Take 1 capsule (40 mg total) by mouth before breakfast., Disp: 90 capsule, Rfl: 3    fluticasone-salmeterol diskus inhaler 250-50 mcg, Inhale 1 puff by mouth into the lungs 2 (two) times daily. Controller, Disp: 60 each, Rfl: 6    gabapentin (NEURONTIN) 100 MG capsule, Take 1 capsule (100 mg total) by mouth 3 (three) times daily., Disp: 270 capsule, Rfl: 3    glucose 4 GM chewable tablet, Take 4 tablets (16 g total) by mouth as needed for Low blood sugar (If having symptoms of blurry vision, palpitations, confusion, shakiness.  Please check sugars and if sugar below 70 please take 4 tablets and re-check sugar everry 15 minutes until sugars are above 70 and symptoms resolve.)., Disp: 50 tablet, Rfl: 12    insulin aspart U-100 (NOVOLOG FLEXPEN U-100 INSULIN) 100 unit/mL (3 mL) InPn pen, Inject 12 units w/ meals plus scale 150-200+2, 201-250+4, 251-300+6, 301-350+8, >350+10. Max daily 50 units., Disp: 15 mL, Rfl: 6    insulin detemir U-100 (LEVEMIR FLEXTOUCH U-100 INSULN) 100 unit/mL (3 mL) InPn pen, Inject 45 units into the skin at night., Disp: 15 mL, Rfl: 6    insulin syringe-needle U-100 0.3 mL 31 gauge x 5/16" Syrg, relion brand, use 5 x a day, if not on levemir or novolog, Disp: 150 each, Rfl: 1    insulin syringe-needle U-100 0.5 mL 31 gauge x 5/16" Syrg, Use nightly. 90 day, Disp: 100 each, Rfl: 3    losartan (COZAAR) 50 MG tablet, TAKE 1 TABLET EVERY DAY, Disp: 90 tablet, Rfl: 3    magnesium oxide (MAG-OX) 400 mg tablet, Take 1 tablet (400 mg total) by mouth 2 (two) times daily., Disp: 180 tablet, Rfl: 3    pen " "needle, diabetic 31 gauge x 3/16" Ndle, Uses 4 x a day., Disp: 400 each, Rfl: 3    pravastatin (PRAVACHOL) 20 MG tablet, Take 1 tablet (20 mg total) by mouth every evening., Disp: 90 tablet, Rfl: 3    traZODone (DESYREL) 50 MG tablet, TAKE 1 TABLET EVERY NIGHT AS NEEDED, Disp: 90 tablet, Rfl: 3    Current Facility-Administered Medications:     sodium chloride 0.9% flush 10 mL, 10 mL, Intravenous, PRN, Clarice Herzog MD     Review of Systems  ROS    Medical History  Past Medical History:   Diagnosis Date    Allergy     Cataract     DDD (degenerative disc disease), lumbar     Diabetes mellitus     diet controlled    Diabetes mellitus, type 2     GERD (gastroesophageal reflux disease)     History of subconjunctival hemorrhage 11    left eye    Hyperlipidemia     Hypertension     IBS (irritable bowel syndrome)     Obesity     MYRIAM (obstructive sleep apnea)     on CPAP    Rotator cuff impingement syndrome     Seasonal allergic conjunctivitis         Surgical History  Past Surgical History:   Procedure Laterality Date    CATARACT EXTRACTION W/  INTRAOCULAR LENS IMPLANT Right 10/03/2013         SECTION      x2    CHOLECYSTECTOMY      COLONOSCOPY N/A 2018    Procedure: COLONOSCOPY;  Surgeon: Marie Mejia MD;  Location: [facility] ENDO;  Service: Endoscopy;  Laterality: N/A;    COLONOSCOPY N/A 2022    Procedure: COLONOSCOPY;  Surgeon: Pierce Rivera MD;  Location: [facility] ENDO;  Service: Endoscopy;  Laterality: N/A;    ENDOSCOPIC ULTRASOUND OF UPPER GASTROINTESTINAL TRACT N/A 10/9/2018    Procedure: ULTRASOUND, UPPER GI TRACT, ENDOSCOPIC;  Surgeon: Javy Simpson MD;  Location: [facility] ENDO;  Service: Endoscopy;  Laterality: N/A;    EXCISION OF LESION N/A 2019    Procedure: EXCISION, LESION VULVA;  Surgeon: Liv Odell MD;  Location: [facility] OR;  Service: OB/GYN;  Laterality: N/A;  latex allergy    EYE SURGERY      HYSTERECTOMY      ovaries intact, due to DUB    TUBAL LIGATION   "        Physical Exam  There were no vitals filed for this visit.  Ortho/SPM Exam    Imaging  ***    Labs:  BMP  Lab Results   Component Value Date     10/21/2022    K 3.4 (L) 10/21/2022     10/21/2022    CO2 27 10/21/2022    BUN 11 10/21/2022    CREATININE 0.8 10/21/2022    CALCIUM 9.3 10/21/2022    ANIONGAP 9 10/21/2022    ESTGFRAFRICA >60.0 06/13/2022    EGFRNONAA >60.0 06/13/2022     Lab Results   Component Value Date    ALT 11 10/21/2022    AST 17 10/21/2022    GGT 30 10/27/2004    ALKPHOS 111 10/21/2022    BILITOT 0.5 10/21/2022       Assessment:  Problem List Items Addressed This Visit    None      *** - Chelly Ortiz is a 71 y.o. female with     ***    Plan & Recommendations  Procedures: ***  Medications: No medications changes recommended at this time.  Imaging: No additional imaging required at this time.  PT/OT: ***  HEP: I encouraged the patient to maintain a home exercise regimen that includes daily, moderate cardiovascular exercise lasting at least 30 minutes.  This may include yoga, porfirio chi, walking, swimming, aqua aerobics, or other exercises that maintain a heart rate of 50-70% of the calculated maximum heart rate.  I also encouraged light, daily stretching focused on the target area.  Follow Up: RTC in *** weeks    Aisha Perez Jr, MD  Interventional Pain Medicine / Anesthesiology    Disclaimer: This note was partly generated using dictation software which may occasionally result in transcription errors.

## 2022-10-26 NOTE — PROCEDURES
Large Joint Aspiration/Injection: bilateral subacromial bursa    Date/Time: 10/26/2022 11:00 AM  Performed by: Aisha Perez Jr., MD  Authorized by: Aisha Perez Jr., MD     Consent Done?:  Yes (Verbal)  Indications:  Pain  Site marked: the procedure site was marked    Timeout: prior to procedure the correct patient, procedure, and site was verified    Prep: patient was prepped and draped in usual sterile fashion      Local anesthesia used?: Yes    Anesthesia:  Local infiltration  Local anesthetic:  Bupivacaine 0.25% without epinephrine  Anesthetic total (ml):  4      Details:  Needle Size:  22 G  Ultrasonic Guidance for needle placement?: Yes    Images are saved and documented.  Approach:  Posterior  Location:  Shoulder  Laterality:  Bilateral  Site:  Bilateral subacromial bursa  Medications (Right):  20 mg triamcinolone acetonide 40 mg/mL  Medications (Left):  20 mg triamcinolone acetonide 40 mg/mL  Patient tolerance:  Patient tolerated the procedure well with no immediate complications

## 2022-10-27 ENCOUNTER — OFFICE VISIT (OUTPATIENT)
Dept: FAMILY MEDICINE | Facility: CLINIC | Age: 72
End: 2022-10-27
Payer: MEDICARE

## 2022-10-27 VITALS
BODY MASS INDEX: 35.9 KG/M2 | WEIGHT: 202.63 LBS | DIASTOLIC BLOOD PRESSURE: 78 MMHG | HEIGHT: 63 IN | SYSTOLIC BLOOD PRESSURE: 142 MMHG | OXYGEN SATURATION: 95 % | HEART RATE: 88 BPM

## 2022-10-27 DIAGNOSIS — I70.0 ABDOMINAL AORTIC ATHEROSCLEROSIS: ICD-10-CM

## 2022-10-27 DIAGNOSIS — E11.42 DIABETIC POLYNEUROPATHY ASSOCIATED WITH TYPE 2 DIABETES MELLITUS: ICD-10-CM

## 2022-10-27 DIAGNOSIS — F32.0 MILD MAJOR DEPRESSION: ICD-10-CM

## 2022-10-27 DIAGNOSIS — E11.65 TYPE 2 DIABETES MELLITUS WITH HYPERGLYCEMIA, WITH LONG-TERM CURRENT USE OF INSULIN: ICD-10-CM

## 2022-10-27 DIAGNOSIS — M85.89 OSTEOPENIA OF MULTIPLE SITES: ICD-10-CM

## 2022-10-27 DIAGNOSIS — E66.01 SEVERE OBESITY WITH BODY MASS INDEX (BMI) OF 35.0 TO 39.9 WITH SERIOUS COMORBIDITY: ICD-10-CM

## 2022-10-27 DIAGNOSIS — G47.00 INSOMNIA, UNSPECIFIED TYPE: ICD-10-CM

## 2022-10-27 DIAGNOSIS — Z00.00 ENCOUNTER FOR PREVENTIVE HEALTH EXAMINATION: Primary | ICD-10-CM

## 2022-10-27 DIAGNOSIS — I10 ESSENTIAL HYPERTENSION: ICD-10-CM

## 2022-10-27 DIAGNOSIS — Z79.4 TYPE 2 DIABETES MELLITUS WITH HYPERGLYCEMIA, WITH LONG-TERM CURRENT USE OF INSULIN: ICD-10-CM

## 2022-10-27 DIAGNOSIS — M13.0 ARTHRITIS OF MULTIPLE SITES: ICD-10-CM

## 2022-10-27 PROCEDURE — 1159F PR MEDICATION LIST DOCUMENTED IN MEDICAL RECORD: ICD-10-PCS | Mod: CPTII,S$GLB,, | Performed by: NURSE PRACTITIONER

## 2022-10-27 PROCEDURE — 4010F ACE/ARB THERAPY RXD/TAKEN: CPT | Mod: CPTII,S$GLB,, | Performed by: NURSE PRACTITIONER

## 2022-10-27 PROCEDURE — 3051F HG A1C>EQUAL 7.0%<8.0%: CPT | Mod: CPTII,S$GLB,, | Performed by: NURSE PRACTITIONER

## 2022-10-27 PROCEDURE — 1159F MED LIST DOCD IN RCRD: CPT | Mod: CPTII,S$GLB,, | Performed by: NURSE PRACTITIONER

## 2022-10-27 PROCEDURE — 1126F PR PAIN SEVERITY QUANTIFIED, NO PAIN PRESENT: ICD-10-PCS | Mod: CPTII,S$GLB,, | Performed by: NURSE PRACTITIONER

## 2022-10-27 PROCEDURE — 3066F NEPHROPATHY DOC TX: CPT | Mod: CPTII,S$GLB,, | Performed by: NURSE PRACTITIONER

## 2022-10-27 PROCEDURE — 99999 PR PBB SHADOW E&M-EST. PATIENT-LVL IV: ICD-10-PCS | Mod: PBBFAC,,, | Performed by: NURSE PRACTITIONER

## 2022-10-27 PROCEDURE — 3077F SYST BP >= 140 MM HG: CPT | Mod: CPTII,S$GLB,, | Performed by: NURSE PRACTITIONER

## 2022-10-27 PROCEDURE — 1160F PR REVIEW ALL MEDS BY PRESCRIBER/CLIN PHARMACIST DOCUMENTED: ICD-10-PCS | Mod: CPTII,S$GLB,, | Performed by: NURSE PRACTITIONER

## 2022-10-27 PROCEDURE — 99499 RISK ADDL DX/OHS AUDIT: ICD-10-PCS | Mod: S$GLB,,, | Performed by: NURSE PRACTITIONER

## 2022-10-27 PROCEDURE — 3078F DIAST BP <80 MM HG: CPT | Mod: CPTII,S$GLB,, | Performed by: NURSE PRACTITIONER

## 2022-10-27 PROCEDURE — G0439 PR MEDICARE ANNUAL WELLNESS SUBSEQUENT VISIT: ICD-10-PCS | Mod: S$GLB,,, | Performed by: NURSE PRACTITIONER

## 2022-10-27 PROCEDURE — 1170F FXNL STATUS ASSESSED: CPT | Mod: CPTII,S$GLB,, | Performed by: NURSE PRACTITIONER

## 2022-10-27 PROCEDURE — 1170F PR FUNCTIONAL STATUS ASSESSED: ICD-10-PCS | Mod: CPTII,S$GLB,, | Performed by: NURSE PRACTITIONER

## 2022-10-27 PROCEDURE — 1101F PR PT FALLS ASSESS DOC 0-1 FALLS W/OUT INJ PAST YR: ICD-10-PCS | Mod: CPTII,S$GLB,, | Performed by: NURSE PRACTITIONER

## 2022-10-27 PROCEDURE — 4010F PR ACE/ARB THEARPY RXD/TAKEN: ICD-10-PCS | Mod: CPTII,S$GLB,, | Performed by: NURSE PRACTITIONER

## 2022-10-27 PROCEDURE — 3051F PR MOST RECENT HEMOGLOBIN A1C LEVEL 7.0 - < 8.0%: ICD-10-PCS | Mod: CPTII,S$GLB,, | Performed by: NURSE PRACTITIONER

## 2022-10-27 PROCEDURE — 1160F RVW MEDS BY RX/DR IN RCRD: CPT | Mod: CPTII,S$GLB,, | Performed by: NURSE PRACTITIONER

## 2022-10-27 PROCEDURE — G0439 PPPS, SUBSEQ VISIT: HCPCS | Mod: S$GLB,,, | Performed by: NURSE PRACTITIONER

## 2022-10-27 PROCEDURE — 3077F PR MOST RECENT SYSTOLIC BLOOD PRESSURE >= 140 MM HG: ICD-10-PCS | Mod: CPTII,S$GLB,, | Performed by: NURSE PRACTITIONER

## 2022-10-27 PROCEDURE — 1126F AMNT PAIN NOTED NONE PRSNT: CPT | Mod: CPTII,S$GLB,, | Performed by: NURSE PRACTITIONER

## 2022-10-27 PROCEDURE — 3288F FALL RISK ASSESSMENT DOCD: CPT | Mod: CPTII,S$GLB,, | Performed by: NURSE PRACTITIONER

## 2022-10-27 PROCEDURE — 99999 PR PBB SHADOW E&M-EST. PATIENT-LVL IV: CPT | Mod: PBBFAC,,, | Performed by: NURSE PRACTITIONER

## 2022-10-27 PROCEDURE — 3078F PR MOST RECENT DIASTOLIC BLOOD PRESSURE < 80 MM HG: ICD-10-PCS | Mod: CPTII,S$GLB,, | Performed by: NURSE PRACTITIONER

## 2022-10-27 PROCEDURE — 1101F PT FALLS ASSESS-DOCD LE1/YR: CPT | Mod: CPTII,S$GLB,, | Performed by: NURSE PRACTITIONER

## 2022-10-27 PROCEDURE — 3061F PR NEG MICROALBUMINURIA RESULT DOCUMENTED/REVIEW: ICD-10-PCS | Mod: CPTII,S$GLB,, | Performed by: NURSE PRACTITIONER

## 2022-10-27 PROCEDURE — 3066F PR DOCUMENTATION OF TREATMENT FOR NEPHROPATHY: ICD-10-PCS | Mod: CPTII,S$GLB,, | Performed by: NURSE PRACTITIONER

## 2022-10-27 PROCEDURE — 3061F NEG MICROALBUMINURIA REV: CPT | Mod: CPTII,S$GLB,, | Performed by: NURSE PRACTITIONER

## 2022-10-27 PROCEDURE — 99499 UNLISTED E&M SERVICE: CPT | Mod: S$GLB,,, | Performed by: NURSE PRACTITIONER

## 2022-10-27 PROCEDURE — 3288F PR FALLS RISK ASSESSMENT DOCUMENTED: ICD-10-PCS | Mod: CPTII,S$GLB,, | Performed by: NURSE PRACTITIONER

## 2022-10-27 NOTE — PATIENT INSTRUCTIONS
Counseling and Referral of Other Preventative  (Italic type indicates deductible and co-insurance are waived)    Patient Name: Chelly Ortiz  Today's Date: 10/27/2022    Health Maintenance       Date Due Completion Date    COVID-19 Vaccine (5 - Booster for Pfizer series) 10/03/2022 8/8/2022    Mammogram 01/26/2023 1/26/2022    Hemoglobin A1c 02/02/2023 8/2/2022    Eye Exam 04/28/2023 4/28/2022    Override on 1/29/2016: (N/S)    Lipid Panel 06/13/2023 6/13/2022    Diabetes Urine Screening 08/02/2023 8/2/2022    Foot Exam 08/16/2023 8/16/2022    Override on 9/16/2021: Done    DEXA Scan 08/15/2025 8/15/2022    Colorectal Cancer Screening 06/07/2027 6/7/2022    TETANUS VACCINE 04/15/2032 4/15/2022        No orders of the defined types were placed in this encounter.    The following information is provided to all patients.  This information is to help you find resources for any of the problems found today that may be affecting your health:                Living healthy guide: www.[facility].[place].gov      Understanding Diabetes: www.diabetes.org      Eating healthy: www.cdc.gov/healthyweight      CDC home safety checklist: www.cdc.gov/steadi/patient.html      Agency on Aging: www.goea.[place].[facility]      Alcoholics anonymous (AA): www.aa.org      Physical Activity: www.donaldo.nih.gov/bh5hxtj      Tobacco use: www.quitwithusla.org

## 2022-10-27 NOTE — PROGRESS NOTES
"  Chelly Ortiz presented for a  Medicare AWV and comprehensive Health Risk Assessment today. The following components were reviewed and updated:    Medical history  Family History  Social history  Allergies and Current Medications  Health Risk Assessment  Health Maintenance  Care Team         ** See Completed Assessments for Annual Wellness Visit within the encounter summary.**         The following assessments were completed:  Living Situation  CAGE  Depression Screening  Timed Get Up and Go  Whisper Test  Cognitive Function Screening      Nutrition Screening  ADL Screening  PAQ Screening        Vitals:    10/27/22 1445 10/27/22 1503 10/27/22 1524   BP: (!) 150/80 (!) 150/78 (!) 142/78   BP Location: Left arm Left arm    Patient Position: Sitting Sitting    BP Method: Medium (Manual)     Pulse: 88     SpO2: 95%     Weight: 91.9 kg (202 lb 9.6 oz)     Height: 5' 3" (1.6 m)       Body mass index is 35.89 kg/m².    Physical Exam  Vitals reviewed.   Constitutional:       General: She is not in acute distress.     Appearance: Normal appearance. She is well-developed and well-groomed. She is obese.   HENT:      Head: Normocephalic.   Eyes:      General:         Right eye: No discharge.         Left eye: No discharge.   Cardiovascular:      Rate and Rhythm: Normal rate.   Pulmonary:      Effort: Pulmonary effort is normal. No respiratory distress.   Abdominal:      General: There is no distension.   Skin:     General: Skin is warm and dry.      Coloration: Skin is not pale.   Neurological:      Mental Status: She is alert and oriented to person, place, and time.      Coordination: Coordination normal.      Gait: Gait normal.   Psychiatric:         Attention and Perception: Attention normal.         Mood and Affect: Mood and affect normal.         Speech: Speech normal.         Behavior: Behavior normal. Behavior is cooperative.         Thought Content: Thought content normal.           Diagnoses and health risks " identified today and associated recommendations/orders:    1. Encounter for preventive health examination    2. Type 2 diabetes mellitus with hyperglycemia, with long-term current use of insulin  Chronic; stable on medication. Follow up with PCP.    3. Diabetic polyneuropathy associated with type 2 diabetes mellitus  Chronic; stable on medication. Follow up with PCP.    4. Abdominal aortic atherosclerosis  Chronic; stable on medication. Follow up with PCP.    5. Mild major depression  Chronic; stable on medication. Follow up with PCP.    6. Essential hypertension  Chronic; stable on medication. Follow up with PCP.    7. Insomnia, unspecified type  Chronic; stable on medication. Follow up with PCP.    8. Osteopenia of multiple sites  Chronic; stable on medication. Follow up with PCP.    9. Arthritis of multiple sites  Chronic; stable on medication. Follow up with PCP.    10. Severe obesity with body mass index (BMI) of 35.0 to 39.9 with serious comorbidity  Continue to eat a low salt/low fat ADA diet and discussed importance of engaging in physical activity at least 5x/week for a minimum of 30 min/day.      Provided Chelly with a 5-10 year written screening schedule and personal prevention plan. Recommendations were developed using the USPSTF age appropriate recommendations. Education, counseling, and referrals were provided as needed. After Visit Summary printed and given to patient which includes a list of additional screenings/tests needed.    Follow up for your next annual wellness visit.    Tami Junior NP      Advance Care Planning      I offered to discuss advanced care planning, including how to pick a person who would make decisions for you if you were unable to make them for yourself, called a health care power of , and what kind of decisions you might make such as use of life sustaining treatments such as ventilators and tube feeding when faced with a life limiting illness recorded on a  living will that they will need to know. (How you want to be cared for as you near the end of your natural life)     X  Patient has advanced directives written and agrees to provide copies to the institution.

## 2022-10-31 ENCOUNTER — TELEPHONE (OUTPATIENT)
Dept: PODIATRY | Facility: CLINIC | Age: 72
End: 2022-10-31
Payer: MEDICARE

## 2022-10-31 NOTE — TELEPHONE ENCOUNTER
Called to reach out to Ms Ortiz to inquire if she would accept an appt to be seen earlier by Dr. Martinez. New appt date and time of 11/10/22 at 2:45 accepted with no other needs voiced at this time. Encouraged to call if further assistance is needed.

## 2022-11-01 ENCOUNTER — TELEPHONE (OUTPATIENT)
Dept: OPTOMETRY | Facility: CLINIC | Age: 72
End: 2022-11-01
Payer: MEDICARE

## 2022-11-01 NOTE — PRE-PROCEDURE INSTRUCTIONS
Preop instructions: NPO solids/ milk products after midnight and clears up to 2 hours before arrival (clear liquids are: water, sugar- free Gatorade & Jell-O, black coffee/no milk, cream or creamer), shower instructions, directions, leave all valuables at home, wear comfortable clothes (something that buttons up the front is best), medication instructions explained. Patient stated an understanding.     Patient denies any side effects or issues with anesthesia or sedation.    Patient does NOT want medication or health issues discussed in front of .

## 2022-11-02 ENCOUNTER — ANESTHESIA EVENT (OUTPATIENT)
Dept: SURGERY | Facility: HOSPITAL | Age: 72
End: 2022-11-02
Payer: MEDICARE

## 2022-11-02 ENCOUNTER — HOSPITAL ENCOUNTER (OUTPATIENT)
Facility: HOSPITAL | Age: 72
Discharge: HOME | End: 2022-11-02
Attending: OPHTHALMOLOGY | Admitting: OPHTHALMOLOGY
Payer: MEDICARE

## 2022-11-02 ENCOUNTER — ANESTHESIA (OUTPATIENT)
Dept: SURGERY | Facility: HOSPITAL | Age: 72
End: 2022-11-02
Payer: MEDICARE

## 2022-11-02 VITALS
BODY MASS INDEX: 35.78 KG/M2 | RESPIRATION RATE: 16 BRPM | OXYGEN SATURATION: 98 % | HEART RATE: 70 BPM | SYSTOLIC BLOOD PRESSURE: 135 MMHG | DIASTOLIC BLOOD PRESSURE: 72 MMHG | TEMPERATURE: 99 F | WEIGHT: 202 LBS

## 2022-11-02 DIAGNOSIS — H25.012 CORTICAL AGE-RELATED CATARACT, LEFT EYE: ICD-10-CM

## 2022-11-02 DIAGNOSIS — H25.12 NUCLEAR SCLEROTIC CATARACT OF LEFT EYE: ICD-10-CM

## 2022-11-02 DIAGNOSIS — H57.89 ABNORMAL RED REFLEX OF EYE: ICD-10-CM

## 2022-11-02 DIAGNOSIS — H25.042 POSTERIOR SUBCAPSULAR AGE-RELATED CATARACT OF LEFT EYE: ICD-10-CM

## 2022-11-02 DIAGNOSIS — H25.812 COMBINED FORM OF AGE-RELATED CATARACT, LEFT EYE: Primary | ICD-10-CM

## 2022-11-02 LAB
POCT GLUCOSE: 131 MG/DL (ref 70–110)
POCT GLUCOSE: 137 MG/DL (ref 70–110)

## 2022-11-02 PROCEDURE — 82962 GLUCOSE BLOOD TEST: CPT | Performed by: OPHTHALMOLOGY

## 2022-11-02 PROCEDURE — 71000015 HC POSTOP RECOV 1ST HR: Performed by: OPHTHALMOLOGY

## 2022-11-02 PROCEDURE — D9220A PRA ANESTHESIA: Mod: CRNA,,, | Performed by: NURSE ANESTHETIST, CERTIFIED REGISTERED

## 2022-11-02 PROCEDURE — D9220A PRA ANESTHESIA: ICD-10-PCS | Mod: CRNA,,, | Performed by: NURSE ANESTHETIST, CERTIFIED REGISTERED

## 2022-11-02 PROCEDURE — 37000009 HC ANESTHESIA EA ADD 15 MINS: Performed by: OPHTHALMOLOGY

## 2022-11-02 PROCEDURE — 66982 XCAPSL CTRC RMVL CPLX WO ECP: CPT | Mod: LT,,, | Performed by: OPHTHALMOLOGY

## 2022-11-02 PROCEDURE — V2632 POST CHMBR INTRAOCULAR LENS: HCPCS | Performed by: OPHTHALMOLOGY

## 2022-11-02 PROCEDURE — 66982 PR REMOVAL, CATARACT, W/INSRT INTRAOC LENS, W/O ENDO CYCLO, CMPLX: ICD-10-PCS | Mod: LT,,, | Performed by: OPHTHALMOLOGY

## 2022-11-02 PROCEDURE — 25000003 PHARM REV CODE 250: Performed by: NURSE ANESTHETIST, CERTIFIED REGISTERED

## 2022-11-02 PROCEDURE — 99499 UNLISTED E&M SERVICE: CPT | Mod: ,,, | Performed by: STUDENT IN AN ORGANIZED HEALTH CARE EDUCATION/TRAINING PROGRAM

## 2022-11-02 PROCEDURE — 99499 NO LOS: ICD-10-PCS | Mod: ,,, | Performed by: STUDENT IN AN ORGANIZED HEALTH CARE EDUCATION/TRAINING PROGRAM

## 2022-11-02 PROCEDURE — 37000008 HC ANESTHESIA 1ST 15 MINUTES: Performed by: OPHTHALMOLOGY

## 2022-11-02 PROCEDURE — 63600175 PHARM REV CODE 636 W HCPCS: Performed by: OPHTHALMOLOGY

## 2022-11-02 PROCEDURE — 71000044 HC DOSC ROUTINE RECOVERY FIRST HOUR: Performed by: OPHTHALMOLOGY

## 2022-11-02 PROCEDURE — 63600175 PHARM REV CODE 636 W HCPCS: Performed by: NURSE ANESTHETIST, CERTIFIED REGISTERED

## 2022-11-02 PROCEDURE — D9220A PRA ANESTHESIA: ICD-10-PCS | Mod: ANES,,, | Performed by: STUDENT IN AN ORGANIZED HEALTH CARE EDUCATION/TRAINING PROGRAM

## 2022-11-02 PROCEDURE — 25000003 PHARM REV CODE 250

## 2022-11-02 PROCEDURE — 36000706: Performed by: OPHTHALMOLOGY

## 2022-11-02 PROCEDURE — 25000003 PHARM REV CODE 250: Performed by: OPHTHALMOLOGY

## 2022-11-02 PROCEDURE — 36000707: Performed by: OPHTHALMOLOGY

## 2022-11-02 PROCEDURE — D9220A PRA ANESTHESIA: Mod: ANES,,, | Performed by: STUDENT IN AN ORGANIZED HEALTH CARE EDUCATION/TRAINING PROGRAM

## 2022-11-02 DEVICE — LENS 19.5: Type: IMPLANTABLE DEVICE | Site: EYE | Status: FUNCTIONAL

## 2022-11-02 RX ORDER — MOXIFLOXACIN 5 MG/ML
SOLUTION/ DROPS OPHTHALMIC
Status: DISCONTINUED
Start: 2022-11-02 | End: 2022-11-02 | Stop reason: HOSPADM

## 2022-11-02 RX ORDER — PREDNISOLONE ACETATE 10 MG/ML
SUSPENSION/ DROPS OPHTHALMIC
Status: DISCONTINUED
Start: 2022-11-02 | End: 2022-11-02 | Stop reason: HOSPADM

## 2022-11-02 RX ORDER — ONDANSETRON 2 MG/ML
INJECTION INTRAMUSCULAR; INTRAVENOUS
Status: DISCONTINUED | OUTPATIENT
Start: 2022-11-02 | End: 2022-11-02

## 2022-11-02 RX ORDER — MOXIFLOXACIN 5 MG/ML
1 SOLUTION/ DROPS OPHTHALMIC
Status: DISCONTINUED | OUTPATIENT
Start: 2022-11-02 | End: 2022-11-02 | Stop reason: HOSPADM

## 2022-11-02 RX ORDER — LIDOCAINE HYDROCHLORIDE 40 MG/ML
INJECTION, SOLUTION RETROBULBAR
Status: DISCONTINUED | OUTPATIENT
Start: 2022-11-02 | End: 2022-11-02 | Stop reason: HOSPADM

## 2022-11-02 RX ORDER — TROPICAMIDE 10 MG/ML
1 SOLUTION/ DROPS OPHTHALMIC
Status: DISCONTINUED | OUTPATIENT
Start: 2022-11-02 | End: 2022-11-02 | Stop reason: HOSPADM

## 2022-11-02 RX ORDER — KETOROLAC TROMETHAMINE 5 MG/ML
1 SOLUTION OPHTHALMIC
Status: DISCONTINUED | OUTPATIENT
Start: 2022-11-02 | End: 2022-11-02 | Stop reason: HOSPADM

## 2022-11-02 RX ORDER — PHENYLEPHRINE HYDROCHLORIDE 100 MG/ML
1 SOLUTION/ DROPS OPHTHALMIC
Status: DISCONTINUED | OUTPATIENT
Start: 2022-11-02 | End: 2022-11-02 | Stop reason: HOSPADM

## 2022-11-02 RX ORDER — PROPARACAINE HYDROCHLORIDE 5 MG/ML
1 SOLUTION/ DROPS OPHTHALMIC
Status: DISCONTINUED | OUTPATIENT
Start: 2022-11-02 | End: 2022-11-02 | Stop reason: HOSPADM

## 2022-11-02 RX ORDER — PREDNISOLONE ACETATE 10 MG/ML
SUSPENSION/ DROPS OPHTHALMIC
Status: DISCONTINUED | OUTPATIENT
Start: 2022-11-02 | End: 2022-11-02 | Stop reason: HOSPADM

## 2022-11-02 RX ORDER — LIDOCAINE HYDROCHLORIDE 40 MG/ML
INJECTION, SOLUTION RETROBULBAR
Status: DISCONTINUED
Start: 2022-11-02 | End: 2022-11-02 | Stop reason: HOSPADM

## 2022-11-02 RX ORDER — ACETAMINOPHEN 325 MG/1
650 TABLET ORAL EVERY 6 HOURS PRN
Status: DISCONTINUED | OUTPATIENT
Start: 2022-11-02 | End: 2022-11-02 | Stop reason: HOSPADM

## 2022-11-02 RX ORDER — ONDANSETRON 2 MG/ML
4 INJECTION INTRAMUSCULAR; INTRAVENOUS DAILY PRN
Status: DISCONTINUED | OUTPATIENT
Start: 2022-11-02 | End: 2022-11-02 | Stop reason: HOSPADM

## 2022-11-02 RX ORDER — LIDOCAINE HYDROCHLORIDE 10 MG/ML
INJECTION, SOLUTION EPIDURAL; INFILTRATION; INTRACAUDAL; PERINEURAL
Status: DISCONTINUED | OUTPATIENT
Start: 2022-11-02 | End: 2022-11-02 | Stop reason: HOSPADM

## 2022-11-02 RX ORDER — FENTANYL CITRATE 50 UG/ML
INJECTION, SOLUTION INTRAMUSCULAR; INTRAVENOUS
Status: DISCONTINUED | OUTPATIENT
Start: 2022-11-02 | End: 2022-11-02

## 2022-11-02 RX ORDER — LIDOCAINE HYDROCHLORIDE 10 MG/ML
INJECTION, SOLUTION EPIDURAL; INFILTRATION; INTRACAUDAL; PERINEURAL
Status: DISCONTINUED
Start: 2022-11-02 | End: 2022-11-02 | Stop reason: HOSPADM

## 2022-11-02 RX ORDER — ACETAZOLAMIDE 500 MG/1
500 CAPSULE, EXTENDED RELEASE ORAL ONCE
Status: COMPLETED | OUTPATIENT
Start: 2022-11-02 | End: 2022-11-02

## 2022-11-02 RX ORDER — MOXIFLOXACIN 5 MG/ML
SOLUTION/ DROPS OPHTHALMIC
Status: DISCONTINUED | OUTPATIENT
Start: 2022-11-02 | End: 2022-11-02 | Stop reason: HOSPADM

## 2022-11-02 RX ORDER — LIDOCAINE HYDROCHLORIDE 20 MG/ML
JELLY TOPICAL
Status: DISCONTINUED
Start: 2022-11-02 | End: 2022-11-02 | Stop reason: HOSPADM

## 2022-11-02 RX ORDER — FENTANYL CITRATE 50 UG/ML
25 INJECTION, SOLUTION INTRAMUSCULAR; INTRAVENOUS EVERY 5 MIN PRN
Status: DISCONTINUED | OUTPATIENT
Start: 2022-11-02 | End: 2022-11-02 | Stop reason: HOSPADM

## 2022-11-02 RX ADMIN — MOXIFLOXACIN OPHTHALMIC 1 DROP: 5 SOLUTION/ DROPS OPHTHALMIC at 07:11

## 2022-11-02 RX ADMIN — TROPICAMIDE 1 DROP: 10 SOLUTION/ DROPS OPHTHALMIC at 07:11

## 2022-11-02 RX ADMIN — KETOROLAC TROMETHAMINE 1 DROP: 5 SOLUTION OPHTHALMIC at 07:11

## 2022-11-02 RX ADMIN — PHENYLEPHRINE HYDROCHLORIDE 1 DROP: 100 SOLUTION/ DROPS OPHTHALMIC at 07:11

## 2022-11-02 RX ADMIN — FENTANYL CITRATE 25 MCG: 50 INJECTION, SOLUTION INTRAMUSCULAR; INTRAVENOUS at 08:11

## 2022-11-02 RX ADMIN — SODIUM CHLORIDE: 0.9 INJECTION, SOLUTION INTRAVENOUS at 08:11

## 2022-11-02 RX ADMIN — PROPARACAINE HYDROCHLORIDE 1 DROP: 5 SOLUTION/ DROPS OPHTHALMIC at 07:11

## 2022-11-02 RX ADMIN — ONDANSETRON 4 MG: 2 INJECTION INTRAMUSCULAR; INTRAVENOUS at 08:11

## 2022-11-02 RX ADMIN — ACETAZOLAMIDE 500 MG: 500 CAPSULE ORAL at 09:11

## 2022-11-02 NOTE — INTERVAL H&P NOTE
PT ID confirmed x2. HP performed, and reviewed previous, no updates. RBA discussed. Questions answered. Proceed with surgery as planned.      IOL calcs (4/28/2022)   OS  SN60WF 19.5 (has a sn60wf 19.0 in right eye)   MTA4U0 17.0     DIB00 would be 20.0 - but would be preferable to use a sn60wf - like in the first eye      Pt C/O blurry vision and glare problems - left eye .  Would like to have phaco/IOL os (other eye done in 2013     Poor red reflex - trypan blue // ? Dilation

## 2022-11-02 NOTE — OP NOTE
DATE OF PROCEDURE: 11/2/2022    PREOPERATIVE DIAGNOSIS: mixed Cataract - nuclear and cortical and posterior subcapsular - poor red reflex // Combined form of age-related cataract, left eye OS.     POSTOPERATIVE DIAGNOSIS: mixed Cataract - nucelar/cortical /. Posterior subcapsular with poor red reflex / Combined form of age-related cataract, left eyeOS, status post procedure.     PROCEDURE PERFORMED: Cataract extraction with trypan blue and phacoemulsification, posterior   chamber intraocular lens placement.     SURGEON: Clarice Herzog M.D.     ASSISTANT:  Nabil Limon MD       COMPLICATIONS: None.     BLOOD LOSS: Less than 5 mL.     ANESTHESIA: Local MAC    IMPLANT DATA:   1. Jarrod model SN60WF, power 19.5 diopters, serial #[id number]    PROCEDURE IN DETAIL: After informed consent including risks, benefits,   alternatives, the patient was brought to the operating room table, placed in   supine position. Monitored anesthesia care was used throughout the entire   procedure. Once adequate anesthesia was confirmed, the patient was then prepped   and draped in usual sterile fashion for intraocular surgery. Wire speculum was   used to hold the eyelids apart and the procedure was initiated by making   a partial thickness corneal wound with a katlyn blade temporally.   A supersharp blade was then used to make a second entry into the eye via a paracentesis incision.  Intracameral lidocaine followed by trypan blue, followed by  Viscoat were placed in the anterior chamber.   A 2.4 mm blade was then used to make to complete the clear corneal   incision temporally. A cystotome was used to make a tear in   the anterior capsular flap, which was continued around with Utrata forceps for   continuous curvilinear capsulorrhexis. BSS on a Yates cannula was then used for   hydrodissection and hydrodelineation of lens. The lens was noted to spin   freely in the bag. Phacoemulsification handpiece was then used to remove the    lens in a divide-and-conquer manner. Irrigation aspiration handpiece removed   the remaining cortical material. Provisc was placed in the eye followed by the   lens as mentioned above without any complications. Once the lens was in proper   position, irrigation aspiration handpiece was used to remove the remaining Provisc. The   wounds were then hydrated with BSS and noted to be watertight. The eye had a   good physiological pressure and the lid speculum was removed under the   microscope without any shallowing. The eye was then covered with a collagen   shield soaked in Pred Forte and Vigamox and turned over to Anesthesia in stable   condition after placement of patch and shield.

## 2022-11-02 NOTE — TRANSFER OF CARE
Anesthesia Transfer of Care Note    Patient: Chelly Ortiz    Procedure(s) Performed: Procedure(s) (LRB):  PHACOEMULSIFICATION, CATARACT (Left)  INSERTION, IOL PROSTHESIS (Left)    Patient location: PACU    Anesthesia Type: MAC    Transport from OR: Transported from OR on room air with adequate spontaneous ventilation    Post pain: adequate analgesia    Post assessment: no apparent anesthetic complications and tolerated procedure well    Post vital signs: stable    Level of consciousness: awake, alert and oriented    Nausea/Vomiting: no nausea/vomiting    Complications: none    Transfer of care protocol was followed      Last vitals:   Visit Vitals  BP (!) 144/63 (BP Location: Left arm, Patient Position: Lying)   Pulse 73   Temp 36.9 °C (98.4 °F) (Oral)   Resp 20   Wt 91.6 kg (202 lb)   LMP  (LMP Unknown)   SpO2 95%   Breastfeeding No   BMI 35.78 kg/m²

## 2022-11-02 NOTE — ANESTHESIA PREPROCEDURE EVALUATION
11/02/2022  Pre-operative evaluation for Procedure(s) (LRB):  PHACOEMULSIFICATION, CATARACT (Left)  INSERTION, IOL PROSTHESIS (Left)    Chelly Ortiz is a 72 y.o. female MYRIAM, GERD, HTN, DM2    Patient Active Problem List   Diagnosis    GERD (gastroesophageal reflux disease)    MYRIAM (obstructive sleep apnea)    IBS (irritable bowel syndrome)    DDD (degenerative disc disease), lumbar    Insomnia    Anxiety    Hearing loss, sensorineural    Nuclear sclerotic cataract of left eye    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    Constipation    History of colon polyps    Essential hypertension    Hyperlipidemia, unspecified    Diabetic polyneuropathy associated with type 2 diabetes mellitus    Spondylosis of cervical region without myelopathy or radiculopathy    Cervical myofascial pain syndrome    PCO (posterior capsular opacification), right    Dry eye syndrome    Pseudophakia    Decreased range of motion of lumbar spine    Decreased strength    Dilated bile duct    Personal history of colonic polyps    EIC (epidermal inclusion cyst)    Severe obesity with body mass index (BMI) of 35.0 to 39.9 with serious comorbidity    Mild major depression    Sedentary lifestyle    Dysphagia    Type 2 diabetes mellitus with hyperglycemia, with long-term current use of insulin    Abdominal aortic atherosclerosis    Decreased ROM of neck    Arthritis of multiple sites    Osteopenia of multiple sites       Review of patient's allergies indicates:   Allergen Reactions    Ace inhibitors Hives     \Cough    Prednisone      Causes pt to be hyper    Latex, natural rubber Itching       No current facility-administered medications on file prior to encounter.     Current Outpatient Medications on File Prior to Encounter   Medication Sig Dispense Refill    albuterol (PROVENTIL/VENTOLIN HFA) 90  mcg/actuation inhaler Inhale 1-2 puffs into the lungs every 4 (four) hours as needed for Wheezing. 18 g 2    amLODIPine (NORVASC) 10 MG tablet Take 1 tablet (10 mg total) by mouth once daily. 90 tablet 3    aspirin (ECOTRIN) 81 MG EC tablet Take 81 mg by mouth once daily.      azelastine (OPTIVAR) 0.05 % ophthalmic solution Place 1 drop into both eyes 2 (two) times daily. 6 mL 1    calcium carbonate (OS-ARIC) 600 mg calcium (1,500 mg) Tab Take 1 tablet (600 mg total) by mouth once. for 1 dose 30 tablet 11    cetirizine (ZYRTEC) 10 MG tablet Take 1 tablet (10 mg total) by mouth every evening. 90 tablet 0    chlorhexidine (PERIDEX) 0.12 % solution Use as directed 15 mLs in the mouth or throat daily as needed.      citalopram (CELEXA) 10 MG tablet Take 1 tablet (10 mg total) by mouth once daily. 90 tablet 3    dulaglutide (TRULICITY) 3 mg/0.5 mL pen injector Inject 3 mg into the skin every 7 days. 4 pen 11    esomeprazole (NEXIUM) 40 MG capsule Take 1 capsule (40 mg total) by mouth before breakfast. 90 capsule 3    fluticasone-salmeterol diskus inhaler 250-50 mcg Inhale 1 puff by mouth into the lungs 2 (two) times daily. Controller 60 each 6    gabapentin (NEURONTIN) 100 MG capsule Take 1 capsule (100 mg total) by mouth 3 (three) times daily. 270 capsule 3    insulin aspart U-100 (NOVOLOG FLEXPEN U-100 INSULIN) 100 unit/mL (3 mL) InPn pen Inject 12 units w/ meals plus scale 150-200+2, 201-250+4, 251-300+6, 301-350+8, >350+10. Max daily 50 units. 15 mL 6    insulin detemir U-100 (LEVEMIR FLEXTOUCH U-100 INSULN) 100 unit/mL (3 mL) InPn pen Inject 45 units into the skin at night. 15 mL 6    losartan (COZAAR) 50 MG tablet TAKE 1 TABLET EVERY DAY 90 tablet 3    magnesium oxide (MAG-OX) 400 mg tablet Take 1 tablet (400 mg total) by mouth 2 (two) times daily. 180 tablet 3    pravastatin (PRAVACHOL) 20 MG tablet Take 1 tablet (20 mg total) by mouth every evening. 90 tablet 3    traZODone (DESYREL) 50 MG tablet  "TAKE 1 TABLET EVERY NIGHT AS NEEDED 90 tablet 3    ACCU-CHEK FASTCLIX LANCET DRUM Misc TEST BLOOD SUGAR THREE TIMES A  each 3    ACCU-CHEK FASTCLIX LANCING DEV Kit USE AS DIRECTED 1 each 0    blood glucose control high,low (ACCU-CHEK GUIDE L1-L2 CTRL SOL) Soln 3 times a day 300 each 11    blood sugar diagnostic (ACCU-CHEK GUIDE TEST STRIPS) Strp 3 times a day 300 strip 11    blood-glucose meter (ACCU-CHEK GUIDE GLUCOSE METER) Misc Three times a day 1 each 0    glucose 4 GM chewable tablet Take 4 tablets (16 g total) by mouth as needed for Low blood sugar (If having symptoms of blurry vision, palpitations, confusion, shakiness.  Please check sugars and if sugar below 70 please take 4 tablets and re-check sugar everry 15 minutes until sugars are above 70 and symptoms resolve.). (Patient not taking: Reported on 10/27/2022) 50 tablet 12    insulin syringe-needle U-100 0.3 mL 31 gauge x 5/16" Syrg relion brand, use 5 x a day, if not on levemir or novolog 150 each 1    insulin syringe-needle U-100 0.5 mL 31 gauge x 5/16" Syrg Use nightly. 90 day 100 each 3    pen needle, diabetic 31 gauge x 3/16" Ndle Uses 4 x a day. 400 each 3       Past Surgical History:   Procedure Laterality Date    CATARACT EXTRACTION W/  INTRAOCULAR LENS IMPLANT Right 10/03/2013         SECTION      x2    CHOLECYSTECTOMY      COLONOSCOPY N/A 2018    Procedure: COLONOSCOPY;  Surgeon: Marie Mejia MD;  Location: [facility];  Service: Endoscopy;  Laterality: N/A;    COLONOSCOPY N/A 2022    Procedure: COLONOSCOPY;  Surgeon: Pierce Rivera MD;  Location: [facility];  Service: Endoscopy;  Laterality: N/A;    ENDOSCOPIC ULTRASOUND OF UPPER GASTROINTESTINAL TRACT N/A 10/9/2018    Procedure: ULTRASOUND, UPPER GI TRACT, ENDOSCOPIC;  Surgeon: Javy Simpson MD;  Location: [facility];  Service: Endoscopy;  Laterality: N/A;    EXCISION OF LESION N/A 2019    Procedure: EXCISION, LESION VULVA;  " Surgeon: Liv Odell MD;  Location: [facility];  Service: OB/GYN;  Laterality: N/A;  latex allergy    EYE SURGERY      HYSTERECTOMY      ovaries intact, due to DUB    TUBAL LIGATION         Social History     Socioeconomic History    Marital status:    Tobacco Use    Smoking status: Former     Types: Cigarettes     Quit date: 2/15/1983     Years since quittin.7    Smokeless tobacco: Never   Substance and Sexual Activity    Alcohol use: No    Drug use: No    Sexual activity: Yes     Partners: Male     Social Determinants of Health     Financial Resource Strain: Unknown    Difficulty of Paying Living Expenses: Patient refused   Food Insecurity: Unknown    Worried About Running Out of Food in the Last Year: Patient refused    Ran Out of Food in the Last Year: Patient refused   Transportation Needs: No Transportation Needs    Lack of Transportation (Medical): No    Lack of Transportation (Non-Medical): No   Physical Activity: Unknown    Days of Exercise per Week: Patient refused    Minutes of Exercise per Session: 20 min   Stress: Unknown    Feeling of Stress : Patient refused   Social Connections: Unknown    Frequency of Communication with Friends and Family: Patient refused    Frequency of Social Gatherings with Friends and Family: Patient refused    Active Member of Clubs or Organizations: No    Attends Club or Organization Meetings: Patient refused    Marital Status:    Housing Stability: Low Risk     Unable to Pay for Housing in the Last Year: No    Number of Places Lived in the Last Year: 1    Unstable Housing in the Last Year: No         CBC: No results for input(s): WBC, RBC, HGB, HCT, PLT, MCV, MCH, MCHC in the last 72 hours.    CMP: No results for input(s): NA, K, CL, CO2, BUN, CREATININE, GLU, MG, PHOS, CALCIUM, ALBUMIN, PROT, ALKPHOS, ALT, AST, BILITOT in the last 72 hours.    INR  No results for input(s): PT, INR, PROTIME, APTT in the last 72  hours.        Diagnostic Studies:      E[facility] Echo:  No results found. However, due to the size of the patient record, not all encounters were searched. Please check Results Review for a complete set of results.      Pre-op Assessment    I have reviewed the Patient Summary Reports.     I have reviewed the Nursing Notes. I have reviewed the NPO Status.   I have reviewed the Medications.     Review of Systems  Cardiovascular:   Hypertension    Hepatic/GI:   GERD    Endocrine:   Diabetes        Physical Exam  General: Well nourished and Cooperative    Airway:  Mallampati: II   Mouth Opening: Normal  TM Distance: Normal  Tongue: Normal  Neck ROM: Normal ROM    Chest/Lungs:  Clear to auscultation, Normal Respiratory Rate    Heart:  Rate: Normal  Rhythm: Regular Rhythm  Sounds: Normal        Anesthesia Plan  Type of Anesthesia, risks & benefits discussed:    Anesthesia Type: Gen Natural Airway  Intra-op Monitoring Plan: Standard ASA Monitors  Post Op Pain Control Plan: multimodal analgesia and IV/PO Opioids PRN  Induction:  IV  Airway Plan: Direct and Video, Post-Induction  Informed Consent: Informed consent signed with the Patient and all parties understand the risks and agree with anesthesia plan.  All questions answered.   ASA Score: 3    Ready For Surgery From Anesthesia Perspective.     .

## 2022-11-02 NOTE — DISCHARGE SUMMARY
[facility] - [facility] (1st Fl)  Discharge Note  Short Stay    Procedure(s) (LRB):  PHACOEMULSIFICATION, CATARACT (Left)  INSERTION, IOL PROSTHESIS (Left)      OUTCOME: Patient tolerated treatment/procedure well without complication and is now ready for discharge.    DISPOSITION: Home or Self Care    FINAL DIAGNOSIS:  Combined form of age-related cataract, left eye    FOLLOWUP: In clinic    DISCHARGE INSTRUCTIONS:  No discharge procedures on file.     TIME SPENT ON DISCHARGE: 10 minutes

## 2022-11-03 ENCOUNTER — OFFICE VISIT (OUTPATIENT)
Dept: OPHTHALMOLOGY | Facility: CLINIC | Age: 72
End: 2022-11-03
Payer: MEDICARE

## 2022-11-03 DIAGNOSIS — Z98.49 POSTOPERATIVE CARE FOR CATARACT: Primary | ICD-10-CM

## 2022-11-03 DIAGNOSIS — Z96.1 PSEUDOPHAKIA, LEFT EYE: ICD-10-CM

## 2022-11-03 DIAGNOSIS — E11.9 DM TYPE 2 WITHOUT RETINOPATHY: ICD-10-CM

## 2022-11-03 DIAGNOSIS — Z96.1 PSEUDOPHAKIA, RIGHT EYE: ICD-10-CM

## 2022-11-03 DIAGNOSIS — Z48.810 POSTOPERATIVE CARE FOR CATARACT: Primary | ICD-10-CM

## 2022-11-03 PROCEDURE — 1101F PR PT FALLS ASSESS DOC 0-1 FALLS W/OUT INJ PAST YR: ICD-10-PCS | Mod: CPTII,S$GLB,, | Performed by: OPHTHALMOLOGY

## 2022-11-03 PROCEDURE — 99024 POSTOP FOLLOW-UP VISIT: CPT | Mod: S$GLB,,, | Performed by: OPHTHALMOLOGY

## 2022-11-03 PROCEDURE — 1125F AMNT PAIN NOTED PAIN PRSNT: CPT | Mod: CPTII,S$GLB,, | Performed by: OPHTHALMOLOGY

## 2022-11-03 PROCEDURE — 1125F PR PAIN SEVERITY QUANTIFIED, PAIN PRESENT: ICD-10-PCS | Mod: CPTII,S$GLB,, | Performed by: OPHTHALMOLOGY

## 2022-11-03 PROCEDURE — 3051F PR MOST RECENT HEMOGLOBIN A1C LEVEL 7.0 - < 8.0%: ICD-10-PCS | Mod: CPTII,S$GLB,, | Performed by: OPHTHALMOLOGY

## 2022-11-03 PROCEDURE — 99999 PR PBB SHADOW E&M-EST. PATIENT-LVL II: CPT | Mod: PBBFAC,,, | Performed by: OPHTHALMOLOGY

## 2022-11-03 PROCEDURE — 99999 PR PBB SHADOW E&M-EST. PATIENT-LVL II: ICD-10-PCS | Mod: PBBFAC,,, | Performed by: OPHTHALMOLOGY

## 2022-11-03 PROCEDURE — 1159F MED LIST DOCD IN RCRD: CPT | Mod: CPTII,S$GLB,, | Performed by: OPHTHALMOLOGY

## 2022-11-03 PROCEDURE — 1159F PR MEDICATION LIST DOCUMENTED IN MEDICAL RECORD: ICD-10-PCS | Mod: CPTII,S$GLB,, | Performed by: OPHTHALMOLOGY

## 2022-11-03 PROCEDURE — 3288F PR FALLS RISK ASSESSMENT DOCUMENTED: ICD-10-PCS | Mod: CPTII,S$GLB,, | Performed by: OPHTHALMOLOGY

## 2022-11-03 PROCEDURE — 3288F FALL RISK ASSESSMENT DOCD: CPT | Mod: CPTII,S$GLB,, | Performed by: OPHTHALMOLOGY

## 2022-11-03 PROCEDURE — 3066F NEPHROPATHY DOC TX: CPT | Mod: CPTII,S$GLB,, | Performed by: OPHTHALMOLOGY

## 2022-11-03 PROCEDURE — 3061F PR NEG MICROALBUMINURIA RESULT DOCUMENTED/REVIEW: ICD-10-PCS | Mod: CPTII,S$GLB,, | Performed by: OPHTHALMOLOGY

## 2022-11-03 PROCEDURE — 99024 PR POST-OP FOLLOW-UP VISIT: ICD-10-PCS | Mod: S$GLB,,, | Performed by: OPHTHALMOLOGY

## 2022-11-03 PROCEDURE — 1160F RVW MEDS BY RX/DR IN RCRD: CPT | Mod: CPTII,S$GLB,, | Performed by: OPHTHALMOLOGY

## 2022-11-03 PROCEDURE — 3061F NEG MICROALBUMINURIA REV: CPT | Mod: CPTII,S$GLB,, | Performed by: OPHTHALMOLOGY

## 2022-11-03 PROCEDURE — 3051F HG A1C>EQUAL 7.0%<8.0%: CPT | Mod: CPTII,S$GLB,, | Performed by: OPHTHALMOLOGY

## 2022-11-03 PROCEDURE — 3066F PR DOCUMENTATION OF TREATMENT FOR NEPHROPATHY: ICD-10-PCS | Mod: CPTII,S$GLB,, | Performed by: OPHTHALMOLOGY

## 2022-11-03 PROCEDURE — 1101F PT FALLS ASSESS-DOCD LE1/YR: CPT | Mod: CPTII,S$GLB,, | Performed by: OPHTHALMOLOGY

## 2022-11-03 PROCEDURE — 4010F ACE/ARB THERAPY RXD/TAKEN: CPT | Mod: CPTII,S$GLB,, | Performed by: OPHTHALMOLOGY

## 2022-11-03 PROCEDURE — 1160F PR REVIEW ALL MEDS BY PRESCRIBER/CLIN PHARMACIST DOCUMENTED: ICD-10-PCS | Mod: CPTII,S$GLB,, | Performed by: OPHTHALMOLOGY

## 2022-11-03 PROCEDURE — 4010F PR ACE/ARB THEARPY RXD/TAKEN: ICD-10-PCS | Mod: CPTII,S$GLB,, | Performed by: OPHTHALMOLOGY

## 2022-11-04 NOTE — PROGRESS NOTES
HPI    DLS: 4/28/2022    Pt here for Post op phaco w/IOL OS- Surgery is 11/02/2022  Pt states that she experienced pain in the eye last night and this   morning. +photophobia. 4-5/10 on pain scale, comes and goes. Removed patch   in office.     Meds;  Optivar PRN OU    1. PC IOL OS 11/2/2022  2. Blepharitis   3. PC IOL OD - [facility] - 2013     Last edited by Rosalie Ayala MA on 11/3/2022  2:46 PM.            Assessment /Plan     For exam results, see Encounter Report.    Postoperative care for cataract    DM type 2 without retinopathy    Pseudophakia, right eye    Pseudophakia, left eye      Cataract os   Patient presents for pre-op phaco/IOL os .. Patient has seen Dr Stafford and [facility]  in the   past, but states she only wants to have cataract surgery at [facility]. // also seen [facility] - 9/202  Patient complains of crusted eyes in the morning. States that she notices   a collection of mucous throughout the day in the corner of the eye.     Blepharitis   WC/LH/AT's     S/P PCIOL Dr Hurtado 2013 OD    SN60WF - 19.0    Ocular meds:   Visine PRN for dry eyes     Eyemeds   Optivar OU PRN     Phaco / IOL OS Date: 11/2/2022 - SN60WF 19.5  POD # 1 - phaco/IOL   Doing well  Begin Pred Acetate QID   Begin vigamox QID  Shield at night  Glasses day  No lifting, no bending, no eye rubbing  F/U 1 week, AR/MR ou       IOL calcs (4/28/2022)   OS  SN60WF 19.5 (has a sn60wf 19.0 in right eye)   MTA4U0 17.0      Pt C/O blurry vision and glare problems - left eye .  Would like to have phaco/IOL os (other eye done in 2013

## 2022-11-07 ENCOUNTER — TELEPHONE (OUTPATIENT)
Dept: OPHTHALMOLOGY | Facility: CLINIC | Age: 72
End: 2022-11-07
Payer: MEDICARE

## 2022-11-07 NOTE — TELEPHONE ENCOUNTER
----- Message from Krystal Luevano sent at 11/7/2022 11:40 AM CST -----  Regarding: speak with office  Contact: grazyna Lozada is calling to speak with office about eye pain..[phone] (home)

## 2022-11-07 NOTE — TELEPHONE ENCOUNTER
Pt states she has been having some discomfort/pain that comes and goes and also c/o bright bothering eye. Pt advised that this is normal and most likely diue to inflammation in eye from surgery. Pt is on steroid drops and has appt on Friday. Pt advised to continue drops and wear her dark sunglasses in the bright sunlight. Pt understood and will call back if symptoms worsen.

## 2022-11-08 DIAGNOSIS — Z79.4 TYPE 2 DIABETES MELLITUS WITH HYPERGLYCEMIA, WITH LONG-TERM CURRENT USE OF INSULIN: Primary | ICD-10-CM

## 2022-11-08 DIAGNOSIS — E11.65 TYPE 2 DIABETES MELLITUS WITH HYPERGLYCEMIA, WITH LONG-TERM CURRENT USE OF INSULIN: Primary | ICD-10-CM

## 2022-11-10 ENCOUNTER — OFFICE VISIT (OUTPATIENT)
Dept: PODIATRY | Facility: CLINIC | Age: 72
End: 2022-11-10
Payer: MEDICARE

## 2022-11-10 VITALS
SYSTOLIC BLOOD PRESSURE: 145 MMHG | HEIGHT: 63 IN | BODY MASS INDEX: 35.79 KG/M2 | DIASTOLIC BLOOD PRESSURE: 77 MMHG | WEIGHT: 202 LBS | HEART RATE: 78 BPM

## 2022-11-10 DIAGNOSIS — L60.0 ONYCHOCRYPTOSIS: ICD-10-CM

## 2022-11-10 DIAGNOSIS — G57.62 MORTON'S NEUROMA OF SECOND INTERSPACE OF LEFT FOOT: ICD-10-CM

## 2022-11-10 DIAGNOSIS — M20.12 HALLUX ABDUCTOVALGUS, LEFT: Primary | ICD-10-CM

## 2022-11-10 PROCEDURE — 3066F PR DOCUMENTATION OF TREATMENT FOR NEPHROPATHY: ICD-10-PCS | Mod: CPTII,S$GLB,, | Performed by: PODIATRIST

## 2022-11-10 PROCEDURE — 1160F PR REVIEW ALL MEDS BY PRESCRIBER/CLIN PHARMACIST DOCUMENTED: ICD-10-PCS | Mod: CPTII,S$GLB,, | Performed by: PODIATRIST

## 2022-11-10 PROCEDURE — 11730 AVULSION NAIL PLATE SIMPLE 1: CPT | Mod: TA,S$GLB,, | Performed by: PODIATRIST

## 2022-11-10 PROCEDURE — 1160F RVW MEDS BY RX/DR IN RCRD: CPT | Mod: CPTII,S$GLB,, | Performed by: PODIATRIST

## 2022-11-10 PROCEDURE — 1101F PT FALLS ASSESS-DOCD LE1/YR: CPT | Mod: CPTII,S$GLB,, | Performed by: PODIATRIST

## 2022-11-10 PROCEDURE — 1125F AMNT PAIN NOTED PAIN PRSNT: CPT | Mod: CPTII,S$GLB,, | Performed by: PODIATRIST

## 2022-11-10 PROCEDURE — 3051F PR MOST RECENT HEMOGLOBIN A1C LEVEL 7.0 - < 8.0%: ICD-10-PCS | Mod: CPTII,S$GLB,, | Performed by: PODIATRIST

## 2022-11-10 PROCEDURE — 3078F DIAST BP <80 MM HG: CPT | Mod: CPTII,S$GLB,, | Performed by: PODIATRIST

## 2022-11-10 PROCEDURE — 4010F ACE/ARB THERAPY RXD/TAKEN: CPT | Mod: CPTII,S$GLB,, | Performed by: PODIATRIST

## 2022-11-10 PROCEDURE — 1125F PR PAIN SEVERITY QUANTIFIED, PAIN PRESENT: ICD-10-PCS | Mod: CPTII,S$GLB,, | Performed by: PODIATRIST

## 2022-11-10 PROCEDURE — 3077F PR MOST RECENT SYSTOLIC BLOOD PRESSURE >= 140 MM HG: ICD-10-PCS | Mod: CPTII,S$GLB,, | Performed by: PODIATRIST

## 2022-11-10 PROCEDURE — 1101F PR PT FALLS ASSESS DOC 0-1 FALLS W/OUT INJ PAST YR: ICD-10-PCS | Mod: CPTII,S$GLB,, | Performed by: PODIATRIST

## 2022-11-10 PROCEDURE — 3078F PR MOST RECENT DIASTOLIC BLOOD PRESSURE < 80 MM HG: ICD-10-PCS | Mod: CPTII,S$GLB,, | Performed by: PODIATRIST

## 2022-11-10 PROCEDURE — 11730 PR REMOVAL OF NAIL PLATE: ICD-10-PCS | Mod: TA,S$GLB,, | Performed by: PODIATRIST

## 2022-11-10 PROCEDURE — 3288F FALL RISK ASSESSMENT DOCD: CPT | Mod: CPTII,S$GLB,, | Performed by: PODIATRIST

## 2022-11-10 PROCEDURE — 99214 PR OFFICE/OUTPT VISIT, EST, LEVL IV, 30-39 MIN: ICD-10-PCS | Mod: 25,S$GLB,, | Performed by: PODIATRIST

## 2022-11-10 PROCEDURE — 3008F PR BODY MASS INDEX (BMI) DOCUMENTED: ICD-10-PCS | Mod: CPTII,S$GLB,, | Performed by: PODIATRIST

## 2022-11-10 PROCEDURE — 3077F SYST BP >= 140 MM HG: CPT | Mod: CPTII,S$GLB,, | Performed by: PODIATRIST

## 2022-11-10 PROCEDURE — 99999 PR PBB SHADOW E&M-EST. PATIENT-LVL III: CPT | Mod: PBBFAC,,, | Performed by: PODIATRIST

## 2022-11-10 PROCEDURE — 99214 OFFICE O/P EST MOD 30 MIN: CPT | Mod: 25,S$GLB,, | Performed by: PODIATRIST

## 2022-11-10 PROCEDURE — 99999 PR PBB SHADOW E&M-EST. PATIENT-LVL III: ICD-10-PCS | Mod: PBBFAC,,, | Performed by: PODIATRIST

## 2022-11-10 PROCEDURE — 1159F PR MEDICATION LIST DOCUMENTED IN MEDICAL RECORD: ICD-10-PCS | Mod: CPTII,S$GLB,, | Performed by: PODIATRIST

## 2022-11-10 PROCEDURE — 3051F HG A1C>EQUAL 7.0%<8.0%: CPT | Mod: CPTII,S$GLB,, | Performed by: PODIATRIST

## 2022-11-10 PROCEDURE — 1159F MED LIST DOCD IN RCRD: CPT | Mod: CPTII,S$GLB,, | Performed by: PODIATRIST

## 2022-11-10 PROCEDURE — 3061F PR NEG MICROALBUMINURIA RESULT DOCUMENTED/REVIEW: ICD-10-PCS | Mod: CPTII,S$GLB,, | Performed by: PODIATRIST

## 2022-11-10 PROCEDURE — 4010F PR ACE/ARB THEARPY RXD/TAKEN: ICD-10-PCS | Mod: CPTII,S$GLB,, | Performed by: PODIATRIST

## 2022-11-10 PROCEDURE — 3288F PR FALLS RISK ASSESSMENT DOCUMENTED: ICD-10-PCS | Mod: CPTII,S$GLB,, | Performed by: PODIATRIST

## 2022-11-10 PROCEDURE — 3061F NEG MICROALBUMINURIA REV: CPT | Mod: CPTII,S$GLB,, | Performed by: PODIATRIST

## 2022-11-10 PROCEDURE — 3008F BODY MASS INDEX DOCD: CPT | Mod: CPTII,S$GLB,, | Performed by: PODIATRIST

## 2022-11-10 PROCEDURE — 3066F NEPHROPATHY DOC TX: CPT | Mod: CPTII,S$GLB,, | Performed by: PODIATRIST

## 2022-11-10 NOTE — PROGRESS NOTES
"Subjective:      Patient ID: Chelly Ortiz is a 72 y.o. female.    Chief Complaint: Foot Pain (Follow up left foot pain; with concern for ingrown toenail left great toe)    Presents complaining of pain underneath the ball the left foot for greater than 6 months duration.  She had a previous steroid injection per Dr. Marley in 2022 with no relief.  She states pain is present with or without shoes and is aggravated by standing and walking.  No pain at rest.    11/10/2022:  Follow-up for bilateral foot x-ray and from diagnostic injection to distal left 2nd intermetatarsal space.  Patient reported that she had near 50% relief of her pain to left foot for approximately a week following the injection.  A gradually recurred.  She complains of new onset ingrown toenail to left great toe.  Denies any trauma.  Wearing a metatarsal pad as advised however it appears to betoo distal on the left forefoot.    Vitals:    11/10/22 1513   BP: (!) 145/77   Pulse: 78   Weight: 91.6 kg (202 lb)   Height: 5' 3" (1.6 m)   PainSc:   7   PainLoc: Toe      Past Medical History:   Diagnosis Date    Allergy     Cataract     DDD (degenerative disc disease), lumbar     Diabetes mellitus     diet controlled    Diabetes mellitus, type 2     GERD (gastroesophageal reflux disease)     History of subconjunctival hemorrhage 11    left eye    Hyperlipidemia     Hypertension     IBS (irritable bowel syndrome)     Obesity     MYRIAM (obstructive sleep apnea)     on CPAP    Rotator cuff impingement syndrome     Seasonal allergic conjunctivitis        Past Surgical History:   Procedure Laterality Date    CATARACT EXTRACTION W/  INTRAOCULAR LENS IMPLANT Right 10/03/2013         SECTION      x2    CHOLECYSTECTOMY      COLONOSCOPY N/A 2018    Procedure: COLONOSCOPY;  Surgeon: Marie Mejia MD;  Location: [facility];  Service: Endoscopy;  Laterality: N/A;    COLONOSCOPY N/A 2022    Procedure: COLONOSCOPY;  " Surgeon: Pierce Rivera MD;  Location: [facility] ENDO;  Service: Endoscopy;  Laterality: N/A;    ENDOSCOPIC ULTRASOUND OF UPPER GASTROINTESTINAL TRACT N/A 10/9/2018    Procedure: ULTRASOUND, UPPER GI TRACT, ENDOSCOPIC;  Surgeon: Javy Simpson MD;  Location: [facility] ENDO;  Service: Endoscopy;  Laterality: N/A;    EXCISION OF LESION N/A 2019    Procedure: EXCISION, LESION VULVA;  Surgeon: Liv Odell MD;  Location: [facility] OR;  Service: OB/GYN;  Laterality: N/A;  latex allergy    EYE SURGERY      HYSTERECTOMY      ovaries intact, due to DUB    INTRAOCULAR PROSTHESES INSERTION Left 2022    Procedure: INSERTION, IOL PROSTHESIS;  Surgeon: Clarice Herzog MD;  Location: [facility] OR [address] [place] [address];  Service: Ophthalmology;  Laterality: Left;    PHACOEMULSIFICATION OF CATARACT Left 2022    Procedure: PHACOEMULSIFICATION, CATARACT;  Surgeon: Clarice Herzog MD;  Location: [facility] OR [address] [place] [address];  Service: Ophthalmology;  Laterality: Left;    TUBAL LIGATION         Family History   Problem Relation Age of Onset    Alzheimer's disease Mother     Heart disease Father     Diabetes Sister     Breast cancer Sister     Deep vein thrombosis Brother     Diabetes Daughter     Cancer Brother         Lung    Lung cancer Brother     Amblyopia Neg Hx     Blindness Neg Hx     Cataracts Neg Hx     Glaucoma Neg Hx     Hypertension Neg Hx     Macular degeneration Neg Hx     Retinal detachment Neg Hx     Strabismus Neg Hx     Stroke Neg Hx     Thyroid disease Neg Hx        Social History     Socioeconomic History    Marital status:    Tobacco Use    Smoking status: Former     Types: Cigarettes     Quit date: 2/15/1983     Years since quittin.7    Smokeless tobacco: Never   Substance and Sexual Activity    Alcohol use: No    Drug use: No    Sexual activity: Yes     Partners: Male     Social Determinants of Health     Financial Resource Strain: Unknown    Difficulty of Paying Living Expenses: Patient refused   Food  Insecurity: Unknown    Worried About Running Out of Food in the Last Year: Patient refused    Ran Out of Food in the Last Year: Patient refused   Transportation Needs: No Transportation Needs    Lack of Transportation (Medical): No    Lack of Transportation (Non-Medical): No   Physical Activity: Unknown    Days of Exercise per Week: Patient refused    Minutes of Exercise per Session: 20 min   Stress: Unknown    Feeling of Stress : Patient refused   Social Connections: Unknown    Frequency of Communication with Friends and Family: Patient refused    Frequency of Social Gatherings with Friends and Family: Patient refused    Active Member of Clubs or Organizations: No    Attends Club or Organization Meetings: Patient refused    Marital Status:    Housing Stability: Low Risk     Unable to Pay for Housing in the Last Year: No    Number of Places Lived in the Last Year: 1    Unstable Housing in the Last Year: No       Current Outpatient Medications   Medication Sig Dispense Refill    ACCU-CHEK FASTCLIX LANCET DRUM Misc TEST BLOOD SUGAR THREE TIMES A  each 3    ACCU-CHEK FASTCLIX LANCING DEV Kit USE AS DIRECTED 1 each 0    albuterol (PROVENTIL/VENTOLIN HFA) 90 mcg/actuation inhaler Inhale 1-2 puffs into the lungs every 4 (four) hours as needed for Wheezing. 18 g 2    aspirin (ECOTRIN) 81 MG EC tablet Take 81 mg by mouth once daily.      azelastine (OPTIVAR) 0.05 % ophthalmic solution Place 1 drop into both eyes 2 (two) times daily. 6 mL 1    blood glucose control high,low (ACCU-CHEK GUIDE L1-L2 CTRL SOL) Soln 3 times a day 300 each 11    blood sugar diagnostic (ACCU-CHEK GUIDE TEST STRIPS) Strp 3 times a day 300 strip 11    blood-glucose meter (ACCU-CHEK GUIDE GLUCOSE METER) Misc Three times a day 1 each 0    chlorhexidine (PERIDEX) 0.12 % solution Use as directed 15 mLs in the mouth or throat daily as needed.      citalopram (CELEXA) 10 MG tablet Take 1 tablet (10 mg total) by mouth once daily. 90 tablet 3  "   dulaglutide (TRULICITY) 3 mg/0.5 mL pen injector Inject 3 mg into the skin every 7 days. 4 pen 11    ergocalciferol (ERGOCALCIFEROL) 50,000 unit Cap TAKE 1 CAPSULE EVERY 7 DAYS. Strength: 50,000 Units 12 capsule 1    esomeprazole (NEXIUM) 40 MG capsule Take 1 capsule (40 mg total) by mouth before breakfast. 90 capsule 3    glucose 4 GM chewable tablet Take 4 tablets (16 g total) by mouth as needed for Low blood sugar (If having symptoms of blurry vision, palpitations, confusion, shakiness.  Please check sugars and if sugar below 70 please take 4 tablets and re-check sugar everry 15 minutes until sugars are above 70 and symptoms resolve.). 50 tablet 12    insulin aspart U-100 (NOVOLOG FLEXPEN U-100 INSULIN) 100 unit/mL (3 mL) InPn pen Inject 12 units w/ meals plus scale 150-200+2, 201-250+4, 251-300+6, 301-350+8, >350+10. Max daily 50 units. 15 mL 6    insulin detemir U-100 (LEVEMIR FLEXTOUCH U-100 INSULN) 100 unit/mL (3 mL) InPn pen Inject 45 units into the skin at night. 15 mL 6    insulin syringe-needle U-100 0.3 mL 31 gauge x 5/16" Syrg relion brand, use 5 x a day, if not on levemir or novolog 150 each 1    insulin syringe-needle U-100 0.5 mL 31 gauge x 5/16" Syrg Use nightly. 90 day 100 each 3    losartan (COZAAR) 50 MG tablet TAKE 1 TABLET EVERY DAY 90 tablet 3    magnesium oxide (MAG-OX) 400 mg tablet Take 1 tablet (400 mg total) by mouth 2 (two) times daily. 180 tablet 3    pen needle, diabetic 31 gauge x 3/16" Ndle Uses 4 x a day. 400 each 3    pravastatin (PRAVACHOL) 20 MG tablet Take 1 tablet (20 mg total) by mouth every evening. 90 tablet 3    traZODone (DESYREL) 50 MG tablet TAKE 1 TABLET EVERY NIGHT AS NEEDED 90 tablet 3    amLODIPine (NORVASC) 10 MG tablet Take 1 tablet (10 mg total) by mouth once daily. 90 tablet 3    calcium carbonate (OS-ARIC) 600 mg calcium (1,500 mg) Tab Take 1 tablet (600 mg total) by mouth once. for 1 dose 30 tablet 11    cetirizine (ZYRTEC) 10 MG tablet Take 1 tablet (10 mg " total) by mouth every evening. 90 tablet 0    fluticasone-salmeterol diskus inhaler 250-50 mcg Inhale 1 puff by mouth into the lungs 2 (two) times daily. Controller 60 each 6    gabapentin (NEURONTIN) 100 MG capsule Take 1 capsule (100 mg total) by mouth 3 (three) times daily. 270 capsule 3     Current Facility-Administered Medications   Medication Dose Route Frequency Provider Last Rate Last Admin    sodium chloride 0.9% flush 10 mL  10 mL Intravenous PRN Clarice Herzog MD           Review of patient's allergies indicates:   Allergen Reactions    Ace inhibitors Hives     \Cough    Prednisone      Causes pt to be hyper    Latex, natural rubber Itching         Review of Systems   Constitutional: Negative for chills, fever and malaise/fatigue.   HENT:  Negative for congestion and hearing loss.    Cardiovascular:  Negative for chest pain, claudication and leg swelling.   Respiratory:  Negative for cough and shortness of breath.    Musculoskeletal:  Negative for back pain, joint pain, muscle cramps and muscle weakness.   Gastrointestinal:  Negative for nausea and vomiting.   Neurological:  Negative for numbness, paresthesias and weakness.   Psychiatric/Behavioral:  Negative for altered mental status.          Objective:      Physical Exam  Constitutional:       General: She is not in acute distress.     Appearance: She is obese. She is not ill-appearing.   Cardiovascular:      Pulses:           Dorsalis pedis pulses are 2+ on the right side and 2+ on the left side.        Posterior tibial pulses are 2+ on the right side and 2+ on the left side.      Comments: Mild nonpitting edema to lower extremity bilateral.  Skin temp is warm to foot bilateral.  No rubor on dependency bilateral foot.  Musculoskeletal:      Comments: Moderate pain on palpation distal 2nd intermetatarsal space and mild pain distal 3rd intermetatarsal space left foot.  Moderate pain with positive Lachman test 2nd metatarsophalangeal joint left  foot.  Semi rigid flexion contracture left 2nd toe PIPJ.  Track bound hallux abductovalgus under riding 2nd toe bilateral.    Reducible flexion contractures toes 3-4 left and 2-4 right with adductovarus contracture 5th toe bilateral.   Skin:     General: Skin is warm.      Capillary Refill: Capillary refill takes less than 2 seconds.      Findings: No ecchymosis or erythema.      Nails: There is no clubbing.      Comments:  Localized pain on palpation mild edema along the left hallux medial nail border with moderately incurvated nail border.  No active drainage noted.  No surrounding erythema.   Neurological:      Mental Status: She is alert and oriented to person, place, and time.      Motor: Motor function is intact.             Assessment:       Encounter Diagnoses   Name Primary?    Hallux abductovalgus, left Yes    Onychocryptosis     Montalvo's neuroma of second interspace of left foot          Plan:       Chelly was seen today for foot pain.    Diagnoses and all orders for this visit:    Hallux abductovalgus, left    Onychocryptosis    Montalvo's neuroma of second interspace of left foot    I counseled the patient on her conditions, their implications and medical management.      Reviewed bilateral foot x-ray in detail the patient.  Initial focus on left foot 1-2 intermetatarsal angle  10°, hallux valgus angle 29°, tibial sesamoid position of 4.  Note prior resection of the medial eminence of 1st metatarsal head with possible previous distal metatarsal osteotomy however there is no fixation in place.  There does not appear to be any significant pathology involving the lesser metatarsophalangeal joints.      We discussed continued conservative care such as a silicone toe spacer, bunion splint, orthotics with shoe gear modifications versus surgical intervention consisting of minimally invasive bunionectomy with 1st metatarsal osteotomy possible Akin osteotomy versus 1st metatarsal phalangeal joint arthrodesis.   Risks, benefits anticipate postop course discussed in detail the patient.  No guarantees were given or implied.  Patient like to consider her options however she is more concerned with the ingrown toenail today.      Discussed treatment options such as lukewarm water Epson salt soaks, shoe gear modifications, topical antibiotic versus surgical intervention consisting of temporary nail avulsion of the left hallux medial nail border versus nail avulsion with phenol and alcohol matrixectomy.  Patient elected to have the temporary nail avulsion for relief.  Most likely the position of the hallux is contributing to the pain along the medial nail border secondary to the hallux abductovalgus.  We discussed that straightening of the toe would also help with the symptoms in the future.      Discussed treatment for painful ingrown toenails in detail.     Procedure: Left hallux medial nail border avulsion  Pathology: none  EBL: < 1 mL  Materials: none  Injectibles: 3 mL 1:1 mixture 2% plain lidocaine and 0.25% plain Marcaine  Complications: none    Procedure in detail: Time out called verifying patient, procedure and toe. Skin prepped with alcohol followed by ethyl chloride application and infiltration of 4 mL 1% plain lidocaine into proximal toe. Skin was prepped with betadine. A sterile tourniquet was applied to the toe. Sterile instrumentation consisting of english anvil and hemostat were used to excise lateral nail border. The tourniquet was released noting instant return of the toe color and capillary fill time was instant. Bacitracin ointment was applied to the wound followed by gauze secured with coban.     The patient tolerated the procedure well without complication.    RTC 3-4 weeks for wound check and to further discuss surgical intervention. Also educated patient on proper positioning of the metatarsal pad.  We further discussed excision of the neuroma versus another therapeutic injection with triamcinolone and  Marcaine.  Patient opted to defer the injection until the next visit.    A portion of this note was generated by voice recognition software and may contain spelling and grammar errors.  .

## 2022-11-11 ENCOUNTER — OFFICE VISIT (OUTPATIENT)
Dept: OPHTHALMOLOGY | Facility: CLINIC | Age: 72
End: 2022-11-11
Payer: MEDICARE

## 2022-11-11 DIAGNOSIS — Z96.1 PSEUDOPHAKIA, RIGHT EYE: ICD-10-CM

## 2022-11-11 DIAGNOSIS — Z48.810 POSTOPERATIVE CARE FOR CATARACT: Primary | ICD-10-CM

## 2022-11-11 DIAGNOSIS — Z98.49 POSTOPERATIVE CARE FOR CATARACT: Primary | ICD-10-CM

## 2022-11-11 DIAGNOSIS — E11.9 DM TYPE 2 WITHOUT RETINOPATHY: ICD-10-CM

## 2022-11-11 DIAGNOSIS — Z96.1 PSEUDOPHAKIA, LEFT EYE: ICD-10-CM

## 2022-11-11 PROCEDURE — 3066F PR DOCUMENTATION OF TREATMENT FOR NEPHROPATHY: ICD-10-PCS | Mod: CPTII,S$GLB,, | Performed by: OPHTHALMOLOGY

## 2022-11-11 PROCEDURE — 3051F HG A1C>EQUAL 7.0%<8.0%: CPT | Mod: CPTII,S$GLB,, | Performed by: OPHTHALMOLOGY

## 2022-11-11 PROCEDURE — 1160F RVW MEDS BY RX/DR IN RCRD: CPT | Mod: CPTII,S$GLB,, | Performed by: OPHTHALMOLOGY

## 2022-11-11 PROCEDURE — 99999 PR PBB SHADOW E&M-EST. PATIENT-LVL II: CPT | Mod: PBBFAC,,, | Performed by: OPHTHALMOLOGY

## 2022-11-11 PROCEDURE — 3051F PR MOST RECENT HEMOGLOBIN A1C LEVEL 7.0 - < 8.0%: ICD-10-PCS | Mod: CPTII,S$GLB,, | Performed by: OPHTHALMOLOGY

## 2022-11-11 PROCEDURE — 4010F PR ACE/ARB THEARPY RXD/TAKEN: ICD-10-PCS | Mod: CPTII,S$GLB,, | Performed by: OPHTHALMOLOGY

## 2022-11-11 PROCEDURE — 3061F NEG MICROALBUMINURIA REV: CPT | Mod: CPTII,S$GLB,, | Performed by: OPHTHALMOLOGY

## 2022-11-11 PROCEDURE — 4010F ACE/ARB THERAPY RXD/TAKEN: CPT | Mod: CPTII,S$GLB,, | Performed by: OPHTHALMOLOGY

## 2022-11-11 PROCEDURE — 99999 PR PBB SHADOW E&M-EST. PATIENT-LVL II: ICD-10-PCS | Mod: PBBFAC,,, | Performed by: OPHTHALMOLOGY

## 2022-11-11 PROCEDURE — 1160F PR REVIEW ALL MEDS BY PRESCRIBER/CLIN PHARMACIST DOCUMENTED: ICD-10-PCS | Mod: CPTII,S$GLB,, | Performed by: OPHTHALMOLOGY

## 2022-11-11 PROCEDURE — 3066F NEPHROPATHY DOC TX: CPT | Mod: CPTII,S$GLB,, | Performed by: OPHTHALMOLOGY

## 2022-11-11 PROCEDURE — 1159F PR MEDICATION LIST DOCUMENTED IN MEDICAL RECORD: ICD-10-PCS | Mod: CPTII,S$GLB,, | Performed by: OPHTHALMOLOGY

## 2022-11-11 PROCEDURE — 99024 POSTOP FOLLOW-UP VISIT: CPT | Mod: S$GLB,,, | Performed by: OPHTHALMOLOGY

## 2022-11-11 PROCEDURE — 99024 PR POST-OP FOLLOW-UP VISIT: ICD-10-PCS | Mod: S$GLB,,, | Performed by: OPHTHALMOLOGY

## 2022-11-11 PROCEDURE — 1159F MED LIST DOCD IN RCRD: CPT | Mod: CPTII,S$GLB,, | Performed by: OPHTHALMOLOGY

## 2022-11-11 PROCEDURE — 3061F PR NEG MICROALBUMINURIA RESULT DOCUMENTED/REVIEW: ICD-10-PCS | Mod: CPTII,S$GLB,, | Performed by: OPHTHALMOLOGY

## 2022-11-11 RX ORDER — PREDNISOLONE ACETATE 10 MG/ML
SUSPENSION/ DROPS OPHTHALMIC
Qty: 10 ML | Refills: 0 | Status: SHIPPED | OUTPATIENT
Start: 2022-11-11 | End: 2022-12-15 | Stop reason: ALTCHOICE

## 2022-11-12 NOTE — PROGRESS NOTES
HPI    DLS: 11/03/2022    Pt here for 1 week post phaco w/IOL OS- 11/02/2022  Pt states no eye pain or discomfort.    Meds;  Vigamox QID OS  PA QID OS    1. PC IOL OS 11/2/2022   2. Blepharitis   3. PC IOL OD - [facility] - 2013     Last edited by Erika Bryan on 11/11/2022  2:33 PM.            Assessment /Plan     For exam results, see Encounter Report.    Postoperative care for cataract  -     prednisoLONE acetate (PRED FORTE) 1 % DrpS; Left eye - one drop 3 x day for 2 weeks, then 2 x day for 2 weeks, then 1 x day for 2 weeks, then stop  Dispense: 10 mL; Refill: 0    DM type 2 without retinopathy    Pseudophakia, right eye    Pseudophakia, left eye        Blepharitis   WC/LH/AT's     S/P PCIOL Dr Hurtado 2013 OD    SN60WF - 19.0    Ocular meds:   Visine PRN for dry eyes     Eyemeds   Optivar OU PRN     Phaco / IOL OS Date: 11/2/2022 - SN60WF 19.5  POD # 1 - phaco/IOL   Doing well  Begin Pred Acetate  begin taper q 2 weeks - 3/2/1/ off   Begin vigamox QID  Shield at night - 1 more week     Glasses day  No lifting, no bending, no eye rubbing  F/U 1 month  AR/MR ou

## 2022-11-14 ENCOUNTER — PATIENT MESSAGE (OUTPATIENT)
Dept: FAMILY MEDICINE | Facility: CLINIC | Age: 72
End: 2022-11-14
Payer: MEDICARE

## 2022-11-14 ENCOUNTER — PATIENT MESSAGE (OUTPATIENT)
Dept: ENDOCRINOLOGY | Facility: CLINIC | Age: 72
End: 2022-11-14
Payer: MEDICARE

## 2022-11-17 ENCOUNTER — PATIENT MESSAGE (OUTPATIENT)
Dept: OPHTHALMOLOGY | Facility: CLINIC | Age: 72
End: 2022-11-17
Payer: MEDICARE

## 2022-11-18 ENCOUNTER — OFFICE VISIT (OUTPATIENT)
Dept: FAMILY MEDICINE | Facility: CLINIC | Age: 72
End: 2022-11-18
Payer: MEDICARE

## 2022-11-18 VITALS
BODY MASS INDEX: 35.2 KG/M2 | DIASTOLIC BLOOD PRESSURE: 70 MMHG | HEIGHT: 63 IN | OXYGEN SATURATION: 96 % | WEIGHT: 198.63 LBS | HEART RATE: 83 BPM | SYSTOLIC BLOOD PRESSURE: 118 MMHG

## 2022-11-18 DIAGNOSIS — I10 ESSENTIAL HYPERTENSION: ICD-10-CM

## 2022-11-18 DIAGNOSIS — R00.2 HEART PALPITATIONS: ICD-10-CM

## 2022-11-18 DIAGNOSIS — E11.65 TYPE 2 DIABETES MELLITUS WITH HYPERGLYCEMIA, WITH LONG-TERM CURRENT USE OF INSULIN: ICD-10-CM

## 2022-11-18 DIAGNOSIS — Z79.4 TYPE 2 DIABETES MELLITUS WITH HYPERGLYCEMIA, WITH LONG-TERM CURRENT USE OF INSULIN: ICD-10-CM

## 2022-11-18 DIAGNOSIS — R25.2 LEG CRAMPS: Primary | ICD-10-CM

## 2022-11-18 DIAGNOSIS — R53.83 FATIGUE, UNSPECIFIED TYPE: ICD-10-CM

## 2022-11-18 PROCEDURE — 1159F MED LIST DOCD IN RCRD: CPT | Mod: CPTII,S$GLB,, | Performed by: FAMILY MEDICINE

## 2022-11-18 PROCEDURE — 3051F PR MOST RECENT HEMOGLOBIN A1C LEVEL 7.0 - < 8.0%: ICD-10-PCS | Mod: CPTII,S$GLB,, | Performed by: FAMILY MEDICINE

## 2022-11-18 PROCEDURE — 3008F PR BODY MASS INDEX (BMI) DOCUMENTED: ICD-10-PCS | Mod: CPTII,S$GLB,, | Performed by: FAMILY MEDICINE

## 2022-11-18 PROCEDURE — 3078F PR MOST RECENT DIASTOLIC BLOOD PRESSURE < 80 MM HG: ICD-10-PCS | Mod: CPTII,S$GLB,, | Performed by: FAMILY MEDICINE

## 2022-11-18 PROCEDURE — 3288F PR FALLS RISK ASSESSMENT DOCUMENTED: ICD-10-PCS | Mod: CPTII,S$GLB,, | Performed by: FAMILY MEDICINE

## 2022-11-18 PROCEDURE — 3078F DIAST BP <80 MM HG: CPT | Mod: CPTII,S$GLB,, | Performed by: FAMILY MEDICINE

## 2022-11-18 PROCEDURE — 3008F BODY MASS INDEX DOCD: CPT | Mod: CPTII,S$GLB,, | Performed by: FAMILY MEDICINE

## 2022-11-18 PROCEDURE — 1101F PT FALLS ASSESS-DOCD LE1/YR: CPT | Mod: CPTII,S$GLB,, | Performed by: FAMILY MEDICINE

## 2022-11-18 PROCEDURE — 3066F PR DOCUMENTATION OF TREATMENT FOR NEPHROPATHY: ICD-10-PCS | Mod: CPTII,S$GLB,, | Performed by: FAMILY MEDICINE

## 2022-11-18 PROCEDURE — 3051F HG A1C>EQUAL 7.0%<8.0%: CPT | Mod: CPTII,S$GLB,, | Performed by: FAMILY MEDICINE

## 2022-11-18 PROCEDURE — 1159F PR MEDICATION LIST DOCUMENTED IN MEDICAL RECORD: ICD-10-PCS | Mod: CPTII,S$GLB,, | Performed by: FAMILY MEDICINE

## 2022-11-18 PROCEDURE — 1125F PR PAIN SEVERITY QUANTIFIED, PAIN PRESENT: ICD-10-PCS | Mod: CPTII,S$GLB,, | Performed by: FAMILY MEDICINE

## 2022-11-18 PROCEDURE — 1125F AMNT PAIN NOTED PAIN PRSNT: CPT | Mod: CPTII,S$GLB,, | Performed by: FAMILY MEDICINE

## 2022-11-18 PROCEDURE — 3061F NEG MICROALBUMINURIA REV: CPT | Mod: CPTII,S$GLB,, | Performed by: FAMILY MEDICINE

## 2022-11-18 PROCEDURE — 3074F SYST BP LT 130 MM HG: CPT | Mod: CPTII,S$GLB,, | Performed by: FAMILY MEDICINE

## 2022-11-18 PROCEDURE — 3061F PR NEG MICROALBUMINURIA RESULT DOCUMENTED/REVIEW: ICD-10-PCS | Mod: CPTII,S$GLB,, | Performed by: FAMILY MEDICINE

## 2022-11-18 PROCEDURE — 99999 PR PBB SHADOW E&M-EST. PATIENT-LVL III: ICD-10-PCS | Mod: PBBFAC,,, | Performed by: FAMILY MEDICINE

## 2022-11-18 PROCEDURE — 3288F FALL RISK ASSESSMENT DOCD: CPT | Mod: CPTII,S$GLB,, | Performed by: FAMILY MEDICINE

## 2022-11-18 PROCEDURE — 99215 OFFICE O/P EST HI 40 MIN: CPT | Mod: S$GLB,,, | Performed by: FAMILY MEDICINE

## 2022-11-18 PROCEDURE — 99999 PR PBB SHADOW E&M-EST. PATIENT-LVL III: CPT | Mod: PBBFAC,,, | Performed by: FAMILY MEDICINE

## 2022-11-18 PROCEDURE — 1101F PR PT FALLS ASSESS DOC 0-1 FALLS W/OUT INJ PAST YR: ICD-10-PCS | Mod: CPTII,S$GLB,, | Performed by: FAMILY MEDICINE

## 2022-11-18 PROCEDURE — 4010F ACE/ARB THERAPY RXD/TAKEN: CPT | Mod: CPTII,S$GLB,, | Performed by: FAMILY MEDICINE

## 2022-11-18 PROCEDURE — 4010F PR ACE/ARB THEARPY RXD/TAKEN: ICD-10-PCS | Mod: CPTII,S$GLB,, | Performed by: FAMILY MEDICINE

## 2022-11-18 PROCEDURE — 99215 PR OFFICE/OUTPT VISIT, EST, LEVL V, 40-54 MIN: ICD-10-PCS | Mod: S$GLB,,, | Performed by: FAMILY MEDICINE

## 2022-11-18 PROCEDURE — 3074F PR MOST RECENT SYSTOLIC BLOOD PRESSURE < 130 MM HG: ICD-10-PCS | Mod: CPTII,S$GLB,, | Performed by: FAMILY MEDICINE

## 2022-11-18 PROCEDURE — 3066F NEPHROPATHY DOC TX: CPT | Mod: CPTII,S$GLB,, | Performed by: FAMILY MEDICINE

## 2022-11-18 RX ORDER — BNT162B2 0.23 MG/2.25ML
INJECTION, SUSPENSION INTRAMUSCULAR
COMMUNITY
Start: 2022-08-08 | End: 2022-12-15 | Stop reason: ALTCHOICE

## 2022-11-18 RX ORDER — ROPINIROLE 0.5 MG/1
0.5 TABLET, FILM COATED ORAL NIGHTLY
Qty: 90 TABLET | Refills: 0 | Status: SHIPPED | OUTPATIENT
Start: 2022-11-18 | End: 2023-02-16

## 2022-11-18 NOTE — PROGRESS NOTES
Subjective:       Patient ID: Chelly Ortiz is a 72 y.o. female.    Chief Complaint: leg cramps and Shoulder Pain    72 years old female came to the clinic for blood pressure check.  Blood pressure today was stable.  No chest pain, palpitation, orthopnea or PND.  Patient reports episodic tiredness associated with heart palpitations.  Patient is requesting cardiovascular workup.  No orthopnea or PND.  Patient with leg cramps with no significant improvement with gabapentin.  She reports leg cramps are getting better after stopping recently the losartan.    Shoulder Pain     Review of Systems   Constitutional:  Positive for fatigue.   HENT: Negative.     Eyes: Negative.    Respiratory: Negative.     Cardiovascular:  Positive for palpitations. Negative for chest pain, leg swelling and claudication.   Gastrointestinal: Negative.    Endocrine: Negative for polydipsia, polyphagia and polyuria.   Genitourinary: Negative.    Musculoskeletal: Negative.    Neurological: Negative.    Psychiatric/Behavioral: Negative.         Objective:      Physical Exam  Constitutional:       General: She is not in acute distress.     Appearance: She is well-developed. She is not diaphoretic.   HENT:      Head: Normocephalic and atraumatic.      Right Ear: External ear normal.      Left Ear: External ear normal.      Nose: Nose normal.      Mouth/Throat:      Pharynx: No oropharyngeal exudate.   Eyes:      General: No scleral icterus.        Right eye: No discharge.         Left eye: No discharge.      Conjunctiva/sclera: Conjunctivae normal.      Pupils: Pupils are equal, round, and reactive to light.   Neck:      Thyroid: No thyromegaly.      Vascular: No JVD.      Trachea: No tracheal deviation.   Cardiovascular:      Rate and Rhythm: Normal rate and regular rhythm.      Heart sounds: Normal heart sounds. No murmur heard.    No friction rub. No gallop.   Pulmonary:      Effort: Pulmonary effort is normal. No respiratory distress.       Breath sounds: Normal breath sounds. No stridor. No wheezing or rales.   Chest:      Chest wall: No tenderness.   Abdominal:      General: Bowel sounds are normal. There is no distension.      Palpations: Abdomen is soft. There is no mass.      Tenderness: There is no abdominal tenderness. There is no guarding or rebound.   Musculoskeletal:         General: No tenderness. Normal range of motion.      Cervical back: Normal range of motion and neck supple.   Lymphadenopathy:      Cervical: No cervical adenopathy.   Skin:     General: Skin is warm and dry.      Coloration: Skin is not pale.      Findings: No erythema or rash.   Neurological:      Mental Status: She is alert and oriented to person, place, and time.      Cranial Nerves: No cranial nerve deficit.      Motor: No abnormal muscle tone.      Coordination: Coordination normal.      Deep Tendon Reflexes: Reflexes are normal and symmetric.   Psychiatric:         Behavior: Behavior normal.         Thought Content: Thought content normal.         Judgment: Judgment normal.       Assessment:       Problem List Items Addressed This Visit       Essential hypertension    Type 2 diabetes mellitus with hyperglycemia, with long-term current use of insulin     Other Visit Diagnoses       Leg cramps    -  Primary    Relevant Medications    rOPINIRole (REQUIP) 0.5 MG tablet    Fatigue, unspecified type        Relevant Orders    Ambulatory referral/consult to Cardiology    Heart palpitations        Relevant Orders    Ambulatory referral/consult to Cardiology              Plan:         Chelly was seen today for leg cramps and shoulder pain.    Diagnoses and all orders for this visit:    Leg cramps  -     rOPINIRole (REQUIP) 0.5 MG tablet; Take 1 tablet (0.5 mg total) by mouth every evening.    Type 2 diabetes mellitus with hyperglycemia, with long-term current use of insulin    Essential hypertension    Fatigue, unspecified type  -     Ambulatory referral/consult to  Cardiology; Future    Heart palpitations  -     Ambulatory referral/consult to Cardiology; Future         Continue monitoring blood sugar at home,ADA diet.     Continue monitoring blood pressure at home, low sodium diet.

## 2022-11-19 RX ORDER — LOSARTAN POTASSIUM 50 MG/1
TABLET ORAL
Qty: 90 TABLET | OUTPATIENT
Start: 2022-11-19

## 2022-11-19 NOTE — TELEPHONE ENCOUNTER
No new care gaps identified.  [facility] Embedded Care Gaps. Reference number: [id number]. 11/19/2022   4:28:23 AM CST

## 2022-11-20 NOTE — TELEPHONE ENCOUNTER
Quick DC. Inappropriate Request    Refill Authorization Note   Chelly Ortiz  is requesting a refill authorization.  Brief Assessment and Rationale for Refill:  Quick Discontinue  Medication Therapy Plan:  D/C 11/18/22    Medication Reconciliation Completed:  No      Comments:     Note composed:11:38 PM 11/19/2022

## 2022-11-23 ENCOUNTER — OFFICE VISIT (OUTPATIENT)
Dept: CARDIOLOGY | Facility: CLINIC | Age: 72
End: 2022-11-23
Payer: MEDICARE

## 2022-11-23 VITALS
WEIGHT: 198.44 LBS | SYSTOLIC BLOOD PRESSURE: 143 MMHG | DIASTOLIC BLOOD PRESSURE: 85 MMHG | BODY MASS INDEX: 35.16 KG/M2 | HEART RATE: 81 BPM | OXYGEN SATURATION: 97 % | HEIGHT: 63 IN

## 2022-11-23 DIAGNOSIS — I10 ESSENTIAL HYPERTENSION: Primary | ICD-10-CM

## 2022-11-23 DIAGNOSIS — E11.65 TYPE 2 DIABETES MELLITUS WITH HYPERGLYCEMIA, WITH LONG-TERM CURRENT USE OF INSULIN: ICD-10-CM

## 2022-11-23 DIAGNOSIS — Z79.4 TYPE 2 DIABETES MELLITUS WITH HYPERGLYCEMIA, WITH LONG-TERM CURRENT USE OF INSULIN: ICD-10-CM

## 2022-11-23 DIAGNOSIS — R00.2 HEART PALPITATIONS: ICD-10-CM

## 2022-11-23 DIAGNOSIS — E78.5 DYSLIPIDEMIA: ICD-10-CM

## 2022-11-23 DIAGNOSIS — E78.5 HYPERLIPIDEMIA, UNSPECIFIED HYPERLIPIDEMIA TYPE: ICD-10-CM

## 2022-11-23 DIAGNOSIS — R00.2 PALPITATIONS: ICD-10-CM

## 2022-11-23 DIAGNOSIS — R53.83 FATIGUE, UNSPECIFIED TYPE: ICD-10-CM

## 2022-11-23 DIAGNOSIS — E66.01 SEVERE OBESITY WITH BODY MASS INDEX (BMI) OF 35.0 TO 39.9 WITH SERIOUS COMORBIDITY: ICD-10-CM

## 2022-11-23 DIAGNOSIS — I20.89 OTHER FORMS OF ANGINA PECTORIS: ICD-10-CM

## 2022-11-23 DIAGNOSIS — I70.0 ABDOMINAL AORTIC ATHEROSCLEROSIS: ICD-10-CM

## 2022-11-23 PROCEDURE — 1159F MED LIST DOCD IN RCRD: CPT | Mod: CPTII,S$GLB,, | Performed by: INTERNAL MEDICINE

## 2022-11-23 PROCEDURE — 99204 OFFICE O/P NEW MOD 45 MIN: CPT | Mod: S$GLB,,, | Performed by: INTERNAL MEDICINE

## 2022-11-23 PROCEDURE — 3066F NEPHROPATHY DOC TX: CPT | Mod: CPTII,S$GLB,, | Performed by: INTERNAL MEDICINE

## 2022-11-23 PROCEDURE — 3008F BODY MASS INDEX DOCD: CPT | Mod: CPTII,S$GLB,, | Performed by: INTERNAL MEDICINE

## 2022-11-23 PROCEDURE — 3051F HG A1C>EQUAL 7.0%<8.0%: CPT | Mod: CPTII,S$GLB,, | Performed by: INTERNAL MEDICINE

## 2022-11-23 PROCEDURE — 3079F PR MOST RECENT DIASTOLIC BLOOD PRESSURE 80-89 MM HG: ICD-10-PCS | Mod: CPTII,S$GLB,, | Performed by: INTERNAL MEDICINE

## 2022-11-23 PROCEDURE — 3061F NEG MICROALBUMINURIA REV: CPT | Mod: CPTII,S$GLB,, | Performed by: INTERNAL MEDICINE

## 2022-11-23 PROCEDURE — 99999 PR PBB SHADOW E&M-EST. PATIENT-LVL III: ICD-10-PCS | Mod: PBBFAC,,, | Performed by: INTERNAL MEDICINE

## 2022-11-23 PROCEDURE — 3008F PR BODY MASS INDEX (BMI) DOCUMENTED: ICD-10-PCS | Mod: CPTII,S$GLB,, | Performed by: INTERNAL MEDICINE

## 2022-11-23 PROCEDURE — 99499 UNLISTED E&M SERVICE: CPT | Mod: S$GLB,,, | Performed by: INTERNAL MEDICINE

## 2022-11-23 PROCEDURE — 3051F PR MOST RECENT HEMOGLOBIN A1C LEVEL 7.0 - < 8.0%: ICD-10-PCS | Mod: CPTII,S$GLB,, | Performed by: INTERNAL MEDICINE

## 2022-11-23 PROCEDURE — 1126F AMNT PAIN NOTED NONE PRSNT: CPT | Mod: CPTII,S$GLB,, | Performed by: INTERNAL MEDICINE

## 2022-11-23 PROCEDURE — 99204 PR OFFICE/OUTPT VISIT, NEW, LEVL IV, 45-59 MIN: ICD-10-PCS | Mod: S$GLB,,, | Performed by: INTERNAL MEDICINE

## 2022-11-23 PROCEDURE — 3061F PR NEG MICROALBUMINURIA RESULT DOCUMENTED/REVIEW: ICD-10-PCS | Mod: CPTII,S$GLB,, | Performed by: INTERNAL MEDICINE

## 2022-11-23 PROCEDURE — 1126F PR PAIN SEVERITY QUANTIFIED, NO PAIN PRESENT: ICD-10-PCS | Mod: CPTII,S$GLB,, | Performed by: INTERNAL MEDICINE

## 2022-11-23 PROCEDURE — 4010F ACE/ARB THERAPY RXD/TAKEN: CPT | Mod: CPTII,S$GLB,, | Performed by: INTERNAL MEDICINE

## 2022-11-23 PROCEDURE — 3077F SYST BP >= 140 MM HG: CPT | Mod: CPTII,S$GLB,, | Performed by: INTERNAL MEDICINE

## 2022-11-23 PROCEDURE — 99999 PR PBB SHADOW E&M-EST. PATIENT-LVL III: CPT | Mod: PBBFAC,,, | Performed by: INTERNAL MEDICINE

## 2022-11-23 PROCEDURE — 3066F PR DOCUMENTATION OF TREATMENT FOR NEPHROPATHY: ICD-10-PCS | Mod: CPTII,S$GLB,, | Performed by: INTERNAL MEDICINE

## 2022-11-23 PROCEDURE — 3079F DIAST BP 80-89 MM HG: CPT | Mod: CPTII,S$GLB,, | Performed by: INTERNAL MEDICINE

## 2022-11-23 PROCEDURE — 1159F PR MEDICATION LIST DOCUMENTED IN MEDICAL RECORD: ICD-10-PCS | Mod: CPTII,S$GLB,, | Performed by: INTERNAL MEDICINE

## 2022-11-23 PROCEDURE — 99499 RISK ADDL DX/OHS AUDIT: ICD-10-PCS | Mod: S$GLB,,, | Performed by: INTERNAL MEDICINE

## 2022-11-23 PROCEDURE — 4010F PR ACE/ARB THEARPY RXD/TAKEN: ICD-10-PCS | Mod: CPTII,S$GLB,, | Performed by: INTERNAL MEDICINE

## 2022-11-23 PROCEDURE — 3077F PR MOST RECENT SYSTOLIC BLOOD PRESSURE >= 140 MM HG: ICD-10-PCS | Mod: CPTII,S$GLB,, | Performed by: INTERNAL MEDICINE

## 2022-11-23 RX ORDER — PRAVASTATIN SODIUM 40 MG/1
40 TABLET ORAL NIGHTLY
Qty: 90 TABLET | Refills: 3 | Status: SHIPPED | OUTPATIENT
Start: 2022-11-23 | End: 2023-04-28

## 2022-11-23 NOTE — PROGRESS NOTES
Subjective:   @Patient ID:  Chelly Ortiz is a 72 y.o. female who presents for evaluation of shortness of breath       HPI:       Prior cardiovascular  Hx  --------------------------------    - Heart Catheterization         - ECHO    - EKG    - SINTIA    - Vascular U/S     Velocities      RT   - CFA   - SFA  (Prox):     (Mid)     (Distal)   - Popliteal   - AT  prox   Mid   Distal   DP    - PT  prox   Mid   Distal      LT  - CFA   - SFA  (Prox)     (Mid)      (Distal)   - Popliteal   - AT  prox     Mid    Distal     DP   - PT  prox     Mid    Distal        Patient Active Problem List    Diagnosis Date Noted    Combined form of age-related cataract, left eye 11/02/2022    Osteopenia of multiple sites 08/31/2022    Arthritis of multiple sites 03/15/2022    Decreased ROM of neck 07/30/2021    Type 2 diabetes mellitus with hyperglycemia, with long-term current use of insulin 07/20/2021    Abdominal aortic atherosclerosis 07/20/2021     As seen on CTA imaging on 8/18/2020      Dysphagia 06/25/2021    Sedentary lifestyle 02/05/2020    Mild major depression 11/21/2019    Severe obesity with body mass index (BMI) of 35.0 to 39.9 with serious comorbidity 11/19/2019    EIC (epidermal inclusion cyst)     Personal history of colonic polyps 12/05/2018    Dilated bile duct 10/09/2018    Decreased range of motion of lumbar spine 09/28/2018    Decreased strength 09/28/2018    PCO (posterior capsular opacification), right 01/29/2018    Dry eye syndrome 01/29/2018    Pseudophakia 01/29/2018    Spondylosis of cervical region without myelopathy or radiculopathy 01/26/2018    Cervical myofascial pain syndrome 01/26/2018    Hyperlipidemia, unspecified 10/12/2017    Diabetic polyneuropathy associated with type 2 diabetes mellitus 10/12/2017    Essential hypertension 03/12/2016    History of colon polyps 12/02/2015    Constipation 05/06/2015    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 11/19/2013    Nuclear sclerotic cataract of left eye  08/27/2013    Hearing loss, sensorineural 10/12/2012    Insomnia 07/26/2012    Anxiety 07/26/2012    GERD (gastroesophageal reflux disease)     MYRIAM (obstructive sleep apnea)     IBS (irritable bowel syndrome)     DDD (degenerative disc disease), lumbar                     LAST HbA1c  Lab Results   Component Value Date    HGBA1C 7.6 (H) 08/02/2022       Lipid panel  Lab Results   Component Value Date    CHOL 202 (H) 06/13/2022    CHOL 248 (H) 12/27/2021    CHOL 218 (H) 01/13/2021     Lab Results   Component Value Date    HDL 53 06/13/2022    HDL 61 12/27/2021    HDL 54 01/13/2021     Lab Results   Component Value Date    LDLCALC 111.8 06/13/2022    LDLCALC 145.2 12/27/2021    LDLCALC 131.0 01/13/2021     Lab Results   Component Value Date    TRIG 186 (H) 06/13/2022    TRIG 209 (H) 12/27/2021    TRIG 165 (H) 01/13/2021     Lab Results   Component Value Date    CHOLHDL 26.2 06/13/2022    CHOLHDL 24.6 12/27/2021    CHOLHDL 24.8 01/13/2021            ROS    Objective:   Physical Exam    Assessment:     1. Fatigue, unspecified type    2. Heart palpitations        Plan:       Pertinent cardiac images and EKG reviewed independently.    Continue with current medical plan and lifestyle changes.  Return sooner for concerns or questions. If symptoms persist go to the ED  I have reviewed all pertinent data including patient's medical history in detail and updated the computerized patient record.     No orders of the defined types were placed in this encounter.      Follow up as scheduled.     She expressed verbal understanding and agreed with the plan    Patient's Medications   New Prescriptions    No medications on file   Previous Medications    ACCU-CHEK FASTCLIX LANCET DRUM MISC    TEST BLOOD SUGAR THREE TIMES A DAY    ACCU-CHEK FASTCLIX LANCING DEV KIT    USE AS DIRECTED    ALBUTEROL (PROVENTIL/VENTOLIN HFA) 90 MCG/ACTUATION INHALER    Inhale 1-2 puffs into the lungs every 4 (four) hours as needed for Wheezing.    AMLODIPINE  (NORVASC) 10 MG TABLET    TAKE 1 TABLET EVERY DAY    ASPIRIN (ECOTRIN) 81 MG EC TABLET    Take 81 mg by mouth once daily.    AZELASTINE (OPTIVAR) 0.05 % OPHTHALMIC SOLUTION    Place 1 drop into both eyes 2 (two) times daily.    BLOOD GLUCOSE CONTROL HIGH,LOW (ACCU-CHEK GUIDE L1-L2 CTRL SOL) SOLN    3 times a day    BLOOD SUGAR DIAGNOSTIC (ACCU-CHEK GUIDE TEST STRIPS) STRP    3 times a day    BLOOD-GLUCOSE METER (ACCU-CHEK GUIDE GLUCOSE METER) MISC    Three times a day    CALCIUM CARBONATE (OS-ARIC) 600 MG CALCIUM (1,500 MG) TAB    Take 1 tablet (600 mg total) by mouth once. for 1 dose    CETIRIZINE (ZYRTEC) 10 MG TABLET    Take 1 tablet (10 mg total) by mouth every evening.    CHLORHEXIDINE (PERIDEX) 0.12 % SOLUTION    Use as directed 15 mLs in the mouth or throat daily as needed.    CITALOPRAM (CELEXA) 10 MG TABLET    Take 1 tablet (10 mg total) by mouth once daily.    DULAGLUTIDE (TRULICITY) 3 MG/0.5 ML PEN INJECTOR    Inject 3 mg into the skin every 7 days.    ERGOCALCIFEROL (ERGOCALCIFEROL) 50,000 UNIT CAP    TAKE 1 CAPSULE EVERY 7 DAYS. Strength: 50,000 Units    ESOMEPRAZOLE (NEXIUM) 40 MG CAPSULE    Take 1 capsule (40 mg total) by mouth before breakfast.    FLUTICASONE-SALMETEROL DISKUS INHALER 250-50 MCG    Inhale 1 puff by mouth into the lungs 2 (two) times daily. Controller    GABAPENTIN (NEURONTIN) 100 MG CAPSULE    Take 1 capsule (100 mg total) by mouth 3 (three) times daily.    GLUCOSE 4 GM CHEWABLE TABLET    Take 4 tablets (16 g total) by mouth as needed for Low blood sugar (If having symptoms of blurry vision, palpitations, confusion, shakiness.  Please check sugars and if sugar below 70 please take 4 tablets and re-check sugar everry 15 minutes until sugars are above 70 and symptoms resolve.).    INSULIN ASPART U-100 (NOVOLOG FLEXPEN U-100 INSULIN) 100 UNIT/ML (3 ML) INPN PEN    Inject 12 units w/ meals plus scale 150-200+2, 201-250+4, 251-300+6, 301-350+8, >350+10. Max daily 50 units.    INSULIN  "DETEMIR U-100 (LEVEMIR FLEXTOUCH U-100 INSULN) 100 UNIT/ML (3 ML) INPN PEN    Inject 45 units into the skin at night.    INSULIN SYRINGE-NEEDLE U-100 0.3 ML 31 GAUGE X 5/16" SYRG    relion brand, use 5 x a day, if not on levemir or novolog    INSULIN SYRINGE-NEEDLE U-100 0.5 ML 31 GAUGE X 5/16" SYRG    Use nightly. 90 day    MAGNESIUM OXIDE (MAG-OX) 400 MG TABLET    Take 1 tablet (400 mg total) by mouth 2 (two) times daily.    PEN NEEDLE, DIABETIC 31 GAUGE X 3/16" NDLE    Uses 4 x a day.    PFIZER COVID-19 NILO VACCN,PF, 30 MCG/0.3 ML INJECTION        PRAVASTATIN (PRAVACHOL) 20 MG TABLET    Take 1 tablet (20 mg total) by mouth every evening.    PREDNISOLONE ACETATE (PRED FORTE) 1 % DRPS    Left eye - one drop 3 x day for 2 weeks, then 2 x day for 2 weeks, then 1 x day for 2 weeks, then stop    ROPINIROLE (REQUIP) 0.5 MG TABLET    Take 1 tablet (0.5 mg total) by mouth every evening.    TRAZODONE (DESYREL) 50 MG TABLET    TAKE 1 TABLET EVERY NIGHT AS NEEDED   Modified Medications    No medications on file   Discontinued Medications    No medications on file         "

## 2022-11-23 NOTE — PROGRESS NOTES
Subjective:   @Patient ID:  Chelly Ortiz is a 72 y.o. female who presents for evaluation of Shortness of breath       HPI:   Here for initial evaluation   Occasional CARLSON has been going   Palpitations for last few months . Sometimes daily, and sometimes weekly. Lasts for few minutes with no issues.  No syncope or presyncope      FH: Father  from MI in 50s  Hx of tobacco abuse, smoked for 10 yrs (not heavy), stopped     Type II DM, asthma, HTN, HLP      Prior cardiovascular  Hx  --------------------------------       - ECHO 1/3/2017     1 - Normal left ventricular systolic function (EF 60-65%).     2 - Left ventricular diastolic dysfunction.     3 - Mild left atrial enlargement.     4 - Normal right ventricular systolic function .     5 - The estimated PA systolic pressure is 23 mmHg.     - Holter 2018 was normal. No significant arrhythmias     - PET stress 2012 -ve for ischemia     - EKG 10/21/2022 SR, non specific st changes.          Patient Active Problem List    Diagnosis Date Noted    Combined form of age-related cataract, left eye 2022    Osteopenia of multiple sites 2022    Arthritis of multiple sites 03/15/2022    Decreased ROM of neck 2021    Type 2 diabetes mellitus with hyperglycemia, with long-term current use of insulin 2021    Abdominal aortic atherosclerosis 2021     As seen on CTA imaging on 2020      Dysphagia 2021    Sedentary lifestyle 2020    Mild major depression 2019    Severe obesity with body mass index (BMI) of 35.0 to 39.9 with serious comorbidity 2019    EIC (epidermal inclusion cyst)     Personal history of colonic polyps 2018    Dilated bile duct 10/09/2018    Decreased range of motion of lumbar spine 2018    Decreased strength 2018    PCO (posterior capsular opacification), right 2018    Dry eye syndrome 2018    Pseudophakia 2018    Spondylosis of cervical region without  myelopathy or radiculopathy 01/26/2018    Cervical myofascial pain syndrome 01/26/2018    Hyperlipidemia, unspecified 10/12/2017    Diabetic polyneuropathy associated with type 2 diabetes mellitus 10/12/2017    Essential hypertension 03/12/2016    History of colon polyps 12/02/2015    Constipation 05/06/2015    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 11/19/2013    Nuclear sclerotic cataract of left eye 08/27/2013    Hearing loss, sensorineural 10/12/2012    Insomnia 07/26/2012    Anxiety 07/26/2012    GERD (gastroesophageal reflux disease)     MYRIAM (obstructive sleep apnea)     IBS (irritable bowel syndrome)     DDD (degenerative disc disease), lumbar                     LAST HbA1c  Lab Results   Component Value Date    HGBA1C 7.6 (H) 08/02/2022       Lipid panel  Lab Results   Component Value Date    CHOL 202 (H) 06/13/2022    CHOL 248 (H) 12/27/2021    CHOL 218 (H) 01/13/2021     Lab Results   Component Value Date    HDL 53 06/13/2022    HDL 61 12/27/2021    HDL 54 01/13/2021     Lab Results   Component Value Date    LDLCALC 111.8 06/13/2022    LDLCALC 145.2 12/27/2021    LDLCALC 131.0 01/13/2021     Lab Results   Component Value Date    TRIG 186 (H) 06/13/2022    TRIG 209 (H) 12/27/2021    TRIG 165 (H) 01/13/2021     Lab Results   Component Value Date    CHOLHDL 26.2 06/13/2022    CHOLHDL 24.6 12/27/2021    CHOLHDL 24.8 01/13/2021            Review of Systems   Constitutional: Negative for chills and fever.   HENT:  Positive for hearing loss (Rt ear). Negative for nosebleeds.    Eyes:  Negative for blurred vision.   Cardiovascular:  Negative for palpitations.        As in HPI    Respiratory:  Negative for hemoptysis and shortness of breath.    Hematologic/Lymphatic: Negative for bleeding problem.   Skin:  Negative for itching.   Musculoskeletal:  Negative for falls.   Gastrointestinal:  Negative for abdominal pain and hematochezia.   Genitourinary:  Negative for hematuria.   Neurological:  Negative for dizziness  and loss of balance.   Psychiatric/Behavioral:  Negative for altered mental status and depression.      Objective:   Physical Exam  Constitutional:       Appearance: She is well-developed. She is obese.   HENT:      Head: Normocephalic and atraumatic.   Eyes:      Conjunctiva/sclera: Conjunctivae normal.   Neck:      Vascular: No carotid bruit or JVD.   Cardiovascular:      Rate and Rhythm: Normal rate and regular rhythm.      Pulses:           Carotid pulses are 2+ on the right side and 2+ on the left side.       Radial pulses are 2+ on the right side and 2+ on the left side.      Heart sounds: Normal heart sounds. No murmur heard.    No friction rub. No gallop.   Pulmonary:      Effort: Pulmonary effort is normal. No respiratory distress.      Breath sounds: Normal breath sounds. No stridor. No wheezing.   Musculoskeletal:      Cervical back: Neck supple.   Skin:     General: Skin is warm and dry.   Neurological:      Mental Status: She is alert and oriented to person, place, and time.   Psychiatric:         Behavior: Behavior normal.       Assessment:     1. Essential hypertension    2. Fatigue, unspecified type    3. Heart palpitations    4. Hyperlipidemia, unspecified hyperlipidemia type    5. Abdominal aortic atherosclerosis    6. Type 2 diabetes mellitus with hyperglycemia, with long-term current use of insulin    7. Severe obesity with body mass index (BMI) of 35.0 to 39.9 with serious comorbidity    8. Other forms of angina pectoris    9. Palpitations    10. Dyslipidemia        Plan:   Given her symptoms/risk factor will proceed with stress MPI for ischemic evaluation and risk stratification  Check 48 hours Holter monitor to assess for any arrhythmias  Will increase pravastatin to 40 mg.  LDL goal be to be lower than 70 given her reported abdominal atherosclerosis  Blood pressure managed by Dr. Garcia   Weight loss   I spent 5-10 minutes asking, assessing, assisting, arranging and advising heart healthy  diet improvements. This included low-salt meals, portion control and health food alternatives. I also encourage 30 minutes of moderate exercise 3-4x a week.      Pertinent cardiac images and EKG reviewed independently.    Continue with current medical plan and lifestyle changes.  Return sooner for concerns or questions. If symptoms persist go to the ED  I have reviewed all pertinent data including patient's medical history in detail and updated the computerized patient record.     Orders Placed This Encounter   Procedures    NM Myocardial Perfusion Spect Multi Pharmacologic     Standing Status:   Future     Standing Expiration Date:   11/23/2023     Order Specific Question:   May the Radiologist modify the order per protocol to meet the clinical needs of the patient?     Answer:   Yes     Order Specific Question:   Stress Medication to use:     Answer:   Regadenoson     Order Specific Question:   Will a Cardiologist read this study?     Answer:   No    Nuclear Stress Test     Standing Status:   Future     Standing Expiration Date:   11/23/2023     Order Specific Question:   Which stress agent will be used?     Answer:   Pharm     Order Specific Question:   Which medicaton for the stress procedure?     Answer:   Regadenoson     Order Specific Question:   Release to patient     Answer:   Immediate    Holter monitor - 48 hour     Standing Status:   Future     Standing Expiration Date:   11/23/2023       Follow up as scheduled.     She expressed verbal understanding and agreed with the plan    Patient's Medications   New Prescriptions    No medications on file   Previous Medications    ACCU-CHEK FASTCLIX LANCET DRUM MISC    TEST BLOOD SUGAR THREE TIMES A DAY    ACCU-CHEK FASTCLIX LANCING DEV KIT    USE AS DIRECTED    ALBUTEROL (PROVENTIL/VENTOLIN HFA) 90 MCG/ACTUATION INHALER    Inhale 1-2 puffs into the lungs every 4 (four) hours as needed for Wheezing.    AMLODIPINE (NORVASC) 10 MG TABLET    TAKE 1 TABLET EVERY DAY     ASPIRIN (ECOTRIN) 81 MG EC TABLET    Take 81 mg by mouth once daily.    AZELASTINE (OPTIVAR) 0.05 % OPHTHALMIC SOLUTION    Place 1 drop into both eyes 2 (two) times daily.    BLOOD GLUCOSE CONTROL HIGH,LOW (ACCU-CHEK GUIDE L1-L2 CTRL SOL) SOLN    3 times a day    BLOOD SUGAR DIAGNOSTIC (ACCU-CHEK GUIDE TEST STRIPS) STRP    3 times a day    BLOOD-GLUCOSE METER (ACCU-CHEK GUIDE GLUCOSE METER) MISC    Three times a day    CALCIUM CARBONATE (OS-ARIC) 600 MG CALCIUM (1,500 MG) TAB    Take 1 tablet (600 mg total) by mouth once. for 1 dose    CETIRIZINE (ZYRTEC) 10 MG TABLET    Take 1 tablet (10 mg total) by mouth every evening.    CHLORHEXIDINE (PERIDEX) 0.12 % SOLUTION    Use as directed 15 mLs in the mouth or throat daily as needed.    CITALOPRAM (CELEXA) 10 MG TABLET    Take 1 tablet (10 mg total) by mouth once daily.    DULAGLUTIDE (TRULICITY) 3 MG/0.5 ML PEN INJECTOR    Inject 3 mg into the skin every 7 days.    ERGOCALCIFEROL (ERGOCALCIFEROL) 50,000 UNIT CAP    TAKE 1 CAPSULE EVERY 7 DAYS. Strength: 50,000 Units    ESOMEPRAZOLE (NEXIUM) 40 MG CAPSULE    Take 1 capsule (40 mg total) by mouth before breakfast.    FLUTICASONE-SALMETEROL DISKUS INHALER 250-50 MCG    Inhale 1 puff by mouth into the lungs 2 (two) times daily. Controller    GABAPENTIN (NEURONTIN) 100 MG CAPSULE    Take 1 capsule (100 mg total) by mouth 3 (three) times daily.    GLUCOSE 4 GM CHEWABLE TABLET    Take 4 tablets (16 g total) by mouth as needed for Low blood sugar (If having symptoms of blurry vision, palpitations, confusion, shakiness.  Please check sugars and if sugar below 70 please take 4 tablets and re-check sugar everry 15 minutes until sugars are above 70 and symptoms resolve.).    INSULIN ASPART U-100 (NOVOLOG FLEXPEN U-100 INSULIN) 100 UNIT/ML (3 ML) INPN PEN    Inject 12 units w/ meals plus scale 150-200+2, 201-250+4, 251-300+6, 301-350+8, >350+10. Max daily 50 units.    INSULIN DETEMIR U-100 (LEVEMIR FLEXTOUCH U-100 INSULN) 100 UNIT/ML  "(3 ML) INPN PEN    Inject 45 units into the skin at night.    INSULIN SYRINGE-NEEDLE U-100 0.3 ML 31 GAUGE X 5/16" SYRG    relion brand, use 5 x a day, if not on levemir or novolog    INSULIN SYRINGE-NEEDLE U-100 0.5 ML 31 GAUGE X 5/16" SYRG    Use nightly. 90 day    MAGNESIUM OXIDE (MAG-OX) 400 MG TABLET    Take 1 tablet (400 mg total) by mouth 2 (two) times daily.    PEN NEEDLE, DIABETIC 31 GAUGE X 3/16" NDLE    Uses 4 x a day.    PFIZER COVID-19 NILO VACCN,PF, 30 MCG/0.3 ML INJECTION        PREDNISOLONE ACETATE (PRED FORTE) 1 % DRPS    Left eye - one drop 3 x day for 2 weeks, then 2 x day for 2 weeks, then 1 x day for 2 weeks, then stop    ROPINIROLE (REQUIP) 0.5 MG TABLET    Take 1 tablet (0.5 mg total) by mouth every evening.    TRAZODONE (DESYREL) 50 MG TABLET    TAKE 1 TABLET EVERY NIGHT AS NEEDED   Modified Medications    Modified Medication Previous Medication    PRAVASTATIN (PRAVACHOL) 40 MG TABLET pravastatin (PRAVACHOL) 20 MG tablet       Take 1 tablet (40 mg total) by mouth every evening.    Take 1 tablet (20 mg total) by mouth every evening.   Discontinued Medications    No medications on file         "

## 2022-11-28 ENCOUNTER — OFFICE VISIT (OUTPATIENT)
Dept: PAIN MEDICINE | Facility: CLINIC | Age: 72
End: 2022-11-28
Payer: MEDICARE

## 2022-11-28 ENCOUNTER — HOSPITAL ENCOUNTER (OUTPATIENT)
Dept: RADIOLOGY | Facility: HOSPITAL | Age: 72
Discharge: HOME | End: 2022-11-28
Attending: NURSE PRACTITIONER
Payer: MEDICARE

## 2022-11-28 VITALS
BODY MASS INDEX: 35.15 KG/M2 | HEART RATE: 86 BPM | WEIGHT: 198.44 LBS | DIASTOLIC BLOOD PRESSURE: 81 MMHG | SYSTOLIC BLOOD PRESSURE: 137 MMHG

## 2022-11-28 DIAGNOSIS — M47.812 FACET ARTHROPATHY, CERVICAL: ICD-10-CM

## 2022-11-28 DIAGNOSIS — M75.51 SUBACROMIAL BURSITIS OF BOTH SHOULDERS: ICD-10-CM

## 2022-11-28 DIAGNOSIS — M54.2 NECK PAIN, BILATERAL: ICD-10-CM

## 2022-11-28 DIAGNOSIS — M47.9 SPONDYLOSIS: ICD-10-CM

## 2022-11-28 DIAGNOSIS — G89.4 CHRONIC PAIN SYNDROME: ICD-10-CM

## 2022-11-28 DIAGNOSIS — M54.2 NECK PAIN, BILATERAL: Primary | ICD-10-CM

## 2022-11-28 DIAGNOSIS — M75.52 SUBACROMIAL BURSITIS OF BOTH SHOULDERS: ICD-10-CM

## 2022-11-28 PROCEDURE — 1125F AMNT PAIN NOTED PAIN PRSNT: CPT | Mod: CPTII,S$GLB,, | Performed by: NURSE PRACTITIONER

## 2022-11-28 PROCEDURE — 3061F PR NEG MICROALBUMINURIA RESULT DOCUMENTED/REVIEW: ICD-10-PCS | Mod: CPTII,S$GLB,, | Performed by: NURSE PRACTITIONER

## 2022-11-28 PROCEDURE — 99214 OFFICE O/P EST MOD 30 MIN: CPT | Mod: S$GLB,,, | Performed by: NURSE PRACTITIONER

## 2022-11-28 PROCEDURE — 1159F MED LIST DOCD IN RCRD: CPT | Mod: CPTII,S$GLB,, | Performed by: NURSE PRACTITIONER

## 2022-11-28 PROCEDURE — 72050 XR CERVICAL SPINE AP LAT WITH FLEX EXTEN: ICD-10-PCS | Mod: 26,,, | Performed by: RADIOLOGY

## 2022-11-28 PROCEDURE — 3075F SYST BP GE 130 - 139MM HG: CPT | Mod: CPTII,S$GLB,, | Performed by: NURSE PRACTITIONER

## 2022-11-28 PROCEDURE — 3075F PR MOST RECENT SYSTOLIC BLOOD PRESS GE 130-139MM HG: ICD-10-PCS | Mod: CPTII,S$GLB,, | Performed by: NURSE PRACTITIONER

## 2022-11-28 PROCEDURE — 3066F NEPHROPATHY DOC TX: CPT | Mod: CPTII,S$GLB,, | Performed by: NURSE PRACTITIONER

## 2022-11-28 PROCEDURE — 99499 RISK ADDL DX/OHS AUDIT: ICD-10-PCS | Mod: S$GLB,,, | Performed by: NURSE PRACTITIONER

## 2022-11-28 PROCEDURE — 72050 X-RAY EXAM NECK SPINE 4/5VWS: CPT | Mod: TC,FY

## 2022-11-28 PROCEDURE — 4010F PR ACE/ARB THEARPY RXD/TAKEN: ICD-10-PCS | Mod: CPTII,S$GLB,, | Performed by: NURSE PRACTITIONER

## 2022-11-28 PROCEDURE — 3079F DIAST BP 80-89 MM HG: CPT | Mod: CPTII,S$GLB,, | Performed by: NURSE PRACTITIONER

## 2022-11-28 PROCEDURE — 1160F PR REVIEW ALL MEDS BY PRESCRIBER/CLIN PHARMACIST DOCUMENTED: ICD-10-PCS | Mod: CPTII,S$GLB,, | Performed by: NURSE PRACTITIONER

## 2022-11-28 PROCEDURE — 4010F ACE/ARB THERAPY RXD/TAKEN: CPT | Mod: CPTII,S$GLB,, | Performed by: NURSE PRACTITIONER

## 2022-11-28 PROCEDURE — 99214 PR OFFICE/OUTPT VISIT, EST, LEVL IV, 30-39 MIN: ICD-10-PCS | Mod: S$GLB,,, | Performed by: NURSE PRACTITIONER

## 2022-11-28 PROCEDURE — 99999 PR PBB SHADOW E&M-EST. PATIENT-LVL V: CPT | Mod: PBBFAC,,, | Performed by: NURSE PRACTITIONER

## 2022-11-28 PROCEDURE — 3008F BODY MASS INDEX DOCD: CPT | Mod: CPTII,S$GLB,, | Performed by: NURSE PRACTITIONER

## 2022-11-28 PROCEDURE — 1125F PR PAIN SEVERITY QUANTIFIED, PAIN PRESENT: ICD-10-PCS | Mod: CPTII,S$GLB,, | Performed by: NURSE PRACTITIONER

## 2022-11-28 PROCEDURE — 3066F PR DOCUMENTATION OF TREATMENT FOR NEPHROPATHY: ICD-10-PCS | Mod: CPTII,S$GLB,, | Performed by: NURSE PRACTITIONER

## 2022-11-28 PROCEDURE — 3008F PR BODY MASS INDEX (BMI) DOCUMENTED: ICD-10-PCS | Mod: CPTII,S$GLB,, | Performed by: NURSE PRACTITIONER

## 2022-11-28 PROCEDURE — 3051F HG A1C>EQUAL 7.0%<8.0%: CPT | Mod: CPTII,S$GLB,, | Performed by: NURSE PRACTITIONER

## 2022-11-28 PROCEDURE — 3051F PR MOST RECENT HEMOGLOBIN A1C LEVEL 7.0 - < 8.0%: ICD-10-PCS | Mod: CPTII,S$GLB,, | Performed by: NURSE PRACTITIONER

## 2022-11-28 PROCEDURE — 1160F RVW MEDS BY RX/DR IN RCRD: CPT | Mod: CPTII,S$GLB,, | Performed by: NURSE PRACTITIONER

## 2022-11-28 PROCEDURE — 99999 PR PBB SHADOW E&M-EST. PATIENT-LVL V: ICD-10-PCS | Mod: PBBFAC,,, | Performed by: NURSE PRACTITIONER

## 2022-11-28 PROCEDURE — 3061F NEG MICROALBUMINURIA REV: CPT | Mod: CPTII,S$GLB,, | Performed by: NURSE PRACTITIONER

## 2022-11-28 PROCEDURE — 1159F PR MEDICATION LIST DOCUMENTED IN MEDICAL RECORD: ICD-10-PCS | Mod: CPTII,S$GLB,, | Performed by: NURSE PRACTITIONER

## 2022-11-28 PROCEDURE — 99499 UNLISTED E&M SERVICE: CPT | Mod: S$GLB,,, | Performed by: NURSE PRACTITIONER

## 2022-11-28 PROCEDURE — 3079F PR MOST RECENT DIASTOLIC BLOOD PRESSURE 80-89 MM HG: ICD-10-PCS | Mod: CPTII,S$GLB,, | Performed by: NURSE PRACTITIONER

## 2022-11-28 PROCEDURE — 72050 X-RAY EXAM NECK SPINE 4/5VWS: CPT | Mod: 26,,, | Performed by: RADIOLOGY

## 2022-11-28 NOTE — PROGRESS NOTES
[facility] Established Patient Evaluation    Referred by: Gabriel Wilson  Reason for referral: neck pain    CC: Bilateral Shoulder pain    Interval Update: 11/28/2022 - Diana returns to clinic s/p bilateral subacromial shoulder injection on 10/26/22 with 90% relief.  She reports Aching of the bilateral shoulder  (location) pain rated 3/10 today with a weekly range of 3-4/10.  Additionally she complained of bilateral neck pain that has  been ongoing for greater than a year, see pain described below.    10/26/2022 - Ms. Ortiz eturns to clinic for follow up visit reporting bilateral shoulder pain, worse with lifting something, and working above her head. She has had shoulder pain over the last 10 years, but worse over the last few works. Nothing improves her pain. She is using tylenol arthritis for the pain. No unusual amount of work or activity recently. No paraesthesias or tingling in upper extremities.     8/18/20 - Ms. Ortiz returns to clinic for follow up visit reporting stable bilateral knee pain and review imaging. Pain intensity is currently 3/10.      8/13/20 - Ms. Ortiz returns to clinic for follow up visit reporting worse bilateral knee  pain.  Pain intensity is currently 6/10.  Patient reports bilateral knee pain that started approximately 1 week ago, pain is worse with walking, standing and while sleeping. Pain is described as sharp and stabbing, she denies profound weakness, or radiating pain. Worse pain is 6/10. Of note she reports no inciting incident, trauma or falls.     02/05/2020 - Mrs. Ortiz returns to clinic for follow up visit reporting left buttock pain. Patient reports radiation down left leg to mid thigh. Pain intensity is currently 5/10.      4/9/18 - Pt returns to complaining of MRI results, neck and bilateral shoulder pain.  Her day time pain improved with PT but she continues to complain of bilat neck and shoulder pain at night.  The results of the MRI were shared with  her.    Background:  Chelly Ortiz is a 72 y.o. female who complains of neck and bilateral shoulder pain as characterized below.    Location:  Neck  Severity: Currently: 5/2/10   Typical Range: 6/10     Exacerbation: 10/10   Onset:  Greater than 1 year  Quality: Dull, achy  Radiation:  None  Exacerbating Factors: extension and flexion lateral movement  Mitigating Factors: heat  Assoc: denies night fever/night sweats, urinary incontinence, bowel incontinence, significant weight loss, significant motor weakness and loss of sensations    Previous Therapies:  PT: Completed in march  HEP: Daily  TENS: None  Injections: LYNN 3x per Ca Oconnell  Surgery: Denies  Medications:   - NSAIDS:   - MSK Relaxants:   - TCAs:   - SNRIs:   - Topicals:   - Anticonvulsants: Current  - Opioids: No    Current Pain Medications:  Gabapentin 100 TID - she is taking it prn  Piroxicam 20 mg - stopped due to GI issues    Full Medication List:    Current Outpatient Medications:     ACCU-CHEK FASTCLIX LANCET DRUM Misc, TEST BLOOD SUGAR THREE TIMES A DAY, Disp: 918 each, Rfl: 3    ACCU-CHEK FASTCLIX LANCING DEV Kit, USE AS DIRECTED, Disp: 1 each, Rfl: 0    albuterol (PROVENTIL/VENTOLIN HFA) 90 mcg/actuation inhaler, Inhale 1-2 puffs into the lungs every 4 (four) hours as needed for Wheezing., Disp: 18 g, Rfl: 2    amLODIPine (NORVASC) 10 MG tablet, TAKE 1 TABLET EVERY DAY, Disp: 90 tablet, Rfl: 3    aspirin (ECOTRIN) 81 MG EC tablet, Take 81 mg by mouth once daily., Disp: , Rfl:     azelastine (OPTIVAR) 0.05 % ophthalmic solution, Place 1 drop into both eyes 2 (two) times daily., Disp: 6 mL, Rfl: 1    blood glucose control high,low (ACCU-CHEK GUIDE L1-L2 CTRL SOL) Soln, 3 times a day, Disp: 300 each, Rfl: 11    blood sugar diagnostic (ACCU-CHEK GUIDE TEST STRIPS) Strp, 3 times a day, Disp: 300 strip, Rfl: 11    blood-glucose meter (ACCU-CHEK GUIDE GLUCOSE METER) Misc, Three times a day, Disp: 1 each, Rfl: 0    calcium carbonate (OS-ARIC) 600 mg  "calcium (1,500 mg) Tab, Take 1 tablet (600 mg total) by mouth once. for 1 dose, Disp: 30 tablet, Rfl: 11    cetirizine (ZYRTEC) 10 MG tablet, Take 1 tablet (10 mg total) by mouth every evening., Disp: 90 tablet, Rfl: 0    chlorhexidine (PERIDEX) 0.12 % solution, Use as directed 15 mLs in the mouth or throat daily as needed., Disp: , Rfl:     citalopram (CELEXA) 10 MG tablet, Take 1 tablet (10 mg total) by mouth once daily., Disp: 90 tablet, Rfl: 3    dulaglutide (TRULICITY) 3 mg/0.5 mL pen injector, Inject 3 mg into the skin every 7 days., Disp: 4 pen, Rfl: 11    ergocalciferol (ERGOCALCIFEROL) 50,000 unit Cap, TAKE 1 CAPSULE EVERY 7 DAYS. Strength: 50,000 Units, Disp: 12 capsule, Rfl: 1    esomeprazole (NEXIUM) 40 MG capsule, Take 1 capsule (40 mg total) by mouth before breakfast., Disp: 90 capsule, Rfl: 3    fluticasone-salmeterol diskus inhaler 250-50 mcg, Inhale 1 puff by mouth into the lungs 2 (two) times daily. Controller, Disp: 60 each, Rfl: 6    gabapentin (NEURONTIN) 100 MG capsule, Take 1 capsule (100 mg total) by mouth 3 (three) times daily., Disp: 270 capsule, Rfl: 3    glucose 4 GM chewable tablet, Take 4 tablets (16 g total) by mouth as needed for Low blood sugar (If having symptoms of blurry vision, palpitations, confusion, shakiness.  Please check sugars and if sugar below 70 please take 4 tablets and re-check sugar everry 15 minutes until sugars are above 70 and symptoms resolve.)., Disp: 50 tablet, Rfl: 12    insulin aspart U-100 (NOVOLOG FLEXPEN U-100 INSULIN) 100 unit/mL (3 mL) InPn pen, Inject 12 units w/ meals plus scale 150-200+2, 201-250+4, 251-300+6, 301-350+8, >350+10. Max daily 50 units., Disp: 15 mL, Rfl: 6    insulin detemir U-100 (LEVEMIR FLEXTOUCH U-100 INSULN) 100 unit/mL (3 mL) InPn pen, Inject 45 units into the skin at night., Disp: 15 mL, Rfl: 6    insulin syringe-needle U-100 0.3 mL 31 gauge x 5/16" Syrg, relion brand, use 5 x a day, if not on levemir or novolog, Disp: 150 each, " "Rfl: 1    insulin syringe-needle U-100 0.5 mL 31 gauge x 5/16" Syrg, Use nightly. 90 day, Disp: 100 each, Rfl: 3    magnesium oxide (MAG-OX) 400 mg tablet, Take 1 tablet (400 mg total) by mouth 2 (two) times daily., Disp: 180 tablet, Rfl: 3    pen needle, diabetic 31 gauge x 3/16" Ndle, Uses 4 x a day., Disp: 400 each, Rfl: 3    PFIZER COVID-19 NILO VACCN,PF, 30 mcg/0.3 mL injection, , Disp: , Rfl:     pravastatin (PRAVACHOL) 40 MG tablet, Take 1 tablet (40 mg total) by mouth every evening., Disp: 90 tablet, Rfl: 3    prednisoLONE acetate (PRED FORTE) 1 % DrpS, Left eye - one drop 3 x day for 2 weeks, then 2 x day for 2 weeks, then 1 x day for 2 weeks, then stop, Disp: 10 mL, Rfl: 0    rOPINIRole (REQUIP) 0.5 MG tablet, Take 1 tablet (0.5 mg total) by mouth every evening., Disp: 90 tablet, Rfl: 0    traZODone (DESYREL) 50 MG tablet, TAKE 1 TABLET EVERY NIGHT AS NEEDED, Disp: 90 tablet, Rfl: 3     Review of Systems:  Review of Systems   Constitutional:  Negative for chills and fever.   HENT:  Negative for nosebleeds.    Eyes:  Negative for pain.   Respiratory:  Negative for hemoptysis.    Cardiovascular:  Negative for chest pain.   Gastrointestinal:  Negative for nausea and vomiting.   Genitourinary:  Negative for dysuria.   Musculoskeletal:  Positive for joint pain and myalgias.   Skin:  Negative for rash.   Neurological:  Negative for tingling, tremors, sensory change, focal weakness and weakness.     Allergies:  Ace inhibitors; Prednisone; and Latex, natural rubber     Medical History:  Past Medical History:   Diagnosis Date    Allergy     Cataract     DDD (degenerative disc disease), lumbar     Diabetes mellitus     diet controlled    Diabetes mellitus, type 2     GERD (gastroesophageal reflux disease)     History of subconjunctival hemorrhage 12/2/11    left eye    Hyperlipidemia     Hypertension     IBS (irritable bowel syndrome)     Obesity     MYRIAM (obstructive sleep apnea)     on CPAP    Rotator cuff " impingement syndrome     Seasonal allergic conjunctivitis         Surgical History:  Past Surgical History:   Procedure Laterality Date    CATARACT EXTRACTION W/  INTRAOCULAR LENS IMPLANT Right 10/03/2013         SECTION      x2    CHOLECYSTECTOMY      COLONOSCOPY N/A 2018    Procedure: COLONOSCOPY;  Surgeon: Marie Mejia MD;  Location: [facility] ENDO;  Service: Endoscopy;  Laterality: N/A;    COLONOSCOPY N/A 2022    Procedure: COLONOSCOPY;  Surgeon: Pierce Rivera MD;  Location: [facility] ENDO;  Service: Endoscopy;  Laterality: N/A;    ENDOSCOPIC ULTRASOUND OF UPPER GASTROINTESTINAL TRACT N/A 10/9/2018    Procedure: ULTRASOUND, UPPER GI TRACT, ENDOSCOPIC;  Surgeon: Javy Simpson MD;  Location: [facility] ENDO;  Service: Endoscopy;  Laterality: N/A;    EXCISION OF LESION N/A 2019    Procedure: EXCISION, LESION VULVA;  Surgeon: Liv Odell MD;  Location: [facility] OR;  Service: OB/GYN;  Laterality: N/A;  latex allergy    EYE SURGERY      HYSTERECTOMY      ovaries intact, due to DUB    INTRAOCULAR PROSTHESES INSERTION Left 2022    Procedure: INSERTION, IOL PROSTHESIS;  Surgeon: Clarice Herzog MD;  Location: [facility] OR [address] [place] [address];  Service: Ophthalmology;  Laterality: Left;    PHACOEMULSIFICATION OF CATARACT Left 2022    Procedure: PHACOEMULSIFICATION, CATARACT;  Surgeon: Clarice Herzog MD;  [facility];  Service: Ophthalmology;  Laterality: Left;    TUBAL LIGATION          Social History:  Social History     Socioeconomic History    Marital status:    Tobacco Use    Smoking status: Former     Types: Cigarettes     Quit date: 2/15/1983     Years since quittin.8    Smokeless tobacco: Never   Substance and Sexual Activity    Alcohol use: No    Drug use: No    Sexual activity: Yes     Partners: Male     Social Determinants of Health     Financial Resource Strain: Low Risk     Difficulty of Paying Living Expenses: Not very hard   Food Insecurity:  Food Insecurity Present    Worried About Running Out of Food in the Last Year: Sometimes true    Ran Out of Food in the Last Year: Patient refused   Transportation Needs: No Transportation Needs    Lack of Transportation (Medical): No    Lack of Transportation (Non-Medical): No   Physical Activity: Inactive    Days of Exercise per Week: 0 days    Minutes of Exercise per Session: 30 min   Stress: No Stress Concern Present    Feeling of Stress : Not at all   Social Connections: Unknown    Frequency of Communication with Friends and Family: Three times a week    Frequency of Social Gatherings with Friends and Family: Patient refused    Active Member of Clubs or Organizations: No    Attends Club or Organization Meetings: Never    Marital Status:    Housing Stability: Low Risk     Unable to Pay for Housing in the Last Year: No    Number of Places Lived in the Last Year: 1    Unstable Housing in the Last Year: No       Physical Exam:  Vitals:    11/28/22 1407   BP: 137/81   Pulse: 86   Weight: 90 kg (198 lb 6.6 oz)   PainSc:   3       General    Nursing note and vitals reviewed.  Constitutional: She is oriented to person, place, and time. She appears well-developed and well-nourished. No distress.   HENT:   Head: Normocephalic and atraumatic.   Nose: Nose normal.   Eyes: Conjunctivae and EOM are normal. Pupils are equal, round, and reactive to light. Right eye exhibits no discharge. Left eye exhibits no discharge. No scleral icterus.   Neck: No JVD present.   Cardiovascular:  Intact distal pulses.            Pulmonary/Chest: Effort normal. No respiratory distress.   Abdominal: She exhibits no distension.   Neurological: She is alert and oriented to person, place, and time. Coordination normal.   Psychiatric: She has a normal mood and affect. Her behavior is normal. Judgment and thought content normal.     General Musculoskeletal Exam   Gait: normal       Right Knee Exam     Tenderness   The patient is tender to  palpation of the medial joint line.    Crepitus   The patient has crepitus of the patella.    Range of Motion   Extension:  normal   Flexion:  normal     Left Knee Exam     Tenderness   The patient tender to palpation of the lateral joint line.    Crepitus   The patient has crepitus of the patella.    Range of Motion   Extension:  normal   Flexion:  normal Left Hip Exam     Comments:  Left ischial bursa tenderness.  Partial reproduction of pain with hip flexion and piriformis-type stretches.      Back (L-Spine & T-Spine) / Neck (C-Spine) Exam     Tenderness Posterior midline palpation reveals tenderness of the Upper C-Spine, Lower C-Spine and Upper T-Spine. Right paramedian tenderness of the Upper C-Spine. Left paramedian tenderness of the Upper C-Spine.     Neck (C-Spine) Range of Motion   Flexion:      Normal  Extension:  Normal  Right Shoulder Exam     Inspection/Observation   Deformity: absent    Range of Motion   Active abduction:  90 abnormal     Tests & Signs   Drop arm: positive  Saldaña test: positive  Impingement: positive  Rotator Cuff Painful Arc/Range: moderate    Other   Sensation: normal    Comments:  Positive empty can and impingement sign     Left Shoulder Exam     Inspection/Observation   Deformity: absent    Range of Motion   Active abduction:  90 abnormal     Tests & Signs   Drop arm: positive  Saldaña test: positive  Impingement: positive  Rotator Cuff Painful Arc/Range: moderate    Other   Sensation: normal     Comments:  Positive empty can and impingement sign       Muscle Strength   Right Upper Extremity   Shoulder Abduction: 5/5   Shoulder Internal Rotation: 5/5   Shoulder External Rotation: 5/5   Supraspinatus: 5/5   Subscapularis: 5/5   Biceps: 5/5   Wrist flexion: 5/5   Left Upper Extremity  Shoulder Abduction: 5/5   Shoulder Internal Rotation: 5/5   Shoulder External Rotation: 5/5   Supraspinatus: 5/5   Subscapularis: 5/5   Biceps: 5/5   Wrist flexion: 5/5     Reflexes     Left  Side  Biceps:  2+  Brachioradialis:  2+  Left Roque's Sign:  Absent    Right Side   Biceps:  2+  Brachioradialis:  2+  Right Roque's Sign:  absent    Vascular Exam     Right Pulses      Radial:                    1+      Left Pulses      Radial:                    2+      Capillary Refill  Right Hand: normal capillary refill  Left Hand: normal capillary refill        Imaging:    Order Details       XR SHOULDER COMPLETE 2 OR MORE VIEWS BILATERAL     CLINICAL HISTORY:  Other hypertrophic osteoarthropathy, multiple sites     TECHNIQUE:  Three views of each shoulder were performed.     COMPARISON:  Shoulder radiographs 01/28/2020 and 07/07/2016     FINDINGS:  No fracture or dislocation.  Mild-moderate degenerative changes of the acromioclavicular and glenohumeral joints bilaterally, right greater than left.  No focal soft tissue swelling.     Impression:     As above.        Electronically signed by: Jim Cooper  Date:                                            06/16/2022    MRI Upper Extremity Joint Without Contraast Right  TECHNIQUE: MRI of right shoulder was performed on a 1.5T magnet utilizing the following sequences: Localizer; axial T2 FS; coronal T2 FS and PD FS; sagittal T1, T2 FS and PD FS.    COMPARISON: Right shoulder radiograph 4/20/16.    FINDINGS:    Rotator cuff: There is thickening and increased signal of the insertional fibers of the supraspinatus consistent with tendinosis with partial thickness undersurface tear.  There is small full-thickness component involving the anterior fibers measuring approximately 8 x 6 mm.  Mild infraspinatus tendinosis with undersurface fraying.  Teres minor tendon is intact.  There is mild tendinosis with interstitial tear involving the insertional fibers of the subscapularis.  Muscle bulk of the rotator cuff is within normal limits.     Labrum: Global degeneration of the glenoid labrum.      Biceps: Thickening and increased signal within the intra-articular portion of  the long head biceps tendon consistent with tendinosis.    Bone: There is no fracture.  Bone marrow signal is unremarkable.    Acromioclavicular joint: Severe degenerative changes are seen at the acromioclavicular joint with osseous spurring, edema, and subchondral cystic change.    Cartilage: Articular cartilage of the glenohumeral joint is preserved.    Miscellaneous: Small joint effusion with increased fluid seen in the subscapularis recess.  There is increased fluid seen within the subacromial/subdeltoid bursa.   Impression       1.  Supraspinatus tendinosis with partial thickness articular surface tear and small full-thickness component involving the anterior fibers.    2.  Mild subscapularis and infraspinatus tendinosis.    3.  Severe AC joint arthrosis.    4.  Long head biceps tendinosis.    5.  Joint effusion and moderate subacromial/subdeltoid bursitis.  ______________________________________     Electronically signed by resident: Hosea Costello MD  Date: 06/16/16  Time: 16:29     ______________________________________     Electronically signed by: MADISON WINTERS MD  Date: 06/17/16  Time: 08:39              Assessment:  Problem List Items Addressed This Visit    None  Visit Diagnoses       Neck pain, bilateral    -  Primary    Relevant Orders    X-Ray Cervical Spine AP Lat with Flexion  Extension (Completed)    Facet arthropathy, cervical        Relevant Orders    X-Ray Cervical Spine AP Lat with Flexion  Extension (Completed)    Spondylosis        Relevant Orders    X-Ray Cervical Spine AP Lat with Flexion  Extension (Completed)    Chronic pain syndrome        Subacromial bursitis of both shoulders              Chelly is a 67-year-old female with chronic neck pain exacerbated by increased physical activity 3 months ago.  Her exam and imaging are consistent with facet arthropathy and myofascial dysfunction.  I recommended that we start with physical therapy to eliminate as much pain as possible related to  myofascial dysfunction then move on to interventional procedures should a significant amount of pain persist.  I also recommended that she complete the MRI ordered by her primary care physician and start a trial of piroxicam.  She also states significant insomnia not related to her chronic neck pain and I have recommended that she try tizanidine 4-8 mg nightly as a muscle relaxant and sleep aid.    4/9/18 - she completed PT with improved pain after each session.  Pain remains most bothersome at night which is common for muscle issues.  She was encouraged to perform neck stretching 3 times daily and especially at night to minimize pain at night.    2/5/2020 - there is tenderness in the left buttock consistent with ischial bursitis.  There may be a component of tendonitis as well; however, the treatment would be the same.  I have recommended an ischial bursa injection under fluoroscopic guidance and patient is in agreement.  She was warned that her blood glucose must be controlled and less than 180 g/dL to receive a steroid injection, otherwise we would need to cancel the injection.  Patient was also advised to begin stretching on a daily basis. I demonstrated 3 separate stretches that would target the painful area.  I have encouraged her to walk on a daily basis for exercise as she currently lives a relatively sedentary lifestyle.    08/13/[id number]-huzi-old female presents with bilateral knee pain consistent with osteoarthritis, there may be a component of tendonitis as well. I will order bilateral knee x-rays today for further evaluation and consider bilateral knee injections with steroids in the future pt  Is a diabetic so I educated her on how steroids can increase BS so monitoring  And staying at the appropriate level is vital. I will also recommend returning to PT for hari knee pain and  ischial bursitis she was previously attending physical therapy prior to the COVID-19 pandemic for ischial bursitis this was  providing her with relief she reports that she only had 2 sessions during that time.   Lastly I did discuss with patient her most recent labs  C reactive protein, and sedimentation rate results and that they were both abnormal and elevated I recommended she follow-up with her primary care doctor to discussed these results.      8/18/2020- 70 y/o female presents with a hx of chronic hari knee pain secondary to osteoarthritis , and left sided low back pain secondary to ischia bursitis. Upon review of her recent bilateral knee x-rays they show tricompartmental DJD with moderate medial femorotibial joint space narrowing bilaterally. I recommended hari IA knee injections in the future, considering her pain is stable today I will hold off her pain score is 3/10. Explained recent lab results and recommended  that she will need to f/u with her PCP.  Also recommended patient follow up with physical therapy.    10/26/2022 - Chelly Ortiz is a 72 y.o. female who  has a past medical history of Allergy, Cataract, DDD (degenerative disc disease), lumbar, Diabetes mellitus, Diabetes mellitus, type 2, GERD (gastroesophageal reflux disease), History of subconjunctival hemorrhage (12/2/11), Hyperlipidemia, Hypertension, IBS (irritable bowel syndrome), Obesity, MYRIAM (obstructive sleep apnea), Rotator cuff impingement syndrome, and Seasonal allergic conjunctivitis.  By history and examination this patient has chronic and acute on chronic bilateral shoulder pain. The underlying cause cause is bilateral rotator cuff pathology causing bilateral bursitis.  Pathology is confirmed by imaging.  We discussed the underlying diagnoses and multiple treatment options including interventional procedures.  The risks and benefits of each treatment option were discussed and all questions were answered.      11/28/2022-Chelly Ortiz is a 72 y.o. female who  has a past medical history of Allergy, Cataract, DDD (degenerative disc disease), lumbar,  Diabetes mellitus, Diabetes mellitus, type 2, GERD (gastroesophageal reflux disease), History of subconjunctival hemorrhage (12/2/11), Hyperlipidemia, Hypertension, IBS (irritable bowel syndrome), Obesity, MYRIAM (obstructive sleep apnea), Rotator cuff impingement syndrome, and Seasonal allergic conjunctivitis.  By history and examination this patient has chronicneck pain without bilateral radiculopathy in the distribution of C4, C5, C6, and C7.  The underlying cause cause is facet arthritis, degenerative disc disease, and muscle dysfunction.  Pathology is confirmed by imaging.  We discussed the underlying diagnoses and multiple treatment options including non-opioid medications, interventional procedures, physical therapy, and home exercise.  The risks and benefits of each treatment option were discussed and all questions were answered.          Treatment Plan:   No procedures recommended at this time  HE sheet given to establish HE plan to help specifically with neck pain  X-rays AP lateral flexion-extension-call with results  Recommended Tylenol extra-strength arthritis 500 to a 1000 t.i.d. not to exceed 3000 mg in 24 hours    RTC- 2-3 months or sooner if needed     Disclaimer: This note was partly generated using dictation software which may occasionally result in transcription errors.

## 2022-11-30 ENCOUNTER — PATIENT MESSAGE (OUTPATIENT)
Dept: FAMILY MEDICINE | Facility: CLINIC | Age: 72
End: 2022-11-30
Payer: MEDICARE

## 2022-12-01 ENCOUNTER — CLINICAL SUPPORT (OUTPATIENT)
Dept: DIABETES | Facility: CLINIC | Age: 72
End: 2022-12-01
Payer: MEDICARE

## 2022-12-01 DIAGNOSIS — Z79.4 TYPE 2 DIABETES MELLITUS WITH HYPERGLYCEMIA, WITH LONG-TERM CURRENT USE OF INSULIN: ICD-10-CM

## 2022-12-01 DIAGNOSIS — E11.65 TYPE 2 DIABETES MELLITUS WITH HYPERGLYCEMIA, WITH LONG-TERM CURRENT USE OF INSULIN: ICD-10-CM

## 2022-12-01 PROCEDURE — G0108 PR DIAB MANAGE TRN  PER INDIV: ICD-10-PCS | Mod: S$GLB,,, | Performed by: INTERNAL MEDICINE

## 2022-12-01 PROCEDURE — 99999 PR PBB SHADOW E&M-EST. PATIENT-LVL II: ICD-10-PCS | Mod: PBBFAC,,,

## 2022-12-01 PROCEDURE — 99999 PR PBB SHADOW E&M-EST. PATIENT-LVL II: CPT | Mod: PBBFAC,,,

## 2022-12-01 PROCEDURE — G0108 DIAB MANAGE TRN  PER INDIV: HCPCS | Mod: S$GLB,,, | Performed by: INTERNAL MEDICINE

## 2022-12-02 ENCOUNTER — OFFICE VISIT (OUTPATIENT)
Dept: FAMILY MEDICINE | Facility: CLINIC | Age: 72
End: 2022-12-02
Payer: MEDICARE

## 2022-12-02 ENCOUNTER — LAB VISIT (OUTPATIENT)
Dept: LAB | Facility: HOSPITAL | Age: 72
End: 2022-12-02
Payer: MEDICARE

## 2022-12-02 VITALS
SYSTOLIC BLOOD PRESSURE: 126 MMHG | DIASTOLIC BLOOD PRESSURE: 62 MMHG | OXYGEN SATURATION: 97 % | WEIGHT: 198.44 LBS | HEART RATE: 89 BPM | BODY MASS INDEX: 35.15 KG/M2

## 2022-12-02 VITALS — WEIGHT: 200.31 LBS | BODY MASS INDEX: 35.48 KG/M2

## 2022-12-02 DIAGNOSIS — E87.6 HYPOKALEMIA: ICD-10-CM

## 2022-12-02 DIAGNOSIS — R25.2 LEG CRAMPS: Primary | ICD-10-CM

## 2022-12-02 DIAGNOSIS — R25.2 LEG CRAMPS: ICD-10-CM

## 2022-12-02 DIAGNOSIS — M79.10 MYALGIA: ICD-10-CM

## 2022-12-02 DIAGNOSIS — H60.311 ACUTE DIFFUSE OTITIS EXTERNA OF RIGHT EAR: ICD-10-CM

## 2022-12-02 DIAGNOSIS — E11.65 UNCONTROLLED TYPE 2 DIABETES MELLITUS WITH HYPERGLYCEMIA: Primary | ICD-10-CM

## 2022-12-02 LAB
ANION GAP SERPL CALC-SCNC: 11 MMOL/L (ref 8–16)
BUN SERPL-MCNC: 9 MG/DL (ref 8–23)
CALCIUM SERPL-MCNC: 9.1 MG/DL (ref 8.7–10.5)
CHLORIDE SERPL-SCNC: 104 MMOL/L (ref 95–110)
CO2 SERPL-SCNC: 25 MMOL/L (ref 23–29)
CREAT SERPL-MCNC: 0.9 MG/DL (ref 0.5–1.4)
EST. GFR  (NO RACE VARIABLE): >60 ML/MIN/1.73 M^2
GLUCOSE SERPL-MCNC: 222 MG/DL (ref 70–110)
MAGNESIUM SERPL-MCNC: 2 MG/DL (ref 1.6–2.6)
POTASSIUM SERPL-SCNC: 3.3 MMOL/L (ref 3.5–5.1)
SODIUM SERPL-SCNC: 140 MMOL/L (ref 136–145)

## 2022-12-02 PROCEDURE — 1159F MED LIST DOCD IN RCRD: CPT | Mod: CPTII,S$GLB,,

## 2022-12-02 PROCEDURE — 3074F SYST BP LT 130 MM HG: CPT | Mod: CPTII,S$GLB,,

## 2022-12-02 PROCEDURE — 3288F FALL RISK ASSESSMENT DOCD: CPT | Mod: CPTII,S$GLB,,

## 2022-12-02 PROCEDURE — 1160F RVW MEDS BY RX/DR IN RCRD: CPT | Mod: CPTII,S$GLB,,

## 2022-12-02 PROCEDURE — 1126F PR PAIN SEVERITY QUANTIFIED, NO PAIN PRESENT: ICD-10-PCS | Mod: CPTII,S$GLB,,

## 2022-12-02 PROCEDURE — 1126F AMNT PAIN NOTED NONE PRSNT: CPT | Mod: CPTII,S$GLB,,

## 2022-12-02 PROCEDURE — 99999 PR PBB SHADOW E&M-EST. PATIENT-LVL V: ICD-10-PCS | Mod: PBBFAC,,,

## 2022-12-02 PROCEDURE — 1159F PR MEDICATION LIST DOCUMENTED IN MEDICAL RECORD: ICD-10-PCS | Mod: CPTII,S$GLB,,

## 2022-12-02 PROCEDURE — 36415 COLL VENOUS BLD VENIPUNCTURE: CPT | Mod: PO

## 2022-12-02 PROCEDURE — 99214 PR OFFICE/OUTPT VISIT, EST, LEVL IV, 30-39 MIN: ICD-10-PCS | Mod: S$GLB,,,

## 2022-12-02 PROCEDURE — 3008F PR BODY MASS INDEX (BMI) DOCUMENTED: ICD-10-PCS | Mod: CPTII,S$GLB,,

## 2022-12-02 PROCEDURE — 80048 BASIC METABOLIC PNL TOTAL CA: CPT

## 2022-12-02 PROCEDURE — 4010F ACE/ARB THERAPY RXD/TAKEN: CPT | Mod: CPTII,S$GLB,,

## 2022-12-02 PROCEDURE — 3074F PR MOST RECENT SYSTOLIC BLOOD PRESSURE < 130 MM HG: ICD-10-PCS | Mod: CPTII,S$GLB,,

## 2022-12-02 PROCEDURE — 83735 ASSAY OF MAGNESIUM: CPT

## 2022-12-02 PROCEDURE — 1101F PT FALLS ASSESS-DOCD LE1/YR: CPT | Mod: CPTII,S$GLB,,

## 2022-12-02 PROCEDURE — 3078F DIAST BP <80 MM HG: CPT | Mod: CPTII,S$GLB,,

## 2022-12-02 PROCEDURE — 3066F NEPHROPATHY DOC TX: CPT | Mod: CPTII,S$GLB,,

## 2022-12-02 PROCEDURE — 3066F PR DOCUMENTATION OF TREATMENT FOR NEPHROPATHY: ICD-10-PCS | Mod: CPTII,S$GLB,,

## 2022-12-02 PROCEDURE — 99999 PR PBB SHADOW E&M-EST. PATIENT-LVL V: CPT | Mod: PBBFAC,,,

## 2022-12-02 PROCEDURE — 99214 OFFICE O/P EST MOD 30 MIN: CPT | Mod: S$GLB,,,

## 2022-12-02 PROCEDURE — 4010F PR ACE/ARB THEARPY RXD/TAKEN: ICD-10-PCS | Mod: CPTII,S$GLB,,

## 2022-12-02 PROCEDURE — 1101F PR PT FALLS ASSESS DOC 0-1 FALLS W/OUT INJ PAST YR: ICD-10-PCS | Mod: CPTII,S$GLB,,

## 2022-12-02 PROCEDURE — 3061F NEG MICROALBUMINURIA REV: CPT | Mod: CPTII,S$GLB,,

## 2022-12-02 PROCEDURE — 3008F BODY MASS INDEX DOCD: CPT | Mod: CPTII,S$GLB,,

## 2022-12-02 PROCEDURE — 1160F PR REVIEW ALL MEDS BY PRESCRIBER/CLIN PHARMACIST DOCUMENTED: ICD-10-PCS | Mod: CPTII,S$GLB,,

## 2022-12-02 PROCEDURE — 3078F PR MOST RECENT DIASTOLIC BLOOD PRESSURE < 80 MM HG: ICD-10-PCS | Mod: CPTII,S$GLB,,

## 2022-12-02 PROCEDURE — 3061F PR NEG MICROALBUMINURIA RESULT DOCUMENTED/REVIEW: ICD-10-PCS | Mod: CPTII,S$GLB,,

## 2022-12-02 PROCEDURE — 3288F PR FALLS RISK ASSESSMENT DOCUMENTED: ICD-10-PCS | Mod: CPTII,S$GLB,,

## 2022-12-02 PROCEDURE — 3051F HG A1C>EQUAL 7.0%<8.0%: CPT | Mod: CPTII,S$GLB,,

## 2022-12-02 PROCEDURE — 3051F PR MOST RECENT HEMOGLOBIN A1C LEVEL 7.0 - < 8.0%: ICD-10-PCS | Mod: CPTII,S$GLB,,

## 2022-12-02 RX ORDER — PEN NEEDLE, DIABETIC 30 GX3/16"
200 NEEDLE, DISPOSABLE MISCELLANEOUS DAILY
Qty: 200 EACH | Refills: 11 | Status: CANCELLED | OUTPATIENT
Start: 2022-12-02 | End: 2023-12-02

## 2022-12-02 RX ORDER — POTASSIUM CHLORIDE 750 MG/1
10 TABLET, EXTENDED RELEASE ORAL 2 TIMES DAILY
Qty: 20 TABLET | Refills: 0 | Status: SHIPPED | OUTPATIENT
Start: 2022-12-02 | End: 2022-12-12

## 2022-12-02 RX ORDER — PEN NEEDLE, DIABETIC 30 GX3/16"
NEEDLE, DISPOSABLE MISCELLANEOUS
Qty: 200 EACH | Refills: 11 | Status: SHIPPED | OUTPATIENT
Start: 2022-12-02 | End: 2023-07-25

## 2022-12-02 RX ORDER — CIPROFLOXACIN AND DEXAMETHASONE 3; 1 MG/ML; MG/ML
4 SUSPENSION/ DROPS AURICULAR (OTIC) 2 TIMES DAILY
Qty: 7.5 ML | Refills: 0 | Status: SHIPPED | OUTPATIENT
Start: 2022-12-02 | End: 2022-12-09

## 2022-12-02 RX ORDER — GABAPENTIN 100 MG/1
100 CAPSULE ORAL 3 TIMES DAILY
Qty: 270 CAPSULE | Refills: 3 | Status: SHIPPED | OUTPATIENT
Start: 2022-12-02 | End: 2023-12-01 | Stop reason: SDUPTHER

## 2022-12-02 RX ORDER — PEN NEEDLE, DIABETIC 31 GX5/16"
NEEDLE, DISPOSABLE MISCELLANEOUS
COMMUNITY
Start: 2022-11-14 | End: 2023-07-25

## 2022-12-02 NOTE — PROGRESS NOTES
Diabetes Care Specialist Progress Note  Author: Sabra Pink RN, CDE  Date: 12/2/2022    Program Intake  Reason for Diabetes Program Visit:: Initial Diabetes Assessment  Current diabetes risk level:: low  In the last 12 months, have you:: none  Permission to speak with others about care:: no    Lab Results   Component Value Date    HGBA1C 7.6 (H) 08/02/2022       Current Diabetic Medications: Levemir 45 units in the evening; Novolog 12 units ac meals, Trulicity 3 mg weekly (will start after 1.5 mg doses are completed)    Clinical    Problem Review  Active comorbidities affecting diabetes self-care.: yes  Comorbidities: Hypertension (Hyperlipidemia)  Reviewed health maintenance: yes    Clinical Assessment  Have you ever experienced hypoglycemia (low blood sugar)?: no  Have you ever experienced hyperglycemia (high blood sugar)?: no    Medication Information  How do you obtain your medications?: Patient drives  How many days a week do you miss your medications?: Never  Do you sometimes have difficulty refilling your medications?: No  Medication adherence impacting ability to self-manage diabetes?: No      Nutritional Status  Meal Plan 24 Hour Recall: Breakfast, Lunch, Dinner, Snack  Meal Plan 24 Hour Recall - Breakfast: sometimes skips update 12/1/22 cereal with milk, sausage or waffle with eggs, almond milk  Meal Plan 24 Hour Recall - Lunch: toast, eggs, almond milk update 12/1/22 burger with 1 bun  Meal Plan 24 Hour Recall - Dinner: last night had [employer] chickien, or stuffed pepper update 12/2/22 salad chic filet salad, sweet tea  Meal Plan 24 Hour Recall - Snack: nuts, cookies, drinks water, coffee, tea with 1/2 sugar  Change in appetite?: No  Recent Changes in Weight: No Recent Weight Change  Current nutritional status an area of need that is impacting patient's ability to self-manage diabetes?: No    Additional Social History      Access to [employer]  Does the patient have access to any of the  following devices or technologies?: Smart phone  Media or technology needs impacting ability to self-manage diabetes?: No    Cognitive/Behavioral Health  Alert and Oriented: Yes  Difficulty Thinking: No  Requires Prompting: No  Requires assistance for routine expression?: No  Cognitive or behavioral barriers impacting ability to self-manage diabetes?: No      Communication  Language preference: English  Hearing Problems: No  Vision Problems: No  Communication needs impacting ability to self-manage diabetes?: No    Health Literacy  Preferred Learning Method: Face to Face, Reading Materials, Demonstration  How often do you need to have someone help you read instructions, pamphlets, or written material from your doctor or pharmacy?: Never      Diabetes Self-Management Skills Assessment    Diabetes Disease Process/Treatment Options  Patient/caregiver able to state what happens when someone has diabetes.: somewhat  Patient/caregiver knows what type of diabetes they have.: yes  Diabetes Type : Type II  Assessment indicates:: Knowledge deficit  Area of need?: Yes    Nutrition/Healthy Eating  Challenges to healthy eating:: eating out, going to parties, portion control, snacking between meals and at night  Method of carbohydrate measurement:: no method  Patient can identify foods that impact blood sugar.: yes  Patient-identified foods:: fruit/fruit juice, sweets  Nutrition/Healthy Eating Skills Assessment Completed:: Yes  Assessment indicates:: Knowledge deficit, Instruction Needed  Area of need?: Yes    Physical Activity/Exercise  Physical Activity/Exercise Skills Assessment Completed: : No  Deffered due to:: Time    Medications  Patient is able to describe current diabetes management routine.: yes  Diabetes management routine:: injectable medications, insulin  Patient is able to identify current diabetes medications, dosages, and appropriate timing of medications.: yes  Patient understands the purpose of the medications  taken for diabetes.: yes  Patient reports problems or concerns with current medication regimen.: no  Medication Skills Assessment Completed:: Yes  Assessment indicates:: Adequate understanding, Instruction Needed  Area of need?: Yes    Home Blood Glucose Monitoring  Patient states that blood sugar is checked at home daily.: yes  Monitoring Method:: home glucometer  Home glucometer meter type:: Accucheck avia  When you check what is your typical blood sugar range? : Oral recall am 99 to 125, lunch after meal  Blood glucose logs:: no, encouraged to keep logs, encouraged to bring logs to provider visits  Blood glucose logs reviewed today?: no  Assessment indicates:: Instruction Needed  Area of need?: Yes    Acute Complications  Patient is able to identify types of acute complications: Yes  Patient Identified:: Hypoglycemia  Acute Complications Skills Assessment Completed: : Yes  Assessment indicates:: Instruction Needed  Area of need?: Yes    Chronic Complications  Chronic Complications Skills Assessment Completed: : No  Deferred due to:: Time    Psychosocial/Coping  Patient can identify ways of coping with chronic disease.: yes  Patient-stated ways of coping with chronic disease:: support from loved ones  Psychosocial/Coping Skills Assessment Completed: : Yes  Assessment indicates:: Adequate understanding  Area of need?: No      Diabetes Self Support Plan         Assessment Summary and Plan    Based on today's diabetes care assessment, the following areas of need were identified:      Social 12/1/2022   Support -   Access to [employer] No   Cognitive/Behavioral Health No   Communication No   Health Literacy -        Clinical 12/1/2022   Medication Adherence No   Nutritional Status No        Diabetes Self-Management Skills 12/1/2022   Diabetes Disease Process/Treatment Options Yes, Provided DM management guide and provided instruction regarding signs and symptoms, risk factors of diabetes, increased risk for  cardio and cerebral vascular events.  Instructed patient on what is diabetes and the progression of the disease. Discussed significance of current A1c and blood glucose goals.   Nutrition/Healthy Eating Yes, see care planning   Medication Yes, see care planning, Sending note to Dr. Blanca to order 4mm pen needles   Home Blood Glucose Monitoring Yes, reviewed. Declined CGM   Acute Complications Yes - Reviewed blood glucose goals, prevention, detection, signs and symptoms, and treatment of hypoglycemia following rule of 15 and hyperglycemia, and when to contact the clinic. Advised to carry a source of fast acting carbs.    Psychosocial/Coping No          Today's interventions were provided through individual discussion, instruction, and written materials were provided.      Patient verbalized understanding of instruction and written materials.  Pt was able to return back demonstration of instructions today. Patient understood key points, needs reinforcement and further instruction.     Diabetes Self-Management Care Plan:    Today's Diabetes Self-Management Care Plan was developed with Chelly's input. Chelly has agreed to work toward the following goal(s) to improve his/her overall diabetes control.      Care Plan: Diabetes Management   Updates made since 11/2/2022 12:00 AM        Problem: Healthy Eating         Goal: Eat 2-3 meals daily with 30-45g/2-3 servings of Carbohydrate per meal.    Start Date: 5/24/2021   Expected End Date: 12/22/2022   This Visit's Progress: Not met   Priority: High   Barriers: Knowledge deficit   Note:    Patient states that she struggles with food choices.  We agreed upon the above goal for her food intake.  She would like to lose 10-15 pounds by our next f/u visit.  She will do this by learning to make better food choices in relation to carbs and decrease consumption of snacks and fast foods.     Update to care planning 12/1/2022:  Patient seen today for review of nutrition.  Last seen in  2021.  Reviewed her food choices, she still like to eat out at [employer], but is trying to choose more healthy choices. Still does snacking on popcorn and sometimes Atkins bars.     We concentrated on portion sizes at today's session.  Encouraged increased consumption of non-starchy veggies to diet.  Overall meal planning and making better choices for meals.         Problem: Medications         Goal: Patient Agrees to take Diabetes Medication(s) as prescribed.    Start Date: 12/1/2022   Priority: High   Barriers: Knowledge deficit   Note:    We reviewed Ms. Ortiz diabetes meds today.  Clarified the dose on Trulicity 3 mg.  She was prescribed this but was unsure what the increased dose meant. MOA was explained in detail. Reviewed timing of insulin and stressed the importance of dosing meal time insulin at least 15 mins prior or at the time of meal, never afterwards.  She is using a new needle with each injection.  Was complaining of discomfort and bleeding at the injection site stating the pen needles were long.  8 mm was ordered for her.  Provided her with 4mm samples to try and advised with have Endo call those into the pharmacy. We spend a great deal of time explaining this. Understanding was verbalized and will see the patient in 3 weeks for follow up.  She again declined the use of a CGM and prefers to do glucometer.        Task: Reviewed with patient all current diabetes medications and provided basic review of the purpose, dosage, frequency, side effects, and storage of both oral and injectable diabetes medications. Completed 12/1/2022     Task: Instructed patient on how to self-administer Trulicity Completed 12/1/2022     Task: Discussed guidelines for preventing, detecting and treating hypoglycemia and hyperglycemia and reviewed the importance of meal and medication timing with diabetes mediations for prevention of hypoglycemia and maximum drug benefit. Completed 12/1/2022          Follow Up Plan     F/u  on 12/22/2022    Today's care plan and follow up schedule was discussed with patient.  Chelly verbalized understanding of the care plan, goals, and agrees to follow up plan.        The patient was encouraged to communicate with his/her health care provider/physician and care team regarding his/her condition(s) and treatment.  I provided the patient with my contact information today and encouraged to contact me via phone or [facility] Patient Portal as needed.     Length of Visit   Total Time: 60 Minutes

## 2022-12-02 NOTE — PROGRESS NOTES
"Subjective:         Chief Complaint: leg cramps      Chelly Ortiz is a 72 y.o. female, patient of Gabriel Wilson MD unknown to me, presents today with complaints of leg cramps     HPI  Patient presents today with complaints of leg cramps x5 months, worsening in the last weeks. Seen by Dr. Xie on 11/18/2022, started on Ropinirole/Requip, pt took for 5 days but did not like the way it made her feel, as it gave her bad dreams and "feelings of becoming frightened." Medication did not help with leg cramps. Pt also on Gabapentin which helps sometimes. Leg cramps occur at rest, mostly at night/while in bed. Denies calf pain with ambulation. Denies numbness/tingling/burning sensation to leg or feet or swelling. Does use compression stocking on regular basis. Last K 3.4 (3.3). Not on diuretics or K+ replacement.    On side note, patient c/o right ear discomfort/itching x 3 days. Denies ear discharge, pain, fever, chills, or recent illness.      Chemistry        Component Value Date/Time     10/21/2022 1058    K 3.4 (L) 10/21/2022 1058     10/21/2022 1058    CO2 27 10/21/2022 1058    BUN 11 10/21/2022 1058    CREATININE 0.8 10/21/2022 1058     (H) 10/21/2022 1058        Component Value Date/Time    CALCIUM 9.3 10/21/2022 1058    ALKPHOS 111 10/21/2022 1058    AST 17 10/21/2022 1058    ALT 11 10/21/2022 1058    BILITOT 0.5 10/21/2022 1058    ESTGFRAFRICA >60.0 06/13/2022 0841    EGFRNONAA >60.0 06/13/2022 0841              Past Medical History:   Diagnosis Date    Allergy     Cataract     DDD (degenerative disc disease), lumbar     Diabetes mellitus     diet controlled    Diabetes mellitus, type 2     GERD (gastroesophageal reflux disease)     History of subconjunctival hemorrhage 12/2/11    left eye    Hyperlipidemia     Hypertension     IBS (irritable bowel syndrome)     Obesity     MYRIAM (obstructive sleep apnea)     on CPAP    Rotator cuff impingement syndrome     Seasonal allergic " conjunctivitis        Past Surgical History:   Procedure Laterality Date    CATARACT EXTRACTION W/  INTRAOCULAR LENS IMPLANT Right 10/03/2013         SECTION      x2    CHOLECYSTECTOMY      COLONOSCOPY N/A 2018    Procedure: COLONOSCOPY;  Surgeon: Marie Mejia MD;  Location: [facility] ENDO;  Service: Endoscopy;  Laterality: N/A;    COLONOSCOPY N/A 2022    Procedure: COLONOSCOPY;  Surgeon: Pierce Rivera MD;  Location: [facility] ENDO;  Service: Endoscopy;  Laterality: N/A;    ENDOSCOPIC ULTRASOUND OF UPPER GASTROINTESTINAL TRACT N/A 10/9/2018    Procedure: ULTRASOUND, UPPER GI TRACT, ENDOSCOPIC;  Surgeon: Javy Simpson MD;  Location: [facility] ENDO;  Service: Endoscopy;  Laterality: N/A;    EXCISION OF LESION N/A 2019    Procedure: EXCISION, LESION VULVA;  Surgeon: Liv Odell MD;  Location: [facility] OR;  Service: OB/GYN;  Laterality: N/A;  latex allergy    EYE SURGERY      HYSTERECTOMY      ovaries intact, due to DUB    INTRAOCULAR PROSTHESES INSERTION Left 2022    Procedure: INSERTION, IOL PROSTHESIS;  Surgeon: Clarice Herzog MD;  Location: [facility] OR [address] [place] [address];  Service: Ophthalmology;  Laterality: Left;    PHACOEMULSIFICATION OF CATARACT Left 2022    Procedure: PHACOEMULSIFICATION, CATARACT;  Surgeon: Clarice Herzog MD;  Location: [facility] OR [address] [place] [address];  Service: Ophthalmology;  Laterality: Left;    TUBAL LIGATION         Family History   Problem Relation Age of Onset    Alzheimer's disease Mother     Heart disease Father     Diabetes Sister     Breast cancer Sister     Deep vein thrombosis Brother     Diabetes Daughter     Cancer Brother         Lung    Lung cancer Brother     Amblyopia Neg Hx     Blindness Neg Hx     Cataracts Neg Hx     Glaucoma Neg Hx     Hypertension Neg Hx     Macular degeneration Neg Hx     Retinal detachment Neg Hx     Strabismus Neg Hx     Stroke Neg Hx     Thyroid disease Neg Hx        Social History     Socioeconomic History    Marital  status:    Tobacco Use    Smoking status: Former     Types: Cigarettes     Quit date: 2/15/1983     Years since quittin.8    Smokeless tobacco: Never   Substance and Sexual Activity    Alcohol use: No    Drug use: No    Sexual activity: Yes     Partners: Male     Social Determinants of Health     Financial Resource Strain: Low Risk     Difficulty of Paying Living Expenses: Not very hard   Food Insecurity: Food Insecurity Present    Worried About Running Out of Food in the Last Year: Sometimes true    Ran Out of Food in the Last Year: Patient refused   Transportation Needs: No Transportation Needs    Lack of Transportation (Medical): No    Lack of Transportation (Non-Medical): No   Physical Activity: Inactive    Days of Exercise per Week: 0 days    Minutes of Exercise per Session: 30 min   Stress: No Stress Concern Present    Feeling of Stress : Not at all   Social Connections: Unknown    Frequency of Communication with Friends and Family: Three times a week    Frequency of Social Gatherings with Friends and Family: Patient refused    Active Member of Clubs or Organizations: No    Attends Club or Organization Meetings: Never    Marital Status:    Housing Stability: Low Risk     Unable to Pay for Housing in the Last Year: No    Number of Places Lived in the Last Year: 1    Unstable Housing in the Last Year: No       Review of Systems   Constitutional:  Negative for appetite change, chills, fever and unexpected weight change.   HENT:  Negative for ear discharge and ear pain.    Eyes:  Negative for visual disturbance.   Respiratory:  Negative for chest tightness and shortness of breath.    Cardiovascular:  Negative for chest pain and palpitations.   Gastrointestinal:  Negative for abdominal pain.   Genitourinary: Negative.    Musculoskeletal: Negative.  Negative for arthralgias, joint swelling and myalgias.   Skin:  Negative for color change.   Neurological:  Negative for dizziness, weakness and  headaches.   Hematological:  Negative for adenopathy.   All other systems reviewed and are negative.      Objective:     Vitals:    12/02/22 1337 12/02/22 1405   BP: (!) 142/68 126/62   BP Location: Left arm Left arm   Patient Position: Sitting Sitting   BP Method: Large (Manual)    Pulse: 89    SpO2: 97%    Weight: 90 kg (198 lb 6.6 oz)           Physical Exam  Vitals reviewed.   Constitutional:       General: She is not in acute distress.     Appearance: Normal appearance. She is well-developed and well-groomed. She is not ill-appearing or toxic-appearing.   HENT:      Right Ear: Swelling (erythema) present. No mastoid tenderness.      Left Ear: Tympanic membrane normal.   Cardiovascular:      Rate and Rhythm: Normal rate and regular rhythm.      Pulses: Normal pulses.           Dorsalis pedis pulses are 2+ on the right side and 2+ on the left side.        Posterior tibial pulses are 2+ on the right side and 2+ on the left side.      Heart sounds: No murmur heard.  Pulmonary:      Effort: Pulmonary effort is normal. No respiratory distress.      Breath sounds: Normal breath sounds.   Abdominal:      General: Bowel sounds are normal.      Palpations: Abdomen is soft.   Musculoskeletal:         General: No swelling, tenderness, deformity or signs of injury. Normal range of motion.      Right lower leg: Normal. No swelling or tenderness. No edema.      Left lower leg: Normal. No swelling or tenderness. No edema.      Comments: Full ROM to BLE     Skin:     General: Skin is warm and dry.      Capillary Refill: Capillary refill takes less than 2 seconds.      Findings: No bruising, erythema, lesion or rash.   Neurological:      General: No focal deficit present.      Mental Status: She is alert and oriented to person, place, and time.   Psychiatric:         Attention and Perception: Attention normal.         Mood and Affect: Mood normal.         Laboratory:  CBC:  Recent Labs   Lab Result Units 10/21/22  1058   WBC  K/uL 5.24   RBC M/uL 4.96   Hemoglobin g/dL 12.7   Hematocrit % 39.7   Platelets K/uL 323   MCV fL 80*   MCH pg 25.6*   MCHC g/dL 32.0     CMP:  Recent Labs   Lab Result Units 10/21/22  1058   Glucose mg/dL 139*   Calcium mg/dL 9.3   Albumin g/dL 3.9   Total Protein g/dL 7.5   Sodium mmol/L 139   Potassium mmol/L 3.4*   CO2 mmol/L 27   Chloride mmol/L 103   BUN mg/dL 11   Alkaline Phosphatase U/L 111   ALT U/L 11   AST U/L 17   Total Bilirubin mg/dL 0.5         Assessment:         ICD-10-CM ICD-9-CM   1. Leg cramps  R25.2 729.82   2. Hypokalemia  E87.6 276.8   3. Myalgia  M79.10 729.1   4. Acute diffuse otitis externa of right ear  H60.311 380.10       Plan:       Leg cramps  -     BASIC METABOLIC PANEL; Future; Expected date: 12/02/2022  -     MAGNESIUM; Future; Expected date: 12/02/2022  -     gabapentin (NEURONTIN) 100 MG capsule; Take 1 capsule (100 mg total) by mouth 3 (three) times daily.  Dispense: 270 capsule; Refill: 3    Hypokalemia  -     potassium chloride SA (K-DUR,KLOR-CON M) 10 MEQ tablet; Take 1 tablet (10 mEq total) by mouth 2 (two) times daily. for 10 days  Dispense: 20 tablet; Refill: 0  -     BASIC METABOLIC PANEL; Future; Expected date: 12/15/2022    Myalgia  -     gabapentin (NEURONTIN) 100 MG capsule; Take 1 capsule (100 mg total) by mouth 3 (three) times daily.  Dispense: 270 capsule; Refill: 3    Acute diffuse otitis externa of right ear  -     ciprofloxacin-dexAMETHasone 0.3-0.1% (CIPRODEX) 0.3-0.1 % DrpS; Place 4 drops into the right ear 2 (two) times daily. for 7 days  Dispense: 7.5 mL; Refill: 0    - BMP and Magnesium today.  - Potassium replacement trial for 10 days to eval for improvement of symptoms.  -Instructed to take Gabapentin prior going to bed to avoid worsening symptoms. Medication refill sent to preferred pharmacy.  -Patient counseled on medication mechanism of action, side effects, benefits, and risks. Patient verbalizes understanding.    -F/u in two weeks, repeat BMP prior  "visit.    Patient voiced understanding and in agreement with current treatment plan     If symptoms worsen, go to ER.  If symptoms do not improve, return to clinic.   Keep appointments with all specialists.   Follow up in about 2 weeks (around 12/16/2022), or if symptoms worsen or fail to improve.     Patient's Medications   New Prescriptions    CIPROFLOXACIN-DEXAMETHASONE 0.3-0.1% (CIPRODEX) 0.3-0.1 % DRPS    Place 4 drops into the right ear 2 (two) times daily. for 7 days    POTASSIUM CHLORIDE SA (K-DUR,KLOR-CON M) 10 MEQ TABLET    Take 1 tablet (10 mEq total) by mouth 2 (two) times daily. for 10 days   Previous Medications    ACCU-CHEK FASTCLIX LANCET DRUM MISC    TEST BLOOD SUGAR THREE TIMES A DAY    ACCU-CHEK FASTCLIX LANCING DEV KIT    USE AS DIRECTED    ALBUTEROL (PROVENTIL/VENTOLIN HFA) 90 MCG/ACTUATION INHALER    Inhale 1-2 puffs into the lungs every 4 (four) hours as needed for Wheezing.    AMLODIPINE (NORVASC) 10 MG TABLET    TAKE 1 TABLET EVERY DAY    ASPIRIN (ECOTRIN) 81 MG EC TABLET    Take 81 mg by mouth once daily.    AZELASTINE (OPTIVAR) 0.05 % OPHTHALMIC SOLUTION    Place 1 drop into both eyes 2 (two) times daily.    BD ULTRA-FINE SHORT PEN NEEDLE 31 GAUGE X 5/16" NDLE    USES 4 TIMES A DAY.    BLOOD GLUCOSE CONTROL HIGH,LOW (ACCU-CHEK GUIDE L1-L2 CTRL SOL) SOLN    3 times a day    BLOOD SUGAR DIAGNOSTIC (ACCU-CHEK GUIDE TEST STRIPS) STRP    3 times a day    BLOOD-GLUCOSE METER (ACCU-CHEK GUIDE GLUCOSE METER) MISC    Three times a day    CALCIUM CARBONATE (OS-ARIC) 600 MG CALCIUM (1,500 MG) TAB    Take 1 tablet (600 mg total) by mouth once. for 1 dose    CETIRIZINE (ZYRTEC) 10 MG TABLET    Take 1 tablet (10 mg total) by mouth every evening.    CHLORHEXIDINE (PERIDEX) 0.12 % SOLUTION    Use as directed 15 mLs in the mouth or throat daily as needed.    CITALOPRAM (CELEXA) 10 MG TABLET    Take 1 tablet (10 mg total) by mouth once daily.    DULAGLUTIDE (TRULICITY) 3 MG/0.5 ML PEN INJECTOR    Inject 3 mg " "into the skin every 7 days.    ERGOCALCIFEROL (ERGOCALCIFEROL) 50,000 UNIT CAP    TAKE 1 CAPSULE EVERY 7 DAYS. Strength: 50,000 Units    ESOMEPRAZOLE (NEXIUM) 40 MG CAPSULE    Take 1 capsule (40 mg total) by mouth before breakfast.    FLUTICASONE-SALMETEROL DISKUS INHALER 250-50 MCG    Inhale 1 puff by mouth into the lungs 2 (two) times daily. Controller    GLUCOSE 4 GM CHEWABLE TABLET    Take 4 tablets (16 g total) by mouth as needed for Low blood sugar (If having symptoms of blurry vision, palpitations, confusion, shakiness.  Please check sugars and if sugar below 70 please take 4 tablets and re-check sugar everry 15 minutes until sugars are above 70 and symptoms resolve.).    INSULIN ASPART U-100 (NOVOLOG FLEXPEN U-100 INSULIN) 100 UNIT/ML (3 ML) INPN PEN    Inject 12 units w/ meals plus scale 150-200+2, 201-250+4, 251-300+6, 301-350+8, >350+10. Max daily 50 units.    INSULIN DETEMIR U-100 (LEVEMIR FLEXTOUCH U-100 INSULN) 100 UNIT/ML (3 ML) INPN PEN    Inject 45 units into the skin at night.    INSULIN SYRINGE-NEEDLE U-100 0.3 ML 31 GAUGE X 5/16" SYRG    relion brand, use 5 x a day, if not on levemir or novolog    INSULIN SYRINGE-NEEDLE U-100 0.5 ML 31 GAUGE X 5/16" SYRG    Use nightly. 90 day    MAGNESIUM OXIDE (MAG-OX) 400 MG TABLET    Take 1 tablet (400 mg total) by mouth 2 (two) times daily.    PEN NEEDLE, DIABETIC 32 GAUGE X 5/32" NDLE    Change needle with each injection    PFIZER COVID-19 NILO VACCN,PF, 30 MCG/0.3 ML INJECTION        PRAVASTATIN (PRAVACHOL) 40 MG TABLET    Take 1 tablet (40 mg total) by mouth every evening.    PREDNISOLONE ACETATE (PRED FORTE) 1 % DRPS    Left eye - one drop 3 x day for 2 weeks, then 2 x day for 2 weeks, then 1 x day for 2 weeks, then stop    ROPINIROLE (REQUIP) 0.5 MG TABLET    Take 1 tablet (0.5 mg total) by mouth every evening.    TRAZODONE (DESYREL) 50 MG TABLET    TAKE 1 TABLET EVERY NIGHT AS NEEDED   Modified Medications    Modified Medication Previous Medication    " GABAPENTIN (NEURONTIN) 100 MG CAPSULE gabapentin (NEURONTIN) 100 MG capsule       Take 1 capsule (100 mg total) by mouth 3 (three) times daily.    Take 1 capsule (100 mg total) by mouth 3 (three) times daily.   Discontinued Medications    No medications on file         Sindy Warren NP

## 2022-12-05 ENCOUNTER — PATIENT MESSAGE (OUTPATIENT)
Dept: FAMILY MEDICINE | Facility: CLINIC | Age: 72
End: 2022-12-05
Payer: MEDICARE

## 2022-12-05 DIAGNOSIS — H60.501 ACUTE OTITIS EXTERNA OF RIGHT EAR, UNSPECIFIED TYPE: Primary | ICD-10-CM

## 2022-12-05 RX ORDER — NEOMYCIN SULFATE, POLYMYXIN B SULFATE, HYDROCORTISONE 3.5; 10000; 1 MG/ML; [USP'U]/ML; MG/ML
3 SOLUTION/ DROPS AURICULAR (OTIC) 4 TIMES DAILY
Qty: 10 ML | Refills: 0 | Status: SHIPPED | OUTPATIENT
Start: 2022-12-05 | End: 2023-08-03

## 2022-12-13 ENCOUNTER — LAB VISIT (OUTPATIENT)
Dept: LAB | Facility: HOSPITAL | Age: 72
End: 2022-12-13
Payer: MEDICARE

## 2022-12-13 DIAGNOSIS — R25.2 LEG CRAMPS: ICD-10-CM

## 2022-12-13 PROCEDURE — 80048 BASIC METABOLIC PNL TOTAL CA: CPT

## 2022-12-14 ENCOUNTER — PATIENT MESSAGE (OUTPATIENT)
Dept: FAMILY MEDICINE | Facility: CLINIC | Age: 72
End: 2022-12-14
Payer: MEDICARE

## 2022-12-14 LAB
ANION GAP SERPL CALC-SCNC: 11 MMOL/L (ref 8–16)
BUN SERPL-MCNC: 9 MG/DL (ref 8–23)
CALCIUM SERPL-MCNC: 9.2 MG/DL (ref 8.7–10.5)
CHLORIDE SERPL-SCNC: 105 MMOL/L (ref 95–110)
CO2 SERPL-SCNC: 26 MMOL/L (ref 23–29)
CREAT SERPL-MCNC: 0.9 MG/DL (ref 0.5–1.4)
EST. GFR  (NO RACE VARIABLE): >60 ML/MIN/1.73 M^2
GLUCOSE SERPL-MCNC: 93 MG/DL (ref 70–110)
POTASSIUM SERPL-SCNC: 3.5 MMOL/L (ref 3.5–5.1)
SODIUM SERPL-SCNC: 142 MMOL/L (ref 136–145)

## 2022-12-14 NOTE — PROGRESS NOTES
HPI     Post-op Evaluation            Comments: Pt states no complaints today           Comments    Pt notes her left eye is doing very well , but the right eye seems more   dull than in the past     S/p Phaco IOL OS 11/2/22  S/P cat sx - 2013 - od - jj     1. PC IOL OS 11/2/2022 - [facility]  2. Blepharitis   3. PC IOL OD - Jj - 2013   4. Dry eyes       MEDS:  PA QDAY OS  Optivar PRN OU  AT's PRN OU          Last edited by Clarice Herzog MD on 12/15/2022  1:40 PM.            Assessment /Plan     For exam results, see Encounter Report.    Postoperative care for cataract    DM type 2 without retinopathy    Pseudophakia, right eye    Pseudophakia, left eye          Blepharitis   WC/LH/AT's     S/P PCIOL Dr Hurtado 2013 OD    SN60WF - 19.0    Ocular meds:   Visine PRN for dry eyes     Eyemeds   Optivar OU PRN     Phaco / IOL OS Date: 11/2/2022 - SN60WF 19.5  POW 5  # 1 - phaco/IOL   Doing well   Off gtts     F/U 4 months - AR/MR ou  and OCT f the macula - pt is concerned that the vision in her right eye is not as good as the more recently done left eye   No PCO in the visual axis seen today - NOT - dilated   ?? Mild macula pathology // ?? Mild unappreciated PCO in visual axis od   Prefers to be seen in the [facility] office       If all stable can F/U with [facility] yearly - old pt of his

## 2022-12-15 ENCOUNTER — OFFICE VISIT (OUTPATIENT)
Dept: OPHTHALMOLOGY | Facility: CLINIC | Age: 72
End: 2022-12-15
Payer: MEDICARE

## 2022-12-15 DIAGNOSIS — E11.9 DM TYPE 2 WITHOUT RETINOPATHY: ICD-10-CM

## 2022-12-15 DIAGNOSIS — Z48.810 POSTOPERATIVE CARE FOR CATARACT: Primary | ICD-10-CM

## 2022-12-15 DIAGNOSIS — Z96.1 PSEUDOPHAKIA, RIGHT EYE: ICD-10-CM

## 2022-12-15 DIAGNOSIS — Z96.1 PSEUDOPHAKIA, LEFT EYE: ICD-10-CM

## 2022-12-15 DIAGNOSIS — Z98.49 POSTOPERATIVE CARE FOR CATARACT: Primary | ICD-10-CM

## 2022-12-15 PROCEDURE — 3288F FALL RISK ASSESSMENT DOCD: CPT | Mod: CPTII,S$GLB,, | Performed by: OPHTHALMOLOGY

## 2022-12-15 PROCEDURE — 1101F PT FALLS ASSESS-DOCD LE1/YR: CPT | Mod: CPTII,S$GLB,, | Performed by: OPHTHALMOLOGY

## 2022-12-15 PROCEDURE — 4010F ACE/ARB THERAPY RXD/TAKEN: CPT | Mod: CPTII,S$GLB,, | Performed by: OPHTHALMOLOGY

## 2022-12-15 PROCEDURE — 1126F AMNT PAIN NOTED NONE PRSNT: CPT | Mod: CPTII,S$GLB,, | Performed by: OPHTHALMOLOGY

## 2022-12-15 PROCEDURE — 99999 PR PBB SHADOW E&M-EST. PATIENT-LVL IV: ICD-10-PCS | Mod: PBBFAC,,, | Performed by: OPHTHALMOLOGY

## 2022-12-15 PROCEDURE — 3066F PR DOCUMENTATION OF TREATMENT FOR NEPHROPATHY: ICD-10-PCS | Mod: CPTII,S$GLB,, | Performed by: OPHTHALMOLOGY

## 2022-12-15 PROCEDURE — 1159F MED LIST DOCD IN RCRD: CPT | Mod: CPTII,S$GLB,, | Performed by: OPHTHALMOLOGY

## 2022-12-15 PROCEDURE — 3288F PR FALLS RISK ASSESSMENT DOCUMENTED: ICD-10-PCS | Mod: CPTII,S$GLB,, | Performed by: OPHTHALMOLOGY

## 2022-12-15 PROCEDURE — 1126F PR PAIN SEVERITY QUANTIFIED, NO PAIN PRESENT: ICD-10-PCS | Mod: CPTII,S$GLB,, | Performed by: OPHTHALMOLOGY

## 2022-12-15 PROCEDURE — 99024 POSTOP FOLLOW-UP VISIT: CPT | Mod: S$GLB,,, | Performed by: OPHTHALMOLOGY

## 2022-12-15 PROCEDURE — 3066F NEPHROPATHY DOC TX: CPT | Mod: CPTII,S$GLB,, | Performed by: OPHTHALMOLOGY

## 2022-12-15 PROCEDURE — 1159F PR MEDICATION LIST DOCUMENTED IN MEDICAL RECORD: ICD-10-PCS | Mod: CPTII,S$GLB,, | Performed by: OPHTHALMOLOGY

## 2022-12-15 PROCEDURE — 1160F PR REVIEW ALL MEDS BY PRESCRIBER/CLIN PHARMACIST DOCUMENTED: ICD-10-PCS | Mod: CPTII,S$GLB,, | Performed by: OPHTHALMOLOGY

## 2022-12-15 PROCEDURE — 1160F RVW MEDS BY RX/DR IN RCRD: CPT | Mod: CPTII,S$GLB,, | Performed by: OPHTHALMOLOGY

## 2022-12-15 PROCEDURE — 3061F PR NEG MICROALBUMINURIA RESULT DOCUMENTED/REVIEW: ICD-10-PCS | Mod: CPTII,S$GLB,, | Performed by: OPHTHALMOLOGY

## 2022-12-15 PROCEDURE — 3044F HG A1C LEVEL LT 7.0%: CPT | Mod: CPTII,S$GLB,, | Performed by: OPHTHALMOLOGY

## 2022-12-15 PROCEDURE — 99024 PR POST-OP FOLLOW-UP VISIT: ICD-10-PCS | Mod: S$GLB,,, | Performed by: OPHTHALMOLOGY

## 2022-12-15 PROCEDURE — 4010F PR ACE/ARB THEARPY RXD/TAKEN: ICD-10-PCS | Mod: CPTII,S$GLB,, | Performed by: OPHTHALMOLOGY

## 2022-12-15 PROCEDURE — 3061F NEG MICROALBUMINURIA REV: CPT | Mod: CPTII,S$GLB,, | Performed by: OPHTHALMOLOGY

## 2022-12-15 PROCEDURE — 99999 PR PBB SHADOW E&M-EST. PATIENT-LVL IV: CPT | Mod: PBBFAC,,, | Performed by: OPHTHALMOLOGY

## 2022-12-15 PROCEDURE — 3044F PR MOST RECENT HEMOGLOBIN A1C LEVEL <7.0%: ICD-10-PCS | Mod: CPTII,S$GLB,, | Performed by: OPHTHALMOLOGY

## 2022-12-15 PROCEDURE — 1101F PR PT FALLS ASSESS DOC 0-1 FALLS W/OUT INJ PAST YR: ICD-10-PCS | Mod: CPTII,S$GLB,, | Performed by: OPHTHALMOLOGY

## 2022-12-22 ENCOUNTER — OFFICE VISIT (OUTPATIENT)
Dept: ENDOCRINOLOGY | Facility: CLINIC | Age: 72
End: 2022-12-22
Payer: MEDICARE

## 2022-12-22 ENCOUNTER — PATIENT MESSAGE (OUTPATIENT)
Dept: ENDOCRINOLOGY | Facility: CLINIC | Age: 72
End: 2022-12-22

## 2022-12-22 ENCOUNTER — CLINICAL SUPPORT (OUTPATIENT)
Dept: DIABETES | Facility: CLINIC | Age: 72
End: 2022-12-22
Payer: MEDICARE

## 2022-12-22 VITALS
SYSTOLIC BLOOD PRESSURE: 138 MMHG | HEART RATE: 70 BPM | RESPIRATION RATE: 16 BRPM | WEIGHT: 197.88 LBS | DIASTOLIC BLOOD PRESSURE: 62 MMHG | WEIGHT: 197.88 LBS | BODY MASS INDEX: 35.05 KG/M2 | OXYGEN SATURATION: 99 % | BODY MASS INDEX: 35.05 KG/M2

## 2022-12-22 DIAGNOSIS — E11.9 CONTROLLED TYPE 2 DIABETES MELLITUS WITHOUT COMPLICATION, WITH LONG-TERM CURRENT USE OF INSULIN: Primary | ICD-10-CM

## 2022-12-22 DIAGNOSIS — E11.65 TYPE 2 DIABETES MELLITUS WITH HYPERGLYCEMIA, WITH LONG-TERM CURRENT USE OF INSULIN: Primary | ICD-10-CM

## 2022-12-22 DIAGNOSIS — E78.5 HYPERLIPIDEMIA, UNSPECIFIED HYPERLIPIDEMIA TYPE: ICD-10-CM

## 2022-12-22 DIAGNOSIS — I10 HYPERTENSION, ESSENTIAL: ICD-10-CM

## 2022-12-22 DIAGNOSIS — Z79.4 TYPE 2 DIABETES MELLITUS WITH HYPERGLYCEMIA, WITH LONG-TERM CURRENT USE OF INSULIN: Primary | ICD-10-CM

## 2022-12-22 DIAGNOSIS — E11.65 UNCONTROLLED TYPE 2 DIABETES MELLITUS WITH HYPERGLYCEMIA: ICD-10-CM

## 2022-12-22 DIAGNOSIS — Z79.4 CONTROLLED TYPE 2 DIABETES MELLITUS WITHOUT COMPLICATION, WITH LONG-TERM CURRENT USE OF INSULIN: Primary | ICD-10-CM

## 2022-12-22 PROCEDURE — 3008F BODY MASS INDEX DOCD: CPT | Mod: CPTII,S$GLB,, | Performed by: GENERAL ACUTE CARE HOSPITAL

## 2022-12-22 PROCEDURE — 3061F PR NEG MICROALBUMINURIA RESULT DOCUMENTED/REVIEW: ICD-10-PCS | Mod: CPTII,S$GLB,, | Performed by: GENERAL ACUTE CARE HOSPITAL

## 2022-12-22 PROCEDURE — 1125F AMNT PAIN NOTED PAIN PRSNT: CPT | Mod: CPTII,S$GLB,, | Performed by: GENERAL ACUTE CARE HOSPITAL

## 2022-12-22 PROCEDURE — 99999 PR PBB SHADOW E&M-EST. PATIENT-LVL IV: ICD-10-PCS | Mod: PBBFAC,,, | Performed by: GENERAL ACUTE CARE HOSPITAL

## 2022-12-22 PROCEDURE — 3066F NEPHROPATHY DOC TX: CPT | Mod: CPTII,S$GLB,, | Performed by: GENERAL ACUTE CARE HOSPITAL

## 2022-12-22 PROCEDURE — 1125F PR PAIN SEVERITY QUANTIFIED, PAIN PRESENT: ICD-10-PCS | Mod: CPTII,S$GLB,, | Performed by: GENERAL ACUTE CARE HOSPITAL

## 2022-12-22 PROCEDURE — 3066F PR DOCUMENTATION OF TREATMENT FOR NEPHROPATHY: ICD-10-PCS | Mod: CPTII,S$GLB,, | Performed by: GENERAL ACUTE CARE HOSPITAL

## 2022-12-22 PROCEDURE — 3044F HG A1C LEVEL LT 7.0%: CPT | Mod: CPTII,S$GLB,, | Performed by: GENERAL ACUTE CARE HOSPITAL

## 2022-12-22 PROCEDURE — G0108 PR DIAB MANAGE TRN  PER INDIV: ICD-10-PCS | Mod: S$GLB,,, | Performed by: INTERNAL MEDICINE

## 2022-12-22 PROCEDURE — 99214 OFFICE O/P EST MOD 30 MIN: CPT | Mod: S$GLB,,, | Performed by: GENERAL ACUTE CARE HOSPITAL

## 2022-12-22 PROCEDURE — 1160F RVW MEDS BY RX/DR IN RCRD: CPT | Mod: CPTII,S$GLB,, | Performed by: GENERAL ACUTE CARE HOSPITAL

## 2022-12-22 PROCEDURE — 1159F MED LIST DOCD IN RCRD: CPT | Mod: CPTII,S$GLB,, | Performed by: GENERAL ACUTE CARE HOSPITAL

## 2022-12-22 PROCEDURE — 3044F PR MOST RECENT HEMOGLOBIN A1C LEVEL <7.0%: ICD-10-PCS | Mod: CPTII,S$GLB,, | Performed by: GENERAL ACUTE CARE HOSPITAL

## 2022-12-22 PROCEDURE — 3008F PR BODY MASS INDEX (BMI) DOCUMENTED: ICD-10-PCS | Mod: CPTII,S$GLB,, | Performed by: GENERAL ACUTE CARE HOSPITAL

## 2022-12-22 PROCEDURE — 4010F PR ACE/ARB THEARPY RXD/TAKEN: ICD-10-PCS | Mod: CPTII,S$GLB,, | Performed by: GENERAL ACUTE CARE HOSPITAL

## 2022-12-22 PROCEDURE — 99999 PR PBB SHADOW E&M-EST. PATIENT-LVL I: CPT | Mod: PBBFAC,,,

## 2022-12-22 PROCEDURE — 3078F DIAST BP <80 MM HG: CPT | Mod: CPTII,S$GLB,, | Performed by: GENERAL ACUTE CARE HOSPITAL

## 2022-12-22 PROCEDURE — 3061F NEG MICROALBUMINURIA REV: CPT | Mod: CPTII,S$GLB,, | Performed by: GENERAL ACUTE CARE HOSPITAL

## 2022-12-22 PROCEDURE — 3078F PR MOST RECENT DIASTOLIC BLOOD PRESSURE < 80 MM HG: ICD-10-PCS | Mod: CPTII,S$GLB,, | Performed by: GENERAL ACUTE CARE HOSPITAL

## 2022-12-22 PROCEDURE — 3075F PR MOST RECENT SYSTOLIC BLOOD PRESS GE 130-139MM HG: ICD-10-PCS | Mod: CPTII,S$GLB,, | Performed by: GENERAL ACUTE CARE HOSPITAL

## 2022-12-22 PROCEDURE — 1159F PR MEDICATION LIST DOCUMENTED IN MEDICAL RECORD: ICD-10-PCS | Mod: CPTII,S$GLB,, | Performed by: GENERAL ACUTE CARE HOSPITAL

## 2022-12-22 PROCEDURE — 1160F PR REVIEW ALL MEDS BY PRESCRIBER/CLIN PHARMACIST DOCUMENTED: ICD-10-PCS | Mod: CPTII,S$GLB,, | Performed by: GENERAL ACUTE CARE HOSPITAL

## 2022-12-22 PROCEDURE — 4010F ACE/ARB THERAPY RXD/TAKEN: CPT | Mod: CPTII,S$GLB,, | Performed by: GENERAL ACUTE CARE HOSPITAL

## 2022-12-22 PROCEDURE — 99999 PR PBB SHADOW E&M-EST. PATIENT-LVL IV: CPT | Mod: PBBFAC,,, | Performed by: GENERAL ACUTE CARE HOSPITAL

## 2022-12-22 PROCEDURE — 99999 PR PBB SHADOW E&M-EST. PATIENT-LVL I: ICD-10-PCS | Mod: PBBFAC,,,

## 2022-12-22 PROCEDURE — 99214 PR OFFICE/OUTPT VISIT, EST, LEVL IV, 30-39 MIN: ICD-10-PCS | Mod: S$GLB,,, | Performed by: GENERAL ACUTE CARE HOSPITAL

## 2022-12-22 PROCEDURE — G0108 DIAB MANAGE TRN  PER INDIV: HCPCS | Mod: S$GLB,,, | Performed by: INTERNAL MEDICINE

## 2022-12-22 PROCEDURE — 3075F SYST BP GE 130 - 139MM HG: CPT | Mod: CPTII,S$GLB,, | Performed by: GENERAL ACUTE CARE HOSPITAL

## 2022-12-22 RX ORDER — DULAGLUTIDE 4.5 MG/.5ML
4.5 INJECTION, SOLUTION SUBCUTANEOUS
Qty: 4 PEN | Refills: 11 | Status: CANCELLED | OUTPATIENT
Start: 2022-12-22 | End: 2023-12-22

## 2022-12-22 RX ORDER — DULAGLUTIDE 3 MG/.5ML
3 INJECTION, SOLUTION SUBCUTANEOUS
Qty: 4 PEN | Refills: 11 | Status: CANCELLED | OUTPATIENT
Start: 2022-12-22 | End: 2023-12-22

## 2022-12-22 RX ORDER — INSULIN DETEMIR 100 [IU]/ML
INJECTION, SOLUTION SUBCUTANEOUS
Qty: 2 EACH | Refills: 11 | Status: SHIPPED | OUTPATIENT
Start: 2022-12-22 | End: 2023-02-14

## 2022-12-22 RX ORDER — INSULIN DETEMIR 100 [IU]/ML
40 INJECTION, SOLUTION SUBCUTANEOUS NIGHTLY
Qty: 36 ML | Refills: 3 | Status: SHIPPED | OUTPATIENT
Start: 2022-12-22 | End: 2023-01-30 | Stop reason: SDUPTHER

## 2022-12-22 NOTE — PROGRESS NOTES
Diabetes Care Specialist Progress Note  Author: Sabra Pink RN, CDE  Date: 12/22/2022    Program Intake  Reason for Diabetes Program Visit:: Intervention  Type of Intervention:: Individual  Individual: Education  Education: Self-Management Skill Review  Current diabetes risk level:: low  In the last 12 months, have you:: none  Permission to speak with others about care:: no    Lab Results   Component Value Date    HGBA1C 6.5 (H) 12/13/2022     Current Diabetic Medications: Levemir 45 units at night; Novolog 12 units ac meals, trulicity 3mg weekly starting on 12/23/2022    Clinical    Problem Review  Active comorbidities affecting diabetes self-care.: yes  Comorbidities: Hypertension (Hyperlipidemia)  Reviewed health maintenance: yes    Clinical Assessment  Have you ever experienced hypoglycemia (low blood sugar)?: no  Have you ever experienced hyperglycemia (high blood sugar)?: no    Nutritional Status  Meal Plan 24 Hour Recall: Breakfast, Lunch, Dinner, Snack  Meal Plan 24 Hour Recall - Breakfast: sometimes skips update 12/1/22 cereal with milk, sausage or waffle with eggs, almond milk  Meal Plan 24 Hour Recall - Lunch: toast, eggs, almond milk update 12/1/22 burger with 1 bun  Meal Plan 24 Hour Recall - Dinner: last night had [employer] chickien, or stuffed pepper update 12/2/22 salad chic filet salad, sweet tea  Meal Plan 24 Hour Recall - Snack: nuts, cookies, drinks water, coffee, tea with 1/2 sugar    Additional Social History  Access to Mass Media & Technology  Does the patient have access to any of the following devices or technologies?: Smart phone       Health Literacy  Preferred Learning Method: Face to Face, Reading Materials, Demonstration      Diabetes Self-Management Skills Assessment    Diabetes Disease Process/Treatment Options  Patient/caregiver able to state what happens when someone has diabetes.: somewhat  Patient/caregiver knows what type of diabetes they have.: yes  Diabetes Type : Type  II  Assessment indicates:: Knowledge deficit  Area of need?: Yes    Nutrition/Healthy Eating  Challenges to healthy eating:: eating out, going to parties, portion control, snacking between meals and at night  Method of carbohydrate measurement:: no method  Patient can identify foods that impact blood sugar.: yes  Patient-identified foods:: fruit/fruit juice, sweets  Assessment indicates:: Knowledge deficit, Instruction Needed  Area of need?: Yes    Physical Activity/Exercise  Deffered due to:: Time    Medications  Patient is able to describe current diabetes management routine.: yes  Diabetes management routine:: injectable medications, insulin  Patient is able to identify current diabetes medications, dosages, and appropriate timing of medications.: yes  Patient understands the purpose of the medications taken for diabetes.: yes  Patient reports problems or concerns with current medication regimen.: no  Assessment indicates:: Adequate understanding, Instruction Needed  Area of need?: Yes        Self Support Plan         Assessment Summary and Plan    Based on today's diabetes care assessment, the following areas of need were identified:      Social 12/1/2022   Access to Mass Media/Tech No   Cognitive/Behavioral Health No   Communication No        Clinical 12/1/2022   Medication Adherence No   Nutritional Status No        Diabetes Self-Management Skills 12/22/2022   Diabetes Disease Process/Treatment Options no   Nutrition/Healthy Eating Yes, see care planning   Medication Yes, see care planning   Home Blood Glucose Monitoring BG from meter: am ranging from 93, 96, 108, 99 afternoon 123, 72, 94, 101, 81          Today's interventions were provided through individual discussion, instruction, and written materials were provided.      Patient verbalized understanding of instruction and written materials.  Pt was able to return back demonstration of instructions today. Patient understood key points, needs reinforcement  and further instruction.     Diabetes Self-Management Care Plan:    Today's Diabetes Self-Management Care Plan was developed with Chelly's input. Chelly has agreed to work toward the following goal(s) to improve his/her overall diabetes control.      Care Plan: Diabetes Management   Updates made since 11/22/2022 12:00 AM        Problem: Healthy Eating         Goal: Eat 2-3 meals daily with 30-45g/2-3 servings of Carbohydrate per meal.    Start Date: 5/24/2021   Expected End Date: 12/22/2022   Recent Progress: Not met   Priority: High   Barriers: Knowledge deficit   Note:    Patient states that she struggles with food choices.  We agreed upon the above goal for her food intake.  She would like to lose 10-15 pounds by our next f/u visit.  She will do this by learning to make better food choices in relation to carbs and decrease consumption of snacks and fast foods.     Update to care planning 12/1/2022:  Patient seen today for review of nutrition.  Last seen in 2021.  Reviewed her food choices, she still like to eat out at [employer], but is trying to choose more healthy choices. Still does snacking on popcorn and sometimes Atkins bars.     We concentrated on portion sizes at today's session.  Encouraged increased consumption of non-starchy veggies to diet.  Overall meal planning and making better choices for meals.      Update to care planning 12/22/2022:  Reviewed meal planning.  Ms. Ortiz A1c has decreased to 6.5%. States has been making better food choices.  Not doing as much fast foods as before.  Eating frozen prepared meals about 39 gram.  Knows to watch sodium content of foods. Does drink a 8 oz regular soft drink with dinner.  Encouraged to choose sugar free or drink water.        Problem: Medications         Goal: Patient Agrees to take Diabetes Medication(s) as prescribed.    Start Date: 12/1/2022   Priority: High   Barriers: Knowledge deficit   Note:    We reviewed Ms. Ortiz diabetes meds today.  Clarified  the dose on Trulicity 3 mg.  She was prescribed this but was unsure what the increased dose meant. MOA was explained in detail. Reviewed timing of insulin and stressed the importance of dosing meal time insulin at least 15 mins prior or at the time of meal, never afterwards.  She is using a new needle with each injection.  Was complaining of discomfort and bleeding at the injection site stating the pen needles were long.  8 mm was ordered for her.  Provided her with 4mm samples to try and advised with have Endo call those into the pharmacy. We spend a great deal of time explaining this. Understanding was verbalized and will see the patient in 3 weeks for follow up.  She again declined the use of a CGM and prefers to do glucometer.     Update to care planning 12/22/2022:  Reviewed medication today.  States she is out of Levemir and usually goes through pharmacy assistance. Spoke to pharmacy on phone and asked them to email me paperwork for Dr. Taylor.  She stated it was faxed to doctors office. Advised she will need a RX for Levemir until she gets her supply in.  Will advise MD to call in pen.  Also will be starting Trulicity 3 mg tomorrow. Understanding verbalized.        Task: Reviewed with patient all current diabetes medications and provided basic review of the purpose, dosage, frequency, side effects, and storage of both oral and injectable diabetes medications. Completed 12/2/2022       Task: Discussed guidelines for preventing, detecting and treating hypoglycemia and hyperglycemia and reviewed the importance of meal and medication timing with diabetes mediations for prevention of hypoglycemia and maximum drug benefit. Completed 12/2/2022          Follow Up Plan     Follow up as needed    Today's care plan and follow up schedule was discussed with patient.  Chelly verbalized understanding of the care plan, goals, and agrees to follow up plan.        The patient was encouraged to communicate with his/her  health care provider/physician and care team regarding his/her condition(s) and treatment.  I provided the patient with my contact information today and encouraged to contact me via phone or [facility] Patient Portal as needed.     Length of Visit   Total Time: 30 Minutes

## 2022-12-22 NOTE — PROGRESS NOTES
Subjective:      Patient ID: Chelly Ortiz is a 72 y.o. female.    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    Constipation    History of colon polyps    Essential hypertension    Hyperlipidemia, unspecified    Diabetic polyneuropathy associated with type 2 diabetes mellitus    Spondylosis of cervical region without myelopathy or radiculopathy    Cervical myofascial pain syndrome    PCO (posterior capsular opacification), right    Dry eye syndrome    Pseudophakia    Decreased range of motion of lumbar spine    Decreased strength    Dilated bile duct    Personal history of colonic polyps    EIC (epidermal inclusion cyst)    Severe obesity with body mass index (BMI) of 35.0 to 39.9 with serious comorbidity    Mild major depression    Sedentary lifestyle    Dysphagia    Type 2 diabetes mellitus with hyperglycemia, with long-term current use of insulin    Abdominal aortic atherosclerosis    Decreased ROM of neck    Arthritis of multiple sites    Osteopenia of multiple sites    Combined form of age-related cataract, left eye       History of Present Illness  73 YO Female w/ dx of DM2, HTN, HLD and Obesity that presents to the endocrine clinic for follow up.  Pt today reports feels well and denies any complaints.  Denies hyper or hypoglycemic symptoms.         Has not started the Trulicity 3mg weekly yet.  Will start tomorrow.          Interval history:     Pt was last seen on 8/2022 and plan was:           Increase Trulicity to 3mg SC weekly  (Will need medication assistance program to inquire of cost of higher dose of Trulicity)      C/w  Levemir 45 qHS  C/w  Novolog 12 units TID before meals +  sliding scale.    (If Trulicity is approved at higher dose, will likely decrease Novolog doses)          With regards to Diabetes Mellitus:   -Type 2    -Duration:  Dx 10 yrs Ago on blood work   -FH of DM? 2 sisters and 1 brother with DM2   -Microvascular complications: (Nephropath, Neuropathy)  -Macrovascular complications:  DENIES   -DKA?  DENIES   -HHS?  DENIES   -DM Medications:  Trulicity 1.5mg weekly,  Levemir 45 qHS,   Novolog 12 units TID before meals + sliding scale (Low dose)     -Previously tried medications:  NONE   -Compliance w/ Meds:  Yes  -Hyperglycemia symptoms: DENIES   -Hypoglycemia symptoms:  DENIES   -Know how to correct hypoglycemias: No, will do teaching today.   -Glucose monitoring:   Checks 4 times daily and glucose mainly at goal between 90 - 160.  NO glucose above 180 seen on glucometer review.   AT GOAL     -Diet:  B (cereal w/ almond milk, turkey sausage)  L (Rice beans, meat loaf, spagetti)  D ( baked chicken, rice, potatoes), Snacks (Muffins once a month)   -Activity:  Sedentary   -Pancreatitis?  DENIES   -CHF?  DENIES   -UTIs?  DENIES   -Thyroid CA?  DENIES   -ETOH Abuse?  DENIES     Diabetes Management Status    Statin: Taking  ACE/ARB: Taking    Screening or Prevention Patient's value Goal Complete/Controlled?   HgA1C Testing and Control   Lab Results   Component Value Date    HGBA1C 6.5 (H) 12/13/2022      Annually/Less than 8% Yes   Lipid profile : 06/13/2022 Annually Yes   LDL control Lab Results   Component Value Date    LDLCALC 111.8 06/13/2022    Annually/Less than 100 mg/dl  No   Nephropathy screening Lab Results   Component Value Date    LABMICR <5.0 08/02/2022     Lab Results   Component Value Date    PROTEINUA Negative 04/13/2021    Annually Yes   Blood pressure BP Readings from Last 1 Encounters:   12/02/22 126/62    Less than 140/90 Yes   Dilated retinal exam : 04/28/2022 Annually Yes   Foot exam   : 08/16/2022 Annually Yes        ROS:   As above    Objective:     LMP   (LMP Unknown)   BP Readings from Last 3 Encounters:   12/02/22 126/62   11/28/22 137/81   11/23/22 (!) 143/85     Wt Readings from Last 1 Encounters:   12/02/22 1337 90 kg (198 lb 6.6 oz)     There is no height or weight on file to calculate BMI.      Physical Exam  Vitals reviewed.   Constitutional:       General: She is not in acute distress.     Appearance: Normal appearance. She is well-developed. She is not ill-appearing.   HENT:      Nose: Nose normal. No rhinorrhea.      Mouth/Throat:      Mouth: Mucous membranes are moist.   Eyes:      Extraocular Movements: Extraocular movements intact.      Pupils: Pupils are equal, round, and reactive to light.      Comments: No proptosis, No lid lag, No conjunctival erythema    Neck:      Thyroid: No thyromegaly.      Trachea: No tracheal deviation.      Comments: No thyromegaly or Thyroid nodules palpated on exam   Cardiovascular:      Rate and Rhythm: Normal rate and regular rhythm.      Pulses: Normal pulses.   Pulmonary:      Effort: Pulmonary effort is normal.      Breath sounds: Normal breath sounds.   Abdominal:      Palpations: Abdomen is soft. There is no mass.      Tenderness: There is no abdominal tenderness.      Hernia: No hernia is present.   Musculoskeletal:         General: No swelling.      Cervical back: Neck supple. No tenderness.      Right lower leg: No edema.      Left lower leg: No edema.   Skin:     General: Skin is warm.      Findings: No rash.   Neurological:      General: No focal deficit present.      Mental Status: She is alert and oriented to person, place, and time.   Psychiatric:         Mood and Affect: Mood normal.         Judgment: Judgment normal.     Protective Sensation (w/ 10 gram monofilament):  Right: Intact  Left: Intact    Visual Inspection:  Normal -  Bilateral    Pedal Pulses:   Right: Present  Left: Present    Posterior tibialis:   Right:Present  Left: Present           Lab Review:   Lab Results   Component Value Date     HGBA1C 6.5 (H) 12/13/2022     Lab Results   Component Value Date    CHOL 202 (H) 06/13/2022    HDL 53 06/13/2022    LDLCALC 111.8 06/13/2022    TRIG 186 (H) 06/13/2022    CHOLHDL 26.2 06/13/2022     Lab Results   Component Value Date     12/13/2022    K 3.5 12/13/2022     12/13/2022    CO2 26 12/13/2022    GLU 93 12/13/2022    BUN 9 12/13/2022    CREATININE 0.9 12/13/2022    CALCIUM 9.2 12/13/2022    PROT 7.5 10/21/2022    ALBUMIN 3.9 10/21/2022    BILITOT 0.5 10/21/2022    ALKPHOS 111 10/21/2022    AST 17 10/21/2022    ALT 11 10/21/2022    ANIONGAP 11 12/13/2022    ESTGFRAFRICA >60.0 06/13/2022    EGFRNONAA >60.0 06/13/2022    TSH 2.519 04/15/2021     Vit D, 25-Hydroxy   Date Value Ref Range Status   06/25/2013 16 (L) 30 - 96 ng/mL Final     Comment:     Vitamin D deficiency........<10 ng/mL  Vitamin D insufficiency.....10-29 ng/mL  Vitamin D sufficiency.......> or equal to 30 ng/mL  Vitamin D toxicity..........>100 ng/mL       Assessment and Plan     Uncontrolled type 2 diabetes mellitus with hyperglycemia  Lab Results   Component Value Date    HGBA1C 6.5 (H) 12/13/2022      -FS log  AT GOAL   -Diet, exercise, lifestyle modifications and A1c Goals discussed   -Hypoglycemia symptoms and plan of action discussed   -Low carb diet   -Seen DM Education?  NO, Will give referral today     PLAN:     Due to A1C at goal.  I will recommend the following.       Increase Trulicity to 3mg SC weekly  (Receiving Trulicity through [facility])     Decrease Levemir to 40 units qHS    Decrease Novolog to 10 units TID before meals + sliding scale.       Will consider adding  Metformin or SGLT-2i at next visit to keep decreasing insulin doses if possible.        Send glucose logs in 2 weeks for review.     Will consider starting low dose metformin or low dose SGLT-2i at next visit with hopes to decrease insulin doses in the future.      A1C prior to next visit in 3 months       Hyperlipidemia, unspecified hyperlipidemia type  LDL  above goal on Pravastatin 10mg daily    Statin intolerance on higher doses of statin     C/w  pravastatin 10mg daily   Diet, exercise and lifestyle modifications discussed   Low Fat diet       Hypertension, essential  BP controlled on current BP meds   On ARB for renal protection   Low Na diet

## 2022-12-23 ENCOUNTER — TELEPHONE (OUTPATIENT)
Dept: ENDOCRINOLOGY | Facility: CLINIC | Age: 72
End: 2022-12-23
Payer: MEDICARE

## 2022-12-23 NOTE — TELEPHONE ENCOUNTER
----- Message from Sarika Michael sent at 12/23/2022 11:57 AM CST -----  Regarding: Refill  Contact: [facility]/Ashley [phone]  Ashley from [facility] is calling in regards to insulin detemir U-100 (LEVEMIR FLEXTOUCH U-100 INSULN) 100 unit/mL  Needs clarification on quantity for this prescription.      [facility]/pharmacy #[id number] - [place] - [address]  [address] [address]  Phone: [phone] Fax: [phone]

## 2023-01-03 ENCOUNTER — OFFICE VISIT (OUTPATIENT)
Dept: FAMILY MEDICINE | Facility: CLINIC | Age: 73
End: 2023-01-03
Payer: MEDICARE

## 2023-01-03 VITALS
SYSTOLIC BLOOD PRESSURE: 114 MMHG | BODY MASS INDEX: 35.04 KG/M2 | HEIGHT: 63 IN | OXYGEN SATURATION: 96 % | DIASTOLIC BLOOD PRESSURE: 68 MMHG | WEIGHT: 197.75 LBS | HEART RATE: 81 BPM

## 2023-01-03 DIAGNOSIS — R25.2 LEG CRAMPS: Primary | ICD-10-CM

## 2023-01-03 DIAGNOSIS — E87.6 HYPOKALEMIA: ICD-10-CM

## 2023-01-03 PROCEDURE — 3074F SYST BP LT 130 MM HG: CPT | Mod: CPTII,S$GLB,,

## 2023-01-03 PROCEDURE — 3074F PR MOST RECENT SYSTOLIC BLOOD PRESSURE < 130 MM HG: ICD-10-PCS | Mod: CPTII,S$GLB,,

## 2023-01-03 PROCEDURE — 1101F PT FALLS ASSESS-DOCD LE1/YR: CPT | Mod: CPTII,S$GLB,,

## 2023-01-03 PROCEDURE — 3072F LOW RISK FOR RETINOPATHY: CPT | Mod: CPTII,S$GLB,,

## 2023-01-03 PROCEDURE — 1101F PR PT FALLS ASSESS DOC 0-1 FALLS W/OUT INJ PAST YR: ICD-10-PCS | Mod: CPTII,S$GLB,,

## 2023-01-03 PROCEDURE — 1159F PR MEDICATION LIST DOCUMENTED IN MEDICAL RECORD: ICD-10-PCS | Mod: CPTII,S$GLB,,

## 2023-01-03 PROCEDURE — 99213 OFFICE O/P EST LOW 20 MIN: CPT | Mod: S$GLB,,,

## 2023-01-03 PROCEDURE — 1126F PR PAIN SEVERITY QUANTIFIED, NO PAIN PRESENT: ICD-10-PCS | Mod: CPTII,S$GLB,,

## 2023-01-03 PROCEDURE — 99999 PR PBB SHADOW E&M-EST. PATIENT-LVL V: CPT | Mod: PBBFAC,,,

## 2023-01-03 PROCEDURE — 99999 PR PBB SHADOW E&M-EST. PATIENT-LVL V: ICD-10-PCS | Mod: PBBFAC,,,

## 2023-01-03 PROCEDURE — 1159F MED LIST DOCD IN RCRD: CPT | Mod: CPTII,S$GLB,,

## 2023-01-03 PROCEDURE — 1126F AMNT PAIN NOTED NONE PRSNT: CPT | Mod: CPTII,S$GLB,,

## 2023-01-03 PROCEDURE — 3288F PR FALLS RISK ASSESSMENT DOCUMENTED: ICD-10-PCS | Mod: CPTII,S$GLB,,

## 2023-01-03 PROCEDURE — 3072F PR LOW RISK FOR RETINOPATHY: ICD-10-PCS | Mod: CPTII,S$GLB,,

## 2023-01-03 PROCEDURE — 3008F BODY MASS INDEX DOCD: CPT | Mod: CPTII,S$GLB,,

## 2023-01-03 PROCEDURE — 99213 PR OFFICE/OUTPT VISIT, EST, LEVL III, 20-29 MIN: ICD-10-PCS | Mod: S$GLB,,,

## 2023-01-03 PROCEDURE — 3078F DIAST BP <80 MM HG: CPT | Mod: CPTII,S$GLB,,

## 2023-01-03 PROCEDURE — 3008F PR BODY MASS INDEX (BMI) DOCUMENTED: ICD-10-PCS | Mod: CPTII,S$GLB,,

## 2023-01-03 PROCEDURE — 3078F PR MOST RECENT DIASTOLIC BLOOD PRESSURE < 80 MM HG: ICD-10-PCS | Mod: CPTII,S$GLB,,

## 2023-01-03 PROCEDURE — 3288F FALL RISK ASSESSMENT DOCD: CPT | Mod: CPTII,S$GLB,,

## 2023-01-03 PROCEDURE — 1160F RVW MEDS BY RX/DR IN RCRD: CPT | Mod: CPTII,S$GLB,,

## 2023-01-03 PROCEDURE — 1160F PR REVIEW ALL MEDS BY PRESCRIBER/CLIN PHARMACIST DOCUMENTED: ICD-10-PCS | Mod: CPTII,S$GLB,,

## 2023-01-04 NOTE — PROGRESS NOTES
Subjective:         Chief Complaint: Follow-up     Chelly Ortiz is a 72 y.o. female, patient of Gabriel Wilson MD   known to me, presents today with complaints of Follow-up    Mrs. Spaulding presents to clinic for follow-up of leg cramps. She was seen by me on 2022. She was then complaining of leg cramping especially during the night, upon chart review, it was noted her K+ was low (3.3), a 10 day K+ replacement was rx'd, and BMP recheck (3.5). Patient states problem has now resolved.      Past Medical History:   Diagnosis Date    Allergy     DDD (degenerative disc disease), lumbar     Diabetes mellitus     diet controlled    Diabetes mellitus, type 2     GERD (gastroesophageal reflux disease)     History of subconjunctival hemorrhage 2011    left eye    Hyperlipidemia     Hypertension     IBS (irritable bowel syndrome)     Obesity     MYRIAM (obstructive sleep apnea)     on CPAP    Rotator cuff impingement syndrome     Seasonal allergic conjunctivitis        Past Surgical History:   Procedure Laterality Date    CATARACT EXTRACTION W/  INTRAOCULAR LENS IMPLANT Right 10/03/2013        CATARACT EXTRACTION W/  INTRAOCULAR LENS IMPLANT Left 2022         SECTION      x2    CHOLECYSTECTOMY      COLONOSCOPY N/A 2018    Procedure: COLONOSCOPY;  Surgeon: Marie Mejia MD;  Location: [facility];  Service: Endoscopy;  Laterality: N/A;    COLONOSCOPY N/A 2022    Procedure: COLONOSCOPY;  Surgeon: Pierce Rivera MD;  Location: [facility] ENDO;  Service: Endoscopy;  Laterality: N/A;    ENDOSCOPIC ULTRASOUND OF UPPER GASTROINTESTINAL TRACT N/A 10/09/2018    Procedure: ULTRASOUND, UPPER GI TRACT, ENDOSCOPIC;  Surgeon: Javy Simpson MD;  Location: [facility] ENDO;  Service: Endoscopy;  Laterality: N/A;    EXCISION OF LESION N/A 2019    Procedure: EXCISION, LESION VULVA;  Surgeon: Liv Odell MD;  Location: [facility] OR;  Service: OB/GYN;  Laterality: N/A;  latex  allergy    EYE SURGERY      HYSTERECTOMY      ovaries intact, due to DUB    INTRAOCULAR PROSTHESES INSERTION Left 2022    Procedure: INSERTION, IOL PROSTHESIS;  Surgeon: Clarice Herzog MD;  Location: [facility] OR [address] [place] [address];  Service: Ophthalmology;  Laterality: Left;    PHACOEMULSIFICATION OF CATARACT Left 2022    Procedure: PHACOEMULSIFICATION, CATARACT;  Surgeon: Clarice Herzog MD;  Location: [facility] OR [address] [place] [address];  Service: Ophthalmology;  Laterality: Left;    TUBAL LIGATION         Family History   Problem Relation Age of Onset    Alzheimer's disease Mother     Heart disease Father     Diabetes Sister     Breast cancer Sister     Deep vein thrombosis Brother     Diabetes Daughter     Cancer Brother         Lung    Lung cancer Brother     Amblyopia Neg Hx     Blindness Neg Hx     Cataracts Neg Hx     Glaucoma Neg Hx     Hypertension Neg Hx     Macular degeneration Neg Hx     Retinal detachment Neg Hx     Strabismus Neg Hx     Stroke Neg Hx     Thyroid disease Neg Hx        Social History     Socioeconomic History    Marital status:    Tobacco Use    Smoking status: Former     Types: Cigarettes     Quit date: 2/15/1983     Years since quittin.9    Smokeless tobacco: Never   Substance and Sexual Activity    Alcohol use: No    Drug use: No    Sexual activity: Yes     Partners: Male     Social Determinants of Health     Financial Resource Strain: Unknown    Difficulty of Paying Living Expenses: Patient refused   Food Insecurity: Unknown    Worried About Running Out of Food in the Last Year: Patient refused    Ran Out of Food in the Last Year: Patient refused   Transportation Needs: No Transportation Needs    Lack of Transportation (Medical): No    Lack of Transportation (Non-Medical): No   Physical Activity: Unknown    Days of Exercise per Week: Patient refused    Minutes of Exercise per Session: 30 min   Stress: Unknown    Feeling of Stress : Patient refused   Social Connections: Unknown     "Frequency of Communication with Friends and Family: Patient refused    Frequency of Social Gatherings with Friends and Family: Patient refused    Active Member of Clubs or Organizations: No    Attends Club or Organization Meetings: Patient refused    Marital Status:    Housing Stability: Low Risk     Unable to Pay for Housing in the Last Year: No    Number of Places Lived in the Last Year: 1    Unstable Housing in the Last Year: No     Review of Systems   Constitutional:  Negative for appetite change, chills, fever and unexpected weight change.   Eyes:  Negative for visual disturbance.   Respiratory:  Negative for chest tightness and shortness of breath.    Cardiovascular:  Negative for chest pain and palpitations.   Gastrointestinal:  Negative for abdominal pain.   Genitourinary: Negative.    Musculoskeletal: Negative.    Skin:  Negative for color change.   Neurological:  Negative for dizziness, weakness and headaches.   Hematological:  Negative for adenopathy.   All other systems reviewed and are negative.      Objective:     Vitals:    01/03/23 1543   BP: 114/68   BP Location: Left arm   Patient Position: Sitting   BP Method: Large (Manual)   Pulse: 81   SpO2: 96%   Weight: 89.7 kg (197 lb 12 oz)   Height: 5' 3" (1.6 m)          Physical Exam  Vitals reviewed.   Constitutional:       General: She is not in acute distress.     Appearance: Normal appearance. She is well-developed and well-groomed. She is not ill-appearing or toxic-appearing.   HENT:      Left Ear: Tympanic membrane normal.   Cardiovascular:      Rate and Rhythm: Normal rate and regular rhythm.      Heart sounds: Normal heart sounds. No murmur heard.  Pulmonary:      Effort: Pulmonary effort is normal. No respiratory distress.      Breath sounds: Normal breath sounds.   Abdominal:      General: Bowel sounds are normal. There is no distension.      Palpations: Abdomen is soft.      Tenderness: There is no abdominal tenderness. There is no " guarding.   Musculoskeletal:         General: No swelling or deformity. Normal range of motion.      Cervical back: Normal range of motion.      Right lower leg: No edema.      Left lower leg: No edema.   Skin:     General: Skin is warm and dry.   Neurological:      General: No focal deficit present.      Mental Status: She is alert and oriented to person, place, and time.   Psychiatric:         Attention and Perception: Attention normal.         Mood and Affect: Mood normal.         Speech: Speech normal.         Behavior: Behavior is cooperative.         Laboratory:  CBC:  Recent Labs   Lab Result Units 10/21/22  1058   WBC K/uL 5.24   RBC M/uL 4.96   Hemoglobin g/dL 12.7   Hematocrit % 39.7   Platelets K/uL 323   MCV fL 80*   MCH pg 25.6*   MCHC g/dL 32.0     CMP:  Recent Labs   Lab Result Units 10/21/22  1058 12/02/22  1421 12/13/22  1220   Glucose mg/dL 139*   < > 93   Calcium mg/dL 9.3   < > 9.2   Albumin g/dL 3.9  --   --    Total Protein g/dL 7.5  --   --    Sodium mmol/L 139   < > 142   Potassium mmol/L 3.4*   < > 3.5   CO2 mmol/L 27   < > 26   Chloride mmol/L 103   < > 105   BUN mg/dL 11   < > 9   Alkaline Phosphatase U/L 111  --   --    ALT U/L 11  --   --    AST U/L 17  --   --    Total Bilirubin mg/dL 0.5  --   --     < > = values in this interval not displayed.     A1C:  Recent Labs   Lab Result Units 12/13/22  1220   Hemoglobin A1C % 6.5*       Assessment:         ICD-10-CM ICD-9-CM   1. Leg cramps  R25.2 729.82   2. Hypokalemia  E87.6 276.8       Plan:       Leg cramps    Hypokalemia    -Symptoms resolved, no longer experiencing cramps.  -K+ corrected, patient completed the 10 days of potassium replacement. Not interested on continuing treatment, since symptoms improved.  -Discussed K+ rich foods in case discomfort reappears.  -Patient voiced understanding and in agreement with current treatment plan     If symptoms worsen, go to ER.  If symptoms do not improve, return to clinic.   Keep appointments  "with all specialists.   Follow up if symptoms worsen or fail to improve.     Patient's Medications   New Prescriptions    No medications on file   Previous Medications    ACCU-CHEK FASTCLIX LANCET DRUM MISC    TEST BLOOD SUGAR THREE TIMES A DAY    ACCU-CHEK FASTCLIX LANCING DEV KIT    USE AS DIRECTED    ALBUTEROL (PROVENTIL/VENTOLIN HFA) 90 MCG/ACTUATION INHALER    Inhale 1-2 puffs into the lungs every 4 (four) hours as needed for Wheezing.    AMLODIPINE (NORVASC) 10 MG TABLET    TAKE 1 TABLET EVERY DAY    ASPIRIN (ECOTRIN) 81 MG EC TABLET    Take 81 mg by mouth once daily.    AZELASTINE (OPTIVAR) 0.05 % OPHTHALMIC SOLUTION    Place 1 drop into both eyes 2 (two) times daily.    BD ULTRA-FINE SHORT PEN NEEDLE 31 GAUGE X 5/16" NDLE    USES 4 TIMES A DAY.    BLOOD GLUCOSE CONTROL HIGH,LOW (ACCU-CHEK GUIDE L1-L2 CTRL SOL) SOLN    3 times a day    BLOOD SUGAR DIAGNOSTIC (ACCU-CHEK GUIDE TEST STRIPS) STRP    3 times a day    BLOOD-GLUCOSE METER (ACCU-CHEK GUIDE GLUCOSE METER) MISC    Three times a day    CALCIUM CARBONATE (OS-ARIC) 600 MG CALCIUM (1,500 MG) TAB    Take 1 tablet (600 mg total) by mouth once. for 1 dose    CETIRIZINE (ZYRTEC) 10 MG TABLET    Take 1 tablet (10 mg total) by mouth every evening.    CHLORHEXIDINE (PERIDEX) 0.12 % SOLUTION    Use as directed 15 mLs in the mouth or throat daily as needed.    CITALOPRAM (CELEXA) 10 MG TABLET    Take 1 tablet (10 mg total) by mouth once daily.    DULAGLUTIDE (TRULICITY) 3 MG/0.5 ML PEN INJECTOR    Inject 3 mg into the skin every 7 days.    ERGOCALCIFEROL (ERGOCALCIFEROL) 50,000 UNIT CAP    TAKE 1 CAPSULE EVERY 7 DAYS. Strength: 50,000 Units    ESOMEPRAZOLE (NEXIUM) 40 MG CAPSULE    Take 1 capsule (40 mg total) by mouth before breakfast.    FLUTICASONE-SALMETEROL DISKUS INHALER 250-50 MCG    Inhale 1 puff by mouth into the lungs 2 (two) times daily. Controller    GABAPENTIN (NEURONTIN) 100 MG CAPSULE    Take 1 capsule (100 mg total) by mouth 3 (three) times daily.    " "GLUCOSE 4 GM CHEWABLE TABLET    Take 4 tablets (16 g total) by mouth as needed for Low blood sugar (If having symptoms of blurry vision, palpitations, confusion, shakiness.  Please check sugars and if sugar below 70 please take 4 tablets and re-check sugar everry 15 minutes until sugars are above 70 and symptoms resolve.).    INSULIN ASPART U-100 (NOVOLOG FLEXPEN U-100 INSULIN) 100 UNIT/ML (3 ML) INPN PEN    Inject 12 units w/ meals plus scale 150-200+2, 201-250+4, 251-300+6, 301-350+8, >350+10. Max daily 50 units.    INSULIN DETEMIR U-100 (LEVEMIR FLEXTOUCH U-100 INSULN) 100 UNIT/ML (3 ML) INPN PEN    Inject 40 Units into the skin every evening.    INSULIN DETEMIR U-100 (LEVEMIR FLEXTOUCH U-100 INSULN) 100 UNIT/ML (3 ML) INPN PEN    Inject 40 units into the skin at night.    INSULIN SYRINGE-NEEDLE U-100 0.3 ML 31 GAUGE X 5/16" SYRG    relion brand, use 5 x a day, if not on levemir or novolog    INSULIN SYRINGE-NEEDLE U-100 0.5 ML 31 GAUGE X 5/16" SYRG    Use nightly. 90 day    MAGNESIUM OXIDE (MAG-OX) 400 MG TABLET    Take 1 tablet (400 mg total) by mouth 2 (two) times daily.    NEOMYCIN-POLYMYXIN-HYDROCORTISONE (CORTISPORIN) OTIC SOLUTION    Place 3 drops into the right ear 4 (four) times daily.    PEN NEEDLE, DIABETIC 32 GAUGE X 5/32" NDLE    Change needle with each injection    PRAVASTATIN (PRAVACHOL) 40 MG TABLET    Take 1 tablet (40 mg total) by mouth every evening.    ROPINIROLE (REQUIP) 0.5 MG TABLET    Take 1 tablet (0.5 mg total) by mouth every evening.    TRAZODONE (DESYREL) 50 MG TABLET    TAKE 1 TABLET EVERY NIGHT AS NEEDED   Modified Medications    No medications on file   Discontinued Medications    No medications on file       Sindy Warren NP  "

## 2023-01-13 ENCOUNTER — TELEPHONE (OUTPATIENT)
Dept: PHARMACY | Facility: CLINIC | Age: 73
End: 2023-01-13
Payer: MEDICARE

## 2023-01-13 NOTE — TELEPHONE ENCOUNTER
The pharmacy patient assistance team has the necessary documents today to begin the enrollment process into the [facility] and [employer] program. The prescription portion of the application has been sent to the providers office for approval and signature.

## 2023-01-18 ENCOUNTER — OFFICE VISIT (OUTPATIENT)
Dept: URGENT CARE | Facility: CLINIC | Age: 73
End: 2023-01-18
Payer: MEDICARE

## 2023-01-18 VITALS
DIASTOLIC BLOOD PRESSURE: 74 MMHG | BODY MASS INDEX: 34.9 KG/M2 | TEMPERATURE: 97 F | HEART RATE: 78 BPM | SYSTOLIC BLOOD PRESSURE: 133 MMHG | OXYGEN SATURATION: 95 % | RESPIRATION RATE: 16 BRPM | WEIGHT: 197 LBS

## 2023-01-18 DIAGNOSIS — M25.512 ACUTE PAIN OF BOTH SHOULDERS: ICD-10-CM

## 2023-01-18 DIAGNOSIS — R10.9 ACUTE RIGHT FLANK PAIN: ICD-10-CM

## 2023-01-18 DIAGNOSIS — M25.511 ACUTE PAIN OF BOTH SHOULDERS: ICD-10-CM

## 2023-01-18 DIAGNOSIS — M62.838 MUSCLE SPASM: Primary | ICD-10-CM

## 2023-01-18 PROCEDURE — 3075F PR MOST RECENT SYSTOLIC BLOOD PRESS GE 130-139MM HG: ICD-10-PCS | Mod: CPTII,S$GLB,, | Performed by: NURSE PRACTITIONER

## 2023-01-18 PROCEDURE — 3008F BODY MASS INDEX DOCD: CPT | Mod: CPTII,S$GLB,, | Performed by: NURSE PRACTITIONER

## 2023-01-18 PROCEDURE — 1160F PR REVIEW ALL MEDS BY PRESCRIBER/CLIN PHARMACIST DOCUMENTED: ICD-10-PCS | Mod: CPTII,S$GLB,, | Performed by: NURSE PRACTITIONER

## 2023-01-18 PROCEDURE — 1159F MED LIST DOCD IN RCRD: CPT | Mod: CPTII,S$GLB,, | Performed by: NURSE PRACTITIONER

## 2023-01-18 PROCEDURE — 1125F PR PAIN SEVERITY QUANTIFIED, PAIN PRESENT: ICD-10-PCS | Mod: CPTII,S$GLB,, | Performed by: NURSE PRACTITIONER

## 2023-01-18 PROCEDURE — 1159F PR MEDICATION LIST DOCUMENTED IN MEDICAL RECORD: ICD-10-PCS | Mod: CPTII,S$GLB,, | Performed by: NURSE PRACTITIONER

## 2023-01-18 PROCEDURE — 3075F SYST BP GE 130 - 139MM HG: CPT | Mod: CPTII,S$GLB,, | Performed by: NURSE PRACTITIONER

## 2023-01-18 PROCEDURE — 99213 PR OFFICE/OUTPT VISIT, EST, LEVL III, 20-29 MIN: ICD-10-PCS | Mod: S$GLB,,, | Performed by: NURSE PRACTITIONER

## 2023-01-18 PROCEDURE — 3008F PR BODY MASS INDEX (BMI) DOCUMENTED: ICD-10-PCS | Mod: CPTII,S$GLB,, | Performed by: NURSE PRACTITIONER

## 2023-01-18 PROCEDURE — 99213 OFFICE O/P EST LOW 20 MIN: CPT | Mod: S$GLB,,, | Performed by: NURSE PRACTITIONER

## 2023-01-18 PROCEDURE — 3072F LOW RISK FOR RETINOPATHY: CPT | Mod: CPTII,S$GLB,, | Performed by: NURSE PRACTITIONER

## 2023-01-18 PROCEDURE — 1125F AMNT PAIN NOTED PAIN PRSNT: CPT | Mod: CPTII,S$GLB,, | Performed by: NURSE PRACTITIONER

## 2023-01-18 PROCEDURE — 1160F RVW MEDS BY RX/DR IN RCRD: CPT | Mod: CPTII,S$GLB,, | Performed by: NURSE PRACTITIONER

## 2023-01-18 PROCEDURE — 3078F PR MOST RECENT DIASTOLIC BLOOD PRESSURE < 80 MM HG: ICD-10-PCS | Mod: CPTII,S$GLB,, | Performed by: NURSE PRACTITIONER

## 2023-01-18 PROCEDURE — 3072F PR LOW RISK FOR RETINOPATHY: ICD-10-PCS | Mod: CPTII,S$GLB,, | Performed by: NURSE PRACTITIONER

## 2023-01-18 PROCEDURE — 3078F DIAST BP <80 MM HG: CPT | Mod: CPTII,S$GLB,, | Performed by: NURSE PRACTITIONER

## 2023-01-18 RX ORDER — METHOCARBAMOL 500 MG/1
500 TABLET, FILM COATED ORAL 4 TIMES DAILY
Qty: 40 TABLET | Refills: 0 | Status: SHIPPED | OUTPATIENT
Start: 2023-01-18 | End: 2023-01-28
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
## 2023-01-22 ENCOUNTER — PATIENT MESSAGE (OUTPATIENT)
Dept: ENDOCRINOLOGY | Facility: CLINIC | Age: 73
End: 2023-01-22
Payer: MEDICARE

## 2023-01-23 ENCOUNTER — OFFICE VISIT (OUTPATIENT)
Dept: PSYCHIATRY | Facility: CLINIC | Age: 73
End: 2023-01-23
Payer: MEDICARE

## 2023-01-23 DIAGNOSIS — F43.22 ADJUSTMENT DISORDER WITH ANXIOUS MOOD: ICD-10-CM

## 2023-01-23 PROCEDURE — 90837 PSYTX W PT 60 MINUTES: CPT | Mod: S$GLB,,, | Performed by: SOCIAL WORKER

## 2023-01-23 PROCEDURE — 99999 PR PBB SHADOW E&M-EST. PATIENT-LVL I: CPT | Mod: PBBFAC,,, | Performed by: SOCIAL WORKER

## 2023-01-23 PROCEDURE — 1159F PR MEDICATION LIST DOCUMENTED IN MEDICAL RECORD: ICD-10-PCS | Mod: CPTII,S$GLB,, | Performed by: SOCIAL WORKER

## 2023-01-23 PROCEDURE — 99999 PR PBB SHADOW E&M-EST. PATIENT-LVL I: ICD-10-PCS | Mod: PBBFAC,,, | Performed by: SOCIAL WORKER

## 2023-01-23 PROCEDURE — 3072F PR LOW RISK FOR RETINOPATHY: ICD-10-PCS | Mod: CPTII,S$GLB,, | Performed by: SOCIAL WORKER

## 2023-01-23 PROCEDURE — 90837 PR PSYCHOTHERAPY W/PATIENT, 60 MIN: ICD-10-PCS | Mod: S$GLB,,, | Performed by: SOCIAL WORKER

## 2023-01-23 PROCEDURE — 1159F MED LIST DOCD IN RCRD: CPT | Mod: CPTII,S$GLB,, | Performed by: SOCIAL WORKER

## 2023-01-23 PROCEDURE — 3072F LOW RISK FOR RETINOPATHY: CPT | Mod: CPTII,S$GLB,, | Performed by: SOCIAL WORKER

## 2023-01-23 NOTE — PROGRESS NOTES
"Individual Psychotherapy (LCSW/PhD)  Chelly Ortiz,  1/23/2023    Site:  [facility]         Therapeutic Intervention: Met with patient for individual psychotherapy.    Chief complaint/reason for encounter: sleep and behavior     Interval history and content of current session: PT reported her  is isolative and to himself, always watching TV. PT reported it bugs you to be in the house with someone who does not talk. PT reported they use to go out together. PT reported they would go see entertainment and go out to eat. Pt reported her  went through [place]. PT reported she spoke to his sister about "his ways." PT reported she does talk to her sisters, but they are all spread out. She reported she has one daughter here ([place]) and one in [place] ([place]). They talk almost everyday. Her  has 3 sources of money coming in. She spoke about moving next to her daughter in [place], because they are close. We discussed exploring different options that would boost her spirits. She denies SI, HI or AVH.     Treatment plan:  Target symptoms: adjustment  Why chosen therapy is appropriate versus another modality: relevant to diagnosis, patient responds to this modality, evidence based practice  Outcome monitoring methods: self-report, observation  Therapeutic intervention type: insight oriented psychotherapy, behavior modifying psychotherapy, supportive psychotherapy    Risk parameters:  Patient reports no suicidal ideation  Patient reports no homicidal ideation  Patient reports no self-injurious behavior  Patient reports no violent behavior    Verbal deficits: None    Patient's response to intervention:  The patient's response to intervention is accepting, motivated.    Progress toward goals and other mental status changes:  The patient's progress toward goals is limited.    Diagnosis:     ICD-10-CM ICD-9-CM   1. Adjustment disorder with anxious mood  F43.22 309.24       Plan: Pt plans to continue individual " psychotherapy    Return to clinic: as scheduled    Length of Service (minutes): 60

## 2023-01-26 NOTE — TELEPHONE ENCOUNTER
Attempting to return call. Left Vm message  letting Ms Pounds we have not received application back from provider but that I would re-fax it

## 2023-01-27 ENCOUNTER — HOSPITAL ENCOUNTER (OUTPATIENT)
Dept: RADIOLOGY | Facility: HOSPITAL | Age: 73
Discharge: HOME | End: 2023-01-27
Attending: NURSE PRACTITIONER
Payer: MEDICARE

## 2023-01-27 DIAGNOSIS — Z12.31 ENCOUNTER FOR SCREENING MAMMOGRAM FOR MALIGNANT NEOPLASM OF BREAST: ICD-10-CM

## 2023-01-27 PROCEDURE — 77063 BREAST TOMOSYNTHESIS BI: CPT | Mod: 26,,, | Performed by: RADIOLOGY

## 2023-01-27 PROCEDURE — 77067 SCR MAMMO BI INCL CAD: CPT | Mod: TC

## 2023-01-27 PROCEDURE — 77067 SCR MAMMO BI INCL CAD: CPT | Mod: 26,,, | Performed by: RADIOLOGY

## 2023-01-27 PROCEDURE — 77067 MAMMO DIGITAL SCREENING BILAT WITH TOMO: ICD-10-PCS | Mod: 26,,, | Performed by: RADIOLOGY

## 2023-01-27 PROCEDURE — 77063 MAMMO DIGITAL SCREENING BILAT WITH TOMO: ICD-10-PCS | Mod: 26,,, | Performed by: RADIOLOGY

## 2023-01-30 ENCOUNTER — PATIENT MESSAGE (OUTPATIENT)
Dept: ENDOCRINOLOGY | Facility: CLINIC | Age: 73
End: 2023-01-30
Payer: MEDICARE

## 2023-01-30 DIAGNOSIS — E11.9 CONTROLLED TYPE 2 DIABETES MELLITUS WITHOUT COMPLICATION, WITH LONG-TERM CURRENT USE OF INSULIN: ICD-10-CM

## 2023-01-30 DIAGNOSIS — Z79.4 CONTROLLED TYPE 2 DIABETES MELLITUS WITHOUT COMPLICATION, WITH LONG-TERM CURRENT USE OF INSULIN: ICD-10-CM

## 2023-01-30 RX ORDER — INSULIN DETEMIR 100 [IU]/ML
40 INJECTION, SOLUTION SUBCUTANEOUS NIGHTLY
Qty: 36 ML | Refills: 3 | Status: SHIPPED | OUTPATIENT
Start: 2023-01-30 | End: 2023-02-14 | Stop reason: SDUPTHER

## 2023-01-31 ENCOUNTER — TELEPHONE (OUTPATIENT)
Dept: PODIATRY | Facility: CLINIC | Age: 73
End: 2023-01-31
Payer: MEDICARE

## 2023-01-31 ENCOUNTER — PATIENT MESSAGE (OUTPATIENT)
Dept: DIABETES | Facility: CLINIC | Age: 73
End: 2023-01-31
Payer: MEDICARE

## 2023-01-31 NOTE — TELEPHONE ENCOUNTER
Spoke with Ms Ortiz who reports she her toenail is lifting up and that she is concerned. Earlier appt time offered. New appt date and time of 2/9/23 at 10:30 am accepted with no other needs voiced. Encouraged to call if further assistance is needed

## 2023-01-31 NOTE — TELEPHONE ENCOUNTER
----- Message from Flako Limon sent at 1/31/2023 11:02 AM CST -----  Type:  Needs Medical Advice    Who Called: pt   Symptoms (please be specific): pt would like a call regarding her toenails  states she has an issue going on  Would the patient rather a call back or a response via [facility]? call  Best Call Back Number: [phone]  Additional Information: please call pt

## 2023-02-01 ENCOUNTER — NURSE TRIAGE (OUTPATIENT)
Dept: ADMINISTRATIVE | Facility: CLINIC | Age: 73
End: 2023-02-01
Payer: MEDICARE

## 2023-02-01 ENCOUNTER — OFFICE VISIT (OUTPATIENT)
Dept: INTERNAL MEDICINE | Facility: CLINIC | Age: 73
End: 2023-02-01
Payer: MEDICARE

## 2023-02-01 VITALS
RESPIRATION RATE: 18 BRPM | OXYGEN SATURATION: 97 % | WEIGHT: 200.63 LBS | DIASTOLIC BLOOD PRESSURE: 70 MMHG | SYSTOLIC BLOOD PRESSURE: 122 MMHG | HEIGHT: 63 IN | HEART RATE: 79 BPM | BODY MASS INDEX: 35.55 KG/M2

## 2023-02-01 DIAGNOSIS — M54.6 RIGHT-SIDED THORACIC BACK PAIN, UNSPECIFIED CHRONICITY: Primary | ICD-10-CM

## 2023-02-01 PROCEDURE — 3288F FALL RISK ASSESSMENT DOCD: CPT | Mod: CPTII,S$GLB,, | Performed by: INTERNAL MEDICINE

## 2023-02-01 PROCEDURE — 3288F PR FALLS RISK ASSESSMENT DOCUMENTED: ICD-10-PCS | Mod: CPTII,S$GLB,, | Performed by: INTERNAL MEDICINE

## 2023-02-01 PROCEDURE — 3078F DIAST BP <80 MM HG: CPT | Mod: CPTII,S$GLB,, | Performed by: INTERNAL MEDICINE

## 2023-02-01 PROCEDURE — 99999 PR PBB SHADOW E&M-EST. PATIENT-LVL V: ICD-10-PCS | Mod: PBBFAC,,, | Performed by: INTERNAL MEDICINE

## 2023-02-01 PROCEDURE — 1160F PR REVIEW ALL MEDS BY PRESCRIBER/CLIN PHARMACIST DOCUMENTED: ICD-10-PCS | Mod: CPTII,S$GLB,, | Performed by: INTERNAL MEDICINE

## 2023-02-01 PROCEDURE — 1125F PR PAIN SEVERITY QUANTIFIED, PAIN PRESENT: ICD-10-PCS | Mod: CPTII,S$GLB,, | Performed by: INTERNAL MEDICINE

## 2023-02-01 PROCEDURE — 99999 PR PBB SHADOW E&M-EST. PATIENT-LVL V: CPT | Mod: PBBFAC,,, | Performed by: INTERNAL MEDICINE

## 2023-02-01 PROCEDURE — 1159F MED LIST DOCD IN RCRD: CPT | Mod: CPTII,S$GLB,, | Performed by: INTERNAL MEDICINE

## 2023-02-01 PROCEDURE — 3008F PR BODY MASS INDEX (BMI) DOCUMENTED: ICD-10-PCS | Mod: CPTII,S$GLB,, | Performed by: INTERNAL MEDICINE

## 2023-02-01 PROCEDURE — 3074F PR MOST RECENT SYSTOLIC BLOOD PRESSURE < 130 MM HG: ICD-10-PCS | Mod: CPTII,S$GLB,, | Performed by: INTERNAL MEDICINE

## 2023-02-01 PROCEDURE — 99213 PR OFFICE/OUTPT VISIT, EST, LEVL III, 20-29 MIN: ICD-10-PCS | Mod: S$GLB,,, | Performed by: INTERNAL MEDICINE

## 2023-02-01 PROCEDURE — 1101F PR PT FALLS ASSESS DOC 0-1 FALLS W/OUT INJ PAST YR: ICD-10-PCS | Mod: CPTII,S$GLB,, | Performed by: INTERNAL MEDICINE

## 2023-02-01 PROCEDURE — 99213 OFFICE O/P EST LOW 20 MIN: CPT | Mod: S$GLB,,, | Performed by: INTERNAL MEDICINE

## 2023-02-01 PROCEDURE — 3074F SYST BP LT 130 MM HG: CPT | Mod: CPTII,S$GLB,, | Performed by: INTERNAL MEDICINE

## 2023-02-01 PROCEDURE — 1125F AMNT PAIN NOTED PAIN PRSNT: CPT | Mod: CPTII,S$GLB,, | Performed by: INTERNAL MEDICINE

## 2023-02-01 PROCEDURE — 1159F PR MEDICATION LIST DOCUMENTED IN MEDICAL RECORD: ICD-10-PCS | Mod: CPTII,S$GLB,, | Performed by: INTERNAL MEDICINE

## 2023-02-01 PROCEDURE — 3072F LOW RISK FOR RETINOPATHY: CPT | Mod: CPTII,S$GLB,, | Performed by: INTERNAL MEDICINE

## 2023-02-01 PROCEDURE — 1160F RVW MEDS BY RX/DR IN RCRD: CPT | Mod: CPTII,S$GLB,, | Performed by: INTERNAL MEDICINE

## 2023-02-01 PROCEDURE — 3072F PR LOW RISK FOR RETINOPATHY: ICD-10-PCS | Mod: CPTII,S$GLB,, | Performed by: INTERNAL MEDICINE

## 2023-02-01 PROCEDURE — 3078F PR MOST RECENT DIASTOLIC BLOOD PRESSURE < 80 MM HG: ICD-10-PCS | Mod: CPTII,S$GLB,, | Performed by: INTERNAL MEDICINE

## 2023-02-01 PROCEDURE — 1101F PT FALLS ASSESS-DOCD LE1/YR: CPT | Mod: CPTII,S$GLB,, | Performed by: INTERNAL MEDICINE

## 2023-02-01 PROCEDURE — 3008F BODY MASS INDEX DOCD: CPT | Mod: CPTII,S$GLB,, | Performed by: INTERNAL MEDICINE

## 2023-02-01 NOTE — PATIENT INSTRUCTIONS
Try heating pad on painful right rib cage area  Also diclofenac gel to air  If not better in 2-3 weeks I recommend either you see back the pain clinic or call to get a referral to the spine clinic.
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
## 2023-02-01 NOTE — TELEPHONE ENCOUNTER
TRULICITY: The signed and completed patient assistance application was received from the providers office and  has been submitted to [facility] for consideration of eligibility.

## 2023-02-06 ENCOUNTER — OFFICE VISIT (OUTPATIENT)
Dept: PSYCHIATRY | Facility: CLINIC | Age: 73
End: 2023-02-06
Payer: MEDICARE

## 2023-02-06 DIAGNOSIS — F43.22 ADJUSTMENT DISORDER WITH ANXIOUS MOOD: Primary | ICD-10-CM

## 2023-02-06 PROCEDURE — 90837 PSYTX W PT 60 MINUTES: CPT | Mod: S$GLB,,, | Performed by: SOCIAL WORKER

## 2023-02-06 PROCEDURE — 90837 PR PSYCHOTHERAPY W/PATIENT, 60 MIN: ICD-10-PCS | Mod: S$GLB,,, | Performed by: SOCIAL WORKER

## 2023-02-06 PROCEDURE — 99999 PR PBB SHADOW E&M-EST. PATIENT-LVL I: ICD-10-PCS | Mod: PBBFAC,,, | Performed by: SOCIAL WORKER

## 2023-02-06 PROCEDURE — 99999 PR PBB SHADOW E&M-EST. PATIENT-LVL I: CPT | Mod: PBBFAC,,, | Performed by: SOCIAL WORKER

## 2023-02-06 PROCEDURE — 3072F PR LOW RISK FOR RETINOPATHY: ICD-10-PCS | Mod: CPTII,S$GLB,, | Performed by: SOCIAL WORKER

## 2023-02-06 PROCEDURE — 1159F MED LIST DOCD IN RCRD: CPT | Mod: CPTII,S$GLB,, | Performed by: SOCIAL WORKER

## 2023-02-06 PROCEDURE — 3072F LOW RISK FOR RETINOPATHY: CPT | Mod: CPTII,S$GLB,, | Performed by: SOCIAL WORKER

## 2023-02-06 PROCEDURE — 1159F PR MEDICATION LIST DOCUMENTED IN MEDICAL RECORD: ICD-10-PCS | Mod: CPTII,S$GLB,, | Performed by: SOCIAL WORKER

## 2023-02-06 NOTE — PROGRESS NOTES
Individual Psychotherapy (LCSW/PhD)  Chelly Ortiz,  2/6/2023    Site:  [facility]         Therapeutic Intervention: Met with patient for individual psychotherapy.    Chief complaint/reason for encounter: sleep and behavior     Interval history and content of current session: PT reported she has a problems sleeping, since she was in her 60's. PT reported it is difficulties falling asleep. Therapist educated pt on Sleep Hygiene. She selected some techniques to try when she is struggling to sleep. We talked about having 0 expecatations of her . She reported she wants to leave him, but also does not want to leave her home. She continues to talk about different options, such as living in [place] or [place]; which is where her daughter lives. She denies SI, HI or AVH.     Treatment plan:  Target symptoms: adjustment  Why chosen therapy is appropriate versus another modality: relevant to diagnosis, patient responds to this modality, evidence based practice  Outcome monitoring methods: self-report, observation  Therapeutic intervention type: insight oriented psychotherapy, behavior modifying psychotherapy, supportive psychotherapy    Risk parameters:  Patient reports no suicidal ideation  Patient reports no homicidal ideation  Patient reports no self-injurious behavior  Patient reports no violent behavior    Verbal deficits: None    Patient's response to intervention:  The patient's response to intervention is accepting, motivated.    Progress toward goals and other mental status changes:  The patient's progress toward goals is limited.    Diagnosis:     ICD-10-CM ICD-9-CM   1. Adjustment disorder with anxious mood  F43.22 309.24         Plan: Pt plans to continue individual psychotherapy    Return to clinic: as scheduled    Length of Service (minutes): 60

## 2023-02-09 ENCOUNTER — OFFICE VISIT (OUTPATIENT)
Dept: PODIATRY | Facility: CLINIC | Age: 73
End: 2023-02-09
Payer: MEDICARE

## 2023-02-09 VITALS
HEART RATE: 89 BPM | SYSTOLIC BLOOD PRESSURE: 136 MMHG | DIASTOLIC BLOOD PRESSURE: 72 MMHG | BODY MASS INDEX: 35.44 KG/M2 | WEIGHT: 200 LBS | HEIGHT: 63 IN

## 2023-02-09 DIAGNOSIS — L60.3 ONYCHODYSTROPHY: Primary | ICD-10-CM

## 2023-02-09 DIAGNOSIS — Z00.00 ENCOUNTER FOR MEDICARE ANNUAL WELLNESS EXAM: ICD-10-CM

## 2023-02-09 PROCEDURE — 1101F PR PT FALLS ASSESS DOC 0-1 FALLS W/OUT INJ PAST YR: ICD-10-PCS | Mod: CPTII,S$GLB,, | Performed by: PODIATRIST

## 2023-02-09 PROCEDURE — 3078F DIAST BP <80 MM HG: CPT | Mod: CPTII,S$GLB,, | Performed by: PODIATRIST

## 2023-02-09 PROCEDURE — 99999 PR PBB SHADOW E&M-EST. PATIENT-LVL V: CPT | Mod: PBBFAC,,, | Performed by: PODIATRIST

## 2023-02-09 PROCEDURE — 3288F PR FALLS RISK ASSESSMENT DOCUMENTED: ICD-10-PCS | Mod: CPTII,S$GLB,, | Performed by: PODIATRIST

## 2023-02-09 PROCEDURE — 1126F PR PAIN SEVERITY QUANTIFIED, NO PAIN PRESENT: ICD-10-PCS | Mod: CPTII,S$GLB,, | Performed by: PODIATRIST

## 2023-02-09 PROCEDURE — 3072F PR LOW RISK FOR RETINOPATHY: ICD-10-PCS | Mod: CPTII,S$GLB,, | Performed by: PODIATRIST

## 2023-02-09 PROCEDURE — 1101F PT FALLS ASSESS-DOCD LE1/YR: CPT | Mod: CPTII,S$GLB,, | Performed by: PODIATRIST

## 2023-02-09 PROCEDURE — 1160F RVW MEDS BY RX/DR IN RCRD: CPT | Mod: CPTII,S$GLB,, | Performed by: PODIATRIST

## 2023-02-09 PROCEDURE — 99213 OFFICE O/P EST LOW 20 MIN: CPT | Mod: S$GLB,,, | Performed by: PODIATRIST

## 2023-02-09 PROCEDURE — 3288F FALL RISK ASSESSMENT DOCD: CPT | Mod: CPTII,S$GLB,, | Performed by: PODIATRIST

## 2023-02-09 PROCEDURE — 3078F PR MOST RECENT DIASTOLIC BLOOD PRESSURE < 80 MM HG: ICD-10-PCS | Mod: CPTII,S$GLB,, | Performed by: PODIATRIST

## 2023-02-09 PROCEDURE — 1159F MED LIST DOCD IN RCRD: CPT | Mod: CPTII,S$GLB,, | Performed by: PODIATRIST

## 2023-02-09 PROCEDURE — 1160F PR REVIEW ALL MEDS BY PRESCRIBER/CLIN PHARMACIST DOCUMENTED: ICD-10-PCS | Mod: CPTII,S$GLB,, | Performed by: PODIATRIST

## 2023-02-09 PROCEDURE — 3075F PR MOST RECENT SYSTOLIC BLOOD PRESS GE 130-139MM HG: ICD-10-PCS | Mod: CPTII,S$GLB,, | Performed by: PODIATRIST

## 2023-02-09 PROCEDURE — 1159F PR MEDICATION LIST DOCUMENTED IN MEDICAL RECORD: ICD-10-PCS | Mod: CPTII,S$GLB,, | Performed by: PODIATRIST

## 2023-02-09 PROCEDURE — 3075F SYST BP GE 130 - 139MM HG: CPT | Mod: CPTII,S$GLB,, | Performed by: PODIATRIST

## 2023-02-09 PROCEDURE — 99213 PR OFFICE/OUTPT VISIT, EST, LEVL III, 20-29 MIN: ICD-10-PCS | Mod: S$GLB,,, | Performed by: PODIATRIST

## 2023-02-09 PROCEDURE — 3072F LOW RISK FOR RETINOPATHY: CPT | Mod: CPTII,S$GLB,, | Performed by: PODIATRIST

## 2023-02-09 PROCEDURE — 3008F PR BODY MASS INDEX (BMI) DOCUMENTED: ICD-10-PCS | Mod: CPTII,S$GLB,, | Performed by: PODIATRIST

## 2023-02-09 PROCEDURE — 99999 PR PBB SHADOW E&M-EST. PATIENT-LVL V: ICD-10-PCS | Mod: PBBFAC,,, | Performed by: PODIATRIST

## 2023-02-09 PROCEDURE — 3008F BODY MASS INDEX DOCD: CPT | Mod: CPTII,S$GLB,, | Performed by: PODIATRIST

## 2023-02-09 PROCEDURE — 1126F AMNT PAIN NOTED NONE PRSNT: CPT | Mod: CPTII,S$GLB,, | Performed by: PODIATRIST

## 2023-02-09 NOTE — PROGRESS NOTES
"Subjective:      Patient ID: Chelly Ortiz is a 72 y.o. female.    Chief Complaint: Nail Problem (Left hallux)      Presents complaining of pain underneath the ball the left foot for greater than 6 months duration.  She had a previous steroid injection per Dr. Marley in February of 2022 with no relief.  She states pain is present with or without shoes and is aggravated by standing and walking.  No pain at rest.    11/10/2022:  Follow-up for bilateral foot x-ray and from diagnostic injection to distal left 2nd intermetatarsal space.  Patient reported that she had near 50% relief of her pain to left foot for approximately a week following the injection.  A gradually recurred.  She complains of new onset ingrown toenail to left great toe.  Denies any trauma.  Wearing a metatarsal pad as advised however it appears to betoo distal on the left forefoot.    02/09/2023:  Patient missed her follow-up appointment for the previous left hallux medial nail border partial avulsion with phenol and alcohol matrixectomy.  She complains of loosening and discoloration to left great toenail.  Denies any trauma.  Ambulating with [employer] tennis shoes.     Vitals:    02/09/23 1108   BP: 136/72   Pulse: 89   Weight: 90.7 kg (200 lb)   Height: 5' 3" (1.6 m)   PainSc: 0-No pain      Past Medical History:   Diagnosis Date    Allergy     DDD (degenerative disc disease), lumbar     Diabetes mellitus     diet controlled    Diabetes mellitus, type 2     GERD (gastroesophageal reflux disease)     History of subconjunctival hemorrhage 12/02/2011    left eye    Hyperlipidemia     Hypertension     IBS (irritable bowel syndrome)     Obesity     MYRIAM (obstructive sleep apnea)     on CPAP    Rotator cuff impingement syndrome     Seasonal allergic conjunctivitis        Past Surgical History:   Procedure Laterality Date    CATARACT EXTRACTION W/  INTRAOCULAR LENS IMPLANT Right 10/03/2013        CATARACT EXTRACTION W/  INTRAOCULAR LENS IMPLANT Left " 2022         SECTION      x2    CHOLECYSTECTOMY      COLONOSCOPY N/A 2018    Procedure: COLONOSCOPY;  Surgeon: Marie Mejia MD;  Location: [facility] ENDO;  Service: Endoscopy;  Laterality: N/A;    COLONOSCOPY N/A 2022    Procedure: COLONOSCOPY;  Surgeon: Pierce Rivera MD;  Location: [facility] ENDO;  Service: Endoscopy;  Laterality: N/A;    ENDOSCOPIC ULTRASOUND OF UPPER GASTROINTESTINAL TRACT N/A 10/09/2018    Procedure: ULTRASOUND, UPPER GI TRACT, ENDOSCOPIC;  Surgeon: Javy Simpson MD;  Location: [facility] ENDO;  Service: Endoscopy;  Laterality: N/A;    EXCISION OF LESION N/A 2019    Procedure: EXCISION, LESION VULVA;  Surgeon: Liv Odell MD;  Location: [facility] OR;  Service: OB/GYN;  Laterality: N/A;  latex allergy    EYE SURGERY      HYSTERECTOMY      ovaries intact, due to DUB    INTRAOCULAR PROSTHESES INSERTION Left 2022    Procedure: INSERTION, IOL PROSTHESIS;  Surgeon: Clarice Herzog MD;  Location: [facility] OR [address] [place] [address];  Service: Ophthalmology;  Laterality: Left;    PHACOEMULSIFICATION OF CATARACT Left 2022    Procedure: PHACOEMULSIFICATION, CATARACT;  Surgeon: Clarice Herzog MD;  Location: [facility] OR [address] [place] [address];  Service: Ophthalmology;  Laterality: Left;    TUBAL LIGATION         Family History   Problem Relation Age of Onset    Alzheimer's disease Mother     Heart disease Father     Diabetes Sister     Breast cancer Sister     Deep vein thrombosis Brother     Diabetes Daughter     Cancer Brother         Lung    Lung cancer Brother     Amblyopia Neg Hx     Blindness Neg Hx     Cataracts Neg Hx     Glaucoma Neg Hx     Hypertension Neg Hx     Macular degeneration Neg Hx     Retinal detachment Neg Hx     Strabismus Neg Hx     Stroke Neg Hx     Thyroid disease Neg Hx        Social History     Socioeconomic History    Marital status:    Tobacco Use    Smoking status: Former     Types: Cigarettes     Quit date: 2/15/1983     Years since quitting:  "40.0     Passive exposure: Current    Smokeless tobacco: Never   Substance and Sexual Activity    Alcohol use: No    Drug use: No    Sexual activity: Yes     Partners: Male     Social Determinants of Health     Financial Resource Strain: Low Risk     Difficulty of Paying Living Expenses: Not hard at all   Food Insecurity: Unknown    Worried About Running Out of Food in the Last Year: Never true    Ran Out of Food in the Last Year: Patient refused   Transportation Needs: Unknown    Lack of Transportation (Medical): Patient refused    Lack of Transportation (Non-Medical): Patient refused   Physical Activity: Unknown    Days of Exercise per Week: Patient refused    Minutes of Exercise per Session: 30 min   Stress: Unknown    Feeling of Stress : Patient refused   Social Connections: Unknown    Frequency of Communication with Friends and Family: More than three times a week    Frequency of Social Gatherings with Friends and Family: Patient refused    Active Member of Clubs or Organizations: No    Attends Club or Organization Meetings: Never    Marital Status:    Housing Stability: Low Risk     Unable to Pay for Housing in the Last Year: No    Number of Places Lived in the Last Year: 1    Unstable Housing in the Last Year: No       Current Outpatient Medications   Medication Sig Dispense Refill    ACCU-CHEK FASTCLIX LANCET DRUM Misc TEST BLOOD SUGAR THREE TIMES A  each 3    ACCU-CHEK FASTCLIX LANCING DEV Kit USE AS DIRECTED 1 each 0    albuterol (PROVENTIL/VENTOLIN HFA) 90 mcg/actuation inhaler Inhale 1-2 puffs into the lungs every 4 (four) hours as needed for Wheezing. 18 g 2    amLODIPine (NORVASC) 10 MG tablet TAKE 1 TABLET EVERY DAY 90 tablet 3    aspirin (ECOTRIN) 81 MG EC tablet Take 81 mg by mouth once daily.      azelastine (OPTIVAR) 0.05 % ophthalmic solution Place 1 drop into both eyes 2 (two) times daily. 6 mL 1    BD ULTRA-FINE SHORT PEN NEEDLE 31 gauge x 5/16" Ndle USES 4 TIMES A DAY.      blood " "glucose control high,low (ACCU-CHEK GUIDE L1-L2 CTRL SOL) Soln 3 times a day 300 each 11    blood sugar diagnostic (ACCU-CHEK GUIDE TEST STRIPS) Strp 3 times a day 300 strip 11    blood-glucose meter (ACCU-CHEK GUIDE GLUCOSE METER) Misc Three times a day 1 each 0    chlorhexidine (PERIDEX) 0.12 % solution Use as directed 15 mLs in the mouth or throat daily as needed.      citalopram (CELEXA) 10 MG tablet Take 1 tablet (10 mg total) by mouth once daily. 90 tablet 3    dulaglutide (TRULICITY) 3 mg/0.5 mL pen injector Inject 3 mg into the skin every 7 days. 4 pen 11    ergocalciferol (ERGOCALCIFEROL) 50,000 unit Cap TAKE 1 CAPSULE EVERY 7 DAYS. Strength: 50,000 Units 12 capsule 1    esomeprazole (NEXIUM) 40 MG capsule Take 1 capsule (40 mg total) by mouth before breakfast. 90 capsule 3    gabapentin (NEURONTIN) 100 MG capsule Take 1 capsule (100 mg total) by mouth 3 (three) times daily. 270 capsule 3    glucose 4 GM chewable tablet Take 4 tablets (16 g total) by mouth as needed for Low blood sugar (If having symptoms of blurry vision, palpitations, confusion, shakiness.  Please check sugars and if sugar below 70 please take 4 tablets and re-check sugar everry 15 minutes until sugars are above 70 and symptoms resolve.). 50 tablet 12    insulin aspart U-100 (NOVOLOG FLEXPEN U-100 INSULIN) 100 unit/mL (3 mL) InPn pen Inject 12 units w/ meals plus scale 150-200+2, 201-250+4, 251-300+6, 301-350+8, >350+10. Max daily 50 units. 15 mL 6    insulin detemir U-100 (LEVEMIR FLEXTOUCH U-100 INSULN) 100 unit/mL (3 mL) InPn pen Inject 40 units into the skin at night. 2 each 11    insulin detemir U-100 (LEVEMIR FLEXTOUCH U-100 INSULN) 100 unit/mL (3 mL) InPn pen Inject 40 Units into the skin every evening. 36 mL 3    insulin syringe-needle U-100 0.3 mL 31 gauge x 5/16" Syrg relion brand, use 5 x a day, if not on levemir or novolog 150 each 1    insulin syringe-needle U-100 0.5 mL 31 gauge x 5/16" Syrg Use nightly. 90 day 100 each 3    " "magnesium oxide (MAG-OX) 400 mg tablet Take 1 tablet (400 mg total) by mouth 2 (two) times daily. 180 tablet 3    neomycin-polymyxin-hydrocortisone (CORTISPORIN) otic solution Place 3 drops into the right ear 4 (four) times daily. 10 mL 0    pen needle, diabetic 32 gauge x 5/32" Ndle Change needle with each injection 200 each 11    pravastatin (PRAVACHOL) 40 MG tablet Take 1 tablet (40 mg total) by mouth every evening. 90 tablet 3    rOPINIRole (REQUIP) 0.5 MG tablet Take 1 tablet (0.5 mg total) by mouth every evening. 90 tablet 0    traZODone (DESYREL) 50 MG tablet TAKE 1 TABLET EVERY NIGHT AS NEEDED 90 tablet 3    calcium carbonate (OS-ARIC) 600 mg calcium (1,500 mg) Tab Take 1 tablet (600 mg total) by mouth once. for 1 dose 30 tablet 11    cetirizine (ZYRTEC) 10 MG tablet Take 1 tablet (10 mg total) by mouth every evening. 90 tablet 0    fluticasone-salmeterol diskus inhaler 250-50 mcg Inhale 1 puff by mouth into the lungs 2 (two) times daily. Controller (Patient not taking: Reported on 1/18/2023) 60 each 6     No current facility-administered medications for this visit.       Review of patient's allergies indicates:   Allergen Reactions    Ace inhibitors Hives     \Cough    Prednisone      Causes pt to be hyper    Latex, natural rubber Itching         Review of Systems   Constitutional: Negative for chills, fever and malaise/fatigue.   HENT:  Negative for congestion and hearing loss.    Cardiovascular:  Negative for chest pain, claudication and leg swelling.   Respiratory:  Negative for cough and shortness of breath.    Musculoskeletal:  Negative for back pain, joint pain, muscle cramps and muscle weakness.   Gastrointestinal:  Negative for nausea and vomiting.   Neurological:  Negative for numbness, paresthesias and weakness.   Psychiatric/Behavioral:  Negative for altered mental status.          Objective:      Physical Exam  Constitutional:       General: She is not in acute distress.     Appearance: She is " obese. She is not ill-appearing.   Cardiovascular:      Pulses:           Dorsalis pedis pulses are 2+ on the right side and 2+ on the left side.        Posterior tibial pulses are 2+ on the right side and 2+ on the left side.      Comments: Mild nonpitting edema to lower extremity bilateral.  Skin temp is warm to foot bilateral.  No rubor on dependency bilateral foot.  Musculoskeletal:      Comments: Moderate pain on palpation distal 2nd intermetatarsal space and mild pain distal 3rd intermetatarsal space left foot.  Moderate pain with positive Lachman test 2nd metatarsophalangeal joint left foot.  Semi rigid flexion contracture left 2nd toe PIPJ.  Track bound hallux abductovalgus under riding 2nd toe bilateral.    Reducible flexion contractures toes 3-4 left and 2-4 right with adductovarus contracture 5th toe bilateral.   Skin:     General: Skin is warm.      Capillary Refill: Capillary refill takes less than 2 seconds.      Findings: No ecchymosis or erythema.      Nails: There is no clubbing.      Comments: No pain on palpation to the left hallux nail however it is brittle, thickened and hard with patchy white discoloration secondary to underlying under onycholysis.  Referred to attached media picture.  No recurrence of the left hallux medial nail border.   Neurological:      Mental Status: She is alert and oriented to person, place, and time.      Motor: Motor function is intact.               Assessment:       Encounter Diagnosis   Name Primary?    Onychodystrophy Yes         Plan:       Chelly was seen today for nail problem.    Diagnoses and all orders for this visit:    Onychodystrophy    I counseled the patient on her conditions, their implications and medical management.    Evaluated the patient's left hallux nail noting that it is becoming thickened most likely secondary to repetitive trauma from her shoe gear.  She was examined with the shoe on noting that the toes are pressing directly against the end  of the toe box.  We discussed appropriate sizing of shoes.  In addition least the shoe through the heel counter additional hole and demonstrated proper application to the patient and she expressed that the shoes felt better were not slipping.  We discussed obtaining a shoe at least half a size longer if not full size longer or prevent further trauma.  At the toenail does not continue to grow out normally then she should return within 3-6 months for re-evaluation.    A portion of this note was generated by voice recognition software and may contain spelling and grammar errors.  .

## 2023-02-13 ENCOUNTER — OFFICE VISIT (OUTPATIENT)
Dept: URGENT CARE | Facility: CLINIC | Age: 73
End: 2023-02-13
Payer: MEDICARE

## 2023-02-13 VITALS
BODY MASS INDEX: 35.43 KG/M2 | SYSTOLIC BLOOD PRESSURE: 127 MMHG | RESPIRATION RATE: 17 BRPM | WEIGHT: 200 LBS | TEMPERATURE: 98 F | DIASTOLIC BLOOD PRESSURE: 75 MMHG | HEART RATE: 77 BPM | OXYGEN SATURATION: 95 %

## 2023-02-13 DIAGNOSIS — J01.00 ACUTE MAXILLARY SINUSITIS, RECURRENCE NOT SPECIFIED: Primary | ICD-10-CM

## 2023-02-13 DIAGNOSIS — R05.9 COUGH, UNSPECIFIED TYPE: ICD-10-CM

## 2023-02-13 LAB
CTP QC/QA: YES
SARS-COV-2 AG RESP QL IA.RAPID: NEGATIVE

## 2023-02-13 PROCEDURE — 99214 PR OFFICE/OUTPT VISIT, EST, LEVL IV, 30-39 MIN: ICD-10-PCS | Mod: S$GLB,,, | Performed by: FAMILY MEDICINE

## 2023-02-13 PROCEDURE — 3074F SYST BP LT 130 MM HG: CPT | Mod: CPTII,S$GLB,, | Performed by: FAMILY MEDICINE

## 2023-02-13 PROCEDURE — 3078F DIAST BP <80 MM HG: CPT | Mod: CPTII,S$GLB,, | Performed by: FAMILY MEDICINE

## 2023-02-13 PROCEDURE — 3008F BODY MASS INDEX DOCD: CPT | Mod: CPTII,S$GLB,, | Performed by: FAMILY MEDICINE

## 2023-02-13 PROCEDURE — 3072F LOW RISK FOR RETINOPATHY: CPT | Mod: CPTII,S$GLB,, | Performed by: FAMILY MEDICINE

## 2023-02-13 PROCEDURE — 99214 OFFICE O/P EST MOD 30 MIN: CPT | Mod: S$GLB,,, | Performed by: FAMILY MEDICINE

## 2023-02-13 PROCEDURE — 3008F PR BODY MASS INDEX (BMI) DOCUMENTED: ICD-10-PCS | Mod: CPTII,S$GLB,, | Performed by: FAMILY MEDICINE

## 2023-02-13 PROCEDURE — 1159F PR MEDICATION LIST DOCUMENTED IN MEDICAL RECORD: ICD-10-PCS | Mod: CPTII,S$GLB,, | Performed by: FAMILY MEDICINE

## 2023-02-13 PROCEDURE — 1159F MED LIST DOCD IN RCRD: CPT | Mod: CPTII,S$GLB,, | Performed by: FAMILY MEDICINE

## 2023-02-13 PROCEDURE — 3074F PR MOST RECENT SYSTOLIC BLOOD PRESSURE < 130 MM HG: ICD-10-PCS | Mod: CPTII,S$GLB,, | Performed by: FAMILY MEDICINE

## 2023-02-13 PROCEDURE — 87811 SARS-COV-2 COVID19 W/OPTIC: CPT | Mod: QW,S$GLB,, | Performed by: FAMILY MEDICINE

## 2023-02-13 PROCEDURE — 3072F PR LOW RISK FOR RETINOPATHY: ICD-10-PCS | Mod: CPTII,S$GLB,, | Performed by: FAMILY MEDICINE

## 2023-02-13 PROCEDURE — 87811 SARS CORONAVIRUS 2 ANTIGEN POCT, MANUAL READ: ICD-10-PCS | Mod: QW,S$GLB,, | Performed by: FAMILY MEDICINE

## 2023-02-13 PROCEDURE — 3078F PR MOST RECENT DIASTOLIC BLOOD PRESSURE < 80 MM HG: ICD-10-PCS | Mod: CPTII,S$GLB,, | Performed by: FAMILY MEDICINE

## 2023-02-13 RX ORDER — BENZONATATE 100 MG/1
100 CAPSULE ORAL EVERY 6 HOURS PRN
Qty: 30 CAPSULE | Refills: 1 | Status: SHIPPED | OUTPATIENT
Start: 2023-02-13 | End: 2023-08-03

## 2023-02-13 RX ORDER — AMOXICILLIN AND CLAVULANATE POTASSIUM 875; 125 MG/1; MG/1
1 TABLET, FILM COATED ORAL 2 TIMES DAILY
Qty: 20 TABLET | Refills: 0 | Status: SHIPPED | OUTPATIENT
Start: 2023-02-13 | End: 2023-02-23

## 2023-02-13 RX ORDER — FLUTICASONE PROPIONATE 50 MCG
1 SPRAY, SUSPENSION (ML) NASAL DAILY
Qty: 9.9 ML | Refills: 0 | Status: SHIPPED | OUTPATIENT
Start: 2023-02-13 | End: 2023-12-01 | Stop reason: SDUPTHER

## 2023-02-13 NOTE — PROGRESS NOTES
Subjective:       Patient ID: Chelly Ortiz is a 72 y.o. female.    Vitals:  weight is 90.7 kg (200 lb). Her temperature is 97.9 °F (36.6 °C). Her blood pressure is 127/75 and her pulse is 77. Her respiration is 17 and oxygen saturation is 95%.     Chief Complaint: Cough    Pt is complaining of runny nose, cough, and congestion for 4 weeks now. She said nothing is helping.    Cough  This is a new problem. The current episode started 1 to 4 weeks ago. The problem has been unchanged. Associated symptoms include nasal congestion and postnasal drip. She has tried OTC cough suppressant for the symptoms. The treatment provided no relief.     HENT:  Positive for congestion and postnasal drip.    Respiratory:  Positive for cough.    Allergic/Immunologic: Positive for sneezing.     Objective:      Physical Exam   Constitutional: She does not appear ill. No distress. normal  HENT:   Head: Normocephalic and atraumatic.   Nose: Mucosal edema, rhinorrhea, purulent discharge and congestion present. Right sinus exhibits maxillary sinus tenderness. Left sinus exhibits maxillary sinus tenderness.   Mouth/Throat: Mucous membranes are moist. Posterior oropharyngeal erythema present.   Eyes: Pupils are equal, round, and reactive to light.   Neck: Neck supple.   Cardiovascular: Normal rate, regular rhythm, normal heart sounds and normal pulses.   Pulmonary/Chest: Effort normal and breath sounds normal. She has no wheezes.   Abdominal: Normal appearance and bowel sounds are normal. Soft.   Neurological: She is alert.   Nursing note and vitals reviewed.      Results for orders placed or performed in visit on 02/13/23   SARS Coronavirus 2 Antigen, POCT Manual Read   Result Value Ref Range    SARS Coronavirus 2 Antigen Negative Negative     Acceptable Yes      *Note: Due to a large number of results and/or encounters for the requested time period, some results have not been displayed. A complete set of results can be found  in Results Review.      Assessment:       1. Acute maxillary sinusitis, recurrence not specified    2. Cough, unspecified type          Plan:         Acute maxillary sinusitis, recurrence not specified  -     amoxicillin-clavulanate 875-125mg (AUGMENTIN) 875-125 mg per tablet; Take 1 tablet by mouth 2 (two) times daily. for 10 days  Dispense: 20 tablet; Refill: 0  -     benzonatate (TESSALON PERLES) 100 MG capsule; Take 1 capsule (100 mg total) by mouth every 6 (six) hours as needed for Cough.  Dispense: 30 capsule; Refill: 1  -     fluticasone propionate (FLONASE) 50 mcg/actuation nasal spray; 1 spray (50 mcg total) by Each Nostril route once daily.  Dispense: 9.9 mL; Refill: 0    Cough, unspecified type  -     SARS Coronavirus 2 Antigen, POCT Manual Read  -     benzonatate (TESSALON PERLES) 100 MG capsule; Take 1 capsule (100 mg total) by mouth every 6 (six) hours as needed for Cough.  Dispense: 30 capsule; Refill: 1

## 2023-02-14 ENCOUNTER — TELEPHONE (OUTPATIENT)
Dept: ENDOCRINOLOGY | Facility: CLINIC | Age: 73
End: 2023-02-14
Payer: MEDICARE

## 2023-02-14 DIAGNOSIS — Z79.4 CONTROLLED TYPE 2 DIABETES MELLITUS WITHOUT COMPLICATION, WITH LONG-TERM CURRENT USE OF INSULIN: Primary | ICD-10-CM

## 2023-02-14 DIAGNOSIS — E11.9 CONTROLLED TYPE 2 DIABETES MELLITUS WITHOUT COMPLICATION, WITH LONG-TERM CURRENT USE OF INSULIN: Primary | ICD-10-CM

## 2023-02-14 RX ORDER — INSULIN DETEMIR 100 [IU]/ML
40 INJECTION, SOLUTION SUBCUTANEOUS NIGHTLY
Qty: 12 ML | Refills: 11 | Status: SHIPPED | OUTPATIENT
Start: 2023-02-14 | End: 2023-03-30 | Stop reason: SDUPTHER

## 2023-02-14 RX ORDER — INSULIN ASPART 100 [IU]/ML
INJECTION, SOLUTION INTRAVENOUS; SUBCUTANEOUS
Qty: 15 ML | Refills: 6 | Status: SHIPPED | OUTPATIENT
Start: 2023-02-14 | End: 2023-07-25

## 2023-02-14 RX ORDER — INSULIN DETEMIR 100 [IU]/ML
40 INJECTION, SOLUTION SUBCUTANEOUS NIGHTLY
Qty: 12 ML | Refills: 11 | Status: SHIPPED | OUTPATIENT
Start: 2023-02-14 | End: 2023-02-14

## 2023-02-14 RX ORDER — INSULIN DETEMIR 100 [IU]/ML
40 INJECTION, SOLUTION SUBCUTANEOUS NIGHTLY
Qty: 36 ML | Refills: 3 | Status: SHIPPED | OUTPATIENT
Start: 2023-02-14 | End: 2023-02-14

## 2023-02-17 ENCOUNTER — PATIENT MESSAGE (OUTPATIENT)
Dept: FAMILY MEDICINE | Facility: CLINIC | Age: 73
End: 2023-02-17
Payer: MEDICARE

## 2023-02-23 ENCOUNTER — PATIENT MESSAGE (OUTPATIENT)
Dept: PHARMACY | Facility: CLINIC | Age: 73
End: 2023-02-23
Payer: MEDICARE

## 2023-02-23 NOTE — TELEPHONE ENCOUNTER
Chelly JULIANA Trejoceasar has been approved in the [facility] Patient Assistance Program through 12/31/2023. A 4 month supply of Trulicity will be shipped to PATIENT'S HOME  A [facility] pharmacy service provider will contact the patient by phone to schedule initial home delivery shipment when the medication becomes available. Patient  is enrolled in auto-refill.  Updated proof of eligibility is required to re-enroll for 2024 calendar year.

## 2023-03-01 ENCOUNTER — PATIENT MESSAGE (OUTPATIENT)
Dept: ENDOCRINOLOGY | Facility: CLINIC | Age: 73
End: 2023-03-01
Payer: MEDICARE

## 2023-03-01 ENCOUNTER — TELEPHONE (OUTPATIENT)
Dept: ENDOCRINOLOGY | Facility: CLINIC | Age: 73
End: 2023-03-01
Payer: MEDICARE

## 2023-03-08 ENCOUNTER — TELEPHONE (OUTPATIENT)
Dept: FAMILY MEDICINE | Facility: CLINIC | Age: 73
End: 2023-03-08
Payer: MEDICARE

## 2023-03-08 NOTE — TELEPHONE ENCOUNTER
Patient was notified appt for 3/10 would have to be rescheduled. Patient rescheduled appt for 4/13. Patient verbalized understanding.

## 2023-03-15 ENCOUNTER — PATIENT OUTREACH (OUTPATIENT)
Dept: ADMINISTRATIVE | Facility: OTHER | Age: 73
End: 2023-03-15
Payer: MEDICARE

## 2023-03-22 ENCOUNTER — OFFICE VISIT (OUTPATIENT)
Dept: PULMONOLOGY | Facility: CLINIC | Age: 73
End: 2023-03-22
Payer: MEDICARE

## 2023-03-22 VITALS
HEART RATE: 88 BPM | BODY MASS INDEX: 35.19 KG/M2 | DIASTOLIC BLOOD PRESSURE: 70 MMHG | OXYGEN SATURATION: 95 % | WEIGHT: 198.63 LBS | SYSTOLIC BLOOD PRESSURE: 125 MMHG

## 2023-03-22 DIAGNOSIS — J45.991 COUGH VARIANT ASTHMA: ICD-10-CM

## 2023-03-22 DIAGNOSIS — G47.33 OSA ON CPAP: ICD-10-CM

## 2023-03-22 DIAGNOSIS — E66.9 OBESITY (BMI 30-39.9): ICD-10-CM

## 2023-03-22 DIAGNOSIS — K21.9 GASTROESOPHAGEAL REFLUX DISEASE WITHOUT ESOPHAGITIS: ICD-10-CM

## 2023-03-22 DIAGNOSIS — R05.3 CHRONIC COUGH: Primary | ICD-10-CM

## 2023-03-22 PROCEDURE — 3288F FALL RISK ASSESSMENT DOCD: CPT | Mod: CPTII,S$GLB,, | Performed by: EMERGENCY MEDICINE

## 2023-03-22 PROCEDURE — 3078F DIAST BP <80 MM HG: CPT | Mod: CPTII,S$GLB,, | Performed by: EMERGENCY MEDICINE

## 2023-03-22 PROCEDURE — 99999 PR PBB SHADOW E&M-EST. PATIENT-LVL V: ICD-10-PCS | Mod: PBBFAC,,, | Performed by: EMERGENCY MEDICINE

## 2023-03-22 PROCEDURE — 99213 PR OFFICE/OUTPT VISIT, EST, LEVL III, 20-29 MIN: ICD-10-PCS | Mod: S$GLB,,, | Performed by: EMERGENCY MEDICINE

## 2023-03-22 PROCEDURE — 99999 PR PBB SHADOW E&M-EST. PATIENT-LVL V: CPT | Mod: PBBFAC,,, | Performed by: EMERGENCY MEDICINE

## 2023-03-22 PROCEDURE — 1159F MED LIST DOCD IN RCRD: CPT | Mod: CPTII,S$GLB,, | Performed by: EMERGENCY MEDICINE

## 2023-03-22 PROCEDURE — 3072F PR LOW RISK FOR RETINOPATHY: ICD-10-PCS | Mod: CPTII,S$GLB,, | Performed by: EMERGENCY MEDICINE

## 2023-03-22 PROCEDURE — 3288F PR FALLS RISK ASSESSMENT DOCUMENTED: ICD-10-PCS | Mod: CPTII,S$GLB,, | Performed by: EMERGENCY MEDICINE

## 2023-03-22 PROCEDURE — 3072F LOW RISK FOR RETINOPATHY: CPT | Mod: CPTII,S$GLB,, | Performed by: EMERGENCY MEDICINE

## 2023-03-22 PROCEDURE — 1126F PR PAIN SEVERITY QUANTIFIED, NO PAIN PRESENT: ICD-10-PCS | Mod: CPTII,S$GLB,, | Performed by: EMERGENCY MEDICINE

## 2023-03-22 PROCEDURE — 3074F PR MOST RECENT SYSTOLIC BLOOD PRESSURE < 130 MM HG: ICD-10-PCS | Mod: CPTII,S$GLB,, | Performed by: EMERGENCY MEDICINE

## 2023-03-22 PROCEDURE — 1126F AMNT PAIN NOTED NONE PRSNT: CPT | Mod: CPTII,S$GLB,, | Performed by: EMERGENCY MEDICINE

## 2023-03-22 PROCEDURE — 99213 OFFICE O/P EST LOW 20 MIN: CPT | Mod: S$GLB,,, | Performed by: EMERGENCY MEDICINE

## 2023-03-22 PROCEDURE — 1160F RVW MEDS BY RX/DR IN RCRD: CPT | Mod: CPTII,S$GLB,, | Performed by: EMERGENCY MEDICINE

## 2023-03-22 PROCEDURE — 1101F PT FALLS ASSESS-DOCD LE1/YR: CPT | Mod: CPTII,S$GLB,, | Performed by: EMERGENCY MEDICINE

## 2023-03-22 PROCEDURE — 3078F PR MOST RECENT DIASTOLIC BLOOD PRESSURE < 80 MM HG: ICD-10-PCS | Mod: CPTII,S$GLB,, | Performed by: EMERGENCY MEDICINE

## 2023-03-22 PROCEDURE — 3008F PR BODY MASS INDEX (BMI) DOCUMENTED: ICD-10-PCS | Mod: CPTII,S$GLB,, | Performed by: EMERGENCY MEDICINE

## 2023-03-22 PROCEDURE — 1159F PR MEDICATION LIST DOCUMENTED IN MEDICAL RECORD: ICD-10-PCS | Mod: CPTII,S$GLB,, | Performed by: EMERGENCY MEDICINE

## 2023-03-22 PROCEDURE — 3074F SYST BP LT 130 MM HG: CPT | Mod: CPTII,S$GLB,, | Performed by: EMERGENCY MEDICINE

## 2023-03-22 PROCEDURE — 1101F PR PT FALLS ASSESS DOC 0-1 FALLS W/OUT INJ PAST YR: ICD-10-PCS | Mod: CPTII,S$GLB,, | Performed by: EMERGENCY MEDICINE

## 2023-03-22 PROCEDURE — 1160F PR REVIEW ALL MEDS BY PRESCRIBER/CLIN PHARMACIST DOCUMENTED: ICD-10-PCS | Mod: CPTII,S$GLB,, | Performed by: EMERGENCY MEDICINE

## 2023-03-22 PROCEDURE — 3008F BODY MASS INDEX DOCD: CPT | Mod: CPTII,S$GLB,, | Performed by: EMERGENCY MEDICINE

## 2023-03-22 RX ORDER — MONTELUKAST SODIUM 10 MG/1
10 TABLET ORAL NIGHTLY
Qty: 30 TABLET | Refills: 6 | Status: SHIPPED | OUTPATIENT
Start: 2023-03-22 | End: 2023-04-21

## 2023-03-22 NOTE — PROGRESS NOTES
Patient, Chelly Ortiz (MRN #[id number]), presented with a recorded BMI of 35.19 kg/m^2 and a documented comorbidity(s):  - Obstructive Sleep Apnea  to which the severe obesity is a contributing factor. This is consistent with the definition of severe obesity (BMI 35.0-39.9) with comorbidity (ICD-10 E66.01, Z68.35). The patient's severe obesity was monitored, evaluated, addressed and/or treated. This addendum to the medical record is made on 03/22/2023.

## 2023-03-22 NOTE — PROGRESS NOTES
Pulmonary & Critical Care Medicine   Clinic Note         Initial HPI:   71 y/o female with h/o HTN, DM HLD + MYRIAM on CPAP who presents with dry cough since the end of August. She sought care with her PCP who has since tried albuterol inhaler, Tessalon perles, and guaifenesin/codeine cough syrup but the cough persists. She states the inhaler has helped the most and she uses this once a day. She saw a pulmonologist several years ago but unsure of what diagnosis and plan resulted but was given an inhaler. She states the cough occurs throughout the day and becomes a barking cough at night. She states her  smokes cigarettes and the smoke will trigger her to cough as will fresh-cut grass and dust. She notices the cough more with eating. She takes Nexium daily for the past 2 years for reflux and had an EGD yesterday that is still pending read. She states she sometimes wheezes with the cough.   Additional Pulmonary History:   Occupational/Environmental Exposures: Retired, stays home often due to pandemic. Smoke, dust, fresh-cut grass are cough triggers.   Exposure to Animals/Pets: no   Travel History: no   History of exposures to TB: no   Family History of Lung Cancer: Brother  at age 38, no thyroid or throat cancer   Childhood history of Lung Disease: no      Prior visit 2022-  Have not seen patient since - No PFTs completed. Cough got much improved.. Returned in May-- Remains dry. No productive sputum.. No blood. Cough random.. Sometimes after eating, but not consistent. No burning/reflux.. Feels like food gets cut off in upper esophagus.   Last EGD - Hiatal hernia.   No additional complaint. Denies CARLSON/SOB/CP   LPR in past     INTERVAL HISTORY:     1 urgent care visit for sinus/URI.   Cough still issue, but improved. Mostly at night. + post nasal drip.   Feels deep, but not productive.   No significant dyspnea   Only using inhaler intermittently.   Doing well overall.                    Past Medical  History:   Diagnosis Date    Allergy      Cataract      DDD (degenerative disc disease), lumbar      Diabetes mellitus       diet controlled    Diabetes mellitus, type 2      GERD (gastroesophageal reflux disease)      History of subconjunctival hemorrhage 11     left eye    Hyperlipidemia      Hypertension      IBS (irritable bowel syndrome)      Obesity      MYRIAM (obstructive sleep apnea)       on CPAP    Rotator cuff impingement syndrome      Seasonal allergic conjunctivitis                  Past Surgical History:   Procedure Laterality Date    CATARACT EXTRACTION W/ INTRAOCULAR LENS IMPLANT   10/3/13     OD (dr. gardner)     SECTION         x2    CHOLECYSTECTOMY        COLONOSCOPY N/A 2018     Procedure: COLONOSCOPY; Surgeon: Marie Mejia MD; Location: [facility] ENDO; Service: Endoscopy; Laterality: N/A;    ENDOSCOPIC ULTRASOUND OF UPPER GASTROINTESTINAL TRACT N/A 10/9/2018     Procedure: ULTRASOUND, UPPER GI TRACT, ENDOSCOPIC; Surgeon: Javy Simpson MD; Location: [facility] ENDO; Service: Endoscopy; Laterality: N/A;    EXCISION OF LESION N/A 2019     Procedure: EXCISION, LESION VULVA; Surgeon: Liv Odell MD; Location: [facility] OR; Service: OB/GYN; Laterality: N/A; latex allergy    EYE SURGERY        HYSTERECTOMY         ovaries intact, due to DUB    TUBAL LIGATION          Family/Social History: Reviewed and updated.         Drug Allergies:             Review of patient's allergies indicates:   Allergen Reactions    Prednisone Other (See Comments)       hipper    Latex         Other reaction(s): Itching    Ace inhibitors Hives       Other reaction(s): Cough    Latex, natural rubber Itching      Review of Systems:   A comprehensive 12-point review of systems was performed, and is negative except for those items mentioned above in the HPI section of this note.         Vital Signs:   /70 (BP Location: Right arm, Patient Position: Sitting)   Pulse 88   Wt 90.1 kg (198 lb 10.2 oz)   LMP  08/01/2000   SpO2 95%   BMI 35.19 kg/m²            Physical Exam:   GEN- NAD AAOx3 Well Built, Well Appearing   HEENT- ATNC, PERRLA, EOMI, OP-Cl. No JVD, LAD or bruit noted. Trachea Midline.   CV- RRR No M/R/G   RESP- CTA-Bilateral   GI- S/NT/ND. Positive BS X 4. No HSM Noted   BACK- Spine midline. No step off, crepitus or deformity noted. No midline TTP.   Ext- MAEW, No deformity. No edema or rashes noted.   Neuro- Strength 5/5 symmetric. CN 2-12 intact. Normal gait. Normal sensation.         Personal Review and Summary of Prior Diagnostics      Laboratory Studies: Reviewed         Latest Reference Range & Units 10/21/22 10:58 12/02/22 14:21 12/13/22 12:20   WBC 3.90 - 12.70 K/uL 5.24     RBC 4.00 - 5.40 M/uL 4.96     Hemoglobin 12.0 - 16.0 g/dL 12.7     Hematocrit 37.0 - 48.5 % 39.7     MCV 82 - 98 fL 80 (L)     MCH 27.0 - 31.0 pg 25.6 (L)     MCHC 32.0 - 36.0 g/dL 32.0     RDW 11.5 - 14.5 % 16.0 (H)     Platelets 150 - 450 K/uL 323     MPV 9.2 - 12.9 fL 10.3     Gran % 38.0 - 73.0 % 51.6     Lymph % 18.0 - 48.0 % 38.4     Mono % 4.0 - 15.0 % 7.3     Eosinophil % 0.0 - 8.0 % 1.9     Basophil % 0.0 - 1.9 % 0.4     Immature Granulocytes 0.0 - 0.5 % 0.4     Gran # (ANC) 1.8 - 7.7 K/uL 2.7     Lymph # 1.0 - 4.8 K/uL 2.0     Mono # 0.3 - 1.0 K/uL 0.4     Eos # 0.0 - 0.5 K/uL 0.1     Baso # 0.00 - 0.20 K/uL 0.02     Immature Grans (Abs) 0.00 - 0.04 K/uL 0.02     nRBC 0 /100 WBC 0     Differential Method  Automated     Sodium 136 - 145 mmol/L 139 140 142   Potassium 3.5 - 5.1 mmol/L 3.4 (L) 3.3 (L) 3.5   Chloride 95 - 110 mmol/L 103 104 105   CO2 23 - 29 mmol/L 27 25 26   Anion Gap 8 - 16 mmol/L 9 11 11   BUN 8 - 23 mg/dL 11 9 9   Creatinine 0.5 - 1.4 mg/dL 0.8 0.9 0.9   eGFR >60 mL/min/1.73 m^2 >60.0 >60.0 >60.0   Glucose 70 - 110 mg/dL 139 (H) 222 (H) 93   Calcium 8.7 - 10.5 mg/dL 9.3 9.1 9.2   Magnesium 1.6 - 2.6 mg/dL  2.0    Alkaline Phosphatase 55 - 135 U/L 111     PROTEIN TOTAL 6.0 - 8.4 g/dL 7.5     Albumin 3.5  - 5.2 g/dL 3.9     BILIRUBIN TOTAL 0.1 - 1.0 mg/dL 0.5     AST 10 - 40 U/L 17     ALT 10 - 44 U/L 11     Hemoglobin A1C External 4.0 - 5.6 %   6.5 (H)   Estimated Avg Glucose 68 - 131 mg/dL   140 (H)         JESSICA Screen Negative <1:80  Positive !           JESSICA Titer 1   1:160           JESSICA PATTERN 1   Homogeneous           ds DNA Ab Negative 1:10  Negative 1:10           Anti-SSA Antibody 0.00 - 0.99 Ratio 0.05           Anti-SSA Interpretation Negative  Negative           Anti-SSB Antibody 0.00 - 0.99 Ratio 0.04           Anti-SSB Interpretation Negative  Negative           Anti Sm Antibody 0.00 - 0.99 Ratio 0.07           Anti-Sm Interpretation Negative  Negative           Anti Sm/RNP Antibody 0.00 - 0.99 Ratio 0.05           Anti-Sm/RNP Interpretation Negative  Negative           Rheumatoid Factor 0.0 - 15.0 IU/mL <13.0           SARS-CoV-2 RNA, Amplification, Qual Negative        Negative           Microbiology Data: None         Summary of Chest Imaging Personally Reviewed:   CXR- The lungs are clear, with normal appearance of pulmonary vasculature and no pleural effusion or pneumothorax.   The cardiac silhouette is normal in size. The hilar and mediastinal contours are unremarkable.   Bones are intact. Healed left rib fracture. Scoliosis of the thoracolumbar spine.   CT A/P- Visualized lungs are clear. No pleural effusions.     CT Chest 8/2022- No significant abnormality to explain patient's symptoms of cough.     Stable 0.4 cm pulmonary nodule right lower lobe, most certainly benign.     Mild prominence of the intrahepatic biliary ducts similar to prior several CT abdomen study     FL Esophagram-   Swallowing: Normal.  Esophagus: Normal caliber and motility.  Gastroesophageal reflux: None observed  Other findings: Very small reducible hiatal hernia.  Impression:Normal examination     2D Echo: 2017   1 - Normal left ventricular systolic function (EF 60-65%).   2 - Left ventricular diastolic dysfunction.   3 -  Mild left atrial enlargement.   4 - Normal right ventricular systolic function .   5 - The estimated PA systolic pressure is 23 mmHg.      Holter-   TEST DESCRIPTION   PREDOMINANT RHYTHM   1. Sinus rhythm with heart rates varying between 61 and 118 bpm with an average of 82 bpm.   VENTRICULAR ARRHYTHMIAS   1. There were very rare PVCs recorded totalling 7 and averaging less than 1 per hour. There was 1 couplet.   2. There were no episodes of ventricular tachycardia.   SUPRA VENTRICULAR ARRHYTHMIAS   1. There were very rare PACs recorded totalling 12 and averaging less than 1 per hour.   2. There were no episodes of sustained supraventricular tachycardia.   SINUS NODE FUNCTION   1. There was no evidence of high grade SA ambrosio block.   AV CONDUCTION   1. There was no evidence of high grade AV block.   2. The longest RR interval was 1200 msec   PFT's: None   EGD 2018- Impression: - Medium-sized hiatal hernia.   - Normal stomach.   - No gross lesions in the entire examined duodenum.   - There was no sign of significant pathology in the   common bile duct.   - There was mild dilation in the left intrahepatic   bile duct(s).   - There was no sign of significant pathology in the   entire pancreas.   - No specimens collected.   Recommendation: - Discharge patient to home (ambulatory).   - Patient has a contact number available for   emergencies. The signs and symptoms of potential   delayed complications were discussed with the   patient. Return to normal activities tomorrow.   Written discharge instructions were provided to the   patient.   - Will plan for interval MRI/MRCP and LFTs in 3   months for follow-up of mild intrahepatic duct   dilation.       Assessment:       No diagnosis found.    Outpatient Encounter Medications as of 3/22/2023   Medication Sig Dispense Refill    ACCU-CHEK FASTCLIX LANCET DRUM [facility] [place] TEST BLOOD SUGAR THREE TIMES A  each 3    ACCU-CHEK FASTCLIX LANCING DEV Kit USE AS DIRECTED 1 each 0     "albuterol (PROVENTIL/VENTOLIN HFA) 90 mcg/actuation inhaler Inhale 1-2 puffs into the lungs every 4 (four) hours as needed for Wheezing. 18 g 2    amLODIPine (NORVASC) 10 MG tablet TAKE 1 TABLET EVERY DAY 90 tablet 3    [] amoxicillin-clavulanate 875-125mg (AUGMENTIN) 875-125 mg per tablet Take 1 tablet by mouth 2 (two) times daily. for 10 days 20 tablet 0    aspirin (ECOTRIN) 81 MG EC tablet Take 81 mg by mouth once daily.      azelastine (OPTIVAR) 0.05 % ophthalmic solution Place 1 drop into both eyes 2 (two) times daily. 6 mL 1    BD ULTRA-FINE SHORT PEN NEEDLE 31 gauge x 5/16" Ndle USES 4 TIMES A DAY.      benzonatate (TESSALON PERLES) 100 MG capsule Take 1 capsule (100 mg total) by mouth every 6 (six) hours as needed for Cough. 30 capsule 1    blood glucose control high,low (ACCU-CHEK GUIDE L1-L2 CTRL SOL) Soln 3 times a day 300 each 11    blood sugar diagnostic (ACCU-CHEK GUIDE TEST STRIPS) Strp 3 times a day 300 strip 11    blood-glucose meter (ACCU-CHEK GUIDE GLUCOSE METER) Misc Three times a day 1 each 0    calcium carbonate (OS-ARIC) 600 mg calcium (1,500 mg) Tab Take 1 tablet (600 mg total) by mouth once. for 1 dose 30 tablet 11    cetirizine (ZYRTEC) 10 MG tablet Take 1 tablet (10 mg total) by mouth every evening. 90 tablet 0    chlorhexidine (PERIDEX) 0.12 % solution Use as directed 15 mLs in the mouth or throat daily as needed.      citalopram (CELEXA) 10 MG tablet Take 1 tablet (10 mg total) by mouth once daily. 90 tablet 3    dulaglutide (TRULICITY) 3 mg/0.5 mL pen injector Inject 3 mg into the skin every 7 days. 4 pen 11    ergocalciferol (ERGOCALCIFEROL) 50,000 unit Cap TAKE 1 CAPSULE BY MOUTH EVERY 7 DAYS 12 capsule 3    esomeprazole (NEXIUM) 40 MG capsule Take 1 capsule (40 mg total) by mouth before breakfast. 90 capsule 3    fluticasone propionate (FLONASE) 50 mcg/actuation nasal spray 1 spray (50 mcg total) by Each Nostril route once daily. 9.9 mL 0    fluticasone-salmeterol diskus " "inhaler 250-50 mcg Inhale 1 puff by mouth into the lungs 2 (two) times daily. Controller (Patient not taking: Reported on 1/18/2023) 60 each 6    gabapentin (NEURONTIN) 100 MG capsule Take 1 capsule (100 mg total) by mouth 3 (three) times daily. 270 capsule 3    glucose 4 GM chewable tablet Take 4 tablets (16 g total) by mouth as needed for Low blood sugar (If having symptoms of blurry vision, palpitations, confusion, shakiness.  Please check sugars and if sugar below 70 please take 4 tablets and re-check sugar everry 15 minutes until sugars are above 70 and symptoms resolve.). 50 tablet 12    insulin aspart U-100 (NOVOLOG FLEXPEN U-100 INSULIN) 100 unit/mL (3 mL) InPn pen Inject 12 units w/ meals plus scale 150-200+2, 201-250+4, 251-300+6, 301-350+8, >350+10. Max daily 50 units. 15 mL 6    insulin detemir U-100 (LEVEMIR FLEXTOUCH U-100 INSULN) 100 unit/mL (3 mL) InPn pen Inject 40 Units into the skin every evening. 12 mL 11    insulin syringe-needle U-100 0.3 mL 31 gauge x 5/16" Syrg relion brand, use 5 x a day, if not on levemir or novolog 150 each 1    insulin syringe-needle U-100 0.5 mL 31 gauge x 5/16" Syrg Use nightly. 90 day 100 each 3    magnesium oxide (MAG-OX) 400 mg tablet Take 1 tablet (400 mg total) by mouth 2 (two) times daily. 180 tablet 3    neomycin-polymyxin-hydrocortisone (CORTISPORIN) otic solution Place 3 drops into the right ear 4 (four) times daily. 10 mL 0    pen needle, diabetic 32 gauge x 5/32" Ndle Change needle with each injection 200 each 11    pravastatin (PRAVACHOL) 40 MG tablet Take 1 tablet (40 mg total) by mouth every evening. 90 tablet 3    rOPINIRole (REQUIP) 0.5 MG tablet TAKE 1 TABLET BY MOUTH EVERY EVENING. 90 tablet 0    traZODone (DESYREL) 50 MG tablet Take 1 tablet (50 mg total) by mouth every evening. 90 tablet 0    [DISCONTINUED] ergocalciferol (ERGOCALCIFEROL) 50,000 unit Cap TAKE 1 CAPSULE EVERY 7 DAYS. Strength: 50,000 Units 12 capsule 1     No facility-administered " encounter medications on file as of 3/22/2023.     No orders of the defined types were placed in this encounter.      Plan:          Chronic cough- intermittent wheezing reported, but inconsistent. Does have significant allergic triggers.. + PND at times, mostly with weather change.  No offending medications and despite hiatal hernia, GERD is well controlled with use of PPI.  CT imaging without parenchymal abnormalities.. LABA/ICS not very helpful. Still using nasal corticosteroids + antihistamine.. Esophagram is normal.     -- Continue current AR/Asthma therapy for now. Add singulair as this seems allergy driven.   -- Needs PFTs on follow  -- GI follow up as scheduled.     Cough is minimal and much improved from prior visits. Discussed that it may vary at times.. Needs to avoid triggers/exposures      MYRIAM- on CPAP and compliant. Continue current therapy.       3. + JESSICA- Expanded panel negative. No overt CTD features.  Seen by rheum- Feel joint pain c/w OA.. No rheumatologic disease noted.      4. GERD- Maintain PPI      5. Obesity- BMI 35. Weight loss and exercise discussed.         RTC 1 year. Will review PFTs in interim and adjust if needed.         Foreign Gauthier M.D.   [facility]

## 2023-03-25 NOTE — PROGRESS NOTES
HPI    DLS: 12/15/2022    Pt here for S/P phaco w/IOL OS- 11/02/2022  S/P cat sx- 2013- OD- Dr. Hurtado    Pt states no eye pain or discomfort.     Meds;  Clear Eyes PRN OU      Last edited by Erika Bryan on 3/28/2023  3:23 PM.            Assessment /Plan     For exam results, see Encounter Report.    Squamous blepharitis of upper and lower eyelids of both eyes    Dry eyes    DM type 2 without retinopathy    Pseudophakia, right eye    Refractive error            Blepharitis   WC/LH/AT's     S/P PCIOL Dr Hurtado 2013 OD    SN60WF - 19.0    Ocular meds:   Visine PRN for dry eyes     Eyemeds   Optivar OU PRN     Phaco / IOL OS Date: 11/2/2022 - SN60WF 19.5  POM 4 - phaco/IOL   Doing well   Off gtts       03/28/2023  MRX to 20/20   Will print script  SHANNON levi - yearly exams - or if has any problems with the glasses     Re-consult [facility] prn any signs of glaucoma or any vis sign PCO

## 2023-03-28 ENCOUNTER — OFFICE VISIT (OUTPATIENT)
Dept: OPHTHALMOLOGY | Facility: CLINIC | Age: 73
End: 2023-03-28
Payer: MEDICARE

## 2023-03-28 DIAGNOSIS — H35.371 EPIRETINAL MEMBRANE (ERM) OF RIGHT EYE: ICD-10-CM

## 2023-03-28 DIAGNOSIS — H52.7 REFRACTIVE ERROR: ICD-10-CM

## 2023-03-28 DIAGNOSIS — H01.02A SQUAMOUS BLEPHARITIS OF UPPER AND LOWER EYELIDS OF BOTH EYES: Primary | ICD-10-CM

## 2023-03-28 DIAGNOSIS — Z96.1 PSEUDOPHAKIA, RIGHT EYE: ICD-10-CM

## 2023-03-28 DIAGNOSIS — H04.123 DRY EYES: ICD-10-CM

## 2023-03-28 DIAGNOSIS — E11.9 DM TYPE 2 WITHOUT RETINOPATHY: ICD-10-CM

## 2023-03-28 DIAGNOSIS — H01.02B SQUAMOUS BLEPHARITIS OF UPPER AND LOWER EYELIDS OF BOTH EYES: Primary | ICD-10-CM

## 2023-03-28 PROCEDURE — 3288F PR FALLS RISK ASSESSMENT DOCUMENTED: ICD-10-PCS | Mod: CPTII,S$GLB,, | Performed by: OPHTHALMOLOGY

## 2023-03-28 PROCEDURE — 1159F PR MEDICATION LIST DOCUMENTED IN MEDICAL RECORD: ICD-10-PCS | Mod: CPTII,S$GLB,, | Performed by: OPHTHALMOLOGY

## 2023-03-28 PROCEDURE — 99999 PR PBB SHADOW E&M-EST. PATIENT-LVL IV: CPT | Mod: PBBFAC,,, | Performed by: OPHTHALMOLOGY

## 2023-03-28 PROCEDURE — 1126F AMNT PAIN NOTED NONE PRSNT: CPT | Mod: CPTII,S$GLB,, | Performed by: OPHTHALMOLOGY

## 2023-03-28 PROCEDURE — 92012 PR EYE EXAM, EST PATIENT,INTERMED: ICD-10-PCS | Mod: S$GLB,,, | Performed by: OPHTHALMOLOGY

## 2023-03-28 PROCEDURE — 1160F RVW MEDS BY RX/DR IN RCRD: CPT | Mod: CPTII,S$GLB,, | Performed by: OPHTHALMOLOGY

## 2023-03-28 PROCEDURE — 1126F PR PAIN SEVERITY QUANTIFIED, NO PAIN PRESENT: ICD-10-PCS | Mod: CPTII,S$GLB,, | Performed by: OPHTHALMOLOGY

## 2023-03-28 PROCEDURE — 1101F PT FALLS ASSESS-DOCD LE1/YR: CPT | Mod: CPTII,S$GLB,, | Performed by: OPHTHALMOLOGY

## 2023-03-28 PROCEDURE — 1101F PR PT FALLS ASSESS DOC 0-1 FALLS W/OUT INJ PAST YR: ICD-10-PCS | Mod: CPTII,S$GLB,, | Performed by: OPHTHALMOLOGY

## 2023-03-28 PROCEDURE — 92134 CPTRZ OPH DX IMG PST SGM RTA: CPT | Mod: S$GLB,,, | Performed by: OPHTHALMOLOGY

## 2023-03-28 PROCEDURE — 1160F PR REVIEW ALL MEDS BY PRESCRIBER/CLIN PHARMACIST DOCUMENTED: ICD-10-PCS | Mod: CPTII,S$GLB,, | Performed by: OPHTHALMOLOGY

## 2023-03-28 PROCEDURE — 1159F MED LIST DOCD IN RCRD: CPT | Mod: CPTII,S$GLB,, | Performed by: OPHTHALMOLOGY

## 2023-03-28 PROCEDURE — 99999 PR PBB SHADOW E&M-EST. PATIENT-LVL IV: ICD-10-PCS | Mod: PBBFAC,,, | Performed by: OPHTHALMOLOGY

## 2023-03-28 PROCEDURE — 3288F FALL RISK ASSESSMENT DOCD: CPT | Mod: CPTII,S$GLB,, | Performed by: OPHTHALMOLOGY

## 2023-03-28 PROCEDURE — 92134 POSTERIOR SEGMENT OCT RETINA (OCULAR COHERENCE TOMOGRAPHY)-BOTH EYES: ICD-10-PCS | Mod: S$GLB,,, | Performed by: OPHTHALMOLOGY

## 2023-03-28 PROCEDURE — 92012 INTRM OPH EXAM EST PATIENT: CPT | Mod: S$GLB,,, | Performed by: OPHTHALMOLOGY

## 2023-03-30 ENCOUNTER — PATIENT MESSAGE (OUTPATIENT)
Dept: ENDOCRINOLOGY | Facility: CLINIC | Age: 73
End: 2023-03-30
Payer: MEDICARE

## 2023-03-30 DIAGNOSIS — E11.9 CONTROLLED TYPE 2 DIABETES MELLITUS WITHOUT COMPLICATION, WITH LONG-TERM CURRENT USE OF INSULIN: ICD-10-CM

## 2023-03-30 DIAGNOSIS — Z79.4 CONTROLLED TYPE 2 DIABETES MELLITUS WITHOUT COMPLICATION, WITH LONG-TERM CURRENT USE OF INSULIN: ICD-10-CM

## 2023-03-30 RX ORDER — INSULIN DETEMIR 100 [IU]/ML
40 INJECTION, SOLUTION SUBCUTANEOUS NIGHTLY
Qty: 12 ML | Refills: 11 | Status: SHIPPED | OUTPATIENT
Start: 2023-03-30 | End: 2023-07-25

## 2023-03-31 ENCOUNTER — PATIENT MESSAGE (OUTPATIENT)
Dept: PHARMACY | Facility: CLINIC | Age: 73
End: 2023-03-31
Payer: MEDICARE

## 2023-03-31 NOTE — TELEPHONE ENCOUNTER
I have informed Chelly Ortiz she has been approved in the [employer] Patient Assistance Program through 11/30/2023. A 4month day supply of Levemir, Novolog, and Pen Needles will be shipped to [facility] in 10-14 business days. 3r efills remain. Patient enrollend in auto refill.  Updated proof of eligibility is required to re-enroll for 2024 calendar year.

## 2023-04-05 ENCOUNTER — OFFICE VISIT (OUTPATIENT)
Dept: URGENT CARE | Facility: CLINIC | Age: 73
End: 2023-04-05
Payer: MEDICARE

## 2023-04-05 VITALS
RESPIRATION RATE: 16 BRPM | BODY MASS INDEX: 35.07 KG/M2 | DIASTOLIC BLOOD PRESSURE: 81 MMHG | WEIGHT: 198 LBS | SYSTOLIC BLOOD PRESSURE: 144 MMHG | OXYGEN SATURATION: 96 % | HEART RATE: 84 BPM | TEMPERATURE: 98 F

## 2023-04-05 DIAGNOSIS — R42 VERTIGO: Primary | ICD-10-CM

## 2023-04-05 LAB
BILIRUB UR QL STRIP: NEGATIVE
GLUCOSE UR QL STRIP: NEGATIVE
KETONES UR QL STRIP: NEGATIVE
LEUKOCYTE ESTERASE UR QL STRIP: NEGATIVE
PH, POC UA: 5.5 (ref 5–8)
POC BLOOD, URINE: NEGATIVE
POC NITRATES, URINE: NEGATIVE
PROT UR QL STRIP: NEGATIVE
SP GR UR STRIP: 1 (ref 1–1.03)
UROBILINOGEN UR STRIP-ACNC: NORMAL (ref 0.1–1.1)

## 2023-04-05 PROCEDURE — 99213 PR OFFICE/OUTPT VISIT, EST, LEVL III, 20-29 MIN: ICD-10-PCS | Mod: S$GLB,,, | Performed by: FAMILY MEDICINE

## 2023-04-05 PROCEDURE — 81003 POCT URINALYSIS, DIPSTICK, AUTOMATED, W/O SCOPE: ICD-10-PCS | Mod: QW,S$GLB,, | Performed by: FAMILY MEDICINE

## 2023-04-05 PROCEDURE — 81003 URINALYSIS AUTO W/O SCOPE: CPT | Mod: QW,S$GLB,, | Performed by: FAMILY MEDICINE

## 2023-04-05 PROCEDURE — 99213 OFFICE O/P EST LOW 20 MIN: CPT | Mod: S$GLB,,, | Performed by: FAMILY MEDICINE

## 2023-04-05 RX ORDER — MECLIZINE HYDROCHLORIDE 25 MG/1
25 TABLET ORAL 3 TIMES DAILY PRN
Qty: 21 TABLET | Refills: 0 | Status: SHIPPED | OUTPATIENT
Start: 2023-04-05 | End: 2023-08-03

## 2023-04-05 NOTE — PROGRESS NOTES
"Subjective:      Patient ID: Chelly Ortiz is a 72 y.o. female.    Vitals:  weight is 89.8 kg (198 lb). Her oral temperature is 97.8 °F (36.6 °C). Her blood pressure is 144/81 (abnormal) and her pulse is 84. Her respiration is 16 and oxygen saturation is 96%.     Chief Complaint: Dizziness    This is a 72 y.o. female who presents today with a chief complaint of feeling "heaviness" in her head since this morning - very hard to get her head up off the pillow. Pt says she felt like "the room was spinning". Pt says she still has the sensation, but now feels more lightheaded rather than "heaviness". No chest px, no SOB, no diaphoresis, no nausea, no congestion, no runny nose, no cough    Home tx: zyrtec (last night); blood sugar check this morning = 112    PMH: R ear px off and on; HTN; no heart related problems; no lung related problems; vertigo (once - feels similar to current sx); allergies; diabetes Type II    Dizziness:   Progression since onset:  Rapidly improving and waxing and waning  Frequency:  Every few minutes  Pain Scale:  0/10  Duration:  5 minutes   Associated symptoms: ear pain and light-headedness.no hearing loss, no ear congestion, no fever, no headaches, no tinnitus, no nausea, no diaphoresis, no weakness, no visual disturbances, no syncope, no palpitations, no slurred speech, no numbness in extremities and no chest pain.   PMH includes: ear infections, anxiety and environmental allergies.no strokes, no cardiac surgery, no neurologic disease, no head trauma, no ear trauma, no head trauma and no ear tubes.    Constitution: Negative for sweating and fever.   HENT:  Positive for ear pain. Negative for tinnitus and hearing loss.    Cardiovascular:  Negative for chest pain, palpitations and passing out.   Gastrointestinal:  Negative for nausea.   Allergic/Immunologic: Positive for environmental allergies.   Neurological:  Positive for dizziness and light-headedness. Negative for headaches.    Objective: "     Physical Exam   Constitutional: She is oriented to person, place, and time. She does not appear ill. No distress. obesity  Neck: Neck supple.   Cardiovascular: Normal rate, regular rhythm, normal heart sounds and normal pulses.   Pulmonary/Chest: Effort normal and breath sounds normal.   Abdominal: Normal appearance and bowel sounds are normal. Soft.   Neurological: no focal deficit. She is alert, oriented to person, place, and time and at baseline. She displays no weakness. No sensory deficit. Coordination normal.   Nursing note and vitals reviewed.    Assessment:     1. Vertigo        Plan:       Vertigo  -     POCT Urinalysis, Dipstick, Automated, W/O Scope  -     Orthostatic vital signs  -     meclizine (ANTIVERT) 25 mg tablet; Take 1 tablet (25 mg total) by mouth 3 (three) times daily as needed for Dizziness.  Dispense: 21 tablet; Refill: 0    Discussed the Epley's maneuver with patient

## 2023-04-06 ENCOUNTER — PATIENT MESSAGE (OUTPATIENT)
Dept: FAMILY MEDICINE | Facility: CLINIC | Age: 73
End: 2023-04-06
Payer: MEDICARE

## 2023-04-06 DIAGNOSIS — I10 ESSENTIAL HYPERTENSION: ICD-10-CM

## 2023-04-06 RX ORDER — AMLODIPINE BESYLATE 10 MG/1
10 TABLET ORAL DAILY
Qty: 90 TABLET | Refills: 3 | Status: SHIPPED | OUTPATIENT
Start: 2023-04-06 | End: 2023-12-01 | Stop reason: SDUPTHER

## 2023-04-06 NOTE — TELEPHONE ENCOUNTER
No new care gaps identified.  [facility] Embedded Care Gaps. Reference number: [id number]. 4/06/2023   4:46:30 PM CDT   (1) More than 48 hours/None

## 2023-04-11 ENCOUNTER — TELEPHONE (OUTPATIENT)
Dept: FAMILY MEDICINE | Facility: CLINIC | Age: 73
End: 2023-04-11
Payer: MEDICARE

## 2023-04-11 NOTE — TELEPHONE ENCOUNTER
Called pt  to reschedule appt due to PCP not being in office. Pt rescheduled appt 6/1. Pt verbalized understanding.

## 2023-04-17 ENCOUNTER — TELEPHONE (OUTPATIENT)
Dept: ENDOCRINOLOGY | Facility: CLINIC | Age: 73
End: 2023-04-17
Payer: MEDICARE

## 2023-04-17 ENCOUNTER — PATIENT MESSAGE (OUTPATIENT)
Dept: OTOLARYNGOLOGY | Facility: CLINIC | Age: 73
End: 2023-04-17
Payer: MEDICARE

## 2023-04-17 DIAGNOSIS — E11.65 UNCONTROLLED TYPE 2 DIABETES MELLITUS WITH HYPERGLYCEMIA: Primary | ICD-10-CM

## 2023-04-17 RX ORDER — INSULIN DETEMIR 100 [IU]/ML
40 INJECTION, SOLUTION SUBCUTANEOUS NIGHTLY
Qty: 60 ML | Refills: 11 | Status: SHIPPED | OUTPATIENT
Start: 2023-04-17 | End: 2024-02-20

## 2023-04-17 RX ORDER — INSULIN ASPART 100 [IU]/ML
10 INJECTION, SOLUTION INTRAVENOUS; SUBCUTANEOUS
Qty: 60 ML | Refills: 11 | Status: SHIPPED | OUTPATIENT
Start: 2023-04-17 | End: 2024-12-09

## 2023-04-19 ENCOUNTER — TELEPHONE (OUTPATIENT)
Dept: PHARMACY | Facility: CLINIC | Age: 73
End: 2023-04-19
Payer: MEDICARE

## 2023-04-19 ENCOUNTER — PATIENT MESSAGE (OUTPATIENT)
Dept: FAMILY MEDICINE | Facility: CLINIC | Age: 73
End: 2023-04-19
Payer: MEDICARE

## 2023-04-20 DIAGNOSIS — F32.A DEPRESSION, UNSPECIFIED DEPRESSION TYPE: ICD-10-CM

## 2023-04-20 RX ORDER — TRAZODONE HYDROCHLORIDE 50 MG/1
50 TABLET ORAL NIGHTLY
Qty: 90 TABLET | Refills: 1 | Status: SHIPPED | OUTPATIENT
Start: 2023-04-20 | End: 2023-04-28 | Stop reason: SDUPTHER

## 2023-04-20 NOTE — TELEPHONE ENCOUNTER
Refill Routing Note   Medication(s) are not appropriate for processing by [facility] for the following reason(s):      Due for refill >6 months ago: no dispenses within past 6 months per Epic data    [facility] action(s):  Defer  Approve     Medication Therapy Plan: Trazodone: PCP previously prescribed rx.      Appointments  past 12m or future 3m with PCP    Date Provider   Last Visit   11/18/2022 Gabriel Wilson MD   Next Visit   6/1/2023 Gabriel Wilson MD   ED visits in past 90 days: 0        Note composed:10:59 AM 04/20/2023

## 2023-04-20 NOTE — TELEPHONE ENCOUNTER
No new care gaps identified.  [facility] Embedded Care Gaps. Reference number: [id number]. 4/20/2023   8:58:02 AM CDT

## 2023-04-24 RX ORDER — CITALOPRAM 10 MG/1
10 TABLET ORAL DAILY
Qty: 90 TABLET | Refills: 1 | Status: SHIPPED | OUTPATIENT
Start: 2023-04-24 | End: 2023-06-08

## 2023-04-26 ENCOUNTER — LAB VISIT (OUTPATIENT)
Dept: LAB | Facility: HOSPITAL | Age: 73
End: 2023-04-26
Payer: MEDICARE

## 2023-04-26 DIAGNOSIS — E78.5 DYSLIPIDEMIA: ICD-10-CM

## 2023-04-26 DIAGNOSIS — I10 ESSENTIAL HYPERTENSION: ICD-10-CM

## 2023-04-26 DIAGNOSIS — E66.01 SEVERE OBESITY (BMI 35.0-39.9) WITH COMORBIDITY: ICD-10-CM

## 2023-04-26 DIAGNOSIS — E11.65 TYPE 2 DIABETES MELLITUS WITH HYPERGLYCEMIA, WITH LONG-TERM CURRENT USE OF INSULIN: ICD-10-CM

## 2023-04-26 DIAGNOSIS — Z79.4 TYPE 2 DIABETES MELLITUS WITH HYPERGLYCEMIA, WITH LONG-TERM CURRENT USE OF INSULIN: ICD-10-CM

## 2023-04-26 LAB
ALBUMIN SERPL BCP-MCNC: 3.7 G/DL (ref 3.5–5.2)
ALP SERPL-CCNC: 111 U/L (ref 55–135)
ALT SERPL W/O P-5'-P-CCNC: 9 U/L (ref 10–44)
ANION GAP SERPL CALC-SCNC: 11 MMOL/L (ref 8–16)
AST SERPL-CCNC: 15 U/L (ref 10–40)
BILIRUB SERPL-MCNC: 0.6 MG/DL (ref 0.1–1)
BUN SERPL-MCNC: 11 MG/DL (ref 8–23)
CALCIUM SERPL-MCNC: 9.1 MG/DL (ref 8.7–10.5)
CHLORIDE SERPL-SCNC: 107 MMOL/L (ref 95–110)
CHOLEST SERPL-MCNC: 213 MG/DL (ref 120–199)
CHOLEST/HDLC SERPL: 3.8 {RATIO} (ref 2–5)
CO2 SERPL-SCNC: 24 MMOL/L (ref 23–29)
CREAT SERPL-MCNC: 0.9 MG/DL (ref 0.5–1.4)
EST. GFR  (NO RACE VARIABLE): >60 ML/MIN/1.73 M^2
ESTIMATED AVG GLUCOSE: 140 MG/DL (ref 68–131)
GLUCOSE SERPL-MCNC: 121 MG/DL (ref 70–110)
HBA1C MFR BLD: 6.5 % (ref 4–5.6)
HDLC SERPL-MCNC: 56 MG/DL (ref 40–75)
HDLC SERPL: 26.3 % (ref 20–50)
LDLC SERPL CALC-MCNC: 130.6 MG/DL (ref 63–159)
NONHDLC SERPL-MCNC: 157 MG/DL
POTASSIUM SERPL-SCNC: 3.6 MMOL/L (ref 3.5–5.1)
PROT SERPL-MCNC: 7.4 G/DL (ref 6–8.4)
SODIUM SERPL-SCNC: 142 MMOL/L (ref 136–145)
TRIGL SERPL-MCNC: 132 MG/DL (ref 30–150)
TSH SERPL DL<=0.005 MIU/L-ACNC: 1.54 UIU/ML (ref 0.4–4)

## 2023-04-26 PROCEDURE — 36415 COLL VENOUS BLD VENIPUNCTURE: CPT | Performed by: FAMILY MEDICINE

## 2023-04-26 PROCEDURE — 80061 LIPID PANEL: CPT | Performed by: FAMILY MEDICINE

## 2023-04-26 PROCEDURE — 84443 ASSAY THYROID STIM HORMONE: CPT | Performed by: FAMILY MEDICINE

## 2023-04-26 PROCEDURE — 80053 COMPREHEN METABOLIC PANEL: CPT | Performed by: FAMILY MEDICINE

## 2023-04-26 PROCEDURE — 83036 HEMOGLOBIN GLYCOSYLATED A1C: CPT | Performed by: FAMILY MEDICINE

## 2023-04-28 ENCOUNTER — OFFICE VISIT (OUTPATIENT)
Dept: FAMILY MEDICINE | Facility: CLINIC | Age: 73
End: 2023-04-28
Payer: MEDICARE

## 2023-04-28 VITALS
DIASTOLIC BLOOD PRESSURE: 70 MMHG | SYSTOLIC BLOOD PRESSURE: 120 MMHG | WEIGHT: 196.44 LBS | HEART RATE: 87 BPM | OXYGEN SATURATION: 96 % | BODY MASS INDEX: 34.8 KG/M2

## 2023-04-28 DIAGNOSIS — E78.5 HYPERLIPIDEMIA, UNSPECIFIED HYPERLIPIDEMIA TYPE: ICD-10-CM

## 2023-04-28 DIAGNOSIS — Z87.891 FORMER SMOKER: ICD-10-CM

## 2023-04-28 DIAGNOSIS — E66.9 CLASS 1 OBESITY WITH BODY MASS INDEX (BMI) OF 34.0 TO 34.9 IN ADULT, UNSPECIFIED OBESITY TYPE, UNSPECIFIED WHETHER SERIOUS COMORBIDITY PRESENT: ICD-10-CM

## 2023-04-28 DIAGNOSIS — I10 ESSENTIAL HYPERTENSION: Primary | ICD-10-CM

## 2023-04-28 DIAGNOSIS — E11.65 TYPE 2 DIABETES MELLITUS WITH HYPERGLYCEMIA, WITH LONG-TERM CURRENT USE OF INSULIN: ICD-10-CM

## 2023-04-28 DIAGNOSIS — M62.838 MUSCLE SPASM: ICD-10-CM

## 2023-04-28 DIAGNOSIS — I70.0 ABDOMINAL AORTIC ATHEROSCLEROSIS: ICD-10-CM

## 2023-04-28 DIAGNOSIS — Z79.4 TYPE 2 DIABETES MELLITUS WITH HYPERGLYCEMIA, WITH LONG-TERM CURRENT USE OF INSULIN: ICD-10-CM

## 2023-04-28 DIAGNOSIS — F51.01 PRIMARY INSOMNIA: ICD-10-CM

## 2023-04-28 DIAGNOSIS — I20.89 OTHER FORMS OF ANGINA PECTORIS: ICD-10-CM

## 2023-04-28 DIAGNOSIS — Z13.6 SCREENING FOR ABDOMINAL AORTIC ANEURYSM: ICD-10-CM

## 2023-04-28 PROBLEM — F32.0 MILD MAJOR DEPRESSION: Status: RESOLVED | Noted: 2019-11-21 | Resolved: 2023-04-28

## 2023-04-28 PROBLEM — E66.01 SEVERE OBESITY WITH BODY MASS INDEX (BMI) OF 35.0 TO 39.9 WITH SERIOUS COMORBIDITY: Status: RESOLVED | Noted: 2019-11-19 | Resolved: 2023-04-28

## 2023-04-28 PROBLEM — E11.42 DIABETIC POLYNEUROPATHY ASSOCIATED WITH TYPE 2 DIABETES MELLITUS: Status: RESOLVED | Noted: 2017-10-12 | Resolved: 2023-04-28

## 2023-04-28 PROCEDURE — 3072F PR LOW RISK FOR RETINOPATHY: ICD-10-PCS | Mod: CPTII,S$GLB,, | Performed by: FAMILY MEDICINE

## 2023-04-28 PROCEDURE — 1101F PR PT FALLS ASSESS DOC 0-1 FALLS W/OUT INJ PAST YR: ICD-10-PCS | Mod: CPTII,S$GLB,, | Performed by: FAMILY MEDICINE

## 2023-04-28 PROCEDURE — 1125F AMNT PAIN NOTED PAIN PRSNT: CPT | Mod: CPTII,S$GLB,, | Performed by: FAMILY MEDICINE

## 2023-04-28 PROCEDURE — 99215 OFFICE O/P EST HI 40 MIN: CPT | Mod: S$GLB,,, | Performed by: FAMILY MEDICINE

## 2023-04-28 PROCEDURE — 3066F PR DOCUMENTATION OF TREATMENT FOR NEPHROPATHY: ICD-10-PCS | Mod: CPTII,S$GLB,, | Performed by: FAMILY MEDICINE

## 2023-04-28 PROCEDURE — 99999 PR PBB SHADOW E&M-EST. PATIENT-LVL V: CPT | Mod: PBBFAC,,, | Performed by: FAMILY MEDICINE

## 2023-04-28 PROCEDURE — 1125F PR PAIN SEVERITY QUANTIFIED, PAIN PRESENT: ICD-10-PCS | Mod: CPTII,S$GLB,, | Performed by: FAMILY MEDICINE

## 2023-04-28 PROCEDURE — 3288F PR FALLS RISK ASSESSMENT DOCUMENTED: ICD-10-PCS | Mod: CPTII,S$GLB,, | Performed by: FAMILY MEDICINE

## 2023-04-28 PROCEDURE — 99215 PR OFFICE/OUTPT VISIT, EST, LEVL V, 40-54 MIN: ICD-10-PCS | Mod: S$GLB,,, | Performed by: FAMILY MEDICINE

## 2023-04-28 PROCEDURE — 1160F RVW MEDS BY RX/DR IN RCRD: CPT | Mod: CPTII,S$GLB,, | Performed by: FAMILY MEDICINE

## 2023-04-28 PROCEDURE — 1101F PT FALLS ASSESS-DOCD LE1/YR: CPT | Mod: CPTII,S$GLB,, | Performed by: FAMILY MEDICINE

## 2023-04-28 PROCEDURE — 1159F PR MEDICATION LIST DOCUMENTED IN MEDICAL RECORD: ICD-10-PCS | Mod: CPTII,S$GLB,, | Performed by: FAMILY MEDICINE

## 2023-04-28 PROCEDURE — 1159F MED LIST DOCD IN RCRD: CPT | Mod: CPTII,S$GLB,, | Performed by: FAMILY MEDICINE

## 2023-04-28 PROCEDURE — 3066F NEPHROPATHY DOC TX: CPT | Mod: CPTII,S$GLB,, | Performed by: FAMILY MEDICINE

## 2023-04-28 PROCEDURE — 3074F PR MOST RECENT SYSTOLIC BLOOD PRESSURE < 130 MM HG: ICD-10-PCS | Mod: CPTII,S$GLB,, | Performed by: FAMILY MEDICINE

## 2023-04-28 PROCEDURE — 3074F SYST BP LT 130 MM HG: CPT | Mod: CPTII,S$GLB,, | Performed by: FAMILY MEDICINE

## 2023-04-28 PROCEDURE — 3008F BODY MASS INDEX DOCD: CPT | Mod: CPTII,S$GLB,, | Performed by: FAMILY MEDICINE

## 2023-04-28 PROCEDURE — 3044F HG A1C LEVEL LT 7.0%: CPT | Mod: CPTII,S$GLB,, | Performed by: FAMILY MEDICINE

## 2023-04-28 PROCEDURE — 3078F PR MOST RECENT DIASTOLIC BLOOD PRESSURE < 80 MM HG: ICD-10-PCS | Mod: CPTII,S$GLB,, | Performed by: FAMILY MEDICINE

## 2023-04-28 PROCEDURE — 3061F PR NEG MICROALBUMINURIA RESULT DOCUMENTED/REVIEW: ICD-10-PCS | Mod: CPTII,S$GLB,, | Performed by: FAMILY MEDICINE

## 2023-04-28 PROCEDURE — 1160F PR REVIEW ALL MEDS BY PRESCRIBER/CLIN PHARMACIST DOCUMENTED: ICD-10-PCS | Mod: CPTII,S$GLB,, | Performed by: FAMILY MEDICINE

## 2023-04-28 PROCEDURE — 3072F LOW RISK FOR RETINOPATHY: CPT | Mod: CPTII,S$GLB,, | Performed by: FAMILY MEDICINE

## 2023-04-28 PROCEDURE — 3061F NEG MICROALBUMINURIA REV: CPT | Mod: CPTII,S$GLB,, | Performed by: FAMILY MEDICINE

## 2023-04-28 PROCEDURE — 3078F DIAST BP <80 MM HG: CPT | Mod: CPTII,S$GLB,, | Performed by: FAMILY MEDICINE

## 2023-04-28 PROCEDURE — 99999 PR PBB SHADOW E&M-EST. PATIENT-LVL V: ICD-10-PCS | Mod: PBBFAC,,, | Performed by: FAMILY MEDICINE

## 2023-04-28 PROCEDURE — 3044F PR MOST RECENT HEMOGLOBIN A1C LEVEL <7.0%: ICD-10-PCS | Mod: CPTII,S$GLB,, | Performed by: FAMILY MEDICINE

## 2023-04-28 PROCEDURE — 3288F FALL RISK ASSESSMENT DOCD: CPT | Mod: CPTII,S$GLB,, | Performed by: FAMILY MEDICINE

## 2023-04-28 PROCEDURE — 3008F PR BODY MASS INDEX (BMI) DOCUMENTED: ICD-10-PCS | Mod: CPTII,S$GLB,, | Performed by: FAMILY MEDICINE

## 2023-04-28 RX ORDER — ATORVASTATIN CALCIUM 40 MG/1
40 TABLET, FILM COATED ORAL NIGHTLY
Qty: 90 TABLET | Refills: 3 | Status: SHIPPED | OUTPATIENT
Start: 2023-04-28 | End: 2023-10-17

## 2023-04-28 RX ORDER — CYCLOBENZAPRINE HCL 5 MG
5 TABLET ORAL 3 TIMES DAILY PRN
Qty: 40 TABLET | Refills: 0 | Status: SHIPPED | OUTPATIENT
Start: 2023-04-28 | End: 2023-05-08

## 2023-04-28 RX ORDER — TRAZODONE HYDROCHLORIDE 50 MG/1
50 TABLET ORAL NIGHTLY
Qty: 90 TABLET | Refills: 3 | Status: SHIPPED | OUTPATIENT
Start: 2023-04-28

## 2023-04-28 NOTE — PROGRESS NOTES
Subjective     Patient ID: Chelly Ortiz is a 72 y.o. female.    Chief Complaint: Pain    72 years old female came to the clinic for blood pressure check.  Blood pressure today was stable.  Patient with stable blood sugar for the last 3 months.  No chest pain, palpitation, orthopnea or PND.  No polyuria, polydipsia or polyphagia.  She is using trazodone to help her to sleep.  She is a former smoker.  She was previously diagnosed with aortic atherosclerosis.  Patient currently on a statin.  She reports episodic middle back pain associated with muscle spasm.    Pain  Pertinent negatives include no chest pain.   Review of Systems   Constitutional: Negative.    HENT: Negative.     Eyes: Negative.    Respiratory: Negative.     Cardiovascular:  Negative for chest pain, palpitations, leg swelling and claudication.   Gastrointestinal: Negative.    Endocrine: Negative for polydipsia, polyphagia and polyuria.   Genitourinary: Negative.    Musculoskeletal: Negative.    Neurological: Negative.    Psychiatric/Behavioral: Negative.          Objective     Physical Exam  Constitutional:       General: She is not in acute distress.     Appearance: She is well-developed. She is not diaphoretic.   HENT:      Head: Normocephalic and atraumatic.      Right Ear: External ear normal.      Left Ear: External ear normal.      Nose: Nose normal.      Mouth/Throat:      Pharynx: No oropharyngeal exudate.   Eyes:      General: No scleral icterus.        Right eye: No discharge.         Left eye: No discharge.      Conjunctiva/sclera: Conjunctivae normal.      Pupils: Pupils are equal, round, and reactive to light.   Neck:      Thyroid: No thyromegaly.      Vascular: No JVD.      Trachea: No tracheal deviation.   Cardiovascular:      Rate and Rhythm: Normal rate and regular rhythm.      Heart sounds: Normal heart sounds. No murmur heard.    No friction rub. No gallop.   Pulmonary:      Effort: Pulmonary effort is normal. No respiratory  distress.      Breath sounds: Normal breath sounds. No stridor. No wheezing or rales.   Chest:      Chest wall: No tenderness.   Abdominal:      General: Bowel sounds are normal. There is no distension.      Palpations: Abdomen is soft. There is no mass.      Tenderness: There is no abdominal tenderness. There is no guarding or rebound.   Musculoskeletal:         General: No tenderness. Normal range of motion.      Cervical back: Normal range of motion and neck supple.   Lymphadenopathy:      Cervical: No cervical adenopathy.   Skin:     General: Skin is warm and dry.      Coloration: Skin is not pale.      Findings: No erythema or rash.   Neurological:      Mental Status: She is alert and oriented to person, place, and time.      Cranial Nerves: No cranial nerve deficit.      Motor: No abnormal muscle tone.      Coordination: Coordination normal.      Deep Tendon Reflexes: Reflexes are normal and symmetric.   Psychiatric:         Behavior: Behavior normal.         Thought Content: Thought content normal.         Judgment: Judgment normal.          Assessment and Plan     Problem List Items Addressed This Visit       Insomnia    Relevant Medications    traZODone (DESYREL) 50 MG tablet    Essential hypertension - Primary    Hyperlipidemia, unspecified    Relevant Medications    atorvastatin (LIPITOR) 40 MG tablet    Other Relevant Orders    Comprehensive Metabolic Panel    Lipid Panel    Abdominal aortic atherosclerosis    Relevant Orders    Lipid Panel    Other forms of angina pectoris     Other Visit Diagnoses       Type 2 diabetes mellitus with hyperglycemia, with long-term current use of insulin        Relevant Orders    Comprehensive Metabolic Panel    Hemoglobin A1C    Microalbumin/Creatinine Ratio, Urine    Class 1 obesity with body mass index (BMI) of 34.0 to 34.9 in adult, unspecified obesity type, unspecified whether serious comorbidity present        Screening for abdominal aortic aneurysm        Relevant  Orders    US Abdominal Aorta    Former smoker        Muscle spasm        Relevant Medications    cyclobenzaprine (FLEXERIL) 5 MG tablet          Chelly was seen today for pain.    Diagnoses and all orders for this visit:    Essential hypertension    Other forms of angina pectoris    Abdominal aortic atherosclerosis  -     Lipid Panel; Future    Hyperlipidemia, unspecified hyperlipidemia type  -     atorvastatin (LIPITOR) 40 MG tablet; Take 1 tablet (40 mg total) by mouth every evening.  -     Comprehensive Metabolic Panel; Future  -     Lipid Panel; Future    Type 2 diabetes mellitus with hyperglycemia, with long-term current use of insulin  -     Comprehensive Metabolic Panel; Future  -     Hemoglobin A1C; Future  -     Microalbumin/Creatinine Ratio, Urine; Future    Class 1 obesity with body mass index (BMI) of 34.0 to 34.9 in adult, unspecified obesity type, unspecified whether serious comorbidity present    Screening for abdominal aortic aneurysm  -     US Abdominal Aorta; Future    Former smoker    Primary insomnia  -     traZODone (DESYREL) 50 MG tablet; Take 1 tablet (50 mg total) by mouth every evening.    Muscle spasm  -     cyclobenzaprine (FLEXERIL) 5 MG tablet; Take 1 tablet (5 mg total) by mouth 3 (three) times daily as needed for Muscle spasms.     Continue monitoring blood pressure at home, low sodium diet.   Continue monitoring blood sugar at home,ADA diet.

## 2023-05-04 ENCOUNTER — OFFICE VISIT (OUTPATIENT)
Dept: ENDOCRINOLOGY | Facility: CLINIC | Age: 73
End: 2023-05-04
Payer: MEDICARE

## 2023-05-04 VITALS
HEIGHT: 63 IN | OXYGEN SATURATION: 98 % | SYSTOLIC BLOOD PRESSURE: 130 MMHG | WEIGHT: 196 LBS | BODY MASS INDEX: 34.73 KG/M2 | DIASTOLIC BLOOD PRESSURE: 60 MMHG | HEART RATE: 97 BPM

## 2023-05-04 DIAGNOSIS — E78.5 HYPERLIPIDEMIA, UNSPECIFIED HYPERLIPIDEMIA TYPE: ICD-10-CM

## 2023-05-04 DIAGNOSIS — E11.9 CONTROLLED TYPE 2 DIABETES MELLITUS WITHOUT COMPLICATION, WITH LONG-TERM CURRENT USE OF INSULIN: Primary | ICD-10-CM

## 2023-05-04 DIAGNOSIS — I10 HYPERTENSION, ESSENTIAL: ICD-10-CM

## 2023-05-04 DIAGNOSIS — Z79.4 CONTROLLED TYPE 2 DIABETES MELLITUS WITHOUT COMPLICATION, WITH LONG-TERM CURRENT USE OF INSULIN: Primary | ICD-10-CM

## 2023-05-04 PROCEDURE — 3061F PR NEG MICROALBUMINURIA RESULT DOCUMENTED/REVIEW: ICD-10-PCS | Mod: CPTII,S$GLB,, | Performed by: GENERAL ACUTE CARE HOSPITAL

## 2023-05-04 PROCEDURE — 3044F HG A1C LEVEL LT 7.0%: CPT | Mod: CPTII,S$GLB,, | Performed by: GENERAL ACUTE CARE HOSPITAL

## 2023-05-04 PROCEDURE — 3288F FALL RISK ASSESSMENT DOCD: CPT | Mod: CPTII,S$GLB,, | Performed by: GENERAL ACUTE CARE HOSPITAL

## 2023-05-04 PROCEDURE — 3288F PR FALLS RISK ASSESSMENT DOCUMENTED: ICD-10-PCS | Mod: CPTII,S$GLB,, | Performed by: GENERAL ACUTE CARE HOSPITAL

## 2023-05-04 PROCEDURE — 3044F PR MOST RECENT HEMOGLOBIN A1C LEVEL <7.0%: ICD-10-PCS | Mod: CPTII,S$GLB,, | Performed by: GENERAL ACUTE CARE HOSPITAL

## 2023-05-04 PROCEDURE — 99999 PR PBB SHADOW E&M-EST. PATIENT-LVL III: ICD-10-PCS | Mod: PBBFAC,,, | Performed by: GENERAL ACUTE CARE HOSPITAL

## 2023-05-04 PROCEDURE — 1159F MED LIST DOCD IN RCRD: CPT | Mod: CPTII,S$GLB,, | Performed by: GENERAL ACUTE CARE HOSPITAL

## 2023-05-04 PROCEDURE — 99214 OFFICE O/P EST MOD 30 MIN: CPT | Mod: S$GLB,,, | Performed by: GENERAL ACUTE CARE HOSPITAL

## 2023-05-04 PROCEDURE — 3066F NEPHROPATHY DOC TX: CPT | Mod: CPTII,S$GLB,, | Performed by: GENERAL ACUTE CARE HOSPITAL

## 2023-05-04 PROCEDURE — 1160F RVW MEDS BY RX/DR IN RCRD: CPT | Mod: CPTII,S$GLB,, | Performed by: GENERAL ACUTE CARE HOSPITAL

## 2023-05-04 PROCEDURE — 1126F PR PAIN SEVERITY QUANTIFIED, NO PAIN PRESENT: ICD-10-PCS | Mod: CPTII,S$GLB,, | Performed by: GENERAL ACUTE CARE HOSPITAL

## 2023-05-04 PROCEDURE — 3075F SYST BP GE 130 - 139MM HG: CPT | Mod: CPTII,S$GLB,, | Performed by: GENERAL ACUTE CARE HOSPITAL

## 2023-05-04 PROCEDURE — 1126F AMNT PAIN NOTED NONE PRSNT: CPT | Mod: CPTII,S$GLB,, | Performed by: GENERAL ACUTE CARE HOSPITAL

## 2023-05-04 PROCEDURE — 3066F PR DOCUMENTATION OF TREATMENT FOR NEPHROPATHY: ICD-10-PCS | Mod: CPTII,S$GLB,, | Performed by: GENERAL ACUTE CARE HOSPITAL

## 2023-05-04 PROCEDURE — 3061F NEG MICROALBUMINURIA REV: CPT | Mod: CPTII,S$GLB,, | Performed by: GENERAL ACUTE CARE HOSPITAL

## 2023-05-04 PROCEDURE — 3075F PR MOST RECENT SYSTOLIC BLOOD PRESS GE 130-139MM HG: ICD-10-PCS | Mod: CPTII,S$GLB,, | Performed by: GENERAL ACUTE CARE HOSPITAL

## 2023-05-04 PROCEDURE — 3008F BODY MASS INDEX DOCD: CPT | Mod: CPTII,S$GLB,, | Performed by: GENERAL ACUTE CARE HOSPITAL

## 2023-05-04 PROCEDURE — 3072F LOW RISK FOR RETINOPATHY: CPT | Mod: CPTII,S$GLB,, | Performed by: GENERAL ACUTE CARE HOSPITAL

## 2023-05-04 PROCEDURE — 99214 PR OFFICE/OUTPT VISIT, EST, LEVL IV, 30-39 MIN: ICD-10-PCS | Mod: S$GLB,,, | Performed by: GENERAL ACUTE CARE HOSPITAL

## 2023-05-04 PROCEDURE — 1160F PR REVIEW ALL MEDS BY PRESCRIBER/CLIN PHARMACIST DOCUMENTED: ICD-10-PCS | Mod: CPTII,S$GLB,, | Performed by: GENERAL ACUTE CARE HOSPITAL

## 2023-05-04 PROCEDURE — 1101F PR PT FALLS ASSESS DOC 0-1 FALLS W/OUT INJ PAST YR: ICD-10-PCS | Mod: CPTII,S$GLB,, | Performed by: GENERAL ACUTE CARE HOSPITAL

## 2023-05-04 PROCEDURE — 3072F PR LOW RISK FOR RETINOPATHY: ICD-10-PCS | Mod: CPTII,S$GLB,, | Performed by: GENERAL ACUTE CARE HOSPITAL

## 2023-05-04 PROCEDURE — 1159F PR MEDICATION LIST DOCUMENTED IN MEDICAL RECORD: ICD-10-PCS | Mod: CPTII,S$GLB,, | Performed by: GENERAL ACUTE CARE HOSPITAL

## 2023-05-04 PROCEDURE — 3008F PR BODY MASS INDEX (BMI) DOCUMENTED: ICD-10-PCS | Mod: CPTII,S$GLB,, | Performed by: GENERAL ACUTE CARE HOSPITAL

## 2023-05-04 PROCEDURE — 3078F DIAST BP <80 MM HG: CPT | Mod: CPTII,S$GLB,, | Performed by: GENERAL ACUTE CARE HOSPITAL

## 2023-05-04 PROCEDURE — 1101F PT FALLS ASSESS-DOCD LE1/YR: CPT | Mod: CPTII,S$GLB,, | Performed by: GENERAL ACUTE CARE HOSPITAL

## 2023-05-04 PROCEDURE — 3078F PR MOST RECENT DIASTOLIC BLOOD PRESSURE < 80 MM HG: ICD-10-PCS | Mod: CPTII,S$GLB,, | Performed by: GENERAL ACUTE CARE HOSPITAL

## 2023-05-04 PROCEDURE — 99999 PR PBB SHADOW E&M-EST. PATIENT-LVL III: CPT | Mod: PBBFAC,,, | Performed by: GENERAL ACUTE CARE HOSPITAL

## 2023-05-04 NOTE — PATIENT INSTRUCTIONS
Your Diabetes and A1C are very well controlled which is great news.     No medication changes today.     Please reach out when you need refills.     Follow up and blood work 1 week prior to next visit in 4 months.     If any questions feel free to contact me.     Have a nice day.     Sincerely,       Ray Mireles MD   Endocrinology   5/4/2023 1:55 PM

## 2023-05-04 NOTE — PROGRESS NOTES
Subjective:      Patient ID: Chelly Ortiz is a 72 y.o. female.    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    Constipation    History of colon polyps    Essential hypertension    Hyperlipidemia, unspecified    Spondylosis of cervical region without myelopathy or radiculopathy    Cervical myofascial pain syndrome    PCO (posterior capsular opacification), right    Dry eye syndrome    Pseudophakia    Decreased range of motion of lumbar spine    Decreased strength    Dilated bile duct    Personal history of colonic polyps    EIC (epidermal inclusion cyst)    Sedentary lifestyle    Dysphagia    Abdominal aortic atherosclerosis    Decreased ROM of neck    Arthritis of multiple sites    Osteopenia of multiple sites    Combined form of age-related cataract, left eye    Adjustment disorder with anxious mood    Other forms of angina pectoris       History of Present Illness  73 YO Female w/ dx of DM2, HTN, HLD and Obesity that presents to the endocrine clinic for follow up.  Pt today reports feels well and denies hyper or hypoglycemias.       Interval history:     Pt was last seen on 12/2022 and plan was:        Increase Trulicity to 3mg SC weekly  (Receiving Trulicity through [facility])      Decrease Levemir to 40 units qHS     Decrease Novolog to 10 units TID before meals + sliding scale.            With regards to Diabetes Mellitus:   -Type 2    -Duration:  Dx 10 yrs Ago on blood work   -FH of DM? 2 sisters and 1 brother with DM2   -Microvascular complications: (Nephropath, Neuropathy)  -Macrovascular complications:  DENIES   -DKA?  DENIES   -HHS?  DENIES   -DM Medications:  Trulicity 3mg weekly,  Levemir  40 qHS,   Novolog 10 units TID before meals + sliding scale (Low dose)     -Previously tried medications:  NONE   -Compliance w/ Meds:  Yes  -Hyperglycemia symptoms: DENIES   -Hypoglycemia symptoms:  DENIES   -Know how to correct hypoglycemias: No, will do teaching today.   -Glucose monitoring:   Checks 4 times daily and glucose mainly at goal between 90 - 160.  NO glucose above 180 seen on glucometer review.   AT GOAL         -Diet:  B (cereal w/ almond milk, turkey sausage)  L (Rice beans, meat loaf, spagetti)  D ( baked chicken, rice, potatoes), Snacks (Muffins once a month)     -Activity:  Sedentary   -Pancreatitis?  DENIES   -CHF?  DENIES   -UTIs?  DENIES   -Thyroid CA?  DENIES   -ETOH Abuse?  DENIES     Diabetes Management Status    Statin: Taking  ACE/ARB: Taking    Screening or Prevention Patient's value Goal Complete/Controlled?   HgA1C Testing and Control   Lab Results   Component Value Date    HGBA1C 6.5 (H) 04/26/2023      Annually/Less than 8% Yes   Lipid profile : 04/26/2023 Annually Yes   LDL control Lab Results   Component Value Date    LDLCALC 130.6 04/26/2023    Annually/Less than 100 mg/dl  No   Nephropathy screening Lab Results   Component Value Date    LABMICR 38.0 04/26/2023     Lab Results   Component Value Date    PROTEINUA Negative 04/13/2021    Annually Yes   Blood pressure BP Readings from Last 1 Encounters:   04/28/23 120/70    Less than 140/90 Yes   Dilated retinal exam : 03/28/2023 Annually Yes   Foot exam   : 08/16/2022 Annually Yes        ROS:   As above    Objective:     LMP 08/01/2000   BP Readings from Last 3 Encounters:   04/28/23 120/70   04/05/23 (!) 144/81   03/22/23 125/70     Wt Readings from Last 1 Encounters:   04/28/23 0836 89.1 kg (196 lb 6.9 oz)     There is no height or weight on file to calculate BMI.      Physical Exam  Vitals reviewed.   Constitutional:       General: She is not in acute distress.     Appearance: Normal appearance. She is well-developed. She is not  ill-appearing.   HENT:      Nose: Nose normal. No rhinorrhea.      Mouth/Throat:      Mouth: Mucous membranes are moist.   Eyes:      Extraocular Movements: Extraocular movements intact.      Pupils: Pupils are equal, round, and reactive to light.      Comments: No proptosis, No lid lag, No conjunctival erythema    Neck:      Thyroid: No thyromegaly.      Trachea: No tracheal deviation.      Comments: No thyromegaly or Thyroid nodules palpated on exam   Cardiovascular:      Rate and Rhythm: Normal rate and regular rhythm.      Pulses: Normal pulses.   Pulmonary:      Effort: Pulmonary effort is normal.      Breath sounds: Normal breath sounds.   Abdominal:      Palpations: Abdomen is soft. There is no mass.      Tenderness: There is no abdominal tenderness.      Hernia: No hernia is present.   Musculoskeletal:         General: No swelling.      Cervical back: Neck supple. No tenderness.      Right lower leg: No edema.      Left lower leg: No edema.   Skin:     General: Skin is warm.      Findings: No rash.   Neurological:      General: No focal deficit present.      Mental Status: She is alert and oriented to person, place, and time.   Psychiatric:         Mood and Affect: Mood normal.         Judgment: Judgment normal.     Protective Sensation (w/ 10 gram monofilament):  Right: Intact  Left: Intact    Visual Inspection:  Normal -  Bilateral    Pedal Pulses:   Right: Present  Left: Present    Posterior tibialis:   Right:Present  Left: Present           Lab Review:   Lab Results   Component Value Date    HGBA1C 6.5 (H) 04/26/2023     Lab Results   Component Value Date    CHOL 213 (H) 04/26/2023    HDL 56 04/26/2023    LDLCALC 130.6 04/26/2023    TRIG 132 04/26/2023    CHOLHDL 26.3 04/26/2023     Lab Results   Component Value Date     04/26/2023    K 3.6 04/26/2023     04/26/2023    CO2 24 04/26/2023     (H) 04/26/2023    BUN 11 04/26/2023    CREATININE 0.9 04/26/2023    CALCIUM 9.1 04/26/2023     PROT 7.4 04/26/2023    ALBUMIN 3.7 04/26/2023    BILITOT 0.6 04/26/2023    ALKPHOS 111 04/26/2023    AST 15 04/26/2023    ALT 9 (L) 04/26/2023    ANIONGAP 11 04/26/2023    ESTGFRAFRICA >60.0 06/13/2022    EGFRNONAA >60.0 06/13/2022    TSH 1.544 04/26/2023     Vit D, 25-Hydroxy   Date Value Ref Range Status   06/25/2013 16 (L) 30 - 96 ng/mL Final     Comment:     Vitamin D deficiency........<10 ng/mL  Vitamin D insufficiency.....10-29 ng/mL  Vitamin D sufficiency.......> or equal to 30 ng/mL  Vitamin D toxicity..........>100 ng/mL       Assessment and Plan     Uncontrolled type 2 diabetes mellitus with hyperglycemia  Lab Results   Component Value Date    HGBA1C 6.5 (H) 04/26/2023      -FS log  AT GOAL   -Diet, exercise, lifestyle modifications and A1c Goals discussed   -Hypoglycemia symptoms and plan of action discussed   -Low carb diet   -Seen DM Education?  NO, Will give referral today     PLAN:     Due to A1C at goal.  I will recommend the following.       C/w Trulicity to 3mg SC weekly  (Receiving Trulicity through [facility])     C/w  Levemir to 40 units qHS    C/w  Novolog to 10 units TID before meals + sliding scale.       Will consider starting low dose metformin or low dose SGLT-2i at next visit with hopes to decrease insulin doses in the future.      A1C prior to next visit in 4 months       Hyperlipidemia, unspecified hyperlipidemia type  LDL above goal on Pravastatin 10mg daily    Statin intolerance on higher doses of statin     C/w  pravastatin 10mg daily   Diet, exercise and lifestyle modifications discussed   Low Fat diet       Hypertension, essential  BP controlled on current BP meds   On ARB for renal protection   Low Na diet

## 2023-05-09 ENCOUNTER — PATIENT MESSAGE (OUTPATIENT)
Dept: FAMILY MEDICINE | Facility: CLINIC | Age: 73
End: 2023-05-09
Payer: MEDICARE

## 2023-05-11 ENCOUNTER — OFFICE VISIT (OUTPATIENT)
Dept: PAIN MEDICINE | Facility: CLINIC | Age: 73
End: 2023-05-11
Payer: MEDICARE

## 2023-05-11 ENCOUNTER — HOSPITAL ENCOUNTER (OUTPATIENT)
Dept: RADIOLOGY | Facility: HOSPITAL | Age: 73
Discharge: HOME | End: 2023-05-11
Attending: FAMILY MEDICINE
Payer: MEDICARE

## 2023-05-11 VITALS
HEART RATE: 93 BPM | BODY MASS INDEX: 34.72 KG/M2 | WEIGHT: 196 LBS | SYSTOLIC BLOOD PRESSURE: 118 MMHG | DIASTOLIC BLOOD PRESSURE: 81 MMHG

## 2023-05-11 DIAGNOSIS — M79.18 MYOFASCIAL PAIN ON RIGHT SIDE: ICD-10-CM

## 2023-05-11 DIAGNOSIS — M54.9 MID BACK PAIN ON RIGHT SIDE: Primary | ICD-10-CM

## 2023-05-11 DIAGNOSIS — Z13.6 SCREENING FOR ABDOMINAL AORTIC ANEURYSM: ICD-10-CM

## 2023-05-11 PROCEDURE — 99999 PR PBB SHADOW E&M-EST. PATIENT-LVL IV: CPT | Mod: PBBFAC,,, | Performed by: NURSE PRACTITIONER

## 2023-05-11 PROCEDURE — 99999 PR PBB SHADOW E&M-EST. PATIENT-LVL IV: ICD-10-PCS | Mod: PBBFAC,,, | Performed by: NURSE PRACTITIONER

## 2023-05-11 PROCEDURE — 3079F PR MOST RECENT DIASTOLIC BLOOD PRESSURE 80-89 MM HG: ICD-10-PCS | Mod: CPTII,S$GLB,, | Performed by: NURSE PRACTITIONER

## 2023-05-11 PROCEDURE — 3072F LOW RISK FOR RETINOPATHY: CPT | Mod: CPTII,S$GLB,, | Performed by: NURSE PRACTITIONER

## 2023-05-11 PROCEDURE — 1125F AMNT PAIN NOTED PAIN PRSNT: CPT | Mod: CPTII,S$GLB,, | Performed by: NURSE PRACTITIONER

## 2023-05-11 PROCEDURE — 20552 NJX 1/MLT TRIGGER POINT 1/2: CPT | Mod: S$GLB,,, | Performed by: NURSE PRACTITIONER

## 2023-05-11 PROCEDURE — 3066F NEPHROPATHY DOC TX: CPT | Mod: CPTII,S$GLB,, | Performed by: NURSE PRACTITIONER

## 2023-05-11 PROCEDURE — 20552 TRIGGER POINT INJECTION: ICD-10-PCS | Mod: S$GLB,,, | Performed by: NURSE PRACTITIONER

## 2023-05-11 PROCEDURE — 3061F NEG MICROALBUMINURIA REV: CPT | Mod: CPTII,S$GLB,, | Performed by: NURSE PRACTITIONER

## 2023-05-11 PROCEDURE — 3061F PR NEG MICROALBUMINURIA RESULT DOCUMENTED/REVIEW: ICD-10-PCS | Mod: CPTII,S$GLB,, | Performed by: NURSE PRACTITIONER

## 2023-05-11 PROCEDURE — 1160F PR REVIEW ALL MEDS BY PRESCRIBER/CLIN PHARMACIST DOCUMENTED: ICD-10-PCS | Mod: CPTII,S$GLB,, | Performed by: NURSE PRACTITIONER

## 2023-05-11 PROCEDURE — 3066F PR DOCUMENTATION OF TREATMENT FOR NEPHROPATHY: ICD-10-PCS | Mod: CPTII,S$GLB,, | Performed by: NURSE PRACTITIONER

## 2023-05-11 PROCEDURE — 3074F SYST BP LT 130 MM HG: CPT | Mod: CPTII,S$GLB,, | Performed by: NURSE PRACTITIONER

## 2023-05-11 PROCEDURE — 76775 US ABDOMINAL AORTA: ICD-10-PCS | Mod: 26,,, | Performed by: RADIOLOGY

## 2023-05-11 PROCEDURE — 99499 RISK ADDL DX/OHS AUDIT: ICD-10-PCS | Mod: S$GLB,,, | Performed by: NURSE PRACTITIONER

## 2023-05-11 PROCEDURE — 3044F PR MOST RECENT HEMOGLOBIN A1C LEVEL <7.0%: ICD-10-PCS | Mod: CPTII,S$GLB,, | Performed by: NURSE PRACTITIONER

## 2023-05-11 PROCEDURE — 99214 PR OFFICE/OUTPT VISIT, EST, LEVL IV, 30-39 MIN: ICD-10-PCS | Mod: 25,S$GLB,, | Performed by: NURSE PRACTITIONER

## 2023-05-11 PROCEDURE — 3008F BODY MASS INDEX DOCD: CPT | Mod: CPTII,S$GLB,, | Performed by: NURSE PRACTITIONER

## 2023-05-11 PROCEDURE — 3008F PR BODY MASS INDEX (BMI) DOCUMENTED: ICD-10-PCS | Mod: CPTII,S$GLB,, | Performed by: NURSE PRACTITIONER

## 2023-05-11 PROCEDURE — 3044F HG A1C LEVEL LT 7.0%: CPT | Mod: CPTII,S$GLB,, | Performed by: NURSE PRACTITIONER

## 2023-05-11 PROCEDURE — 3072F PR LOW RISK FOR RETINOPATHY: ICD-10-PCS | Mod: CPTII,S$GLB,, | Performed by: NURSE PRACTITIONER

## 2023-05-11 PROCEDURE — 76775 US EXAM ABDO BACK WALL LIM: CPT | Mod: TC

## 2023-05-11 PROCEDURE — 1160F RVW MEDS BY RX/DR IN RCRD: CPT | Mod: CPTII,S$GLB,, | Performed by: NURSE PRACTITIONER

## 2023-05-11 PROCEDURE — 1159F PR MEDICATION LIST DOCUMENTED IN MEDICAL RECORD: ICD-10-PCS | Mod: CPTII,S$GLB,, | Performed by: NURSE PRACTITIONER

## 2023-05-11 PROCEDURE — 99499 UNLISTED E&M SERVICE: CPT | Mod: S$GLB,,, | Performed by: NURSE PRACTITIONER

## 2023-05-11 PROCEDURE — 1159F MED LIST DOCD IN RCRD: CPT | Mod: CPTII,S$GLB,, | Performed by: NURSE PRACTITIONER

## 2023-05-11 PROCEDURE — 76775 US EXAM ABDO BACK WALL LIM: CPT | Mod: 26,,, | Performed by: RADIOLOGY

## 2023-05-11 PROCEDURE — 1125F PR PAIN SEVERITY QUANTIFIED, PAIN PRESENT: ICD-10-PCS | Mod: CPTII,S$GLB,, | Performed by: NURSE PRACTITIONER

## 2023-05-11 PROCEDURE — 99214 OFFICE O/P EST MOD 30 MIN: CPT | Mod: 25,S$GLB,, | Performed by: NURSE PRACTITIONER

## 2023-05-11 PROCEDURE — 3074F PR MOST RECENT SYSTOLIC BLOOD PRESSURE < 130 MM HG: ICD-10-PCS | Mod: CPTII,S$GLB,, | Performed by: NURSE PRACTITIONER

## 2023-05-11 PROCEDURE — 3079F DIAST BP 80-89 MM HG: CPT | Mod: CPTII,S$GLB,, | Performed by: NURSE PRACTITIONER

## 2023-05-11 RX ORDER — TRIAMCINOLONE ACETONIDE 40 MG/ML
20 INJECTION, SUSPENSION INTRA-ARTICULAR; INTRAMUSCULAR
Status: SHIPPED | OUTPATIENT
Start: 2023-05-11

## 2023-05-11 RX ORDER — TRIAMCINOLONE ACETONIDE 40 MG/ML
20 INJECTION, SUSPENSION INTRA-ARTICULAR; INTRAMUSCULAR
Status: DISCONTINUED | OUTPATIENT
Start: 2023-05-11 | End: 2023-05-11 | Stop reason: HOSPADM

## 2023-05-11 RX ADMIN — TRIAMCINOLONE ACETONIDE 20 MG: 40 INJECTION, SUSPENSION INTRA-ARTICULAR; INTRAMUSCULAR at 01:05

## 2023-05-11 NOTE — PROGRESS NOTES
[facility] Established Patient Evaluation    Referred by: Gabriel Wilson  Reason for referral: neck pain    CC:right shoulder blade pain     Interval Update:    05/11/[id number]-wnqw-old female who is established with our clinic presents today complaining of right posterior shoulder blade pain onset 4 weeks pain is located in the posterior right shoulder blade, constant pain is described as dull, in particular causes pain nothing in particular mitigate her symptoms at this point.  She denies any radicular symptoms anywhere.  She denies any paresthesia like symptoms.  Her pain is described as dull achy and deep at times.  Pain score today is 7/10.  She denies any recent incident or trauma denies any bowel bladder dysfunction denies any profound weakness.      Interval update:   11/28/2022 Quentin Ortiz returns to clinic s/p bilateral subacromial shoulder injection on 10/26/22 with 90% relief.  She reports Aching of the bilateral shoulder  (location) pain rated 3/10 today with a weekly range of 3-4/10.  Additionally she complained of bilateral neck pain that has  been ongoing for greater than a year, see pain described below.    10/26/2022 - Ms. Ortiz eturns to clinic for follow up visit reporting bilateral shoulder pain, worse with lifting something, and working above her head. She has had shoulder pain over the last 10 years, but worse over the last few works. Nothing improves her pain. She is using tylenol arthritis for the pain. No unusual amount of work or activity recently. No paraesthesias or tingling in upper extremities.     8/18/20 - Ms. Ortiz returns to clinic for follow up visit reporting stable bilateral knee pain and review imaging. Pain intensity is currently 3/10.      8/13/20 - Ms. Ortiz returns to clinic for follow up visit reporting worse bilateral knee  pain.  Pain intensity is currently 6/10.  Patient reports bilateral knee pain that started approximately 1 week ago, pain is worse with  walking, standing and while sleeping. Pain is described as sharp and stabbing, she denies profound weakness, or radiating pain. Worse pain is 6/10. Of note she reports no inciting incident, trauma or falls.     02/05/2020 - Mrs. Ortiz returns to clinic for follow up visit reporting left buttock pain. Patient reports radiation down left leg to mid thigh. Pain intensity is currently 5/10.      4/9/18 - Pt returns to complaining of MRI results, neck and bilateral shoulder pain.  Her day time pain improved with PT but she continues to complain of bilat neck and shoulder pain at night.  The results of the MRI were shared with her.    Background:  Chelly Ortiz is a 72 y.o. female who complains of neck and bilateral shoulder pain as characterized below.    Location:  Right posterior shoulder blade pain  Severity: Currently: 5/2/10   Typical Range: 6/10     Exacerbation: 10/10   Onset:  4 weeks  Quality: Dull, achy  Radiation:  None  Exacerbating Factors: extension and flexion lateral movement  Mitigating Factors: heat  Assoc: denies night fever/night sweats, urinary incontinence, bowel incontinence, significant weight loss, significant motor weakness and loss of sensations    Previous Therapies:  PT: Completed in march  HEP: Daily  TENS: None  Injections: LYNN 3x per Ca Oconnell  Surgery: Denies  Medications:   - NSAIDS:   - MSK Relaxants:   - TCAs:   - SNRIs:   - Topicals:   - Anticonvulsants: Current  - Opioids: No    Current Pain Medications:  Gabapentin 100 TID - she is taking it prn  Piroxicam 20 mg - stopped due to GI issues    Full Medication List:    Current Outpatient Medications:     ACCU-CHEK FASTCLIX LANCET DRUM [facility], TEST BLOOD SUGAR THREE TIMES A DAY, Disp: 918 each, Rfl: 3    ACCU-CHEK FASTCLIX LANCING DEV Kit, USE AS DIRECTED, Disp: 1 each, Rfl: 0    albuterol (PROVENTIL/VENTOLIN HFA) 90 mcg/actuation inhaler, Inhale 1-2 puffs into the lungs every 4 (four) hours as needed for Wheezing., Disp: 18 g,  "Rfl: 2    amLODIPine (NORVASC) 10 MG tablet, Take 1 tablet (10 mg total) by mouth once daily., Disp: 90 tablet, Rfl: 3    aspirin (ECOTRIN) 81 MG EC tablet, Take 81 mg by mouth once daily., Disp: , Rfl:     atorvastatin (LIPITOR) 40 MG tablet, Take 1 tablet (40 mg total) by mouth every evening., Disp: 90 tablet, Rfl: 3    azelastine (OPTIVAR) 0.05 % ophthalmic solution, Place 1 drop into both eyes 2 (two) times daily., Disp: 6 mL, Rfl: 1    BD ULTRA-FINE SHORT PEN NEEDLE 31 gauge x 5/16" Ndle, USES 4 TIMES A DAY., Disp: , Rfl:     benzonatate (TESSALON PERLES) 100 MG capsule, Take 1 capsule (100 mg total) by mouth every 6 (six) hours as needed for Cough., Disp: 30 capsule, Rfl: 1    blood glucose control high,low (ACCU-CHEK GUIDE L1-L2 CTRL SOL) Soln, 3 times a day, Disp: 300 each, Rfl: 11    blood sugar diagnostic (ACCU-CHEK GUIDE TEST STRIPS) Strp, 3 times a day, Disp: 300 strip, Rfl: 11    blood-glucose meter (ACCU-CHEK GUIDE GLUCOSE METER) Misc, Three times a day, Disp: 1 each, Rfl: 0    chlorhexidine (PERIDEX) 0.12 % solution, Use as directed 15 mLs in the mouth or throat daily as needed., Disp: , Rfl:     citalopram (CELEXA) 10 MG tablet, Take 1 tablet (10 mg total) by mouth once daily., Disp: 90 tablet, Rfl: 1    dulaglutide (TRULICITY) 3 mg/0.5 mL pen injector, Inject 3 mg into the skin every 7 days., Disp: 4 pen, Rfl: 11    ergocalciferol (ERGOCALCIFEROL) 50,000 unit Cap, TAKE 1 CAPSULE BY MOUTH EVERY 7 DAYS, Disp: 12 capsule, Rfl: 3    esomeprazole (NEXIUM) 40 MG capsule, Take 1 capsule (40 mg total) by mouth before breakfast., Disp: 90 capsule, Rfl: 3    fluticasone propionate (FLONASE) 50 mcg/actuation nasal spray, 1 spray (50 mcg total) by Each Nostril route once daily., Disp: 9.9 mL, Rfl: 0    gabapentin (NEURONTIN) 100 MG capsule, Take 1 capsule (100 mg total) by mouth 3 (three) times daily., Disp: 270 capsule, Rfl: 3    insulin aspart U-100 (NOVOLOG FLEXPEN U-100 INSULIN) 100 unit/mL (3 mL) InPn pen, " "Inject 12 units w/ meals plus scale 150-200+2, 201-250+4, 251-300+6, 301-350+8, >350+10. Max daily 50 units., Disp: 15 mL, Rfl: 6    insulin aspart U-100 (NOVOLOG FLEXPEN U-100 INSULIN) 100 unit/mL (3 mL) InPn pen, Inject 10 Units into the skin 3 (three) times daily with meals., Disp: 60 mL, Rfl: 11    insulin detemir U-100, Levemir, (LEVEMIR FLEXPEN) 100 unit/mL (3 mL) InPn pen, Inject 40 Units into the skin every evening., Disp: 60 mL, Rfl: 11    insulin detemir U-100, Levemir, (LEVEMIR FLEXTOUCH U-100 INSULN) 100 unit/mL (3 mL) InPn pen, Inject 40 Units into the skin every evening., Disp: 12 mL, Rfl: 11    insulin syringe-needle U-100 0.3 mL 31 gauge x 5/16" Syrg, relion brand, use 5 x a day, if not on levemir or novolog, Disp: 150 each, Rfl: 1    insulin syringe-needle U-100 0.5 mL 31 gauge x 5/16" Syrg, Use nightly. 90 day, Disp: 100 each, Rfl: 3    magnesium oxide (MAG-OX) 400 mg tablet, Take 1 tablet (400 mg total) by mouth 2 (two) times daily., Disp: 180 tablet, Rfl: 3    meclizine (ANTIVERT) 25 mg tablet, Take 1 tablet (25 mg total) by mouth 3 (three) times daily as needed for Dizziness., Disp: 21 tablet, Rfl: 0    neomycin-polymyxin-hydrocortisone (CORTISPORIN) otic solution, Place 3 drops into the right ear 4 (four) times daily., Disp: 10 mL, Rfl: 0    pen needle, diabetic 32 gauge x 5/32" Ndle, Change needle with each injection, Disp: 200 each, Rfl: 11    rOPINIRole (REQUIP) 0.5 MG tablet, TAKE 1 TABLET BY MOUTH EVERY EVENING., Disp: 90 tablet, Rfl: 0    traZODone (DESYREL) 50 MG tablet, Take 1 tablet (50 mg total) by mouth every evening., Disp: 90 tablet, Rfl: 3    calcium carbonate (OS-ARIC) 600 mg calcium (1,500 mg) Tab, Take 1 tablet (600 mg total) by mouth once. for 1 dose, Disp: 30 tablet, Rfl: 11    cetirizine (ZYRTEC) 10 MG tablet, Take 1 tablet (10 mg total) by mouth every evening., Disp: 90 tablet, Rfl: 0    fluticasone-salmeterol diskus inhaler 250-50 mcg, Inhale 1 puff by mouth into the lungs " 2 (two) times daily. Controller (Patient not taking: Reported on 2023), Disp: 60 each, Rfl: 6    glucose 4 GM chewable tablet, Take 4 tablets (16 g total) by mouth as needed for Low blood sugar (If having symptoms of blurry vision, palpitations, confusion, shakiness.  Please check sugars and if sugar below 70 please take 4 tablets and re-check sugar everry 15 minutes until sugars are above 70 and symptoms resolve.)., Disp: 50 tablet, Rfl: 12    Current Facility-Administered Medications:     triamcinolone acetonide injection 20 mg, 20 mg, Intramuscular, 1 time in Clinic/HOD, GINETTE Richards     Review of Systems:  Review of Systems   Constitutional:  Negative for chills and fever.   HENT:  Negative for nosebleeds.    Eyes:  Negative for pain.   Respiratory:  Negative for hemoptysis.    Cardiovascular:  Negative for chest pain.   Gastrointestinal:  Negative for nausea and vomiting.   Genitourinary:  Negative for dysuria.   Musculoskeletal:  Positive for joint pain and myalgias.   Skin:  Negative for rash.   Neurological:  Negative for tingling, tremors, sensory change, focal weakness and weakness.     Allergies:  Ace inhibitors; Prednisone; and Latex, natural rubber     Medical History:  Past Medical History:   Diagnosis Date    Allergy     DDD (degenerative disc disease), lumbar     Diabetes mellitus     diet controlled    Diabetes mellitus, type 2     GERD (gastroesophageal reflux disease)     History of subconjunctival hemorrhage 2011    left eye    Hyperlipidemia     Hypertension     IBS (irritable bowel syndrome)     Obesity     MYRIAM (obstructive sleep apnea)     on CPAP    Rotator cuff impingement syndrome     Seasonal allergic conjunctivitis         Surgical History:  Past Surgical History:   Procedure Laterality Date    CATARACT EXTRACTION W/  INTRAOCULAR LENS IMPLANT Right 10/03/2013        CATARACT EXTRACTION W/  INTRAOCULAR LENS IMPLANT Left 2022         SECTION       x2    CHOLECYSTECTOMY      COLONOSCOPY N/A 2018    Procedure: COLONOSCOPY;  Surgeon: Marie Mejia MD;  Location: [facility] ENDO;  Service: Endoscopy;  Laterality: N/A;    COLONOSCOPY N/A 2022    Procedure: COLONOSCOPY;  Surgeon: Pierce Rivera MD;  Location: [facility] ENDO;  Service: Endoscopy;  Laterality: N/A;    ENDOSCOPIC ULTRASOUND OF UPPER GASTROINTESTINAL TRACT N/A 10/09/2018    Procedure: ULTRASOUND, UPPER GI TRACT, ENDOSCOPIC;  Surgeon: Javy Simpson MD;  Location: [facility] ENDO;  Service: Endoscopy;  Laterality: N/A;    EXCISION OF LESION N/A 2019    Procedure: EXCISION, LESION VULVA;  Surgeon: Liv Odell MD;  Location: [facility] OR;  Service: OB/GYN;  Laterality: N/A;  latex allergy    EYE SURGERY      HYSTERECTOMY      ovaries intact, due to DUB    INTRAOCULAR PROSTHESES INSERTION Left 2022    Procedure: INSERTION, IOL PROSTHESIS;  Surgeon: Clarice Herzog MD;  Location: [facility] OR [address] [place] [address];  Service: Ophthalmology;  Laterality: Left;    PHACOEMULSIFICATION OF CATARACT Left 2022    Procedure: PHACOEMULSIFICATION, CATARACT;  Surgeon: Clarice Herzog MD;  Location: [facility] OR [address] [place] [address];  Service: Ophthalmology;  Laterality: Left;    TUBAL LIGATION          Social History:  Social History     Socioeconomic History    Marital status:    Tobacco Use    Smoking status: Former     Types: Cigarettes     Quit date: 2/15/1983     Years since quittin.2     Passive exposure: Current ()    Smokeless tobacco: Never   Substance and Sexual Activity    Alcohol use: No    Drug use: No    Sexual activity: Yes     Partners: Male     Social Determinants of Health     Financial Resource Strain: Low Risk     Difficulty of Paying Living Expenses: Not hard at all   Food Insecurity: Unknown    Worried About Running Out of Food in the Last Year: Patient refused    Ran Out of Food in the Last Year: Patient refused   Transportation Needs: No Transportation Needs    Lack of  Transportation (Medical): No    Lack of Transportation (Non-Medical): No   Physical Activity: Unknown    Days of Exercise per Week: Patient refused    Minutes of Exercise per Session: 20 min   Stress: No Stress Concern Present    Feeling of Stress : Not at all   Social Connections: Unknown    Frequency of Communication with Friends and Family: More than three times a week    Frequency of Social Gatherings with Friends and Family: Patient refused    Active Member of Clubs or Organizations: No    Attends Club or Organization Meetings: Never    Marital Status:    Housing Stability: Low Risk     Unable to Pay for Housing in the Last Year: No    Number of Places Lived in the Last Year: 1    Unstable Housing in the Last Year: No       Physical Exam:  Vitals:    05/11/23 1049   BP: 118/81   Pulse: 93   Weight: 88.9 kg (195 lb 15.8 oz)   PainSc:   7   PainLoc: Shoulder       General    Nursing note and vitals reviewed.  Constitutional: She is oriented to person, place, and time. She appears well-developed and well-nourished. No distress.   HENT:   Head: Normocephalic and atraumatic.   Nose: Nose normal.   Eyes: Conjunctivae and EOM are normal. Pupils are equal, round, and reactive to light. Right eye exhibits no discharge. Left eye exhibits no discharge. No scleral icterus.   Neck: No JVD present.   Cardiovascular:  Intact distal pulses.            Pulmonary/Chest: Effort normal. No respiratory distress.   Abdominal: She exhibits no distension.   Neurological: She is alert and oriented to person, place, and time. Coordination normal.   Psychiatric: She has a normal mood and affect. Her behavior is normal. Judgment and thought content normal.     General Musculoskeletal Exam   Gait: normal       Right Knee Exam     Tenderness   The patient is tender to palpation of the medial joint line.    Crepitus   The patient has crepitus of the patella.    Range of Motion   Extension:  normal   Flexion:  normal     Left Knee Exam      Tenderness   The patient tender to palpation of the lateral joint line.    Crepitus   The patient has crepitus of the patella.    Range of Motion   Extension:  normal   Flexion:  normal Left Hip Exam     Comments:  Left ischial bursa tenderness.  Partial reproduction of pain with hip flexion and piriformis-type stretches.      Back (L-Spine & T-Spine) / Neck (C-Spine) Exam     Tenderness   The patient is tender to palpation of the right scapular. Posterior midline palpation reveals tenderness of the Upper C-Spine, Lower C-Spine and Upper T-Spine. Right paramedian tenderness of the Upper T-Spine.     Back (L-Spine & T-Spine) Range of Motion   Lateral bend right:  normal   Lateral bend left:  normal   Rotation right:  normal   Rotation left:  normal     Neck (C-Spine) Range of Motion   Flexion:      Normal  Extension:  Normal    Comments:  TTP right posterior scapula   Right Shoulder Exam     Inspection/Observation   Deformity: absent    Range of Motion   Active abduction:  90 abnormal     Tests & Signs   Drop arm: positive  Saldaña test: positive  Impingement: positive  Rotator Cuff Painful Arc/Range: moderate    Other   Sensation: normal    Comments:  Positive empty can and impingement sign     Left Shoulder Exam     Inspection/Observation   Deformity: absent    Range of Motion   Active abduction:  90 abnormal     Tests & Signs   Drop arm: positive  Saldaña test: positive  Impingement: positive  Rotator Cuff Painful Arc/Range: moderate    Other   Sensation: normal     Comments:  Positive empty can and impingement sign       Muscle Strength   Right Upper Extremity   Shoulder Abduction: 5/5   Shoulder Internal Rotation: 5/5   Shoulder External Rotation: 5/5   Supraspinatus: 5/5   Subscapularis: 5/5   Biceps: 5/5   Wrist flexion: 5/5   Left Upper Extremity  Shoulder Abduction: 5/5   Shoulder Internal Rotation: 5/5   Shoulder External Rotation: 5/5   Supraspinatus: 5/5   Subscapularis: 5/5   Biceps: 5/5   Wrist  flexion: 5/5   Right Lower Extremity   Quadriceps:  5/5   Anterior tibial:  5/5   Gastrocsoleus:  5/5   Left Lower Extremity   Quadriceps:  5/5   Anterior tibial:  5/5   Gastrocsoleus:  5/5     Reflexes     Left Side  Biceps:  2+  Brachioradialis:  2+  Left Roque's Sign:  Absent    Right Side   Biceps:  2+  Brachioradialis:  2+  Right Roque's Sign:  absent    Vascular Exam     Right Pulses      Radial:                    1+      Left Pulses      Radial:                    2+      Capillary Refill  Right Hand: normal capillary refill  Left Hand: normal capillary refill        Imaging:    Order Details       XR SHOULDER COMPLETE 2 OR MORE VIEWS BILATERAL     CLINICAL HISTORY:  Other hypertrophic osteoarthropathy, multiple sites     TECHNIQUE:  Three views of each shoulder were performed.     COMPARISON:  Shoulder radiographs 01/28/2020 and 07/07/2016     FINDINGS:  No fracture or dislocation.  Mild-moderate degenerative changes of the acromioclavicular and glenohumeral joints bilaterally, right greater than left.  No focal soft tissue swelling.     Impression:     As above.        Electronically signed by: Jim Cooper  Date:                                            06/16/2022    MRI Upper Extremity Joint Without Contraast Right  TECHNIQUE: MRI of right shoulder was performed on a 1.5T magnet utilizing the following sequences: Localizer; axial T2 FS; coronal T2 FS and PD FS; sagittal T1, T2 FS and PD FS.    COMPARISON: Right shoulder radiograph 4/20/16.    FINDINGS:    Rotator cuff: There is thickening and increased signal of the insertional fibers of the supraspinatus consistent with tendinosis with partial thickness undersurface tear.  There is small full-thickness component involving the anterior fibers measuring approximately 8 x 6 mm.  Mild infraspinatus tendinosis with undersurface fraying.  Teres minor tendon is intact.  There is mild tendinosis with interstitial tear involving the insertional fibers of  the subscapularis.  Muscle bulk of the rotator cuff is within normal limits.     Labrum: Global degeneration of the glenoid labrum.      Biceps: Thickening and increased signal within the intra-articular portion of the long head biceps tendon consistent with tendinosis.    Bone: There is no fracture.  Bone marrow signal is unremarkable.    Acromioclavicular joint: Severe degenerative changes are seen at the acromioclavicular joint with osseous spurring, edema, and subchondral cystic change.    Cartilage: Articular cartilage of the glenohumeral joint is preserved.    Miscellaneous: Small joint effusion with increased fluid seen in the subscapularis recess.  There is increased fluid seen within the subacromial/subdeltoid bursa.   Impression       1.  Supraspinatus tendinosis with partial thickness articular surface tear and small full-thickness component involving the anterior fibers.    2.  Mild subscapularis and infraspinatus tendinosis.    3.  Severe AC joint arthrosis.    4.  Long head biceps tendinosis.    5.  Joint effusion and moderate subacromial/subdeltoid bursitis.  ______________________________________     Electronically signed by resident: Hosea Costello MD  Date: 06/16/16  Time: 16:29     ______________________________________     Electronically signed by: MADISON WINTERS MD  Date: 06/17/16  Time: 08:39              Assessment:  Problem List Items Addressed This Visit    None  Visit Diagnoses       Mid back pain on right side    -  Primary    Relevant Orders    X-Ray Thoracic Spine AP Lateral          Chelly is a 67-year-old female with chronic neck pain exacerbated by increased physical activity 3 months ago.  Her exam and imaging are consistent with facet arthropathy and myofascial dysfunction.  I recommended that we start with physical therapy to eliminate as much pain as possible related to myofascial dysfunction then move on to interventional procedures should a significant amount of pain persist.  I  also recommended that she complete the MRI ordered by her primary care physician and start a trial of piroxicam.  She also states significant insomnia not related to her chronic neck pain and I have recommended that she try tizanidine 4-8 mg nightly as a muscle relaxant and sleep aid.    4/9/18 - she completed PT with improved pain after each session.  Pain remains most bothersome at night which is common for muscle issues.  She was encouraged to perform neck stretching 3 times daily and especially at night to minimize pain at night.    2/5/2020 - there is tenderness in the left buttock consistent with ischial bursitis.  There may be a component of tendonitis as well; however, the treatment would be the same.  I have recommended an ischial bursa injection under fluoroscopic guidance and patient is in agreement.  She was warned that her blood glucose must be controlled and less than 180 g/dL to receive a steroid injection, otherwise we would need to cancel the injection.  Patient was also advised to begin stretching on a daily basis. I demonstrated 3 separate stretches that would target the painful area.  I have encouraged her to walk on a daily basis for exercise as she currently lives a relatively sedentary lifestyle.    08/13/[id number]-dokc-old female presents with bilateral knee pain consistent with osteoarthritis, there may be a component of tendonitis as well. I will order bilateral knee x-rays today for further evaluation and consider bilateral knee injections with steroids in the future pt  Is a diabetic so I educated her on how steroids can increase BS so monitoring  And staying at the appropriate level is vital. I will also recommend returning to PT for hari knee pain and  ischial bursitis she was previously attending physical therapy prior to the COVID-19 pandemic for ischial bursitis this was providing her with relief she reports that she only had 2 sessions during that time.   Lastly I did discuss with  patient her most recent labs  C reactive protein, and sedimentation rate results and that they were both abnormal and elevated I recommended she follow-up with her primary care doctor to discussed these results.      8/18/2020- 70 y/o female presents with a hx of chronic hari knee pain secondary to osteoarthritis , and left sided low back pain secondary to ischia bursitis. Upon review of her recent bilateral knee x-rays they show tricompartmental DJD with moderate medial femorotibial joint space narrowing bilaterally. I recommended hari IA knee injections in the future, considering her pain is stable today I will hold off her pain score is 3/10. Explained recent lab results and recommended  that she will need to f/u with her PCP.  Also recommended patient follow up with physical therapy.    10/26/2022 - Chelly Ortiz is a 72 y.o. female who  has a past medical history of Allergy, DDD (degenerative disc disease), lumbar, Diabetes mellitus, Diabetes mellitus, type 2, GERD (gastroesophageal reflux disease), History of subconjunctival hemorrhage (12/02/2011), Hyperlipidemia, Hypertension, IBS (irritable bowel syndrome), Obesity, MYRIAM (obstructive sleep apnea), Rotator cuff impingement syndrome, and Seasonal allergic conjunctivitis.  By history and examination this patient has chronic and acute on chronic bilateral shoulder pain. The underlying cause cause is bilateral rotator cuff pathology causing bilateral bursitis.  Pathology is confirmed by imaging.  We discussed the underlying diagnoses and multiple treatment options including interventional procedures.  The risks and benefits of each treatment option were discussed and all questions were answered.      11/28/2022-Chelly Ortiz is a 72 y.o. female who  has a past medical history of Allergy, DDD (degenerative disc disease), lumbar, Diabetes mellitus, Diabetes mellitus, type 2, GERD (gastroesophageal reflux disease), History of subconjunctival hemorrhage (12/02/2011),  Hyperlipidemia, Hypertension, IBS (irritable bowel syndrome), Obesity, MYRIAM (obstructive sleep apnea), Rotator cuff impingement syndrome, and Seasonal allergic conjunctivitis.  By history and examination this patient has chronicneck pain without bilateral radiculopathy in the distribution of C4, C5, C6, and C7.  The underlying cause cause is facet arthritis, degenerative disc disease, and muscle dysfunction.  Pathology is confirmed by imaging.  We discussed the underlying diagnoses and multiple treatment options including non-opioid medications, interventional procedures, physical therapy, and home exercise.  The risks and benefits of each treatment option were discussed and all questions were answered.      5/11/2023- Chelly Ortiz is a 72 y.o. female who  has a past medical history of Allergy, DDD (degenerative disc disease), lumbar, Diabetes mellitus, Diabetes mellitus, type 2, GERD (gastroesophageal reflux disease), History of subconjunctival hemorrhage (12/02/2011), Hyperlipidemia, Hypertension, IBS (irritable bowel syndrome), Obesity, MYRIAM (obstructive sleep apnea), Rotator cuff impingement syndrome, and Seasonal allergic conjunctivitis.  By history and examination this patient has acute/subacute right posterior scapular pain.without radiculopathy.  The underlying cause cause is muscle dysfunction and deconditioning.  Pathology is confirmed by imaging.  We discussed the underlying diagnoses and multiple treatment options including non-opioid medications, interventional procedures, physical therapy, home exercise, and weight loss.  The risks and benefits of each treatment option were discussed and all questions were answered.        Treatment Plan:   S/F TPIs right rhomboids major, minor rhomboids minor and major, Serratus posterior   HE sheet given to establish HE plan to help specifically with neck pain  X-rays AP mid back/thoracic  Recommended Tylenol extra-strength arthritis 500 to a 1000 t.i.d. not to  exceed 3000 mg in 24 hours    RTC- 2 weeks on my  chart to discuss right mid back pain    Disclaimer: This note was partly generated using dictation software which may occasionally result in transcription errors.

## 2023-05-11 NOTE — PROCEDURES
Trigger Point Injection  Performed by: GINETTE Richards  Authorized by: GINETTE Richards     Thoracic Paraspinal:  Right  Rhomboid:  Right    Consent Done?:  Yes (Written)    Pre-Procedure:   Indications:  Pain relief  Site marked: the procedure site was marked     Timeout: prior to procedure the correct patient, procedure, and site was verified      Local anesthesia used?: Yes    Local anesthetic:  Lidocaine 1% without epinephrine  Medications: 20 mg triamcinolone acetonide 40 mg/mL

## 2023-05-15 ENCOUNTER — PES CALL (OUTPATIENT)
Dept: ADMINISTRATIVE | Facility: CLINIC | Age: 73
End: 2023-05-15
Payer: MEDICARE

## 2023-05-22 ENCOUNTER — OFFICE VISIT (OUTPATIENT)
Dept: DERMATOLOGY | Facility: CLINIC | Age: 73
End: 2023-05-22
Payer: MEDICARE

## 2023-05-22 DIAGNOSIS — D18.01 ANGIOMA OF SKIN: ICD-10-CM

## 2023-05-22 DIAGNOSIS — L82.1 SK (SEBORRHEIC KERATOSIS): Primary | ICD-10-CM

## 2023-05-22 DIAGNOSIS — L29.9 SCALP PRURITUS: ICD-10-CM

## 2023-05-22 DIAGNOSIS — L73.8 SEBACEOUS GLAND HYPERPLASIA: ICD-10-CM

## 2023-05-22 DIAGNOSIS — D23.9 DILATED PORE OF WINER: ICD-10-CM

## 2023-05-22 DIAGNOSIS — D22.9 BENIGN NEVUS: ICD-10-CM

## 2023-05-22 DIAGNOSIS — Z12.83 SCREENING EXAM FOR SKIN CANCER: ICD-10-CM

## 2023-05-22 PROCEDURE — 3044F PR MOST RECENT HEMOGLOBIN A1C LEVEL <7.0%: ICD-10-PCS | Mod: CPTII,S$GLB,, | Performed by: DERMATOLOGY

## 2023-05-22 PROCEDURE — 1101F PR PT FALLS ASSESS DOC 0-1 FALLS W/OUT INJ PAST YR: ICD-10-PCS | Mod: CPTII,S$GLB,, | Performed by: DERMATOLOGY

## 2023-05-22 PROCEDURE — 99999 PR PBB SHADOW E&M-EST. PATIENT-LVL IV: ICD-10-PCS | Mod: PBBFAC,,, | Performed by: DERMATOLOGY

## 2023-05-22 PROCEDURE — 3288F FALL RISK ASSESSMENT DOCD: CPT | Mod: CPTII,S$GLB,, | Performed by: DERMATOLOGY

## 2023-05-22 PROCEDURE — 1126F PR PAIN SEVERITY QUANTIFIED, NO PAIN PRESENT: ICD-10-PCS | Mod: CPTII,S$GLB,, | Performed by: DERMATOLOGY

## 2023-05-22 PROCEDURE — 99204 PR OFFICE/OUTPT VISIT, NEW, LEVL IV, 45-59 MIN: ICD-10-PCS | Mod: S$GLB,,, | Performed by: DERMATOLOGY

## 2023-05-22 PROCEDURE — 3061F NEG MICROALBUMINURIA REV: CPT | Mod: CPTII,S$GLB,, | Performed by: DERMATOLOGY

## 2023-05-22 PROCEDURE — 3072F PR LOW RISK FOR RETINOPATHY: ICD-10-PCS | Mod: CPTII,S$GLB,, | Performed by: DERMATOLOGY

## 2023-05-22 PROCEDURE — 3288F PR FALLS RISK ASSESSMENT DOCUMENTED: ICD-10-PCS | Mod: CPTII,S$GLB,, | Performed by: DERMATOLOGY

## 2023-05-22 PROCEDURE — 1160F PR REVIEW ALL MEDS BY PRESCRIBER/CLIN PHARMACIST DOCUMENTED: ICD-10-PCS | Mod: CPTII,S$GLB,, | Performed by: DERMATOLOGY

## 2023-05-22 PROCEDURE — 1159F MED LIST DOCD IN RCRD: CPT | Mod: CPTII,S$GLB,, | Performed by: DERMATOLOGY

## 2023-05-22 PROCEDURE — 1160F RVW MEDS BY RX/DR IN RCRD: CPT | Mod: CPTII,S$GLB,, | Performed by: DERMATOLOGY

## 2023-05-22 PROCEDURE — 99214 OFFICE O/P EST MOD 30 MIN: CPT | Performed by: DERMATOLOGY

## 2023-05-22 PROCEDURE — 3061F PR NEG MICROALBUMINURIA RESULT DOCUMENTED/REVIEW: ICD-10-PCS | Mod: CPTII,S$GLB,, | Performed by: DERMATOLOGY

## 2023-05-22 PROCEDURE — 99999 PR PBB SHADOW E&M-EST. PATIENT-LVL IV: CPT | Mod: PBBFAC,,, | Performed by: DERMATOLOGY

## 2023-05-22 PROCEDURE — 3066F PR DOCUMENTATION OF TREATMENT FOR NEPHROPATHY: ICD-10-PCS | Mod: CPTII,S$GLB,, | Performed by: DERMATOLOGY

## 2023-05-22 PROCEDURE — 3072F LOW RISK FOR RETINOPATHY: CPT | Mod: CPTII,S$GLB,, | Performed by: DERMATOLOGY

## 2023-05-22 PROCEDURE — 1126F AMNT PAIN NOTED NONE PRSNT: CPT | Mod: CPTII,S$GLB,, | Performed by: DERMATOLOGY

## 2023-05-22 PROCEDURE — 3066F NEPHROPATHY DOC TX: CPT | Mod: CPTII,S$GLB,, | Performed by: DERMATOLOGY

## 2023-05-22 PROCEDURE — 1159F PR MEDICATION LIST DOCUMENTED IN MEDICAL RECORD: ICD-10-PCS | Mod: CPTII,S$GLB,, | Performed by: DERMATOLOGY

## 2023-05-22 PROCEDURE — 1101F PT FALLS ASSESS-DOCD LE1/YR: CPT | Mod: CPTII,S$GLB,, | Performed by: DERMATOLOGY

## 2023-05-22 PROCEDURE — 3044F HG A1C LEVEL LT 7.0%: CPT | Mod: CPTII,S$GLB,, | Performed by: DERMATOLOGY

## 2023-05-22 PROCEDURE — 99204 OFFICE O/P NEW MOD 45 MIN: CPT | Mod: S$GLB,,, | Performed by: DERMATOLOGY

## 2023-05-22 RX ORDER — FLUOCINONIDE TOPICAL SOLUTION USP, 0.05% 0.5 MG/ML
SOLUTION TOPICAL
Qty: 60 ML | Refills: 3 | Status: SHIPPED | OUTPATIENT
Start: 2023-05-22

## 2023-05-22 NOTE — PATIENT INSTRUCTIONS
Shampoos -   Recommend OTC medicated shampoo containing active ingredients of either selenium sulfide, zinc pyrithione, tar, salicylic acid, or ketoconazole. Patient should rotate the active ingredients to avoid building tolerance. It is recommended that patient wash hair at least 2 times per week and allow shampoo to sit on scalp at least 5 minutes prior to rinsing.      SEBORRHEIC KERATOSES        What causes seborrheic keratoses?    Seborrheic keratoses are harmless, common skin growths that first appear during adult life.  As time goes by, more growths appear.  Some persons have a very large number of them.  Seborrheic keratoses appear on both covered and uncovered parts of the body; they are not caused by sunlight.  The tendency to develop seborrheic keratoses is inherited.    Seborrheic keratoses are harmless and never become malignant.  They begin as slightly raised, light brown spots.  Gradually they thicken and take on a rough wartlike surface.  They slowly darken and may turn black.  These color changes are harmless.  Seborrheic keratoses are superficial and look as if they were stuck on the skin.  Persons who have had several seborrheic keratoses can usually recognize this type of benign growth.  However, if you are concerned or unsure about any growth, consult me.    Treatment    Seborrheic keratoses can easily be removed in the office.  The only reason for removing a seborrheic keratosis is your wish to get rid of it.

## 2023-05-22 NOTE — PROGRESS NOTES
Subjective:      Patient ID:  Chelly Ortiz is a 72 y.o. female who presents for   Chief Complaint   Patient presents with    Skin Check     Ubse    Itching     Scalp     Patient here for Upper Body Skin Exam    Last seen by dermatologist: 2/12/2020 Dr. Gambino    no - personal history of atypical moles removed  no - personal history of MM   no - family history of MM  no - childhood blistering sunburns  no - tanning bed use  no - personal history of NMSC    Patient with specific complaint of lesion(s)  Location: Face  Duration: >10 years  Symptoms: none  Relieving factors/Previous treatments: none    Washes hair weekly -itching improves after washing x few days      Itching - Initial  Affected locations: scalp  Duration: 10 years (or more)  Signs / symptoms: itching  Timing: intermittent  Treatments tried: OTC anti-itch shampoo.    Review of Systems   Skin:  Positive for itching and activity-related sunscreen use. Negative for rash, daily sunscreen use, recent sunburn and wears hat.   Hematologic/Lymphatic: Bruises/bleeds easily (on aspirin).     Objective:   Physical Exam   Constitutional: She appears well-developed and well-nourished. No distress.   Neurological: She is alert and oriented to person, place, and time. She is not disoriented.   Psychiatric: She has a normal mood and affect.   Skin:   Areas Examined (abnormalities noted in diagram):   Scalp / Hair Palpated and Inspected  Head / Face Inspection Performed  Neck Inspection Performed  Chest / Axilla Inspection Performed  Back Inspection Performed  RUE Inspected  LUE Inspection Performed               Diagram Legend     Erythematous scaling macule/papule c/w actinic keratosis       Vascular papule c/w angioma      Pigmented verrucoid papule/plaque c/w seborrheic keratosis      Yellow umbilicated papule c/w sebaceous hyperplasia      Irregularly shaped tan macule c/w lentigo     1-2 mm smooth white papules consistent with Milia      Movable subcutaneous cyst  with punctum c/w epidermal inclusion cyst      Subcutaneous movable cyst c/w pilar cyst      Firm pink to brown papule c/w dermatofibroma      Pedunculated fleshy papule(s) c/w skin tag(s)      Evenly pigmented macule c/w junctional nevus     Mildly variegated pigmented, slightly irregular-bordered macule c/w mildly atypical nevus      Flesh colored to evenly pigmented papule c/w intradermal nevus       Pink pearly papule/plaque c/w basal cell carcinoma      Erythematous hyperkeratotic cursted plaque c/w SCC      Surgical scar with no sign of skin cancer recurrence      Open and closed comedones      Inflammatory papules and pustules      Verrucoid papule consistent consistent with wart     Erythematous eczematous patches and plaques     Dystrophic onycholytic nail with subungual debris c/w onychomycosis     Umbilicated papule    Erythematous-base heme-crusted tan verrucoid plaque consistent with inflamed seborrheic keratosis     Erythematous Silvery Scaling Plaque c/w Psoriasis     See annotation      Assessment / Plan:        SK (seborrheic keratosis)  These are benign inherited growths without a malignant potential. Reassurance given to patient. No treatment is necessary.       Angioma of skin  This is a benign vascular lesion. Reassurance given. No treatment required.       Sebaceous gland hyperplasia  This is a common condition representing benign enlargement of the sebaceous lobule. It typically occurs in adulthood. Reassurance given to patient.       Benign nevus   - minor problem and chronic.   Reassurance given to patient. No treatment necessary.      Dilated pore of Denisha   - minor problem and chronic.   Reassurance given to patient. No treatment necessary.       Screening exam for skin cancer  Upper body skin examination performed today including at least 6 points as noted in physical examination. No lesions suspicious for malignancy noted.    Recommend daily sun protection/avoidance and use of at least  SPF 30, broad spectrum sunscreen (OTC drug).       Scalp pruritus  -     fluocinonide (LIDEX) 0.05 % external solution; AAA scalp qday - bid prn pruritus  Dispense: 60 mL; Refill: 3  Shampoos -   Recommend OTC medicated shampoo containing active ingredients of either selenium sulfide, zinc pyrithione, tar, salicylic acid, or ketoconazole. Patient should rotate the active ingredients to avoid building tolerance. It is recommended that patient wash hair at least 2 times per week and allow shampoo to sit on scalp at least 5 minutes prior to rinsing.             Follow up if symptoms worsen or fail to improve.

## 2023-05-24 ENCOUNTER — TELEPHONE (OUTPATIENT)
Dept: OPTOMETRY | Facility: CLINIC | Age: 73
End: 2023-05-24
Payer: MEDICARE

## 2023-05-24 NOTE — TELEPHONE ENCOUNTER
----- Message from Tila Diego sent at 5/24/2023  1:40 PM CDT -----  Contact: pt @ [phone]  Chelly Ortiz calling regarding Appointment Access  (message) for #pt is calling to get np appt for dry eye and itchy lids. Asking for call back

## 2023-05-26 ENCOUNTER — OFFICE VISIT (OUTPATIENT)
Dept: OPTOMETRY | Facility: CLINIC | Age: 73
End: 2023-05-26
Payer: MEDICARE

## 2023-05-26 DIAGNOSIS — H01.004 BLEPHARITIS OF UPPER EYELIDS OF BOTH EYES, UNSPECIFIED TYPE: ICD-10-CM

## 2023-05-26 DIAGNOSIS — H10.13 ALLERGIC CONJUNCTIVITIS OF BOTH EYES: ICD-10-CM

## 2023-05-26 DIAGNOSIS — H10.523 ANGULAR BLEPHAROCONJUNCTIVITIS OF BOTH EYES: Primary | ICD-10-CM

## 2023-05-26 DIAGNOSIS — H01.001 BLEPHARITIS OF UPPER EYELIDS OF BOTH EYES, UNSPECIFIED TYPE: ICD-10-CM

## 2023-05-26 DIAGNOSIS — H04.123 DRY EYE SYNDROME OF BOTH EYES: ICD-10-CM

## 2023-05-26 PROCEDURE — 99999 PR PBB SHADOW E&M-EST. PATIENT-LVL IV: ICD-10-PCS | Mod: PBBFAC,,, | Performed by: OPTOMETRIST

## 2023-05-26 PROCEDURE — 1160F PR REVIEW ALL MEDS BY PRESCRIBER/CLIN PHARMACIST DOCUMENTED: ICD-10-PCS | Mod: CPTII,S$GLB,, | Performed by: OPTOMETRIST

## 2023-05-26 PROCEDURE — 3288F FALL RISK ASSESSMENT DOCD: CPT | Mod: CPTII,S$GLB,, | Performed by: OPTOMETRIST

## 2023-05-26 PROCEDURE — 3044F PR MOST RECENT HEMOGLOBIN A1C LEVEL <7.0%: ICD-10-PCS | Mod: CPTII,S$GLB,, | Performed by: OPTOMETRIST

## 2023-05-26 PROCEDURE — 3061F NEG MICROALBUMINURIA REV: CPT | Mod: CPTII,S$GLB,, | Performed by: OPTOMETRIST

## 2023-05-26 PROCEDURE — 1126F AMNT PAIN NOTED NONE PRSNT: CPT | Mod: CPTII,S$GLB,, | Performed by: OPTOMETRIST

## 2023-05-26 PROCEDURE — 3061F PR NEG MICROALBUMINURIA RESULT DOCUMENTED/REVIEW: ICD-10-PCS | Mod: CPTII,S$GLB,, | Performed by: OPTOMETRIST

## 2023-05-26 PROCEDURE — 3066F NEPHROPATHY DOC TX: CPT | Mod: CPTII,S$GLB,, | Performed by: OPTOMETRIST

## 2023-05-26 PROCEDURE — 3288F PR FALLS RISK ASSESSMENT DOCUMENTED: ICD-10-PCS | Mod: CPTII,S$GLB,, | Performed by: OPTOMETRIST

## 2023-05-26 PROCEDURE — 3066F PR DOCUMENTATION OF TREATMENT FOR NEPHROPATHY: ICD-10-PCS | Mod: CPTII,S$GLB,, | Performed by: OPTOMETRIST

## 2023-05-26 PROCEDURE — 3044F HG A1C LEVEL LT 7.0%: CPT | Mod: CPTII,S$GLB,, | Performed by: OPTOMETRIST

## 2023-05-26 PROCEDURE — 1159F MED LIST DOCD IN RCRD: CPT | Mod: CPTII,S$GLB,, | Performed by: OPTOMETRIST

## 2023-05-26 PROCEDURE — 99999 PR PBB SHADOW E&M-EST. PATIENT-LVL IV: CPT | Mod: PBBFAC,,, | Performed by: OPTOMETRIST

## 2023-05-26 PROCEDURE — 1159F PR MEDICATION LIST DOCUMENTED IN MEDICAL RECORD: ICD-10-PCS | Mod: CPTII,S$GLB,, | Performed by: OPTOMETRIST

## 2023-05-26 PROCEDURE — 1160F RVW MEDS BY RX/DR IN RCRD: CPT | Mod: CPTII,S$GLB,, | Performed by: OPTOMETRIST

## 2023-05-26 PROCEDURE — 1101F PR PT FALLS ASSESS DOC 0-1 FALLS W/OUT INJ PAST YR: ICD-10-PCS | Mod: CPTII,S$GLB,, | Performed by: OPTOMETRIST

## 2023-05-26 PROCEDURE — 99204 OFFICE O/P NEW MOD 45 MIN: CPT | Mod: S$GLB,,, | Performed by: OPTOMETRIST

## 2023-05-26 PROCEDURE — 99204 PR OFFICE/OUTPT VISIT, NEW, LEVL IV, 45-59 MIN: ICD-10-PCS | Mod: S$GLB,,, | Performed by: OPTOMETRIST

## 2023-05-26 PROCEDURE — 1126F PR PAIN SEVERITY QUANTIFIED, NO PAIN PRESENT: ICD-10-PCS | Mod: CPTII,S$GLB,, | Performed by: OPTOMETRIST

## 2023-05-26 PROCEDURE — 1101F PT FALLS ASSESS-DOCD LE1/YR: CPT | Mod: CPTII,S$GLB,, | Performed by: OPTOMETRIST

## 2023-05-26 RX ORDER — NEOMYCIN SULFATE, POLYMYXIN B SULFATE AND DEXAMETHASONE 3.5; 10000; 1 MG/ML; [USP'U]/ML; MG/ML
1 SUSPENSION/ DROPS OPHTHALMIC 4 TIMES DAILY
Qty: 5 ML | Refills: 1 | Status: SHIPPED | OUTPATIENT
Start: 2023-05-26 | End: 2023-06-08

## 2023-05-26 NOTE — PROGRESS NOTES
HPI    IMTIAZ: 08/10/2021   Chief complaint (CC): pt states for about 1 half both eye's are tearing   and itching all day, with mucus threw out the day and also has flakes in   the morning and thru out the day pt states she has been using Allergy   drops but the not working  Glasses? + OTC +3.00  Contacts? -  H/o eye surgery, injections or laser: Cataract OU   H/o eye injury: -  Known eye conditions? See above   Family h/o eye conditions? -  Eye gtts? Similasan Allergy drops OTC PRN       (-) Flashes (-)  Floaters (+) Mucous   (+)  Tearing (+) Itching (+) Burning   (-) Headaches (-) Eye Pain/discomfort (-) Irritation   (-)  Redness (-) Double vision (-) Blurry vision    Diabetic? +  A1c? -       Last edited by Jennifer Owen MA on 5/26/2023  3:10 PM.            Assessment /Plan     For exam results, see Encounter Report.    Angular blepharoconjunctivitis of both eyes  -     neomycin-polymyxin-dexamethasone (MAXITROL) 3.5mg/mL-10,000 unit/mL-0.1 % DrpS; Place 1 drop into both eyes 4 (four) times daily. for 10 days  Dispense: 5 mL; Refill: 1    Allergic conjunctivitis of both eyes  -     neomycin-polymyxin-dexamethasone (MAXITROL) 3.5mg/mL-10,000 unit/mL-0.1 % DrpS; Place 1 drop into both eyes 4 (four) times daily. for 10 days  Dispense: 5 mL; Refill: 1    Blepharitis of upper eyelids of both eyes, unspecified type    Dry eye syndrome of both eyes      Cont lid scrubs BID OU.   Rx Maxitrol QiD OU x 10 days.   Recommend Systane Ultra or Refresh Optive BID-TID OU to aid with symptoms of dry eyes.  RTC STAT if s/sx get worse.

## 2023-06-08 ENCOUNTER — OFFICE VISIT (OUTPATIENT)
Dept: INTERNAL MEDICINE | Facility: CLINIC | Age: 73
End: 2023-06-08
Payer: MEDICARE

## 2023-06-08 ENCOUNTER — PES CALL (OUTPATIENT)
Dept: ADMINISTRATIVE | Facility: CLINIC | Age: 73
End: 2023-06-08
Payer: MEDICARE

## 2023-06-08 ENCOUNTER — HOSPITAL ENCOUNTER (OUTPATIENT)
Dept: RADIOLOGY | Facility: HOSPITAL | Age: 73
Discharge: HOME | End: 2023-06-08
Attending: INTERNAL MEDICINE
Payer: MEDICARE

## 2023-06-08 VITALS
HEIGHT: 63 IN | WEIGHT: 193.13 LBS | DIASTOLIC BLOOD PRESSURE: 79 MMHG | SYSTOLIC BLOOD PRESSURE: 120 MMHG | HEART RATE: 101 BPM | OXYGEN SATURATION: 96 % | BODY MASS INDEX: 34.22 KG/M2

## 2023-06-08 DIAGNOSIS — E11.65 TYPE 2 DIABETES MELLITUS WITH HYPERGLYCEMIA, WITH LONG-TERM CURRENT USE OF INSULIN: ICD-10-CM

## 2023-06-08 DIAGNOSIS — M54.6 RIGHT-SIDED THORACIC BACK PAIN, UNSPECIFIED CHRONICITY: ICD-10-CM

## 2023-06-08 DIAGNOSIS — Z79.4 TYPE 2 DIABETES MELLITUS WITH HYPERGLYCEMIA, WITH LONG-TERM CURRENT USE OF INSULIN: ICD-10-CM

## 2023-06-08 DIAGNOSIS — Z76.89 ENCOUNTER TO ESTABLISH CARE: Primary | ICD-10-CM

## 2023-06-08 DIAGNOSIS — I10 ESSENTIAL HYPERTENSION: ICD-10-CM

## 2023-06-08 DIAGNOSIS — E78.5 HYPERLIPIDEMIA, UNSPECIFIED HYPERLIPIDEMIA TYPE: ICD-10-CM

## 2023-06-08 PROCEDURE — 3072F LOW RISK FOR RETINOPATHY: CPT | Mod: CPTII,S$GLB,, | Performed by: INTERNAL MEDICINE

## 2023-06-08 PROCEDURE — 1126F PR PAIN SEVERITY QUANTIFIED, NO PAIN PRESENT: ICD-10-PCS | Mod: CPTII,S$GLB,, | Performed by: INTERNAL MEDICINE

## 2023-06-08 PROCEDURE — 3008F BODY MASS INDEX DOCD: CPT | Mod: CPTII,S$GLB,, | Performed by: INTERNAL MEDICINE

## 2023-06-08 PROCEDURE — 3078F DIAST BP <80 MM HG: CPT | Mod: CPTII,S$GLB,, | Performed by: INTERNAL MEDICINE

## 2023-06-08 PROCEDURE — 3066F PR DOCUMENTATION OF TREATMENT FOR NEPHROPATHY: ICD-10-PCS | Mod: CPTII,S$GLB,, | Performed by: INTERNAL MEDICINE

## 2023-06-08 PROCEDURE — 72070 XR THORACIC SPINE AP LATERAL: ICD-10-PCS | Mod: 26,,, | Performed by: RADIOLOGY

## 2023-06-08 PROCEDURE — 3061F PR NEG MICROALBUMINURIA RESULT DOCUMENTED/REVIEW: ICD-10-PCS | Mod: CPTII,S$GLB,, | Performed by: INTERNAL MEDICINE

## 2023-06-08 PROCEDURE — 72070 X-RAY EXAM THORAC SPINE 2VWS: CPT | Mod: TC

## 2023-06-08 PROCEDURE — 1101F PT FALLS ASSESS-DOCD LE1/YR: CPT | Mod: CPTII,S$GLB,, | Performed by: INTERNAL MEDICINE

## 2023-06-08 PROCEDURE — 3288F FALL RISK ASSESSMENT DOCD: CPT | Mod: CPTII,S$GLB,, | Performed by: INTERNAL MEDICINE

## 2023-06-08 PROCEDURE — 3066F NEPHROPATHY DOC TX: CPT | Mod: CPTII,S$GLB,, | Performed by: INTERNAL MEDICINE

## 2023-06-08 PROCEDURE — 99214 OFFICE O/P EST MOD 30 MIN: CPT | Mod: S$GLB,,, | Performed by: INTERNAL MEDICINE

## 2023-06-08 PROCEDURE — 3044F HG A1C LEVEL LT 7.0%: CPT | Mod: CPTII,S$GLB,, | Performed by: INTERNAL MEDICINE

## 2023-06-08 PROCEDURE — 1126F AMNT PAIN NOTED NONE PRSNT: CPT | Mod: CPTII,S$GLB,, | Performed by: INTERNAL MEDICINE

## 2023-06-08 PROCEDURE — 72070 X-RAY EXAM THORAC SPINE 2VWS: CPT | Mod: 26,,, | Performed by: RADIOLOGY

## 2023-06-08 PROCEDURE — 1101F PR PT FALLS ASSESS DOC 0-1 FALLS W/OUT INJ PAST YR: ICD-10-PCS | Mod: CPTII,S$GLB,, | Performed by: INTERNAL MEDICINE

## 2023-06-08 PROCEDURE — 3288F PR FALLS RISK ASSESSMENT DOCUMENTED: ICD-10-PCS | Mod: CPTII,S$GLB,, | Performed by: INTERNAL MEDICINE

## 2023-06-08 PROCEDURE — 99999 PR PBB SHADOW E&M-EST. PATIENT-LVL III: ICD-10-PCS | Mod: PBBFAC,,, | Performed by: INTERNAL MEDICINE

## 2023-06-08 PROCEDURE — 3074F PR MOST RECENT SYSTOLIC BLOOD PRESSURE < 130 MM HG: ICD-10-PCS | Mod: CPTII,S$GLB,, | Performed by: INTERNAL MEDICINE

## 2023-06-08 PROCEDURE — 3074F SYST BP LT 130 MM HG: CPT | Mod: CPTII,S$GLB,, | Performed by: INTERNAL MEDICINE

## 2023-06-08 PROCEDURE — 3044F PR MOST RECENT HEMOGLOBIN A1C LEVEL <7.0%: ICD-10-PCS | Mod: CPTII,S$GLB,, | Performed by: INTERNAL MEDICINE

## 2023-06-08 PROCEDURE — 3078F PR MOST RECENT DIASTOLIC BLOOD PRESSURE < 80 MM HG: ICD-10-PCS | Mod: CPTII,S$GLB,, | Performed by: INTERNAL MEDICINE

## 2023-06-08 PROCEDURE — 3061F NEG MICROALBUMINURIA REV: CPT | Mod: CPTII,S$GLB,, | Performed by: INTERNAL MEDICINE

## 2023-06-08 PROCEDURE — 3072F PR LOW RISK FOR RETINOPATHY: ICD-10-PCS | Mod: CPTII,S$GLB,, | Performed by: INTERNAL MEDICINE

## 2023-06-08 PROCEDURE — 99214 PR OFFICE/OUTPT VISIT, EST, LEVL IV, 30-39 MIN: ICD-10-PCS | Mod: S$GLB,,, | Performed by: INTERNAL MEDICINE

## 2023-06-08 PROCEDURE — 99999 PR PBB SHADOW E&M-EST. PATIENT-LVL III: CPT | Mod: PBBFAC,,, | Performed by: INTERNAL MEDICINE

## 2023-06-08 PROCEDURE — 3008F PR BODY MASS INDEX (BMI) DOCUMENTED: ICD-10-PCS | Mod: CPTII,S$GLB,, | Performed by: INTERNAL MEDICINE

## 2023-06-08 RX ORDER — TIZANIDINE 2 MG/1
2 TABLET ORAL DAILY PRN
Qty: 20 TABLET | Refills: 1 | Status: SHIPPED | OUTPATIENT
Start: 2023-06-08 | End: 2023-07-18

## 2023-06-08 NOTE — PROGRESS NOTES
Subjective:       Patient ID: Chelly Ortiz is a 72 y.o. female.    Chief Complaint: Establish Care    HPI      Presents to establish care.       HAs been having right thoracic back pain. Follows with pain management.     PMHx    DMII on insulin and trulicity     HTN on amlodipine     HLD on statn     Insomnia on trazodone     Health Maintenance:  Colon Cancer Screening: colonoscopy in  with polyps removed due again in   Mammogram: 2023 was normal   Hep C:negative    Lipids: normal    Vaccines:  up to date      Review of Systems   Constitutional:  Negative for activity change, appetite change and chills.   HENT:  Negative for ear pain, sinus pressure/congestion and sneezing.    Respiratory:  Negative for cough and shortness of breath.    Cardiovascular:  Negative for chest pain, palpitations and leg swelling.   Gastrointestinal:  Negative for abdominal distention, abdominal pain, constipation, diarrhea, nausea and vomiting.   Genitourinary:  Negative for dysuria and hematuria.   Musculoskeletal:  Negative for arthralgias, back pain and myalgias.   Neurological:  Negative for dizziness and headaches.   Psychiatric/Behavioral:  Negative for agitation. The patient is not nervous/anxious.          Past Medical History:   Diagnosis Date    Allergy     DDD (degenerative disc disease), lumbar     Diabetes mellitus     diet controlled    Diabetes mellitus, type 2     GERD (gastroesophageal reflux disease)     History of subconjunctival hemorrhage 2011    left eye    Hyperlipidemia     Hypertension     IBS (irritable bowel syndrome)     Obesity     MYRIAM (obstructive sleep apnea)     on CPAP    Rotator cuff impingement syndrome     Seasonal allergic conjunctivitis      Past Surgical History:   Procedure Laterality Date    CATARACT EXTRACTION W/  INTRAOCULAR LENS IMPLANT Right 10/03/2013        CATARACT EXTRACTION W/  INTRAOCULAR LENS IMPLANT Left 2022          SECTION      x2    CHOLECYSTECTOMY      COLONOSCOPY N/A 12/05/2018    Procedure: COLONOSCOPY;  Surgeon: Marie Mejia MD;  Location: [facility] ENDO;  Service: Endoscopy;  Laterality: N/A;    COLONOSCOPY N/A 06/07/2022    Procedure: COLONOSCOPY;  Surgeon: Pierce Rivera MD;  Location: [facility] ENDO;  Service: Endoscopy;  Laterality: N/A;    ENDOSCOPIC ULTRASOUND OF UPPER GASTROINTESTINAL TRACT N/A 10/09/2018    Procedure: ULTRASOUND, UPPER GI TRACT, ENDOSCOPIC;  Surgeon: Javy Simpson MD;  Location: [facility] ENDO;  Service: Endoscopy;  Laterality: N/A;    EXCISION OF LESION N/A 02/13/2019    Procedure: EXCISION, LESION VULVA;  Surgeon: Liv Odell MD;  Location: [facility] OR;  Service: OB/GYN;  Laterality: N/A;  latex allergy    EYE SURGERY      HYSTERECTOMY      ovaries intact, due to DUB    INTRAOCULAR PROSTHESES INSERTION Left 11/2/2022    Procedure: INSERTION, IOL PROSTHESIS;  Surgeon: Clarice Herzog MD;  Location: [facility] OR [address] [place] [address];  Service: Ophthalmology;  Laterality: Left;    PHACOEMULSIFICATION OF CATARACT Left 11/2/2022    Procedure: PHACOEMULSIFICATION, CATARACT;  Surgeon: Clarice Herzog MD;  Location: [facility] OR [address] [place] [address];  Service: Ophthalmology;  Laterality: Left;    TUBAL LIGATION        Patient Active Problem List   Diagnosis    GERD (gastroesophageal reflux disease)    MYRIAM (obstructive sleep apnea)    IBS (irritable bowel syndrome)    DDD (degenerative disc disease), lumbar    Insomnia    Anxiety    Hearing loss, sensorineural    Nuclear sclerotic cataract of left eye    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    Constipation    History of colon polyps    Essential hypertension    Hyperlipidemia, unspecified    Spondylosis of cervical region without myelopathy or radiculopathy    Cervical myofascial pain syndrome    PCO (posterior capsular opacification), right    Dry eye syndrome    Pseudophakia    Decreased range of motion of lumbar spine    Decreased strength    Dilated bile duct    Personal  history of colonic polyps    EIC (epidermal inclusion cyst)    Sedentary lifestyle    Dysphagia    Abdominal aortic atherosclerosis    Decreased ROM of neck    Arthritis of multiple sites    Osteopenia of multiple sites    Combined form of age-related cataract, left eye    Adjustment disorder with anxious mood    Other forms of angina pectoris        Objective:      Physical Exam  Constitutional:       Appearance: Normal appearance.   HENT:      Head: Normocephalic.      Right Ear: Tympanic membrane normal.      Left Ear: Tympanic membrane normal.      Nose: Nose normal.   Cardiovascular:      Rate and Rhythm: Normal rate and regular rhythm.      Pulses: Normal pulses.      Heart sounds: Normal heart sounds.   Pulmonary:      Effort: Pulmonary effort is normal.      Breath sounds: Normal breath sounds.   Abdominal:      General: Abdomen is flat. Bowel sounds are normal.      Palpations: Abdomen is soft.   Musculoskeletal:         General: Normal range of motion.      Cervical back: Normal range of motion and neck supple.   Skin:     General: Skin is warm and dry.   Neurological:      General: No focal deficit present.      Mental Status: She is alert and oriented to person, place, and time.   Psychiatric:         Mood and Affect: Mood normal.       Assessment:       Problem List Items Addressed This Visit          Cardiac/Vascular    Hyperlipidemia, unspecified    Essential hypertension     Other Visit Diagnoses       Encounter to establish care    -  Primary    Right-sided thoracic back pain, unspecified chronicity        Relevant Orders    X-Ray Thoracic Spine AP Lateral (Completed)    Type 2 diabetes mellitus with hyperglycemia, with long-term current use of insulin                Plan:         Chelly was seen today for establish care.    Diagnoses and all orders for this visit:    Encounter to establish care  Colon Cancer Screening: colonoscopy in 2022 with polyps removed due again in 2027  Mammogram: January  2023 was normal   Hep C:negative 2007   Lipids: normal 2023   Vaccines:  up to date    Right-sided thoracic back pain, unspecified chronicity  -     X-Ray Thoracic Spine AP Lateral; Future  -     tiZANidine (ZANAFLEX) 2 MG tablet; Take 1 tablet (2 mg total) by mouth daily as needed (for muscle pain in back).    Essential hypertension  Well controlled on current meds     Type 2 diabetes mellitus with hyperglycemia, with long-term current use of insulin  Well controlled on insulin and trulicity     Hyperlipidemia, unspecified hyperlipidemia type  Continue statin                      Smitha Turner MD   Internal Medicine   Primary Care

## 2023-06-09 ENCOUNTER — PATIENT MESSAGE (OUTPATIENT)
Dept: INTERNAL MEDICINE | Facility: CLINIC | Age: 73
End: 2023-06-09
Payer: MEDICARE

## 2023-06-23 ENCOUNTER — TELEPHONE (OUTPATIENT)
Dept: GASTROENTEROLOGY | Facility: CLINIC | Age: 73
End: 2023-06-23
Payer: MEDICARE

## 2023-06-23 NOTE — TELEPHONE ENCOUNTER
Let patient know we're sending  a message. Advice patient to continue follow up.        ----- Message from Jessenia Rinaldi sent at 6/23/2023 11:24 AM CDT -----  Type:  Needs Medical Advice    Who Called: Pt   Symptoms (please be specific): stomach pain    How long has patient had these symptoms:  few weeks   Pharmacy name and phone #:    Would the patient rather a call back or a response via [facility]? Callback   Best Call Back Number: [phone]  Additional Information:

## 2023-07-07 ENCOUNTER — OFFICE VISIT (OUTPATIENT)
Dept: GASTROENTEROLOGY | Facility: CLINIC | Age: 73
End: 2023-07-07
Payer: MEDICARE

## 2023-07-07 VITALS — HEIGHT: 63 IN | BODY MASS INDEX: 34.3 KG/M2 | WEIGHT: 193.56 LBS

## 2023-07-07 DIAGNOSIS — K59.04 CHRONIC IDIOPATHIC CONSTIPATION: Primary | ICD-10-CM

## 2023-07-07 PROCEDURE — 3072F PR LOW RISK FOR RETINOPATHY: ICD-10-PCS | Mod: CPTII,S$GLB,, | Performed by: INTERNAL MEDICINE

## 2023-07-07 PROCEDURE — 3008F BODY MASS INDEX DOCD: CPT | Mod: CPTII,S$GLB,, | Performed by: INTERNAL MEDICINE

## 2023-07-07 PROCEDURE — 3044F PR MOST RECENT HEMOGLOBIN A1C LEVEL <7.0%: ICD-10-PCS | Mod: CPTII,S$GLB,, | Performed by: INTERNAL MEDICINE

## 2023-07-07 PROCEDURE — 3066F PR DOCUMENTATION OF TREATMENT FOR NEPHROPATHY: ICD-10-PCS | Mod: CPTII,S$GLB,, | Performed by: INTERNAL MEDICINE

## 2023-07-07 PROCEDURE — 3288F PR FALLS RISK ASSESSMENT DOCUMENTED: ICD-10-PCS | Mod: CPTII,S$GLB,, | Performed by: INTERNAL MEDICINE

## 2023-07-07 PROCEDURE — 3061F NEG MICROALBUMINURIA REV: CPT | Mod: CPTII,S$GLB,, | Performed by: INTERNAL MEDICINE

## 2023-07-07 PROCEDURE — 1101F PR PT FALLS ASSESS DOC 0-1 FALLS W/OUT INJ PAST YR: ICD-10-PCS | Mod: CPTII,S$GLB,, | Performed by: INTERNAL MEDICINE

## 2023-07-07 PROCEDURE — 1101F PT FALLS ASSESS-DOCD LE1/YR: CPT | Mod: CPTII,S$GLB,, | Performed by: INTERNAL MEDICINE

## 2023-07-07 PROCEDURE — 1126F AMNT PAIN NOTED NONE PRSNT: CPT | Mod: CPTII,S$GLB,, | Performed by: INTERNAL MEDICINE

## 2023-07-07 PROCEDURE — 1159F PR MEDICATION LIST DOCUMENTED IN MEDICAL RECORD: ICD-10-PCS | Mod: CPTII,S$GLB,, | Performed by: INTERNAL MEDICINE

## 2023-07-07 PROCEDURE — 99999 PR PBB SHADOW E&M-EST. PATIENT-LVL IV: ICD-10-PCS | Mod: PBBFAC,,, | Performed by: INTERNAL MEDICINE

## 2023-07-07 PROCEDURE — 1159F MED LIST DOCD IN RCRD: CPT | Mod: CPTII,S$GLB,, | Performed by: INTERNAL MEDICINE

## 2023-07-07 PROCEDURE — 3288F FALL RISK ASSESSMENT DOCD: CPT | Mod: CPTII,S$GLB,, | Performed by: INTERNAL MEDICINE

## 2023-07-07 PROCEDURE — 1126F PR PAIN SEVERITY QUANTIFIED, NO PAIN PRESENT: ICD-10-PCS | Mod: CPTII,S$GLB,, | Performed by: INTERNAL MEDICINE

## 2023-07-07 PROCEDURE — 99214 OFFICE O/P EST MOD 30 MIN: CPT | Mod: S$GLB,,, | Performed by: INTERNAL MEDICINE

## 2023-07-07 PROCEDURE — 99999 PR PBB SHADOW E&M-EST. PATIENT-LVL IV: CPT | Mod: PBBFAC,,, | Performed by: INTERNAL MEDICINE

## 2023-07-07 PROCEDURE — 3072F LOW RISK FOR RETINOPATHY: CPT | Mod: CPTII,S$GLB,, | Performed by: INTERNAL MEDICINE

## 2023-07-07 PROCEDURE — 3066F NEPHROPATHY DOC TX: CPT | Mod: CPTII,S$GLB,, | Performed by: INTERNAL MEDICINE

## 2023-07-07 PROCEDURE — 3061F PR NEG MICROALBUMINURIA RESULT DOCUMENTED/REVIEW: ICD-10-PCS | Mod: CPTII,S$GLB,, | Performed by: INTERNAL MEDICINE

## 2023-07-07 PROCEDURE — 3044F HG A1C LEVEL LT 7.0%: CPT | Mod: CPTII,S$GLB,, | Performed by: INTERNAL MEDICINE

## 2023-07-07 PROCEDURE — 99214 PR OFFICE/OUTPT VISIT, EST, LEVL IV, 30-39 MIN: ICD-10-PCS | Mod: S$GLB,,, | Performed by: INTERNAL MEDICINE

## 2023-07-07 PROCEDURE — 3008F PR BODY MASS INDEX (BMI) DOCUMENTED: ICD-10-PCS | Mod: CPTII,S$GLB,, | Performed by: INTERNAL MEDICINE

## 2023-07-07 NOTE — PROGRESS NOTES
Subjective:       Patient ID: Chelly Ortiz is a 72 y.o. female.    Chief Complaint: Abdominal Pain and Constipation    Patient here today to reestablish care with aforementioned complaints.  She was previously seen by me in April of 2022 after episode of diverticulitis.  Colonoscopy was performed following May significant only for a small removed from the cecum as well as diverticulosis.  A 5 year recall interval was recommended.  Today patient reports    Constipation. Previously responded to miralax but doesn't seem to work as well. Without it will have very infrequent bm, only with straining. With it will have bm every 3-4 days. Diet high in fiber and drinks adequate water.     Review of Systems   Gastrointestinal:  Positive for abdominal distention and constipation.       The following portions of the patient's history were reviewed and updated as appropriate: allergies, current medications, past family history, past medical history, past social history, past surgical history and problem list.    Objective:      Physical Exam  Constitutional:       Appearance: She is well-developed.   HENT:      Head: Normocephalic and atraumatic.   Eyes:      Conjunctiva/sclera: Conjunctivae normal.   Pulmonary:      Effort: Pulmonary effort is normal. No respiratory distress.   Musculoskeletal:      Cervical back: Normal range of motion.   Neurological:      Mental Status: She is alert and oriented to person, place, and time.   Psychiatric:         Behavior: Behavior normal.         Thought Content: Thought content normal.         Judgment: Judgment normal.         Pertinent labs and imaging studies reviewed    Assessment:       1. Chronic idiopathic constipation        Plan:       Start low-dose Linzess    (Portions of this note were dictated using voice recognition software and may contain dictation related errors in spelling/grammar/syntax not found on text review)

## 2023-07-14 ENCOUNTER — TELEPHONE (OUTPATIENT)
Dept: OPHTHALMOLOGY | Facility: CLINIC | Age: 73
End: 2023-07-14
Payer: MEDICARE

## 2023-07-14 NOTE — TELEPHONE ENCOUNTER
----- Message from Tila Diego sent at 7/14/2023  2:50 PM CDT -----  Contact: pt @ [phone]  Chelly Ortiz calling regarding Same Day Appointment  (message) for #pt is calling to be seen today for red itchy swollen right eye and feel like something is in it. Asking for call back

## 2023-07-18 ENCOUNTER — PATIENT MESSAGE (OUTPATIENT)
Dept: INTERNAL MEDICINE | Facility: CLINIC | Age: 73
End: 2023-07-18
Payer: MEDICARE

## 2023-07-20 ENCOUNTER — PATIENT MESSAGE (OUTPATIENT)
Dept: INTERNAL MEDICINE | Facility: CLINIC | Age: 73
End: 2023-07-20
Payer: MEDICARE

## 2023-07-20 NOTE — TELEPHONE ENCOUNTER
Yes would definitely recommend that she take the citalopram.  The tizanidine is a muscle relaxant to be used sparingly, only as needed.  The citalopram is meant for every day use so I recommend that she continue with that and try to do without the muscle relaxant.  If the back pain or muscle spasm continues to be a problem, there may be other options her PCP can consider

## 2023-07-25 ENCOUNTER — TELEPHONE (OUTPATIENT)
Dept: ENDOCRINOLOGY | Facility: CLINIC | Age: 73
End: 2023-07-25
Payer: MEDICARE

## 2023-07-25 DIAGNOSIS — Z79.4 CONTROLLED TYPE 2 DIABETES MELLITUS WITHOUT COMPLICATION, WITH LONG-TERM CURRENT USE OF INSULIN: Primary | ICD-10-CM

## 2023-07-25 DIAGNOSIS — E11.9 CONTROLLED TYPE 2 DIABETES MELLITUS WITHOUT COMPLICATION, WITH LONG-TERM CURRENT USE OF INSULIN: ICD-10-CM

## 2023-07-25 DIAGNOSIS — E11.9 CONTROLLED TYPE 2 DIABETES MELLITUS WITHOUT COMPLICATION, WITH LONG-TERM CURRENT USE OF INSULIN: Primary | ICD-10-CM

## 2023-07-25 DIAGNOSIS — E11.65 UNCONTROLLED TYPE 2 DIABETES MELLITUS WITH HYPERGLYCEMIA: ICD-10-CM

## 2023-07-25 DIAGNOSIS — Z79.4 CONTROLLED TYPE 2 DIABETES MELLITUS WITHOUT COMPLICATION, WITH LONG-TERM CURRENT USE OF INSULIN: ICD-10-CM

## 2023-07-25 RX ORDER — INSULIN ASPART 100 [IU]/ML
10 INJECTION, SOLUTION INTRAVENOUS; SUBCUTANEOUS
Qty: 60 ML | Refills: 11 | Status: SHIPPED | OUTPATIENT
Start: 2023-07-25 | End: 2024-07-24

## 2023-07-25 RX ORDER — BLOOD SUGAR DIAGNOSTIC
STRIP MISCELLANEOUS
Qty: 500 EACH | Refills: 4 | Status: SHIPPED | OUTPATIENT
Start: 2023-07-25 | End: 2023-07-25 | Stop reason: SDUPTHER

## 2023-07-25 RX ORDER — INSULIN DETEMIR 100 [IU]/ML
40 INJECTION, SOLUTION SUBCUTANEOUS NIGHTLY
Qty: 50 ML | Refills: 11 | Status: SHIPPED | OUTPATIENT
Start: 2023-07-25 | End: 2024-02-20

## 2023-07-25 RX ORDER — BLOOD SUGAR DIAGNOSTIC
STRIP MISCELLANEOUS
Qty: 500 EACH | Refills: 4 | Status: SHIPPED | OUTPATIENT
Start: 2023-07-25

## 2023-07-25 NOTE — TELEPHONE ENCOUNTER
----- Message from Mary Pink MA sent at 7/25/2023  2:41 PM CDT -----  Regarding: FW: [employer] [address] [place] Patient Assistance Program    ----- Message -----  From: Zoran Ortega  Sent: 7/25/2023  12:20 PM CDT  To: Rianna Bell Staff  Subject: [employer] [address] [place] Patient Assista#    [facility] has received medication from [facility] patient assistance program for your patient Chelly Ortiz ([id number]).  At your earliest convenience please send to [facility] escript for.  [employer] [address] [employer] (qty [phone])  Thanks

## 2023-08-01 ENCOUNTER — TELEPHONE (OUTPATIENT)
Dept: ADMINISTRATIVE | Facility: CLINIC | Age: 73
End: 2023-08-01
Payer: MEDICARE

## 2023-08-03 ENCOUNTER — OFFICE VISIT (OUTPATIENT)
Dept: INTERNAL MEDICINE | Facility: CLINIC | Age: 73
End: 2023-08-03
Payer: MEDICARE

## 2023-08-03 VITALS
RESPIRATION RATE: 18 BRPM | OXYGEN SATURATION: 97 % | DIASTOLIC BLOOD PRESSURE: 70 MMHG | BODY MASS INDEX: 34.22 KG/M2 | SYSTOLIC BLOOD PRESSURE: 120 MMHG | HEART RATE: 83 BPM | HEIGHT: 63 IN | WEIGHT: 193.13 LBS

## 2023-08-03 DIAGNOSIS — I20.89 OTHER FORMS OF ANGINA PECTORIS: ICD-10-CM

## 2023-08-03 DIAGNOSIS — I70.0 ABDOMINAL AORTIC ATHEROSCLEROSIS: ICD-10-CM

## 2023-08-03 DIAGNOSIS — Z00.00 ENCOUNTER FOR PREVENTIVE HEALTH EXAMINATION: Primary | ICD-10-CM

## 2023-08-03 DIAGNOSIS — Z00.00 ENCOUNTER FOR MEDICARE ANNUAL WELLNESS EXAM: ICD-10-CM

## 2023-08-03 DIAGNOSIS — E11.65 POORLY CONTROLLED DIABETES MELLITUS: ICD-10-CM

## 2023-08-03 DIAGNOSIS — E78.2 MIXED HYPERLIPIDEMIA: ICD-10-CM

## 2023-08-03 DIAGNOSIS — I10 ESSENTIAL HYPERTENSION: ICD-10-CM

## 2023-08-03 DIAGNOSIS — E66.9 OBESITY (BMI 30.0-34.9): ICD-10-CM

## 2023-08-03 PROCEDURE — 3008F BODY MASS INDEX DOCD: CPT | Mod: CPTII,S$GLB,, | Performed by: NURSE PRACTITIONER

## 2023-08-03 PROCEDURE — 3072F PR LOW RISK FOR RETINOPATHY: ICD-10-PCS | Mod: CPTII,S$GLB,, | Performed by: NURSE PRACTITIONER

## 2023-08-03 PROCEDURE — 1126F AMNT PAIN NOTED NONE PRSNT: CPT | Mod: CPTII,S$GLB,, | Performed by: NURSE PRACTITIONER

## 2023-08-03 PROCEDURE — 1101F PT FALLS ASSESS-DOCD LE1/YR: CPT | Mod: CPTII,S$GLB,, | Performed by: NURSE PRACTITIONER

## 2023-08-03 PROCEDURE — 3288F PR FALLS RISK ASSESSMENT DOCUMENTED: ICD-10-PCS | Mod: CPTII,S$GLB,, | Performed by: NURSE PRACTITIONER

## 2023-08-03 PROCEDURE — 1160F PR REVIEW ALL MEDS BY PRESCRIBER/CLIN PHARMACIST DOCUMENTED: ICD-10-PCS | Mod: CPTII,S$GLB,, | Performed by: NURSE PRACTITIONER

## 2023-08-03 PROCEDURE — 3044F PR MOST RECENT HEMOGLOBIN A1C LEVEL <7.0%: ICD-10-PCS | Mod: CPTII,S$GLB,, | Performed by: NURSE PRACTITIONER

## 2023-08-03 PROCEDURE — 3074F SYST BP LT 130 MM HG: CPT | Mod: CPTII,S$GLB,, | Performed by: NURSE PRACTITIONER

## 2023-08-03 PROCEDURE — 3078F DIAST BP <80 MM HG: CPT | Mod: CPTII,S$GLB,, | Performed by: NURSE PRACTITIONER

## 2023-08-03 PROCEDURE — 1126F PR PAIN SEVERITY QUANTIFIED, NO PAIN PRESENT: ICD-10-PCS | Mod: CPTII,S$GLB,, | Performed by: NURSE PRACTITIONER

## 2023-08-03 PROCEDURE — G0439 PPPS, SUBSEQ VISIT: HCPCS | Mod: S$GLB,,, | Performed by: NURSE PRACTITIONER

## 2023-08-03 PROCEDURE — 3066F NEPHROPATHY DOC TX: CPT | Mod: CPTII,S$GLB,, | Performed by: NURSE PRACTITIONER

## 2023-08-03 PROCEDURE — 3044F HG A1C LEVEL LT 7.0%: CPT | Mod: CPTII,S$GLB,, | Performed by: NURSE PRACTITIONER

## 2023-08-03 PROCEDURE — 1159F PR MEDICATION LIST DOCUMENTED IN MEDICAL RECORD: ICD-10-PCS | Mod: CPTII,S$GLB,, | Performed by: NURSE PRACTITIONER

## 2023-08-03 PROCEDURE — 99999 PR PBB SHADOW E&M-EST. PATIENT-LVL V: CPT | Mod: PBBFAC,,, | Performed by: NURSE PRACTITIONER

## 2023-08-03 PROCEDURE — G0439 PR MEDICARE ANNUAL WELLNESS SUBSEQUENT VISIT: ICD-10-PCS | Mod: S$GLB,,, | Performed by: NURSE PRACTITIONER

## 2023-08-03 PROCEDURE — 3061F PR NEG MICROALBUMINURIA RESULT DOCUMENTED/REVIEW: ICD-10-PCS | Mod: CPTII,S$GLB,, | Performed by: NURSE PRACTITIONER

## 2023-08-03 PROCEDURE — 1170F FXNL STATUS ASSESSED: CPT | Mod: CPTII,S$GLB,, | Performed by: NURSE PRACTITIONER

## 2023-08-03 PROCEDURE — 1159F MED LIST DOCD IN RCRD: CPT | Mod: CPTII,S$GLB,, | Performed by: NURSE PRACTITIONER

## 2023-08-03 PROCEDURE — 3288F FALL RISK ASSESSMENT DOCD: CPT | Mod: CPTII,S$GLB,, | Performed by: NURSE PRACTITIONER

## 2023-08-03 PROCEDURE — 3078F PR MOST RECENT DIASTOLIC BLOOD PRESSURE < 80 MM HG: ICD-10-PCS | Mod: CPTII,S$GLB,, | Performed by: NURSE PRACTITIONER

## 2023-08-03 PROCEDURE — 3074F PR MOST RECENT SYSTOLIC BLOOD PRESSURE < 130 MM HG: ICD-10-PCS | Mod: CPTII,S$GLB,, | Performed by: NURSE PRACTITIONER

## 2023-08-03 PROCEDURE — 1170F PR FUNCTIONAL STATUS ASSESSED: ICD-10-PCS | Mod: CPTII,S$GLB,, | Performed by: NURSE PRACTITIONER

## 2023-08-03 PROCEDURE — 3066F PR DOCUMENTATION OF TREATMENT FOR NEPHROPATHY: ICD-10-PCS | Mod: CPTII,S$GLB,, | Performed by: NURSE PRACTITIONER

## 2023-08-03 PROCEDURE — 1160F RVW MEDS BY RX/DR IN RCRD: CPT | Mod: CPTII,S$GLB,, | Performed by: NURSE PRACTITIONER

## 2023-08-03 PROCEDURE — 1101F PR PT FALLS ASSESS DOC 0-1 FALLS W/OUT INJ PAST YR: ICD-10-PCS | Mod: CPTII,S$GLB,, | Performed by: NURSE PRACTITIONER

## 2023-08-03 PROCEDURE — 3072F LOW RISK FOR RETINOPATHY: CPT | Mod: CPTII,S$GLB,, | Performed by: NURSE PRACTITIONER

## 2023-08-03 PROCEDURE — 3061F NEG MICROALBUMINURIA REV: CPT | Mod: CPTII,S$GLB,, | Performed by: NURSE PRACTITIONER

## 2023-08-03 PROCEDURE — 3008F PR BODY MASS INDEX (BMI) DOCUMENTED: ICD-10-PCS | Mod: CPTII,S$GLB,, | Performed by: NURSE PRACTITIONER

## 2023-08-03 PROCEDURE — 99999 PR PBB SHADOW E&M-EST. PATIENT-LVL V: ICD-10-PCS | Mod: PBBFAC,,, | Performed by: NURSE PRACTITIONER

## 2023-08-03 RX ORDER — PRAVASTATIN SODIUM 40 MG/1
40 TABLET ORAL
COMMUNITY
Start: 2023-05-23 | End: 2023-08-03

## 2023-08-03 RX ORDER — MONTELUKAST SODIUM 10 MG/1
10 TABLET ORAL
COMMUNITY
Start: 2023-06-18

## 2023-08-03 RX ORDER — CITALOPRAM 10 MG/1
10 TABLET ORAL
COMMUNITY
Start: 2023-07-22 | End: 2023-10-30

## 2023-08-03 NOTE — PROGRESS NOTES
"  Chelly Ortiz presented for a  Medicare AWV and comprehensive Health Risk Assessment today. The following components were reviewed and updated:    Medical history  Family History  Social history  Allergies and Current Medications  Health Risk Assessment  Health Maintenance  Care Team         ** See Completed Assessments for Annual Wellness Visit within the encounter summary.**         The following assessments were completed:  Living Situation  CAGE  Depression Screening  Timed Get Up and Go  Whisper Test  Cognitive Function Screening      Nutrition Screening  ADL Screening  PAQ Screening        Vitals:    08/03/23 1410   BP: 120/70   Pulse: 83   Resp: 18   SpO2: 97%   Weight: 87.6 kg (193 lb 2 oz)   Height: 5' 3" (1.6 m)     Body mass index is 34.21 kg/m².  Physical Exam  Vitals and nursing note reviewed.   Constitutional:       Appearance: She is obese.   HENT:      Head: Normocephalic.      Nose: Nose normal.      Mouth/Throat:      Mouth: Mucous membranes are moist.   Eyes:      Conjunctiva/sclera: Conjunctivae normal.   Cardiovascular:      Rate and Rhythm: Normal rate.      Heart sounds: Normal heart sounds.   Pulmonary:      Effort: Pulmonary effort is normal.      Breath sounds: Normal breath sounds.   Musculoskeletal:         General: Normal range of motion.      Cervical back: Normal range of motion.   Skin:     General: Skin is warm and dry.   Neurological:      General: No focal deficit present.      Mental Status: She is alert and oriented to person, place, and time.   Psychiatric:         Mood and Affect: Mood normal.         Behavior: Behavior normal.         Thought Content: Thought content normal.         Judgment: Judgment normal.       Diagnoses and health risks identified today and associated recommendations/orders:    1. Encounter for preventive health examination  Exam done    Health Maintenance updated    Records reviewed    2. Encounter for Medicare annual wellness exam  - Ambulatory " Referral/Consult to Enhanced Annual Wellness Visit (eAWV)    3. Abdominal aortic atherosclerosis  Chronic, followed by PCP    4. Other forms of angina pectoris  Chronic, followed by PCP    5. Mixed hyperlipidemia  Chronic, followed by PCP    Continue cholesterol med, low fat diet, and exercise.    6. Essential hypertension  Chronic, followed by PCP    Take medications as prescribed.    Monitor BP at home, goal BP < or = 140/80, call office if consistently above this range.    Follow low salt DASH diet and exercise.    BMI reviewed.    Go to ED if Headaches, blurred vision, chest pain, or SOB occurs along with elevated readings > or = 160/90.    7. Poorly controlled diabetes mellitus  Chronic, followed by PCP    A1C goal < 7.0, BP goal < 140/80, LDL goal < 100.    Adhere to ADA diet.    Take meds as prescribed, check BS daily. Notify if sugars are out of range discussed during visit.    Yearly eye exam discussed.    Yearly foot exam discussed.    8. BMI 34.0-34.9,adult  BMI reviewed    9. Obesity (BMI 30.0-34.9)  BMI reviewed.    Diet and exercise to lose weight.    Provided Chelly with a 5-10 year written screening schedule and personal prevention plan. Recommendations were developed using the USPSTF age appropriate recommendations. Education, counseling, and referrals were provided as needed. After Visit Summary printed and given to patient which includes a list of additional screenings\tests needed.    Follow up in about 2 months (around 10/17/2023) for with PCP Dr. Turner as scheduled.    Laya Laird, [place]      I offered to discuss advanced care planning, including how to pick a person who would make decisions for you if you were unable to make them for yourself, called a health care power of , and what kind of decisions you might make such as use of life sustaining treatments such as ventilators and tube feeding when faced with a life limiting illness recorded on a living will that they will need to  know. (How you want to be cared for as you near the end of your natural life)     X Patient is interested in learning more about how to make advanced directives.  I provided them paperwork and offered to discuss this with them.

## 2023-08-03 NOTE — PATIENT INSTRUCTIONS
Counseling and Referral of Other Preventative  (Italic type indicates deductible and co-insurance are waived)    Patient Name: Chelly Ortiz  Today's Date: 8/3/2023    Health Maintenance         Date Due Completion Date    Foot Exam 08/16/2023 8/16/2022    Override on 9/16/2021: Done    COVID-19 Vaccine (5 - Pfizer series) 08/03/2024 (Originally 10/3/2022) 8/8/2022    Influenza Vaccine (1) 09/01/2023 10/20/2022    Override on 12/22/2016: Done    Hemoglobin A1c 10/26/2023 4/26/2023    Mammogram 01/27/2024 1/27/2023    Eye Exam 03/28/2024 3/28/2023    Override on 1/29/2016: (N/S)    Diabetes Urine Screening 04/26/2024 4/26/2023    Lipid Panel 04/26/2024 4/26/2023    Aspirin/Antiplatelet Therapy 07/07/2024 7/7/2023    DEXA Scan 08/15/2025 8/15/2022    Colorectal Cancer Screening 06/07/2027 6/7/2022    TETANUS VACCINE 04/15/2032 4/15/2022          No orders of the defined types were placed in this encounter.    The following information is provided to all patients.  This information is to help you find resources for any of the problems found today that may be affecting your health:                Living healthy guide: www.[facility].[place].gov      Understanding Diabetes: www.diabetes.org      Eating healthy: www.cdc.gov/healthyweight      CDC home safety checklist: www.cdc.gov/steadi/patient.html      Agency on Aging: www.goea.[place].[facility]      Alcoholics anonymous (AA): www.aa.org      Physical Activity: www.donaldo.nih.gov/zz9fgir      Tobacco use: www.quitwithusla.org

## 2023-08-07 ENCOUNTER — PATIENT MESSAGE (OUTPATIENT)
Dept: INTERNAL MEDICINE | Facility: CLINIC | Age: 73
End: 2023-08-07
Payer: MEDICARE

## 2023-08-15 ENCOUNTER — PATIENT MESSAGE (OUTPATIENT)
Dept: INTERNAL MEDICINE | Facility: CLINIC | Age: 73
End: 2023-08-15
Payer: MEDICARE

## 2023-08-16 ENCOUNTER — TELEPHONE (OUTPATIENT)
Dept: GASTROENTEROLOGY | Facility: CLINIC | Age: 73
End: 2023-08-16
Payer: MEDICARE

## 2023-08-16 RX ORDER — ESOMEPRAZOLE MAGNESIUM 40 MG/1
40 CAPSULE, DELAYED RELEASE ORAL
Qty: 90 CAPSULE | Refills: 3 | Status: SHIPPED | OUTPATIENT
Start: 2023-08-16 | End: 2023-08-22 | Stop reason: SDUPTHER

## 2023-08-22 RX ORDER — ESOMEPRAZOLE MAGNESIUM 40 MG/1
40 CAPSULE, DELAYED RELEASE ORAL
Qty: 90 CAPSULE | Refills: 3 | Status: SHIPPED | OUTPATIENT
Start: 2023-08-22

## 2023-08-22 NOTE — ADDENDUM NOTE
Addended by: PRASANNA AIKEN on: 10/29/2019 11:38 AM     Modules accepted: Orders    
Symptomatic anemia

## 2023-08-23 ENCOUNTER — OFFICE VISIT (OUTPATIENT)
Dept: OTOLARYNGOLOGY | Facility: CLINIC | Age: 73
End: 2023-08-23
Payer: MEDICARE

## 2023-08-23 VITALS — HEART RATE: 82 BPM | SYSTOLIC BLOOD PRESSURE: 134 MMHG | DIASTOLIC BLOOD PRESSURE: 74 MMHG

## 2023-08-23 DIAGNOSIS — L29.9 EAR ITCH: ICD-10-CM

## 2023-08-23 DIAGNOSIS — H92.03 EAR DISCOMFORT, BILATERAL: ICD-10-CM

## 2023-08-23 DIAGNOSIS — H60.591: Primary | ICD-10-CM

## 2023-08-23 PROCEDURE — 1126F AMNT PAIN NOTED NONE PRSNT: CPT | Mod: CPTII,S$GLB,, | Performed by: OTOLARYNGOLOGY

## 2023-08-23 PROCEDURE — 3078F PR MOST RECENT DIASTOLIC BLOOD PRESSURE < 80 MM HG: ICD-10-PCS | Mod: CPTII,S$GLB,, | Performed by: OTOLARYNGOLOGY

## 2023-08-23 PROCEDURE — 1159F MED LIST DOCD IN RCRD: CPT | Mod: CPTII,S$GLB,, | Performed by: OTOLARYNGOLOGY

## 2023-08-23 PROCEDURE — 3061F PR NEG MICROALBUMINURIA RESULT DOCUMENTED/REVIEW: ICD-10-PCS | Mod: CPTII,S$GLB,, | Performed by: OTOLARYNGOLOGY

## 2023-08-23 PROCEDURE — 1159F PR MEDICATION LIST DOCUMENTED IN MEDICAL RECORD: ICD-10-PCS | Mod: CPTII,S$GLB,, | Performed by: OTOLARYNGOLOGY

## 2023-08-23 PROCEDURE — 3078F DIAST BP <80 MM HG: CPT | Mod: CPTII,S$GLB,, | Performed by: OTOLARYNGOLOGY

## 2023-08-23 PROCEDURE — 99213 PR OFFICE/OUTPT VISIT, EST, LEVL III, 20-29 MIN: ICD-10-PCS | Mod: S$GLB,,, | Performed by: OTOLARYNGOLOGY

## 2023-08-23 PROCEDURE — 3061F NEG MICROALBUMINURIA REV: CPT | Mod: CPTII,S$GLB,, | Performed by: OTOLARYNGOLOGY

## 2023-08-23 PROCEDURE — 1126F PR PAIN SEVERITY QUANTIFIED, NO PAIN PRESENT: ICD-10-PCS | Mod: CPTII,S$GLB,, | Performed by: OTOLARYNGOLOGY

## 2023-08-23 PROCEDURE — 3044F HG A1C LEVEL LT 7.0%: CPT | Mod: CPTII,S$GLB,, | Performed by: OTOLARYNGOLOGY

## 2023-08-23 PROCEDURE — 1160F PR REVIEW ALL MEDS BY PRESCRIBER/CLIN PHARMACIST DOCUMENTED: ICD-10-PCS | Mod: CPTII,S$GLB,, | Performed by: OTOLARYNGOLOGY

## 2023-08-23 PROCEDURE — 3066F PR DOCUMENTATION OF TREATMENT FOR NEPHROPATHY: ICD-10-PCS | Mod: CPTII,S$GLB,, | Performed by: OTOLARYNGOLOGY

## 2023-08-23 PROCEDURE — 1101F PT FALLS ASSESS-DOCD LE1/YR: CPT | Mod: CPTII,S$GLB,, | Performed by: OTOLARYNGOLOGY

## 2023-08-23 PROCEDURE — 99999 PR PBB SHADOW E&M-EST. PATIENT-LVL V: CPT | Mod: PBBFAC,,, | Performed by: OTOLARYNGOLOGY

## 2023-08-23 PROCEDURE — 3288F FALL RISK ASSESSMENT DOCD: CPT | Mod: CPTII,S$GLB,, | Performed by: OTOLARYNGOLOGY

## 2023-08-23 PROCEDURE — 3075F SYST BP GE 130 - 139MM HG: CPT | Mod: CPTII,S$GLB,, | Performed by: OTOLARYNGOLOGY

## 2023-08-23 PROCEDURE — 1160F RVW MEDS BY RX/DR IN RCRD: CPT | Mod: CPTII,S$GLB,, | Performed by: OTOLARYNGOLOGY

## 2023-08-23 PROCEDURE — 3288F PR FALLS RISK ASSESSMENT DOCUMENTED: ICD-10-PCS | Mod: CPTII,S$GLB,, | Performed by: OTOLARYNGOLOGY

## 2023-08-23 PROCEDURE — 3044F PR MOST RECENT HEMOGLOBIN A1C LEVEL <7.0%: ICD-10-PCS | Mod: CPTII,S$GLB,, | Performed by: OTOLARYNGOLOGY

## 2023-08-23 PROCEDURE — 3066F NEPHROPATHY DOC TX: CPT | Mod: CPTII,S$GLB,, | Performed by: OTOLARYNGOLOGY

## 2023-08-23 PROCEDURE — 1101F PR PT FALLS ASSESS DOC 0-1 FALLS W/OUT INJ PAST YR: ICD-10-PCS | Mod: CPTII,S$GLB,, | Performed by: OTOLARYNGOLOGY

## 2023-08-23 PROCEDURE — 3075F PR MOST RECENT SYSTOLIC BLOOD PRESS GE 130-139MM HG: ICD-10-PCS | Mod: CPTII,S$GLB,, | Performed by: OTOLARYNGOLOGY

## 2023-08-23 PROCEDURE — 99999 PR PBB SHADOW E&M-EST. PATIENT-LVL V: ICD-10-PCS | Mod: PBBFAC,,, | Performed by: OTOLARYNGOLOGY

## 2023-08-23 PROCEDURE — 99213 OFFICE O/P EST LOW 20 MIN: CPT | Mod: S$GLB,,, | Performed by: OTOLARYNGOLOGY

## 2023-08-23 RX ORDER — OFLOXACIN 3 MG/ML
4 SOLUTION AURICULAR (OTIC) 2 TIMES DAILY
Qty: 10 ML | Refills: 0 | Status: SHIPPED | OUTPATIENT
Start: 2023-08-23 | End: 2023-08-30

## 2023-08-23 NOTE — PROGRESS NOTES
"73 y/o female returns for evaluation of her ear. Right side has more irritation and itching than when she was last seen. After last visit patient got hearing aids from outside provider. Reports no trouble at first - then noted some irritation and itching more on right. Went to hearing aid provider - no issues noted - different size insert recommended. Used for a week or so w/o any issues. Now having irritation and discomfort with use. Has soft time "rubber like" over metal piece.  No active drainage. If not touching in ear canal no bad pain. No other new symptoms.    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 Until issues with insert was liking hearing aids.    PMHx, PSHx, Meds, Allergies, SocHx, FamHx reviewed in EPIC    Review of Systems     Constitutional: Negative for appetite change, chills, fatigue, fever and unexpected weight loss.      HENT: Positive for ear pain, postnasal drip and sinus pressure.  Negative for ear discharge, hearing loss, runny nose, sinus infection, stuffy nose, trouble swallowing and voice change.      Eyes:  Positive for eye drainage, eye itching and photophobia.     Respiratory:  Positive for sleep apnea and snoring. Negative for cough, shortness of breath and wheezing.      Cardiovascular:  Negative for chest pain, foot swelling, irregular heartbeat and swollen veins.     Gastrointestinal:  Positive for abdominal pain, acid reflux and constipation. Negative for heartburn.     Genitourinary: Negative for difficulty urinating, sexual problems and frequent urination.     Musc: Positive for aching muscles, back pain and neck pain. Negative for aching joints.     Skin: Negative for rash.     Allergy: Positive for seasonal allergies.     Endocrine: Negative for cold intolerance and heat intolerance.      Neurological: Positive for dizziness. Negative for headaches.      Hematologic: Positive for bruises/bleeds easily.     Psychiatric: Positive for sleep disturbance. Negative for " depression and nervous/anxious.              PE: /74   Pulse 82   LMP 08/01/2000    Gen: female, well nourished, well developed, NAD, cooperative, good historian  Ears:  R EAC w/ discomfort at meatus inferior (with mild folliculitis signs) where insert sets - min cerumen removed but TTP to patient - skin on posterior lateral EAC with mild erythema deep to cerumen 2x3mm w/o edema (cerumen removed with gentle curette touch), no drainage, remaining EAC skin w/o edema or erythema; L EAC with small mound of cerumen removed with curette, otherwise skin WNL & R TM with small monomeric area just lateral to tip of umbo; left TM translucent with normal bony landmarks  Neck: supple, no TTP, no LAD or masses  Face:  no TTP, no erythema or flushing  Respiratory: Breathing comfortably without retractions  Skin: facial skin intact without visible lesions or flushing  Neuro:  facial movement symmetric, speech fluid, gait stable  Psych: alert & oriented x 3, reasonable, normal affect    Impression:   1. Acute irritant otitis externa of right ear - borderline  ofloxacin (FLOXIN) 0.3 % otic solution      2. Ear itch        3. Ear discomfort, bilateral      R>L          Discussion and Plan:    Discussed findings. Minmal cerumen cleared.    Check to make sure ear insert tips due not have latex. Give right ear a break from hearing aid insert for 2-3 days. Retry smaller tip. Consider discuss with hearing aid retailer to trouble shoot issue.    Ofloxacin ear drops prescribed for patient to have in case right ear becomes painful or has drainage. Likely the irritation of skin will resolve with break from hearing aid insert pressure.    Follow up for persistent or worsening symptoms.     Parts or all of this note were created by voice recognition software; typographical errors in translating may be present.

## 2023-08-23 NOTE — PATIENT INSTRUCTIONS
Discussed findings. Minmal cerumen cleared.    Check to make sure ear insert tips due not have latex. Give right ear a break from hearing aid insert for 2-3 days. Retry smaller tip. Consider discuss with hearing aid retailer to trouble shoot issue.    Ofloxacin ear drops prescribed for patient to have in case right ear becomes painful or has drainage. Likely the irritation of skin will resolve with break from hearing aid insert pressure.    Follow up for persistent or worsening symptoms.

## 2023-08-27 NOTE — TELEPHONE ENCOUNTER
Patient canceled her surgery on 11/9 but would like to know if it is still available?  Notified when I spoke with the surgery coordinator this morning on another patient, I was told Dr. Odell's next surgery availability is after Thanksgiving.  However, if she is ready to reschedule I can send a message to see what the next available surgery date is.  Patient verbalized understanding.    Patient is ready to reschedule.   Patient/Caregiver provided printed discharge information.

## 2023-09-12 DIAGNOSIS — E11.65 TYPE 2 DIABETES MELLITUS WITH HYPERGLYCEMIA, WITH LONG-TERM CURRENT USE OF INSULIN: ICD-10-CM

## 2023-09-12 DIAGNOSIS — Z79.4 TYPE 2 DIABETES MELLITUS WITH HYPERGLYCEMIA, WITH LONG-TERM CURRENT USE OF INSULIN: ICD-10-CM

## 2023-09-12 RX ORDER — LANCETS
EACH MISCELLANEOUS
Qty: 300 EACH | Refills: 0 | OUTPATIENT
Start: 2023-09-12

## 2023-09-12 NOTE — TELEPHONE ENCOUNTER
Refill Decision Note   Chelly Ortiz  is requesting a refill authorization.  Brief Assessment and Rationale for Refill:  Quick Discontinue     Medication Therapy Plan: Pharmacy is requesting new scripts for the following medications without required information, (sig/ frequency/qty/etc)       Medication Reconciliation Completed: No   Comments: Pharmacies have been requesting medications for patients without required information, (sig, frequency, qty, etc.). In addition, requests are sent for medication(s) pt. are currently not taking, and medications patients have never taken.    We have spoken to the pharmacies about these request types and advised their teams previously that we are unable to assess these New Script requests and require all details for these requests. This is a known issue and has been reported.     No Care Gaps recommended.     Note composed:6:28 PM 09/12/2023

## 2023-09-12 NOTE — TELEPHONE ENCOUNTER
No care due was identified.  [facility] Embedded Care Due Messages. Reference number: [id number].   9/12/2023 5:39:54 PM CDT

## 2023-09-17 ENCOUNTER — PATIENT MESSAGE (OUTPATIENT)
Dept: INTERNAL MEDICINE | Facility: CLINIC | Age: 73
End: 2023-09-17
Payer: MEDICARE

## 2023-10-10 DIAGNOSIS — E11.65 TYPE 2 DIABETES MELLITUS WITH HYPERGLYCEMIA, WITH LONG-TERM CURRENT USE OF INSULIN: ICD-10-CM

## 2023-10-10 DIAGNOSIS — Z79.4 TYPE 2 DIABETES MELLITUS WITH HYPERGLYCEMIA, WITH LONG-TERM CURRENT USE OF INSULIN: ICD-10-CM

## 2023-10-10 RX ORDER — LANCETS
EACH MISCELLANEOUS
Qty: 918 EACH | Refills: 3 | Status: SHIPPED | OUTPATIENT
Start: 2023-10-10 | End: 2023-10-11 | Stop reason: SDUPTHER

## 2023-10-10 NOTE — TELEPHONE ENCOUNTER
----- Message from Saul Guo sent at 10/10/2023 10:36 AM CDT -----  Contact: Veran Armstrong [phone]  Requesting an RX refill or new RX.  Is this a refill or new RX: refill  RX name and strength (copy/paste from chart):  ACCU-CHEK FASTCLIX LANCET DRUM Mis  Is this a 30 day or 90 day RX:   Pharmacy name and phone # (copy/paste from chart):      [facility] - [place], [place] - [address]  [address] [address]  Phone: [phone] Fax: [phone]

## 2023-10-10 NOTE — TELEPHONE ENCOUNTER
No care due was identified.  [facility] Embedded Care Due Messages. Reference number: [id number].   10/10/2023 11:05:50 AM CDT

## 2023-10-10 NOTE — TELEPHONE ENCOUNTER
----- Message from Saul Guo sent at 10/10/2023 10:36 AM CDT -----  Contact: Verna Armstrong [phone]  Requesting an RX refill or new RX.  Is this a refill or new RX: refill  RX name and strength (copy/paste from chart):  ACCU-CHEK FASTCLIX LANCET DRUM Mis  Is this a 30 day or 90 day RX:   Pharmacy name and phone # (copy/paste from chart):      [facility] - [place], [place] - [address]  [address] [address]  Phone: [phone] Fax: [phone]

## 2023-10-11 ENCOUNTER — PATIENT MESSAGE (OUTPATIENT)
Dept: INTERNAL MEDICINE | Facility: CLINIC | Age: 73
End: 2023-10-11
Payer: MEDICARE

## 2023-10-11 DIAGNOSIS — Z79.4 TYPE 2 DIABETES MELLITUS WITH HYPERGLYCEMIA, WITH LONG-TERM CURRENT USE OF INSULIN: ICD-10-CM

## 2023-10-11 DIAGNOSIS — E11.65 TYPE 2 DIABETES MELLITUS WITH HYPERGLYCEMIA, WITH LONG-TERM CURRENT USE OF INSULIN: ICD-10-CM

## 2023-10-11 NOTE — TELEPHONE ENCOUNTER
No care due was identified.  Health [facility] Embedded Care Due Messages. Reference number: [id number].   10/11/2023 10:45:01 AM CDT

## 2023-10-12 ENCOUNTER — PATIENT MESSAGE (OUTPATIENT)
Dept: ADMINISTRATIVE | Facility: OTHER | Age: 73
End: 2023-10-12
Payer: MEDICARE

## 2023-10-12 RX ORDER — LANCETS
EACH MISCELLANEOUS
Qty: 918 EACH | Refills: 3 | Status: SHIPPED | OUTPATIENT
Start: 2023-10-12

## 2023-10-12 NOTE — TELEPHONE ENCOUNTER
Refill Decision Note   Chelly Ortiz  is requesting a refill authorization.  Brief Assessment and Rationale for Refill:  Approve     Medication Therapy Plan:  Patient request to be sent to [facility].      Comments:     Note composed:2:27 PM 10/12/2023

## 2023-10-17 ENCOUNTER — HOSPITAL ENCOUNTER (OUTPATIENT)
Dept: RADIOLOGY | Facility: HOSPITAL | Age: 73
Discharge: HOME | End: 2023-10-17
Attending: INTERNAL MEDICINE
Payer: MEDICARE

## 2023-10-17 ENCOUNTER — OFFICE VISIT (OUTPATIENT)
Dept: INTERNAL MEDICINE | Facility: CLINIC | Age: 73
End: 2023-10-17
Payer: MEDICARE

## 2023-10-17 ENCOUNTER — PATIENT MESSAGE (OUTPATIENT)
Dept: PODIATRY | Facility: CLINIC | Age: 73
End: 2023-10-17
Payer: MEDICARE

## 2023-10-17 VITALS
DIASTOLIC BLOOD PRESSURE: 75 MMHG | WEIGHT: 193.56 LBS | HEIGHT: 63 IN | SYSTOLIC BLOOD PRESSURE: 132 MMHG | OXYGEN SATURATION: 98 % | HEART RATE: 78 BPM | BODY MASS INDEX: 34.3 KG/M2

## 2023-10-17 DIAGNOSIS — Z79.4 TYPE 2 DIABETES MELLITUS WITH HYPERGLYCEMIA, WITH LONG-TERM CURRENT USE OF INSULIN: ICD-10-CM

## 2023-10-17 DIAGNOSIS — M25.511 ACUTE PAIN OF RIGHT SHOULDER: ICD-10-CM

## 2023-10-17 DIAGNOSIS — I10 ESSENTIAL HYPERTENSION: ICD-10-CM

## 2023-10-17 DIAGNOSIS — M25.511 ACUTE PAIN OF RIGHT SHOULDER: Primary | ICD-10-CM

## 2023-10-17 DIAGNOSIS — E11.65 TYPE 2 DIABETES MELLITUS WITH HYPERGLYCEMIA, WITH LONG-TERM CURRENT USE OF INSULIN: ICD-10-CM

## 2023-10-17 DIAGNOSIS — E78.2 MIXED HYPERLIPIDEMIA: ICD-10-CM

## 2023-10-17 PROCEDURE — 73030 X-RAY EXAM OF SHOULDER: CPT | Mod: TC,RT

## 2023-10-17 PROCEDURE — 99214 PR OFFICE/OUTPT VISIT, EST, LEVL IV, 30-39 MIN: ICD-10-PCS | Mod: S$GLB,,, | Performed by: INTERNAL MEDICINE

## 2023-10-17 PROCEDURE — 1126F AMNT PAIN NOTED NONE PRSNT: CPT | Mod: CPTII,S$GLB,, | Performed by: INTERNAL MEDICINE

## 2023-10-17 PROCEDURE — 3044F HG A1C LEVEL LT 7.0%: CPT | Mod: CPTII,S$GLB,, | Performed by: INTERNAL MEDICINE

## 2023-10-17 PROCEDURE — 73030 XR SHOULDER COMPLETE 2 OR MORE VIEWS RIGHT: ICD-10-PCS | Mod: 26,RT,, | Performed by: RADIOLOGY

## 2023-10-17 PROCEDURE — 1101F PR PT FALLS ASSESS DOC 0-1 FALLS W/OUT INJ PAST YR: ICD-10-PCS | Mod: CPTII,S$GLB,, | Performed by: INTERNAL MEDICINE

## 2023-10-17 PROCEDURE — 99214 OFFICE O/P EST MOD 30 MIN: CPT | Mod: S$GLB,,, | Performed by: INTERNAL MEDICINE

## 2023-10-17 PROCEDURE — 3008F PR BODY MASS INDEX (BMI) DOCUMENTED: ICD-10-PCS | Mod: CPTII,S$GLB,, | Performed by: INTERNAL MEDICINE

## 2023-10-17 PROCEDURE — 3078F PR MOST RECENT DIASTOLIC BLOOD PRESSURE < 80 MM HG: ICD-10-PCS | Mod: CPTII,S$GLB,, | Performed by: INTERNAL MEDICINE

## 2023-10-17 PROCEDURE — 3288F PR FALLS RISK ASSESSMENT DOCUMENTED: ICD-10-PCS | Mod: CPTII,S$GLB,, | Performed by: INTERNAL MEDICINE

## 2023-10-17 PROCEDURE — 3066F PR DOCUMENTATION OF TREATMENT FOR NEPHROPATHY: ICD-10-PCS | Mod: CPTII,S$GLB,, | Performed by: INTERNAL MEDICINE

## 2023-10-17 PROCEDURE — 3044F PR MOST RECENT HEMOGLOBIN A1C LEVEL <7.0%: ICD-10-PCS | Mod: CPTII,S$GLB,, | Performed by: INTERNAL MEDICINE

## 2023-10-17 PROCEDURE — 3061F NEG MICROALBUMINURIA REV: CPT | Mod: CPTII,S$GLB,, | Performed by: INTERNAL MEDICINE

## 2023-10-17 PROCEDURE — 73030 X-RAY EXAM OF SHOULDER: CPT | Mod: 26,RT,, | Performed by: RADIOLOGY

## 2023-10-17 PROCEDURE — 3075F SYST BP GE 130 - 139MM HG: CPT | Mod: CPTII,S$GLB,, | Performed by: INTERNAL MEDICINE

## 2023-10-17 PROCEDURE — 1101F PT FALLS ASSESS-DOCD LE1/YR: CPT | Mod: CPTII,S$GLB,, | Performed by: INTERNAL MEDICINE

## 2023-10-17 PROCEDURE — 1126F PR PAIN SEVERITY QUANTIFIED, NO PAIN PRESENT: ICD-10-PCS | Mod: CPTII,S$GLB,, | Performed by: INTERNAL MEDICINE

## 2023-10-17 PROCEDURE — 99999 PR PBB SHADOW E&M-EST. PATIENT-LVL IV: CPT | Mod: PBBFAC,,, | Performed by: INTERNAL MEDICINE

## 2023-10-17 PROCEDURE — 3288F FALL RISK ASSESSMENT DOCD: CPT | Mod: CPTII,S$GLB,, | Performed by: INTERNAL MEDICINE

## 2023-10-17 PROCEDURE — 3075F PR MOST RECENT SYSTOLIC BLOOD PRESS GE 130-139MM HG: ICD-10-PCS | Mod: CPTII,S$GLB,, | Performed by: INTERNAL MEDICINE

## 2023-10-17 PROCEDURE — 3078F DIAST BP <80 MM HG: CPT | Mod: CPTII,S$GLB,, | Performed by: INTERNAL MEDICINE

## 2023-10-17 PROCEDURE — 99999 PR PBB SHADOW E&M-EST. PATIENT-LVL IV: ICD-10-PCS | Mod: PBBFAC,,, | Performed by: INTERNAL MEDICINE

## 2023-10-17 PROCEDURE — 3008F BODY MASS INDEX DOCD: CPT | Mod: CPTII,S$GLB,, | Performed by: INTERNAL MEDICINE

## 2023-10-17 PROCEDURE — 3066F NEPHROPATHY DOC TX: CPT | Mod: CPTII,S$GLB,, | Performed by: INTERNAL MEDICINE

## 2023-10-17 PROCEDURE — 3061F PR NEG MICROALBUMINURIA RESULT DOCUMENTED/REVIEW: ICD-10-PCS | Mod: CPTII,S$GLB,, | Performed by: INTERNAL MEDICINE

## 2023-10-17 RX ORDER — MELOXICAM 7.5 MG/1
7.5 TABLET ORAL DAILY
Qty: 14 TABLET | Refills: 0 | Status: SHIPPED | OUTPATIENT
Start: 2023-10-17 | End: 2023-10-31

## 2023-10-17 RX ORDER — ATORVASTATIN CALCIUM 10 MG/1
10 TABLET, FILM COATED ORAL DAILY
Qty: 30 TABLET | Refills: 11 | Status: SHIPPED | OUTPATIENT
Start: 2023-10-17 | End: 2023-12-01

## 2023-10-18 ENCOUNTER — TELEPHONE (OUTPATIENT)
Dept: INTERNAL MEDICINE | Facility: CLINIC | Age: 73
End: 2023-10-18
Payer: MEDICARE

## 2023-10-18 NOTE — TELEPHONE ENCOUNTER
----- Message from Smtiha Turner MD sent at 10/17/2023  4:25 PM CDT -----  Can you let her know the XRAY did not show any abnormalities. Can you help her schedule with ortho?

## 2023-10-18 NOTE — TELEPHONE ENCOUNTER
Call pt 2 times also daughter know answer left a voice mail for letting pt know her results came back normal and also left a number for pt to call orth to schedule appt.

## 2023-10-19 NOTE — PROGRESS NOTES
Subjective:       Patient ID: Chelly Ortiz is a 72 y.o. female.    Chief Complaint: Follow-up      She stopped taking statin due to myalgias . She was on 40 mg daily.     Having right shoulder pain. Pain with movement.      DMII on insulin and trulicity      HTN on amlodipine      HLD has nt been taking statin as above.      Insomnia on trazodone      Health Maintenance:  Colon Cancer Screening: colonoscopy in 2022 with polyps removed due again in 2027  Mammogram: January 2023 was normal   Hep C:negative 2007   Lipids: normal 2023   Vaccines:  up to date      Follow-up  Associated symptoms include arthralgias. Pertinent negatives include no abdominal pain, chest pain, chills, coughing, headaches, myalgias, nausea or vomiting.     Review of Systems   Constitutional:  Negative for activity change, appetite change and chills.   HENT:  Negative for ear pain, sinus pressure/congestion and sneezing.    Respiratory:  Negative for cough and shortness of breath.    Cardiovascular:  Negative for chest pain, palpitations and leg swelling.   Gastrointestinal:  Negative for abdominal distention, abdominal pain, constipation, diarrhea, nausea and vomiting.   Genitourinary:  Negative for dysuria and hematuria.   Musculoskeletal:  Positive for arthralgias. Negative for back pain and myalgias.   Neurological:  Negative for dizziness and headaches.   Psychiatric/Behavioral:  Negative for agitation. The patient is not nervous/anxious.            Past Medical History:   Diagnosis Date    Allergy     DDD (degenerative disc disease), lumbar     Diabetes mellitus     diet controlled    Diabetes mellitus, type 2     GERD (gastroesophageal reflux disease)     History of subconjunctival hemorrhage 12/02/2011    left eye    Hyperlipidemia     Hypertension     IBS (irritable bowel syndrome)     Obesity     MYRIAM (obstructive sleep apnea)     on CPAP    Rotator cuff impingement syndrome     Seasonal allergic conjunctivitis      Past Surgical  History:   Procedure Laterality Date    CATARACT EXTRACTION W/  INTRAOCULAR LENS IMPLANT Right 10/03/2013        CATARACT EXTRACTION W/  INTRAOCULAR LENS IMPLANT Left 2022         SECTION      x2    CHOLECYSTECTOMY      COLONOSCOPY N/A 2018    Procedure: COLONOSCOPY;  Surgeon: Marie Mejia MD;  Location: [facility];  Service: Endoscopy;  Laterality: N/A;    COLONOSCOPY N/A 2022    Procedure: COLONOSCOPY;  Surgeon: Pierce Rivera MD;  Location: [facility];  Service: Endoscopy;  Laterality: N/A;    ENDOSCOPIC ULTRASOUND OF UPPER GASTROINTESTINAL TRACT N/A 10/09/2018    Procedure: ULTRASOUND, UPPER GI TRACT, ENDOSCOPIC;  Surgeon: Javy Simpson MD;  Location: [facility];  Service: Endoscopy;  Laterality: N/A;    EXCISION OF LESION N/A 2019    Procedure: EXCISION, LESION VULVA;  Surgeon: Liv Odell MD;  Location: [facility];  Service: OB/GYN;  Laterality: N/A;  latex allergy    EYE SURGERY      HYSTERECTOMY      ovaries intact, due to DUB    INTRAOCULAR PROSTHESES INSERTION Left 2022    Procedure: INSERTION, IOL PROSTHESIS;  Surgeon: Clarice Herzog MD;  Location: [facility] OR [address] [place] [address];  Service: Ophthalmology;  Laterality: Left;    PHACOEMULSIFICATION OF CATARACT Left 2022    Procedure: PHACOEMULSIFICATION, CATARACT;  Surgeon: Clarice Herzog MD;  Location: [facility] OR [address] [place] [address];  Service: Ophthalmology;  Laterality: Left;    TUBAL LIGATION        Patient Active Problem List   Diagnosis    GERD (gastroesophageal reflux disease)    MYRIAM (obstructive sleep apnea)    IBS (irritable bowel syndrome)    DDD (degenerative disc disease), lumbar    Insomnia    Anxiety    Hearing loss, sensorineural    Nuclear sclerotic cataract of left eye    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    Constipation    History of colon polyps    Essential hypertension    Hyperlipidemia, unspecified    Spondylosis of cervical region without myelopathy or radiculopathy     Cervical myofascial pain syndrome    PCO (posterior capsular opacification), right    Dry eye syndrome    Pseudophakia    Decreased range of motion of lumbar spine    Decreased strength    Dilated bile duct    Personal history of colonic polyps    EIC (epidermal inclusion cyst)    Sedentary lifestyle    Dysphagia    Abdominal aortic atherosclerosis    Decreased ROM of neck    Arthritis of multiple sites    Osteopenia of multiple sites    Combined form of age-related cataract, left eye    Adjustment disorder with anxious mood    Other forms of angina pectoris        Objective:      Physical Exam  Vitals and nursing note reviewed.   HENT:      Head: Normocephalic.      Nose: Nose normal.      Mouth/Throat:      Mouth: Mucous membranes are moist.   Eyes:      Conjunctiva/sclera: Conjunctivae normal.   Cardiovascular:      Rate and Rhythm: Normal rate.      Heart sounds: Normal heart sounds.   Pulmonary:      Effort: Pulmonary effort is normal.      Breath sounds: Normal breath sounds.   Musculoskeletal:         General: Normal range of motion.      Cervical back: Normal range of motion.   Skin:     General: Skin is warm and dry.   Neurological:      General: No focal deficit present.      Mental Status: She is alert and oriented to person, place, and time.   Psychiatric:         Mood and Affect: Mood normal.         Behavior: Behavior normal.         Thought Content: Thought content normal.         Judgment: Judgment normal.         Assessment:       Problem List Items Addressed This Visit          Cardiac/Vascular    Hyperlipidemia, unspecified    Essential hypertension     Other Visit Diagnoses       Acute pain of right shoulder    -  Primary    Relevant Orders    X-ray Shoulder 2 or More Views Right (Completed)    Ambulatory referral/consult to Orthopedics    Ambulatory referral/consult to Physical/Occupational Therapy    Type 2 diabetes mellitus with hyperglycemia, with long-term current use of insulin                 Plan:         Chelly was seen today for follow-up.    Diagnoses and all orders for this visit:    Acute pain of right shoulder  -     X-ray Shoulder 2 or More Views Right; Future  -     Ambulatory referral/consult to Orthopedics; Future  -     Ambulatory referral/consult to Physical/Occupational Therapy; Future  -     meloxicam (MOBIC) 7.5 MG tablet; Take 1 tablet (7.5 mg total) by mouth once daily. for 14 days    Type 2 diabetes mellitus with hyperglycemia, with long-term current use of insulin  Contineu current meds  Has upcoming labs next week     Mixed hyperlipidemia  -     atorvastatin (LIPITOR) 10 MG tablet; Take 1 tablet (10 mg total) by mouth once daily.  Try lower dose of statin at 10 mg daily     Essential hypertension  Well controlled on current meds            Smitha Turner MD   Internal Medicine   Primary Care

## 2023-10-23 ENCOUNTER — TELEPHONE (OUTPATIENT)
Dept: INTERNAL MEDICINE | Facility: CLINIC | Age: 73
End: 2023-10-23

## 2023-10-23 NOTE — TELEPHONE ENCOUNTER
"----- Message from Beata Viera sent at 10/23/2023  2:40 PM CDT -----  Regarding: pt advice  Contact: [phone]  Name Of Caller: self     Contact Preference?: [phone]     What is the nature of the call?: she states she need her ortho referral to be sent to [facility] so she will have a less copay.      Additional Notes:    "Thank you for all that you do for our patients"        "

## 2023-10-24 ENCOUNTER — LAB VISIT (OUTPATIENT)
Dept: LAB | Facility: HOSPITAL | Age: 73
End: 2023-10-24
Attending: FAMILY MEDICINE
Payer: MEDICARE

## 2023-10-24 DIAGNOSIS — E78.5 HYPERLIPIDEMIA, UNSPECIFIED HYPERLIPIDEMIA TYPE: ICD-10-CM

## 2023-10-24 DIAGNOSIS — I70.0 ABDOMINAL AORTIC ATHEROSCLEROSIS: ICD-10-CM

## 2023-10-24 DIAGNOSIS — Z79.4 TYPE 2 DIABETES MELLITUS WITH HYPERGLYCEMIA, WITH LONG-TERM CURRENT USE OF INSULIN: ICD-10-CM

## 2023-10-24 DIAGNOSIS — E11.65 TYPE 2 DIABETES MELLITUS WITH HYPERGLYCEMIA, WITH LONG-TERM CURRENT USE OF INSULIN: ICD-10-CM

## 2023-10-24 LAB
ALBUMIN SERPL BCP-MCNC: 3.7 G/DL (ref 3.5–5.2)
ALP SERPL-CCNC: 101 U/L (ref 55–135)
ALT SERPL W/O P-5'-P-CCNC: 14 U/L (ref 10–44)
ANION GAP SERPL CALC-SCNC: 9 MMOL/L (ref 8–16)
AST SERPL-CCNC: 20 U/L (ref 10–40)
BILIRUB SERPL-MCNC: 0.6 MG/DL (ref 0.1–1)
BUN SERPL-MCNC: 13 MG/DL (ref 8–23)
CALCIUM SERPL-MCNC: 9.1 MG/DL (ref 8.7–10.5)
CHLORIDE SERPL-SCNC: 107 MMOL/L (ref 95–110)
CHOLEST SERPL-MCNC: 204 MG/DL (ref 120–199)
CHOLEST/HDLC SERPL: 3.7 {RATIO} (ref 2–5)
CO2 SERPL-SCNC: 27 MMOL/L (ref 23–29)
CREAT SERPL-MCNC: 0.9 MG/DL (ref 0.5–1.4)
EST. GFR  (NO RACE VARIABLE): >60 ML/MIN/1.73 M^2
ESTIMATED AVG GLUCOSE: 137 MG/DL (ref 68–131)
GLUCOSE SERPL-MCNC: 85 MG/DL (ref 70–110)
HBA1C MFR BLD: 6.4 % (ref 4–5.6)
HDLC SERPL-MCNC: 55 MG/DL (ref 40–75)
HDLC SERPL: 27 % (ref 20–50)
LDLC SERPL CALC-MCNC: 128.4 MG/DL (ref 63–159)
NONHDLC SERPL-MCNC: 149 MG/DL
POTASSIUM SERPL-SCNC: 3.6 MMOL/L (ref 3.5–5.1)
PROT SERPL-MCNC: 7.2 G/DL (ref 6–8.4)
SODIUM SERPL-SCNC: 143 MMOL/L (ref 136–145)
TRIGL SERPL-MCNC: 103 MG/DL (ref 30–150)

## 2023-10-24 PROCEDURE — 80053 COMPREHEN METABOLIC PANEL: CPT | Performed by: FAMILY MEDICINE

## 2023-10-24 PROCEDURE — 80061 LIPID PANEL: CPT | Performed by: FAMILY MEDICINE

## 2023-10-24 PROCEDURE — 36415 COLL VENOUS BLD VENIPUNCTURE: CPT | Mod: PO | Performed by: FAMILY MEDICINE

## 2023-10-24 PROCEDURE — 83036 HEMOGLOBIN GLYCOSYLATED A1C: CPT | Performed by: FAMILY MEDICINE

## 2023-10-24 NOTE — TELEPHONE ENCOUNTER
----- Message from Britt Albright sent at 10/24/2023  4:02 PM CDT -----  Contact: [phone]  [email or web address]     Patient is calling for Medical Advice regarding: marisol castaneda she is scheduled to see an orthopedic tomorrow but [employer] needs the authorization form     Would like response via [facility]:  call back     Comments:   Fax # [phone] [employer]

## 2023-10-25 ENCOUNTER — OFFICE VISIT (OUTPATIENT)
Dept: ORTHOPEDICS | Facility: CLINIC | Age: 73
End: 2023-10-25
Payer: MEDICARE

## 2023-10-25 VITALS — BODY MASS INDEX: 34.6 KG/M2 | WEIGHT: 195.31 LBS

## 2023-10-25 DIAGNOSIS — M75.51 SUBACROMIAL BURSITIS OF RIGHT SHOULDER JOINT: Primary | ICD-10-CM

## 2023-10-25 DIAGNOSIS — M25.511 ACUTE PAIN OF RIGHT SHOULDER: ICD-10-CM

## 2023-10-25 PROCEDURE — 1101F PT FALLS ASSESS-DOCD LE1/YR: CPT | Mod: CPTII,S$GLB,, | Performed by: PHYSICIAN ASSISTANT

## 2023-10-25 PROCEDURE — 99214 OFFICE O/P EST MOD 30 MIN: CPT | Mod: S$GLB,,, | Performed by: PHYSICIAN ASSISTANT

## 2023-10-25 PROCEDURE — 1160F RVW MEDS BY RX/DR IN RCRD: CPT | Mod: CPTII,S$GLB,, | Performed by: PHYSICIAN ASSISTANT

## 2023-10-25 PROCEDURE — 99999 PR PBB SHADOW E&M-EST. PATIENT-LVL V: CPT | Mod: PBBFAC,,, | Performed by: PHYSICIAN ASSISTANT

## 2023-10-25 PROCEDURE — 1159F PR MEDICATION LIST DOCUMENTED IN MEDICAL RECORD: ICD-10-PCS | Mod: CPTII,S$GLB,, | Performed by: PHYSICIAN ASSISTANT

## 2023-10-25 PROCEDURE — 3288F PR FALLS RISK ASSESSMENT DOCUMENTED: ICD-10-PCS | Mod: CPTII,S$GLB,, | Performed by: PHYSICIAN ASSISTANT

## 2023-10-25 PROCEDURE — 1160F PR REVIEW ALL MEDS BY PRESCRIBER/CLIN PHARMACIST DOCUMENTED: ICD-10-PCS | Mod: CPTII,S$GLB,, | Performed by: PHYSICIAN ASSISTANT

## 2023-10-25 PROCEDURE — 99999 PR PBB SHADOW E&M-EST. PATIENT-LVL V: ICD-10-PCS | Mod: PBBFAC,,, | Performed by: PHYSICIAN ASSISTANT

## 2023-10-25 PROCEDURE — 3044F PR MOST RECENT HEMOGLOBIN A1C LEVEL <7.0%: ICD-10-PCS | Mod: CPTII,S$GLB,, | Performed by: PHYSICIAN ASSISTANT

## 2023-10-25 PROCEDURE — 1125F AMNT PAIN NOTED PAIN PRSNT: CPT | Mod: CPTII,S$GLB,, | Performed by: PHYSICIAN ASSISTANT

## 2023-10-25 PROCEDURE — 99214 PR OFFICE/OUTPT VISIT, EST, LEVL IV, 30-39 MIN: ICD-10-PCS | Mod: S$GLB,,, | Performed by: PHYSICIAN ASSISTANT

## 2023-10-25 PROCEDURE — 3008F BODY MASS INDEX DOCD: CPT | Mod: CPTII,S$GLB,, | Performed by: PHYSICIAN ASSISTANT

## 2023-10-25 PROCEDURE — 1159F MED LIST DOCD IN RCRD: CPT | Mod: CPTII,S$GLB,, | Performed by: PHYSICIAN ASSISTANT

## 2023-10-25 PROCEDURE — 1101F PR PT FALLS ASSESS DOC 0-1 FALLS W/OUT INJ PAST YR: ICD-10-PCS | Mod: CPTII,S$GLB,, | Performed by: PHYSICIAN ASSISTANT

## 2023-10-25 PROCEDURE — 1125F PR PAIN SEVERITY QUANTIFIED, PAIN PRESENT: ICD-10-PCS | Mod: CPTII,S$GLB,, | Performed by: PHYSICIAN ASSISTANT

## 2023-10-25 PROCEDURE — 3008F PR BODY MASS INDEX (BMI) DOCUMENTED: ICD-10-PCS | Mod: CPTII,S$GLB,, | Performed by: PHYSICIAN ASSISTANT

## 2023-10-25 PROCEDURE — 3066F NEPHROPATHY DOC TX: CPT | Mod: CPTII,S$GLB,, | Performed by: PHYSICIAN ASSISTANT

## 2023-10-25 PROCEDURE — 3061F NEG MICROALBUMINURIA REV: CPT | Mod: CPTII,S$GLB,, | Performed by: PHYSICIAN ASSISTANT

## 2023-10-25 PROCEDURE — 3288F FALL RISK ASSESSMENT DOCD: CPT | Mod: CPTII,S$GLB,, | Performed by: PHYSICIAN ASSISTANT

## 2023-10-25 PROCEDURE — 3061F PR NEG MICROALBUMINURIA RESULT DOCUMENTED/REVIEW: ICD-10-PCS | Mod: CPTII,S$GLB,, | Performed by: PHYSICIAN ASSISTANT

## 2023-10-25 PROCEDURE — 3066F PR DOCUMENTATION OF TREATMENT FOR NEPHROPATHY: ICD-10-PCS | Mod: CPTII,S$GLB,, | Performed by: PHYSICIAN ASSISTANT

## 2023-10-25 PROCEDURE — 3044F HG A1C LEVEL LT 7.0%: CPT | Mod: CPTII,S$GLB,, | Performed by: PHYSICIAN ASSISTANT

## 2023-10-25 NOTE — PROGRESS NOTES
SUBJECTIVE:     Chief Complaint & History of Present Illness:  Chelly Ortiz is a 72 y.o. year old female who presents today with constant right shoulder pain that started one month ago.  She is Right hand dominant.  There is not a history of injury.  She has had intermittent pain in the past. The pain is located in the anterior and  upper arm aspect of the shoulder.  The pain is described as achy.  It is aggravated by lifting, reaching, overhead activity.  Pain radiates down the arm.  Previous treatments include rest, meloxicam, topical creams, tylenol.  She recently started the meloxicam which seems to be helping.  There is not a history of previous injury or surgery to the shoulder.      Review of patient's allergies indicates:   Allergen Reactions    Ace inhibitors Hives     \Cough    Latex, natural rubber Itching         Current Outpatient Medications   Medication Sig Dispense Refill    ACCU-CHEK FASTCLIX LANCING DEV Kit USE AS DIRECTED 1 each 0    albuterol (PROVENTIL/VENTOLIN HFA) 90 mcg/actuation inhaler Inhale 1-2 puffs into the lungs every 4 (four) hours as needed for Wheezing. 18 g 2    aspirin (ECOTRIN) 81 MG EC tablet Take 81 mg by mouth once daily.      atorvastatin (LIPITOR) 10 MG tablet Take 1 tablet (10 mg total) by mouth once daily. 30 tablet 11    azelastine (OPTIVAR) 0.05 % ophthalmic solution Place 1 drop into both eyes 2 (two) times daily. 6 mL 1    blood glucose control high,low (ACCU-CHEK GUIDE L1-L2 CTRL SOL) Soln 3 times a day 300 each 11    blood sugar diagnostic (ACCU-CHEK GUIDE TEST STRIPS) Strp 3 times a day 300 strip 11    blood-glucose meter (ACCU-CHEK GUIDE GLUCOSE METER) Misc Three times a day 1 each 0    citalopram (CELEXA) 10 MG tablet Take 10 mg by mouth.      ergocalciferol (ERGOCALCIFEROL) 50,000 unit Cap TAKE 1 CAPSULE BY MOUTH EVERY 7 DAYS 12 capsule 3    esomeprazole (NEXIUM) 40 MG capsule Take 1 capsule (40 mg total) by mouth before breakfast. 90 capsule 3     "fluocinonide (LIDEX) 0.05 % external solution AAA scalp qday - bid prn pruritus 60 mL 3    fluticasone propionate (FLONASE) 50 mcg/actuation nasal spray 1 spray (50 mcg total) by Each Nostril route once daily. 9.9 mL 0    gabapentin (NEURONTIN) 100 MG capsule Take 1 capsule (100 mg total) by mouth 3 (three) times daily. 270 capsule 3    insulin aspart U-100 (NOVOLOG FLEXPEN U-100 INSULIN) 100 unit/mL (3 mL) InPn pen Inject 10 Units into the skin 3 (three) times daily with meals. 60 mL 11    insulin aspart U-100 (NOVOLOG FLEXPEN U-100 INSULIN) 100 unit/mL (3 mL) InPn pen Inject 10 Units into the skin 3 (three) times daily with meals. TDD 60 units 60 mL 11    insulin detemir U-100, Levemir, (LEVEMIR FLEXPEN) 100 unit/mL (3 mL) InPn pen Inject 40 Units into the skin every evening. 60 mL 11    insulin detemir U-100, Levemir, (LEVEMIR FLEXPEN) 100 unit/mL (3 mL) InPn pen Inject 40 Units into the skin every evening. 50 mL 11    insulin syringe-needle U-100 0.3 mL 31 gauge x 5/16" Syrg relion brand, use 5 x a day, if not on levemir or novolog 150 each 1    insulin syringe-needle U-100 0.5 mL 31 gauge x 5/16" Syrg Use nightly. 90 day 100 each 3    lancets (ACCU-CHEK FASTCLIX LANCET DRUM) [facility] TEST BLOOD SUGAR THREE TIMES A  each 3    linaCLOtide (LINZESS) 72 mcg Cap capsule Take 1 capsule (72 mcg total) by mouth before breakfast. 30 capsule 5    magnesium oxide (MAG-OX) 400 mg tablet Take 1 tablet (400 mg total) by mouth 2 (two) times daily. 180 tablet 3    meloxicam (MOBIC) 7.5 MG tablet Take 1 tablet (7.5 mg total) by mouth once daily. for 14 days 14 tablet 0    montelukast (SINGULAIR) 10 mg tablet Take 10 mg by mouth.      pen needle, diabetic (NOVOFINE 32) 32 gauge x 1/4" Ndle 4 injections per day 500 each 4    traZODone (DESYREL) 50 MG tablet Take 1 tablet (50 mg total) by mouth every evening. 90 tablet 3    amLODIPine (NORVASC) 10 MG tablet Take 1 tablet (10 mg total) by mouth once daily. 90 tablet 3    calcium " carbonate (OS-ARIC) 600 mg calcium (1,500 mg) Tab Take 1 tablet (600 mg total) by mouth once. for 1 dose 30 tablet 11    cetirizine (ZYRTEC) 10 MG tablet Take 1 tablet (10 mg total) by mouth every evening. 90 tablet 0    glucose 4 GM chewable tablet Take 4 tablets (16 g total) by mouth as needed for Low blood sugar (If having symptoms of blurry vision, palpitations, confusion, shakiness.  Please check sugars and if sugar below 70 please take 4 tablets and re-check sugar everry 15 minutes until sugars are above 70 and symptoms resolve.). 50 tablet 12     Current Facility-Administered Medications   Medication Dose Route Frequency Provider Last Rate Last Admin    triamcinolone acetonide injection 20 mg  20 mg Intramuscular 1 time in Clinic/Tani Finney FNP           Past Medical History:   Diagnosis Date    Allergy     DDD (degenerative disc disease), lumbar     Diabetes mellitus     diet controlled    Diabetes mellitus, type 2     GERD (gastroesophageal reflux disease)     History of subconjunctival hemorrhage 2011    left eye    Hyperlipidemia     Hypertension     IBS (irritable bowel syndrome)     Obesity     MYRIAM (obstructive sleep apnea)     on CPAP    Rotator cuff impingement syndrome     Seasonal allergic conjunctivitis        Past Surgical History:   Procedure Laterality Date    CATARACT EXTRACTION W/  INTRAOCULAR LENS IMPLANT Right 10/03/2013        CATARACT EXTRACTION W/  INTRAOCULAR LENS IMPLANT Left 2022         SECTION      x2    CHOLECYSTECTOMY      COLONOSCOPY N/A 2018    Procedure: COLONOSCOPY;  Surgeon: Marie Mejia MD;  Location: [facility];  Service: Endoscopy;  Laterality: N/A;    COLONOSCOPY N/A 2022    Procedure: COLONOSCOPY;  Surgeon: Pierce Rivera MD;  Location: [facility];  Service: Endoscopy;  Laterality: N/A;    ENDOSCOPIC ULTRASOUND OF UPPER GASTROINTESTINAL TRACT N/A 10/09/2018    Procedure: ULTRASOUND, UPPER GI TRACT,  ENDOSCOPIC;  Surgeon: Javy Simpson MD;  Location: [facility] ENDO;  Service: Endoscopy;  Laterality: N/A;    EXCISION OF LESION N/A 02/13/2019    Procedure: EXCISION, LESION VULVA;  Surgeon: Liv Odell MD;  Location: [facility] OR;  Service: OB/GYN;  Laterality: N/A;  latex allergy    EYE SURGERY      HYSTERECTOMY      ovaries intact, due to DUB    INTRAOCULAR PROSTHESES INSERTION Left 11/2/2022    Procedure: INSERTION, IOL PROSTHESIS;  Surgeon: Clarice Herzog MD;  Location: [address];  Service: Ophthalmology;  Laterality: Left;    PHACOEMULSIFICATION OF CATARACT Left 11/2/2022    Procedure: PHACOEMULSIFICATION, CATARACT;  Surgeon: Clarice Herzog MD;  Location: [facility] OR [address] [place] [address];  Service: Ophthalmology;  Laterality: Left;    TUBAL LIGATION         Review of Systems:  ROS:  Constitutional: no fever or chills  Eyes: no visual changes  ENT: no nasal congestion or sore throat  Respiratory: no cough or shortness of breath  Musculoskeletal: no arthralgias or myalgias      OBJECTIVE:     PHYSICAL EXAM:    General: Weight: 88.6 kg (195 lb 5.2 oz) Body mass index is 34.6 kg/m².  Patient is alert, awake and oriented to time, place and person. Mood and affect are appropriate.  Patient does not appear to be in any distress, denies any constitutional symptoms and appears stated age.   HEENT: Pupils are equal and round, sclera are not injected. External examination of ears and nose reveals no abnormalities. Cranial nerves II-X are grossly intact  Neck: examination demonstrates painless  active range of motion. Spurling's sign is negative  Skin: no rashes, abrasions or open wounds on the affected extremity   Resp: No respiratory distress or audible wheezing   Psych:  normal mood and behavior  CV: 2+  pulses, all extremities warm and well perfused   Right Shoulder   Skin intact  No warmth or effusion  Tenderness: none  Range of motion is painful   ROM: forward flexion 160, external rotation 40, internal rotation  L1  Shoulder Strength: biceps 5/5, triceps 5/5, abduction 5/5, adduction 5/5  positive for impingement sign, sensory exam normal, and motor exam normal    Special Tests:    Crossbody test: negative    Neer's positive  Hawkin's positive    Lisbeth's positive  Drop arm negative      IMAGING:  X-rays of the right shoulder, personally reviewed by me, demonstrate well maintained joint space.  No fracture or dislocation.     ASSESSMENT     1. Subacromial bursitis of right shoulder joint    2. Acute pain of right shoulder      PLAN:     Discussed with the patient at length all the different treatment options available for her right shoulder including anti-inflammatories, acetaminophen, rest, ice, Physical therapy, occasional cortisone injections for temporary relief, and shoulder arthroscopy    - She will continue meloxicam x 1 week  - she has PT scheduled- advised to try this and then if symptoms worsen or don't improve she may follow up for CSI  - Follow up if symptoms worsen or fail to improve

## 2023-10-26 ENCOUNTER — PATIENT MESSAGE (OUTPATIENT)
Dept: INTERNAL MEDICINE | Facility: CLINIC | Age: 73
End: 2023-10-26
Payer: MEDICARE

## 2023-10-30 ENCOUNTER — TELEPHONE (OUTPATIENT)
Dept: FAMILY MEDICINE | Facility: CLINIC | Age: 73
End: 2023-10-30
Payer: MEDICARE

## 2023-10-30 DIAGNOSIS — F32.A DEPRESSION, UNSPECIFIED: ICD-10-CM

## 2023-10-30 RX ORDER — CITALOPRAM 10 MG/1
10 TABLET ORAL
Qty: 90 TABLET | Refills: 1 | Status: SHIPPED | OUTPATIENT
Start: 2023-10-30 | End: 2023-12-01 | Stop reason: SDUPTHER

## 2023-10-30 NOTE — TELEPHONE ENCOUNTER
Care Due:                  Date            Visit Type   Department     Provider  --------------------------------------------------------------------------------                                EP -                              PRIMARY      [facility] INTERNAL  Last Visit: 10-      CARE ([facility])   MEDICINE       Smitha Turner  Next Visit: None Scheduled  None         None Found                                                            Last  Test          Frequency    Reason                     Performed    Due Date  --------------------------------------------------------------------------------    CBC.........  12 months..  meloxicam................  10-   10-    [facility] Embedded Care Due Messages. Reference number: [id number].   10/30/2023 12:24:11 AM CDT

## 2023-10-30 NOTE — TELEPHONE ENCOUNTER
Refill Routing Note   Medication(s) are not appropriate for processing by [facility] for the following reason(s):      No active prescription written by provider    [facility] action(s):  Defer Care Due:  Labs due            Appointments  past 12m or future 3m with PCP    Date Provider   Last Visit   10/17/2023 Smitha Turner MD   Next Visit   Visit date not found mSitha Turner MD   ED visits in past 90 days: 0        Note composed:7:53 AM 10/30/2023

## 2023-10-30 NOTE — PROGRESS NOTES
[facility]   Physical Therapy Initial Evaluation      Name: Chelly Ortiz  Clinic Number: [id number]    Therapy Diagnosis:   Encounter Diagnoses   Name Primary?    Acute pain of right shoulder     Decreased ROM of right shoulder         Physician: Smitha Turner MD    Physician Orders: PT Eval and Treat   Medical Diagnosis from Referral: M25.511 (ICD-10-CM) - Acute pain of right shoulder  Evaluation Date: 10/31/2023  Authorization Period Expiration: 10/16/24  Plan of Care Expiration: 12/31/23  Progress Note Due: 12/1/23  Visit # / Visits authorized: 1/ 1   FOTO: 0/5    Precautions: Standard     Time In: 1:07 pm   Time Out: 2:06 pm  Total Billable Time: 59 minutes    Subjective     Date of onset: 5-6 Weeks Ago     History of current condition - Chelly reports: Started doing housework and moving some storage bags but the next week it began to hurt and then after about a week it became very painful and difficult to lift.  It is now very stiff as well and she cannot sleep on her right side.  She thinks she had an MRI years ago that showed impingement     Falls: No falls on it     Imaging: X-Ray:  The right humeral head maintains appropriate relationship with the glenoid.  The acromioclavicular joint is intact.  No acute displaced right rib fracture.  The right lung zones are clear.       Prior Therapy: Yes for the back and neck   Social History: Lives with    Occupation: Retired   Prior Level of Function: No Limitations   Current Level of Function: Can't Vacuum, Sweep, Reaching to Cabinets.    Pain:  Current 0/10, worst 9/10, best 0/10   Location:   Posterior/Top Shoulder   Description: Cutting   Aggravating Factors: Lying on it or moving it   Easing Factors: nothing    Patients goals: No pain with household chores      Medical History:   Past Medical History:   Diagnosis Date    Allergy     DDD (degenerative disc disease), lumbar     Diabetes mellitus     diet controlled     Diabetes mellitus, type 2     GERD (gastroesophageal reflux disease)     History of subconjunctival hemorrhage 2011    left eye    Hyperlipidemia     Hypertension     IBS (irritable bowel syndrome)     Obesity     MYRIAM (obstructive sleep apnea)     on CPAP    Rotator cuff impingement syndrome     Seasonal allergic conjunctivitis        Surgical History:   Chelly Ortiz  has a past surgical history that includes  section; Cholecystectomy; Eye surgery; Tubal ligation; Hysterectomy; Endoscopic ultrasound of upper gastrointestinal tract (N/A, 10/09/2018); Colonoscopy (N/A, 2018); Excision of lesion (N/A, 2019); Cataract extraction w/  intraocular lens implant (Right, 10/03/2013); Colonoscopy (N/A, 2022); Cataract extraction w/  intraocular lens implant (Left, 2022); Phacoemulsification of cataract (Left, 2022); and Intraocular prosthesis insertion (Left, 2022).    Medications:   Chelly has a current medication list which includes the following prescription(s): accu-chek fastclix lancing dev, albuterol, amlodipine, aspirin, atorvastatin, azelastine, accu-chek guide l1-l2 ctrl sol, blood sugar diagnostic, blood-glucose meter, calcium carbonate, cetirizine, citalopram, ergocalciferol, esomeprazole, fluocinonide, fluticasone propionate, gabapentin, glucose, insulin aspart u-100, insulin aspart u-100, levemir flexpen, levemir flexpen, insulin syringe-needle u-100, insulin syringe-needle u-100, lancets, linaclotide, magnesium oxide, meloxicam, montelukast, pen needle, diabetic, and trazodone, and the following Facility-Administered Medications: triamcinolone acetonide.    Allergies:   Review of patient's allergies indicates:   Allergen Reactions    Ace inhibitors Hives     \Cough    Latex, natural rubber Itching        Objective    Strength unable to be tested due to pain and irritability    Right ROM Right MMT Left ROM Left MMT Notes:   Shoulder Flexion:  (180) 65  160     Shoulder  Abduction:  (180) 52  120     Shoulder ER:  (90) 75  80     Shoulder IR:  (70) Belt Line   Mid Back      Lower Trap: N/A  N/A     Middle Trap: N/A  N/A     Upper Trap:  N/A  N/A     Serratus Anterior: N/A  N/A     Rhomboids: N/A  N/A     Elbow Flexion: Full  Full     Elbow Extension: Full  Full      Strength: Right =  Left = Equal     Posture Assessment:  Abudcted slightly downward rotation on right - forward humeral head - elevated left shoulder significant diving in of thoracic spine with flattened kyphosis    Observation:     Cervical Cluster:  Spurling's (+) Reverse +    Distraction (--) (--)   ULTT A (+) (+)   Ipsilateral Rotation <60 degrees (+) No Pain    Cervical Rotation:  54 48   Cervical Side Bendin 32   Cervical Flexion:  50    Cervical Extension:  40 Pain         Special Tests:   Tests Right UE:  Left UE:    Drop Arm (+) (--)   Belly Lift Off (--) (--)   Horizontal Adduction/Paxinos (--) (--)   Anterior Apprehension  (--) (--)   Lisbeth Relocation (--) (--)   Neer's  (+) (--)   Hornblower's Sign  (+) (--)     Sensation: Not formally assessed due to no complaints of sensation   Dermatomes Right Left Comments   C4      C5      C6      C7      C8      T1      Assessed Formally Next time      Intake Outcome Measure for FOTO Cervical Survey    Therapist reviewed FOTO scores for Chelly Ortiz on 10/31/2023.   FOTO documents entered into [employer] - see Media section.    Intake Score: TBD%         Treatment     Total Treatment time (time-based codes) separate from Evaluation: 16 minutes     Chelly received the treatments listed below:      Therapeutic Exercises to develop strength, endurance, ROM, posture, and core stabilization for 8 minutes including:  Exercise Sets Reps Time Status   Active Range of Motion Flexion                                  Manual Therapy Techniques: The techniques below were applied for 8 minutes:  Technique Location  Status   Joint Mobilization  Posterior and Inferior  Completed                          Neuromuscular Re-Education activities to improve:  for 0 minutes. The following activities were included:  Exercise Sets Reps Time Status                                    Therapeutic Activities to improve functional performance for 0  minutes, including:  Exercise Sets Reps Time Status                                    gait training to improve functional mobility and safety for 0  minutes, including:      direct contact modalities after being cleared for contraindications:     supervised modalities after being cleared for contradictions:     hot pack for 0 minutes to .    cold pack for 0 minutes to .    Patient Education and Home Exercises     Education provided:   - HEP  - Plan of Care  - Relation of Cervical Spine and Shoulder Pain     Written Home Exercises Provided: yes. Exercises were reviewed and Chelly was able to demonstrate them prior to the end of the session.  Chelly demonstrated good  understanding of the education provided. See EMR under Patient Instructions for exercises provided during therapy sessions.    Assessment     Chelly is a 73 y.o. female referred to outpatient Physical Therapy with a medical diagnosis of M25.511 (ICD-10-CM) - Acute pain of right shoulder. Patient presents with an acute presentation of right shoulder pain.  She is unsure what the original cause was but thinks it could have begun after cleaning the house.  She has a history of neck and back pain with referral to the shoulder.  Patient has a positive spurling's test, upper limb tension test, and limited rotation.  Following the clinical prediction rule patient has a near certain likelihood of have cervical radiculopathy referring into the shoulder.  Patient could additionally have a pathological process occurring in the shoulder due to the severity of the Range of Motion loss.  Because patient was so irritable today, it was difficult to assess true cause of the pathology.  Patient did have aberrant  scapulothoracic motions that will improve shoulder mechanics and improve symptoms.      Patient prognosis is Good.   Patient will benefit from skilled outpatient Physical Therapy to address the deficits stated above and in the chart below, provide patient /family education, and to maximize patientt's level of independence.     Plan of care discussed with patient: Yes  Patient's spiritual, cultural and educational needs considered and patient is agreeable to the plan of care and goals as stated below:     Anticipated Barriers for therapy: None at this time     Medical Necessity is demonstrated by the following  History  Co-morbidities and personal factors that may impact the plan of care [] LOW: no personal factors / co-morbidities  [x] MODERATE: 1-2 personal factors / co-morbidities  [] HIGH: 3+ personal factors / co-morbidities    Moderate / High Support Documentation:   Co-morbidities affecting plan of care: DDD, Diabetes     Personal Factors:        Examination  Body Structures and Functions, activity limitations and participation restrictions that may impact the plan of care [x] LOW: addressing 1-2 elements  [] MODERATE: 3+ elements  [] HIGH: 4+ elements (please support below)    Moderate / High Support Documentation: Shoulder Range of Motion      Clinical Presentation [x] LOW: stable  [] MODERATE: Evolving  [] HIGH: Unstable     Decision Making/ Complexity Score: low       Goals:  Short Term Goals (4 Weeks):   1. Pt will be independent with HEP to supplement PT in improving pain free cervical mobility  2. Pt will improve cervical AROM 10 deg in rotational planes to improve cervical mobility for driving  3. Pt will improve UE MMTs by 1/2 grade in all planes to improve strength for lifting and carrying tasks.  4. Pt will demonstrate improved sitting posture to decrease pain experienced in head and neck.  Long Term Goals (8 Weeks):   1. Pt will improve FOTO to </=TBD% limitation to improve perceived limitation  with changing and maintaining mobility.  2. Pt will improve cervical AROM to WNL in all planes to improve cervical mobility for driving   3. Pt will improve UE MMTs 1 grade in all planes to improve strength for lifting and carrying tasks.  4. Pt will report no pain with lifting 10 lbs to promote physical activity.   5. Pt will report no pain with cervical AROM in all planes to promote QOL.   Plan     Plan of care Certification: 10/31/2023 to 12/1/23.    Outpatient Physical Therapy 2 times weekly for 8 weeks to include the following interventions: Cervical/Lumbar Traction, Electrical Stimulation  , Gait Training, Manual Therapy, Moist Heat/ Ice, Neuromuscular Re-ed, Patient Education, Self Care, Therapeutic Activities, Therapeutic Exercise, and Ultrasound.     John Goyal, PT

## 2023-10-30 NOTE — TELEPHONE ENCOUNTER
----- Message from Mira Rosas sent at 10/30/2023  9:06 AM CDT -----  Contact: [phone]  [email or web address]     Patient is calling for Medical Advice regarding:Patient has an appointment for today for PT and orders have not been received. Patient's PT has called 3 times     How long has patient had these symptoms:    Pharmacy name and phone#:    Would like response via [employer]:  call     Comments:

## 2023-10-30 NOTE — TELEPHONE ENCOUNTER
Refill has been assessed and routed appropriately. Patient call note routed to [facility] as refill request. Redirecting back to sender as [facility] cannot assess further and no action to be completed by [facility].

## 2023-10-31 ENCOUNTER — PATIENT MESSAGE (OUTPATIENT)
Dept: INTERNAL MEDICINE | Facility: CLINIC | Age: 73
End: 2023-10-31
Payer: MEDICARE

## 2023-10-31 ENCOUNTER — CLINICAL SUPPORT (OUTPATIENT)
Dept: REHABILITATION | Facility: HOSPITAL | Age: 73
End: 2023-10-31
Payer: MEDICARE

## 2023-10-31 DIAGNOSIS — M25.511 ACUTE PAIN OF RIGHT SHOULDER: ICD-10-CM

## 2023-10-31 DIAGNOSIS — M25.611 DECREASED ROM OF RIGHT SHOULDER: ICD-10-CM

## 2023-10-31 PROCEDURE — 97110 THERAPEUTIC EXERCISES: CPT

## 2023-10-31 PROCEDURE — 97161 PT EVAL LOW COMPLEX 20 MIN: CPT

## 2023-10-31 PROCEDURE — 97140 MANUAL THERAPY 1/> REGIONS: CPT

## 2023-11-01 NOTE — PLAN OF CARE
[facility]   Physical Therapy Initial Evaluation      Name: Chelly Ortiz  Clinic Number: [id number]    Therapy Diagnosis:   Encounter Diagnoses   Name Primary?    Acute pain of right shoulder     Decreased ROM of right shoulder         Physician: Smitha Turner MD    Physician Orders: PT Eval and Treat   Medical Diagnosis from Referral: M25.511 (ICD-10-CM) - Acute pain of right shoulder  Evaluation Date: 10/31/2023  Authorization Period Expiration: 10/16/24  Plan of Care Expiration: 12/31/23  Progress Note Due: 12/1/23  Visit # / Visits authorized: 1/ 1   FOTO: 0/5    Precautions: Standard     Time In: 1:07 pm   Time Out: 2:06 pm  Total Billable Time: 59 minutes    Subjective     Date of onset: 5-6 Weeks Ago     History of current condition - Chelly reports: Started doing housework and moving some storage bags but the next week it began to hurt and then after about a week it became very painful and difficult to lift.  It is now very stiff as well and she cannot sleep on her right side.  She thinks she had an MRI years ago that showed impingement     Falls: No falls on it     Imaging: X-Ray:  The right humeral head maintains appropriate relationship with the glenoid.  The acromioclavicular joint is intact.  No acute displaced right rib fracture.  The right lung zones are clear.       Prior Therapy: Yes for the back and neck   Social History: Lives with    Occupation: Retired   Prior Level of Function: No Limitations   Current Level of Function: Can't Vacuum, Sweep, Reaching to Cabinets.    Pain:  Current 0/10, worst 9/10, best 0/10   Location:   Posterior/Top Shoulder   Description: Cutting   Aggravating Factors: Lying on it or moving it   Easing Factors: nothing    Patients goals: No pain with household chores      Medical History:   Past Medical History:   Diagnosis Date    Allergy     DDD (degenerative disc disease), lumbar     Diabetes mellitus     diet controlled     Diabetes mellitus, type 2     GERD (gastroesophageal reflux disease)     History of subconjunctival hemorrhage 2011    left eye    Hyperlipidemia     Hypertension     IBS (irritable bowel syndrome)     Obesity     MYRIAM (obstructive sleep apnea)     on CPAP    Rotator cuff impingement syndrome     Seasonal allergic conjunctivitis        Surgical History:   Chelly Ortiz  has a past surgical history that includes  section; Cholecystectomy; Eye surgery; Tubal ligation; Hysterectomy; Endoscopic ultrasound of upper gastrointestinal tract (N/A, 10/09/2018); Colonoscopy (N/A, 2018); Excision of lesion (N/A, 2019); Cataract extraction w/  intraocular lens implant (Right, 10/03/2013); Colonoscopy (N/A, 2022); Cataract extraction w/  intraocular lens implant (Left, 2022); Phacoemulsification of cataract (Left, 2022); and Intraocular prosthesis insertion (Left, 2022).    Medications:   Chelly has a current medication list which includes the following prescription(s): accu-chek fastclix lancing dev, albuterol, amlodipine, aspirin, atorvastatin, azelastine, accu-chek guide l1-l2 ctrl sol, blood sugar diagnostic, blood-glucose meter, calcium carbonate, cetirizine, citalopram, ergocalciferol, esomeprazole, fluocinonide, fluticasone propionate, gabapentin, glucose, insulin aspart u-100, insulin aspart u-100, levemir flexpen, levemir flexpen, insulin syringe-needle u-100, insulin syringe-needle u-100, lancets, linaclotide, magnesium oxide, meloxicam, montelukast, pen needle, diabetic, and trazodone, and the following Facility-Administered Medications: triamcinolone acetonide.    Allergies:   Review of patient's allergies indicates:   Allergen Reactions    Ace inhibitors Hives     \Cough    Latex, natural rubber Itching        Objective    Strength unable to be tested due to pain and irritability    Right ROM Right MMT Left ROM Left MMT Notes:   Shoulder Flexion:  (180) 65  160     Shoulder  Abduction:  (180) 52  120     Shoulder ER:  (90) 75  80     Shoulder IR:  (70) Belt Line   Mid Back      Lower Trap: N/A  N/A     Middle Trap: N/A  N/A     Upper Trap:  N/A  N/A     Serratus Anterior: N/A  N/A     Rhomboids: N/A  N/A     Elbow Flexion: Full  Full     Elbow Extension: Full  Full      Strength: Right =  Left = Equal     Posture Assessment:  Abudcted slightly downward rotation on right - forward humeral head - elevated left shoulder significant diving in of thoracic spine with flattened kyphosis    Observation:     Cervical Cluster:  Spurling's (+) Reverse +    Distraction (--) (--)   ULTT A (+) (+)   Ipsilateral Rotation <60 degrees (+) No Pain    Cervical Rotation:  54 48   Cervical Side Bendin 32   Cervical Flexion:  50    Cervical Extension:  40 Pain         Special Tests:   Tests Right UE:  Left UE:    Drop Arm (+) (--)   Belly Lift Off (--) (--)   Horizontal Adduction/Paxinos (--) (--)   Anterior Apprehension  (--) (--)   Lisbeth Relocation (--) (--)   Neer's  (+) (--)   Hornblower's Sign  (+) (--)     Sensation: Not formally assessed due to no complaints of sensation   Dermatomes Right Left Comments   C4      C5      C6      C7      C8      T1      Assessed Formally Next time      Intake Outcome Measure for FOTO Cervical Survey    Therapist reviewed FOTO scores for Chelly Ortiz on 10/31/2023.   FOTO documents entered into [employer] [facility] - see Media section.    Intake Score: TBD%         Treatment     Total Treatment time (time-based codes) separate from Evaluation: 16 minutes     Chelly received the treatments listed below:      Therapeutic Exercises to develop strength, endurance, ROM, posture, and core stabilization for 8 minutes including:  Exercise Sets Reps Time Status   Active Range of Motion Flexion                                  Manual Therapy Techniques: The techniques below were applied for 8 minutes:  Technique Location  Status   Joint Mobilization  Posterior and Inferior  Completed                          Neuromuscular Re-Education activities to improve:  for 0 minutes. The following activities were included:  Exercise Sets Reps Time Status                                    Therapeutic Activities to improve functional performance for 0  minutes, including:  Exercise Sets Reps Time Status                                    gait training to improve functional mobility and safety for 0  minutes, including:      direct contact modalities after being cleared for contraindications:     supervised modalities after being cleared for contradictions:     hot pack for 0 minutes to .    cold pack for 0 minutes to .    Patient Education and Home Exercises     Education provided:   - HEP  - Plan of Care  - Relation of Cervical Spine and Shoulder Pain     Written Home Exercises Provided: yes. Exercises were reviewed and Chelly was able to demonstrate them prior to the end of the session.  Chelly demonstrated good  understanding of the education provided. See EMR under Patient Instructions for exercises provided during therapy sessions.    Assessment     Chelly is a 73 y.o. female referred to outpatient Physical Therapy with a medical diagnosis of M25.511 (ICD-10-CM) - Acute pain of right shoulder. Patient presents with an acute presentation of right shoulder pain.  She is unsure what the original cause was but thinks it could have begun after cleaning the house.  She has a history of neck and back pain with referral to the shoulder.  Patient has a positive spurling's test, upper limb tension test, and limited rotation.  Following the clinical prediction rule patient has a near certain likelihood of have cervical radiculopathy referring into the shoulder.  Patient could additionally have a pathological process occurring in the shoulder due to the severity of the Range of Motion loss.  Because patient was so irritable today, it was difficult to assess true cause of the pathology.  Patient did have aberrant  scapulothoracic motions that will improve shoulder mechanics and improve symptoms.      Patient prognosis is Good.   Patient will benefit from skilled outpatient Physical Therapy to address the deficits stated above and in the chart below, provide patient /family education, and to maximize patientt's level of independence.     Plan of care discussed with patient: Yes  Patient's spiritual, cultural and educational needs considered and patient is agreeable to the plan of care and goals as stated below:     Anticipated Barriers for therapy: None at this time     Medical Necessity is demonstrated by the following  History  Co-morbidities and personal factors that may impact the plan of care [] LOW: no personal factors / co-morbidities  [x] MODERATE: 1-2 personal factors / co-morbidities  [] HIGH: 3+ personal factors / co-morbidities    Moderate / High Support Documentation:   Co-morbidities affecting plan of care: DDD, Diabetes     Personal Factors:        Examination  Body Structures and Functions, activity limitations and participation restrictions that may impact the plan of care [x] LOW: addressing 1-2 elements  [] MODERATE: 3+ elements  [] HIGH: 4+ elements (please support below)    Moderate / High Support Documentation: Shoulder Range of Motion      Clinical Presentation [x] LOW: stable  [] MODERATE: Evolving  [] HIGH: Unstable     Decision Making/ Complexity Score: low       Goals:  Short Term Goals (4 Weeks):   1. Pt will be independent with HEP to supplement PT in improving pain free cervical mobility  2. Pt will improve cervical AROM 10 deg in rotational planes to improve cervical mobility for driving  3. Pt will improve UE MMTs by 1/2 grade in all planes to improve strength for lifting and carrying tasks.  4. Pt will demonstrate improved sitting posture to decrease pain experienced in head and neck.  Long Term Goals (8 Weeks):   1. Pt will improve FOTO to </=TBD% limitation to improve perceived limitation  with changing and maintaining mobility.  2. Pt will improve cervical AROM to WNL in all planes to improve cervical mobility for driving   3. Pt will improve UE MMTs 1 grade in all planes to improve strength for lifting and carrying tasks.  4. Pt will report no pain with lifting 10 lbs to promote physical activity.   5. Pt will report no pain with cervical AROM in all planes to promote QOL.   Plan     Plan of care Certification: 10/31/2023 to 12/1/23.    Outpatient Physical Therapy 2 times weekly for 8 weeks to include the following interventions: Cervical/Lumbar Traction, Electrical Stimulation , Gait Training, Manual Therapy, Moist Heat/ Ice, Neuromuscular Re-ed, Patient Education, Self Care, Therapeutic Activities, Therapeutic Exercise, and Ultrasound.     John Goyal, PT

## 2023-11-06 ENCOUNTER — PATIENT MESSAGE (OUTPATIENT)
Dept: ORTHOPEDICS | Facility: CLINIC | Age: 73
End: 2023-11-06
Payer: MEDICARE

## 2023-11-07 ENCOUNTER — OFFICE VISIT (OUTPATIENT)
Dept: PODIATRY | Facility: CLINIC | Age: 73
End: 2023-11-07
Payer: MEDICARE

## 2023-11-07 ENCOUNTER — OFFICE VISIT (OUTPATIENT)
Dept: ORTHOPEDICS | Facility: CLINIC | Age: 73
End: 2023-11-07
Payer: MEDICARE

## 2023-11-07 VITALS
SYSTOLIC BLOOD PRESSURE: 135 MMHG | BODY MASS INDEX: 34.57 KG/M2 | HEIGHT: 63 IN | WEIGHT: 195 LBS | DIASTOLIC BLOOD PRESSURE: 74 MMHG | WEIGHT: 195.13 LBS | RESPIRATION RATE: 18 BRPM | BODY MASS INDEX: 34.55 KG/M2 | HEART RATE: 75 BPM | HEIGHT: 63 IN

## 2023-11-07 DIAGNOSIS — M20.42 HAMMERTOE OF SECOND TOE OF LEFT FOOT: ICD-10-CM

## 2023-11-07 DIAGNOSIS — E11.49 TYPE II DIABETES MELLITUS WITH NEUROLOGICAL MANIFESTATIONS: ICD-10-CM

## 2023-11-07 DIAGNOSIS — M75.51 SUBACROMIAL BURSITIS OF RIGHT SHOULDER JOINT: Primary | ICD-10-CM

## 2023-11-07 DIAGNOSIS — M20.12 HALLUX ABDUCTOVALGUS, LEFT: Primary | ICD-10-CM

## 2023-11-07 PROCEDURE — 3061F NEG MICROALBUMINURIA REV: CPT | Mod: CPTII,S$GLB,, | Performed by: PHYSICIAN ASSISTANT

## 2023-11-07 PROCEDURE — 20610 DRAIN/INJ JOINT/BURSA W/O US: CPT | Mod: RT,S$GLB,, | Performed by: PHYSICIAN ASSISTANT

## 2023-11-07 PROCEDURE — 1160F PR REVIEW ALL MEDS BY PRESCRIBER/CLIN PHARMACIST DOCUMENTED: ICD-10-PCS | Mod: CPTII,S$GLB,, | Performed by: PHYSICIAN ASSISTANT

## 2023-11-07 PROCEDURE — 99999 PR PBB SHADOW E&M-EST. PATIENT-LVL IV: CPT | Mod: PBBFAC,,, | Performed by: PHYSICIAN ASSISTANT

## 2023-11-07 PROCEDURE — 3066F NEPHROPATHY DOC TX: CPT | Mod: CPTII,S$GLB,, | Performed by: PODIATRIST

## 2023-11-07 PROCEDURE — 3044F PR MOST RECENT HEMOGLOBIN A1C LEVEL <7.0%: ICD-10-PCS | Mod: CPTII,S$GLB,, | Performed by: PHYSICIAN ASSISTANT

## 2023-11-07 PROCEDURE — 3044F HG A1C LEVEL LT 7.0%: CPT | Mod: CPTII,S$GLB,, | Performed by: PHYSICIAN ASSISTANT

## 2023-11-07 PROCEDURE — 1101F PR PT FALLS ASSESS DOC 0-1 FALLS W/OUT INJ PAST YR: ICD-10-PCS | Mod: CPTII,S$GLB,, | Performed by: PODIATRIST

## 2023-11-07 PROCEDURE — 1101F PT FALLS ASSESS-DOCD LE1/YR: CPT | Mod: CPTII,S$GLB,, | Performed by: PODIATRIST

## 2023-11-07 PROCEDURE — 3078F DIAST BP <80 MM HG: CPT | Mod: CPTII,S$GLB,, | Performed by: PODIATRIST

## 2023-11-07 PROCEDURE — 1125F PR PAIN SEVERITY QUANTIFIED, PAIN PRESENT: ICD-10-PCS | Mod: CPTII,S$GLB,, | Performed by: PODIATRIST

## 2023-11-07 PROCEDURE — 1159F PR MEDICATION LIST DOCUMENTED IN MEDICAL RECORD: ICD-10-PCS | Mod: CPTII,S$GLB,, | Performed by: PHYSICIAN ASSISTANT

## 2023-11-07 PROCEDURE — 1159F MED LIST DOCD IN RCRD: CPT | Mod: CPTII,S$GLB,, | Performed by: PHYSICIAN ASSISTANT

## 2023-11-07 PROCEDURE — 3061F PR NEG MICROALBUMINURIA RESULT DOCUMENTED/REVIEW: ICD-10-PCS | Mod: CPTII,S$GLB,, | Performed by: PODIATRIST

## 2023-11-07 PROCEDURE — 3044F PR MOST RECENT HEMOGLOBIN A1C LEVEL <7.0%: ICD-10-PCS | Mod: CPTII,S$GLB,, | Performed by: PODIATRIST

## 2023-11-07 PROCEDURE — 1160F RVW MEDS BY RX/DR IN RCRD: CPT | Mod: CPTII,S$GLB,, | Performed by: PHYSICIAN ASSISTANT

## 2023-11-07 PROCEDURE — 3288F FALL RISK ASSESSMENT DOCD: CPT | Mod: CPTII,S$GLB,, | Performed by: PODIATRIST

## 2023-11-07 PROCEDURE — 99213 PR OFFICE/OUTPT VISIT, EST, LEVL III, 20-29 MIN: ICD-10-PCS | Mod: 25,S$GLB,, | Performed by: PHYSICIAN ASSISTANT

## 2023-11-07 PROCEDURE — 20610 LARGE JOINT ASPIRATION/INJECTION: R SUBACROMIAL BURSA: ICD-10-PCS | Mod: RT,S$GLB,, | Performed by: PHYSICIAN ASSISTANT

## 2023-11-07 PROCEDURE — 3075F PR MOST RECENT SYSTOLIC BLOOD PRESS GE 130-139MM HG: ICD-10-PCS | Mod: CPTII,S$GLB,, | Performed by: PODIATRIST

## 2023-11-07 PROCEDURE — 3061F NEG MICROALBUMINURIA REV: CPT | Mod: CPTII,S$GLB,, | Performed by: PODIATRIST

## 2023-11-07 PROCEDURE — 3288F FALL RISK ASSESSMENT DOCD: CPT | Mod: CPTII,S$GLB,, | Performed by: PHYSICIAN ASSISTANT

## 2023-11-07 PROCEDURE — 99213 OFFICE O/P EST LOW 20 MIN: CPT | Mod: 25,S$GLB,, | Performed by: PHYSICIAN ASSISTANT

## 2023-11-07 PROCEDURE — 99213 PR OFFICE/OUTPT VISIT, EST, LEVL III, 20-29 MIN: ICD-10-PCS | Mod: S$GLB,,, | Performed by: PODIATRIST

## 2023-11-07 PROCEDURE — 1101F PT FALLS ASSESS-DOCD LE1/YR: CPT | Mod: CPTII,S$GLB,, | Performed by: PHYSICIAN ASSISTANT

## 2023-11-07 PROCEDURE — 3078F PR MOST RECENT DIASTOLIC BLOOD PRESSURE < 80 MM HG: ICD-10-PCS | Mod: CPTII,S$GLB,, | Performed by: PODIATRIST

## 2023-11-07 PROCEDURE — 99999 PR PBB SHADOW E&M-EST. PATIENT-LVL III: ICD-10-PCS | Mod: PBBFAC,,, | Performed by: PODIATRIST

## 2023-11-07 PROCEDURE — 99213 OFFICE O/P EST LOW 20 MIN: CPT | Mod: S$GLB,,, | Performed by: PODIATRIST

## 2023-11-07 PROCEDURE — 3288F PR FALLS RISK ASSESSMENT DOCUMENTED: ICD-10-PCS | Mod: CPTII,S$GLB,, | Performed by: PHYSICIAN ASSISTANT

## 2023-11-07 PROCEDURE — 3288F PR FALLS RISK ASSESSMENT DOCUMENTED: ICD-10-PCS | Mod: CPTII,S$GLB,, | Performed by: PODIATRIST

## 2023-11-07 PROCEDURE — 1125F PR PAIN SEVERITY QUANTIFIED, PAIN PRESENT: ICD-10-PCS | Mod: CPTII,S$GLB,, | Performed by: PHYSICIAN ASSISTANT

## 2023-11-07 PROCEDURE — 99999 PR PBB SHADOW E&M-EST. PATIENT-LVL IV: ICD-10-PCS | Mod: PBBFAC,,, | Performed by: PHYSICIAN ASSISTANT

## 2023-11-07 PROCEDURE — 3075F SYST BP GE 130 - 139MM HG: CPT | Mod: CPTII,S$GLB,, | Performed by: PODIATRIST

## 2023-11-07 PROCEDURE — 1101F PR PT FALLS ASSESS DOC 0-1 FALLS W/OUT INJ PAST YR: ICD-10-PCS | Mod: CPTII,S$GLB,, | Performed by: PHYSICIAN ASSISTANT

## 2023-11-07 PROCEDURE — 3066F NEPHROPATHY DOC TX: CPT | Mod: CPTII,S$GLB,, | Performed by: PHYSICIAN ASSISTANT

## 2023-11-07 PROCEDURE — 99999 PR PBB SHADOW E&M-EST. PATIENT-LVL III: CPT | Mod: PBBFAC,,, | Performed by: PODIATRIST

## 2023-11-07 PROCEDURE — 3044F HG A1C LEVEL LT 7.0%: CPT | Mod: CPTII,S$GLB,, | Performed by: PODIATRIST

## 2023-11-07 PROCEDURE — 3066F PR DOCUMENTATION OF TREATMENT FOR NEPHROPATHY: ICD-10-PCS | Mod: CPTII,S$GLB,, | Performed by: PHYSICIAN ASSISTANT

## 2023-11-07 PROCEDURE — 1125F AMNT PAIN NOTED PAIN PRSNT: CPT | Mod: CPTII,S$GLB,, | Performed by: PHYSICIAN ASSISTANT

## 2023-11-07 PROCEDURE — 3061F PR NEG MICROALBUMINURIA RESULT DOCUMENTED/REVIEW: ICD-10-PCS | Mod: CPTII,S$GLB,, | Performed by: PHYSICIAN ASSISTANT

## 2023-11-07 PROCEDURE — 3066F PR DOCUMENTATION OF TREATMENT FOR NEPHROPATHY: ICD-10-PCS | Mod: CPTII,S$GLB,, | Performed by: PODIATRIST

## 2023-11-07 PROCEDURE — 1125F AMNT PAIN NOTED PAIN PRSNT: CPT | Mod: CPTII,S$GLB,, | Performed by: PODIATRIST

## 2023-11-07 RX ORDER — DICLOFENAC SODIUM 10 MG/G
2 GEL TOPICAL DAILY
Qty: 100 G | Refills: 1 | Status: SHIPPED | OUTPATIENT
Start: 2023-11-07 | End: 2024-01-12

## 2023-11-07 RX ORDER — LIDOCAINE HYDROCHLORIDE 10 MG/ML
2 INJECTION INFILTRATION; PERINEURAL
Status: DISCONTINUED | OUTPATIENT
Start: 2023-11-07 | End: 2023-11-07 | Stop reason: HOSPADM

## 2023-11-07 RX ORDER — TRIAMCINOLONE ACETONIDE 40 MG/ML
40 INJECTION, SUSPENSION INTRA-ARTICULAR; INTRAMUSCULAR
Status: DISCONTINUED | OUTPATIENT
Start: 2023-11-07 | End: 2023-11-07 | Stop reason: HOSPADM

## 2023-11-07 RX ADMIN — LIDOCAINE HYDROCHLORIDE 2 ML: 10 INJECTION INFILTRATION; PERINEURAL at 10:11

## 2023-11-07 RX ADMIN — TRIAMCINOLONE ACETONIDE 40 MG: 40 INJECTION, SUSPENSION INTRA-ARTICULAR; INTRAMUSCULAR at 10:11

## 2023-11-07 NOTE — PROCEDURES
Large Joint Aspiration/Injection: R subacromial bursa    Date/Time: 11/7/2023 10:00 AM    Performed by: Laney Rivas PA-C  Authorized by: Laney Rivas PA-C    Consent Done?:  Yes (Verbal)  Indications:  Pain  Timeout: prior to procedure the correct patient, procedure, and site was verified    Prep: patient was prepped and draped in usual sterile fashion    Local anesthetic:  Topical anesthetic    Details:  Needle Size:  22 G  Approach:  Posterior  Location:  Shoulder  Site:  R subacromial bursa  Medications:  40 mg triamcinolone acetonide 40 mg/mL; 2 mL LIDOcaine HCL 10 mg/ml (1%) 10 mg/mL (1 %)  Patient tolerance:  Patient tolerated the procedure well with no immediate complications

## 2023-11-07 NOTE — PROGRESS NOTES
"Patient ID: Chelly Ortiz is a 73 y.o. female.    Chief Complaint: Pain and Injections of the Right Shoulder      HISTORY:  Chelly Ortiz is a 73 y.o. female who returns to me today for follow up of right shoulder.  She is here for injection today.  She was last seen by me 10/25/2023.  Her pain is not improving with rest or meloxicam.    PMH/PSH/FamHx/SocHx:    Unchanged from prior visit.    ROS:  Constitution: Negative for chills, fever and weakness.   Respiratory: Negative for cough and shortness of breath.   Musculoskeletal: Positive for right shoulder  Psychiatric/Behavioral: The patient is not nervous/anxious.       PHYSICAL EXAM:   Ht 5' 2.99" (1.6 m)   Wt 88.5 kg (195 lb 1.7 oz)   LMP 08/01/2000   BMI 34.57 kg/m²   Right shoulder  Skin intact  No warmth or effusion  ROM is painful    ASSESSMENT/PLAN:    Chelly was seen today for pain and injections.    Diagnoses and all orders for this visit:    Subacromial bursitis of right shoulder joint  -     Large Joint Aspiration/Injection: R subacromial bursa    - Right shoulder CSI performed today  - rest, ice as needed  - Follow up if symptoms worsen or fail to improve      "

## 2023-11-08 ENCOUNTER — CLINICAL SUPPORT (OUTPATIENT)
Dept: REHABILITATION | Facility: HOSPITAL | Age: 73
End: 2023-11-08
Payer: MEDICARE

## 2023-11-08 DIAGNOSIS — M25.611 DECREASED ROM OF RIGHT SHOULDER: Primary | ICD-10-CM

## 2023-11-08 PROCEDURE — 97112 NEUROMUSCULAR REEDUCATION: CPT

## 2023-11-08 PROCEDURE — 97140 MANUAL THERAPY 1/> REGIONS: CPT

## 2023-11-08 NOTE — PROGRESS NOTES
[facility]   Physical Therapy Treatment Note      Name: Chelly Ortiz  Clinic Number: [id number]    Therapy Diagnosis: No diagnosis found.  Physician: Smitha Turner MD    Visit Date: 11/8/2023  Physician Orders: PT Eval and Treat   Medical Diagnosis from Referral: M25.511 (ICD-10-CM) - Acute pain of right shoulder  Evaluation Date: 10/31/2023  Authorization Period Expiration: 10/16/24  Plan of Care Expiration: 12/31/23  Progress Note Due: 12/1/23  Visit # / Visits authorized: 1/ 1 1/20  FOTO: 1/5 * Next Session     PTA Visit #: 0/5     Precautions: Standard     Time In: 3:04 pm  Time Out: 3:59 pm  Total Billable Time: 30 Minutes (55 Minutes Total)     Subjective     Pt reports: That she still has a significant amount of pain more clearly located in her neck.  She received a steroid shot yesterday has improved her symptoms some and she can move her arm more freely.      She was compliant with home exercise program.  Response to previous treatment: No Change - Initial Eval   Functional change: No Change - Initial Eval     Pain: 4/10  Location: Right Shoulder       Objective      Objective Measures updated at progress report unless specified.         Treatment     Chelly received the treatments listed below:      therapeutic exercises to develop strength, endurance, ROM, posture, and core stabilization for 18 minutes including:  Exercise Sets Reps Time Status   Active Assisted Range of Flexion 1# 2 10  Completed    D2 Flexion YTB 2 10  Completed    UBE 1 Min FWD 1 Min BWD    4 Minutes Completed               manual therapy techniques: The techniques below were applied for 9 minutes:   Technique Location  Status   Side Glides Mid-Cervical Spine (Left)  Completed    1st Rib Mobilization  Right Side  Completed    Joint Mobilization PA to Thoracic Spine Grade II  Completed               neuromuscular re-education activities to improve:  for 28 minutes. The following activities were  included:  Exercise Sets Reps Time Status   ER Walk Outs YTB 2 10  Completed    IR Walk Outs YTB 2 10  Completed    Scapular Retractions RTB 3 8 3 Sec Completed    Chin Tuck 1 10 10 Sec Completed    Wall Slides  2 15  Completed         therapeutic activities to improve functional performance for 0  minutes, including:  Exercise Sets Reps Time Status                                    gait training to improve functional mobility and safety for 0  minutes, including:      direct contact modalities after being cleared for contraindications:     supervised modalities after being cleared for contradictions:     hot pack for 0 minutes to .    cold pack for 0 minutes to .    Patient Education and Home Exercises       Education provided:   - HEP  - Plan of Care   - Anatomy   - Cervical Radiculopathy     Written Home Exercises Provided: yes. Exercises were reviewed and Chelly was able to demonstrate them prior to the end of the session.  Chelly demonstrated good  understanding of the education provided. See EMR under Patient Instructions for exercises provided during therapy sessions    Assessment     Chelly presents to physical therapy today with no real change in symptoms.  She has significant hypomobility of the thoracic spine with large hypertrophy of the paraspinals muscles.  Patient has significant tenderness to the upper traps and periscapular region which only allowed for grade II mobilizations to decrease pain and begin to unload the cervical spine.  Patient has decreased motor control of scapular musculature that is increasing forces placed through the cervical spine.  Patient presented with scapular winging with upper extremity motions that was to improve serratus control and activation.  Patient has decreased deep neck flexor control that creates the patient's posture to be in resting extension closing the facet.  Patient has increased pain with cervical extension but attempts to find relief there.  Patient will  benefit from further training to improve flexion and better positioning.  She will continued to be challenged as appropriate and manual interventions will be increased as tolerated.     Chelly Is progressing well towards her goals.   Pt prognosis is Good.     Pt will continue to benefit from skilled outpatient physical therapy to address the deficits listed in the problem list box on initial evaluation, provide pt/family education and to maximize pt's level of independence in the home and community environment.     Pt's spiritual, cultural and educational needs considered and pt agreeable to plan of care and goals.     Anticipated barriers to physical therapy: None at this time     Goals:   Short Term Goals (4 Weeks):   1. Pt will be independent with HEP to supplement PT in improving pain free cervical mobility  2. Pt will improve cervical AROM 10 deg in rotational planes to improve cervical mobility for driving  3. Pt will improve UE MMTs by 1/2 grade in all planes to improve strength for lifting and carrying tasks.  4. Pt will demonstrate improved sitting posture to decrease pain experienced in head and neck.  Long Term Goals (8 Weeks):   1. Pt will improve FOTO to </=TBD% limitation to improve perceived limitation with changing and maintaining mobility.  2. Pt will improve cervical AROM to WNL in all planes to improve cervical mobility for driving   3. Pt will improve UE MMTs 1 grade in all planes to improve strength for lifting and carrying tasks.  4. Pt will report no pain with lifting 10 lbs to promote physical activity.   5. Pt will report no pain with cervical AROM in all planes to promote QOL.   Plan      Plan of care Certification: 10/31/2023 to 12/1/23.     Outpatient Physical Therapy 2 times weekly for 8 weeks to include the following interventions: Cervical/Lumbar Traction, Electrical Stimulation , Gait Training, Manual Therapy, Moist Heat/ Ice, Neuromuscular Re-ed, Patient Education, Self Care,  Therapeutic Activities, Therapeutic Exercise, and Ultrasound.     John Goyal, PT

## 2023-11-19 NOTE — PROGRESS NOTES
[facility]   Physical Therapy Treatment Note      Name: Chelly Ortiz  Clinic Number: [id number]    Therapy Diagnosis:   Encounter Diagnosis   Name Primary?    Decreased ROM of right shoulder Yes     Physician: Smitha Turner MD    Visit Date: 11/20/2023  Physician Orders: PT Eval and Treat   Medical Diagnosis from Referral: M25.511 (ICD-10-CM) - Acute pain of right shoulder  Evaluation Date: 10/31/2023  Authorization Period Expiration: 10/16/24  Plan of Care Expiration: 12/31/23  Progress Note Due: 12/1/23  Visit # / Visits authorized: 1/ 1 2/20  FOTO: 2/5     PTA Visit #: 0/5     Precautions: Standard     Time In: 1:52 pm  Time Out: 2:55 pm  Total Billable Time: 63 Minutes (40 One on One)     Subjective     Pt reports: That she is feeling better but it is not healed and it still hurts when she has to do different things with arm, particularly when raising it above her head.  She feels increased pain in the neck when looking backwards.     She was compliant with home exercise program.  Response to previous treatment: Decreased Pain   Functional change: No Change      Pain: 4/10  Location: Right Shoulder       Objective      Objective Measures updated at progress report unless specified.         Treatment     Chelly received the treatments listed below:      therapeutic exercises to develop strength, endurance, ROM, posture, and core stabilization for 6 minutes including:  Exercise Sets Reps Time Status   Active Assisted Range of Flexion 1# 2 10  Not Completed    D2 Flexion YTB 2 10  Not Completed    UBE 1 Min FWD 1 Min BWD    6 Minutes Completed               manual therapy techniques: The techniques below were applied for 18 minutes:   Technique Location  Status   Side Glides Mid-Cervical Spine (Left)  Completed    1st Rib Mobilization  Right Side  Not Completed    Joint Mobilization PA to Thoracic Spine Grade III   OA Flexion Mobilizations  Completed               neuromuscular  re-education activities to improve:  for 39 minutes. The following activities were included:  Exercise Sets Reps Time Status   ER Walk Outs YTB 2 10  Completed    IR Walk Outs YTB 2 10  Completed    Scapular Retractions RTB 3 8 3 Sec Not Completed    Chin Tuck 1 10 10 Sec Completed    Wall Slides  2 15  Completed    No Money's YTB 2 12  Completed   TheraBand Ts  2 12  Completed   Serratus Punches 2# 2 12  Completed         therapeutic activities to improve functional performance for 0  minutes, including:  Exercise Sets Reps Time Status                                    gait training to improve functional mobility and safety for 0  minutes, including:      direct contact modalities after being cleared for contraindications:     supervised modalities after being cleared for contradictions:     hot pack for 0 minutes to .    cold pack for 0 minutes to .    Patient Education and Home Exercises       Education provided:   - HEP  - Plan of Care   - Anatomy   - Cervical Radiculopathy     Written Home Exercises Provided: yes. Exercises were reviewed and Chelly was able to demonstrate them prior to the end of the session.  Chelly demonstrated good  understanding of the education provided. See EMR under Patient Instructions for exercises provided during therapy sessions    Assessment     Chelly presents to physical therapy today with continued pain in the neck and shoulder.  Patient is extremely lordotic in the cervical spine in resting posture and sits with significant upper cervical spine extension that could be impinging nerve roots and creating the radiating symptoms.  Session focused on improving cervical posture with increasing deep cervical neck flexor strengthening.  Patient continues to improve periscapular strengthening with focus on mid-traps and serratus anterior to improve upward rotation.  She continued to receive education on improved posture with cueing for cervical flexion and reduced cervical extension while  completing upper extremity motions.  She has significant difficulty with deep cervical neck flexion and requires cueing and assistance.  She will continued to be challenged as appropriate and manual interventions will be increased as tolerated.     Chelly Is progressing well towards her goals.   Pt prognosis is Good.     Pt will continue to benefit from skilled outpatient physical therapy to address the deficits listed in the problem list box on initial evaluation, provide pt/family education and to maximize pt's level of independence in the home and community environment.     Pt's spiritual, cultural and educational needs considered and pt agreeable to plan of care and goals.     Anticipated barriers to physical therapy: None at this time     Goals:   Short Term Goals (4 Weeks):   1. Pt will be independent with HEP to supplement PT in improving pain free cervical mobility  2. Pt will improve cervical AROM 10 deg in rotational planes to improve cervical mobility for driving  3. Pt will improve UE MMTs by 1/2 grade in all planes to improve strength for lifting and carrying tasks.  4. Pt will demonstrate improved sitting posture to decrease pain experienced in head and neck.  Long Term Goals (8 Weeks):   1. Pt will improve FOTO to </=TBD% limitation to improve perceived limitation with changing and maintaining mobility.  2. Pt will improve cervical AROM to WNL in all planes to improve cervical mobility for driving   3. Pt will improve UE MMTs 1 grade in all planes to improve strength for lifting and carrying tasks.  4. Pt will report no pain with lifting 10 lbs to promote physical activity.   5. Pt will report no pain with cervical AROM in all planes to promote QOL.   Plan      Plan of care Certification: 10/31/2023 to 12/1/23.     Outpatient Physical Therapy 2 times weekly for 8 weeks to include the following interventions: Cervical/Lumbar Traction, Electrical Stimulation , Gait Training, Manual Therapy, Moist Heat/  Ice, Neuromuscular Re-ed, Patient Education, Self Care, Therapeutic Activities, Therapeutic Exercise, and Ultrasound.     John Goyal, PT

## 2023-11-20 ENCOUNTER — CLINICAL SUPPORT (OUTPATIENT)
Dept: REHABILITATION | Facility: HOSPITAL | Age: 73
End: 2023-11-20
Payer: MEDICARE

## 2023-11-20 ENCOUNTER — PATIENT MESSAGE (OUTPATIENT)
Dept: INTERNAL MEDICINE | Facility: CLINIC | Age: 73
End: 2023-11-20
Payer: MEDICARE

## 2023-11-20 DIAGNOSIS — M25.611 DECREASED ROM OF RIGHT SHOULDER: Primary | ICD-10-CM

## 2023-11-20 PROCEDURE — 97112 NEUROMUSCULAR REEDUCATION: CPT

## 2023-11-20 PROCEDURE — 97140 MANUAL THERAPY 1/> REGIONS: CPT

## 2023-11-22 ENCOUNTER — PATIENT MESSAGE (OUTPATIENT)
Dept: ADMINISTRATIVE | Facility: HOSPITAL | Age: 73
End: 2023-11-22
Payer: MEDICARE

## 2023-11-27 NOTE — PROGRESS NOTES
Subjective:      Patient ID: Chelly Ortiz is a 73 y.o. female.    Chief Complaint: Follow-up (Left ball of foot pain)    Chelly is a 73 y.o. female who presents to the clinic upon referral from Dr. Venice multani. provider found  for evaluation and treatment of diabetic feet. Chelly has a past medical history of Allergy, DDD (degenerative disc disease), lumbar, Diabetes mellitus, Diabetes mellitus, type 2, GERD (gastroesophageal reflux disease), History of subconjunctival hemorrhage (12/02/2011), Hyperlipidemia, Hypertension, IBS (irritable bowel syndrome), Obesity, MYRIAM (obstructive sleep apnea), Rotator cuff impingement syndrome, and Seasonal allergic conjunctivitis. Patient relates no major problem with feet. Only complaints today consist of yearly comprehensive diabetic  foot examination.  pain L 2nd toe .    PCP: Smitha Turner MD    Date Last Seen by PCP:   Chief Complaint   Patient presents with    Follow-up     Left ball of foot pain         Current shoe gear: Casual shoes    Hemoglobin A1C   Date Value Ref Range Status   10/24/2023 6.4 (H) 4.0 - 5.6 % Final     Comment:     ADA Screening Guidelines:  5.7-6.4%  Consistent with prediabetes  >or=6.5%  Consistent with diabetes    High levels of fetal hemoglobin interfere with the HbA1C  assay. Heterozygous hemoglobin variants (HbS, HgC, etc)do  not significantly interfere with this assay.   However, presence of multiple variants may affect accuracy.     04/26/2023 6.5 (H) 4.0 - 5.6 % Final     Comment:     ADA Screening Guidelines:  5.7-6.4%  Consistent with prediabetes  >or=6.5%  Consistent with diabetes    High levels of fetal hemoglobin interfere with the HbA1C  assay. Heterozygous hemoglobin variants (HbS, HgC, etc)do  not significantly interfere with this assay.   However, presence of multiple variants may affect accuracy.     12/13/2022 6.5 (H) 4.0 - 5.6 % Final     Comment:     ADA Screening Guidelines:  5.7-6.4%  Consistent with prediabetes  >or=6.5%   "Consistent with diabetes    High levels of fetal hemoglobin interfere with the HbA1C  assay. Heterozygous hemoglobin variants (HbS, HgC, etc)do  not significantly interfere with this assay.   However, presence of multiple variants may affect accuracy.             Review of Systems   Constitutional: Negative for chills, decreased appetite and fever.   Cardiovascular:  Negative for leg swelling.   Respiratory:  Negative for cough.    Skin:  Negative for flushing and itching.   Musculoskeletal:  Negative for arthritis, joint pain, joint swelling and myalgias.   Gastrointestinal:  Negative for nausea and vomiting.   Neurological:  Negative for loss of balance, numbness and paresthesias.           Objective:      Vitals:    11/07/23 1407   BP: 135/74   Pulse: 75   Resp: 18   Weight: 88.5 kg (195 lb)   Height: 5' 3" (1.6 m)   PainSc:   5   PainLoc: Foot        Physical Exam  Vitals and nursing note reviewed.   Constitutional:       General: She is not in acute distress.     Appearance: She is well-developed. She is not toxic-appearing or diaphoretic.      Comments: alert and oriented x 3.    Cardiovascular:      Pulses:           Dorsalis pedis pulses are 2+ on the right side and 2+ on the left side.        Posterior tibial pulses are 2+ on the right side and 2+ on the left side.      Comments:  Capillary refill time is within normal limits. Digital hair present.   Pulmonary:      Effort: No respiratory distress.   Musculoskeletal:         General: Deformity (b/l hallux ) present.      Right ankle: No tenderness. No lateral malleolus, medial malleolus, AITF ligament, CF ligament or posterior TF ligament tenderness.      Right Achilles Tendon: No defects. Gonzalez's test negative.      Left ankle: No tenderness. No lateral malleolus, medial malleolus, AITF ligament, CF ligament or posterior TF ligament tenderness.      Left Achilles Tendon: No defects. Gonzalez's test negative.      Right foot: No tenderness or bony " tenderness.      Left foot: No tenderness or bony tenderness.      Comments: Adequate joint range of motion without pain, limitation, nor crepitation Bilateral feet and ankle joints. Muscle strength is 5/5 in all groups bilaterally.        Decreased first MPJ range of motion both weightbearing and nonweightbearing, no crepitus observed the first MP joint, - dorsal flag sign.Mild  bunion deformity is observed   There is pain on palpation of the L 2nd   intermetatarsal space with a - Merritt's click. Minimal tenderness to palpation of the adjacent metatarsal heads.     Feet:      Right foot:      Protective Sensation: 5 sites tested.  5 sites sensed.      Left foot:      Protective Sensation: 5 sites tested.  5 sites sensed.   Lymphadenopathy:      Comments: No lymphatic streaking     Skin:     General: Skin is warm and dry.      Coloration: Skin is not pale.      Findings: No rash.      Nails: There is no clubbing.      Comments: Skin is of normal turgor.   Normal temperature gradient.  Examination of the skin reveals no evidence of significant rashes, open lesions, suspicious appearing nevi or other concerning lesions.        Toenails 1-5 bilaterally are neatly trimmed; of normal color and thickness     Neurological:      Sensory: No sensory deficit.      Motor: No atrophy.      Comments: Light touch present     Psychiatric:         Attention and Perception: She is attentive.         Mood and Affect: Mood is not anxious. Affect is not inappropriate.         Speech: She is communicative. Speech is not slurred.         Behavior: Behavior is not combative.               Assessment:       Encounter Diagnoses   Name Primary?    Hallux abductovalgus, left Yes    Hammertoe of second toe of left foot          Plan:       Chelly was seen today for follow-up.    Diagnoses and all orders for this visit:    Hallux abductovalgus, left    Hammertoe of second toe of left foot    Other orders  -     diclofenac sodium (VOLTAREN) 1 %  Gel; Apply 2 g topically once daily.      I counseled the patient on her conditions, their implications and medical management.      - Shoe inspection. Diabetic Foot Education. Patient reminded of the importance of good nutrition and blood sugar control to help prevent podiatric complications of diabetes. Patient instructed on proper foot hygeine. We discussed wearing proper shoe gear, daily foot inspections, never walking without protective shoe gear, caution putting sharp instruments to feet     - Discussed DM foot care:  Wear comfortable, proper fitting shoes. Wash feet daily. Dry well. After drying, apply moisturizer to feet (no lotion to webspaces). Inspect feet daily for skin breaks, blisters, swelling, or redness. Wear cotton socks (preferably white)  Change socks every day. Do NOT walk barefoot. Do NOT use heating pads or warm/hot water soaks     - Discussed importance of daily moisturizer to the feet such as Gold bonds diabetic foot cream    - Patient is low risk for developing lower extremity issues secondary to diabetes. I recommend continued yearly diabetic foot examinations.     - Patients PCP can perform yearly foot checks . Currently  patient has no pedal manifestations of DM    - Patients with out pedal manifestations of DM, do not qualify for nail/callus trimming     - Discussed surgical and conservative management of b/l hav deformity. Conservatively we did discuss padding, and shoe modifications such as softer shoes with wide toe boxes. Surgically we briefly discussed pre and post operative expectations. The patient elects for conservative management at this time     - Rx Voltaren gel 1%, use as instructed. Discontinue  usage if any adverse effects should occur

## 2023-12-01 ENCOUNTER — OFFICE VISIT (OUTPATIENT)
Dept: INTERNAL MEDICINE | Facility: CLINIC | Age: 73
End: 2023-12-01
Payer: MEDICARE

## 2023-12-01 ENCOUNTER — LAB VISIT (OUTPATIENT)
Dept: LAB | Facility: HOSPITAL | Age: 73
End: 2023-12-01
Payer: MEDICARE

## 2023-12-01 VITALS
DIASTOLIC BLOOD PRESSURE: 76 MMHG | OXYGEN SATURATION: 97 % | HEART RATE: 99 BPM | HEIGHT: 64 IN | BODY MASS INDEX: 33.2 KG/M2 | SYSTOLIC BLOOD PRESSURE: 138 MMHG | WEIGHT: 194.44 LBS

## 2023-12-01 DIAGNOSIS — R25.2 LEG CRAMPS: ICD-10-CM

## 2023-12-01 DIAGNOSIS — M25.512 CHRONIC PAIN OF BOTH SHOULDERS: Primary | ICD-10-CM

## 2023-12-01 DIAGNOSIS — F32.A DEPRESSION, UNSPECIFIED: ICD-10-CM

## 2023-12-01 DIAGNOSIS — J01.00 ACUTE MAXILLARY SINUSITIS, RECURRENCE NOT SPECIFIED: ICD-10-CM

## 2023-12-01 DIAGNOSIS — M79.10 MYALGIA: ICD-10-CM

## 2023-12-01 DIAGNOSIS — I10 ESSENTIAL HYPERTENSION: ICD-10-CM

## 2023-12-01 DIAGNOSIS — G89.29 CHRONIC PAIN OF BOTH SHOULDERS: Primary | ICD-10-CM

## 2023-12-01 DIAGNOSIS — M25.511 CHRONIC PAIN OF BOTH SHOULDERS: Primary | ICD-10-CM

## 2023-12-01 LAB
ALBUMIN SERPL BCP-MCNC: 3.5 G/DL (ref 3.5–5.2)
ALP SERPL-CCNC: 120 U/L (ref 55–135)
ALT SERPL W/O P-5'-P-CCNC: 12 U/L (ref 10–44)
ANION GAP SERPL CALC-SCNC: 9 MMOL/L (ref 8–16)
AST SERPL-CCNC: 17 U/L (ref 10–40)
BILIRUB SERPL-MCNC: 0.4 MG/DL (ref 0.1–1)
BUN SERPL-MCNC: 18 MG/DL (ref 8–23)
CALCIUM SERPL-MCNC: 9.1 MG/DL (ref 8.7–10.5)
CHLORIDE SERPL-SCNC: 108 MMOL/L (ref 95–110)
CO2 SERPL-SCNC: 25 MMOL/L (ref 23–29)
CREAT SERPL-MCNC: 0.9 MG/DL (ref 0.5–1.4)
EST. GFR  (NO RACE VARIABLE): >60 ML/MIN/1.73 M^2
GLUCOSE SERPL-MCNC: 114 MG/DL (ref 70–110)
MAGNESIUM SERPL-MCNC: 2.3 MG/DL (ref 1.6–2.6)
PHOSPHATE SERPL-MCNC: 2.5 MG/DL (ref 2.7–4.5)
POTASSIUM SERPL-SCNC: 3.7 MMOL/L (ref 3.5–5.1)
PROT SERPL-MCNC: 7.2 G/DL (ref 6–8.4)
SODIUM SERPL-SCNC: 142 MMOL/L (ref 136–145)

## 2023-12-01 PROCEDURE — 99214 PR OFFICE/OUTPT VISIT, EST, LEVL IV, 30-39 MIN: ICD-10-PCS | Mod: S$GLB,,, | Performed by: INTERNAL MEDICINE

## 2023-12-01 PROCEDURE — 1159F MED LIST DOCD IN RCRD: CPT | Mod: CPTII,S$GLB,, | Performed by: INTERNAL MEDICINE

## 2023-12-01 PROCEDURE — 99214 OFFICE O/P EST MOD 30 MIN: CPT | Mod: S$GLB,,, | Performed by: INTERNAL MEDICINE

## 2023-12-01 PROCEDURE — 3078F PR MOST RECENT DIASTOLIC BLOOD PRESSURE < 80 MM HG: ICD-10-PCS | Mod: CPTII,S$GLB,, | Performed by: INTERNAL MEDICINE

## 2023-12-01 PROCEDURE — 3066F PR DOCUMENTATION OF TREATMENT FOR NEPHROPATHY: ICD-10-PCS | Mod: CPTII,S$GLB,, | Performed by: INTERNAL MEDICINE

## 2023-12-01 PROCEDURE — 3075F SYST BP GE 130 - 139MM HG: CPT | Mod: CPTII,S$GLB,, | Performed by: INTERNAL MEDICINE

## 2023-12-01 PROCEDURE — 3061F NEG MICROALBUMINURIA REV: CPT | Mod: CPTII,S$GLB,, | Performed by: INTERNAL MEDICINE

## 2023-12-01 PROCEDURE — 99999 PR PBB SHADOW E&M-EST. PATIENT-LVL V: ICD-10-PCS | Mod: PBBFAC,,, | Performed by: INTERNAL MEDICINE

## 2023-12-01 PROCEDURE — 1125F PR PAIN SEVERITY QUANTIFIED, PAIN PRESENT: ICD-10-PCS | Mod: CPTII,S$GLB,, | Performed by: INTERNAL MEDICINE

## 2023-12-01 PROCEDURE — 3075F PR MOST RECENT SYSTOLIC BLOOD PRESS GE 130-139MM HG: ICD-10-PCS | Mod: CPTII,S$GLB,, | Performed by: INTERNAL MEDICINE

## 2023-12-01 PROCEDURE — 84100 ASSAY OF PHOSPHORUS: CPT | Performed by: INTERNAL MEDICINE

## 2023-12-01 PROCEDURE — 1125F AMNT PAIN NOTED PAIN PRSNT: CPT | Mod: CPTII,S$GLB,, | Performed by: INTERNAL MEDICINE

## 2023-12-01 PROCEDURE — 83735 ASSAY OF MAGNESIUM: CPT | Performed by: INTERNAL MEDICINE

## 2023-12-01 PROCEDURE — 3044F HG A1C LEVEL LT 7.0%: CPT | Mod: CPTII,S$GLB,, | Performed by: INTERNAL MEDICINE

## 2023-12-01 PROCEDURE — 36415 COLL VENOUS BLD VENIPUNCTURE: CPT | Performed by: INTERNAL MEDICINE

## 2023-12-01 PROCEDURE — 1159F PR MEDICATION LIST DOCUMENTED IN MEDICAL RECORD: ICD-10-PCS | Mod: CPTII,S$GLB,, | Performed by: INTERNAL MEDICINE

## 2023-12-01 PROCEDURE — 3044F PR MOST RECENT HEMOGLOBIN A1C LEVEL <7.0%: ICD-10-PCS | Mod: CPTII,S$GLB,, | Performed by: INTERNAL MEDICINE

## 2023-12-01 PROCEDURE — 80053 COMPREHEN METABOLIC PANEL: CPT | Performed by: INTERNAL MEDICINE

## 2023-12-01 PROCEDURE — 3066F NEPHROPATHY DOC TX: CPT | Mod: CPTII,S$GLB,, | Performed by: INTERNAL MEDICINE

## 2023-12-01 PROCEDURE — 3078F DIAST BP <80 MM HG: CPT | Mod: CPTII,S$GLB,, | Performed by: INTERNAL MEDICINE

## 2023-12-01 PROCEDURE — 3061F PR NEG MICROALBUMINURIA RESULT DOCUMENTED/REVIEW: ICD-10-PCS | Mod: CPTII,S$GLB,, | Performed by: INTERNAL MEDICINE

## 2023-12-01 PROCEDURE — 99999 PR PBB SHADOW E&M-EST. PATIENT-LVL V: CPT | Mod: PBBFAC,,, | Performed by: INTERNAL MEDICINE

## 2023-12-01 RX ORDER — CITALOPRAM 10 MG/1
10 TABLET ORAL DAILY
Qty: 90 TABLET | Refills: 1 | Status: SHIPPED | OUTPATIENT
Start: 2023-12-01

## 2023-12-01 RX ORDER — MELOXICAM 7.5 MG/1
7.5 TABLET ORAL DAILY
Qty: 30 TABLET | Refills: 0 | Status: SHIPPED | OUTPATIENT
Start: 2023-12-01 | End: 2023-12-28

## 2023-12-01 RX ORDER — AMLODIPINE BESYLATE 10 MG/1
10 TABLET ORAL DAILY
Qty: 90 TABLET | Refills: 3 | Status: SHIPPED | OUTPATIENT
Start: 2023-12-01 | End: 2024-11-25

## 2023-12-01 RX ORDER — FLUTICASONE PROPIONATE 50 MCG
1 SPRAY, SUSPENSION (ML) NASAL DAILY
Qty: 9.9 ML | Refills: 0 | Status: SHIPPED | OUTPATIENT
Start: 2023-12-01 | End: 2024-01-12 | Stop reason: SDUPTHER

## 2023-12-01 RX ORDER — EZETIMIBE 10 MG/1
10 TABLET ORAL DAILY
Qty: 90 TABLET | Refills: 3 | Status: SHIPPED | OUTPATIENT
Start: 2023-12-01 | End: 2024-11-30

## 2023-12-01 RX ORDER — GABAPENTIN 100 MG/1
200 CAPSULE ORAL NIGHTLY
Qty: 180 CAPSULE | Refills: 3 | Status: SHIPPED | OUTPATIENT
Start: 2023-12-01 | End: 2024-11-30

## 2023-12-01 NOTE — PATIENT INSTRUCTIONS
Drink plenty of water throughout the day.   Stretch your legs well before bed.   Take gabapentin 200 mg before bed.  Take magnesium before bed   Drink a little tonic water, small amount

## 2023-12-01 NOTE — PROGRESS NOTES
Subjective:       Patient ID: Chelly Ortiz is a 73 y.o. female.    Chief Complaint: Leg Pain    Has been experiencing  severe leg and foot pain every  day for about a month.     She stopped taking statin due to myalgias . She was on atorvastatin 40 mg daily.      Having right shoulder pain. Pain with movement. Has been doing PT and had steroid injection with ortho.      DMII on insulin and trulicity      HTN on amlodipine      HLD has nt been taking statin as above.      Insomnia on trazodone      Health Maintenance:  Colon Cancer Screening: colonoscopy in 2022 with polyps removed due again in 2027  Mammogram: January 2023 was normal   Hep C:negative 2007   Lipids: normal 2023   Vaccines:  up to date      Leg Pain         Review of Systems   Constitutional:  Negative for activity change, appetite change and chills.   HENT:  Negative for ear pain, sinus pressure/congestion and sneezing.    Respiratory:  Negative for cough and shortness of breath.    Cardiovascular:  Negative for chest pain, palpitations and leg swelling.   Gastrointestinal:  Negative for abdominal distention, abdominal pain, constipation, diarrhea, nausea and vomiting.   Genitourinary:  Negative for dysuria and hematuria.   Musculoskeletal:  Positive for leg pain. Negative for arthralgias, back pain and myalgias.   Neurological:  Negative for dizziness and headaches.   Psychiatric/Behavioral:  Negative for agitation. The patient is not nervous/anxious.            Past Medical History:   Diagnosis Date    Allergy     DDD (degenerative disc disease), lumbar     Diabetes mellitus     diet controlled    Diabetes mellitus, type 2     GERD (gastroesophageal reflux disease)     History of subconjunctival hemorrhage 12/02/2011    left eye    Hyperlipidemia     Hypertension     IBS (irritable bowel syndrome)     Obesity     MYRIAM (obstructive sleep apnea)     on CPAP    Rotator cuff impingement syndrome     Seasonal allergic conjunctivitis      Past Surgical  History:   Procedure Laterality Date    CATARACT EXTRACTION W/  INTRAOCULAR LENS IMPLANT Right 10/03/2013        CATARACT EXTRACTION W/  INTRAOCULAR LENS IMPLANT Left 2022         SECTION      x2    CHOLECYSTECTOMY      COLONOSCOPY N/A 2018    Procedure: COLONOSCOPY;  Surgeon: Marie Mejia MD;  Location: [facility];  Service: Endoscopy;  Laterality: N/A;    COLONOSCOPY N/A 2022    Procedure: COLONOSCOPY;  Surgeon: Pierce Rivera MD;  Location: [facility];  Service: Endoscopy;  Laterality: N/A;    ENDOSCOPIC ULTRASOUND OF UPPER GASTROINTESTINAL TRACT N/A 10/09/2018    Procedure: ULTRASOUND, UPPER GI TRACT, ENDOSCOPIC;  Surgeon: Javy Simpson MD;  Location: [facility];  Service: Endoscopy;  Laterality: N/A;    EXCISION OF LESION N/A 2019    Procedure: EXCISION, LESION VULVA;  Surgeon: Liv Odell MD;  Location: [facility];  Service: OB/GYN;  Laterality: N/A;  latex allergy    EYE SURGERY      HYSTERECTOMY      ovaries intact, due to DUB    INTRAOCULAR PROSTHESES INSERTION Left 2022    Procedure: INSERTION, IOL PROSTHESIS;  Surgeon: Clarice Herzog MD;  Location: [facility] OR [address] [place] [address];  Service: Ophthalmology;  Laterality: Left;    PHACOEMULSIFICATION OF CATARACT Left 2022    Procedure: PHACOEMULSIFICATION, CATARACT;  Surgeon: Clarice Herzog MD;  Location: [facility] OR [address] [place] [address];  Service: Ophthalmology;  Laterality: Left;    TUBAL LIGATION        Patient Active Problem List   Diagnosis    GERD (gastroesophageal reflux disease)    MYRIAM (obstructive sleep apnea)    IBS (irritable bowel syndrome)    DDD (degenerative disc disease), lumbar    Insomnia    Anxiety    Hearing loss, sensorineural    Nuclear sclerotic cataract of left eye    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    Constipation    History of colon polyps    Essential hypertension    Hyperlipidemia, unspecified    Spondylosis of cervical region without myelopathy or radiculopathy     Cervical myofascial pain syndrome    PCO (posterior capsular opacification), right    Dry eye syndrome    Pseudophakia    Decreased range of motion of lumbar spine    Decreased strength    Dilated bile duct    Personal history of colonic polyps    EIC (epidermal inclusion cyst)    Sedentary lifestyle    Dysphagia    Abdominal aortic atherosclerosis    Decreased ROM of neck    Arthritis of multiple sites    Osteopenia of multiple sites    Combined form of age-related cataract, left eye    Adjustment disorder with anxious mood    Other forms of angina pectoris    Decreased ROM of right shoulder        Objective:      Physical Exam  Constitutional:       Appearance: Normal appearance.   HENT:      Head: Normocephalic.   Cardiovascular:      Rate and Rhythm: Normal rate and regular rhythm.      Pulses: Normal pulses.      Heart sounds: Normal heart sounds.   Pulmonary:      Effort: Pulmonary effort is normal.      Breath sounds: Normal breath sounds.   Abdominal:      General: Abdomen is flat. Bowel sounds are normal.      Palpations: Abdomen is soft.   Musculoskeletal:         General: Normal range of motion.      Cervical back: Normal range of motion and neck supple.   Skin:     General: Skin is warm and dry.   Neurological:      General: No focal deficit present.      Mental Status: She is alert and oriented to person, place, and time.   Psychiatric:         Mood and Affect: Mood normal.         Assessment:       Problem List Items Addressed This Visit          Cardiac/Vascular    Essential hypertension    Relevant Medications    amLODIPine (NORVASC) 10 MG tablet     Other Visit Diagnoses       Chronic pain of both shoulders    -  Primary    Relevant Orders    Ambulatory referral/consult to Orthopedics    Leg cramps        Relevant Medications    gabapentin (NEURONTIN) 100 MG capsule    Other Relevant Orders    COMPREHENSIVE METABOLIC PANEL    Magnesium    PHOSPHORUS    Myalgia        Relevant Medications     gabapentin (NEURONTIN) 100 MG capsule    Acute maxillary sinusitis, recurrence not specified        Relevant Medications    fluticasone propionate (FLONASE) 50 mcg/actuation nasal spray    Depression, unspecified        Relevant Medications    citalopram (CELEXA) 10 MG tablet            Plan:         Chelly was seen today for leg pain.    Diagnoses and all orders for this visit:    Chronic pain of both shoulders  -     Ambulatory referral/consult to Orthopedics; Future  -     meloxicam (MOBIC) 7.5 MG tablet; Take 1 tablet (7.5 mg total) by mouth once daily.    Leg cramps  -     COMPREHENSIVE METABOLIC PANEL; Future  -     gabapentin (NEURONTIN) 100 MG capsule; Take 2 capsules (200 mg total) by mouth every evening.  -     Magnesium; Future  -     PHOSPHORUS; Future    Myalgia  -     gabapentin (NEURONTIN) 100 MG capsule; Take 2 capsules (200 mg total) by mouth every evening.    Acute maxillary sinusitis, recurrence not specified  -     fluticasone propionate (FLONASE) 50 mcg/actuation nasal spray; 1 spray (50 mcg total) by Each Nostril route once daily.    Depression, unspecified  -     citalopram (CELEXA) 10 MG tablet; Take 1 tablet (10 mg total) by mouth once daily.  Well controlled on celexa     Essential hypertension  -     amLODIPine (NORVASC) 10 MG tablet; Take 1 tablet (10 mg total) by mouth once daily.  Well controlled on current meds    Hyperlipidemia   -     ezetimibe (ZETIA) 10 mg tablet; Take 1 tablet (10 mg total) by mouth once daily.              Smitha Turner MD   Internal Medicine   Primary Care

## 2023-12-05 ENCOUNTER — OFFICE VISIT (OUTPATIENT)
Dept: PAIN MEDICINE | Facility: CLINIC | Age: 73
End: 2023-12-05
Payer: MEDICARE

## 2023-12-05 VITALS
SYSTOLIC BLOOD PRESSURE: 154 MMHG | WEIGHT: 192.88 LBS | HEART RATE: 92 BPM | DIASTOLIC BLOOD PRESSURE: 81 MMHG | HEIGHT: 64 IN | BODY MASS INDEX: 32.93 KG/M2

## 2023-12-05 DIAGNOSIS — M25.511 CHRONIC PAIN OF BOTH SHOULDERS: ICD-10-CM

## 2023-12-05 DIAGNOSIS — M75.51 SUBACROMIAL BURSITIS OF BOTH SHOULDERS: Primary | ICD-10-CM

## 2023-12-05 DIAGNOSIS — M25.512 CHRONIC PAIN OF BOTH SHOULDERS: ICD-10-CM

## 2023-12-05 DIAGNOSIS — G89.29 CHRONIC PAIN OF BOTH SHOULDERS: ICD-10-CM

## 2023-12-05 DIAGNOSIS — M75.52 SUBACROMIAL BURSITIS OF BOTH SHOULDERS: Primary | ICD-10-CM

## 2023-12-05 PROCEDURE — 3061F NEG MICROALBUMINURIA REV: CPT | Mod: CPTII,S$GLB,, | Performed by: NURSE PRACTITIONER

## 2023-12-05 PROCEDURE — 99214 OFFICE O/P EST MOD 30 MIN: CPT | Mod: 25,S$GLB,, | Performed by: NURSE PRACTITIONER

## 2023-12-05 PROCEDURE — 3077F PR MOST RECENT SYSTOLIC BLOOD PRESSURE >= 140 MM HG: ICD-10-PCS | Mod: CPTII,S$GLB,, | Performed by: NURSE PRACTITIONER

## 2023-12-05 PROCEDURE — 3061F PR NEG MICROALBUMINURIA RESULT DOCUMENTED/REVIEW: ICD-10-PCS | Mod: CPTII,S$GLB,, | Performed by: NURSE PRACTITIONER

## 2023-12-05 PROCEDURE — 3077F SYST BP >= 140 MM HG: CPT | Mod: CPTII,S$GLB,, | Performed by: NURSE PRACTITIONER

## 2023-12-05 PROCEDURE — 3079F PR MOST RECENT DIASTOLIC BLOOD PRESSURE 80-89 MM HG: ICD-10-PCS | Mod: CPTII,S$GLB,, | Performed by: NURSE PRACTITIONER

## 2023-12-05 PROCEDURE — 3079F DIAST BP 80-89 MM HG: CPT | Mod: CPTII,S$GLB,, | Performed by: NURSE PRACTITIONER

## 2023-12-05 PROCEDURE — 1159F MED LIST DOCD IN RCRD: CPT | Mod: CPTII,S$GLB,, | Performed by: NURSE PRACTITIONER

## 2023-12-05 PROCEDURE — 20610: ICD-10-PCS | Mod: LT,S$GLB,, | Performed by: NURSE PRACTITIONER

## 2023-12-05 PROCEDURE — 99214 PR OFFICE/OUTPT VISIT, EST, LEVL IV, 30-39 MIN: ICD-10-PCS | Mod: 25,S$GLB,, | Performed by: NURSE PRACTITIONER

## 2023-12-05 PROCEDURE — 1125F AMNT PAIN NOTED PAIN PRSNT: CPT | Mod: CPTII,S$GLB,, | Performed by: NURSE PRACTITIONER

## 2023-12-05 PROCEDURE — 3044F HG A1C LEVEL LT 7.0%: CPT | Mod: CPTII,S$GLB,, | Performed by: NURSE PRACTITIONER

## 2023-12-05 PROCEDURE — 3066F PR DOCUMENTATION OF TREATMENT FOR NEPHROPATHY: ICD-10-PCS | Mod: CPTII,S$GLB,, | Performed by: NURSE PRACTITIONER

## 2023-12-05 PROCEDURE — 3066F NEPHROPATHY DOC TX: CPT | Mod: CPTII,S$GLB,, | Performed by: NURSE PRACTITIONER

## 2023-12-05 PROCEDURE — 1160F PR REVIEW ALL MEDS BY PRESCRIBER/CLIN PHARMACIST DOCUMENTED: ICD-10-PCS | Mod: CPTII,S$GLB,, | Performed by: NURSE PRACTITIONER

## 2023-12-05 PROCEDURE — 99999 PR PBB SHADOW E&M-EST. PATIENT-LVL V: ICD-10-PCS | Mod: PBBFAC,,, | Performed by: NURSE PRACTITIONER

## 2023-12-05 PROCEDURE — 1160F RVW MEDS BY RX/DR IN RCRD: CPT | Mod: CPTII,S$GLB,, | Performed by: NURSE PRACTITIONER

## 2023-12-05 PROCEDURE — 1159F PR MEDICATION LIST DOCUMENTED IN MEDICAL RECORD: ICD-10-PCS | Mod: CPTII,S$GLB,, | Performed by: NURSE PRACTITIONER

## 2023-12-05 PROCEDURE — 99999 PR PBB SHADOW E&M-EST. PATIENT-LVL V: CPT | Mod: PBBFAC,,, | Performed by: NURSE PRACTITIONER

## 2023-12-05 PROCEDURE — 20610 DRAIN/INJ JOINT/BURSA W/O US: CPT | Mod: LT,S$GLB,, | Performed by: NURSE PRACTITIONER

## 2023-12-05 PROCEDURE — 1125F PR PAIN SEVERITY QUANTIFIED, PAIN PRESENT: ICD-10-PCS | Mod: CPTII,S$GLB,, | Performed by: NURSE PRACTITIONER

## 2023-12-05 PROCEDURE — 3044F PR MOST RECENT HEMOGLOBIN A1C LEVEL <7.0%: ICD-10-PCS | Mod: CPTII,S$GLB,, | Performed by: NURSE PRACTITIONER

## 2023-12-05 RX ORDER — METHYLPREDNISOLONE ACETATE 40 MG/ML
40 INJECTION, SUSPENSION INTRA-ARTICULAR; INTRALESIONAL; INTRAMUSCULAR; SOFT TISSUE
Status: COMPLETED | OUTPATIENT
Start: 2023-12-05 | End: 2023-12-05

## 2023-12-05 RX ORDER — METHYLPREDNISOLONE ACETATE 40 MG/ML
40 INJECTION, SUSPENSION INTRA-ARTICULAR; INTRALESIONAL; INTRAMUSCULAR; SOFT TISSUE
Status: DISCONTINUED | OUTPATIENT
Start: 2023-12-05 | End: 2023-12-05

## 2023-12-05 RX ADMIN — METHYLPREDNISOLONE ACETATE 40 MG: 40 INJECTION, SUSPENSION INTRA-ARTICULAR; INTRALESIONAL; INTRAMUSCULAR; SOFT TISSUE at 02:12

## 2023-12-05 NOTE — PROCEDURES
Left shoulder subacromial bursa injection: L subacromial bursa    Date/Time: 12/5/2023 2:00 PM    Performed by: Tani Pittman FNP  Authorized by: Tani Pittman FNP    Consent Done?:  Yes (Written)  Indications:  Pain  Local anesthetic:  Bupivacaine 0.25% without epinephrine    Details:  Needle Size:  25 G  Ultrasonic Guidance for needle placement?: No    Approach:  Posterior  Location:  Shoulder  Site:  L subacromial bursa  Medications:  40 mg Methylprednisolone acetate 40 mg/ml inj susp  Patient tolerance:  Patient tolerated the procedure well with no immediate complications

## 2023-12-05 NOTE — PROGRESS NOTES
[facility] Established Patient Evaluation      CC:right and left  shoulder pain     Interval history 12/05/2023:  73-year-old female that presents today with continued bilateral shoulder pain right greater than left.  She reports attempting physical therapy that was ordered from our last clinic visit however she was only attend 2 sessions and had to stop due to increased shoulder pain.  She denies any recent incident or trauma denies any new pain denies any profound weakness.  She was provided a right subacromial bursa injection by her internal medicine provider on 11/07/2023 with minimal to no relief.  She states she did not have prior left shoulder pain but since she started therapy the left shoulder is hurting.    Interval Update:    05/11/20230857-01-vvia-old female who is established with our clinic presents today complaining of right posterior shoulder blade pain onset 4 weeks pain is located in the posterior right shoulder blade, constant pain is described as dull, in particular causes pain nothing in particular mitigate her symptoms at this point.  She denies any radicular symptoms anywhere.  She denies any paresthesia like symptoms.  Her pain is described as dull achy and deep at times.  Pain score today is 7/10.  She denies any recent incident or trauma denies any bowel bladder dysfunction denies any profound weakness.      Interval update:   11/28/2022 - Diana returns to clinic s/p bilateral subacromial shoulder injection on 10/26/22 with 90% relief.  She reports Aching of the bilateral shoulder  (location) pain rated 3/10 today with a weekly range of 3-4/10.  Additionally she complained of bilateral neck pain that has  been ongoing for greater than a year, see pain described below.    10/26/2022 - Ms. Diana cobian to clinic for follow up visit reporting bilateral shoulder pain, worse with lifting something, and working above her head. She has had shoulder pain over the last 10 years, but worse  over the last few works. Nothing improves her pain. She is using tylenol arthritis for the pain. No unusual amount of work or activity recently. No paraesthesias or tingling in upper extremities.     8/18/20 - Ms. Ortiz returns to clinic for follow up visit reporting stable bilateral knee pain and review imaging. Pain intensity is currently 3/10.      8/13/20 - Ms. Ortiz returns to clinic for follow up visit reporting worse bilateral knee  pain.  Pain intensity is currently 6/10.  Patient reports bilateral knee pain that started approximately 1 week ago, pain is worse with walking, standing and while sleeping. Pain is described as sharp and stabbing, she denies profound weakness, or radiating pain. Worse pain is 6/10. Of note she reports no inciting incident, trauma or falls.     02/05/2020 - Mrs. Ortiz returns to clinic for follow up visit reporting left buttock pain. Patient reports radiation down left leg to mid thigh. Pain intensity is currently 5/10.      4/9/18 - Pt returns to complaining of MRI results, neck and bilateral shoulder pain.  Her day time pain improved with PT but she continues to complain of bilat neck and shoulder pain at night.  The results of the MRI were shared with her.    Background:  Chelly Ortiz is a 73 y.o. female who complains of neck and bilateral shoulder pain as characterized below.    Location:  Right and left posterior shoulder blade pain  Severity: Currently: 5/2/10   Typical Range: 6/10     Exacerbation: 10/10   Onset:  4 weeks  Quality: Dull, achy  Radiation:  None  Exacerbating Factors: extension and flexion lateral movement  Mitigating Factors: heat  Assoc: denies night fever/night sweats, urinary incontinence, bowel incontinence, significant weight loss, significant motor weakness and loss of sensations    Previous Therapies:  PT: Completed in march  HEP: Daily  TENS: None  Injections: LYNN 3x per Ca Oconnell  Surgery: Denies  Medications:   - NSAIDS:   - MSK  Relaxants:   - TCAs:   - SNRIs:   - Topicals:   - Anticonvulsants: Current  - Opioids: No    Current Pain Medications:  Gabapentin 100 TID - she is taking it prn  Piroxicam 20 mg - stopped due to GI issues    Full Medication List:    Current Outpatient Medications:     ACCU-CHEK FASTCLIX LANCING DEV Kit, USE AS DIRECTED, Disp: 1 each, Rfl: 0    albuterol (PROVENTIL/VENTOLIN HFA) 90 mcg/actuation inhaler, Inhale 1-2 puffs into the lungs every 4 (four) hours as needed for Wheezing., Disp: 18 g, Rfl: 2    amLODIPine (NORVASC) 10 MG tablet, Take 1 tablet (10 mg total) by mouth once daily., Disp: 90 tablet, Rfl: 3    aspirin (ECOTRIN) 81 MG EC tablet, Take 81 mg by mouth once daily., Disp: , Rfl:     azelastine (OPTIVAR) 0.05 % ophthalmic solution, Place 1 drop into both eyes 2 (two) times daily., Disp: 6 mL, Rfl: 1    blood glucose control high,low (ACCU-CHEK GUIDE L1-L2 CTRL SOL) Soln, 3 times a day, Disp: 300 each, Rfl: 11    blood sugar diagnostic (ACCU-CHEK GUIDE TEST STRIPS) Strp, 3 times a day, Disp: 300 strip, Rfl: 11    blood-glucose meter (ACCU-CHEK GUIDE GLUCOSE METER) Misc, Three times a day, Disp: 1 each, Rfl: 0    citalopram (CELEXA) 10 MG tablet, Take 1 tablet (10 mg total) by mouth once daily., Disp: 90 tablet, Rfl: 1    diclofenac sodium (VOLTAREN) 1 % Gel, Apply 2 g topically once daily., Disp: 100 g, Rfl: 1    ergocalciferol (ERGOCALCIFEROL) 50,000 unit Cap, TAKE 1 CAPSULE BY MOUTH EVERY 7 DAYS, Disp: 12 capsule, Rfl: 3    esomeprazole (NEXIUM) 40 MG capsule, Take 1 capsule (40 mg total) by mouth before breakfast., Disp: 90 capsule, Rfl: 3    ezetimibe (ZETIA) 10 mg tablet, Take 1 tablet (10 mg total) by mouth once daily., Disp: 90 tablet, Rfl: 3    fluocinonide (LIDEX) 0.05 % external solution, AAA scalp qday - bid prn pruritus, Disp: 60 mL, Rfl: 3    fluticasone propionate (FLONASE) 50 mcg/actuation nasal spray, 1 spray (50 mcg total) by Each Nostril route once daily., Disp: 9.9 mL, Rfl: 0     "gabapentin (NEURONTIN) 100 MG capsule, Take 2 capsules (200 mg total) by mouth every evening., Disp: 180 capsule, Rfl: 3    insulin aspart U-100 (NOVOLOG FLEXPEN U-100 INSULIN) 100 unit/mL (3 mL) InPn pen, Inject 10 Units into the skin 3 (three) times daily with meals., Disp: 60 mL, Rfl: 11    insulin aspart U-100 (NOVOLOG FLEXPEN U-100 INSULIN) 100 unit/mL (3 mL) InPn pen, Inject 10 Units into the skin 3 (three) times daily with meals. TDD 60 units, Disp: 60 mL, Rfl: 11    insulin detemir U-100, Levemir, (LEVEMIR FLEXPEN) 100 unit/mL (3 mL) InPn pen, Inject 40 Units into the skin every evening., Disp: 60 mL, Rfl: 11    insulin detemir U-100, Levemir, (LEVEMIR FLEXPEN) 100 unit/mL (3 mL) InPn pen, Inject 40 Units into the skin every evening., Disp: 50 mL, Rfl: 11    insulin syringe-needle U-100 0.3 mL 31 gauge x 5/16" Syrg, relion brand, use 5 x a day, if not on levemir or novolog, Disp: 150 each, Rfl: 1    insulin syringe-needle U-100 0.5 mL 31 gauge x 5/16" Syrg, Use nightly. 90 day, Disp: 100 each, Rfl: 3    lancets (ACCU-CHEK FASTCLIX LANCET DRUM) [facility], TEST BLOOD SUGAR THREE TIMES A DAY, Disp: 918 each, Rfl: 3    linaCLOtide (LINZESS) 72 mcg Cap capsule, Take 1 capsule (72 mcg total) by mouth before breakfast., Disp: 30 capsule, Rfl: 5    magnesium oxide (MAG-OX) 400 mg tablet, Take 1 tablet (400 mg total) by mouth 2 (two) times daily., Disp: 180 tablet, Rfl: 3    meloxicam (MOBIC) 7.5 MG tablet, Take 1 tablet (7.5 mg total) by mouth once daily., Disp: 30 tablet, Rfl: 0    montelukast (SINGULAIR) 10 mg tablet, Take 10 mg by mouth., Disp: , Rfl:     pen needle, diabetic (NOVOFINE 32) 32 gauge x 1/4" Ndle, 4 injections per day, Disp: 500 each, Rfl: 4    traZODone (DESYREL) 50 MG tablet, Take 1 tablet (50 mg total) by mouth every evening., Disp: 90 tablet, Rfl: 3    calcium carbonate (OS-ARIC) 600 mg calcium (1,500 mg) Tab, Take 1 tablet (600 mg total) by mouth once. for 1 dose, Disp: 30 tablet, Rfl: 11    " cetirizine (ZYRTEC) 10 MG tablet, Take 1 tablet (10 mg total) by mouth every evening., Disp: 90 tablet, Rfl: 0    glucose 4 GM chewable tablet, Take 4 tablets (16 g total) by mouth as needed for Low blood sugar (If having symptoms of blurry vision, palpitations, confusion, shakiness.  Please check sugars and if sugar below 70 please take 4 tablets and re-check sugar everry 15 minutes until sugars are above 70 and symptoms resolve.)., Disp: 50 tablet, Rfl: 12    Current Facility-Administered Medications:     methylPREDNISolone acetate injection 40 mg, 40 mg, Intramuscular, 1 time in Clinic/HOD, Tani Pittman, FNP    methylPREDNISolone acetate injection 40 mg, 40 mg, Intramuscular, 1 time in Clinic/HOD, Tani Pittman, FNP    triamcinolone acetonide injection 20 mg, 20 mg, Intramuscular, 1 time in Clinic/HOD, Tani Pittman, FNP     Review of Systems:  Review of Systems   Constitutional:  Negative for chills and fever.   HENT:  Negative for nosebleeds.    Eyes:  Negative for pain.   Respiratory:  Negative for hemoptysis.    Cardiovascular:  Negative for chest pain.   Gastrointestinal:  Negative for nausea and vomiting.   Genitourinary:  Negative for dysuria.   Musculoskeletal:  Positive for joint pain and myalgias.   Skin:  Negative for rash.   Neurological:  Negative for tingling, tremors, sensory change, focal weakness and weakness.       Allergies:  Ace inhibitors and Latex, natural rubber     Medical History:  Past Medical History:   Diagnosis Date    Allergy     DDD (degenerative disc disease), lumbar     Diabetes mellitus     diet controlled    Diabetes mellitus, type 2     GERD (gastroesophageal reflux disease)     History of subconjunctival hemorrhage 12/02/2011    left eye    Hyperlipidemia     Hypertension     IBS (irritable bowel syndrome)     Obesity     MYRIAM (obstructive sleep apnea)     on CPAP    Rotator cuff impingement syndrome     Seasonal allergic conjunctivitis         Surgical History:  Past  Surgical History:   Procedure Laterality Date    CATARACT EXTRACTION W/  INTRAOCULAR LENS IMPLANT Right 10/03/2013        CATARACT EXTRACTION W/  INTRAOCULAR LENS IMPLANT Left 2022         SECTION      x2    CHOLECYSTECTOMY      COLONOSCOPY N/A 2018    Procedure: COLONOSCOPY;  Surgeon: Marie Mejia MD;  Location: [facility] ENDO;  Service: Endoscopy;  Laterality: N/A;    COLONOSCOPY N/A 2022    Procedure: COLONOSCOPY;  Surgeon: Pierce Rivera MD;  Location: [facility] ENDO;  Service: Endoscopy;  Laterality: N/A;    ENDOSCOPIC ULTRASOUND OF UPPER GASTROINTESTINAL TRACT N/A 10/09/2018    Procedure: ULTRASOUND, UPPER GI TRACT, ENDOSCOPIC;  Surgeon: Javy Simpson MD;  Location: [facility] ENDO;  Service: Endoscopy;  Laterality: N/A;    EXCISION OF LESION N/A 2019    Procedure: EXCISION, LESION VULVA;  Surgeon: Liv Odell MD;  Location: [facility] OR;  Service: OB/GYN;  Laterality: N/A;  latex allergy    EYE SURGERY      HYSTERECTOMY      ovaries intact, due to DUB    INTRAOCULAR PROSTHESES INSERTION Left 2022    Procedure: INSERTION, IOL PROSTHESIS;  Surgeon: Clarice Herzog MD;  Location: [facility] OR [address] [place] [address];  Service: Ophthalmology;  Laterality: Left;    PHACOEMULSIFICATION OF CATARACT Left 2022    Procedure: PHACOEMULSIFICATION, CATARACT;  Surgeon: Clarice Herzog MD;  [facility];  Service: Ophthalmology;  Laterality: Left;    TUBAL LIGATION          Social History:  Social History     Socioeconomic History    Marital status:    Tobacco Use    Smoking status: Former     Current packs/day: 0.00     Types: Cigarettes     Quit date: 2/15/1983     Years since quittin.8     Passive exposure: Current ()    Smokeless tobacco: Never   Substance and Sexual Activity    Alcohol use: No    Drug use: No    Sexual activity: Yes     Partners: Male     Social Determinants of Health     Financial Resource Strain: Unknown (2023)     Overall Financial Resource Strain (CARDIA)     Difficulty of Paying Living Expenses: Patient refused   Food Insecurity: Unknown (11/30/2023)    Hunger Vital Sign     Worried About Running Out of Food in the Last Year: Patient refused     Ran Out of Food in the Last Year: Patient refused   Recent Concern: Food Insecurity - Food Insecurity Present (10/11/2023)    Hunger Vital Sign     Worried About Running Out of Food in the Last Year: Patient refused     Ran Out of Food in the Last Year: Sometimes true   Transportation Needs: No Transportation Needs (11/30/2023)    PRAPARE - Transportation     Lack of Transportation (Medical): No     Lack of Transportation (Non-Medical): No   Physical Activity: Unknown (11/30/2023)    Exercise Vital Sign     Days of Exercise per Week: Patient refused     Minutes of Exercise per Session: 20 min   Recent Concern: Physical Activity - Insufficiently Active (10/11/2023)    Exercise Vital Sign     Days of Exercise per Week: 3 days     Minutes of Exercise per Session: 20 min   Stress: Unknown (11/30/2023)    [employer] [place] [employer] - Occupational Stress Questionnaire     Feeling of Stress : Patient refused   Recent Concern: Stress - Stress Concern Present (10/11/2023)    [employer] [place] [employer] - Occupational Stress Questionnaire     Feeling of Stress : To some extent   Social Connections: Unknown (11/30/2023)    Social Connection and Isolation Panel [NHANES]     Frequency of Communication with Friends and Family: Patient refused     Frequency of Social Gatherings with Friends and Family: Patient refused     Active Member of Clubs or Organizations: Patient refused     Attends Club or Organization Meetings: Patient refused     Marital Status: Patient refused   Housing Stability: Low Risk  (11/30/2023)    Housing Stability Vital Sign     Unable to Pay for Housing in the Last Year: No     Number of Places Lived in the Last Year: 1     Unstable Housing in the  "Last Year: No       Physical Exam:  Vitals:    12/05/23 1355   BP: (!) 154/81   Pulse: 92   Weight: 87.5 kg (192 lb 14.4 oz)   Height: 5' 3.5" (1.613 m)   PainSc:   8   PainLoc: Shoulder       General    Nursing note and vitals reviewed.  Constitutional: She is oriented to person, place, and time. She appears well-developed and well-nourished. No distress.   HENT:   Head: Normocephalic and atraumatic.   Nose: Nose normal.   Eyes: Conjunctivae and EOM are normal. Pupils are equal, round, and reactive to light. Right eye exhibits no discharge. Left eye exhibits no discharge. No scleral icterus.   Neck: No JVD present.   Cardiovascular:  Intact distal pulses.            Pulmonary/Chest: Effort normal. No respiratory distress.   Abdominal: She exhibits no distension.   Neurological: She is alert and oriented to person, place, and time. Coordination normal.   Psychiatric: She has a normal mood and affect. Her behavior is normal. Judgment and thought content normal.     General Musculoskeletal Exam   Gait: normal       Right Knee Exam     Tenderness   The patient is tender to palpation of the medial joint line.    Crepitus   The patient has crepitus of the patella.    Range of Motion   Extension:  normal   Flexion:  normal     Left Knee Exam     Tenderness   The patient tender to palpation of the lateral joint line.    Crepitus   The patient has crepitus of the patella.    Range of Motion   Extension:  normal   Flexion:  normal Left Hip Exam     Comments:  Left ischial bursa tenderness.  Partial reproduction of pain with hip flexion and piriformis-type stretches.      Back (L-Spine & T-Spine) / Neck (C-Spine) Exam     Tenderness   The patient is tender to palpation of the right scapular and left scapular. Posterior midline palpation reveals tenderness of the Upper C-Spine, Lower C-Spine and Upper T-Spine. Right paramedian tenderness of the Upper T-Spine.     Back (L-Spine & T-Spine) Range of Motion   Lateral bend right:  " normal   Lateral bend left:  normal   Rotation right:  normal   Rotation left:  normal     Neck (C-Spine) Range of Motion   Flexion:      Normal  Extension:  Normal    Comments:  TTP right posterior scapula   Right Shoulder Exam     Inspection/Observation   Deformity: absent    Range of Motion   Active abduction:  90 abnormal   Passive abduction:  abnormal     Tests & Signs   Drop arm: positive  Saldaña test: positive  Impingement: positive  Rotator Cuff Painful Arc/Range: moderate    Other   Sensation: normal    Comments:  Positive empty can and impingement sign     Left Shoulder Exam     Inspection/Observation   Deformity: absent    Range of Motion   Active abduction:  90 abnormal   Passive abduction:  abnormal     Tests & Signs   Drop arm: positive  Saldaña test: positive  Impingement: positive  Rotator Cuff Painful Arc/Range: moderate    Other   Sensation: normal     Comments:  Positive empty can and impingement sign       Muscle Strength   Right Upper Extremity   Shoulder Abduction: 5/5   Shoulder Internal Rotation: 5/5   Shoulder External Rotation: 5/5   Supraspinatus: 5/5   Subscapularis: 5/5   Biceps: 5/5   Wrist flexion: 5/5   Left Upper Extremity  Shoulder Abduction: 5/5   Shoulder Internal Rotation: 5/5   Shoulder External Rotation: 5/5   Supraspinatus: 5/5   Subscapularis: 5/5   Biceps: 5/5   Wrist flexion: 5/5   Right Lower Extremity   Quadriceps:  5/5   Anterior tibial:  5/5   Gastrocsoleus:  5/5   Left Lower Extremity   Quadriceps:  5/5   Anterior tibial:  5/5   Gastrocsoleus:  5/5     Reflexes     Left Side  Biceps:  2+  Brachioradialis:  2+  Left Roque's Sign:  Absent    Right Side   Biceps:  2+  Brachioradialis:  2+  Right Roque's Sign:  absent    Vascular Exam     Right Pulses      Radial:                    1+      Left Pulses      Radial:                    2+      Capillary Refill  Right Hand: normal capillary refill  Left Hand: normal capillary refill          Imaging:    Order Details        XR SHOULDER COMPLETE 2 OR MORE VIEWS BILATERAL     CLINICAL HISTORY:  Other hypertrophic osteoarthropathy, multiple sites     TECHNIQUE:  Three views of each shoulder were performed.     COMPARISON:  Shoulder radiographs 01/28/2020 and 07/07/2016     FINDINGS:  No fracture or dislocation.  Mild-moderate degenerative changes of the acromioclavicular and glenohumeral joints bilaterally, right greater than left.  No focal soft tissue swelling.     Impression:     As above.        Electronically signed by: Jim Cooper  Date:                                            06/16/2022    MRI Upper Extremity Joint Without Contraast Right  TECHNIQUE: MRI of right shoulder was performed on a 1.5T magnet utilizing the following sequences: Localizer; axial T2 FS; coronal T2 FS and PD FS; sagittal T1, T2 FS and PD FS.    COMPARISON: Right shoulder radiograph 4/20/16.    FINDINGS:    Rotator cuff: There is thickening and increased signal of the insertional fibers of the supraspinatus consistent with tendinosis with partial thickness undersurface tear.  There is small full-thickness component involving the anterior fibers measuring approximately 8 x 6 mm.  Mild infraspinatus tendinosis with undersurface fraying.  Teres minor tendon is intact.  There is mild tendinosis with interstitial tear involving the insertional fibers of the subscapularis.  Muscle bulk of the rotator cuff is within normal limits.     Labrum: Global degeneration of the glenoid labrum.      Biceps: Thickening and increased signal within the intra-articular portion of the long head biceps tendon consistent with tendinosis.    Bone: There is no fracture.  Bone marrow signal is unremarkable.    Acromioclavicular joint: Severe degenerative changes are seen at the acromioclavicular joint with osseous spurring, edema, and subchondral cystic change.    Cartilage: Articular cartilage of the glenohumeral joint is preserved.    Miscellaneous: Small joint effusion with  increased fluid seen in the subscapularis recess.  There is increased fluid seen within the subacromial/subdeltoid bursa.   Impression       1.  Supraspinatus tendinosis with partial thickness articular surface tear and small full-thickness component involving the anterior fibers.    2.  Mild subscapularis and infraspinatus tendinosis.    3.  Severe AC joint arthrosis.    4.  Long head biceps tendinosis.    5.  Joint effusion and moderate subacromial/subdeltoid bursitis.  ______________________________________     Electronically signed by resident: Hosea Costello MD  Date: 06/16/16  Time: 16:29     ______________________________________     Electronically signed by: MADISON WINTERS MD  Date: 06/17/16  Time: 08:39              Assessment:  Problem List Items Addressed This Visit    None  Visit Diagnoses       Chronic pain of both shoulders              Chelly is a 67-year-old female with chronic neck pain exacerbated by increased physical activity 3 months ago.  Her exam and imaging are consistent with facet arthropathy and myofascial dysfunction.  I recommended that we start with physical therapy to eliminate as much pain as possible related to myofascial dysfunction then move on to interventional procedures should a significant amount of pain persist.  I also recommended that she complete the MRI ordered by her primary care physician and start a trial of piroxicam.  She also states significant insomnia not related to her chronic neck pain and I have recommended that she try tizanidine 4-8 mg nightly as a muscle relaxant and sleep aid.    4/9/18 - she completed PT with improved pain after each session.  Pain remains most bothersome at night which is common for muscle issues.  She was encouraged to perform neck stretching 3 times daily and especially at night to minimize pain at night.    2/5/2020 - there is tenderness in the left buttock consistent with ischial bursitis.  There may be a component of tendonitis as well;  however, the treatment would be the same.  I have recommended an ischial bursa injection under fluoroscopic guidance and patient is in agreement.  She was warned that her blood glucose must be controlled and less than 180 g/dL to receive a steroid injection, otherwise we would need to cancel the injection.  Patient was also advised to begin stretching on a daily basis. I demonstrated 3 separate stretches that would target the painful area.  I have encouraged her to walk on a daily basis for exercise as she currently lives a relatively sedentary lifestyle.    08/13/[id number]-bkcn-old female presents with bilateral knee pain consistent with osteoarthritis, there may be a component of tendonitis as well. I will order bilateral knee x-rays today for further evaluation and consider bilateral knee injections with steroids in the future pt  Is a diabetic so I educated her on how steroids can increase BS so monitoring  And staying at the appropriate level is vital. I will also recommend returning to PT for hari knee pain and  ischial bursitis she was previously attending physical therapy prior to the COVID-19 pandemic for ischial bursitis this was providing her with relief she reports that she only had 2 sessions during that time.   Lastly I did discuss with patient her most recent labs  C reactive protein, and sedimentation rate results and that they were both abnormal and elevated I recommended she follow-up with her primary care doctor to discussed these results.      8/18/2020- 70 y/o female presents with a hx of chronic hari knee pain secondary to osteoarthritis , and left sided low back pain secondary to ischia bursitis. Upon review of her recent bilateral knee x-rays they show tricompartmental DJD with moderate medial femorotibial joint space narrowing bilaterally. I recommended hari IA knee injections in the future, considering her pain is stable today I will hold off her pain score is 3/10. Explained recent lab results  and recommended  that she will need to f/u with her PCP.  Also recommended patient follow up with physical therapy.    10/26/2022 - Chelly Ortiz is a 73 y.o. female who  has a past medical history of Allergy, DDD (degenerative disc disease), lumbar, Diabetes mellitus, Diabetes mellitus, type 2, GERD (gastroesophageal reflux disease), History of subconjunctival hemorrhage (12/02/2011), Hyperlipidemia, Hypertension, IBS (irritable bowel syndrome), Obesity, MYRIAM (obstructive sleep apnea), Rotator cuff impingement syndrome, and Seasonal allergic conjunctivitis.  By history and examination this patient has chronic and acute on chronic bilateral shoulder pain. The underlying cause cause is bilateral rotator cuff pathology causing bilateral bursitis.  Pathology is confirmed by imaging.  We discussed the underlying diagnoses and multiple treatment options including interventional procedures.  The risks and benefits of each treatment option were discussed and all questions were answered.      11/28/2022Chloe Ortiz is a 73 y.o. female who  has a past medical history of Allergy, DDD (degenerative disc disease), lumbar, Diabetes mellitus, Diabetes mellitus, type 2, GERD (gastroesophageal reflux disease), History of subconjunctival hemorrhage (12/02/2011), Hyperlipidemia, Hypertension, IBS (irritable bowel syndrome), Obesity, MYRIAM (obstructive sleep apnea), Rotator cuff impingement syndrome, and Seasonal allergic conjunctivitis.  By history and examination this patient has chronicneck pain without bilateral radiculopathy in the distribution of C4, C5, C6, and C7.  The underlying cause cause is facet arthritis, degenerative disc disease, and muscle dysfunction.  Pathology is confirmed by imaging.  We discussed the underlying diagnoses and multiple treatment options including non-opioid medications, interventional procedures, physical therapy, and home exercise.  The risks and benefits of each treatment option were discussed  and all questions were answered.      5/11/2023- Chelly Ortiz is a 73 y.o. female who  has a past medical history of Allergy, DDD (degenerative disc disease), lumbar, Diabetes mellitus, Diabetes mellitus, type 2, GERD (gastroesophageal reflux disease), History of subconjunctival hemorrhage (12/02/2011), Hyperlipidemia, Hypertension, IBS (irritable bowel syndrome), Obesity, MYRIAM (obstructive sleep apnea), Rotator cuff impingement syndrome, and Seasonal allergic conjunctivitis.  By history and examination this patient has acute/subacute right posterior scapular pain.without radiculopathy.  The underlying cause cause is muscle dysfunction and deconditioning.  Pathology is confirmed by imaging.  We discussed the underlying diagnoses and multiple treatment options including non-opioid medications, interventional procedures, physical therapy, home exercise, and weight loss.  The risks and benefits of each treatment option were discussed and all questions were answered.      12/05/2023- 73-year-old female that returns to clinic with a history of bilateral shoulder pain on history and exam with the appears in her pain is likely related to subacromial bursitis.  Her right shoulder x-ray shows that the right humeral head maintains appropriate relation with the glenoid.  AC joint is intact.  Based on history and exam it is she is suffering from subacromial bursitis bilaterally she was recently provided a right subacromial bursa injection per her internal medicine physician she states that this injection only provided her 1 day of relief.  This was done approximately 1 month ago.  Discussed today that I will provide her left subacromial bursa injection in effort to review reduce her now left shoulder pain.  If no relief found we will order bilateral MRIs of her shoulder to rule out pathology and referred to orthopedics.      Treatment Plan:   S/F Left subacromial bursa injections to help with left shoulder pain. ( Educated  patient on monitoring her BS as steroids tend to increase her BS levels)   Restart physical therapy once pain is stable  Will obtain bilateral shoulder images if pain does not resolve in 6-8 weeks.  Recommended Tylenol extra-strength arthritis 500 to a 1000 t.i.d. not to exceed 3000 mg in 24 hours    RTC- patient to follow up on MyChart in 6-8 weeks with a message.    Disclaimer: This note was partly generated using dictation software which may occasionally result in transcription errors.

## 2023-12-11 ENCOUNTER — PATIENT MESSAGE (OUTPATIENT)
Dept: PAIN MEDICINE | Facility: CLINIC | Age: 73
End: 2023-12-11
Payer: MEDICARE

## 2023-12-14 ENCOUNTER — CLINICAL SUPPORT (OUTPATIENT)
Dept: REHABILITATION | Facility: HOSPITAL | Age: 73
End: 2023-12-14
Payer: MEDICARE

## 2023-12-14 DIAGNOSIS — M25.611 DECREASED ROM OF RIGHT SHOULDER: Primary | ICD-10-CM

## 2023-12-14 PROCEDURE — 97140 MANUAL THERAPY 1/> REGIONS: CPT

## 2023-12-14 PROCEDURE — 97112 NEUROMUSCULAR REEDUCATION: CPT

## 2023-12-14 PROCEDURE — 97110 THERAPEUTIC EXERCISES: CPT

## 2023-12-14 NOTE — PROGRESS NOTES
[facility]   Physical Therapy Treatment Note / Progress Note     Name: Chelly Ortiz  Clinic Number: [id number]    Therapy Diagnosis:   Encounter Diagnosis   Name Primary?    Decreased ROM of right shoulder Yes     Physician: Smitha Turner MD    Visit Date: 12/14/2023  Physician Orders: PT Eval and Treat   Medical Diagnosis from Referral: M25.511 (ICD-10-CM) - Acute pain of right shoulder  Evaluation Date: 10/31/2023  Authorization Period Expiration: 10/16/24  Plan of Care Expiration: 12/31/23  Progress Note Due: 12/31/23  Visit # / Visits authorized: 3/20  FOTO: 2/5     PTA Visit #: 0/5     Precautions: Standard     Time In: 103pm  Time Out: 200pm  Total Billable Time: 55 Minutes    Subjective     Pt reports: that she has not been to PT in a while due to the R shoulder pain being very intense. She has had a few injections in the shoulder but they really have not helped much. She does still feel the pain in the neck at times.   She was compliant with home exercise program.  Response to previous treatment: Decreased Pain   Functional change: No Change      Pain: 4/10  Location: Right Shoulder       Objective      Objective Measures updated at progress report unless specified.       Right AROM Right MMT Left aROM Left MMT Notes:   Shoulder Flexion:  (180) 45 (significant shrug sign)   160       Shoulder Abduction:  (180) 35 (significant shrug sign)   120       Shoulder ER:  (90) 75   80       Shoulder IR:  (70) Belt Line    Mid Back        Lower Trap: N/A   N/A       Middle Trap: N/A   N/A       Upper Trap:  N/A   N/A       Serratus Anterior: N/A   N/A       Rhomboids: N/A   N/A       Elbow Flexion: Full   Full       Elbow Extension: Full   Full            Observation:      Cervical Cluster:   Right  Left  Spurling's (+ for R shoulder pain) -   Distraction (--) (--)   ULTT A (+) (+)   Ipsilateral Rotation <60 degrees (+) Pain free   Cervical Rotation:  50 50   Cervical Side Bending:   "40 32   Cervical Flexion:  50     Cervical Extension:  40 Pain            Special Tests:   Tests Right UE:  Left UE:    Drop Arm (+) (--)   Belly Lift Off (--) (--)   Horizontal Adduction/Paxinos (--) (--)   Anterior Apprehension  (--) (--)   Lisbeth Relocation (--) (--)   Neer's  (+) (--)   Hornblower's Sign  (+) (--)        Treatment     Chelly received the treatments listed below:      therapeutic exercises to develop strength, endurance, ROM, posture, and core stabilization for 18 minutes including:  Exercise Sets Reps Time Status   Active Assisted Range of Flexion 1# 2 10  Not Completed    Seated table slides into flexion and scaption 2 10 (each direction)  Completed    Seated thoracic extensions over a chair 2 10  Completed   Reassessment   10 Completed        manual therapy techniques: The techniques below were applied for 12 minutes:   Technique Location  Status   Side Glides Mid-Cervical Spine (Left)  Completed    1st Rib Mobilization  Right Side  Not Completed    Joint Mobilization PA to Thoracic Spine Grade III   OA Flexion Mobilizations  Completed    Joint mobilization Posterior and inferior glides of GH joint Completed         neuromuscular re-education activities to improve:  for 25 minutes. The following activities were included:  Exercise Sets Reps Time Status   ER Walk Outs YTB 2 10  Completed    IR Walk Outs YTB 2 10  Completed    Flexion and ER isometrics against the wall 2 10 (each direction) 5" holds Completed   Chin Tuck 1 10 10 Sec Completed    Wall Slides  1 10 (halted after 1 set due to pain)  Completed    Serratus Punches 2# 3 8  Completed                      therapeutic activities to improve functional performance for 0  minutes, including:  Exercise Sets Reps Time Status                                    gait training to improve functional mobility and safety for 0  minutes, including:      direct contact modalities after being cleared for contraindications:     supervised modalities after " being cleared for contradictions:     hot pack for 0 minutes to .    cold pack for 0 minutes to .    Patient Education and Home Exercises       Education provided:   - HEP  - Plan of Care   - Anatomy   - Cervical Radiculopathy     Written Home Exercises Provided: yes. Exercises were reviewed and Chelly was able to demonstrate them prior to the end of the session.  Chelly demonstrated good  understanding of the education provided. See EMR under Patient Instructions for exercises provided during therapy sessions    Assessment     Chelly has been treated for a total of 4 visits over the past 6 weeks to address her complaints of R shoulder pain. There was a recent interruption in her care for about 3.5 weeks as her pain was worsening. Objectively her R shoulder AROM seems to have worsened since the onset of treatment. There is a very significant shrug sign with any AROM into flexion and abduction due to excessive superior migration of her humeral head. This seems to be mostly likely due to poor rotator cuff function on that side. She does present with a + cervical spine examination that does seem to be further influencing her R shoulder pain. At this time Chelly remains a good candidate for skilled PT services to address both her shoulder and cervical spine to help improve her pain and overall functional ability.     Chelly Is progressing well towards her goals.   Pt prognosis is Good.     Pt will continue to benefit from skilled outpatient physical therapy to address the deficits listed in the problem list box on initial evaluation, provide pt/family education and to maximize pt's level of independence in the home and community environment.     Pt's spiritual, cultural and educational needs considered and pt agreeable to plan of care and goals.     Anticipated barriers to physical therapy: None at this time     Goals:   Short Term Goals (4 Weeks):   1. Pt will be independent with HEP to supplement PT in improving pain free  cervical mobility (MET)  2. Pt will improve cervical AROM 10 deg in rotational planes to improve cervical mobility for driving (Progressing)  3. Pt will improve UE MMTs by 1/2 grade in all planes to improve strength for lifting and carrying tasks. (Progressing)  4. Pt will demonstrate improved sitting posture to decrease pain experienced in head and neck. (Progressing)  Long Term Goals (8 Weeks):   1. Pt will improve FOTO to </=TBD% limitation to improve perceived limitation with changing and maintaining mobility. (Progressing)  2. Pt will improve cervical AROM to WNL in all planes to improve cervical mobility for driving. (Progressing)  3. Pt will improve UE MMTs 1 grade in all planes to improve strength for lifting and carrying tasks. (Progressing)  4. Pt will report no pain with lifting 10 lbs to promote physical activity. (Progressing)  5. Pt will report no pain with cervical AROM in all planes to promote QOL. (Progressing)  Plan      Plan of care Certification: 10/31/2023 to 12/1/23.     Outpatient Physical Therapy 2 times weekly for 8 weeks to include the following interventions: Cervical/Lumbar Traction, Electrical Stimulation , Gait Training, Manual Therapy, Moist Heat/ Ice, Neuromuscular Re-ed, Patient Education, Self Care, Therapeutic Activities, Therapeutic Exercise, and Ultrasound.     MELISSA DOVER, PT

## 2023-12-14 NOTE — PROGRESS NOTES
[facility]   Physical Therapy Treatment Note      Name: Chelly Ortiz  Clinic Number: [id number]    Therapy Diagnosis:   No diagnosis found.    Physician: Smitha Turner MD    Visit Date: 12/14/2023  Physician Orders: PT Eval and Treat   Medical Diagnosis from Referral: M25.511 (ICD-10-CM) - Acute pain of right shoulder  Evaluation Date: 10/31/2023  Authorization Period Expiration: 10/16/24  Plan of Care Expiration: 12/31/23  Progress Note Due: 12/1/23***  Visit # / Visits authorized: 1/ 1 3/20  FOTO: 3/5     PTA Visit #: 0/5     Precautions: Standard     Time In: *** pm  Time Out: *** pm  Total Billable Time: 63*** Minutes     Subjective     Pt reports: ***    That she is feeling better but it is not healed and it still hurts when she has to do different things with arm, particularly when raising it above her head.  She feels increased pain in the neck when looking backwards.     She was compliant with home exercise program.  Response to previous treatment: Decreased Pain   Functional change: No Change      Pain: 4/10  Location: Right Shoulder       Objective      Objective Measures updated at progress report unless specified.       Right ROM Right MMT Left ROM Left MMT Notes:    Shoulder Flexion:  (180) 65   160       Shoulder Abduction:  (180) 52   120       Shoulder ER:  (90) 75   80       Shoulder IR:  (70) Belt Line    Mid Back        Lower Trap: N/A   N/A       Middle Trap: N/A   N/A       Upper Trap:  N/A   N/A       Serratus Anterior: N/A   N/A       Rhomboids: N/A   N/A       Elbow Flexion: Full   Full       Elbow Extension: Full   Full        Strength: Right =  Left = Equal      Posture Assessment:  Abudcted slightly downward rotation on right - forward humeral head - elevated left shoulder significant diving in of thoracic spine with flattened kyphosis     Observation:      Cervical Cluster:  Spurling's (+) Reverse +    Distraction (--) (--)   ULTT ALEX (+) (+)    Ipsilateral Rotation <60 degrees (+) No Pain    Cervical Rotation:  54 48   Cervical Side Bendin 32   Cervical Flexion:  50     Cervical Extension:  40 Pain            Special Tests:   Tests Right UE:  Left UE:    Drop Arm (+) (--)   Belly Lift Off (--) (--)   Horizontal Adduction/Paxinos (--) (--)   Anterior Apprehension  (--) (--)   Lisbeth Relocation (--) (--)   Neer's  (+) (--)   Hornblower's Sign  (+) (--)        Treatment     Chelly received the treatments listed below:      therapeutic exercises to develop strength, endurance, ROM, posture, and core stabilization for 6*** minutes including:  Exercise Sets Reps Time Status   Active Assisted Range of Flexion 1# 2 10  Not Completed    D2 Flexion YTB 2 10  Not Completed    UBE 1 Min FWD 1 Min BWD    6 Minutes Not Completed               manual therapy techniques: The techniques below were applied for 18*** minutes:   Technique Location  Status   Side Glides Mid-Cervical Spine (Left)  Not Completed    1st Rib Mobilization  Right Side  Not Completed    Joint Mobilization PA to Thoracic Spine Grade III   OA Flexion Mobilizations  Not Completed               neuromuscular re-education activities to improve:  for 39*** minutes. The following activities were included:  Exercise Sets Reps Time Status   ER Walk Outs YTB 2 10  Not Completed    IR Walk Outs YTB 2 10  Not Completed    Scapular Retractions RTB 3 8 3 Sec Not Completed    Chin Tuck 1 10 10 Sec Not Completed    Wall Slides  2 15  Not Completed    No Money's YTB 2 12  Not Completed   TheraBand Ts  2 12  Not Completed   Serratus Punches 2# 2 12  Not Completed         therapeutic activities to improve functional performance for 0  minutes, including:  Exercise Sets Reps Time Status                                    gait training to improve functional mobility and safety for 0  minutes, including:      direct contact modalities after being cleared for contraindications:     supervised modalities after being  cleared for contradictions:     hot pack for 0 minutes to .    cold pack for 0 minutes to .    Patient Education and Home Exercises       Education provided:   - HEP  - Plan of Care   - Anatomy   - Cervical Radiculopathy     Written Home Exercises Provided: yes. Exercises were reviewed and Chelly was able to demonstrate them prior to the end of the session.  Chelly demonstrated good  understanding of the education provided. See EMR under Patient Instructions for exercises provided during therapy sessions    Assessment     Chelly presents to physical therapy today with ***    continued pain in the neck and shoulder.  Patient is extremely lordotic in the cervical spine in resting posture and sits with significant upper cervical spine extension that could be impinging nerve roots and creating the radiating symptoms.  Session focused on improving cervical posture with increasing deep cervical neck flexor strengthening.  Patient continues to improve periscapular strengthening with focus on mid-traps and serratus anterior to improve upward rotation.  She continued to receive education on improved posture with cueing for cervical flexion and reduced cervical extension while completing upper extremity motions.  She has significant difficulty with deep cervical neck flexion and requires cueing and assistance.  She will continued to be challenged as appropriate and manual interventions will be increased as tolerated.     Chelly Is progressing well towards her goals.   Pt prognosis is Good.     Pt will continue to benefit from skilled outpatient physical therapy to address the deficits listed in the problem list box on initial evaluation, provide pt/family education and to maximize pt's level of independence in the home and community environment.     Pt's spiritual, cultural and educational needs considered and pt agreeable to plan of care and goals.     Anticipated barriers to physical therapy: None at this time     Goals:    Short Term Goals (4 Weeks):   1. Pt will be independent with HEP to supplement PT in improving pain free cervical mobility  2. Pt will improve cervical AROM 10 deg in rotational planes to improve cervical mobility for driving  3. Pt will improve UE MMTs by 1/2 grade in all planes to improve strength for lifting and carrying tasks.  4. Pt will demonstrate improved sitting posture to decrease pain experienced in head and neck.  Long Term Goals (8 Weeks):   1. Pt will improve FOTO to </=TBD% limitation to improve perceived limitation with changing and maintaining mobility.  2. Pt will improve cervical AROM to WNL in all planes to improve cervical mobility for driving   3. Pt will improve UE MMTs 1 grade in all planes to improve strength for lifting and carrying tasks.  4. Pt will report no pain with lifting 10 lbs to promote physical activity.   5. Pt will report no pain with cervical AROM in all planes to promote QOL.   Plan      Plan of care Certification: 10/31/2023 to 12/1/23.     Outpatient Physical Therapy 2 times weekly for 8 weeks to include the following interventions: Cervical/Lumbar Traction, Electrical Stimulation , Gait Training, Manual Therapy, Moist Heat/ Ice, Neuromuscular Re-ed, Patient Education, Self Care, Therapeutic Activities, Therapeutic Exercise, and Ultrasound.     John Goyal, PT

## 2023-12-18 ENCOUNTER — OFFICE VISIT (OUTPATIENT)
Dept: URGENT CARE | Facility: CLINIC | Age: 73
End: 2023-12-18
Payer: MEDICARE

## 2023-12-18 VITALS
HEART RATE: 79 BPM | DIASTOLIC BLOOD PRESSURE: 82 MMHG | OXYGEN SATURATION: 96 % | RESPIRATION RATE: 18 BRPM | BODY MASS INDEX: 32.44 KG/M2 | SYSTOLIC BLOOD PRESSURE: 158 MMHG | HEIGHT: 64 IN | TEMPERATURE: 98 F | WEIGHT: 190 LBS

## 2023-12-18 DIAGNOSIS — R05.9 COUGH, UNSPECIFIED TYPE: Primary | ICD-10-CM

## 2023-12-18 DIAGNOSIS — J06.9 VIRAL UPPER RESPIRATORY TRACT INFECTION WITH COUGH: ICD-10-CM

## 2023-12-18 DIAGNOSIS — I10 ELEVATED BLOOD PRESSURE READING IN OFFICE WITH DIAGNOSIS OF HYPERTENSION: ICD-10-CM

## 2023-12-18 LAB
CTP QC/QA: YES
SARS-COV-2 AG RESP QL IA.RAPID: NEGATIVE

## 2023-12-18 PROCEDURE — 99214 OFFICE O/P EST MOD 30 MIN: CPT | Mod: S$GLB,,, | Performed by: PHYSICIAN ASSISTANT

## 2023-12-18 PROCEDURE — 99214 PR OFFICE/OUTPT VISIT, EST, LEVL IV, 30-39 MIN: ICD-10-PCS | Mod: S$GLB,,, | Performed by: PHYSICIAN ASSISTANT

## 2023-12-18 PROCEDURE — 87811 SARS-COV-2 COVID19 W/OPTIC: CPT | Mod: QW,S$GLB,, | Performed by: PHYSICIAN ASSISTANT

## 2023-12-18 PROCEDURE — 87811 SARS CORONAVIRUS 2 ANTIGEN POCT, MANUAL READ: ICD-10-PCS | Mod: QW,S$GLB,, | Performed by: PHYSICIAN ASSISTANT

## 2023-12-18 RX ORDER — IPRATROPIUM BROMIDE 21 UG/1
2 SPRAY, METERED NASAL 3 TIMES DAILY
Qty: 30 ML | Refills: 0 | Status: SHIPPED | OUTPATIENT
Start: 2023-12-18 | End: 2024-01-01

## 2023-12-18 RX ORDER — BENZONATATE 200 MG/1
200 CAPSULE ORAL 3 TIMES DAILY PRN
Qty: 30 CAPSULE | Refills: 0 | Status: SHIPPED | OUTPATIENT
Start: 2023-12-18 | End: 2023-12-28

## 2023-12-18 NOTE — PROGRESS NOTES
"Subjective:      Patient ID: Chelly Ortiz is a 73 y.o. female.    Vitals:  height is 5' 3.5" (1.613 m) and weight is 86.2 kg (190 lb). Her oral temperature is 98.1 °F (36.7 °C). Her blood pressure is 158/82 (abnormal) and her pulse is 79. Her respiration is 18 and oxygen saturation is 96%.     Chief Complaint: Cough    Patient presents today for evaluation of cough that began 5 days ago.  No fever, CP, or dyspnea.  No sore throat.  Admits to postnasal drip and tickling in the back of the throat.  Noticed some mild nasal congestion, but no rhinorrhea.  No increase in acid reflux or heartburn.  Has used cough drops for symptoms.    Cough  This is a new problem. The current episode started in the past 7 days. The problem has been gradually worsening. The problem occurs hourly. The cough is Non-productive. Associated symptoms include postnasal drip. Pertinent negatives include no chest pain, chills, eye redness, fever, headaches, rash, sore throat or shortness of breath. The symptoms are aggravated by lying down and cold air. She has tried nothing for the symptoms. The treatment provided no relief. Her past medical history is significant for asthma.       Constitution: Negative for chills, sweating, fatigue and fever.   HENT:  Positive for congestion and postnasal drip. Negative for sore throat.    Neck: Negative for neck pain and neck stiffness.   Cardiovascular:  Negative for chest pain, leg swelling and palpitations.   Eyes:  Negative for eye itching, eye pain and eye redness.   Respiratory:  Positive for cough. Negative for chest tightness, sputum production and shortness of breath.    Gastrointestinal:  Negative for abdominal pain, nausea, vomiting and diarrhea.   Genitourinary:  Negative for dysuria, frequency and urgency.   Musculoskeletal:  Negative for pain and joint swelling.   Skin:  Negative for color change and rash.   Neurological:  Negative for dizziness, headaches, disorientation, altered mental " status, numbness and tingling.   Psychiatric/Behavioral:  Negative for altered mental status and disorientation.       Objective:     Physical Exam   Constitutional: She is oriented to person, place, and time. She appears well-developed. She is cooperative.  Non-toxic appearance. She does not appear ill. No distress.   HENT:   Head: Normocephalic and atraumatic.   Ears:   Right Ear: Hearing, tympanic membrane, external ear and ear canal normal.   Left Ear: Hearing, tympanic membrane, external ear and ear canal normal.   Nose: Mucosal edema present. No rhinorrhea or nasal deformity. No epistaxis. Right sinus exhibits no maxillary sinus tenderness and no frontal sinus tenderness. Left sinus exhibits no maxillary sinus tenderness and no frontal sinus tenderness.   Mouth/Throat: Uvula is midline, oropharynx is clear and moist and mucous membranes are normal. No trismus in the jaw. Normal dentition. No uvula swelling. No oropharyngeal exudate, posterior oropharyngeal edema, posterior oropharyngeal erythema, tonsillar abscesses or cobblestoning. No tonsillar exudate.   Eyes: Conjunctivae and lids are normal. No scleral icterus.   Neck: Trachea normal and phonation normal. Neck supple. No edema present. No erythema present. No neck rigidity present.   Cardiovascular: Normal rate, regular rhythm, normal heart sounds and normal pulses.   Pulmonary/Chest: Effort normal and breath sounds normal. No accessory muscle usage or stridor. No tachypnea and no bradypnea. No respiratory distress. She has no decreased breath sounds. She has no wheezes. She has no rhonchi. She has no rales.   No evidence of respiratory distress.  Lungs CTAB.         Comments: No evidence of respiratory distress.  Lungs CTAB.    Abdominal: Normal appearance.   Musculoskeletal: Normal range of motion.         General: No deformity. Normal range of motion.      Right lower leg: No edema.      Left lower leg: No edema.   Lymphadenopathy:     She has no  cervical adenopathy.   Neurological: She is alert and oriented to person, place, and time. She exhibits normal muscle tone. Coordination normal.   Skin: Skin is warm, dry, intact, not diaphoretic and not pale.   Psychiatric: Her speech is normal and behavior is normal. Judgment and thought content normal.   Nursing note and vitals reviewed.      Results for orders placed or performed in visit on 12/18/23   SARS Coronavirus 2 Antigen, POCT Manual Read   Result Value Ref Range    SARS Coronavirus 2 Antigen Negative Negative     Acceptable Yes      *Note: Due to a large number of results and/or encounters for the requested time period, some results have not been displayed. A complete set of results can be found in Results Review.       Assessment:     1. Cough, unspecified type    2. Viral upper respiratory tract infection with cough    3. Elevated blood pressure reading in office with diagnosis of hypertension        Plan:     - Discussed likely viral etiology, ddx, home care, tx options, and given follow up precautions.  I have reviewed the patient's chart to view previous visits, labs, and imaging to assess PMH and look for any trends or previous treatments.    Cough, unspecified type  -     SARS Coronavirus 2 Antigen, POCT Manual Read    Viral upper respiratory tract infection with cough  -     ipratropium (ATROVENT) 21 mcg (0.03 %) nasal spray; 2 sprays by Each Nostril route 3 (three) times daily. for 14 days  Dispense: 30 mL; Refill: 0  -     benzonatate (TESSALON) 200 MG capsule; Take 1 capsule (200 mg total) by mouth 3 (three) times daily as needed for Cough.  Dispense: 30 capsule; Refill: 0    Elevated blood pressure reading in office with diagnosis of hypertension      Patient Instructions   - Rest.    - Drink plenty of fluids.  - Viral upper respiratory infections typically run their course in 10-14 days.     - Tylenol (acetaminophen) or Ibuprofen as directed as needed for fever/pain. Avoid  tylenol if you have a history of liver disease. Do not take ibuprofen if you have a history of GI bleeding, kidney disease, gastric surgery, or if you take blood thinners.     - You can take over-the-counter claritin, zyrtec, allegra, or xyzal as directed. These are antihistamines that can help with runny nose, nasal congestion, sneezing, and helps to dry up post-nasal drip, which usually causes sore throat and cough.      - ipratropium nasal spray as prescribed can help with drip which may help with cough, in turn      - you can take plain OTC Mucinex (guaifenesin) 1200 mg twice a day to help loosen mucous.     -warm salt water gargles can help with sore throat    - warm tea with honey can help with cough. Honey is a natural cough suppressant.    - Take the tessalon (benzonatate) as needed as prescribed for cough   - Only take the promethazine- DM as directed, as needed at night for cough. This medication may cause drowsiness.        - Follow up with your PCP or specialty clinic as directed in the next 1-2 weeks if not improved or as needed.  You can call [phone] to schedule an appointment with the appropriate provider.      - Go to the ER if you develop new or worsening symptoms.     - You must understand that you have received an Urgent Care treatment only and that you may be released before all of your medical problems are known or treated.   - You, the patient, will arrange for follow up care as instructed.   - If your condition worsens or fails to improve we recommend that you receive another evaluation at the ER immediately or contact your PCP to discuss your concerns or return here.     Elevated Blood Pressure  Your blood pressure was elevated during your visit to the urgent care.  It was not so high that immediate care was needed but it is recommended that you monitor your blood pressure over the next week or two to make sure that it is not staying elevated.  Please have your blood pressure taken 2-3  times daily at different times of the day.  Write all of those blood pressures down and record the time that they were taken.  Keep all that information and take it with you to see your Primary Care Physician.  If your blood pressure is consistently above 140/90 you will need to follow up with your PCP more quickly

## 2023-12-18 NOTE — PATIENT INSTRUCTIONS
- Rest.    - Drink plenty of fluids.  - Viral upper respiratory infections typically run their course in 10-14 days.     - Tylenol (acetaminophen) or Ibuprofen as directed as needed for fever/pain. Avoid tylenol if you have a history of liver disease. Do not take ibuprofen if you have a history of GI bleeding, kidney disease, gastric surgery, or if you take blood thinners.     - You can take over-the-counter claritin, zyrtec, allegra, or xyzal as directed. These are antihistamines that can help with runny nose, nasal congestion, sneezing, and helps to dry up post-nasal drip, which usually causes sore throat and cough.      - ipratropium nasal spray as prescribed can help with drip which may help with cough, in turn      - you can take plain OTC Mucinex (guaifenesin) 1200 mg twice a day to help loosen mucous.     -warm salt water gargles can help with sore throat    - warm tea with honey can help with cough. Honey is a natural cough suppressant.    - Take the tessalon (benzonatate) as needed as prescribed for cough   - Only take the promethazine- DM as directed, as needed at night for cough. This medication may cause drowsiness.        - Follow up with your PCP or specialty clinic as directed in the next 1-2 weeks if not improved or as needed.  You can call [phone] to schedule an appointment with the appropriate provider.      - Go to the ER if you develop new or worsening symptoms.     - You must understand that you have received an Urgent Care treatment only and that you may be released before all of your medical problems are known or treated.   - You, the patient, will arrange for follow up care as instructed.   - If your condition worsens or fails to improve we recommend that you receive another evaluation at the ER immediately or contact your PCP to discuss your concerns or return here.     Elevated Blood Pressure  Your blood pressure was elevated during your visit to the urgent care.  It was not so high  that immediate care was needed but it is recommended that you monitor your blood pressure over the next week or two to make sure that it is not staying elevated.  Please have your blood pressure taken 2-3 times daily at different times of the day.  Write all of those blood pressures down and record the time that they were taken.  Keep all that information and take it with you to see your Primary Care Physician.  If your blood pressure is consistently above 140/90 you will need to follow up with your PCP more quickly

## 2023-12-26 ENCOUNTER — TELEPHONE (OUTPATIENT)
Dept: ORTHOPEDICS | Facility: CLINIC | Age: 73
End: 2023-12-26
Payer: MEDICARE

## 2023-12-26 NOTE — TELEPHONE ENCOUNTER
Called pt to offer appt time. Pt did not answer left a vm instructing pt to give us a call back.     ----- Message from Jacques Britton sent at 12/26/2023 10:17 AM CST -----  Regarding: MICHELE  Contact: [phone]  Same Day Appointment Request            Reason for FST appt.: pain in both shoulders    Additional Information:  prefer to be seen at main campus

## 2023-12-26 NOTE — PROGRESS NOTES
[facility]   Physical Therapy Treatment Note / Progress Note     Name: Chelly Ortiz  Clinic Number: [id number]    Therapy Diagnosis:   Encounter Diagnosis   Name Primary?    Decreased ROM of right shoulder Yes       Physician: Smitha Turner MD    Visit Date: 12/27/2023  Physician Orders: PT Eval and Treat   Medical Diagnosis from Referral: M25.511 (ICD-10-CM) - Acute pain of right shoulder  Evaluation Date: 10/31/2023  Authorization Period Expiration: 12/31/23  Plan of Care Expiration: 2/14/23  Progress Note Due: 1/14/23  Visit # / Visits authorized: 4/20  FOTO: 4/5     PTA Visit #: 0/5     Precautions: Standard     Time In: 10:08 am  Time Out: 11:04 am  Total Billable Time: 56 Minutes    Subjective     Pt reports: That she may be feeling slightly better but she still get a lot of pain in both shoulders and in the neck.  She has tried to adjust sleeping position but most nights she still gets a lot of burning pain in both shoulders.       She was compliant with home exercise program.  Response to previous treatment: Decreased Pain   Functional change: No Change      Pain: 4/10  Location: Right Shoulder       Objective      Objective Measures updated at progress report unless specified.     Treatment     Chelly received the treatments listed below:      therapeutic exercises to develop strength, endurance, ROM, posture, and core stabilization for 9 minutes including:  Exercise Sets Reps Time Status   Active Assisted Range of Flexion 1# 2 10  Held   Seated table slides into flexion and scaption 2 10 (each direction)  Completed    Seated thoracic extensions over a chair 1 18  Completed    Reassessment            manual therapy techniques: The techniques below were applied for 29 minutes:   Technique Location  Status   Side Glides Mid-Cervical Spine (Bilateral )  Completed    1st Rib Mobilization  Right Side     Joint Mobilization PA to Thoracic Spine Grade III   OA Flexion Mobilizations  " Completed    Joint mobilization Posterior and inferior glides of GH joint Completed          neuromuscular re-education activities to improve:  for 18 minutes. The following activities were included:  Exercise Sets Reps Time Status   ER Walk Outs YTB 2 10     IR Walk Outs YTB 2 10     Flexion and ER isometrics against the wall 1 10 (each direction) 6" holds Completed    Chin Tuck 2 10 10 Sec Completed    Wall Slides  1 10 (halted after 1 set due to pain)     Serratus Punches 2# 3 8                        therapeutic activities to improve functional performance for 0  minutes, including:  Exercise Sets Reps Time Status                                    gait training to improve functional mobility and safety for 0  minutes, including:      direct contact modalities after being cleared for contraindications:     supervised modalities after being cleared for contradictions:     hot pack for 0 minutes to .    cold pack for 0 minutes to .    Patient Education and Home Exercises       Education provided:   - HEP  - Plan of Care   - Anatomy   - Cervical Radiculopathy     Written Home Exercises Provided: yes. Exercises were reviewed and Chelly was able to demonstrate them prior to the end of the session.  Chelly demonstrated good  understanding of the education provided. See EMR under Patient Instructions for exercises provided during therapy sessions    Assessment     Chelly presents to physical therapy with continues complaints of function limiting pain in the right shoulder and neck with intermittent pain in the left shoulder.  She has difficulty sleeping due to pain and demonstrates severe fatigue of upper extremity movement.  Patient's glenohumeral joint is highly guarded with inherent restricted mobility.  She attempts to elevate arm with prominent shrug sign.  Patient reports pain primarily located on the humeral head and in the neck that is very tender to palpation.  Patient has very limited mobility in the cervical " spine with prominent upper cervical spine restriction in extension and mid-cervical flexion.  There is a prominent flexed at C6 that is a majority of patient's movement comes from.  Patient had radial nerve tension that recreated shoulder and neck pain that lessened following nerve flossing.  Patient had increased manual work to improve joint mobility, decrease guarding, and modulate nervous system.  She continued to work through active assisted and isometric motions to facilitate cuff and activate muscles to modulate pain signals in pain free motion.  Patient continues to need to work on thoracic mobility to further unload the cervical spine and improve scapulothoracic and glenohumeral motions.  She continues to present with symptoms that appear to be comorbid between a rotator cuff pathology and cervical radiculopathy.  Patient received further education in posturing and movements.        Chelly Is progressing well towards her goals.   Pt prognosis is Good.     Pt will continue to benefit from skilled outpatient physical therapy to address the deficits listed in the problem list box on initial evaluation, provide pt/family education and to maximize pt's level of independence in the home and community environment.     Pt's spiritual, cultural and educational needs considered and pt agreeable to plan of care and goals.     Anticipated barriers to physical therapy: None at this time     Goals:   Short Term Goals (4 Weeks):   1. Pt will be independent with HEP to supplement PT in improving pain free cervical mobility (MET)  2. Pt will improve cervical AROM 10 deg in rotational planes to improve cervical mobility for driving (Progressing)  3. Pt will improve UE MMTs by 1/2 grade in all planes to improve strength for lifting and carrying tasks. (Progressing)  4. Pt will demonstrate improved sitting posture to decrease pain experienced in head and neck. (Progressing)  Long Term Goals (8 Weeks):   1. Pt will improve FOTO  to </=TBD% limitation to improve perceived limitation with changing and maintaining mobility. (Progressing)  2. Pt will improve cervical AROM to WNL in all planes to improve cervical mobility for driving. (Progressing)  3. Pt will improve UE MMTs 1 grade in all planes to improve strength for lifting and carrying tasks. (Progressing)  4. Pt will report no pain with lifting 10 lbs to promote physical activity. (Progressing)  5. Pt will report no pain with cervical AROM in all planes to promote QOL. (Progressing)  Plan      Plan of care Certification: 10/31/2023 to 2/14/24.     Outpatient Physical Therapy 2 times weekly for 8 weeks to include the following interventions: Cervical/Lumbar Traction, Electrical Stimulation , Gait Training, Manual Therapy, Moist Heat/ Ice, Neuromuscular Re-ed, Patient Education, Self Care, Therapeutic Activities, Therapeutic Exercise, and Ultrasound.     John Goyal, PT

## 2023-12-27 ENCOUNTER — PATIENT MESSAGE (OUTPATIENT)
Dept: INTERNAL MEDICINE | Facility: CLINIC | Age: 73
End: 2023-12-27
Payer: MEDICARE

## 2023-12-27 ENCOUNTER — CLINICAL SUPPORT (OUTPATIENT)
Dept: REHABILITATION | Facility: HOSPITAL | Age: 73
End: 2023-12-27
Payer: MEDICARE

## 2023-12-27 DIAGNOSIS — M25.611 DECREASED ROM OF RIGHT SHOULDER: Primary | ICD-10-CM

## 2023-12-27 PROCEDURE — 97110 THERAPEUTIC EXERCISES: CPT

## 2023-12-27 PROCEDURE — 97140 MANUAL THERAPY 1/> REGIONS: CPT

## 2023-12-27 PROCEDURE — 97112 NEUROMUSCULAR REEDUCATION: CPT

## 2023-12-28 RX ORDER — MELOXICAM 7.5 MG/1
7.5 TABLET ORAL
Qty: 30 TABLET | Refills: 0 | Status: SHIPPED | OUTPATIENT
Start: 2023-12-28 | End: 2024-01-12

## 2023-12-28 NOTE — TELEPHONE ENCOUNTER
No care due was identified.  Health [facility] Embedded Care Due Messages. Reference number: [id number].   12/28/2023 12:10:26 AM CST

## 2023-12-28 NOTE — TELEPHONE ENCOUNTER
Refill Routing Note   Medication(s) are not appropriate for processing by [facility] for the following reason(s):        Outside of protocol    [facility] action(s):  Route               Appointments  past 12m or future 3m with PCP    Date Provider   Last Visit   12/1/2023 Smitha Turner MD   Next Visit   Visit date not found Smitha Turner MD   ED visits in past 90 days: 0        Note composed:7:45 AM 12/28/2023

## 2024-01-02 ENCOUNTER — HOSPITAL ENCOUNTER (OUTPATIENT)
Dept: RADIOLOGY | Facility: HOSPITAL | Age: 74
Discharge: HOME | End: 2024-01-02
Attending: PHYSICIAN ASSISTANT
Payer: MEDICARE

## 2024-01-02 ENCOUNTER — OFFICE VISIT (OUTPATIENT)
Dept: ORTHOPEDICS | Facility: CLINIC | Age: 74
End: 2024-01-02
Payer: MEDICARE

## 2024-01-02 ENCOUNTER — NURSE TRIAGE (OUTPATIENT)
Dept: ADMINISTRATIVE | Facility: CLINIC | Age: 74
End: 2024-01-02
Payer: MEDICARE

## 2024-01-02 VITALS — WEIGHT: 190.06 LBS | HEIGHT: 64 IN | BODY MASS INDEX: 32.45 KG/M2

## 2024-01-02 DIAGNOSIS — M25.512 ACUTE PAIN OF BOTH SHOULDERS: Primary | ICD-10-CM

## 2024-01-02 DIAGNOSIS — M25.511 ACUTE PAIN OF BOTH SHOULDERS: Primary | ICD-10-CM

## 2024-01-02 DIAGNOSIS — M75.51 SUBACROMIAL BURSITIS OF RIGHT SHOULDER JOINT: ICD-10-CM

## 2024-01-02 DIAGNOSIS — M25.512 ACUTE PAIN OF BOTH SHOULDERS: ICD-10-CM

## 2024-01-02 DIAGNOSIS — M25.511 ACUTE PAIN OF BOTH SHOULDERS: ICD-10-CM

## 2024-01-02 PROCEDURE — 1159F MED LIST DOCD IN RCRD: CPT | Mod: CPTII,S$GLB,, | Performed by: PHYSICIAN ASSISTANT

## 2024-01-02 PROCEDURE — 1125F AMNT PAIN NOTED PAIN PRSNT: CPT | Mod: CPTII,S$GLB,, | Performed by: PHYSICIAN ASSISTANT

## 2024-01-02 PROCEDURE — 99213 OFFICE O/P EST LOW 20 MIN: CPT | Mod: S$GLB,,, | Performed by: PHYSICIAN ASSISTANT

## 2024-01-02 PROCEDURE — 73030 X-RAY EXAM OF SHOULDER: CPT | Mod: TC,50

## 2024-01-02 PROCEDURE — 99999 PR PBB SHADOW E&M-EST. PATIENT-LVL IV: CPT | Mod: PBBFAC,,, | Performed by: PHYSICIAN ASSISTANT

## 2024-01-02 PROCEDURE — 73030 X-RAY EXAM OF SHOULDER: CPT | Mod: 26,50,, | Performed by: RADIOLOGY

## 2024-01-02 RX ORDER — MELOXICAM 15 MG/1
15 TABLET ORAL DAILY
Qty: 30 TABLET | Refills: 0 | Status: SHIPPED | OUTPATIENT
Start: 2024-01-02 | End: 2024-02-01

## 2024-01-02 NOTE — PROGRESS NOTES
Subjective:     Patient ID: Chelly Ortiz is a 73 y.o. female.    Chief Complaint: No chief complaint on file.    HPI    Patient is a 73 year old female who presents to clinic with chief complaint of bilateral right greater than left a-traumatic shoulder pain x awhile. Pain is constant with increase in intensity with range of motion and at night reports a burning pain. She has tried Mobic, ice, heat, and had steroid injection on 11/07/23 and 12/05/23 with some mild movement but still has symptoms. She has gone to PT but had to stop due to increased pain. Mild neck pain. No numbness or tingling.     Review of Systems   Constitutional: Negative for chills and fever.   Cardiovascular:  Negative for chest pain.   Respiratory:  Negative for cough and shortness of breath.    Skin:  Negative for color change, dry skin, itching, nail changes, poor wound healing and rash.   Musculoskeletal:         Bilateral shoulder pain    Neurological:  Negative for dizziness.   Psychiatric/Behavioral:  Negative for altered mental status. The patient is not nervous/anxious.    All other systems reviewed and are negative.      Objective:       Ortho/SPM Exam  General: Weight: 88.6 kg (195 lb 5.2 oz) Body mass index is 34.6 kg/m².  Patient is alert, awake and oriented to time, place and person. Mood and affect are appropriate.  Patient does not appear to be in any distress, denies any constitutional symptoms and appears stated age.   HEENT: Pupils are equal and round, sclera are not injected. External examination of ears and nose reveals no abnormalities. Cranial nerves II-X are grossly intact  Neck: examination demonstrates painless  active range of motion. Spurling's sign is negative  Skin: no rashes, abrasions or open wounds on the affected extremity   Resp: No respiratory distress or audible wheezing   Psych:  normal mood and behavior  CV: 2+  pulses, all extremities warm and well perfused   Right Shoulder   Skin intact  No warmth or  effusion  Tenderness: none  Range of motion is painful   ROM: forward flexion 160, external rotation 40, internal rotation L1  Shoulder Strength: biceps 5/5, triceps 5/5, abduction 5/5, adduction 5/5  positive for impingement sign, sensory exam normal, and motor exam normal     Special Tests:     Crossbody test: negative     Neer's positive  Hawkin's positive     Lisbeth's positive  Drop arm negative      Physical Exam    RADS: reviewed by myself:   No fracture or dislocation, mild degenerative changes noted.     Assessment:     Encounter Diagnoses   Name Primary?    Acute pain of both shoulders Yes    Subacromial bursitis of right shoulder joint        Plan:      Dicussed treatment options with the patient to include rest ice, oral medication, PT, injection and further images. At this time she would like to increased her gabapentin to 300 mg qhs and mobic to 15 mg ro see if this can help as well as ice. She will attend PT. Patient is to notify if no resolution in symptoms consider MRI.

## 2024-01-03 ENCOUNTER — HOSPITAL ENCOUNTER (EMERGENCY)
Facility: HOSPITAL | Age: 74
Discharge: HOME | End: 2024-01-03
Attending: EMERGENCY MEDICINE
Payer: MEDICARE

## 2024-01-03 VITALS
RESPIRATION RATE: 18 BRPM | SYSTOLIC BLOOD PRESSURE: 144 MMHG | BODY MASS INDEX: 33.13 KG/M2 | WEIGHT: 190 LBS | DIASTOLIC BLOOD PRESSURE: 86 MMHG | TEMPERATURE: 98 F | HEART RATE: 87 BPM | OXYGEN SATURATION: 96 %

## 2024-01-03 DIAGNOSIS — R04.0 EPISTAXIS: Primary | ICD-10-CM

## 2024-01-03 PROCEDURE — 99283 EMERGENCY DEPT VISIT LOW MDM: CPT

## 2024-01-03 PROCEDURE — 25000003 PHARM REV CODE 250: Performed by: EMERGENCY MEDICINE

## 2024-01-03 RX ORDER — OXYMETAZOLINE HCL 0.05 %
2 SPRAY, NON-AEROSOL (ML) NASAL
Status: COMPLETED | OUTPATIENT
Start: 2024-01-03 | End: 2024-01-03

## 2024-01-03 RX ADMIN — OXYMETAZOLINE HYDROCHLORIDE 2 SPRAY: 0.05 SPRAY NASAL at 12:01

## 2024-01-03 NOTE — TELEPHONE ENCOUNTER
Patient reports nose bleed since 2105. Patient states has been holding direct pressure to nose and still bleeding at this time. Advised patient of dispo to ED. Verbalized understanding. Attempted to advise to call back but patient ended call.        Reason for Disposition   [1] Bleeding present > 30 minutes AND [2] using correct method of direct pressure    Additional Information   Negative: Fainted or too weak to stand following large blood loss   Negative: Sounds like a life-threatening emergency to the triager    Protocols used: Nosebleed-A-

## 2024-01-03 NOTE — ED PROVIDER NOTES
[facility]  Emergency Department Provider Note    Chelly Ortiz   73 y.o. female   [id number]      2024       History     This history was obtained from the patient without limitations.    She is a 73-year-old with the below past medical history who presents by personal transportation.  She complains epistaxis that began at around 21:00 tonight.  She had bleeding from both nostrils.  She attempted to stop the bleeding by applying pressure and ice to the outside of her nose.  The bleeding stopped while in the ED waiting room.  She denies nasal trauma and pain.  She denies headache, lightheadedness, and dizziness.  She denies nausea and vomiting.  She denies blood thinner use.  She denies history of frequent nosebleeds.        Past Medical History:   Diagnosis Date    Allergy     DDD (degenerative disc disease), lumbar     Diabetes mellitus     diet controlled    Diabetes mellitus, type 2     GERD (gastroesophageal reflux disease)     History of subconjunctival hemorrhage 2011    left eye    Hyperlipidemia     Hypertension     IBS (irritable bowel syndrome)     Obesity     MYRIAM (obstructive sleep apnea)     on CPAP    Rotator cuff impingement syndrome     Seasonal allergic conjunctivitis       Past Surgical History:   Procedure Laterality Date    CATARACT EXTRACTION W/  INTRAOCULAR LENS IMPLANT Right 10/03/2013        CATARACT EXTRACTION W/  INTRAOCULAR LENS IMPLANT Left 2022         SECTION      x2    CHOLECYSTECTOMY      COLONOSCOPY N/A 2018    Procedure: COLONOSCOPY;  Surgeon: Marie Mejia MD;  Location: [facility];  Service: Endoscopy;  Laterality: N/A;    COLONOSCOPY N/A 2022    Procedure: COLONOSCOPY;  Surgeon: Pierce Rivera MD;  Location: [facility];  Service: Endoscopy;  Laterality: N/A;    ENDOSCOPIC ULTRASOUND OF UPPER GASTROINTESTINAL TRACT N/A 10/09/2018    Procedure: ULTRASOUND, UPPER GI TRACT, ENDOSCOPIC;  Surgeon: Javy Simpson MD;   Location: [facility] ENDO;  Service: Endoscopy;  Laterality: N/A;    EXCISION OF LESION N/A 2019    Procedure: EXCISION, LESION VULVA;  Surgeon: Liv Odell MD;  Location: [facility] OR;  Service: OB/GYN;  Laterality: N/A;  latex allergy    EYE SURGERY      HYSTERECTOMY      ovaries intact, due to DUB    INTRAOCULAR PROSTHESES INSERTION Left 2022    Procedure: INSERTION, IOL PROSTHESIS;  Surgeon: Clarice Herzog MD;  Location: [address];  Service: Ophthalmology;  Laterality: Left;    PHACOEMULSIFICATION OF CATARACT Left 2022    Procedure: PHACOEMULSIFICATION, CATARACT;  Surgeon: Clarice Herzog MD;  Location: [facility] OR [address] [place] [address];  Service: Ophthalmology;  Laterality: Left;    TUBAL LIGATION        Family History   Problem Relation Age of Onset    Alzheimer's disease Mother     Heart disease Father     Diabetes Sister     Breast cancer Sister     Deep vein thrombosis Brother     Diabetes Daughter     Cancer Brother         Lung    Lung cancer Brother     Amblyopia Neg Hx     Blindness Neg Hx     Cataracts Neg Hx     Glaucoma Neg Hx     Hypertension Neg Hx     Macular degeneration Neg Hx     Retinal detachment Neg Hx     Strabismus Neg Hx     Stroke Neg Hx     Thyroid disease Neg Hx       Social History     Socioeconomic History    Marital status:    Tobacco Use    Smoking status: Former     Current packs/day: 0.00     Types: Cigarettes     Quit date: 2/15/1983     Years since quittin.9     Passive exposure: Current ()    Smokeless tobacco: Never   Substance and Sexual Activity    Alcohol use: No    Drug use: No    Sexual activity: Yes     Partners: Male     Social Determinants of Health     Financial Resource Strain: Patient Declined (2023)    Overall Financial Resource Strain (CARDIA)     Difficulty of Paying Living Expenses: Patient declined   Food Insecurity: Patient Declined (2023)    Hunger Vital Sign     Worried About Running Out of Food in the Last Year:  Patient declined     Ran Out of Food in the Last Year: Patient declined   Recent Concern: Food Insecurity - Food Insecurity Present (10/11/2023)    Hunger Vital Sign     Worried About Running Out of Food in the Last Year: Patient declined     Ran Out of Food in the Last Year: Sometimes true   Transportation Needs: No Transportation Needs (11/30/2023)    PRAPARE - Transportation     Lack of Transportation (Medical): No     Lack of Transportation (Non-Medical): No   Physical Activity: Unknown (11/30/2023)    Exercise Vital Sign     Days of Exercise per Week: Patient declined     Minutes of Exercise per Session: 20 min   Recent Concern: Physical Activity - Insufficiently Active (10/11/2023)    Exercise Vital Sign     Days of Exercise per Week: 3 days     Minutes of Exercise per Session: 20 min   Stress: Patient Declined (11/30/2023)    [employer] - Occupational Stress Questionnaire     Feeling of Stress : Patient declined   Recent Concern: Stress - Stress Concern Present (10/11/2023)    [employer] - Occupational Stress Questionnaire     Feeling of Stress : To some extent   Social Connections: Unknown (11/30/2023)    Social Connection and Isolation Panel [NHANES]     Frequency of Communication with Friends and Family: Patient declined     Frequency of Social Gatherings with Friends and Family: Patient declined     Active Member of Clubs or Organizations: Patient declined     Attends Club or Organization Meetings: Patient declined     Marital Status: Patient declined   Housing Stability: Low Risk  (11/30/2023)    Housing Stability Vital Sign     Unable to Pay for Housing in the Last Year: No     Number of Places Lived in the Last Year: 1     Unstable Housing in the Last Year: No      Review of patient's allergies indicates:   Allergen Reactions    Ace inhibitors Hives     \Cough    Latex, natural rubber Itching           Physical Examination     Initial Vitals  [01/02/24 2244]   BP Pulse Resp Temp SpO2   (!) 144/61 102 18 98.1 °F (36.7 °C) 96 %      MAP       --           Physical Exam    Nursing note and vitals reviewed.  Constitutional: She is not diaphoretic. No distress.   HENT:   Head: Normocephalic and atraumatic.   No facial swelling or tenderness.  Small amount of coagulated blood in both nostrils.  No active epistaxis.   Pulmonary/Chest: No respiratory distress.     Neurological: She is alert and oriented to person, place, and time. GCS score is 15. GCS eye subscore is 4. GCS verbal subscore is 5. GCS motor subscore is 6.   Skin: Skin is warm and dry. No pallor.            Labs     None     Imaging     Imaging Results    None           ED Course     The patient received the following medications:  Medications   oxymetazoline 0.05 % nasal spray 2 spray (2 sprays Each Nostril Given 1/3/24 0027)                 Medical Decision Making                 Medical Decision Making       Diagnoses       ICD-10-CM ICD-9-CM   1. Epistaxis  R04.0 784.7         Dispostion      ED Disposition Condition    Discharge Stable            Follow-up Information       Follow up With Specialties Details Why Contact Info    Smitha Turner MD Internal Medicine  Schedule a close ER follow-up visit. [address]  [place] [address]  [phone]      ER   Return to the ER for any concerns or symptoms that you feel need immediate attention.               Houston Lopez III, MD  01/03/24 [id number]

## 2024-01-03 NOTE — DISCHARGE INSTRUCTIONS
We know that you have many choices and are honored that you chose us. We hope that we met or exceeded your expectations and goals for this visit and will keep the [facility] family in mind for your future needs and those of your family and friends.     - Dr. Lopez

## 2024-01-04 ENCOUNTER — CLINICAL SUPPORT (OUTPATIENT)
Dept: REHABILITATION | Facility: HOSPITAL | Age: 74
End: 2024-01-04
Payer: MEDICARE

## 2024-01-04 ENCOUNTER — PATIENT OUTREACH (OUTPATIENT)
Dept: EMERGENCY MEDICINE | Facility: HOSPITAL | Age: 74
End: 2024-01-04
Payer: MEDICARE

## 2024-01-04 DIAGNOSIS — M25.611 DECREASED ROM OF RIGHT SHOULDER: Primary | ICD-10-CM

## 2024-01-04 PROCEDURE — 97112 NEUROMUSCULAR REEDUCATION: CPT

## 2024-01-04 PROCEDURE — 97110 THERAPEUTIC EXERCISES: CPT

## 2024-01-04 PROCEDURE — 97140 MANUAL THERAPY 1/> REGIONS: CPT

## 2024-01-04 NOTE — PROGRESS NOTES
[facility]   Physical Therapy Treatment Note      Name: Chelly Ortiz  Clinic Number: [id number]    Therapy Diagnosis:   Encounter Diagnosis   Name Primary?    Decreased ROM of right shoulder Yes     Physician: Smitha Turner MD    Visit Date: 1/4/2024  Physician Orders: PT Eval and Treat   Medical Diagnosis from Referral: M25.511 (ICD-10-CM) - Acute pain of right shoulder  Evaluation Date: 10/31/2023  Authorization Period Expiration: 12/31/23  Plan of Care Expiration: 2/14/23  Progress Note Due: 1/14/23  Visit # / Visits authorized: 4/20  FOTO: 4/5     PTA Visit #: 0/5     Precautions: Standard     Time In: 1:08 pm  Time Out: 2:02 am  Total Billable Time: 54 Minutes    Subjective     Pt reports: That her neck and shoulders are doing maybe minimally better but they feel okay if she doesn't have to move them.  She is very fearful of movements.  She saw MD yesterday who imaged her shoulder and stated that there is mild DJD and if the pain continues for a few months that they will explore MRI.      She was compliant with home exercise program.  Response to previous treatment: Decreased Pain   Functional change: No Change      Pain: 4/10  Location: Right Shoulder       Objective      Objective Measures updated at progress report unless specified.     Treatment     Chelly received the treatments listed below:      therapeutic exercises to develop strength, endurance, ROM, posture, and core stabilization for 18 minutes including:  Exercise Sets Reps Time Status   Active Assisted Range of Flexion 1# to 90 Degrees 2 10  Completed    Active Range of Flexion in Side Lying with Dowel Dwain 1 15  Completed    Seated table slides into flexion and scaption 2 10 (each direction)  Completed    Seated thoracic extensions over a chair 1 18     Reassessment       UBE 1 Min FWD 1 Min BWD   6 Minutes Completed         manual therapy techniques: The techniques below were applied for 25 minutes:   Technique  "Location  Status   Side Glides Mid-Cervical Spine (Bilateral )  Completed    1st Rib Mobilization  Right Side  Completed    Joint Mobilization PA to Thoracic Spine Grade III   OA Flexion Mobilizations  Completed    Joint mobilization Posterior and inferior glides of GH joint with ER Combined  Completed    Joint Mobilization  Unilateral CT Junction (Bilateral)  Completed          neuromuscular re-education activities to improve:  for 11 minutes. The following activities were included:  Exercise Sets Reps Time Status   ER Walk Outs YTB 2 10     IR Walk Outs YTB 2 10     Flexion and ER isometrics against the wall 1 10 (each direction) 6" holds Completed    Chin Tuck 2 10 10 Sec Completed    Wall Slides  1 10 (halted after 1 set due to pain)     Serratus Punches 2# 3 8                        therapeutic activities to improve functional performance for 0  minutes, including:  Exercise Sets Reps Time Status                                    gait training to improve functional mobility and safety for 0  minutes, including:      direct contact modalities after being cleared for contraindications:     supervised modalities after being cleared for contradictions:     hot pack for 0 minutes to .    cold pack for 0 minutes to .    Patient Education and Home Exercises       Education provided:   - HEP  - Plan of Care   - Anatomy   - Cervical Radiculopathy     Written Home Exercises Provided: yes. Exercises were reviewed and Chelly was able to demonstrate them prior to the end of the session.  Chelly demonstrated good  understanding of the education provided. See EMR under Patient Instructions for exercises provided during therapy sessions    Assessment     Chelly presents to physical therapy with continued complaints of severe shoulder pain with various movements and the inability to elevate her arm.  When attempting to elevate her arm she can not reach above 40 degrees actively and without severe shoulder shrugging.  Patient has " significant tenderness to the anterior shoulder.  She continues to have severe tenderness to the mid-cervical spine with decreased mobility in the joints.  She has significant bony growth at her CT junction with large hinge point into cervical extension at C5-6.  Patient has decreased OA flexion that improved significant with MET and improved quality and completion of deep neck flexion chin tucks.  Patient is severe limited by pain and inability to raise muscle as she cannot elevate her arm from the side when supine with gravity and requires active assistance.  This is further complicated by the adverse neural tension stemming from the neck.  She will continue to be assessed for changes and improvement in symptoms.  She continues to present with symptoms that appear to be comorbid between a rotator cuff pathology and cervical radiculopathy.  Patient received further education in posturing and movements.        Chelly Is progressing well towards her goals.   Pt prognosis is Good.     Pt will continue to benefit from skilled outpatient physical therapy to address the deficits listed in the problem list box on initial evaluation, provide pt/family education and to maximize pt's level of independence in the home and community environment.     Pt's spiritual, cultural and educational needs considered and pt agreeable to plan of care and goals.     Anticipated barriers to physical therapy: None at this time     Goals:   Short Term Goals (4 Weeks):   1. Pt will be independent with HEP to supplement PT in improving pain free cervical mobility (MET)  2. Pt will improve cervical AROM 10 deg in rotational planes to improve cervical mobility for driving (Progressing)  3. Pt will improve UE MMTs by 1/2 grade in all planes to improve strength for lifting and carrying tasks. (Progressing)  4. Pt will demonstrate improved sitting posture to decrease pain experienced in head and neck. (Progressing)  Long Term Goals (8 Weeks):   1. Pt  will improve FOTO to </=TBD% limitation to improve perceived limitation with changing and maintaining mobility. (Progressing)  2. Pt will improve cervical AROM to WNL in all planes to improve cervical mobility for driving. (Progressing)  3. Pt will improve UE MMTs 1 grade in all planes to improve strength for lifting and carrying tasks. (Progressing)  4. Pt will report no pain with lifting 10 lbs to promote physical activity. (Progressing)  5. Pt will report no pain with cervical AROM in all planes to promote QOL. (Progressing)  Plan      Plan of care Certification: 10/31/2023 to 2/14/24.     Outpatient Physical Therapy 2 times weekly for 8 weeks to include the following interventions: Cervical/Lumbar Traction, Electrical Stimulation , Gait Training, Manual Therapy, Moist Heat/ Ice, Neuromuscular Re-ed, Patient Education, Self Care, Therapeutic Activities, Therapeutic Exercise, and Ultrasound.     John Goyal, PT

## 2024-01-05 DIAGNOSIS — E11.65 TYPE 2 DIABETES MELLITUS WITH HYPERGLYCEMIA, WITH LONG-TERM CURRENT USE OF INSULIN: ICD-10-CM

## 2024-01-05 DIAGNOSIS — Z79.4 TYPE 2 DIABETES MELLITUS WITH HYPERGLYCEMIA, WITH LONG-TERM CURRENT USE OF INSULIN: ICD-10-CM

## 2024-01-05 NOTE — TELEPHONE ENCOUNTER
Care Due:                  Date            Visit Type   Department     Provider  --------------------------------------------------------------------------------                                EP -                              PRIMARY      [facility]  Last Visit: 12-      CARE ([facility])   MEDICINE       Simtha Turner  Next Visit: None Scheduled  None         None Found                                                            Last  Test          Frequency    Reason                     Performed    Due Date  --------------------------------------------------------------------------------    CBC.........  12 months..  meloxicam................  10-   10-    [facility] Embedded Care Due Messages. Reference number: [id number].   1/05/2024 2:38:36 PM CST

## 2024-01-05 NOTE — TELEPHONE ENCOUNTER
----- Message from Maryellen Nicholson MA sent at 1/5/2024  2:17 PM CST -----  Regarding: Diabetic supplies  Patient requests a call back regarding an order placed to [facility] for her accucheck shira diabetic supplies.

## 2024-01-05 NOTE — PROGRESS NOTES
Patient agreed to have assistance with scheduling her post ED 7-day follow up with YARED Hawkins on 1/12/24 at 1:30 pm. Patient will receive an appointment reminder.

## 2024-01-05 NOTE — TELEPHONE ENCOUNTER
----- Message from Maryellen Nicholson MA sent at 1/5/2024  2:17 PM CST -----  Regarding: Diabetic supplies  Patient requests a call back regarding an order placed to [facility] for her accucheck shira diabetic supplies.

## 2024-01-07 RX ORDER — DEXTROSE 4 G
TABLET,CHEWABLE ORAL
Qty: 1 EACH | Refills: 0 | Status: SHIPPED | OUTPATIENT
Start: 2024-01-07 | End: 2024-01-30 | Stop reason: SDUPTHER

## 2024-01-07 NOTE — TELEPHONE ENCOUNTER
Refill Routing Note   Medication(s) are not appropriate for processing by [facility] for the following reason(s):        ED/Hospital Visit since last OV with provider  No active prescription written by provider    [facility] action(s):  Defer     Requires labs : Yes             Appointments  past 12m or future 3m with PCP    Date Provider   Last Visit   12/1/2023 Smitha Turner MD   Next Visit   Visit date not found Smitha Turner MD   ED visits in past 90 days: 1        Note composed:4:25 PM 01/07/2024

## 2024-01-07 NOTE — PROGRESS NOTES
INTERNAL [facility]INE CLINIC  Progress Note   Recent ED Discharge     PRESENTING HISTORY     PCP: Smitha Turner MD  Chief Complaint/Reason for Visit:  Follow up ED Discharge   No chief complaint on file.      History of Present Illness: Ms. Chelly Ortiz is a 73 y.o. female who was recently discharged from the ED.    ___________________________________________________________________    Today:  ER follow up.   Very pleasant lady,   Reports that 'blew her nose and noticed bleeding from the left, more than the right side, went to the ER; bleeding stopped before being seen, did not get packed'. She has not been seen by ENT, recently; has been in the past and underwent a procedure to the left side several years ago; started using Ocean [employer].   She also endorses that 'this has happened in the past'.   Still taking an ASA daily for AA atherosclerosis.   She has no 'cold symptoms'.     Review of Systems:  Eyes: denies visual changes at this time denies floaters   ENT: no nasal congestion or sore throat  Respiratory: no cough or shorness of breath  Cardiovascular: no chest pain or palpitations  Gastrointestinal: no nausea or vomiting, no abdominal pain or change in bowel habits  Genitourinary: no hematuria or dysuria; denies frequency  Hematologic/Lymphatic: no easy bruising or lymphadenopathy  Musculoskeletal: no arthralgias or myalgias  Neurological: no seizures or tremors  Endocrine: no heat or cold intolerance  PAST HISTORY:     Past Medical History:   Diagnosis Date    Allergy     DDD (degenerative disc disease), lumbar     Diabetes mellitus     diet controlled    Diabetes mellitus, type 2     GERD (gastroesophageal reflux disease)     History of subconjunctival hemorrhage 12/02/2011    left eye    Hyperlipidemia     Hypertension     IBS (irritable bowel syndrome)     Obesity     MYRIAM (obstructive sleep apnea)     on CPAP    Rotator cuff impingement syndrome     Seasonal allergic conjunctivitis        Past Surgical  History:   Procedure Laterality Date    CATARACT EXTRACTION W/  INTRAOCULAR LENS IMPLANT Right 10/03/2013        CATARACT EXTRACTION W/  INTRAOCULAR LENS IMPLANT Left 2022         SECTION      x2    CHOLECYSTECTOMY      COLONOSCOPY N/A 2018    Procedure: COLONOSCOPY;  Surgeon: Marie Mejia MD;  Location: [facility] ENDO;  Service: Endoscopy;  Laterality: N/A;    COLONOSCOPY N/A 2022    Procedure: COLONOSCOPY;  Surgeon: Pierce Rivera MD;  Location: [facility] ENDO;  Service: Endoscopy;  Laterality: N/A;    ENDOSCOPIC ULTRASOUND OF UPPER GASTROINTESTINAL TRACT N/A 10/09/2018    Procedure: ULTRASOUND, UPPER GI TRACT, ENDOSCOPIC;  Surgeon: Javy Simpson MD;  Location: [facility] ENDO;  Service: Endoscopy;  Laterality: N/A;    EXCISION OF LESION N/A 2019    Procedure: EXCISION, LESION VULVA;  Surgeon: iLv Odell MD;  Location: [facility] OR;  Service: OB/GYN;  Laterality: N/A;  latex allergy    EYE SURGERY      HYSTERECTOMY      ovaries intact, due to DUB    INTRAOCULAR PROSTHESES INSERTION Left 2022    Procedure: INSERTION, IOL PROSTHESIS;  Surgeon: Clarice Herzog MD;  Location: [facility] OR [address] [place] [address];  Service: Ophthalmology;  Laterality: Left;    PHACOEMULSIFICATION OF CATARACT Left 2022    Procedure: PHACOEMULSIFICATION, CATARACT;  Surgeon: Clarice Herzog MD;  [facility];  Service: Ophthalmology;  Laterality: Left;    TUBAL LIGATION         Family History   Problem Relation Age of Onset    Alzheimer's disease Mother     Heart disease Father     Diabetes Sister     Breast cancer Sister     Deep vein thrombosis Brother     Diabetes Daughter     Cancer Brother         Lung    Lung cancer Brother     Amblyopia Neg Hx     Blindness Neg Hx     Cataracts Neg Hx     Glaucoma Neg Hx     Hypertension Neg Hx     Macular degeneration Neg Hx     Retinal detachment Neg Hx     Strabismus Neg Hx     Stroke Neg Hx     Thyroid disease Neg Hx        Social  History     Socioeconomic History    Marital status:    Tobacco Use    Smoking status: Former     Current packs/day: 0.00     Types: Cigarettes     Quit date: 2/15/1983     Years since quittin.9     Passive exposure: Current ()    Smokeless tobacco: Never   Substance and Sexual Activity    Alcohol use: No    Drug use: No    Sexual activity: Yes     Partners: Male     Social Determinants of Health     Financial Resource Strain: Patient Declined (2023)    Overall Financial Resource Strain (CARDIA)     Difficulty of Paying Living Expenses: Patient declined   Food Insecurity: Patient Declined (2023)    Hunger Vital Sign     Worried About Running Out of Food in the Last Year: Patient declined     Ran Out of Food in the Last Year: Patient declined   Recent Concern: Food Insecurity - Food Insecurity Present (10/11/2023)    Hunger Vital Sign     Worried About Running Out of Food in the Last Year: Patient declined     Ran Out of Food in the Last Year: Sometimes true   Transportation Needs: No Transportation Needs (2023)    PRAPARE - Transportation     Lack of Transportation (Medical): No     Lack of Transportation (Non-Medical): No   Physical Activity: Unknown (2023)    Exercise Vital Sign     Days of Exercise per Week: Patient declined     Minutes of Exercise per Session: 20 min   Recent Concern: Physical Activity - Insufficiently Active (10/11/2023)    Exercise Vital Sign     Days of Exercise per Week: 3 days     Minutes of Exercise per Session: 20 min   Stress: Patient Declined (2023)    [employer] - Occupational Stress Questionnaire     Feeling of Stress : Patient declined   Recent Concern: Stress - Stress Concern Present (10/11/2023)    [employer] - Occupational Stress Questionnaire     Feeling of Stress : To some extent   Social Connections: Unknown (2023)    Social Connection and Isolation Panel [NHANES]      Frequency of Communication with Friends and Family: Patient declined     Frequency of Social Gatherings with Friends and Family: Patient declined     Active Member of Clubs or Organizations: Patient declined     Attends Club or Organization Meetings: Patient declined     Marital Status: Patient declined   Housing Stability: Low Risk  (11/30/2023)    Housing Stability Vital Sign     Unable to Pay for Housing in the Last Year: No     Number of Places Lived in the Last Year: 1     Unstable Housing in the Last Year: No       MEDICATIONS & ALLERGIES:     Current Outpatient Medications on File Prior to Visit   Medication Sig Dispense Refill    ACCU-CHEK FASTCLIX LANCING DEV Kit USE AS DIRECTED 1 each 0    albuterol (PROVENTIL/VENTOLIN HFA) 90 mcg/actuation inhaler Inhale 1-2 puffs into the lungs every 4 (four) hours as needed for Wheezing. 18 g 2    amLODIPine (NORVASC) 10 MG tablet Take 1 tablet (10 mg total) by mouth once daily. 90 tablet 3    aspirin (ECOTRIN) 81 MG EC tablet Take 81 mg by mouth once daily.      azelastine (OPTIVAR) 0.05 % ophthalmic solution Place 1 drop into both eyes 2 (two) times daily. 6 mL 1    blood glucose control high,low (ACCU-CHEK GUIDE L1-L2 CTRL SOL) Soln 3 times a day 300 each 11    blood sugar diagnostic (ACCU-CHEK GUIDE TEST STRIPS) Strp 3 times a day 300 strip 11    blood-glucose meter (ACCU-CHEK GUIDE GLUCOSE METER) Misc Three times a day 1 each 0    calcium carbonate (OS-ARIC) 600 mg calcium (1,500 mg) Tab Take 1 tablet (600 mg total) by mouth once. for 1 dose 30 tablet 11    cetirizine (ZYRTEC) 10 MG tablet Take 1 tablet (10 mg total) by mouth every evening. 90 tablet 0    citalopram (CELEXA) 10 MG tablet Take 1 tablet (10 mg total) by mouth once daily. 90 tablet 1    diclofenac sodium (VOLTAREN) 1 % Gel Apply 2 g topically once daily. 100 g 1    ergocalciferol (ERGOCALCIFEROL) 50,000 unit Cap TAKE 1 CAPSULE BY MOUTH EVERY 7 DAYS 12 capsule 3    esomeprazole (NEXIUM) 40 MG  "capsule Take 1 capsule (40 mg total) by mouth before breakfast. 90 capsule 3    ezetimibe (ZETIA) 10 mg tablet Take 1 tablet (10 mg total) by mouth once daily. 90 tablet 3    fluocinonide (LIDEX) 0.05 % external solution AAA scalp qday - bid prn pruritus 60 mL 3    fluticasone propionate (FLONASE) 50 mcg/actuation nasal spray 1 spray (50 mcg total) by Each Nostril route once daily. 9.9 mL 0    gabapentin (NEURONTIN) 100 MG capsule Take 2 capsules (200 mg total) by mouth every evening. 180 capsule 3    glucose 4 GM chewable tablet Take 4 tablets (16 g total) by mouth as needed for Low blood sugar (If having symptoms of blurry vision, palpitations, confusion, shakiness.  Please check sugars and if sugar below 70 please take 4 tablets and re-check sugar everry 15 minutes until sugars are above 70 and symptoms resolve.). 50 tablet 12    insulin aspart U-100 (NOVOLOG FLEXPEN U-100 INSULIN) 100 unit/mL (3 mL) InPn pen Inject 10 Units into the skin 3 (three) times daily with meals. 60 mL 11    insulin aspart U-100 (NOVOLOG FLEXPEN U-100 INSULIN) 100 unit/mL (3 mL) InPn pen Inject 10 Units into the skin 3 (three) times daily with meals. TDD 60 units 60 mL 11    insulin detemir U-100, Levemir, (LEVEMIR FLEXPEN) 100 unit/mL (3 mL) InPn pen Inject 40 Units into the skin every evening. 60 mL 11    insulin detemir U-100, Levemir, (LEVEMIR FLEXPEN) 100 unit/mL (3 mL) InPn pen Inject 40 Units into the skin every evening. 50 mL 11    insulin syringe-needle U-100 0.3 mL 31 gauge x 5/16" Syrg relion brand, use 5 x a day, if not on levemir or novolog 150 each 1    insulin syringe-needle U-100 0.5 mL 31 gauge x 5/16" Syrg Use nightly. 90 day 100 each 3    lancets (ACCU-CHEK FASTCLIX LANCET DRUM) [facility] TEST BLOOD SUGAR THREE TIMES A  each 3    linaCLOtide (LINZESS) 72 mcg Cap capsule Take 1 capsule (72 mcg total) by mouth before breakfast. 30 capsule 5    magnesium oxide (MAG-OX) 400 mg tablet Take 1 tablet (400 mg total) by mouth " "2 (two) times daily. 180 tablet 3    meloxicam (MOBIC) 15 MG tablet Take 1 tablet (15 mg total) by mouth once daily. 30 tablet 0    meloxicam (MOBIC) 7.5 MG tablet TAKE 1 TABLET BY MOUTH EVERY DAY 30 tablet 0    montelukast (SINGULAIR) 10 mg tablet Take 10 mg by mouth.      pen needle, diabetic (NOVOFINE 32) 32 gauge x 1/4" Ndle 4 injections per day 500 each 4    traZODone (DESYREL) 50 MG tablet Take 1 tablet (50 mg total) by mouth every evening. 90 tablet 3     Current Facility-Administered Medications on File Prior to Visit   Medication Dose Route Frequency Provider Last Rate Last Admin    triamcinolone acetonide injection 20 mg  20 mg Intramuscular 1 time in Clinic/HOD Tani Pittman FNP            Review of patient's allergies indicates:   Allergen Reactions    Ace inhibitors Hives     \Cough    Latex, natural rubber Itching       Medications Reconciliation:   I have reconciled the patient's home medications and discharge medications with the patient/family. I have updated all changes.  Refer to After-Visit Medication List.    OBJECTIVE:     Vital Signs:  There were no vitals filed for this visit.  Wt Readings from Last 3 Encounters:   01/02/24 2244 86.2 kg (190 lb)   01/02/24 0822 86.2 kg (190 lb 0.6 oz)   12/18/23 1425 86.2 kg (190 lb)     There is no height or weight on file to calculate BMI.   Wt Readings from Last 3 Encounters:   01/12/24 87.8 kg (193 lb 9 oz)   01/02/24 86.2 kg (190 lb)   01/02/24 86.2 kg (190 lb 0.6 oz)     Temp Readings from Last 3 Encounters:   01/02/24 98.1 °F (36.7 °C) (Oral)   12/18/23 98.1 °F (36.7 °C) (Oral)   04/05/23 97.8 °F (36.6 °C) (Oral)     BP Readings from Last 3 Encounters:   01/12/24 130/70   01/03/24 (!) 144/86   12/18/23 (!) 158/82     Pulse Readings from Last 3 Encounters:   01/12/24 79   01/03/24 87   12/18/23 79       Physical Exam:  (Focused Exam)  General: Well developed, well nourished. No distress.  HEENT: Head is normocephalic, atraumatic; ears are normal. " "  Left nare: ? Small polyp vs scar tissue  Right nare: + turbinate hypertrophy  Eyes: Clear conjunctiva.  Cardiovascular: Heart with regular rate and rhythm. No murmurs, gallops or rubs  Extremities: No LE edema. Pulses 2+ and symmetric.   Skin: Skin color, texture, turgor normal. No rashes.  Musculoskeletal: Normal gait.   Neurologic: Normal strength and tone. No focal numbness or weakness.       Laboratory  Lab Results   Component Value Date    WBC 5.24 10/21/2022    HGB 12.7 10/21/2022    HCT 39.7 10/21/2022    MCV 80 (L) 10/21/2022     10/21/2022     BMP  Lab Results   Component Value Date     12/01/2023    K 3.7 12/01/2023     12/01/2023    CO2 25 12/01/2023    BUN 18 12/01/2023    CREATININE 0.9 12/01/2023    CALCIUM 9.1 12/01/2023    ANIONGAP 9 12/01/2023    EGFRNORACEVR >60.0 12/01/2023     Lab Results   Component Value Date    ALT 12 12/01/2023    AST 17 12/01/2023    GGT 30 10/27/2004    ALKPHOS 120 12/01/2023    BILITOT 0.4 12/01/2023     Lab Results   Component Value Date    INR 0.9 04/19/2012    INR 1.0 07/04/2005    INR 1.0 01/05/2005     Lab Results   Component Value Date    HGBA1C 6.4 (H) 10/24/2023     No results for input(s): "POCTGLUCOSE" in the last 72 hours.      ASSESSMENT & PLAN:     HIGH RISK CONDITION(S):    ER Follow up     Seen in the ER on 1/3/2024 with "nose Bleed'.   Frequent epistaxis  Chronic rhinitis  -     fluticasone propionate (FLONASE) 50 mcg/actuation nasal spray; 1 spray (50 mcg total) by Each Nostril route once daily.  Dispense: 9.9 mL; Refill: 3  -     Ambulatory referral/consult to ENT; Future; Expected date: 01/19/2024  -     CBC Auto Differential; Future; Expected date: 01/12/2024  -     sodium chloride (SALINE NASAL) 0.65 % nasal spray; 1 spray by Nasal route as needed for Congestion.  Dispense: 30 mL; Refill: 6    Abdominal aortic atherosclerosis  *Taking ASA daily    Future Appointments   Date Time Provider Department Center   1/16/2024  2:00 PM " Gabo Matthews, PT [facility] [place]   1/18/2024  3:00 PM Gabo Matthews, PT [facility] [place]   1/23/2024  2:00 PM Gabo Matthews, PT [facility] [place]   1/25/2024  2:00 PM Bartolome Tinsley, PT [facility] [place]   1/29/2024 11:00 AM Laureano Bartolome, PT [facility] [place]   1/29/2024  1:20 PM [facility]   1/31/2024  2:00 PM Gabo Matthews, PT [facility] [place]   3/12/2024  2:20 PM Suzette Davis MD [facility]        Medication List            Accurate as of January 12, 2024  2:31 PM. If you have any questions, ask your nurse or doctor.                START taking these medications      Saline NasaL 0.65 % nasal spray  Generic drug: sodium chloride  1 spray by Nasal route as needed for Congestion.  Started by: GINETTE Cartwright            CHANGE how you take these medications      meloxicam 15 MG tablet  Commonly known as: MOBIC  Take 1 tablet (15 mg total) by mouth once daily.  What changed: Another medication with the same name was removed. Continue taking this medication, and follow the directions you see here.  Changed by: GINETTE Cartwright            CONTINUE taking these medications      ACCU-CHEK FASTCLIX LANCING DEV Kit  Generic drug: lancing device with lancets  USE AS DIRECTED     amLODIPine 10 MG tablet  Commonly known as: NORVASC  Take 1 tablet (10 mg total) by mouth once daily.     aspirin 81 MG EC tablet  Commonly known as: ECOTRIN     blood sugar diagnostic Strp  Commonly known as: ACCU-CHEK GUIDE TEST STRIPS  3 times a day     blood-glucose meter Misc  Commonly known as: ACCU-CHEK GUIDE GLUCOSE METER  Three times a day     calcium carbonate 600 mg calcium (1,500 mg) Tab  Commonly known as: OS-ARIC  Take 1 tablet (600 mg total) by mouth once. for 1 dose     cetirizine 10 MG tablet  Commonly known as: ZYRTEC  Take 1 tablet (10 mg total) by mouth every evening.     citalopram 10 MG tablet  Commonly known as: CeleXA  Take 1  "tablet (10 mg total) by mouth once daily.     ergocalciferol 50,000 unit Cap  Commonly known as: ERGOCALCIFEROL  TAKE 1 CAPSULE BY MOUTH EVERY 7 DAYS     esomeprazole 40 MG capsule  Commonly known as: NEXIUM  Take 1 capsule (40 mg total) by mouth before breakfast.     ezetimibe 10 mg tablet  Commonly known as: ZETIA  Take 1 tablet (10 mg total) by mouth once daily.     fluocinonide 0.05 % external solution  Commonly known as: LIDEX  AAA scalp qday - bid prn pruritus     fluticasone propionate 50 mcg/actuation nasal spray  Commonly known as: FLONASE  1 spray (50 mcg total) by Each Nostril route once daily.     gabapentin 100 MG capsule  Commonly known as: NEURONTIN  Take 2 capsules (200 mg total) by mouth every evening.     glucose 4 GM chewable tablet  Take 4 tablets (16 g total) by mouth as needed for Low blood sugar (If having symptoms of blurry vision, palpitations, confusion, shakiness.  Please check sugars and if sugar below 70 please take 4 tablets and re-check sugar everry 15 minutes until sugars are above 70 and symptoms resolve.).     insulin syringe-needle U-100 0.3 mL 31 gauge x 5/16" Syrg  relion brand, use 5 x a day, if not on levemir or novolog     insulin syringe-needle U-100 0.5 mL 31 gauge x 5/16" Syrg  Use nightly. 90 day     lancets Misc  Commonly known as: ACCU-CHEK FASTCLIX LANCET DRUM  TEST BLOOD SUGAR THREE TIMES A DAY     * LEVEMIR FLEXPEN 100 unit/mL (3 mL) Inpn pen  Generic drug: insulin detemir U-100 (Levemir)  Inject 40 Units into the skin every evening.     * LEVEMIR FLEXPEN 100 unit/mL (3 mL) Inpn pen  Generic drug: insulin detemir U-100 (Levemir)  Inject 40 Units into the skin every evening.     linaCLOtide 72 mcg Cap capsule  Commonly known as: LINZESS  Take 1 capsule (72 mcg total) by mouth before breakfast.     magnesium oxide 400 mg (241.3 mg magnesium) tablet  Commonly known as: MAG-OX  Take 1 tablet (400 mg total) by mouth 2 (two) times daily.     montelukast 10 mg " "tablet  Commonly known as: SINGULAIR     NOVOFINE 32 32 gauge x 1/4" Ndle  Generic drug: pen needle, diabetic  4 injections per day     * NovoLOG Flexpen U-100 Insulin 100 unit/mL (3 mL) Inpn pen  Generic drug: insulin aspart U-100  Inject 10 Units into the skin 3 (three) times daily with meals.     * insulin aspart U-100 100 unit/mL (3 mL) Inpn pen  Commonly known as: NovoLOG Flexpen U-100 Insulin  Inject 10 Units into the skin 3 (three) times daily with meals. TDD 60 units     traZODone 50 MG tablet  Commonly known as: DESYREL  Take 1 tablet (50 mg total) by mouth every evening.     VENTOLIN HFA 90 mcg/actuation inhaler  Generic drug: albuterol  Inhale 1-2 puffs into the lungs every 4 (four) hours as needed for Wheezing.           * This list has 4 medication(s) that are the same as other medications prescribed for you. Read the directions carefully, and ask your doctor or other care provider to review them with you.                STOP taking these medications      ACCU-CHEK GUIDE L1-L2 CTRL SOL Soln  Generic drug: blood glucose control high,low  Stopped by: GINETTE Cartwright     azelastine 0.05 % ophthalmic solution  Commonly known as: OPTIVAR  Stopped by: GINETTE Cartwright     diclofenac sodium 1 % Gel  Commonly known as: VOLTAREN  Stopped by: GINETTE Cartwright               Where to Get Your Medications        These medications were sent to [facility]/pharmacy #[id number] - [place] - [address]  [address] [place] [address]      Phone: [phone]   fluticasone propionate 50 mcg/actuation nasal spray  Saline NasaL 0.65 % nasal spray         Signing Physician:  GINETTE Cartwright    "

## 2024-01-10 ENCOUNTER — CLINICAL SUPPORT (OUTPATIENT)
Dept: REHABILITATION | Facility: HOSPITAL | Age: 74
End: 2024-01-10
Payer: MEDICARE

## 2024-01-10 DIAGNOSIS — M25.611 DECREASED ROM OF RIGHT SHOULDER: Primary | ICD-10-CM

## 2024-01-10 PROCEDURE — 97140 MANUAL THERAPY 1/> REGIONS: CPT

## 2024-01-10 PROCEDURE — 97110 THERAPEUTIC EXERCISES: CPT

## 2024-01-10 NOTE — PROGRESS NOTES
[facility]   Physical Therapy Treatment Note      Name: Chelly Ortiz  Clinic Number: [id number]    Therapy Diagnosis:   Encounter Diagnosis   Name Primary?    Decreased ROM of right shoulder Yes       Physician: Smitha Turner MD    Visit Date: 1/10/2024  Physician Orders: PT Eval and Treat   Medical Diagnosis from Referral: M25.511 (ICD-10-CM) - Acute pain of right shoulder  Evaluation Date: 10/31/2023  Authorization Period Expiration: 12/31/23  Plan of Care Expiration: 2/14/23  Progress Note Due: 2/14/24  Visit # / Visits authorized: 5/20  FOTO: 5/5 ** Next Visit     PTA Visit #: 0/5     Precautions: Standard     Time In: 3:02 pm  Time Out: 4:00 pm  Total Billable Time: 58 Minutes (30 Minutes 1 on 1)     Subjective     Pt reports: That she might feel slight better but her arm still really hurts and she can't lift it at all.  She is concerned that she might have to get surgery.      She was compliant with home exercise program.  Response to previous treatment: Decreased Pain   Functional change: No Change      Pain: 4/10  Location: Right Shoulder       Objective      Objective Measures updated at progress report unless specified.       Right AROM Right MMT Left aROM Left MMT Notes:   Shoulder Flexion:  (180) 30  (significant shrug sign)   156       Shoulder Abduction:  (180) 24 (significant shrug sign)   126       Shoulder ER:  (90) 73   68       Shoulder IR:  (70) L3    T12       Lower Trap: N/A   N/A       Middle Trap: N/A   N/A       Upper Trap:  N/A   N/A       Serratus Anterior: N/A   N/A       Rhomboids: N/A   N/A       Elbow Flexion: Full   Full       Elbow Extension: Full   Full       Positive Saldaña-Kosta   Positive Drop Arm   Positive Infraspinatus Weakness/Pain    ULTT Testing was Globally Negative (Patient could not tolerate pressure in superior shoulder for depression)     Treatment     Chelly received the treatments listed below:      therapeutic exercises to develop  "strength, endurance, ROM, posture, and core stabilization for 25 minutes including:  Exercise Sets Reps Time Status   Active Assisted Range of Flexion 1# to 90 Degrees 2 10  Completed    Active Range of Flexion in Side Lying with Dowel Dwain 1 15     Seated table slides into flexion and scaption 2 10 (each direction)     Seated thoracic extensions over a chair 1 18  Completed    Reassessment    Completed    UBE 1 Min FWD 1 Min BWD   6 Minutes Completed    Pulleys Flexion    2.5 Minutes Completed         manual therapy techniques: The techniques below were applied for 17 minutes:   Technique Location  Status   Side Glides Mid-Cervical Spine (Bilateral )     1st Rib Mobilization  Right Side     Joint Mobilization PA to Thoracic Spine Grade III   OA Flexion Mobilizations     Joint mobilization Posterior and inferior glides of GH joint with ER Combined  Completed    Rhythmic Stabilization  4 Way at Elbow  Completed    Joint Mobilization  Unilateral CT Junction (Bilateral)           neuromuscular re-education activities to improve:  for 16 minutes. The following activities were included:  Exercise Sets Reps Time Status   ER Walk Outs YTB 2 10     IR Walk Outs YTB 2 10     Flexion and ER isometrics against the wall 1 8 (each direction) 6" holds Completed    Chin Tuck 2 10 10 Sec    Wall Slides  2 12   Completed    Serratus Punches 2# 3 8                        therapeutic activities to improve functional performance for 0  minutes, including:  Exercise Sets Reps Time Status                                    gait training to improve functional mobility and safety for 0  minutes, including:      direct contact modalities after being cleared for contraindications:     supervised modalities after being cleared for contradictions:     hot pack for 0 minutes to .    cold pack for 0 minutes to .    Patient Education and Home Exercises       Education provided:   - HEP  - Plan of Care   - Anatomy   - Cervical Radiculopathy "     Written Home Exercises Provided: yes. Exercises were reviewed and Chelly was able to demonstrate them prior to the end of the session.  Chelly demonstrated good  understanding of the education provided. See EMR under Patient Instructions for exercises provided during therapy sessions    Assessment     Chelly presents to physical therapy with continued severe shoulder pain with any movements.  She demonstrates decreased ability to elevate the arm from initial eval despite overall less irritability.  Patient had decreased adverse neural tension today with decreased neural irritability that allowed for increased passive mobility today.  This allowed for a  assessment of shoulder mobility and patient demonstrated 30 degrees of shoulder flexion and 24 degrees of abduction with a full shoulder shrug compensatory pattern to attempt to elevate the arm.  She improved pain level and passive range of motion with manual interventions but did not improve active range of motion.  Patient worked to improve motion tolerance with passive and active assisted interventions today to decrease stiffness and allow for motion to help lubricate the glenohumeral joint.  She worked on light isometrics to facilitate firing of the rotator cuff to help centration of the humeral head and mobility of the shoulder.  She was educated on the cluster that is indicative of a full thickness rotator cuff tear.      Chelly Is progressing well towards her goals.   Pt prognosis is Good.     Pt will continue to benefit from skilled outpatient physical therapy to address the deficits listed in the problem list box on initial evaluation, provide pt/family education and to maximize pt's level of independence in the home and community environment.     Pt's spiritual, cultural and educational needs considered and pt agreeable to plan of care and goals.     Anticipated barriers to physical therapy: None at this time     Goals:   Short Term Goals (4 Weeks):    1. Pt will be independent with HEP to supplement PT in improving pain free cervical mobility (MET)  2. Pt will improve cervical AROM 10 deg in rotational planes to improve cervical mobility for driving (Progressing)  3. Pt will improve UE MMTs by 1/2 grade in all planes to improve strength for lifting and carrying tasks. (Progressing)  4. Pt will demonstrate improved sitting posture to decrease pain experienced in head and neck. (Progressing)  Long Term Goals (8 Weeks):   1. Pt will improve FOTO to </=TBD% limitation to improve perceived limitation with changing and maintaining mobility. (Progressing)  2. Pt will improve cervical AROM to WNL in all planes to improve cervical mobility for driving. (Progressing)  3. Pt will improve UE MMTs 1 grade in all planes to improve strength for lifting and carrying tasks. (Progressing)  4. Pt will report no pain with lifting 10 lbs to promote physical activity. (Progressing)  5. Pt will report no pain with cervical AROM in all planes to promote QOL. (Progressing)  Plan      Plan of care Certification: 10/31/2023 to 2/14/24.     Outpatient Physical Therapy 2 times weekly for 8 weeks to include the following interventions: Cervical/Lumbar Traction, Electrical Stimulation , Gait Training, Manual Therapy, Moist Heat/ Ice, Neuromuscular Re-ed, Patient Education, Self Care, Therapeutic Activities, Therapeutic Exercise, and Ultrasound.     John Goyal, PT

## 2024-01-11 ENCOUNTER — PATIENT OUTREACH (OUTPATIENT)
Dept: EMERGENCY MEDICINE | Facility: HOSPITAL | Age: 74
End: 2024-01-11
Payer: MEDICARE

## 2024-01-11 NOTE — PROGRESS NOTES
ED navigator reminded patient about her appointment for tomorrow 1/12/24 with NP Berenice through voicemail, as she did not answer the call. ED navigator to close encounter at this time.

## 2024-01-12 ENCOUNTER — OFFICE VISIT (OUTPATIENT)
Dept: INTERNAL MEDICINE | Facility: CLINIC | Age: 74
End: 2024-01-12
Payer: MEDICARE

## 2024-01-12 ENCOUNTER — LAB VISIT (OUTPATIENT)
Dept: LAB | Facility: HOSPITAL | Age: 74
End: 2024-01-12
Payer: MEDICARE

## 2024-01-12 VITALS
SYSTOLIC BLOOD PRESSURE: 130 MMHG | DIASTOLIC BLOOD PRESSURE: 70 MMHG | BODY MASS INDEX: 34.3 KG/M2 | HEIGHT: 63 IN | WEIGHT: 193.56 LBS | OXYGEN SATURATION: 95 % | HEART RATE: 79 BPM

## 2024-01-12 DIAGNOSIS — Z09 FOLLOW-UP EXAM: Primary | ICD-10-CM

## 2024-01-12 DIAGNOSIS — R04.0 FREQUENT EPISTAXIS: ICD-10-CM

## 2024-01-12 DIAGNOSIS — J31.0 CHRONIC RHINITIS: ICD-10-CM

## 2024-01-12 DIAGNOSIS — Z09 FOLLOW-UP EXAM: ICD-10-CM

## 2024-01-12 DIAGNOSIS — I70.0 ABDOMINAL AORTIC ATHEROSCLEROSIS: ICD-10-CM

## 2024-01-12 LAB
BASOPHILS # BLD AUTO: 0.04 K/UL (ref 0–0.2)
BASOPHILS NFR BLD: 0.6 % (ref 0–1.9)
DIFFERENTIAL METHOD BLD: ABNORMAL
EOSINOPHIL # BLD AUTO: 0.1 K/UL (ref 0–0.5)
EOSINOPHIL NFR BLD: 1.1 % (ref 0–8)
ERYTHROCYTE [DISTWIDTH] IN BLOOD BY AUTOMATED COUNT: 16.8 % (ref 11.5–14.5)
HCT VFR BLD AUTO: 39.7 % (ref 37–48.5)
HGB BLD-MCNC: 13.2 G/DL (ref 12–16)
IMM GRANULOCYTES # BLD AUTO: 0.02 K/UL (ref 0–0.04)
IMM GRANULOCYTES NFR BLD AUTO: 0.3 % (ref 0–0.5)
LYMPHOCYTES # BLD AUTO: 2.6 K/UL (ref 1–4.8)
LYMPHOCYTES NFR BLD: 39.4 % (ref 18–48)
MCH RBC QN AUTO: 26.2 PG (ref 27–31)
MCHC RBC AUTO-ENTMCNC: 33.2 G/DL (ref 32–36)
MCV RBC AUTO: 79 FL (ref 82–98)
MONOCYTES # BLD AUTO: 0.5 K/UL (ref 0.3–1)
MONOCYTES NFR BLD: 6.8 % (ref 4–15)
NEUTROPHILS # BLD AUTO: 3.4 K/UL (ref 1.8–7.7)
NEUTROPHILS NFR BLD: 51.8 % (ref 38–73)
NRBC BLD-RTO: 0 /100 WBC
PLATELET # BLD AUTO: 325 K/UL (ref 150–450)
PMV BLD AUTO: 9.9 FL (ref 9.2–12.9)
RBC # BLD AUTO: 5.04 M/UL (ref 4–5.4)
WBC # BLD AUTO: 6.58 K/UL (ref 3.9–12.7)

## 2024-01-12 PROCEDURE — 36415 COLL VENOUS BLD VENIPUNCTURE: CPT | Performed by: NURSE PRACTITIONER

## 2024-01-12 PROCEDURE — 1160F RVW MEDS BY RX/DR IN RCRD: CPT | Mod: CPTII,S$GLB,, | Performed by: NURSE PRACTITIONER

## 2024-01-12 PROCEDURE — 1126F AMNT PAIN NOTED NONE PRSNT: CPT | Mod: CPTII,S$GLB,, | Performed by: NURSE PRACTITIONER

## 2024-01-12 PROCEDURE — 85025 COMPLETE CBC W/AUTO DIFF WBC: CPT | Performed by: NURSE PRACTITIONER

## 2024-01-12 PROCEDURE — 99999 PR PBB SHADOW E&M-EST. PATIENT-LVL IV: CPT | Mod: PBBFAC,,, | Performed by: NURSE PRACTITIONER

## 2024-01-12 PROCEDURE — 99214 OFFICE O/P EST MOD 30 MIN: CPT | Mod: S$GLB,,, | Performed by: NURSE PRACTITIONER

## 2024-01-12 PROCEDURE — 1159F MED LIST DOCD IN RCRD: CPT | Mod: CPTII,S$GLB,, | Performed by: NURSE PRACTITIONER

## 2024-01-12 PROCEDURE — 1101F PT FALLS ASSESS-DOCD LE1/YR: CPT | Mod: CPTII,S$GLB,, | Performed by: NURSE PRACTITIONER

## 2024-01-12 PROCEDURE — 3078F DIAST BP <80 MM HG: CPT | Mod: CPTII,S$GLB,, | Performed by: NURSE PRACTITIONER

## 2024-01-12 PROCEDURE — 3075F SYST BP GE 130 - 139MM HG: CPT | Mod: CPTII,S$GLB,, | Performed by: NURSE PRACTITIONER

## 2024-01-12 PROCEDURE — 3288F FALL RISK ASSESSMENT DOCD: CPT | Mod: CPTII,S$GLB,, | Performed by: NURSE PRACTITIONER

## 2024-01-12 RX ORDER — FLUTICASONE PROPIONATE 50 MCG
1 SPRAY, SUSPENSION (ML) NASAL DAILY
Qty: 9.9 ML | Refills: 3 | Status: SHIPPED | OUTPATIENT
Start: 2024-01-12

## 2024-01-12 RX ORDER — VITAMIN A 3000 MCG
1 CAPSULE ORAL
Qty: 30 ML | Refills: 6 | Status: SHIPPED | OUTPATIENT
Start: 2024-01-12

## 2024-01-17 ENCOUNTER — PATIENT MESSAGE (OUTPATIENT)
Dept: PHARMACY | Facility: CLINIC | Age: 74
End: 2024-01-17
Payer: MEDICARE

## 2024-01-17 ENCOUNTER — TELEPHONE (OUTPATIENT)
Dept: PHARMACY | Facility: CLINIC | Age: 74
End: 2024-01-17
Payer: MEDICARE

## 2024-01-17 RX ORDER — DICLOFENAC SODIUM 10 MG/G
2 GEL TOPICAL DAILY
Qty: 100 G | Refills: 1 | Status: SHIPPED | OUTPATIENT
Start: 2024-01-17

## 2024-01-17 NOTE — TELEPHONE ENCOUNTER
Portal message sent to inform ceasar of the [employer] [facility] and [employer] application process for Novolog and Trulicity and what's required to apply.  Chelly Ortiz will provide the following documents: Proof of household Income( such as social security statement, 1099 form, pension statement or 3 consecutive pay stubs      I will follow up with the patient in 5 business days.

## 2024-01-17 NOTE — TELEPHONE ENCOUNTER
[employer] has been  removed from the [employer] Patient Assistance Program. There are no additional assistance options available.      Ms Ortiz was instructed to contact her pharmacy to find out what her co-pay would be under her 2024 Part D Plan. If she finds that the co-pay is still unaffordable to discuss alternate therapy options with Provider

## 2024-01-18 ENCOUNTER — CLINICAL SUPPORT (OUTPATIENT)
Dept: REHABILITATION | Facility: HOSPITAL | Age: 74
End: 2024-01-18
Payer: MEDICARE

## 2024-01-18 DIAGNOSIS — M25.611 DECREASED ROM OF RIGHT SHOULDER: Primary | ICD-10-CM

## 2024-01-18 PROCEDURE — 97112 NEUROMUSCULAR REEDUCATION: CPT

## 2024-01-18 PROCEDURE — 97140 MANUAL THERAPY 1/> REGIONS: CPT

## 2024-01-18 PROCEDURE — 97110 THERAPEUTIC EXERCISES: CPT

## 2024-01-18 NOTE — PROGRESS NOTES
[facility]   Physical Therapy Treatment Note      Name: Chelly Ortiz  [facility] Number: [id number]    Therapy Diagnosis:   Encounter Diagnosis   Name Primary?    Decreased ROM of right shoulder Yes       Physician: Smitha Turner MD    Visit Date: 1/18/2024  Physician Orders: PT Eval and Treat   Medical Diagnosis from Referral: M25.511 (ICD-10-CM) - Acute pain of right shoulder  Evaluation Date: 10/31/2023  Authorization Period Expiration: 12/31/24  Plan of Care Expiration: 2/14/24  Progress Note Due: 2/10/24  Visit # / Visits authorized:  3/16  (7 total since initial evaluation)   FOTO: 5/5 ** Next Visit     PTA Visit #: 0/5     Precautions: Standard     Time In: 3:10 PM (patient arrived late)   Time Out: 4:00 PM  Total Billable Time: 50 minutes    Subjective     Pt reports: That her arm still really hurts and she can't lift it much at all. She was doing some light cleaning at home today but mainly with her left arm. Still has difficulty reaching up.     She was compliant with home exercise program.    Response to previous treatment: similar level of Pain   Functional change: No Change      Pain: 4/10  Location: Right Shoulder       Objective      Objective Measures updated at progress report unless specified.   From 1/10/24    Right AROM Right MMT Left aROM Left MMT Notes:   Shoulder Flexion:  (180) 30  (significant shrug sign)   156       Shoulder Abduction:  (180) 24 (significant shrug sign)   126       Shoulder ER:  (90) 73   68       Shoulder IR:  (70) L3    T12       Lower Trap: N/A   N/A       Middle Trap: N/A   N/A       Upper Trap:  N/A   N/A       Serratus Anterior: N/A   N/A       Rhomboids: N/A   N/A       Elbow Flexion: Full   Full       Elbow Extension: Full   Full       Positive Saldaña-Kosta   Positive Drop Arm   Positive Infraspinatus Weakness/Pain    ULTT Testing was Globally Negative (Patient could not tolerate pressure in superior shoulder for depression)      Treatment     Chelly received the treatments listed below:      therapeutic exercises to develop strength, endurance, ROM, posture, and core stabilization for 25 minutes including:     Seated thoracic extension in chair 2 x 20   Sidelying gravity-eliminated shoulder flexion 3 x 10 with hand on dowel   Table slides in flexion 2 min   Table slides in scaption 2 min  Pulleys flexion 2 mins into scaption   Seated scap squeezes 10 x 5 sec   Education - HEP / prognosis / activity modification     Not performed today:  UBE 3 min fwd, 3 min backward for muscular endurance       manual therapy techniques: The techniques below were applied for 09 minutes:       GH distraction grade I/II  GH mobs posterior/inferior grade I/II   Submax IR isometrics with manual distraction of GH joint - 5 x 5 sec at 50% full effort       Not performed today:  CT junction mobs     neuromuscular re-education activities to improve: for 16 minutes. The following activities were included:    IR walkouts YTB 2 x 10   IR isometrics 10 x 5 sec holds at submax effort   Flexion isometrics 10 x 5 sec holds at submax effort   Seated chin tucks 10x with 3 sec hold - cueing for form     therapeutic activities to improve functional performance for 0  minutes, including:      Patient Education and Home Exercises       Education provided:   - HEP  - Plan of Care   - Activity modification     Written Home Exercises Provided: yes. Exercises were reviewed and Chelly was able to demonstrate them prior to the end of the session.  Chelly demonstrated good  understanding of the education provided. See EMR under Patient Instructions for exercises provided during therapy sessions    Assessment     Chelly presents to physical therapy with continued reports of severe shoulder pain.  She demonstrates poor overall ability to elevate the arm to shoulder height and has pain with most movements. She continues to show a positive shrug sign with any attempt to bring her shoulder  overhead as well. Joint mobility has an empty end feel and mobilizations were tolerated but showed no significant improvement despite getting full overhead motion with passive range. Following manual interventions, she was able to work on gravity eliminated motions and active assisted to achieve overhead motion of shoulder.  She then tolerated submax isometrics for remainder of session. Patient educated on activity modification and symptom management to facilitate rotator cuff healing. She was advised to re-visit this injury with her doctor at follow-up visit if symptoms begin to worsen or continue to remain at a plateau 'ed state.     Chelly Is progressing well towards her goals.   Pt prognosis is Good.     Pt will continue to benefit from skilled outpatient physical therapy to address the deficits listed in the problem list box on initial evaluation, provide pt/family education and to maximize pt's level of independence in the home and community environment.     Pt's spiritual, cultural and educational needs considered and pt agreeable to plan of care and goals.     Anticipated barriers to physical therapy: None at this time     Goals:   Short Term Goals (4 Weeks):   1. Pt will be independent with HEP to supplement PT in improving pain free cervical mobility (MET)  2. Pt will improve cervical AROM 10 deg in rotational planes to improve cervical mobility for driving (Progressing)  3. Pt will improve UE MMTs by 1/2 grade in all planes to improve strength for lifting and carrying tasks. (Progressing)  4. Pt will demonstrate improved sitting posture to decrease pain experienced in head and neck. (Progressing)  Long Term Goals (8 Weeks):   1. Pt will improve FOTO to </=TBD% limitation to improve perceived limitation with changing and maintaining mobility. (Progressing)  2. Pt will improve cervical AROM to WNL in all planes to improve cervical mobility for driving. (Progressing)  3. Pt will improve UE MMTs 1 grade in all  planes to improve strength for lifting and carrying tasks. (Progressing)  4. Pt will report no pain with lifting 10 lbs to promote physical activity. (Progressing)  5. Pt will report no pain with cervical AROM in all planes to promote QOL. (Progressing)  Plan      Plan of care Certification: 10/31/2023 to 2/14/24.     Outpatient Physical Therapy 2 times weekly for 8 weeks to include the following interventions: Cervical/Lumbar Traction, Electrical Stimulation , Gait Training, Manual Therapy, Moist Heat/ Ice, Neuromuscular Re-ed, Patient Education, Self Care, Therapeutic Activities, Therapeutic Exercise, and Ultrasound.     Gabo Matthews, PT

## 2024-01-23 ENCOUNTER — CLINICAL SUPPORT (OUTPATIENT)
Dept: REHABILITATION | Facility: HOSPITAL | Age: 74
End: 2024-01-23
Payer: MEDICARE

## 2024-01-23 DIAGNOSIS — M25.611 DECREASED ROM OF RIGHT SHOULDER: Primary | ICD-10-CM

## 2024-01-23 PROCEDURE — 97140 MANUAL THERAPY 1/> REGIONS: CPT

## 2024-01-23 PROCEDURE — 97110 THERAPEUTIC EXERCISES: CPT

## 2024-01-23 PROCEDURE — 97112 NEUROMUSCULAR REEDUCATION: CPT

## 2024-01-23 NOTE — PROGRESS NOTES
[facility]   Physical Therapy Treatment Note      Name: Chelly Ortiz  Clinic Number: [id number]    Therapy Diagnosis:   Encounter Diagnosis   Name Primary?    Decreased ROM of right shoulder Yes       Physician: Smitha Turner MD    Visit Date: 1/23/2024  Physician Orders: PT Eval and Treat   Medical Diagnosis from Referral: M25.511 (ICD-10-CM) - Acute pain of right shoulder  Evaluation Date: 10/31/2023  Authorization Period Expiration: 12/31/24  Plan of Care Expiration: 2/14/24  Progress Note Due: 2/10/24  Visit # / Visits authorized:   4/16  (7 total since initial evaluation)   FOTO: 5/5 ** Next Visit     PTA Visit #: 0/5     Precautions: Standard     Time In: 2:07 PM   Time Out: 3:00 PM  Total Billable Time: 53 minutes    Subjective     Pt reports: That her arm still really hurts and she can't lift it much at all.  She has not been able to use it much.     She was compliant with home exercise program.    Response to previous treatment: similar level of Pain   Functional change: No Change      Pain: 4/10  Location: Right Shoulder       Objective      Objective Measures updated at progress report unless specified.   From 1/10/24    Right AROM Right MMT Left aROM Left MMT Notes:   Shoulder Flexion:  (180) 30  (significant shrug sign)   156       Shoulder Abduction:  (180) 24 (significant shrug sign)   126       Shoulder ER:  (90) 73   68       Shoulder IR:  (70) L3    T12       Lower Trap: N/A   N/A       Middle Trap: N/A   N/A       Upper Trap:  N/A   N/A       Serratus Anterior: N/A   N/A       Rhomboids: N/A   N/A       Elbow Flexion: Full   Full       Elbow Extension: Full   Full       Positive Saldaña-Kosta   Positive Drop Arm   Positive Infraspinatus Weakness/Pain    ULTT Testing was Globally Negative (Patient could not tolerate pressure in superior shoulder for depression)     Treatment     Chelly received the treatments listed below:      therapeutic exercises to develop  "strength, endurance, ROM, posture, and core stabilization for 25 minutes including:     Passive range of motion external rotation to 45, flexion in scapular plane to 140  Seated thoracic extension in chair 2 x 20 with 3" holds   Sidelying gravity-eliminated shoulder flexion 3 x 10 with hand on dowel   Table slides in scaption, 2 min  Pulleys flexion 2 mins into scaption   Education - HEP / prognosis / activity modification     Not performed today:  Table slides in flexion 2 min   UBE 3 min fwd, 3 min backward for muscular endurance       manual therapy techniques: The techniques below were applied for 09 minutes:       GH distraction grade I/II  GH mobs posterior/inferior grade I-III  Submax IR/ER isometrics with manual distraction of GH joint - 5 x 5 sec at 40-50% full effort   CT junction PA mobs grade II-IV    neuromuscular re-education activities to improve: for 19 minutes. The following activities were included:    Prone Mid trap level 2, 2 x 10 with 3" holds, cueing for scap movement  Sidelying protraction/retraction with manual cueing for form, 3 x 8 with hand on dowel  IR walkouts YTB 2 x 10   IR isometrics 10 x 5 sec holds at submax effort   Seated chin tucks 10x with 3 sec hold - cueing for form     therapeutic activities to improve functional performance for 0  minutes, including:      Patient Education and Home Exercises       Education provided:   - HEP  - Plan of Care   - Activity modification     Written Home Exercises Provided: yes. Exercises were reviewed and Chelly was able to demonstrate them prior to the end of the session.  Chelly demonstrated good  understanding of the education provided. See EMR under Patient Instructions for exercises provided during therapy sessions    Assessment     Chelly presents to physical therapy with continued reports of severe shoulder pain. She was better able to tolerate exercises today and achieved greater control with scapular movements. She still demonstrates poor " ability to elevate the arm to shoulder height and has pain especially with flexion and external rotation of the shoulder. She continues to show a positive shrug sign with any attempt to bring her shoulder overhead as well. Joint mobility has an empty end feel and mobilizations were tolerated but showed no significant improvement despite getting almost full overhead motion with passive range. Following manual interventions, she was able to work on gravity eliminated motions and active assisted to achieve overhead motion of shoulder. Submax isometrics remain tolerable and patient again educated on activity modification and symptom management to facilitate rotator cuff healing. She was advised to re-visit this injury with her doctor at follow-up visit if symptoms begin to worsen or continue to remain the same.     Chelly Is progressing well towards her goals.   Pt prognosis is Good.     Pt will continue to benefit from skilled outpatient physical therapy to address the deficits listed in the problem list box on initial evaluation, provide pt/family education and to maximize pt's level of independence in the home and community environment.     Pt's spiritual, cultural and educational needs considered and pt agreeable to plan of care and goals.     Anticipated barriers to physical therapy: None at this time     Goals:   Short Term Goals (4 Weeks):   1. Pt will be independent with HEP to supplement PT in improving pain free cervical mobility (MET)  2. Pt will improve cervical AROM 10 deg in rotational planes to improve cervical mobility for driving (Progressing)  3. Pt will improve UE MMTs by 1/2 grade in all planes to improve strength for lifting and carrying tasks. (Progressing)  4. Pt will demonstrate improved sitting posture to decrease pain experienced in head and neck. (Progressing)  Long Term Goals (8 Weeks):   1. Pt will improve FOTO to </=TBD% limitation to improve perceived limitation with changing and  maintaining mobility. (Progressing)  2. Pt will improve cervical AROM to WNL in all planes to improve cervical mobility for driving. (Progressing)  3. Pt will improve UE MMTs 1 grade in all planes to improve strength for lifting and carrying tasks. (Progressing)  4. Pt will report no pain with lifting 10 lbs to promote physical activity. (Progressing)  5. Pt will report no pain with cervical AROM in all planes to promote QOL. (Progressing)  Plan      Plan of care Certification: 10/31/2023 to 2/14/24.     Outpatient Physical Therapy 2 times weekly for 8 weeks to include the following interventions: Cervical/Lumbar Traction, Electrical Stimulation , Gait Training, Manual Therapy, Moist Heat/ Ice, Neuromuscular Re-ed, Patient Education, Self Care, Therapeutic Activities, Therapeutic Exercise, and Ultrasound.     Gabo Matthews, PT

## 2024-01-29 ENCOUNTER — HOSPITAL ENCOUNTER (OUTPATIENT)
Dept: RADIOLOGY | Facility: HOSPITAL | Age: 74
Discharge: HOME | End: 2024-01-29
Attending: INTERNAL MEDICINE
Payer: MEDICARE

## 2024-01-29 ENCOUNTER — CLINICAL SUPPORT (OUTPATIENT)
Dept: REHABILITATION | Facility: HOSPITAL | Age: 74
End: 2024-01-29
Payer: MEDICARE

## 2024-01-29 DIAGNOSIS — Z12.31 ENCOUNTER FOR SCREENING MAMMOGRAM FOR BREAST CANCER: ICD-10-CM

## 2024-01-29 DIAGNOSIS — M25.611 DECREASED ROM OF RIGHT SHOULDER: Primary | ICD-10-CM

## 2024-01-29 PROCEDURE — 97110 THERAPEUTIC EXERCISES: CPT

## 2024-01-29 PROCEDURE — 77067 SCR MAMMO BI INCL CAD: CPT | Mod: 26,,, | Performed by: RADIOLOGY

## 2024-01-29 PROCEDURE — 77067 SCR MAMMO BI INCL CAD: CPT | Mod: TC

## 2024-01-29 PROCEDURE — 97112 NEUROMUSCULAR REEDUCATION: CPT

## 2024-01-29 PROCEDURE — 77063 BREAST TOMOSYNTHESIS BI: CPT | Mod: 26,,, | Performed by: RADIOLOGY

## 2024-01-29 NOTE — PROGRESS NOTES
"[facility]   Physical Therapy Treatment Note      Name: Chelly Ortiz  Clinic Number: [id number]    Therapy Diagnosis:   Encounter Diagnosis   Name Primary?    Decreased ROM of right shoulder Yes     Physician: Smitha Turner MD    Visit Date: 1/29/2024  Physician Orders: PT Eval and Treat   Medical Diagnosis from Referral: M25.511 (ICD-10-CM) - Acute pain of right shoulder  Evaluation Date: 10/31/2023  Authorization Period Expiration: 12/31/24  Plan of Care Expiration: 2/14/24  Progress Note Due: 2/10/24  Visit # / Visits authorized:   5/16  (8 total since initial evaluation)   FOTO: 5/5 ** Next Visit     PTA Visit #: 0/5     Precautions: Standard     Time In: 1115am (late due to traffic)  Time Out: 1200pm  Total Billable Time: 30 minutes    Subjective     Pt reports: that she is noticing some improvement in terms of her pain but her motion in the R arm is still very limited. She feels like the L shoulder is not starting to bother her more.   She was compliant with home exercise program.    Response to previous treatment: similar level of Pain   Functional change: No Change      Pain: 4/10  Location: Right Shoulder       Objective      Objective Measures updated at progress report unless specified.     Treatment     **PT was 1 on 1 with patient for 30 minutes today**     Chelly received the treatments listed below:      therapeutic exercises to develop strength, endurance, ROM, posture, and core stabilization for 20 minutes including:     Passive range of motion external rotation to 45, flexion in scapular plane to 140  Seated thoracic extension in chair 2 x 20 with 3" holds   Sidelying gravity-eliminated shoulder flexion 3 x 10 with hand on dowel   Table slides in scaption, 2 min  Table slides in flexion 2 min     Not performed today:  Pulleys flexion 2 mins into scaption   Education - HEP / prognosis / activity modification   UBE 3 min fwd, 3 min backward for muscular endurance " "    manual therapy techniques: The techniques below were applied for 10 minutes:       GH distraction grade I/II  GH mobs posterior/inferior grade I-III  Submax IR/ER isometrics with manual distraction of GH joint - 5 x 5 sec at 40-50% full effort   CT junction PA mobs grade II-IV    neuromuscular re-education activities to improve: for 15 minutes. The following activities were included:    ER and flexion isometrics on the wall, 20x with 5" holds  Sidelying protraction/retraction with manual cueing for form, 3 x 8 with hand on dowel  IR walkouts YTB 2 x 10   Wall slides, 3 x 6    Not today:  IR isometrics 10 x 5 sec holds at submax effort   Seated chin tucks 10x with 3 sec hold - cueing for form   Prone Mid trap level 2, 2 x 10 with 3" holds, cueing for scap movement    therapeutic activities to improve functional performance for 0  minutes, including:      Patient Education and Home Exercises       Education provided:   - HEP  - Plan of Care   - Activity modification     Written Home Exercises Provided: yes. Exercises were reviewed and Chelly was able to demonstrate them prior to the end of the session.  Chelly demonstrated good  understanding of the education provided. See EMR under Patient Instructions for exercises provided during therapy sessions    Assessment     Chelly presents to PT today with continued pain throughout her R shoulder with initiation of AROM. Her GH mobility and passive motion was good today. Her AROM displays a significant shrug sign which seems to be related to excessive superior humeral gliding due to a lack of rotator cuff function. She continues to be challenged with isometric rotator cuff exercises to help improve muscle activation.     Chelly Is progressing well towards her goals.   Pt prognosis is Good.     Pt will continue to benefit from skilled outpatient physical therapy to address the deficits listed in the problem list box on initial evaluation, provide pt/family education and to " maximize pt's level of independence in the home and community environment.     Pt's spiritual, cultural and educational needs considered and pt agreeable to plan of care and goals.     Anticipated barriers to physical therapy: None at this time     Goals:   Short Term Goals (4 Weeks):   1. Pt will be independent with HEP to supplement PT in improving pain free cervical mobility (MET)  2. Pt will improve cervical AROM 10 deg in rotational planes to improve cervical mobility for driving (Progressing)  3. Pt will improve UE MMTs by 1/2 grade in all planes to improve strength for lifting and carrying tasks. (Progressing)  4. Pt will demonstrate improved sitting posture to decrease pain experienced in head and neck. (Progressing)  Long Term Goals (8 Weeks):   1. Pt will improve FOTO to </=TBD% limitation to improve perceived limitation with changing and maintaining mobility. (Progressing)  2. Pt will improve cervical AROM to WNL in all planes to improve cervical mobility for driving. (Progressing)  3. Pt will improve UE MMTs 1 grade in all planes to improve strength for lifting and carrying tasks. (Progressing)  4. Pt will report no pain with lifting 10 lbs to promote physical activity. (Progressing)  5. Pt will report no pain with cervical AROM in all planes to promote QOL. (Progressing)  Plan      Plan of care Certification: 10/31/2023 to 2/14/24.     Outpatient Physical Therapy 2 times weekly for 8 weeks to include the following interventions: Cervical/Lumbar Traction, Electrical Stimulation , Gait Training, Manual Therapy, Moist Heat/ Ice, Neuromuscular Re-ed, Patient Education, Self Care, Therapeutic Activities, Therapeutic Exercise, and Ultrasound.     MELISSA DOVER, PT

## 2024-01-30 ENCOUNTER — PATIENT MESSAGE (OUTPATIENT)
Dept: ORTHOPEDICS | Facility: CLINIC | Age: 74
End: 2024-01-30
Payer: MEDICARE

## 2024-01-30 ENCOUNTER — PATIENT MESSAGE (OUTPATIENT)
Dept: INTERNAL MEDICINE | Facility: CLINIC | Age: 74
End: 2024-01-30
Payer: MEDICARE

## 2024-01-30 DIAGNOSIS — E11.65 TYPE 2 DIABETES MELLITUS WITH HYPERGLYCEMIA, WITH LONG-TERM CURRENT USE OF INSULIN: ICD-10-CM

## 2024-01-30 DIAGNOSIS — Z79.4 TYPE 2 DIABETES MELLITUS WITH HYPERGLYCEMIA, WITH LONG-TERM CURRENT USE OF INSULIN: ICD-10-CM

## 2024-01-30 RX ORDER — DEXTROSE 4 G
TABLET,CHEWABLE ORAL
Qty: 1 EACH | Refills: 0 | Status: SHIPPED | OUTPATIENT
Start: 2024-01-30

## 2024-01-30 NOTE — TELEPHONE ENCOUNTER
No care due was identified.  [facility] Embedded Care Due Messages. Reference number: [id number].   1/30/2024 11:29:53 AM CST

## 2024-01-31 ENCOUNTER — CLINICAL SUPPORT (OUTPATIENT)
Dept: REHABILITATION | Facility: HOSPITAL | Age: 74
End: 2024-01-31
Payer: MEDICARE

## 2024-01-31 DIAGNOSIS — M25.611 DECREASED ROM OF RIGHT SHOULDER: Primary | ICD-10-CM

## 2024-01-31 DIAGNOSIS — M25.511 ACUTE PAIN OF BOTH SHOULDERS: Primary | ICD-10-CM

## 2024-01-31 DIAGNOSIS — M25.512 ACUTE PAIN OF BOTH SHOULDERS: Primary | ICD-10-CM

## 2024-01-31 DIAGNOSIS — R79.89 LOW VITAMIN D LEVEL: ICD-10-CM

## 2024-01-31 PROCEDURE — 97110 THERAPEUTIC EXERCISES: CPT

## 2024-01-31 PROCEDURE — 97112 NEUROMUSCULAR REEDUCATION: CPT

## 2024-01-31 NOTE — PROGRESS NOTES
"[facility]   Physical Therapy Treatment Note      Name: Chelly Ortiz  Clinic Number: [id number]    Therapy Diagnosis:   Encounter Diagnosis   Name Primary?    Decreased ROM of right shoulder Yes       Physician: Smitha Turner MD    Visit Date: 1/31/2024  Physician Orders: PT Eval and Treat   Medical Diagnosis from Referral: M25.511 (ICD-10-CM) - Acute pain of right shoulder  Evaluation Date: 10/31/2023  Authorization Period Expiration: 12/31/24  Plan of Care Expiration: 2/14/24  Progress Note Due: 2/10/24  Visit # / Visits authorized:   6/16  (9 total since initial evaluation)   FOTO: 5/5 ** Next Visit     PTA Visit #: 0/5     Precautions: Standard     Time In: 2:16 PM (patient late upon arrival)    Time Out: 3:01 PM   Total Billable Time: 25 minutes of one on one time    Subjective     Pt reports: that she is noticing improvement in her pain but has difficulty still with lifting her right arm.     Response to previous treatment: similar level of Pain   Functional change: No Change      Pain: 4/10  Location: Right Shoulder       Objective      Objective Measures updated at progress report unless specified.     Treatment       Chelly received the treatments listed below:      therapeutic exercises to develop strength, endurance, ROM, posture, and core stabilization for 14 minutes including:     UBE 3 min fwd, 3 min backward for muscular endurance   Passive range of motion external rotation to 45, flexion in scapular plane to 140  Sidelying gravity-eliminated shoulder flexion 3 x 10 with hand on dowel     Not performed today:  Seated thoracic extension in chair 2 x 20 with 3" holds   Table slides in scaption, 2 min  Table slides in flexion 2 min   Pulleys flexion 2 mins into scaption   Education - HEP / prognosis / activity modification     manual therapy techniques: The techniques below were applied for 10 minutes:       GH distraction grade I/II  GH mobs posterior/inferior grade " "I-III  Submax IR/ER isometrics with manual distraction of GH joint - 5 x 5 sec at 40-50% full effort   CT junction PA mobs grade II-IV    neuromuscular re-education activities to improve: for 21 minutes. The following activities were included:    IR and flexion isometrics on the wall, 20x with 5" holds  Sidelying protraction/retraction with manual cueing for form, 3 x 8 with hand on dowel  IR walkouts RTB 2 x 10   ER walkouts red Tband 2 x 10     Not today:  IR isometrics 10 x 5 sec holds at submax effort   Seated chin tucks 10x with 3 sec hold - cueing for form   Prone Mid trap level 2, 2 x 10 with 3" holds, cueing for scap movement    therapeutic activities to improve functional performance for 0  minutes, including:      Patient Education and Home Exercises       Education provided:   - HEP  - Plan of Care   - Activity modification     Written Home Exercises Provided: yes. Exercises were reviewed and Chelly was able to demonstrate them prior to the end of the session.  Chelly demonstrated good  understanding of the education provided. See EMR under Patient Instructions for exercises provided during therapy sessions    Assessment     Chelly continues to demonstrate shrug sign and has pain with active R shoulder elevation.  Her AROM displays a significant shrug sign which seems to be related to excessive superior humeral gliding due to a lack of rotator cuff function. She can complete sidelying, gravity-eliminated exercises with decreased pain today. She has ability to perform submax isometrics today with longer hold tolerance and progressed resistance with band walkouts. She continues to be challenged with isometric rotator cuff exercises to help improve muscle activation. Re-assessment at next visit.     Chelly Is progressing well towards her goals.   Pt prognosis is Good.     Pt will continue to benefit from skilled outpatient physical therapy to address the deficits listed in the problem list box on initial " none known evaluation, provide pt/family education and to maximize pt's level of independence in the home and community environment.     Pt's spiritual, cultural and educational needs considered and pt agreeable to plan of care and goals.     Anticipated barriers to physical therapy: None at this time     Goals:   Short Term Goals (4 Weeks):   1. Pt will be independent with HEP to supplement PT in improving pain free cervical mobility (MET)  2. Pt will improve cervical AROM 10 deg in rotational planes to improve cervical mobility for driving (Progressing)  3. Pt will improve UE MMTs by 1/2 grade in all planes to improve strength for lifting and carrying tasks. (Progressing)  4. Pt will demonstrate improved sitting posture to decrease pain experienced in head and neck. (Progressing)  Long Term Goals (8 Weeks):   1. Pt will improve FOTO to </=TBD% limitation to improve perceived limitation with changing and maintaining mobility. (Progressing)  2. Pt will improve cervical AROM to WNL in all planes to improve cervical mobility for driving. (Progressing)  3. Pt will improve UE MMTs 1 grade in all planes to improve strength for lifting and carrying tasks. (Progressing)  4. Pt will report no pain with lifting 10 lbs to promote physical activity. (Progressing)  5. Pt will report no pain with cervical AROM in all planes to promote QOL. (Progressing)  Plan      Plan of care Certification: 10/31/2023 to 2/14/24.     Outpatient Physical Therapy 2 times weekly for 8 weeks to include the following interventions: Cervical/Lumbar Traction, Electrical Stimulation , Gait Training, Manual Therapy, Moist Heat/ Ice, Neuromuscular Re-ed, Patient Education, Self Care, Therapeutic Activities, Therapeutic Exercise, and Ultrasound.     Gabo Matthews, PT

## 2024-02-01 RX ORDER — ERGOCALCIFEROL 1.25 MG/1
CAPSULE ORAL
Qty: 12 CAPSULE | Refills: 3 | Status: SHIPPED | OUTPATIENT
Start: 2024-02-01 | End: 2024-02-05

## 2024-02-01 NOTE — TELEPHONE ENCOUNTER
Refill Routing Note   Medication(s) are not appropriate for processing by [facility] for the following reason(s):        Outside of protocol: Vitamin D2    [facility] action(s):  Route               Appointments  past 12m or future 3m with PCP    Date Provider   Last Visit   12/1/2023 Smitha Turner MD   Next Visit   Visit date not found Smitha Turner MD   ED visits in past 90 days: 1        Note composed:9:51 PM 01/31/2024

## 2024-02-02 NOTE — TELEPHONE ENCOUNTER
Chelly Ortiz has provided the necessary documents to begin the enrollment process into the [employer] program. The prescription portion of the application has been sent to the providers office for approval and signature.

## 2024-02-03 ENCOUNTER — PATIENT MESSAGE (OUTPATIENT)
Dept: INTERNAL MEDICINE | Facility: CLINIC | Age: 74
End: 2024-02-03
Payer: MEDICARE

## 2024-02-05 DIAGNOSIS — Z79.4 TYPE 2 DIABETES MELLITUS WITH HYPERGLYCEMIA, WITH LONG-TERM CURRENT USE OF INSULIN: ICD-10-CM

## 2024-02-05 DIAGNOSIS — E11.65 TYPE 2 DIABETES MELLITUS WITH HYPERGLYCEMIA, WITH LONG-TERM CURRENT USE OF INSULIN: ICD-10-CM

## 2024-02-05 DIAGNOSIS — R79.89 LOW VITAMIN D LEVEL: ICD-10-CM

## 2024-02-05 RX ORDER — DEXTROSE 4 G
TABLET,CHEWABLE ORAL
Qty: 1 EACH | Refills: 0 | OUTPATIENT
Start: 2024-02-05

## 2024-02-05 RX ORDER — ERGOCALCIFEROL 1.25 MG/1
CAPSULE ORAL
Qty: 12 CAPSULE | Refills: 3 | Status: SHIPPED | OUTPATIENT
Start: 2024-02-05

## 2024-02-05 NOTE — TELEPHONE ENCOUNTER
Refill Routing Note   Medication(s) are not appropriate for processing by [facility] for the following reason(s):        Outside of protocol - previous order transmission failed    [facility] action(s):  Route     Requires labs : Yes      Medication Therapy Plan: previous order transmission failed      Appointments  past 12m or future 3m with PCP    Date Provider   Last Visit   12/1/2023 Smitha Turner MD   Next Visit   Visit date not found Smitha Turner MD   ED visits in past 90 days: 1        Note composed:11:15 AM 02/05/2024

## 2024-02-05 NOTE — TELEPHONE ENCOUNTER
----- Message from Liv Conn sent at 2/5/2024  2:25 PM CST -----  Contact: [phone] Patient  Diabetic or Medical Supplies.  What supplies are needed: Glucometer  What is the brand name of the supplies: SHILOH  (  to work with the fastclick dayanara device)  Is this a refill or new prescription: new   Who prescribed the supplies:    Pharmacy or company name, phone # and location:    [facility] #[id number] - [place] - [address]  [address] [address]  Phone: [phone] Fax: [phone]    Would the patient like a call back, or a response through their [facility] portal?:   Call Back Please.   Comments: Can you please send ASAP. Pt does not have a machine. Thank you

## 2024-02-05 NOTE — TELEPHONE ENCOUNTER
No care due was identified.  [facility] Embedded Care Due Messages. Reference number: [id number].   2/05/2024 3:49:52 PM CST

## 2024-02-05 NOTE — TELEPHONE ENCOUNTER
Care Due:                  Date            Visit Type   Department     Provider  --------------------------------------------------------------------------------                                EP -                              PRIMARY      [facility] INTER[facility]  Last Visit: 12-      CARE ([facility])   MEDICINE       Smitha Turner  Next Visit: None Scheduled  None         None Found                                                            Last  Test          Frequency    Reason                     Performed    Due Date  --------------------------------------------------------------------------------    Vitamin D...  12 months..  ergocalciferol...........  Not Found    Overdue    [employer] Embedded Care Due Messages. Reference number: [id number].   2/05/2024 11:05:23 AM CST

## 2024-02-06 ENCOUNTER — CLINICAL SUPPORT (OUTPATIENT)
Dept: REHABILITATION | Facility: HOSPITAL | Age: 74
End: 2024-02-06
Payer: MEDICARE

## 2024-02-06 DIAGNOSIS — M25.512 ACUTE PAIN OF BOTH SHOULDERS: ICD-10-CM

## 2024-02-06 DIAGNOSIS — M25.511 ACUTE PAIN OF BOTH SHOULDERS: ICD-10-CM

## 2024-02-06 DIAGNOSIS — M25.611 DECREASED ROM OF RIGHT SHOULDER: Primary | ICD-10-CM

## 2024-02-06 PROCEDURE — 97112 NEUROMUSCULAR REEDUCATION: CPT

## 2024-02-06 PROCEDURE — 97110 THERAPEUTIC EXERCISES: CPT

## 2024-02-06 NOTE — TELEPHONE ENCOUNTER
Refill Decision Note   Chelly Ortiz  is requesting a refill authorization.  Brief Assessment and Rationale for Refill:  Quick Discontinue     Medication Therapy Plan:  Receipt confirmed by pharmacy (1/30/2024 11:41 AM CST)      Comments:     Note composed:7:27 PM 02/05/2024

## 2024-02-06 NOTE — PROGRESS NOTES
"[facility]   Physical Therapy Treatment Note      Name: Chelly Ortiz  Clinic Number: [id number]    Therapy Diagnosis:   Encounter Diagnoses   Name Primary?    Acute pain of both shoulders     Decreased ROM of right shoulder Yes         Physician: Smitha Turner MD    Visit Date: 2/6/2024  Physician Orders: PT Eval and Treat   Medical Diagnosis from Referral:  Acute pain of both shoulders [M25.511, M25.512]   Authorization Period Expiration: 12/31/24  Plan of Care Expiration: 2/14/24  Progress Note Due: 2/10/24  Visit # / Visits authorized:   1/1   FOTO: 5/5 ** Next Visit     PTA Visit #: 0/5     Precautions: Standard     Time In: 4:00 PM     Time Out: 4:59 PM   Total Billable Time: 30 minutes of one on one time    Subjective     Pt reports: that she had some increased soreness after last time. She wants to start working on both shoulders.     Response to previous treatment: similar level of Pain   Functional change: No Change      Pain: 4/10  Location: Right Shoulder       Objective      Objective Measures updated at progress report unless specified.     Treatment       Chelly received the treatments listed below:      therapeutic exercises to develop strength, endurance, ROM, posture, and core stabilization for 26 minutes including:     Cable column active assist R and L shoulders, 3 min each with 5# assist  R shoulder Passive range of motion external rotation to 55, flexion in scapular plane to 140  Sidelying gravity-eliminated shoulder flexion 3 x 10 with hand on dowel   Active assist external rotation for R shoulder using wand, 2 x 15   Pendulums, fwd/back, 2 min for R shoulder pain modulation  Education - HEP / prognosis / activity modification     Not performed today:  UBE 3 min fwd, 3 min backward for muscular endurance   Seated thoracic extension in chair 2 x 20 with 3" holds   Table slides in scaption, 2 min  Table slides in flexion 2 min     manual therapy techniques: The " "techniques below were applied for 09 minutes:       GH distraction grade I/II  GH mobs posterior/inferior grade I-III  Submax IR/ER isometrics with manual distraction of GH joint - 5 x 5 sec at 40-50% full effort     Not performed today:   CT junction PA mobs grade II-IV    neuromuscular re-education activities to improve: for 24 minutes. The following activities were included:    IR and flexion isometrics on the wall, 20x with 5" holds  Sidelying protraction/retraction with manual cueing for form, 3 x 8 with hand on dowel  IR walkouts RTB 2 x 10   ER isometrics on wall, 2 x 10 with 3" hold   Stair Railing towel slides into protraction R and L shoulder, 20x each with cueing for serratus activation     Not today:  IR isometrics 10 x 5 sec holds at submax effort   Seated chin tucks 10x with 3 sec hold - cueing for form   ER walkouts red Tband 2 x 10   Prone Mid trap level 2, 2 x 10 with 3" holds, cueing for scap movement    therapeutic activities to improve functional performance for 0  minutes, including:      Patient Education and Home Exercises       Education provided:   - HEP  - Plan of Care   - Activity modification     Written Home Exercises Provided: yes. Exercises were reviewed and Chelly was able to demonstrate them prior to the end of the session.  Chelly demonstrated good  understanding of the education provided. See EMR under Patient Instructions for exercises provided during therapy sessions    Assessment     Chelly presented to therapy today wanting to work on both shoulders due to pain complaints however has continued decreased range of motion and pain with her Right shoulder active motion. She displays shrug sign and inability to lift her arm to shoulder height. Patient tolerated manual well and requires consistent cueing to decrease guarding. Passive range of motion of R shoulder allowed for more external rotation today and patient again does well with sidelying, gravity-eliminated exercises with " decreased pain today. Held off on ER walkouts with band. She continues to be challenged with isometric rotator cuff exercises to help improve muscle activation. Re-assessment at next visit.     Chelly Is progressing well towards her goals.   Pt prognosis is Good.     Pt will continue to benefit from skilled outpatient physical therapy to address the deficits listed in the problem list box on initial evaluation, provide pt/family education and to maximize pt's level of independence in the home and community environment.     Pt's spiritual, cultural and educational needs considered and pt agreeable to plan of care and goals.     Anticipated barriers to physical therapy: None at this time     Goals:   Short Term Goals (4 Weeks):   1. Pt will be independent with HEP to supplement PT in improving pain free cervical mobility (MET)  2. Pt will improve cervical AROM 10 deg in rotational planes to improve cervical mobility for driving (Progressing)  3. Pt will improve UE MMTs by 1/2 grade in all planes to improve strength for lifting and carrying tasks. (Progressing)  4. Pt will demonstrate improved sitting posture to decrease pain experienced in head and neck. (Progressing)  Long Term Goals (8 Weeks):   1. Pt will improve FOTO to </=TBD% limitation to improve perceived limitation with changing and maintaining mobility. (Progressing)  2. Pt will improve cervical AROM to WNL in all planes to improve cervical mobility for driving. (Progressing)  3. Pt will improve UE MMTs 1 grade in all planes to improve strength for lifting and carrying tasks. (Progressing)  4. Pt will report no pain with lifting 10 lbs to promote physical activity. (Progressing)  5. Pt will report no pain with cervical AROM in all planes to promote QOL. (Progressing)  Plan      Plan of care Certification: 10/31/2023 to 2/14/24.     Outpatient Physical Therapy 2 times weekly for 8 weeks to include the following interventions: Cervical/Lumbar Traction,  Electrical Stimulation , Gait Training, Manual Therapy, Moist Heat/ Ice, Neuromuscular Re-ed, Patient Education, Self Care, Therapeutic Activities, Therapeutic Exercise, and Ultrasound.     Gabo Matthews, PT

## 2024-02-08 ENCOUNTER — TELEPHONE (OUTPATIENT)
Dept: ORTHOPEDICS | Facility: CLINIC | Age: 74
End: 2024-02-08
Payer: MEDICARE

## 2024-02-08 PROBLEM — E11.9 TYPE 2 DIABETES MELLITUS WITHOUT COMPLICATION, WITH LONG-TERM CURRENT USE OF INSULIN: Status: RESOLVED | Noted: 2021-07-20 | Resolved: 2023-04-28

## 2024-02-08 PROBLEM — E11.9 TYPE 2 DIABETES MELLITUS WITHOUT COMPLICATION, WITH LONG-TERM CURRENT USE OF INSULIN: Status: ACTIVE | Noted: 2021-07-20

## 2024-02-08 NOTE — TELEPHONE ENCOUNTER
----- Message from Ty Bingham sent at 2/7/2024  4:07 PM CST -----  Regarding: Pt Advice  Contact: pt [phone]  Pt was seen in office 1/2/2024, pt would like to know if insurance card was left with , pt states this was the last time in use, please call pt @ [phone]

## 2024-02-19 ENCOUNTER — PATIENT MESSAGE (OUTPATIENT)
Dept: INTERNAL MEDICINE | Facility: CLINIC | Age: 74
End: 2024-02-19
Payer: MEDICARE

## 2024-02-19 DIAGNOSIS — E11.65 UNCONTROLLED TYPE 2 DIABETES MELLITUS WITH HYPERGLYCEMIA: ICD-10-CM

## 2024-02-20 DIAGNOSIS — E11.65 UNCONTROLLED TYPE 2 DIABETES MELLITUS WITH HYPERGLYCEMIA: ICD-10-CM

## 2024-02-20 RX ORDER — INSULIN DETEMIR 100 [IU]/ML
40 INJECTION, SOLUTION SUBCUTANEOUS NIGHTLY
Qty: 36 ML | Refills: 3 | Status: SHIPPED | OUTPATIENT
Start: 2024-02-20 | End: 2024-02-22

## 2024-02-21 NOTE — TELEPHONE ENCOUNTER
Refill Routing Note   Medication(s) are not appropriate for processing by [facility] for the following reason(s):        Med affordability: insurance will not cover for Levemir; pended Lantus as alternate therapy for provider's review     [facility] action(s):  Defer   Requires labs : Yes (A1C due 4/22/24)            Appointments  past 12m or future 3m with PCP    Date Provider   Last Visit   12/1/2023 Smitha Turner MD   Next Visit   Visit date not found Smitha Turner MD   ED visits in past 90 days: 1        Note composed:4:47 PM 02/21/2024

## 2024-02-21 NOTE — TELEPHONE ENCOUNTER
Care Due:                  Date            Visit Type   Department     Provider  --------------------------------------------------------------------------------                                EP -                              PRIMARY      [facility] INTERNAL  Last Visit: 12-      CARE ([facility])   MEDICINE       Smitha Turner                              [facility]                              PRIMARY      [facility] INTERNAL  Next Visit: 03-      CARE ([facility])   MEDICINE       JAMES KENDRICK                                                            Last  Test          Frequency    Reason                     Performed    Due Date  --------------------------------------------------------------------------------    HBA1C.......  6 months...  insulin..................  10-   04-    [facility] Embedded Care Due Messages. Reference number: [id number].   2/20/2024 6:10:26 PM CST

## 2024-02-21 NOTE — TELEPHONE ENCOUNTER
Refill request routed to [facility] Review Pool for Pharmacist Review.

## 2024-02-22 ENCOUNTER — TELEPHONE (OUTPATIENT)
Dept: INTERNAL MEDICINE | Facility: CLINIC | Age: 74
End: 2024-02-22

## 2024-02-22 RX ORDER — INSULIN GLARGINE 100 [IU]/ML
40 INJECTION, SOLUTION SUBCUTANEOUS NIGHTLY
Qty: 45 ML | Refills: 0 | Status: SHIPPED | OUTPATIENT
Start: 2024-02-22

## 2024-02-22 NOTE — TELEPHONE ENCOUNTER
----- Message from Flory Brown sent at 2/22/2024 12:41 PM CST -----  Contact: Self/ [phone]  1MEDICALADVICE     Patient is calling for Medical Advice regarding:medication     Would like response via [employer]:  Would like a return call today     Comments: pt stated that she is calling in regards to she has a question about a medication, (pt did not give any further information) just stated that she needs the nurse to call her

## 2024-02-22 NOTE — TELEPHONE ENCOUNTER
Call pt and pt stated that Dr Turner sent a new insulin medication in for her pt feel that the dr should have explain the medication to her pt stated that she don't no if its going to make her sick are have a reaction.

## 2024-02-23 DIAGNOSIS — M25.512 ACUTE PAIN OF BOTH SHOULDERS: Primary | ICD-10-CM

## 2024-02-23 DIAGNOSIS — M25.511 ACUTE PAIN OF BOTH SHOULDERS: Primary | ICD-10-CM

## 2024-02-27 ENCOUNTER — TELEPHONE (OUTPATIENT)
Dept: ORTHOPEDICS | Facility: CLINIC | Age: 74
End: 2024-02-27
Payer: MEDICARE

## 2024-02-27 NOTE — TELEPHONE ENCOUNTER
Left message for patient to return call.  Regarding therapy orders. What place would the pt would like for  therapy orders to be fax to.

## 2024-03-05 NOTE — PROGRESS NOTES
SAME DAY  VISIT NOTE     PRESENTING HISTORY     Reason for Visit: Same Day visit.    No chief complaint on file.    History of Present Illness & ROS: Ms. Chelly Ortiz is a 73 y.o. female.  Follow up.   Est'd with Dr. Turner  Not due Annual until 2024.   She 'thought was coming in today for AWV' and she is with 'with some voiced frustration about the scheduling process'. Addressed her concerns and questions answered.   Offered to be rescheduled 'appropriately' with me for an AWV / HRA, declines at this time.   Questions about her vaccines, but no real issues, complaints or concerns needing to be medically addressed today.     Review of Systems:  Eyes: denies visual changes at this time denies floaters   ENT: no nasal congestion or sore throat  Respiratory: no cough or shorness of breath  Cardiovascular: no chest pain or palpitations  Gastrointestinal: no nausea or vomiting, no abdominal pain or change in bowel habits  Genitourinary: no hematuria or dysuria; denies frequency  Hematologic/Lymphatic: no easy bruising or lymphadenopathy  Musculoskeletal: no arthralgias or myalgias  Neurological: no seizures or tremors  Endocrine: no heat or cold intolerance    PAST HISTORY:     Past Medical History:   Diagnosis Date    Allergy     DDD (degenerative disc disease), lumbar     Diabetes mellitus     diet controlled    GERD (gastroesophageal reflux disease)     History of subconjunctival hemorrhage 2011    left eye    Hyperlipidemia     Hypertension     IBS (irritable bowel syndrome)     Obesity     MYRIAM (obstructive sleep apnea)     on CPAP    Rotator cuff impingement syndrome     Seasonal allergic conjunctivitis     Type 2 diabetes mellitus treated with insulin        Past Surgical History:   Procedure Laterality Date    CATARACT EXTRACTION W/  INTRAOCULAR LENS IMPLANT Right 10/03/2013        CATARACT EXTRACTION W/  INTRAOCULAR LENS IMPLANT Left 2022         SECTION      x2     CHOLECYSTECTOMY      COLONOSCOPY N/A 2018    Procedure: COLONOSCOPY;  Surgeon: Marie Mejia MD;  Location: [facility] ENDO;  Service: Endoscopy;  Laterality: N/A;    COLONOSCOPY N/A 2022    Procedure: COLONOSCOPY;  Surgeon: Pierce Rivera MD;  Location: [facility] ENDO;  Service: Endoscopy;  Laterality: N/A;    ENDOSCOPIC ULTRASOUND OF UPPER GASTROINTESTINAL TRACT N/A 10/09/2018    Procedure: ULTRASOUND, UPPER GI TRACT, ENDOSCOPIC;  Surgeon: Javy Simpson MD;  Location: [facility] ENDO;  Service: Endoscopy;  Laterality: N/A;    EXCISION OF LESION N/A 2019    Procedure: EXCISION, LESION VULVA;  Surgeon: Liv Odell MD;  Location: [facility] OR;  Service: OB/GYN;  Laterality: N/A;  latex allergy    EYE SURGERY      HYSTERECTOMY      ovaries intact, due to DUB    INTRAOCULAR PROSTHESES INSERTION Left 2022    Procedure: INSERTION, IOL PROSTHESIS;  Surgeon: Clarice Herzog MD;  Location: [facility] OR [address] [place] [address];  Service: Ophthalmology;  Laterality: Left;    PHACOEMULSIFICATION OF CATARACT Left 2022    Procedure: PHACOEMULSIFICATION, CATARACT;  Surgeon: Clarice Herzog MD;  Location: [facility] OR [address] [place] [address];  Service: Ophthalmology;  Laterality: Left;    TUBAL LIGATION         Family History   Problem Relation Age of Onset    Alzheimer's disease Mother     Heart disease Father     Diabetes Sister     Breast cancer Sister     Deep vein thrombosis Brother     Diabetes Daughter     Cancer Brother         Lung    Lung cancer Brother     Amblyopia Neg Hx     Blindness Neg Hx     Cataracts Neg Hx     Glaucoma Neg Hx     Hypertension Neg Hx     Macular degeneration Neg Hx     Retinal detachment Neg Hx     Strabismus Neg Hx     Stroke Neg Hx     Thyroid disease Neg Hx        Social History     Socioeconomic History    Marital status:    Tobacco Use    Smoking status: Former     Current packs/day: 0.00     Types: Cigarettes     Quit date: 2/15/1983     Years since quittin.0     Passive exposure:  Current ()    Smokeless tobacco: Never   Substance and Sexual Activity    Alcohol use: No    Drug use: No    Sexual activity: Yes     Partners: Male     Social Determinants of Health     Financial Resource Strain: Patient Declined (11/30/2023)    Overall Financial Resource Strain (CARDIA)     Difficulty of Paying Living Expenses: Patient declined   Food Insecurity: Patient Declined (11/30/2023)    Hunger Vital Sign     Worried About Running Out of Food in the Last Year: Patient declined     Ran Out of Food in the Last Year: Patient declined   Recent Concern: Food Insecurity - Food Insecurity Present (10/11/2023)    Hunger Vital Sign     Worried About Running Out of Food in the Last Year: Patient declined     Ran Out of Food in the Last Year: Sometimes true   Transportation Needs: No Transportation Needs (11/30/2023)    PRAPARE - Transportation     Lack of Transportation (Medical): No     Lack of Transportation (Non-Medical): No   Physical Activity: Unknown (11/30/2023)    Exercise Vital Sign     Days of Exercise per Week: Patient declined     Minutes of Exercise per Session: 20 min   Recent Concern: Physical Activity - Insufficiently Active (10/11/2023)    Exercise Vital Sign     Days of Exercise per Week: 3 days     Minutes of Exercise per Session: 20 min   Stress: Patient Declined (11/30/2023)    [employer] [place] [employer] - Occupational Stress Questionnaire     Feeling of Stress : Patient declined   Recent Concern: Stress - Stress Concern Present (10/11/2023)    [employer] [place] [employer] - Occupational Stress Questionnaire     Feeling of Stress : To some extent   Social Connections: Unknown (11/30/2023)    Social Connection and Isolation Panel [NHANES]     Frequency of Communication with Friends and Family: Patient declined     Frequency of Social Gatherings with Friends and Family: Patient declined     Active Member of Clubs or Organizations: Patient declined     Attends Club  or Organization Meetings: Patient declined     Marital Status: Patient declined   Housing Stability: Low Risk  (11/30/2023)    Housing Stability Vital Sign     Unable to Pay for Housing in the Last Year: No     Number of Places Lived in the Last Year: 1     Unstable Housing in the Last Year: No       MEDICATIONS & ALLERGIES:     Current Outpatient Medications on File Prior to Visit   Medication Sig Dispense Refill    diclofenac sodium (VOLTAREN) 1 % Gel APPLY 2 G TOPICALLY ONCE DAILY. 100 g 1    ACCU-CHEK FASTCLIX LANCING DEV Kit USE AS DIRECTED 1 each 0    albuterol (PROVENTIL/VENTOLIN HFA) 90 mcg/actuation inhaler Inhale 1-2 puffs into the lungs every 4 (four) hours as needed for Wheezing. 18 g 2    amLODIPine (NORVASC) 10 MG tablet Take 1 tablet (10 mg total) by mouth once daily. 90 tablet 3    aspirin (ECOTRIN) 81 MG EC tablet Take 81 mg by mouth once daily.      blood sugar diagnostic (ACCU-CHEK GUIDE TEST STRIPS) Strp 3 times a day 300 strip 11    blood-glucose meter (ACCU-CHEK GUIDE GLUCOSE METER) Misc Three times a day 1 each 0    calcium carbonate (OS-ARIC) 600 mg calcium (1,500 mg) Tab Take 1 tablet (600 mg total) by mouth once. for 1 dose 30 tablet 11    cetirizine (ZYRTEC) 10 MG tablet Take 1 tablet (10 mg total) by mouth every evening. 90 tablet 0    citalopram (CELEXA) 10 MG tablet Take 1 tablet (10 mg total) by mouth once daily. 90 tablet 1    ergocalciferol (ERGOCALCIFEROL) 50,000 unit Cap TAKE 1 CAPSULE BY MOUTH EVERY 7 DAYS 12 capsule 3    esomeprazole (NEXIUM) 40 MG capsule Take 1 capsule (40 mg total) by mouth before breakfast. 90 capsule 3    ezetimibe (ZETIA) 10 mg tablet Take 1 tablet (10 mg total) by mouth once daily. 90 tablet 3    fluocinonide (LIDEX) 0.05 % external solution AAA scalp qday - bid prn pruritus (Patient not taking: Reported on 1/12/2024) 60 mL 3    fluticasone propionate (FLONASE) 50 mcg/actuation nasal spray 1 spray (50 mcg total) by Each Nostril route once daily. 9.9 mL 3     "gabapentin (NEURONTIN) 100 MG capsule Take 2 capsules (200 mg total) by mouth every evening. 180 capsule 3    glucose 4 GM chewable tablet Take 4 tablets (16 g total) by mouth as needed for Low blood sugar (If having symptoms of blurry vision, palpitations, confusion, shakiness.  Please check sugars and if sugar below 70 please take 4 tablets and re-check sugar everry 15 minutes until sugars are above 70 and symptoms resolve.). 50 tablet 12    insulin (LANTUS SOLOSTAR U-100 INSULIN) glargine 100 units/mL SubQ pen Inject 40 Units into the skin every evening. 45 mL 0    insulin aspart U-100 (NOVOLOG FLEXPEN U-100 INSULIN) 100 unit/mL (3 mL) InPn pen Inject 10 Units into the skin 3 (three) times daily with meals. 60 mL 11    insulin aspart U-100 (NOVOLOG FLEXPEN U-100 INSULIN) 100 unit/mL (3 mL) InPn pen Inject 10 Units into the skin 3 (three) times daily with meals. TDD 60 units (Patient not taking: Reported on 1/12/2024) 60 mL 11    insulin syringe-needle U-100 0.3 mL 31 gauge x 5/16" Syrg relion brand, use 5 x a day, if not on levemir or novolog 150 each 1    insulin syringe-needle U-100 0.5 mL 31 gauge x 5/16" Syrg Use nightly. 90 day 100 each 3    lancets (ACCU-CHEK FASTCLIX LANCET DRUM) [facility] [place] BLOOD SUGAR THREE TIMES A  each 3    linaCLOtide (LINZESS) 72 mcg Cap capsule Take 1 capsule (72 mcg total) by mouth before breakfast. (Patient not taking: Reported on 1/12/2024) 30 capsule 5    magnesium oxide (MAG-OX) 400 mg tablet Take 1 tablet (400 mg total) by mouth 2 (two) times daily. 180 tablet 3    montelukast (SINGULAIR) 10 mg tablet Take 10 mg by mouth.      pen needle, diabetic (NOVOFINE 32) 32 gauge x 1/4" Ndle 4 injections per day 500 each 4    sodium chloride (SALINE NASAL) 0.65 % nasal spray 1 spray by Nasal route as needed for Congestion. 30 mL 6    traZODone (DESYREL) 50 MG tablet Take 1 tablet (50 mg total) by mouth every evening. 90 tablet 3     Current Facility-Administered Medications on File " Prior to Visit   Medication Dose Route Frequency Provider Last Rate Last Admin    triamcinolone acetonide injection 20 mg  20 mg Intramuscular 1 time in Clinic/HOD Tani Pittman FNP            Review of patient's allergies indicates:   Allergen Reactions    Ace inhibitors Hives     \Cough    Latex, natural rubber Itching       Medications Reconciliation:   I have reconciled the patient's home medications and discharge medications with the patient/family. I have updated all changes.  Refer to After-Visit Medication List.    OBJECTIVE:     Vital Signs:  There were no vitals filed for this visit.  Wt Readings from Last 3 Encounters:   01/12/24 1331 87.8 kg (193 lb 9 oz)   01/02/24 2244 86.2 kg (190 lb)   01/02/24 0822 86.2 kg (190 lb 0.6 oz)     There is no height or weight on file to calculate BMI.     Wt Readings from Last 3 Encounters:   03/06/24 87.4 kg (192 lb 10.9 oz)   01/12/24 87.8 kg (193 lb 9 oz)   01/02/24 86.2 kg (190 lb)     Temp Readings from Last 3 Encounters:   01/02/24 98.1 °F (36.7 °C) (Oral)   12/18/23 98.1 °F (36.7 °C) (Oral)   04/05/23 97.8 °F (36.6 °C) (Oral)     BP Readings from Last 3 Encounters:   03/06/24 118/70   01/12/24 130/70   01/03/24 (!) 144/86     Pulse Readings from Last 3 Encounters:   03/06/24 78   01/12/24 79   01/03/24 87       Physical Exam:  General: Well developed, well nourished. No distress.        Laboratory  Lab Results   Component Value Date    WBC 6.58 01/12/2024    HGB 13.2 01/12/2024    HCT 39.7 01/12/2024     01/12/2024    CHOL 204 (H) 10/24/2023    TRIG 103 10/24/2023    HDL 55 10/24/2023    ALT 12 12/01/2023    AST 17 12/01/2023     12/01/2023    K 3.7 12/01/2023     12/01/2023    CREATININE 0.9 12/01/2023    BUN 18 12/01/2023    CO2 25 12/01/2023    TSH 1.544 04/26/2023    INR 0.9 04/19/2012    GLUF 105 10/27/2004    HGBA1C 6.4 (H) 10/24/2023       ASSESSMENT & PLAN:     Too soon for Annual: last was in 6/2023    DM II:   Lab Results   Component  Value Date    HGBA1C 6.4 (H) 10/24/2023   ` Insulin regimen per PCP  `needs to see Optometry (apt noted for 3/12/2024)    HTN:   Today: 118/70 (optimal)  ` Norvasc     History of Epistaxis:   Stable   Will follow up with ENT (3/12)    Immunizations:   RSV... declines   COVID...declines  Flu...Declines   Shingrix: up tod date  Pneumonia: due booster in 11/2024    *Follow up today. Will follow up with PCP at this time.   NO VISIT today  NO LOS   Future Appointments   Date Time Provider Department Center   3/12/2024  2:20 PM Suzette Davis MD [facility]   3/13/2024  2:40 PM Huy Martin MD [facility] IM [facility]   3/14/2024  2:00 PM Evans Paz, PT [facility] [place]   3/27/2024  2:00 PM Katherine Vincent, TALIAM [facility] POD [facility]   4/10/2024  1:20 PM Michael Villasenor, OD [facility] OPT[facility] [place]        Medication List            Accurate as of March 6, 2024  3:28 PM. If you have any questions, ask your nurse or doctor.                CONTINUE taking these medications      ACCU-CHEK FASTCLIX LANCING DEV Kit  Generic drug: lancing device with lancets  USE AS DIRECTED     amLODIPine 10 MG tablet  Commonly known as: NORVASC  Take 1 tablet (10 mg total) by mouth once daily.     aspirin 81 MG EC tablet  Commonly known as: ECOTRIN     blood sugar diagnostic Strp  Commonly known as: ACCU-CHEK GUIDE TEST STRIPS  3 times a day     blood-glucose meter Misc  Commonly known as: ACCU-CHEK GUIDE GLUCOSE METER  Three times a day     calcium carbonate 600 mg calcium (1,500 mg) Tab  Commonly known as: OS-ARIC  Take 1 tablet (600 mg total) by mouth once. for 1 dose     cetirizine 10 MG tablet  Commonly known as: ZYRTEC  Take 1 tablet (10 mg total) by mouth every evening.     citalopram 10 MG tablet  Commonly known as: CeleXA  Take 1 tablet (10 mg total) by mouth once daily.     diclofenac sodium 1 % Gel  Commonly known as: VOLTAREN  APPLY 2 G TOPICALLY ONCE DAILY.     ergocalciferol 50,000 unit Cap  Commonly  "known as: ERGOCALCIFEROL  TAKE 1 CAPSULE BY MOUTH EVERY 7 DAYS     esomeprazole 40 MG capsule  Commonly known as: NEXIUM  Take 1 capsule (40 mg total) by mouth before breakfast.     ezetimibe 10 mg tablet  Commonly known as: ZETIA  Take 1 tablet (10 mg total) by mouth once daily.     fluocinonide 0.05 % external solution  Commonly known as: LIDEX  AAA scalp qday - bid prn pruritus     fluticasone propionate 50 mcg/actuation nasal spray  Commonly known as: FLONASE  1 spray (50 mcg total) by Each Nostril route once daily.     gabapentin 100 MG capsule  Commonly known as: NEURONTIN  Take 2 capsules (200 mg total) by mouth every evening.     glucose 4 GM chewable tablet  Take 4 tablets (16 g total) by mouth as needed for Low blood sugar (If having symptoms of blurry vision, palpitations, confusion, shakiness.  Please check sugars and if sugar below 70 please take 4 tablets and re-check sugar everry 15 minutes until sugars are above 70 and symptoms resolve.).     insulin syringe-needle U-100 0.3 mL 31 gauge x 5/16" Syrg  relion brand, use 5 x a day, if not on levemir or novolog     insulin syringe-needle U-100 0.5 mL 31 gauge x 5/16" Syrg  Use nightly. 90 day     lancets Misc  Commonly known as: ACCU-CHEK FASTCLIX LANCET DRUM  TEST BLOOD SUGAR THREE TIMES A DAY     LANTUS SOLOSTAR U-100 INSULIN glargine 100 units/mL SubQ pen  Generic drug: insulin  Inject 40 Units into the skin every evening.     linaCLOtide 72 mcg Cap capsule  Commonly known as: LINZESS  Take 1 capsule (72 mcg total) by mouth before breakfast.     magnesium oxide 400 mg (241.3 mg magnesium) tablet  Commonly known as: MAG-OX  Take 1 tablet (400 mg total) by mouth 2 (two) times daily.     montelukast 10 mg tablet  Commonly known as: SINGULAIR     NOVOFINE 32 32 gauge x 1/4" Ndle  Generic drug: pen needle, diabetic  4 injections per day     * NovoLOG Flexpen U-100 Insulin 100 unit/mL (3 mL) Inpn pen  Generic drug: insulin aspart U-100  Inject 10 Units into " the skin 3 (three) times daily with meals.     * insulin aspart U-100 100 unit/mL (3 mL) Inpn pen  Commonly known as: NovoLOG Flexpen U-100 Insulin  Inject 10 Units into the skin 3 (three) times daily with meals. TDD 60 units     Saline NasaL 0.65 % nasal spray  Generic drug: sodium chloride  1 spray by Nasal route as needed for Congestion.     traZODone 50 MG tablet  Commonly known as: DESYREL  Take 1 tablet (50 mg total) by mouth every evening.     VENTOLIN HFA 90 mcg/actuation inhaler  Generic drug: albuterol  Inhale 1-2 puffs into the lungs every 4 (four) hours as needed for Wheezing.           * This list has 2 medication(s) that are the same as other medications prescribed for you. Read the directions carefully, and ask your doctor or other care provider to review them with you.                Signing Physician:  GINETTE Cartwright

## 2024-03-06 ENCOUNTER — OFFICE VISIT (OUTPATIENT)
Dept: INTERNAL MEDICINE | Facility: CLINIC | Age: 74
End: 2024-03-06
Payer: MEDICARE

## 2024-03-06 VITALS
HEART RATE: 78 BPM | WEIGHT: 192.69 LBS | BODY MASS INDEX: 34.14 KG/M2 | OXYGEN SATURATION: 95 % | SYSTOLIC BLOOD PRESSURE: 118 MMHG | DIASTOLIC BLOOD PRESSURE: 70 MMHG | HEIGHT: 63 IN

## 2024-03-06 DIAGNOSIS — E11.9 TYPE 2 DIABETES MELLITUS WITHOUT COMPLICATION, WITH LONG-TERM CURRENT USE OF INSULIN: Primary | ICD-10-CM

## 2024-03-06 DIAGNOSIS — I10 ESSENTIAL HYPERTENSION: ICD-10-CM

## 2024-03-06 DIAGNOSIS — Z87.898 HISTORY OF EPISTAXIS: ICD-10-CM

## 2024-03-06 DIAGNOSIS — Z79.4 TYPE 2 DIABETES MELLITUS WITHOUT COMPLICATION, WITH LONG-TERM CURRENT USE OF INSULIN: Primary | ICD-10-CM

## 2024-03-06 PROCEDURE — 99499 UNLISTED E&M SERVICE: CPT | Mod: S$GLB,,, | Performed by: NURSE PRACTITIONER

## 2024-03-06 PROCEDURE — 99999 PR PBB SHADOW E&M-EST. PATIENT-LVL V: CPT | Mod: PBBFAC,,, | Performed by: NURSE PRACTITIONER

## 2024-03-12 ENCOUNTER — OFFICE VISIT (OUTPATIENT)
Dept: PODIATRY | Facility: CLINIC | Age: 74
End: 2024-03-12
Payer: MEDICARE

## 2024-03-12 VITALS
DIASTOLIC BLOOD PRESSURE: 70 MMHG | BODY MASS INDEX: 34.14 KG/M2 | HEIGHT: 63 IN | WEIGHT: 192.69 LBS | TEMPERATURE: 99 F | SYSTOLIC BLOOD PRESSURE: 120 MMHG | HEART RATE: 80 BPM

## 2024-03-12 DIAGNOSIS — E11.49 TYPE II DIABETES MELLITUS WITH NEUROLOGICAL MANIFESTATIONS: Primary | ICD-10-CM

## 2024-03-12 DIAGNOSIS — M21.612 BILATERAL BUNIONS: ICD-10-CM

## 2024-03-12 DIAGNOSIS — M21.611 BILATERAL BUNIONS: ICD-10-CM

## 2024-03-12 DIAGNOSIS — M79.675 PAIN IN LEFT TOE(S): ICD-10-CM

## 2024-03-12 DIAGNOSIS — L85.2 PUNCTATE KERATODERMA: ICD-10-CM

## 2024-03-12 PROCEDURE — 1125F AMNT PAIN NOTED PAIN PRSNT: CPT | Mod: CPTII,S$GLB,, | Performed by: STUDENT IN AN ORGANIZED HEALTH CARE EDUCATION/TRAINING PROGRAM

## 2024-03-12 PROCEDURE — 3078F DIAST BP <80 MM HG: CPT | Mod: CPTII,S$GLB,, | Performed by: STUDENT IN AN ORGANIZED HEALTH CARE EDUCATION/TRAINING PROGRAM

## 2024-03-12 PROCEDURE — 1101F PT FALLS ASSESS-DOCD LE1/YR: CPT | Mod: CPTII,S$GLB,, | Performed by: STUDENT IN AN ORGANIZED HEALTH CARE EDUCATION/TRAINING PROGRAM

## 2024-03-12 PROCEDURE — 3288F FALL RISK ASSESSMENT DOCD: CPT | Mod: CPTII,S$GLB,, | Performed by: STUDENT IN AN ORGANIZED HEALTH CARE EDUCATION/TRAINING PROGRAM

## 2024-03-12 PROCEDURE — 99214 OFFICE O/P EST MOD 30 MIN: CPT | Mod: S$GLB,,, | Performed by: STUDENT IN AN ORGANIZED HEALTH CARE EDUCATION/TRAINING PROGRAM

## 2024-03-12 PROCEDURE — 99999 PR PBB SHADOW E&M-EST. PATIENT-LVL III: CPT | Mod: PBBFAC,,, | Performed by: STUDENT IN AN ORGANIZED HEALTH CARE EDUCATION/TRAINING PROGRAM

## 2024-03-12 PROCEDURE — 3074F SYST BP LT 130 MM HG: CPT | Mod: CPTII,S$GLB,, | Performed by: STUDENT IN AN ORGANIZED HEALTH CARE EDUCATION/TRAINING PROGRAM

## 2024-03-12 PROCEDURE — 1159F MED LIST DOCD IN RCRD: CPT | Mod: CPTII,S$GLB,, | Performed by: STUDENT IN AN ORGANIZED HEALTH CARE EDUCATION/TRAINING PROGRAM

## 2024-03-12 RX ORDER — TRETINOIN 0.5 MG/G
CREAM TOPICAL NIGHTLY
Qty: 20 G | Refills: 1 | Status: SHIPPED | OUTPATIENT
Start: 2024-03-12 | End: 2024-03-12

## 2024-03-12 RX ORDER — IMIQUIMOD 12.5 MG/.25G
CREAM TOPICAL
Qty: 12 PACKET | Refills: 2 | Status: SHIPPED | OUTPATIENT
Start: 2024-03-13

## 2024-03-12 NOTE — PROGRESS NOTES
Subjective:     Patient    Chelly Ortiz is a 73 y.o. female.    Problem    Previously followed by multiple [facility] podiatrists; last seen 4 months ago by Dr Marley for diabetic foot exam and left 2nd toe pain, discussed conservative options and briefly discussed surgical options. Denies numbness, tingling, burning.      Primary Care Provider    Primary Care Provider: Smitha Turner MD   Last Seen: 2023     History    History obtained from patient and review of medical records.     Past Medical History:   Diagnosis Date    Allergy     DDD (degenerative disc disease), lumbar     Diabetes mellitus     diet controlled    GERD (gastroesophageal reflux disease)     History of subconjunctival hemorrhage 2011    left eye    Hyperlipidemia     Hypertension     IBS (irritable bowel syndrome)     Obesity     MYRIAM (obstructive sleep apnea)     on CPAP    Rotator cuff impingement syndrome     Seasonal allergic conjunctivitis     Type 2 diabetes mellitus treated with insulin        Past Surgical History:   Procedure Laterality Date    CATARACT EXTRACTION W/  INTRAOCULAR LENS IMPLANT Right 10/03/2013        CATARACT EXTRACTION W/  INTRAOCULAR LENS IMPLANT Left 2022         SECTION      x2    CHOLECYSTECTOMY      COLONOSCOPY N/A 2018    Procedure: COLONOSCOPY;  Surgeon: Marie Mejia MD;  Location: [facility] ENDO;  Service: Endoscopy;  Laterality: N/A;    COLONOSCOPY N/A 2022    Procedure: COLONOSCOPY;  Surgeon: Pierce Rivera MD;  Location: [facility] ENDO;  Service: Endoscopy;  Laterality: N/A;    ENDOSCOPIC ULTRASOUND OF UPPER GASTROINTESTINAL TRACT N/A 10/09/2018    Procedure: ULTRASOUND, UPPER GI TRACT, ENDOSCOPIC;  Surgeon: Javy Simpson MD;  Location: [facility] ENDO;  Service: Endoscopy;  Laterality: N/A;    EXCISION OF LESION N/A 2019    Procedure: EXCISION, LESION VULVA;  Surgeon: Liv Odell MD;  Location: [facility] OR;  Service: OB/GYN;   Laterality: N/A;  latex allergy    EYE SURGERY      HYSTERECTOMY      ovaries intact, due to DUB    INTRAOCULAR PROSTHESES INSERTION Left 2022    Procedure: INSERTION, IOL PROSTHESIS;  Surgeon: Clarice Herzog MD;  Location: [facility] OR [address] [place] [address];  Service: Ophthalmology;  Laterality: Left;    PHACOEMULSIFICATION OF CATARACT Left 2022    Procedure: PHACOEMULSIFICATION, CATARACT;  Surgeon: Clarice Herzog MD;  Location: [facility] OR [address] [place] [address];  Service: Ophthalmology;  Laterality: Left;    TUBAL LIGATION          Objective:     Vitals  Wt Readings from Last 1 Encounters:   24 87.4 kg (192 lb 10.9 oz)     Temp Readings from Last 1 Encounters:   24 99.1 °F (37.3 °C) (Oral)     BP Readings from Last 1 Encounters:   24 120/70     Pulse Readings from Last 1 Encounters:   24 80       Dermatological Exam    Skin:  Pedal hair growth, skin color, and skin texture normal on right  Pedal hair growth, skin color, and skin texture normal on left     Nails:  All nails normal in length, thickness, color    Vascular Exam    Arteries:  Posterior tibial artery palpable on right  Dorsalis pedis artery palpable on right  Posterior tibial artery palpable on left  Dorsalis pedis artery palpable on left    Veins:  Superficial veins unremarkable on right  Superficial veins unremarkable on left    [place]+ pitting on right  1+ pitting on left    Neurological Exam    [place] touch test:  6/6 sites sensed, light touch intact     Musculoskeletal Exam    Footwear:  Casual on right  Casual on left    Gait Exam:   Ambulatory Status: Ambulatory  Gait: Apropulsive  Assistive Devices: None    Foot Progression Angle:  Normal on right  Normal on left     Right Lower Extremity Additional Findings:  Right foot and ankle function, strength, and range of motion unremarkable except as noted above.     Left Lower Extremity Additional Findings:  Left foot and ankle function, strength, and range of motion unremarkable except  as noted above.    Imaging and Other Tests    Imaging:  Independently reviewed and interpreted imaging, findings are as follows: N/A    Other Tests: The following A1c results were reviewed.   Hemoglobin A1C   Date Value Ref Range Status   10/24/2023 6.4 (H) 4.0 - 5.6 % Final     Comment:     ADA Screening Guidelines:  5.7-6.4%  Consistent with prediabetes  >or=6.5%  Consistent with diabetes    High levels of fetal hemoglobin interfere with the HbA1C  assay. Heterozygous hemoglobin variants (HbS, HgC, etc)do  not significantly interfere with this assay.   However, presence of multiple variants may affect accuracy.     04/26/2023 6.5 (H) 4.0 - 5.6 % Final     Comment:     ADA Screening Guidelines:  5.7-6.4%  Consistent with prediabetes  >or=6.5%  Consistent with diabetes    High levels of fetal hemoglobin interfere with the HbA1C  assay. Heterozygous hemoglobin variants (HbS, HgC, etc)do  not significantly interfere with this assay.   However, presence of multiple variants may affect accuracy.     12/13/2022 6.5 (H) 4.0 - 5.6 % Final     Comment:     ADA Screening Guidelines:  5.7-6.4%  Consistent with prediabetes  >or=6.5%  Consistent with diabetes    High levels of fetal hemoglobin interfere with the HbA1C  assay. Heterozygous hemoglobin variants (HbS, HgC, etc)do  not significantly interfere with this assay.   However, presence of multiple variants may affect accuracy.           Assessment:     Encounter Diagnoses   Name Primary?    Type II diabetes mellitus with neurological manifestations Yes    Bilateral bunions     Punctate keratoderma     Pain in left toe(s)         Plan:     I counseled the patient on her conditions, their implications and medical management.    Diabetic foot with neurological manifestations: chronic stable  -Diabetic foot exam performed.  -Performed shoe inspection and diabetic foot education. Reviewed importance of blood glucose control, proper nutrition, and foot hygiene to minimize risk  of complications of diabetes. Recommended daily foot inspections, daily moisturizer to feet, avoiding sharp instruments to feet, appropriate footwear at all times when ambulating, and following up regularly for routine foot care.   -Diabetic foot risk: 2023 IWGDF very low ulcer risk.   -Next foot exam due March 2025.    Bilateral bunions, left 1st and 2nd toe pain with 2nd toe punctate keratoderma: chronic stable  -Continue conservative care with imiquimod daily, diabetic shoes daily, silicone toe sleeve as needed.   -Patient is under a comprehensive diabetes management plan and has foot deformity. Ordered diabetic shoes and inserts.   -Briefly discussed surgical intervention left foot to at least include Tae + Rakan bunionectomies, possible lesser toe procedures; would need updated X rays. Patient will consider.        Return to clinic in 1 year for diabetic foot exam, call sooner PRN.

## 2024-03-13 ENCOUNTER — OFFICE VISIT (OUTPATIENT)
Dept: INTERNAL MEDICINE | Facility: CLINIC | Age: 74
End: 2024-03-13
Payer: MEDICARE

## 2024-03-13 VITALS — BODY MASS INDEX: 33.91 KG/M2 | HEART RATE: 85 BPM | WEIGHT: 191.38 LBS | OXYGEN SATURATION: 97 % | HEIGHT: 63 IN

## 2024-03-13 DIAGNOSIS — G89.29 CHRONIC LEFT SHOULDER PAIN: ICD-10-CM

## 2024-03-13 DIAGNOSIS — M25.511 CHRONIC RIGHT SHOULDER PAIN: Primary | ICD-10-CM

## 2024-03-13 DIAGNOSIS — G89.29 CHRONIC RIGHT SHOULDER PAIN: Primary | ICD-10-CM

## 2024-03-13 DIAGNOSIS — M25.512 CHRONIC LEFT SHOULDER PAIN: ICD-10-CM

## 2024-03-13 PROCEDURE — 99213 OFFICE O/P EST LOW 20 MIN: CPT | Mod: S$GLB,,, | Performed by: FAMILY MEDICINE

## 2024-03-13 PROCEDURE — 1101F PT FALLS ASSESS-DOCD LE1/YR: CPT | Mod: CPTII,S$GLB,, | Performed by: FAMILY MEDICINE

## 2024-03-13 PROCEDURE — 3288F FALL RISK ASSESSMENT DOCD: CPT | Mod: CPTII,S$GLB,, | Performed by: FAMILY MEDICINE

## 2024-03-13 PROCEDURE — 1159F MED LIST DOCD IN RCRD: CPT | Mod: CPTII,S$GLB,, | Performed by: FAMILY MEDICINE

## 2024-03-13 PROCEDURE — 99999 PR PBB SHADOW E&M-EST. PATIENT-LVL V: CPT | Mod: PBBFAC,,, | Performed by: FAMILY MEDICINE

## 2024-03-13 PROCEDURE — 1125F AMNT PAIN NOTED PAIN PRSNT: CPT | Mod: CPTII,S$GLB,, | Performed by: FAMILY MEDICINE

## 2024-03-13 RX ORDER — ALPRAZOLAM 0.25 MG/1
0.25 TABLET ORAL ONCE
Qty: 1 TABLET | Refills: 0 | Status: SHIPPED | OUTPATIENT
Start: 2024-03-13 | End: 2024-04-08 | Stop reason: SDUPTHER

## 2024-03-13 NOTE — PROGRESS NOTES
"Subjective:     Patient ID: Chelly Ortiz is a 73 y.o. female.   Chief Complaint: Health Maintenance and Generalized Body Aches    HPI:  Patient of Dr. Smitha Turner, seen acutely today for chronic upper body pain.    Reports she has had injections, has been on mobic, goes to PT but still having pain. Reports difficulty raising right arm like the left, though she has limited ROM in both d/t pain.    Pain has been present since the end of September 2023. Primarily in shoulders and coming down into the hands. No numbness/tingling/weakness in the hands.    Review of Systems   Constitutional:  Negative for chills and fever.   Musculoskeletal:  Positive for arthralgias and myalgias. Negative for back pain, gait problem, joint swelling, leg pain, neck pain, neck stiffness and joint deformity.   Neurological:  Negative for weakness and numbness.          Objective:      Vitals:    03/13/24 1442   Pulse: 85   SpO2: 97%   Weight: 86.8 kg (191 lb 5.8 oz)   Height: 5' 3" (1.6 m)      Physical Exam  Vitals reviewed.   Constitutional:       General: She is not in acute distress.     Appearance: Normal appearance. She is normal weight. She is not ill-appearing.   HENT:      Head: Normocephalic and atraumatic.   Cardiovascular:      Rate and Rhythm: Normal rate and regular rhythm.      Pulses: Normal pulses.      Heart sounds: Normal heart sounds. No murmur heard.     No friction rub. No gallop.   Pulmonary:      Effort: Pulmonary effort is normal. No respiratory distress.      Breath sounds: Normal breath sounds. No stridor. No wheezing, rhonchi or rales.   Musculoskeletal:         General: Tenderness present.      Right shoulder: Tenderness present. No swelling, deformity, effusion or bony tenderness. Decreased range of motion (severely decreased ability w/ lateral abduction and with forward abduction). Decreased strength (positive Saldaña and empty can tests).      Left shoulder: Tenderness present. No swelling, deformity, " effusion or bony tenderness. Decreased range of motion (moderately decreased ability w/ lateral abduction and with forward abduction). Decreased strength (positive Saldaña and empty can tests).      Right hand: Normal. No swelling, deformity, tenderness or bony tenderness. Normal range of motion. Normal strength. Normal sensation.      Left hand: Normal. No swelling, deformity, tenderness or bony tenderness. Normal range of motion. Normal sensation.   Neurological:      General: No focal deficit present.      Mental Status: She is alert and oriented to person, place, and time. Mental status is at baseline.      Sensory: No sensory deficit.      Motor: No weakness.      Coordination: Coordination normal.   Psychiatric:         Mood and Affect: Mood normal.         Behavior: Behavior normal.         Thought Content: Thought content normal.         Judgment: Judgment normal.           Assessment:       Problem List Items Addressed This Visit    None  Visit Diagnoses       Chronic left shoulder pain    -  Primary    Relevant Medications    ALPRAZolam (XANAX) 0.25 MG tablet    Other Relevant Orders    MRI Shoulder Without Contrast Left    Chronic right shoulder pain        Relevant Medications    ALPRAZolam (XANAX) 0.25 MG tablet    Other Relevant Orders    MRI Shoulder Without Contrast Right              Plan:       1. Chronic right shoulder pain  Review of prior hx shows MRI in 2016 remarkable for small partial tear  Since sx have progressed since then, Xray was unremarkable, and minimal improvement w/ PT, will repeat MRI  Xanax x1 rx'd for claustrophobia  Pt to f/u ortho and continue PT  - MRI Shoulder Without Contrast Right; Future  - ALPRAZolam (XANAX) 0.25 MG tablet; Take 1 tablet (0.25 mg total) by mouth once. Take approximately 20 to 30 minutes before your MRI exam for 1 dose  Dispense: 1 tablet; Refill: 0    2. Chronic left shoulder pain  As above  Left shoulder also showing sx but less severe than the right  -  MRI Shoulder Without Contrast Left; Future  - ALPRAZolam (XANAX) 0.25 MG tablet; Take 1 tablet (0.25 mg total) by mouth once. Take approximately 20 to 30 minutes before your MRI exam for 1 dose  Dispense: 1 tablet; Refill: 0          Follow up in about 3 months (around 6/13/2024) for Annual Visit, Fasting labs.     Huy Martin MD, [facility]  Family Medicine Physician  [facility]  03/13/2024

## 2024-03-14 ENCOUNTER — CLINICAL SUPPORT (OUTPATIENT)
Dept: REHABILITATION | Facility: HOSPITAL | Age: 74
End: 2024-03-14
Payer: MEDICARE

## 2024-03-14 DIAGNOSIS — M25.611 DECREASED ROM OF RIGHT SHOULDER: ICD-10-CM

## 2024-03-14 DIAGNOSIS — M25.512 ACUTE PAIN OF BOTH SHOULDERS: Primary | ICD-10-CM

## 2024-03-14 DIAGNOSIS — M25.511 ACUTE PAIN OF BOTH SHOULDERS: Primary | ICD-10-CM

## 2024-03-14 PROCEDURE — 97530 THERAPEUTIC ACTIVITIES: CPT

## 2024-03-14 PROCEDURE — 97140 MANUAL THERAPY 1/> REGIONS: CPT

## 2024-03-14 PROCEDURE — 97161 PT EVAL LOW COMPLEX 20 MIN: CPT

## 2024-03-15 PROBLEM — M25.511 ACUTE PAIN OF BOTH SHOULDERS: Status: ACTIVE | Noted: 2024-03-15

## 2024-03-15 PROBLEM — M25.512 ACUTE PAIN OF BOTH SHOULDERS: Status: ACTIVE | Noted: 2024-03-15

## 2024-03-15 NOTE — PLAN OF CARE
"[facility]   Physical Therapy Initial Evaluation      Name: Chelly Ortiz  Clinic Number: [id number]    Therapy Diagnosis:   Encounter Diagnoses   Name Primary?    Acute pain of both shoulders Yes    Decreased ROM of right shoulder         Physician: Tejal Kelley PA-C    Physician Orders: PT Eval and Treat  Medical Diagnosis from Referral: M25.511,M25.512 (ICD-10-CM) - Acute pain of both shoulders   Evaluation Date: 3/14/2024  Authorization Period Expiration: 12/31/2024  Plan of Care Expiration: 6/6/2024  Visit # / Visits authorized: 1/ 1   FOTO #2:   FOTO: #3:     Precautions: Standard, Diabetes, and hypertension    Time In: 1400  Time Out: 1500  Total Appointment Time (timed & untimed codes): 60 minutes    SUBJECTIVE   Date of onset: September 2023    History of current condition - Chelly reports: Started doing housework and moving some storage bags but pain and difficulty lifting her arm the following week. She has undergone several weeks of physical therapy with mild improvements as noted by her ability raise her right arm to approximately 90 degrees flexion. She continues to have pain at nigh and when attempting to lift her arm. She is scheduled to undergo an MRI on 4/9/2024.    Falls: None    Imaging, x-ray (1/2/2024): "Mild DJD. No acute fracture or dislocation. No bone destruction identified on either side. No abnormal soft tissue calcifications."    Prior Therapy: recent physical therapy  Social History: lives with their spouse  Occupation: Retired  Prior Level of Function: No Limitations   Current Level of Function: Can't Vacuum, Sweep, Reaching to Cabinets    Pain:  Current 0/10, worst 9/10, best 0/10   Location:   Posterior/Top Shoulder   Description: Cutting   Aggravating Factors: Lying on it or moving it   Easing Factors: nothing    Patients goals: no pain with household chores and improve sleeping.     Medical History:   Past Medical History:   Diagnosis Date    Allergy  "    DDD (degenerative disc disease), lumbar     Diabetes mellitus     diet controlled    GERD (gastroesophageal reflux disease)     History of subconjunctival hemorrhage 2011    left eye    Hyperlipidemia     Hypertension     IBS (irritable bowel syndrome)     Obesity     MYRIAM (obstructive sleep apnea)     on CPAP    Rotator cuff impingement syndrome     Seasonal allergic conjunctivitis     Type 2 diabetes mellitus treated with insulin      Surgical History:   Chelly Ortiz  has a past surgical history that includes  section; Cholecystectomy; Eye surgery; Tubal ligation; Hysterectomy; Endoscopic ultrasound of upper gastrointestinal tract (N/A, 10/09/2018); Colonoscopy (N/A, 2018); Excision of lesion (N/A, 2019); Cataract extraction w/  intraocular lens implant (Right, 10/03/2013); Colonoscopy (N/A, 2022); Cataract extraction w/  intraocular lens implant (Left, 2022); Phacoemulsification of cataract (Left, 2022); and Intraocular prosthesis insertion (Left, 2022).    Medications:   Chelly has a current medication list which includes the following prescription(s): accu-chek fastclix lancing dev, albuterol, alprazolam, amlodipine, aspirin, blood sugar diagnostic, blood-glucose meter, calcium carbonate, cetirizine, citalopram, diclofenac sodium, ergocalciferol, esomeprazole, ezetimibe, fluocinonide, fluticasone propionate, gabapentin, glucose, imiquimod, lantus solostar u-100 insulin, insulin aspart u-100, insulin aspart u-100, insulin syringe-needle u-100, insulin syringe-needle u-100, lancets, linaclotide, magnesium oxide, montelukast, pen needle, diabetic, saline nasal, and trazodone, and the following Facility-Administered Medications: triamcinolone acetonide.    Allergies:   Review of patient's allergies indicates:   Allergen Reactions    Ace inhibitors Hives     \Cough    Latex, natural rubber Itching        OBJECTIVE     Observations:  Cervical: forward head, rounded  shoulders, internally rotated humeral head bilateral  Thoracic: increased kyphosis with Dowager hump  Scapulae: bilateral abduction, downwards rotation, and anterior tipping      Active Range of Motion:  Cervical Spine   Action Degrees Notes   Flexion 50    Extension 40    Left Rotation 50    Right Rotation 50       Shoulder   Action Right Left   Flexion 55 degrees 145 degrees   Abduction 50 degrees 130 degrees   External Rotation  at 90 Unable to perform 80 degrees   Internal Rotation (functional) L4-5 Mid back       Passive Range of Motion:  Shoulder   Action Right Left   Flexion 160 degrees 180 degrees   Abduction 160 degrees 180 degrees   External Rotation 85 degrees 90 degrees   Internal Rotation 70 degrees 65 degrees       Manual Muscle Testing:  Shoulder Girdle   Action Right Left   Flexion 3- / 5 4- / 5   Abduction 3- / 5 4 / 5   External Rotation at 45 3+ / 5 4- / 5   External Rotation at 90 4- / 5 4 / 5   Internal Rotation at 90 4+ / 5 4+ / 5   Serratus Anterior Not Tested Not Tested   Upper Trapezius Not Tested Not Tested   Middle Trapezius Not Tested Not Tested   Lower Trapezius Not Tested Not Tested       Special Tests:  Positive (+) Tests  Drop Arm  Neer's  Hornblower's Sign  Negative (-) Tests:  Anterior Apprehension  Spurling's  Distraction  Cervical Quadrant      Palpation and Additional Assessment:  Right glenohumeral hypermobility  Superior humeral head elevation with active flexion / abduction  Fear avoidance and high tissue irritability      Intake Outcome Measure for FOTO Shoulder Survey    Therapist reviewed FOTO scores for Chlely Ortiz on 3/14/2024.   FOTO documents entered into EPIC - see Media section.    Intake Score: will complete next session       TREATMENT     Total Treatment time (time-based codes) separate from Evaluation: 25 minutes      Chelly received the treatments listed below:      Therapeutic Exercises to develop strength and ROM for 00 minutes including:  Seated Thoracic  Extension  Shoulder Isometrics - external rotation and internal rotation      Manual Therapy Techniques: Joint mobilizations and muscle energy technique were applied to the: Right Shoulder for 10 minutes, including:  Right GH posterior-inferior mobilizations - grade II-III  Manual external rotation / internal rotation isometrics at multiple angles      Therapeutic Activities to improve functional performance for 15 minutes, including:  Education (see below)  Questions and answers    PATIENT EDUCATION AND HOME EXERCISES     Education provided:   Anatomy and Pathology.  Symptom management and plan of care progressions.  Home Exercise Program.    Written Home Exercises Provided: yes. Exercises were reviewed and Chelly was able to demonstrate them prior to the end of the session.  Chelly demonstrated fair  understanding of the education provided. See EMR under Patient Instructions for exercises provided during therapy sessions.    ASSESSMENT     Chelly is a 73 y.o. female referred to outpatient Physical Therapy with a medical diagnosis of Acute pain of both shoulders. Patient presents with decreased range of motion, decreased strength, joint hypermobility, altered movement mechanics, high tissue irritability, and postural deviations. Signs and symptoms are consistent with rotator cuff related pain with probable tear secondary to scapular internal rotation and anterior tipping with superior humeral head shearing. She will benefit from skilled physical therapy to improve her spinal mobility, strengthen the periscapular muscles, improve postural proprioception, and promote compensatory rotator cuff activity for decreased symptoms and improved function.    Patient prognosis is Guarded.   Patientt will benefit from skilled outpatient Physical Therapy to address the deficits stated above and in the chart below, provide patient /family education, and to maximize patientt's level of independence.     Plan of care discussed with  patient: Yes  Patient's spiritual, cultural and educational needs considered and patient is agreeable to the plan of care and goals as stated below:     Anticipated Barriers for therapy: chronicity of symptoms; co-morbidities    Medical Necessity is demonstrated by the following  History  Co-morbidities and personal factors that may impact the plan of care [] LOW: no personal factors / co-morbidities  [] MODERATE: 1-2 personal factors / co-morbidities  [x] HIGH: 3+ personal factors / co-morbidities    Moderate / High Support Documentation:   Co-morbidities affecting plan of care: Diabetes, hypertension, Hyperlipidemia, high BMI    Personal Factors:   no deficits     Examination  Body Structures and Functions, activity limitations and participation restrictions that may impact the plan of care [] LOW: addressing 1-2 elements  [] MODERATE: 3+ elements  [x] HIGH: 4+ elements (please support below)    Moderate / High Support Documentation: pushing, pulling, lifting, carrying, reaching, driving, overhead activities, house work.     Clinical Presentation [x] LOW: stable  [] MODERATE: Evolving  [] HIGH: Unstable     Decision Making/ Complexity Score: low       Goals:  Short Term Goals (4 Weeks):   1. Pt will be independent with HEP to supplement PT in improving pain free cervical mobility  2. Pt will improve cervical AROM 10 deg in rotational planes to improve cervical mobility for driving  3. Pt will improve UE MMTs by 1/2 grade in all planes to improve strength for lifting and carrying tasks.  4. Pt will demonstrate improved sitting posture to decrease pain experienced in head and neck.    Long Term Goals (8 Weeks):   1. Pt will improve FOTO to </=TBD% limitation to improve perceived limitation with changing and maintaining mobility.  2. Pt will improve cervical AROM to WNL in all planes to improve cervical mobility for driving   3. Pt will improve UE MMTs 1 grade in all planes to improve strength for lifting and  carrying tasks.  4. Pt will report no pain with lifting 10 lbs to promote physical activity.   5. Pt will report no pain with cervical AROM in all planes to promote QOL.    PLAN   Plan of care Certification: 3/14/2024 to 6/6/2024.    Outpatient Physical Therapy 1 to 2 times weekly for 10 weeks to include the following interventions: Manual Therapy, Neuromuscular Re-ed, Patient Education, Therapeutic Activities, and Therapeutic Exercise.     Evan Pritchard, PT, DPT, OCS    Co-treated with Evans Paz, PT, DPT, OCS, SCS, FAAOMPT      Physician's Signature: _________________________________________ Date: ________________

## 2024-03-18 ENCOUNTER — CLINICAL SUPPORT (OUTPATIENT)
Dept: REHABILITATION | Facility: HOSPITAL | Age: 74
End: 2024-03-18
Payer: MEDICARE

## 2024-03-18 DIAGNOSIS — R29.3 ABNORMAL POSTURE: ICD-10-CM

## 2024-03-18 DIAGNOSIS — M25.511 ACUTE PAIN OF BOTH SHOULDERS: ICD-10-CM

## 2024-03-18 DIAGNOSIS — M25.612 DECREASED ROM OF LEFT SHOULDER: ICD-10-CM

## 2024-03-18 DIAGNOSIS — M25.512 ACUTE PAIN OF BOTH SHOULDERS: ICD-10-CM

## 2024-03-18 DIAGNOSIS — M25.611 DECREASED ROM OF RIGHT SHOULDER: Primary | ICD-10-CM

## 2024-03-18 PROCEDURE — 97140 MANUAL THERAPY 1/> REGIONS: CPT

## 2024-03-18 PROCEDURE — 97112 NEUROMUSCULAR REEDUCATION: CPT

## 2024-03-18 NOTE — PROGRESS NOTES
[facility]   Physical Therapy Treatment Note     Name: Chelly Ortiz  Clinic Number: [id number]    Therapy Diagnosis:   Encounter Diagnoses   Name Primary?    Decreased ROM of right shoulder Yes    Acute pain of both shoulders     Decreased ROM of left shoulder     Abnormal posture      Physician: Tejal Kelley PA-C    Visit Date: 3/18/2024    Physician Orders: PT Eval and Treat  Medical Diagnosis from Referral: M25.511,M25.512 (ICD-10-CM) - Acute pain of both shoulders   Evaluation Date: 3/14/2024  Authorization Period Expiration: 12/31/2024  Plan of Care Expiration: 6/6/2024  Visit # / Visits authorized: 7/36   FOTO #2:   FOTO: #3:      Precautions: Standard, Diabetes, and hypertension     Time In: 1407  Time Out: 1500  Total Billable Time (timed & untimed codes): 25 minutes    SUBJECTIVE     Pt reports: Shoulders are feeling better, but continues with weakness and difficulty reaching and lifting.  She was compliant with home exercise program.  Response to previous treatment: less shoulder pain  Functional change: no change    Pain: 5/10  Location: Posterior/Top B Shoulders    OBJECTIVE     Objective Measures updated at progress report unless specified.     Treatment     Chelly received the treatments listed below:      Therapeutic Exercises to develop strength and ROM for 13 minutes including:  Seated Thoracic Extension, 2x10 5 second holds  Shoulder assisted flexion at cable column 5#, 2x15  Supine shoulder External rotation at 90 degrees, 1# 2x12     Manual Therapy Techniques: Joint mobilizations and muscle energy technique were applied to the: Right Shoulder for 18 minutes, including:  Right GH posterior-inferior mobilizations - grade II-III  Manual external rotation / internal rotation isometrics at multiple angles  Prone thoracic posterior to anterior glides, grade II-III  Prone cervicothoracic junction gapping mobilizations, grade III-IV     neuromuscular re-education activities  to improve: muscle firing patterns for 18 minutes. The following activities were included:    Sidelying serratus re-education with pole, 2x15  Shoulder Isometrics walk outs for external rotation and internal rotation red band, 2x10 each  Upper trap level II, 2x10     Therapeutic Activities to improve functional performance for 0 minutes, including:     Patient Education and Home Exercises     Home Exercises Provided and Patient Education Provided     Education provided:   - continue with Home exercise program     Written Home Exercises Provided: Patient instructed to cont prior HEP. Exercises were reviewed and Chelly was able to demonstrate them prior to the end of the session.  Chelly demonstrated good  understanding of the education provided. See EMR under Patient Instructions for exercises provided during therapy sessions    ASSESSMENT     Chelly continues with significantly limited shoulder range of motion on R so manual techniques were performed to improve her shoulder mobility as well as METs for rotator cuff recruitment. She benefits from assisted shoulder flexion at cable column to improve her pain free range of motion to offload her shoulder. Shoulder rotator cuff isometrics were progressed to walkouts for improved rotator cuff activation.     Chelly Is progressing well towards her goals.   Pt prognosis is Good.     Pt will continue to benefit from skilled outpatient physical therapy to address the deficits listed in the problem list box on initial evaluation, provide pt/family education and to maximize pt's level of independence in the home and community environment.     Pt's spiritual, cultural and educational needs considered and pt agreeable to plan of care and goals.     Anticipated barriers to physical therapy: chronicity of symptoms; co-morbidities     Goals:   Short Term Goals (4 Weeks):   1. Pt will be independent with HEP to supplement PT in improving pain free cervical mobility- Progressing  2. Pt  will improve cervical AROM 10 deg in rotational planes to improve cervical mobility for driving- Progressing  3. Pt will improve UE MMTs by 1/2 grade in all planes to improve strength for lifting and carrying tasks.- Progressing  4. Pt will demonstrate improved sitting posture to decrease pain experienced in head and neck.- Progressing     Long Term Goals (8 Weeks):   1. Pt will improve FOTO to </=TBD% limitation to improve perceived limitation with changing and maintaining mobility.- Progressing  2. Pt will improve cervical AROM to WNL in all planes to improve cervical mobility for driving - Progressing  3. Pt will improve UE MMTs 1 grade in all planes to improve strength for lifting and carrying tasks.- Progressing  4. Pt will report no pain with lifting 10 lbs to promote physical activity. - Progressing  5. Pt will report no pain with cervical AROM in all planes to promote QOL.- Progressing    PLAN     Plan of care Certification: 3/14/2024 to 6/6/2024.     Progress shoulder range of motion, rotator cuff and filipe-scapular strength, and thoracic mobility.     Evans Paz, PT   Board Certified Clinical Specialist in Orthopedic Physical Therapy  Board Certified Clinical Specialist in Sports Physical Therapy  Fellow, [employer] Manual Physical Therapists

## 2024-03-20 PROBLEM — R29.3 ABNORMAL POSTURE: Status: ACTIVE | Noted: 2024-03-20

## 2024-03-20 PROBLEM — M25.612 DECREASED ROM OF LEFT SHOULDER: Status: ACTIVE | Noted: 2024-03-20

## 2024-03-21 ENCOUNTER — CLINICAL SUPPORT (OUTPATIENT)
Dept: REHABILITATION | Facility: HOSPITAL | Age: 74
End: 2024-03-21
Payer: MEDICARE

## 2024-03-21 DIAGNOSIS — M25.512 ACUTE PAIN OF BOTH SHOULDERS: ICD-10-CM

## 2024-03-21 DIAGNOSIS — R29.3 ABNORMAL POSTURE: ICD-10-CM

## 2024-03-21 DIAGNOSIS — M25.612 DECREASED ROM OF LEFT SHOULDER: ICD-10-CM

## 2024-03-21 DIAGNOSIS — M25.511 ACUTE PAIN OF BOTH SHOULDERS: ICD-10-CM

## 2024-03-21 DIAGNOSIS — M25.611 DECREASED ROM OF RIGHT SHOULDER: Primary | ICD-10-CM

## 2024-03-21 PROCEDURE — 97112 NEUROMUSCULAR REEDUCATION: CPT

## 2024-03-21 PROCEDURE — 97110 THERAPEUTIC EXERCISES: CPT

## 2024-03-21 PROCEDURE — 97140 MANUAL THERAPY 1/> REGIONS: CPT

## 2024-03-21 NOTE — PROGRESS NOTES
`[facility]   Physical Therapy Treatment Note     Name: Chelly Ortiz  Clinic Number: [id number]    Therapy Diagnosis:   Encounter Diagnoses   Name Primary?    Decreased ROM of right shoulder Yes    Acute pain of both shoulders     Decreased ROM of left shoulder     Abnormal posture        Physician: Tejal Kelley PA-C    Visit Date: 3/21/2024    Physician Orders: PT Eval and Treat  Medical Diagnosis from Referral: M25.511,M25.512 (ICD-10-CM) - Acute pain of both shoulders   Evaluation Date: 3/14/2024  Authorization Period Expiration: 12/31/2024  Plan of Care Expiration: 6/6/2024  Visit # / Visits authorized: 8/36   FOTO #2:   FOTO: #3:      Precautions: Standard, Diabetes, and hypertension     Time In: 1405  Time Out: 1500  Total Billable Time (timed & untimed codes): 55 minutes    SUBJECTIVE     Pt reports: No real change is how much she can lift her right arm. Pain is about the same as last time in both arms.   She was compliant with home exercise program.  Response to previous treatment: no change   Functional change: no change    Pain: 5/10  Location: Posterior/Top B Shoulders    OBJECTIVE     Objective Measures updated at progress report unless specified.     Posture: B scapular abduction, downward rotation, Right shoulder depression, forward head, dowagers hump    Joint mobility: Hypomobility found in AP and inferior direction     Strength:  External Rotation at 0 3+ / 5 4- / 5   External Rotation at 90 4- / 5 4 / 5   Internal Rotation at 90 4 / 5 4+ / 5       Treatment     Chelly received the treatments listed below:      Therapeutic Exercises to develop strength and ROM for 13 minutes including:  Assessment above  Seated Thoracic Extension, x20 5 second holds  Shoulder assisted flexion at cable column 5#, 2x15  Supine shoulder External rotation at 90 degrees, 1# 2x10     Manual Therapy Techniques: Joint mobilizations and muscle energy technique were applied to the: Right Shoulder  "for 25 minutes, including:  Right GH posterior-inferior mobilizations - grade II-III  Manual external rotation / internal rotation isometrics for humeral centering   Alternating Isometrics at 90' IR/ER  Prone thoracic posterior to anterior glides, grade II-III  Prone cervicothoracic junction gapping mobilizations, grade III-IV     neuromuscular re-education activities to improve: muscle firing patterns for 18 minutes. The following activities were included:  Prone middle trap arm off edge of table 2x10 5" Bilateral  Upper trap level II, 2x10 - cuing to perform chin tuck in order to reduce sheer in cervical spine    Not today:  Sidelying serratus re-education with pole, 2x15  Shoulder Isometrics walk outs for external rotation and internal rotation red band, 2x10 each     Therapeutic Activities to improve functional performance for 0 minutes, including:     Patient Education and Home Exercises     Home Exercises Provided and Patient Education Provided     Education provided:   - continue with Home exercise program     Written Home Exercises Provided: Patient instructed to cont prior HEP. Exercises were reviewed and Chelly was able to demonstrate them prior to the end of the session.  Chelly demonstrated good  understanding of the education provided. See EMR under Patient Instructions for exercises provided during therapy sessions    ASSESSMENT     Chelly presents to PT today with no change from previous visit. She continues to demonstrate limited active range of motion of the Right UE. Stabilization exercises initiated today to improve humeral centering. She fatigued easily with treatment performed today. She would continue to benefit from skilled physical therapy to address range of motion and strength deficits limiting her ability to utilize B UE.    Chelly Is progressing well towards her goals.   Pt prognosis is Good.     Pt will continue to benefit from skilled outpatient physical therapy to address the deficits " listed in the problem list box on initial evaluation, provide pt/family education and to maximize pt's level of independence in the home and community environment.     Pt's spiritual, cultural and educational needs considered and pt agreeable to plan of care and goals.     Anticipated barriers to physical therapy: chronicity of symptoms; co-morbidities     Goals:   Short Term Goals (4 Weeks):   1. Pt will be independent with HEP to supplement PT in improving pain free cervical mobility- Progressing  2. Pt will improve cervical AROM 10 deg in rotational planes to improve cervical mobility for driving- Progressing  3. Pt will improve UE MMTs by 1/2 grade in all planes to improve strength for lifting and carrying tasks.- Progressing  4. Pt will demonstrate improved sitting posture to decrease pain experienced in head and neck.- Progressing     Long Term Goals (8 Weeks):   1. Pt will improve FOTO to </=TBD% limitation to improve perceived limitation with changing and maintaining mobility.- Progressing  2. Pt will improve cervical AROM to WNL in all planes to improve cervical mobility for driving - Progressing  3. Pt will improve UE MMTs 1 grade in all planes to improve strength for lifting and carrying tasks.- Progressing  4. Pt will report no pain with lifting 10 lbs to promote physical activity. - Progressing  5. Pt will report no pain with cervical AROM in all planes to promote QOL.- Progressing    PLAN     Plan of care Certification: 3/14/2024 to 6/6/2024.     Progress shoulder range of motion, rotator cuff and filipe-scapular strength, and thoracic mobility.     Rebeca Barrios, PT, DPT   Board Certified Clinical Specialist in Orthopedic Physical Therapy      Co-treated by Denny Paz PT, DPT  Board Certified Clinical Specialist in Orthopedic Physical Therapy  Board Certified Clinical Specialist in Sports Physical Therapy  Fellow, [employer] Orth[employer] Manual Physical Therapists

## 2024-03-27 ENCOUNTER — PATIENT MESSAGE (OUTPATIENT)
Dept: PHARMACY | Facility: CLINIC | Age: 74
End: 2024-03-27
Payer: MEDICARE

## 2024-03-27 ENCOUNTER — CLINICAL SUPPORT (OUTPATIENT)
Dept: REHABILITATION | Facility: HOSPITAL | Age: 74
End: 2024-03-27
Payer: MEDICARE

## 2024-03-27 DIAGNOSIS — M25.512 ACUTE PAIN OF BOTH SHOULDERS: ICD-10-CM

## 2024-03-27 DIAGNOSIS — M25.611 DECREASED ROM OF RIGHT SHOULDER: Primary | ICD-10-CM

## 2024-03-27 DIAGNOSIS — M25.511 ACUTE PAIN OF BOTH SHOULDERS: ICD-10-CM

## 2024-03-27 DIAGNOSIS — R29.3 ABNORMAL POSTURE: ICD-10-CM

## 2024-03-27 DIAGNOSIS — M25.612 DECREASED ROM OF LEFT SHOULDER: ICD-10-CM

## 2024-03-27 PROCEDURE — 97110 THERAPEUTIC EXERCISES: CPT

## 2024-03-27 PROCEDURE — 97112 NEUROMUSCULAR REEDUCATION: CPT

## 2024-03-27 PROCEDURE — 97140 MANUAL THERAPY 1/> REGIONS: CPT

## 2024-03-27 NOTE — TELEPHONE ENCOUNTER
Hello     A Patient Assistance application for Chelly Ortiz was faxed to your office @[phone]  on 03/18/24 that required Dr Turner's signature. It appears the application was signed but it was faxed back to the (wrong fax #) manufacture program and not back to the Pharmacy Patient Assistance Team. The [employer] Patient Assistance Program has notified  our Team and Ms. Ortiz that her application has been  denied due to missing paperwork. If you still have the signed application, please fax it back to the Pharmacy Patient Assistance Team [phone] so that we can re-submit Chelly Ortiz complete application for review. Please let us know if you need us to re fax the application to your office.      Thank You  Pharmacy Patient Assistance

## 2024-03-27 NOTE — PROGRESS NOTES
"[facility]   Physical Therapy Treatment Note     Name: Chelly Ortiz  Clinic Number: [id number]    Therapy Diagnosis:   Encounter Diagnoses   Name Primary?    Decreased ROM of right shoulder Yes    Acute pain of both shoulders     Decreased ROM of left shoulder     Abnormal posture          Physician: Smitha Turner MD    Visit Date: 3/27/2024    Physician Orders: PT Eval and Treat  Medical Diagnosis from Referral: M25.511,M25.512 (ICD-10-CM) - Acute pain of both shoulders   Evaluation Date: 3/14/2024  Authorization Period Expiration: 12/31/2024  Plan of Care Expiration: 6/6/2024  Visit # / Visits authorized:  9/36   FOTO #2:   FOTO: #3:      Precautions: Standard, Diabetes, and hypertension     Time In: 2:06 PM   Time Out: 3:00 PM  Total Billable Time (timed & untimed codes): 54 minutes    SUBJECTIVE     Pt reports: having some pinching behind the Left shoulder and feels like pain "in the joint" and behind the left scapula but she can lift it up fully. States that she can lift Right shoulder a little but to same height as usual.     She was compliant with home exercise program.    Response to previous treatment: no change   Functional change: no change    Pain: 5/10  Location: Posterior/Top B Shoulders    OBJECTIVE     Objective Measures updated at progress report unless specified.     Right Shoulder Active ROM:  Flexion 65 degrees  ER 80  IR 65    Left Shoulder Active ROM:  Flexion 150 degrees   ER 85  IR 60    Joint mobility: Limited posterior/inferior glide Right and Left GH    Scapular Assist: + for improving Left UE symptoms with full elevation     Treatment     Chelly received the treatments listed below:      Therapeutic Exercises to develop strength and ROM for  15 minutes including:    Assessment above   Seated Thoracic Extension, 2 x 15 with 3" holds  Sidelying shoulder flexion R shoulder gravity-eliminated ROM, 2 x 12   Passive ROM Right UE - full and pain-free passive " "flexion to 160, ER/IR full     Not today:  Shoulder assisted flexion at cable column 5#, 2x15  Supine shoulder External rotation at 90 degrees, 1# 2x10     Manual Therapy Techniques: Joint mobilizations and muscle energy technique were applied to the: Right Shoulder for 23 minutes, including:    Right GH posterior-inferior mobilizations - grade II-III  Manual external rotation / internal rotation isometrics for humeral centering   Prone thoracic posterior to anterior glides, grade II-III  Prone cervicothoracic junction gapping mobilizations, grade III-IV     neuromuscular re-education activities to improve: muscle firing patterns for 16 minutes. The following activities were included:    Sidelying serratus re-education with pole, 2 x 15  Serratus wall slides with cueing for keeping chin tuck, 2 x 8   Shoulder Isometrics walk outs for external rotation and internal rotation red band, 2x10 each    Not today:  Prone middle trap arm off edge of table 2x10 5" Bilateral  Upper trap level II, 2x10 - cuing to perform chin tuck in order to reduce sheer in cervical spine     Therapeutic Activities to improve functional performance for 0 minutes, including:     Patient Education and Home Exercises     Home Exercises Provided and Patient Education Provided     Education provided:   - continue with Home exercise program     Written Home Exercises Provided: Patient instructed to cont prior HEP. Exercises were reviewed and Chelly was able to demonstrate them prior to the end of the session.  Chelly demonstrated good  understanding of the education provided. See EMR under Patient Instructions for exercises provided during therapy sessions    ASSESSMENT     Chelly presents to PT today with no change from previous visit.  She continues to demonstrate limited active range of motion of the Right UE and shows full but painful active ROM of Left UE. She responded well for scapular assist to reduce pain with Left shoulder elevation so " further emphasis placed on Left serratus activation today while Right UE focused on humeral centration with isometrics at pain-free submaximal effort. She would continue to benefit from skilled physical therapy to address range of motion and strength deficits limiting her ability to utilize B UE.    Chelly Is progressing well towards her goals.   Pt prognosis is Good.     Pt will continue to benefit from skilled outpatient physical therapy to address the deficits listed in the problem list box on initial evaluation, provide pt/family education and to maximize pt's level of independence in the home and community environment.     Pt's spiritual, cultural and educational needs considered and pt agreeable to plan of care and goals.     Anticipated barriers to physical therapy: chronicity of symptoms; co-morbidities     Goals:   Short Term Goals (4 Weeks):   1. Pt will be independent with HEP to supplement PT in improving pain free cervical mobility- Progressing  2. Pt will improve cervical AROM 10 deg in rotational planes to improve cervical mobility for driving- Progressing  3. Pt will improve UE MMTs by 1/2 grade in all planes to improve strength for lifting and carrying tasks.- Progressing  4. Pt will demonstrate improved sitting posture to decrease pain experienced in head and neck.- Progressing     Long Term Goals (8 Weeks):   1. Pt will improve FOTO to </=TBD% limitation to improve perceived limitation with changing and maintaining mobility.- Progressing  2. Pt will improve cervical AROM to WNL in all planes to improve cervical mobility for driving - Progressing  3. Pt will improve UE MMTs 1 grade in all planes to improve strength for lifting and carrying tasks.- Progressing  4. Pt will report no pain with lifting 10 lbs to promote physical activity. - Progressing  5. Pt will report no pain with cervical AROM in all planes to promote QOL.- Progressing    PLAN     Plan of care Certification: 3/14/2024 to 6/6/2024.      Progress shoulder range of motion, rotator cuff and filipe-scapular strength, and thoracic mobility.     Gabo Matthews, PT, DPT

## 2024-04-01 ENCOUNTER — CLINICAL SUPPORT (OUTPATIENT)
Dept: REHABILITATION | Facility: HOSPITAL | Age: 74
End: 2024-04-01
Payer: MEDICARE

## 2024-04-01 DIAGNOSIS — M25.611 DECREASED ROM OF RIGHT SHOULDER: Primary | ICD-10-CM

## 2024-04-01 DIAGNOSIS — R29.3 ABNORMAL POSTURE: ICD-10-CM

## 2024-04-01 DIAGNOSIS — M25.512 ACUTE PAIN OF BOTH SHOULDERS: ICD-10-CM

## 2024-04-01 DIAGNOSIS — M25.612 DECREASED ROM OF LEFT SHOULDER: ICD-10-CM

## 2024-04-01 DIAGNOSIS — M25.511 ACUTE PAIN OF BOTH SHOULDERS: ICD-10-CM

## 2024-04-01 PROCEDURE — 97140 MANUAL THERAPY 1/> REGIONS: CPT

## 2024-04-01 PROCEDURE — 97112 NEUROMUSCULAR REEDUCATION: CPT

## 2024-04-01 NOTE — PROGRESS NOTES
"[facility]   Physical Therapy Treatment Note     Name: Chelly Ortiz  Clinic Number: [id number]    Therapy Diagnosis:   Encounter Diagnoses   Name Primary?    Decreased ROM of right shoulder Yes    Acute pain of both shoulders     Decreased ROM of left shoulder     Abnormal posture          Physician: Tejal Kelley PA-C    Visit Date: 4/1/2024    Physician Orders: PT Eval and Treat  Medical Diagnosis from Referral: M25.511,M25.512 (ICD-10-CM) - Acute pain of both shoulders   Evaluation Date: 3/14/2024  Authorization Period Expiration: 12/31/2024  Plan of Care Expiration: 6/6/2024  Progress Note due: 4/14/2024  Visit # / Visits authorized:  10/36   FOTO #2:   FOTO: #3:      Precautions: Standard, Diabetes, and hypertension     Time In: 1406   Time Out: 1458  Total Billable Time (timed & untimed codes): 26 minutes    SUBJECTIVE     Pt reports: R shoulder is still feeling good but can't reach overhead. L shoulder remains painful with reaching and overhead activities.      She was compliant with home exercise program.    Response to previous treatment: no change   Functional change: no change    Pain: 5/10  Location: Posterior/Top B Shoulders    OBJECTIVE     Objective Measures updated at progress report unless specified.       Treatment     Chelly received the treatments listed below:      Therapeutic Exercises to develop strength and ROM for  19 minutes including:    Seated Thoracic Extension, 2 x 15 with 3" holds  Sidelying shoulder flexion R shoulder gravity-eliminated ROM, 2 x 12   Supine shoulder External rotation at 90 degrees, 2# 2x10  Shoulder assisted flexion at cable column 5#, 2x15    Not today:     Manual Therapy Techniques: Joint mobilizations and muscle energy technique were applied to the: Right Shoulder for 18 minutes, including:    Right GH posterior-inferior mobilizations - grade II-III  Manual external rotation / internal rotation isometrics for humeral centering   Prone " "thoracic posterior to anterior glides, grade II-III  Prone cervicothoracic junction gapping mobilizations, grade III-IV     neuromuscular re-education activities to improve: muscle firing patterns for 15 minutes. The following activities were included:    Prone middle trap level I, 2x10  Serratus wall slides with cueing for keeping chin tuck, 2 x 8   Shoulder Isometrics walk outs for external rotation and internal rotation red band, 2x10 each    Not today:  Prone middle trap arm off edge of table 2x10 5" Bilateral  Upper trap level II, 2x10 - cuing to perform chin tuck in order to reduce sheer in cervical spine  Sidelying serratus re-education with pole, 2 x 15     Therapeutic Activities to improve functional performance for 0 minutes, including:     Patient Education and Home Exercises     Home Exercises Provided and Patient Education Provided     Education provided:   - continue with Home exercise program     Written Home Exercises Provided: Patient instructed to cont prior HEP. Exercises were reviewed and Chelly was able to demonstrate them prior to the end of the session.  Chelly demonstrated good  understanding of the education provided. See EMR under Patient Instructions for exercises provided during therapy sessions    ASSESSMENT     Chelly remains with limited shoulder mobility which improves with rotator cuff and scapula upward rotation training. She also continues with limited thoracic mobility so further thoracic mobility manual techniques and exercises were utilized to improve her ability to complete reaching activities.     Chelly Is progressing well towards her goals.   Pt prognosis is Good.     Pt will continue to benefit from skilled outpatient physical therapy to address the deficits listed in the problem list box on initial evaluation, provide pt/family education and to maximize pt's level of independence in the home and community environment.     Pt's spiritual, cultural and educational needs " considered and pt agreeable to plan of care and goals.     Anticipated barriers to physical therapy: chronicity of symptoms; co-morbidities     Goals:   Short Term Goals (4 Weeks):   1. Pt will be independent with HEP to supplement PT in improving pain free cervical mobility- Progressing  2. Pt will improve cervical AROM 10 deg in rotational planes to improve cervical mobility for driving- Progressing  3. Pt will improve UE MMTs by 1/2 grade in all planes to improve strength for lifting and carrying tasks.- Progressing  4. Pt will demonstrate improved sitting posture to decrease pain experienced in head and neck.- Progressing     Long Term Goals (8 Weeks):   1. Pt will improve FOTO to </=TBD% limitation to improve perceived limitation with changing and maintaining mobility.- Progressing  2. Pt will improve cervical AROM to WNL in all planes to improve cervical mobility for driving - Progressing  3. Pt will improve UE MMTs 1 grade in all planes to improve strength for lifting and carrying tasks.- Progressing  4. Pt will report no pain with lifting 10 lbs to promote physical activity. - Progressing  5. Pt will report no pain with cervical AROM in all planes to promote QOL.- Progressing    PLAN     Plan of care Certification: 3/14/2024 to 6/6/2024.     Progress shoulder range of motion, rotator cuff and filipe-scapular strength, and thoracic mobility.     Evans Paz, PT, DPT

## 2024-04-04 ENCOUNTER — CLINICAL SUPPORT (OUTPATIENT)
Dept: REHABILITATION | Facility: HOSPITAL | Age: 74
End: 2024-04-04
Payer: MEDICARE

## 2024-04-04 DIAGNOSIS — R29.3 ABNORMAL POSTURE: ICD-10-CM

## 2024-04-04 DIAGNOSIS — M25.511 ACUTE PAIN OF BOTH SHOULDERS: ICD-10-CM

## 2024-04-04 DIAGNOSIS — M25.611 DECREASED ROM OF RIGHT SHOULDER: Primary | ICD-10-CM

## 2024-04-04 DIAGNOSIS — M25.612 DECREASED ROM OF LEFT SHOULDER: ICD-10-CM

## 2024-04-04 DIAGNOSIS — M25.512 ACUTE PAIN OF BOTH SHOULDERS: ICD-10-CM

## 2024-04-04 PROCEDURE — 97110 THERAPEUTIC EXERCISES: CPT

## 2024-04-04 PROCEDURE — 97112 NEUROMUSCULAR REEDUCATION: CPT

## 2024-04-04 PROCEDURE — 97140 MANUAL THERAPY 1/> REGIONS: CPT

## 2024-04-04 NOTE — PROGRESS NOTES
"[facility]   Physical Therapy Treatment Note     Name: Chelly Ortiz  Clinic Number: [id number]    Therapy Diagnosis:   Encounter Diagnoses   Name Primary?    Decreased ROM of right shoulder Yes    Acute pain of both shoulders     Decreased ROM of left shoulder     Abnormal posture        Physician: Tejal Kelley PA-C    Visit Date: 4/4/2024    Physician Orders: PT Eval and Treat  Medical Diagnosis from Referral: M25.511,M25.512 (ICD-10-CM) - Acute pain of both shoulders   Evaluation Date: 3/14/2024  Authorization Period Expiration: 12/31/2024  Plan of Care Expiration: 6/6/2024  Progress Note due: 4/14/2024  Visit # / Visits authorized:  11/36   FOTO #2:   FOTO: #3:      Precautions: Standard, Diabetes, and hypertension     Time In: 1406   Time Out: 1501  Total Billable Time (timed & untimed codes):  53 minutes    SUBJECTIVE     Pt reports: Right shoulder doesn't have any pain. Left will come and go. Today it feels good and she will have a couple of good days and then a couple of bad days.   She was compliant with home exercise program.    Response to previous treatment: sore  Functional change: no change    Pain: 2/10  Location: Posterior/Top B Shoulders primarily on the L    OBJECTIVE     Objective Measures updated at progress report unless specified.       Treatment     Chelly received the treatments listed below:      Therapeutic Exercises to develop strength and ROM for  10 minutes including:    Seated Thoracic Extension, 2 x 15 with 3" hold  Supine shoulder External rotation at 90 degrees, 2# 2x10      Not today:  Shoulder assisted flexion at cable column 5#, 2x15  Sidelying shoulder flexion R shoulder gravity-eliminated ROM, 2 x 12      Manual Therapy Techniques: Joint mobilizations and muscle energy technique were applied to the: Right Shoulder for 18 minutes, including:    Right GH posterior-inferior mobilizations - grade II-III  Manual external rotation / internal rotation " "isometrics for humeral centering   Prone thoracic posterior to anterior glides, grade II-III  Prone cervicothoracic junction gapping mobilizations, grade III-IV     neuromuscular re-education activities to improve: muscle firing patterns for 25 minutes. The following activities were included:    Prone middle trap level I, 2x10  Serratus wall slides with cueing for keeping chin tuck, x10 early fatigue  Upper trap level I x10 - cuing to perform chin tuck in order to reduce sheer in cervical spine  Shoulder Isometrics walk outs with yellow ball under arm ER RTB 2x10  Shoulder isometric walks outs towel under arm IR GTB 2x10     Not today:  Prone middle trap arm off edge of table 2x10 5" Bilateral  Sidelying serratus re-education with pole, 2 x 15     Therapeutic Activities to improve functional performance for 0 minutes, including:     Patient Education and Home Exercises     Home Exercises Provided and Patient Education Provided     Education provided:   - continue with Home exercise program     Written Home Exercises Provided: Patient instructed to cont prior HEP. Exercises were reviewed and Chelly was able to demonstrate them prior to the end of the session.  Chelly demonstrated good  understanding of the education provided. See EMR under Patient Instructions for exercises provided during therapy sessions    ASSESSMENT     Chelly remains with continued range of motion limitations on the Right. Pain is minimal on the Left today. Rotator cuff training B to improve strength and shoulder positioning. Upper trap and serratus performed at wall requires cuing to reduce cervical extension during activities.     Chelly Is progressing well towards her goals.   Pt prognosis is Good.     Pt will continue to benefit from skilled outpatient physical therapy to address the deficits listed in the problem list box on initial evaluation, provide pt/family education and to maximize pt's level of independence in the home and community " environment.     Pt's spiritual, cultural and educational needs considered and pt agreeable to plan of care and goals.     Anticipated barriers to physical therapy: chronicity of symptoms; co-morbidities     Goals:   Short Term Goals (4 Weeks):   1. Pt will be independent with HEP to supplement PT in improving pain free cervical mobility- Progressing  2. Pt will improve cervical AROM 10 deg in rotational planes to improve cervical mobility for driving- Progressing  3. Pt will improve UE MMTs by 1/2 grade in all planes to improve strength for lifting and carrying tasks.- Progressing  4. Pt will demonstrate improved sitting posture to decrease pain experienced in head and neck.- Progressing     Long Term Goals (8 Weeks):   1. Pt will improve FOTO to </=TBD% limitation to improve perceived limitation with changing and maintaining mobility.- Progressing  2. Pt will improve cervical AROM to WNL in all planes to improve cervical mobility for driving - Progressing  3. Pt will improve UE MMTs 1 grade in all planes to improve strength for lifting and carrying tasks.- Progressing  4. Pt will report no pain with lifting 10 lbs to promote physical activity. - Progressing  5. Pt will report no pain with cervical AROM in all planes to promote QOL.- Progressing    PLAN     Plan of care Certification: 3/14/2024 to 6/6/2024.     Progress shoulder range of motion, rotator cuff and filipe-scapular strength, and thoracic mobility.     Rebeca Barrios, PT, DPT   Board Certified Orthopedic Clinical Specialist    Co-Treated by Denny Paz PT, DPT  Board Certified Orthopedic Clinical Specialist  Board Certified Sports Clinical Specialist  Fellow of the American Academy of Orthopedic Manual Physical Therapy

## 2024-04-08 ENCOUNTER — PATIENT MESSAGE (OUTPATIENT)
Dept: INTERNAL MEDICINE | Facility: CLINIC | Age: 74
End: 2024-04-08
Payer: MEDICARE

## 2024-04-08 DIAGNOSIS — G89.29 CHRONIC RIGHT SHOULDER PAIN: ICD-10-CM

## 2024-04-08 DIAGNOSIS — G89.29 CHRONIC LEFT SHOULDER PAIN: ICD-10-CM

## 2024-04-08 DIAGNOSIS — M25.512 CHRONIC LEFT SHOULDER PAIN: ICD-10-CM

## 2024-04-08 DIAGNOSIS — M25.511 CHRONIC RIGHT SHOULDER PAIN: ICD-10-CM

## 2024-04-08 RX ORDER — ALPRAZOLAM 0.25 MG/1
0.25 TABLET ORAL ONCE
Qty: 1 TABLET | Refills: 0 | Status: SHIPPED | OUTPATIENT
Start: 2024-04-08 | End: 2024-05-24

## 2024-04-08 NOTE — TELEPHONE ENCOUNTER
----- Message from Anajohnvidhya Jose Ramon sent at 4/8/2024  8:26 AM CDT -----  Contact: self [phone]  Would like to receive medical advice.    Pharmacy name/number (copy/paste from chart):      [facility]/pharmacy #[id number] - [place] - [address]  [address] [address]  Phone: [phone] Fax: [phone]       Would they like a call back or a response via [facility]:  portal    Additional information:  Calling to have prescription for ALPRAZolam (XANAX) 0.25 MG tablet resent to above pharmacy for MRI.

## 2024-04-08 NOTE — TELEPHONE ENCOUNTER
Care Due:                  Date            Visit Type   Department     Provider  --------------------------------------------------------------------------------                                             [facility]  Last Visit: 03-      [facility]          MARYLOU Martin  Next Visit: None Scheduled  None         None Found                                                            Last  Test          Frequency    Reason                     Performed    Due Date  --------------------------------------------------------------------------------    Vitamin D...  12 months..  ergocalciferol...........  Not Found    Overdue    [employer] Embedded Care Due Messages. Reference number: [id number].   4/08/2024 8:46:01 AM GELACIOT

## 2024-04-08 NOTE — TELEPHONE ENCOUNTER
Refill Routing Note   Medication(s) are not appropriate for processing by [facility] for the following reason(s):        Outside of protocol    [facility] action(s):  Route               Appointments  past 12m or future 3m with PCP    Date Provider   Last Visit   12/1/2023 Smitha Turner MD   Next Visit   Visit date not found Smitha Turner MD   ED visits in past 90 days: 0        Note composed:10:19 AM 04/08/2024

## 2024-04-09 ENCOUNTER — HOSPITAL ENCOUNTER (OUTPATIENT)
Dept: RADIOLOGY | Facility: HOSPITAL | Age: 74
Discharge: HOME | End: 2024-04-09
Attending: FAMILY MEDICINE
Payer: MEDICARE

## 2024-04-09 DIAGNOSIS — M25.511 CHRONIC RIGHT SHOULDER PAIN: ICD-10-CM

## 2024-04-09 DIAGNOSIS — M25.512 CHRONIC LEFT SHOULDER PAIN: ICD-10-CM

## 2024-04-09 DIAGNOSIS — G89.29 CHRONIC LEFT SHOULDER PAIN: ICD-10-CM

## 2024-04-09 DIAGNOSIS — G89.29 CHRONIC RIGHT SHOULDER PAIN: ICD-10-CM

## 2024-04-09 PROCEDURE — 73221 MRI JOINT UPR EXTREM W/O DYE: CPT | Mod: TC,LT

## 2024-04-09 PROCEDURE — 73221 MRI JOINT UPR EXTREM W/O DYE: CPT | Mod: 26,LT,, | Performed by: RADIOLOGY

## 2024-04-09 PROCEDURE — 73221 MRI JOINT UPR EXTREM W/O DYE: CPT | Mod: TC,RT

## 2024-04-09 PROCEDURE — 73221 MRI JOINT UPR EXTREM W/O DYE: CPT | Mod: 26,RT,, | Performed by: RADIOLOGY

## 2024-04-10 ENCOUNTER — PATIENT MESSAGE (OUTPATIENT)
Dept: INTERNAL MEDICINE | Facility: CLINIC | Age: 74
End: 2024-04-10
Payer: MEDICARE

## 2024-04-10 DIAGNOSIS — M75.102 ROTATOR CUFF TEAR ARTHROPATHY OF LEFT SHOULDER: Primary | ICD-10-CM

## 2024-04-10 DIAGNOSIS — M12.812 ROTATOR CUFF TEAR ARTHROPATHY OF LEFT SHOULDER: Primary | ICD-10-CM

## 2024-04-11 ENCOUNTER — OFFICE VISIT (OUTPATIENT)
Dept: URGENT CARE | Facility: CLINIC | Age: 74
End: 2024-04-11
Payer: MEDICARE

## 2024-04-11 ENCOUNTER — TELEPHONE (OUTPATIENT)
Dept: PODIATRY | Facility: CLINIC | Age: 74
End: 2024-04-11
Payer: MEDICARE

## 2024-04-11 VITALS
OXYGEN SATURATION: 95 % | SYSTOLIC BLOOD PRESSURE: 122 MMHG | BODY MASS INDEX: 33.84 KG/M2 | WEIGHT: 191 LBS | HEIGHT: 63 IN | HEART RATE: 83 BPM | DIASTOLIC BLOOD PRESSURE: 69 MMHG | RESPIRATION RATE: 17 BRPM | TEMPERATURE: 99 F

## 2024-04-11 DIAGNOSIS — R05.9 COUGH, UNSPECIFIED TYPE: Primary | ICD-10-CM

## 2024-04-11 DIAGNOSIS — B96.89 BACTERIAL RESPIRATORY INFECTION: ICD-10-CM

## 2024-04-11 DIAGNOSIS — J98.8 BACTERIAL RESPIRATORY INFECTION: ICD-10-CM

## 2024-04-11 LAB
CTP QC/QA: YES
SARS-COV-2 AG RESP QL IA.RAPID: NEGATIVE

## 2024-04-11 PROCEDURE — 99213 OFFICE O/P EST LOW 20 MIN: CPT | Mod: S$GLB,,, | Performed by: NURSE PRACTITIONER

## 2024-04-11 PROCEDURE — 87811 SARS-COV-2 COVID19 W/OPTIC: CPT | Mod: QW,S$GLB,, | Performed by: NURSE PRACTITIONER

## 2024-04-11 RX ORDER — BENZONATATE 200 MG/1
200 CAPSULE ORAL EVERY 8 HOURS PRN
Qty: 30 CAPSULE | Refills: 0 | Status: SHIPPED | OUTPATIENT
Start: 2024-04-11

## 2024-04-11 RX ORDER — DOXYCYCLINE 100 MG/1
100 CAPSULE ORAL EVERY 12 HOURS
Qty: 14 CAPSULE | Refills: 0 | Status: SHIPPED | OUTPATIENT
Start: 2024-04-11 | End: 2024-04-18

## 2024-04-11 RX ORDER — PROMETHAZINE HYDROCHLORIDE AND DEXTROMETHORPHAN HYDROBROMIDE 6.25; 15 MG/5ML; MG/5ML
5 SYRUP ORAL NIGHTLY PRN
Qty: 118 ML | Refills: 0 | Status: SHIPPED | OUTPATIENT
Start: 2024-04-11 | End: 2024-05-16

## 2024-04-11 NOTE — TELEPHONE ENCOUNTER
Left VM to get patient rescheduled for skin lesion with call back number [phone] for assistance on getting rescheduled. I was able to get patient scheduled fir another date and time with reminder in the mail.

## 2024-04-11 NOTE — PROGRESS NOTES
"Subjective:      Patient ID: Chelly Ortiz is a 73 y.o. female.    Vitals:  height is 5' 3" (1.6 m) and weight is 86.6 kg (191 lb). Her temperature is 98.5 °F (36.9 °C). Her blood pressure is 122/69 and her pulse is 83. Her respiration is 17 and oxygen saturation is 95%.     Chief Complaint: Cough    This is a 73 y.o. female who presents today with a chief complaint of cough and fatigue x1 week, with cough worsening over the last 2 days, and she is unable to sleep well an night due to the cough. She has a h/o asthma. Denies recent wheezing. Uses albuterol as needed. Denies fever.     Cough  This is a new problem. The current episode started in the past 7 days. The problem has been waxing and waning. The problem occurs every few minutes. The cough is Non-productive. Associated symptoms include myalgias, nasal congestion and shortness of breath. Pertinent negatives include no chest pain, fever or wheezing. She has tried steroid inhaler and OTC cough suppressant for the symptoms. The treatment provided no relief. Her past medical history is significant for asthma.       Constitution: Positive for fatigue. Negative for fever.   HENT:  Positive for congestion. Negative for facial trauma.    Cardiovascular:  Negative for chest pain and palpitations.   Respiratory:  Positive for cough, shortness of breath and asthma. Negative for wheezing.    Gastrointestinal:  Negative for vomiting and diarrhea.   Musculoskeletal:  Positive for muscle ache.   Allergic/Immunologic: Positive for asthma.      Objective:     Physical Exam   Constitutional: She is oriented to person, place, and time. She appears well-developed. She is cooperative.  Non-toxic appearance. She does not appear ill. No distress.   HENT:   Head: Normocephalic.   Ears:   Right Ear: Hearing, tympanic membrane, external ear and ear canal normal.   Left Ear: Hearing, tympanic membrane, external ear and ear canal normal.   Nose: Congestion present. No mucosal edema, " rhinorrhea or nasal deformity. No epistaxis. Right sinus exhibits no maxillary sinus tenderness and no frontal sinus tenderness. Left sinus exhibits no maxillary sinus tenderness and no frontal sinus tenderness.   Mouth/Throat: Uvula is midline and mucous membranes are normal. Mucous membranes are moist. No trismus in the jaw. Normal dentition. No uvula swelling. No posterior oropharyngeal edema.   Eyes: Conjunctivae and lids are normal. No scleral icterus.   Neck: Trachea normal and phonation normal. Neck supple. No edema present. No erythema present. No neck rigidity present.   Cardiovascular: Normal rate and regular rhythm.   Pulmonary/Chest: Effort normal and breath sounds normal. No respiratory distress. She has no decreased breath sounds. She has no wheezes. She has no rhonchi.         Comments: Wheezy cough noted. Clear BS.    Abdominal: Normal appearance.   Musculoskeletal: Normal range of motion.         General: No deformity. Normal range of motion.   Neurological: She is alert and oriented to person, place, and time. She exhibits normal muscle tone. Coordination normal.   Skin: Skin is warm, dry, intact, not diaphoretic and not pale.   Psychiatric: Her speech is normal and behavior is normal. Judgment and thought content normal.   Nursing note and vitals reviewed.    Results for orders placed or performed in visit on 04/11/24   SARS Coronavirus 2 Antigen, POCT Manual Read   Result Value Ref Range    SARS Coronavirus 2 Antigen Negative Negative     Acceptable Yes      *Note: Due to a large number of results and/or encounters for the requested time period, some results have not been displayed. A complete set of results can be found in Results Review.        Assessment:     1. Cough, unspecified type    2. Bacterial respiratory infection        Plan:       Cough, unspecified type  -     SARS Coronavirus 2 Antigen, POCT Manual Read  -     benzonatate (TESSALON) 200 MG capsule; Take 1 capsule  (200 mg total) by mouth every 8 (eight) hours as needed for Cough.  Dispense: 30 capsule; Refill: 0  -     promethazine-dextromethorphan (PROMETHAZINE-DM) 6.25-15 mg/5 mL Syrp; Take 5 mLs by mouth nightly as needed (cough).  Dispense: 118 mL; Refill: 0    Bacterial respiratory infection  -     SARS Coronavirus 2 Antigen, POCT Manual Read  -     doxycycline (VIBRAMYCIN) 100 MG Cap; Take 1 capsule (100 mg total) by mouth every 12 (twelve) hours. for 7 days  Dispense: 14 capsule; Refill: 0      Patient Instructions   Oral fluids  Rest  Steam (hot showers, hot tea)  Blow nose often  Avoid circulating air (such as ceiling fans) dries your airway  Avoid drinking cold drinks (worsens cough)  Avoid strong smells which could worsen cough (perfume, lotions, smoke...)  Wet washcloth with warm-hot water, wring out well, and cover nose and mouth-breathing through this helps to humidify the airway  You can also try humidifier machine   Therapeutic coughing to expel mucous  Sit in upright position often

## 2024-04-12 NOTE — PATIENT INSTRUCTIONS
Oral fluids  Rest  Steam (hot showers, hot tea)  Blow nose often  Avoid circulating air (such as ceiling fans) dries your airway  Avoid drinking cold drinks (worsens cough)  Avoid strong smells which could worsen cough (perfume, lotions, smoke...)  Wet washcloth with warm-hot water, wring out well, and cover nose and mouth-breathing through this helps to humidify the airway  You can also try humidifier machine   Therapeutic coughing to expel mucous  Sit in upright position often

## 2024-04-16 ENCOUNTER — PATIENT MESSAGE (OUTPATIENT)
Dept: ADMINISTRATIVE | Facility: HOSPITAL | Age: 74
End: 2024-04-16
Payer: MEDICARE

## 2024-04-18 ENCOUNTER — CLINICAL SUPPORT (OUTPATIENT)
Dept: REHABILITATION | Facility: HOSPITAL | Age: 74
End: 2024-04-18
Payer: MEDICARE

## 2024-04-18 DIAGNOSIS — M25.611 DECREASED ROM OF RIGHT SHOULDER: Primary | ICD-10-CM

## 2024-04-18 DIAGNOSIS — M25.512 ACUTE PAIN OF BOTH SHOULDERS: ICD-10-CM

## 2024-04-18 DIAGNOSIS — M25.511 ACUTE PAIN OF BOTH SHOULDERS: ICD-10-CM

## 2024-04-18 DIAGNOSIS — M25.612 DECREASED ROM OF LEFT SHOULDER: ICD-10-CM

## 2024-04-18 DIAGNOSIS — R29.3 ABNORMAL POSTURE: ICD-10-CM

## 2024-04-18 PROCEDURE — 97112 NEUROMUSCULAR REEDUCATION: CPT

## 2024-04-18 PROCEDURE — 97140 MANUAL THERAPY 1/> REGIONS: CPT

## 2024-04-18 PROCEDURE — 97110 THERAPEUTIC EXERCISES: CPT

## 2024-04-18 NOTE — PROGRESS NOTES
"[facility]   Physical Therapy Treatment Note     Name: Chelly Ortiz  Clinic Number: [id number]    Therapy Diagnosis:   Encounter Diagnoses   Name Primary?    Decreased ROM of right shoulder Yes    Acute pain of both shoulders     Decreased ROM of left shoulder     Abnormal posture        Physician: Smitha Turner MD    Visit Date: 4/18/2024    Physician Orders: PT Eval and Treat  Medical Diagnosis from Referral: M25.511,M25.512 (ICD-10-CM) - Acute pain of both shoulders   Evaluation Date: 3/14/2024  Authorization Period Expiration: 12/31/2024  Plan of Care Expiration: 6/6/2024  Progress Note due: 5/18/2024  Visit # / Visits authorized:  12/36   FOTO #2:   FOTO: #3:      Precautions: Standard, Diabetes, and hypertension     Time In: 1506   Time Out: 1601  Total Billable Time (timed & untimed codes):  53 minutes    SUBJECTIVE     Pt reports: Had an MRI on her shoulder which demonstrated a massive rotator cuff tear on right. She is scheduled with a follow up with orthopedic surgery to determine management of her shoulder pain.   She was compliant with home exercise program.    Response to previous treatment: sore  Functional change: no change    Pain: 2/10  Location: Posterior/Top B Shoulders primarily on the L    OBJECTIVE     Objective Measures updated at progress report unless specified.            Shoulder   Action Right Left   Flexion 70 degrees 150 degrees   Abduction 50 degrees 140 degrees   External Rotation  at 90 Unable to perform 80 degrees   Internal Rotation (functional) L4-5 Mid back          Treatment     Chelly received the treatments listed below:      Therapeutic Exercises to develop strength and ROM for  14 minutes including:    Seated Thoracic Extension, 2 x 15 with 3" hold  Supine shoulder External rotation at 90 degrees, 2# 2x10  Objective measures above      Not today:  Shoulder assisted flexion at cable column 5#, 2x15  Sidelying shoulder flexion R shoulder " "gravity-eliminated ROM, 2 x 12      Manual Therapy Techniques: Joint mobilizations and muscle energy technique were applied to the: Right Shoulder for 18 minutes, including:    Right GH posterior-inferior mobilizations - grade II-III  Manual external rotation / internal rotation isometrics for humeral centering   Prone thoracic posterior to anterior glides, grade II-III  Prone cervicothoracic junction gapping mobilizations, grade III-IV     neuromuscular re-education activities to improve: muscle firing patterns for 25 minutes. The following activities were included:    Seated middle trap level I, 2x10  Serratus wall slides with cueing for keeping chin tuck, x10 early fatigue  Upper trap level I 2x10 - cuing to perform chin tuck in order to reduce sheer in cervical spine      Not today:  Prone middle trap arm off edge of table 2x10 5" Bilateral  Sidelying serratus re-education with pole, 2 x 15  Shoulder Isometrics walk outs with yellow ball under arm ER RTB 2x10  Shoulder isometric walks outs towel under arm IR GTB 2x10      Therapeutic Activities to improve functional performance for 0 minutes, including:     Patient Education and Home Exercises     Home Exercises Provided and Patient Education Provided     Education provided:   - continue with Home exercise program     Written Home Exercises Provided: Patient instructed to cont prior HEP. Exercises were reviewed and Chelly was able to demonstrate them prior to the end of the session.  Chelly demonstrated good  understanding of the education provided. See EMR under Patient Instructions for exercises provided during therapy sessions    ASSESSMENT     Chelly has been seen for 7 visits over the past month for her bilateral shoulder pain. She demonstrates improvements in her shoulder range of motion in both her shoulders, but remains significantly limited in her R shoulder range of motion. She continues with weakness in her R shoulder and recently had an MRI which " demonstrated a massive rotator cuff tear per the radiology report. She is progressing slowly toward her goals and we will continue to address her cervical and shoulder range of motion deficits, strength and posture impairments, and help to manage her pain.     Chelly Is progressing well towards her goals.   Pt prognosis is Good.     Pt will continue to benefit from skilled outpatient physical therapy to address the deficits listed in the problem list box on initial evaluation, provide pt/family education and to maximize pt's level of independence in the home and community environment.     Pt's spiritual, cultural and educational needs considered and pt agreeable to plan of care and goals.     Anticipated barriers to physical therapy: chronicity of symptoms; co-morbidities     Goals:   Short Term Goals (4 Weeks):   1. Pt will be independent with HEP to supplement PT in improving pain free cervical mobility- Progressing  2. Pt will improve cervical AROM 10 deg in rotational planes to improve cervical mobility for driving- Progressing  3. Pt will improve UE MMTs by 1/2 grade in all planes to improve strength for lifting and carrying tasks.- Progressing  4. Pt will demonstrate improved sitting posture to decrease pain experienced in head and neck.- Progressing     Long Term Goals (8 Weeks):   1. Pt will improve FOTO to </=TBD% limitation to improve perceived limitation with changing and maintaining mobility.- Progressing  2. Pt will improve cervical AROM to WNL in all planes to improve cervical mobility for driving - Progressing  3. Pt will improve UE MMTs 1 grade in all planes to improve strength for lifting and carrying tasks.- Progressing  4. Pt will report no pain with lifting 10 lbs to promote physical activity. - Progressing  5. Pt will report no pain with cervical AROM in all planes to promote QOL.- Progressing    PLAN     Plan of care Certification: 3/14/2024 to 6/6/2024.     Progress shoulder range of motion,  rotator cuff and filipe-scapular strength, and thoracic mobility.     Evans Paz, PT, DPT   Board Certified Orthopedic Clinical Specialist  Board Certified Sports Clinical Specialist  Fellow of the [employer] Orth[employer] Manual Physical Therapy

## 2024-04-19 ENCOUNTER — TELEPHONE (OUTPATIENT)
Dept: INTERNAL MEDICINE | Facility: CLINIC | Age: 74
End: 2024-04-19
Payer: MEDICARE

## 2024-04-19 ENCOUNTER — OFFICE VISIT (OUTPATIENT)
Dept: SPORTS MEDICINE | Facility: CLINIC | Age: 74
End: 2024-04-19
Payer: MEDICARE

## 2024-04-19 VITALS
HEART RATE: 80 BPM | WEIGHT: 189.63 LBS | DIASTOLIC BLOOD PRESSURE: 75 MMHG | SYSTOLIC BLOOD PRESSURE: 144 MMHG | BODY MASS INDEX: 33.59 KG/M2

## 2024-04-19 DIAGNOSIS — E11.9 TYPE 2 DIABETES MELLITUS WITHOUT COMPLICATION, WITH LONG-TERM CURRENT USE OF INSULIN: Primary | ICD-10-CM

## 2024-04-19 DIAGNOSIS — M12.811 ROTATOR CUFF ARTHROPATHY OF BOTH SHOULDERS: ICD-10-CM

## 2024-04-19 DIAGNOSIS — G89.29 CHRONIC PAIN OF BOTH SHOULDERS: Primary | ICD-10-CM

## 2024-04-19 DIAGNOSIS — M25.511 CHRONIC PAIN OF BOTH SHOULDERS: Primary | ICD-10-CM

## 2024-04-19 DIAGNOSIS — I10 ESSENTIAL HYPERTENSION: ICD-10-CM

## 2024-04-19 DIAGNOSIS — M67.921 BICEPS TENDINOPATHY OF RIGHT UPPER EXTREMITY: ICD-10-CM

## 2024-04-19 DIAGNOSIS — M12.812 ROTATOR CUFF ARTHROPATHY OF BOTH SHOULDERS: ICD-10-CM

## 2024-04-19 DIAGNOSIS — Z79.4 TYPE 2 DIABETES MELLITUS WITHOUT COMPLICATION, WITH LONG-TERM CURRENT USE OF INSULIN: Primary | ICD-10-CM

## 2024-04-19 DIAGNOSIS — M67.922 BICEPS TENDINOPATHY, LEFT: ICD-10-CM

## 2024-04-19 DIAGNOSIS — M75.122 NONTRAUMATIC COMPLETE TEAR OF LEFT ROTATOR CUFF: ICD-10-CM

## 2024-04-19 DIAGNOSIS — M75.121 NONTRAUMATIC COMPLETE TEAR OF RIGHT ROTATOR CUFF: ICD-10-CM

## 2024-04-19 DIAGNOSIS — M25.512 CHRONIC PAIN OF BOTH SHOULDERS: Primary | ICD-10-CM

## 2024-04-19 PROCEDURE — 3078F DIAST BP <80 MM HG: CPT | Mod: HCNC,CPTII,S$GLB, | Performed by: PHYSICIAN ASSISTANT

## 2024-04-19 PROCEDURE — 1126F AMNT PAIN NOTED NONE PRSNT: CPT | Mod: HCNC,CPTII,S$GLB, | Performed by: PHYSICIAN ASSISTANT

## 2024-04-19 PROCEDURE — 99999 PR PBB SHADOW E&M-EST. PATIENT-LVL V: CPT | Mod: PBBFAC,HCNC,, | Performed by: PHYSICIAN ASSISTANT

## 2024-04-19 PROCEDURE — 1159F MED LIST DOCD IN RCRD: CPT | Mod: HCNC,CPTII,S$GLB, | Performed by: PHYSICIAN ASSISTANT

## 2024-04-19 PROCEDURE — 99215 OFFICE O/P EST HI 40 MIN: CPT | Mod: 25,HCNC,S$GLB, | Performed by: PHYSICIAN ASSISTANT

## 2024-04-19 PROCEDURE — 20610 DRAIN/INJ JOINT/BURSA W/O US: CPT | Mod: HCNC,RT,S$GLB, | Performed by: PHYSICIAN ASSISTANT

## 2024-04-19 PROCEDURE — 3077F SYST BP >= 140 MM HG: CPT | Mod: HCNC,CPTII,S$GLB, | Performed by: PHYSICIAN ASSISTANT

## 2024-04-19 PROCEDURE — 1160F RVW MEDS BY RX/DR IN RCRD: CPT | Mod: HCNC,CPTII,S$GLB, | Performed by: PHYSICIAN ASSISTANT

## 2024-04-19 RX ORDER — TRIAMCINOLONE ACETONIDE 40 MG/ML
40 INJECTION, SUSPENSION INTRA-ARTICULAR; INTRAMUSCULAR
Status: DISCONTINUED | OUTPATIENT
Start: 2024-04-19 | End: 2024-04-19 | Stop reason: HOSPADM

## 2024-04-19 RX ADMIN — TRIAMCINOLONE ACETONIDE 40 MG: 40 INJECTION, SUSPENSION INTRA-ARTICULAR; INTRAMUSCULAR at 02:04

## 2024-04-19 NOTE — PROGRESS NOTES
CC:  Bilateral shoulder pain    Patient is a 73-year-old female who presents today for initial evaluation of bilateral shoulder pain.  Initially seen by Tejal Kelley PA-C with Orthopedics in January. She reports that she began experiencing increased pain in the right shoulder last September, and worsening pain of the left shoulder in December.  She denies any associated injury or trauma to attribute to this.  She is left-hand dominant.  The pain in her right shoulder is significantly worse than the left.  She reports significant weakness and decreased range of motion of the right shoulder, with an inability to lift it past 90°.  She has pain and weakness in the left shoulder as well, however has almost full range of motion.  Pain has begun to disrupts sleep and normal activities of daily living such as getting dressed and reaching for things above shoulder level.  No prior injuries or surgeries on the right or left shoulder.  Thus far treatment has included formal physical therapy, subacromial corticosteroid injections, and over-the-counter/prescription anti-inflammatories which do provide some relief. Her PCP ordered MRIs of both shoulders due to continued pain despite conservative treatment. Here today to discuss results and treatment options.     Is affecting ADLs.  Pain is 6/10 at it's worst.      Past Medical History:   Diagnosis Date    Allergy     DDD (degenerative disc disease), lumbar     Diabetes mellitus     diet controlled    GERD (gastroesophageal reflux disease)     History of subconjunctival hemorrhage 12/02/2011    left eye    Hyperlipidemia     Hypertension     IBS (irritable bowel syndrome)     Obesity     MYRIAM (obstructive sleep apnea)     on CPAP    Rotator cuff impingement syndrome     Seasonal allergic conjunctivitis     Type 2 diabetes mellitus treated with insulin        Past Surgical History:   Procedure Laterality Date    CATARACT EXTRACTION W/  INTRAOCULAR LENS IMPLANT Right 10/03/2013         CATARACT EXTRACTION W/  INTRAOCULAR LENS IMPLANT Left 2022         SECTION      x2    CHOLECYSTECTOMY      COLONOSCOPY N/A 2018    Procedure: COLONOSCOPY;  Surgeon: Marie Mejia MD;  Location: [facility] ENDO;  Service: Endoscopy;  Laterality: N/A;    COLONOSCOPY N/A 2022    Procedure: COLONOSCOPY;  Surgeon: Pierce Rivera MD;  Location: [facility] ENDO;  Service: Endoscopy;  Laterality: N/A;    ENDOSCOPIC ULTRASOUND OF UPPER GASTROINTESTINAL TRACT N/A 10/09/2018    Procedure: ULTRASOUND, UPPER GI TRACT, ENDOSCOPIC;  Surgeon: Javy Simpson MD;  Location: [facility] ENDO;  Service: Endoscopy;  Laterality: N/A;    EXCISION OF LESION N/A 2019    Procedure: EXCISION, LESION VULVA;  Surgeon: Liv Odell MD;  Location: [facility] OR;  Service: OB/GYN;  Laterality: N/A;  latex allergy    EYE SURGERY      HYSTERECTOMY      ovaries intact, due to DUB    INTRAOCULAR PROSTHESES INSERTION Left 2022    Procedure: INSERTION, IOL PROSTHESIS;  Surgeon: Clarice Herzog MD;  Location: [facility] OR [address] [place] [address];  Service: Ophthalmology;  Laterality: Left;    PHACOEMULSIFICATION OF CATARACT Left 2022    Procedure: PHACOEMULSIFICATION, CATARACT;  Surgeon: Clarice Herzog MD;  Location: [facility] OR [address] [place] [address];  Service: Ophthalmology;  Laterality: Left;    TUBAL LIGATION         Family History   Problem Relation Name Age of Onset    Alzheimer's disease Mother      Heart disease Father      Diabetes Sister x4     Breast cancer Sister x4     Deep vein thrombosis Brother x1     Diabetes Daughter x2     Cancer Brother x1         Lung    Lung cancer Brother x1     Amblyopia Neg Hx      Blindness Neg Hx      Cataracts Neg Hx      Glaucoma Neg Hx      Hypertension Neg Hx      Macular degeneration Neg Hx      Retinal detachment Neg Hx      Strabismus Neg Hx      Stroke Neg Hx      Thyroid disease Neg Hx           Current Outpatient Medications:     ACCU-CHEK FASTCLIX LANCING DEV Kit, USE AS  DIRECTED, Disp: 1 each, Rfl: 0    albuterol (PROVENTIL/VENTOLIN HFA) 90 mcg/actuation inhaler, Inhale 1-2 puffs into the lungs every 4 (four) hours as needed for Wheezing., Disp: 18 g, Rfl: 2    ALPRAZolam (XANAX) 0.25 MG tablet, Take 1 tablet (0.25 mg total) by mouth once. Take approximately 20 to 30 minutes before your MRI exam for 1 dose, Disp: 1 tablet, Rfl: 0    amLODIPine (NORVASC) 10 MG tablet, Take 1 tablet (10 mg total) by mouth once daily., Disp: 90 tablet, Rfl: 3    aspirin (ECOTRIN) 81 MG EC tablet, Take 81 mg by mouth once daily., Disp: , Rfl:     benzonatate (TESSALON) 200 MG capsule, Take 1 capsule (200 mg total) by mouth every 8 (eight) hours as needed for Cough., Disp: 30 capsule, Rfl: 0    blood sugar diagnostic (ACCU-CHEK GUIDE TEST STRIPS) Strp, 3 times a day, Disp: 300 strip, Rfl: 11    blood-glucose meter (ACCU-CHEK GUIDE GLUCOSE METER) Misc, Three times a day, Disp: 1 each, Rfl: 0    calcium carbonate (OS-ARIC) 600 mg calcium (1,500 mg) Tab, Take 1 tablet (600 mg total) by mouth once. for 1 dose, Disp: 30 tablet, Rfl: 11    cetirizine (ZYRTEC) 10 MG tablet, Take 1 tablet (10 mg total) by mouth every evening., Disp: 90 tablet, Rfl: 0    citalopram (CELEXA) 10 MG tablet, Take 1 tablet (10 mg total) by mouth once daily., Disp: 90 tablet, Rfl: 1    diclofenac sodium (VOLTAREN) 1 % Gel, APPLY 2 G TOPICALLY ONCE DAILY., Disp: 100 g, Rfl: 1    ergocalciferol (ERGOCALCIFEROL) 50,000 unit Cap, TAKE 1 CAPSULE BY MOUTH EVERY 7 DAYS, Disp: 12 capsule, Rfl: 3    esomeprazole (NEXIUM) 40 MG capsule, Take 1 capsule (40 mg total) by mouth before breakfast., Disp: 90 capsule, Rfl: 3    ezetimibe (ZETIA) 10 mg tablet, Take 1 tablet (10 mg total) by mouth once daily., Disp: 90 tablet, Rfl: 3    fluocinonide (LIDEX) 0.05 % external solution, AAA scalp qday - bid prn pruritus, Disp: 60 mL, Rfl: 3    fluticasone propionate (FLONASE) 50 mcg/actuation nasal spray, 1 spray (50 mcg total) by Each Nostril route once  "daily., Disp: 9.9 mL, Rfl: 3    gabapentin (NEURONTIN) 100 MG capsule, Take 2 capsules (200 mg total) by mouth every evening., Disp: 180 capsule, Rfl: 3    glucose 4 GM chewable tablet, Take 4 tablets (16 g total) by mouth as needed for Low blood sugar (If having symptoms of blurry vision, palpitations, confusion, shakiness.  Please check sugars and if sugar below 70 please take 4 tablets and re-check sugar everry 15 minutes until sugars are above 70 and symptoms resolve.)., Disp: 50 tablet, Rfl: 12    imiquimod (ALDARA) 5 % cream, Apply topically 3 (three) times a week., Disp: 12 packet, Rfl: 2    insulin (LANTUS SOLOSTAR U-100 INSULIN) glargine 100 units/mL SubQ pen, Inject 40 Units into the skin every evening., Disp: 45 mL, Rfl: 0    insulin aspart U-100 (NOVOLOG FLEXPEN U-100 INSULIN) 100 unit/mL (3 mL) InPn pen, Inject 10 Units into the skin 3 (three) times daily with meals., Disp: 60 mL, Rfl: 11    insulin aspart U-100 (NOVOLOG FLEXPEN U-100 INSULIN) 100 unit/mL (3 mL) InPn pen, Inject 10 Units into the skin 3 (three) times daily with meals. TDD 60 units, Disp: 60 mL, Rfl: 11    insulin syringe-needle U-100 0.3 mL 31 gauge x 5/16" Syrg, relion brand, use 5 x a day, if not on levemir or novolog, Disp: 150 each, Rfl: 1    insulin syringe-needle U-100 0.5 mL 31 gauge x 5/16" Syrg, Use nightly. 90 day, Disp: 100 each, Rfl: 3    lancets (ACCU-CHEK FASTCLIX LANCET DRUM) [facility] [place] BLOOD SUGAR THREE TIMES A DAY, Disp: 918 each, Rfl: 3    linaCLOtide (LINZESS) 72 mcg Cap capsule, Take 1 capsule (72 mcg total) by mouth before breakfast., Disp: 30 capsule, Rfl: 5    magnesium oxide (MAG-OX) 400 mg tablet, Take 1 tablet (400 mg total) by mouth 2 (two) times daily., Disp: 180 tablet, Rfl: 3    montelukast (SINGULAIR) 10 mg tablet, Take 10 mg by mouth., Disp: , Rfl:     pen needle, diabetic (NOVOFINE 32) 32 gauge x 1/4" Ndle, 4 injections per day, Disp: 500 each, Rfl: 4    promethazine-dextromethorphan (PROMETHAZINE-DM) " 6.25-15 mg/5 mL Syrp, Take 5 mLs by mouth nightly as needed (cough)., Disp: 118 mL, Rfl: 0    sodium chloride (SALINE NASAL) 0.65 % nasal spray, 1 spray by Nasal route as needed for Congestion., Disp: 30 mL, Rfl: 6    traZODone (DESYREL) 50 MG tablet, Take 1 tablet (50 mg total) by mouth every evening., Disp: 90 tablet, Rfl: 3    Current Facility-Administered Medications:     triamcinolone acetonide injection 20 mg, 20 mg, Intramuscular, 1 time in Clinic/HOD, Tani Pittman FNP    Review of patient's allergies indicates:   Allergen Reactions    Ace inhibitors Hives     \Cough    Latex, natural rubber Itching          REVIEW OF SYSTEMS:  Constitution: Negative. Negative for chills, fever and night sweats.   HENT: Negative for congestion and headaches.    Eyes: Negative for blurred vision, left vision loss and right vision loss.   Cardiovascular: Negative for chest pain and syncope.   Respiratory: Negative for cough and shortness of breath.    Endocrine: Negative for polydipsia, polyphagia and polyuria.   Hematologic/Lymphatic: Negative for bleeding problem. Does not bruise/bleed easily.   Skin: Negative for dry skin, itching and rash.   Musculoskeletal: Negative for falls.  Positive for bilateral shoulder pain and muscle weakness.   Gastrointestinal: Negative for abdominal pain and bowel incontinence.   Genitourinary: Negative for bladder incontinence and nocturia.   Neurological: Negative for disturbances in coordination, loss of balance and seizures.   Psychiatric/Behavioral: Negative for depression. The patient does not have insomnia.    Allergic/Immunologic: Negative for hives and persistent infections.      PHYSICAL EXAMINATION:  Vitals:  BP (!) 144/75   Pulse 80   Wt 86 kg (189 lb 9.5 oz)   LMP 08/01/2000   BMI 33.59 kg/m²    General: The patient is alert and oriented x 3.  Mood is pleasant.  Observation of ears, eyes and nose reveal no gross abnormalities.  No labored breathing observed.  Gait is  coordinated. Patient can toe walk and heel walk without difficulty.      BILATERAL SHOULDER / UPPER EXTREMITY EXAM    OBSERVATION:     Swelling  none  Deformity  none   Discoloration  none   Scapular winging none   Scars   none  Atrophy  none    TENDERNESS / CREPITUS (T/C):          T/C      T/C   Clavicle   -/-  SUPRAspinatus    +/- (B)     AC Jt.    +/- (B)  INFRAspinatus  -/-    SC Jt.    -/-  Deltoid    -/-      G. Tuberosity  -/-  LH BICEP groove  +/- (B)   Acromion:  -/-  Midline Neck   -/-     Scapular Spine -/-  Trapezium   -/-   SMA Scapula  -/-  GH jt. line - post  + /-(B)     Scapulothoracic  -/-         ROM: (* = with pain)  Left shoulder   Right shoulder        AROM (PROM)   AROM (PROM)   FE    160°* (175°*)     90°* (170°*)     ER at 0°    40°*  (60°*)    30°*  (60°*)   ER at 90° ABD  30°*  (40°*)    50°*  (65°*)   IR at 90°  ABD   40*  (40°*)     50*  (50°*)      IR (spine level)   T12*     L2*    STRENGTH: (* = with pain) Left shoulder   Right shoulder   SCAPTION   4-/5*    3/5*    IR    4+/5*    4/5*   ER    4/5*    3/5*   BICEPS   5/5*    4/5*   Deltoid    4/5*    4/5*     SIGNS:  Painful side       NEER   +   OKALEES  +    LYN   +   SPEEDS  +     DROP ARM   -   BELLY PRESS +   Superior escape none    LIFT-OFF  +   X-Body ADD    +   MOVING VALGUS neg        STABILITY TESTING    Left shoulder   Right shoulder    Translation     Anterior  up face     up face    Posterior  up face    up face    Sulcus   < 10mm    < 10 mm     Signs   Apprehension   neg      neg       Relocation   no change     no change      Jerk test  neg     neg    EXTREMITY NEURO-VASCULAR EXAM:    Sensation grossly intact to light touch all dermatomal regions.    DTR 2+ Biceps, Triceps, BR and Negative Nates sign   Grossly intact motor function at Elbow, Wrist and Hand   Distal pulses radial and ulnar 2+, brisk cap refill, symmetric.      NECK:  Painless FROM and spinous processes non-tender. Negative Spurlings  sign.      OTHER FINDINGS:  + scapular dyskinesia    XRAYS bilateral shoulders (1/2/2024):  Xrays including AP, Outlet and Axillary Lateral of shoulder are ordered / images reviewed by me:  No acute fracture or dislocation.  Mild-to-moderate glenohumeral and acromioclavicular DJD bilaterally.  Soft tissues appear normal.    MRI right shoulder (4/9/2024):  Impression:     1. Massive rotator cuff tear involving the supraspinatus and infraspinatus tendons with differential fiber retraction medial to the glenoid and associated severe fatty atrophy.  2. Subscapularis tendinosis with fraying of the superior footprint fibers.  3. Biceps tendinosis/tenosynovitis with high-grade partial tear and findings suggesting an associated pulley injury.  4. Superior migration of the humeral head with resulting narrowing of the acromiohumeral interval.  5. Mild glenohumeral osteoarthritis.  6. Moderate AC joint arthrosis and prominent subacromial spurring.  7. Moderate joint effusion with synovitis.  Subacromial-subdeltoid bursitis.    MRI left shoulder (4/9/2024):  Impression:     1. Full-thickness, full width tear of the supraspinatus tendon with extension into the anterior infraspinatus and associated mild volume loss and fatty degeneration.  2. Tendinosis of the residual infraspinatus with a partial-thickness articular surface tear of the footprint and critical zone.  3. Mild tendinosis subscapularis with fraying of the superior footprint fibers.  4. SLAP tear.  5. Biceps tendinosis and tenosynovitis.  6. Mild glenohumeral osteoarthritis.  7. Moderate joint effusion with synovitis and debris.  Subacromial-subdeltoid bursitis.  8. Moderate AC joint arthrosis and prominent subacromial spurring.      ASSESSMENT:     1. Chronic pain of both shoulders  Large Joint Aspiration/Injection: R subacromial bursa      2. Nontraumatic complete tear of right rotator cuff  Large Joint Aspiration/Injection: R subacromial bursa      3. Nontraumatic  complete tear of left rotator cuff        4. Biceps tendinopathy of right upper extremity        5. Biceps tendinopathy, left        6. Rotator cuff arthropathy of both shoulders              PLAN:      I made the decision to obtain old records of the patient including previous notes and imaging. I independently reviewed and interpreted the radiographs and/or MRIs today as well as prior imaging. Reviewed MRI and radiograph results with patient in detail.    We discussed at length different treatment options including conservative vs surgical management. These include anti-inflammatories, acetaminophen, rest, ice, heat, formal physical therapy including strengthening and stretching exercises, home exercise programs, dry needling, corticosteroid injections, and finally surgical intervention which would likely entail reverse total shoulder arthroplasty for the right shoulder, and possible an attempt at arthroscopic rotator cuff repair on the left versus reverse total shoulder arthroplasty.  She is considering moving forward surgery for a right shoulder, however would like to wait until the fall if possible.    In the interim, continue formal physical therapy, over-the-counter anti-inflammatories, and acetaminophen as needed for pain.  Discussed using warm/cool compresses as well to help with pain.    Right shoulder subacromial corticosteroid injection performed today.  See procedure note for details.  She would like to follow up in 1-2 weeks for a left shoulder injection.    Follow-up for left shoulder injection.    The total face-to-face encounter time with this patient was 60 minutes and greater than 50% of of the encounter time was spent counseling the patient and coordinating care. Significant time was also spent educating the patient, giving detail post-visit instructions, and discussing risks and benefits of treatment. Patient also had several specific questions that were answered during the visit today.        All questions were answered, patient will contact us for questions or concerns in the interim.      Medical Dictation software was used during the dictation of portions or the entirety of this medical record.  Phonetic or grammatic errors may exist due to the use of this software. For clarification, refer to the author of the document.

## 2024-04-19 NOTE — PROCEDURES
Large Joint Aspiration/Injection: R subacromial bursa    Date/Time: 4/19/2024 2:00 PM    Performed by: Louis Grider PA-C  Authorized by: Louis Grider PA-C    Consent Done?:  Yes (Verbal)  Indications:  Pain and arthritis  Site marked: the procedure site was marked    Timeout: prior to procedure the correct patient, procedure, and site was verified    Prep: patient was prepped and draped in usual sterile fashion    Local anesthesia used?: No      Details:  Needle Size:  22 G  Ultrasonic Guidance for needle placement?: No    Approach:  Posterior  Location:  Shoulder  Site:  R subacromial bursa  Medications:  40 mg triamcinolone acetonide 40 mg/mL  Patient tolerance:  Patient tolerated the procedure well with no immediate complications    Injection Procedure  A time out was performed, including verification of patient ID, procedure, site and side, availability of information and equipment, review of safety issues, and agreement with consent, the procedure site was marked.    After time out was performed, the patient was prepped aseptically with povidone-iodine swabsticks. A diagnostic and therapeutic injection of 1:3cc Kenalog/Marcaine was given under sterile technique using a 22g x 1.5 needle from the Posterior  aspect of the right Subacromial in the sitting position.      Chelly Ortiz had no adverse reactions to the medication. Pain decreased. She was instructed to apply ice to the joint for 20 minutes and avoid strenuous activities for 24-36 hours following the injection. She was warned of possible blood sugar and/or blood pressure changes during that time. Following that time, she can resume regular activities.    She was reminded to call the clinic immediately for any adverse side effects as explained in clinic today.

## 2024-04-19 NOTE — TELEPHONE ENCOUNTER
----- Message from Flory Brown sent at 4/19/2024  9:24 AM CDT -----  Contact: Self/ [phone]  [id number]     Patient is calling for Medical Advice regarding: question about the A1C      Would like response via [employer]: Yes    Comments: pt said that she is calling in regards to needing to know if it Is time for her to have the A1C ,she received a message in her portal advising her that it is time for her to have the lab done. Please advise

## 2024-04-23 ENCOUNTER — LAB VISIT (OUTPATIENT)
Dept: LAB | Facility: HOSPITAL | Age: 74
End: 2024-04-23
Attending: INTERNAL MEDICINE
Payer: MEDICARE

## 2024-04-23 ENCOUNTER — PATIENT MESSAGE (OUTPATIENT)
Dept: PHARMACY | Facility: CLINIC | Age: 74
End: 2024-04-23
Payer: MEDICARE

## 2024-04-23 DIAGNOSIS — Z79.4 TYPE 2 DIABETES MELLITUS WITHOUT COMPLICATION, WITH LONG-TERM CURRENT USE OF INSULIN: ICD-10-CM

## 2024-04-23 DIAGNOSIS — E11.9 TYPE 2 DIABETES MELLITUS WITHOUT COMPLICATION, WITH LONG-TERM CURRENT USE OF INSULIN: ICD-10-CM

## 2024-04-23 DIAGNOSIS — I10 ESSENTIAL HYPERTENSION: ICD-10-CM

## 2024-04-23 LAB
ALBUMIN SERPL BCP-MCNC: 3.6 G/DL (ref 3.5–5.2)
ALP SERPL-CCNC: 106 U/L (ref 55–135)
ALT SERPL W/O P-5'-P-CCNC: 11 U/L (ref 10–44)
ANION GAP SERPL CALC-SCNC: 6 MMOL/L (ref 8–16)
AST SERPL-CCNC: 11 U/L (ref 10–40)
BASOPHILS # BLD AUTO: 0.03 K/UL (ref 0–0.2)
BASOPHILS NFR BLD: 0.3 % (ref 0–1.9)
BILIRUB SERPL-MCNC: 0.5 MG/DL (ref 0.1–1)
BUN SERPL-MCNC: 14 MG/DL (ref 8–23)
CALCIUM SERPL-MCNC: 8.7 MG/DL (ref 8.7–10.5)
CHLORIDE SERPL-SCNC: 107 MMOL/L (ref 95–110)
CHOLEST SERPL-MCNC: 222 MG/DL (ref 120–199)
CHOLEST/HDLC SERPL: 4.1 {RATIO} (ref 2–5)
CO2 SERPL-SCNC: 30 MMOL/L (ref 23–29)
CREAT SERPL-MCNC: 1 MG/DL (ref 0.5–1.4)
DIFFERENTIAL METHOD BLD: ABNORMAL
EOSINOPHIL # BLD AUTO: 0 K/UL (ref 0–0.5)
EOSINOPHIL NFR BLD: 0.4 % (ref 0–8)
ERYTHROCYTE [DISTWIDTH] IN BLOOD BY AUTOMATED COUNT: 15.1 % (ref 11.5–14.5)
EST. GFR  (NO RACE VARIABLE): 59 ML/MIN/1.73 M^2
ESTIMATED AVG GLUCOSE: 148 MG/DL (ref 68–131)
GLUCOSE SERPL-MCNC: 125 MG/DL (ref 70–110)
HBA1C MFR BLD: 6.8 % (ref 4–5.6)
HCT VFR BLD AUTO: 39.6 % (ref 37–48.5)
HDLC SERPL-MCNC: 54 MG/DL (ref 40–75)
HDLC SERPL: 24.3 % (ref 20–50)
HGB BLD-MCNC: 13.3 G/DL (ref 12–16)
IMM GRANULOCYTES # BLD AUTO: 0.05 K/UL (ref 0–0.04)
IMM GRANULOCYTES NFR BLD AUTO: 0.6 % (ref 0–0.5)
LDLC SERPL CALC-MCNC: 146.4 MG/DL (ref 63–159)
LYMPHOCYTES # BLD AUTO: 3.5 K/UL (ref 1–4.8)
LYMPHOCYTES NFR BLD: 39.3 % (ref 18–48)
MCH RBC QN AUTO: 25.8 PG (ref 27–31)
MCHC RBC AUTO-ENTMCNC: 33.6 G/DL (ref 32–36)
MCV RBC AUTO: 77 FL (ref 82–98)
MONOCYTES # BLD AUTO: 0.6 K/UL (ref 0.3–1)
MONOCYTES NFR BLD: 7.1 % (ref 4–15)
NEUTROPHILS # BLD AUTO: 4.7 K/UL (ref 1.8–7.7)
NEUTROPHILS NFR BLD: 52.3 % (ref 38–73)
NONHDLC SERPL-MCNC: 168 MG/DL
NRBC BLD-RTO: 0 /100 WBC
PLATELET # BLD AUTO: 339 K/UL (ref 150–450)
PMV BLD AUTO: 9.8 FL (ref 9.2–12.9)
POTASSIUM SERPL-SCNC: 3.4 MMOL/L (ref 3.5–5.1)
PROT SERPL-MCNC: 7 G/DL (ref 6–8.4)
RBC # BLD AUTO: 5.15 M/UL (ref 4–5.4)
SODIUM SERPL-SCNC: 143 MMOL/L (ref 136–145)
TRIGL SERPL-MCNC: 108 MG/DL (ref 30–150)
TSH SERPL DL<=0.005 MIU/L-ACNC: 1.61 UIU/ML (ref 0.4–4)
WBC # BLD AUTO: 8.96 K/UL (ref 3.9–12.7)

## 2024-04-23 PROCEDURE — 36415 COLL VENOUS BLD VENIPUNCTURE: CPT | Mod: HCNC | Performed by: INTERNAL MEDICINE

## 2024-04-23 PROCEDURE — 85025 COMPLETE CBC W/AUTO DIFF WBC: CPT | Mod: HCNC | Performed by: INTERNAL MEDICINE

## 2024-04-23 PROCEDURE — 80053 COMPREHEN METABOLIC PANEL: CPT | Mod: HCNC | Performed by: INTERNAL MEDICINE

## 2024-04-23 PROCEDURE — 84443 ASSAY THYROID STIM HORMONE: CPT | Mod: HCNC | Performed by: INTERNAL MEDICINE

## 2024-04-23 PROCEDURE — 83036 HEMOGLOBIN GLYCOSYLATED A1C: CPT | Mod: HCNC | Performed by: INTERNAL MEDICINE

## 2024-04-23 PROCEDURE — 80061 LIPID PANEL: CPT | Mod: HCNC | Performed by: INTERNAL MEDICINE

## 2024-04-23 NOTE — TELEPHONE ENCOUNTER
----- Message from Leena Bridges sent at 4/23/2024 11:26 AM CDT -----  Contact: [phone] Patient  Requesting an RX refill or new RX.  Is this a refill or new RX: new  RX name and strength (copy/paste from chart):  dulaglutide (TRULICITY) 3 mg/0.5 mL pen injector  Is this a 30 day or 90 day RX:   Pharmacy name and phone # (copy/paste from chart):  [facility]/pharmacy #[id number] - [place] - [address]  [address] [address]  Phone: [phone] Fax: [phone]  Hours: Not open 24 hours      The doctors have asked that we provide their patients with the following 2 reminders -- prescription refills can take up to 72 hours, and a friendly reminder that in the future you can use your [facility] account to request refills: yes

## 2024-04-23 NOTE — TELEPHONE ENCOUNTER
Care Due:                  Date            Visit Type   Department     Provider  --------------------------------------------------------------------------------                                             [facility]  Last Visit: 03-      [facility]          MARYLOU Martin  Next Visit: None Scheduled  None         None Found                                                            Last  Test          Frequency    Reason                     Performed    Due Date  --------------------------------------------------------------------------------    HBA1C.......  6 months...  insulin..................  10-   04-    [facility] Embedded Care Due Messages. Reference number: [id number].   4/23/2024 12:06:51 PM CDT

## 2024-04-23 NOTE — TELEPHONE ENCOUNTER
The prescription portion of Ms. Ortiz 's Novolog Pens and Pen Needles application was received from the providers office. The completed patient assistance application has been submitted to  [employer] for consideration of eligibility.

## 2024-04-24 ENCOUNTER — PATIENT MESSAGE (OUTPATIENT)
Dept: INTERNAL MEDICINE | Facility: CLINIC | Age: 74
End: 2024-04-24
Payer: MEDICARE

## 2024-04-24 ENCOUNTER — CLINICAL SUPPORT (OUTPATIENT)
Dept: REHABILITATION | Facility: HOSPITAL | Age: 74
End: 2024-04-24
Payer: MEDICARE

## 2024-04-24 DIAGNOSIS — M25.512 ACUTE PAIN OF BOTH SHOULDERS: ICD-10-CM

## 2024-04-24 DIAGNOSIS — M25.511 ACUTE PAIN OF BOTH SHOULDERS: ICD-10-CM

## 2024-04-24 DIAGNOSIS — M25.611 DECREASED ROM OF RIGHT SHOULDER: Primary | ICD-10-CM

## 2024-04-24 DIAGNOSIS — M25.612 DECREASED ROM OF LEFT SHOULDER: ICD-10-CM

## 2024-04-24 DIAGNOSIS — R29.3 ABNORMAL POSTURE: ICD-10-CM

## 2024-04-24 PROCEDURE — 97140 MANUAL THERAPY 1/> REGIONS: CPT

## 2024-04-24 PROCEDURE — 97110 THERAPEUTIC EXERCISES: CPT

## 2024-04-24 PROCEDURE — 97112 NEUROMUSCULAR REEDUCATION: CPT

## 2024-04-24 RX ORDER — DULAGLUTIDE 3 MG/.5ML
3 INJECTION, SOLUTION SUBCUTANEOUS
Qty: 4 PEN | Refills: 11 | Status: SHIPPED | OUTPATIENT
Start: 2024-04-24 | End: 2024-05-27

## 2024-04-24 NOTE — PROGRESS NOTES
"[facility]   Physical Therapy Treatment Note     Name: Chelly Ortiz  Clinic Number: [id number]    Therapy Diagnosis:   Encounter Diagnoses   Name Primary?    Decreased ROM of right shoulder Yes    Acute pain of both shoulders     Decreased ROM of left shoulder     Abnormal posture      Physician: Smitha Turner MD    Visit Date: 4/24/2024    Physician Orders: PT Eval and Treat  Medical Diagnosis from Referral: M25.511,M25.512 (ICD-10-CM) - Acute pain of both shoulders   Evaluation Date: 3/14/2024  Authorization Period Expiration: 12/31/2024  Plan of Care Expiration: 6/6/2024  Progress Note due: 5/18/2024  Visit # / Visits authorized:  13/36   FOTO #2:   FOTO: #3:      Precautions: Standard, Diabetes, and hypertension     Time In: 1540  Time Out: 1640  Total Billable Time (timed & untimed codes):  55 minutes    SUBJECTIVE     Pt reports: she is planning on getting a right shoulder replacement in September.   She was compliant with home exercise program.    Response to previous treatment: sore  Functional change: no change    Pain: 2/10  Location: Posterior/Top B Shoulders primarily on the L    OBJECTIVE     Objective Measures updated at progress report unless specified.     Shoulder   Action Right Left   Flexion 70 degrees 150 degrees   Abduction 50 degrees 140 degrees   External Rotation  at 90 Unable to perform 80 degrees   Internal Rotation (functional) L4-5 Mid back      Treatment     Chelly received the treatments listed below:      Therapeutic Exercises to develop strength and ROM for  12 minutes including:    Seated Thoracic Extension, 2 x 15 with 3" hold  Supine shoulder External rotation at 90 degrees, 2# 2x10  Sidelying shoulder flexion R shoulder gravity-eliminated ROM, 2 x 12     Not today:  Shoulder assisted flexion at cable column 5#, 2x15    Manual Therapy Techniques: Joint mobilizations and muscle energy technique were applied to the: Right Shoulder for 18 minutes, " "including:    Right GH posterior-inferior mobilizations - grade II-III  Manual external rotation / internal rotation isometrics for humeral centering   Prone thoracic posterior to anterior glides, grade II-III  Prone cervicothoracic junction gapping mobilizations, grade III-IV     neuromuscular re-education activities to improve: muscle firing patterns for 25 minutes. The following activities were included:    Seated middle trap level I, 2x10  Serratus wall slides with cueing for keeping chin tuck, 2x10  Shoulder Isometrics walk outs with yellow ball under arm ER GTB 2x10  Shoulder isometric walks outs towel under arm IR BTB 2x10     Not today:  Prone middle trap arm off edge of table 2x10 5" Bilateral  Sidelying serratus re-education with pole, 2 x 15  Upper trap level I 2x10 - cuing to perform chin tuck in order to reduce sheer in cervical spine     Therapeutic Activities to improve functional performance for 0 minutes, including:     Patient Education and Home Exercises     Home Exercises Provided and Patient Education Provided     Education provided:   - continue with Home exercise program     Written Home Exercises Provided: Patient instructed to cont prior HEP. Exercises were reviewed and Chelly was able to demonstrate them prior to the end of the session.  Chelly demonstrated good  understanding of the education provided. See EMR under Patient Instructions for exercises provided during therapy sessions    ASSESSMENT     Chelly returns to the clinic following her visit with ortho PA. A shoulder replacement surgery was suggest and she was issued a steroid injection which has helped reduce her symptoms in bilateral shoulders. She continues to demonstrate limitations in right shoulder range of motion with maximal active flexion range to 90 degrees. Left shoulder has full range with pain and weakness mainly at end range which improves with manual therapy techniques and corrective exercises. She will continue to " benefit from skilled physical therapy to promote independence with her home exercise program for ongoing therapeutic benefits in preparation for her tentative surgery in September.     Chelly Is progressing well towards her goals.   Pt prognosis is Good.     Pt will continue to benefit from skilled outpatient physical therapy to address the deficits listed in the problem list box on initial evaluation, provide pt/family education and to maximize pt's level of independence in the home and community environment.     Pt's spiritual, cultural and educational needs considered and pt agreeable to plan of care and goals.     Anticipated barriers to physical therapy: chronicity of symptoms; co-morbidities     Goals:   Short Term Goals (4 Weeks):   1. Pt will be independent with HEP to supplement PT in improving pain free cervical mobility- Progressing  2. Pt will improve cervical AROM 10 deg in rotational planes to improve cervical mobility for driving- Progressing  3. Pt will improve UE MMTs by 1/2 grade in all planes to improve strength for lifting and carrying tasks.- Progressing  4. Pt will demonstrate improved sitting posture to decrease pain experienced in head and neck.- Progressing     Long Term Goals (8 Weeks):   1. Pt will improve FOTO to </=TBD% limitation to improve perceived limitation with changing and maintaining mobility.- Progressing  2. Pt will improve cervical AROM to WNL in all planes to improve cervical mobility for driving - Progressing  3. Pt will improve UE MMTs 1 grade in all planes to improve strength for lifting and carrying tasks.- Progressing  4. Pt will report no pain with lifting 10 lbs to promote physical activity. - Progressing  5. Pt will report no pain with cervical AROM in all planes to promote QOL.- Progressing    PLAN     Plan of care Certification: 3/14/2024 to 6/6/2024.     Progress shoulder range of motion, rotator cuff and filipe-scapular strength, and thoracic mobility.     Evan   Macho PT, DPT    Co-treated with Evans Paz PT  Board Certified Orthopedic Clinical Specialist  Board Certified Sports Clinical Specialist  Fellow of the American Academy of Orthopedic Manual Physical Therapy

## 2024-04-26 ENCOUNTER — OFFICE VISIT (OUTPATIENT)
Dept: OPTOMETRY | Facility: CLINIC | Age: 74
End: 2024-04-26
Payer: COMMERCIAL

## 2024-04-26 DIAGNOSIS — H52.203 MYOPIA WITH ASTIGMATISM AND PRESBYOPIA, BILATERAL: ICD-10-CM

## 2024-04-26 DIAGNOSIS — H52.13 MYOPIA WITH ASTIGMATISM AND PRESBYOPIA, BILATERAL: ICD-10-CM

## 2024-04-26 DIAGNOSIS — H01.004 BLEPHARITIS OF UPPER EYELIDS OF BOTH EYES, UNSPECIFIED TYPE: ICD-10-CM

## 2024-04-26 DIAGNOSIS — H04.123 DRY EYE SYNDROME OF BOTH EYES: ICD-10-CM

## 2024-04-26 DIAGNOSIS — H52.4 MYOPIA WITH ASTIGMATISM AND PRESBYOPIA, BILATERAL: ICD-10-CM

## 2024-04-26 DIAGNOSIS — E11.9 TYPE 2 DIABETES MELLITUS WITHOUT RETINOPATHY: ICD-10-CM

## 2024-04-26 DIAGNOSIS — Z96.1 PSEUDOPHAKIA: ICD-10-CM

## 2024-04-26 DIAGNOSIS — H01.001 BLEPHARITIS OF UPPER EYELIDS OF BOTH EYES, UNSPECIFIED TYPE: ICD-10-CM

## 2024-04-26 DIAGNOSIS — H10.523 ANGULAR BLEPHAROCONJUNCTIVITIS OF BOTH EYES: ICD-10-CM

## 2024-04-26 DIAGNOSIS — H10.13 ALLERGIC CONJUNCTIVITIS OF BOTH EYES: Primary | ICD-10-CM

## 2024-04-26 PROCEDURE — 92014 COMPRE OPH EXAM EST PT 1/>: CPT | Mod: S$GLB,,, | Performed by: OPTOMETRIST

## 2024-04-26 PROCEDURE — 99999 PR PBB SHADOW E&M-EST. PATIENT-LVL IV: CPT | Mod: PBBFAC,,, | Performed by: OPTOMETRIST

## 2024-04-26 PROCEDURE — 92015 DETERMINE REFRACTIVE STATE: CPT | Mod: S$GLB,,, | Performed by: OPTOMETRIST

## 2024-04-26 RX ORDER — OFLOXACIN 3 MG/ML
1 SOLUTION/ DROPS OPHTHALMIC 4 TIMES DAILY
Qty: 5 ML | Refills: 0 | Status: SHIPPED | OUTPATIENT
Start: 2024-04-26 | End: 2024-05-03

## 2024-04-26 RX ORDER — ATORVASTATIN CALCIUM 10 MG/1
10 TABLET, FILM COATED ORAL
COMMUNITY
Start: 2024-01-14 | End: 2024-05-16

## 2024-04-26 RX ORDER — METHYLPREDNISOLONE ACETATE 40 MG/ML
INJECTION, SUSPENSION INTRA-ARTICULAR; INTRALESIONAL; INTRAMUSCULAR; SOFT TISSUE
COMMUNITY
Start: 2023-12-05 | End: 2024-05-16

## 2024-04-26 NOTE — PROGRESS NOTES
HPI    IMTIAZ: 05/23  Chief complaint (CC): Patient is here for annual eye exam today. Patient   wears OTC readers but feel she may see better with prescription glasses.    Patient has trouble seeing for night driving and avoids it.  Glasses? +2.50 or +3.00 OTC   Contacts? -  H/o eye surgery, injections or laser: PC IOL OU  H/o eye injury: -  Known eye conditions? See above  Family h/o eye conditions? -  Eye gtts? Allergy drops 1-2 times, AT's prn and lid scrubs once or twice a   day      (-) Flashes (-)  Floaters (+) Mucous   (+)  Tearing (+) Itching (-) Burning   (-) Headaches (-) Eye Pain/discomfort (+) Irritation   (-)  Redness (-) Double vision (-) Blurry vision    Diabetic? +  A1c? Hemoglobin A1C       Date                     Value               Ref Range             Status                04/23/2024               6.8 (H)             4.0 - 5.6 %           Final                 10/24/2023               6.4 (H)             4.0 - 5.6 %           Final                 04/26/2023               6.5 (H)             4.0 - 5.6 %           Final                    Last edited by Susie Bartlett on 4/26/2024  9:24 AM.            Assessment /Plan     For exam results, see Encounter Report.      Allergic conjunctivitis of both eyes  Recommend Zaditor or Alaway bid OU and cool compresses to help soothe itching. Patient advised to RTC if condition gets worse.     Dry eye syndrome of both eyes  Recommend Systane Ultra or Refresh Optive BID-TID OU to aid with symptoms of dry eyes.    Blepharitis of upper eyelids of both eyes, unspecified type  -     ofloxacin (OCUFLOX) 0.3 % ophthalmic solution; Place 1 drop into both eyes 4 (four) times daily. for 7 days  Dispense: 5 mL; Refill: 0  Angular blepharoconjunctivitis of both eyes  -     ofloxacin (OCUFLOX) 0.3 % ophthalmic solution; Place 1 drop into both eyes 4 (four) times daily. for 7 days  Dispense: 5 mL; Refill: 0  Cont lid scrubs BID OU. Rx Ocuflox QID OU x 7 days.     Type  2 diabetes mellitus without retinopathy  BS control. No signs of diabetic retinopathy. Monitor with annual exam.     Pseudophakia  Good result.     Myopia with astigmatism and presbyopia, bilateral  SRx released to patient. Patient educated on lens options. Normal ocular health. RTC 1 year for routine exam.

## 2024-04-29 ENCOUNTER — PATIENT MESSAGE (OUTPATIENT)
Dept: PHARMACY | Facility: CLINIC | Age: 74
End: 2024-04-29
Payer: MEDICARE

## 2024-04-29 NOTE — TELEPHONE ENCOUNTER
We are pleased to inform you Chelly Ortiz has been approved in the [employer] Patient Assistance Program.  [employer] has shared this information with your patient.   Approval Details  Eligibility start Date: 04/25/24  Eligibility end date: 12/31/24 (Updated proof of eligibility required to re-enroll for 2025 calendar year).  Approved Medication: Novolog Pens and Pen Needles  Day supply: 120  Allotted Refills: 3 (Please be advised Program allows only 3 refills per eligibility period regardless of changes in strength).   Estimated Shipping: 10-14 business days or longer depending on availability and/or projected refill date  Shipping Location: [facility] (You and your patient will receive further communication from an [facility] once Medication is received)                                            Important Note  It is imperative that any changes to strength or discontinuation of this medication be referred to the Pharmacy Patient Assistance Team Pool Via EPIC to prevent Gaps in therapy.

## 2024-04-30 ENCOUNTER — PATIENT MESSAGE (OUTPATIENT)
Dept: PULMONOLOGY | Facility: CLINIC | Age: 74
End: 2024-04-30
Payer: MEDICARE

## 2024-05-01 ENCOUNTER — OFFICE VISIT (OUTPATIENT)
Dept: SPORTS MEDICINE | Facility: CLINIC | Age: 74
End: 2024-05-01
Payer: MEDICARE

## 2024-05-01 VITALS
SYSTOLIC BLOOD PRESSURE: 149 MMHG | DIASTOLIC BLOOD PRESSURE: 73 MMHG | BODY MASS INDEX: 33.63 KG/M2 | WEIGHT: 189.81 LBS | HEART RATE: 81 BPM | HEIGHT: 63 IN

## 2024-05-01 DIAGNOSIS — M67.922 BICEPS TENDINOPATHY, LEFT: ICD-10-CM

## 2024-05-01 DIAGNOSIS — M12.812 ROTATOR CUFF TEAR ARTHROPATHY OF LEFT SHOULDER: ICD-10-CM

## 2024-05-01 DIAGNOSIS — M12.811 ROTATOR CUFF ARTHROPATHY OF BOTH SHOULDERS: ICD-10-CM

## 2024-05-01 DIAGNOSIS — M75.102 ROTATOR CUFF TEAR ARTHROPATHY OF LEFT SHOULDER: ICD-10-CM

## 2024-05-01 DIAGNOSIS — G89.29 CHRONIC LEFT SHOULDER PAIN: Primary | ICD-10-CM

## 2024-05-01 DIAGNOSIS — M25.512 CHRONIC LEFT SHOULDER PAIN: Primary | ICD-10-CM

## 2024-05-01 DIAGNOSIS — M12.812 ROTATOR CUFF ARTHROPATHY OF BOTH SHOULDERS: ICD-10-CM

## 2024-05-01 PROCEDURE — 1160F RVW MEDS BY RX/DR IN RCRD: CPT | Mod: HCNC,CPTII,S$GLB, | Performed by: PHYSICIAN ASSISTANT

## 2024-05-01 PROCEDURE — 3288F FALL RISK ASSESSMENT DOCD: CPT | Mod: HCNC,CPTII,S$GLB, | Performed by: PHYSICIAN ASSISTANT

## 2024-05-01 PROCEDURE — 99212 OFFICE O/P EST SF 10 MIN: CPT | Mod: 25,HCNC,S$GLB, | Performed by: PHYSICIAN ASSISTANT

## 2024-05-01 PROCEDURE — 3078F DIAST BP <80 MM HG: CPT | Mod: HCNC,CPTII,S$GLB, | Performed by: PHYSICIAN ASSISTANT

## 2024-05-01 PROCEDURE — 1159F MED LIST DOCD IN RCRD: CPT | Mod: HCNC,CPTII,S$GLB, | Performed by: PHYSICIAN ASSISTANT

## 2024-05-01 PROCEDURE — 20610 DRAIN/INJ JOINT/BURSA W/O US: CPT | Mod: HCNC,LT,S$GLB, | Performed by: PHYSICIAN ASSISTANT

## 2024-05-01 PROCEDURE — 3077F SYST BP >= 140 MM HG: CPT | Mod: HCNC,CPTII,S$GLB, | Performed by: PHYSICIAN ASSISTANT

## 2024-05-01 PROCEDURE — 99999 PR PBB SHADOW E&M-EST. PATIENT-LVL V: CPT | Mod: PBBFAC,HCNC,, | Performed by: PHYSICIAN ASSISTANT

## 2024-05-01 PROCEDURE — 3044F HG A1C LEVEL LT 7.0%: CPT | Mod: HCNC,CPTII,S$GLB, | Performed by: PHYSICIAN ASSISTANT

## 2024-05-01 PROCEDURE — 1125F AMNT PAIN NOTED PAIN PRSNT: CPT | Mod: HCNC,CPTII,S$GLB, | Performed by: PHYSICIAN ASSISTANT

## 2024-05-01 PROCEDURE — 1101F PT FALLS ASSESS-DOCD LE1/YR: CPT | Mod: HCNC,CPTII,S$GLB, | Performed by: PHYSICIAN ASSISTANT

## 2024-05-01 RX ORDER — TRIAMCINOLONE ACETONIDE 40 MG/ML
40 INJECTION, SUSPENSION INTRA-ARTICULAR; INTRAMUSCULAR
Status: DISCONTINUED | OUTPATIENT
Start: 2024-05-01 | End: 2024-05-01 | Stop reason: HOSPADM

## 2024-05-01 RX ADMIN — TRIAMCINOLONE ACETONIDE 40 MG: 40 INJECTION, SUSPENSION INTRA-ARTICULAR; INTRAMUSCULAR at 02:05

## 2024-05-01 NOTE — PROCEDURES
Large Joint Aspiration/Injection: L subacromial bursa    Date/Time: 5/1/2024 2:30 PM    Performed by: Louis Grider PA-C  Authorized by: Louis Grider PA-C    Consent Done?:  Yes (Verbal)  Indications:  Pain  Site marked: the procedure site was marked    Timeout: prior to procedure the correct patient, procedure, and site was verified    Prep: patient was prepped and draped in usual sterile fashion    Local anesthesia used?: No      Details:  Needle Size:  22 G  Ultrasonic Guidance for needle placement?: No    Approach:  Posterior  Location:  Shoulder  Site:  L subacromial bursa  Medications:  40 mg triamcinolone acetonide 40 mg/mL  Patient tolerance:  Patient tolerated the procedure well with no immediate complications    Injection Procedure  A time out was performed, including verification of patient ID, procedure, site and side, availability of information and equipment, review of safety issues, and agreement with consent, the procedure site was marked.    After time out was performed, the patient was prepped aseptically with povidone-iodine swabsticks. A diagnostic and therapeutic injection of 1:3cc Kenalog/Marcaine was given under sterile technique using a 22g x 1.5 needle from the Posterior  aspect of the left Subacromial in the sitting position.      Chelly Ortiz had no adverse reactions to the medication. Pain decreased. She was instructed to apply ice to the joint for 20 minutes and avoid strenuous activities for 24-36 hours following the injection. She was warned of possible blood sugar and/or blood pressure changes during that time. Following that time, she can resume regular activities.    She was reminded to call the clinic immediately for any adverse side effects as explained in clinic today.

## 2024-05-01 NOTE — PROGRESS NOTES
CC:  Left shoulder pain    Patient is here for follow up of left shoulder pain. Pt is requesting left shoulder CSI as discussed at her last visit. She reports moderate improvement from the right shoulder injection she received at her last visit.      The prior shots were tolerated well.     PHYSICAL EXAMINATION:      General: The patient is alert and oriented x 3. Mood is pleasant.   Observation of ears, eyes and nose reveals no gross abnormalities. No   labored breathing observed.      No signs of infection or adverse reaction to left shoulder.    Left shoulder:    TTP over AC joint, posterior glenohumeral joint line, and bicipital groove  Active forward flexion to 160°, external rotation to 40°, and internal rotation to T12  Positive impingement signs, cross-body adduction, [place]'s testing, Speed's testing  4/5 strength with resisted internal rotation, external rotation, and scaption    Assessment:  Chronic left shoulder pain  Biceps tendinopathy, left  Rotator cuff arthropathy, left    Plan:    Left shoulder subacromial corticosteroid injection performed today.  See procedure note for details.    Continue conservative treatment at home including anti-inflammatory medication, acetaminophen, topical analgesics, and warm/cool compresses.    Continue formal physical therapy as scheduled.    Follow-up this summer with Dr. Rushing to discuss surgery.      All questions were answered. Instructed patient to call with questions or concerns in the interim.       Medical Dictation software was used during the dictation of portions or the entirety of this medical record.  Phonetic or grammatic errors may exist due to the use of this software. For clarification, refer to the author of the document.

## 2024-05-03 ENCOUNTER — TELEPHONE (OUTPATIENT)
Dept: INTERNAL MEDICINE | Facility: CLINIC | Age: 74
End: 2024-05-03
Payer: MEDICARE

## 2024-05-03 NOTE — TELEPHONE ENCOUNTER
Call pt and stated to pt she have the wrong fax number and gave pt the right fax number for pharmacy to fax her rx for a PA

## 2024-05-06 DIAGNOSIS — F51.01 PRIMARY INSOMNIA: ICD-10-CM

## 2024-05-06 NOTE — TELEPHONE ENCOUNTER
Refill Routing Note   Medication(s) are not appropriate for processing by [facility] for the following reason(s):        ED/Hospital Visit since last OV with provider  No active prescription written by provider    [facility] action(s):  Defer               Appointments  past 12m or future 3m with PCP    Date Provider   Last Visit   12/1/2023 Smitha Turner MD   Next Visit   Visit date not found Smitha Turner MD   ED visits in past 90 days: 0        Note composed:9:42 AM 05/06/2024

## 2024-05-06 NOTE — TELEPHONE ENCOUNTER
No care due was identified.  [facility] Embedded Care Due Messages. Reference number: [id number].   5/06/2024 12:08:17 AM CDT

## 2024-05-07 RX ORDER — TRAZODONE HYDROCHLORIDE 50 MG/1
50 TABLET ORAL NIGHTLY
Qty: 90 TABLET | Refills: 0 | Status: SHIPPED | OUTPATIENT
Start: 2024-05-07

## 2024-05-08 ENCOUNTER — TELEPHONE (OUTPATIENT)
Dept: INTERNAL MEDICINE | Facility: CLINIC | Age: 74
End: 2024-05-08
Payer: MEDICARE

## 2024-05-08 NOTE — TELEPHONE ENCOUNTER
I called [facility]. They did not have Trulicity that was sent via ERX on 4/24/2024. I called it in. The drug does not need a PA. It will cost 47 dollars monthly. They do not currently have the drug in stock but will order the drug. This is a manufacturing issue so shopping around for different drug stores is not going to get the drug any sooner. I left a detailed message on her recorder to contact [facility] to answer any questions she has about manufacturing issue.

## 2024-05-08 NOTE — TELEPHONE ENCOUNTER
----- Message from James Gómez MA sent at 5/7/2024  1:28 PM CDT -----  Contact: [phone]    ----- Message -----  From: Swati Mccabe  Sent: 5/7/2024   1:22 PM CDT  To: Johnny MILLAN Staff    Pt is requesting a callback to be advised about her dulaglutide (TRULICITY) 3 mg/0.5 mL pen injector 4 pen prescription.     Per pt, When [facility] reached out to Dr Turner in regards to pt's refill it was rejected. Pt states it already had been approved by [facility] previously but they did not have the medicine.     Please call pt to advise her on what to do. Per pt, she has been without her medication since about the first of the year.              Thank you

## 2024-05-13 PROBLEM — I20.89 OTHER FORMS OF ANGINA PECTORIS: Status: RESOLVED | Noted: 2023-04-28 | Resolved: 2024-05-13

## 2024-05-13 PROBLEM — R00.2 PALPITATIONS: Status: ACTIVE | Noted: 2024-05-13

## 2024-05-13 NOTE — PROGRESS NOTES
Cardiology Clinic Note  Reason for Visit: palpitations    HPI:     Chelly Ortiz is a 73 y.o. female, who presents for palpitations.    She reports having intermittent chest discomfort, which she reports is palpitations.  No chest pain. This occurs at rest. Lasts ~1hr. Gradual offset. Occurs around time of taking medications.  Happens almost daily for the past 2 weeks.  Stopped taking atorva and palpitations stopped. Last felt palps ~4-5 days ago.    Has been on atorva 20mg. Had palps. Lessened to 10mg. Still occurring.  She has not been on crestor, zocor. Tried prava.   Is not currently taking zetia.    Also has muscle aches/cramps with a few statins.    Medical: DM2, HLD, HTN, coronary artery calcium (mild, LAD), aortic atherosclerosis (CT), MYIRAM  Other relevant histories: None  Allergies/side effects: ACEi (hives)    ROS:    Pertinent ROS included in HPI and below.  PMH:     Patient Active Problem List    Diagnosis Date Noted    Decreased ROM of left shoulder 03/20/2024    Abnormal posture 03/20/2024    Acute pain of both shoulders 03/15/2024    History of epistaxis 03/06/2024    Decreased ROM of right shoulder 10/31/2023    Adjustment disorder with anxious mood 01/23/2023    Combined form of age-related cataract, left eye 11/02/2022    Osteopenia of multiple sites 08/31/2022    Arthritis of multiple sites 03/15/2022    Decreased ROM of neck 07/30/2021    Type 2 diabetes mellitus without complication, with long-term current use of insulin 07/20/2021    Abdominal aortic atherosclerosis 07/20/2021     As seen on CTA imaging on 8/18/2020      Dysphagia 06/25/2021    Sedentary lifestyle 02/05/2020    EIC (epidermal inclusion cyst)     Personal history of colonic polyps 12/05/2018    Dilated bile duct 10/09/2018    Decreased range of motion of lumbar spine 09/28/2018    Decreased strength 09/28/2018    PCO (posterior capsular opacification), right 01/29/2018    Dry eye syndrome 01/29/2018    Pseudophakia  2018    Spondylosis of cervical region without myelopathy or radiculopathy 2018    Cervical myofascial pain syndrome 2018    Hyperlipidemia, unspecified 10/12/2017    Essential hypertension 2016    History of colon polyps 2015    Constipation 2015    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 2013    Nuclear sclerotic cataract of left eye 2013    Hearing loss, sensorineural 10/12/2012    Insomnia 2012    Anxiety 2012    GERD (gastroesophageal reflux disease)     MYRIAM (obstructive sleep apnea)     IBS (irritable bowel syndrome)     DDD (degenerative disc disease), lumbar      Past Surgical History:   Procedure Laterality Date    CATARACT EXTRACTION W/  INTRAOCULAR LENS IMPLANT Right 10/03/2013        CATARACT EXTRACTION W/  INTRAOCULAR LENS IMPLANT Left 2022         SECTION      x2    CHOLECYSTECTOMY      COLONOSCOPY N/A 2018    Procedure: COLONOSCOPY;  Surgeon: Marie Mejia MD;  Location: [facility];  Service: Endoscopy;  Laterality: N/A;    COLONOSCOPY N/A 2022    Procedure: COLONOSCOPY;  Surgeon: Pierce Rivera MD;  Location: [facility];  Service: Endoscopy;  Laterality: N/A;    ENDOSCOPIC ULTRASOUND OF UPPER GASTROINTESTINAL TRACT N/A 10/09/2018    Procedure: ULTRASOUND, UPPER GI TRACT, ENDOSCOPIC;  Surgeon: Javy Simpson MD;  Location: [facility];  Service: Endoscopy;  Laterality: N/A;    EXCISION OF LESION N/A 2019    Procedure: EXCISION, LESION VULVA;  Surgeon: Liv Odell MD;  Location: [facility];  Service: OB/GYN;  Laterality: N/A;  latex allergy    EYE SURGERY      HYSTERECTOMY      ovaries intact, due to DUB    INTRAOCULAR PROSTHESES INSERTION Left 2022    Procedure: INSERTION, IOL PROSTHESIS;  Surgeon: Clarice Herzog MD;  Location: [address];  Service: Ophthalmology;  Laterality: Left;    PHACOEMULSIFICATION OF CATARACT Left 2022    Procedure: PHACOEMULSIFICATION,  CATARACT;  Surgeon: Clarice Herzog MD;  Location: [facility] OR [address] [place] [address];  Service: Ophthalmology;  Laterality: Left;    TUBAL LIGATION       Allergies:     Review of patient's allergies indicates:   Allergen Reactions    Ace inhibitors Hives     \Cough    Latex, natural rubber Itching     Medications:     Current Outpatient Medications:     ACCU-CHEK FASTCLIX LANCING DEV Kit, USE AS DIRECTED, Disp: 1 each, Rfl: 0    albuterol (PROVENTIL/VENTOLIN HFA) 90 mcg/actuation inhaler, Inhale 1-2 puffs into the lungs every 4 (four) hours as needed for Wheezing., Disp: 18 g, Rfl: 2    ALPRAZolam (XANAX) 0.25 MG tablet, Take 1 tablet (0.25 mg total) by mouth once. Take approximately 20 to 30 minutes before your MRI exam for 1 dose, Disp: 1 tablet, Rfl: 0    amLODIPine (NORVASC) 10 MG tablet, Take 1 tablet (10 mg total) by mouth once daily., Disp: 90 tablet, Rfl: 3    aspirin (ECOTRIN) 81 MG EC tablet, Take 81 mg by mouth once daily., Disp: , Rfl:     atorvastatin (LIPITOR) 10 MG tablet, Take 10 mg by mouth., Disp: , Rfl:     benzonatate (TESSALON) 200 MG capsule, Take 1 capsule (200 mg total) by mouth every 8 (eight) hours as needed for Cough., Disp: 30 capsule, Rfl: 0    blood sugar diagnostic (ACCU-CHEK GUIDE TEST STRIPS) Strp, 3 times a day, Disp: 300 strip, Rfl: 11    blood-glucose meter (ACCU-CHEK GUIDE GLUCOSE METER) Misc, Three times a day, Disp: 1 each, Rfl: 0    calcium carbonate (OS-ARIC) 600 mg calcium (1,500 mg) Tab, Take 1 tablet (600 mg total) by mouth once. for 1 dose, Disp: 30 tablet, Rfl: 11    cetirizine (ZYRTEC) 10 MG tablet, Take 1 tablet (10 mg total) by mouth every evening., Disp: 90 tablet, Rfl: 0    citalopram (CELEXA) 10 MG tablet, Take 1 tablet (10 mg total) by mouth once daily., Disp: 90 tablet, Rfl: 1    diclofenac sodium (VOLTAREN) 1 % Gel, APPLY 2 G TOPICALLY ONCE DAILY., Disp: 100 g, Rfl: 1    dulaglutide (TRULICITY) 3 mg/0.5 mL pen injector, Inject 3 mg into the skin every 7 days., Disp: 4 pen ,  "Rfl: 11    ergocalciferol (ERGOCALCIFEROL) 50,000 unit Cap, TAKE 1 CAPSULE BY MOUTH EVERY 7 DAYS, Disp: 12 capsule, Rfl: 3    esomeprazole (NEXIUM) 40 MG capsule, Take 1 capsule (40 mg total) by mouth before breakfast., Disp: 90 capsule, Rfl: 3    ezetimibe (ZETIA) 10 mg tablet, Take 1 tablet (10 mg total) by mouth once daily., Disp: 90 tablet, Rfl: 3    fluocinonide (LIDEX) 0.05 % external solution, AAA scalp qday - bid prn pruritus, Disp: 60 mL, Rfl: 3    fluticasone propionate (FLONASE) 50 mcg/actuation nasal spray, 1 spray (50 mcg total) by Each Nostril route once daily., Disp: 9.9 mL, Rfl: 3    gabapentin (NEURONTIN) 100 MG capsule, Take 2 capsules (200 mg total) by mouth every evening., Disp: 180 capsule, Rfl: 3    glucose 4 GM chewable tablet, Take 4 tablets (16 g total) by mouth as needed for Low blood sugar (If having symptoms of blurry vision, palpitations, confusion, shakiness.  Please check sugars and if sugar below 70 please take 4 tablets and re-check sugar everry 15 minutes until sugars are above 70 and symptoms resolve.)., Disp: 50 tablet, Rfl: 12    imiquimod (ALDARA) 5 % cream, Apply topically 3 (three) times a week., Disp: 12 packet, Rfl: 2    insulin (LANTUS SOLOSTAR U-100 INSULIN) glargine 100 units/mL SubQ pen, Inject 40 Units into the skin every evening., Disp: 45 mL, Rfl: 0    insulin aspart U-100 (NOVOLOG FLEXPEN U-100 INSULIN) 100 unit/mL (3 mL) InPn pen, Inject 10 Units into the skin 3 (three) times daily with meals., Disp: 60 mL, Rfl: 11    insulin aspart U-100 (NOVOLOG FLEXPEN U-100 INSULIN) 100 unit/mL (3 mL) InPn pen, Inject 10 Units into the skin 3 (three) times daily with meals. TDD 60 units, Disp: 60 mL, Rfl: 11    insulin syringe-needle U-100 0.3 mL 31 gauge x 5/16" Syrg, relion brand, use 5 x a day, if not on levemir or novolog, Disp: 150 each, Rfl: 1    insulin syringe-needle U-100 0.5 mL 31 gauge x 5/16" Syrg, Use nightly. 90 day, Disp: 100 each, Rfl: 3    lancets (ACCU-CHEK " "FASTCLIX LANCET DRUM) Misc, TEST BLOOD SUGAR THREE TIMES A DAY, Disp: 918 each, Rfl: 3    linaCLOtide (LINZESS) 72 mcg Cap capsule, Take 1 capsule (72 mcg total) by mouth before breakfast., Disp: 30 capsule, Rfl: 5    magnesium oxide (MAG-OX) 400 mg tablet, Take 1 tablet (400 mg total) by mouth 2 (two) times daily., Disp: 180 tablet, Rfl: 3    methylPREDNISolone acetate (DEPO-MEDROL) 40 mg/mL injection, , Disp: , Rfl:     montelukast (SINGULAIR) 10 mg tablet, Take 10 mg by mouth., Disp: , Rfl:     pen needle, diabetic (NOVOFINE 32) 32 gauge x 1/4" Ndle, 4 injections per day, Disp: 500 each, Rfl: 4    promethazine-dextromethorphan (PROMETHAZINE-DM) 6.25-15 mg/5 mL Syrp, Take 5 mLs by mouth nightly as needed (cough)., Disp: 118 mL, Rfl: 0    sodium chloride (SALINE NASAL) 0.65 % nasal spray, 1 spray by Nasal route as needed for Congestion., Disp: 30 mL, Rfl: 6    traZODone (DESYREL) 50 MG tablet, TAKE 1 TABLET BY MOUTH EVERY EVENING., Disp: 90 tablet, Rfl: 0    Current Facility-Administered Medications:     triamcinolone acetonide injection 20 mg, 20 mg, Intramuscular, 1 time in Clinic/HOD, Tani Pittman FNP    Social History:     Social History     Tobacco Use    Smoking status: Former     Current packs/day: 0.00     Types: Cigarettes     Quit date: 2/15/1983     Years since quittin.2     Passive exposure: Current ()    Smokeless tobacco: Never   Substance Use Topics    Alcohol use: No     Physical Exam:   /80   Pulse 86   Ht 5' 3" (1.6 m)   Wt 84.6 kg (186 lb 8.2 oz)   LMP 2000   SpO2 97%   BMI 33.04 kg/m²      Physical Exam   Constitutional: She appears healthy. No distress.   Neck: No JVD present.   Cardiovascular: Normal rate and regular rhythm.   No murmur heard.  Pulses:       Posterior tibial pulses are 2+ on the right side and 2+ on the left side.   Pulmonary/Chest: Effort normal and breath sounds normal. She has no wheezes. She has no rales.   Musculoskeletal:         General: " No edema.   Neurological: She is alert.   Skin: Skin is warm.     Labs:     Blood Tests:  Lab Results   Component Value Date    BNP <10.0 07/28/2012     04/23/2024    K 3.4 (L) 04/23/2024     04/23/2024    CO2 30 (H) 04/23/2024    BUN 14 04/23/2024    CREATININE 1.0 04/23/2024     (H) 04/23/2024    HGBA1C 6.8 (H) 04/23/2024    MG 2.3 12/01/2023    AST 11 04/23/2024    ALT 11 04/23/2024    ALBUMIN 3.6 04/23/2024    PROT 7.0 04/23/2024    BILITOT 0.5 04/23/2024    WBC 8.96 04/23/2024    HGB 13.3 04/23/2024    HCT 39.6 04/23/2024    MCV 77 (L) 04/23/2024     04/23/2024    INR 0.9 04/19/2012    TSH 1.612 04/23/2024       Lab Results   Component Value Date    CHOL 222 (H) 04/23/2024    HDL 54 04/23/2024    TRIG 108 04/23/2024       Lab Results   Component Value Date    LDLCALC 146.4 04/23/2024       Urine Tests:  Lab Results   Component Value Date    COLORU Straw 04/13/2021    APPEARANCEUA Clear 04/13/2021    PHUR 5.5 04/05/2023    PHUR 6.0 04/13/2021    SPECGRAV 1.005 04/13/2021    PROTEINUA Negative 04/13/2021    GLUCUA Negative 04/13/2021    KETONESU Negative 04/13/2021    BILIRUBINUA Negative 04/13/2021    OCCULTUA Negative 04/13/2021    NITRITE Negative 04/13/2021    UROBILINOGEN Negative 04/21/2019    LEUKOCYTESUR Negative 04/13/2021    CREATRANDUR 123.0 10/24/2023       Imaging:     Echocardiogram  TTE 1/3/17  CONCLUSIONS     1 - Normal left ventricular systolic function (EF 60-65%).     2 - Left ventricular diastolic dysfunction.     3 - Mild left atrial enlargement.     4 - Normal right ventricular systolic function .     5 - The estimated PA systolic pressure is 23 mmHg.     Stress testing  None    Cath Lab  None    Other  Holter 4/17/18  PREDOMINANT RHYTHM   1. Sinus rhythm with heart rates varying between 61 and 118 bpm with an average of 82 bpm.     VENTRICULAR ARRHYTHMIAS   1. There were very rare PVCs recorded totalling 7 and averaging less than 1 per hour.  There was 1 couplet.    2. There were no episodes of ventricular tachycardia.     SUPRA VENTRICULAR ARRHYTHMIAS   1. There were very rare PACs recorded totalling 12 and averaging less than 1 per hour.   2. There were no episodes of sustained supraventricular tachycardia.     EKG:   EKG (10/21/22): normal sinus rhythm, nonspecific ST and T waves changes. (personally reviewed)    Assessment:     1. Essential hypertension    2. Mixed hyperlipidemia    3. Abdominal aortic atherosclerosis    4. Type 2 diabetes mellitus without complication, with long-term current use of insulin    5. Palpitations        Plan:     Palpitations   Seems correlated with medications. Monitor  If worsens or persistent, will do holter +/- echo    Essential hypertension  BP at goal  Continue meds  Exercise    Mixed hyperlipidemia  Abdominal aortic atherosclerosis  With aortic plaque, recommend high intensity statin; however, has been unable to tolerate a few statins  Will try rosuvastatin 5mg qd. Titrate as able    Type 2 diabetes mellitus without complication, with long-term current use of insulin  A1c at goal, 6.8%    The 10-year ASCVD risk score (Earnestine BARKER, et al., 2019) is: 42.3%    Values used to calculate the score:      Age: 73 years      Sex: Female      Is Non- : Yes      Diabetic: Yes      Tobacco smoker: No      Systolic Blood Pressure: 149 mmHg      Is BP treated: Yes      HDL Cholesterol: 54 mg/dL      Total Cholesterol: 222 mg/dL    Signed:  Benjamin Aden MD  Cardiology     Follow-up:     Future Appointments   Date Time Provider Department Center   5/16/2024 10:00 AM Alexandro Aden III, MD [facility] CARDIO [facility]

## 2024-05-14 ENCOUNTER — TELEPHONE (OUTPATIENT)
Dept: INTERNAL MEDICINE | Facility: CLINIC | Age: 74
End: 2024-05-14
Payer: MEDICARE

## 2024-05-14 ENCOUNTER — PATIENT MESSAGE (OUTPATIENT)
Dept: INTERNAL MEDICINE | Facility: CLINIC | Age: 74
End: 2024-05-14
Payer: MEDICARE

## 2024-05-14 DIAGNOSIS — Z79.4 TYPE 2 DIABETES MELLITUS WITHOUT COMPLICATION, WITH LONG-TERM CURRENT USE OF INSULIN: Primary | ICD-10-CM

## 2024-05-14 DIAGNOSIS — Z79.4 CONTROLLED TYPE 2 DIABETES MELLITUS WITHOUT COMPLICATION, WITH LONG-TERM CURRENT USE OF INSULIN: ICD-10-CM

## 2024-05-14 DIAGNOSIS — E11.9 CONTROLLED TYPE 2 DIABETES MELLITUS WITHOUT COMPLICATION, WITH LONG-TERM CURRENT USE OF INSULIN: ICD-10-CM

## 2024-05-14 DIAGNOSIS — E11.9 TYPE 2 DIABETES MELLITUS WITHOUT COMPLICATION, WITH LONG-TERM CURRENT USE OF INSULIN: Primary | ICD-10-CM

## 2024-05-14 DIAGNOSIS — E11.65 UNCONTROLLED TYPE 2 DIABETES MELLITUS WITH HYPERGLYCEMIA: ICD-10-CM

## 2024-05-14 RX ORDER — BLOOD SUGAR DIAGNOSTIC
STRIP MISCELLANEOUS
Qty: 500 EACH | Refills: 4 | Status: SHIPPED | OUTPATIENT
Start: 2024-05-14

## 2024-05-14 RX ORDER — INSULIN ASPART 100 [IU]/ML
10 INJECTION, SOLUTION INTRAVENOUS; SUBCUTANEOUS
Qty: 75 ML | Refills: 11 | Status: SHIPPED | OUTPATIENT
Start: 2024-05-14 | End: 2025-05-14

## 2024-05-14 NOTE — TELEPHONE ENCOUNTER
Pt has concerns about most recent A1C at 6.8 Also wanted to note that pt stated she stopped taking her cholesterol medication because it gives her chest discomfort

## 2024-05-14 NOTE — TELEPHONE ENCOUNTER
----- Message from Zoran Ortega sent at 5/14/2024 11:09 AM CDT -----  Regarding: NovoFine 32G Pen Needles NovoLog FlexPen 100u Patient Assistance Program  [facility] has received medication from [facility] patient assistance program for your patient Chelly Ortiz ([id number]).  At your earliest convenience please send to [facility] escript for.  1. NovoFine 32G Pen Needles (qty 400)  2. NovoLog FlexPen 100u (qty 25 pens)  Thanks

## 2024-05-16 ENCOUNTER — OFFICE VISIT (OUTPATIENT)
Dept: CARDIOLOGY | Facility: CLINIC | Age: 74
End: 2024-05-16
Payer: MEDICARE

## 2024-05-16 VITALS
SYSTOLIC BLOOD PRESSURE: 124 MMHG | HEIGHT: 63 IN | BODY MASS INDEX: 33.04 KG/M2 | HEART RATE: 86 BPM | OXYGEN SATURATION: 97 % | WEIGHT: 186.5 LBS | DIASTOLIC BLOOD PRESSURE: 80 MMHG

## 2024-05-16 DIAGNOSIS — E11.9 TYPE 2 DIABETES MELLITUS WITHOUT COMPLICATION, WITH LONG-TERM CURRENT USE OF INSULIN: ICD-10-CM

## 2024-05-16 DIAGNOSIS — E78.2 MIXED HYPERLIPIDEMIA: ICD-10-CM

## 2024-05-16 DIAGNOSIS — I10 ESSENTIAL HYPERTENSION: ICD-10-CM

## 2024-05-16 DIAGNOSIS — Z79.4 TYPE 2 DIABETES MELLITUS WITHOUT COMPLICATION, WITH LONG-TERM CURRENT USE OF INSULIN: ICD-10-CM

## 2024-05-16 DIAGNOSIS — R00.2 PALPITATIONS: Primary | ICD-10-CM

## 2024-05-16 DIAGNOSIS — I70.0 ABDOMINAL AORTIC ATHEROSCLEROSIS: ICD-10-CM

## 2024-05-16 PROCEDURE — 1125F AMNT PAIN NOTED PAIN PRSNT: CPT | Mod: HCNC,CPTII,S$GLB, | Performed by: INTERNAL MEDICINE

## 2024-05-16 PROCEDURE — 3079F DIAST BP 80-89 MM HG: CPT | Mod: HCNC,CPTII,S$GLB, | Performed by: INTERNAL MEDICINE

## 2024-05-16 PROCEDURE — 99999 PR PBB SHADOW E&M-EST. PATIENT-LVL V: CPT | Mod: PBBFAC,HCNC,, | Performed by: INTERNAL MEDICINE

## 2024-05-16 PROCEDURE — 1159F MED LIST DOCD IN RCRD: CPT | Mod: HCNC,CPTII,S$GLB, | Performed by: INTERNAL MEDICINE

## 2024-05-16 PROCEDURE — 3044F HG A1C LEVEL LT 7.0%: CPT | Mod: HCNC,CPTII,S$GLB, | Performed by: INTERNAL MEDICINE

## 2024-05-16 PROCEDURE — 3074F SYST BP LT 130 MM HG: CPT | Mod: HCNC,CPTII,S$GLB, | Performed by: INTERNAL MEDICINE

## 2024-05-16 PROCEDURE — 1101F PT FALLS ASSESS-DOCD LE1/YR: CPT | Mod: HCNC,CPTII,S$GLB, | Performed by: INTERNAL MEDICINE

## 2024-05-16 PROCEDURE — 99214 OFFICE O/P EST MOD 30 MIN: CPT | Mod: HCNC,S$GLB,, | Performed by: INTERNAL MEDICINE

## 2024-05-16 PROCEDURE — 3288F FALL RISK ASSESSMENT DOCD: CPT | Mod: HCNC,CPTII,S$GLB, | Performed by: INTERNAL MEDICINE

## 2024-05-16 RX ORDER — ROSUVASTATIN CALCIUM 5 MG/1
5 TABLET, COATED ORAL DAILY
Qty: 90 TABLET | Refills: 3 | Status: SHIPPED | OUTPATIENT
Start: 2024-05-16 | End: 2025-05-16

## 2024-05-24 ENCOUNTER — OFFICE VISIT (OUTPATIENT)
Dept: INTERNAL MEDICINE | Facility: CLINIC | Age: 74
End: 2024-05-24
Payer: MEDICARE

## 2024-05-24 VITALS
BODY MASS INDEX: 33.48 KG/M2 | OXYGEN SATURATION: 98 % | HEIGHT: 63 IN | DIASTOLIC BLOOD PRESSURE: 82 MMHG | WEIGHT: 188.94 LBS | HEART RATE: 72 BPM | SYSTOLIC BLOOD PRESSURE: 142 MMHG

## 2024-05-24 DIAGNOSIS — R39.9 UTI SYMPTOMS: ICD-10-CM

## 2024-05-24 DIAGNOSIS — R10.2 SUPRAPUBIC PAIN: Primary | ICD-10-CM

## 2024-05-24 LAB
BILIRUB SERPL-MCNC: NEGATIVE MG/DL
BLOOD URINE, POC: NEGATIVE
CLARITY, POC UA: CLEAR
COLOR, POC UA: NORMAL
GLUCOSE UR QL STRIP: NEGATIVE
KETONES UR QL STRIP: NEGATIVE
LEUKOCYTE ESTERASE URINE, POC: NORMAL
NITRITE, POC UA: NEGATIVE
PH, POC UA: 7
PROTEIN, POC: NEGATIVE
SPECIFIC GRAVITY, POC UA: 1.01
UROBILINOGEN, POC UA: 0.2

## 2024-05-24 PROCEDURE — 3077F SYST BP >= 140 MM HG: CPT | Mod: HCNC,CPTII,S$GLB, | Performed by: NURSE PRACTITIONER

## 2024-05-24 PROCEDURE — 3288F FALL RISK ASSESSMENT DOCD: CPT | Mod: HCNC,CPTII,S$GLB, | Performed by: NURSE PRACTITIONER

## 2024-05-24 PROCEDURE — 3044F HG A1C LEVEL LT 7.0%: CPT | Mod: HCNC,CPTII,S$GLB, | Performed by: NURSE PRACTITIONER

## 2024-05-24 PROCEDURE — 1101F PT FALLS ASSESS-DOCD LE1/YR: CPT | Mod: HCNC,CPTII,S$GLB, | Performed by: NURSE PRACTITIONER

## 2024-05-24 PROCEDURE — 3079F DIAST BP 80-89 MM HG: CPT | Mod: HCNC,CPTII,S$GLB, | Performed by: NURSE PRACTITIONER

## 2024-05-24 PROCEDURE — 81002 URINALYSIS NONAUTO W/O SCOPE: CPT | Mod: HCNC,S$GLB,, | Performed by: NURSE PRACTITIONER

## 2024-05-24 PROCEDURE — 99213 OFFICE O/P EST LOW 20 MIN: CPT | Mod: HCNC,S$GLB,, | Performed by: NURSE PRACTITIONER

## 2024-05-24 PROCEDURE — 1159F MED LIST DOCD IN RCRD: CPT | Mod: HCNC,CPTII,S$GLB, | Performed by: NURSE PRACTITIONER

## 2024-05-24 PROCEDURE — 1125F AMNT PAIN NOTED PAIN PRSNT: CPT | Mod: HCNC,CPTII,S$GLB, | Performed by: NURSE PRACTITIONER

## 2024-05-24 PROCEDURE — 87086 URINE CULTURE/COLONY COUNT: CPT | Mod: HCNC | Performed by: NURSE PRACTITIONER

## 2024-05-24 PROCEDURE — 99999 PR PBB SHADOW E&M-EST. PATIENT-LVL V: CPT | Mod: PBBFAC,HCNC,, | Performed by: NURSE PRACTITIONER

## 2024-05-24 RX ORDER — CEPHALEXIN 500 MG/1
500 CAPSULE ORAL EVERY 12 HOURS
Qty: 14 CAPSULE | Refills: 0 | Status: SHIPPED | OUTPATIENT
Start: 2024-05-24

## 2024-05-24 NOTE — PROGRESS NOTES
INTERNAL MEDICINE PROGRESS/URGENT CARE NOTE    CHIEF COMPLAINT     Chief Complaint   Patient presents with    Abdominal Pain       HPI     Chelly Ortiz is a 73 y.o. female who presents for an urgent visit today.    Lower abd cramping in suprapubic area x 1 week. No fevers, nvd, vaginal bleeding or discharge. Some urinary frequency. No dysuria or urgency.   Takes miralax prn. Constipation every now and then. No worse than normal. No melena, hematochezia.     Past Medical History:  Past Medical History:   Diagnosis Date    Allergy     DDD (degenerative disc disease), lumbar     Diabetes mellitus     diet controlled    GERD (gastroesophageal reflux disease)     History of subconjunctival hemorrhage 2011    left eye    IBS (irritable bowel syndrome)     Obesity     MYRIAM (obstructive sleep apnea)     on CPAP    Rotator cuff impingement syndrome     Seasonal allergic conjunctivitis     Type 2 diabetes mellitus treated with insulin         Past Surgical History:  Past Surgical History:   Procedure Laterality Date    CATARACT EXTRACTION W/  INTRAOCULAR LENS IMPLANT Right 10/03/2013        CATARACT EXTRACTION W/  INTRAOCULAR LENS IMPLANT Left 2022         SECTION      x2    CHOLECYSTECTOMY      COLONOSCOPY N/A 2018    Procedure: COLONOSCOPY;  Surgeon: Marie Mejia MD;  Location: [facility] ENDO;  Service: Endoscopy;  Laterality: N/A;    COLONOSCOPY N/A 2022    Procedure: COLONOSCOPY;  Surgeon: Pierce Rivera MD;  Location: [facility] ENDO;  Service: Endoscopy;  Laterality: N/A;    ENDOSCOPIC ULTRASOUND OF UPPER GASTROINTESTINAL TRACT N/A 10/09/2018    Procedure: ULTRASOUND, UPPER GI TRACT, ENDOSCOPIC;  Surgeon: Javy Simpson MD;  Location: [facility] ENDO;  Service: Endoscopy;  Laterality: N/A;    EXCISION OF LESION N/A 2019    Procedure: EXCISION, LESION VULVA;  Surgeon: Liv Odell MD;  Location: [facility] OR;  Service: OB/GYN;  Laterality: N/A;  latex allergy    EYE  SURGERY      HYSTERECTOMY      ovaries intact, due to DUB    INTRAOCULAR PROSTHESES INSERTION Left 11/2/2022    Procedure: INSERTION, IOL PROSTHESIS;  Surgeon: Clarice Herzog MD;  Location: [facility] OR [address] [place] [address];  Service: Ophthalmology;  Laterality: Left;    PHACOEMULSIFICATION OF CATARACT Left 11/2/2022    Procedure: PHACOEMULSIFICATION, CATARACT;  Surgeon: Clarice Herzog MD;  [facility];  Service: Ophthalmology;  Laterality: Left;    TUBAL LIGATION          Allergies:  Review of patient's allergies indicates:   Allergen Reactions    Ace inhibitors Hives     \Cough    Latex, natural rubber Itching       Home Medications:    Current Outpatient Medications:     ACCU-CHEK FASTCLIX LANCING DEV Kit, USE AS DIRECTED, Disp: 1 each, Rfl: 0    albuterol (PROVENTIL/VENTOLIN HFA) 90 mcg/actuation inhaler, Inhale 1-2 puffs into the lungs every 4 (four) hours as needed for Wheezing., Disp: 18 g, Rfl: 2    ALPRAZolam (XANAX) 0.25 MG tablet, Take 1 tablet (0.25 mg total) by mouth once. Take approximately 20 to 30 minutes before your MRI exam for 1 dose, Disp: 1 tablet, Rfl: 0    amLODIPine (NORVASC) 10 MG tablet, Take 1 tablet (10 mg total) by mouth once daily., Disp: 90 tablet, Rfl: 3    aspirin (ECOTRIN) 81 MG EC tablet, Take 81 mg by mouth once daily., Disp: , Rfl:     benzonatate (TESSALON) 200 MG capsule, Take 1 capsule (200 mg total) by mouth every 8 (eight) hours as needed for Cough., Disp: 30 capsule, Rfl: 0    blood sugar diagnostic (ACCU-CHEK GUIDE TEST STRIPS) Strp, 3 times a day, Disp: 300 strip, Rfl: 11    blood-glucose meter (ACCU-CHEK GUIDE GLUCOSE METER) Misc, Three times a day, Disp: 1 each, Rfl: 0    citalopram (CELEXA) 10 MG tablet, Take 1 tablet (10 mg total) by mouth once daily., Disp: 90 tablet, Rfl: 1    diclofenac sodium (VOLTAREN) 1 % Gel, APPLY 2 G TOPICALLY ONCE DAILY., Disp: 100 g, Rfl: 1    dulaglutide (TRULICITY) 3 mg/0.5 mL pen injector, Inject 3 mg into the skin every 7 days.,  "Disp: 4 pen , Rfl: 11    ergocalciferol (ERGOCALCIFEROL) 50,000 unit Cap, TAKE 1 CAPSULE BY MOUTH EVERY 7 DAYS, Disp: 12 capsule, Rfl: 3    esomeprazole (NEXIUM) 40 MG capsule, Take 1 capsule (40 mg total) by mouth before breakfast., Disp: 90 capsule, Rfl: 3    fluocinonide (LIDEX) 0.05 % external solution, AAA scalp qday - bid prn pruritus, Disp: 60 mL, Rfl: 3    fluticasone propionate (FLONASE) 50 mcg/actuation nasal spray, 1 spray (50 mcg total) by Each Nostril route once daily., Disp: 9.9 mL, Rfl: 3    gabapentin (NEURONTIN) 100 MG capsule, Take 2 capsules (200 mg total) by mouth every evening., Disp: 180 capsule, Rfl: 3    imiquimod (ALDARA) 5 % cream, Apply topically 3 (three) times a week., Disp: 12 packet, Rfl: 2    insulin (LANTUS SOLOSTAR U-100 INSULIN) glargine 100 units/mL SubQ pen, Inject 40 Units into the skin every evening., Disp: 45 mL, Rfl: 0    insulin aspart U-100 (NOVOLOG FLEXPEN U-100 INSULIN) 100 unit/mL (3 mL) InPn pen, Inject 10 Units into the skin 3 (three) times daily with meals. TDD 60 units, Disp: 75 mL, Rfl: 11    insulin syringe-needle U-100 0.3 mL 31 gauge x 5/16" Syrg, relion brand, use 5 x a day, if not on levemir or novolog, Disp: 150 each, Rfl: 1    insulin syringe-needle U-100 0.5 mL 31 gauge x 5/16" Syrg, Use nightly. 90 day, Disp: 100 each, Rfl: 3    lancets (ACCU-CHEK FASTCLIX LANCET DRUM) [facility], TEST BLOOD SUGAR THREE TIMES A DAY, Disp: 918 each, Rfl: 3    magnesium oxide (MAG-OX) 400 mg tablet, Take 1 tablet (400 mg total) by mouth 2 (two) times daily., Disp: 180 tablet, Rfl: 3    pen needle, diabetic (NOVOFINE 32) 32 gauge x 1/4" Ndle, 4 injections per day, Disp: 500 each, Rfl: 4    rosuvastatin (CRESTOR) 5 MG tablet, Take 1 tablet (5 mg total) by mouth once daily., Disp: 90 tablet, Rfl: 3    sodium chloride (SALINE NASAL) 0.65 % nasal spray, 1 spray by Nasal route as needed for Congestion., Disp: 30 mL, Rfl: 6    traZODone (DESYREL) 50 MG tablet, TAKE 1 TABLET BY MOUTH EVERY " "EVENING., Disp: 90 tablet, Rfl: 0    calcium carbonate (OS-ARIC) 600 mg calcium (1,500 mg) Tab, Take 1 tablet (600 mg total) by mouth once. for 1 dose, Disp: 30 tablet, Rfl: 11    cephALEXin (KEFLEX) 500 MG capsule, Take 1 capsule (500 mg total) by mouth every 12 (twelve) hours., Disp: 14 capsule, Rfl: 0    cetirizine (ZYRTEC) 10 MG tablet, Take 1 tablet (10 mg total) by mouth every evening., Disp: 90 tablet, Rfl: 0    glucose 4 GM chewable tablet, Take 4 tablets (16 g total) by mouth as needed for Low blood sugar (If having symptoms of blurry vision, palpitations, confusion, shakiness.  Please check sugars and if sugar below 70 please take 4 tablets and re-check sugar everry 15 minutes until sugars are above 70 and symptoms resolve.)., Disp: 50 tablet, Rfl: 12    Current Facility-Administered Medications:     triamcinolone acetonide injection 20 mg, 20 mg, Intramuscular, 1 time in Clinic/HOD, Tani Pittman FNP     Review of Systems:  Review of Systems   Constitutional:  Negative for fever.   Gastrointestinal:  Positive for abdominal pain and constipation. Negative for diarrhea, nausea and vomiting.   Genitourinary:  Positive for frequency. Negative for dysuria and hematuria.   Musculoskeletal:  Negative for arthralgias and myalgias.   Neurological:  Negative for headaches.         PHYSICAL EXAM     Vitals:    05/24/24 1536 05/24/24 1547   BP: (!) 146/88 (!) 142/82   BP Location: Right arm    Patient Position: Sitting    BP Method: Large (Manual)    Pulse: 72    SpO2: 98%    Weight: 85.7 kg (188 lb 15 oz)    Height: 5' 3" (1.6 m)       Body mass index is 33.47 kg/m².     Physical Exam  Vitals reviewed.   Constitutional:       Appearance: Normal appearance.   HENT:      Head: Normocephalic.      Mouth/Throat:      Mouth: Mucous membranes are moist.      Pharynx: Oropharynx is clear.   Eyes:      Conjunctiva/sclera: Conjunctivae normal.      Pupils: Pupils are equal, round, and reactive to light.   Cardiovascular:    "   Rate and Rhythm: Normal rate and regular rhythm.   Pulmonary:      Effort: Pulmonary effort is normal.      Breath sounds: Normal breath sounds.   Abdominal:      General: Bowel sounds are normal.      Palpations: Abdomen is soft.      Tenderness: There is no abdominal tenderness. There is no right CVA tenderness or left CVA tenderness.   Skin:     General: Skin is warm and dry.   Neurological:      General: No focal deficit present.      Mental Status: She is alert.   Psychiatric:         Mood and Affect: Mood normal.         Behavior: Behavior normal.         LABS     Lab Results   Component Value Date    HGBA1C 6.8 (H) 04/23/2024     CMP  Sodium   Date Value Ref Range Status   04/23/2024 143 136 - 145 mmol/L Final     Potassium   Date Value Ref Range Status   04/23/2024 3.4 (L) 3.5 - 5.1 mmol/L Final     Chloride   Date Value Ref Range Status   04/23/2024 107 95 - 110 mmol/L Final     CO2   Date Value Ref Range Status   04/23/2024 30 (H) 23 - 29 mmol/L Final     Glucose   Date Value Ref Range Status   04/23/2024 125 (H) 70 - 110 mg/dL Final     BUN   Date Value Ref Range Status   04/23/2024 14 8 - 23 mg/dL Final     Creatinine   Date Value Ref Range Status   04/23/2024 1.0 0.5 - 1.4 mg/dL Final     Calcium   Date Value Ref Range Status   04/23/2024 8.7 8.7 - 10.5 mg/dL Final     Total Protein   Date Value Ref Range Status   04/23/2024 7.0 6.0 - 8.4 g/dL Final     Albumin   Date Value Ref Range Status   04/23/2024 3.6 3.5 - 5.2 g/dL Final     Total Bilirubin   Date Value Ref Range Status   04/23/2024 0.5 0.1 - 1.0 mg/dL Final     Comment:     For infants and newborns, interpretation of results should be based  on gestational age, weight and in agreement with clinical  observations.    Premature Infant recommended reference ranges:  Up to 24 hours.............<8.0 mg/dL  Up to 48 hours............<12.0 mg/dL  3-5 days..................<15.0 mg/dL  6-29 days.................<15.0 mg/dL       Alkaline Phosphatase    Date Value Ref Range Status   04/23/2024 106 55 - 135 U/L Final     AST   Date Value Ref Range Status   04/23/2024 11 10 - 40 U/L Final     ALT   Date Value Ref Range Status   04/23/2024 11 10 - 44 U/L Final     Anion Gap   Date Value Ref Range Status   04/23/2024 6 (L) 8 - 16 mmol/L Final     eGFR if    Date Value Ref Range Status   06/13/2022 >60.0 >60 mL/min/1.73 m^2 Final     eGFR if non    Date Value Ref Range Status   06/13/2022 >60.0 >60 mL/min/1.73 m^2 Final     Comment:     Calculation used to obtain the estimated glomerular filtration  rate (eGFR) is the CKD-EPI equation.        Lab Results   Component Value Date    WBC 8.96 04/23/2024    HGB 13.3 04/23/2024    HCT 39.6 04/23/2024    MCV 77 (L) 04/23/2024     04/23/2024     Lab Results   Component Value Date    CHOL 222 (H) 04/23/2024    CHOL 204 (H) 10/24/2023    CHOL 213 (H) 04/26/2023     Lab Results   Component Value Date    HDL 54 04/23/2024    HDL 55 10/24/2023    HDL 56 04/26/2023     Lab Results   Component Value Date    LDLCALC 146.4 04/23/2024    LDLCALC 128.4 10/24/2023    LDLCALC 130.6 04/26/2023     Lab Results   Component Value Date    TRIG 108 04/23/2024    TRIG 103 10/24/2023    TRIG 132 04/26/2023     Lab Results   Component Value Date    CHOLHDL 24.3 04/23/2024    CHOLHDL 27.0 10/24/2023    CHOLHDL 26.3 04/26/2023     Lab Results   Component Value Date    TSH 1.612 04/23/2024       ASSESSMENT     1. Suprapubic pain    2. UTI symptoms           PLAN  Trace leuks but otherwise negative. Possible UTI? Will tx empirically for now and obtain urine cx. Hydrate well.   Take miralax daily for now.   Will contact with labs and any further instructions.     1. Suprapubic pain  - POCT urine dipstick without microscope    2. UTI symptoms  - cephALEXin (KEFLEX) 500 MG capsule; Take 1 capsule (500 mg total) by mouth every 12 (twelve) hours.  Dispense: 14 capsule; Refill: 0  - Urine culture       Follow up with  PCP     Patient was counseled on when to seek emergent care. Patient's plan/treatment was discussed including medications and possible side effects. Verbalized understanding of all instructions.          KAYKAY Chappell  Department of Internal Medicine - [facility]  05/27/2024

## 2024-05-26 ENCOUNTER — PATIENT MESSAGE (OUTPATIENT)
Dept: INTERNAL MEDICINE | Facility: CLINIC | Age: 74
End: 2024-05-26
Payer: MEDICARE

## 2024-05-26 DIAGNOSIS — Z79.4 TYPE 2 DIABETES MELLITUS WITHOUT COMPLICATION, WITH LONG-TERM CURRENT USE OF INSULIN: Primary | ICD-10-CM

## 2024-05-26 DIAGNOSIS — E11.9 TYPE 2 DIABETES MELLITUS WITHOUT COMPLICATION, WITH LONG-TERM CURRENT USE OF INSULIN: Primary | ICD-10-CM

## 2024-05-27 ENCOUNTER — TELEPHONE (OUTPATIENT)
Dept: INTERNAL MEDICINE | Facility: CLINIC | Age: 74
End: 2024-05-27
Payer: MEDICARE

## 2024-05-27 RX ORDER — SEMAGLUTIDE 0.68 MG/ML
INJECTION, SOLUTION SUBCUTANEOUS
Qty: 6 ML | Refills: 0 | Status: SHIPPED | OUTPATIENT
Start: 2024-05-27 | End: 2024-07-26

## 2024-05-27 NOTE — TELEPHONE ENCOUNTER
----- Message from Rachelle Goodrich NP sent at 5/27/2024  7:51 AM CDT -----  Please call pt and let her know culture shows no growth as of yet. Likely no uti. Stop keflex for now. Hydrate well. Follow back up if continued pain.

## 2024-05-28 LAB — BACTERIA UR CULT: NORMAL

## 2024-06-03 NOTE — TELEPHONE ENCOUNTER
"  Reason for Disposition   [1] Request for URGENT new prescription or refill of "essential" medication (i.e., likelihood of harm to patient if not taken) AND [2] triager unable to fill per unit policy    Additional Information   Negative: Drug overdose and nurse unable to answer question   Negative: Caller requesting information not related to medicine   Negative: Caller requesting a prescription for Strep throat and has a positive culture result   Negative: Rash while taking a medication or within 3 days of stopping it   Negative: Immunization reaction suspected   Negative: [1] Asthma AND [2] having symptoms of asthma (cough, wheezing, etc)   Negative: MORE THAN A DOUBLE DOSE of a prescription or over-the-counter (OTC) drug   Negative: [1] DOUBLE DOSE (an extra dose or lesser amount) of over-the-counter (OTC) drug AND [2] any symptoms (e.g., dizziness, nausea, pain, sleepiness)   Negative: [1] DOUBLE DOSE (an extra dose or lesser amount) of prescription drug AND [2] any symptoms (e.g., dizziness, nausea, pain, sleepiness)   Negative: Took another person's prescription drug   Negative: [1] DOUBLE DOSE (an extra dose or lesser amount) of prescription drug AND [2] NO symptoms (Exception: a double dose of antibiotics)   Negative: Diabetes drug error or overdose (e.g., insulin or extra dose)    Protocols used: MEDICATION QUESTION CALL-A-    " Speaking Coherently

## 2024-06-10 ENCOUNTER — PATIENT MESSAGE (OUTPATIENT)
Dept: INTERNAL MEDICINE | Facility: CLINIC | Age: 74
End: 2024-06-10
Payer: MEDICARE

## 2024-06-13 ENCOUNTER — PATIENT MESSAGE (OUTPATIENT)
Dept: INTERNAL MEDICINE | Facility: CLINIC | Age: 74
End: 2024-06-13
Payer: MEDICARE

## 2024-06-13 ENCOUNTER — TELEPHONE (OUTPATIENT)
Dept: INTERNAL MEDICINE | Facility: CLINIC | Age: 74
End: 2024-06-13
Payer: MEDICARE

## 2024-06-13 DIAGNOSIS — Z79.4 TYPE 2 DIABETES MELLITUS WITHOUT COMPLICATION, WITH LONG-TERM CURRENT USE OF INSULIN: Primary | ICD-10-CM

## 2024-06-13 DIAGNOSIS — E11.9 TYPE 2 DIABETES MELLITUS WITHOUT COMPLICATION, WITH LONG-TERM CURRENT USE OF INSULIN: Primary | ICD-10-CM

## 2024-06-13 NOTE — TELEPHONE ENCOUNTER
----- Message from Smitha Turner MD sent at 6/13/2024 11:37 AM CDT -----  She is due for a 6 month follow up with me. Can we get her scheduled? Thanks  ----- Message -----  From: [employer] [facility]  Sent: 4/23/2024  12:52 PM CDT  To: Smitha Turner MD

## 2024-06-13 NOTE — TELEPHONE ENCOUNTER
----- Message from Smitha Turner MD sent at 6/13/2024 11:37 AM CDT -----  She is due for a 6 month follow up with me. Can we get her scheduled? Thanks  ----- Message -----  From: [employer] [facility]  Sent: 4/23/2024  12:52 PM CDT  To: Smitha Turner MD

## 2024-06-26 ENCOUNTER — TELEPHONE (OUTPATIENT)
Dept: OPTOMETRY | Facility: CLINIC | Age: 74
End: 2024-06-26
Payer: MEDICARE

## 2024-06-26 NOTE — TELEPHONE ENCOUNTER
----- Message from Ashley Nolasco sent at 6/26/2024  1:38 PM CDT -----  Contact: [phone]  Type:  Sooner Apoointment Request  Caller is requesting a sooner appointment.  Name of Caller:Chelly  When is the first available appointment?Soon appt  Symptoms:States feel light sand in her right eye  Would the patient rather a call back or a response via [facility]? Call  Best Call Back Number:[phone]  Additional Information:

## 2024-06-27 ENCOUNTER — PATIENT MESSAGE (OUTPATIENT)
Dept: SPORTS MEDICINE | Facility: CLINIC | Age: 74
End: 2024-06-27
Payer: MEDICARE

## 2024-06-27 DIAGNOSIS — M12.812 ROTATOR CUFF ARTHROPATHY OF BOTH SHOULDERS: Primary | ICD-10-CM

## 2024-06-27 DIAGNOSIS — G89.29 CHRONIC LEFT SHOULDER PAIN: ICD-10-CM

## 2024-06-27 DIAGNOSIS — M25.512 CHRONIC LEFT SHOULDER PAIN: ICD-10-CM

## 2024-06-27 DIAGNOSIS — M12.811 ROTATOR CUFF ARTHROPATHY OF BOTH SHOULDERS: Primary | ICD-10-CM

## 2024-06-27 DIAGNOSIS — M67.922 BICEPS TENDINOPATHY, LEFT: ICD-10-CM

## 2024-06-28 RX ORDER — CELECOXIB 200 MG/1
200 CAPSULE ORAL 2 TIMES DAILY
Qty: 60 CAPSULE | Refills: 1 | Status: SHIPPED | OUTPATIENT
Start: 2024-06-28

## 2024-07-20 ENCOUNTER — LAB VISIT (OUTPATIENT)
Dept: LAB | Facility: HOSPITAL | Age: 74
End: 2024-07-20
Payer: MEDICARE

## 2024-07-20 DIAGNOSIS — E11.9 TYPE 2 DIABETES MELLITUS WITHOUT COMPLICATION, WITH LONG-TERM CURRENT USE OF INSULIN: ICD-10-CM

## 2024-07-20 DIAGNOSIS — Z79.4 TYPE 2 DIABETES MELLITUS WITHOUT COMPLICATION, WITH LONG-TERM CURRENT USE OF INSULIN: ICD-10-CM

## 2024-07-20 LAB
ALBUMIN SERPL BCP-MCNC: 3.6 G/DL (ref 3.5–5.2)
ALP SERPL-CCNC: 96 U/L (ref 55–135)
ALT SERPL W/O P-5'-P-CCNC: 13 U/L (ref 10–44)
ANION GAP SERPL CALC-SCNC: 10 MMOL/L (ref 8–16)
AST SERPL-CCNC: 17 U/L (ref 10–40)
BILIRUB SERPL-MCNC: 0.8 MG/DL (ref 0.1–1)
BUN SERPL-MCNC: 11 MG/DL (ref 8–23)
CALCIUM SERPL-MCNC: 8.9 MG/DL (ref 8.7–10.5)
CHLORIDE SERPL-SCNC: 108 MMOL/L (ref 95–110)
CHOLEST SERPL-MCNC: 218 MG/DL (ref 120–199)
CHOLEST/HDLC SERPL: 3.6 {RATIO} (ref 2–5)
CO2 SERPL-SCNC: 26 MMOL/L (ref 23–29)
CREAT SERPL-MCNC: 0.8 MG/DL (ref 0.5–1.4)
EST. GFR  (NO RACE VARIABLE): >60 ML/MIN/1.73 M^2
ESTIMATED AVG GLUCOSE: 160 MG/DL (ref 68–131)
GLUCOSE SERPL-MCNC: 126 MG/DL (ref 70–110)
HBA1C MFR BLD: 7.2 % (ref 4–5.6)
HDLC SERPL-MCNC: 61 MG/DL (ref 40–75)
HDLC SERPL: 28 % (ref 20–50)
LDLC SERPL CALC-MCNC: 135.8 MG/DL (ref 63–159)
NONHDLC SERPL-MCNC: 157 MG/DL
POTASSIUM SERPL-SCNC: 3.3 MMOL/L (ref 3.5–5.1)
PROT SERPL-MCNC: 7 G/DL (ref 6–8.4)
SODIUM SERPL-SCNC: 144 MMOL/L (ref 136–145)
TRIGL SERPL-MCNC: 106 MG/DL (ref 30–150)

## 2024-07-20 PROCEDURE — 80061 LIPID PANEL: CPT | Mod: HCNC | Performed by: INTERNAL MEDICINE

## 2024-07-20 PROCEDURE — 36415 COLL VENOUS BLD VENIPUNCTURE: CPT | Mod: HCNC | Performed by: INTERNAL MEDICINE

## 2024-07-20 PROCEDURE — 80053 COMPREHEN METABOLIC PANEL: CPT | Mod: HCNC | Performed by: INTERNAL MEDICINE

## 2024-07-20 PROCEDURE — 83036 HEMOGLOBIN GLYCOSYLATED A1C: CPT | Mod: HCNC | Performed by: INTERNAL MEDICINE

## 2024-07-31 ENCOUNTER — OFFICE VISIT (OUTPATIENT)
Dept: INTERNAL MEDICINE | Facility: CLINIC | Age: 74
End: 2024-07-31
Payer: MEDICARE

## 2024-07-31 VITALS
DIASTOLIC BLOOD PRESSURE: 80 MMHG | OXYGEN SATURATION: 98 % | WEIGHT: 193.81 LBS | HEIGHT: 63 IN | SYSTOLIC BLOOD PRESSURE: 130 MMHG | BODY MASS INDEX: 34.34 KG/M2 | HEART RATE: 80 BPM

## 2024-07-31 DIAGNOSIS — E11.9 TYPE 2 DIABETES MELLITUS WITHOUT COMPLICATION, WITH LONG-TERM CURRENT USE OF INSULIN: ICD-10-CM

## 2024-07-31 DIAGNOSIS — N30.00 ACUTE CYSTITIS WITHOUT HEMATURIA: Primary | ICD-10-CM

## 2024-07-31 DIAGNOSIS — G89.29 CHRONIC RIGHT SHOULDER PAIN: ICD-10-CM

## 2024-07-31 DIAGNOSIS — M25.511 CHRONIC RIGHT SHOULDER PAIN: ICD-10-CM

## 2024-07-31 DIAGNOSIS — Z78.0 ASYMPTOMATIC MENOPAUSAL STATE: ICD-10-CM

## 2024-07-31 DIAGNOSIS — F34.1 PERSISTENT DEPRESSIVE DISORDER: ICD-10-CM

## 2024-07-31 DIAGNOSIS — Z79.4 TYPE 2 DIABETES MELLITUS WITHOUT COMPLICATION, WITH LONG-TERM CURRENT USE OF INSULIN: ICD-10-CM

## 2024-07-31 DIAGNOSIS — F32.A DEPRESSION, UNSPECIFIED: ICD-10-CM

## 2024-07-31 LAB
BILIRUB UR QL STRIP: NEGATIVE
CLARITY UR REFRACT.AUTO: CLEAR
COLOR UR AUTO: COLORLESS
GLUCOSE UR QL STRIP: NEGATIVE
HGB UR QL STRIP: NEGATIVE
KETONES UR QL STRIP: NEGATIVE
LEUKOCYTE ESTERASE UR QL STRIP: NEGATIVE
NITRITE UR QL STRIP: NEGATIVE
PH UR STRIP: 6 [PH] (ref 5–8)
PROT UR QL STRIP: NEGATIVE
SP GR UR STRIP: 1 (ref 1–1.03)
URN SPEC COLLECT METH UR: ABNORMAL

## 2024-07-31 PROCEDURE — 1101F PT FALLS ASSESS-DOCD LE1/YR: CPT | Mod: HCNC,CPTII,S$GLB, | Performed by: INTERNAL MEDICINE

## 2024-07-31 PROCEDURE — 3075F SYST BP GE 130 - 139MM HG: CPT | Mod: HCNC,CPTII,S$GLB, | Performed by: INTERNAL MEDICINE

## 2024-07-31 PROCEDURE — 3079F DIAST BP 80-89 MM HG: CPT | Mod: HCNC,CPTII,S$GLB, | Performed by: INTERNAL MEDICINE

## 2024-07-31 PROCEDURE — 3051F HG A1C>EQUAL 7.0%<8.0%: CPT | Mod: HCNC,CPTII,S$GLB, | Performed by: INTERNAL MEDICINE

## 2024-07-31 PROCEDURE — 81003 URINALYSIS AUTO W/O SCOPE: CPT | Mod: HCNC | Performed by: INTERNAL MEDICINE

## 2024-07-31 PROCEDURE — 99999 PR PBB SHADOW E&M-EST. PATIENT-LVL IV: CPT | Mod: PBBFAC,HCNC,, | Performed by: INTERNAL MEDICINE

## 2024-07-31 PROCEDURE — 99214 OFFICE O/P EST MOD 30 MIN: CPT | Mod: HCNC,S$GLB,, | Performed by: INTERNAL MEDICINE

## 2024-07-31 PROCEDURE — 3288F FALL RISK ASSESSMENT DOCD: CPT | Mod: HCNC,CPTII,S$GLB, | Performed by: INTERNAL MEDICINE

## 2024-07-31 PROCEDURE — 1125F AMNT PAIN NOTED PAIN PRSNT: CPT | Mod: HCNC,CPTII,S$GLB, | Performed by: INTERNAL MEDICINE

## 2024-07-31 RX ORDER — CITALOPRAM 10 MG/1
10 TABLET ORAL DAILY
Qty: 90 TABLET | Refills: 1 | Status: SHIPPED | OUTPATIENT
Start: 2024-07-31

## 2024-07-31 RX ORDER — INSULIN DETEMIR 100 [IU]/ML
40 INJECTION, SOLUTION SUBCUTANEOUS NIGHTLY
Start: 2024-07-31 | End: 2025-07-31

## 2024-07-31 NOTE — PROGRESS NOTES
Subjective:       Patient ID: Chelly Ortiz is a 73 y.o. female.    Chief Complaint: Follow-up    Has been experiencing  severe leg and foot pain every  day for about a month.      She stopped taking statin due to myalgias . She was on atorvastatin 40 mg daily.      Having right shoulder pain. Pain with movement. Has been doing PT and had steroid injection with ortho.      DMII on insulin and trulicity      HTN on amlodipine      HLD has nt been taking statin as above.      Insomnia on trazodone      Health Maintenance:  Colon Cancer Screening: colonoscopy in 2022 with polyps removed due again in 2027  Mammogram: January 2023 was normal   Hep C:negative 2007   Lipids: normal 2023   Vaccines:  up to date      Follow-up  Pertinent negatives include no abdominal pain, arthralgias, chest pain, chills, coughing, headaches, myalgias, nausea or vomiting.     Review of Systems   Constitutional:  Negative for activity change, appetite change and chills.   HENT:  Negative for ear pain, sinus pressure/congestion and sneezing.    Respiratory:  Negative for cough and shortness of breath.    Cardiovascular:  Negative for chest pain, palpitations and leg swelling.   Gastrointestinal:  Negative for abdominal distention, abdominal pain, constipation, diarrhea, nausea and vomiting.   Genitourinary:  Negative for dysuria and hematuria.   Musculoskeletal:  Positive for leg pain. Negative for arthralgias, back pain and myalgias.   Neurological:  Negative for dizziness and headaches.   Psychiatric/Behavioral:  Negative for agitation. The patient is not nervous/anxious.            Past Medical History:   Diagnosis Date    Allergy     DDD (degenerative disc disease), lumbar     Diabetes mellitus     diet controlled    GERD (gastroesophageal reflux disease)     History of subconjunctival hemorrhage 12/02/2011    left eye    IBS (irritable bowel syndrome)     Obesity     MYRIAM (obstructive sleep apnea)     on CPAP    Rotator cuff impingement  syndrome     Seasonal allergic conjunctivitis     Type 2 diabetes mellitus treated with insulin      Past Surgical History:   Procedure Laterality Date    CATARACT EXTRACTION W/  INTRAOCULAR LENS IMPLANT Right 10/03/2013        CATARACT EXTRACTION W/  INTRAOCULAR LENS IMPLANT Left 2022         SECTION      x2    CHOLECYSTECTOMY      COLONOSCOPY N/A 2018    Procedure: COLONOSCOPY;  Surgeon: Marie Mejia MD;  Location: [facility] ENDO;  Service: Endoscopy;  Laterality: N/A;    COLONOSCOPY N/A 2022    Procedure: COLONOSCOPY;  Surgeon: Pierce Rivera MD;  Location: [facility] ENDO;  Service: Endoscopy;  Laterality: N/A;    ENDOSCOPIC ULTRASOUND OF UPPER GASTROINTESTINAL TRACT N/A 10/09/2018    Procedure: ULTRASOUND, UPPER GI TRACT, ENDOSCOPIC;  Surgeon: Javy Simpson MD;  Location: [facility];  Service: Endoscopy;  Laterality: N/A;    EXCISION OF LESION N/A 2019    Procedure: EXCISION, LESION VULVA;  Surgeon: Liv Odell MD;  Location: [facility];  Service: OB/GYN;  Laterality: N/A;  latex allergy    EYE SURGERY      HYSTERECTOMY      ovaries intact, due to DUB    INTRAOCULAR PROSTHESES INSERTION Left 2022    Procedure: INSERTION, IOL PROSTHESIS;  Surgeon: Clarice Herzog MD;  Location: [facility] OR [address] [place] [address];  Service: Ophthalmology;  Laterality: Left;    PHACOEMULSIFICATION OF CATARACT Left 2022    Procedure: PHACOEMULSIFICATION, CATARACT;  Surgeon: Clarice Herzog MD;  Location: [facility] OR [address] [place] [address];  Service: Ophthalmology;  Laterality: Left;    TUBAL LIGATION        Patient Active Problem List   Diagnosis    GERD (gastroesophageal reflux disease)    MYRIAM (obstructive sleep apnea)    IBS (irritable bowel syndrome)    DDD (degenerative disc disease), lumbar    Insomnia    Anxiety    Hearing loss, sensorineural    Nuclear sclerotic cataract of left eye    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    Constipation    History of colon polyps    Essential  hypertension    Hyperlipidemia, unspecified    Spondylosis of cervical region without myelopathy or radiculopathy    Cervical myofascial pain syndrome    PCO (posterior capsular opacification), right    Dry eye syndrome    Pseudophakia    Decreased range of motion of lumbar spine    Decreased strength    Dilated bile duct    Personal history of colonic polyps    EIC (epidermal inclusion cyst)    Sedentary lifestyle    Dysphagia    Type 2 diabetes mellitus without complication, with long-term current use of insulin    Abdominal aortic atherosclerosis    Decreased ROM of neck    Arthritis of multiple sites    Osteopenia of multiple sites    Combined form of age-related cataract, left eye    Adjustment disorder with anxious mood    Decreased ROM of right shoulder    History of epistaxis    Acute pain of both shoulders    Decreased ROM of left shoulder    Abnormal posture    Palpitations        Objective:      Physical Exam  Vitals reviewed.   Constitutional:       Appearance: Normal appearance.   HENT:      Head: Normocephalic.      Mouth/Throat:      Mouth: Mucous membranes are moist.      Pharynx: Oropharynx is clear.   Eyes:      Conjunctiva/sclera: Conjunctivae normal.      Pupils: Pupils are equal, round, and reactive to light.   Cardiovascular:      Rate and Rhythm: Normal rate and regular rhythm.   Pulmonary:      Effort: Pulmonary effort is normal.      Breath sounds: Normal breath sounds.   Abdominal:      General: Bowel sounds are normal.      Palpations: Abdomen is soft.      Tenderness: There is no abdominal tenderness. There is no right CVA tenderness or left CVA tenderness.   Skin:     General: Skin is warm and dry.   Neurological:      General: No focal deficit present.      Mental Status: She is alert.   Psychiatric:         Mood and Affect: Mood normal.         Behavior: Behavior normal.         Assessment:       Problem List Items Addressed This Visit    None      Plan:         There are no diagnoses  linked to this encounter.             Smitha Turner MD   Internal Medicine   Primary Care

## 2024-08-01 ENCOUNTER — TELEPHONE (OUTPATIENT)
Dept: ORTHOPEDICS | Facility: CLINIC | Age: 74
End: 2024-08-01
Payer: MEDICARE

## 2024-08-01 NOTE — TELEPHONE ENCOUNTER
Called  and spoke with pt and got her schedule with Dr. [place] for Right Shoulder pain     Pt has no further questions or concerns.     Elizabeth     ----- Message from Lisa Crandall sent at 8/1/2024  9:56 AM CDT -----  Regarding: Ambulatory referral/consult to Orthopedics  Contact: [phone]  Good morning,    Pt returned a call to schedule an appointment for aan order in her chart.  No appointments were available at the Main [place] location.  Pt declined Previous Providers she has seen in the past, and is requesting to be scheduled with a different Provider.    DX: Chronic right shoulder pain [M25.511, G89.29]    Thank you,   Kate WONG  Access Navigator

## 2024-08-04 ENCOUNTER — PATIENT MESSAGE (OUTPATIENT)
Dept: INTERNAL MEDICINE | Facility: CLINIC | Age: 74
End: 2024-08-04
Payer: MEDICARE

## 2024-08-05 ENCOUNTER — OFFICE VISIT (OUTPATIENT)
Dept: URGENT CARE | Facility: CLINIC | Age: 74
End: 2024-08-05
Payer: MEDICARE

## 2024-08-05 VITALS
WEIGHT: 193 LBS | BODY MASS INDEX: 34.2 KG/M2 | SYSTOLIC BLOOD PRESSURE: 160 MMHG | HEART RATE: 100 BPM | OXYGEN SATURATION: 96 % | DIASTOLIC BLOOD PRESSURE: 84 MMHG | RESPIRATION RATE: 18 BRPM | TEMPERATURE: 97 F | HEIGHT: 63 IN

## 2024-08-05 DIAGNOSIS — J06.9 VIRAL URI WITH COUGH: Primary | ICD-10-CM

## 2024-08-05 LAB
CTP QC/QA: YES
SARS-COV-2 AG RESP QL IA.RAPID: NEGATIVE

## 2024-08-05 PROCEDURE — 87811 SARS-COV-2 COVID19 W/OPTIC: CPT | Mod: QW,,, | Performed by: NURSE PRACTITIONER

## 2024-08-05 PROCEDURE — 99213 OFFICE O/P EST LOW 20 MIN: CPT | Mod: ,,, | Performed by: NURSE PRACTITIONER

## 2024-08-05 RX ORDER — IPRATROPIUM BROMIDE 21 UG/1
2 SPRAY, METERED NASAL 3 TIMES DAILY
Qty: 30 ML | Refills: 0 | Status: SHIPPED | OUTPATIENT
Start: 2024-08-05 | End: 2024-08-09

## 2024-08-05 RX ORDER — BENZONATATE 200 MG/1
200 CAPSULE ORAL EVERY 8 HOURS PRN
Qty: 30 CAPSULE | Refills: 0 | Status: SHIPPED | OUTPATIENT
Start: 2024-08-05

## 2024-08-05 NOTE — TELEPHONE ENCOUNTER
Pt f/u on 7/31 office visit and 7/2 lab results, can't recall if her DM medications were discussed    Pt would like to know if she needs to changer her insulin or be prescribed another insulin medication

## 2024-08-07 ENCOUNTER — PATIENT MESSAGE (OUTPATIENT)
Dept: INTERNAL MEDICINE | Facility: CLINIC | Age: 74
End: 2024-08-07
Payer: MEDICARE

## 2024-08-14 ENCOUNTER — OFFICE VISIT (OUTPATIENT)
Dept: ORTHOPEDICS | Facility: CLINIC | Age: 74
End: 2024-08-14
Payer: MEDICARE

## 2024-08-14 VITALS — HEIGHT: 63 IN | WEIGHT: 191.56 LBS | BODY MASS INDEX: 33.94 KG/M2

## 2024-08-14 DIAGNOSIS — M19.211 SECONDARY OSTEOARTHRITIS OF RIGHT SHOULDER DUE TO ROTATOR CUFF TEAR: Primary | ICD-10-CM

## 2024-08-14 DIAGNOSIS — M25.511 CHRONIC RIGHT SHOULDER PAIN: ICD-10-CM

## 2024-08-14 DIAGNOSIS — G89.29 CHRONIC RIGHT SHOULDER PAIN: ICD-10-CM

## 2024-08-14 DIAGNOSIS — M75.101 SECONDARY OSTEOARTHRITIS OF RIGHT SHOULDER DUE TO ROTATOR CUFF TEAR: Primary | ICD-10-CM

## 2024-08-14 PROCEDURE — 99213 OFFICE O/P EST LOW 20 MIN: CPT | Mod: HCNC,S$GLB,, | Performed by: ORTHOPAEDIC SURGERY

## 2024-08-14 PROCEDURE — 3288F FALL RISK ASSESSMENT DOCD: CPT | Mod: HCNC,CPTII,S$GLB, | Performed by: ORTHOPAEDIC SURGERY

## 2024-08-14 PROCEDURE — 1101F PT FALLS ASSESS-DOCD LE1/YR: CPT | Mod: HCNC,CPTII,S$GLB, | Performed by: ORTHOPAEDIC SURGERY

## 2024-08-14 PROCEDURE — 3051F HG A1C>EQUAL 7.0%<8.0%: CPT | Mod: HCNC,CPTII,S$GLB, | Performed by: ORTHOPAEDIC SURGERY

## 2024-08-14 PROCEDURE — 99999 PR PBB SHADOW E&M-EST. PATIENT-LVL III: CPT | Mod: PBBFAC,HCNC,, | Performed by: ORTHOPAEDIC SURGERY

## 2024-08-14 PROCEDURE — 1125F AMNT PAIN NOTED PAIN PRSNT: CPT | Mod: HCNC,CPTII,S$GLB, | Performed by: ORTHOPAEDIC SURGERY

## 2024-08-14 PROCEDURE — 1159F MED LIST DOCD IN RCRD: CPT | Mod: HCNC,CPTII,S$GLB, | Performed by: ORTHOPAEDIC SURGERY

## 2024-08-14 PROCEDURE — 1160F RVW MEDS BY RX/DR IN RCRD: CPT | Mod: HCNC,CPTII,S$GLB, | Performed by: ORTHOPAEDIC SURGERY

## 2024-08-15 ENCOUNTER — TELEPHONE (OUTPATIENT)
Dept: SPORTS MEDICINE | Facility: CLINIC | Age: 74
End: 2024-08-15
Payer: MEDICARE

## 2024-08-15 ENCOUNTER — HOSPITAL ENCOUNTER (OUTPATIENT)
Dept: RADIOLOGY | Facility: HOSPITAL | Age: 74
Discharge: HOME | End: 2024-08-15
Attending: INTERNAL MEDICINE
Payer: MEDICARE

## 2024-08-15 DIAGNOSIS — Z78.0 ASYMPTOMATIC MENOPAUSAL STATE: ICD-10-CM

## 2024-08-15 PROCEDURE — 77080 DXA BONE DENSITY AXIAL: CPT | Mod: TC

## 2024-08-15 NOTE — TELEPHONE ENCOUNTER
Scheduled patient with Сергей.       ----- Message from Valeria Owens MA sent at 8/15/2024  2:10 PM CDT -----  Regarding: FW: ADVISE: REFERRAL- SURGERY  Contact: Self    ----- Message -----  From: Lyric Gutierrez  Sent: 8/15/2024   2:07 PM CDT  To: [facility] Staff  Subject: ADVISE: REFERRAL- SURGERY                        Pt ask for a call regarding a referral that Dr [place] put into the system to see Dr Holm. Pt has request to see Dr Rushing and have some questions.    Contact info   [phone] (home)

## 2024-08-20 PROBLEM — M19.211 SECONDARY OSTEOARTHRITIS OF RIGHT SHOULDER DUE TO ROTATOR CUFF TEAR: Status: ACTIVE | Noted: 2024-08-20

## 2024-08-20 PROBLEM — M75.101 SECONDARY OSTEOARTHRITIS OF RIGHT SHOULDER DUE TO ROTATOR CUFF TEAR: Status: ACTIVE | Noted: 2024-08-20

## 2024-08-20 NOTE — PROGRESS NOTES
Subjective:      Patient ID: Chelly Ortiz is a 73 y.o. female.    Chief Complaint:   Pain of the Right Shoulder    HPI  She comes in for further opinion and advice regarding her chronic right shoulder pain.  From her visit with another provider at this clinic about 7 months ago, reviewed with her and confirmed:  Patient is a 73 year old female who presents to clinic with chief complaint of bilateral right greater than left a-traumatic shoulder pain x awhile. Pain is constant with increase in intensity with range of motion and at night reports a burning pain. She has tried Mobic, ice, heat, and had steroid injection on 11/07/23 and 12/05/23 with some mild movement but still has symptoms. She has gone to PT but had to stop due to increased pain. Mild neck pain. No numbness or tingling.   She is not getting adequate relief from meloxicam and gabapentin.  Objective:        Ortho/SPM Exam    On exam she has no asymmetry to inspection.  She has a positive empty can test, with positive drop-arm sign.  She has active forward flexion to about the chest level only.  She has nearly normal rotation, but pain with internal to the waistline level.  She has tenderness at the biceps tendon anteriorly.  Distal neurovascular intact.  Normal range of motion of the elbow, wrist and hand      IMAGING:  MRI of the right shoulder confirms complete tear of the rotator cuff with early fatty infiltration and early glenohumeral arthrosis  Assessment:   Cuff tear arthropathy, right shoulder      Plan:   She may be a candidate for reverse shoulder arthroplasty.  We are going to refer her over to the [facility] to discuss this further.    The patient's pathophysiology was explained in detail with reference to x-rays, models, other visual aids as appropriate.  Treatment options were discussed in detail.  Questions were invited and answered to the patient's satisfaction.      Blake Monroe MD  Orthopedic Surgery

## 2024-08-21 ENCOUNTER — PATIENT MESSAGE (OUTPATIENT)
Dept: INTERNAL MEDICINE | Facility: CLINIC | Age: 74
End: 2024-08-21
Payer: MEDICARE

## 2024-08-22 ENCOUNTER — TELEPHONE (OUTPATIENT)
Dept: PHARMACY | Facility: CLINIC | Age: 74
End: 2024-08-22
Payer: MEDICARE

## 2024-08-22 NOTE — TELEPHONE ENCOUNTER
Attempt to return pt's call. Left detailed message reminding her of our last conversation regarding Levemir in Jan. Advised pt to reach out to Provider to discuss alternate therapy. If co-pay for newly prescribed med is unaffordable to let me know    Me   to Aníbal BOND Staff  Johnny MILLAN Staff   TT      1/17/24  9:49 AM  You can send a new prescription to the pharmacy of choice. If the decision is made to prescribe a different medication and the co-pay is unaffordable please let the Pharmacy patient assistance team know and we will be happy to research available assistance options.  Me     TT    1/17/24  9:49 AM  Note  Levemir has been  removed from the [employer] Patient Assistance Program. There are no additional assistance options available.      Ms Ortiz was instructed to contact her pharmacy to find out what her co-pay would be under her 2024 Part D Plan. If she finds that the co-pay is still unaffordable to discuss alternate therapy options with Provider

## 2024-09-03 ENCOUNTER — TELEPHONE (OUTPATIENT)
Dept: OPTOMETRY | Facility: CLINIC | Age: 74
End: 2024-09-03
Payer: MEDICARE

## 2024-09-03 ENCOUNTER — PATIENT MESSAGE (OUTPATIENT)
Dept: OPTOMETRY | Facility: CLINIC | Age: 74
End: 2024-09-03
Payer: MEDICARE

## 2024-09-03 NOTE — TELEPHONE ENCOUNTER
"----- Message from Caren Maddox sent at 9/3/2024  4:03 PM CDT -----    ----- Message -----  From: Carlos Enrique Branch  Sent: 9/3/2024   2:48 PM CDT  To: Hamilton Rodriguez Staff    Consult/Advisory:    Name Of Caller: Self    Contact Preference?: [phone]      email   [email or web address]    Provider Name: Hamilton    What is the nature of the call?: Requesting copy of recent glasses prescription via Email    Additional Notes:  "Thank you for all that you do for our patients"  "

## 2024-09-18 ENCOUNTER — TELEPHONE (OUTPATIENT)
Dept: PULMONOLOGY | Facility: CLINIC | Age: 74
End: 2024-09-18
Payer: MEDICARE

## 2024-09-18 NOTE — TELEPHONE ENCOUNTER
Attempted to contact patient in regards to her message but no one answered pt telephone and no voicemail picked up.

## 2024-09-18 NOTE — TELEPHONE ENCOUNTER
----- Message from Igor Sarabia sent at 9/18/2024  4:46 PM CDT -----  Regarding: Appt  Contact: [phone]  Pt calling to request sooner appt, if available. Coughing is getting worse. Please call  [phone]

## 2024-09-19 ENCOUNTER — TELEPHONE (OUTPATIENT)
Dept: PULMONOLOGY | Facility: CLINIC | Age: 74
End: 2024-09-19
Payer: MEDICARE

## 2024-09-19 NOTE — TELEPHONE ENCOUNTER
Spoke with patient, informed her that I have received her message.  Pt states that she has been having a consistent cough and would like to schedule a sooner appointment with Dr Gauthier. I advised pt that Dr Gauthier does not have any sooner appointments available but however, pt can contact her PCP in regards until she visit with Dr Gauthier. Pt verbalized that she understand.

## 2024-09-19 NOTE — TELEPHONE ENCOUNTER
----- Message from Yamila Zavala sent at 9/19/2024  8:52 AM CDT -----  Regarding: Appt  Contact: Pt [phone]  Pt is returning a call back to Penelope from yesterday about being seen sooner than 9/30 for appt please call

## 2024-09-23 ENCOUNTER — TELEPHONE (OUTPATIENT)
Dept: OPTOMETRY | Facility: CLINIC | Age: 74
End: 2024-09-23
Payer: MEDICARE

## 2024-09-23 NOTE — TELEPHONE ENCOUNTER
----- Message from Ashley Nolasco sent at 9/20/2024  4:56 PM CDT -----  Contact: [phone]  Patient need to come in the get recheck she can not see with her new RX

## 2024-09-25 ENCOUNTER — OFFICE VISIT (OUTPATIENT)
Dept: OPTOMETRY | Facility: CLINIC | Age: 74
End: 2024-09-25
Payer: MEDICARE

## 2024-09-25 DIAGNOSIS — H01.004 BLEPHARITIS OF UPPER EYELIDS OF BOTH EYES, UNSPECIFIED TYPE: ICD-10-CM

## 2024-09-25 DIAGNOSIS — H52.4 MYOPIA WITH ASTIGMATISM AND PRESBYOPIA, BILATERAL: ICD-10-CM

## 2024-09-25 DIAGNOSIS — H52.203 MYOPIA WITH ASTIGMATISM AND PRESBYOPIA, BILATERAL: ICD-10-CM

## 2024-09-25 DIAGNOSIS — H01.001 BLEPHARITIS OF UPPER EYELIDS OF BOTH EYES, UNSPECIFIED TYPE: ICD-10-CM

## 2024-09-25 DIAGNOSIS — H10.523 ANGULAR BLEPHAROCONJUNCTIVITIS OF BOTH EYES: Primary | ICD-10-CM

## 2024-09-25 DIAGNOSIS — H52.13 MYOPIA WITH ASTIGMATISM AND PRESBYOPIA, BILATERAL: ICD-10-CM

## 2024-09-25 PROCEDURE — 92015 DETERMINE REFRACTIVE STATE: CPT | Mod: HCNC,S$GLB,, | Performed by: OPTOMETRIST

## 2024-09-25 PROCEDURE — 3051F HG A1C>EQUAL 7.0%<8.0%: CPT | Mod: HCNC,CPTII,S$GLB, | Performed by: OPTOMETRIST

## 2024-09-25 PROCEDURE — 1159F MED LIST DOCD IN RCRD: CPT | Mod: HCNC,CPTII,S$GLB, | Performed by: OPTOMETRIST

## 2024-09-25 PROCEDURE — 1160F RVW MEDS BY RX/DR IN RCRD: CPT | Mod: HCNC,CPTII,S$GLB, | Performed by: OPTOMETRIST

## 2024-09-25 PROCEDURE — 99214 OFFICE O/P EST MOD 30 MIN: CPT | Mod: HCNC,S$GLB,, | Performed by: OPTOMETRIST

## 2024-09-25 PROCEDURE — 3288F FALL RISK ASSESSMENT DOCD: CPT | Mod: HCNC,CPTII,S$GLB, | Performed by: OPTOMETRIST

## 2024-09-25 PROCEDURE — 99999 PR PBB SHADOW E&M-EST. PATIENT-LVL II: CPT | Mod: PBBFAC,HCNC,, | Performed by: OPTOMETRIST

## 2024-09-25 PROCEDURE — 1101F PT FALLS ASSESS-DOCD LE1/YR: CPT | Mod: HCNC,CPTII,S$GLB, | Performed by: OPTOMETRIST

## 2024-09-25 PROCEDURE — 1126F AMNT PAIN NOTED NONE PRSNT: CPT | Mod: HCNC,CPTII,S$GLB, | Performed by: OPTOMETRIST

## 2024-09-25 RX ORDER — OFLOXACIN 3 MG/ML
1 SOLUTION/ DROPS OPHTHALMIC 4 TIMES DAILY
Qty: 5 ML | Refills: 0 | Status: SHIPPED | OUTPATIENT
Start: 2024-09-25 | End: 2024-10-05

## 2024-09-25 NOTE — PROGRESS NOTES
KY[facility]    IMTIAZ: 04/26/2024   Chief complaint (CC): Patient is here for Rx recheck pt did not purchase   Rx glasses from [facility].  Glasses? +2.50 or +3.00 OTC   Contacts? -  H/o eye surgery, injections or laser: PC IOL OU  H/o eye injury: -  Known eye conditions? See above  Family h/o eye conditions? -  Eye gtts? Systane drops 1-2 times, AT's prn and lid scrubs once or twice a   day      (-) Flashes (-)  Floaters (+) Mucous   (+)  Tearing (+) Itching (-) Burning   (-) Headaches (-) Eye Pain/discomfort (+) Irritation   (-)  Redness (-) Double vision (-) Blurry vision    Diabetic? +  A1c?  Last edited by Michael Villasenor, OD on 9/25/2024  2:29 PM.            Assessment /Plan     For exam results, see Encounter Report.    Angular blepharoconjunctivitis of both eyes  -     ofloxacin (OCUFLOX) 0.3 % ophthalmic solution; Place 1 drop into both eyes 4 (four) times daily. for 10 days  Dispense: 5 mL; Refill: 0  Blepharitis of upper eyelids of both eyes, unspecified type  -     ofloxacin (OCUFLOX) 0.3 % ophthalmic solution; Place 1 drop into both eyes 4 (four) times daily. for 10 days  Dispense: 5 mL; Refill: 0  Cont lid scrubs BID OU  Rx Maxitrol QID OU x 10 days.     Myopia with astigmatism and presbyopia, bilateral  Pt presents today w/issues w/Rx given on 4/26/2024 that she purchased from [facility]. Pt reports that she left the glasses there and got a refund. MRx today matches last Rx given.

## 2024-09-27 ENCOUNTER — PATIENT MESSAGE (OUTPATIENT)
Dept: INTERNAL MEDICINE | Facility: CLINIC | Age: 74
End: 2024-09-27
Payer: MEDICARE

## 2024-09-30 ENCOUNTER — OFFICE VISIT (OUTPATIENT)
Dept: PULMONOLOGY | Facility: CLINIC | Age: 74
End: 2024-09-30
Payer: MEDICARE

## 2024-09-30 VITALS
WEIGHT: 189.63 LBS | OXYGEN SATURATION: 96 % | HEIGHT: 63 IN | BODY MASS INDEX: 33.6 KG/M2 | SYSTOLIC BLOOD PRESSURE: 152 MMHG | HEART RATE: 48 BPM | DIASTOLIC BLOOD PRESSURE: 86 MMHG

## 2024-09-30 DIAGNOSIS — R05.3 CHRONIC COUGH: ICD-10-CM

## 2024-09-30 DIAGNOSIS — J45.991 COUGH VARIANT ASTHMA: ICD-10-CM

## 2024-09-30 DIAGNOSIS — G47.33 OSA (OBSTRUCTIVE SLEEP APNEA): Primary | ICD-10-CM

## 2024-09-30 DIAGNOSIS — G47.33 OSA ON CPAP: ICD-10-CM

## 2024-09-30 DIAGNOSIS — J30.89 NON-SEASONAL ALLERGIC RHINITIS, UNSPECIFIED TRIGGER: ICD-10-CM

## 2024-09-30 PROCEDURE — 3077F SYST BP >= 140 MM HG: CPT | Mod: HCNC,CPTII,S$GLB, | Performed by: EMERGENCY MEDICINE

## 2024-09-30 PROCEDURE — 3079F DIAST BP 80-89 MM HG: CPT | Mod: HCNC,CPTII,S$GLB, | Performed by: EMERGENCY MEDICINE

## 2024-09-30 PROCEDURE — 3051F HG A1C>EQUAL 7.0%<8.0%: CPT | Mod: HCNC,CPTII,S$GLB, | Performed by: EMERGENCY MEDICINE

## 2024-09-30 PROCEDURE — 99213 OFFICE O/P EST LOW 20 MIN: CPT | Mod: HCNC,S$GLB,, | Performed by: EMERGENCY MEDICINE

## 2024-09-30 PROCEDURE — 99999 PR PBB SHADOW E&M-EST. PATIENT-LVL III: CPT | Mod: PBBFAC,HCNC,, | Performed by: EMERGENCY MEDICINE

## 2024-09-30 PROCEDURE — 1159F MED LIST DOCD IN RCRD: CPT | Mod: HCNC,CPTII,S$GLB, | Performed by: EMERGENCY MEDICINE

## 2024-09-30 PROCEDURE — 1101F PT FALLS ASSESS-DOCD LE1/YR: CPT | Mod: HCNC,CPTII,S$GLB, | Performed by: EMERGENCY MEDICINE

## 2024-09-30 PROCEDURE — 3288F FALL RISK ASSESSMENT DOCD: CPT | Mod: HCNC,CPTII,S$GLB, | Performed by: EMERGENCY MEDICINE

## 2024-09-30 PROCEDURE — 1160F RVW MEDS BY RX/DR IN RCRD: CPT | Mod: HCNC,CPTII,S$GLB, | Performed by: EMERGENCY MEDICINE

## 2024-09-30 RX ORDER — MONTELUKAST SODIUM 10 MG/1
10 TABLET ORAL NIGHTLY
Qty: 30 TABLET | Refills: 6 | Status: SHIPPED | OUTPATIENT
Start: 2024-09-30 | End: 2024-10-30

## 2024-09-30 NOTE — PROGRESS NOTES
Pulmonary & Critical Care Medicine   Clinic Note         Initial HPI:   73 y/o female with h/o HTN, DM HLD + MYRIAM on CPAP who presents with dry cough since the end of August. She sought care with her PCP who has since tried albuterol inhaler, Tessalon perles, and guaifenesin/codeine cough syrup but the cough persists. She states the inhaler has helped the most and she uses this once a day. She saw a pulmonologist several years ago but unsure of what diagnosis and plan resulted but was given an inhaler. She states the cough occurs throughout the day and becomes a barking cough at night. She states her  smokes cigarettes and the smoke will trigger her to cough as will fresh-cut grass and dust. She notices the cough more with eating. She takes Nexium daily for the past 2 years for reflux and had an EGD yesterday that is still pending read. She states she sometimes wheezes with the cough.   Additional Pulmonary History:   Occupational/Environmental Exposures: Retired, stays home often due to pandemic. Smoke, dust, fresh-cut grass are cough triggers.   Exposure to Animals/Pets: no   Travel History: no   History of exposures to TB: no   Family History of Lung Cancer: Brother  at age 38, no thyroid or throat cancer   Childhood history of Lung Disease: none       Prior visit 2022-  Have not seen patient since - No PFTs completed. Cough got much improved.. Returned in May-- Remains dry. No productive sputum.. No blood. Cough random.. Sometimes after eating, but not consistent. No burning/reflux.. Feels like food gets cut off in upper esophagus.   Last EGD - Hiatal hernia.   No additional complaint. Denies CARLSON/SOB/CP   LPR in past     Prior Visit:      1 urgent care visit for sinus/URI.   Cough still issue, but improved. Mostly at night. + post nasal drip.   Feels deep, but not productive.   No significant dyspnea   Only using inhaler intermittently.   Doing well overall.     INTERVAL HISTORY-   -- Still  with ongoing cough. Several U[facility] visits.. Nothing makes better. Remains dry.   -- Lots of allergic triggers including husbands smoke.   -- No productive sputum/hemoptysis.   -- Has not used inhaler or singulair consistently.   -- No dyspnea. No additional complaints.                    Past Medical History:   Diagnosis Date    Allergy      Cataract      DDD (degenerative disc disease), lumbar      Diabetes mellitus       diet controlled    Diabetes mellitus, type 2      GERD (gastroesophageal reflux disease)      History of subconjunctival hemorrhage 11     left eye    Hyperlipidemia      Hypertension      IBS (irritable bowel syndrome)      Obesity      MYRIAM (obstructive sleep apnea)       on CPAP    Rotator cuff impingement syndrome      Seasonal allergic conjunctivitis                  Past Surgical History:   Procedure Laterality Date    CATARACT EXTRACTION W/ INTRAOCULAR LENS IMPLANT   10/3/13     OD (dr. gardner)     SECTION         x2    CHOLECYSTECTOMY        COLONOSCOPY N/A 2018     Procedure: COLONOSCOPY; Surgeon: Marie Mejia MD; Location: [facility] ENDO; Service: Endoscopy; Laterality: N/A;    ENDOSCOPIC ULTRASOUND OF UPPER GASTROINTESTINAL TRACT N/A 10/9/2018     Procedure: ULTRASOUND, UPPER GI TRACT, ENDOSCOPIC; Surgeon: Javy Simpson MD; Location: [facility] ENDO; Service: Endoscopy; Laterality: N/A;    EXCISION OF LESION N/A 2019     Procedure: EXCISION, LESION VULVA; Surgeon: Liv Odell MD; Location: [facility] OR; Service: OB/GYN; Laterality: N/A; latex allergy    EYE SURGERY        HYSTERECTOMY         ovaries intact, due to DUB    TUBAL LIGATION          Family/Social History: Reviewed and updated.         Drug Allergies:             Review of patient's allergies indicates:   Allergen Reactions    Prednisone Other (See Comments)       hipper    Latex         Other reaction(s): Itching    Ace inhibitors Hives       Other reaction(s): Cough    Latex, natural rubber Itching     "  Review of Systems:   A comprehensive 12-point review of systems was performed, and is negative except for those items mentioned above in the HPI section of this note.         Vital Signs:    BP (!) 152/86 (BP Location: Right arm, Patient Position: Sitting)   Pulse (!) 48   Ht 5' 3" (1.6 m)   Wt 86 kg (189 lb 9.5 oz)   LMP 08/01/2000   SpO2 96%   BMI 33.59 kg/m²          Physical Exam:   GEN- NAD AAOx3 Well Built, Well Appearing   HEENT- ATNC, PERRLA, EOMI, OP-Cl. No JVD, LAD or bruit noted. Trachea Midline.   CV- RRR No M/R/G   RESP- CTA-Bilateral   GI- S/NT/ND. Positive BS X 4. No HSM Noted   BACK- Spine midline. No step off, crepitus or deformity noted. No midline TTP.   Ext- MAEW, No deformity. No edema or rashes noted.   Neuro- Strength 5/5 symmetric. CN 2-12 intact. Normal gait. Normal sensation.         Personal Review and Summary of Prior Diagnostics      Laboratory Studies: Reviewed    Latest Reference Range & Units Most Recent   WBC 3.90 - 12.70 K/uL 8.96  4/23/24 11:42   RBC 4.00 - 5.40 M/uL 5.15  4/23/24 11:42   Hemoglobin 12.0 - 16.0 g/dL 13.3  4/23/24 11:42   Hematocrit 37.0 - 48.5 % 39.6  4/23/24 11:42   MCV 82 - 98 fL 77 (L)  4/23/24 11:42   MCH 27.0 - 31.0 pg 25.8 (L)  4/23/24 11:42   MCHC 32.0 - 36.0 g/dL 33.6  4/23/24 11:42   RDW 11.5 - 14.5 % 15.1 (H)  4/23/24 11:42   Platelet Count 150 - 450 K/uL 339  4/23/24 11:42   MPV 9.2 - 12.9 fL 9.8  4/23/24 11:42   Platelet Estimate  Normal  10/19/07 11:33   Gran % 38.0 - 73.0 % 52.3  4/23/24 11:42   Lymph % 18.0 - 48.0 % 39.3  4/23/24 11:42   Lymphs 25 - 40 % 35  10/19/07 11:33   Mono % 4.0 - 15.0 % 7.1  4/23/24 11:42   Monocytes 0 - 10 % 2  10/19/07 11:33   Eos % 0.0 - 8.0 % 0.4  4/23/24 11:42   Eosinophils 0 - 8 % 1  10/19/07 11:33   Basophil % 0.0 - 1.9 % 0.3  4/23/24 11:42   Immature Granulocytes 0.0 - 0.5 % 0.6 (H)  4/23/24 11:42   Gran # (ANC) 1.8 - 7.7 K/uL 4.7  4/23/24 11:42   Lymph # 1.0 - 4.8 K/uL 3.5  4/23/24 11:42   Mono # 0.3 - 1.0 " K/uL 0.6  4/23/24 11:42   Eos # 0.0 - 0.5 K/uL 0.0  4/23/24 11:42   Baso # 0.00 - 0.20 K/uL 0.03  4/23/24 11:42   Immature Grans (Abs) 0.00 - 0.04 K/uL 0.05 (H)  4/23/24 11:42   SEGS 50 - 70 % 62  10/19/07 11:33   nRBC 0 /100 WBC 0  4/23/24 11:42   Differential Method  Automated  4/23/24 11:42   Aniso  sl  10/19/07 11:33   Poikilocytosis  sl  10/19/07 11:33   Iron 30 - 160 ug/dL 53  5/13/15 11:10   TIBC 250 - 450 ug/dL 292  5/13/15 11:10   Saturated Iron 20 - 50 % 18 (L)  5/13/15 11:10   Transferrin 200 - 375 mg/dL 197 (L)  5/13/15 11:10   Ferritin 20.0 - 300.0 ng/mL 77  5/13/15 11:10   Folate 4.0 - 24.0 ng/ml 16.9  6/25/13 14:32   Vitamin B12 210 - 950 pg/ml 914  6/25/13 14:32   Sed Rate 0 - 36 mm/Hr 35  2/15/22 10:59   PT 9.0 - 12.5 sec 9.9  4/19/12 09:20   INR 0.8 - 1.2  0.9  4/19/12 09:20   PTT 21.0 - 32.0 sec 26.0  4/19/12 09:20   Sodium 136 - 145 mmol/L 144  7/20/24 10:37   Potassium 3.5 - 5.1 mmol/L 3.3 (L)  7/20/24 10:37   Chloride 95 - 110 mmol/L 108  7/20/24 10:37   CO2 23 - 29 mmol/L 26  7/20/24 10:37   Anion Gap 8 - 16 mmol/L 10  7/20/24 10:37   BUN 8 - 23 mg/dL 11  7/20/24 10:37   Creatinine 0.5 - 1.4 mg/dL 0.8  7/20/24 10:37   eGFR >60 mL/min/1.73 m^2 >60.0  7/20/24 10:37   eGFR if non African American >60 mL/min/1.73 m^2 >60.0  6/13/22 08:41   eGFR if African American >60 mL/min/1.73 m^2 >60.0  6/13/22 08:41   Glucose 70 - 110 mg/dL 126 (H)  7/20/24 10:37   Calcium 8.7 - 10.5 mg/dL 8.9  7/20/24 10:37   Calcium Level Ionized 1.06 - 1.42 mmol/L 1.25  5/17/13 10:25   Phosphorus Level 2.7 - 4.5 mg/dL 2.5 (L)  12/1/23 11:53   Magnesium  1.6 - 2.6 mg/dL 2.3  12/1/23 11:53   ALP 55 - 135 U/L 96  7/20/24 10:37   PROTEIN TOTAL 6.0 - 8.4 g/dL 7.0  7/20/24 10:37   Albumin 3.5 - 5.2 g/dL 3.6  7/20/24 10:37   Uric Acid 2.4 - 5.7 mg/dl 5.5  9/27/12 11:04   BILIRUBIN TOTAL 0.1 - 1.0 mg/dL 0.8  7/20/24 10:37   Bilirubin Direct 0.1 - 0.3 mg/dL 0.2  1/16/19 12:07   AST 10 - 40 U/L 17  7/20/24 10:37   ALT 10 - 44 U/L  13  7/20/24 10:37   GGT 8 - 55 U/L 30  10/27/04 10:25   Amylase Level 20 - 110 U/L 55  8/12/20 10:19   Lipase 4 - 60 U/L 59  8/12/20 10:19   CRP 0.0 - 8.2 mg/L 11.9 (H)  2/15/22 10:59   Cholesterol Total 120 - 199 mg/dL 218 (H)  7/20/24 10:37   HDL 40 - 75 mg/dL 61  7/20/24 10:37   HDL/Cholesterol Ratio 20.0 - 50.0 % 28.0  7/20/24 10:37   Non-HDL Cholesterol mg/dL 157  7/20/24 10:37   Total Cholesterol/HDL Ratio 2.0 - 5.0  3.6  7/20/24 10:37   Triglycerides 30 - 150 mg/dL 106  7/20/24 10:37   LDL Cholesterol 63.0 - 159.0 mg/dL 135.8  7/20/24 10:37   BNP 0 - 99 pg/mL <10.0  7/28/12 09:22   BNP (B-Type Natriuretic Pep) 0 - 99 pg/ml 9  1/6/05 08:00   CPK 20 - 180 U/L 163  5/17/13 10:25   CPK MB 0.1 - 6.5 ng/mL 2.5  8/4/12 08:11   Lactate Dehydrogenase 110 - 260 U/L 181  5/17/13 10:25   MB % 0 - 5 % 1.2  8/4/12 08:11   Troponin I 0.000 - 0.026 ng/mL <0.006  4/21/19 04:33   Vitamin D 30 - 96 ng/mL 16 (L)  6/25/13 14:32   Vitamin E 500 - 1,800 ug/dL 1,479  5/17/13 10:25   Gluc Fast 70 - 105 mg/dl 142 (H)  9/13/10 07:58   Glucose, Fasting 70 - 110 mg/dl 105  10/27/04 10:25   Gluc 1 HR mg/dl 278  9/13/10 07:58   Gluc 2 HR mg/dl 307  9/13/10 07:58   Hemoglobin A1C External 4.0 - 5.6 % 7.2 (H)  7/20/24 10:37   Estimated Avg Glucose 68 - 131 mg/dL 160 (H)  7/20/24 10:37   TSH 0.400 - 4.000 uIU/mL 1.612  4/23/24 11:42   Free T4 0.71 - 1.51 ng/dl 1.02  6/13/08 08:44   Aspergillus Fumigatus IgE kU/L <0.35  2/24/10 17:20   A. tenius, IgE kU/L <0.35  2/24/10 17:20   [place] Grass IgE kU/L <0.35  2/24/10 17:20   D. farinae kU/L <0.35  2/24/10 17:20   D. pteronyssinus Dust Mite IgE kU/L <0.35  2/24/10 17:20   [place] IgE kU/L <0.35  2/24/10 17:20   Ragweed, Western IgE kU/L <0.35  2/24/10 17:20   S[place] Grass IgE kU/L <0.35  2/24/10 17:20   [place], IgE kU/L <0.35  2/24/10 17:20   [place] IgE kU/L <0.35  2/24/10 17:20   JESSICA Neg <1:160  Negative  9/27/12 11:04   JESSICA Screen Negative <1:80  Positive !  2/15/22 10:59   JESSICA  Titer 1  1:160  2/15/22 10:59   JESSICA PATTERN 1  Homogeneous  2/15/22 10:59   ds DNA Ab Negative 1:10  Negative 1:10  2/15/22 10:59   Anti-SSA Antibody 0.00 - 0.99 Ratio 0.05  2/15/22 10:59   Anti-SSA Interpretation Negative  Negative  2/15/22 10:59   Anti-SSB Antibody 0.00 - 0.99 Ratio 0.04  2/15/22 10:59   Anti-SSB Interpretation Negative  Negative  2/15/22 10:59   Anti Sm Antibody 0.00 - 0.99 Ratio 0.07  2/15/22 10:59   Anti-Sm Interpretation Negative  Negative  2/15/22 10:59   Anti Sm/RNP Antibody 0.00 - 0.99 Ratio 0.05  2/15/22 10:59   Anti-Sm/RNP Interpretation Negative  Negative  2/15/22 10:59   CCP Antibodies U/mL <2.0  6/25/07 10:01   Total IgE 35 - 250 IU/ml <35 !  2/24/10 17:20   Rheumatoid Factor 0.0 - 15.0 IU/mL <13.0  2/15/22 10:59   Shiga Toxin 1 E.coli Negative  Negative  2/29/08 07:57   Shiga Toxin 2 E.coli Negative  Negative  2/29/08 07:57   Hepatitis B Surface Ag  Negative  6/25/07 10:01   Hepatitis C Ab  Negative  6/25/07 10:01   SARS-CoV-2 RNA, Amplification, Qual Negative  Negative  7/28/22 17:14   HIV 1/2 Ag/Ab Negative  Negative  6/4/19 14:58   Bacterial vaginosis DNA Negative  Negative  7/11/19 11:49   Candida glabrata DNA Negative  Negative  7/11/19 11:49   Candida krusei DNA Negative  Negative  7/11/19 11:49   Candida sp Negative  Negative  7/11/19 11:49   Gardnerella vaginalis Negative  Negative  2/18/15 11:44   Trichomonas vaginalis Negative  Negative  7/11/19 11:49   Specimen UA  Urine, Clean Catch  7/31/24 15:15   Color, UA Yellow, Straw, Génesis  Colorless !  7/31/24 15:15   Appearance, UA Clear  Clear  7/31/24 15:15   Spec Grav UA 1.005 - 1.030  1.005  7/31/24 15:15   pH, UA 5.0 - 8.0  6.0  7/31/24 15:15   Protein, UA Negative  Negative  7/31/24 15:15   Glucose, UA Negative  Negative  7/31/24 15:15   Ketones, UA Negative  Negative  7/31/24 15:15   Blood, UA Negative  Negative  7/31/24 15:15   NITRITE UA Negative  Negative  7/31/24 15:15   UROBILINOGEN UA <2.0 EU/dL Negative  4/21/19  04:05   Bilirubin (UA) Negative  Negative  7/31/24 15:15   Leukocyte Esterase, UA Negative  Negative  7/31/24 15:15   RBC, UA 0 - 4 /hpf 0  7/17/18 15:09   WBC, UA 0 - 5 /hpf 3  7/17/18 15:09   Bacteria, UA None-Occ /hpf Rare  7/17/18 15:09   Squam Epithel, UA /hpf 0  7/17/18 15:09   Microscopic Comment  SEE COMMENT  7/17/18 15:09   Urine Creatinine 15.0 - 325.0 mg/dL 123.0  10/24/23 10:14   Urine Microalbumin ug/mL 20.0  10/24/23 10:14   MICROALB/CREAT RATIO 0.0 - 30.0 ug/mg 16.3  10/24/23 10:14   C difficile Toxins A+B, EIA Negative  Negative for C.difficile toxin  2/29/08 07:57   Occult Blood Negative  Negative  2/29/08 07:57   Stool WBC  No WBC'S seen  5/17/04 07:38        Microbiology Data: None         Summary of Chest Imaging Personally Reviewed:   CXR- The lungs are clear, with normal appearance of pulmonary vasculature and no pleural effusion or pneumothorax.   The cardiac silhouette is normal in size. The hilar and mediastinal contours are unremarkable.   Bones are intact. Healed left rib fracture. Scoliosis of the thoracolumbar spine.   CT A/P- Visualized lungs are clear. No pleural effusions.      CT Chest 8/2022- No significant abnormality to explain patient's symptoms of cough.     Stable 0.4 cm pulmonary nodule right lower lobe, most certainly benign.     Mild prominence of the intrahepatic biliary ducts similar to prior several CT abdomen study     FL Esophagram-   Swallowing: Normal.  Esophagus: Normal caliber and motility.  Gastroesophageal reflux: None observed  Other findings: Very small reducible hiatal hernia.  Impression:Normal examination     2D Echo: 2017   1 - Normal left ventricular systolic function (EF 60-65%).   2 - Left ventricular diastolic dysfunction.   3 - Mild left atrial enlargement.   4 - Normal right ventricular systolic function .   5 - The estimated PA systolic pressure is 23 mmHg.      Holter-   TEST DESCRIPTION   PREDOMINANT RHYTHM   1. Sinus rhythm with heart rates varying  between 61 and 118 bpm with an average of 82 bpm.   VENTRICULAR ARRHYTHMIAS   1. There were very rare PVCs recorded totalling 7 and averaging less than 1 per hour. There was 1 couplet.   2. There were no episodes of ventricular tachycardia.   SUPRA VENTRICULAR ARRHYTHMIAS   1. There were very rare PACs recorded totalling 12 and averaging less than 1 per hour.   2. There were no episodes of sustained supraventricular tachycardia.   SINUS NODE FUNCTION   1. There was no evidence of high grade SA ambrosio block.   AV CONDUCTION   1. There was no evidence of high grade AV block.   2. The longest RR interval was 1200 msec   PFT's: None   EGD 2018- Impression: - Medium-sized hiatal hernia.   - Normal stomach.   - No gross lesions in the entire examined duodenum.   - There was no sign of significant pathology in the   common bile duct.   - There was mild dilation in the left intrahepatic   bile duct(s).   - There was no sign of significant pathology in the   entire pancreas.   - No specimens collected.   Recommendation: - Discharge patient to home (ambulatory).   - Patient has a contact number available for   emergencies. The signs and symptoms of potential   delayed complications were discussed with the   patient. Return to normal activities tomorrow.   Written discharge instructions were provided to the   patient.   - Will plan for interval MRI/MRCP and LFTs in 3   months for follow-up of mild intrahepatic duct   dilation.        Assessment:     No diagnosis found.     Outpatient Encounter Medications as of 9/30/2024   Medication Sig Dispense Refill    ACCU-CHEK FASTCLIX LANCING DEV Kit USE AS DIRECTED 1 each 0    albuterol (PROVENTIL/VENTOLIN HFA) 90 mcg/actuation inhaler Inhale 1-2 puffs into the lungs every 4 (four) hours as needed for Wheezing. 18 g 2    amLODIPine (NORVASC) 10 MG tablet Take 1 tablet (10 mg total) by mouth once daily. 90 tablet 3    aspirin (ECOTRIN) 81 MG EC tablet Take 81 mg by mouth once daily.       benzonatate (TESSALON) 200 MG capsule Take 1 capsule (200 mg total) by mouth every 8 (eight) hours as needed for Cough. 30 capsule 0    benzonatate (TESSALON) 200 MG capsule Take 1 capsule (200 mg total) by mouth every 8 (eight) hours as needed for Cough. 30 capsule 0    blood sugar diagnostic (ACCU-CHEK GUIDE TEST STRIPS) Strp 3 times a day 300 strip 11    blood-glucose meter (ACCU-CHEK GUIDE GLUCOSE METER) Misc Three times a day 1 each 0    calcium carbonate (OS-ARIC) 600 mg calcium (1,500 mg) Tab Take 1 tablet (600 mg total) by mouth once. for 1 dose 30 tablet 11    cetirizine (ZYRTEC) 10 MG tablet Take 1 tablet (10 mg total) by mouth every evening. 90 tablet 0    citalopram (CELEXA) 10 MG tablet Take 1 tablet (10 mg total) by mouth once daily. 90 tablet 1    ergocalciferol (ERGOCALCIFEROL) 50,000 unit Cap TAKE 1 CAPSULE BY MOUTH EVERY 7 DAYS 12 capsule 3    esomeprazole (NEXIUM) 40 MG capsule Take 1 capsule (40 mg total) by mouth before breakfast. 90 capsule 3    fluocinonide (LIDEX) 0.05 % external solution AAA scalp qday - bid prn pruritus 60 mL 3    fluticasone propionate (FLONASE) 50 mcg/actuation nasal spray 1 spray (50 mcg total) by Each Nostril route once daily. 9.9 mL 3    gabapentin (NEURONTIN) 100 MG capsule Take 2 capsules (200 mg total) by mouth every evening. 180 capsule 3    glucose 4 GM chewable tablet Take 4 tablets (16 g total) by mouth as needed for Low blood sugar (If having symptoms of blurry vision, palpitations, confusion, shakiness.  Please check sugars and if sugar below 70 please take 4 tablets and re-check sugar everry 15 minutes until sugars are above 70 and symptoms resolve.). 50 tablet 12    insulin aspart U-100 (NOVOLOG FLEXPEN U-100 INSULIN) 100 unit/mL (3 mL) InPn pen Inject 10 Units into the skin 3 (three) times daily with meals. TDD 60 units 75 mL 11    insulin detemir U-100, Levemir, (LEVEMIR FLEXPEN) 100 unit/mL (3 mL) InPn pen Inject 40 Units into the skin every evening.       "insulin syringe-needle U-100 0.3 mL 31 gauge x 5/16" Syrg relion brand, use 5 x a day, if not on levemir or novolog 150 each 1    insulin syringe-needle U-100 0.5 mL 31 gauge x 5/16" Syrg Use nightly. 90 day 100 each 3    lancets (ACCU-CHEK FASTCLIX LANCET DRUM) Misc TEST BLOOD SUGAR THREE TIMES A  each 3    magnesium oxide (MAG-OX) 400 mg tablet Take 1 tablet (400 mg total) by mouth 2 (two) times daily. 180 tablet 3    ofloxacin (OCUFLOX) 0.3 % ophthalmic solution Place 1 drop into both eyes 4 (four) times daily. for 10 days 5 mL 0    pen needle, diabetic (NOVOFINE 32) 32 gauge x 1/4" Ndle 4 injections per day 500 each 4    rosuvastatin (CRESTOR) 5 MG tablet Take 1 tablet (5 mg total) by mouth once daily. 90 tablet 3    sodium chloride (SALINE NASAL) 0.65 % nasal spray 1 spray by Nasal route as needed for Congestion. 30 mL 6    traZODone (DESYREL) 50 MG tablet TAKE 1 TABLET BY MOUTH EVERY EVENING. 90 tablet 0     Facility-Administered Encounter Medications as of 9/30/2024   Medication Dose Route Frequency Provider Last Rate Last Admin    triamcinolone acetonide injection 20 mg  20 mg Intramuscular 1 time in Clinic/HOD Tani Pittman, GINETTE         No orders of the defined types were placed in this encounter.      Plan:     Chronic cough- intermittent wheezing reported, but inconsistent. Does have significant allergic triggers.. + PND at times, mostly with weather change.  No offending medications and despite hiatal hernia, GERD is well controlled with use of PPI.  CT imaging without parenchymal abnormalities.. LABA/ICS not very helpful, but variable and inconsistently using. No offending meds. . Still using nasal corticosteroids + antihistamine.. Esophagram is normal.      -- Continue current AR/Asthma therapy for now. Add singulair as this seems allergy driven. Discussed need to use consistently.   -- Needs PFTs on follow-- She has never obtained.     The cough has been main feature-- Extensive investigation " and multiple empiric medications have not proven to be of benefit. Etiology elusive, but still appears to be more upper airway/allergic driven. Reports that she did have upper airway evaluation with ENT remotely..   If no improvement with consistent therapy will need to have ENT re-evaluate.. Possibly consider repeat CT imaging chest +/- airway survey..       MYRIAM- on CPAP and compliant. Continue current therapy.       3. + JESSICA- Expanded panel negative. No overt CTD features.  Seen by rheum- Feel joint pain c/w OA.. No rheumatologic disease noted.      4. GERD- Maintain PPI      5. Obesity- BMI 33. Weight loss and exercise discussed.              Foreign Gauthier M.D.   [facility]

## 2024-10-14 ENCOUNTER — LAB VISIT (OUTPATIENT)
Dept: LAB | Facility: HOSPITAL | Age: 74
End: 2024-10-14
Attending: INTERNAL MEDICINE
Payer: MEDICARE

## 2024-10-14 DIAGNOSIS — E11.9 TYPE 2 DIABETES MELLITUS WITHOUT COMPLICATION, WITH LONG-TERM CURRENT USE OF INSULIN: ICD-10-CM

## 2024-10-14 DIAGNOSIS — Z79.4 TYPE 2 DIABETES MELLITUS WITHOUT COMPLICATION, WITH LONG-TERM CURRENT USE OF INSULIN: ICD-10-CM

## 2024-10-14 LAB
ALBUMIN SERPL BCP-MCNC: 3.7 G/DL (ref 3.5–5.2)
ALP SERPL-CCNC: 103 U/L (ref 55–135)
ALT SERPL W/O P-5'-P-CCNC: 10 U/L (ref 10–44)
ANION GAP SERPL CALC-SCNC: 10 MMOL/L (ref 8–16)
AST SERPL-CCNC: 13 U/L (ref 10–40)
BILIRUB SERPL-MCNC: 0.6 MG/DL (ref 0.1–1)
BUN SERPL-MCNC: 10 MG/DL (ref 8–23)
CALCIUM SERPL-MCNC: 9.4 MG/DL (ref 8.7–10.5)
CHLORIDE SERPL-SCNC: 105 MMOL/L (ref 95–110)
CO2 SERPL-SCNC: 25 MMOL/L (ref 23–29)
CREAT SERPL-MCNC: 0.9 MG/DL (ref 0.5–1.4)
EST. GFR  (NO RACE VARIABLE): >60 ML/MIN/1.73 M^2
ESTIMATED AVG GLUCOSE: 192 MG/DL (ref 68–131)
GLUCOSE SERPL-MCNC: 219 MG/DL (ref 70–110)
HBA1C MFR BLD: 8.3 % (ref 4–5.6)
POTASSIUM SERPL-SCNC: 3.6 MMOL/L (ref 3.5–5.1)
PROT SERPL-MCNC: 7 G/DL (ref 6–8.4)
SODIUM SERPL-SCNC: 140 MMOL/L (ref 136–145)

## 2024-10-14 PROCEDURE — 83036 HEMOGLOBIN GLYCOSYLATED A1C: CPT | Mod: HCNC | Performed by: INTERNAL MEDICINE

## 2024-10-14 PROCEDURE — 80053 COMPREHEN METABOLIC PANEL: CPT | Mod: HCNC | Performed by: INTERNAL MEDICINE

## 2024-10-16 ENCOUNTER — OFFICE VISIT (OUTPATIENT)
Dept: INTERNAL MEDICINE | Facility: CLINIC | Age: 74
End: 2024-10-16
Payer: MEDICARE

## 2024-10-16 VITALS
OXYGEN SATURATION: 98 % | WEIGHT: 143.31 LBS | BODY MASS INDEX: 25.39 KG/M2 | DIASTOLIC BLOOD PRESSURE: 84 MMHG | HEART RATE: 68 BPM | SYSTOLIC BLOOD PRESSURE: 182 MMHG | HEIGHT: 63 IN

## 2024-10-16 DIAGNOSIS — I10 ESSENTIAL HYPERTENSION: ICD-10-CM

## 2024-10-16 DIAGNOSIS — J44.9 CHRONIC OBSTRUCTIVE PULMONARY DISEASE, UNSPECIFIED COPD TYPE: ICD-10-CM

## 2024-10-16 DIAGNOSIS — Z79.4 TYPE 2 DIABETES MELLITUS WITHOUT COMPLICATION, WITH LONG-TERM CURRENT USE OF INSULIN: Primary | ICD-10-CM

## 2024-10-16 DIAGNOSIS — E11.9 TYPE 2 DIABETES MELLITUS WITHOUT COMPLICATION, WITH LONG-TERM CURRENT USE OF INSULIN: Primary | ICD-10-CM

## 2024-10-16 PROCEDURE — 3288F FALL RISK ASSESSMENT DOCD: CPT | Mod: HCNC,CPTII,S$GLB, | Performed by: INTERNAL MEDICINE

## 2024-10-16 PROCEDURE — 1101F PT FALLS ASSESS-DOCD LE1/YR: CPT | Mod: HCNC,CPTII,S$GLB, | Performed by: INTERNAL MEDICINE

## 2024-10-16 PROCEDURE — 3051F HG A1C>EQUAL 7.0%<8.0%: CPT | Mod: HCNC,CPTII,S$GLB, | Performed by: INTERNAL MEDICINE

## 2024-10-16 PROCEDURE — 1126F AMNT PAIN NOTED NONE PRSNT: CPT | Mod: HCNC,CPTII,S$GLB, | Performed by: INTERNAL MEDICINE

## 2024-10-16 PROCEDURE — 99214 OFFICE O/P EST MOD 30 MIN: CPT | Mod: HCNC,S$GLB,, | Performed by: INTERNAL MEDICINE

## 2024-10-16 PROCEDURE — 3079F DIAST BP 80-89 MM HG: CPT | Mod: HCNC,CPTII,S$GLB, | Performed by: INTERNAL MEDICINE

## 2024-10-16 PROCEDURE — 3077F SYST BP >= 140 MM HG: CPT | Mod: HCNC,CPTII,S$GLB, | Performed by: INTERNAL MEDICINE

## 2024-10-16 PROCEDURE — 99999 PR PBB SHADOW E&M-EST. PATIENT-LVL IV: CPT | Mod: PBBFAC,HCNC,, | Performed by: INTERNAL MEDICINE

## 2024-10-16 RX ORDER — INSULIN DETEMIR 100 [IU]/ML
40 INJECTION, SOLUTION SUBCUTANEOUS NIGHTLY
Start: 2024-10-16 | End: 2024-10-16

## 2024-10-16 RX ORDER — INSULIN DETEMIR 100 [IU]/ML
20 INJECTION, SOLUTION SUBCUTANEOUS NIGHTLY
Start: 2024-10-16 | End: 2024-11-04

## 2024-10-16 RX ORDER — BLOOD-GLUCOSE SENSOR
1 EACH MISCELLANEOUS DAILY
Qty: 3 EACH | Refills: 1 | Status: SHIPPED | OUTPATIENT
Start: 2024-10-16 | End: 2025-10-16

## 2024-10-16 RX ORDER — BLOOD-GLUCOSE,RECEIVER,CONT
1 EACH MISCELLANEOUS DAILY
Qty: 1 EACH | Refills: 3 | Status: SHIPPED | OUTPATIENT
Start: 2024-10-16

## 2024-10-16 NOTE — PROGRESS NOTES
Subjective:       Patient ID: Chelly Ortiz is a 73 y.o. female.    Chief Complaint: Follow-up    Previously on levemir but no longer being manufactured. Has been out for about 2 months. BG elevated but around 200-250. Has been using her novolog.       DMII on insulin last A1C was 8.3      HTN on amlodipine      HLD    She stopped taking statin due to myalgias . She was on atorvastatin 40 mg daily.      Insomnia on trazodone      Health Maintenance:  Colon Cancer Screening: colonoscopy in 2022 with polyps removed due again in 2027  Mammogram: January 2023 was normal   Hep C:negative 2007   Lipids: normal 2023   Vaccines:  up to date      Follow-up  Pertinent negatives include no abdominal pain, arthralgias, chest pain, chills, coughing, headaches, myalgias, nausea or vomiting.     Review of Systems   Constitutional:  Negative for activity change, appetite change and chills.   HENT:  Negative for ear pain, sinus pressure/congestion and sneezing.    Respiratory:  Negative for cough and shortness of breath.    Cardiovascular:  Negative for chest pain, palpitations and leg swelling.   Gastrointestinal:  Negative for abdominal distention, abdominal pain, constipation, diarrhea, nausea and vomiting.   Genitourinary:  Negative for dysuria and hematuria.   Musculoskeletal:  Positive for leg pain. Negative for arthralgias, back pain and myalgias.   Neurological:  Negative for dizziness and headaches.   Psychiatric/Behavioral:  Negative for agitation. The patient is not nervous/anxious.            Past Medical History:   Diagnosis Date    Allergy     DDD (degenerative disc disease), lumbar     Diabetes mellitus     diet controlled    GERD (gastroesophageal reflux disease)     History of subconjunctival hemorrhage 12/02/2011    left eye    IBS (irritable bowel syndrome)     Obesity     MYRIAM (obstructive sleep apnea)     on CPAP    Rotator cuff impingement syndrome     Seasonal allergic conjunctivitis     Type 2 diabetes  mellitus treated with insulin      Past Surgical History:   Procedure Laterality Date    CATARACT EXTRACTION W/  INTRAOCULAR LENS IMPLANT Right 10/03/2013        CATARACT EXTRACTION W/  INTRAOCULAR LENS IMPLANT Left 2022         SECTION      x2    CHOLECYSTECTOMY      COLONOSCOPY N/A 2018    Procedure: COLONOSCOPY;  Surgeon: Marie Mejia MD;  Location: [facility] END[facility];  Service: Endoscopy;  Laterality: N/A;    COLONOSCOPY N/A 2022    Procedure: COLONOSCOPY;  Surgeon: Pierce Rivera MD;  Location: [facility] END[facility];  Service: Endoscopy;  Laterality: N/A;    ENDOSCOPIC ULTRASOUND OF UPPER GASTROINTESTINAL TRACT N/A 10/09/2018    Procedure: ULTRASOUND, UPPER GI TRACT, ENDOSCOPIC;  Surgeon: Javy Simpson MD;  Location: [facility] END[facility];  Service: Endoscopy;  Laterality: N/A;    EXCISION OF LESION N/A 2019    Procedure: EXCISION, LESION VULVA;  Surgeon: Liv Odell MD;  Location: [facility];  Service: OB/GYN;  Laterality: N/A;  latex allergy    EYE SURGERY      HYSTERECTOMY      ovaries intact, due to DUB    INTRAOCULAR PROSTHESES INSERTION Left 2022    Procedure: INSERTION, IOL PROSTHESIS;  Surgeon: Clarice Herzog MD;  Location: [facility] OR [address] [place] [address];  Service: Ophthalmology;  Laterality: Left;    PHACOEMULSIFICATION OF CATARACT Left 2022    Procedure: PHACOEMULSIFICATION, CATARACT;  Surgeon: Clarice Herzog MD;  Location: [facility] OR [address] [place] [address];  Service: Ophthalmology;  Laterality: Left;    TUBAL LIGATION        Patient Active Problem List   Diagnosis    GERD (gastroesophageal reflux disease)    MYRIAM (obstructive sleep apnea)    IBS (irritable bowel syndrome)    DDD (degenerative disc disease), lumbar    Insomnia    Anxiety    Hearing loss, sensorineural    Nuclear sclerotic cataract of left eye    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    Constipation    History of colon polyps    Essential hypertension    Hyperlipidemia, unspecified    Spondylosis of cervical  region without myelopathy or radiculopathy    Cervical myofascial pain syndrome    PCO (posterior capsular opacification), right    Dry eye syndrome    Pseudophakia    Decreased range of motion of lumbar spine    Decreased strength    Dilated bile duct    Personal history of colonic polyps    EIC (epidermal inclusion cyst)    Sedentary lifestyle    Dysphagia    Type 2 diabetes mellitus without complication, with long-term current use of insulin    Abdominal aortic atherosclerosis    Decreased ROM of neck    Arthritis of multiple sites    Osteopenia of multiple sites    Combined form of age-related cataract, left eye    Adjustment disorder with anxious mood    Decreased ROM of right shoulder    History of epistaxis    Acute pain of both shoulders    Decreased ROM of left shoulder    Abnormal posture    Palpitations    Secondary osteoarthritis of right shoulder due to rotator cuff tear        Objective:      Physical Exam  Vitals reviewed.   Constitutional:       Appearance: Normal appearance.   HENT:      Head: Normocephalic.      Mouth/Throat:      Mouth: Mucous membranes are moist.      Pharynx: Oropharynx is clear.   Eyes:      Conjunctiva/sclera: Conjunctivae normal.      Pupils: Pupils are equal, round, and reactive to light.   Cardiovascular:      Rate and Rhythm: Normal rate and regular rhythm.   Pulmonary:      Effort: Pulmonary effort is normal.      Breath sounds: Normal breath sounds.   Abdominal:      General: Bowel sounds are normal.      Palpations: Abdomen is soft.      Tenderness: There is no abdominal tenderness. There is no right CVA tenderness or left CVA tenderness.   Skin:     General: Skin is warm and dry.   Neurological:      General: No focal deficit present.      Mental Status: She is alert.   Psychiatric:         Mood and Affect: Mood normal.         Behavior: Behavior normal.         Assessment:       Problem List Items Addressed This Visit    None        Plan:         Chelly was seen today  for follow-up.    Diagnoses and all orders for this visit:    Type 2 diabetes mellitus without complication, with long-term current use of insulin  -     Ambulatory Referral/Consult to Primary Care Diabetic Management; Future  -     tirzepatide 2.5 mg/0.5 mL PnIj; Inject 2.5 mg into the skin every 7 days.  -     blood-glucose meter,continuous (DEXCOM G6 ) Misc; for use to monitor Blood glucose Daily.  -     blood-glucose sensor (DEXCOM G6 SENSOR) Pepper; change sensor every 10 days.  Start mounjaro and continue sliding scale. Goal is to get patient off insulin.   Get dexcom for close BG monitoring.     Chronic obstructive pulmonary disease, unspecified COPD type  Asymptomatic.     Essential hypertension  Well controlled on current meds              Smitha Turner MD   Internal Medicine   Primary Care

## 2024-10-17 ENCOUNTER — TELEPHONE (OUTPATIENT)
Dept: INTERNAL MEDICINE | Facility: CLINIC | Age: 74
End: 2024-10-17
Payer: MEDICARE

## 2024-10-17 NOTE — TELEPHONE ENCOUNTER
----- Message from Kimberly sent at 10/17/2024  3:41 PM CDT -----  Contact: [phone]  [email or web address]     Patient is calling for Medical Advice regarding: speak to Nurse James    Patient wants a call back or thru [facility]: call    Comments:    Please advise patient replies from provider may take up to 48 hours.

## 2024-10-18 ENCOUNTER — CLINICAL SUPPORT (OUTPATIENT)
Dept: INTERNAL MEDICINE | Facility: CLINIC | Age: 74
End: 2024-10-18
Payer: MEDICARE

## 2024-10-18 DIAGNOSIS — I10 ESSENTIAL HYPERTENSION: Primary | ICD-10-CM

## 2024-10-18 NOTE — PROGRESS NOTES
Chelly Mccormicks 73 y.o. female is here today for Blood Pressure check. Blood pressure 170/92.  History of HTN yes.    Review of patient's allergies indicates:   Allergen Reactions    Ace inhibitors Hives     \Cough    Latex, natural rubber Itching     Creatinine   Date Value Ref Range Status   10/14/2024 0.9 0.5 - 1.4 mg/dL Final     Sodium   Date Value Ref Range Status   10/14/2024 140 136 - 145 mmol/L Final     Potassium   Date Value Ref Range Status   10/14/2024 3.6 3.5 - 5.1 mmol/L Final     Patient verifies taking blood pressure medications on a regular basis at the same time of the day.     Current Outpatient Medications:     ACCU-CHEK FASTCLIX LANCING DEV Kit, USE AS DIRECTED, Disp: 1 each, Rfl: 0    albuterol (PROVENTIL/VENTOLIN HFA) 90 mcg/actuation inhaler, Inhale 1-2 puffs into the lungs every 4 (four) hours as needed for Wheezing., Disp: 18 g, Rfl: 2    amLODIPine (NORVASC) 10 MG tablet, Take 1 tablet (10 mg total) by mouth once daily., Disp: 90 tablet, Rfl: 3    aspirin (ECOTRIN) 81 MG EC tablet, Take 81 mg by mouth once daily., Disp: , Rfl:     benzonatate (TESSALON) 200 MG capsule, Take 1 capsule (200 mg total) by mouth every 8 (eight) hours as needed for Cough., Disp: 30 capsule, Rfl: 0    benzonatate (TESSALON) 200 MG capsule, Take 1 capsule (200 mg total) by mouth every 8 (eight) hours as needed for Cough., Disp: 30 capsule, Rfl: 0    blood sugar diagnostic (ACCU-CHEK GUIDE TEST STRIPS) Strp, 3 times a day, Disp: 300 strip, Rfl: 11    blood-glucose meter (BLOOD GLUCOSE MONITORING) kit, Three times a day, Disp: 1 each, Rfl: 0    blood-glucose meter,continuous (DEXCOM G6 ) Misc, for use to monitor Blood glucose Daily., Disp: 1 each, Rfl: 3    blood-glucose sensor (DEXCOM G6 SENSOR) Pepper, change sensor every 10 days., Disp: 3 each, Rfl: 1    calcium carbonate (OS-ARIC) 600 mg calcium (1,500 mg) Tab, Take 1 tablet (600 mg total) by mouth once. for 1 dose, Disp: 30 tablet, Rfl: 11    cetirizine  "(ZYRTEC) 10 MG tablet, Take 1 tablet (10 mg total) by mouth every evening., Disp: 90 tablet, Rfl: 0    citalopram (CELEXA) 10 MG tablet, Take 1 tablet (10 mg total) by mouth once daily., Disp: 90 tablet, Rfl: 1    ergocalciferol (ERGOCALCIFEROL) 50,000 unit Cap, TAKE 1 CAPSULE BY MOUTH EVERY 7 DAYS, Disp: 12 capsule, Rfl: 3    esomeprazole (NEXIUM) 40 MG capsule, Take 1 capsule (40 mg total) by mouth before breakfast., Disp: 90 capsule, Rfl: 3    fluocinonide (LIDEX) 0.05 % external solution, AAA scalp qday - bid prn pruritus, Disp: 60 mL, Rfl: 3    fluticasone propionate (FLONASE) 50 mcg/actuation nasal spray, 1 spray (50 mcg total) by Each Nostril route once daily., Disp: 9.9 mL, Rfl: 3    gabapentin (NEURONTIN) 100 MG capsule, Take 2 capsules (200 mg total) by mouth every evening., Disp: 180 capsule, Rfl: 3    glucose 4 GM chewable tablet, Take 4 tablets (16 g total) by mouth as needed for Low blood sugar (If having symptoms of blurry vision, palpitations, confusion, shakiness.  Please check sugars and if sugar below 70 please take 4 tablets and re-check sugar everry 15 minutes until sugars are above 70 and symptoms resolve.)., Disp: 50 tablet, Rfl: 12    insulin aspart U-100 (NOVOLOG FLEXPEN U-100 INSULIN) 100 unit/mL (3 mL) InPn pen, Inject 10 Units into the skin 3 (three) times daily with meals. TDD 60 units, Disp: 75 mL, Rfl: 11    insulin detemir U-100, Levemir, (LEVEMIR FLEXPEN) 100 unit/mL (3 mL) InPn pen, Inject 20 Units into the skin every evening., Disp: , Rfl:     insulin syringe-needle U-100 0.3 mL 31 gauge x 5/16" Syrg, relion brand, use 5 x a day, if not on levemir or novolog, Disp: 150 each, Rfl: 1    insulin syringe-needle U-100 0.5 mL 31 gauge x 5/16" Syrg, Use nightly. 90 day, Disp: 100 each, Rfl: 3    lancets (ACCU-CHEK FASTCLIX LANCET DRUM) [facility] [place], TEST BLOOD SUGAR THREE TIMES A DAY, Disp: 918 each, Rfl: 3    magnesium oxide (MAG-OX) 400 mg tablet, Take 1 tablet (400 mg total) by mouth 2 (two) " "times daily., Disp: 180 tablet, Rfl: 3    montelukast (SINGULAIR) 10 mg tablet, Take 1 tablet (10 mg total) by mouth every evening., Disp: 30 tablet, Rfl: 6    pen needle, diabetic (NOVOFINE 32) 32 gauge x 1/4" Ndle, 4 injections per day, Disp: 500 each, Rfl: 4    rosuvastatin (CRESTOR) 5 MG tablet, Take 1 tablet (5 mg total) by mouth once daily., Disp: 90 tablet, Rfl: 3    sodium chloride (SALINE NASAL) 0.65 % nasal spray, 1 spray by Nasal route as needed for Congestion., Disp: 30 mL, Rfl: 6    tirzepatide 2.5 mg/0.5 mL PnIj, Inject 2.5 mg into the skin every 7 days., Disp: 2 mL, Rfl: 0    traZODone (DESYREL) 50 MG tablet, TAKE 1 TABLET BY MOUTH EVERY EVENING., Disp: 90 tablet, Rfl: 0    Current Facility-Administered Medications:     triamcinolone acetonide injection 20 mg, 20 mg, Intramuscular, 1 time in Clinic/HOD, Tani Pittman, LINSEYP  Does patient have record of home blood pressure readings no.   Last dose of blood pressure medication was taken at this am.  Patient is asymptomatic. Denies headache,dizziness arm or chest pain    Blood pressure reading after 30 minutes was 150/80.  Dr. Turner notified.    "

## 2024-10-21 ENCOUNTER — PATIENT MESSAGE (OUTPATIENT)
Dept: ADMINISTRATIVE | Facility: HOSPITAL | Age: 74
End: 2024-10-21
Payer: COMMERCIAL

## 2024-10-30 ENCOUNTER — PATIENT MESSAGE (OUTPATIENT)
Dept: INTERNAL MEDICINE | Facility: CLINIC | Age: 74
End: 2024-10-30
Payer: MEDICARE

## 2024-10-30 DIAGNOSIS — Z79.4 TYPE 2 DIABETES MELLITUS WITHOUT COMPLICATION, WITH LONG-TERM CURRENT USE OF INSULIN: Primary | ICD-10-CM

## 2024-10-30 DIAGNOSIS — E11.9 TYPE 2 DIABETES MELLITUS WITHOUT COMPLICATION, WITH LONG-TERM CURRENT USE OF INSULIN: Primary | ICD-10-CM

## 2024-10-30 NOTE — TELEPHONE ENCOUNTER
Pt also seeking for Levemir she has not taken for a few months, the pharmacy does not have it; she states that it's no longer available anywhere through the     Pt also states that Mounjaro was recently prescribed to replace trulicity rx but pt can't afford that either and needs for it to be provided through the Patient Assistance Program

## 2024-11-04 ENCOUNTER — OFFICE VISIT (OUTPATIENT)
Dept: INTERNAL MEDICINE | Facility: CLINIC | Age: 74
End: 2024-11-04
Payer: MEDICARE

## 2024-11-04 VITALS
OXYGEN SATURATION: 97 % | BODY MASS INDEX: 33.55 KG/M2 | HEIGHT: 63 IN | HEART RATE: 78 BPM | DIASTOLIC BLOOD PRESSURE: 70 MMHG | SYSTOLIC BLOOD PRESSURE: 128 MMHG | WEIGHT: 189.38 LBS

## 2024-11-04 DIAGNOSIS — E11.9 TYPE 2 DIABETES MELLITUS WITHOUT COMPLICATION, WITH LONG-TERM CURRENT USE OF INSULIN: Primary | ICD-10-CM

## 2024-11-04 DIAGNOSIS — Z79.4 TYPE 2 DIABETES MELLITUS WITHOUT COMPLICATION, WITH LONG-TERM CURRENT USE OF INSULIN: Primary | ICD-10-CM

## 2024-11-04 PROCEDURE — 1159F MED LIST DOCD IN RCRD: CPT | Mod: HCNC,CPTII,S$GLB, | Performed by: NURSE PRACTITIONER

## 2024-11-04 PROCEDURE — 3288F FALL RISK ASSESSMENT DOCD: CPT | Mod: HCNC,CPTII,S$GLB, | Performed by: NURSE PRACTITIONER

## 2024-11-04 PROCEDURE — 1160F RVW MEDS BY RX/DR IN RCRD: CPT | Mod: HCNC,CPTII,S$GLB, | Performed by: NURSE PRACTITIONER

## 2024-11-04 PROCEDURE — 99214 OFFICE O/P EST MOD 30 MIN: CPT | Mod: HCNC,S$GLB,, | Performed by: NURSE PRACTITIONER

## 2024-11-04 PROCEDURE — 3078F DIAST BP <80 MM HG: CPT | Mod: HCNC,CPTII,S$GLB, | Performed by: NURSE PRACTITIONER

## 2024-11-04 PROCEDURE — 1101F PT FALLS ASSESS-DOCD LE1/YR: CPT | Mod: HCNC,CPTII,S$GLB, | Performed by: NURSE PRACTITIONER

## 2024-11-04 PROCEDURE — 99999 PR PBB SHADOW E&M-EST. PATIENT-LVL IV: CPT | Mod: PBBFAC,HCNC,, | Performed by: NURSE PRACTITIONER

## 2024-11-04 PROCEDURE — 3074F SYST BP LT 130 MM HG: CPT | Mod: HCNC,CPTII,S$GLB, | Performed by: NURSE PRACTITIONER

## 2024-11-04 PROCEDURE — 1126F AMNT PAIN NOTED NONE PRSNT: CPT | Mod: HCNC,CPTII,S$GLB, | Performed by: NURSE PRACTITIONER

## 2024-11-04 RX ORDER — TIRZEPATIDE 5 MG/.5ML
5 INJECTION, SOLUTION SUBCUTANEOUS
Qty: 2 ML | Refills: 1 | Status: SHIPPED | OUTPATIENT
Start: 2024-11-17 | End: 2024-12-17

## 2024-11-04 RX ORDER — INSULIN GLARGINE 100 [IU]/ML
10 INJECTION, SOLUTION SUBCUTANEOUS NIGHTLY
Qty: 9 ML | Refills: 0 | Status: SHIPPED | OUTPATIENT
Start: 2024-11-04 | End: 2024-11-04

## 2024-11-04 NOTE — PROGRESS NOTES
INTERNAL MEDICINE PROGRESS/URGENT CARE NOTE    CHIEF COMPLAINT     Chief Complaint   Patient presents with    Follow-up       HPI     Chelly Ortiz is a 74 y.o. female who presents for a follow up visit today.    DM- recently saw PCP. Had previously been on levemir and novolog sliding scale. Was also on trulicity, too, but has since been off due to cost. She has been off the levemir x 1-2 months, too, due to discontinuation. Recent A1c 8.3. She has been just doing sliding scale novolog with meals. Couldn't afford dexcom. I spoke with Dr. Turner. She is giving herself very little Novolog with her meals. She started her on mounjaro 2.5 mg weekly which the patient was able to  and has taken 3 doses thus far. Sugar this AM was 142. No lows.     She was enrolled with patient assistance for it seems like most of her diabetes meds. States she needs her list updated and paperwork filled out to continue.       Patient Active Problem List   Diagnosis    GERD (gastroesophageal reflux disease)    MYRIAM (obstructive sleep apnea)    IBS (irritable bowel syndrome)    DDD (degenerative disc disease), lumbar    Insomnia    Anxiety    Hearing loss, sensorineural    Nuclear sclerotic cataract of left eye    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    Constipation    History of colon polyps    Essential hypertension    Hyperlipidemia, unspecified    Spondylosis of cervical region without myelopathy or radiculopathy    Cervical myofascial pain syndrome    PCO (posterior capsular opacification), right    Dry eye syndrome    Pseudophakia    Decreased range of motion of lumbar spine    Decreased strength    Dilated bile duct    Personal history of colonic polyps    EIC (epidermal inclusion cyst)    Sedentary lifestyle    Dysphagia    Type 2 diabetes mellitus without complication, with long-term current use of insulin    Abdominal aortic atherosclerosis    Decreased ROM of neck    Arthritis of multiple sites    Osteopenia of multiple  sites    Combined form of age-related cataract, left eye    Adjustment disorder with anxious mood    Decreased ROM of right shoulder    History of epistaxis    Acute pain of both shoulders    Decreased ROM of left shoulder    Abnormal posture    Palpitations    Secondary osteoarthritis of right shoulder due to rotator cuff tear        Past Medical History:  Past Medical History:   Diagnosis Date    Allergy     DDD (degenerative disc disease), lumbar     Diabetes mellitus     diet controlled    GERD (gastroesophageal reflux disease)     History of subconjunctival hemorrhage 2011    left eye    IBS (irritable bowel syndrome)     Obesity     MYRIAM (obstructive sleep apnea)     on CPAP    Rotator cuff impingement syndrome     Seasonal allergic conjunctivitis     Type 2 diabetes mellitus treated with insulin         Past Surgical History:  Past Surgical History:   Procedure Laterality Date    CATARACT EXTRACTION W/  INTRAOCULAR LENS IMPLANT Right 10/03/2013        CATARACT EXTRACTION W/  INTRAOCULAR LENS IMPLANT Left 2022         SECTION      x2    CHOLECYSTECTOMY      COLONOSCOPY N/A 2018    Procedure: COLONOSCOPY;  Surgeon: Mraie Mejia MD;  Location: [facility];  Service: Endoscopy;  Laterality: N/A;    COLONOSCOPY N/A 2022    Procedure: COLONOSCOPY;  Surgeon: Pierce Rivera MD;  Location: [facility] ENDO;  Service: Endoscopy;  Laterality: N/A;    ENDOSCOPIC ULTRASOUND OF UPPER GASTROINTESTINAL TRACT N/A 10/09/2018    Procedure: ULTRASOUND, UPPER GI TRACT, ENDOSCOPIC;  Surgeon: Javy Simpson MD;  Location: [facility] ENDO;  Service: Endoscopy;  Laterality: N/A;    EXCISION OF LESION N/A 2019    Procedure: EXCISION, LESION VULVA;  Surgeon: Liv Odell MD;  Location: [facility] OR;  Service: OB/GYN;  Laterality: N/A;  latex allergy    EYE SURGERY      HYSTERECTOMY      ovaries intact, due to DUB    INTRAOCULAR PROSTHESES INSERTION Left 2022    Procedure:  INSERTION, IOL PROSTHESIS;  Surgeon: Clarice Herzog MD;  Location: [facility] OR [address] [place] [address];  Service: Ophthalmology;  Laterality: Left;    PHACOEMULSIFICATION OF CATARACT Left 11/2/2022    Procedure: PHACOEMULSIFICATION, CATARACT;  Surgeon: Clarice Herzog MD;  Location: [facility] OR [address] [place] [address];  Service: Ophthalmology;  Laterality: Left;    TUBAL LIGATION          Allergies:  Review of patient's allergies indicates:   Allergen Reactions    Ace inhibitors Hives     \Cough    Latex, natural rubber Itching       Home Medications:    Current Outpatient Medications:     ACCU-CHEK FASTCLIX LANCING DEV Kit, USE AS DIRECTED, Disp: 1 each, Rfl: 0    albuterol (PROVENTIL/VENTOLIN HFA) 90 mcg/actuation inhaler, Inhale 1-2 puffs into the lungs every 4 (four) hours as needed for Wheezing., Disp: 18 g, Rfl: 2    amLODIPine (NORVASC) 10 MG tablet, Take 1 tablet (10 mg total) by mouth once daily., Disp: 90 tablet, Rfl: 3    aspirin (ECOTRIN) 81 MG EC tablet, Take 81 mg by mouth once daily., Disp: , Rfl:     benzonatate (TESSALON) 200 MG capsule, Take 1 capsule (200 mg total) by mouth every 8 (eight) hours as needed for Cough., Disp: 30 capsule, Rfl: 0    blood sugar diagnostic (ACCU-CHEK GUIDE TEST STRIPS) Strp, 3 times a day, Disp: 300 strip, Rfl: 11    blood-glucose meter (BLOOD GLUCOSE MONITORING) kit, Three times a day, Disp: 1 each, Rfl: 0    citalopram (CELEXA) 10 MG tablet, Take 1 tablet (10 mg total) by mouth once daily., Disp: 90 tablet, Rfl: 1    ergocalciferol (ERGOCALCIFEROL) 50,000 unit Cap, TAKE 1 CAPSULE BY MOUTH EVERY 7 DAYS, Disp: 12 capsule, Rfl: 3    esomeprazole (NEXIUM) 40 MG capsule, Take 1 capsule (40 mg total) by mouth before breakfast., Disp: 90 capsule, Rfl: 3    fluocinonide (LIDEX) 0.05 % external solution, AAA scalp qday - bid prn pruritus, Disp: 60 mL, Rfl: 3    fluticasone propionate (FLONASE) 50 mcg/actuation nasal spray, 1 spray (50 mcg total) by Each Nostril route once daily., Disp: 9.9 mL, Rfl: 3     "gabapentin (NEURONTIN) 100 MG capsule, Take 2 capsules (200 mg total) by mouth every evening., Disp: 180 capsule, Rfl: 3    insulin aspart U-100 (NOVOLOG FLEXPEN U-100 INSULIN) 100 unit/mL (3 mL) InPn pen, Inject 10 Units into the skin 3 (three) times daily with meals. TDD 60 units, Disp: 75 mL, Rfl: 11    insulin syringe-needle U-100 0.3 mL 31 gauge x 5/16" Syrg, relion brand, use 5 x a day, if not on levemir or novolog, Disp: 150 each, Rfl: 1    insulin syringe-needle U-100 0.5 mL 31 gauge x 5/16" Syrg, Use nightly. 90 day, Disp: 100 each, Rfl: 3    lancets (ACCU-CHEK FASTCLIX LANCET DRUM) Misc, TEST BLOOD SUGAR THREE TIMES A DAY, Disp: 918 each, Rfl: 3    magnesium oxide (MAG-OX) 400 mg tablet, Take 1 tablet (400 mg total) by mouth 2 (two) times daily., Disp: 180 tablet, Rfl: 3    pen needle, diabetic (NOVOFINE 32) 32 gauge x 1/4" Ndle, 4 injections per day, Disp: 500 each, Rfl: 4    rosuvastatin (CRESTOR) 5 MG tablet, Take 1 tablet (5 mg total) by mouth once daily., Disp: 90 tablet, Rfl: 3    sodium chloride (SALINE NASAL) 0.65 % nasal spray, 1 spray by Nasal route as needed for Congestion., Disp: 30 mL, Rfl: 6    tirzepatide 2.5 mg/0.5 mL PnIj, Inject 2.5 mg into the skin every 7 days., Disp: 2 mL, Rfl: 0    traZODone (DESYREL) 50 MG tablet, TAKE 1 TABLET BY MOUTH EVERY EVENING., Disp: 90 tablet, Rfl: 0    benzonatate (TESSALON) 200 MG capsule, Take 1 capsule (200 mg total) by mouth every 8 (eight) hours as needed for Cough., Disp: 30 capsule, Rfl: 0    blood-glucose meter,continuous (DEXCOM G6 ) Misc, for use to monitor Blood glucose Daily. (Patient not taking: Reported on 11/4/2024), Disp: 1 each, Rfl: 3    blood-glucose sensor (DEXCOM G6 SENSOR) Pepper, change sensor every 10 days. (Patient not taking: Reported on 11/4/2024), Disp: 3 each, Rfl: 1    calcium carbonate (OS-ARIC) 600 mg calcium (1,500 mg) Tab, Take 1 tablet (600 mg total) by mouth once. for 1 dose, Disp: 30 tablet, Rfl: 11    cetirizine " "(ZYRTEC) 10 MG tablet, Take 1 tablet (10 mg total) by mouth every evening., Disp: 90 tablet, Rfl: 0    glucose 4 GM chewable tablet, Take 4 tablets (16 g total) by mouth as needed for Low blood sugar (If having symptoms of blurry vision, palpitations, confusion, shakiness.  Please check sugars and if sugar below 70 please take 4 tablets and re-check sugar everry 15 minutes until sugars are above 70 and symptoms resolve.)., Disp: 50 tablet, Rfl: 12    [START ON 11/17/2024] tirzepatide (MOUNJARO) 5 mg/0.5 mL PnIj, Inject 5 mg into the skin every 7 days., Disp: 2 mL, Rfl: 1    Current Facility-Administered Medications:     triamcinolone acetonide injection 20 mg, 20 mg, Intramuscular, 1 time in Clinic/HOD, Tani Pittman FNP     Review of Systems:  Review of Systems   Constitutional:  Negative for fever.   HENT:  Negative for congestion and sore throat.    Respiratory:  Negative for cough.    Cardiovascular:  Negative for chest pain.   Neurological:  Negative for weakness and headaches.         PHYSICAL EXAM     Vitals:    11/04/24 1334   BP: 128/70   BP Location: Right arm   Patient Position: Sitting   Pulse: 78   SpO2: 97%   Weight: 85.9 kg (189 lb 6 oz)   Height: 5' 3" (1.6 m)      Body mass index is 33.55 kg/m².     Physical Exam  Vitals reviewed.   Constitutional:       Appearance: Normal appearance.   HENT:      Head: Normocephalic.      Mouth/Throat:      Mouth: Mucous membranes are moist.      Pharynx: Oropharynx is clear.   Eyes:      Conjunctiva/sclera: Conjunctivae normal.      Pupils: Pupils are equal, round, and reactive to light.   Cardiovascular:      Rate and Rhythm: Normal rate and regular rhythm.   Pulmonary:      Effort: Pulmonary effort is normal.      Breath sounds: Normal breath sounds.   Skin:     General: Skin is warm and dry.   Neurological:      General: No focal deficit present.      Mental Status: She is alert.   Psychiatric:         Mood and Affect: Mood normal.         Behavior: Behavior " normal.         LABS     Lab Results   Component Value Date    HGBA1C 8.3 (H) 10/14/2024     CMP  Sodium   Date Value Ref Range Status   10/14/2024 140 136 - 145 mmol/L Final     Potassium   Date Value Ref Range Status   10/14/2024 3.6 3.5 - 5.1 mmol/L Final     Chloride   Date Value Ref Range Status   10/14/2024 105 95 - 110 mmol/L Final     CO2   Date Value Ref Range Status   10/14/2024 25 23 - 29 mmol/L Final     Glucose   Date Value Ref Range Status   10/14/2024 219 (H) 70 - 110 mg/dL Final     BUN   Date Value Ref Range Status   10/14/2024 10 8 - 23 mg/dL Final     Creatinine   Date Value Ref Range Status   10/14/2024 0.9 0.5 - 1.4 mg/dL Final     Calcium   Date Value Ref Range Status   10/14/2024 9.4 8.7 - 10.5 mg/dL Final     Total Protein   Date Value Ref Range Status   10/14/2024 7.0 6.0 - 8.4 g/dL Final     Albumin   Date Value Ref Range Status   10/14/2024 3.7 3.5 - 5.2 g/dL Final     Total Bilirubin   Date Value Ref Range Status   10/14/2024 0.6 0.1 - 1.0 mg/dL Final     Comment:     For infants and newborns, interpretation of results should be based  on gestational age, weight and in agreement with clinical  observations.    Premature Infant recommended reference ranges:  Up to 24 hours.............<8.0 mg/dL  Up to 48 hours............<12.0 mg/dL  3-5 days..................<15.0 mg/dL  6-29 days.................<15.0 mg/dL       Alkaline Phosphatase   Date Value Ref Range Status   10/14/2024 103 55 - 135 U/L Final     AST   Date Value Ref Range Status   10/14/2024 13 10 - 40 U/L Final     ALT   Date Value Ref Range Status   10/14/2024 10 10 - 44 U/L Final     Anion Gap   Date Value Ref Range Status   10/14/2024 10 8 - 16 mmol/L Final     eGFR if    Date Value Ref Range Status   06/13/2022 >60.0 >60 mL/min/1.73 m^2 Final     eGFR if non    Date Value Ref Range Status   06/13/2022 >60.0 >60 mL/min/1.73 m^2 Final     Comment:     Calculation used to obtain the estimated  glomerular filtration  rate (eGFR) is the CKD-EPI equation.        Lab Results   Component Value Date    WBC 8.96 04/23/2024    HGB 13.3 04/23/2024    HCT 39.6 04/23/2024    MCV 77 (L) 04/23/2024     04/23/2024     Lab Results   Component Value Date    CHOL 218 (H) 07/20/2024    CHOL 222 (H) 04/23/2024    CHOL 204 (H) 10/24/2023     Lab Results   Component Value Date    HDL 61 07/20/2024    HDL 54 04/23/2024    HDL 55 10/24/2023     Lab Results   Component Value Date    LDLCALC 135.8 07/20/2024    LDLCALC 146.4 04/23/2024    LDLCALC 128.4 10/24/2023     Lab Results   Component Value Date    TRIG 106 07/20/2024    TRIG 108 04/23/2024    TRIG 103 10/24/2023     Lab Results   Component Value Date    CHOLHDL 28.0 07/20/2024    CHOLHDL 24.3 04/23/2024    CHOLHDL 27.0 10/24/2023     Lab Results   Component Value Date    TSH 1.612 04/23/2024       ASSESSMENT     1. Type 2 diabetes mellitus without complication, with long-term current use of insulin           PLAN  I spoke with Dr. Turner. She has been off of her basal insulin for months along with trulicity. A1c just above 8. Using little s/s novolog. Plan is to try to keep her off the basal while increasing her mounjaro. Instructed to continue her 2.5 mg of mounjaro and her s/s novolog and continue to watch her diet. Has one more injection left of 2.5 mg. Tolerating it well. Discussed will increase her to 5 mg weekly of mounjaro after it has been 1 month since being on the 2.5 mg. Timed Rx out. Her biggest concern is having all of these meds documented for patient assistance. Dr. Turner does not have any paperwork regarding this. Nor do I. I placed a referral for patient assistance in pharm as well. She has had trouble affording her meds. Couldn't afford dexcom. Too expensive. But is willing to do [facility] for DM. Dr. Turner placed order. I will have her do close follow up with Dr. Turner in 1 month to reassess sugars. Encouraged her to call if she has any problems. All  questions were answered.     1. Type 2 diabetes mellitus without complication, with long-term current use of insulin  - Ambulatory referral/consult to Pharmacy Assistance; Future  - Diabetes Digital Medicine (DDMP) Enrollment Order  - tirzepatide (MOUNJARO) 5 mg/0.5 mL PnIj; Inject 5 mg into the skin every 7 days.  Dispense: 2 mL; Refill: 1       Follow up with PCP     Patient's plan/treatment was discussed including medications and possible side effects. Verbalized understanding of all instructions.     This note was partly generated with [employer] voice recognition software. I apologize for any possible typographical errors.          KAYKAY Chappell  Department of Internal Medicine - [facility]  11/04/2024

## 2024-11-06 ENCOUNTER — OFFICE VISIT (OUTPATIENT)
Dept: INTERNAL MEDICINE | Facility: CLINIC | Age: 74
End: 2024-11-06
Payer: MEDICARE

## 2024-11-06 ENCOUNTER — TELEPHONE (OUTPATIENT)
Dept: PHARMACY | Facility: CLINIC | Age: 74
End: 2024-11-06
Payer: MEDICARE

## 2024-11-06 VITALS
HEART RATE: 70 BPM | SYSTOLIC BLOOD PRESSURE: 126 MMHG | OXYGEN SATURATION: 97 % | BODY MASS INDEX: 33.87 KG/M2 | DIASTOLIC BLOOD PRESSURE: 72 MMHG | WEIGHT: 191.13 LBS | HEIGHT: 63 IN

## 2024-11-06 DIAGNOSIS — M54.2 NECK PAIN ON LEFT SIDE: ICD-10-CM

## 2024-11-06 DIAGNOSIS — E11.9 TYPE 2 DIABETES MELLITUS WITHOUT COMPLICATION, WITH LONG-TERM CURRENT USE OF INSULIN: ICD-10-CM

## 2024-11-06 DIAGNOSIS — G47.33 OSA (OBSTRUCTIVE SLEEP APNEA): Primary | ICD-10-CM

## 2024-11-06 DIAGNOSIS — Z79.4 TYPE 2 DIABETES MELLITUS WITHOUT COMPLICATION, WITH LONG-TERM CURRENT USE OF INSULIN: ICD-10-CM

## 2024-11-06 PROCEDURE — 3074F SYST BP LT 130 MM HG: CPT | Mod: HCNC,CPTII,S$GLB, | Performed by: INTERNAL MEDICINE

## 2024-11-06 PROCEDURE — 1125F AMNT PAIN NOTED PAIN PRSNT: CPT | Mod: HCNC,CPTII,S$GLB, | Performed by: INTERNAL MEDICINE

## 2024-11-06 PROCEDURE — 3288F FALL RISK ASSESSMENT DOCD: CPT | Mod: HCNC,CPTII,S$GLB, | Performed by: INTERNAL MEDICINE

## 2024-11-06 PROCEDURE — 1101F PT FALLS ASSESS-DOCD LE1/YR: CPT | Mod: HCNC,CPTII,S$GLB, | Performed by: INTERNAL MEDICINE

## 2024-11-06 PROCEDURE — 99214 OFFICE O/P EST MOD 30 MIN: CPT | Mod: HCNC,S$GLB,, | Performed by: INTERNAL MEDICINE

## 2024-11-06 PROCEDURE — 99999 PR PBB SHADOW E&M-EST. PATIENT-LVL IV: CPT | Mod: PBBFAC,HCNC,, | Performed by: INTERNAL MEDICINE

## 2024-11-06 PROCEDURE — 3078F DIAST BP <80 MM HG: CPT | Mod: HCNC,CPTII,S$GLB, | Performed by: INTERNAL MEDICINE

## 2024-11-06 RX ORDER — TIZANIDINE 2 MG/1
4 TABLET ORAL NIGHTLY PRN
Qty: 10 TABLET | Refills: 0 | Status: SHIPPED | OUTPATIENT
Start: 2024-11-06 | End: 2024-11-16

## 2024-11-06 NOTE — TELEPHONE ENCOUNTER
----- Message from Radha Arguelles sent at 11/4/2024  2:05 PM CST -----  Regarding: FW: Order for KIRT NOLASCO V    ----- Message -----  From: Rachelle Goodrich NP  Sent: 11/4/2024   1:49 PM CST  To: Pharmacy Patient Assistance Team  Subject: Order for MARIBEL NOLASCOEN V

## 2024-11-06 NOTE — TELEPHONE ENCOUNTER
Patient inform Lantus order  discontinued 11/04/2024: Provider must update chart with current Lantus order to proceed with assistance request

## 2024-11-06 NOTE — TELEPHONE ENCOUNTER
Good morning,    I stopped the lantus. We are trying to keep her off of the insulin for now but that may change when she sees her PCP in 1 month. She wanted me to make sure the mounjaro was on there for patient assistance.

## 2024-11-06 NOTE — PROGRESS NOTES
Subjective:       Patient ID: Chelly Ortiz is a 74 y.o. female.    Chief Complaint: Neck Pain    Having left sidedneck pain since Saturday. Has been improving over time but still uncomfortable.  She feels the straps of her CPAP machine have been causing some neck discomfort as well      DMII  we recently started her on Mounjaro and she is tolerating the medication well.  She has been off Levemir for the last 4 months as she was unable to get it as medication has been discontinued.  Her last A1c was 8.3 which is up from 7.2 previously.  She is still living herself NovoLog and states her sugar has been around 140s to 160s throughout the day.     HTN on amlodipine      HLD    She stopped taking statin due to myalgias . She was on atorvastatin 40 mg daily.      Insomnia on trazodone      Health Maintenance:  Colon Cancer Screening: colonoscopy in 2022 with polyps removed due again in 2027  Mammogram: January 2023 was normal   Hep C:negative 2007   Lipids: normal 2023   Vaccines:  up to date      Neck Pain   Associated symptoms include leg pain. Pertinent negatives include no chest pain or headaches.   Follow-up  Associated symptoms include neck pain. Pertinent negatives include no abdominal pain, arthralgias, chest pain, chills, coughing, headaches, myalgias, nausea or vomiting.     Review of Systems   Constitutional:  Negative for activity change, appetite change and chills.   HENT:  Negative for ear pain, sinus pressure/congestion and sneezing.    Respiratory:  Negative for cough and shortness of breath.    Cardiovascular:  Negative for chest pain, palpitations and leg swelling.   Gastrointestinal:  Negative for abdominal distention, abdominal pain, constipation, diarrhea, nausea and vomiting.   Genitourinary:  Negative for dysuria and hematuria.   Musculoskeletal:  Positive for leg pain and neck pain. Negative for arthralgias, back pain and myalgias.   Neurological:  Negative for dizziness and headaches.    Psychiatric/Behavioral:  Negative for agitation. The patient is not nervous/anxious.            Past Medical History:   Diagnosis Date    Allergy     DDD (degenerative disc disease), lumbar     Diabetes mellitus     diet controlled    GERD (gastroesophageal reflux disease)     History of subconjunctival hemorrhage 2011    left eye    IBS (irritable bowel syndrome)     Obesity     MYRIAM (obstructive sleep apnea)     on CPAP    Rotator cuff impingement syndrome     Seasonal allergic conjunctivitis     Type 2 diabetes mellitus treated with insulin      Past Surgical History:   Procedure Laterality Date    CATARACT EXTRACTION W/  INTRAOCULAR LENS IMPLANT Right 10/03/2013        CATARACT EXTRACTION W/  INTRAOCULAR LENS IMPLANT Left 2022         SECTION      x2    CHOLECYSTECTOMY      COLONOSCOPY N/A 2018    Procedure: COLONOSCOPY;  Surgeon: Marie Mejia MD;  Location: [facility];  Service: Endoscopy;  Laterality: N/A;    COLONOSCOPY N/A 2022    Procedure: COLONOSCOPY;  Surgeon: Pierce Rivera MD;  Location: [facility];  Service: Endoscopy;  Laterality: N/A;    ENDOSCOPIC ULTRASOUND OF UPPER GASTROINTESTINAL TRACT N/A 10/09/2018    Procedure: ULTRASOUND, UPPER GI TRACT, ENDOSCOPIC;  Surgeon: Javy Simpson MD;  Location: [facility];  Service: Endoscopy;  Laterality: N/A;    EXCISION OF LESION N/A 2019    Procedure: EXCISION, LESION VULVA;  Surgeon: Liv Odell MD;  Location: [facility] OR;  Service: OB/GYN;  Laterality: N/A;  latex allergy    EYE SURGERY      HYSTERECTOMY      ovaries intact, due to DUB    INTRAOCULAR PROSTHESES INSERTION Left 2022    Procedure: INSERTION, IOL PROSTHESIS;  Surgeon: Clarice Herzog MD;  Location: [facility] OR [address] [place] [address];  Service: Ophthalmology;  Laterality: Left;    PHACOEMULSIFICATION OF CATARACT Left 2022    Procedure: PHACOEMULSIFICATION, CATARACT;  Surgeon: Clarice Herzog MD;  [facility];   Service: Ophthalmology;  Laterality: Left;    TUBAL LIGATION        Patient Active Problem List   Diagnosis    GERD (gastroesophageal reflux disease)    MYRIAM (obstructive sleep apnea)    IBS (irritable bowel syndrome)    DDD (degenerative disc disease), lumbar    Insomnia    Anxiety    Hearing loss, sensorineural    Nuclear sclerotic cataract of left eye    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    Constipation    History of colon polyps    Essential hypertension    Hyperlipidemia, unspecified    Spondylosis of cervical region without myelopathy or radiculopathy    Cervical myofascial pain syndrome    PCO (posterior capsular opacification), right    Dry eye syndrome    Pseudophakia    Decreased range of motion of lumbar spine    Decreased strength    Dilated bile duct    Personal history of colonic polyps    EIC (epidermal inclusion cyst)    Sedentary lifestyle    Dysphagia    Type 2 diabetes mellitus without complication, with long-term current use of insulin    Abdominal aortic atherosclerosis    Decreased ROM of neck    Arthritis of multiple sites    Osteopenia of multiple sites    Combined form of age-related cataract, left eye    Adjustment disorder with anxious mood    Decreased ROM of right shoulder    History of epistaxis    Acute pain of both shoulders    Decreased ROM of left shoulder    Abnormal posture    Palpitations    Secondary osteoarthritis of right shoulder due to rotator cuff tear        Objective:      Physical Exam  Vitals reviewed.   Constitutional:       Appearance: Normal appearance.   HENT:      Head: Normocephalic.      Mouth/Throat:      Mouth: Mucous membranes are moist.      Pharynx: Oropharynx is clear.   Eyes:      Conjunctiva/sclera: Conjunctivae normal.      Pupils: Pupils are equal, round, and reactive to light.   Cardiovascular:      Rate and Rhythm: Normal rate and regular rhythm.   Pulmonary:      Effort: Pulmonary effort is normal.      Breath sounds: Normal breath sounds.    Abdominal:      General: Bowel sounds are normal.      Palpations: Abdomen is soft.      Tenderness: There is no abdominal tenderness. There is no right CVA tenderness or left CVA tenderness.   Skin:     General: Skin is warm and dry.   Neurological:      General: No focal deficit present.      Mental Status: She is alert.   Psychiatric:         Mood and Affect: Mood normal.         Behavior: Behavior normal.         Assessment:       Problem List Items Addressed This Visit    None        Plan:         Chelly was seen today for neck pain.    Diagnoses and all orders for this visit:    MYRIAM (obstructive sleep apnea)  -     Ambulatory referral/consult to Sleep Disorders; Future    Neck pain on left side  -     Ambulatory referral/consult to Physical/Occupational Therapy; Future  -     tiZANidine (ZANAFLEX) 2 MG tablet; Take 2 tablets (4 mg total) by mouth nightly as needed (pain).  Start tizanidine and PT.  She will let me know if her pain does not improve.    Type 2 diabetes mellitus without complication, with long-term current use of insulin  -     HEMOGLOBIN A1C; Future  -     COMPREHENSIVE METABOLIC PANEL; Future  Increase Mounjaro 2 5 mg weekly.  She will continue NovoLog on a sliding scale for now but the goal will be to get her off insulin completely as we continue to increase the Mounjaro.            Smitha Turner MD   Internal Medicine   Primary Care

## 2024-11-11 ENCOUNTER — TELEPHONE (OUTPATIENT)
Dept: SPORTS MEDICINE | Facility: CLINIC | Age: 74
End: 2024-11-11
Payer: MEDICARE

## 2024-11-11 DIAGNOSIS — M54.2 NECK PAIN: Primary | ICD-10-CM

## 2024-11-12 ENCOUNTER — HOSPITAL ENCOUNTER (OUTPATIENT)
Dept: RADIOLOGY | Facility: HOSPITAL | Age: 74
Discharge: HOME | End: 2024-11-12
Attending: REGISTERED NURSE
Payer: MEDICARE

## 2024-11-12 ENCOUNTER — OFFICE VISIT (OUTPATIENT)
Dept: ORTHOPEDICS | Facility: CLINIC | Age: 74
End: 2024-11-12
Payer: MEDICARE

## 2024-11-12 ENCOUNTER — PATIENT MESSAGE (OUTPATIENT)
Dept: INTERNAL MEDICINE | Facility: CLINIC | Age: 74
End: 2024-11-12
Payer: MEDICARE

## 2024-11-12 ENCOUNTER — TELEPHONE (OUTPATIENT)
Dept: ORTHOPEDICS | Facility: CLINIC | Age: 74
End: 2024-11-12

## 2024-11-12 VITALS — HEIGHT: 63 IN | BODY MASS INDEX: 33.49 KG/M2 | WEIGHT: 189 LBS

## 2024-11-12 DIAGNOSIS — M50.30 DDD (DEGENERATIVE DISC DISEASE), CERVICAL: Primary | ICD-10-CM

## 2024-11-12 DIAGNOSIS — M54.2 NECK PAIN: ICD-10-CM

## 2024-11-12 DIAGNOSIS — M50.30 DDD (DEGENERATIVE DISC DISEASE), CERVICAL: ICD-10-CM

## 2024-11-12 PROCEDURE — 72050 X-RAY EXAM NECK SPINE 4/5VWS: CPT | Mod: TC

## 2024-11-12 PROCEDURE — 72050 X-RAY EXAM NECK SPINE 4/5VWS: CPT | Mod: 26,,, | Performed by: RADIOLOGY

## 2024-11-12 PROCEDURE — 99999 PR PBB SHADOW E&M-EST. PATIENT-LVL V: CPT | Mod: PBBFAC,,, | Performed by: REGISTERED NURSE

## 2024-11-12 RX ORDER — INSULIN GLARGINE 100 [IU]/ML
INJECTION, SOLUTION SUBCUTANEOUS
COMMUNITY
Start: 2024-11-11

## 2024-11-12 RX ORDER — BLOOD-GLUCOSE METER
EACH MISCELLANEOUS
COMMUNITY
Start: 2024-10-09

## 2024-11-12 RX ORDER — MONTELUKAST SODIUM 10 MG/1
10 TABLET ORAL NIGHTLY
COMMUNITY
Start: 2024-09-30

## 2024-11-12 NOTE — PROGRESS NOTES
DATE: 2024  PATIENT: Chelly Ortiz    Supervising Physician: Frantz Dia M.D.    CHIEF COMPLAINT: neck pain    HISTORY:  Chelly Ortiz is a 74 y.o. female with pmhx of DM2 (A1c:7.2) here for initial evaluation of neck pain (Neck - 5, Arm - 0). The pain has been present for months. The patient describes the pain as aching and stiffness. The pain is worse with neck rotation and improved by nothing. There is no associated numbness and tingling. There is no subjective weakness. Prior treatments have included gabapentin, Zanaflex, but no LYNN or surgery. Of note, the patient has bilateral rotator cuff tears.    The patient denies myelopathic symptoms such as handwriting changes or difficulty with buttons/coins/keys. Denies perineal paresthesias, bowel/bladder dysfunction.    PAST MEDICAL/SURGICAL HISTORY:  Past Medical History:   Diagnosis Date    Allergy     DDD (degenerative disc disease), lumbar     Diabetes mellitus     diet controlled    GERD (gastroesophageal reflux disease)     History of subconjunctival hemorrhage 2011    left eye    IBS (irritable bowel syndrome)     Obesity     MYRIAM (obstructive sleep apnea)     on CPAP    Rotator cuff impingement syndrome     Seasonal allergic conjunctivitis     Type 2 diabetes mellitus treated with insulin      Past Surgical History:   Procedure Laterality Date    CATARACT EXTRACTION W/  INTRAOCULAR LENS IMPLANT Right 10/03/2013        CATARACT EXTRACTION W/  INTRAOCULAR LENS IMPLANT Left 2022         SECTION      x2    CHOLECYSTECTOMY      COLONOSCOPY N/A 2018    Procedure: COLONOSCOPY;  Surgeon: Marie Mejia MD;  Location: [facility] ENDO;  Service: Endoscopy;  Laterality: N/A;    COLONOSCOPY N/A 2022    Procedure: COLONOSCOPY;  Surgeon: Pierce Rivera MD;  Location: [facility] ENDO;  Service: Endoscopy;  Laterality: N/A;    ENDOSCOPIC ULTRASOUND OF UPPER GASTROINTESTINAL TRACT N/A 10/09/2018    Procedure:  ULTRASOUND, UPPER GI TRACT, ENDOSCOPIC;  Surgeon: Javy Simpson MD;  Location: [facility] ENDO;  Service: Endoscopy;  Laterality: N/A;    EXCISION OF LESION N/A 02/13/2019    Procedure: EXCISION, LESION VULVA;  Surgeon: Liv Odell MD;  Location: [facility] OR;  Service: OB/GYN;  Laterality: N/A;  latex allergy    EYE SURGERY      HYSTERECTOMY      ovaries intact, due to DUB    INTRAOCULAR PROSTHESES INSERTION Left 11/2/2022    Procedure: INSERTION, IOL PROSTHESIS;  Surgeon: Clarice Herzog MD;  Location: [facility] OR [address] [place] [address];  Service: Ophthalmology;  Laterality: Left;    PHACOEMULSIFICATION OF CATARACT Left 11/2/2022    Procedure: PHACOEMULSIFICATION, CATARACT;  Surgeon: Clarice Herzog MD;  Location: [facility] OR [address] [place] [address];  Service: Ophthalmology;  Laterality: Left;    TUBAL LIGATION         Medications:  Current Outpatient Medications on File Prior to Visit   Medication Sig Dispense Refill    ACCU-CHEK FASTCLIX LANCING DEV Kit USE AS DIRECTED 1 each 0    ACCU-CHEK GUIDE ME GLUCOSE MTR [facility] USE GLUCOSE METER TO TEST 3 TIMES A DAY      albuterol (PROVENTIL/VENTOLIN HFA) 90 mcg/actuation inhaler Inhale 1-2 puffs into the lungs every 4 (four) hours as needed for Wheezing. 18 g 2    amLODIPine (NORVASC) 10 MG tablet Take 1 tablet (10 mg total) by mouth once daily. 90 tablet 3    aspirin (ECOTRIN) 81 MG EC tablet Take 81 mg by mouth once daily.      benzonatate (TESSALON) 200 MG capsule Take 1 capsule (200 mg total) by mouth every 8 (eight) hours as needed for Cough. 30 capsule 0    benzonatate (TESSALON) 200 MG capsule Take 1 capsule (200 mg total) by mouth every 8 (eight) hours as needed for Cough. 30 capsule 0    blood sugar diagnostic (ACCU-CHEK GUIDE TEST STRIPS) Strp 3 times a day 300 strip 11    blood-glucose meter (BLOOD GLUCOSE MONITORING) kit Three times a day 1 each 0    blood-glucose meter,continuous (DEXCOM G6 ) Misc for use to monitor Blood glucose Daily. 1 each 3    blood-glucose sensor (DEXCOM G6  "SENSOR) Pepper change sensor every 10 days. 3 each 1    citalopram (CELEXA) 10 MG tablet Take 1 tablet (10 mg total) by mouth once daily. 90 tablet 1    ergocalciferol (ERGOCALCIFEROL) 50,000 unit Cap TAKE 1 CAPSULE BY MOUTH EVERY 7 DAYS 12 capsule 3    esomeprazole (NEXIUM) 40 MG capsule Take 1 capsule (40 mg total) by mouth before breakfast. 90 capsule 3    fluocinonide (LIDEX) 0.05 % external solution AAA scalp qday - bid prn pruritus 60 mL 3    fluticasone propionate (FLONASE) 50 mcg/actuation nasal spray 1 spray (50 mcg total) by Each Nostril route once daily. 9.9 mL 3    gabapentin (NEURONTIN) 100 MG capsule Take 2 capsules (200 mg total) by mouth every evening. 180 capsule 3    insulin aspart U-100 (NOVOLOG FLEXPEN U-100 INSULIN) 100 unit/mL (3 mL) InPn pen Inject 10 Units into the skin 3 (three) times daily with meals. TDD 60 units 75 mL 11    insulin syringe-needle U-100 0.3 mL 31 gauge x 5/16" Syrg relion brand, use 5 x a day, if not on levemir or novolog 150 each 1    insulin syringe-needle U-100 0.5 mL 31 gauge x 5/16" Syrg Use nightly. 90 day 100 each 3    lancets (ACCU-CHEK FASTCLIX LANCET DRUM) [facility] TEST BLOOD SUGAR THREE TIMES A  each 3    LANTUS SOLOSTAR U-100 INSULIN 100 unit/mL (3 mL) InPn pen Inject into the skin.      magnesium oxide (MAG-OX) 400 mg tablet Take 1 tablet (400 mg total) by mouth 2 (two) times daily. 180 tablet 3    montelukast (SINGULAIR) 10 mg tablet Take 10 mg by mouth every evening.      pen needle, diabetic (NOVOFINE 32) 32 gauge x 1/4" Ndle 4 injections per day 500 each 4    rosuvastatin (CRESTOR) 5 MG tablet Take 1 tablet (5 mg total) by mouth once daily. 90 tablet 3    sodium chloride (SALINE NASAL) 0.65 % nasal spray 1 spray by Nasal route as needed for Congestion. 30 mL 6    [START ON 11/17/2024] tirzepatide (MOUNJARO) 5 mg/0.5 mL PnIj Inject 5 mg into the skin every 7 days. 2 mL 1    tirzepatide 2.5 mg/0.5 mL PnIj Inject 2.5 mg into the skin every 7 days. 2 mL 0    " tiZANidine (ZANAFLEX) 2 MG tablet Take 2 tablets (4 mg total) by mouth nightly as needed (pain). 10 tablet 0    traZODone (DESYREL) 50 MG tablet TAKE 1 TABLET BY MOUTH EVERY EVENING. 90 tablet 0    calcium carbonate (OS-ARIC) 600 mg calcium (1,500 mg) Tab Take 1 tablet (600 mg total) by mouth once. for 1 dose 30 tablet 11    cetirizine (ZYRTEC) 10 MG tablet Take 1 tablet (10 mg total) by mouth every evening. 90 tablet 0    glucose 4 GM chewable tablet Take 4 tablets (16 g total) by mouth as needed for Low blood sugar (If having symptoms of blurry vision, palpitations, confusion, shakiness.  Please check sugars and if sugar below 70 please take 4 tablets and re-check sugar everry 15 minutes until sugars are above 70 and symptoms resolve.). 50 tablet 12     Current Facility-Administered Medications on File Prior to Visit   Medication Dose Route Frequency Provider Last Rate Last Admin    triamcinolone acetonide injection 20 mg  20 mg Intramuscular 1 time in Clinic/HOD Tani Pittman FNP           Social History:   Social History     Socioeconomic History    Marital status:    Tobacco Use    Smoking status: Former     Current packs/day: 0.00     Types: Cigarettes     Quit date: 2/15/1983     Years since quittin.7     Passive exposure: Current ()    Smokeless tobacco: Never   Substance and Sexual Activity    Alcohol use: No    Drug use: No    Sexual activity: Yes     Partners: Male     Social Drivers of Health     Financial Resource Strain: Patient Declined (2023)    Overall Financial Resource Strain (CARDIA)     Difficulty of Paying Living Expenses: Patient declined   Food Insecurity: Patient Declined (2023)    Hunger Vital Sign     Worried About Running Out of Food in the Last Year: Patient declined     Ran Out of Food in the Last Year: Patient declined   Recent Concern: Food Insecurity - Food Insecurity Present (10/11/2023)    Hunger Vital Sign     Worried About Running Out of Food in  "the Last Year: Patient declined     Ran Out of Food in the Last Year: Sometimes true   Transportation Needs: No Transportation Needs (11/30/2023)    PRAPARE - Transportation     Lack of Transportation (Medical): No     Lack of Transportation (Non-Medical): No   Physical Activity: Unknown (11/30/2023)    Exercise Vital Sign     Days of Exercise per Week: Patient declined   Recent Concern: Physical Activity - Insufficiently Active (10/11/2023)    Exercise Vital Sign     Days of Exercise per Week: 3 days     Minutes of Exercise per Session: 20 min   Stress: Patient Declined (11/30/2023)    [employer] [place] [employer] - Occupational Stress Questionnaire     Feeling of Stress : Patient declined   Recent Concern: Stress - Stress Concern Present (10/11/2023)    [employer] [place] [employer] - Occupational Stress Questionnaire     Feeling of Stress : To some extent   Housing Stability: Low Risk  (11/30/2023)    Housing Stability Vital Sign     Unable to Pay for Housing in the Last Year: No     Number of Places Lived in the Last Year: 1     Unstable Housing in the Last Year: No       REVIEW OF SYSTEMS:  Constitution: Negative. Negative for chills, fever and night sweats.   Cardiovascular: Negative for chest pain and syncope.   Respiratory: Negative for cough and shortness of breath.   Gastrointestinal: See HPI. Negative for nausea/vomiting. Negative for abdominal pain.  Genitourinary: See HPI. Negative for discoloration or dysuria.  Skin: Negative for dry skin, itching and rash.   Hematologic/Lymphatic: Negative for bleeding problem. Does not bruise/bleed easily.   Musculoskeletal: Negative for falls and muscle weakness.   Neurological: See HPI. No seizures.   Endocrine: Negative for polydipsia, polyphagia and polyuria.   Allergic/Immunologic: Negative for hives and persistent infections.  Psychiatric/Behavioral: Negative for depression and insomnia.         EXAM:  Ht 5' 3" (1.6 m)   Wt 85.7 kg (189 " lb)   LMP 08/01/2000   BMI 33.48 kg/m²     General: The patient is a 74 y.o. female in no apparent distress, the patient is oriented to person, place and time.  Psych: Normal mood and affect  HEENT: Vision grossly intact, hearing intact to the spoken word.  Lungs: Respirations unlabored.  Gait: Normal station and gait, no difficulty with toe or heel walk.   Skin: Cervical skin negative for rashes, lesions, hairy patches and surgical scars.  Range of motion: Cervical range of motion is acceptable. There is mild tenderness to palpation.  Spinal Balance: Global saggital and coronal spinal balance acceptable, no significant for scoliosis and kyphosis.  Musculoskeletal: No pain with the range of motion of the bilateral shoulders and elbows. Normal bulk and contour of the bilateral hands.  Vascular: Bilateral hands warm and well perfused, radial pulses 2+ bilaterally.  Neurological: Normal strength and tone in all major motor groups in the bilateral upper and lower extremities. Normal sensation to light touch in the C5-T1 and L2-S1 dermatomes bilaterally.  Deep tendon reflexes symmetric 2+ in the bilateral upper and lower extremities.  Negative Inverted Radial Reflex and Roque's bilaterally. Negative Babinski bilaterally.     IMAGING:   Today I personally reviewed AP, Lat and Flex/Ex  upright C-spine films that demonstrate no significant degenerative changes      Body mass index is 33.48 kg/m².    Hemoglobin A1C   Date Value Ref Range Status   10/14/2024 8.3 (H) 4.0 - 5.6 % Final     Comment:     ADA Screening Guidelines:  5.7-6.4%  Consistent with prediabetes  >or=6.5%  Consistent with diabetes    High levels of fetal hemoglobin interfere with the HbA1C  assay. Heterozygous hemoglobin variants (HbS, HgC, etc)do  not significantly interfere with this assay.   However, presence of multiple variants may affect accuracy.     07/20/2024 7.2 (H) 4.0 - 5.6 % Final     Comment:     ADA Screening Guidelines:  5.7-6.4%   Consistent with prediabetes  >or=6.5%  Consistent with diabetes    High levels of fetal hemoglobin interfere with the HbA1C  assay. Heterozygous hemoglobin variants (HbS, HgC, etc)do  not significantly interfere with this assay.   However, presence of multiple variants may affect accuracy.     04/23/2024 6.8 (H) 4.0 - 5.6 % Final     Comment:     ADA Screening Guidelines:  5.7-6.4%  Consistent with prediabetes  >or=6.5%  Consistent with diabetes    High levels of fetal hemoglobin interfere with the HbA1C  assay. Heterozygous hemoglobin variants (HbS, HgC, etc)do  not significantly interfere with this assay.   However, presence of multiple variants may affect accuracy.             ASSESSMENT/PLAN:    Chelly was seen today for neck pain.    Diagnoses and all orders for this visit:    Neck pain  -     [facility]ulatory referral[facility]      Today we discussed at length all of the different treatment options including anti-inflammatories, acetaminophen, rest, ice, heat, physical therapy including strengthening and stretching exercises, home exercises, ROM, aerobic conditioning, aqua therapy, other modalities including ultrasound, massage, and dry needling, epidural steroid injections and finally surgical intervention.  referred to pain mgmt for cervical trigger point injections. Follow up as needed

## 2024-11-12 NOTE — TELEPHONE ENCOUNTER
Attempt to contact patient regarding x-ray. Left message stating that the x-ray are scheduled for 1:00 pm at the [facility] today. Also left number for patient to return call back to [phone]. Thanks.

## 2024-11-13 ENCOUNTER — PATIENT MESSAGE (OUTPATIENT)
Dept: INTERNAL MEDICINE | Facility: CLINIC | Age: 74
End: 2024-11-13
Payer: MEDICARE

## 2024-11-13 ENCOUNTER — PATIENT MESSAGE (OUTPATIENT)
Dept: PULMONOLOGY | Facility: CLINIC | Age: 74
End: 2024-11-13
Payer: MEDICARE

## 2024-11-13 NOTE — TELEPHONE ENCOUNTER
Reviewed pt chart, note from e[facility] that states: Provider must update chart with current Lantus order to proceed with assistance request. Please enter updated Lantus prescription.

## 2024-11-13 NOTE — PROGRESS NOTES
ANNUAL VISIT NOTE     PRESENTING HISTORY     Reason for Visit:  Annual visit.    No chief complaint on file.    History of Present Illness & ROS: Ms. Chelly Ortiz is a 74 y.o. female.  Annual   Pleasant lady.   Fasting for labs.   Est'd with Dr. Turner.   She was not certain who she was to see today for her annual.   Having no acute medical issues or concerns.   She is having a cough and has reached out to her established Pulmonary medicine expert at [facility] for refill on inhaler.   No routine refills needed today.   She is working with her PCP, Dr. Turner, on the insulin issues she has been having.     Review of Systems:  Eyes: denies visual changes at this time denies floaters   ENT: no nasal congestion or sore throat  Respiratory: no cough or shorness of breath  Cardiovascular: no chest pain or palpitations  Gastrointestinal: no nausea or vomiting, no abdominal pain or change in bowel habits  Genitourinary: no hematuria or dysuria; denies frequency  Hematologic/Lymphatic: no easy bruising or lymphadenopathy  Musculoskeletal: no arthralgias or myalgias  Neurological: no seizures or tremors  Endocrine: no heat or cold intolerance    PAST HISTORY:     Past Medical History:   Diagnosis Date    Allergy     DDD (degenerative disc disease), lumbar     Diabetes mellitus     diet controlled    GERD (gastroesophageal reflux disease)     History of subconjunctival hemorrhage 2011    left eye    IBS (irritable bowel syndrome)     Obesity     MYRIAM (obstructive sleep apnea)     on CPAP    Rotator cuff impingement syndrome     Seasonal allergic conjunctivitis     Type 2 diabetes mellitus treated with insulin        Past Surgical History:   Procedure Laterality Date    CATARACT EXTRACTION W/  INTRAOCULAR LENS IMPLANT Right 10/03/2013        CATARACT EXTRACTION W/  INTRAOCULAR LENS IMPLANT Left 2022         SECTION      x2    CHOLECYSTECTOMY      COLONOSCOPY N/A 2018    Procedure:  COLONOSCOPY;  Surgeon: Marie Mejia MD;  Location: [facility] ENDO;  Service: Endoscopy;  Laterality: N/A;    COLONOSCOPY N/A 2022    Procedure: COLONOSCOPY;  Surgeon: Pierce Rivera MD;  Location: [facility] ENDO;  Service: Endoscopy;  Laterality: N/A;    ENDOSCOPIC ULTRASOUND OF UPPER GASTROINTESTINAL TRACT N/A 10/09/2018    Procedure: ULTRASOUND, UPPER GI TRACT, ENDOSCOPIC;  Surgeon: Javy Simpson MD;  Location: [facility] ENDO;  Service: Endoscopy;  Laterality: N/A;    EXCISION OF LESION N/A 2019    Procedure: EXCISION, LESION VULVA;  Surgeon: Liv Odell MD;  Location: [facility] OR;  Service: OB/GYN;  Laterality: N/A;  latex allergy    EYE SURGERY      HYSTERECTOMY      ovaries intact, due to DUB    INTRAOCULAR PROSTHESES INSERTION Left 2022    Procedure: INSERTION, IOL PROSTHESIS;  Surgeon: Clarice Herzog MD;  Location: [facility] OR [address] [place] [address];  Service: Ophthalmology;  Laterality: Left;    PHACOEMULSIFICATION OF CATARACT Left 2022    Procedure: PHACOEMULSIFICATION, CATARACT;  Surgeon: Clarice Herzog MD;  Location: [facility] OR [address] [place] [address];  Service: Ophthalmology;  Laterality: Left;    TUBAL LIGATION         Family History   Problem Relation Name Age of Onset    Alzheimer's disease Mother      Heart disease Father      Diabetes Sister x4     Breast cancer Sister x4     Deep vein thrombosis Brother x1     Diabetes Daughter x2     Cancer Brother x1         Lung    Lung cancer Brother x1     Amblyopia Neg Hx      Blindness Neg Hx      Cataracts Neg Hx      Glaucoma Neg Hx      Hypertension Neg Hx      Macular degeneration Neg Hx      Retinal detachment Neg Hx      Strabismus Neg Hx      Stroke Neg Hx      Thyroid disease Neg Hx         Social History     Socioeconomic History    Marital status:    Tobacco Use    Smoking status: Former     Current packs/day: 0.00     Types: Cigarettes     Quit date: 2/15/1983     Years since quittin.7     Passive exposure: Current ()    Smokeless  tobacco: Never   Substance and Sexual Activity    Alcohol use: No    Drug use: No    Sexual activity: Yes     Partners: Male     Social Drivers of Health     Financial Resource Strain: Patient Declined (11/30/2023)    Overall Financial Resource Strain (CARDIA)     Difficulty of Paying Living Expenses: Patient declined   Food Insecurity: Patient Declined (11/30/2023)    Hunger Vital Sign     Worried About Running Out of Food in the Last Year: Patient declined     Ran Out of Food in the Last Year: Patient declined   Recent Concern: Food Insecurity - Food Insecurity Present (10/11/2023)    Hunger Vital Sign     Worried About Running Out of Food in the Last Year: Patient declined     Ran Out of Food in the Last Year: Sometimes true   Transportation Needs: No Transportation Needs (11/30/2023)    PRAPARE - Transportation     Lack of Transportation (Medical): No     Lack of Transportation (Non-Medical): No   Physical Activity: Unknown (11/30/2023)    Exercise Vital Sign     Days of Exercise per Week: Patient declined   Recent Concern: Physical Activity - Insufficiently Active (10/11/2023)    Exercise Vital Sign     Days of Exercise per Week: 3 days     Minutes of Exercise per Session: 20 min   Stress: Patient Declined (11/30/2023)    [employer] - Occupational Stress Questionnaire     Feeling of Stress : Patient declined   Recent Concern: Stress - Stress Concern Present (10/11/2023)    [employer] - Occupational Stress Questionnaire     Feeling of Stress : To some extent   Housing Stability: Low Risk  (11/30/2023)    Housing Stability Vital Sign     Unable to Pay for Housing in the Last Year: No     Number of Places Lived in the Last Year: 1     Unstable Housing in the Last Year: No       MEDICATIONS & ALLERGIES:     Current Outpatient Medications on File Prior to Visit   Medication Sig Dispense Refill    ACCU-CHEK FASTCLIX LANCING DEV Kit USE AS DIRECTED 1 each 0     ACCU-CHEK GUIDE ME GLUCOSE [facility] USE GLUCOSE METER TO TEST 3 TIMES A DAY      albuterol (PROVENTIL/VENTOLIN HFA) 90 mcg/actuation inhaler Inhale 1-2 puffs into the lungs every 4 (four) hours as needed for Wheezing. 18 g 2    amLODIPine (NORVASC) 10 MG tablet Take 1 tablet (10 mg total) by mouth once daily. 90 tablet 3    aspirin (ECOTRIN) 81 MG EC tablet Take 81 mg by mouth once daily.      benzonatate (TESSALON) 200 MG capsule Take 1 capsule (200 mg total) by mouth every 8 (eight) hours as needed for Cough. 30 capsule 0    benzonatate (TESSALON) 200 MG capsule Take 1 capsule (200 mg total) by mouth every 8 (eight) hours as needed for Cough. 30 capsule 0    blood sugar diagnostic (ACCU-CHEK GUIDE TEST STRIPS) Strp 3 times a day 300 strip 11    blood-glucose meter (BLOOD GLUCOSE MONITORING) kit Three times a day 1 each 0    blood-glucose meter,continuous (DEXCOM G6 ) Misc for use to monitor Blood glucose Daily. 1 each 3    blood-glucose sensor (DEXCOM G6 SENSOR) Pepper change sensor every 10 days. 3 each 1    calcium carbonate (OS-ARIC) 600 mg calcium (1,500 mg) Tab Take 1 tablet (600 mg total) by mouth once. for 1 dose 30 tablet 11    cetirizine (ZYRTEC) 10 MG tablet Take 1 tablet (10 mg total) by mouth every evening. 90 tablet 0    citalopram (CELEXA) 10 MG tablet Take 1 tablet (10 mg total) by mouth once daily. 90 tablet 1    ergocalciferol (ERGOCALCIFEROL) 50,000 unit Cap TAKE 1 CAPSULE BY MOUTH EVERY 7 DAYS 12 capsule 3    esomeprazole (NEXIUM) 40 MG capsule Take 1 capsule (40 mg total) by mouth before breakfast. 90 capsule 3    fluocinonide (LIDEX) 0.05 % external solution AAA scalp qday - bid prn pruritus 60 mL 3    fluticasone propionate (FLONASE) 50 mcg/actuation nasal spray 1 spray (50 mcg total) by Each Nostril route once daily. 9.9 mL 3    gabapentin (NEURONTIN) 100 MG capsule Take 2 capsules (200 mg total) by mouth every evening. 180 capsule 3    glucose 4 GM chewable tablet Take 4 tablets (16  "g total) by mouth as needed for Low blood sugar (If having symptoms of blurry vision, palpitations, confusion, shakiness.  Please check sugars and if sugar below 70 please take 4 tablets and re-check sugar everry 15 minutes until sugars are above 70 and symptoms resolve.). 50 tablet 12    insulin aspart U-100 (NOVOLOG FLEXPEN U-100 INSULIN) 100 unit/mL (3 mL) InPn pen Inject 10 Units into the skin 3 (three) times daily with meals. TDD 60 units 75 mL 11    insulin syringe-needle U-100 0.3 mL 31 gauge x 5/16" Syrg relion brand, use 5 x a day, if not on levemir or novolog 150 each 1    insulin syringe-needle U-100 0.5 mL 31 gauge x 5/16" Syrg Use nightly. 90 day 100 each 3    lancets (ACCU-CHEK FASTCLIX LANCET DRUM) Misc TEST BLOOD SUGAR THREE TIMES A  each 3    LANTUS SOLOSTAR U-100 INSULIN 100 unit/mL (3 mL) InPn pen Inject into the skin.      magnesium oxide (MAG-OX) 400 mg tablet Take 1 tablet (400 mg total) by mouth 2 (two) times daily. 180 tablet 3    montelukast (SINGULAIR) 10 mg tablet Take 10 mg by mouth every evening.      pen needle, diabetic (NOVOFINE 32) 32 gauge x 1/4" Ndle 4 injections per day 500 each 4    rosuvastatin (CRESTOR) 5 MG tablet Take 1 tablet (5 mg total) by mouth once daily. 90 tablet 3    sodium chloride (SALINE NASAL) 0.65 % nasal spray 1 spray by Nasal route as needed for Congestion. 30 mL 6    [START ON 11/17/2024] tirzepatide (MOUNJARO) 5 mg/0.5 mL PnIj Inject 5 mg into the skin every 7 days. 2 mL 1    tirzepatide 2.5 mg/0.5 mL PnIj Inject 2.5 mg into the skin every 7 days. 2 mL 0    tiZANidine (ZANAFLEX) 2 MG tablet Take 2 tablets (4 mg total) by mouth nightly as needed (pain). 10 tablet 0    traZODone (DESYREL) 50 MG tablet TAKE 1 TABLET BY MOUTH EVERY EVENING. 90 tablet 0     Current Facility-Administered Medications on File Prior to Visit   Medication Dose Route Frequency Provider Last Rate Last Admin    triamcinolone acetonide injection 20 mg  20 mg Intramuscular 1 time in " Clinic/HOD Tani Pittman FNP            Review of patient's allergies indicates:   Allergen Reactions    Ace inhibitors Hives     \Cough    Latex, natural rubber Itching       Medications Reconciliation:   I have reconciled the patient's home medications and discharge medications with the patient/family. I have updated all changes.  Refer to After-Visit Medication List.    OBJECTIVE:     Vital Signs:  There were no vitals filed for this visit.  Wt Readings from Last 3 Encounters:   11/12/24 1334 85.7 kg (189 lb)   11/06/24 0814 86.7 kg (191 lb 2.2 oz)   11/04/24 1334 85.9 kg (189 lb 6 oz)     There is no height or weight on file to calculate BMI.   Wt Readings from Last 3 Encounters:   11/14/24 85.5 kg (188 lb 7.9 oz)   11/12/24 85.7 kg (189 lb)   11/06/24 86.7 kg (191 lb 2.2 oz)     Temp Readings from Last 3 Encounters:   08/05/24 97.3 °F (36.3 °C)   04/11/24 98.5 °F (36.9 °C)   03/12/24 99.1 °F (37.3 °C) (Oral)     BP Readings from Last 3 Encounters:   11/14/24 (!) 140/72   11/06/24 126/72   11/04/24 128/70     Pulse Readings from Last 3 Encounters:   11/14/24 78   11/06/24 70   11/04/24 78       Physical Exam:  General: Well developed, well nourished. No distress.  HEENT: Head is normocephalic, atraumatic;  Eyes: Clear conjunctiva.  Neck: Supple, symmetrical neck; trachea midline.  Lungs: Clear to auscultation bilaterally and normal respiratory effort.  Cardiovascular: Heart with regular rate and rhythm. No murmurs, gallops or rubs  Skin: Skin color, texture, turgor normal. No rashes.  Musculoskeletal: Normal gait.   Neurologic: Normal strength and tone. No focal numbness or weakness.     Laboratory  Lab Results   Component Value Date    WBC 8.96 04/23/2024    HGB 13.3 04/23/2024    HCT 39.6 04/23/2024     04/23/2024    CHOL 218 (H) 07/20/2024    TRIG 106 07/20/2024    HDL 61 07/20/2024    ALT 10 10/14/2024    AST 13 10/14/2024     10/14/2024    K 3.6 10/14/2024     10/14/2024    CREATININE  0.9 10/14/2024    BUN 10 10/14/2024    CO2 25 10/14/2024    TSH 1.612 04/23/2024    INR 0.9 04/19/2012    GLUF 105 10/27/2004    HGBA1C 8.3 (H) 10/14/2024       ASSESSMENT & PLAN:     Annual physical exam  -     Comprehensive Metabolic Panel; Future; Expected date: 11/14/2024  -     CBC Auto Differential; Future; Expected date: 11/14/2024  -     Lipid Panel; Future; Expected date: 11/14/2024  -     Hemoglobin A1C; Future; Expected date: 11/14/2024  -     TSH; Future; Expected date: 11/14/2024  -     Vitamin D; Future; Expected date: 11/14/2024  -     Mammo Digital Screening Bilat w/ Nixon; Future; Expected date: 01/30/2025    Type 2 diabetes mellitus without complication, with long-term current use of insulin  *Seen by her primary team ~ 1 week ago. Addressing the Insulin, Dexcom issues with patient assistance.   *Currently being addressed by her PCP, Dr. Turner.,  ` Mounpito  ` Novolog   -     Hemoglobin A1C; Future; Expected date: 11/14/2024      Spondylosis of cervical region without myelopathy or radiculopathy  Chronic, stable.   ` Neurontin     MYRIAM (obstructive sleep apnea)  -     Comprehensive Metabolic Panel; Future; Expected date: 11/14/2024  -     CBC Auto Differential; Future; Expected date: 11/14/2024  -     Lipid Panel; Future; Expected date: 11/14/2024  -     Hemoglobin A1C; Future; Expected date: 11/14/2024  -     TSH; Future; Expected date: 11/14/2024  -     Vitamin D; Future; Expected date: 11/14/2024  -     Mammo Digital Screening Bilat w/ Nixon; Future; Expected date: 01/30/2025    Mixed hyperlipidemia  Abdominal aortic atherosclerosis  ` Crestor  -     Lipid Panel; Future; Expected date: 11/14/2024    Adjustment disorder with anxious mood  Anxiety  Chronic, stable.   ` Celexa     Essential hypertension  BP Readings from Last 3 Encounters:   11/14/24 (!) 140/72   11/06/24 126/72   11/04/24 128/70   Chronic, stable.   ` Norvasc     Gastroesophageal reflux disease without esophagitis  Chronic, stable    ` Nexium    Vitamin D deficiency, unspecified  -     Vitamin D; Future; Expected date: 11/14/2024    Encounter for screening mammogram for malignant neoplasm of breast  -     Mammo Digital Screening Bilat w/ Nixon; Future; Expected date: 01/30/2025    Other orders  -     sodium chloride (SALINE NASAL) 0.65 % nasal spray; 1 spray by Nasal route as needed for Congestion.  Dispense: 15 mL; Refill: 3    MYRIAM:   *Referred to Sleep [facility] clinic    *Needs PCP follow up    Future Appointments   Date Time Provider Department Center   11/15/2024  1:40 PM Tani Pittman, FNP [facility] [place] [facility]   11/19/2024  1:00 PM Tisha Pickens, PT [facility] [place]   11/26/2024  1:00 PM Munira Mc, PT [facility] [place]   11/29/2024  8:20 AM LAB[facility]   12/4/2024  1:40 PM Smitha Turner MD [facility]   12/16/2024  3:00 PM Shanae Stephenson MD [facility] IM [facility]   1/30/2025 11:30 AM Cherry Quezada MD [facility] SLEEP [place] [facility]        Medication List            Accurate as of November 14, 2024  1:06 PM. If you have any questions, ask your nurse or doctor.                START taking these medications      fluticasone-salmeterol 250-50 mcg/dose 250-50 mcg/dose diskus inhaler  Commonly known as: ADVAIR  Inhale 1 puff into the lungs 2 (two) times daily. Controller  Started by: Foreign Gauthier MD            CONTINUE taking these medications      ACCU-CHEK FASTCLIX LANCING DEV Kit  Generic drug: lancing device with lancets  USE AS DIRECTED     albuterol 90 mcg/actuation inhaler  Commonly known as: PROVENTIL/VENTOLIN HFA  Inhale 1-2 puffs into the lungs every 4 (four) hours as needed for Wheezing.     amLODIPine 10 MG tablet  Commonly known as: NORVASC  Take 1 tablet (10 mg total) by mouth once daily.     aspirin 81 MG EC tablet  Commonly known as: ECOTRIN     * benzonatate 200 MG capsule  Commonly known as: TESSALON  Take 1 capsule (200 mg total) by mouth every 8  "(eight) hours as needed for Cough.     * benzonatate 200 MG capsule  Commonly known as: TESSALON  Take 1 capsule (200 mg total) by mouth every 8 (eight) hours as needed for Cough.     blood sugar diagnostic Strp  Commonly known as: ACCU-CHEK GUIDE TEST STRIPS  3 times a day     * blood-glucose meter kit  Commonly known as: BLOOD GLUCOSE MONITORING  Three times a day     * ACCU-CHEK GUIDE ME GLUCOSE MTR Misc  Generic drug: blood-glucose meter     calcium carbonate 600 mg calcium (1,500 mg) Tab  Commonly known as: OS-ARIC  Take 1 tablet (600 mg total) by mouth once. for 1 dose     citalopram 10 MG tablet  Commonly known as: CeleXA  Take 1 tablet (10 mg total) by mouth once daily.     DEXCOM G6  Misc  Generic drug: blood-glucose meter,continuous  for use to monitor Blood glucose Daily.     DEXCOM G6 SENSOR Pepper  Generic drug: blood-glucose sensor  change sensor every 10 days.     ergocalciferol 50,000 unit Cap  Commonly known as: ERGOCALCIFEROL  TAKE 1 CAPSULE BY MOUTH EVERY 7 DAYS     esomeprazole 40 MG capsule  Commonly known as: NEXIUM  Take 1 capsule (40 mg total) by mouth before breakfast.     fluocinonide 0.05 % external solution  Commonly known as: LIDEX  AAA scalp qday - bid prn pruritus     gabapentin 100 MG capsule  Commonly known as: NEURONTIN  Take 2 capsules (200 mg total) by mouth every evening.     glucose 4 GM chewable tablet  Take 4 tablets (16 g total) by mouth as needed for Low blood sugar (If having symptoms of blurry vision, palpitations, confusion, shakiness.  Please check sugars and if sugar below 70 please take 4 tablets and re-check sugar everry 15 minutes until sugars are above 70 and symptoms resolve.).     insulin syringe-needle U-100 0.3 mL 31 gauge x 5/16" Syrg  relion brand, use 5 x a day, if not on levemir or novolog     insulin syringe-needle U-100 0.5 mL 31 gauge x 5/16" Syrg  Use nightly. 90 day     lancets Misc  Commonly known as: ACCU-CHEK FASTCLIX LANCET DRUM  TEST BLOOD " "SUGAR THREE TIMES A DAY     LANTUS SOLOSTAR U-100 INSULIN 100 unit/mL (3 mL) Inpn pen  Generic drug: insulin glargine U-100 (Lantus)     magnesium oxide 400 mg (241.3 mg magnesium) tablet  Commonly known as: MAG-OX  Take 1 tablet (400 mg total) by mouth 2 (two) times daily.     montelukast 10 mg tablet  Commonly known as: SINGULAIR     * MOUNJARO 2.5 mg/0.5 mL Pnij  Generic drug: tirzepatide  Inject 2.5 mg into the skin every 7 days.     * MOUNJARO 5 mg/0.5 mL Pnij  Generic drug: tirzepatide  Inject 5 mg into the skin every 7 days.  Start taking on: November 17, 2024     NOVOFINE 32 32 gauge x 1/4" Ndle  Generic drug: pen needle, diabetic  4 injections per day     NovoLOG Flexpen U-100 Insulin 100 unit/mL (3 mL) Inpn pen  Generic drug: insulin aspart U-100  Inject 10 Units into the skin 3 (three) times daily with meals. TDD 60 units     rosuvastatin 5 MG tablet  Commonly known as: CRESTOR  Take 1 tablet (5 mg total) by mouth once daily.     Saline NasaL 0.65 % nasal spray  Generic drug: sodium chloride  1 spray by Nasal route as needed for Congestion.     tiZANidine 2 MG tablet  Commonly known as: ZANAFLEX  Take 2 tablets (4 mg total) by mouth nightly as needed (pain).     traZODone 50 MG tablet  Commonly known as: DESYREL  TAKE 1 TABLET BY MOUTH EVERY EVENING.           * This list has 6 medication(s) that are the same as other medications prescribed for you. Read the directions carefully, and ask your doctor or other care provider to review them with you.                STOP taking these medications      cetirizine 10 MG tablet  Commonly known as: ZYRTEC  Stopped by: GINETTE Cartwright     fluticasone propionate 50 mcg/actuation nasal spray  Commonly known as: FLONASE  Stopped by: GINETTE Cartwright               Where to Get Your Medications        These medications were sent to [facility]/pharmacy #[id number] - [place] - [address]  [address] [place] [address]      Phone: " [phone]   albuterol 90 mcg/actuation inhaler  fluticasone-salmeterol 250-50 mcg/dose 250-50 mcg/dose diskus inhaler  Saline NasaL 0.65 % nasal spray         Signing Physician:  GINETTE Cartwright

## 2024-11-14 ENCOUNTER — LAB VISIT (OUTPATIENT)
Dept: LAB | Facility: HOSPITAL | Age: 74
End: 2024-11-14
Payer: MEDICARE

## 2024-11-14 ENCOUNTER — OFFICE VISIT (OUTPATIENT)
Dept: INTERNAL MEDICINE | Facility: CLINIC | Age: 74
End: 2024-11-14
Payer: MEDICARE

## 2024-11-14 VITALS
OXYGEN SATURATION: 98 % | SYSTOLIC BLOOD PRESSURE: 140 MMHG | DIASTOLIC BLOOD PRESSURE: 72 MMHG | HEART RATE: 78 BPM | HEIGHT: 63 IN | WEIGHT: 188.5 LBS | BODY MASS INDEX: 33.4 KG/M2

## 2024-11-14 DIAGNOSIS — I70.0 ABDOMINAL AORTIC ATHEROSCLEROSIS: ICD-10-CM

## 2024-11-14 DIAGNOSIS — K21.9 GASTROESOPHAGEAL REFLUX DISEASE WITHOUT ESOPHAGITIS: ICD-10-CM

## 2024-11-14 DIAGNOSIS — E78.2 MIXED HYPERLIPIDEMIA: ICD-10-CM

## 2024-11-14 DIAGNOSIS — I10 ESSENTIAL HYPERTENSION: ICD-10-CM

## 2024-11-14 DIAGNOSIS — E11.9 TYPE 2 DIABETES MELLITUS WITHOUT COMPLICATION, WITH LONG-TERM CURRENT USE OF INSULIN: ICD-10-CM

## 2024-11-14 DIAGNOSIS — Z12.39 ENCOUNTER FOR SCREENING FOR MALIGNANT NEOPLASM OF BREAST, UNSPECIFIED SCREENING MODALITY: ICD-10-CM

## 2024-11-14 DIAGNOSIS — G47.33 OSA (OBSTRUCTIVE SLEEP APNEA): ICD-10-CM

## 2024-11-14 DIAGNOSIS — Z00.00 ANNUAL PHYSICAL EXAM: Primary | ICD-10-CM

## 2024-11-14 DIAGNOSIS — E55.9 VITAMIN D DEFICIENCY, UNSPECIFIED: ICD-10-CM

## 2024-11-14 DIAGNOSIS — Z12.31 ENCOUNTER FOR SCREENING MAMMOGRAM FOR MALIGNANT NEOPLASM OF BREAST: ICD-10-CM

## 2024-11-14 DIAGNOSIS — Z00.00 ANNUAL PHYSICAL EXAM: ICD-10-CM

## 2024-11-14 DIAGNOSIS — M47.812 SPONDYLOSIS OF CERVICAL REGION WITHOUT MYELOPATHY OR RADICULOPATHY: ICD-10-CM

## 2024-11-14 DIAGNOSIS — Z79.4 TYPE 2 DIABETES MELLITUS WITHOUT COMPLICATION, WITH LONG-TERM CURRENT USE OF INSULIN: ICD-10-CM

## 2024-11-14 DIAGNOSIS — J45.20 INTERMITTENT ASTHMA WITHOUT COMPLICATION, UNSPECIFIED ASTHMA SEVERITY: ICD-10-CM

## 2024-11-14 DIAGNOSIS — F41.9 ANXIETY: ICD-10-CM

## 2024-11-14 DIAGNOSIS — F43.22 ADJUSTMENT DISORDER WITH ANXIOUS MOOD: ICD-10-CM

## 2024-11-14 LAB
25(OH)D3+25(OH)D2 SERPL-MCNC: 60 NG/ML (ref 30–96)
ALBUMIN SERPL BCP-MCNC: 4.1 G/DL (ref 3.5–5.2)
ALP SERPL-CCNC: 122 U/L (ref 40–150)
ALT SERPL W/O P-5'-P-CCNC: 14 U/L (ref 10–44)
ANION GAP SERPL CALC-SCNC: 9 MMOL/L (ref 8–16)
AST SERPL-CCNC: 15 U/L (ref 10–40)
BASOPHILS # BLD AUTO: 0.03 K/UL (ref 0–0.2)
BASOPHILS NFR BLD: 0.5 % (ref 0–1.9)
BILIRUB SERPL-MCNC: 0.5 MG/DL (ref 0.1–1)
BUN SERPL-MCNC: 9 MG/DL (ref 8–23)
CALCIUM SERPL-MCNC: 9.8 MG/DL (ref 8.7–10.5)
CHLORIDE SERPL-SCNC: 106 MMOL/L (ref 95–110)
CHOLEST SERPL-MCNC: 214 MG/DL (ref 120–199)
CHOLEST/HDLC SERPL: 3.6 {RATIO} (ref 2–5)
CO2 SERPL-SCNC: 28 MMOL/L (ref 23–29)
CREAT SERPL-MCNC: 0.8 MG/DL (ref 0.5–1.4)
DIFFERENTIAL METHOD BLD: ABNORMAL
EOSINOPHIL # BLD AUTO: 0.1 K/UL (ref 0–0.5)
EOSINOPHIL NFR BLD: 1.3 % (ref 0–8)
ERYTHROCYTE [DISTWIDTH] IN BLOOD BY AUTOMATED COUNT: 15.7 % (ref 11.5–14.5)
EST. GFR  (NO RACE VARIABLE): >60 ML/MIN/1.73 M^2
ESTIMATED AVG GLUCOSE: 180 MG/DL (ref 68–131)
GLUCOSE SERPL-MCNC: 160 MG/DL (ref 70–110)
HBA1C MFR BLD: 7.9 % (ref 4–5.6)
HCT VFR BLD AUTO: 41.6 % (ref 37–48.5)
HDLC SERPL-MCNC: 60 MG/DL (ref 40–75)
HDLC SERPL: 28 % (ref 20–50)
HGB BLD-MCNC: 13.4 G/DL (ref 12–16)
IMM GRANULOCYTES # BLD AUTO: 0.03 K/UL (ref 0–0.04)
IMM GRANULOCYTES NFR BLD AUTO: 0.5 % (ref 0–0.5)
LDLC SERPL CALC-MCNC: 113.4 MG/DL (ref 63–159)
LYMPHOCYTES # BLD AUTO: 2.3 K/UL (ref 1–4.8)
LYMPHOCYTES NFR BLD: 41.3 % (ref 18–48)
MCH RBC QN AUTO: 25.1 PG (ref 27–31)
MCHC RBC AUTO-ENTMCNC: 32.2 G/DL (ref 32–36)
MCV RBC AUTO: 78 FL (ref 82–98)
MONOCYTES # BLD AUTO: 0.4 K/UL (ref 0.3–1)
MONOCYTES NFR BLD: 6.9 % (ref 4–15)
NEUTROPHILS # BLD AUTO: 2.7 K/UL (ref 1.8–7.7)
NEUTROPHILS NFR BLD: 49.5 % (ref 38–73)
NONHDLC SERPL-MCNC: 154 MG/DL
NRBC BLD-RTO: 0 /100 WBC
PLATELET # BLD AUTO: 335 K/UL (ref 150–450)
PMV BLD AUTO: 10.3 FL (ref 9.2–12.9)
POTASSIUM SERPL-SCNC: 4.5 MMOL/L (ref 3.5–5.1)
PROT SERPL-MCNC: 7.9 G/DL (ref 6–8.4)
RBC # BLD AUTO: 5.33 M/UL (ref 4–5.4)
SODIUM SERPL-SCNC: 143 MMOL/L (ref 136–145)
TRIGL SERPL-MCNC: 203 MG/DL (ref 30–150)
TSH SERPL DL<=0.005 MIU/L-ACNC: 2.4 UIU/ML (ref 0.4–4)
WBC # BLD AUTO: 5.49 K/UL (ref 3.9–12.7)

## 2024-11-14 PROCEDURE — 1159F MED LIST DOCD IN RCRD: CPT | Mod: CPTII,S$GLB,, | Performed by: NURSE PRACTITIONER

## 2024-11-14 PROCEDURE — 3288F FALL RISK ASSESSMENT DOCD: CPT | Mod: CPTII,S$GLB,, | Performed by: NURSE PRACTITIONER

## 2024-11-14 PROCEDURE — 3077F SYST BP >= 140 MM HG: CPT | Mod: CPTII,S$GLB,, | Performed by: NURSE PRACTITIONER

## 2024-11-14 PROCEDURE — 85025 COMPLETE CBC W/AUTO DIFF WBC: CPT | Performed by: NURSE PRACTITIONER

## 2024-11-14 PROCEDURE — 80053 COMPREHEN METABOLIC PANEL: CPT | Performed by: NURSE PRACTITIONER

## 2024-11-14 PROCEDURE — 84443 ASSAY THYROID STIM HORMONE: CPT | Performed by: NURSE PRACTITIONER

## 2024-11-14 PROCEDURE — 80061 LIPID PANEL: CPT | Performed by: NURSE PRACTITIONER

## 2024-11-14 PROCEDURE — 1101F PT FALLS ASSESS-DOCD LE1/YR: CPT | Mod: CPTII,S$GLB,, | Performed by: NURSE PRACTITIONER

## 2024-11-14 PROCEDURE — 82306 VITAMIN D 25 HYDROXY: CPT | Performed by: NURSE PRACTITIONER

## 2024-11-14 PROCEDURE — 36415 COLL VENOUS BLD VENIPUNCTURE: CPT | Performed by: NURSE PRACTITIONER

## 2024-11-14 PROCEDURE — 1160F RVW MEDS BY RX/DR IN RCRD: CPT | Mod: CPTII,S$GLB,, | Performed by: NURSE PRACTITIONER

## 2024-11-14 PROCEDURE — 83036 HEMOGLOBIN GLYCOSYLATED A1C: CPT | Performed by: NURSE PRACTITIONER

## 2024-11-14 PROCEDURE — 99999 PR PBB SHADOW E&M-EST. PATIENT-LVL V: CPT | Mod: PBBFAC,,, | Performed by: NURSE PRACTITIONER

## 2024-11-14 PROCEDURE — 3078F DIAST BP <80 MM HG: CPT | Mod: CPTII,S$GLB,, | Performed by: NURSE PRACTITIONER

## 2024-11-14 PROCEDURE — 99397 PER PM REEVAL EST PAT 65+ YR: CPT | Mod: S$GLB,,, | Performed by: NURSE PRACTITIONER

## 2024-11-14 RX ORDER — VITAMIN A 3000 MCG
1 CAPSULE ORAL
Qty: 15 ML | Refills: 3 | Status: SHIPPED | OUTPATIENT
Start: 2024-11-14

## 2024-11-14 RX ORDER — FLUTICASONE PROPIONATE AND SALMETEROL 250; 50 UG/1; UG/1
1 POWDER RESPIRATORY (INHALATION) 2 TIMES DAILY
Qty: 60 EACH | Refills: 6 | Status: SHIPPED | OUTPATIENT
Start: 2024-11-14 | End: 2025-11-14

## 2024-11-14 RX ORDER — ALBUTEROL SULFATE 90 UG/1
1-2 INHALANT RESPIRATORY (INHALATION) EVERY 4 HOURS PRN
Qty: 18 G | Refills: 2 | Status: SHIPPED | OUTPATIENT
Start: 2024-11-14

## 2024-11-15 ENCOUNTER — OFFICE VISIT (OUTPATIENT)
Dept: PAIN MEDICINE | Facility: CLINIC | Age: 74
End: 2024-11-15
Payer: MEDICARE

## 2024-11-15 ENCOUNTER — TELEPHONE (OUTPATIENT)
Dept: SPORTS MEDICINE | Facility: CLINIC | Age: 74
End: 2024-11-15
Payer: MEDICARE

## 2024-11-15 VITALS
SYSTOLIC BLOOD PRESSURE: 142 MMHG | WEIGHT: 187.25 LBS | DIASTOLIC BLOOD PRESSURE: 71 MMHG | BODY MASS INDEX: 33.18 KG/M2 | HEART RATE: 85 BPM

## 2024-11-15 DIAGNOSIS — M47.812 CERVICAL SPONDYLOSIS: ICD-10-CM

## 2024-11-15 DIAGNOSIS — M47.812 FACET ARTHROPATHY, CERVICAL: ICD-10-CM

## 2024-11-15 DIAGNOSIS — M79.18 CERVICAL MYOFASCIAL PAIN SYNDROME: Primary | ICD-10-CM

## 2024-11-15 DIAGNOSIS — M54.2 NECK PAIN ON LEFT SIDE: ICD-10-CM

## 2024-11-15 PROCEDURE — 99999 PR PBB SHADOW E&M-EST. PATIENT-LVL V: CPT | Mod: PBBFAC,,, | Performed by: NURSE PRACTITIONER

## 2024-11-15 NOTE — PROGRESS NOTES
[facility] Established Patient Evaluation      CC:right neck  and left  shoulder pain     Interval Update 11/15/2024: Patient return to clinic for follow up on left-sided neck pain.  Patient reports approximately 1 week ago entering her car she reports as she sat down in her car she felt a pain in her left neck that prohibited her from moving her neck left to right.  She denies any radicular symptoms in the left arm denies any in the right arm.  Reports that the pain slowly went away and that she does not feel it as much today in clinic.  She denies any profound weakness denies inability to write with a pen denies dropping things.  Pain today is in the left cervical paraspinal area is very tender to touch the area seems to be 1 spot in her neck.  She does have history of chronic shoulder pain but she does state that sometimes her neck pain radiates into her left shoulder she also feels it in the right shoulder intermittently.  Her pain score today is 5/10.    Interval history 12/05/2023:  73-year-old female that presents today with continued bilateral shoulder pain right greater than left.  She reports attempting physical therapy that was ordered from our last clinic visit however she was only attend 2 sessions and had to stop due to increased shoulder pain.  She denies any recent incident or trauma denies any new pain denies any profound weakness.  She was provided a right subacromial bursa injection by her internal medicine provider on 11/07/2023 with minimal to no relief.  She states she did not have prior left shoulder pain but since she started therapy the left shoulder is hurting.    Interval Update:    05/11/[id number]-tzid-old female who is established with our clinic presents today complaining of right posterior shoulder blade pain onset 4 weeks pain is located in the posterior right shoulder blade, constant pain is described as dull, in particular causes pain nothing in particular mitigate her  symptoms at this point.  She denies any radicular symptoms anywhere.  She denies any paresthesia like symptoms.  Her pain is described as dull achy and deep at times.  Pain score today is 7/10.  She denies any recent incident or trauma denies any bowel bladder dysfunction denies any profound weakness.      Interval update:   11/28/2022 - Diana returns to clinic s/p bilateral subacromial shoulder injection on 10/26/22 with 90% relief.  She reports Aching of the bilateral shoulder  (location) pain rated 3/10 today with a weekly range of 3-4/10.  Additionally she complained of bilateral neck pain that has  been ongoing for greater than a year, see pain described below.    10/26/2022 - Ms. Ortiz eturns to clinic for follow up visit reporting bilateral shoulder pain, worse with lifting something, and working above her head. She has had shoulder pain over the last 10 years, but worse over the last few works. Nothing improves her pain. She is using tylenol arthritis for the pain. No unusual amount of work or activity recently. No paraesthesias or tingling in upper extremities.     8/18/20 - Ms. Ortiz returns to clinic for follow up visit reporting stable bilateral knee pain and review imaging. Pain intensity is currently 3/10.      8/13/20 - Ms. Ortiz returns to clinic for follow up visit reporting worse bilateral knee  pain.  Pain intensity is currently 6/10.  Patient reports bilateral knee pain that started approximately 1 week ago, pain is worse with walking, standing and while sleeping. Pain is described as sharp and stabbing, she denies profound weakness, or radiating pain. Worse pain is 6/10. Of note she reports no inciting incident, trauma or falls.     02/05/2020 - Mrs. Ortiz returns to clinic for follow up visit reporting left buttock pain. Patient reports radiation down left leg to mid thigh. Pain intensity is currently 5/10.      4/9/18 - Pt returns to complaining of MRI results, neck and bilateral shoulder  pain.  Her day time pain improved with PT but she continues to complain of bilat neck and shoulder pain at night.  The results of the MRI were shared with her.    Background:  Chelly Ortiz is a 74 y.o. female who complains of neck and bilateral shoulder pain as characterized below.    Location:  Right and left posterior shoulder blade pain  Severity: Currently: 5/2/10   Typical Range: 6/10     Exacerbation: 10/10   Onset:  4 weeks  Quality: Dull, achy  Radiation:  None  Exacerbating Factors: extension and flexion lateral movement  Mitigating Factors: heat  Assoc: denies night fever/night sweats, urinary incontinence, bowel incontinence, significant weight loss, significant motor weakness and loss of sensations    Previous Therapies:  PT: Completed in march  HEP: Daily  TENS: None  Injections: LYNN 3x per Ca Oconnell  Surgery: Denies  Medications:   - NSAIDS:   - MSK Relaxants:   - TCAs:   - SNRIs:   - Topicals:   - Anticonvulsants: Current  - Opioids: No    Current Pain Medications:  Gabapentin 100 TID - she is taking it prn  Piroxicam 20 mg - stopped due to GI issues    Full Medication List:    Current Outpatient Medications:     ACCU-CHEK FASTCLIX LANCING DEV Kit, USE AS DIRECTED, Disp: 1 each, Rfl: 0    ACCU-CHEK GUIDE ME GLUCOSE MTR Misc, USE GLUCOSE METER TO TEST 3 TIMES A DAY, Disp: , Rfl:     albuterol (PROVENTIL/VENTOLIN HFA) 90 mcg/actuation inhaler, Inhale 1-2 puffs into the lungs every 4 (four) hours as needed for Wheezing., Disp: 18 g, Rfl: 2    amLODIPine (NORVASC) 10 MG tablet, Take 1 tablet (10 mg total) by mouth once daily., Disp: 90 tablet, Rfl: 3    aspirin (ECOTRIN) 81 MG EC tablet, Take 81 mg by mouth once daily., Disp: , Rfl:     benzonatate (TESSALON) 200 MG capsule, Take 1 capsule (200 mg total) by mouth every 8 (eight) hours as needed for Cough., Disp: 30 capsule, Rfl: 0    benzonatate (TESSALON) 200 MG capsule, Take 1 capsule (200 mg total) by mouth every 8 (eight) hours as needed for  "Cough., Disp: 30 capsule, Rfl: 0    blood sugar diagnostic (ACCU-CHEK GUIDE TEST STRIPS) Strp, 3 times a day, Disp: 300 strip, Rfl: 11    blood-glucose meter (BLOOD GLUCOSE MONITORING) kit, Three times a day, Disp: 1 each, Rfl: 0    blood-glucose meter,continuous (DEXCOM G6 ) Misc, for use to monitor Blood glucose Daily., Disp: 1 each, Rfl: 3    blood-glucose sensor (DEXCOM G6 SENSOR) Pepper, change sensor every 10 days., Disp: 3 each, Rfl: 1    calcium carbonate (OS-ARIC) 600 mg calcium (1,500 mg) Tab, Take 1 tablet (600 mg total) by mouth once. for 1 dose, Disp: 30 tablet, Rfl: 11    citalopram (CELEXA) 10 MG tablet, Take 1 tablet (10 mg total) by mouth once daily., Disp: 90 tablet, Rfl: 1    ergocalciferol (ERGOCALCIFEROL) 50,000 unit Cap, TAKE 1 CAPSULE BY MOUTH EVERY 7 DAYS, Disp: 12 capsule, Rfl: 3    esomeprazole (NEXIUM) 40 MG capsule, Take 1 capsule (40 mg total) by mouth before breakfast., Disp: 90 capsule, Rfl: 3    fluocinonide (LIDEX) 0.05 % external solution, AAA scalp qday - bid prn pruritus, Disp: 60 mL, Rfl: 3    fluticasone-salmeterol diskus inhaler 250-50 mcg, Inhale 1 puff into the lungs 2 (two) times daily. Controller, Disp: 60 each, Rfl: 6    gabapentin (NEURONTIN) 100 MG capsule, Take 2 capsules (200 mg total) by mouth every evening., Disp: 180 capsule, Rfl: 3    glucose 4 GM chewable tablet, Take 4 tablets (16 g total) by mouth as needed for Low blood sugar (If having symptoms of blurry vision, palpitations, confusion, shakiness.  Please check sugars and if sugar below 70 please take 4 tablets and re-check sugar everry 15 minutes until sugars are above 70 and symptoms resolve.)., Disp: 50 tablet, Rfl: 12    insulin aspart U-100 (NOVOLOG FLEXPEN U-100 INSULIN) 100 unit/mL (3 mL) InPn pen, Inject 10 Units into the skin 3 (three) times daily with meals. TDD 60 units, Disp: 75 mL, Rfl: 11    insulin syringe-needle U-100 0.3 mL 31 gauge x 5/16" Syrg, relion brand, use 5 x a day, if not on " "levemir or novolog, Disp: 150 each, Rfl: 1    insulin syringe-needle U-100 0.5 mL 31 gauge x 5/16" Syrg, Use nightly. 90 day, Disp: 100 each, Rfl: 3    lancets (ACCU-CHEK FASTCLIX LANCET DRUM) [facility], TEST BLOOD SUGAR THREE TIMES A DAY, Disp: 918 each, Rfl: 3    LANTUS SOLOSTAR U-100 INSULIN 100 unit/mL (3 mL) InPn pen, Inject into the skin., Disp: , Rfl:     magnesium oxide (MAG-OX) 400 mg tablet, Take 1 tablet (400 mg total) by mouth 2 (two) times daily., Disp: 180 tablet, Rfl: 3    montelukast (SINGULAIR) 10 mg tablet, Take 10 mg by mouth every evening., Disp: , Rfl:     pen needle, diabetic (NOVOFINE 32) 32 gauge x 1/4" Ndle, 4 injections per day, Disp: 500 each, Rfl: 4    rosuvastatin (CRESTOR) 5 MG tablet, Take 1 tablet (5 mg total) by mouth once daily., Disp: 90 tablet, Rfl: 3    sodium chloride (SALINE NASAL) 0.65 % nasal spray, 1 spray by Nasal route as needed for Congestion., Disp: 15 mL, Rfl: 3    [START ON 11/17/2024] tirzepatide (MOUNJARO) 5 mg/0.5 mL PnIj, Inject 5 mg into the skin every 7 days., Disp: 2 mL, Rfl: 1    tirzepatide 2.5 mg/0.5 mL PnIj, Inject 2.5 mg into the skin every 7 days., Disp: 2 mL, Rfl: 0    tiZANidine (ZANAFLEX) 2 MG tablet, Take 2 tablets (4 mg total) by mouth nightly as needed (pain)., Disp: 10 tablet, Rfl: 0    traZODone (DESYREL) 50 MG tablet, TAKE 1 TABLET BY MOUTH EVERY EVENING., Disp: 90 tablet, Rfl: 0    Current Facility-Administered Medications:     triamcinolone acetonide injection 20 mg, 20 mg, Intramuscular, 1 time in Clinic/HOD, Tani Pittman, GINETTE     Review of Systems:  Review of Systems   Constitutional:  Negative for chills and fever.   HENT:  Negative for nosebleeds.    Eyes:  Negative for pain.   Respiratory:  Negative for hemoptysis.    Cardiovascular:  Negative for chest pain.   Gastrointestinal:  Negative for nausea and vomiting.   Genitourinary:  Negative for dysuria.   Musculoskeletal:  Positive for joint pain and myalgias.   Skin:  Negative for rash. "   Neurological:  Negative for tingling, tremors, sensory change, focal weakness and weakness.       Allergies:  Ace inhibitors and Latex, natural rubber     Medical History:  Past Medical History:   Diagnosis Date    Allergy     DDD (degenerative disc disease), lumbar     Diabetes mellitus     diet controlled    GERD (gastroesophageal reflux disease)     History of subconjunctival hemorrhage 2011    left eye    IBS (irritable bowel syndrome)     Obesity     MYRIAM (obstructive sleep apnea)     on CPAP    Rotator cuff impingement syndrome     Seasonal allergic conjunctivitis     Type 2 diabetes mellitus treated with insulin         Surgical History:  Past Surgical History:   Procedure Laterality Date    CATARACT EXTRACTION W/  INTRAOCULAR LENS IMPLANT Right 10/03/2013        CATARACT EXTRACTION W/  INTRAOCULAR LENS IMPLANT Left 2022         SECTION      x2    CHOLECYSTECTOMY      COLONOSCOPY N/A 2018    Procedure: COLONOSCOPY;  Surgeon: Marie Mejia MD;  Location: [facility];  Service: Endoscopy;  Laterality: N/A;    COLONOSCOPY N/A 2022    Procedure: COLONOSCOPY;  Surgeon: Pierce Rivera MD;  Location: [facility];  Service: Endoscopy;  Laterality: N/A;    ENDOSCOPIC ULTRASOUND OF UPPER GASTROINTESTINAL TRACT N/A 10/09/2018    Procedure: ULTRASOUND, UPPER GI TRACT, ENDOSCOPIC;  Surgeon: Javy [facility] MD;  Location: [facility];  Service: Endoscopy;  Laterality: N/A;    EXCISION OF LESION N/A 2019    Procedure: EXCISION, LESION VULVA;  Surgeon: Liv Odell MD;  Location: [facility];  Service: OB/GYN;  Laterality: N/A;  latex allergy    EYE SURGERY      HYSTERECTOMY      ovaries intact, due to DUB    INTRAOCULAR PROSTHESES INSERTION Left 2022    Procedure: INSERTION, IOL PROSTHESIS;  Surgeon: Clarice Herzog MD;  Location: [address];  Service: Ophthalmology;  Laterality: Left;    PHACOEMULSIFICATION OF CATARACT Left 2022     Procedure: PHACOEMULSIFICATION, CATARACT;  Surgeon: Clarice Herzog MD;  Location: [address];  Service: Ophthalmology;  Laterality: Left;    TUBAL LIGATION          Social History:  Social History     Socioeconomic History    Marital status:    Tobacco Use    Smoking status: Former     Current packs/day: 0.00     Types: Cigarettes     Quit date: 2/15/1983     Years since quittin.7     Passive exposure: Current ()    Smokeless tobacco: Never   Substance and Sexual Activity    Alcohol use: No    Drug use: No    Sexual activity: Yes     Partners: Male     Social Drivers of Health     Financial Resource Strain: Patient Declined (2023)    Overall Financial Resource Strain (CARDIA)     Difficulty of Paying Living Expenses: Patient declined   Food Insecurity: Patient Declined (2023)    Hunger Vital Sign     Worried About Running Out of Food in the Last Year: Patient declined     Ran Out of Food in the Last Year: Patient declined   Recent Concern: Food Insecurity - Food Insecurity Present (10/11/2023)    Hunger Vital Sign     Worried About Running Out of Food in the Last Year: Patient declined     Ran Out of Food in the Last Year: Sometimes true   Transportation Needs: No Transportation Needs (2023)    PRAPARE - Transportation     Lack of Transportation (Medical): No     Lack of Transportation (Non-Medical): No   Physical Activity: Unknown (2023)    Exercise Vital Sign     Days of Exercise per Week: Patient declined   Recent Concern: Physical Activity - Insufficiently Active (10/11/2023)    Exercise Vital Sign     Days of Exercise per Week: 3 days     Minutes of Exercise per Session: 20 min   Stress: Patient Declined (2023)    [employer] - Occupational Stress Questionnaire     Feeling of Stress : Patient declined   Recent Concern: Stress - Stress Concern Present (10/11/2023)    [employer] - Occupational  Stress Questionnaire     Feeling of Stress : To some extent   Housing Stability: Low Risk  (11/30/2023)    Housing Stability Vital Sign     Unable to Pay for Housing in the Last Year: No     Number of Places Lived in the Last Year: 1     Unstable Housing in the Last Year: No       Physical Exam:  There were no vitals filed for this visit.      General    Nursing note and vitals reviewed.  Constitutional: She is oriented to person, place, and time. She appears well-developed and well-nourished. No distress.   HENT:   Head: Normocephalic and atraumatic.   Nose: Nose normal.   Eyes: Conjunctivae and EOM are normal. Pupils are equal, round, and reactive to light. Right eye exhibits no discharge. Left eye exhibits no discharge. No scleral icterus.   Neck: No JVD present.   Cardiovascular:  Intact distal pulses.            Pulmonary/Chest: Effort normal. No respiratory distress.   Abdominal: She exhibits no distension.   Neurological: She is alert and oriented to person, place, and time. Coordination normal.   Psychiatric: She has a normal mood and affect. Her behavior is normal. Judgment and thought content normal.     General Musculoskeletal Exam   Gait: normal       Right Knee Exam     Tenderness   The patient is tender to palpation of the medial joint line.    Crepitus   The patient has crepitus of the patella.    Range of Motion   Extension:  normal   Flexion:  normal     Left Knee Exam     Tenderness   The patient tender to palpation of the lateral joint line.    Crepitus   The patient has crepitus of the patella.    Range of Motion   Extension:  normal   Flexion:  normal Left Hip Exam     Comments:  Left ischial bursa tenderness.  Partial reproduction of pain with hip flexion and piriformis-type stretches.      Back (L-Spine & T-Spine) / Neck (C-Spine) Exam     Tenderness   The patient is tender to palpation of the right scapular and left scapular. Posterior midline palpation reveals tenderness of the Upper  C-Spine, Lower C-Spine and Upper T-Spine. Right paramedian tenderness of the Upper C-Spine, Lower C-Spine and Upper T-Spine. Left paramedian tenderness of the Upper C-Spine, Lower C-Spine and Upper T-Spine.     Back (L-Spine & T-Spine) Range of Motion   Lateral bend right:  normal   Lateral bend left:  normal   Rotation right:  normal   Rotation left:  normal     Neck (C-Spine) Range of Motion   Flexion:      Normal  Extension:  Normal    Comments:  TTP right posterior scapula   Tender to palpate cervical paraspinals left greater than right  Right Shoulder Exam     Inspection/Observation   Deformity: absent    Range of Motion   Active abduction:  90 abnormal   Passive abduction:  abnormal     Tests & Signs   Drop arm: positive  Saldaña test: positive  Impingement: positive  Rotator Cuff Painful Arc/Range: moderate    Other   Sensation: normal    Comments:  Positive empty can and impingement sign     Left Shoulder Exam     Inspection/Observation   Deformity: absent    Range of Motion   Active abduction:  90 abnormal   Passive abduction:  abnormal     Tests & Signs   Drop arm: positive  Saldaña test: positive  Impingement: positive  Rotator Cuff Painful Arc/Range: moderate    Other   Sensation: normal     Comments:  Positive empty can and impingement sign       Muscle Strength   Right Upper Extremity   Shoulder Abduction: 5/5   Shoulder Internal Rotation: 5/5   Shoulder External Rotation: 5/5   Supraspinatus: 5/5   Subscapularis: 5/5   Biceps: 5/5   Wrist flexion: 5/5   Left Upper Extremity  Shoulder Abduction: 5/5   Shoulder Internal Rotation: 5/5   Shoulder External Rotation: 5/5   Supraspinatus: 5/5   Subscapularis: 5/5   Biceps: 5/5   Wrist flexion: 5/5   Right Lower Extremity   Quadriceps:  5/5   Anterior tibial:  5/5   Gastrocsoleus:  5/5   Left Lower Extremity   Quadriceps:  5/5   Anterior tibial:  5/5   Gastrocsoleus:  5/5     Reflexes     Left Side  Biceps:  2+  Brachioradialis:  2+  Left Roque's Sign:   Absent    Right Side   Biceps:  2+  Brachioradialis:  2+  Right Roque's Sign:  absent    Vascular Exam     Right Pulses      Radial:                    1+      Left Pulses      Radial:                    2+      Capillary Refill  Right Hand: normal capillary refill  Left Hand: normal capillary refill          Imaging:    Order Details       XR SHOULDER COMPLETE 2 OR MORE VIEWS BILATERAL     CLINICAL HISTORY:  Other hypertrophic osteoarthropathy, multiple sites     TECHNIQUE:  Three views of each shoulder were performed.     COMPARISON:  Shoulder radiographs 01/28/2020 and 07/07/2016     FINDINGS:  No fracture or dislocation.  Mild-moderate degenerative changes of the acromioclavicular and glenohumeral joints bilaterally, right greater than left.  No focal soft tissue swelling.     Impression:     As above.        Electronically signed by: Jim Cooper  Date:                                            06/16/2022    MRI Upper Extremity Joint Without Contraast Right  TECHNIQUE: MRI of right shoulder was performed on a 1.5T magnet utilizing the following sequences: Localizer; axial T2 FS; coronal T2 FS and PD FS; sagittal T1, T2 FS and PD FS.    COMPARISON: Right shoulder radiograph 4/20/16.    FINDINGS:    Rotator cuff: There is thickening and increased signal of the insertional fibers of the supraspinatus consistent with tendinosis with partial thickness undersurface tear.  There is small full-thickness component involving the anterior fibers measuring approximately 8 x 6 mm.  Mild infraspinatus tendinosis with undersurface fraying.  Teres minor tendon is intact.  There is mild tendinosis with interstitial tear involving the insertional fibers of the subscapularis.  Muscle bulk of the rotator cuff is within normal limits.     Labrum: Global degeneration of the glenoid labrum.      Biceps: Thickening and increased signal within the intra-articular portion of the long head biceps tendon consistent with  tendinosis.    Bone: There is no fracture.  Bone marrow signal is unremarkable.    Acromioclavicular joint: Severe degenerative changes are seen at the acromioclavicular joint with osseous spurring, edema, and subchondral cystic change.    Cartilage: Articular cartilage of the glenohumeral joint is preserved.    Miscellaneous: Small joint effusion with increased fluid seen in the subscapularis recess.  There is increased fluid seen within the subacromial/subdeltoid bursa.   Impression       1.  Supraspinatus tendinosis with partial thickness articular surface tear and small full-thickness component involving the anterior fibers.    2.  Mild subscapularis and infraspinatus tendinosis.    3.  Severe AC joint arthrosis.    4.  Long head biceps tendinosis.    5.  Joint effusion and moderate subacromial/subdeltoid bursitis.  ______________________________________     Electronically signed by resident: Hosea Costello MD  Date: 06/16/16  Time: 16:29     ______________________________________     Electronically signed by: MADISON WINTERS MD  Date: 06/17/16  Time: 08:39              Assessment:  Problem List Items Addressed This Visit    None      Chelly is a 67-year-old female with chronic neck pain exacerbated by increased physical activity 3 months ago.  Her exam and imaging are consistent with facet arthropathy and myofascial dysfunction.  I recommended that we start with physical therapy to eliminate as much pain as possible related to myofascial dysfunction then move on to interventional procedures should a significant amount of pain persist.  I also recommended that she complete the MRI ordered by her primary care physician and start a trial of piroxicam.  She also states significant insomnia not related to her chronic neck pain and I have recommended that she try tizanidine 4-8 mg nightly as a muscle relaxant and sleep aid.    4/9/18 - she completed PT with improved pain after each session.  Pain remains most bothersome  at night which is common for muscle issues.  She was encouraged to perform neck stretching 3 times daily and especially at night to minimize pain at night.    2/5/2020 - there is tenderness in the left buttock consistent with ischial bursitis.  There may be a component of tendonitis as well; however, the treatment would be the same.  I have recommended an ischial bursa injection under fluoroscopic guidance and patient is in agreement.  She was warned that her blood glucose must be controlled and less than 180 g/dL to receive a steroid injection, otherwise we would need to cancel the injection.  Patient was also advised to begin stretching on a daily basis. I demonstrated 3 separate stretches that would target the painful area.  I have encouraged her to walk on a daily basis for exercise as she currently lives a relatively sedentary lifestyle.    08/13/[id number]-dnkf-old female presents with bilateral knee pain consistent with osteoarthritis, there may be a component of tendonitis as well. I will order bilateral knee x-rays today for further evaluation and consider bilateral knee injections with steroids in the future pt  Is a diabetic so I educated her on how steroids can increase BS so monitoring  And staying at the appropriate level is vital. I will also recommend returning to PT for hari knee pain and  ischial bursitis she was previously attending physical therapy prior to the COVID-19 pandemic for ischial bursitis this was providing her with relief she reports that she only had 2 sessions during that time.   Lastly I did discuss with patient her most recent labs  C reactive protein, and sedimentation rate results and that they were both abnormal and elevated I recommended she follow-up with her primary care doctor to discussed these results.      8/18/2020- 68 y/o female presents with a hx of chronic hari knee pain secondary to osteoarthritis , and left sided low back pain secondary to ischia bursitis. Upon review  of her recent bilateral knee x-rays they show tricompartmental DJD with moderate medial femorotibial joint space narrowing bilaterally. I recommended hari IA knee injections in the future, considering her pain is stable today I will hold off her pain score is 3/10. Explained recent lab results and recommended  that she will need to f/u with her PCP.  Also recommended patient follow up with physical therapy.    10/26/2022 Quentin Ortiz is a 74 y.o. female who  has a past medical history of Allergy, DDD (degenerative disc disease), lumbar, Diabetes mellitus, GERD (gastroesophageal reflux disease), History of subconjunctival hemorrhage (12/02/2011), IBS (irritable bowel syndrome), Obesity, MYRIAM (obstructive sleep apnea), Rotator cuff impingement syndrome, Seasonal allergic conjunctivitis, and Type 2 diabetes mellitus treated with insulin.  By history and examination this patient has chronic and acute on chronic bilateral shoulder pain. The underlying cause cause is bilateral rotator cuff pathology causing bilateral bursitis.  Pathology is confirmed by imaging.  We discussed the underlying diagnoses and multiple treatment options including interventional procedures.  The risks and benefits of each treatment option were discussed and all questions were answered.      11/28/2022Chloe Ortiz is a 74 y.o. female who  has a past medical history of Allergy, DDD (degenerative disc disease), lumbar, Diabetes mellitus, GERD (gastroesophageal reflux disease), History of subconjunctival hemorrhage (12/02/2011), IBS (irritable bowel syndrome), Obesity, MYRIAM (obstructive sleep apnea), Rotator cuff impingement syndrome, Seasonal allergic conjunctivitis, and Type 2 diabetes mellitus treated with insulin.  By history and examination this patient has chronicneck pain without bilateral radiculopathy in the distribution of C4, C5, C6, and C7.  The underlying cause cause is facet arthritis, degenerative disc disease, and muscle  dysfunction.  Pathology is confirmed by imaging.  We discussed the underlying diagnoses and multiple treatment options including non-opioid medications, interventional procedures, physical therapy, and home exercise.  The risks and benefits of each treatment option were discussed and all questions were answered.      5/11/2023- Chelly Ortiz is a 74 y.o. female who  has a past medical history of Allergy, DDD (degenerative disc disease), lumbar, Diabetes mellitus, GERD (gastroesophageal reflux disease), History of subconjunctival hemorrhage (12/02/2011), IBS (irritable bowel syndrome), Obesity, MYRIAM (obstructive sleep apnea), Rotator cuff impingement syndrome, Seasonal allergic conjunctivitis, and Type 2 diabetes mellitus treated with insulin.  By history and examination this patient has acute/subacute right posterior scapular pain.without radiculopathy.  The underlying cause cause is muscle dysfunction and deconditioning.  Pathology is confirmed by imaging.  We discussed the underlying diagnoses and multiple treatment options including non-opioid medications, interventional procedures, physical therapy, home exercise, and weight loss.  The risks and benefits of each treatment option were discussed and all questions were answered.      12/05/2023- 73-year-old female that returns to clinic with a history of bilateral shoulder pain on history and exam with the appears in her pain is likely related to subacromial bursitis.  Her right shoulder x-ray shows that the right humeral head maintains appropriate relation with the glenoid.  AC joint is intact.  Based on history and exam it is she is suffering from subacromial bursitis bilaterally she was recently provided a right subacromial bursa injection per her internal medicine physician she states that this injection only provided her 1 day of relief.  This was done approximately 1 month ago.  Discussed today that I will provide her left subacromial bursa injection in  effort to review reduce her now left shoulder pain.  If no relief found we will order bilateral MRIs of her shoulder to rule out pathology and referred to orthopedics.    11/15/2024-Chelly Ortiz is a 74 y.o. female who  has a past medical history of Allergy, DDD (degenerative disc disease), lumbar, Diabetes mellitus, GERD (gastroesophageal reflux disease), History of subconjunctival hemorrhage (12/02/2011), IBS (irritable bowel syndrome), Obesity, MYRIAM (obstructive sleep apnea), Rotator cuff impingement syndrome, Seasonal allergic conjunctivitis, and Type 2 diabetes mellitus treated with insulin.  By history and examination this patient has subacute and acuteneck pain without left radiculopathy in the distribution of C4, C5, C6, and C7.  The underlying cause cause is muscle dysfunction and muscles strain.  Pathology is confirmed by imaging.  We discussed the underlying diagnoses and multiple treatment options including non-opioid medications, interventional procedures, physical therapy, home exercise, core muscle enhancement, activity modification, and weight loss.  The risks and benefits of each treatment option were discussed and all questions were answered.          Treatment Plan:   S/F Trigger point injections without steroids bupivacaine only 0.5% cervical paraspinals, left, left rhomboids left trapezius  I encouraged the patient to maintain a home exercise regimen that includes daily, moderate cardiovascular exercise lasting at least 30 minutes.  This may include yoga, porfirio chi, walking, swimming, aqua aerobics, or other exercises that maintain a heart rate of 50-70% of the calculated maximum heart rate.  I also encouraged light, daily stretching focused on the target area.  All previous imaging reviewed and discussed with the patient in detail cervical x-ray, cervical MRI, both shoulder MRIs.  Recommended Tylenol extra-strength arthritis 500 to a 1000 t.i.d. not to exceed 3000 mg in 24 hours    RTC- 7-10  days via [employer] or virtually.    Disclaimer: This note was partly generated using dictation software which may occasionally result in transcription errors.

## 2024-11-15 NOTE — TELEPHONE ENCOUNTER
----- Message from Radha Strauss sent at 11/15/2024  3:16 PM CST -----  Regarding: FW: Appt  Contact: pt [phone]  Can you schedule her an established patient visit with Dr. Rushing  ----- Message -----  From: Ty Bingham  Sent: 11/15/2024   3:12 PM CST  To: Сергей HENDRICKS Staff  Subject: Appt                                             Pt is requesting appt with provider for left shoulder surgery consult, pt was last seen by RENÉ Fitch 5/1/2024, please call pt @[phone]

## 2024-11-15 NOTE — PROCEDURES
Trigger Point Injection    Performed by: Tani Pittman FNP  Authorized by: Tani Pittman FNP    Cervical Paraspinal:  Left  Rhomboid:  Left  Trapezus:  Left    Consent Done?:  Yes (Written)    Pre-Procedure:   Indications:  Pain relief  Site marked: the procedure site was marked      Local anesthesia used?: Yes    Local anesthetic:  Bupivacaine 0.25% without epinephrine  Anesthetic total (ml):  5

## 2024-11-19 ENCOUNTER — CLINICAL SUPPORT (OUTPATIENT)
Dept: REHABILITATION | Facility: HOSPITAL | Age: 74
End: 2024-11-19
Payer: MEDICARE

## 2024-11-19 DIAGNOSIS — M54.2 NECK PAIN ON LEFT SIDE: ICD-10-CM

## 2024-11-19 PROCEDURE — 97750 PHYSICAL PERFORMANCE TEST: CPT | Mod: 32

## 2024-11-20 DIAGNOSIS — M47.812 FACET ARTHROPATHY, CERVICAL: ICD-10-CM

## 2024-11-20 DIAGNOSIS — M54.2 CERVICALGIA: ICD-10-CM

## 2024-11-20 DIAGNOSIS — M47.812 CERVICAL SPONDYLOSIS: Primary | ICD-10-CM

## 2024-11-21 ENCOUNTER — OFFICE VISIT (OUTPATIENT)
Dept: URGENT CARE | Facility: CLINIC | Age: 74
End: 2024-11-21
Payer: MEDICARE

## 2024-11-21 ENCOUNTER — TELEPHONE (OUTPATIENT)
Dept: SPORTS MEDICINE | Facility: CLINIC | Age: 74
End: 2024-11-21
Payer: MEDICARE

## 2024-11-21 VITALS
OXYGEN SATURATION: 98 % | HEART RATE: 76 BPM | RESPIRATION RATE: 18 BRPM | SYSTOLIC BLOOD PRESSURE: 133 MMHG | TEMPERATURE: 98 F | BODY MASS INDEX: 33.13 KG/M2 | DIASTOLIC BLOOD PRESSURE: 71 MMHG | WEIGHT: 187 LBS | HEIGHT: 63 IN

## 2024-11-21 DIAGNOSIS — M94.0 COSTOCHONDRITIS, ACUTE: Primary | ICD-10-CM

## 2024-11-21 DIAGNOSIS — R07.9 CHEST PAIN, UNSPECIFIED TYPE: ICD-10-CM

## 2024-11-21 LAB
OHS QRS DURATION: 90 MS
OHS QTC CALCULATION: 466 MS

## 2024-11-21 PROCEDURE — 93005 ELECTROCARDIOGRAM TRACING: CPT | Mod: S$GLB,,, | Performed by: FAMILY MEDICINE

## 2024-11-21 PROCEDURE — 93010 ELECTROCARDIOGRAM REPORT: CPT | Mod: S$GLB,,, | Performed by: INTERNAL MEDICINE

## 2024-11-21 PROCEDURE — 99213 OFFICE O/P EST LOW 20 MIN: CPT | Mod: S$GLB,,, | Performed by: FAMILY MEDICINE

## 2024-11-21 RX ORDER — DEXAMETHASONE 4 MG/1
8 TABLET ORAL DAILY
Qty: 6 TABLET | Refills: 0 | Status: SHIPPED | OUTPATIENT
Start: 2024-11-21 | End: 2024-11-21

## 2024-11-21 RX ORDER — METHYLPREDNISOLONE 4 MG/1
TABLET ORAL
Qty: 21 EACH | Refills: 0 | Status: SHIPPED | OUTPATIENT
Start: 2024-11-21 | End: 2024-12-12

## 2024-11-21 NOTE — PLAN OF CARE
[facility]  Physical Therapy Cervical Evaluation      Name: Chelly Ortiz  Clinic Number: [id number]    Therapy Diagnosis:   Encounter Diagnosis   Name Primary?    Neck pain on left side      Physician: Smitha Turner MD    Physician Orders: PT Eval and Treat   Medical Diagnosis from Referral: M54.2 (ICD-10-CM) - Neck pain on left side  Evaluation Date: 2024  Authorization Period Expiration: 2025  Plan of Care Expiration: 2025  Reassessment Due: 2024  Visit # / Visits authorized:   MedX Testing:MedX testing visit 2    Time In: 1:00 PM  Time Out: 2:00 PM  Total Billable Time: 60 minutes  INSURANCE and OUTCOMES: Fee for Service with FOTO Outcomes 1/3    Precautions: standard and diabetes    Pattern of pain determined: 1 IAN    Subjective     Date of onset: chronic  History of current condition: Chelly reports neck pain for the past few years, but believes is coming from her shoulders.  She initially believed she was being sent to physical therapy for her shoulders since MRI shows significant rotator cuff tears bilaterally.  While her neck is painful at times, her primary concern is with dealing with her shoulders and hopefully scheduling her first rotator cuff repair before the end of the year.      Medical History:   Past Medical History:   Diagnosis Date    Allergy     DDD (degenerative disc disease), lumbar     Diabetes mellitus     diet controlled    GERD (gastroesophageal reflux disease)     History of subconjunctival hemorrhage 2011    left eye    IBS (irritable bowel syndrome)     Obesity     MYRIAM (obstructive sleep apnea)     on CPAP    Rotator cuff impingement syndrome     Seasonal allergic conjunctivitis     Type 2 diabetes mellitus treated with insulin        Surgical History:   Chelly Ortiz  has a past surgical history that includes  section; Cholecystectomy; Eye surgery; Tubal ligation; Hysterectomy; Endoscopic  ultrasound of upper gastrointestinal tract (N/A, 10/09/2018); Colonoscopy (N/A, 12/05/2018); Excision of lesion (N/A, 02/13/2019); Cataract extraction w/  intraocular lens implant (Right, 10/03/2013); Colonoscopy (N/A, 06/07/2022); Cataract extraction w/  intraocular lens implant (Left, 11/2022); Phacoemulsification of cataract (Left, 11/2/2022); and Intraocular prosthesis insertion (Left, 11/2/2022).    Medications:   Chelly has a current medication list which includes the following prescription(s): accu-chek fastclix lancing dev, accu-chek guide me glucose mtr, albuterol, amlodipine, aspirin, benzonatate, benzonatate, blood sugar diagnostic, blood-glucose meter, dexcom g6 , dexcom g6 sensor, calcium carbonate, citalopram, ergocalciferol, esomeprazole, fluocinonide, fluticasone-salmeterol 250-50 mcg/dose, gabapentin, glucose, insulin aspart u-100, insulin syringe-needle u-100, insulin syringe-needle u-100, lancets, lantus solostar u-100 insulin, magnesium oxide, montelukast, pen needle, diabetic, rosuvastatin, saline nasal, mounjaro, and trazodone, and the following Facility-Administered Medications: triamcinolone acetonide.    Allergies:   Review of patient's allergies indicates:   Allergen Reactions    Ace inhibitors Hives     \Cough    Latex, natural rubber Itching        Imaging: X-ray   XR CERVICAL SPINE AP LAT WITH FLEX EXTEN     CLINICAL HISTORY:  Other cervical disc degeneration, unspecified cervical region     TECHNIQUE:  Three views of the cervical spine plus flexion and extension views were performed.     COMPARISON:  11/28/2022     FINDINGS:  Mild chronic degenerative changes of the cervical spine.  Alignment is anatomic in the neutral position.  No change in alignment with flexion or extension.     Impression:     Degenerative change of the cervical spine.  No radiographic evidence of cervical instability.       Prior Therapy: yes  Prior Treatment: physical therapy   Social History:  lives with  their spouse  Occupation: retired  Leisure: tv, reading      Prior Level of Function: modified independent,h shoulder pain limits activity  Current Level of Function: modified independent, shoulder pain limits  DME owned/used: [employer]  Gym Membership: no    Pain:  Current 0/10, worst 8/10, best 0/10   Location: left neck, bilateral shoulder   Description: Aching and Throbbing  Aggravating Factors: Bending and household chores  Easing Factors: heat  Disturbed Sleep: no, shoulders more problematic    Pattern of pain questions:  1.  Where is your pain the worst? shoulders  2.  Is your pain constant or intermittent? constant  3.  Does bending forward make your typical pain worse? yes  4.  Since the start of your neck pain, has there been a change in your bowel or bladder? no  5.  What can't you do now that you use to be able to do? Does daily chores, just with pain, more time to complete    Pts goals: decrease pain    Red Flag Screening:   Cough/Sneeze Strain: (--)  Bladder/bowel: (--)  Falls: (--)  Night pain: (--)  Unexplained weight loss: (--)  General health: fair    Objective      Postural examination/scapula alignment: Rounded shoulder, Head forward, and Slouched posture  Joint integrity: Firm end feeling  Correction of posture: better with lumbar roll    Range of Motion - MOVEMENT LOSS    ROM Loss   Flexion moderate loss   Extension moderate loss   Side bending Right moderate loss   Side bending Left moderate loss   Rotation Right minimal loss   Rotation Left moderate loss   Protraction minimal loss   Retraction  moderate loss     Upper Extremity Strength  (R) UE  (L) UE    Shoulder flexion: 4-/5 Shoulder flexion: 4-/5   Shoulder Abduction: 4-/5 Shoulder abduction: 4-/5   Elbow flexion: 4+/5 Elbow flexion: 4+/5   Elbow extension: 4+/5 Elbow extension: 4+/5   Wrist flexion: 4+/5 Wrist flexion: 4+/5   Wrist extension: 4+/5 Wrist extension: 4+/5     NEUROLOGICAL SCREEN:    Sensory Deficits: intact to light  "touch    Special Tests:   Test Name  Testing Result   Compression (+)   Distraction (--)   Neural Tension Test (--)   Saddle Sensation (--)     Reflexes:    Left Right   Biceps  NT NT   Brachioradialis  NT NT   Clonus (--) (--)   Babinski NT NT     REPEATED TEST MOVEMENTS:   Repeated Protraction in Sitting end range pain  no effect   Repeated Flexion in Sitting end range pain  pain during motion  no effect   Repeated Retraction in Sitting  better   Repeated Retraction Extension in Sitting no effect   Repeated Protraction in lying end range pain  pain during motion   Repeated Flexion in lying end range pain  no effect   Repeated Retraction in lying better   Repeated Retraction Extension in lying NT         Cervical MedX testing Chart  Cervical MedX Testing visit 2      GAIT:  Assistive Device used: none  Level of Assistance: independent  Patient displays the following gait deviations: decreased step length.       Intake Outcome Measure for FOTO Neck Survey    Therapist reviewed FOTO scores for Chelly Ortiz on 11/19/2024.   FOTO report - see Media section or FOTO account episode details.    Intake Score: %           Treatment     Total Treatment time separate from Evaluation: 12 minutes    Chelly received therapeutic exercises to develop/improve posture, cervical ROM, strength, and muscular endurance for 12 minutes including the following exercises:     RIS x 10  Levator scap stretch 20" x 2  Cervical rotation with towel 5" x 5  Retraction + extension with towel x 10    Written Home Exercises Provided: yes.    HEP AS FOLLOWS:  RIS, levator scap stretch, cervical rotation with towel, retraction + extension with towel    Exercises were reviewed and Chelly was able to demonstrate prior to the end of the session. Chelly demonstrated good  understanding of the education provided.     See EMR under Patient Instructions for exercises provided 11/19/2024.    MedX Testing:  MedX testing to be performed next " visit      Therapeutic Education/Activity provided for 5 minutes:   - Patient was given an [facility] Visit 1 handout which discusses the following:  - what to expect in therapy  - an overview of the program, including health coaching and wellness  - importance of spinal hygiene, proper posture, lifting mechanics, sleep quality, and nutrition/hydration   - Preeti roll trialed, recommended, and purchase information was provided.  - Patient received a handout regarding anticipated muscular soreness following the isometric test and strategies for management were reviewed with patient including stretching, using ice and scheduled rest.   - Patient received verbal education on the following:   - Healthy Back program,   - purpose of the isometric test,   - safe progression of neck strengthening, wellness approach, and systemic strengthening.   - safe usage of MedX machine and testing protocols.        Assessment     Chelly is a 74 y.o. female referred to [facility] with a medical diagnosis of M54.2 (ICD-10-CM) - Neck pain on left side. Pt presents with decreased cervical range of motion, decreased strength, impaired posture, hypomobility at cervicothoracic junction and impaired flexibility.  She would benefit from skilled physical therapy intervention to address limitation and improve overall functional mobility.    Pain Pattern: 1 IAN       Pt prognosis is Fair.     Pt will benefit from skilled outpatient Physical Therapy to address the deficits stated above and in the chart below, to provide pt/family education, and to maximize pt's level of independence.     Plan of care discussed with patient: Yes  Pt's spiritual, cultural and educational needs considered and patient is agreeable to the plan of care and goals as stated below:     Anticipated Barriers for therapy: chronicity of impairments, co-morbidities    PT Evaluation Completed? MedX testing next visit    Medical necessity is demonstrated by  the following problem list:      History  Co-morbidities and personal factors that may impact the plan of care [] LOW: no personal factors / co-morbidities  [] MODERATE: 1-2 personal factors / co-morbidities  [x] HIGH: 3+ personal factors / co-morbidities    Moderate / High Support Documentation:   Co-morbidities affecting plan of care:  OA, DDD, DM, rotator cuff dysfunction    Personal Factors:   no deficits     Examination  Body Structures and Functions, activity limitations and participation restrictions that may impact the plan of care [] LOW: addressing 1-2 elements  [x] MODERATE: 3+ elements  [] HIGH: 4+ elements (please support below)    Moderate / High Support Documentation: impaired posture, decreased strength, chronic pain     Clinical Presentation [] LOW: stable  [x] MODERATE: Evolving  [] HIGH: Unstable     Decision Making/ Complexity Score: moderate       GOALS: Pt is in agreement with the following goals.    Short term goals: 6 weeks or 10 visits   - Pt will demonstratte increased cervical MedX ROM as measured by med ex by 3 degrees from initial test which results in improved  ROM of neck for ease with ADLs and driving. Appropriate and Ongoing  - Pt will demonstrate independence with reducing or controlling symptoms with ther ex, movement, or position independently, able to reduce pain 1-2 points on pain scale using strategies taught in therapy.  Appropriate and Ongoing  - Pt will demonstrate increased MedX average isometric strength value by 20% with  when compared to the initial testing resulting in improved ability to perform bending, lifting, and carrying activities safely and confidently. Appropriate and Ongoing    Long term goals: 10 weeks or 20 visits  - Pt will demonstratte increased cervical MedX ROM as measured by med ex by 9 degrees from initial test which results in functional ROM of neck for ease with ADLs and driving.  Appropriate and Ongoing  - Pt will demonstrate increased MedX average  isometric strength value  by 35% from initial test to improve ability to lift and carry, and sustain good posture while performing ADL's. Appropriate and Ongoing  - Pt will demonstrate reduced pain and improved functional outcomes as reported on the FOTO by reaching an intake score of >/= 50% functional ability in order to demonstrate subjective improvement in patient's condition. . Appropriate and Ongoing  - Pt will demonstrate independence with reducing or controlling symptoms with ther ex, movement, or position independently, able to reduce pain 2-4 points on pain scale using strategies taught in therapy. Appropriate and Ongoing  - Pt will demonstrate independence with the HEP at discharge. Appropriate and Ongoing  - Pt will complete household tasks without increase in neck pain (patient goal)  Appropriate and Ongoing    Plan     Outpatient physical therapy 2x week for 10 weeks or 20 visits to include the following:   - Patient education  - Therapeutic exercise  - Manual therapy  - Performance testing   - Neuromuscular Re-education  - Therapeutic activity   - Modalities    Pt may be seen by PTA as part of the rehabilitation team.     Therapist: Tisha Pickens, PT  11/20/2024

## 2024-11-21 NOTE — PROGRESS NOTES
"Subjective:      Patient ID: Chelly Ortiz is a 74 y.o. female.    Vitals:  height is 5' 3" (1.6 m) and weight is 84.8 kg (187 lb). Her oral temperature is 98.4 °F (36.9 °C). Her blood pressure is 133/71 and her pulse is 76. Her respiration is 18 and oxygen saturation is 98%.     Chief Complaint: Chest Pain    This is a 74 y.o. female who presents today with a chief complaint of  chest discomfort x yesterday   Home Tx: Aspirin     Chest Pain   This is a new problem. The current episode started yesterday. The pain is at a severity of 5/10. The quality of the pain is described as dull. The pain radiates to the mid back. Associated symptoms include back pain. Pertinent negatives include no abdominal pain, claudication, cough, diaphoresis, dizziness, exertional chest pressure, fever, headaches, hemoptysis, irregular heartbeat, leg pain, lower extremity edema, malaise/fatigue, nausea, near-syncope, numbness, orthopnea, palpitations, PND, shortness of breath, sputum production, syncope, vomiting or weakness. The pain is aggravated by movement and deep breathing. She has tried acetaminophen and NSAIDs for the symptoms. Risk factors include being elderly.       Constitution: Negative for sweating and fever.   Cardiovascular:  Positive for chest pain. Negative for palpitations and passing out.   Respiratory:  Negative for cough, sputum production, bloody sputum and shortness of breath.    Gastrointestinal:  Negative for abdominal pain, nausea and vomiting.   Musculoskeletal:  Positive for back pain.   Neurological:  Negative for dizziness, headaches and numbness.      Objective:     Physical Exam   Constitutional: She is oriented to person, place, and time. She appears well-developed. She is cooperative.   HENT:   Head: Normocephalic and atraumatic.   Ears:   Right Ear: Hearing, tympanic membrane, external ear and ear canal normal.   Left Ear: Hearing, tympanic membrane, external ear and ear canal normal.   Nose: Nose " normal. No mucosal edema or nasal deformity. No epistaxis. Right sinus exhibits no maxillary sinus tenderness and no frontal sinus tenderness. Left sinus exhibits no maxillary sinus tenderness and no frontal sinus tenderness.   Mouth/Throat: Uvula is midline, oropharynx is clear and moist and mucous membranes are normal. No trismus in the jaw. Normal dentition. No uvula swelling.   Eyes: Conjunctivae and lids are normal.   Neck: Trachea normal and phonation normal. Neck supple.   Cardiovascular: Normal rate, regular rhythm, normal heart sounds and normal pulses.   Pulmonary/Chest: Effort normal and breath sounds normal. She exhibits tenderness.   Tenderness over sternum and xiphoid process          Comments: Tenderness over sternum and xiphoid process     Abdominal: Normal appearance and bowel sounds are normal. Soft.   Musculoskeletal: Normal range of motion.         General: Normal range of motion.   Neurological: She is alert and oriented to person, place, and time. She exhibits normal muscle tone.   Skin: Skin is warm, dry and intact.   Psychiatric: Her speech is normal and behavior is normal. Judgment and thought content normal.   Nursing note and vitals reviewed.      Assessment:     1. Costochondritis, acute    2. Chest pain, unspecified type        Plan:       Costochondritis, acute  -     Discontinue: dexAMETHasone (DECADRON) 4 MG Tab; Take 2 tablets (8 mg total) by mouth once daily. for 3 doses  Dispense: 6 tablet; Refill: 0  -     methylPREDNISolone (MEDROL DOSEPACK) 4 mg tablet; use as directed  Dispense: 21 each; Refill: 0    Chest pain, unspecified type  -     IN OFFICE EKG 12-LEAD (to Keanu)      Thank you for choosing [facility]!     Our goal in the Urgent Care is to always provide outstanding medical care. You may receive a survey by mail or e-mail in the next week regarding your experience today. We would greatly appreciate you completing and returning the survey. Your feedback provides us  with a way to recognize our staff who provide very good care, and it helps us learn how to improve when your experience was below our aspiration of excellence.       We appreciate you trusting us with your medical care. We hope you feel better soon. We will be happy to take care of you for all of your future medical needs.  You must understand that you've received an Urgent Care treatment only and that you may be released before all your medical problems are known or treated. You, the patient, will arrange for follow up care as instructed.  Follow up with your PCP or specialty clinic as directed in the next 1-2 weeks if not improved or as needed.  You can call [phone] to schedule an appointment with the appropriate provider.  Another option is to follow up with [facility] ([email or web address]) virtually for quick simple medical advice.  If your condition worsens we recommend that you receive another evaluation at the emergency room immediately or contact your primary medical clinics after hours call service to discuss your concerns.  Please return here or go to the Emergency Department for any concerns or worsening of condition.      *If you were prescribed a narcotic or controlled medication, do not drive or operate heavy equipment or machinery while taking these medications.

## 2024-11-25 ENCOUNTER — PATIENT MESSAGE (OUTPATIENT)
Dept: INTERNAL MEDICINE | Facility: CLINIC | Age: 74
End: 2024-11-25
Payer: MEDICARE

## 2024-11-26 NOTE — TELEPHONE ENCOUNTER
Care gap orders were placed on 11/6 and scheduled for 11/29, in between pt had office visit for annual on 11/14 and all labs were ordered including A1c and CMP

## 2024-11-29 PROBLEM — J44.9 CHRONIC OBSTRUCTIVE PULMONARY DISEASE, UNSPECIFIED COPD TYPE: Status: ACTIVE | Noted: 2024-11-29

## 2024-12-04 ENCOUNTER — TELEPHONE (OUTPATIENT)
Dept: INTERNAL MEDICINE | Facility: CLINIC | Age: 74
End: 2024-12-04
Payer: MEDICARE

## 2024-12-04 ENCOUNTER — OFFICE VISIT (OUTPATIENT)
Dept: INTERNAL MEDICINE | Facility: CLINIC | Age: 74
End: 2024-12-04
Payer: MEDICARE

## 2024-12-04 VITALS
BODY MASS INDEX: 28 KG/M2 | DIASTOLIC BLOOD PRESSURE: 72 MMHG | OXYGEN SATURATION: 98 % | WEIGHT: 158.06 LBS | SYSTOLIC BLOOD PRESSURE: 134 MMHG | HEIGHT: 63 IN | HEART RATE: 76 BPM

## 2024-12-04 DIAGNOSIS — E11.65 TYPE 2 DIABETES MELLITUS WITH HYPERGLYCEMIA, WITH LONG-TERM CURRENT USE OF INSULIN: Primary | ICD-10-CM

## 2024-12-04 DIAGNOSIS — Z79.4 TYPE 2 DIABETES MELLITUS WITH HYPERGLYCEMIA, WITH LONG-TERM CURRENT USE OF INSULIN: Primary | ICD-10-CM

## 2024-12-04 PROCEDURE — 3075F SYST BP GE 130 - 139MM HG: CPT | Mod: HCNC,CPTII,S$GLB, | Performed by: INTERNAL MEDICINE

## 2024-12-04 PROCEDURE — 3078F DIAST BP <80 MM HG: CPT | Mod: HCNC,CPTII,S$GLB, | Performed by: INTERNAL MEDICINE

## 2024-12-04 PROCEDURE — 1126F AMNT PAIN NOTED NONE PRSNT: CPT | Mod: HCNC,CPTII,S$GLB, | Performed by: INTERNAL MEDICINE

## 2024-12-04 PROCEDURE — 99214 OFFICE O/P EST MOD 30 MIN: CPT | Mod: HCNC,S$GLB,, | Performed by: INTERNAL MEDICINE

## 2024-12-04 PROCEDURE — 99999 PR PBB SHADOW E&M-EST. PATIENT-LVL IV: CPT | Mod: PBBFAC,HCNC,, | Performed by: INTERNAL MEDICINE

## 2024-12-04 PROCEDURE — 1101F PT FALLS ASSESS-DOCD LE1/YR: CPT | Mod: HCNC,CPTII,S$GLB, | Performed by: INTERNAL MEDICINE

## 2024-12-04 PROCEDURE — 3288F FALL RISK ASSESSMENT DOCD: CPT | Mod: HCNC,CPTII,S$GLB, | Performed by: INTERNAL MEDICINE

## 2024-12-04 NOTE — TELEPHONE ENCOUNTER
----- Message from EDUARDO Betts, GINETTE sent at 12/4/2024  3:12 PM CST -----  Hi Dr. Yuan!  I have a spot jan 9th at 330p or 4p.  I actually don't start the support group until February-so spot can be overridden for now.  Thanks for letting us know!  I remember her:)  Bindu  ----- Message -----  From: Smitha Yuan MD  Sent: 12/4/2024   3:04 PM CST  To: EDUARDO Escobar, GINETTE; #    Hi!    I referred this patient to [facility]. I wanted to see if she can get on a wait list for a sooner appt. Right now she is scheduled for April. I am trying to get her on Mounjaro in the attempt to wean her off insulin altogether.     Thanks in advance!     Smitha Yuan

## 2024-12-23 ENCOUNTER — CLINICAL SUPPORT (OUTPATIENT)
Dept: DIABETES | Facility: CLINIC | Age: 74
End: 2024-12-23
Payer: MEDICARE

## 2024-12-23 VITALS — HEIGHT: 63 IN | BODY MASS INDEX: 33.75 KG/M2 | WEIGHT: 190.5 LBS

## 2024-12-23 DIAGNOSIS — E11.65 TYPE 2 DIABETES MELLITUS WITH HYPERGLYCEMIA, WITH LONG-TERM CURRENT USE OF INSULIN: ICD-10-CM

## 2024-12-23 DIAGNOSIS — Z79.4 TYPE 2 DIABETES MELLITUS WITH HYPERGLYCEMIA, WITH LONG-TERM CURRENT USE OF INSULIN: ICD-10-CM

## 2024-12-23 PROCEDURE — 99999 PR PBB SHADOW E&M-EST. PATIENT-LVL II: CPT | Mod: PBBFAC,HCNC,, | Performed by: DIETITIAN, REGISTERED

## 2024-12-23 PROCEDURE — G0108 DIAB MANAGE TRN  PER INDIV: HCPCS | Mod: HCNC,S$GLB,, | Performed by: DIETITIAN, REGISTERED

## 2024-12-23 NOTE — PROGRESS NOTES
"Diabetes Care Specialist Progress Note  Author: Alexandra Payne RD  Date: 12/23/2024    Intake    Program Intake  Reason for Diabetes Program Visit:: Initial Diabetes Assessment  Current diabetes risk level:: low  In the last month, have you used the ER or been admitted to the hospital: No  Permission to speak with others about care:: no    Current Diabetes Treatment: DM Injectables, Insulin  DM Injectables Type/Dose: Mounjaro 2.5mg QW  Method of insulin delivery?: Injections  Injection Type: Pens  Pen Type/Dose: Lantus 8u QHS; Novolog 10u BID    Continuous Glucose Monitoring  Patient has CGM: No    Lab Results   Component Value Date    HGBA1C 7.9 (H) 11/14/2024       Weight: 86.4 kg (190 lb 7.6 oz)   Height: 5' 3" (160 cm)   Body mass index is 33.74 kg/m².    Lifestyle Coping Support & Clinical    Lifestyle/Coping/Support  Learning Barriers:: None  Culture or Taoism beliefs that may impact ability to access healthcare: No  Psychosocial/Coping Skills Assessment Completed: : No  Deffered due to:: Time  Area of need?: Deferred    Problem Review  Active Comorbidities: Obesity, Hyperlipidemia/Dyslipidemia, Hypertension    Diabetes Self-Management Skills Assessment    Medication Skills Assessment  Patient is able to identify current diabetes medications, dosages, and appropriate timing of medications.: yes  Patient reports problems or concerns with current medication regimen.: yes  Medication regimen problems/concerns:: does not feel like regimen is working  Patient is  aware that some diabetes medications can cause low blood sugar?: Yes  Medication Skills Assessment Completed:: Yes  Assessment indicates:: Instruction Needed  Area of need?: Yes    Diabetes Disease Process/Treatment Options  Diabetes Type?: Type II  When were you diagnosed?: a while ago  If previous diabetes education, when/where:: [facility]  What are your goals for this education session?: just tell what to eat  Is patient aware of what causes diabetes?: " No  Does patient understand the pathophysiology of diabetes?: No  Diabetes Disease Process/Treatment Options: Skills Assessment Completed: Yes  Assessment indicates:: Instruction Needed  Area of need?: Yes    Nutrition/Healthy Eating  Meal Plan 24 Hour Recall - Breakfast: dinner leftovers or cereal and milk and 1/2 regular sprite  Meal Plan 24 Hour Recall - Lunch: skips  Meal Plan 24 Hour Recall - Dinner: meat loaf and [employer] cheesy mashed potatoes  Meal Plan 24 Hour Recall - Snack: nuts, cookies, drinks water, coffee, tea with 1/2 sugar  Meal Plan 24 Hour Recall - Beverage: regular soda, water  Who shops/cooks?: pt  Patient can identify foods that impact blood sugar.: yes  Challenges to healthy eating:: portion control, lack of will power  Nutrition/Healthy Eating Skills Assessment Completed:: Yes  Assessment indicates:: Instruction Needed  Area of need?: Yes    Physical Activity/Exercise  Patient's daily activity level:: sedentary  Patient formally exercises outside of work.: no  Reasons for not exercising:: other (see comments) (no reason)  Patient can identify forms of physical activity.: yes  Physical Activity/Exercise Skills Assessment Completed: : Yes  Assessment indicates:: Instruction Needed  Area of need?: Yes    Home Blood Glucose Monitoring  Patient states that blood sugar is checked at home daily.: yes  Monitoring Method:: home glucometer  Home Blood Glucose Monitoring Skills Assessment Completed: : Yes  Assessment indicates:: Instruction Needed  Area of need?: Yes    Acute Complications  Area of need?: Deferred    Chronic Complications  Area of need?: Deferred      Assessment Summary and Plan    Based on today's diabetes care assessment, the following areas of need were identified:      Identified Areas of Need      Medication/Current Diabetes Treatment: Yes, reviewed MOA of DM meds   Lifestyle Coping/Support: Deferred   Diabetes Disease Process/Treatment Options: Yes, reviewed pathos of DM    Nutrition/Healthy Eating: Yes, see care plan below    Physical Activity/Exercise: Yes, pt was encouraged to get up and move    Home Blood Glucose Monitoring: Yes, pt was encouraged to monitor BG at least 1x/day    Acute Complications: Deferred    Chronic Complications: Deferred       Today's interventions were provided through individual discussion, instruction, and written materials were provided.      Patient verbalized understanding of instruction and written materials.  Pt was able to return back demonstration of instructions today. Patient understood key points, needs reinforcement and further instruction.     Diabetes Self-Management Care Plan:    Today's Diabetes Self-Management Care Plan was developed with Chelly's input. Chelly has agreed to work toward the following goal(s) to improve his/her overall diabetes control.      Care Plan: Diabetes Management   Updates made since 12/24/2023 12:00 AM        Problem: Healthy Eating         Goal: Eat 3 meals daily with 30g/2 servings of Carbohydrate per meal.    Start Date: 12/23/2024   Expected End Date: 11/30/2025   Priority: Medium   Barriers: No Barriers Identified   Note:    12/23/24: Pt eats 2-3x/day. She takes 2.5mg Mounjaro; 8u Lantus QHS; 10u Novolog TID. Is not sure if all these meds are helping. Diet recall shows she drinks at least one regular soda every day; she will try to switch to sugar free. She has not been wearing CGM so she does not know how it affects her. She does not eat a lot of veggies, but does like them so she will try to add more into her daily meals. She does not exercise and likely will not.       Task: Reviewed the sources and role of Carbohydrate, Protein, and Fat and how each nutrient impacts blood sugar. Completed 12/23/2024        Task: Provided visual examples using dry measuring cups, food models, and other familiar objects such as computer mouse, deck or cards, tennis ball etc. to help with visualization of portions. Completed  12/23/2024        Task: Explained how to count carbohydrates using the food label and the use of dry measuring cups for accurate carb counting. Completed 12/23/2024        Task: Discussed strategies for choosing healthier menu options when dining out. Completed 12/23/2024        Task: Recommended replacing beverages containing high sugar content with noncaloric/sugar free options and/or water. Completed 12/23/2024        Task: Review the importance of balancing carbohydrates with each meal using portion control techniques to count servings of carbohydrate and label reading to identify serving size and amount of total carbs per serving. Completed 12/23/2024        Task: Provided Sample plate method and reviewed the use of the plate to estimate amounts of carbohydrate per meal. Completed 12/23/2024          Follow Up Plan     No follow-ups on file. Review weight; A1c, BG log, complications.    Today's care plan and follow up schedule was discussed with patient.  Chelly verbalized understanding of the care plan, goals, and agrees to follow up plan.        The patient was encouraged to communicate with his/her health care provider/physician and care team regarding his/her condition(s) and treatment.  I provided the patient with my contact information today and encouraged to contact me via phone or [facility] as needed.     Length of Visit   Total Time: 30 Minutes

## 2025-01-01 NOTE — PROGRESS NOTES
Subjective:       Patient ID: Chelly Ortiz is a 74 y.o. female.    Chief Complaint: Follow-up        DMII  we recently started her on Mounjaro and she is tolerating the medication well.  She has been off Levemir for the last 4 months as she was unable to get it as medication has been discontinued.  Her last A1c was 8.3 which is up from 7.2 previously.  She is still living herself NovoLog and states her sugar has been around 140s to 160s throughout the day.     HTN on amlodipine      HLD    She stopped taking statin due to myalgias . She was on atorvastatin 40 mg daily.      Insomnia on trazodone      Health Maintenance:  Colon Cancer Screening: colonoscopy in 2022 with polyps removed due again in 2027  Mammogram: January 2023 was normal   Hep C:negative 2007   Lipids: normal 2023   Vaccines:  up to date      Follow-up  Associated symptoms include neck pain. Pertinent negatives include no abdominal pain, arthralgias, chest pain, chills, coughing, headaches, myalgias, nausea or vomiting.   Neck Pain   Associated symptoms include leg pain. Pertinent negatives include no chest pain or headaches.     Review of Systems   Constitutional:  Negative for activity change, appetite change and chills.   HENT:  Negative for ear pain, sinus pressure/congestion and sneezing.    Respiratory:  Negative for cough and shortness of breath.    Cardiovascular:  Negative for chest pain, palpitations and leg swelling.   Gastrointestinal:  Negative for abdominal distention, abdominal pain, constipation, diarrhea, nausea and vomiting.   Genitourinary:  Negative for dysuria and hematuria.   Musculoskeletal:  Positive for leg pain and neck pain. Negative for arthralgias, back pain and myalgias.   Neurological:  Negative for dizziness and headaches.   Psychiatric/Behavioral:  Negative for agitation. The patient is not nervous/anxious.            Past Medical History:   Diagnosis Date    Allergy     DDD (degenerative disc disease), lumbar      Diabetes mellitus     diet controlled    GERD (gastroesophageal reflux disease)     History of subconjunctival hemorrhage 2011    left eye    IBS (irritable bowel syndrome)     Obesity     MYRIAM (obstructive sleep apnea)     on CPAP    Rotator cuff impingement syndrome     Seasonal allergic conjunctivitis     Type 2 diabetes mellitus treated with insulin      Past Surgical History:   Procedure Laterality Date    CATARACT EXTRACTION W/  INTRAOCULAR LENS IMPLANT Right 10/03/2013        CATARACT EXTRACTION W/  INTRAOCULAR LENS IMPLANT Left 2022         SECTION      x2    CHOLECYSTECTOMY      COLONOSCOPY N/A 2018    Procedure: COLONOSCOPY;  Surgeon: Marie Mejia MD;  Location: [facility] ENDO;  Service: Endoscopy;  Laterality: N/A;    COLONOSCOPY N/A 2022    Procedure: COLONOSCOPY;  Surgeon: Pierce Rivera MD;  Location: [facility] ENDO;  Service: Endoscopy;  Laterality: N/A;    ENDOSCOPIC ULTRASOUND OF UPPER GASTROINTESTINAL TRACT N/A 10/09/2018    Procedure: ULTRASOUND, UPPER GI TRACT, ENDOSCOPIC;  Surgeon: Javy Simpson MD;  [facility];  Service: Endoscopy;  Laterality: N/A;    EXCISION OF LESION N/A 2019    Procedure: EXCISION, LESION VULVA;  Surgeon: Liv Odell MD;  Location: [facility] OR;  Service: OB/GYN;  Laterality: N/A;  latex allergy    EYE SURGERY      HYSTERECTOMY      ovaries intact, due to DUB    INTRAOCULAR PROSTHESES INSERTION Left 2022    Procedure: INSERTION, IOL PROSTHESIS;  Surgeon: Clarice Herzog MD;  Location: [facility] OR [address] [place] [address];  Service: Ophthalmology;  Laterality: Left;    PHACOEMULSIFICATION OF CATARACT Left 2022    Procedure: PHACOEMULSIFICATION, CATARACT;  Surgeon: Clarice Herzog MD;  Location: [facility] OR [address] [place] [address];  Service: Ophthalmology;  Laterality: Left;    TUBAL LIGATION        Patient Active Problem List   Diagnosis    GERD (gastroesophageal reflux disease)    MYRIAM (obstructive sleep apnea)    IBS  (irritable bowel syndrome)    DDD (degenerative disc disease), lumbar    Insomnia    Anxiety    Hearing loss, sensorineural    Nuclear sclerotic cataract of left eye    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    Constipation    History of colon polyps    Essential hypertension    Hyperlipidemia, unspecified    Spondylosis of cervical region without myelopathy or radiculopathy    Cervical myofascial pain syndrome    PCO (posterior capsular opacification), right    Dry eye syndrome    Pseudophakia    Decreased range of motion of lumbar spine    Decreased strength    Dilated bile duct    Personal history of colonic polyps    EIC (epidermal inclusion cyst)    Sedentary lifestyle    Dysphagia    Type 2 diabetes mellitus without complication, with long-term current use of insulin    Abdominal aortic atherosclerosis    Decreased ROM of neck    Arthritis of multiple sites    Osteopenia of multiple sites    Combined form of age-related cataract, left eye    Adjustment disorder with anxious mood    Decreased ROM of right shoulder    History of epistaxis    Acute pain of both shoulders    Decreased ROM of left shoulder    Abnormal posture    Palpitations    Secondary osteoarthritis of right shoulder due to rotator cuff tear    Chronic obstructive pulmonary disease, unspecified COPD type        Objective:      Physical Exam  Vitals reviewed.   Constitutional:       Appearance: Normal appearance.   HENT:      Head: Normocephalic.      Mouth/Throat:      Mouth: Mucous membranes are moist.      Pharynx: Oropharynx is clear.   Eyes:      Conjunctiva/sclera: Conjunctivae normal.      Pupils: Pupils are equal, round, and reactive to light.   Cardiovascular:      Rate and Rhythm: Normal rate and regular rhythm.   Pulmonary:      Effort: Pulmonary effort is normal.      Breath sounds: Normal breath sounds.   Abdominal:      General: Bowel sounds are normal.      Palpations: Abdomen is soft.      Tenderness: There is no abdominal  tenderness. There is no right CVA tenderness or left CVA tenderness.   Skin:     General: Skin is warm and dry.   Neurological:      General: No focal deficit present.      Mental Status: She is alert.   Psychiatric:         Mood and Affect: Mood normal.         Behavior: Behavior normal.         Assessment:       Problem List Items Addressed This Visit    None  Visit Diagnoses       Type 2 diabetes mellitus with hyperglycemia, with long-term current use of insulin    -  Primary    Relevant Orders    Ambulatory referral/consult to Diabetes Education    Ambulatory Referral/Consult to Primary Care Diabetic Management              Plan:         Chelly was seen today for neck pain.    Diagnoses and all orders for this visit:      Type 2 diabetes mellitus without complication, with long-term current use of insulin  -     HEMOGLOBIN A1C; Future  -     COMPREHENSIVE METABOLIC PANEL; Future  Increase Mounjaro to 5 mg weekly.  She will continue NovoLog on a sliding scale for now but the goal will be to get her off insulin completely as we continue to increase the Mounjaro.  Referral to diabetes education and diabetes clinic           Smitha Turner MD   Internal Medicine   Primary Care

## 2025-01-07 ENCOUNTER — OFFICE VISIT (OUTPATIENT)
Dept: SPORTS MEDICINE | Facility: CLINIC | Age: 75
End: 2025-01-07
Payer: MEDICARE

## 2025-01-07 VITALS
WEIGHT: 190.06 LBS | HEIGHT: 63 IN | HEART RATE: 92 BPM | DIASTOLIC BLOOD PRESSURE: 77 MMHG | BODY MASS INDEX: 33.68 KG/M2 | SYSTOLIC BLOOD PRESSURE: 145 MMHG

## 2025-01-07 DIAGNOSIS — M12.811 ROTATOR CUFF ARTHROPATHY OF BOTH SHOULDERS: Primary | ICD-10-CM

## 2025-01-07 DIAGNOSIS — M12.812 ROTATOR CUFF ARTHROPATHY OF BOTH SHOULDERS: Primary | ICD-10-CM

## 2025-01-07 PROCEDURE — 20610 DRAIN/INJ JOINT/BURSA W/O US: CPT | Mod: LT,S$GLB,, | Performed by: ORTHOPAEDIC SURGERY

## 2025-01-07 PROCEDURE — 3288F FALL RISK ASSESSMENT DOCD: CPT | Mod: CPTII,S$GLB,, | Performed by: ORTHOPAEDIC SURGERY

## 2025-01-07 PROCEDURE — 99999 PR PBB SHADOW E&M-EST. PATIENT-LVL IV: CPT | Mod: PBBFAC,,, | Performed by: ORTHOPAEDIC SURGERY

## 2025-01-07 PROCEDURE — 3077F SYST BP >= 140 MM HG: CPT | Mod: CPTII,S$GLB,, | Performed by: ORTHOPAEDIC SURGERY

## 2025-01-07 PROCEDURE — 1101F PT FALLS ASSESS-DOCD LE1/YR: CPT | Mod: CPTII,S$GLB,, | Performed by: ORTHOPAEDIC SURGERY

## 2025-01-07 PROCEDURE — 3072F LOW RISK FOR RETINOPATHY: CPT | Mod: CPTII,S$GLB,, | Performed by: ORTHOPAEDIC SURGERY

## 2025-01-07 PROCEDURE — 1125F AMNT PAIN NOTED PAIN PRSNT: CPT | Mod: CPTII,S$GLB,, | Performed by: ORTHOPAEDIC SURGERY

## 2025-01-07 PROCEDURE — 99214 OFFICE O/P EST MOD 30 MIN: CPT | Mod: 25,S$GLB,, | Performed by: ORTHOPAEDIC SURGERY

## 2025-01-07 PROCEDURE — 3078F DIAST BP <80 MM HG: CPT | Mod: CPTII,S$GLB,, | Performed by: ORTHOPAEDIC SURGERY

## 2025-01-07 PROCEDURE — 1159F MED LIST DOCD IN RCRD: CPT | Mod: CPTII,S$GLB,, | Performed by: ORTHOPAEDIC SURGERY

## 2025-01-07 RX ORDER — TRIAMCINOLONE ACETONIDE 40 MG/ML
40 INJECTION, SUSPENSION INTRA-ARTICULAR; INTRAMUSCULAR
Status: DISCONTINUED | OUTPATIENT
Start: 2025-01-07 | End: 2025-01-07 | Stop reason: HOSPADM

## 2025-01-07 RX ADMIN — TRIAMCINOLONE ACETONIDE 40 MG: 40 INJECTION, SUSPENSION INTRA-ARTICULAR; INTRAMUSCULAR at 01:01

## 2025-01-07 NOTE — PROCEDURES
Large Joint Aspiration/Injection: L subacromial bursa    Date/Time: 1/7/2025 1:30 PM    Performed by: Joe Rushing MD  Authorized by: Joe Rushing MD    Consent Done?:  Yes (Verbal)  Indications:  Pain  Site marked: the procedure site was marked    Timeout: prior to procedure the correct patient, procedure, and site was verified    Prep: patient was prepped and draped in usual sterile fashion    Local anesthesia used?: No      Details:  Needle Size:  22 G  Ultrasonic Guidance for needle placement?: No    Approach:  Posterior  Location:  Shoulder  Site:  L subacromial bursa  Medications:  40 mg triamcinolone acetonide 40 mg/mL  Patient tolerance:  Patient tolerated the procedure well with no immediate complications

## 2025-01-07 NOTE — PROGRESS NOTES
CC:  Bilateral shoulder pain    Patient returns to clinic for follow up of left shoulder. She received a CSI last visit in May and reports moderate relief from this. She is requesting a repeat today. No change in history since last visit.     Initial Hx:   Patient is a 73-year-old female who presents today for initial evaluation of bilateral shoulder pain.  Initially seen by Tejal Kelley PA-C with Orthopedics in January. She reports that she began experiencing increased pain in the right shoulder last September, and worsening pain of the left shoulder in December.  She denies any associated injury or trauma to attribute to this.  She is left-hand dominant.  The pain in her right shoulder is significantly worse than the left.  She reports significant weakness and decreased range of motion of the right shoulder, with an inability to lift it past 90°.  She has pain and weakness in the left shoulder as well, however has almost full range of motion.  Pain has begun to disrupts sleep and normal activities of daily living such as getting dressed and reaching for things above shoulder level.  No prior injuries or surgeries on the right or left shoulder.  Thus far treatment has included formal physical therapy, subacromial corticosteroid injections, and over-the-counter/prescription anti-inflammatories which do provide some relief. Her PCP ordered MRIs of both shoulders due to continued pain despite conservative treatment. Here today to discuss results and treatment options.     Is affecting ADLs.  Pain is 6/10 at it's worst.      Past Medical History:   Diagnosis Date    Allergy     DDD (degenerative disc disease), lumbar     Diabetes mellitus     diet controlled    GERD (gastroesophageal reflux disease)     History of subconjunctival hemorrhage 12/02/2011    left eye    IBS (irritable bowel syndrome)     Obesity     MYRIAM (obstructive sleep apnea)     on CPAP    Rotator cuff impingement syndrome     Seasonal allergic  conjunctivitis     Type 2 diabetes mellitus treated with insulin        Past Surgical History:   Procedure Laterality Date    CATARACT EXTRACTION W/  INTRAOCULAR LENS IMPLANT Right 10/03/2013        CATARACT EXTRACTION W/  INTRAOCULAR LENS IMPLANT Left 2022         SECTION      x2    CHOLECYSTECTOMY      COLONOSCOPY N/A 2018    Procedure: COLONOSCOPY;  Surgeon: Marie Mejia MD;  Location: [facility] ENDO;  Service: Endoscopy;  Laterality: N/A;    COLONOSCOPY N/A 2022    Procedure: COLONOSCOPY;  Surgeon: Pierce Rivera MD;  Location: [facility] ENDO;  Service: Endoscopy;  Laterality: N/A;    ENDOSCOPIC ULTRASOUND OF UPPER GASTROINTESTINAL TRACT N/A 10/09/2018    Procedure: ULTRASOUND, UPPER GI TRACT, ENDOSCOPIC;  Surgeon: Javy Simpson MD;  Location: [facility] ENDO;  Service: Endoscopy;  Laterality: N/A;    EXCISION OF LESION N/A 2019    Procedure: EXCISION, LESION VULVA;  Surgeon: Liv Odell MD;  Location: [facility] OR;  Service: OB/GYN;  Laterality: N/A;  latex allergy    EYE SURGERY      HYSTERECTOMY      ovaries intact, due to DUB    INTRAOCULAR PROSTHESES INSERTION Left 2022    Procedure: INSERTION, IOL PROSTHESIS;  Surgeon: Clarice Herzog MD;  Location: [facility] OR [address] [place] [address];  Service: Ophthalmology;  Laterality: Left;    PHACOEMULSIFICATION OF CATARACT Left 2022    Procedure: PHACOEMULSIFICATION, CATARACT;  Surgeon: Clarice Herzog MD;  [facility];  Service: Ophthalmology;  Laterality: Left;    TUBAL LIGATION         Family History   Problem Relation Name Age of Onset    Alzheimer's disease Mother      Heart disease Father      Diabetes Sister x4     Breast cancer Sister x4     Deep vein thrombosis Brother x1     Diabetes Daughter x2     Cancer Brother x1         Lung    Lung cancer Brother x1     Amblyopia Neg Hx      Blindness Neg Hx      Cataracts Neg Hx      Glaucoma Neg Hx      Hypertension Neg Hx      Macular degeneration  "Neg Hx      Retinal detachment Neg Hx      Strabismus Neg Hx      Stroke Neg Hx      Thyroid disease Neg Hx           Current Outpatient Medications:     ACCU-CHEK FASTCLIX LANCING DEV Kit, USE AS DIRECTED, Disp: 1 each, Rfl: 0    ACCU-CHEK GUIDE ME GLUCOSE MTR Misc, USE GLUCOSE METER TO TEST 3 TIMES A DAY, Disp: , Rfl:     albuterol (PROVENTIL/VENTOLIN HFA) 90 mcg/actuation inhaler, Inhale 1-2 puffs into the lungs every 4 (four) hours as needed for Wheezing., Disp: 18 g, Rfl: 2    aspirin (ECOTRIN) 81 MG EC tablet, Take 81 mg by mouth once daily., Disp: , Rfl:     blood sugar diagnostic (ACCU-CHEK GUIDE TEST STRIPS) Strp, 3 times a day, Disp: 300 strip, Rfl: 11    blood-glucose meter (BLOOD GLUCOSE MONITORING) kit, Three times a day, Disp: 1 each, Rfl: 0    blood-glucose meter,continuous (DEXCOM G6 ) Misc, for use to monitor Blood glucose Daily., Disp: 1 each, Rfl: 3    blood-glucose sensor (DEXCOM G6 SENSOR) Pepper, change sensor every 10 days., Disp: 3 each, Rfl: 1    citalopram (CELEXA) 10 MG tablet, Take 1 tablet (10 mg total) by mouth once daily., Disp: 90 tablet, Rfl: 1    ergocalciferol (ERGOCALCIFEROL) 50,000 unit Cap, TAKE 1 CAPSULE BY MOUTH EVERY 7 DAYS, Disp: 12 capsule, Rfl: 3    esomeprazole (NEXIUM) 40 MG capsule, Take 1 capsule (40 mg total) by mouth before breakfast., Disp: 90 capsule, Rfl: 3    fluocinonide (LIDEX) 0.05 % external solution, AAA scalp qday - bid prn pruritus, Disp: 60 mL, Rfl: 3    fluticasone-salmeterol diskus inhaler 250-50 mcg, Inhale 1 puff into the lungs 2 (two) times daily. Controller, Disp: 60 each, Rfl: 6    insulin aspart U-100 (NOVOLOG FLEXPEN U-100 INSULIN) 100 unit/mL (3 mL) InPn pen, Inject 10 Units into the skin 3 (three) times daily with meals. TDD 60 units, Disp: 75 mL, Rfl: 11    insulin syringe-needle U-100 0.3 mL 31 gauge x 5/16" Syrg, relion brand, use 5 x a day, if not on levemir or novolog, Disp: 150 each, Rfl: 1    insulin syringe-needle U-100 0.5 mL 31 " "gauge x 5/16" Syrg, Use nightly. 90 day, Disp: 100 each, Rfl: 3    lancets (ACCU-CHEK FASTCLIX LANCET DRUM) [facility] [place] BLOOD SUGAR THREE TIMES A DAY, Disp: 918 each, Rfl: 3    LANTUS SOLOSTAR U-100 INSULIN 100 unit/mL (3 mL) InPn pen, Inject into the skin., Disp: , Rfl:     magnesium oxide (MAG-OX) 400 mg tablet, Take 1 tablet (400 mg total) by mouth 2 (two) times daily., Disp: 180 tablet, Rfl: 3    montelukast (SINGULAIR) 10 mg tablet, Take 10 mg by mouth every evening., Disp: , Rfl:     pen needle, diabetic (NOVOFINE 32) 32 gauge x 1/4" Ndle, 4 injections per day, Disp: 500 each, Rfl: 4    rosuvastatin (CRESTOR) 5 MG tablet, Take 1 tablet (5 mg total) by mouth once daily., Disp: 90 tablet, Rfl: 3    sodium chloride (SALINE NASAL) 0.65 % nasal spray, 1 spray by Nasal route as needed for Congestion., Disp: 15 mL, Rfl: 3    traZODone (DESYREL) 50 MG tablet, TAKE 1 TABLET BY MOUTH EVERY EVENING., Disp: 90 tablet, Rfl: 0    amLODIPine (NORVASC) 10 MG tablet, Take 1 tablet (10 mg total) by mouth once daily., Disp: 90 tablet, Rfl: 3    benzonatate (TESSALON) 200 MG capsule, Take 1 capsule (200 mg total) by mouth every 8 (eight) hours as needed for Cough. (Patient not taking: Reported on 1/7/2025), Disp: 30 capsule, Rfl: 0    benzonatate (TESSALON) 200 MG capsule, Take 1 capsule (200 mg total) by mouth every 8 (eight) hours as needed for Cough. (Patient not taking: Reported on 1/7/2025), Disp: 30 capsule, Rfl: 0    calcium carbonate (OS-ARIC) 600 mg calcium (1,500 mg) Tab, Take 1 tablet (600 mg total) by mouth once. for 1 dose, Disp: 30 tablet, Rfl: 11    gabapentin (NEURONTIN) 100 MG capsule, Take 2 capsules (200 mg total) by mouth every evening., Disp: 180 capsule, Rfl: 3    glucose 4 GM chewable tablet, Take 4 tablets (16 g total) by mouth as needed for Low blood sugar (If having symptoms of blurry vision, palpitations, confusion, shakiness.  Please check sugars and if sugar below 70 please take 4 tablets and re-check " "sugar everry 15 minutes until sugars are above 70 and symptoms resolve.)., Disp: 50 tablet, Rfl: 12    Current Facility-Administered Medications:     triamcinolone acetonide injection 20 mg, 20 mg, Intramuscular, 1 time in Clinic/HOD, Tani Pittman, GINETTE    Review of patient's allergies indicates:   Allergen Reactions    Ace inhibitors Hives     \Cough    Latex, natural rubber Itching          REVIEW OF SYSTEMS:  Constitution: Negative. Negative for chills, fever and night sweats.   HENT: Negative for congestion and headaches.    Eyes: Negative for blurred vision, left vision loss and right vision loss.   Cardiovascular: Negative for chest pain and syncope.   Respiratory: Negative for cough and shortness of breath.    Endocrine: Negative for polydipsia, polyphagia and polyuria.   Hematologic/Lymphatic: Negative for bleeding problem. Does not bruise/bleed easily.   Skin: Negative for dry skin, itching and rash.   Musculoskeletal: Negative for falls.  Positive for bilateral shoulder pain and muscle weakness.   Gastrointestinal: Negative for abdominal pain and bowel incontinence.   Genitourinary: Negative for bladder incontinence and nocturia.   Neurological: Negative for disturbances in coordination, loss of balance and seizures.   Psychiatric/Behavioral: Negative for depression. The patient does not have insomnia.    Allergic/Immunologic: Negative for hives and persistent infections.      PHYSICAL EXAMINATION:  Vitals:  BP (!) 145/77   Pulse 92   Ht 5' 3" (1.6 m)   Wt 86.2 kg (190 lb 0.6 oz)   LMP 08/01/2000   BMI 33.66 kg/m²    General: The patient is alert and oriented x 3.  Mood is pleasant.  Observation of ears, eyes and nose reveal no gross abnormalities.  No labored breathing observed.  Gait is coordinated. Patient can toe walk and heel walk without difficulty.      BILATERAL SHOULDER / UPPER EXTREMITY EXAM    OBSERVATION:     Swelling  none  Deformity  none   Discoloration  none   Scapular " winging none   Scars   none  Atrophy  none    TENDERNESS / CREPITUS (T/C):          T/C      T/C   Clavicle   -/-  SUPRAspinatus    +/- (B)     AC Jt.    +/- (B)  INFRAspinatus  -/-    SC Jt.    -/-  Deltoid    -/-      G. Tuberosity  -/-  LH BICEP groove  +/- (B)   Acromion:  -/-  Midline Neck   -/-     Scapular Spine -/-  Trapezium   -/-   SMA Scapula  -/-  GH jt. line - post  + /-(B)     Scapulothoracic  -/-         ROM: (* = with pain)  Left shoulder   Right shoulder        AROM (PROM)   AROM (PROM)   FE    160°* (175°*)     90°* (170°*)     ER at 0°    40°*  (60°*)    30°*  (60°*)   ER at 90° ABD  30°*  (40°*)    50°*  (65°*)   IR at 90°  ABD   40*  (40°*)     50*  (50°*)      IR (spine level)   T12*     L2*    STRENGTH: (* = with pain) Left shoulder   Right shoulder   SCAPTION   4-/5*    3/5*    IR    4+/5*    4/5*   ER    4/5*    3/5*   BICEPS   5/5*    4/5*   Deltoid    4/5*    4/5*     SIGNS:  Painful side       NEER   +   OKALEES  +    LYN   +   SPEEDS  +     DROP ARM   -   BELLY PRESS +   Superior escape none    LIFT-OFF  +   X-Body ADD    +   MOVING VALGUS neg        STABILITY TESTING    Left shoulder   Right shoulder    Translation     Anterior  up face     up face    Posterior  up face    up face    Sulcus   < 10mm    < 10 mm     Signs   Apprehension   neg      neg       Relocation   no change     no change      Jerk test  neg     neg    EXTREMITY NEURO-VASCULAR EXAM:    Sensation grossly intact to light touch all dermatomal regions.    DTR 2+ Biceps, Triceps, BR and Negative Nates sign   Grossly intact motor function at Elbow, Wrist and Hand   Distal pulses radial and ulnar 2+, brisk cap refill, symmetric.      NECK:  Painless FROM and spinous processes non-tender. Negative Spurlings sign.      OTHER FINDINGS:  + scapular dyskinesia    XRAYS bilateral shoulders (1/2/2024):  Xrays including AP, Outlet and Axillary Lateral of shoulder are ordered / images reviewed by me:  No acute  fracture or dislocation.  Mild-to-moderate glenohumeral and acromioclavicular DJD bilaterally.  Soft tissues appear normal.    MRI right shoulder (4/9/2024):  Impression:     1. Massive rotator cuff tear involving the supraspinatus and infraspinatus tendons with differential fiber retraction medial to the glenoid and associated severe fatty atrophy.  2. Subscapularis tendinosis with fraying of the superior footprint fibers.  3. Biceps tendinosis/tenosynovitis with high-grade partial tear and findings suggesting an associated pulley injury.  4. Superior migration of the humeral head with resulting narrowing of the acromiohumeral interval.  5. Mild glenohumeral osteoarthritis.  6. Moderate AC joint arthrosis and prominent subacromial spurring.  7. Moderate joint effusion with synovitis.  Subacromial-subdeltoid bursitis.    MRI left shoulder (4/9/2024):  Impression:     1. Full-thickness, full width tear of the supraspinatus tendon with extension into the anterior infraspinatus and associated mild volume loss and fatty degeneration.  2. Tendinosis of the residual infraspinatus with a partial-thickness articular surface tear of the footprint and critical zone.  3. Mild tendinosis subscapularis with fraying of the superior footprint fibers.  4. SLAP tear.  5. Biceps tendinosis and tenosynovitis.  6. Mild glenohumeral osteoarthritis.  7. Moderate joint effusion with synovitis and debris.  Subacromial-subdeltoid bursitis.  8. Moderate AC joint arthrosis and prominent subacromial spurring.      ASSESSMENT:     1. Rotator cuff arthropathy of both shoulders                PLAN:      CSI left shoulder   F/u PRN

## 2025-01-09 ENCOUNTER — OFFICE VISIT (OUTPATIENT)
Dept: INTERNAL MEDICINE | Facility: CLINIC | Age: 75
End: 2025-01-09
Payer: MEDICARE

## 2025-01-09 VITALS
HEART RATE: 86 BPM | DIASTOLIC BLOOD PRESSURE: 80 MMHG | HEIGHT: 63 IN | BODY MASS INDEX: 33.63 KG/M2 | SYSTOLIC BLOOD PRESSURE: 148 MMHG | WEIGHT: 189.81 LBS | OXYGEN SATURATION: 98 %

## 2025-01-09 DIAGNOSIS — G47.00 INSOMNIA, UNSPECIFIED TYPE: ICD-10-CM

## 2025-01-09 DIAGNOSIS — F41.9 ANXIETY: ICD-10-CM

## 2025-01-09 DIAGNOSIS — E11.49 TYPE II DIABETES MELLITUS WITH NEUROLOGICAL MANIFESTATIONS: ICD-10-CM

## 2025-01-09 DIAGNOSIS — E78.2 MIXED HYPERLIPIDEMIA: ICD-10-CM

## 2025-01-09 DIAGNOSIS — Z79.4 TYPE 2 DIABETES MELLITUS WITH HYPERGLYCEMIA, WITH LONG-TERM CURRENT USE OF INSULIN: Primary | ICD-10-CM

## 2025-01-09 DIAGNOSIS — E11.65 TYPE 2 DIABETES MELLITUS WITH HYPERGLYCEMIA, WITH LONG-TERM CURRENT USE OF INSULIN: Primary | ICD-10-CM

## 2025-01-09 DIAGNOSIS — I10 ESSENTIAL HYPERTENSION: ICD-10-CM

## 2025-01-09 DIAGNOSIS — G47.33 OSA (OBSTRUCTIVE SLEEP APNEA): ICD-10-CM

## 2025-01-09 DIAGNOSIS — J44.9 CHRONIC OBSTRUCTIVE PULMONARY DISEASE, UNSPECIFIED COPD TYPE: ICD-10-CM

## 2025-01-09 DIAGNOSIS — K58.9 IRRITABLE BOWEL SYNDROME, UNSPECIFIED TYPE: ICD-10-CM

## 2025-01-09 PROCEDURE — 3072F LOW RISK FOR RETINOPATHY: CPT | Mod: CPTII,S$GLB,, | Performed by: NURSE PRACTITIONER

## 2025-01-09 PROCEDURE — 99214 OFFICE O/P EST MOD 30 MIN: CPT | Mod: S$GLB,,, | Performed by: NURSE PRACTITIONER

## 2025-01-09 PROCEDURE — 3288F FALL RISK ASSESSMENT DOCD: CPT | Mod: CPTII,S$GLB,, | Performed by: NURSE PRACTITIONER

## 2025-01-09 PROCEDURE — 1125F AMNT PAIN NOTED PAIN PRSNT: CPT | Mod: CPTII,S$GLB,, | Performed by: NURSE PRACTITIONER

## 2025-01-09 PROCEDURE — 99999 PR PBB SHADOW E&M-EST. PATIENT-LVL V: CPT | Mod: PBBFAC,,, | Performed by: NURSE PRACTITIONER

## 2025-01-09 PROCEDURE — 3077F SYST BP >= 140 MM HG: CPT | Mod: CPTII,S$GLB,, | Performed by: NURSE PRACTITIONER

## 2025-01-09 PROCEDURE — 1159F MED LIST DOCD IN RCRD: CPT | Mod: CPTII,S$GLB,, | Performed by: NURSE PRACTITIONER

## 2025-01-09 PROCEDURE — 1101F PT FALLS ASSESS-DOCD LE1/YR: CPT | Mod: CPTII,S$GLB,, | Performed by: NURSE PRACTITIONER

## 2025-01-09 PROCEDURE — 3079F DIAST BP 80-89 MM HG: CPT | Mod: CPTII,S$GLB,, | Performed by: NURSE PRACTITIONER

## 2025-01-09 PROCEDURE — 1160F RVW MEDS BY RX/DR IN RCRD: CPT | Mod: CPTII,S$GLB,, | Performed by: NURSE PRACTITIONER

## 2025-01-09 RX ORDER — INSULIN GLARGINE 100 [IU]/ML
INJECTION, SOLUTION SUBCUTANEOUS
Start: 2025-01-09

## 2025-01-09 RX ORDER — TIRZEPATIDE 5 MG/.5ML
5 INJECTION, SOLUTION SUBCUTANEOUS
Qty: 4 PEN | Refills: 5 | Status: SHIPPED | OUTPATIENT
Start: 2025-01-09

## 2025-01-09 RX ORDER — TIRZEPATIDE 5 MG/.5ML
5 INJECTION, SOLUTION SUBCUTANEOUS
Qty: 4 PEN | Refills: 5 | Status: SHIPPED | OUTPATIENT
Start: 2025-01-09 | End: 2025-01-09 | Stop reason: SDUPTHER

## 2025-01-09 NOTE — PROGRESS NOTES
CHIEF COMPLAINT: Type 2 Diabetes     HPI: Mrs. Chelly Ortiz is a 74 y.o. female who was diagnosed with Type 2 DM x 9 years.  H/o PN, CKD 3, MYRIAM, DDD, obesity.  Has 4 siblings with diabetes   Past Medical History:   Diagnosis Date    Allergy     DDD (degenerative disc disease), lumbar     Diabetes mellitus     diet controlled    GERD (gastroesophageal reflux disease)     History of subconjunctival hemorrhage 12/02/2011    left eye    IBS (irritable bowel syndrome)     Obesity     MYRIAM (obstructive sleep apnea)     on CPAP    Rotator cuff impingement syndrome     Seasonal allergic conjunctivitis     Type 2 diabetes mellitus treated with insulin        A1c trending down 8.3% to 7.9%  Lab Results   Component Value Date    HGBA1C 7.9 (H) 11/14/2024     Has used novonordisk---pt assistance.  Last seen by me in spring 2021.   Enrolled with [employer], issues with meter/blood input   Injection (steroid) in shoulder-past month.    171 mg/dl 930a   Highest 191 mg/dl  No hypo  PREVIOUS DIABETES MEDICATIONS TRIED  Glipizide  glimepiride  januvia  lantus   levemir  Metformin -diarrhea   trulicity- cost issue  Mounjaro     CURRENT DIABETIC MEDS: lantus 8 units at night  ,  novolog 10 units w/ scale , mounjaro 2.5 mg weekly -off x 1 week   Self adjustment per scale  Cost issues with insulins in the past  Enrolled with assistance program     Pt is monitoring blood glucose readings 4 times a day a day .  Skips breakfast  On MDI injections 3 x a day  Self adjusting per scale  Testing 4 x a day max  Patient is willing and able to use the device  Demonstrated an understanding of the technology and is motivated to use CGM  Patient expected to adhere to a comprehensive diabetes treatment plan and patient has adequate medical supervision  Patient experiences multiple impaired awareness of hypoglycemia (hypoglycemia unawareness)  Needs ~100 strips per month related to fluctuations with blood glucose reading, a1c trends, and activity  "level.    Diabetes Management Status    Statin: Taking  ACE/ARB: Taking    Screening or Prevention Patient's value Goal Complete/Controlled?   HgA1C Testing and Control   Lab Results   Component Value Date    HGBA1C 7.9 (H) 11/14/2024      Annually/Less than 8% No   Lipid profile : 11/14/2024 Annually Yes   LDL control Lab Results   Component Value Date    LDLCALC 113.4 11/14/2024    Annually/Less than 100 mg/dl  No   Nephropathy screening Lab Results   Component Value Date    LABMICR 20.0 10/24/2023     Lab Results   Component Value Date    PROTEINUA Negative 07/31/2024    Annually Yes   Blood pressure BP Readings from Last 1 Encounters:   01/09/25 (!) 152/82    Less than 140/90 Yes   Dilated retinal exam : 04/26/2024 Annually No   Foot exam   Most Recent Foot Exam Date: Not Found Annually Yes     REVIEW OF SYSTEMS  General: no weakness, fatigue, + weight fluctuations 1-3#   Eyes: no double or blurred vision, eye pain, or redness  Cardiovascular: no chest pain, palpitations, edema, or murmurs.   Respiratory: no cough or dyspnea.   GI: no heartburn, nausea, + changes in bowel patterns; good appetite. +IBS  Skin: no rashes, dryness, itching, or reactions at insulin injection sites.  Neuro: + numbness, tingling, tremors, or vertigo. +shoulder pain  Endocrine: + polyuria, polydipsia, polyphagia, heat or cold intolerance.     Vital Signs  BP (!) 152/82 (BP Location: Left arm, Patient Position: Sitting)   Pulse 86   Ht 5' 3" (1.6 m)   Wt 86.1 kg (189 lb 13.1 oz)   LMP 08/01/2000   SpO2 98%   BMI 33.62 kg/m²     Hemoglobin A1C   Date Value Ref Range Status   11/14/2024 7.9 (H) 4.0 - 5.6 % Final     Comment:     ADA Screening Guidelines:  5.7-6.4%  Consistent with prediabetes  >or=6.5%  Consistent with diabetes    High levels of fetal hemoglobin interfere with the HbA1C  assay. Heterozygous hemoglobin variants (HbS, HgC, etc)do  not significantly interfere with this assay.   However, presence of multiple variants may " affect accuracy.     10/14/2024 8.3 (H) 4.0 - 5.6 % Final     Comment:     ADA Screening Guidelines:  5.7-6.4%  Consistent with prediabetes  >or=6.5%  Consistent with diabetes    High levels of fetal hemoglobin interfere with the HbA1C  assay. Heterozygous hemoglobin variants (HbS, HgC, etc)do  not significantly interfere with this assay.   However, presence of multiple variants may affect accuracy.     07/20/2024 7.2 (H) 4.0 - 5.6 % Final     Comment:     ADA Screening Guidelines:  5.7-6.4%  Consistent with prediabetes  >or=6.5%  Consistent with diabetes    High levels of fetal hemoglobin interfere with the HbA1C  assay. Heterozygous hemoglobin variants (HbS, HgC, etc)do  not significantly interfere with this assay.   However, presence of multiple variants may affect accuracy.          Chemistry        Component Value Date/Time     11/14/2024 1234    K 4.5 11/14/2024 1234     11/14/2024 1234    CO2 28 11/14/2024 1234    BUN 9 11/14/2024 1234    CREATININE 0.8 11/14/2024 1234     (H) 11/14/2024 1234        Component Value Date/Time    CALCIUM 9.8 11/14/2024 1234    ALKPHOS 122 11/14/2024 1234    AST 15 11/14/2024 1234    ALT 14 11/14/2024 1234    BILITOT 0.5 11/14/2024 1234    ESTGFRAFRICA >60.0 06/13/2022 0841    EGFRNONAA >60.0 06/13/2022 0841           Lab Results   Component Value Date    TSH 2.402 11/14/2024      Lab Results   Component Value Date    CHOL 214 (H) 11/14/2024    CHOL 218 (H) 07/20/2024    CHOL 222 (H) 04/23/2024     Lab Results   Component Value Date    HDL 60 11/14/2024    HDL 61 07/20/2024    HDL 54 04/23/2024     Lab Results   Component Value Date    LDLCALC 113.4 11/14/2024    LDLCALC 135.8 07/20/2024    LDLCALC 146.4 04/23/2024     Lab Results   Component Value Date    TRIG 203 (H) 11/14/2024    TRIG 106 07/20/2024    TRIG 108 04/23/2024     Lab Results   Component Value Date    CHOLHDL 28.0 11/14/2024    CHOLHDL 28.0 07/20/2024    CHOLHDL 24.3 04/23/2024         PHYSICAL  EXAMINATION  Constitutional: Appears well, no distress  Neck: Supple, trachea midline.   Respiratory: No wheezes, even and unlabored.  Cardiovascular: RRR; no edema.   Lymph: deferred   Skin: warm and dry; no injection site reactions, +sl acanthosis nigracans observed.  Neuro:patient alert and cooperative, normal affect; steady gait.    Diabetes Foot Exam:   deferred    Assessment/Plan    1. Type 2 diabetes mellitus with hyperglycemia, with long-term current use of insulin  Hemoglobin A1C next time     tirzepatide (MOUNJARO) 5 mg/0.5 mL PnIj  Goal less than 7.5%    Increase mounjaro to 5 mg weekly   Change lantus to 12 units at night   Continue novolog 10-10-10 units ac w/ scale 180--230+2, etc.         2. Type II diabetes mellitus with neurological manifestations  F/u with podiatry   Stable       3. Irritable bowel syndrome, unspecified type  Able to tolerate mounjaro  Stable       4. MYRIAM (obstructive sleep apnea)  May affect cardiovascular system, metabolism   F/u with sleep medicine -Albaro         5. Insomnia, unspecified type  May affect insulin resistance   Managed per pcp   On trazadone       6. Anxiety  On ssri   Stable       7. Essential hypertension  Microalbumin/Creatinine Ratio, Urine      8. Mixed hyperlipidemia  Lab Results   Component Value Date    LDLCALC 113.4 11/14/2024     Above goal   On statin      9. Chronic obstructive pulmonary disease, unspecified COPD type  F/u with pulm   Stable

## 2025-01-09 NOTE — PATIENT INSTRUCTIONS
Follow up in 4 months w/Irielle   A1c urine mac in 4 months   Lab Results   Component Value Date    HGBA1C 7.9 (H) 11/14/2024     Mounjaro 5 mg weekly  Lantus 12 units at night   Novolog 10 units before meals plus scale 180-230+2 ,etc.     [email or web address]  Eat fit trina  [email or web address] trina  [email or web address]    Mysugr trina     Goal  no higher than 180

## 2025-01-14 DIAGNOSIS — M47.812 CERVICAL SPONDYLOSIS: ICD-10-CM

## 2025-01-14 DIAGNOSIS — M54.2 CERVICALGIA: ICD-10-CM

## 2025-01-14 DIAGNOSIS — M54.12 CERVICAL RADICULOPATHY: Primary | ICD-10-CM

## 2025-01-17 ENCOUNTER — PATIENT MESSAGE (OUTPATIENT)
Dept: INTERNAL MEDICINE | Facility: CLINIC | Age: 75
End: 2025-01-17
Payer: MEDICARE

## 2025-01-17 RX ORDER — DULAGLUTIDE 3 MG/.5ML
3 INJECTION, SOLUTION SUBCUTANEOUS
Start: 2025-01-17 | End: 2026-01-17

## 2025-01-24 ENCOUNTER — TELEPHONE (OUTPATIENT)
Dept: PHARMACY | Facility: CLINIC | Age: 75
End: 2025-01-24
Payer: MEDICARE

## 2025-01-24 NOTE — TELEPHONE ENCOUNTER
Bindu Eagle, APRN, FNP to Pharmacy Patient Assistance Team          1/17/25  5:47 PM  Trulicity request -assistance for 3 mg   Thanks   Bindu

## 2025-01-24 NOTE — TELEPHONE ENCOUNTER
Trulicity application and Medical Exception Request Form was faxed to your office @[phone].   Review & sign the prescription section of the application.   Complete & sign the Medical Exception Request Form   Fax application and [facility] Medical Exception Request Form back to the Pharmacy Patient Assistance Team @[phone].     Please do not fax to the Manufacture Program.    Please do not write any corrections on this application.  If changes are needed,let me know ASAP and the corrected application will be re-faxed to your office for Provider signature.        [facility]  Pharmacy Patient Assistance  [address] [facility]  [address] [place] [id number]  Fax: [phone]  Email: [email or web address]

## 2025-01-29 ENCOUNTER — PATIENT MESSAGE (OUTPATIENT)
Dept: INTERNAL MEDICINE | Facility: CLINIC | Age: 75
End: 2025-01-29
Payer: MEDICARE

## 2025-01-29 DIAGNOSIS — M79.10 MYALGIA: ICD-10-CM

## 2025-01-29 DIAGNOSIS — R25.2 LEG CRAMPS: ICD-10-CM

## 2025-01-29 NOTE — TELEPHONE ENCOUNTER
No care due was identified.  [facility] Embedded Care Due Messages. Reference number: [id number].   1/29/2025 12:33:58 AM CST

## 2025-01-29 NOTE — TELEPHONE ENCOUNTER
Refill Routing Note   Medication(s) are not appropriate for processing by [facility] for the following reason(s):        Outside of protocol    [facility] action(s):  Route             Appointments  past 12m or future 3m with PCP    Date Provider   Last Visit   12/4/2024 Smitha Turner MD   Next Visit   Visit date not found Smitha Turner MD   ED visits in past 90 days: 0        Note composed:7:45 AM 01/29/2025

## 2025-01-30 ENCOUNTER — PATIENT MESSAGE (OUTPATIENT)
Dept: PAIN MEDICINE | Facility: CLINIC | Age: 75
End: 2025-01-30
Payer: MEDICARE

## 2025-01-30 ENCOUNTER — PATIENT MESSAGE (OUTPATIENT)
Dept: GASTROENTEROLOGY | Facility: CLINIC | Age: 75
End: 2025-01-30
Payer: MEDICARE

## 2025-01-30 DIAGNOSIS — M54.2 CERVICALGIA: Primary | ICD-10-CM

## 2025-01-30 DIAGNOSIS — M54.12 CERVICAL RADICULOPATHY: ICD-10-CM

## 2025-01-30 NOTE — TELEPHONE ENCOUNTER
The prescription portion of Ms. Ortiz 's Trulicity 3 mg application was received from the providers office. The completed patient assistance application has been submitted to  [facility] for consideration of eligibility.

## 2025-01-31 ENCOUNTER — TELEPHONE (OUTPATIENT)
Dept: ADMINISTRATIVE | Facility: CLINIC | Age: 75
End: 2025-01-31
Payer: MEDICARE

## 2025-01-31 ENCOUNTER — PATIENT MESSAGE (OUTPATIENT)
Dept: PHARMACY | Facility: CLINIC | Age: 75
End: 2025-01-31
Payer: MEDICARE

## 2025-01-31 ENCOUNTER — PATIENT MESSAGE (OUTPATIENT)
Dept: INTERNAL MEDICINE | Facility: CLINIC | Age: 75
End: 2025-01-31
Payer: MEDICARE

## 2025-01-31 RX ORDER — ALPRAZOLAM 0.5 MG/1
0.5 TABLET ORAL ONCE
Qty: 1 TABLET | Refills: 0 | Status: SHIPPED | OUTPATIENT
Start: 2025-01-31 | End: 2025-02-03

## 2025-01-31 NOTE — TELEPHONE ENCOUNTER
Called pt; no answer; could not confirm appt or leave message due to line kept ringing; I was calling to confirm pt's in office EAW[facility] appt on 2/3/25.

## 2025-02-02 DIAGNOSIS — I10 ESSENTIAL HYPERTENSION: ICD-10-CM

## 2025-02-02 NOTE — TELEPHONE ENCOUNTER
No care due was identified.  [facility] Embedded Care Due Messages. Reference number: [id number].   2/02/2025 7:02:08 AM CST

## 2025-02-03 ENCOUNTER — HOSPITAL ENCOUNTER (OUTPATIENT)
Dept: RADIOLOGY | Facility: HOSPITAL | Age: 75
Discharge: HOME | End: 2025-02-03
Attending: NURSE PRACTITIONER
Payer: MEDICARE

## 2025-02-03 ENCOUNTER — OFFICE VISIT (OUTPATIENT)
Dept: FAMILY MEDICINE | Facility: CLINIC | Age: 75
End: 2025-02-03
Payer: MEDICARE

## 2025-02-03 VITALS
BODY MASS INDEX: 33.2 KG/M2 | HEART RATE: 74 BPM | OXYGEN SATURATION: 95 % | HEIGHT: 63 IN | WEIGHT: 187.38 LBS | TEMPERATURE: 98 F | SYSTOLIC BLOOD PRESSURE: 142 MMHG | DIASTOLIC BLOOD PRESSURE: 78 MMHG

## 2025-02-03 DIAGNOSIS — E78.2 MIXED HYPERLIPIDEMIA: ICD-10-CM

## 2025-02-03 DIAGNOSIS — H90.3 SENSORINEURAL HEARING LOSS (SNHL) OF BOTH EARS: ICD-10-CM

## 2025-02-03 DIAGNOSIS — Z86.0100 HISTORY OF COLONIC POLYPS: ICD-10-CM

## 2025-02-03 DIAGNOSIS — F43.22 ADJUSTMENT DISORDER WITH ANXIOUS MOOD: ICD-10-CM

## 2025-02-03 DIAGNOSIS — J44.9 CHRONIC OBSTRUCTIVE PULMONARY DISEASE, UNSPECIFIED COPD TYPE: ICD-10-CM

## 2025-02-03 DIAGNOSIS — E11.9 TYPE 2 DIABETES MELLITUS WITHOUT COMPLICATION, WITH LONG-TERM CURRENT USE OF INSULIN: ICD-10-CM

## 2025-02-03 DIAGNOSIS — I70.0 ABDOMINAL AORTIC ATHEROSCLEROSIS: ICD-10-CM

## 2025-02-03 DIAGNOSIS — G47.33 OSA (OBSTRUCTIVE SLEEP APNEA): ICD-10-CM

## 2025-02-03 DIAGNOSIS — K21.9 GASTROESOPHAGEAL REFLUX DISEASE, UNSPECIFIED WHETHER ESOPHAGITIS PRESENT: ICD-10-CM

## 2025-02-03 DIAGNOSIS — Z00.00 ANNUAL PHYSICAL EXAM: ICD-10-CM

## 2025-02-03 DIAGNOSIS — E11.49 TYPE II DIABETES MELLITUS WITH NEUROLOGICAL MANIFESTATIONS: ICD-10-CM

## 2025-02-03 DIAGNOSIS — Z79.4 TYPE 2 DIABETES MELLITUS WITHOUT COMPLICATION, WITH LONG-TERM CURRENT USE OF INSULIN: ICD-10-CM

## 2025-02-03 DIAGNOSIS — I10 ESSENTIAL HYPERTENSION: ICD-10-CM

## 2025-02-03 DIAGNOSIS — F41.9 ANXIETY: ICD-10-CM

## 2025-02-03 DIAGNOSIS — M79.18 CERVICAL MYOFASCIAL PAIN SYNDROME: ICD-10-CM

## 2025-02-03 DIAGNOSIS — E66.09 CLASS 1 OBESITY DUE TO EXCESS CALORIES WITH SERIOUS COMORBIDITY AND BODY MASS INDEX (BMI) OF 33.0 TO 33.9 IN ADULT: ICD-10-CM

## 2025-02-03 DIAGNOSIS — Z00.00 ENCOUNTER FOR PREVENTIVE HEALTH EXAMINATION: Primary | ICD-10-CM

## 2025-02-03 DIAGNOSIS — G47.00 INSOMNIA, UNSPECIFIED TYPE: ICD-10-CM

## 2025-02-03 DIAGNOSIS — M47.812 SPONDYLOSIS OF CERVICAL REGION WITHOUT MYELOPATHY OR RADICULOPATHY: ICD-10-CM

## 2025-02-03 DIAGNOSIS — Z12.39 ENCOUNTER FOR SCREENING FOR MALIGNANT NEOPLASM OF BREAST, UNSPECIFIED SCREENING MODALITY: ICD-10-CM

## 2025-02-03 DIAGNOSIS — E66.811 CLASS 1 OBESITY DUE TO EXCESS CALORIES WITH SERIOUS COMORBIDITY AND BODY MASS INDEX (BMI) OF 33.0 TO 33.9 IN ADULT: ICD-10-CM

## 2025-02-03 DIAGNOSIS — K21.9 GASTROESOPHAGEAL REFLUX DISEASE WITHOUT ESOPHAGITIS: ICD-10-CM

## 2025-02-03 DIAGNOSIS — Z12.31 ENCOUNTER FOR SCREENING MAMMOGRAM FOR MALIGNANT NEOPLASM OF BREAST: ICD-10-CM

## 2025-02-03 PROCEDURE — 1158F ADVNC CARE PLAN TLK DOCD: CPT | Mod: HCNC,CPTII,S$GLB,

## 2025-02-03 PROCEDURE — 1126F AMNT PAIN NOTED NONE PRSNT: CPT | Mod: HCNC,CPTII,S$GLB,

## 2025-02-03 PROCEDURE — 3078F DIAST BP <80 MM HG: CPT | Mod: HCNC,CPTII,S$GLB,

## 2025-02-03 PROCEDURE — 77067 SCR MAMMO BI INCL CAD: CPT | Mod: 26,HCNC,, | Performed by: RADIOLOGY

## 2025-02-03 PROCEDURE — 77067 SCR MAMMO BI INCL CAD: CPT | Mod: TC,HCNC

## 2025-02-03 PROCEDURE — G0439 PPPS, SUBSEQ VISIT: HCPCS | Mod: HCNC,S$GLB,,

## 2025-02-03 PROCEDURE — 3072F LOW RISK FOR RETINOPATHY: CPT | Mod: HCNC,CPTII,S$GLB,

## 2025-02-03 PROCEDURE — 1170F FXNL STATUS ASSESSED: CPT | Mod: HCNC,CPTII,S$GLB,

## 2025-02-03 PROCEDURE — 77063 BREAST TOMOSYNTHESIS BI: CPT | Mod: 26,HCNC,, | Performed by: RADIOLOGY

## 2025-02-03 PROCEDURE — 1159F MED LIST DOCD IN RCRD: CPT | Mod: HCNC,CPTII,S$GLB,

## 2025-02-03 PROCEDURE — 1160F RVW MEDS BY RX/DR IN RCRD: CPT | Mod: HCNC,CPTII,S$GLB,

## 2025-02-03 PROCEDURE — 3077F SYST BP >= 140 MM HG: CPT | Mod: HCNC,CPTII,S$GLB,

## 2025-02-03 PROCEDURE — 99999 PR PBB SHADOW E&M-EST. PATIENT-LVL V: CPT | Mod: PBBFAC,HCNC,,

## 2025-02-03 RX ORDER — GABAPENTIN 100 MG/1
200 CAPSULE ORAL NIGHTLY
Qty: 180 CAPSULE | Refills: 3 | Status: SHIPPED | OUTPATIENT
Start: 2025-02-03 | End: 2026-02-03

## 2025-02-03 RX ORDER — ESOMEPRAZOLE MAGNESIUM 40 MG/1
40 CAPSULE, DELAYED RELEASE ORAL
Qty: 90 CAPSULE | Refills: 3 | Status: SHIPPED | OUTPATIENT
Start: 2025-02-03

## 2025-02-03 RX ORDER — AMLODIPINE BESYLATE 10 MG/1
10 TABLET ORAL DAILY
Qty: 90 TABLET | Refills: 3 | Status: SHIPPED | OUTPATIENT
Start: 2025-02-03

## 2025-02-03 NOTE — PATIENT INSTRUCTIONS
Counseling and Referral of Other Preventative  (Italic type indicates deductible and co-insurance are waived)    Patient Name: Chelly Ortiz  Today's Date: 2/3/2025    Health Maintenance       Date Due Completion Date    Diabetes Urine Screening 10/24/2024 10/24/2023    Mammogram 01/29/2025 1/29/2024    Diabetic Eye Exam 04/26/2025 4/26/2024    Override on 1/29/2016: (N/S)    RSV Vaccine (Age 60+ and Pregnant patients) (1 - 1-dose 75+ series) 02/03/2025 (Originally 10/29/2023) ---    COVID-19 Vaccine (5 - 2024-25 season) 02/03/2026 (Originally 9/1/2024) 8/8/2022    Hemoglobin A1c 05/14/2025 11/14/2024    Lipid Panel 11/14/2025 11/14/2024    Aspirin/Antiplatelet Therapy 02/03/2026 2/3/2025    DEXA Scan 08/15/2028 8/15/2024    TETANUS VACCINE 04/15/2032 4/15/2022        No orders of the defined types were placed in this encounter.    The following information is provided to all patients.  This information is to help you find resources for any of the problems found today that may be affecting your health:                  Living healthy guide: www.[facility].[place].gov      Understanding Diabetes: www.diabetes.org      Eating healthy: www.cdc.gov/healthyweight      CDC home safety checklist: www.cdc.gov/steadi/patient.html      Agency on Aging: www.goea.[place].[facility]      Alcoholics anonymous (AA): www.aa.org      Physical Activity: www.donaldo.nih.gov/nk0xpky      Tobacco use: www.quitwithusla.org

## 2025-02-03 NOTE — PROGRESS NOTES
Chelly Ortiz presented for a  Medicare AWV and comprehensive Health Risk Assessment today. The following components were reviewed and updated:    Medical history  Family History  Social history  Allergies and Current Medications  Health Risk Assessment  Health Maintenance  Care Team         ** See Completed Assessments for Annual Wellness Visit within the encounter summary.**         The following assessments were completed:  Living Situation  CAGE  Depression Screening  Timed Get Up and Go  Whisper Test- declined- does not have hearing aids with her today  Cognitive Function Screening    Nutrition Screening  ADL Screening  PAQ Screening      Opioid documentation for eAWV      Patient does not have a current opioid prescription.        Review for Substance Use Disorders: Patient does not use substance        Current Outpatient Medications:     albuterol (PROVENTIL/VENTOLIN HFA) 90 mcg/actuation inhaler, Inhale 1-2 puffs into the lungs every 4 (four) hours as needed for Wheezing., Disp: 18 g, Rfl: 2    amLODIPine (NORVASC) 10 MG tablet, Take 1 tablet (10 mg total) by mouth once daily., Disp: 90 tablet, Rfl: 3    aspirin (ECOTRIN) 81 MG EC tablet, Take 81 mg by mouth once daily., Disp: , Rfl:     calcium carbonate (OS-ARIC) 600 mg calcium (1,500 mg) Tab, Take 1 tablet (600 mg total) by mouth once. for 1 dose, Disp: 30 tablet, Rfl: 11    citalopram (CELEXA) 10 MG tablet, Take 1 tablet (10 mg total) by mouth once daily., Disp: 90 tablet, Rfl: 1    ergocalciferol (ERGOCALCIFEROL) 50,000 unit Cap, TAKE 1 CAPSULE BY MOUTH EVERY 7 DAYS, Disp: 12 capsule, Rfl: 3    esomeprazole (NEXIUM) 40 MG capsule, Take 1 capsule (40 mg total) by mouth before breakfast., Disp: 90 capsule, Rfl: 3    gabapentin (NEURONTIN) 100 MG capsule, Take 2 capsules (200 mg total) by mouth every evening., Disp: 180 capsule, Rfl: 3    insulin aspart U-100 (NOVOLOG FLEXPEN U-100 INSULIN) 100 unit/mL (3 mL) InPn pen, Inject 10 Units into the skin 3  (three) times daily with meals. TDD 60 units, Disp: 75 mL, Rfl: 11    insulin glargine U-100, Lantus, (LANTUS SOLOSTAR U-100 INSULIN) 100 unit/mL (3 mL) InPn pen, Inject 12 units at night., Disp: , Rfl:     magnesium oxide (MAG-OX) 400 mg tablet, Take 1 tablet (400 mg total) by mouth 2 (two) times daily., Disp: 180 tablet, Rfl: 3    montelukast (SINGULAIR) 10 mg tablet, Take 10 mg by mouth every evening., Disp: , Rfl:     rosuvastatin (CRESTOR) 5 MG tablet, Take 1 tablet (5 mg total) by mouth once daily., Disp: 90 tablet, Rfl: 3    tirzepatide (MOUNJARO) 5 mg/0.5 mL PnIj, Inject 5 mg (one pen) into the skin every 7 days., Disp: 4 Pen, Rfl: 5    traZODone (DESYREL) 50 MG tablet, TAKE 1 TABLET BY MOUTH EVERY EVENING. (Patient taking differently: Take 50 mg by mouth nightly as needed.), Disp: 90 tablet, Rfl: 0    ACCU-CHEK FASTCLIX LANCING DEV Kit, USE AS DIRECTED, Disp: 1 each, Rfl: 0    ACCU-CHEK GUIDE ME GLUCOSE MTR Misc, USE GLUCOSE METER TO TEST 3 TIMES A DAY, Disp: , Rfl:     ALPRAZolam (XANAX) 0.5 MG tablet, Take 1 tablet (0.5 mg total) by mouth once. for 1 dose (Patient not taking: Reported on 2/3/2025), Disp: 1 tablet, Rfl: 0    benzonatate (TESSALON) 200 MG capsule, Take 1 capsule (200 mg total) by mouth every 8 (eight) hours as needed for Cough., Disp: 30 capsule, Rfl: 0    blood sugar diagnostic (ACCU-CHEK GUIDE TEST STRIPS) Strp, 3 times a day, Disp: 300 strip, Rfl: 11    blood-glucose meter (BLOOD GLUCOSE MONITORING) kit, Three times a day, Disp: 1 each, Rfl: 0    dulaglutide (TRULICITY) 3 mg/0.5 mL pen injector, Inject 3 mg into the skin every 7 days. (Patient not taking: Reported on 2/3/2025), Disp: , Rfl:     fluocinonide (LIDEX) 0.05 % external solution, AAA scalp qday - bid prn pruritus, Disp: 60 mL, Rfl: 3    fluticasone-salmeterol diskus inhaler 250-50 mcg, Inhale 1 puff into the lungs 2 (two) times daily. Controller, Disp: 60 each, Rfl: 6    glucose 4 GM chewable tablet, Take 4 tablets (16 g  "total) by mouth as needed for Low blood sugar (If having symptoms of blurry vision, palpitations, confusion, shakiness.  Please check sugars and if sugar below 70 please take 4 tablets and re-check sugar everry 15 minutes until sugars are above 70 and symptoms resolve.)., Disp: 50 tablet, Rfl: 12    insulin syringe-needle U-100 0.3 mL 31 gauge x 5/16" Syrg, relion brand, use 5 x a day, if not on levemir or novolog, Disp: 150 each, Rfl: 1    insulin syringe-needle U-100 0.5 mL 31 gauge x 5/16" Syrg, Use nightly. 90 day, Disp: 100 each, Rfl: 3    lancets (ACCU-CHEK FASTCLIX LANCET DRUM) [facility] [place], TEST BLOOD SUGAR THREE TIMES A DAY, Disp: 918 each, Rfl: 3    pen needle, diabetic (NOVOFINE 32) 32 gauge x 1/4" Ndle, 4 injections per day, Disp: 500 each, Rfl: 4    sodium chloride (SALINE NASAL) 0.65 % nasal spray, 1 spray by Nasal route as needed for Congestion., Disp: 15 mL, Rfl: 3    Current Facility-Administered Medications:     triamcinolone acetonide injection 20 mg, 20 mg, Intramuscular, 1 time in Clinic/HOD, Tani Pittman, LINSEY       Vitals:    02/03/25 1353   BP: (!) 142/78   Pulse: 74   Temp: 97.7 °F (36.5 °C)   TempSrc: Oral   SpO2: 95%   Weight: 85 kg (187 lb 6.3 oz)   Height: 5' 3" (1.6 m)   PainSc: 0-No pain      Physical Exam  Vitals and nursing note reviewed.   Constitutional:       General: She is not in acute distress.     Appearance: Normal appearance. She is obese. She is not ill-appearing.   HENT:      Head: Normocephalic and atraumatic.      Nose: Nose normal.      Mouth/Throat:      Mouth: Mucous membranes are moist.   Eyes:      General: No scleral icterus.        Right eye: No discharge.         Left eye: No discharge.      Extraocular Movements: Extraocular movements intact.      Conjunctiva/sclera: Conjunctivae normal.   Cardiovascular:      Rate and Rhythm: Normal rate.   Pulmonary:      Effort: Pulmonary effort is normal. No respiratory distress.   Musculoskeletal:         General: Normal range of " motion.      Cervical back: Normal range of motion.   Skin:     General: Skin is warm and dry.   Neurological:      Mental Status: She is alert and oriented to person, place, and time.      GCS: GCS eye subscore is 4. GCS verbal subscore is 5. GCS motor subscore is 6.   Psychiatric:         Mood and Affect: Mood normal.         Speech: Speech normal.         Behavior: Behavior normal. Behavior is cooperative.         Cognition and Memory: Cognition and memory normal.             Diagnoses and health risks identified today and associated recommendations/orders:    1. Encounter for preventive health examination  - Chart reviewed. Problem list updated. Discussed current medical diagnosis, current medications, medical/surgical/family/social history; updated provider list; documented vital signs; identified any cognitive impairment; and updated risk factor list. Addressed any outstanding health maintenance. Provided patient with personalized health advice. Continue to follow up with PCP and any specialists.     2. Type 2 diabetes mellitus without complication, with long-term current use of insulin  Chronic; improving on current treatment plan; follow up with PCP and Endocrinology.  Taking insulin novolog and lantus, and Mounjaro; has not started Trulicity yet.   A1C was 7.9 on 11/14/24.    3. Adjustment disorder with anxious mood  Chronic; stable on current treatment plan; follow up with PCP  Taking Citalopram daily. Takes Trazodone as needed for sleep.   Follow up with Psychiatry.     4. Chronic obstructive pulmonary disease, unspecified COPD type  Chronic; stable on current treatment plan; follow up with PCP  Taking albuterol as needed. Follow up with Pulmonology.     5. Type II diabetes mellitus with neurological manifestations  Chronic; improving on current treatment plan; follow up with PCP and Endocrinology.  Taking insulin novolog and lantus, and Mounjaro; has not started Trulicity yet.   A1C was 7.9 on  11/14/24.    6. Mixed hyperlipidemia  Chronic; stable on current treatment plan; follow up with PCP  Taking statin daily.     7. Essential hypertension  Chronic; improving on current treatment plan; follow up with PCP.  BP slightly elevated today; taking amlodipine. Monitor BP at home.     8. Abdominal aortic atherosclerosis  Chronic; stable on current treatment plan; follow up with PCP  Taking statin; follow up with Cardiology.     9. Gastroesophageal reflux disease, unspecified whether esophagitis present  Chronic; stable on current treatment plan; follow up with PCP  Taking Nexium    10. Sensorineural hearing loss (SNHL) of both ears  Chronic; stable on current treatment plan; follow up with PCP  Wears Hearing aids- not wearing them today.     11. Spondylosis of cervical region without myelopathy or radiculopathy  Chronic; stable on current treatment plan; follow up with PCP  Follow up with ortho and pain management.    12. History of colonic polyps  Chronic; stable; follow up with PCP  Colonoscopy in 2022 with polyps removed; due again in 2027    13. MYRIAM (obstructive sleep apnea)  Chronic; stable on current treatment plan; follow up with PCP  Wears CPAP at night- tolerates well.     14. Insomnia, unspecified type  Chronic; stable on current treatment plan; follow up with PCP  Takes Trazodone nightly as needed to help with sleep.     15. Cervical myofascial pain syndrome  Chronic; stable on current treatment plan; follow up with PCP  Follow up with pain management; takes gabapentin.    16. Class 1 obesity due to excess calories with serious comorbidity and body mass index (BMI) of 33.0 to 33.9 in adult  - Recommendation for healthy diet and increasing exercise as tolerated with goal of 150 min/week . Recommend weight loss.      Provided Chelly with a 5-10 year written screening schedule and personal prevention plan. Recommendations were developed using the USPSTF age appropriate recommendations. Education,  counseling, and referrals were provided as needed. After Visit Summary printed and given to patient which includes a list of additional screenings\tests needed.    Follow up in about 1 year (around 2/3/2026) for next annual wellness visit.    Susana Maldonado, GINETTE-C    Advance Care Planning     I offered to discuss advanced care planning, including how to pick a person who would make decisions for you if you were unable to make them for yourself, called a health care power of , and what kind of decisions you might make such as use of life sustaining treatments such as ventilators and tube feeding when faced with a life limiting illness recorded on a living will that they will need to know. (How you want to be cared for as you near the end of your natural life)     X Patient is interested in learning more about how to make advanced directives.  I provided them paperwork and offered to discuss this with them.      Susana Maldonado, MSN, APRN, FNP-C  Family Medicine  Office #[phone]

## 2025-02-03 NOTE — TELEPHONE ENCOUNTER
Refill Routing Note   Medication(s) are not appropriate for processing by [facility] for the following reason(s):        Required vitals abnormal    [facility] action(s):  Defer               Appointments  past 12m or future 3m with PCP    Date Provider   Last Visit   12/4/2024 Smitha Turner MD   Next Visit   Visit date not found Smitha Turner MD   ED visits in past 90 days: 0        Note composed:8:26 PM 02/02/2025

## 2025-02-04 DIAGNOSIS — R25.2 LEG CRAMPS: ICD-10-CM

## 2025-02-04 DIAGNOSIS — M79.10 MYALGIA: ICD-10-CM

## 2025-02-04 RX ORDER — GABAPENTIN 100 MG/1
200 CAPSULE ORAL NIGHTLY
Qty: 180 CAPSULE | Refills: 3 | OUTPATIENT
Start: 2025-02-04 | End: 2026-02-04

## 2025-02-04 NOTE — TELEPHONE ENCOUNTER
No care due was identified.  Health [facility] Embedded Care Due Messages. Reference number: [id number].   2/04/2025 2:16:13 PM CST

## 2025-02-04 NOTE — TELEPHONE ENCOUNTER
Refill Decision Note   Chelly Ortiz  is requesting a refill authorization.  Brief Assessment and Rationale for Refill:  Quick Discontinue     Medication Therapy Plan: Receipt confirmed by pharmacy (2/3/2025 10:26 AM CST)      Comments:     Note composed:5:39 PM 02/04/2025

## 2025-02-05 NOTE — PROGRESS NOTES
INTERNAL MEDICINE CLINIC - SAME DAY APPOINTMENT  Progress Note    PRESENTING HISTORY     PCP: Smitha Turner MD    Chief Complaint/Reason for Visit:   No chief complaint on file.     History of Present Illness & ROS : Ms. Chelly Ortiz is a 74 y.o. female.    Pleasant lady. With some voiced, valid frustration in regards to scheduling her annual appt due to 'system' notification, but being too early with most recent done in 11/2024. She also had her annual AWV done with YARED Meza on 2/3/2025 (earlier this week).   NO LOS  NO complaints. NO need for refills. Doing well.   States, 'only scheduled because system messaged me to schedule an annual'. Noting the reason on portal for her visit was for 'pain', states, 'Not here to address pain, just had to put something'.   Apologized the system's glitch. We reviewed her upcoming apts (3/2025 with Dr. Rushing, 4/20258: YESSY Eagle and 5/2025: YESSY Eagle... she is ok with all of these). She has been informed her next recommended visit with Primary Care  / Internal Medicine is 11/2025 for her annual, but to not hesitate and reach out if needing to be seen sooner by her PCP or a member or her team in [facility].       Review of Systems:  Eyes: denies visual changes at this time denies floaters   ENT: no nasal congestion or sore throat  Respiratory: no cough or shorness of breath  Cardiovascular: no chest pain or palpitations  Gastrointestinal: no nausea or vomiting, no abdominal pain or change in bowel habits  Genitourinary: no hematuria or dysuria; denies frequency  Hematologic/Lymphatic: no easy bruising or lymphadenopathy  Musculoskeletal: no arthralgias or myalgias  Neurological: no seizures or tremors  Endocrine: no heat or cold intolerance        PAST HISTORY:     Past Medical History:   Diagnosis Date    Allergy     DDD (degenerative disc disease), lumbar     Diabetes mellitus     diet controlled    GERD (gastroesophageal reflux disease)     History of subconjunctival  hemorrhage 2011    left eye    IBS (irritable bowel syndrome)     Obesity     MYRIAM (obstructive sleep apnea)     on CPAP    Rotator cuff impingement syndrome     Seasonal allergic conjunctivitis     Type 2 diabetes mellitus treated with insulin        Past Surgical History:   Procedure Laterality Date    CATARACT EXTRACTION W/  INTRAOCULAR LENS IMPLANT Right 10/03/2013        CATARACT EXTRACTION W/  INTRAOCULAR LENS IMPLANT Left 2022         SECTION      x2    CHOLECYSTECTOMY      COLONOSCOPY N/A 2018    Procedure: COLONOSCOPY;  Surgeon: Marie Mejia MD;  Location: [facility] ENDO;  Service: Endoscopy;  Laterality: N/A;    COLONOSCOPY N/A 2022    Procedure: COLONOSCOPY;  Surgeon: Pierce Rivera MD;  Location: [facility] ENDO;  Service: Endoscopy;  Laterality: N/A;    ENDOSCOPIC ULTRASOUND OF UPPER GASTROINTESTINAL TRACT N/A 10/09/2018    Procedure: ULTRASOUND, UPPER GI TRACT, ENDOSCOPIC;  Surgeon: Javy Simpson MD;  Location: [facility] ENDO;  Service: Endoscopy;  Laterality: N/A;    EXCISION OF LESION N/A 2019    Procedure: EXCISION, LESION VULVA;  Surgeon: Liv Odell MD;  Location: [facility] OR;  Service: OB/GYN;  Laterality: N/A;  latex allergy    EYE SURGERY      HYSTERECTOMY      ovaries intact, due to DUB    INTRAOCULAR PROSTHESES INSERTION Left 2022    Procedure: INSERTION, IOL PROSTHESIS;  Surgeon: Clarice Herzog MD;  Location: [facility] OR [address] [place] [address];  Service: Ophthalmology;  Laterality: Left;    PHACOEMULSIFICATION OF CATARACT Left 2022    Procedure: PHACOEMULSIFICATION, CATARACT;  Surgeon: Clarice Herzog MD;  Location: [facility] OR [address] [place] [address];  Service: Ophthalmology;  Laterality: Left;    TUBAL LIGATION         Family History   Problem Relation Name Age of Onset    Alzheimer's disease Mother      Heart disease Father      Diabetes Sister x4     Breast cancer Sister x4     Deep vein thrombosis Brother x1     Diabetes Daughter x2     Cancer  Brother x1         Lung    Lung cancer Brother x1     Amblyopia Neg Hx      Blindness Neg Hx      Cataracts Neg Hx      Glaucoma Neg Hx      Hypertension Neg Hx      Macular degeneration Neg Hx      Retinal detachment Neg Hx      Strabismus Neg Hx      Stroke Neg Hx      Thyroid disease Neg Hx         Social History     Socioeconomic History    Marital status:    Tobacco Use    Smoking status: Former     Current packs/day: 0.00     Types: Cigarettes     Quit date: 2/15/1983     Years since quittin.0     Passive exposure: Current ()    Smokeless tobacco: Never   Substance and Sexual Activity    Alcohol use: No    Drug use: No    Sexual activity: Yes     Partners: Male     Social Drivers of Health     Financial Resource Strain: Low Risk  (2/3/2025)    Overall Financial Resource Strain (CARDIA)     Difficulty of Paying Living Expenses: Not hard at all   Food Insecurity: No Food Insecurity (2/3/2025)    Hunger Vital Sign     Worried About Running Out of Food in the Last Year: Never true     Ran Out of Food in the Last Year: Never true   Transportation Needs: No Transportation Needs (2/3/2025)    PRAPARE - Transportation     Lack of Transportation (Medical): No     Lack of Transportation (Non-Medical): No   Physical Activity: Inactive (2/3/2025)    Exercise Vital Sign     Days of Exercise per Week: 0 days     Minutes of Exercise per Session: 0 min   Stress: No Stress Concern Present (2/3/2025)    [employer] - Occupational Stress Questionnaire     Feeling of Stress : Not at all   Housing Stability: Low Risk  (2/3/2025)    Housing Stability Vital Sign     Unable to Pay for Housing in the Last Year: No     Homeless in the Last Year: No       MEDICATIONS & ALLERGIES:     Current Outpatient Medications on File Prior to Visit   Medication Sig Dispense Refill    ACCU-CHEK FASTCLIX LANCING DEV Kit USE AS DIRECTED 1 each 0    ACCU-CHEK GUIDE ME GLUCOSE MTR Misc USE GLUCOSE METER TO  TEST 3 TIMES A DAY      albuterol (PROVENTIL/VENTOLIN HFA) 90 mcg/actuation inhaler Inhale 1-2 puffs into the lungs every 4 (four) hours as needed for Wheezing. 18 g 2    ALPRAZolam (XANAX) 0.5 MG tablet Take 1 tablet (0.5 mg total) by mouth once. for 1 dose (Patient not taking: Reported on 2/3/2025) 1 tablet 0    amLODIPine (NORVASC) 10 MG tablet Take 1 tablet (10 mg total) by mouth once daily. 90 tablet 3    aspirin (ECOTRIN) 81 MG EC tablet Take 81 mg by mouth once daily.      benzonatate (TESSALON) 200 MG capsule Take 1 capsule (200 mg total) by mouth every 8 (eight) hours as needed for Cough. 30 capsule 0    blood sugar diagnostic (ACCU-CHEK GUIDE TEST STRIPS) Strp 3 times a day 300 strip 11    blood-glucose meter (BLOOD GLUCOSE MONITORING) kit Three times a day 1 each 0    calcium carbonate (OS-ARIC) 600 mg calcium (1,500 mg) Tab Take 1 tablet (600 mg total) by mouth once. for 1 dose 30 tablet 11    citalopram (CELEXA) 10 MG tablet Take 1 tablet (10 mg total) by mouth once daily. 90 tablet 1    dulaglutide (TRULICITY) 3 mg/0.5 mL pen injector Inject 3 mg into the skin every 7 days. (Patient not taking: Reported on 2/3/2025)      ergocalciferol (ERGOCALCIFEROL) 50,000 unit Cap TAKE 1 CAPSULE BY MOUTH EVERY 7 DAYS 12 capsule 3    esomeprazole (NEXIUM) 40 MG capsule Take 1 capsule (40 mg total) by mouth before breakfast. 90 capsule 3    fluocinonide (LIDEX) 0.05 % external solution AAA scalp qday - bid prn pruritus 60 mL 3    fluticasone-salmeterol diskus inhaler 250-50 mcg Inhale 1 puff into the lungs 2 (two) times daily. Controller 60 each 6    gabapentin (NEURONTIN) 100 MG capsule Take 2 capsules (200 mg total) by mouth every evening. 180 capsule 3    glucose 4 GM chewable tablet Take 4 tablets (16 g total) by mouth as needed for Low blood sugar (If having symptoms of blurry vision, palpitations, confusion, shakiness.  Please check sugars and if sugar below 70 please take 4 tablets and re-check sugar everry 15  "minutes until sugars are above 70 and symptoms resolve.). 50 tablet 12    insulin aspart U-100 (NOVOLOG FLEXPEN U-100 INSULIN) 100 unit/mL (3 mL) InPn pen Inject 10 Units into the skin 3 (three) times daily with meals. TDD 60 units 75 mL 11    insulin glargine U-100, Lantus, (LANTUS SOLOSTAR U-100 INSULIN) 100 unit/mL (3 mL) InPn pen Inject 12 units at night.      insulin syringe-needle U-100 0.3 mL 31 gauge x 5/16" Syrg relion brand, use 5 x a day, if not on levemir or novolog 150 each 1    insulin syringe-needle U-100 0.5 mL 31 gauge x 5/16" Syrg Use nightly. 90 day 100 each 3    lancets (ACCU-CHEK FASTCLIX LANCET DRUM) Misc TEST BLOOD SUGAR THREE TIMES A  each 3    magnesium oxide (MAG-OX) 400 mg tablet Take 1 tablet (400 mg total) by mouth 2 (two) times daily. 180 tablet 3    montelukast (SINGULAIR) 10 mg tablet Take 10 mg by mouth every evening.      pen needle, diabetic (NOVOFINE 32) 32 gauge x 1/4" Ndle 4 injections per day 500 each 4    rosuvastatin (CRESTOR) 5 MG tablet Take 1 tablet (5 mg total) by mouth once daily. 90 tablet 3    sodium chloride (SALINE NASAL) 0.65 % nasal spray 1 spray by Nasal route as needed for Congestion. 15 mL 3    tirzepatide (MOUNJARO) 5 mg/0.5 mL PnIj Inject 5 mg (one pen) into the skin every 7 days. 4 Pen 5    traZODone (DESYREL) 50 MG tablet TAKE 1 TABLET BY MOUTH EVERY EVENING. (Patient taking differently: Take 50 mg by mouth nightly as needed.) 90 tablet 0     Current Facility-Administered Medications on File Prior to Visit   Medication Dose Route Frequency Provider Last Rate Last Admin    triamcinolone acetonide injection 20 mg  20 mg Intramuscular 1 time in Clinic/HOD Tani Pittman FNP            Review of patient's allergies indicates:   Allergen Reactions    Ace inhibitors Hives     \Cough    Latex, natural rubber Itching       Medications Reconciliation:   I have reconciled the patient's home medications with the patient/family. I have updated all changes.  " Refer to After-Visit Medication List.    OBJECTIVE:     Vital Signs:  There were no vitals filed for this visit.  Wt Readings from Last 3 Encounters:   02/03/25 1353 85 kg (187 lb 6.3 oz)   01/09/25 1526 86.1 kg (189 lb 13.1 oz)   01/07/25 1324 86.2 kg (190 lb 0.6 oz)     There is no height or weight on file to calculate BMI.   Wt Readings from Last 3 Encounters:   02/06/25 84 kg (185 lb 3 oz)   02/03/25 85 kg (187 lb 6.3 oz)   01/09/25 86.1 kg (189 lb 13.1 oz)     Temp Readings from Last 3 Encounters:   02/03/25 97.7 °F (36.5 °C) (Oral)   11/21/24 98.4 °F (36.9 °C) (Oral)   08/05/24 97.3 °F (36.3 °C)     BP Readings from Last 3 Encounters:   02/06/25 (!) 148/68   02/03/25 (!) 142/78   01/09/25 (!) 148/80     Pulse Readings from Last 3 Encounters:   02/06/25 88   02/03/25 74   01/09/25 86       Physical Exam:  N/a    Laboratory  Lab Results   Component Value Date    WBC 5.49 11/14/2024    HGB 13.4 11/14/2024    HCT 41.6 11/14/2024     11/14/2024    CHOL 214 (H) 11/14/2024    TRIG 203 (H) 11/14/2024    HDL 60 11/14/2024    ALT 14 11/14/2024    AST 15 11/14/2024     11/14/2024    K 4.5 11/14/2024     11/14/2024    CREATININE 0.8 11/14/2024    BUN 9 11/14/2024    CO2 28 11/14/2024    TSH 2.402 11/14/2024    INR 0.9 04/19/2012    GLUF 105 10/27/2004    HGBA1C 7.9 (H) 11/14/2024       ASSESSMENT & PLAN:     Seen by PCP in 12/2024  Seen by me in 11/2024    Pleasant lady. With some voiced, valid frustration in regards to scheduling her annual appt due to 'system' notification, but being too early with most recent done in 11/2024. She also had her annual AWV done with YARED Meza on 2/3/2025 (earlier this week).   NO LOS  NO complaints. NO need for refills. Doing well.   States, 'only scheduled because system messaged me to schedule an annual'. Noting the reason on portal for her visit was for 'pain', states, 'Not here to address pain, just had to put something'.   Apologized the system's glitch. We reviewed  her upcoming apts (3/2025 with Dr. Rushing, 4/20258: [facility] and 5/2025: [facility]ESSY Eagle... she is ok with all of these). She has been informed her next recommended visit with Primary Care  / Internal Medicine is 11/2025 for her annual, but to not hesitate and reach out if needing to be seen sooner by her PCP or a member or her team in [facility].       NO LOS    Future Appointments   Date Time Provider Department Center   3/13/2025  1:30 PM Joe Rushing MD [facility] SPORTS [place]   4/1/2025  3:30 PM Bindu Eagle, GINETTE CLARK [facility] Alexi Beckham [facility]   5/16/2025  8:50 AM APPOINTMENT LAB, MELISSA LOVE [facility] LAB Melissa Carmona   5/23/2025  1:30 PM Bindu Eagle APRN, FNP [facility] Alexi michael [facility]          Medication List            Accurate as of February 6, 2025  1:10 PM. If you have any questions, ask your nurse or doctor.                CHANGE how you take these medications      traZODone 50 MG tablet  Commonly known as: DESYREL  TAKE 1 TABLET BY MOUTH EVERY EVENING.  What changed:   when to take this  reasons to take this            CONTINUE taking these medications      ACCU-CHEK FASTCLIX LANCING DEV Kit  Generic drug: lancing device with lancets  USE AS DIRECTED     albuterol 90 mcg/actuation inhaler  Commonly known as: PROVENTIL/VENTOLIN HFA  Inhale 1-2 puffs into the lungs every 4 (four) hours as needed for Wheezing.     ALPRAZolam 0.5 MG tablet  Commonly known as: XANAX  Take 1 tablet (0.5 mg total) by mouth once. for 1 dose     amLODIPine 10 MG tablet  Commonly known as: NORVASC  Take 1 tablet (10 mg total) by mouth once daily.     aspirin 81 MG EC tablet  Commonly known as: ECOTRIN     benzonatate 200 MG capsule  Commonly known as: TESSALON  Take 1 capsule (200 mg total) by mouth every 8 (eight) hours as needed for Cough.     blood sugar diagnostic Strp  Commonly known as: ACCU-CHEK GUIDE TEST STRIPS  3 times a day     * blood-glucose meter kit  Commonly known as: BLOOD GLUCOSE MONITORING  Three times a day     *  "ACCU-CHEK GUIDE ME GLUCOSE MTR Misc  Generic drug: blood-glucose meter     calcium carbonate 600 mg calcium (1,500 mg) Tab  Commonly known as: OS-ARIC  Take 1 tablet (600 mg total) by mouth once. for 1 dose     citalopram 10 MG tablet  Commonly known as: CeleXA  Take 1 tablet (10 mg total) by mouth once daily.     ergocalciferol 50,000 unit Cap  Commonly known as: ERGOCALCIFEROL  TAKE 1 CAPSULE BY MOUTH EVERY 7 DAYS     esomeprazole 40 MG capsule  Commonly known as: NEXIUM  Take 1 capsule (40 mg total) by mouth before breakfast.     fluocinonide 0.05 % external solution  Commonly known as: LIDEX  AAA scalp qday - bid prn pruritus     fluticasone-salmeterol 250-50 mcg/dose 250-50 mcg/dose diskus inhaler  Commonly known as: ADVAIR  Inhale 1 puff into the lungs 2 (two) times daily. Controller     gabapentin 100 MG capsule  Commonly known as: NEURONTIN  Take 2 capsules (200 mg total) by mouth every evening.     glucose 4 GM chewable tablet  Take 4 tablets (16 g total) by mouth as needed for Low blood sugar (If having symptoms of blurry vision, palpitations, confusion, shakiness.  Please check sugars and if sugar below 70 please take 4 tablets and re-check sugar everry 15 minutes until sugars are above 70 and symptoms resolve.).     insulin syringe-needle U-100 0.3 mL 31 gauge x 5/16" Syrg  relion brand, use 5 x a day, if not on levemir or novolog     insulin syringe-needle U-100 0.5 mL 31 gauge x 5/16" Syrg  Use nightly. 90 day     lancets Misc  Commonly known as: ACCU-CHEK FASTCLIX LANCET DRUM  TEST BLOOD SUGAR THREE TIMES A DAY     LANTUS SOLOSTAR U-100 INSULIN 100 unit/mL (3 mL) Inpn pen  Generic drug: insulin glargine U-100 (Lantus)  Inject 12 units at night.     magnesium oxide 400 mg (241.3 mg magnesium) tablet  Commonly known as: MAG-OX  Take 1 tablet (400 mg total) by mouth 2 (two) times daily.     montelukast 10 mg tablet  Commonly known as: SINGULAIR     MOUNJARO 5 mg/0.5 mL Pnij  Generic drug: " "tirzepatide  Inject 5 mg (one pen) into the skin every 7 days.     NOVOFINE 32 32 gauge x 1/4" Ndle  Generic drug: pen needle, diabetic  4 injections per day     NovoLOG Flexpen U-100 Insulin 100 unit/mL (3 mL) Inpn pen  Generic drug: insulin aspart U-100  Inject 10 Units into the skin 3 (three) times daily with meals. TDD 60 units     rosuvastatin 5 MG tablet  Commonly known as: CRESTOR  Take 1 tablet (5 mg total) by mouth once daily.     Saline NasaL 0.65 % nasal spray  Generic drug: sodium chloride  1 spray by Nasal route as needed for Congestion.     TRULICITY 3 mg/0.5 mL pen injector  Generic drug: dulaglutide  Inject 3 mg into the skin every 7 days.           * This list has 2 medication(s) that are the same as other medications prescribed for you. Read the directions carefully, and ask your doctor or other care provider to review them with you.                        Signing Physician:  GINETTE Cartwright    "

## 2025-02-06 ENCOUNTER — OFFICE VISIT (OUTPATIENT)
Dept: INTERNAL MEDICINE | Facility: CLINIC | Age: 75
End: 2025-02-06
Payer: MEDICARE

## 2025-02-06 ENCOUNTER — PATIENT MESSAGE (OUTPATIENT)
Dept: INTERNAL MEDICINE | Facility: CLINIC | Age: 75
End: 2025-02-06

## 2025-02-06 VITALS
HEART RATE: 88 BPM | WEIGHT: 185.19 LBS | DIASTOLIC BLOOD PRESSURE: 68 MMHG | OXYGEN SATURATION: 96 % | BODY MASS INDEX: 32.8 KG/M2 | SYSTOLIC BLOOD PRESSURE: 148 MMHG

## 2025-02-06 DIAGNOSIS — Z00.00 VISIT FOR ANNUAL HEALTH EXAMINATION: Primary | ICD-10-CM

## 2025-02-06 PROCEDURE — 99499 UNLISTED E&M SERVICE: CPT | Mod: HCNC,S$GLB,, | Performed by: NURSE PRACTITIONER

## 2025-02-10 ENCOUNTER — PATIENT MESSAGE (OUTPATIENT)
Dept: INTERNAL MEDICINE | Facility: CLINIC | Age: 75
End: 2025-02-10
Payer: MEDICARE

## 2025-02-10 RX ORDER — INSULIN DEGLUDEC 100 U/ML
INJECTION, SOLUTION SUBCUTANEOUS
Qty: 15 ML | Refills: 6 | Status: SHIPPED | OUTPATIENT
Start: 2025-02-10

## 2025-02-11 ENCOUNTER — TELEPHONE (OUTPATIENT)
Dept: GASTROENTEROLOGY | Facility: CLINIC | Age: 75
End: 2025-02-11
Payer: MEDICARE

## 2025-02-11 RX ORDER — ROSUVASTATIN CALCIUM 5 MG/1
5 TABLET, COATED ORAL DAILY
Qty: 90 TABLET | Refills: 3 | Status: SHIPPED | OUTPATIENT
Start: 2025-02-11 | End: 2026-02-11

## 2025-02-11 NOTE — TELEPHONE ENCOUNTER
----- Message from Karmen sent at 2/11/2025 10:44 AM CST -----  Regarding: Same Day Request  Contact: Chelly  SCHEDULING/REQUEST    Appt Type: Est     Date/Time Preference: Same Day Request     Treating Provider:Nicole     Caller Name: Chelly     Contact Preference: [phone] (home)       Comments/Notes:  Pt is having stomach pains for atleast 2 weeks now. Saturday they pain has grown expeditiously worse. She would like a call back to try and be seen today to further evaluate the issue.

## 2025-02-13 ENCOUNTER — OFFICE VISIT (OUTPATIENT)
Dept: INTERNAL MEDICINE | Facility: CLINIC | Age: 75
End: 2025-02-13
Payer: MEDICARE

## 2025-02-13 VITALS
HEART RATE: 80 BPM | SYSTOLIC BLOOD PRESSURE: 130 MMHG | DIASTOLIC BLOOD PRESSURE: 60 MMHG | HEIGHT: 63 IN | OXYGEN SATURATION: 98 % | BODY MASS INDEX: 32.81 KG/M2 | WEIGHT: 185.19 LBS

## 2025-02-13 DIAGNOSIS — R10.13 EPIGASTRIC PAIN: ICD-10-CM

## 2025-02-13 DIAGNOSIS — K59.00 CONSTIPATION, UNSPECIFIED CONSTIPATION TYPE: Primary | ICD-10-CM

## 2025-02-13 PROCEDURE — 99999 PR PBB SHADOW E&M-EST. PATIENT-LVL V: CPT | Mod: PBBFAC,HCNC,,

## 2025-02-13 PROCEDURE — 99213 OFFICE O/P EST LOW 20 MIN: CPT | Mod: HCNC,S$GLB,,

## 2025-02-13 PROCEDURE — 3078F DIAST BP <80 MM HG: CPT | Mod: HCNC,CPTII,S$GLB,

## 2025-02-13 PROCEDURE — 1125F AMNT PAIN NOTED PAIN PRSNT: CPT | Mod: HCNC,CPTII,S$GLB,

## 2025-02-13 PROCEDURE — 1101F PT FALLS ASSESS-DOCD LE1/YR: CPT | Mod: HCNC,CPTII,S$GLB,

## 2025-02-13 PROCEDURE — 3288F FALL RISK ASSESSMENT DOCD: CPT | Mod: HCNC,CPTII,S$GLB,

## 2025-02-13 PROCEDURE — 3075F SYST BP GE 130 - 139MM HG: CPT | Mod: HCNC,CPTII,S$GLB,

## 2025-02-13 PROCEDURE — 1159F MED LIST DOCD IN RCRD: CPT | Mod: HCNC,CPTII,S$GLB,

## 2025-02-13 PROCEDURE — 3072F LOW RISK FOR RETINOPATHY: CPT | Mod: HCNC,CPTII,S$GLB,

## 2025-02-13 NOTE — PROGRESS NOTES
"INTERNAL MEDICINE PROGRESS/URGENT CARE NOTE    Patient: Chelly Ortiz  : 1950  MRN: [id number]  PCP: Smitha Turner MD    CHIEF COMPLAINT     Chief Complaint   Patient presents with    Abdominal Pain     Pain is a 6 , hurt and big , pain been ongoing for 2 weeks.       HPI     Chelly is a 74 y.o. female with IBS, GERD, constipation, T2DM, HTN, HLD COPD, MYRIAM who presents for an urgent visit today with c/o epigastric and central abdominal pain that started about 2 weeks ago and has been constant. About 5/10 pain. She states it feels "hard." Has used miralax with little relief. Last BM this morning. Denies bloody or black stools. Normal appetite. No n/v/d.     F/w GI, Dr. Rivera, but was unable to get in with him. She states she may have had an IBS issue last year and she was given a rx for linzess 72 mcg but she states it caused her to have diarrhea so she only took this for about 2 weeks.       Answers submitted by the patient for this visit:  Abdominal Pain Questionnaire (Submitted on 2025)  Chief Complaint: Abdominal pain  Chronicity: recurrent  Onset: 1 to 4 weeks ago  Onset quality: gradual  Frequency: daily  Episode duration: 10 Days  Progression since onset: unchanged  Pain location: epigastric region  Pain - numeric: 6/10  Pain quality: dull, a sensation of fullness  anorexia: No  belching: No  flatus: No  hematochezia: No  melena: No  weight loss: No  Aggravated by: nothing  Relieved by: nothing  Pain severity: moderate  Treatments tried: acetaminophen  Improvement on treatment: no relief  abdominal surgery: Yes  colon cancer: No  Crohn's disease: No  gallstones: Yes  GERD: Yes  irritable bowel syndrome: Yes  kidney stones: No  pancreatitis: No  PUD: No  ulcerative colitis: No  UTI: Yes      Past Medical History:  Past Medical History:   Diagnosis Date    Allergy     DDD (degenerative disc disease), lumbar     Diabetes mellitus     diet controlled    GERD (gastroesophageal reflux disease)  "    History of subconjunctival hemorrhage 2011    left eye    IBS (irritable bowel syndrome)     Obesity     MYRIAM (obstructive sleep apnea)     on CPAP    Rotator cuff impingement syndrome     Seasonal allergic conjunctivitis     Type 2 diabetes mellitus treated with insulin         Past Surgical History:  Past Surgical History:   Procedure Laterality Date    CATARACT EXTRACTION W/  INTRAOCULAR LENS IMPLANT Right 10/03/2013        CATARACT EXTRACTION W/  INTRAOCULAR LENS IMPLANT Left 2022         SECTION      x2    CHOLECYSTECTOMY      COLONOSCOPY N/A 2018    Procedure: COLONOSCOPY;  Surgeon: Marie Mejia MD;  Location: [facility] ENDO;  Service: Endoscopy;  Laterality: N/A;    COLONOSCOPY N/A 2022    Procedure: COLONOSCOPY;  Surgeon: Pierce Rivera MD;  Location: [facility] ENDO;  Service: Endoscopy;  Laterality: N/A;    ENDOSCOPIC ULTRASOUND OF UPPER GASTROINTESTINAL TRACT N/A 10/09/2018    Procedure: ULTRASOUND, UPPER GI TRACT, ENDOSCOPIC;  Surgeon: Javy Simpson MD;  Location: [facility] ENDO;  Service: Endoscopy;  Laterality: N/A;    EXCISION OF LESION N/A 2019    Procedure: EXCISION, LESION VULVA;  Surgeon: Liv Odell MD;  Location: [facility] OR;  Service: OB/GYN;  Laterality: N/A;  latex allergy    EYE SURGERY      HYSTERECTOMY      ovaries intact, due to DUB    INTRAOCULAR PROSTHESES INSERTION Left 2022    Procedure: INSERTION, IOL PROSTHESIS;  Surgeon: Clarice Herzog MD;  Location: [facility] OR [address] [place] [address];  Service: Ophthalmology;  Laterality: Left;    PHACOEMULSIFICATION OF CATARACT Left 2022    Procedure: PHACOEMULSIFICATION, CATARACT;  Surgeon: Clarice Herzog MD;  Location: [facility] OR [address] [place] [address];  Service: Ophthalmology;  Laterality: Left;    TUBAL LIGATION          Allergies:  Review of patient's allergies indicates:   Allergen Reactions    Ace inhibitors Hives     \Cough    Latex, natural rubber Itching       Home Medications:    Current  Outpatient Medications:     ACCU-CHEK FASTCLIX LANCING DEV Kit, USE AS DIRECTED, Disp: 1 each, Rfl: 0    albuterol (PROVENTIL/VENTOLIN HFA) 90 mcg/actuation inhaler, Inhale 1-2 puffs into the lungs every 4 (four) hours as needed for Wheezing., Disp: 18 g, Rfl: 2    ALPRAZolam (XANAX) 0.5 MG tablet, Take 1 tablet (0.5 mg total) by mouth once. for 1 dose (Patient not taking: Reported on 2/3/2025), Disp: 1 tablet, Rfl: 0    amLODIPine (NORVASC) 10 MG tablet, Take 1 tablet (10 mg total) by mouth once daily., Disp: 90 tablet, Rfl: 3    aspirin (ECOTRIN) 81 MG EC tablet, Take 81 mg by mouth once daily., Disp: , Rfl:     benzonatate (TESSALON) 200 MG capsule, Take 1 capsule (200 mg total) by mouth every 8 (eight) hours as needed for Cough., Disp: 30 capsule, Rfl: 0    blood sugar diagnostic (ACCU-CHEK GUIDE TEST STRIPS) Strp, 3 times a day, Disp: 300 strip, Rfl: 11    blood-glucose meter (BLOOD GLUCOSE MONITORING) kit, Three times a day, Disp: 1 each, Rfl: 0    calcium carbonate (OS-ARIC) 600 mg calcium (1,500 mg) Tab, Take 1 tablet (600 mg total) by mouth once. for 1 dose, Disp: 30 tablet, Rfl: 11    citalopram (CELEXA) 10 MG tablet, Take 1 tablet (10 mg total) by mouth once daily., Disp: 90 tablet, Rfl: 1    dulaglutide (TRULICITY) 3 mg/0.5 mL pen injector, Inject 3 mg into the skin every 7 days., Disp: , Rfl:     ergocalciferol (ERGOCALCIFEROL) 50,000 unit Cap, TAKE 1 CAPSULE BY MOUTH EVERY 7 DAYS, Disp: 12 capsule, Rfl: 3    esomeprazole (NEXIUM) 40 MG capsule, Take 1 capsule (40 mg total) by mouth before breakfast., Disp: 90 capsule, Rfl: 3    fluocinonide (LIDEX) 0.05 % external solution, AAA scalp qday - bid prn pruritus, Disp: 60 mL, Rfl: 3    fluticasone-salmeterol diskus inhaler 250-50 mcg, Inhale 1 puff into the lungs 2 (two) times daily. Controller, Disp: 60 each, Rfl: 6    gabapentin (NEURONTIN) 100 MG capsule, Take 2 capsules (200 mg total) by mouth every evening., Disp: 180 capsule, Rfl: 3    glucose 4 GM  "chewable tablet, Take 4 tablets (16 g total) by mouth as needed for Low blood sugar (If having symptoms of blurry vision, palpitations, confusion, shakiness.  Please check sugars and if sugar below 70 please take 4 tablets and re-check sugar everry 15 minutes until sugars are above 70 and symptoms resolve.)., Disp: 50 tablet, Rfl: 12    insulin aspart U-100 (NOVOLOG FLEXPEN U-100 INSULIN) 100 unit/mL (3 mL) InPn pen, Inject 10 Units into the skin 3 (three) times daily with meals. TDD 60 units, Disp: 75 mL, Rfl: 11    insulin degludec (TRESIBA FLEXTOUCH U-100) 100 unit/mL (3 mL) insulin pen, Inject 12-16 units at night ., Disp: 15 mL, Rfl: 6    insulin syringe-needle U-100 0.3 mL 31 gauge x 5/16" Syrg, relion brand, use 5 x a day, if not on levemir or novolog, Disp: 150 each, Rfl: 1    insulin syringe-needle U-100 0.5 mL 31 gauge x 5/16" Syrg, Use nightly. 90 day, Disp: 100 each, Rfl: 3    lancets (ACCU-CHEK FASTCLIX LANCET DRUM) [facility], TEST BLOOD SUGAR THREE TIMES A DAY, Disp: 918 each, Rfl: 3    linaCLOtide (LINZESS) 72 mcg Cap capsule, Take 1 capsule (72 mcg total) by mouth before breakfast., Disp: 30 capsule, Rfl: 0    magnesium oxide (MAG-OX) 400 mg tablet, Take 1 tablet (400 mg total) by mouth 2 (two) times daily., Disp: 180 tablet, Rfl: 3    montelukast (SINGULAIR) 10 mg tablet, Take 10 mg by mouth every evening., Disp: , Rfl:     pen needle, diabetic (NOVOFINE 32) 32 gauge x 1/4" Ndle, 4 injections per day, Disp: 500 each, Rfl: 4    rosuvastatin (CRESTOR) 5 MG tablet, Take 1 tablet (5 mg total) by mouth once daily., Disp: 90 tablet, Rfl: 3    sodium chloride (SALINE NASAL) 0.65 % nasal spray, 1 spray by Nasal route as needed for Congestion., Disp: 15 mL, Rfl: 3    tirzepatide (MOUNJARO) 5 mg/0.5 mL PnIj, Inject 5 mg (one pen) into the skin every 7 days., Disp: 4 Pen, Rfl: 5    traZODone (DESYREL) 50 MG tablet, TAKE 1 TABLET BY MOUTH EVERY EVENING. (Patient taking differently: Take 50 mg by mouth nightly as " "needed.), Disp: 90 tablet, Rfl: 0    Current Facility-Administered Medications:     triamcinolone acetonide injection 20 mg, 20 mg, Intramuscular, 1 time in Clinic/HOD, Tani Pittman, GINETTE     Review of Systems:  Review of Systems   Constitutional:  Negative for fever.   Gastrointestinal:  Positive for abdominal pain and constipation. Negative for diarrhea, nausea and vomiting.   Genitourinary:  Negative for dysuria, frequency and hematuria.   Musculoskeletal:  Positive for arthralgias and myalgias.   Neurological:  Negative for headaches.         PHYSICAL EXAM     Vitals:    02/13/25 0802   BP: 130/60   BP Location: Right arm   Patient Position: Sitting   Pulse: 80   SpO2: 98%   Weight: 84 kg (185 lb 3 oz)   Height: 5' 3" (1.6 m)      Body mass index is 32.8 kg/m².     Physical Exam  Constitutional:       General: She is not in acute distress.     Appearance: Normal appearance. She is not ill-appearing or toxic-appearing.   HENT:      Head: Normocephalic.   Eyes:      Extraocular Movements: Extraocular movements intact.      Pupils: Pupils are equal, round, and reactive to light.   Cardiovascular:      Rate and Rhythm: Normal rate and regular rhythm.   Pulmonary:      Effort: Pulmonary effort is normal. No respiratory distress.      Breath sounds: Normal breath sounds.   Abdominal:      General: Bowel sounds are normal. There is no distension.      Palpations: Abdomen is soft. There is no mass.      Tenderness: There is abdominal tenderness. There is no right CVA tenderness, left CVA tenderness, guarding or rebound.      Hernia: No hernia is present.       Musculoskeletal:         General: Normal range of motion.      Cervical back: Normal range of motion.   Skin:     General: Skin is warm and dry.   Neurological:      General: No focal deficit present.      Mental Status: She is alert and oriented to person, place, and time.         LABS     Lab Results   Component Value Date    HGBA1C 7.9 (H) 11/14/2024 "     CMP  Sodium   Date Value Ref Range Status   11/14/2024 143 136 - 145 mmol/L Final     Potassium   Date Value Ref Range Status   11/14/2024 4.5 3.5 - 5.1 mmol/L Final     Chloride   Date Value Ref Range Status   11/14/2024 106 95 - 110 mmol/L Final     CO2   Date Value Ref Range Status   11/14/2024 28 23 - 29 mmol/L Final     Glucose   Date Value Ref Range Status   11/14/2024 160 (H) 70 - 110 mg/dL Final     BUN   Date Value Ref Range Status   11/14/2024 9 8 - 23 mg/dL Final     Creatinine   Date Value Ref Range Status   11/14/2024 0.8 0.5 - 1.4 mg/dL Final     Calcium   Date Value Ref Range Status   11/14/2024 9.8 8.7 - 10.5 mg/dL Final     Total Protein   Date Value Ref Range Status   11/14/2024 7.9 6.0 - 8.4 g/dL Final     Albumin   Date Value Ref Range Status   11/14/2024 4.1 3.5 - 5.2 g/dL Final     Total Bilirubin   Date Value Ref Range Status   11/14/2024 0.5 0.1 - 1.0 mg/dL Final     Comment:     For infants and newborns, interpretation of results should be based  on gestational age, weight and in agreement with clinical  observations.    Premature Infant recommended reference ranges:  Up to 24 hours.............<8.0 mg/dL  Up to 48 hours............<12.0 mg/dL  3-5 days..................<15.0 mg/dL  6-29 days.................<15.0 mg/dL       Alkaline Phosphatase   Date Value Ref Range Status   11/14/2024 122 40 - 150 U/L Final     AST   Date Value Ref Range Status   11/14/2024 15 10 - 40 U/L Final     ALT   Date Value Ref Range Status   11/14/2024 14 10 - 44 U/L Final     Anion Gap   Date Value Ref Range Status   11/14/2024 9 8 - 16 mmol/L Final     eGFR if    Date Value Ref Range Status   06/13/2022 >60.0 >60 mL/min/1.73 m^2 Final     eGFR if non    Date Value Ref Range Status   06/13/2022 >60.0 >60 mL/min/1.73 m^2 Final     Comment:     Calculation used to obtain the estimated glomerular filtration  rate (eGFR) is the CKD-EPI equation.        Lab Results   Component  Value Date    WBC 5.49 11/14/2024    HGB 13.4 11/14/2024    HCT 41.6 11/14/2024    MCV 78 (L) 11/14/2024     11/14/2024     Lab Results   Component Value Date    CHOL 214 (H) 11/14/2024    CHOL 218 (H) 07/20/2024    CHOL 222 (H) 04/23/2024     Lab Results   Component Value Date    HDL 60 11/14/2024    HDL 61 07/20/2024    HDL 54 04/23/2024     Lab Results   Component Value Date    LDLCALC 113.4 11/14/2024    LDLCALC 135.8 07/20/2024    LDLCALC 146.4 04/23/2024     Lab Results   Component Value Date    TRIG 203 (H) 11/14/2024    TRIG 106 07/20/2024    TRIG 108 04/23/2024     Lab Results   Component Value Date    CHOLHDL 28.0 11/14/2024    CHOLHDL 28.0 07/20/2024    CHOLHDL 24.3 04/23/2024     Lab Results   Component Value Date    TSH 2.402 11/14/2024       ASSESSMENT & PLAN       1. Constipation, unspecified constipation type  -     linaCLOtide (LINZESS) 72 mcg Cap capsule; Take 1 capsule (72 mcg total) by mouth before breakfast.  Dispense: 30 capsule; Refill: 0    2. Epigastric pain  -     CT Abdomen Without Contrast; Future; Expected date: 02/13/2025       Patient is stable-appearing. Etiology unclear. Recommend trialing otc senna laxative, eating prunes, taking miralax to attempt to clean out possible stool burden. Rx for linzess resent if patient would like to restart. CT abdomen ordered to rule out other acute cause. Message sent to GI clinic to request fu visit for patient if pain does not improve.    Follow up if symptoms worsen or fail to improve.    Patient was counseled on when to seek emergent care. Patient's plan/treatment was discussed including medications and possible side effects. Verbalized understanding of all instructions.            EDUARDO Turner, FNP-C  Department of Internal Medicine  [facility]

## 2025-02-14 RX ORDER — ROSUVASTATIN CALCIUM 5 MG/1
5 TABLET, COATED ORAL
Qty: 90 TABLET | Refills: 3 | OUTPATIENT
Start: 2025-02-14

## 2025-02-26 ENCOUNTER — HOSPITAL ENCOUNTER (OUTPATIENT)
Dept: RADIOLOGY | Facility: HOSPITAL | Age: 75
Discharge: HOME | End: 2025-02-26
Attending: NURSE PRACTITIONER
Payer: MEDICARE

## 2025-02-26 DIAGNOSIS — M54.12 CERVICAL RADICULOPATHY: ICD-10-CM

## 2025-02-26 DIAGNOSIS — M47.812 CERVICAL SPONDYLOSIS: ICD-10-CM

## 2025-02-26 DIAGNOSIS — M54.2 CERVICALGIA: ICD-10-CM

## 2025-02-26 PROCEDURE — 72141 MRI NECK SPINE W/O DYE: CPT | Mod: TC,HCNC

## 2025-02-26 PROCEDURE — 72141 MRI NECK SPINE W/O DYE: CPT | Mod: 26,HCNC,, | Performed by: RADIOLOGY

## 2025-02-27 ENCOUNTER — PATIENT MESSAGE (OUTPATIENT)
Dept: INTERNAL MEDICINE | Facility: CLINIC | Age: 75
End: 2025-02-27
Payer: MEDICARE

## 2025-02-28 ENCOUNTER — OFFICE VISIT (OUTPATIENT)
Dept: PAIN MEDICINE | Facility: CLINIC | Age: 75
End: 2025-02-28
Payer: MEDICARE

## 2025-02-28 ENCOUNTER — TELEPHONE (OUTPATIENT)
Dept: INTERNAL MEDICINE | Facility: CLINIC | Age: 75
End: 2025-02-28
Payer: MEDICARE

## 2025-02-28 VITALS
HEIGHT: 63 IN | SYSTOLIC BLOOD PRESSURE: 158 MMHG | HEART RATE: 77 BPM | WEIGHT: 187.81 LBS | BODY MASS INDEX: 33.28 KG/M2 | DIASTOLIC BLOOD PRESSURE: 76 MMHG

## 2025-02-28 DIAGNOSIS — G89.29 CHRONIC PAIN OF BOTH SHOULDERS: Primary | ICD-10-CM

## 2025-02-28 DIAGNOSIS — M75.52 SUBACROMIAL BURSITIS OF BOTH SHOULDERS: ICD-10-CM

## 2025-02-28 DIAGNOSIS — M25.512 CHRONIC PAIN OF BOTH SHOULDERS: Primary | ICD-10-CM

## 2025-02-28 DIAGNOSIS — M79.18 CERVICAL MYOFASCIAL PAIN SYNDROME: ICD-10-CM

## 2025-02-28 DIAGNOSIS — M47.812 CERVICAL SPONDYLOSIS: ICD-10-CM

## 2025-02-28 DIAGNOSIS — M75.51 SUBACROMIAL BURSITIS OF BOTH SHOULDERS: ICD-10-CM

## 2025-02-28 DIAGNOSIS — M25.511 CHRONIC PAIN OF BOTH SHOULDERS: Primary | ICD-10-CM

## 2025-02-28 PROCEDURE — 99999 PR PBB SHADOW E&M-EST. PATIENT-LVL V: CPT | Mod: PBBFAC,HCNC,, | Performed by: NURSE PRACTITIONER

## 2025-02-28 NOTE — PROGRESS NOTES
[facility] Established Patient Evaluation      CC:right neck  and left  shoulder pain     Interval update 02/28/25:  Patient returns to clinic for a follow-up on her cervical MRI results.  Patient complaining primarily of left shoulder pain she does have pain in the left cervical paraspinal area mild in nature.  Denies any radicular symptoms denies any paresthesia like symptoms denies any recent incident or trauma denies any profound weakness denies inability to write with a pen, denies dropping things.  Upon chart review she does have upcoming appointment with Orthopedics/Dr. Rushing for possible left shoulder injection and evaluation.  She does have history of chronic left shoulder pain however this has improved with physical therapy and dry needling.  Her pain score today is 3/10.    Interval Update 11/15/2024: Patient return to clinic for follow up on left-sided neck pain.  Patient reports approximately 1 week ago entering her car she reports as she sat down in her car she felt a pain in her left neck that prohibited her from moving her neck left to right.  She denies any radicular symptoms in the left arm denies any in the right arm.  Reports that the pain slowly went away and that she does not feel it as much today in clinic.  She denies any profound weakness denies inability to write with a pen denies dropping things.  Pain today is in the left cervical paraspinal area is very tender to touch the area seems to be 1 spot in her neck.  She does have history of chronic shoulder pain but she does state that sometimes her neck pain radiates into her left shoulder she also feels it in the right shoulder intermittently.  Her pain score today is 5/10.    Interval history 12/05/2023:  73-year-old female that presents today with continued bilateral shoulder pain right greater than left.  She reports attempting physical therapy that was ordered from our last clinic visit however she was only attend 2  sessions and had to stop due to increased shoulder pain.  She denies any recent incident or trauma denies any new pain denies any profound weakness.  She was provided a right subacromial bursa injection by her internal medicine provider on 11/07/2023 with minimal to no relief.  She states she did not have prior left shoulder pain but since she started therapy the left shoulder is hurting.    Interval Update:    05/11/[id number]-ndmd-old female who is established with our clinic presents today complaining of right posterior shoulder blade pain onset 4 weeks pain is located in the posterior right shoulder blade, constant pain is described as dull, in particular causes pain nothing in particular mitigate her symptoms at this point.  She denies any radicular symptoms anywhere.  She denies any paresthesia like symptoms.  Her pain is described as dull achy and deep at times.  Pain score today is 7/10.  She denies any recent incident or trauma denies any bowel bladder dysfunction denies any profound weakness.      Interval update:   11/28/2022 - Diana returns to clinic s/p bilateral subacromial shoulder injection on 10/26/22 with 90% relief.  She reports Aching of the bilateral shoulder  (location) pain rated 3/10 today with a weekly range of 3-4/10.  Additionally she complained of bilateral neck pain that has  been ongoing for greater than a year, see pain described below.    10/26/2022 - Ms. Ortiz eturns to clinic for follow up visit reporting bilateral shoulder pain, worse with lifting something, and working above her head. She has had shoulder pain over the last 10 years, but worse over the last few works. Nothing improves her pain. She is using tylenol arthritis for the pain. No unusual amount of work or activity recently. No paraesthesias or tingling in upper extremities.     8/18/20 - Ms. Ortiz returns to clinic for follow up visit reporting stable bilateral knee pain and review imaging. Pain intensity is currently  3/10.      8/13/20 - Ms. Ortiz returns to clinic for follow up visit reporting worse bilateral knee  pain.  Pain intensity is currently 6/10.  Patient reports bilateral knee pain that started approximately 1 week ago, pain is worse with walking, standing and while sleeping. Pain is described as sharp and stabbing, she denies profound weakness, or radiating pain. Worse pain is 6/10. Of note she reports no inciting incident, trauma or falls.     02/05/2020 - Mrs. Ortiz returns to clinic for follow up visit reporting left buttock pain. Patient reports radiation down left leg to mid thigh. Pain intensity is currently 5/10.      4/9/18 - Pt returns to complaining of MRI results, neck and bilateral shoulder pain.  Her day time pain improved with PT but she continues to complain of bilat neck and shoulder pain at night.  The results of the MRI were shared with her.    Background:  Chelly Ortiz is a 74 y.o. female who complains of neck and bilateral shoulder pain as characterized below.    Location:  Right and left posterior shoulder blade pain  Severity: Currently: 5/2/10   Typical Range: 6/10     Exacerbation: 10/10   Onset:  4 weeks  Quality: Dull, achy  Radiation:  None  Exacerbating Factors: extension and flexion lateral movement  Mitigating Factors: heat  Assoc: denies night fever/night sweats, urinary incontinence, bowel incontinence, significant weight loss, significant motor weakness and loss of sensations    Previous Therapies:  PT: Completed in march  HEP: Daily  TENS: None  Injections: LYNN 3x per Ca Oconnell  Surgery: Denies  Medications:   - NSAIDS:   - MSK Relaxants:   - TCAs:   - SNRIs:   - Topicals:   - Anticonvulsants: Current  - Opioids: No    Current Pain Medications:  Gabapentin 100 TID - she is taking it prn  Piroxicam 20 mg - stopped due to GI issues    Full Medication List:    Current Outpatient Medications:     ACCU-CHEK FASTCLIX LANCING DEV Kit, USE AS DIRECTED, Disp: 1 each, Rfl: 0     "albuterol (PROVENTIL/VENTOLIN HFA) 90 mcg/actuation inhaler, Inhale 1-2 puffs into the lungs every 4 (four) hours as needed for Wheezing., Disp: 18 g, Rfl: 2    amLODIPine (NORVASC) 10 MG tablet, Take 1 tablet (10 mg total) by mouth once daily., Disp: 90 tablet, Rfl: 3    aspirin (ECOTRIN) 81 MG EC tablet, Take 81 mg by mouth once daily., Disp: , Rfl:     benzonatate (TESSALON) 200 MG capsule, Take 1 capsule (200 mg total) by mouth every 8 (eight) hours as needed for Cough., Disp: 30 capsule, Rfl: 0    blood sugar diagnostic (ACCU-CHEK GUIDE TEST STRIPS) Strp, 3 times a day, Disp: 300 strip, Rfl: 11    blood-glucose meter (BLOOD GLUCOSE MONITORING) kit, Three times a day, Disp: 1 each, Rfl: 0    citalopram (CELEXA) 10 MG tablet, Take 1 tablet (10 mg total) by mouth once daily., Disp: 90 tablet, Rfl: 1    dulaglutide (TRULICITY) 3 mg/0.5 mL pen injector, Inject 3 mg into the skin every 7 days., Disp: , Rfl:     ergocalciferol (ERGOCALCIFEROL) 50,000 unit Cap, TAKE 1 CAPSULE BY MOUTH EVERY 7 DAYS, Disp: 12 capsule, Rfl: 3    esomeprazole (NEXIUM) 40 MG capsule, Take 1 capsule (40 mg total) by mouth before breakfast., Disp: 90 capsule, Rfl: 3    fluocinonide (LIDEX) 0.05 % external solution, AAA scalp qday - bid prn pruritus, Disp: 60 mL, Rfl: 3    fluticasone-salmeterol diskus inhaler 250-50 mcg, Inhale 1 puff into the lungs 2 (two) times daily. Controller, Disp: 60 each, Rfl: 6    gabapentin (NEURONTIN) 100 MG capsule, Take 2 capsules (200 mg total) by mouth every evening., Disp: 180 capsule, Rfl: 3    insulin aspart U-100 (NOVOLOG FLEXPEN U-100 INSULIN) 100 unit/mL (3 mL) InPn pen, Inject 10 Units into the skin 3 (three) times daily with meals. TDD 60 units, Disp: 75 mL, Rfl: 11    insulin degludec (TRESIBA FLEXTOUCH U-100) 100 unit/mL (3 mL) insulin pen, Inject 12-16 units at night ., Disp: 15 mL, Rfl: 6    insulin syringe-needle U-100 0.3 mL 31 gauge x 5/16" Syrg, relion brand, use 5 x a day, if not on levemir or " "novolog, Disp: 150 each, Rfl: 1    insulin syringe-needle U-100 0.5 mL 31 gauge x 5/16" Syrg, Use nightly. 90 day, Disp: 100 each, Rfl: 3    lancets (ACCU-CHEK FASTCLIX LANCET DRUM) [facility], TEST BLOOD SUGAR THREE TIMES A DAY, Disp: 918 each, Rfl: 3    linaCLOtide (LINZESS) 72 mcg Cap capsule, Take 1 capsule (72 mcg total) by mouth before breakfast., Disp: 30 capsule, Rfl: 0    magnesium oxide (MAG-OX) 400 mg tablet, Take 1 tablet (400 mg total) by mouth 2 (two) times daily., Disp: 180 tablet, Rfl: 3    montelukast (SINGULAIR) 10 mg tablet, Take 10 mg by mouth every evening., Disp: , Rfl:     pen needle, diabetic (NOVOFINE 32) 32 gauge x 1/4" Ndle, 4 injections per day, Disp: 500 each, Rfl: 4    rosuvastatin (CRESTOR) 5 MG tablet, Take 1 tablet (5 mg total) by mouth once daily., Disp: 90 tablet, Rfl: 3    sodium chloride (SALINE NASAL) 0.65 % nasal spray, 1 spray by Nasal route as needed for Congestion., Disp: 15 mL, Rfl: 3    tirzepatide (MOUNJARO) 5 mg/0.5 mL PnIj, Inject 5 mg (one pen) into the skin every 7 days., Disp: 4 Pen, Rfl: 5    traZODone (DESYREL) 50 MG tablet, TAKE 1 TABLET BY MOUTH EVERY EVENING. (Patient taking differently: Take 50 mg by mouth nightly as needed.), Disp: 90 tablet, Rfl: 0    ALPRAZolam (XANAX) 0.5 MG tablet, Take 1 tablet (0.5 mg total) by mouth once. for 1 dose (Patient not taking: Reported on 2/3/2025), Disp: 1 tablet, Rfl: 0    calcium carbonate (OS-ARIC) 600 mg calcium (1,500 mg) Tab, Take 1 tablet (600 mg total) by mouth once. for 1 dose, Disp: 30 tablet, Rfl: 11    glucose 4 GM chewable tablet, Take 4 tablets (16 g total) by mouth as needed for Low blood sugar (If having symptoms of blurry vision, palpitations, confusion, shakiness.  Please check sugars and if sugar below 70 please take 4 tablets and re-check sugar everry 15 minutes until sugars are above 70 and symptoms resolve.)., Disp: 50 tablet, Rfl: 12    Current Facility-Administered Medications:     triamcinolone acetonide " injection 20 mg, 20 mg, Intramuscular, 1 time in Clinic/HOD, Tani Pittman FNP     Review of Systems:  Review of Systems   Constitutional:  Negative for chills and fever.   HENT:  Negative for nosebleeds.    Eyes:  Negative for pain.   Respiratory:  Negative for hemoptysis.    Cardiovascular:  Negative for chest pain.   Gastrointestinal:  Negative for nausea and vomiting.   Genitourinary:  Negative for dysuria.   Musculoskeletal:  Positive for joint pain and myalgias.   Skin:  Negative for rash.   Neurological:  Negative for tingling, tremors, sensory change, focal weakness and weakness.       Allergies:  Ace inhibitors and Latex, natural rubber     Medical History:  Past Medical History:   Diagnosis Date    Allergy     DDD (degenerative disc disease), lumbar     Diabetes mellitus     diet controlled    GERD (gastroesophageal reflux disease)     History of subconjunctival hemorrhage 2011    left eye    IBS (irritable bowel syndrome)     Obesity     MYRIAM (obstructive sleep apnea)     on CPAP    Rotator cuff impingement syndrome     Seasonal allergic conjunctivitis     Type 2 diabetes mellitus treated with insulin         Surgical History:  Past Surgical History:   Procedure Laterality Date    CATARACT EXTRACTION W/  INTRAOCULAR LENS IMPLANT Right 10/03/2013        CATARACT EXTRACTION W/  INTRAOCULAR LENS IMPLANT Left 2022         SECTION      x2    CHOLECYSTECTOMY      COLONOSCOPY N/A 2018    Procedure: COLONOSCOPY;  Surgeon: Marie Mejia MD;  Location: [facility];  Service: Endoscopy;  Laterality: N/A;    COLONOSCOPY N/A 2022    Procedure: COLONOSCOPY;  Surgeon: Pierce Rivera MD;  Location: [facility];  Service: Endoscopy;  Laterality: N/A;    ENDOSCOPIC ULTRASOUND OF UPPER GASTROINTESTINAL TRACT N/A 10/09/2018    Procedure: ULTRASOUND, UPPER GI TRACT, ENDOSCOPIC;  Surgeon: Javy Simpson MD;  Location: [facility];  Service: Endoscopy;  Laterality:  N/A;    EXCISION OF LESION N/A 2019    Procedure: EXCISION, LESION VULVA;  Surgeon: Liv Odell MD;  Location: [facility];  Service: OB/GYN;  Laterality: N/A;  latex allergy    EYE SURGERY      HYSTERECTOMY      ovaries intact, due to DUB    INTRAOCULAR PROSTHESES INSERTION Left 2022    Procedure: INSERTION, IOL PROSTHESIS;  Surgeon: Clarice Herzog MD;  Location: [facility] OR [address] [place] [address];  Service: Ophthalmology;  Laterality: Left;    PHACOEMULSIFICATION OF CATARACT Left 2022    Procedure: PHACOEMULSIFICATION, CATARACT;  Surgeon: Clarice Herzog MD;  Location: [facility] OR [address] [place] [address];  Service: Ophthalmology;  Laterality: Left;    TUBAL LIGATION          Social History:  Social History     Socioeconomic History    Marital status:    Tobacco Use    Smoking status: Former     Current packs/day: 0.00     Types: Cigarettes     Quit date: 2/15/1983     Years since quittin.0     Passive exposure: Current ()    Smokeless tobacco: Never   Substance and Sexual Activity    Alcohol use: No    Drug use: No    Sexual activity: Yes     Partners: Male     Social Drivers of Health     Financial Resource Strain: Low Risk  (2/3/2025)    Overall Financial Resource Strain (CARDIA)     Difficulty of Paying Living Expenses: Not hard at all   Food Insecurity: No Food Insecurity (2/3/2025)    Hunger Vital Sign     Worried About Running Out of Food in the Last Year: Never true     Ran Out of Food in the Last Year: Never true   Transportation Needs: No Transportation Needs (2/3/2025)    PRAPARE - Transportation     Lack of Transportation (Medical): No     Lack of Transportation (Non-Medical): No   Physical Activity: Inactive (2/3/2025)    Exercise Vital Sign     Days of Exercise per Week: 0 days     Minutes of Exercise per Session: 0 min   Stress: No Stress Concern Present (2/3/2025)    [employer] - Occupational Stress Questionnaire     Feeling of Stress : Not at all   Housing  "Stability: Unknown (2/3/2025)    Housing Stability Vital Sign     Unable to Pay for Housing in the Last Year: No     Homeless in the Last Year: No       Physical Exam:  Vitals:    02/28/25 1108   BP: (!) 158/76   Pulse: 77   Weight: 85.2 kg (187 lb 13.3 oz)   Height: 5' 3" (1.6 m)   PainSc:   3   PainLoc: Neck         General    Nursing note and vitals reviewed.  Constitutional: She is oriented to person, place, and time. She appears well-developed and well-nourished. No distress.   HENT:   Head: Normocephalic and atraumatic.   Nose: Nose normal.   Eyes: Conjunctivae and EOM are normal. Pupils are equal, round, and reactive to light. Right eye exhibits no discharge. Left eye exhibits no discharge. No scleral icterus.   Neck: No JVD present.   Cardiovascular:  Intact distal pulses.            Pulmonary/Chest: Effort normal. No respiratory distress.   Abdominal: She exhibits no distension.   Neurological: She is alert and oriented to person, place, and time. Coordination normal.   Psychiatric: She has a normal mood and affect. Her behavior is normal. Judgment and thought content normal.     General Musculoskeletal Exam   Gait: normal       Right Knee Exam     Tenderness   The patient is tender to palpation of the medial joint line.    Crepitus   The patient has crepitus of the patella.    Range of Motion   Extension:  normal   Flexion:  normal     Left Knee Exam     Tenderness   The patient tender to palpation of the lateral joint line.    Crepitus   The patient has crepitus of the patella.    Range of Motion   Extension:  normal   Flexion:  normal Left Hip Exam     Comments:  Left ischial bursa tenderness.  Partial reproduction of pain with hip flexion and piriformis-type stretches.      Back (L-Spine & T-Spine) / Neck (C-Spine) Exam     Tenderness   The patient is tender to palpation of the right scapular and left scapular. Posterior midline palpation reveals tenderness of the Upper C-Spine, Lower C-Spine and Upper " T-Spine. Right paramedian tenderness of the Upper C-Spine, Lower C-Spine and Upper T-Spine. Left paramedian tenderness of the Upper C-Spine, Lower C-Spine and Upper T-Spine.     Back (L-Spine & T-Spine) Range of Motion   Lateral bend right:  normal   Lateral bend left:  normal   Rotation right:  normal   Rotation left:  normal     Neck (C-Spine) Range of Motion   Flexion:      Normal  Extension:  Normal    Comments:  TTP right posterior scapula   Tender to palpate cervical paraspinals left greater than right  Right Shoulder Exam     Inspection/Observation   Deformity: absent    Range of Motion   Active abduction:  90 abnormal   Passive abduction:  abnormal     Tests & Signs   Drop arm: positive  Saldaña test: positive  Impingement: positive  Rotator Cuff Painful Arc/Range: moderate    Other   Sensation: normal    Comments:  Positive empty can and impingement sign     Left Shoulder Exam     Inspection/Observation   Deformity: absent    Range of Motion   Active abduction:  90 abnormal   Passive abduction:  abnormal     Tests & Signs   Drop arm: positive  Saldaña test: positive  Impingement: positive  Rotator Cuff Painful Arc/Range: moderate    Other   Sensation: normal     Comments:  Positive empty can and impingement sign       Muscle Strength   Right Upper Extremity   Shoulder Abduction: 5/5   Shoulder Internal Rotation: 5/5   Shoulder External Rotation: 5/5   Supraspinatus: 5/5   Subscapularis: 5/5   Biceps: 5/5   Wrist flexion: 5/5   Left Upper Extremity  Shoulder Abduction: 5/5   Shoulder Internal Rotation: 5/5   Shoulder External Rotation: 5/5   Supraspinatus: 5/5   Subscapularis: 5/5   Biceps: 5/5   Wrist flexion: 5/5   Right Lower Extremity   Quadriceps:  5/5   Anterior tibial:  5/5   Gastrocsoleus:  5/5   Left Lower Extremity   Quadriceps:  5/5   Anterior tibial:  5/5   Gastrocsoleus:  5/5     Reflexes     Left Side  Biceps:  2+  Brachioradialis:  2+  Left Roque's Sign:  Absent    Right Side   Biceps:   2+  Brachioradialis:  2+  Right Roque's Sign:  absent    Vascular Exam     Right Pulses      Radial:                    1+      Left Pulses      Radial:                    2+      Capillary Refill  Right Hand: normal capillary refill  Left Hand: normal capillary refill          Imaging:    Order Details       XR SHOULDER COMPLETE 2 OR MORE VIEWS BILATERAL     CLINICAL HISTORY:  Other hypertrophic osteoarthropathy, multiple sites     TECHNIQUE:  Three views of each shoulder were performed.     COMPARISON:  Shoulder radiographs 01/28/2020 and 07/07/2016     FINDINGS:  No fracture or dislocation.  Mild-moderate degenerative changes of the acromioclavicular and glenohumeral joints bilaterally, right greater than left.  No focal soft tissue swelling.     Impression:     As above.        Electronically signed by: Jim Cooper  Date:                                            06/16/2022    MRI Upper Extremity Joint Without Contraast Right  TECHNIQUE: MRI of right shoulder was performed on a 1.5T magnet utilizing the following sequences: Localizer; axial T2 FS; coronal T2 FS and PD FS; sagittal T1, T2 FS and PD FS.    COMPARISON: Right shoulder radiograph 4/20/16.    FINDINGS:    Rotator cuff: There is thickening and increased signal of the insertional fibers of the supraspinatus consistent with tendinosis with partial thickness undersurface tear.  There is small full-thickness component involving the anterior fibers measuring approximately 8 x 6 mm.  Mild infraspinatus tendinosis with undersurface fraying.  Teres minor tendon is intact.  There is mild tendinosis with interstitial tear involving the insertional fibers of the subscapularis.  Muscle bulk of the rotator cuff is within normal limits.     Labrum: Global degeneration of the glenoid labrum.      Biceps: Thickening and increased signal within the intra-articular portion of the long head biceps tendon consistent with tendinosis.    Bone: There is no fracture.  Bone  marrow signal is unremarkable.    Acromioclavicular joint: Severe degenerative changes are seen at the acromioclavicular joint with osseous spurring, edema, and subchondral cystic change.    Cartilage: Articular cartilage of the glenohumeral joint is preserved.    Miscellaneous: Small joint effusion with increased fluid seen in the subscapularis recess.  There is increased fluid seen within the subacromial/subdeltoid bursa.   Impression       1.  Supraspinatus tendinosis with partial thickness articular surface tear and small full-thickness component involving the anterior fibers.    2.  Mild subscapularis and infraspinatus tendinosis.    3.  Severe AC joint arthrosis.    4.  Long head biceps tendinosis.    5.  Joint effusion and moderate subacromial/subdeltoid bursitis.  ______________________________________     Electronically signed by resident: Hosea Costello MD  Date: 06/16/16  Time: 16:29     ______________________________________     Electronically signed by: MADISON WINTERS MD  Date: 06/17/16  Time: 08:39    Updated cervical MRI 2025  EXAMINATION:  MRI CERVICAL SPINE WITHOUT CONTRAST     CLINICAL HISTORY:  Neck pain, chronic;Cervical radiculopathy, no red flags; Cervicalgia     TECHNIQUE:  Multiplanar, multisequence MR images of the cervical spine were performed utilizing no contrast.     COMPARISON:  Cervical spine radiographs 11/12/2024, MRI cervical spine 02/03/2018     FINDINGS:  C1-C2: Dens is intact.  Pre dens space is maintained.     Alignment: Straightening of lordosis.     Vertebrae: No fracture or marrow infiltrative process.     Discs: Multilevel disc desiccation.     Cord: Normal.     Skull base and craniocervical junction: Exaggerated occipital protuberance.     Degenerative findings:     C2-C3: Right facet arthropathy.  No spinal canal stenosis or neural foraminal narrowing.     C3-C4: Bilateral facet arthropathy and small posterior disc osteophyte complex.  Mild right neural foraminal  narrowing.     C4-C5: Right facet arthropathy.  No spinal canal stenosis or neural foraminal narrowing.     C5-C6: Small posterior disc osteophyte complex contributing to mild spinal canal stenosis.     C6-C7: No spinal canal stenosis or neural foraminal narrowing.     C7-T1: Left facet arthropathy.  No spinal canal stenosis or neural foraminal narrowing.     Paraspinal muscles & soft tissues: Right mastoid effusion.     Impression:     1. Multilevel degenerative changes of the spine without high-grade spinal canal stenosis or neural foraminal narrowing as above.        Assessment:  Problem List Items Addressed This Visit    None      Chelly is a 67-year-old female with chronic neck pain exacerbated by increased physical activity 3 months ago.  Her exam and imaging are consistent with facet arthropathy and myofascial dysfunction.  I recommended that we start with physical therapy to eliminate as much pain as possible related to myofascial dysfunction then move on to interventional procedures should a significant amount of pain persist.  I also recommended that she complete the MRI ordered by her primary care physician and start a trial of piroxicam.  She also states significant insomnia not related to her chronic neck pain and I have recommended that she try tizanidine 4-8 mg nightly as a muscle relaxant and sleep aid.    4/9/18 - she completed PT with improved pain after each session.  Pain remains most bothersome at night which is common for muscle issues.  She was encouraged to perform neck stretching 3 times daily and especially at night to minimize pain at night.    2/5/2020 - there is tenderness in the left buttock consistent with ischial bursitis.  There may be a component of tendonitis as well; however, the treatment would be the same.  I have recommended an ischial bursa injection under fluoroscopic guidance and patient is in agreement.  She was warned that her blood glucose must be controlled and less than  180 g/dL to receive a steroid injection, otherwise we would need to cancel the injection.  Patient was also advised to begin stretching on a daily basis. I demonstrated 3 separate stretches that would target the painful area.  I have encouraged her to walk on a daily basis for exercise as she currently lives a relatively sedentary lifestyle.    08/13/[id number]-tmhu-old female presents with bilateral knee pain consistent with osteoarthritis, there may be a component of tendonitis as well. I will order bilateral knee x-rays today for further evaluation and consider bilateral knee injections with steroids in the future pt  Is a diabetic so I educated her on how steroids can increase BS so monitoring  And staying at the appropriate level is vital. I will also recommend returning to PT for hari knee pain and  ischial bursitis she was previously attending physical therapy prior to the COVID-19 pandemic for ischial bursitis this was providing her with relief she reports that she only had 2 sessions during that time.   Lastly I did discuss with patient her most recent labs  C reactive protein, and sedimentation rate results and that they were both abnormal and elevated I recommended she follow-up with her primary care doctor to discussed these results.      8/18/2020- 68 y/o female presents with a hx of chronic hari knee pain secondary to osteoarthritis , and left sided low back pain secondary to ischia bursitis. Upon review of her recent bilateral knee x-rays they show tricompartmental DJD with moderate medial femorotibial joint space narrowing bilaterally. I recommended hari IA knee injections in the future, considering her pain is stable today I will hold off her pain score is 3/10. Explained recent lab results and recommended  that she will need to f/u with her PCP.  Also recommended patient follow up with physical therapy.    10/26/2022 - Chelly Trejoceasar is a 74 y.o. female who  has a past medical history of Allergy, DDD  (degenerative disc disease), lumbar, Diabetes mellitus, GERD (gastroesophageal reflux disease), History of subconjunctival hemorrhage (12/02/2011), IBS (irritable bowel syndrome), Obesity, MYRIAM (obstructive sleep apnea), Rotator cuff impingement syndrome, Seasonal allergic conjunctivitis, and Type 2 diabetes mellitus treated with insulin.  By history and examination this patient has chronic and acute on chronic bilateral shoulder pain. The underlying cause cause is bilateral rotator cuff pathology causing bilateral bursitis.  Pathology is confirmed by imaging.  We discussed the underlying diagnoses and multiple treatment options including interventional procedures.  The risks and benefits of each treatment option were discussed and all questions were answered.      11/28/2022Chloe Ortiz is a 74 y.o. female who  has a past medical history of Allergy, DDD (degenerative disc disease), lumbar, Diabetes mellitus, GERD (gastroesophageal reflux disease), History of subconjunctival hemorrhage (12/02/2011), IBS (irritable bowel syndrome), Obesity, MYRIAM (obstructive sleep apnea), Rotator cuff impingement syndrome, Seasonal allergic conjunctivitis, and Type 2 diabetes mellitus treated with insulin.  By history and examination this patient has chronicneck pain without bilateral radiculopathy in the distribution of C4, C5, C6, and C7.  The underlying cause cause is facet arthritis, degenerative disc disease, and muscle dysfunction.  Pathology is confirmed by imaging.  We discussed the underlying diagnoses and multiple treatment options including non-opioid medications, interventional procedures, physical therapy, and home exercise.  The risks and benefits of each treatment option were discussed and all questions were answered.      5/11/2023Quentin Ortiz is a 74 y.o. female who  has a past medical history of Allergy, DDD (degenerative disc disease), lumbar, Diabetes mellitus, GERD (gastroesophageal reflux disease), History  of subconjunctival hemorrhage (12/02/2011), IBS (irritable bowel syndrome), Obesity, MYRIAM (obstructive sleep apnea), Rotator cuff impingement syndrome, Seasonal allergic conjunctivitis, and Type 2 diabetes mellitus treated with insulin.  By history and examination this patient has acute/subacute right posterior scapular pain.without radiculopathy.  The underlying cause cause is muscle dysfunction and deconditioning.  Pathology is confirmed by imaging.  We discussed the underlying diagnoses and multiple treatment options including non-opioid medications, interventional procedures, physical therapy, home exercise, and weight loss.  The risks and benefits of each treatment option were discussed and all questions were answered.      12/05/2023- 73-year-old female that returns to clinic with a history of bilateral shoulder pain on history and exam with the appears in her pain is likely related to subacromial bursitis.  Her right shoulder x-ray shows that the right humeral head maintains appropriate relation with the glenoid.  AC joint is intact.  Based on history and exam it is she is suffering from subacromial bursitis bilaterally she was recently provided a right subacromial bursa injection per her internal medicine physician she states that this injection only provided her 1 day of relief.  This was done approximately 1 month ago.  Discussed today that I will provide her left subacromial bursa injection in effort to review reduce her now left shoulder pain.  If no relief found we will order bilateral MRIs of her shoulder to rule out pathology and referred to orthopedics.    11/15/2024-Chelly Ortiz is a 74 y.o. female who  has a past medical history of Allergy, DDD (degenerative disc disease), lumbar, Diabetes mellitus, GERD (gastroesophageal reflux disease), History of subconjunctival hemorrhage (12/02/2011), IBS (irritable bowel syndrome), Obesity, MYRIAM (obstructive sleep apnea), Rotator cuff impingement syndrome,  Seasonal allergic conjunctivitis, and Type 2 diabetes mellitus treated with insulin.  By history and examination this patient has subacute and acuteneck pain without left radiculopathy in the distribution of C4, C5, C6, and C7.  The underlying cause cause is muscle dysfunction and muscles strain.  Pathology is confirmed by imaging.  We discussed the underlying diagnoses and multiple treatment options including non-opioid medications, interventional procedures, physical therapy, home exercise, core muscle enhancement, activity modification, and weight loss.  The risks and benefits of each treatment option were discussed and all questions were answered.      02/28/[id number]-bsah-old female with a history of chronic left shoulder pain I have not seen her since November of last year she complained mainly of left neck pain primarily in the left cervical paraspinal area that radiates shoulder however this is better today her primary source of pain was left shoulder.  Her cervical MRI showed minimal pathology cervical facet arthritis at C7-T1.  Do believe her underlying issue is left shoulder pain left shoulder MRI shows remarkable pathology she is being seen by Orthopedics/Dr. Rushing she has upcoming appointment I will defer to him for treatment plans regarding her left shoulder.    Treatment Plan:   No procedures recommended at this time patient to follow up with orthopedics for possible left shoulder injection.  I encouraged the patient to maintain a home exercise regimen that includes daily, moderate cardiovascular exercise lasting at least 30 minutes.  This may include yoga, porfirio chi, walking, swimming, aqua aerobics, or other exercises that maintain a heart rate of 50-70% of the calculated maximum heart rate.  I also encouraged light, daily stretching focused on the target area.  All previous imaging reviewed and discussed with the patient in detail cervical x-ray, cervical MRI, both shoulder MRIs.  Recommended  Tylenol extra-strength arthritis 500 to a 1000 t.i.d. not to exceed 3000 mg in 24 hours    RTC- 3 -4  months or sooner if needed per patient     Disclaimer: This note was partly generated using dictation software which may occasionally result in transcription errors.

## 2025-02-28 NOTE — TELEPHONE ENCOUNTER
Spoke to pt. Pt asked if she stops taking the Mounjaro and starts taking Trulicity. Let pt know that is correct.

## 2025-02-28 NOTE — TELEPHONE ENCOUNTER
----- Message from Mira sent at 2/28/2025 11:54 AM CST -----  Contact: [phone] patient  .[email or web address] Patient is calling for Medical Advice regarding:Patient is calling to check what Rx she is to take and not to take How long has patient had these symptoms:Pharmacy name and phone#:Patient wants a call back or [facility]:Call Comments:Please advise patient replies from provider may take up to 48 hours.

## 2025-03-06 ENCOUNTER — OFFICE VISIT (OUTPATIENT)
Dept: URGENT CARE | Facility: CLINIC | Age: 75
End: 2025-03-06
Payer: MEDICARE

## 2025-03-06 ENCOUNTER — TELEPHONE (OUTPATIENT)
Dept: URGENT CARE | Facility: CLINIC | Age: 75
End: 2025-03-06
Payer: MEDICARE

## 2025-03-06 ENCOUNTER — RESULTS FOLLOW-UP (OUTPATIENT)
Dept: URGENT CARE | Facility: CLINIC | Age: 75
End: 2025-03-06

## 2025-03-06 VITALS
SYSTOLIC BLOOD PRESSURE: 140 MMHG | TEMPERATURE: 98 F | WEIGHT: 187 LBS | BODY MASS INDEX: 33.13 KG/M2 | DIASTOLIC BLOOD PRESSURE: 72 MMHG | OXYGEN SATURATION: 99 % | RESPIRATION RATE: 16 BRPM | HEART RATE: 76 BPM

## 2025-03-06 DIAGNOSIS — S69.92XA INJURY OF LEFT HAND, INITIAL ENCOUNTER: ICD-10-CM

## 2025-03-06 DIAGNOSIS — S60.00XA CONTUSION OF FINGER OF LEFT HAND, UNSPECIFIED FINGER, INITIAL ENCOUNTER: ICD-10-CM

## 2025-03-06 DIAGNOSIS — S99.922A INJURY OF TOE, LEFT, INITIAL ENCOUNTER: ICD-10-CM

## 2025-03-06 DIAGNOSIS — S93.502A SPRAIN OF LEFT GREAT TOE, INITIAL ENCOUNTER: Primary | ICD-10-CM

## 2025-03-06 PROCEDURE — 73130 X-RAY EXAM OF HAND: CPT | Mod: FY,LT,S$GLB, | Performed by: RADIOLOGY

## 2025-03-06 PROCEDURE — 73660 X-RAY EXAM OF TOE(S): CPT | Mod: FY,LT,S$GLB, | Performed by: RADIOLOGY

## 2025-03-06 PROCEDURE — 99213 OFFICE O/P EST LOW 20 MIN: CPT | Mod: S$GLB,,, | Performed by: PHYSICIAN ASSISTANT

## 2025-03-06 NOTE — PROGRESS NOTES
Subjective:      Patient ID: Chelly Ortiz is a 74 y.o. female.    Vitals:  weight is 84.8 kg (187 lb). Her oral temperature is 98 °F (36.7 °C). Her blood pressure is 140/72 (abnormal) and her pulse is 76. Her respiration is 16 and oxygen saturation is 99%.     Chief Complaint: Injury    This is a 74 y.o. female who presents today with a chief complaint of injury/pain to L great toe and L thumb and ring finger from fall yesterday afternoon at home.  Patient states that she was walking and her foot got caught on pursed straps on the ground and she fell forward, she stubbed her toe and braced her fall with her left hand.  Admits to swelling and bruising of left great toe from where she stubbed it.  No wounds.  Has some swelling to ring finger and pain to left thumb on hand that she braced fall with.  No other complaints at this time.  Patient not on anticoagulant.    Home tx: tylenol (last dose last night); beltran-ceja roll on ointment      Injury  This is a new problem. The current episode started yesterday. Associated symptoms include arthralgias and joint swelling. Pertinent negatives include no abdominal pain, chest pain, chills, congestion, coughing, diaphoresis, fatigue, fever, headaches, nausea, neck pain, rash, sore throat, vomiting or weakness. Associated symptoms comments: Pt feels numbness in tip of great toe.       Constitution: Negative for chills, sweating, fatigue and fever.   HENT:  Negative for congestion and sore throat.    Neck: Negative for neck pain and neck stiffness.   Cardiovascular:  Negative for chest pain, leg swelling and palpitations.   Eyes:  Negative for eye itching, eye pain and eye redness.   Respiratory:  Negative for cough, sputum production and shortness of breath.    Gastrointestinal:  Negative for abdominal pain, nausea, vomiting and diarrhea.   Genitourinary:  Negative for dysuria, frequency and urgency.   Musculoskeletal:  Positive for pain, trauma, joint pain, joint swelling and  abnormal ROM of joint (decreased great toe).   Skin:  Positive for color change and bruising. Negative for rash.   Neurological:  Negative for dizziness, headaches, disorientation, altered mental status and tingling.   Psychiatric/Behavioral:  Negative for altered mental status and disorientation.       Objective:     Physical Exam   Constitutional: She is oriented to person, place, and time. She appears well-developed.  Non-toxic appearance. She does not appear ill. No distress.   HENT:   Head: Normocephalic and atraumatic.   Ears:   Right Ear: External ear normal.   Left Ear: External ear normal.   Eyes: Conjunctivae are normal. Right eye exhibits no discharge. Left eye exhibits no discharge. No scleral icterus.   Pulmonary/Chest: Effort normal. No stridor. No respiratory distress. She has no wheezes.   Musculoskeletal:        Hands:         Feet:    Neurological: She is alert and oriented to person, place, and time.   Skin: Skin is not diaphoretic.   Psychiatric: Her behavior is normal. Judgment and thought content normal.   Nursing note and vitals reviewed.    X-Ray Toe 2 or More Views Left  Result Date: 3/6/2025  EXAMINATION: XR TOE 2 OR MORE VIEWS LEFT CLINICAL HISTORY: Unspecified injury of left foot, initial encounter TECHNIQUE: Two more views left toes. COMPARISON: 10/06/2022 FINDINGS: mild hallux valgus deformity and bunion formation can be seen. The toes appear to be intact no obvious fractures seen on available views.     See above Electronically signed by: Blake Short MD Date:    03/06/2025 Time:    15:08    XR HAND COMPLETE 3 VIEW LEFT  Result Date: 3/6/2025  EXAMINATION: XR HAND COMPLETE 3 VIEW LEFT CLINICAL HISTORY: . Unspecified injury of left wrist, hand and finger(s), initial encounter TECHNIQUE: PA, lateral, and oblique views of the left hand were performed. COMPARISON: 04/05/2017 FINDINGS: The skeletal structures are intact.  No fracture or dislocation is identified.  Some osteoarthritis  is again seen at several joints with narrowing and marginal spurring; these include 1st CMC joint at the wrist and many of the IP joints of the digits.  Mild soft tissue swelling may be present at the ring finger.     Negative for fracture. Chronic osteoarthritis at multiple joints . Electronically signed by: Frantz Villasenor MD Date:    03/06/2025 Time:    15:06    MRI Cervical Spine Without Contrast  Result Date: 2/26/2025  EXAMINATION: MRI CERVICAL SPINE WITHOUT CONTRAST CLINICAL HISTORY: Neck pain, chronic;Cervical radiculopathy, no red flags; Cervicalgia TECHNIQUE: Multiplanar, multisequence MR images of the cervical spine were performed utilizing no contrast. COMPARISON: Cervical spine radiographs 11/12/2024, MRI cervical spine 02/03/2018 FINDINGS: C1-C2: Dens is intact.  Pre dens space is maintained. Alignment: Straightening of lordosis. Vertebrae: No fracture or marrow infiltrative process. Discs: Multilevel disc desiccation. Cord: Normal. Skull base and craniocervical junction: Exaggerated occipital protuberance. Degenerative findings: C2-C3: Right facet arthropathy.  No spinal canal stenosis or neural foraminal narrowing. C3-C4: Bilateral facet arthropathy and small posterior disc osteophyte complex.  Mild right neural foraminal narrowing. C4-C5: Right facet arthropathy.  No spinal canal stenosis or neural foraminal narrowing. C5-C6: Small posterior disc osteophyte complex contributing to mild spinal canal stenosis. C6-C7: No spinal canal stenosis or neural foraminal narrowing. C7-T1: Left facet arthropathy.  No spinal canal stenosis or neural foraminal narrowing. Paraspinal muscles & soft tissues: Right mastoid effusion.     1. Multilevel degenerative changes of the spine without high-grade spinal canal stenosis or neural foraminal narrowing as above. Electronically signed by resident: Elsa Owusu Date:    02/26/2025 Time:    11:43 Electronically signed by: Edilson Ayala MD Date:    02/26/2025  Time:    15:26        Assessment:     1. Sprain of left great toe, initial encounter    2. Injury of left hand, initial encounter    3. Injury of toe, left, initial encounter    4. Contusion of finger of left hand, unspecified finger, initial encounter        Plan:       Sprain of left great toe, initial encounter    Injury of left hand, initial encounter  -     XR HAND COMPLETE 3 VIEW LEFT; Future; Expected date: 03/06/2025    Injury of toe, left, initial encounter  -     Walking Boot For Home Use  -     X-Ray Toe 2 or More Views Left; Future; Expected date: 03/06/2025    Contusion of finger of left hand, unspecified finger, initial encounter    Other orders  -     Cancel: X-Ray Toe 2 or More Views Right; Future; Expected date: 03/06/2025      MDM  X-rays with chronic changes/arthritis but no acute fracture appreciated.  On exam there are no wounds or evidence of infection.  Consistent with contusion and sprain.  Supportive care at home, follow up p.r.n. for new or worsening symptoms.Discussed ddx, home care, tx options, and given follow up precautions.  I have reviewed the patient's chart to view previous visits, labs, and imaging to assess PMH and look for any trends or previous treatments.

## 2025-03-06 NOTE — PATIENT INSTRUCTIONS
- Rest.    - Drink plenty of fluids.    - Acetaminophen (tylenol) as directed as needed for fever/pain. Avoid tylenol if you have a history of liver disease. Do not take ibuprofen if you have a history of GI bleeding, kidney disease, or if you take blood thinners.     - Ice for 15-20 minutes at a time for the next 24-48 hours.    - Elevate when possible.     - Follow up with your PCP or specialty clinic as directed in the next 1-2 weeks if not improved or as needed.  You can call [phone] to schedule an appointment with the appropriate provider.    - Go to the ER or seek medical attention immediately if you develop new or worsening symptoms.     - You must understand that you have received an Urgent Care treatment only and that you may be released before all of your medical problems are known or treated.   - You, the patient, will arrange for follow up care as instructed.   - If your condition worsens or fails to improve we recommend that you receive another evaluation at the ER immediately or contact your PCP to discuss your concerns or return here.

## 2025-03-13 ENCOUNTER — OFFICE VISIT (OUTPATIENT)
Dept: SPORTS MEDICINE | Facility: CLINIC | Age: 75
End: 2025-03-13
Payer: MEDICARE

## 2025-03-13 ENCOUNTER — OFFICE VISIT (OUTPATIENT)
Dept: INTERNAL MEDICINE | Facility: CLINIC | Age: 75
End: 2025-03-13
Payer: MEDICARE

## 2025-03-13 ENCOUNTER — TELEPHONE (OUTPATIENT)
Dept: INTERNAL MEDICINE | Facility: CLINIC | Age: 75
End: 2025-03-13
Payer: MEDICARE

## 2025-03-13 ENCOUNTER — HOSPITAL ENCOUNTER (OUTPATIENT)
Dept: RADIOLOGY | Facility: HOSPITAL | Age: 75
Discharge: HOME | End: 2025-03-13
Attending: NURSE PRACTITIONER
Payer: MEDICARE

## 2025-03-13 VITALS
DIASTOLIC BLOOD PRESSURE: 72 MMHG | SYSTOLIC BLOOD PRESSURE: 138 MMHG | HEART RATE: 87 BPM | HEIGHT: 64 IN | WEIGHT: 183.44 LBS | OXYGEN SATURATION: 97 % | BODY MASS INDEX: 31.32 KG/M2

## 2025-03-13 VITALS
HEART RATE: 84 BPM | WEIGHT: 183.56 LBS | SYSTOLIC BLOOD PRESSURE: 151 MMHG | BODY MASS INDEX: 32.52 KG/M2 | DIASTOLIC BLOOD PRESSURE: 83 MMHG | HEIGHT: 63 IN

## 2025-03-13 DIAGNOSIS — M54.9 BACK PAIN, UNSPECIFIED BACK LOCATION, UNSPECIFIED BACK PAIN LATERALITY, UNSPECIFIED CHRONICITY: ICD-10-CM

## 2025-03-13 DIAGNOSIS — S99.922A INJURY OF LEFT FOOT, INITIAL ENCOUNTER: ICD-10-CM

## 2025-03-13 DIAGNOSIS — S99.922A INJURY OF LEFT FOOT, INITIAL ENCOUNTER: Primary | ICD-10-CM

## 2025-03-13 DIAGNOSIS — R05.2 SUBACUTE COUGH: ICD-10-CM

## 2025-03-13 DIAGNOSIS — M75.102 ROTATOR CUFF TEAR ARTHROPATHY OF LEFT SHOULDER: Primary | ICD-10-CM

## 2025-03-13 DIAGNOSIS — M75.121 NONTRAUMATIC COMPLETE TEAR OF RIGHT ROTATOR CUFF: ICD-10-CM

## 2025-03-13 DIAGNOSIS — M12.812 ROTATOR CUFF TEAR ARTHROPATHY OF LEFT SHOULDER: Primary | ICD-10-CM

## 2025-03-13 PROCEDURE — 3072F LOW RISK FOR RETINOPATHY: CPT | Mod: HCNC,CPTII,S$GLB, | Performed by: NURSE PRACTITIONER

## 2025-03-13 PROCEDURE — 99999 PR PBB SHADOW E&M-EST. PATIENT-LVL III: CPT | Mod: PBBFAC,HCNC,, | Performed by: NURSE PRACTITIONER

## 2025-03-13 PROCEDURE — 1101F PT FALLS ASSESS-DOCD LE1/YR: CPT | Mod: HCNC,CPTII,S$GLB, | Performed by: NURSE PRACTITIONER

## 2025-03-13 PROCEDURE — 73630 X-RAY EXAM OF FOOT: CPT | Mod: 26,HCNC,LT, | Performed by: RADIOLOGY

## 2025-03-13 PROCEDURE — 1159F MED LIST DOCD IN RCRD: CPT | Mod: HCNC,CPTII,S$GLB, | Performed by: NURSE PRACTITIONER

## 2025-03-13 PROCEDURE — 99999 PR PBB SHADOW E&M-EST. PATIENT-LVL V: CPT | Mod: PBBFAC,HCNC,, | Performed by: ORTHOPAEDIC SURGERY

## 2025-03-13 PROCEDURE — 3078F DIAST BP <80 MM HG: CPT | Mod: HCNC,CPTII,S$GLB, | Performed by: NURSE PRACTITIONER

## 2025-03-13 PROCEDURE — 1125F AMNT PAIN NOTED PAIN PRSNT: CPT | Mod: HCNC,CPTII,S$GLB, | Performed by: NURSE PRACTITIONER

## 2025-03-13 PROCEDURE — 3075F SYST BP GE 130 - 139MM HG: CPT | Mod: HCNC,CPTII,S$GLB, | Performed by: NURSE PRACTITIONER

## 2025-03-13 PROCEDURE — 99214 OFFICE O/P EST MOD 30 MIN: CPT | Mod: HCNC,S$GLB,, | Performed by: NURSE PRACTITIONER

## 2025-03-13 PROCEDURE — 73630 X-RAY EXAM OF FOOT: CPT | Mod: TC,HCNC,LT

## 2025-03-13 PROCEDURE — 3288F FALL RISK ASSESSMENT DOCD: CPT | Mod: HCNC,CPTII,S$GLB, | Performed by: NURSE PRACTITIONER

## 2025-03-13 RX ORDER — TRIAMCINOLONE ACETONIDE 40 MG/ML
40 INJECTION, SUSPENSION INTRA-ARTICULAR; INTRAMUSCULAR
Status: DISCONTINUED | OUTPATIENT
Start: 2025-03-13 | End: 2025-03-13 | Stop reason: HOSPADM

## 2025-03-13 RX ORDER — METHOCARBAMOL 500 MG/1
500 TABLET, FILM COATED ORAL 2 TIMES DAILY PRN
Qty: 40 TABLET | Refills: 0 | Status: SHIPPED | OUTPATIENT
Start: 2025-03-13 | End: 2025-03-23

## 2025-03-13 RX ORDER — FLUTICASONE PROPIONATE 50 MCG
1 SPRAY, SUSPENSION (ML) NASAL DAILY
Qty: 9.9 ML | Refills: 0 | Status: SHIPPED | OUTPATIENT
Start: 2025-03-13

## 2025-03-13 RX ORDER — BENZONATATE 200 MG/1
200 CAPSULE ORAL EVERY 8 HOURS PRN
Qty: 30 CAPSULE | Refills: 0 | Status: SHIPPED | OUTPATIENT
Start: 2025-03-13

## 2025-03-13 RX ADMIN — TRIAMCINOLONE ACETONIDE 40 MG: 40 INJECTION, SUSPENSION INTRA-ARTICULAR; INTRAMUSCULAR at 01:03

## 2025-03-13 NOTE — PROGRESS NOTES
CC:  Bilateral shoulder pain    Patient returns to clinic for follow up of left shoulder. She received a CSI last visit in May and reports moderate relief from this. She is requesting a repeat today. No change in history since last visit.     Initial Hx:   Patient is a 73-year-old female who presents today for initial evaluation of bilateral shoulder pain.  Initially seen by Tejal Kelley PA-C with Orthopedics in January. She reports that she began experiencing increased pain in the right shoulder last September, and worsening pain of the left shoulder in December.  She denies any associated injury or trauma to attribute to this.  She is left-hand dominant.  The pain in her right shoulder is significantly worse than the left.  She reports significant weakness and decreased range of motion of the right shoulder, with an inability to lift it past 90°.  She has pain and weakness in the left shoulder as well, however has almost full range of motion.  Pain has begun to disrupts sleep and normal activities of daily living such as getting dressed and reaching for things above shoulder level.  No prior injuries or surgeries on the right or left shoulder.  Thus far treatment has included formal physical therapy, subacromial corticosteroid injections, and over-the-counter/prescription anti-inflammatories which do provide some relief. Her PCP ordered MRIs of both shoulders due to continued pain despite conservative treatment. Here today to discuss results and treatment options.     Is affecting ADLs.  Pain is 6/10 at it's worst.      Past Medical History:   Diagnosis Date    Allergy     DDD (degenerative disc disease), lumbar     Diabetes mellitus     diet controlled    GERD (gastroesophageal reflux disease)     History of subconjunctival hemorrhage 12/02/2011    left eye    IBS (irritable bowel syndrome)     Obesity     MYRIAM (obstructive sleep apnea)     on CPAP    Rotator cuff impingement syndrome     Seasonal allergic  conjunctivitis     Type 2 diabetes mellitus treated with insulin        Past Surgical History:   Procedure Laterality Date    CATARACT EXTRACTION W/  INTRAOCULAR LENS IMPLANT Right 10/03/2013        CATARACT EXTRACTION W/  INTRAOCULAR LENS IMPLANT Left 2022         SECTION      x2    CHOLECYSTECTOMY      COLONOSCOPY N/A 2018    Procedure: COLONOSCOPY;  Surgeon: Marie Mejia MD;  Location: [facility] ENDO;  Service: Endoscopy;  Laterality: N/A;    COLONOSCOPY N/A 2022    Procedure: COLONOSCOPY;  Surgeon: Pierce Rivera MD;  Location: [facility] ENDO;  Service: Endoscopy;  Laterality: N/A;    ENDOSCOPIC ULTRASOUND OF UPPER GASTROINTESTINAL TRACT N/A 10/09/2018    Procedure: ULTRASOUND, UPPER GI TRACT, ENDOSCOPIC;  Surgeon: Javy Simpson MD;  Location: [facility] ENDO;  Service: Endoscopy;  Laterality: N/A;    EXCISION OF LESION N/A 2019    Procedure: EXCISION, LESION VULVA;  Surgeon: Liv Odell MD;  Location: [facility] OR;  Service: OB/GYN;  Laterality: N/A;  latex allergy    EYE SURGERY      HYSTERECTOMY      ovaries intact, due to DUB    INTRAOCULAR PROSTHESES INSERTION Left 2022    Procedure: INSERTION, IOL PROSTHESIS;  Surgeon: Clarice Herzog MD;  Location: [facility] OR [address] [place] [address];  Service: Ophthalmology;  Laterality: Left;    PHACOEMULSIFICATION OF CATARACT Left 2022    Procedure: PHACOEMULSIFICATION, CATARACT;  Surgeon: Clarice Herzog MD;  [facility];  Service: Ophthalmology;  Laterality: Left;    TUBAL LIGATION         Family History   Problem Relation Name Age of Onset    Alzheimer's disease Mother      Heart disease Father      Diabetes Sister x4     Breast cancer Sister x4     Deep vein thrombosis Brother x1     Diabetes Daughter x2     Cancer Brother x1         Lung    Lung cancer Brother x1     Amblyopia Neg Hx      Blindness Neg Hx      Cataracts Neg Hx      Glaucoma Neg Hx      Hypertension Neg Hx      Macular degeneration  Neg Hx      Retinal detachment Neg Hx      Strabismus Neg Hx      Stroke Neg Hx      Thyroid disease Neg Hx           Current Outpatient Medications:     ACCU-CHEK FASTCLIX LANCING DEV Kit, USE AS DIRECTED, Disp: 1 each, Rfl: 0    albuterol (PROVENTIL/VENTOLIN HFA) 90 mcg/actuation inhaler, Inhale 1-2 puffs into the lungs every 4 (four) hours as needed for Wheezing., Disp: 18 g, Rfl: 2    amLODIPine (NORVASC) 10 MG tablet, Take 1 tablet (10 mg total) by mouth once daily., Disp: 90 tablet, Rfl: 3    aspirin (ECOTRIN) 81 MG EC tablet, Take 81 mg by mouth once daily., Disp: , Rfl:     blood sugar diagnostic (ACCU-CHEK GUIDE TEST STRIPS) Strp, 3 times a day, Disp: 300 strip, Rfl: 11    blood-glucose meter (BLOOD GLUCOSE MONITORING) kit, Three times a day, Disp: 1 each, Rfl: 0    citalopram (CELEXA) 10 MG tablet, Take 1 tablet (10 mg total) by mouth once daily., Disp: 90 tablet, Rfl: 1    dulaglutide (TRULICITY) 3 mg/0.5 mL pen injector, Inject 3 mg into the skin every 7 days., Disp: , Rfl:     ergocalciferol (ERGOCALCIFEROL) 50,000 unit Cap, TAKE 1 CAPSULE BY MOUTH EVERY 7 DAYS, Disp: 12 capsule, Rfl: 3    esomeprazole (NEXIUM) 40 MG capsule, Take 1 capsule (40 mg total) by mouth before breakfast., Disp: 90 capsule, Rfl: 3    fluocinonide (LIDEX) 0.05 % external solution, AAA scalp qday - bid prn pruritus, Disp: 60 mL, Rfl: 3    fluticasone-salmeterol diskus inhaler 250-50 mcg, Inhale 1 puff into the lungs 2 (two) times daily. Controller, Disp: 60 each, Rfl: 6    gabapentin (NEURONTIN) 100 MG capsule, Take 2 capsules (200 mg total) by mouth every evening., Disp: 180 capsule, Rfl: 3    insulin aspart U-100 (NOVOLOG FLEXPEN U-100 INSULIN) 100 unit/mL (3 mL) InPn pen, Inject 10 Units into the skin 3 (three) times daily with meals. TDD 60 units, Disp: 75 mL, Rfl: 11    insulin degludec (TRESIBA FLEXTOUCH U-100) 100 unit/mL (3 mL) insulin pen, Inject 12-16 units at night ., Disp: 15 mL, Rfl: 6    insulin syringe-needle U-100  "0.3 mL 31 gauge x 5/16" Syrg, relion brand, use 5 x a day, if not on levemir or novolog, Disp: 150 each, Rfl: 1    insulin syringe-needle U-100 0.5 mL 31 gauge x 5/16" Syrg, Use nightly. 90 day, Disp: 100 each, Rfl: 3    lancets (ACCU-CHEK FASTCLIX LANCET DRUM) [facility], TEST BLOOD SUGAR THREE TIMES A DAY, Disp: 918 each, Rfl: 3    magnesium oxide (MAG-OX) 400 mg tablet, Take 1 tablet (400 mg total) by mouth 2 (two) times daily., Disp: 180 tablet, Rfl: 3    montelukast (SINGULAIR) 10 mg tablet, Take 10 mg by mouth every evening., Disp: , Rfl:     pen needle, diabetic (NOVOFINE 32) 32 gauge x 1/4" Ndle, 4 injections per day, Disp: 500 each, Rfl: 4    rosuvastatin (CRESTOR) 5 MG tablet, Take 1 tablet (5 mg total) by mouth once daily., Disp: 90 tablet, Rfl: 3    sodium chloride (SALINE NASAL) 0.65 % nasal spray, 1 spray by Nasal route as needed for Congestion., Disp: 15 mL, Rfl: 3    traZODone (DESYREL) 50 MG tablet, TAKE 1 TABLET BY MOUTH EVERY EVENING., Disp: 90 tablet, Rfl: 0    ALPRAZolam (XANAX) 0.5 MG tablet, Take 1 tablet (0.5 mg total) by mouth once. for 1 dose (Patient not taking: Reported on 3/6/2025), Disp: 1 tablet, Rfl: 0    benzonatate (TESSALON) 200 MG capsule, Take 1 capsule (200 mg total) by mouth every 8 (eight) hours as needed for Cough. (Patient not taking: Reported on 3/13/2025), Disp: 30 capsule, Rfl: 0    calcium carbonate (OS-ARIC) 600 mg calcium (1,500 mg) Tab, Take 1 tablet (600 mg total) by mouth once. for 1 dose, Disp: 30 tablet, Rfl: 11    glucose 4 GM chewable tablet, Take 4 tablets (16 g total) by mouth as needed for Low blood sugar (If having symptoms of blurry vision, palpitations, confusion, shakiness.  Please check sugars and if sugar below 70 please take 4 tablets and re-check sugar everry 15 minutes until sugars are above 70 and symptoms resolve.)., Disp: 50 tablet, Rfl: 12    linaCLOtide (LINZESS) 72 mcg Cap capsule, Take 1 capsule (72 mcg total) by mouth before breakfast. (Patient not " "taking: Reported on 3/13/2025), Disp: 30 capsule, Rfl: 0    tirzepatide (MOUNJARO) 5 mg/0.5 mL PnIj, Inject 5 mg (one pen) into the skin every 7 days. (Patient not taking: Reported on 3/13/2025), Disp: 4 Pen, Rfl: 5    Current Facility-Administered Medications:     triamcinolone acetonide injection 20 mg, 20 mg, Intramuscular, 1 time in Clinic/HOD, Tani Pittman, GINETTE    Review of patient's allergies indicates:   Allergen Reactions    Ace inhibitors Hives     \Cough    Latex, natural rubber Itching          REVIEW OF SYSTEMS:  Constitution: Negative. Negative for chills, fever and night sweats.   HENT: Negative for congestion and headaches.    Eyes: Negative for blurred vision, left vision loss and right vision loss.   Cardiovascular: Negative for chest pain and syncope.   Respiratory: Negative for cough and shortness of breath.    Endocrine: Negative for polydipsia, polyphagia and polyuria.   Hematologic/Lymphatic: Negative for bleeding problem. Does not bruise/bleed easily.   Skin: Negative for dry skin, itching and rash.   Musculoskeletal: Negative for falls.  Positive for bilateral shoulder pain and muscle weakness.   Gastrointestinal: Negative for abdominal pain and bowel incontinence.   Genitourinary: Negative for bladder incontinence and nocturia.   Neurological: Negative for disturbances in coordination, loss of balance and seizures.   Psychiatric/Behavioral: Negative for depression. The patient does not have insomnia.    Allergic/Immunologic: Negative for hives and persistent infections.      PHYSICAL EXAMINATION:  Vitals:  BP (!) 151/83 (BP Location: Right arm, Patient Position: Sitting)   Pulse 84   Ht 5' 3" (1.6 m)   Wt 83.3 kg (183 lb 8.5 oz)   LMP 08/01/2000   BMI 32.51 kg/m²    General: The patient is alert and oriented x 3.  Mood is pleasant.  Observation of ears, eyes and nose reveal no gross abnormalities.  No labored breathing observed.  Gait is coordinated. Patient can toe walk and heel walk " without difficulty.      BILATERAL SHOULDER / UPPER EXTREMITY EXAM    OBSERVATION:     Swelling  none  Deformity  none   Discoloration  none   Scapular winging none   Scars   none  Atrophy  none    TENDERNESS / CREPITUS (T/C):          T/C      T/C   Clavicle   -/-  SUPRAspinatus    +/- (B)     AC Jt.    +/- (B)  INFRAspinatus  -/-    SC Jt.    -/-  Deltoid    -/-      G. Tuberosity  -/-  LH BICEP groove  +/- (B)   Acromion:  -/-  Midline Neck   -/-     Scapular Spine -/-  Trapezium   -/-   SMA Scapula  -/-  GH jt. line - post  + /-(B)     Scapulothoracic  -/-         ROM: (* = with pain)  Left shoulder   Right shoulder        AROM (PROM)   AROM (PROM)   FE    160°* (175°*)     90°* (170°*)     ER at 0°    40°*  (60°*)    30°*  (60°*)   ER at 90° ABD  30°*  (40°*)    50°*  (65°*)   IR at 90°  ABD   40*  (40°*)     50*  (50°*)      IR (spine level)   T12*     L2*    STRENGTH: (* = with pain) Left shoulder   Right shoulder   SCAPTION   4-/5*    3/5*    IR    4+/5*    4/5*   ER    4/5*    3/5*   BICEPS   5/5*    4/5*   Deltoid    4/5*    4/5*     SIGNS:  Painful side       NEER   +   OKALEES  +    LYN   +   SPEEDS  +     DROP ARM   -   BELLY PRESS +   Superior escape none    LIFT-OFF  +   X-Body ADD    +   MOVING VALGUS neg        STABILITY TESTING    Left shoulder   Right shoulder    Translation     Anterior  up face     up face    Posterior  up face    up face    Sulcus   < 10mm    < 10 mm     Signs   Apprehension   neg      neg       Relocation   no change     no change      Jerk test  neg     neg    EXTREMITY NEURO-VASCULAR EXAM:    Sensation grossly intact to light touch all dermatomal regions.    DTR 2+ Biceps, Triceps, BR and Negative Nates sign   Grossly intact motor function at Elbow, Wrist and Hand   Distal pulses radial and ulnar 2+, brisk cap refill, symmetric.      NECK:  Painless FROM and spinous processes non-tender. Negative Spurlings sign.      OTHER FINDINGS:  + scapular  dyskinesia    XRAYS bilateral shoulders (1/2/2024):  Xrays including AP, Outlet and Axillary Lateral of shoulder are ordered / images reviewed by me:  No acute fracture or dislocation.  Mild-to-moderate glenohumeral and acromioclavicular DJD bilaterally.  Soft tissues appear normal.    MRI right shoulder (4/9/2024):  Impression:     1. Massive rotator cuff tear involving the supraspinatus and infraspinatus tendons with differential fiber retraction medial to the glenoid and associated severe fatty atrophy.  2. Subscapularis tendinosis with fraying of the superior footprint fibers.  3. Biceps tendinosis/tenosynovitis with high-grade partial tear and findings suggesting an associated pulley injury.  4. Superior migration of the humeral head with resulting narrowing of the acromiohumeral interval.  5. Mild glenohumeral osteoarthritis.  6. Moderate AC joint arthrosis and prominent subacromial spurring.  7. Moderate joint effusion with synovitis.  Subacromial-subdeltoid bursitis.    MRI left shoulder (4/9/2024):  Impression:     1. Full-thickness, full width tear of the supraspinatus tendon with extension into the anterior infraspinatus and associated mild volume loss and fatty degeneration.  2. Tendinosis of the residual infraspinatus with a partial-thickness articular surface tear of the footprint and critical zone.  3. Mild tendinosis subscapularis with fraying of the superior footprint fibers.  4. SLAP tear.  5. Biceps tendinosis and tenosynovitis.  6. Mild glenohumeral osteoarthritis.  7. Moderate joint effusion with synovitis and debris.  Subacromial-subdeltoid bursitis.  8. Moderate AC joint arthrosis and prominent subacromial spurring.      ASSESSMENT:     1. Nontraumatic complete tear of right rotator cuff  Sports Medicine US - Guidance for Needle Placement              PLAN:      CSI left shoulder   F/u PRN

## 2025-03-13 NOTE — PROGRESS NOTES
INTERNAL MEDICINE PROGRESS/URGENT CARE NOTE    CHIEF COMPLAINT     Chief Complaint   Patient presents with    Foot Swelling       HPI     Chelly Ortiz is a 74 y.o. female who presents for an urgent visit today.    L great toe and 2nd toe and distal foot pain after trip and fall last week. Also injured her left hand but that has resolved. Had some upper back soreness.   Xray of foot negative for fracture. Has been doing RICE and taking tylenol. Still swollen. Bruising is resolving.     She has been having a dry hacking cough x 2 weeks. No other associated symptoms but a tickle in back of throat and some post nasal drip. No congestion.   Already on PPI for GERD.       Problem List[1]     Past Medical History:  Past Medical History:   Diagnosis Date    Allergy     DDD (degenerative disc disease), lumbar     Diabetes mellitus     diet controlled    GERD (gastroesophageal reflux disease)     History of subconjunctival hemorrhage 2011    left eye    IBS (irritable bowel syndrome)     Obesity     MYRIAM (obstructive sleep apnea)     on CPAP    Rotator cuff impingement syndrome     Seasonal allergic conjunctivitis     Type 2 diabetes mellitus treated with insulin         Past Surgical History:  Past Surgical History:   Procedure Laterality Date    CATARACT EXTRACTION W/  INTRAOCULAR LENS IMPLANT Right 10/03/2013        CATARACT EXTRACTION W/  INTRAOCULAR LENS IMPLANT Left 2022         SECTION      x2    CHOLECYSTECTOMY      COLONOSCOPY N/A 2018    Procedure: COLONOSCOPY;  Surgeon: Marie Mejia MD;  Location: [facility];  Service: Endoscopy;  Laterality: N/A;    COLONOSCOPY N/A 2022    Procedure: COLONOSCOPY;  Surgeon: Pierce Rivera MD;  Location: [facility];  Service: Endoscopy;  Laterality: N/A;    ENDOSCOPIC ULTRASOUND OF UPPER GASTROINTESTINAL TRACT N/A 10/09/2018    Procedure: ULTRASOUND, UPPER GI TRACT, ENDOSCOPIC;  Surgeon: Javy Simpson MD;  Location:  "[facility] END[facility];  Service: Endoscopy;  Laterality: N/A;    EXCISION OF LESION N/A 02/13/2019    Procedure: EXCISION, LESION VULVA;  Surgeon: Liv Odell MD;  Location: [facility];  Service: OB/GYN;  Laterality: N/A;  latex allergy    EYE SURGERY      HYSTERECTOMY      ovaries intact, due to DUB    INTRAOCULAR PROSTHESES INSERTION Left 11/2/2022    Procedure: INSERTION, IOL PROSTHESIS;  Surgeon: Clarice Herzog MD;  Location: [address];  Service: Ophthalmology;  Laterality: Left;    PHACOEMULSIFICATION OF CATARACT Left 11/2/2022    Procedure: PHACOEMULSIFICATION, CATARACT;  Surgeon: Clarice Herzog MD;  Location: [facility] OR [address] [place] [address];  Service: Ophthalmology;  Laterality: Left;    TUBAL LIGATION          Allergies:  Review of patient's allergies indicates:   Allergen Reactions    Ace inhibitors Hives     \Cough    Latex, natural rubber Itching       Home Medications:  Current Medications[2]     Review of Systems:  Review of Systems   Constitutional:  Negative for fatigue and fever.   HENT:  Positive for postnasal drip. Negative for congestion and sore throat.    Respiratory:  Positive for cough. Negative for shortness of breath and wheezing.    Cardiovascular:  Negative for chest pain.   Musculoskeletal:  Positive for arthralgias.   Neurological:  Negative for weakness and headaches.         PHYSICAL EXAM     Vitals:    03/13/25 1438   BP: 138/72   Pulse: 87   SpO2: 97%   Weight: 83.2 kg (183 lb 6.8 oz)   Height: 5' 3.5" (1.613 m)      Body mass index is 31.98 kg/m².     Physical Exam  Vitals reviewed.   Constitutional:       Appearance: Normal appearance.   HENT:      Head: Normocephalic.      Mouth/Throat:      Mouth: Mucous membranes are moist.      Pharynx: Oropharynx is clear.   Eyes:      Conjunctiva/sclera: Conjunctivae normal.      Pupils: Pupils are equal, round, and reactive to light.   Cardiovascular:      Rate and Rhythm: Normal rate and regular rhythm.      Heart sounds: Normal heart sounds. "   Pulmonary:      Effort: Pulmonary effort is normal.      Breath sounds: Normal breath sounds. No wheezing, rhonchi or rales.   Musculoskeletal:      Cervical back: Normal range of motion and neck supple. No tenderness or bony tenderness. No pain with movement. Normal range of motion.      Right lower leg: No edema.      Left ankle: Normal.        Feet:       Comments: Bony tenderness with swelling toe. Worse in great toe and 2nd toe.    Skin:     General: Skin is warm and dry.   Neurological:      General: No focal deficit present.      Mental Status: She is alert.   Psychiatric:         Mood and Affect: Mood normal.         Behavior: Behavior normal.         LABS     Lab Results   Component Value Date    HGBA1C 7.9 (H) 11/14/2024     CMP  Sodium   Date Value Ref Range Status   11/14/2024 143 136 - 145 mmol/L Final     Potassium   Date Value Ref Range Status   11/14/2024 4.5 3.5 - 5.1 mmol/L Final     Chloride   Date Value Ref Range Status   11/14/2024 106 95 - 110 mmol/L Final     CO2   Date Value Ref Range Status   11/14/2024 28 23 - 29 mmol/L Final     Glucose   Date Value Ref Range Status   11/14/2024 160 (H) 70 - 110 mg/dL Final     BUN   Date Value Ref Range Status   11/14/2024 9 8 - 23 mg/dL Final     Creatinine   Date Value Ref Range Status   11/14/2024 0.8 0.5 - 1.4 mg/dL Final     Calcium   Date Value Ref Range Status   11/14/2024 9.8 8.7 - 10.5 mg/dL Final     Total Protein   Date Value Ref Range Status   11/14/2024 7.9 6.0 - 8.4 g/dL Final     Albumin   Date Value Ref Range Status   11/14/2024 4.1 3.5 - 5.2 g/dL Final     Total Bilirubin   Date Value Ref Range Status   11/14/2024 0.5 0.1 - 1.0 mg/dL Final     Comment:     For infants and newborns, interpretation of results should be based  on gestational age, weight and in agreement with clinical  observations.    Premature Infant recommended reference ranges:  Up to 24 hours.............<8.0 mg/dL  Up to 48 hours............<12.0 mg/dL  3-5  days..................<15.0 mg/dL  6-29 days.................<15.0 mg/dL       Alkaline Phosphatase   Date Value Ref Range Status   11/14/2024 122 40 - 150 U/L Final     AST   Date Value Ref Range Status   11/14/2024 15 10 - 40 U/L Final     ALT   Date Value Ref Range Status   11/14/2024 14 10 - 44 U/L Final     Anion Gap   Date Value Ref Range Status   11/14/2024 9 8 - 16 mmol/L Final     eGFR if    Date Value Ref Range Status   06/13/2022 >60.0 >60 mL/min/1.73 m^2 Final     eGFR if non    Date Value Ref Range Status   06/13/2022 >60.0 >60 mL/min/1.73 m^2 Final     Comment:     Calculation used to obtain the estimated glomerular filtration  rate (eGFR) is the CKD-EPI equation.        Lab Results   Component Value Date    WBC 5.49 11/14/2024    HGB 13.4 11/14/2024    HCT 41.6 11/14/2024    MCV 78 (L) 11/14/2024     11/14/2024     Lab Results   Component Value Date    CHOL 214 (H) 11/14/2024    CHOL 218 (H) 07/20/2024    CHOL 222 (H) 04/23/2024     Lab Results   Component Value Date    HDL 60 11/14/2024    HDL 61 07/20/2024    HDL 54 04/23/2024     Lab Results   Component Value Date    LDLCALC 113.4 11/14/2024    LDLCALC 135.8 07/20/2024    LDLCALC 146.4 04/23/2024     Lab Results   Component Value Date    TRIG 203 (H) 11/14/2024    TRIG 106 07/20/2024    TRIG 108 04/23/2024     Lab Results   Component Value Date    CHOLHDL 28.0 11/14/2024    CHOLHDL 28.0 07/20/2024    CHOLHDL 24.3 04/23/2024     Lab Results   Component Value Date    TSH 2.402 11/14/2024       ASSESSMENT     1. Injury of left foot, initial encounter    2. Subacute cough    3. Back pain, unspecified back location, unspecified back pain laterality, unspecified chronicity         PLAN  Will re-image foot. Continue RICE. Consider ortho referral for failure to continue to improve.   Lungs are clear. With tx with flonase and tessalon and instructed to take otc zyrtec.   Back likely msk. Continue tylenol and will add  on robaxin.    1. Injury of left foot, initial encounter  - X-Ray Foot Complete Left; Future    2. Subacute cough  - benzonatate (TESSALON) 200 MG capsule; Take 1 capsule (200 mg total) by mouth every 8 (eight) hours as needed for Cough.  Dispense: 30 capsule; Refill: 0  - fluticasone propionate (FLONASE) 50 mcg/actuation nasal spray; 1 spray (50 mcg total) by Each Nostril route once daily.  Dispense: 9.9 mL; Refill: 0    3. Back pain, unspecified back location, unspecified back pain laterality, unspecified chronicity  - methocarbamoL (ROBAXIN) 500 MG Tab; Take 1 tablet (500 mg total) by mouth 2 (two) times daily as needed (back pain).  Dispense: 40 tablet; Refill: 0       Follow up with PCP     Patient's plan/treatment was discussed including medications and possible side effects. Verbalized understanding of all instructions.     This note was partly generated with [employer] voice recognition software. I apologize for any possible typographical errors.          KAYKAY Chappell  Department of Internal Medicine - [facility]  03/13/2025          [1]   Patient Active Problem List  Diagnosis    GERD (gastroesophageal reflux disease)    MYRIAM (obstructive sleep apnea)    IBS (irritable bowel syndrome)    DDD (degenerative disc disease), lumbar    Insomnia    Anxiety    Hearing loss, sensorineural    Nuclear sclerotic cataract of left eye    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    Constipation    History of colon polyps    Essential hypertension    Hyperlipidemia, unspecified    Class 1 obesity due to excess calories with serious comorbidity and body mass index (BMI) of 33.0 to 33.9 in adult    Spondylosis of cervical region without myelopathy or radiculopathy    Cervical myofascial pain syndrome    PCO (posterior capsular opacification), right    Dry eye syndrome    Pseudophakia    Decreased range of motion of lumbar spine    Decreased strength    Dilated bile duct    History of colonic polyps    EIC  (epidermal inclusion cyst)    Sedentary lifestyle    Dysphagia    Type 2 diabetes mellitus without complication, with long-term current use of insulin    Abdominal aortic atherosclerosis    Decreased ROM of neck    Arthritis of multiple sites    Osteopenia of multiple sites    Combined form of age-related cataract, left eye    Adjustment disorder with anxious mood    Decreased ROM of right shoulder    History of epistaxis    Acute pain of both shoulders    Decreased ROM of left shoulder    Abnormal posture    Palpitations    Secondary osteoarthritis of right shoulder due to rotator cuff tear    Chronic obstructive pulmonary disease, unspecified COPD type    Type II diabetes mellitus with neurological manifestations    Rotator cuff tear arthropathy of left shoulder    Nontraumatic complete tear of right rotator cuff   [2]   Current Outpatient Medications:     ACCU-CHEK FASTCLIX LANCING DEV Kit, USE AS DIRECTED, Disp: 1 each, Rfl: 0    albuterol (PROVENTIL/VENTOLIN HFA) 90 mcg/actuation inhaler, Inhale 1-2 puffs into the lungs every 4 (four) hours as needed for Wheezing., Disp: 18 g, Rfl: 2    amLODIPine (NORVASC) 10 MG tablet, Take 1 tablet (10 mg total) by mouth once daily., Disp: 90 tablet, Rfl: 3    aspirin (ECOTRIN) 81 MG EC tablet, Take 81 mg by mouth once daily., Disp: , Rfl:     blood sugar diagnostic (ACCU-CHEK GUIDE TEST STRIPS) Strp, 3 times a day, Disp: 300 strip, Rfl: 11    blood-glucose meter (BLOOD GLUCOSE MONITORING) kit, Three times a day, Disp: 1 each, Rfl: 0    citalopram (CELEXA) 10 MG tablet, Take 1 tablet (10 mg total) by mouth once daily., Disp: 90 tablet, Rfl: 1    dulaglutide (TRULICITY) 3 mg/0.5 mL pen injector, Inject 3 mg into the skin every 7 days., Disp: , Rfl:     ergocalciferol (ERGOCALCIFEROL) 50,000 unit Cap, TAKE 1 CAPSULE BY MOUTH EVERY 7 DAYS, Disp: 12 capsule, Rfl: 3    esomeprazole (NEXIUM) 40 MG capsule, Take 1 capsule (40 mg total) by mouth before breakfast., Disp: 90 capsule,  "Rfl: 3    fluticasone-salmeterol diskus inhaler 250-50 mcg, Inhale 1 puff into the lungs 2 (two) times daily. Controller, Disp: 60 each, Rfl: 6    gabapentin (NEURONTIN) 100 MG capsule, Take 2 capsules (200 mg total) by mouth every evening., Disp: 180 capsule, Rfl: 3    insulin degludec (TRESIBA FLEXTOUCH U-100) 100 unit/mL (3 mL) insulin pen, Inject 12-16 units at night ., Disp: 15 mL, Rfl: 6    insulin syringe-needle U-100 0.3 mL 31 gauge x 5/16" Syrg, relion brand, use 5 x a day, if not on levemir or novolog, Disp: 150 each, Rfl: 1    insulin syringe-needle U-100 0.5 mL 31 gauge x 5/16" Syrg, Use nightly. 90 day, Disp: 100 each, Rfl: 3    lancets (ACCU-CHEK FASTCLIX LANCET DRUM) [facility], TEST BLOOD SUGAR THREE TIMES A DAY, Disp: 918 each, Rfl: 3    magnesium oxide (MAG-OX) 400 mg tablet, Take 1 tablet (400 mg total) by mouth 2 (two) times daily., Disp: 180 tablet, Rfl: 3    montelukast (SINGULAIR) 10 mg tablet, Take 10 mg by mouth every evening., Disp: , Rfl:     pen needle, diabetic (NOVOFINE 32) 32 gauge x 1/4" Ndle, 4 injections per day, Disp: 500 each, Rfl: 4    rosuvastatin (CRESTOR) 5 MG tablet, Take 1 tablet (5 mg total) by mouth once daily., Disp: 90 tablet, Rfl: 3    traZODone (DESYREL) 50 MG tablet, TAKE 1 TABLET BY MOUTH EVERY EVENING., Disp: 90 tablet, Rfl: 0    ALPRAZolam (XANAX) 0.5 MG tablet, Take 1 tablet (0.5 mg total) by mouth once. for 1 dose (Patient not taking: Reported on 3/6/2025), Disp: 1 tablet, Rfl: 0    benzonatate (TESSALON) 200 MG capsule, Take 1 capsule (200 mg total) by mouth every 8 (eight) hours as needed for Cough., Disp: 30 capsule, Rfl: 0    fluocinonide (LIDEX) 0.05 % external solution, AAA scalp qday - bid prn pruritus (Patient not taking: Reported on 3/13/2025), Disp: 60 mL, Rfl: 3    fluticasone propionate (FLONASE) 50 mcg/actuation nasal spray, 1 spray (50 mcg total) by Each Nostril route once daily., Disp: 9.9 mL, Rfl: 0    glucose 4 GM chewable tablet, Take 4 tablets (16 g " total) by mouth as needed for Low blood sugar (If having symptoms of blurry vision, palpitations, confusion, shakiness.  Please check sugars and if sugar below 70 please take 4 tablets and re-check sugar everry 15 minutes until sugars are above 70 and symptoms resolve.)., Disp: 50 tablet, Rfl: 12    insulin aspart U-100 (NOVOLOG FLEXPEN U-100 INSULIN) 100 unit/mL (3 mL) InPn pen, Inject 10 Units into the skin 3 (three) times daily with meals. TDD 60 units, Disp: 75 mL, Rfl: 11    linaCLOtide (LINZESS) 72 mcg Cap capsule, Take 1 capsule (72 mcg total) by mouth before breakfast. (Patient not taking: Reported on 3/6/2025), Disp: 30 capsule, Rfl: 0    methocarbamoL (ROBAXIN) 500 MG Tab, Take 1 tablet (500 mg total) by mouth 2 (two) times daily as needed (back pain)., Disp: 40 tablet, Rfl: 0    sodium chloride (SALINE NASAL) 0.65 % nasal spray, 1 spray by Nasal route as needed for Congestion. (Patient not taking: Reported on 3/13/2025), Disp: 15 mL, Rfl: 3    Current Facility-Administered Medications:     triamcinolone acetonide injection 20 mg, 20 mg, Intramuscular, 1 time in Clinic/HOD, Tani Pittman FNP

## 2025-03-13 NOTE — PROCEDURES
Large Joint Aspiration/Injection: L subacromial bursa    Date/Time: 3/13/2025 1:30 PM    Performed by: Joe Rushing MD  Authorized by: Joe Rushing MD    Consent Done?:  Yes (Verbal)  Indications:  Pain  Site marked: the procedure site was marked    Timeout: prior to procedure the correct patient, procedure, and site was verified    Prep: patient was prepped and draped in usual sterile fashion    Local anesthesia used?: No      Details:  Needle Size:  22 G  Ultrasonic Guidance for needle placement?: No    Approach:  Posterior  Location:  Shoulder  Site:  L subacromial bursa  Medications:  40 mg triamcinolone acetonide 40 mg/mL  Patient tolerance:  Patient tolerated the procedure well with no immediate complications

## 2025-03-14 ENCOUNTER — RESULTS FOLLOW-UP (OUTPATIENT)
Dept: INTERNAL MEDICINE | Facility: CLINIC | Age: 75
End: 2025-03-14

## 2025-03-14 ENCOUNTER — PATIENT MESSAGE (OUTPATIENT)
Dept: PODIATRY | Facility: CLINIC | Age: 75
End: 2025-03-14
Payer: MEDICARE

## 2025-03-14 DIAGNOSIS — M79.673 PAIN OF FOOT, UNSPECIFIED LATERALITY: Primary | ICD-10-CM

## 2025-03-14 RX ORDER — MELOXICAM 7.5 MG/1
7.5 TABLET ORAL DAILY
Qty: 5 TABLET | Refills: 0 | Status: SHIPPED | OUTPATIENT
Start: 2025-03-14 | End: 2025-03-19

## 2025-03-17 ENCOUNTER — TELEPHONE (OUTPATIENT)
Dept: GASTROENTEROLOGY | Facility: CLINIC | Age: 75
End: 2025-03-17

## 2025-03-17 ENCOUNTER — OFFICE VISIT (OUTPATIENT)
Dept: GASTROENTEROLOGY | Facility: CLINIC | Age: 75
End: 2025-03-17
Payer: MEDICARE

## 2025-03-17 VITALS
BODY MASS INDEX: 30.86 KG/M2 | HEIGHT: 64 IN | HEART RATE: 73 BPM | WEIGHT: 180.75 LBS | SYSTOLIC BLOOD PRESSURE: 141 MMHG | DIASTOLIC BLOOD PRESSURE: 72 MMHG

## 2025-03-17 DIAGNOSIS — K59.04 CHRONIC IDIOPATHIC CONSTIPATION: Primary | ICD-10-CM

## 2025-03-17 PROCEDURE — 1159F MED LIST DOCD IN RCRD: CPT | Mod: HCNC,CPTII,S$GLB,

## 2025-03-17 PROCEDURE — 1126F AMNT PAIN NOTED NONE PRSNT: CPT | Mod: HCNC,CPTII,S$GLB,

## 2025-03-17 PROCEDURE — 99214 OFFICE O/P EST MOD 30 MIN: CPT | Mod: HCNC,S$GLB,,

## 2025-03-17 PROCEDURE — 3288F FALL RISK ASSESSMENT DOCD: CPT | Mod: HCNC,CPTII,S$GLB,

## 2025-03-17 PROCEDURE — 99999 PR PBB SHADOW E&M-EST. PATIENT-LVL V: CPT | Mod: PBBFAC,HCNC,,

## 2025-03-17 PROCEDURE — 1101F PT FALLS ASSESS-DOCD LE1/YR: CPT | Mod: HCNC,CPTII,S$GLB,

## 2025-03-17 PROCEDURE — 3072F LOW RISK FOR RETINOPATHY: CPT | Mod: HCNC,CPTII,S$GLB,

## 2025-03-17 PROCEDURE — 3077F SYST BP >= 140 MM HG: CPT | Mod: HCNC,CPTII,S$GLB,

## 2025-03-17 PROCEDURE — 3078F DIAST BP <80 MM HG: CPT | Mod: HCNC,CPTII,S$GLB,

## 2025-03-17 NOTE — PROGRESS NOTES
"GENERAL GI PATIENT INTAKE:    COVID symptoms in the last 7 days (runny nose, sore throat, congestion, cough, fever): No  PCP: Smitha Turner  If not PCP-  number given to establish [phone]: N/A    ALLERGIES REVIEWED:  Yes    CHIEF COMPLAINT:    Chief Complaint   Patient presents with    Constipation    Follow-up       VITAL SIGNS:  BP (!) 141/72 (Patient Position: Sitting)   Pulse 73   Ht 5' 3.5" (1.613 m)   Wt 82 kg (180 lb 12.4 oz)   LMP 08/01/2000   BMI 31.52 kg/m²      Change in medical, surgical, family or social history:  Reviewed by NP      REVIEWED MEDICATION LIST RECONCILED INCLUDING ABOVE MEDS:  Yes     "

## 2025-03-17 NOTE — PROGRESS NOTES
Gastroenterology Clinic Consultation Note    Reason for Visit:  The encounter diagnosis was Chronic idiopathic constipation.    PCP:   Smitha Turner         Initial HPI   This is a 74 y.o. female presenting for constipation. Was given Linzess by her family medicine provider, but has not started taking. She is taking Miralax every day, but still not moving her bowels daily. Moves them about every 3-4 days. After chart review, it appears that she was given Linzess 72mcg by her primary GI MD,  but per the documentation it looks like this caused her diarrhea so she stopped. She denies blood in her stool. Does endorse some abdominal discomfort if constipated.       ROS:  Review of Systems   Constitutional:  Negative for chills, fever and weight loss.   Eyes:  Negative for redness.   Respiratory:  Negative for cough and wheezing.    Cardiovascular:  Negative for chest pain.   Gastrointestinal:  Positive for abdominal pain and constipation. Negative for blood in stool, diarrhea, heartburn, melena, nausea and vomiting.   Skin:  Negative for rash.   Neurological:  Negative for seizures, loss of consciousness and weakness.        Medical History:  has a past medical history of Allergy, DDD (degenerative disc disease), lumbar, Diabetes mellitus, GERD (gastroesophageal reflux disease), History of subconjunctival hemorrhage (2011), IBS (irritable bowel syndrome), Obesity, MYRIAM (obstructive sleep apnea), Rotator cuff impingement syndrome, Seasonal allergic conjunctivitis, and Type 2 diabetes mellitus treated with insulin.    Surgical History:  has a past surgical history that includes  section; Cholecystectomy; Eye surgery; Tubal ligation; Hysterectomy; Endoscopic ultrasound of upper gastrointestinal tract (N/A, 10/09/2018); Colonoscopy (N/A, 2018); Excision of lesion (N/A, 2019); Cataract extraction w/  intraocular lens  "implant (Right, 10/03/2013); Colonoscopy (N/A, 06/07/2022); Cataract extraction w/  intraocular lens implant (Left, 11/2022); Phacoemulsification of cataract (Left, 11/2/2022); and Intraocular prosthesis insertion (Left, 11/2/2022).    Family History: family history includes Alzheimer's disease in her mother; Breast cancer in her sister; Cancer in her brother; Deep vein thrombosis in her brother; Diabetes in her daughter and sister; Heart disease in her father; Lung cancer in her brother..       Review of patient's allergies indicates:   Allergen Reactions    Ace inhibitors Hives     \Cough    Latex, natural rubber Itching       Medications Ordered Prior to Encounter[1]      Objective Findings:    Vital Signs:  BP (!) 141/72 (Patient Position: Sitting)   Pulse 73   Ht 5' 3.5" (1.613 m)   Wt 82 kg (180 lb 12.4 oz)   LMP 08/01/2000   BMI 31.52 kg/m²   Body mass index is 31.52 kg/m².    Physical Exam:  Physical Exam  Vitals and nursing note reviewed.   Constitutional:       Appearance: She is obese. She is not ill-appearing.   HENT:      Mouth/Throat:      Mouth: Mucous membranes are moist.      Pharynx: Oropharynx is clear.   Eyes:      General: No scleral icterus.  Abdominal:      General: Abdomen is flat. Bowel sounds are normal. There is no distension.      Palpations: Abdomen is soft. There is no mass.      Tenderness: There is no abdominal tenderness.      Hernia: No hernia is present.   Skin:     General: Skin is warm and dry.      Capillary Refill: Capillary refill takes less than 2 seconds.      Coloration: Skin is not jaundiced or pale.   Neurological:      Mental Status: She is alert and oriented to person, place, and time. Mental status is at baseline.             Labs:  Lab Results   Component Value Date    WBC 5.49 11/14/2024    HGB 13.4 11/14/2024    HCT 41.6 11/14/2024     11/14/2024    CRP 11.9 (H) 02/15/2022    CHOL 214 (H) 11/14/2024    TRIG 203 (H) 11/14/2024    HDL 60 11/14/2024    " ALKPHOS 122 11/14/2024    LIPASE 59 08/12/2020    ALT 14 11/14/2024    AST 15 11/14/2024     11/14/2024    K 4.5 11/14/2024     11/14/2024    CREATININE 0.8 11/14/2024    BUN 9 11/14/2024    CO2 28 11/14/2024    TSH 2.402 11/14/2024    INR 0.9 04/19/2012    GLUF 105 10/27/2004    HGBA1C 7.9 (H) 11/14/2024       Imaging reviewed: No pertinent imaging reviewed       Endoscopy reviewed: Colonoscopy 6/7/2022  Impression:            - One 2 mm polyp in the cecum, removed with a                          jumbo cold forceps. Resected and retrieved.                          - Diverticulosis in the sigmoid colon and in the                          descending colon.   Recommendation:        - Discharge patient to home.                          - Resume previous diet.                          - Continue present medications.                          - Await pathology results.                          - Repeat colonoscopy in 5 years for surveillance                          based on clinical status at that time.                          - Patient has a contact number available for                          emergencies. The signs and symptoms of potential                          delayed complications were discussed with the                          patient. Return to normal activities tomorrow.                          Written discharge instructions were provided to                          the patient.   Pierce Rivera MD   6/7/2022 2:15:49 PM     Assessment:  1. Chronic idiopathic constipation      Orders Placed This Encounter    lactulose 20 gram/30 mL solution Soln 10 g         Plan:  Recommended daily Miralax. Lactulose PRN constipation.   RTC lack of improvement      Thank you for allowing me to participate in this patient's care.    Sincerely,     Rachelle Ornelas NP  Gastroenterology Department  [facility]               [1]   Current Outpatient Medications on File Prior to Visit  "  Medication Sig Dispense Refill    albuterol (PROVENTIL/VENTOLIN HFA) 90 mcg/actuation inhaler Inhale 1-2 puffs into the lungs every 4 (four) hours as needed for Wheezing. 18 g 2    amLODIPine (NORVASC) 10 MG tablet Take 1 tablet (10 mg total) by mouth once daily. 90 tablet 3    aspirin (ECOTRIN) 81 MG EC tablet Take 81 mg by mouth once daily.      benzonatate (TESSALON) 200 MG capsule Take 1 capsule (200 mg total) by mouth every 8 (eight) hours as needed for Cough. 30 capsule 0    blood-glucose meter (BLOOD GLUCOSE MONITORING) kit Three times a day (Patient taking differently: Three times a day NOT WORKING-PATIENT NEEDS NEW ONE) 1 each 0    citalopram (CELEXA) 10 MG tablet Take 1 tablet (10 mg total) by mouth once daily. 90 tablet 1    dulaglutide (TRULICITY) 3 mg/0.5 mL pen injector Inject 3 mg into the skin every 7 days.      ergocalciferol (ERGOCALCIFEROL) 50,000 unit Cap TAKE 1 CAPSULE BY MOUTH EVERY 7 DAYS 12 capsule 3    esomeprazole (NEXIUM) 40 MG capsule Take 1 capsule (40 mg total) by mouth before breakfast. 90 capsule 3    fluticasone propionate (FLONASE) 50 mcg/actuation nasal spray 1 spray (50 mcg total) by Each Nostril route once daily. 9.9 mL 0    fluticasone-salmeterol diskus inhaler 250-50 mcg Inhale 1 puff into the lungs 2 (two) times daily. Controller 60 each 6    gabapentin (NEURONTIN) 100 MG capsule Take 2 capsules (200 mg total) by mouth every evening. 180 capsule 3    insulin aspart U-100 (NOVOLOG FLEXPEN U-100 INSULIN) 100 unit/mL (3 mL) InPn pen Inject 10 Units into the skin 3 (three) times daily with meals. TDD 60 units 75 mL 11    insulin degludec (TRESIBA FLEXTOUCH U-100) 100 unit/mL (3 mL) insulin pen Inject 12-16 units at night . 15 mL 6    insulin syringe-needle U-100 0.3 mL 31 gauge x 5/16" Syrg relion brand, use 5 x a day, if not on levemir or novolog 150 each 1    insulin syringe-needle U-100 0.5 mL 31 gauge x 5/16" Syrg Use nightly. 90 day 100 each 3    lancets (ACCU-CHEK FASTCLIX " "LANCET DRUM) [employer] [facility] [employer] TEST BLOOD SUGAR THREE TIMES A  each 3    magnesium oxide (MAG-OX) 400 mg tablet Take 1 tablet (400 mg total) by mouth 2 (two) times daily. 180 tablet 3    meloxicam (MOBIC) 7.5 MG tablet Take 1 tablet (7.5 mg total) by mouth once daily. for 5 days 5 tablet 0    methocarbamoL (ROBAXIN) 500 MG Tab Take 1 tablet (500 mg total) by mouth 2 (two) times daily as needed (back pain). 40 tablet 0    montelukast (SINGULAIR) 10 mg tablet Take 10 mg by mouth every evening.      pen needle, diabetic (NOVOFINE 32) 32 gauge x 1/4" Ndle 4 injections per day 500 each 4    rosuvastatin (CRESTOR) 5 MG tablet Take 1 tablet (5 mg total) by mouth once daily. 90 tablet 3    traZODone (DESYREL) 50 MG tablet TAKE 1 TABLET BY MOUTH EVERY EVENING. 90 tablet 0    ACCU-CHEK FASTCLIX LANCING DEV Kit USE AS DIRECTED (Patient not taking: Reported on 3/17/2025) 1 each 0    ALPRAZolam (XANAX) 0.5 MG tablet Take 1 tablet (0.5 mg total) by mouth once. for 1 dose (Patient not taking: Reported on 3/6/2025) 1 tablet 0    blood sugar diagnostic (ACCU-CHEK GUIDE TEST STRIPS) Strp 3 times a day (Patient not taking: Reported on 3/17/2025) 300 strip 11    fluocinonide (LIDEX) 0.05 % external solution AAA scalp qday - bid prn pruritus (Patient not taking: Reported on 3/17/2025) 60 mL 3    glucose 4 GM chewable tablet Take 4 tablets (16 g total) by mouth as needed for Low blood sugar (If having symptoms of blurry vision, palpitations, confusion, shakiness.  Please check sugars and if sugar below 70 please take 4 tablets and re-check sugar everry 15 minutes until sugars are above 70 and symptoms resolve.). 50 tablet 12    linaCLOtide (LINZESS) 72 mcg Cap capsule Take 1 capsule (72 mcg total) by mouth before breakfast. (Patient not taking: Reported on 3/17/2025) 30 capsule 0    sodium chloride (SALINE NASAL) 0.65 % nasal spray 1 spray by Nasal route as needed for Congestion. (Patient not taking: Reported on 3/17/2025) 15 mL 3     Current " Facility-Administered Medications on File Prior to Visit   Medication Dose Route Frequency Provider Last Rate Last Admin    triamcinolone acetonide injection 20 mg  20 mg Intramuscular 1 time in Clinic/HOD Tani Pittman, LINSEYP

## 2025-03-17 NOTE — PATIENT INSTRUCTIONS
-Start miralax daily (17g per dose). Start at once per day and can titrate up to twice daily. Decrease and/or stop Miralax if stools become softer/liquid in consistency.

## 2025-03-17 NOTE — TELEPHONE ENCOUNTER
----- Message from Deyanira sent at 3/17/2025  3:33 PM CDT -----  Regarding: refill  .Type:  Needs Medical AdviceWho Called: pt Symptoms (please be specific): stated she went to pharmacy to pick medication listed up, but it was not there.  Explained I could see provider attempted to send, but could not see which pharmacy. May have been a transmission fail. Pls resubmit and call pt to let her know it was sent. Pharmacy name and phone #:  ..[id number] - [place] - [id number] [address]: [phone] Fax: [phone] the patient rather a call back or a response via [facility]? Call Best Call Back Number: [phone] Information: n/a

## 2025-03-18 ENCOUNTER — PATIENT MESSAGE (OUTPATIENT)
Dept: GASTROENTEROLOGY | Facility: CLINIC | Age: 75
End: 2025-03-18
Payer: MEDICARE

## 2025-03-20 ENCOUNTER — PATIENT MESSAGE (OUTPATIENT)
Dept: INTERNAL MEDICINE | Facility: CLINIC | Age: 75
End: 2025-03-20
Payer: MEDICARE

## 2025-03-26 ENCOUNTER — PATIENT MESSAGE (OUTPATIENT)
Dept: SPORTS MEDICINE | Facility: CLINIC | Age: 75
End: 2025-03-26
Payer: MEDICARE

## 2025-04-01 ENCOUNTER — LAB VISIT (OUTPATIENT)
Dept: LAB | Facility: HOSPITAL | Age: 75
End: 2025-04-01
Payer: MEDICARE

## 2025-04-01 ENCOUNTER — OFFICE VISIT (OUTPATIENT)
Dept: INTERNAL MEDICINE | Facility: CLINIC | Age: 75
End: 2025-04-01
Payer: MEDICARE

## 2025-04-01 VITALS
HEIGHT: 64 IN | SYSTOLIC BLOOD PRESSURE: 138 MMHG | OXYGEN SATURATION: 96 % | WEIGHT: 184.5 LBS | BODY MASS INDEX: 31.5 KG/M2 | HEART RATE: 69 BPM | DIASTOLIC BLOOD PRESSURE: 82 MMHG

## 2025-04-01 DIAGNOSIS — I70.0 ABDOMINAL AORTIC ATHEROSCLEROSIS: ICD-10-CM

## 2025-04-01 DIAGNOSIS — E66.811 CLASS 1 OBESITY DUE TO EXCESS CALORIES WITH SERIOUS COMORBIDITY AND BODY MASS INDEX (BMI) OF 33.0 TO 33.9 IN ADULT: ICD-10-CM

## 2025-04-01 DIAGNOSIS — I10 ESSENTIAL HYPERTENSION: ICD-10-CM

## 2025-04-01 DIAGNOSIS — H25.812 COMBINED FORM OF AGE-RELATED CATARACT, LEFT EYE: ICD-10-CM

## 2025-04-01 DIAGNOSIS — Z91.81 HISTORY OF RECENT FALL: ICD-10-CM

## 2025-04-01 DIAGNOSIS — M47.812 SPONDYLOSIS OF CERVICAL REGION WITHOUT MYELOPATHY OR RADICULOPATHY: ICD-10-CM

## 2025-04-01 DIAGNOSIS — R25.2 LEG CRAMPS: ICD-10-CM

## 2025-04-01 DIAGNOSIS — E66.09 CLASS 1 OBESITY DUE TO EXCESS CALORIES WITH SERIOUS COMORBIDITY AND BODY MASS INDEX (BMI) OF 33.0 TO 33.9 IN ADULT: ICD-10-CM

## 2025-04-01 DIAGNOSIS — E78.2 MIXED HYPERLIPIDEMIA: ICD-10-CM

## 2025-04-01 DIAGNOSIS — E11.49 TYPE II DIABETES MELLITUS WITH NEUROLOGICAL MANIFESTATIONS: Primary | ICD-10-CM

## 2025-04-01 DIAGNOSIS — E11.49 TYPE II DIABETES MELLITUS WITH NEUROLOGICAL MANIFESTATIONS: ICD-10-CM

## 2025-04-01 PROCEDURE — 82550 ASSAY OF CK (CPK): CPT | Mod: HCNC

## 2025-04-01 PROCEDURE — 36415 COLL VENOUS BLD VENIPUNCTURE: CPT | Mod: HCNC

## 2025-04-01 PROCEDURE — 80053 COMPREHEN METABOLIC PANEL: CPT | Mod: HCNC

## 2025-04-01 PROCEDURE — 99999 PR PBB SHADOW E&M-EST. PATIENT-LVL V: CPT | Mod: PBBFAC,HCNC,, | Performed by: NURSE PRACTITIONER

## 2025-04-01 PROCEDURE — 83735 ASSAY OF MAGNESIUM: CPT | Mod: HCNC

## 2025-04-01 PROCEDURE — 83036 HEMOGLOBIN GLYCOSYLATED A1C: CPT | Mod: HCNC

## 2025-04-01 RX ORDER — INSULIN DEGLUDEC 100 U/ML
INJECTION, SOLUTION SUBCUTANEOUS
Start: 2025-04-01

## 2025-04-01 NOTE — PATIENT INSTRUCTIONS
Follow up in 4 months w/Irielle  A1c, Cmp, CK, magnesium lab today  A1c urine mac in 4 months     Lab Results   Component Value Date    HGBA1C 7.9 (H) 11/14/2024     Goal less than 7.5%     [email or web address]  Eat fit trina  Myfitnesspal trina  [email or web address]    mySugr trina    Tresiba 14 units at night   Novolog 10 units before meals plus scale   If sugar is  < 100 or light meal-salad, soup -cut to 5 units   Trulicity 3 mg weekly       Goal  no higher than 180

## 2025-04-01 NOTE — PROGRESS NOTES
CHIEF COMPLAINT: Type 2 Diabetes     HPI: Mrs. Chelly Ortiz is a 74 y.o. female who was diagnosed with Type 2 DM x 9 years.  Has PN, CKD 3, MYRIAM, DDD, obesity.  Has 4 siblings with diabetes   Past Medical History:   Diagnosis Date    Allergy     DDD (degenerative disc disease), lumbar     Diabetes mellitus     diet controlled    GERD (gastroesophageal reflux disease)     History of subconjunctival hemorrhage 12/02/2011    left eye    IBS (irritable bowel syndrome)     Obesity     MYRIAM (obstructive sleep apnea)     on CPAP    Rotator cuff impingement syndrome     Seasonal allergic conjunctivitis     Type 2 diabetes mellitus treated with insulin        A1c trending down 8.3% to 7.9%  Overdue for labs  Dealing with leg cramps  Needs labs   Had a fall in the past 3 months, meloxicam helped.  Injury (L) great toe bruising/pain.   F/u with podiatry     Lab Results   Component Value Date    HGBA1C 7.9 (H) 11/14/2024     Has used novonordisk---pt assistance.  Last seen by me in fall 2024.   Enrolled with digital medicine, issues with meter/blood input   Injection (steroid) in shoulder-past month.    +mild 70s    139-140 mg/dl in am     PREVIOUS DIABETES MEDICATIONS TRIED  Glipizide  glimepiride  januvia  lantus   levemir  Metformin -diarrhea   trulicity- cost issue  Mounjaro     CURRENT DIABETIC MEDS: lantus 12-16  units at night  ,  novolog 10 units w/ scale , trulcity 3 mg weekly   Self adjustment per scale  Cost issues with insulins in the past  Enrolled with assistance program     Pt is monitoring blood glucose readings 4 times a day a day .  Skips breakfast  On MDI injections 3 x a day  Self adjusting per scale  Testing 4 x a day max  Patient is willing and able to use the device  Demonstrated an understanding of the technology and is motivated to use CGM  Patient expected to adhere to a comprehensive diabetes treatment plan and patient has adequate medical supervision  Patient experiences multiple impaired awareness  "of hypoglycemia (hypoglycemia unawareness)  Needs ~100 strips per month related to fluctuations with blood glucose reading, a1c trends, and activity level.    Diabetes Management Status    Statin: Taking  ACE/ARB: Taking    Screening or Prevention Patient's value Goal Complete/Controlled?   HgA1C Testing and Control   Lab Results   Component Value Date    HGBA1C 7.9 (H) 11/14/2024      Annually/Less than 8% No   Lipid profile : 11/14/2024 Annually Yes   LDL control Lab Results   Component Value Date    LDLCALC 113.4 11/14/2024    Annually/Less than 100 mg/dl  No   Nephropathy screening Lab Results   Component Value Date    LABMICR 20.0 10/24/2023     Lab Results   Component Value Date    PROTEINUA Negative 07/31/2024    Annually Yes   Blood pressure BP Readings from Last 1 Encounters:   03/17/25 (!) 141/72    Less than 140/90 Yes   Dilated retinal exam : 04/26/2024 Annually No   Foot exam   Most Recent Foot Exam Date: Not Found Annually Yes     REVIEW OF SYSTEMS  General: no weakness, fatigue, + weight fluctuations 1-3#   Eyes: no double or blurred vision, eye pain, or redness  Cardiovascular: no chest pain, palpitations, edema, or murmurs.   Respiratory: no cough or dyspnea.   GI: no heartburn, nausea, + changes in bowel patterns; good appetite. +IBS  Skin: no rashes, dryness, itching, or reactions at insulin injection sites.  Neuro: + numbness, tingling, tremors, or vertigo. +shoulder pain, +leg cramps   Endocrine: + polyuria, polydipsia, polyphagia, heat or cold intolerance.     Vital Signs  Pulse 69   Ht 5' 3.5" (1.613 m)   LMP 08/01/2000   SpO2 96%   BMI 31.52 kg/m²     Hemoglobin A1C   Date Value Ref Range Status   11/14/2024 7.9 (H) 4.0 - 5.6 % Final     Comment:     ADA Screening Guidelines:  5.7-6.4%  Consistent with prediabetes  >or=6.5%  Consistent with diabetes    High levels of fetal hemoglobin interfere with the HbA1C  assay. Heterozygous hemoglobin variants (HbS, HgC, etc)do  not significantly " interfere with this assay.   However, presence of multiple variants may affect accuracy.     10/14/2024 8.3 (H) 4.0 - 5.6 % Final     Comment:     ADA Screening Guidelines:  5.7-6.4%  Consistent with prediabetes  >or=6.5%  Consistent with diabetes    High levels of fetal hemoglobin interfere with the HbA1C  assay. Heterozygous hemoglobin variants (HbS, HgC, etc)do  not significantly interfere with this assay.   However, presence of multiple variants may affect accuracy.     07/20/2024 7.2 (H) 4.0 - 5.6 % Final     Comment:     ADA Screening Guidelines:  5.7-6.4%  Consistent with prediabetes  >or=6.5%  Consistent with diabetes    High levels of fetal hemoglobin interfere with the HbA1C  assay. Heterozygous hemoglobin variants (HbS, HgC, etc)do  not significantly interfere with this assay.   However, presence of multiple variants may affect accuracy.          Chemistry        Component Value Date/Time     11/14/2024 1234    K 4.5 11/14/2024 1234     11/14/2024 1234    CO2 28 11/14/2024 1234    BUN 9 11/14/2024 1234    CREATININE 0.8 11/14/2024 1234     (H) 11/14/2024 1234        Component Value Date/Time    CALCIUM 9.8 11/14/2024 1234    ALKPHOS 122 11/14/2024 1234    AST 15 11/14/2024 1234    ALT 14 11/14/2024 1234    BILITOT 0.5 11/14/2024 1234    ESTGFRAFRICA >60.0 06/13/2022 0841    EGFRNONAA >60.0 06/13/2022 0841           Lab Results   Component Value Date    TSH 2.402 11/14/2024      Lab Results   Component Value Date    CHOL 214 (H) 11/14/2024    CHOL 218 (H) 07/20/2024    CHOL 222 (H) 04/23/2024     Lab Results   Component Value Date    HDL 60 11/14/2024    HDL 61 07/20/2024    HDL 54 04/23/2024     Lab Results   Component Value Date    LDLCALC 113.4 11/14/2024    LDLCALC 135.8 07/20/2024    LDLCALC 146.4 04/23/2024     Lab Results   Component Value Date    TRIG 203 (H) 11/14/2024    TRIG 106 07/20/2024    TRIG 108 04/23/2024     Lab Results   Component Value Date    CHOLHDL 28.0 11/14/2024     CHOLHDL 28.0 07/20/2024    CHOLHDL 24.3 04/23/2024         PHYSICAL EXAMINATION  Constitutional: Appears well, no distress  Neck: Supple, trachea midline.   Respiratory: No wheezes, even and unlabored.  Cardiovascular: RRR; no edema.   Lymph: deferred   Skin: warm and dry; no injection site reactions, +sl acanthosis nigracans observed.  Neuro:patient alert and cooperative, normal affect; steady gait.    Diabetes Foot Exam:   deferred    Assessment/Plan  1. Type II diabetes mellitus with neurological manifestations  Hemoglobin A1C    Microalbumin/Creatinine Ratio, Urine    Hemoglobin A1C    Trulicity 3 mg weekly-pt assistance program  Tresiba 14 units at night   Novolog 5-10 units ac depending on bg   A1c goal less than 7.5%  Labs placed for today   Discussed dietary habits, stressors  F/u with podiatry       2. Spondylosis of cervical region without myelopathy or radiculopathy  May affect exercise, insulin resistance        3. Mixed hyperlipidemia  Lab Results   Component Value Date    LDLCALC 113.4 11/14/2024     Above goal   On statin  Rec: co q10  Labs ck       4. Essential hypertension  Microalbumin/Creatinine Ratio, Urine  Bp managed per pcp  Stable   On arb/acei      5. Class 1 obesity due to excess calories with serious comorbidity and body mass index (BMI) of 33.0 to 33.9 in adult  Body mass index is 32.17 kg/m².  May increase insulin resistance  Losing weight   On trulicity       6. Combined form of age-related cataract, left eye  F/u with ophthalmology  Stable       7. Abdominal aortic atherosclerosis  See above  Stable-ldl  F/u with cards       8. Leg cramps  Comprehensive Metabolic Panel    Magnesium    CK  Labs today

## 2025-04-02 ENCOUNTER — PATIENT MESSAGE (OUTPATIENT)
Dept: INTERNAL MEDICINE | Facility: CLINIC | Age: 75
End: 2025-04-02
Payer: MEDICARE

## 2025-04-02 DIAGNOSIS — M25.511 ACUTE PAIN OF BOTH SHOULDERS: Primary | ICD-10-CM

## 2025-04-02 DIAGNOSIS — R25.2 LEG CRAMPS: ICD-10-CM

## 2025-04-02 DIAGNOSIS — M25.512 ACUTE PAIN OF BOTH SHOULDERS: Primary | ICD-10-CM

## 2025-04-02 LAB
ALBUMIN SERPL BCP-MCNC: 3.8 G/DL (ref 3.5–5.2)
ALP SERPL-CCNC: 117 UNIT/L (ref 40–150)
ALT SERPL W/O P-5'-P-CCNC: 14 UNIT/L (ref 10–44)
ANION GAP (OHS): 12 MMOL/L (ref 8–16)
AST SERPL-CCNC: 20 UNIT/L (ref 11–45)
BILIRUB SERPL-MCNC: 0.6 MG/DL (ref 0.1–1)
BUN SERPL-MCNC: 10 MG/DL (ref 8–23)
CALCIUM SERPL-MCNC: 9.2 MG/DL (ref 8.7–10.5)
CHLORIDE SERPL-SCNC: 104 MMOL/L (ref 95–110)
CK SERPL-CCNC: 232 U/L (ref 20–180)
CO2 SERPL-SCNC: 25 MMOL/L (ref 23–29)
CREAT SERPL-MCNC: 0.7 MG/DL (ref 0.5–1.4)
EAG (OHS): 163 MG/DL (ref 68–131)
GFR SERPLBLD CREATININE-BSD FMLA CKD-EPI: >60 ML/MIN/1.73/M2
GLUCOSE SERPL-MCNC: 94 MG/DL (ref 70–110)
HBA1C MFR BLD: 7.3 % (ref 4–5.6)
MAGNESIUM SERPL-MCNC: 2.5 MG/DL (ref 1.6–2.6)
POTASSIUM SERPL-SCNC: 3.6 MMOL/L (ref 3.5–5.1)
PROT SERPL-MCNC: 7.5 GM/DL (ref 6–8.4)
SODIUM SERPL-SCNC: 141 MMOL/L (ref 136–145)

## 2025-04-03 ENCOUNTER — OFFICE VISIT (OUTPATIENT)
Dept: RHEUMATOLOGY | Facility: CLINIC | Age: 75
End: 2025-04-03
Payer: MEDICARE

## 2025-04-03 VITALS
BODY MASS INDEX: 32.43 KG/M2 | WEIGHT: 183 LBS | DIASTOLIC BLOOD PRESSURE: 80 MMHG | HEIGHT: 63 IN | HEART RATE: 71 BPM | SYSTOLIC BLOOD PRESSURE: 134 MMHG

## 2025-04-03 DIAGNOSIS — M25.512 ACUTE PAIN OF BOTH SHOULDERS: ICD-10-CM

## 2025-04-03 DIAGNOSIS — M25.511 ACUTE PAIN OF BOTH SHOULDERS: ICD-10-CM

## 2025-04-03 DIAGNOSIS — R25.2 LEG CRAMPS: ICD-10-CM

## 2025-04-03 PROBLEM — R29.3 ABNORMAL POSTURE: Status: RESOLVED | Noted: 2024-03-20 | Resolved: 2025-04-03

## 2025-04-03 PROBLEM — M15.0 PRIMARY OSTEOARTHRITIS INVOLVING MULTIPLE JOINTS: Status: ACTIVE | Noted: 2022-03-15

## 2025-04-03 PROCEDURE — 99999 PR PBB SHADOW E&M-EST. PATIENT-LVL V: CPT | Mod: PBBFAC,HCNC,, | Performed by: INTERNAL MEDICINE

## 2025-04-03 ASSESSMENT — ROUTINE ASSESSMENT OF PATIENT INDEX DATA (RAPID3)
PAIN SCORE: 3
TOTAL RAPID3 SCORE: 3.5
PATIENT GLOBAL ASSESSMENT SCORE: 5.5
AM STIFFNESS SCORE: 0, NO
MDHAQ FUNCTION SCORE: 0.6
PSYCHOLOGICAL DISTRESS SCORE: 2.2
FATIGUE SCORE: 2.5

## 2025-04-03 NOTE — PROGRESS NOTES
"Subjective:       Patient ID: Chelly Ortiz is a 74 y.o. female.    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   HENT:  Positive for mouth sores. Negative for trouble swallowing.    Eyes:  Positive for redness.   Respiratory:  Positive for cough and shortness of breath.    Cardiovascular:  Negative for chest pain.   Gastrointestinal:  Positive for constipation. Negative for diarrhea.   Genitourinary:  Negative for dysuria and genital sores.   Skin:  Negative for rash.   Neurological:  Negative for headaches.   Hematological:  Bruises/bleeds easily.         Objective:   /80 (BP Location: Right arm, Patient Position: Sitting)   Pulse 71   Ht 5' 3" (1.6 m)   Wt 83 kg (182 lb 15.7 oz)   LMP 08/01/2000   BMI 32.41 kg/m²      Physical Exam   Constitutional: She appears obese.   Musculoskeletal:      Comments: Mild osteoarthritis thumbs  Full Range of Motion shoulders         Assessment:       1. Acute pain of both shoulders    2. Leg cramps            Plan:       Problem List Items Addressed This Visit          Active Problems    Acute pain of both shoulders    Describes intermittent pain of shoulders with use, consistent with mechanical pain and degenerative rotator " cuff disease    Can consider PT (physical therapy) and /or orthopedics         Leg cramps    She has intermittent leg cramps and has mildly elevated CK  She does not have muscle weakness and regularly ascends stairs in her house  She thinks that statins contribute to muscle cramps. She does not have clinical myopathy to indicate statin induced myositis.     CK is not clinically significant and would not evaluate it further in the absence of functional muscle weakness    Myalgia and cramps are more likely related to her IDDM , but trial of statin may be reasonable to assure her that they are not causing muscle cramps.     She does not appear to need further rheumatology workup

## 2025-04-03 NOTE — PROGRESS NOTES
4/2/2025    11:51 PM   Rapid3 Question Responses and Scores   MDHAQ Score 0.6   Psychologic Score 2.2   Pain Score 3   When you awakened in the morning OVER THE LAST WEEK, did you feel stiff? No   Fatigue Score 2.5   Global Health Score 5.5   RAPID3 Score 3.5        Answers submitted by the patient for this visit:  Rheumatology Questionnaire (Submitted on 4/2/2025)  fever: No  eye redness: Yes  mouth sores: Yes  headaches: No  shortness of breath: Yes  chest pain: No  trouble swallowing: No  diarrhea: No  constipation: Yes  unexpected weight change: No  genital sore: No  dysuria: No  During the last 3 days, have you had a skin rash?: No  Bruises or bleeds easily: Yes  cough: Yes

## 2025-04-04 NOTE — ASSESSMENT & PLAN NOTE
Describes intermittent pain of shoulders with use, consistent with mechanical pain and degenerative rotator cuff disease    Can consider PT (physical therapy) and /or orthopedics

## 2025-04-04 NOTE — ASSESSMENT & PLAN NOTE
She has intermittent leg cramps and has mildly elevated CK  She does not have muscle weakness and regularly ascends stairs in her house  She thinks that statins contribute to muscle cramps. She does not have clinical myopathy to indicate statin induced myositis.     CK is not clinically significant and would not evaluate it further in the absence of functional muscle weakness    Myalgia and cramps are more likely related to her IDDM , but trial of statin may be reasonable to assure her that they are not causing muscle cramps.     She does not appear to need further rheumatology workup

## 2025-04-10 ENCOUNTER — OFFICE VISIT (OUTPATIENT)
Dept: PODIATRY | Facility: CLINIC | Age: 75
End: 2025-04-10
Payer: MEDICARE

## 2025-04-10 VITALS
HEIGHT: 63 IN | DIASTOLIC BLOOD PRESSURE: 81 MMHG | BODY MASS INDEX: 32.43 KG/M2 | SYSTOLIC BLOOD PRESSURE: 151 MMHG | HEART RATE: 77 BPM | RESPIRATION RATE: 18 BRPM | WEIGHT: 183 LBS

## 2025-04-10 DIAGNOSIS — M79.675 PAIN IN LEFT TOE(S): ICD-10-CM

## 2025-04-10 DIAGNOSIS — E11.49 TYPE II DIABETES MELLITUS WITH NEUROLOGICAL MANIFESTATIONS: Primary | ICD-10-CM

## 2025-04-10 PROCEDURE — 1125F AMNT PAIN NOTED PAIN PRSNT: CPT | Mod: CPTII,HCNC,S$GLB, | Performed by: PODIATRIST

## 2025-04-10 PROCEDURE — 3077F SYST BP >= 140 MM HG: CPT | Mod: CPTII,HCNC,S$GLB, | Performed by: PODIATRIST

## 2025-04-10 PROCEDURE — 99213 OFFICE O/P EST LOW 20 MIN: CPT | Mod: HCNC,S$GLB,, | Performed by: PODIATRIST

## 2025-04-10 PROCEDURE — 3072F LOW RISK FOR RETINOPATHY: CPT | Mod: CPTII,HCNC,S$GLB, | Performed by: PODIATRIST

## 2025-04-10 PROCEDURE — 1159F MED LIST DOCD IN RCRD: CPT | Mod: CPTII,HCNC,S$GLB, | Performed by: PODIATRIST

## 2025-04-10 PROCEDURE — 3079F DIAST BP 80-89 MM HG: CPT | Mod: CPTII,HCNC,S$GLB, | Performed by: PODIATRIST

## 2025-04-10 PROCEDURE — 99999 PR PBB SHADOW E&M-EST. PATIENT-LVL IV: CPT | Mod: PBBFAC,HCNC,, | Performed by: PODIATRIST

## 2025-04-23 ENCOUNTER — OFFICE VISIT (OUTPATIENT)
Dept: INTERNAL MEDICINE | Facility: CLINIC | Age: 75
End: 2025-04-23
Payer: MEDICARE

## 2025-04-23 ENCOUNTER — HOSPITAL ENCOUNTER (OUTPATIENT)
Dept: RADIOLOGY | Facility: HOSPITAL | Age: 75
Discharge: HOME | End: 2025-04-23
Attending: INTERNAL MEDICINE
Payer: MEDICARE

## 2025-04-23 ENCOUNTER — RESULTS FOLLOW-UP (OUTPATIENT)
Dept: INTERNAL MEDICINE | Facility: CLINIC | Age: 75
End: 2025-04-23

## 2025-04-23 VITALS
BODY MASS INDEX: 32.54 KG/M2 | WEIGHT: 183.63 LBS | HEIGHT: 63 IN | OXYGEN SATURATION: 96 % | DIASTOLIC BLOOD PRESSURE: 77 MMHG | HEART RATE: 77 BPM | SYSTOLIC BLOOD PRESSURE: 137 MMHG

## 2025-04-23 DIAGNOSIS — M54.9 UPPER BACK PAIN: ICD-10-CM

## 2025-04-23 DIAGNOSIS — M54.9 UPPER BACK PAIN: Primary | ICD-10-CM

## 2025-04-23 DIAGNOSIS — E11.9 TYPE 2 DIABETES MELLITUS WITHOUT COMPLICATION, WITH LONG-TERM CURRENT USE OF INSULIN: ICD-10-CM

## 2025-04-23 DIAGNOSIS — Z79.4 TYPE 2 DIABETES MELLITUS WITHOUT COMPLICATION, WITH LONG-TERM CURRENT USE OF INSULIN: ICD-10-CM

## 2025-04-23 DIAGNOSIS — I10 ESSENTIAL HYPERTENSION: ICD-10-CM

## 2025-04-23 PROCEDURE — 71046 X-RAY EXAM CHEST 2 VIEWS: CPT | Mod: 26,HCNC,, | Performed by: RADIOLOGY

## 2025-04-23 PROCEDURE — 71046 X-RAY EXAM CHEST 2 VIEWS: CPT | Mod: TC,HCNC

## 2025-04-23 PROCEDURE — 3288F FALL RISK ASSESSMENT DOCD: CPT | Mod: HCNC,CPTII,S$GLB, | Performed by: INTERNAL MEDICINE

## 2025-04-23 PROCEDURE — 72070 X-RAY EXAM THORAC SPINE 2VWS: CPT | Mod: 26,HCNC,, | Performed by: RADIOLOGY

## 2025-04-23 PROCEDURE — 99214 OFFICE O/P EST MOD 30 MIN: CPT | Mod: HCNC,S$GLB,, | Performed by: INTERNAL MEDICINE

## 2025-04-23 PROCEDURE — 99999 PR PBB SHADOW E&M-EST. PATIENT-LVL III: CPT | Mod: PBBFAC,HCNC,, | Performed by: INTERNAL MEDICINE

## 2025-04-23 PROCEDURE — 3075F SYST BP GE 130 - 139MM HG: CPT | Mod: HCNC,CPTII,S$GLB, | Performed by: INTERNAL MEDICINE

## 2025-04-23 PROCEDURE — 3078F DIAST BP <80 MM HG: CPT | Mod: HCNC,CPTII,S$GLB, | Performed by: INTERNAL MEDICINE

## 2025-04-23 PROCEDURE — 72070 X-RAY EXAM THORAC SPINE 2VWS: CPT | Mod: TC,HCNC

## 2025-04-23 PROCEDURE — 1101F PT FALLS ASSESS-DOCD LE1/YR: CPT | Mod: HCNC,CPTII,S$GLB, | Performed by: INTERNAL MEDICINE

## 2025-04-23 PROCEDURE — 3072F LOW RISK FOR RETINOPATHY: CPT | Mod: HCNC,CPTII,S$GLB, | Performed by: INTERNAL MEDICINE

## 2025-04-23 PROCEDURE — 1125F AMNT PAIN NOTED PAIN PRSNT: CPT | Mod: HCNC,CPTII,S$GLB, | Performed by: INTERNAL MEDICINE

## 2025-04-24 ENCOUNTER — TELEPHONE (OUTPATIENT)
Dept: OPTOMETRY | Facility: CLINIC | Age: 75
End: 2025-04-24
Payer: MEDICARE

## 2025-04-24 NOTE — PROGRESS NOTES
Subjective:       Patient ID: Chelly Ortiz is a 74 y.o. female.    Chief Complaint: Back Pain      History of Present Illness      Ms. Ortiz presents today for upper back pain    She reports constant bilateral upper back pain affecting the entire upper back region. She also reports neck pain with limited movement and a catching sensation. Previous MRI indicated the neck pain likely originates from the shoulder. She was last seen by pain management in [place] in February.     Last month, she experienced a fall where she injured her foot and toe, with her toenail jamming back into her foot. The foot remains bruised but with improved pain. She was placed in a boot and instructed by podiatry to continue icing. She denies other injuries from the fall.      DIABETES:  Her HbA1c improved from 8.3% to 7.3% last month. She switched from Mounjaro to Trulicity due to cramping side effects. She reports difficulty managing sugar cravings.  Also on Novolog and tesiba.     Hypertension:  Well controlled on amlodipine     Hyperlipidemia:   Now back on statin and tolerating well.       ROS:  General: -fever, -chills, -fatigue, -weight gain, -weight loss  Eyes: -vision changes, -redness, -discharge  ENT: -ear pain, -nasal congestion, -sore throat  Cardiovascular: -chest pain, -palpitations, -lower extremity edema  Respiratory: -cough, -shortness of breath  Gastrointestinal: -abdominal pain, -nausea, -vomiting, -diarrhea, -constipation, -blood in stool  Genitourinary: -dysuria, -hematuria, -frequency  Musculoskeletal: -joint pain, -muscle pain, +back pain, +neck pain, +neck stiffness, +muscle cramps, +leg cramping, +pain with movement  Skin: -rash, -lesion  Neurological: -headache, -dizziness, -numbness, -tingling  Psychiatric: -anxiety, -depression, -sleep difficulty  Endocrine: +unusual cravings                 Past Medical History:   Diagnosis Date    Allergy     DDD (degenerative disc disease), lumbar     Diabetes mellitus      diet controlled    GERD (gastroesophageal reflux disease)     History of subconjunctival hemorrhage 2011    left eye    IBS (irritable bowel syndrome)     Obesity     MYRIAM (obstructive sleep apnea)     on CPAP    Rotator cuff impingement syndrome     Seasonal allergic conjunctivitis     Type 2 diabetes mellitus treated with insulin      Past Surgical History:   Procedure Laterality Date    CATARACT EXTRACTION W/  INTRAOCULAR LENS IMPLANT Right 10/03/2013        CATARACT EXTRACTION W/  INTRAOCULAR LENS IMPLANT Left 2022         SECTION      x2    CHOLECYSTECTOMY      COLONOSCOPY N/A 2018    Procedure: COLONOSCOPY;  Surgeon: Marie Mejia MD;  Location: [facility] ENDO;  Service: Endoscopy;  Laterality: N/A;    COLONOSCOPY N/A 2022    Procedure: COLONOSCOPY;  Surgeon: Pierce Rivera MD;  Location: [facility] ENDO;  Service: Endoscopy;  Laterality: N/A;    ENDOSCOPIC ULTRASOUND OF UPPER GASTROINTESTINAL TRACT N/A 10/09/2018    Procedure: ULTRASOUND, UPPER GI TRACT, ENDOSCOPIC;  Surgeon: Javy Simpson MD;  Location: [facility] ENDO;  Service: Endoscopy;  Laterality: N/A;    EXCISION OF LESION N/A 2019    Procedure: EXCISION, LESION VULVA;  Surgeon: Liv Odell MD;  Location: [facility] OR;  Service: OB/GYN;  Laterality: N/A;  latex allergy    EYE SURGERY      HYSTERECTOMY      ovaries intact, due to DUB    INTRAOCULAR PROSTHESES INSERTION Left 2022    Procedure: INSERTION, IOL PROSTHESIS;  Surgeon: lCarice Herzog MD;  Location: [facility] OR [address] [place] [address];  Service: Ophthalmology;  Laterality: Left;    PHACOEMULSIFICATION OF CATARACT Left 2022    Procedure: PHACOEMULSIFICATION, CATARACT;  Surgeon: Clarice Herzog MD;  Location: [facility] OR [address] [place] [address];  Service: Ophthalmology;  Laterality: Left;    TUBAL LIGATION        Problem List[1]     Objective:      Physical Exam  Constitutional:       Appearance: Normal appearance.   HENT:      Head: Normocephalic.    Cardiovascular:      Rate and Rhythm: Normal rate and regular rhythm.      Pulses: Normal pulses.      Heart sounds: Normal heart sounds.   Pulmonary:      Effort: Pulmonary effort is normal.      Breath sounds: Normal breath sounds.   Abdominal:      General: Abdomen is flat. Bowel sounds are normal.      Palpations: Abdomen is soft.   Musculoskeletal:         General: Normal range of motion.      Cervical back: Normal range of motion and neck supple.   Skin:     General: Skin is warm and dry.   Neurological:      General: No focal deficit present.      Mental Status: She is alert and oriented to person, place, and time.   Psychiatric:         Mood and Affect: Mood normal.         Assessment:       Problem List Items Addressed This Visit          Cardiac/Vascular    Essential hypertension       Endocrine    Type 2 diabetes mellitus without complication, with long-term current use of insulin     Other Visit Diagnoses         Upper back pain    -  Primary    Relevant Orders    X-Ray Thoracic Spine AP Lateral (Completed)    X-Ray Chest PA And Lateral (Completed)            Plan:         Chelly was seen today for back pain.    Diagnoses and all orders for this visit:    Upper back pain  -     X-Ray Thoracic Spine AP Lateral; Future  -     X-Ray Chest PA And Lateral; Future  - Educated the patient that protruding sternum is a normal anatomical variation in some individuals.  - Ordered XR Upper Back and Sternum to evaluate the patient's reported upper back pain across both sides.  - Considered referral to pain management specialist Dr. Tani Pittman for potential further treatment options, including possible injection if conservative measures fail.  - Recommend follow-up after XR results.  - Suggested muscle relaxer as a treatment option.  - Increased gabapentin dosage from 200 mg to 300 mg at night.      Type 2 diabetes mellitus without complication, with long-term current use of insulin  - Noted improvement in the  patient's A1C from 8.3 to 7.3.  - Observed improvement in the patient's sugar levels.  - Acknowledged improvement in diabetes management.  - Continued insulin therapy.    Essential hypertension  - Continued amlodipine for blood pressure management.   Well controlled on current dose.                    This note was generated with the assistance of ambient listening technology. Verbal consent was obtained by the patient and accompanying visitor(s) for the recording of patient appointment to facilitate this note. I attest to having reviewed and edited the generated note for accuracy, though some syntax or spelling errors may persist. Please contact the author of this note for any clarification.       Smitha Turner MD   Internal Medicine   Primary Care           [1]   Patient Active Problem List  Diagnosis    GERD (gastroesophageal reflux disease)    MYRIAM (obstructive sleep apnea)    IBS (irritable bowel syndrome)    DDD (degenerative disc disease), lumbar    Insomnia    Anxiety    Hearing loss, sensorineural    Nuclear sclerotic cataract of left eye    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    Constipation    History of colon polyps    Essential hypertension    Hyperlipidemia, unspecified    Class 1 obesity due to excess calories with serious comorbidity and body mass index (BMI) of 33.0 to 33.9 in adult    Spondylosis of cervical region without myelopathy or radiculopathy    Cervical myofascial pain syndrome    PCO (posterior capsular opacification), right    Dry eye syndrome    Pseudophakia    Decreased range of motion of lumbar spine    Decreased strength    Dilated bile duct    History of colonic polyps    EIC (epidermal inclusion cyst)    Sedentary lifestyle    Dysphagia    Type 2 diabetes mellitus without complication, with long-term current use of insulin    Abdominal aortic atherosclerosis    Decreased ROM of neck    Primary osteoarthritis involving multiple joints    Osteopenia of multiple sites    Combined  form of age-related cataract, left eye    Adjustment disorder with anxious mood    Decreased ROM of right shoulder    History of epistaxis    Acute pain of both shoulders    Decreased ROM of left shoulder    Palpitations    Secondary osteoarthritis of right shoulder due to rotator cuff tear    Chronic obstructive pulmonary disease, unspecified COPD type    Type II diabetes mellitus with neurological manifestations    Rotator cuff tear arthropathy of left shoulder    Nontraumatic complete tear of right rotator cuff    Leg cramps    History of recent fall

## 2025-04-24 NOTE — TELEPHONE ENCOUNTER
----- Message from Carmela sent at 4/24/2025 11:34 AM CDT -----  Regarding: Bump on eye lid  Type:  Needs Medical AdviceWho Called: Chelly MccormicksSymptoms (please be specific): Bump that is itching under right eyeHow long has patient had these symptoms:  MondayPharmacy name and phone #:  Would the patient rather a call back or a response via [facility]? Call [facility] Call Back Number: [phone] Information: Patient states eye is somewhat swollen.

## 2025-04-25 ENCOUNTER — PATIENT MESSAGE (OUTPATIENT)
Dept: INTERNAL MEDICINE | Facility: CLINIC | Age: 75
End: 2025-04-25
Payer: MEDICARE

## 2025-04-30 NOTE — PROGRESS NOTES
Subjective:     Patient ID: Chelly Ortiz is a 74 y.o. female.    Chief Complaint: Diabetic Foot Exam and Foot Pain (Left foot had an injured left big toe )    Chelly is a 74 y.o. female who presents to the clinic upon referral from Dr. Venice multani. provider found  for evaluation and treatment of diabetic feet. Chelly has a past medical history of Allergy, DDD (degenerative disc disease), lumbar, Diabetes mellitus, GERD (gastroesophageal reflux disease), History of subconjunctival hemorrhage (12/02/2011), IBS (irritable bowel syndrome), Obesity, MYRIAM (obstructive sleep apnea), Rotator cuff impingement syndrome, Seasonal allergic conjunctivitis, and Type 2 diabetes mellitus treated with insulin. Patient relates no major problem with feet. Only complaints today consist of yearly comprehensive diabetic  foot examination and l great toe pain .    PCP: Smitha Turner MD    Date Last Seen by PCP:   Chief Complaint   Patient presents with    Diabetic Foot Exam    Foot Pain     Left foot had an injured left big toe          Current shoe gear: Casual shoes    Hemoglobin A1C   Date Value Ref Range Status   11/14/2024 7.9 (H) 4.0 - 5.6 % Final     Comment:     ADA Screening Guidelines:  5.7-6.4%  Consistent with prediabetes  >or=6.5%  Consistent with diabetes    High levels of fetal hemoglobin interfere with the HbA1C  assay. Heterozygous hemoglobin variants (HbS, HgC, etc)do  not significantly interfere with this assay.   However, presence of multiple variants may affect accuracy.     10/14/2024 8.3 (H) 4.0 - 5.6 % Final     Comment:     ADA Screening Guidelines:  5.7-6.4%  Consistent with prediabetes  >or=6.5%  Consistent with diabetes    High levels of fetal hemoglobin interfere with the HbA1C  assay. Heterozygous hemoglobin variants (HbS, HgC, etc)do  not significantly interfere with this assay.   However, presence of multiple variants may affect accuracy.     07/20/2024 7.2 (H) 4.0 - 5.6 % Final     Comment:     ADA  Screening Guidelines:  5.7-6.4%  Consistent with prediabetes  >or=6.5%  Consistent with diabetes    High levels of fetal hemoglobin interfere with the HbA1C  assay. Heterozygous hemoglobin variants (HbS, HgC, etc)do  not significantly interfere with this assay.   However, presence of multiple variants may affect accuracy.       Hemoglobin A1c   Date Value Ref Range Status   04/01/2025 7.3 (H) 4.0 - 5.6 % Final     Comment:     ADA Screening Guidelines:  5.7-6.4%  Consistent with prediabetes  >=6.5%  Consistent with diabetes    High levels of fetal hemoglobin interfere with the HbA1C  assay. Heterozygous hemoglobin variants (HbS, HgC, etc)do  not significantly interfere with this assay.   However, presence of multiple variants may affect accuracy.         Review of Systems   Constitutional: Negative for chills, decreased appetite and fever.   Cardiovascular:  Negative for leg swelling.   Musculoskeletal:  Negative for arthritis, joint pain, joint swelling and myalgias.   Gastrointestinal:  Negative for nausea and vomiting.   Neurological:  Negative for loss of balance, numbness and paresthesias.        Objective:     Physical Exam  Vitals and nursing note reviewed.   Constitutional:       General: She is not in acute distress.     Appearance: She is well-developed. She is not toxic-appearing or diaphoretic.      Comments: alert and oriented x 3.    Cardiovascular:      Pulses:           Dorsalis pedis pulses are 2+ on the right side and 2+ on the left side.        Posterior tibial pulses are 2+ on the right side and 2+ on the left side.      Comments:  Capillary refill time is within normal limits. Digital hair present.   Pulmonary:      Effort: No respiratory distress.   Musculoskeletal:         General: No deformity or signs of injury.      Right ankle: No tenderness. No lateral malleolus, medial malleolus, AITF ligament, CF ligament or posterior TF ligament tenderness.      Right Achilles Tendon: No defects.  Gonzalez's test negative.      Left ankle: No tenderness. No lateral malleolus, medial malleolus, AITF ligament, CF ligament or posterior TF ligament tenderness.      Left Achilles Tendon: No defects. Gonzalez's test negative.      Right foot: No tenderness or bony tenderness.      Left foot: No tenderness or bony tenderness.      Comments: Adequate joint range of motion without pain, limitation, nor crepitation Bilateral feet and ankle joints. Muscle strength is 5/5 in all groups bilaterally.           Feet:      Right foot:      Protective Sensation: 5 sites tested.  5 sites sensed.      Skin integrity: Skin integrity normal.      Left foot:      Protective Sensation: 5 sites tested.  5 sites sensed.      Skin integrity: Skin integrity normal.   Lymphadenopathy:      Comments: No lymphatic streaking     Skin:     General: Skin is warm and dry.      Coloration: Skin is not pale.      Findings: No rash.      Nails: There is no clubbing.      Comments: Skin is of normal turgor.   Normal temperature gradient.  Examination of the skin reveals no evidence of significant rashes, open lesions, suspicious appearing nevi or other concerning lesions.      Toenails 1-5 bilaterally are neatly trimmed; of normal color and thickness   Neurological:      Sensory: No sensory deficit.      Motor: No atrophy.      Comments: Light touch present     Psychiatric:         Attention and Perception: She is attentive.         Mood and Affect: Mood is not anxious. Affect is not inappropriate.         Speech: She is communicative. Speech is not slurred.         Behavior: Behavior is not combative.           Assessment:      Encounter Diagnoses   Name Primary?    Type II diabetes mellitus with neurological manifestations Yes    Pain in left toe(s)      Plan:     Chelly was seen today for diabetic foot exam and foot pain.    Diagnoses and all orders for this visit:    Type II diabetes mellitus with neurological manifestations    Pain in left  toe(s)      I counseled the patient on her conditions, their implications and medical management.    - Shoe inspection. Diabetic Foot Education. Patient reminded of the importance of good nutrition and blood sugar control to help prevent podiatric complications of diabetes. Patient instructed on proper foot hygeine. We discussed wearing proper shoe gear, daily foot inspections, never walking without protective shoe gear, caution putting sharp instruments to feet     - Discussed DM foot care:  Wear comfortable, proper fitting shoes. Wash feet daily. Dry well. After drying, apply moisturizer to feet (no lotion to webspaces). Inspect feet daily for skin breaks, blisters, swelling, or redness. Wear cotton socks (preferably white)  Change socks every day. Do NOT walk barefoot. Do NOT use heating pads or warm/hot water soaks     - Discussed importance of daily moisturizer to the feet such as Cerave,Sween, Or Cetaphil    - Patient is low risk for developing lower extremity issues secondary to diabetes.     - Reviewed current medication(s), and lab(s) specific to foot ailment(s) with patient in detail. All questions answered     - Independent review of patients previous clinical notes    - I recommend continued yearly diabetic foot examinations by Myself or PCP    - Currently  patient has NO pedal manifestations of DM    - Patients with out pedal manifestations of DM, do not qualify for Diabetic Shoes/Inserts nor  nail/callus trimming     - RTC in 1 yr or  PRN       .

## 2025-05-24 ENCOUNTER — TELEPHONE (OUTPATIENT)
Dept: OPTOMETRY | Facility: CLINIC | Age: 75
End: 2025-05-24
Payer: MEDICARE

## 2025-05-24 NOTE — TELEPHONE ENCOUNTER
----- Message from Michael Villasenor OD sent at 5/24/2025 12:00 PM CDT -----  Regarding: RE: Advice  Contact: [phone]  Needs to use ATs 3-4x/day and schedule Sept routine  ----- Message -----  From: Rachel Viera MA  Sent: 5/24/2025  11:43 AM CDT  To: Michael Villasenor OD  Subject: FW: Advice                                       Please advise....  ----- Message -----  From: Sofía Reardon  Sent: 5/23/2025   1:28 PM CDT  To: [facility] Staff  Subject: Advice                                           Type:  Needs Medical AdviceWho Called: Chelly Ortiz Symptoms (please be specific): Seems there is something in the  right eye  How long has patient had these symptoms:  about a month.Pharmacy name and phone #:  [id number] - [place] - [id number] [address]Ygwmm: [phone] Fax: [phone] the patient rather a call back or a response via [facility]? Call [facility] Call Back Number: [phone] Information: She uses the eye drops and it will go away for a day or so Requesting advice on what to do next

## 2025-06-06 ENCOUNTER — TELEPHONE (OUTPATIENT)
Dept: INTERNAL MEDICINE | Facility: CLINIC | Age: 75
End: 2025-06-06
Payer: MEDICARE

## 2025-06-06 ENCOUNTER — PATIENT MESSAGE (OUTPATIENT)
Dept: INTERNAL MEDICINE | Facility: CLINIC | Age: 75
End: 2025-06-06
Payer: MEDICARE

## 2025-06-06 ENCOUNTER — TELEPHONE (OUTPATIENT)
Dept: FAMILY MEDICINE | Facility: CLINIC | Age: 75
End: 2025-06-06
Payer: MEDICARE

## 2025-06-06 DIAGNOSIS — R79.89 LOW VITAMIN D LEVEL: ICD-10-CM

## 2025-06-06 NOTE — TELEPHONE ENCOUNTER
No care due was identified.  [facility] Embedded Care Due Messages. Reference number: [id number].   6/06/2025 3:58:26 PM CDT

## 2025-06-06 NOTE — TELEPHONE ENCOUNTER
LOV with Smitha Turner MD , 4/23/2025  Rx last prescribed on 2/5/24 with a years worth of refills  Do not see a mention that this medication was stopped

## 2025-06-10 RX ORDER — ERGOCALCIFEROL 1.25 MG/1
CAPSULE ORAL
Qty: 12 CAPSULE | Refills: 3 | Status: SHIPPED | OUTPATIENT
Start: 2025-06-10

## 2025-06-11 ENCOUNTER — PATIENT MESSAGE (OUTPATIENT)
Dept: INTERNAL MEDICINE | Facility: CLINIC | Age: 75
End: 2025-06-11
Payer: MEDICARE

## 2025-06-11 ENCOUNTER — OFFICE VISIT (OUTPATIENT)
Dept: OTOLARYNGOLOGY | Facility: CLINIC | Age: 75
End: 2025-06-11
Payer: MEDICARE

## 2025-06-11 VITALS — HEART RATE: 84 BPM | SYSTOLIC BLOOD PRESSURE: 127 MMHG | DIASTOLIC BLOOD PRESSURE: 74 MMHG

## 2025-06-11 DIAGNOSIS — R04.0 EPISTAXIS: Primary | ICD-10-CM

## 2025-06-11 DIAGNOSIS — J30.1 SEASONAL ALLERGIC RHINITIS DUE TO POLLEN: ICD-10-CM

## 2025-06-11 DIAGNOSIS — J34.89 NASAL MUCOSA DRY: ICD-10-CM

## 2025-06-11 PROCEDURE — 3074F SYST BP LT 130 MM HG: CPT | Mod: CPTII,S$GLB,, | Performed by: OTOLARYNGOLOGY

## 2025-06-11 PROCEDURE — 3078F DIAST BP <80 MM HG: CPT | Mod: CPTII,S$GLB,, | Performed by: OTOLARYNGOLOGY

## 2025-06-11 PROCEDURE — 3288F FALL RISK ASSESSMENT DOCD: CPT | Mod: CPTII,S$GLB,, | Performed by: OTOLARYNGOLOGY

## 2025-06-11 PROCEDURE — 1101F PT FALLS ASSESS-DOCD LE1/YR: CPT | Mod: CPTII,S$GLB,, | Performed by: OTOLARYNGOLOGY

## 2025-06-11 PROCEDURE — 99999 PR PBB SHADOW E&M-EST. PATIENT-LVL IV: CPT | Mod: PBBFAC,,, | Performed by: OTOLARYNGOLOGY

## 2025-06-11 PROCEDURE — 3072F LOW RISK FOR RETINOPATHY: CPT | Mod: CPTII,S$GLB,, | Performed by: OTOLARYNGOLOGY

## 2025-06-11 PROCEDURE — 1126F AMNT PAIN NOTED NONE PRSNT: CPT | Mod: CPTII,S$GLB,, | Performed by: OTOLARYNGOLOGY

## 2025-06-11 PROCEDURE — 1159F MED LIST DOCD IN RCRD: CPT | Mod: CPTII,S$GLB,, | Performed by: OTOLARYNGOLOGY

## 2025-06-11 PROCEDURE — 1160F RVW MEDS BY RX/DR IN RCRD: CPT | Mod: CPTII,S$GLB,, | Performed by: OTOLARYNGOLOGY

## 2025-06-11 PROCEDURE — 99213 OFFICE O/P EST LOW 20 MIN: CPT | Mod: S$GLB,,, | Performed by: OTOLARYNGOLOGY

## 2025-06-11 NOTE — PATIENT INSTRUCTIONS
Reviewed history, symptoms, exam findings.    Explained nasal anatomy and vasculature. No sign of prominent vessel currently. For nasal dryness and to prevent adherent nasal mucus use saline nasal spray mist a few times a day. If nose dry or during cold weather/ dry season use saline nasal gel - apply to nostril in morning and night (coupon handout provided).    Avoid putting finger inside nose to clear mucus.    For allergies ok to use Flonase or Nasacort (may dry less than Flonase) daily 2 sprays each nostril daily. We discussed how to apply it appropriately by placing the spray nozzle inside the nostril pointing above the ear on the same side and gently breathing in through the nose (do NOT snort).    No wax buildup today.    Follow up annually or sooner for any ENT issues.

## 2025-06-11 NOTE — TELEPHONE ENCOUNTER
LOV with Smitha Turner MD , 4/23/2025  Next 6/17/25 - notes state discuss running tests on kidneys. Located in telephone encounter. Was requested by pt d/t insurance request.

## 2025-06-11 NOTE — PROGRESS NOTES
75 y/o female presents for check up of nose and ears. Wants ears checked for build up. No ear pain or drainage.    Nose - she had a nose bleed on left side over a year ago in February after trying to clear left dried mucus from ridge/ floor in nose. Would not stop quickly. Went to ER. They said she may need to see ENT. Recalls ER MD being more worried about appearance of the right side of the nose (swollen and red?). No recent bleeding. Gets dry nose at times. Wondering if Flonase makes that happen. Uses it but only helps for brief amount of time. Some nasal congestion with mucus build up.     Would like throat checked. No throat complaints.    PMHx, PSHx, Meds, Allergies, SocHx, FamHx reviewed in EPIC    Review of Systems     Constitutional: Negative for appetite change, chills, fatigue, fever and unexpected weight loss.      HENT: Positive for postnasal drip, sinus pressure and voice change.  Negative for ear discharge, ear pain, hearing loss, runny nose, sinus infection, stuffy nose and trouble swallowing.      Eyes:  Positive for eye drainage, eye itching and photophobia.     Respiratory:  Positive for cough, sleep apnea and snoring. Negative for shortness of breath and wheezing.      Cardiovascular:  Negative for chest pain, foot swelling, irregular heartbeat and swollen veins.     Gastrointestinal:  Positive for abdominal pain, acid reflux and constipation. Negative for heartburn.     Genitourinary: Negative for difficulty urinating, sexual problems and frequent urination.     Musc: Positive for aching muscles, back pain and neck pain. Negative for aching joints.     Skin: Negative for rash.     Allergy: Positive for seasonal allergies.     Endocrine: Negative for cold intolerance and heat intolerance.      Neurological: Negative for dizziness, headaches, light-headedness, seizures and tremors.      Hematologic: Negative for bruises/bleeds easily and swollen glands.      Psychiatric: Negative for decreased  concentration, depression, nervous/anxious and sleep disturbance.            PE: /74 (Patient Position: Sitting)   Pulse 84   LMP 08/01/2000    Gen: female, well nourished, well developed, NAD, cooperative, good historian  Ears:  EAC with minimal cerumen bilaterally & TM translucent with normal bony landmarks bilaterally  Nose: external nose wnl, nasal septum some bowing with some vessels visible through mucosa but none protuberant or exposed, left anterior floor of nose with thin layer of dry mucus - no mucosal ulceration or irritation, inferior turbinates mild edema, no visible purulence or polyps  OC/OP:  MMM, tongue protrudes midline, palate raises symmetrically, tonsils small nodular and symmetric, no erythema or exudate  Neck: supple, no TTP, no LAD or masses  Face:  no TTP, no erythema or flushing  Respiratory: Breathing comfortably without retractions  Skin: facial skin intact without visible lesions or flushing  Lymph: no neck lympadenopathy  Neuro:  facial movement symmetric, speech fluid, gait stable, tongue protrudes midline  Psych: alert & oriented x 3, reasonable, normal affect    Impression:   1. Epistaxis      left  - Feb      2. Nasal mucosa dry        3. Seasonal allergic rhinitis due to pollen            Discussion and Plan:    Reviewed history, symptoms, exam findings.    Explained nasal anatomy and vasculature. No sign of prominent vessel currently. For nasal dryness and to prevent adherent nasal mucus use saline nasal spray mist a few times a day. If nose dry or during cold weather/ dry season use saline gel - apply to nostril in morning and night (coupon handout provided).    For allergies ok to use Flonase or Nasacort (may dry less than Flonase) daily 2 sprays each nostril daily. We discussed how to apply it appropriately by placing the spray nozzle inside the nostril pointing above the ear on the same side and gently breathing in through the nose (do NOT snort).    No wax buildup  today.    Follow up annually or sooner for any ENT issues.        Parts or all of this note were created by voice recognition software; typographical errors in translating may be present.

## 2025-06-14 ENCOUNTER — NURSE TRIAGE (OUTPATIENT)
Dept: ADMINISTRATIVE | Facility: CLINIC | Age: 75
End: 2025-06-14
Payer: MEDICARE

## 2025-06-14 ENCOUNTER — PATIENT MESSAGE (OUTPATIENT)
Dept: URGENT CARE | Facility: CLINIC | Age: 75
End: 2025-06-14

## 2025-06-14 NOTE — TELEPHONE ENCOUNTER
Pt states that she has been having stomach cramps and Diarrhea since Thursday. Pt states that she no longer has abdominal pains, she had them Wednesday, but took Pepto Bismol and that helped. Some Abdominal cramping. Pt states that she is staying well hydrated. Denies fever, denies blood in her stool. Dispo- See PCP within 24 hours. Able to make pt an apnt on Monday 6/16 @ 10:00 am with Dr. Evans Bermudez. Pt aware and VU. Advised pt to call back with any further concerns or questions.               Reason for Disposition   [1] MODERATE diarrhea (e.g., 4-6 times / day more than normal) AND [2] present > 48 hours (2 days)    Additional Information   Negative: Shock suspected (e.g., cold/pale/clammy skin, too weak to stand, low BP, rapid pulse)   Negative: Difficult to awaken or acting confused (e.g., disoriented, slurred speech)   Negative: Sounds like a life-threatening emergency to the triager   Negative: [1] SEVERE abdominal pain (e.g., excruciating) AND [2] present > 1 hour   Negative: [1] SEVERE abdominal pain AND [2] age > 60 years   Negative: [1] Blood in the stool AND [2] moderate or large amount of blood   Negative: Black or tarry bowel movements  (Exception: Chronic-unchanged black-grey BMs AND is taking iron pills or Pepto-Bismol.)   Negative: [1] Drinking very little AND [2] dehydration suspected (e.g., no urine > 12 hours, very dry mouth, very lightheaded)   Negative: Patient sounds very sick or weak to the triager   Negative: [1] SEVERE diarrhea (e.g., 7 or more times / day more than normal) AND [2] age > 60 years   Negative: [1] Constant abdominal pain AND [2] present > 2 hours   Negative: [1] Fever > 103 F (39.4 C) AND [2] not able to get the fever down using Fever Care Advice   Negative: [1] SEVERE diarrhea (e.g., 7 or more times / day more than normal) AND [2] present > 24 hours (1 day)    Protocols used: Diarrhea-A-AH

## 2025-06-16 ENCOUNTER — LAB VISIT (OUTPATIENT)
Dept: LAB | Facility: HOSPITAL | Age: 75
End: 2025-06-16
Attending: FAMILY MEDICINE
Payer: MEDICARE

## 2025-06-16 ENCOUNTER — OFFICE VISIT (OUTPATIENT)
Dept: INTERNAL MEDICINE | Facility: CLINIC | Age: 75
End: 2025-06-16
Attending: FAMILY MEDICINE
Payer: MEDICARE

## 2025-06-16 ENCOUNTER — TELEPHONE (OUTPATIENT)
Dept: INTERNAL MEDICINE | Facility: CLINIC | Age: 75
End: 2025-06-16

## 2025-06-16 ENCOUNTER — RESULTS FOLLOW-UP (OUTPATIENT)
Dept: INTERNAL MEDICINE | Facility: CLINIC | Age: 75
End: 2025-06-16

## 2025-06-16 VITALS
OXYGEN SATURATION: 94 % | DIASTOLIC BLOOD PRESSURE: 66 MMHG | WEIGHT: 182.56 LBS | HEART RATE: 81 BPM | BODY MASS INDEX: 31.17 KG/M2 | HEIGHT: 64 IN | SYSTOLIC BLOOD PRESSURE: 132 MMHG

## 2025-06-16 DIAGNOSIS — I10 ESSENTIAL HYPERTENSION: ICD-10-CM

## 2025-06-16 DIAGNOSIS — R14.0 ABDOMINAL BLOATING: ICD-10-CM

## 2025-06-16 DIAGNOSIS — R19.7 DIARRHEA, UNSPECIFIED TYPE: Primary | ICD-10-CM

## 2025-06-16 DIAGNOSIS — R19.7 DIARRHEA, UNSPECIFIED TYPE: ICD-10-CM

## 2025-06-16 DIAGNOSIS — Z79.4 TYPE 2 DIABETES MELLITUS WITHOUT COMPLICATION, WITH LONG-TERM CURRENT USE OF INSULIN: ICD-10-CM

## 2025-06-16 DIAGNOSIS — K58.9 IRRITABLE BOWEL SYNDROME, UNSPECIFIED TYPE: ICD-10-CM

## 2025-06-16 DIAGNOSIS — E11.9 TYPE 2 DIABETES MELLITUS WITHOUT COMPLICATION, WITH LONG-TERM CURRENT USE OF INSULIN: ICD-10-CM

## 2025-06-16 LAB
ABSOLUTE EOSINOPHIL (OHS): 0.08 K/UL
ABSOLUTE MONOCYTE (OHS): 0.42 K/UL (ref 0.3–1)
ABSOLUTE NEUTROPHIL COUNT (OHS): 3.59 K/UL (ref 1.8–7.7)
ALBUMIN SERPL BCP-MCNC: 3.8 G/DL (ref 3.5–5.2)
ALP SERPL-CCNC: 110 UNIT/L (ref 40–150)
ALT SERPL W/O P-5'-P-CCNC: 14 UNIT/L (ref 10–44)
ANION GAP (OHS): 10 MMOL/L (ref 8–16)
AST SERPL-CCNC: 16 UNIT/L (ref 11–45)
BASOPHILS # BLD AUTO: 0.03 K/UL
BASOPHILS NFR BLD AUTO: 0.5 %
BILIRUB SERPL-MCNC: 0.5 MG/DL (ref 0.1–1)
BUN SERPL-MCNC: 8 MG/DL (ref 8–23)
CALCIUM SERPL-MCNC: 9.1 MG/DL (ref 8.7–10.5)
CHLORIDE SERPL-SCNC: 106 MMOL/L (ref 95–110)
CO2 SERPL-SCNC: 26 MMOL/L (ref 23–29)
CREAT SERPL-MCNC: 0.8 MG/DL (ref 0.5–1.4)
ERYTHROCYTE [DISTWIDTH] IN BLOOD BY AUTOMATED COUNT: 15.4 % (ref 11.5–14.5)
GFR SERPLBLD CREATININE-BSD FMLA CKD-EPI: >60 ML/MIN/1.73/M2
GLUCOSE SERPL-MCNC: 219 MG/DL (ref 70–110)
HCT VFR BLD AUTO: 39.6 % (ref 37–48.5)
HGB BLD-MCNC: 12.8 GM/DL (ref 12–16)
IMM GRANULOCYTES # BLD AUTO: 0.03 K/UL (ref 0–0.04)
IMM GRANULOCYTES NFR BLD AUTO: 0.5 % (ref 0–0.5)
LIPASE SERPL-CCNC: 25 U/L (ref 4–60)
LYMPHOCYTES # BLD AUTO: 2.04 K/UL (ref 1–4.8)
MCH RBC QN AUTO: 25.9 PG (ref 27–31)
MCHC RBC AUTO-ENTMCNC: 32.3 G/DL (ref 32–36)
MCV RBC AUTO: 80 FL (ref 82–98)
NUCLEATED RBC (/100WBC) (OHS): 0 /100 WBC
PLATELET # BLD AUTO: 307 K/UL (ref 150–450)
PMV BLD AUTO: 10.4 FL (ref 9.2–12.9)
POTASSIUM SERPL-SCNC: 3.2 MMOL/L (ref 3.5–5.1)
PROT SERPL-MCNC: 7.2 GM/DL (ref 6–8.4)
RBC # BLD AUTO: 4.95 M/UL (ref 4–5.4)
RELATIVE EOSINOPHIL (OHS): 1.3 %
RELATIVE LYMPHOCYTE (OHS): 33 % (ref 18–48)
RELATIVE MONOCYTE (OHS): 6.8 % (ref 4–15)
RELATIVE NEUTROPHIL (OHS): 57.9 % (ref 38–73)
SODIUM SERPL-SCNC: 142 MMOL/L (ref 136–145)
WBC # BLD AUTO: 6.19 K/UL (ref 3.9–12.7)

## 2025-06-16 PROCEDURE — 82040 ASSAY OF SERUM ALBUMIN: CPT

## 2025-06-16 PROCEDURE — 1160F RVW MEDS BY RX/DR IN RCRD: CPT | Mod: CPTII,S$GLB,, | Performed by: FAMILY MEDICINE

## 2025-06-16 PROCEDURE — 36415 COLL VENOUS BLD VENIPUNCTURE: CPT

## 2025-06-16 PROCEDURE — 3075F SYST BP GE 130 - 139MM HG: CPT | Mod: CPTII,S$GLB,, | Performed by: FAMILY MEDICINE

## 2025-06-16 PROCEDURE — 3072F LOW RISK FOR RETINOPATHY: CPT | Mod: CPTII,S$GLB,, | Performed by: FAMILY MEDICINE

## 2025-06-16 PROCEDURE — 99214 OFFICE O/P EST MOD 30 MIN: CPT | Mod: S$GLB,,, | Performed by: FAMILY MEDICINE

## 2025-06-16 PROCEDURE — 3288F FALL RISK ASSESSMENT DOCD: CPT | Mod: CPTII,S$GLB,, | Performed by: FAMILY MEDICINE

## 2025-06-16 PROCEDURE — 1159F MED LIST DOCD IN RCRD: CPT | Mod: CPTII,S$GLB,, | Performed by: FAMILY MEDICINE

## 2025-06-16 PROCEDURE — 3078F DIAST BP <80 MM HG: CPT | Mod: CPTII,S$GLB,, | Performed by: FAMILY MEDICINE

## 2025-06-16 PROCEDURE — 85025 COMPLETE CBC W/AUTO DIFF WBC: CPT

## 2025-06-16 PROCEDURE — 83690 ASSAY OF LIPASE: CPT

## 2025-06-16 PROCEDURE — 99999 PR PBB SHADOW E&M-EST. PATIENT-LVL V: CPT | Mod: PBBFAC,,, | Performed by: FAMILY MEDICINE

## 2025-06-16 PROCEDURE — 1101F PT FALLS ASSESS-DOCD LE1/YR: CPT | Mod: CPTII,S$GLB,, | Performed by: FAMILY MEDICINE

## 2025-06-16 PROCEDURE — 1126F AMNT PAIN NOTED NONE PRSNT: CPT | Mod: CPTII,S$GLB,, | Performed by: FAMILY MEDICINE

## 2025-06-16 RX ORDER — POTASSIUM CHLORIDE 750 MG/1
20 CAPSULE, EXTENDED RELEASE ORAL 2 TIMES DAILY
Qty: 20 CAPSULE | Refills: 0 | Status: SHIPPED | OUTPATIENT
Start: 2025-06-16 | End: 2025-06-21

## 2025-06-16 NOTE — PROGRESS NOTES
Subjective:       Patient ID: Chelly Ortiz is a 74 y.o. female.    Chief Complaint: Diarrhea    Same day urgent care presents -       History of Present Illness    CHIEF COMPLAINT:  Patient presents today for severe diarrhea    HISTORY OF PRESENT ILLNESS:  She experienced diarrhea starting Wednesday with stomach ache and swelling. The following day, she developed continuous diarrhea persisting through Saturday with approximately 8 episodes daily. Stools were black and watery. She took Pepto-Bismol for symptom relief. Currently, she reports feeling queasy with stomach pressure and had no bowel movement this morning. She denies hematochezia or sick contacts.    Black stool corresponds to pepto intake.    Diarrhea resolved.    Hx bloating predating current sx. Saw GI in march.    MEDICAL HISTORY:  She has irritable bowel syndrome and diabetes with morning glucose readings ranging from 134-140.    SURGICAL HISTORY:  Past surgical history includes  section and cholecystectomy.       Review of Systems   Constitutional:  Positive for fatigue. Negative for appetite change, chills, diaphoresis and fever.   HENT:  Negative for congestion, postnasal drip, rhinorrhea, sore throat and trouble swallowing.    Eyes:  Negative for visual disturbance.   Respiratory:  Negative for cough, choking, chest tightness, shortness of breath and wheezing.    Cardiovascular:  Negative for chest pain and leg swelling.   Gastrointestinal:  Positive for abdominal distention, abdominal pain and diarrhea. Negative for nausea and vomiting.   Genitourinary:  Negative for difficulty urinating and hematuria.   Musculoskeletal:  Negative for arthralgias and myalgias.   Skin:  Negative for rash.   Neurological:  Negative for weakness, light-headedness and headaches.   Hematological:  Does not bruise/bleed easily.   Psychiatric/Behavioral:  Negative for decreased concentration and dysphoric mood.        Objective:      Physical Exam  Vitals and  nursing note reviewed.   Constitutional:       Appearance: She is well-developed. She is not diaphoretic.   HENT:      Head: Normocephalic and atraumatic.   Eyes:      General: No scleral icterus.     Conjunctiva/sclera: Conjunctivae normal.   Cardiovascular:      Rate and Rhythm: Normal rate.      Heart sounds: Heart sounds are distant. No murmur heard.     No friction rub. No gallop.   Pulmonary:      Effort: Pulmonary effort is normal. No respiratory distress.      Breath sounds: Normal breath sounds. No wheezing or rales.   Abdominal:      General: There is no distension or abdominal bruit.      Tenderness: There is no abdominal tenderness. There is no right CVA tenderness, left CVA tenderness, guarding or rebound.   Musculoskeletal:         General: No deformity.      Cervical back: Normal range of motion and neck supple.   Skin:     General: Skin is warm and dry.      Findings: No erythema or rash.   Neurological:      Mental Status: She is alert and oriented to person, place, and time.      Cranial Nerves: No cranial nerve deficit.      Motor: No tremor.      Coordination: Coordination normal.      Gait: Gait normal.   Psychiatric:         Behavior: Behavior normal.         Thought Content: Thought content normal.         Judgment: Judgment normal.         Assessment:       1. Diarrhea, unspecified type    2. Abdominal bloating    3. Type 2 diabetes mellitus without complication, with long-term current use of insulin    4. Essential hypertension    5. Irritable bowel syndrome, unspecified type        Plan:     Medication List with Changes/Refills   Current Medications    ALBUTEROL (PROVENTIL/VENTOLIN HFA) 90 MCG/ACTUATION INHALER    Inhale 1-2 puffs into the lungs every 4 (four) hours as needed for Wheezing.    AMLODIPINE (NORVASC) 10 MG TABLET    Take 1 tablet (10 mg total) by mouth once daily.    ASPIRIN (ECOTRIN) 81 MG EC TABLET    Take 81 mg by mouth once daily.    BENZONATATE (TESSALON) 200 MG CAPSULE     "Take 1 capsule (200 mg total) by mouth every 8 (eight) hours as needed for Cough.    BLOOD-GLUCOSE METER (BLOOD GLUCOSE MONITORING) KIT    Three times a day    CITALOPRAM (CELEXA) 10 MG TABLET    Take 1 tablet (10 mg total) by mouth once daily.    DULAGLUTIDE (TRULICITY) 3 MG/0.5 ML PEN INJECTOR    Inject 3 mg into the skin every 7 days.    ERGOCALCIFEROL (ERGOCALCIFEROL) 50,000 UNIT CAP    TAKE 1 CAPSULE BY MOUTH EVERY 7 DAYS    ESOMEPRAZOLE (NEXIUM) 40 MG CAPSULE    Take 1 capsule (40 mg total) by mouth before breakfast.    FLUTICASONE PROPIONATE (FLONASE) 50 MCG/ACTUATION NASAL SPRAY    1 spray (50 mcg total) by Each Nostril route once daily.    FLUTICASONE-SALMETEROL DISKUS INHALER 250-50 MCG    Inhale 1 puff into the lungs 2 (two) times daily. Controller    GABAPENTIN (NEURONTIN) 100 MG CAPSULE    Take 2 capsules (200 mg total) by mouth every evening.    GLUCOSE 4 GM CHEWABLE TABLET    Take 4 tablets (16 g total) by mouth as needed for Low blood sugar (If having symptoms of blurry vision, palpitations, confusion, shakiness.  Please check sugars and if sugar below 70 please take 4 tablets and re-check sugar everry 15 minutes until sugars are above 70 and symptoms resolve.).    INSULIN ASPART U-100 (NOVOLOG FLEXPEN U-100 INSULIN) 100 UNIT/ML (3 ML) INPN PEN    Inject 10 Units into the skin 3 (three) times daily with meals. TDD 60 units    INSULIN DEGLUDEC (TRESIBA FLEXTOUCH U-100) 100 UNIT/ML (3 ML) INSULIN PEN    Inject 14 units at night .    LACTULOSE (CHRONULAC) 20 GRAM/30 ML SOLN    Take 15 mLs (10 g total) by mouth 3 (three) times daily as needed (constipation).    MAGNESIUM OXIDE (MAG-OX) 400 MG TABLET    Take 1 tablet (400 mg total) by mouth 2 (two) times daily.    MONTELUKAST (SINGULAIR) 10 MG TABLET    Take 10 mg by mouth every evening.    PEN NEEDLE, DIABETIC (NOVOFINE 32) 32 GAUGE X 1/4" NDLE    4 injections per day    ROSUVASTATIN (CRESTOR) 5 MG TABLET    Take 1 tablet (5 mg total) by mouth once " daily.    TRAZODONE (DESYREL) 50 MG TABLET    TAKE 1 TABLET BY MOUTH EVERY EVENING.     1. Diarrhea, unspecified type  -     Clostridioides difficile EIA; Future; Expected date: 06/16/2025  -     Stool culture; Future; Expected date: 06/16/2025  -     H. pylori antigen, stool; Future; Expected date: 06/16/2025  -     Stool Exam-Ova,Cysts,Parasites; Future; Expected date: 06/16/2025  -     Giardia / Cryptosporidum, EIA; Future; Expected date: 06/16/2025  -     WBC, Stool; Future; Expected date: 06/16/2025  -     Rotavirus antigen, stool; Future; Expected date: 06/16/2025  -     CBC Auto Differential; Future; Expected date: 06/16/2025  -     Comprehensive Metabolic Panel; Future; Expected date: 06/16/2025  -     Lipase; Future; Expected date: 06/16/2025  -     Occult blood x 1, stool; Future; Expected date: 06/16/2025  -     Ambulatory referral/consult to Gastroenterology; Future; Expected date: 06/23/2025    2. Abdominal bloating  -     Clostridioides difficile EIA; Future; Expected date: 06/16/2025  -     Stool culture; Future; Expected date: 06/16/2025  -     H. pylori antigen, stool; Future; Expected date: 06/16/2025  -     Stool Exam-Ova,Cysts,Parasites; Future; Expected date: 06/16/2025  -     Giardia / Cryptosporidum, EIA; Future; Expected date: 06/16/2025  -     WBC, Stool; Future; Expected date: 06/16/2025  -     Rotavirus antigen, stool; Future; Expected date: 06/16/2025  -     CBC Auto Differential; Future; Expected date: 06/16/2025  -     Comprehensive Metabolic Panel; Future; Expected date: 06/16/2025  -     Lipase; Future; Expected date: 06/16/2025  -     Occult blood x 1, stool; Future; Expected date: 06/16/2025  -     Ambulatory referral/consult to Gastroenterology; Future; Expected date: 06/23/2025    3. Type 2 diabetes mellitus without complication, with long-term current use of insulin    4. Essential hypertension    5. Irritable bowel syndrome, unspecified type  -     Ambulatory referral/consult to  "Gastroenterology; Future; Expected date: 06/23/2025      See meds, orders, follow up, routing and instructions sections of encounter and AVS. Discussed with patient and provided on AVS.    Lab Results   Component Value Date     04/01/2025     11/14/2024    K 3.6 04/01/2025    K 4.5 11/14/2024     04/01/2025     11/14/2024    BUN 10 04/01/2025    CREATININE 0.7 04/01/2025    GLU 94 04/01/2025     (H) 11/14/2024    HGBA1C 7.3 (H) 04/01/2025    HGBA1C 7.9 (H) 11/14/2024    MG 2.5 04/01/2025    AST 20 04/01/2025    AST 15 11/14/2024    ALT 14 04/01/2025    ALT 14 11/14/2024    ALBUMIN 3.8 04/01/2025    ALBUMIN 4.1 11/14/2024    PROT 7.5 04/01/2025    PROT 7.9 11/14/2024    BILITOT 0.6 04/01/2025    BILITOT 0.5 11/14/2024    CHOL 214 (H) 11/14/2024    HDL 60 11/14/2024    LDLCALC 113.4 11/14/2024    TRIG 203 (H) 11/14/2024    WBC 5.49 11/14/2024    HGB 13.4 11/14/2024    HCT 41.6 11/14/2024     11/14/2024    TSH 2.402 11/14/2024    BNP <10.0 07/28/2012    URICACID 5.5 09/27/2012         Diabetes Management Status    Statin: Taking  ACE/ARB: Not taking    Screening or Prevention Patient's value Goal Complete/Controlled?   HgA1C Testing and Control   Lab Results   Component Value Date    HGBA1C 7.3 (H) 04/01/2025      Annually/Less than 8% Yes   Lipid profile : 11/14/2024 Annually Yes   LDL control Lab Results   Component Value Date    LDLCALC 113.4 11/14/2024    Annually/Less than 100 mg/dl  No   Nephropathy screening Lab Results   Component Value Date    LABMICR 20.0 10/24/2023     Lab Results   Component Value Date    PROTEINUA Negative 07/31/2024     No results found for: "UTPCR"   Annually Yes   Blood pressure BP Readings from Last 1 Encounters:   06/16/25 132/66    Less than 140/90 Yes   Dilated retinal exam : 04/26/2024 Annually No   Foot exam   Most Recent Foot Exam Date: Not Found Annually No       This note was generated with the assistance of ambient listening technology. " Verbal consent was obtained by the patient and accompanying visitor(s) for the recording of patient appointment to facilitate this note. I attest to having reviewed and edited the generated note for accuracy, though some syntax or spelling errors may persist. Please contact the author of this note for any clarification.    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  - Patient has history of irritable bowel syndrome with previous gastroenterologist consultation for constipation.  - Abdominal exam revealed chronic sensitivity in one area.  - Black stools likely attributed to recent Pepto-Bismol use rather than GI bleeding, as it can cause black stools for up to 36 hours after ingestion and is generally safe for symptom relief.  - Ordered stool tests to check for infection with instructions to return sample to laboratory as soon as possible.  - Will perform labs for further evaluation.  - Advised patient to stay hydrated and referred back to gastroenterologist for long-term symptom management.    TYPE 2 DIABETES:  - Monitored glucose levels, noting readings around 140 in the morning and 134 the previous day.  - These values are within acceptable range, indicating diabetes is currently stable.  - Ordered labs for further evaluation.    CHOLECYSTECTOMY:  - Noted patient's history of cholecystectomy.    GENERAL HEALTH:  - BP appears stable.

## 2025-06-17 ENCOUNTER — TELEPHONE (OUTPATIENT)
Dept: GASTROENTEROLOGY | Facility: CLINIC | Age: 75
End: 2025-06-17
Payer: MEDICARE

## 2025-06-17 NOTE — TELEPHONE ENCOUNTER
----- Message from Des sent at 6/17/2025 10:25 AM CDT -----  Regarding: PT REQUEST  Contact: [phone]  Questions: PT WOULD TO ASK QUESTIONS FOR THE PROVIDER.6/17     Patient contact num: [phone]

## 2025-06-17 NOTE — TELEPHONE ENCOUNTER
Spoke with pt and advised that potassium was low and a supplement was called in for her and to go to UC if symptoms persisted

## 2025-06-17 NOTE — TELEPHONE ENCOUNTER
Please call and advise patient that potassium was slightly low.  I will call her in a supplement to take for the next week or so.  If diarrhea nausea vomiting do not stop.  Come back in to clinic or urgent care right away for evaluation.    Two tablets twice a day for 5 days

## 2025-06-18 ENCOUNTER — PATIENT MESSAGE (OUTPATIENT)
Dept: INTERNAL MEDICINE | Facility: CLINIC | Age: 75
End: 2025-06-18
Payer: MEDICARE

## 2025-06-18 ENCOUNTER — LAB VISIT (OUTPATIENT)
Dept: LAB | Facility: HOSPITAL | Age: 75
End: 2025-06-18
Payer: MEDICARE

## 2025-06-18 DIAGNOSIS — I10 ESSENTIAL HYPERTENSION: ICD-10-CM

## 2025-06-18 LAB
ALBUMIN/CREAT UR: 9.3 UG/MG
CREAT UR-MCNC: 107 MG/DL (ref 15–325)
MICROALBUMIN UR-MCNC: 10 UG/ML (ref ?–5000)

## 2025-06-18 PROCEDURE — 82043 UR ALBUMIN QUANTITATIVE: CPT

## 2025-06-19 ENCOUNTER — LAB VISIT (OUTPATIENT)
Dept: LAB | Facility: HOSPITAL | Age: 75
End: 2025-06-19
Attending: FAMILY MEDICINE
Payer: MEDICARE

## 2025-06-19 DIAGNOSIS — R19.7 DIARRHEA, UNSPECIFIED TYPE: ICD-10-CM

## 2025-06-19 DIAGNOSIS — R14.0 ABDOMINAL BLOATING: ICD-10-CM

## 2025-06-19 PROCEDURE — 89055 LEUKOCYTE ASSESSMENT FECAL: CPT

## 2025-06-19 PROCEDURE — 87427 SHIGA-LIKE TOXIN AG IA: CPT

## 2025-06-19 PROCEDURE — 87177 OVA AND PARASITES SMEARS: CPT

## 2025-06-19 PROCEDURE — 82272 OCCULT BLD FECES 1-3 TESTS: CPT

## 2025-06-19 PROCEDURE — 87046 STOOL CULTR AEROBIC BACT EA: CPT

## 2025-06-19 PROCEDURE — 87046 STOOL CULTR AEROBIC BACT EA: CPT | Mod: 59

## 2025-06-19 PROCEDURE — 87425 ROTAVIRUS AG IA: CPT

## 2025-06-19 PROCEDURE — 87338 HPYLORI STOOL AG IA: CPT

## 2025-06-19 PROCEDURE — 87328 CRYPTOSPORIDIUM AG IA: CPT

## 2025-06-19 PROCEDURE — 87324 CLOSTRIDIUM AG IA: CPT

## 2025-06-20 ENCOUNTER — PATIENT MESSAGE (OUTPATIENT)
Dept: INTERNAL MEDICINE | Facility: CLINIC | Age: 75
End: 2025-06-20
Payer: MEDICARE

## 2025-06-20 LAB
C DIFF GDH STL QL: NEGATIVE
C DIFF TOX A+B STL QL IA: NEGATIVE
CRYPTOSP AG SPEC QL: NEGATIVE
G LAMBLIA AG STL QL IA: NEGATIVE
H. PYLORI SURFACE ANTIGEN, INTERPRETATION (OHS): NEGATIVE
HELICOBACTER PYLORI SURFACE ANTIGEN (OHS): 0.31
OB PNL STL: NEGATIVE
RV AG STL QL IA.RAPID: NEGATIVE
WBC #/AREA STL HPF: NORMAL /[HPF]

## 2025-06-22 LAB
E COLI SXT1 STL QL IA: NEGATIVE
E COLI SXT2 STL QL IA: NEGATIVE

## 2025-06-23 LAB
BACTERIA STL CULT: NORMAL
C COLI+JEJ+UPSA DNA STL QL NAA+NON-PROBE: NEGATIVE

## 2025-06-25 LAB — O+P STL MICRO: NORMAL

## 2025-06-30 ENCOUNTER — OFFICE VISIT (OUTPATIENT)
Dept: GASTROENTEROLOGY | Facility: CLINIC | Age: 75
End: 2025-06-30
Attending: FAMILY MEDICINE
Payer: MEDICARE

## 2025-06-30 VITALS
HEIGHT: 64 IN | BODY MASS INDEX: 31.83 KG/M2 | DIASTOLIC BLOOD PRESSURE: 67 MMHG | SYSTOLIC BLOOD PRESSURE: 131 MMHG | HEART RATE: 75 BPM

## 2025-06-30 DIAGNOSIS — R11.0 NAUSEA: ICD-10-CM

## 2025-06-30 DIAGNOSIS — K52.9 GASTROENTERITIS: Primary | ICD-10-CM

## 2025-06-30 DIAGNOSIS — K58.1 IRRITABLE BOWEL SYNDROME WITH CONSTIPATION: ICD-10-CM

## 2025-06-30 DIAGNOSIS — R14.0 ABDOMINAL BLOATING: ICD-10-CM

## 2025-06-30 DIAGNOSIS — E87.6 HYPOKALEMIA: ICD-10-CM

## 2025-06-30 PROCEDURE — 1101F PT FALLS ASSESS-DOCD LE1/YR: CPT | Mod: CPTII,HCNC,S$GLB,

## 2025-06-30 PROCEDURE — 3078F DIAST BP <80 MM HG: CPT | Mod: CPTII,HCNC,S$GLB,

## 2025-06-30 PROCEDURE — 3075F SYST BP GE 130 - 139MM HG: CPT | Mod: CPTII,HCNC,S$GLB,

## 2025-06-30 PROCEDURE — 1159F MED LIST DOCD IN RCRD: CPT | Mod: CPTII,HCNC,S$GLB,

## 2025-06-30 PROCEDURE — 3288F FALL RISK ASSESSMENT DOCD: CPT | Mod: CPTII,HCNC,S$GLB,

## 2025-06-30 PROCEDURE — 99999 PR PBB SHADOW E&M-EST. PATIENT-LVL IV: CPT | Mod: PBBFAC,,,

## 2025-06-30 PROCEDURE — 3072F LOW RISK FOR RETINOPATHY: CPT | Mod: CPTII,HCNC,S$GLB,

## 2025-06-30 PROCEDURE — 99214 OFFICE O/P EST MOD 30 MIN: CPT | Mod: HCNC,S$GLB,,

## 2025-06-30 PROCEDURE — 1126F AMNT PAIN NOTED NONE PRSNT: CPT | Mod: CPTII,HCNC,S$GLB,

## 2025-06-30 RX ORDER — ONDANSETRON 4 MG/1
4 TABLET, ORALLY DISINTEGRATING ORAL EVERY 8 HOURS PRN
Qty: 30 TABLET | Refills: 0 | Status: SHIPPED | OUTPATIENT
Start: 2025-06-30 | End: 2025-07-30

## 2025-06-30 NOTE — PROGRESS NOTES
Gastroenterology Clinic Consultation Note    Patient ID: Chelly Ortiz is a 74 y.o. female.    Chief Complaint: Diarrhea    History of Present Illness    CHIEF COMPLAINT:  Patient presents today for stomach problems with diarrhea    GASTROINTESTINAL:  She reports ongoing GI symptoms including morning queasiness with mild cramping leading to urgent bowel movements, significant gas, bloating, and abdominal distension. She experienced a severe bloating episode where her abdomen was extremely distended. Her symptoms are currently milder compared to initial onset, with occasional morning GI discomfort. She denies vomiting. Similar symptoms were reported in family members, suggesting possible infectious etiology. She has a history of chronic constipation and underwent colonoscopy in 2022 with next screening due in 2027.    MEDICATIONS:  She recently started Imodium for diarrhea management, using as needed up to 4 times daily. She is taking prescription potassium. She took Pepto-Bismol once for severe abdominal discomfort and noted black stool afterward.    LABS AND STUDIES:  Blood work was mostly normal with slight iron deficiency and low potassium, potentially related to diarrhea. Kidney, liver, and pancreas function were normal. Stool studies were negative for occult blood, bacterial, and viral findings. Urinalysis was normal.      ROS:  General: -fever, -chills, +fatigue, -weight gain, -weight loss  Eyes: -vision changes, -redness, -discharge  ENT: -ear pain, -nasal congestion, -sore throat  Cardiovascular: -chest pain, -palpitations, -lower extremity edema  Respiratory: -cough, -shortness of breath  Gastrointestinal: -abdominal pain, +nausea, -vomiting, +diarrhea, -constipation, -blood in stool, +abdominal distention, +bloating  Genitourinary: -dysuria, -hematuria, -frequency  Musculoskeletal: -joint pain, -muscle pain  Skin: -rash, -lesion  Neurological: -headache, -dizziness, -numbness, -tingling  Psychiatric:  -anxiety, -depression, -sleep difficulty         Physical Exam  Vitals and nursing note reviewed.   Constitutional:       General: She is not in acute distress.     Appearance: Normal appearance. She is not ill-appearing.   HENT:      Head: Normocephalic and atraumatic.      Right Ear: External ear normal.      Left Ear: External ear normal.      Nose: Nose normal.   Eyes:      General: No scleral icterus.     Extraocular Movements: Extraocular movements intact.   Cardiovascular:      Rate and Rhythm: Normal rate.   Pulmonary:      Effort: Pulmonary effort is normal. No respiratory distress.   Abdominal:      General: There is no distension.      Palpations: Abdomen is soft.      Tenderness: There is no guarding.   Musculoskeletal:         General: Normal range of motion.      Cervical back: Normal range of motion.   Skin:     General: Skin is warm.   Neurological:      Mental Status: She is alert and oriented to person, place, and time.   Psychiatric:         Mood and Affect: Mood normal.         Behavior: Behavior is cooperative.         Thought Content: Thought content normal.         Medical History:  has a past medical history of Allergy, DDD (degenerative disc disease), lumbar, Diabetes mellitus, GERD (gastroesophageal reflux disease), History of subconjunctival hemorrhage (2011), IBS (irritable bowel syndrome), Obesity, MYRIAM (obstructive sleep apnea), Rotator cuff impingement syndrome, Seasonal allergic conjunctivitis, and Type 2 diabetes mellitus treated with insulin.    Surgical History:  has a past surgical history that includes  section; Cholecystectomy; Eye surgery; Tubal ligation; Hysterectomy; Endoscopic ultrasound of upper gastrointestinal tract (N/A, 10/09/2018); Colonoscopy (N/A, 2018); Excision of lesion (N/A, 2019); Cataract extraction w/  intraocular lens implant (Right, 10/03/2013); Colonoscopy (N/A, 2022); Cataract extraction w/  intraocular lens implant (Left,  "11/2022); Phacoemulsification of cataract (Left, 11/2/2022); and Intraocular prosthesis insertion (Left, 11/2/2022).    Family History: family history includes Alzheimer's disease in her mother; Breast cancer in her sister; Cancer in her brother; Deep vein thrombosis in her brother; Diabetes in her daughter and sister; Heart disease in her father; Lung cancer in her brother..       Review of patient's allergies indicates:   Allergen Reactions    Ace inhibitors Hives     \Cough    Latex, natural rubber Itching       Medications Ordered Prior to Encounter[1]    Labs:  Lab Results   Component Value Date    WBC 6.19 06/16/2025    HGB 12.8 06/16/2025    HCT 39.6 06/16/2025     06/16/2025    CRP 11.9 (H) 02/15/2022    CHOL 214 (H) 11/14/2024    TRIG 203 (H) 11/14/2024    HDL 60 11/14/2024    ALKPHOS 110 06/16/2025    LIPASE 25 06/16/2025    ALT 14 06/16/2025    AST 16 06/16/2025     06/16/2025    K 3.2 (L) 06/16/2025     06/16/2025    CREATININE 0.8 06/16/2025    BUN 8 06/16/2025    CO2 26 06/16/2025    TSH 2.402 11/14/2024    INR 0.9 04/19/2012    GLUF 105 10/27/2004    HGBA1C 7.3 (H) 04/01/2025       Vital Signs:  /67 (BP Location: Left arm, Patient Position: Sitting)   Pulse 75   Ht 5' 3.5" (1.613 m)   LMP 08/01/2000   BMI 31.83 kg/m²   Body mass index is 31.83 kg/m².    Imaging reviewed: none pertinent       Endoscopy reviewed:   Procedure:             Colonoscopy   Indications:           High risk colon cancer surveillance: Personal                          history of colonic polyps   Providers:             Pierce Rivera MD, Daniel Craig RN, Steph Clark, Technician, Joanna Han,                          CRNA   Referring MD:          Pierce Rivera MD (Referring MD)   Complications:         No immediate complications.   Medicines:             Monitored Anesthesia Care   Procedure:             Pre-Anesthesia Assessment:              "             - Prior to the procedure, a History and Physical                          was performed, and patient medications and                          allergies were reviewed. The patient is competent.                          The risks and benefits of the procedure and the                          sedation options and risks were discussed with the                          patient. All questions were answered and informed                          consent was obtained. Patient identification and                          proposed procedure were verified by the physician                          in the pre-procedure area. Mental Status                          Examination: alert and oriented. Airway                          Examination: normal oropharyngeal airway and neck                          mobility. Respiratory Examination: clear to                          auscultation. CV Examination: normal. Prophylactic                          Antibiotics: The patient does not require                          prophylactic antibiotics. Prior Anticoagulants:                          The patient has taken no anticoagulant or                          antiplatelet agents. ASA Grade Assessment: III - A                          patient with severe systemic disease. After                          reviewing the risks and benefits, the patient was                          deemed in satisfactory condition to undergo the                          procedure. The anesthesia plan was to use                          monitored anesthesia care (MAC). Immediately prior                          to administration of medications, the patient was                          re-assessed for adequacy to receive sedatives. The                          heart rate, respiratory rate, oxygen saturations,                          blood pressure, adequacy of pulmonary ventilation,                          and response to care were monitored throughout the                           procedure. The physical status of the patient was                          re-assessed after the procedure.                          After I obtained informed consent, the scope was                          passed under direct vision. Throughout the                          procedure, the patient's blood pressure, pulse,                          and oxygen saturations were monitored                          continuously. The Olympus Scope PCF-[id number]                          ([id number]) was introduced through the anus and                          advanced to the cecum, identified by appendiceal                          orifice and ileocecal valve. The colonoscopy was                          performed without difficulty. The patient                          tolerated the procedure well. The quality of the                          bowel preparation was adequate to identify polyps                          6 mm and larger in size. The ileocecal valve,                          appendiceal orifice, and rectum were photographed.   Findings:       The perianal and digital rectal examinations were normal.        A 2 mm polyp was found in the cecum. The polyp was sessile. The        polyp was removed with a jumbo cold forceps. Resection and retrieval        were complete. Verification of patient identification for the        specimen was done. Estimated blood loss was minimal.        Many small-mouthed diverticula were found in the sigmoid colon and        descending colon.   Impression:            - One 2 mm polyp in the cecum, removed with a                          jumbo cold forceps. Resected and retrieved.                          - Diverticulosis in the sigmoid colon and in the                          descending colon.   Recommendation:        - Discharge patient to home.                          - Resume previous diet.                          - Continue present medications.                           - Await pathology results.                          - Repeat colonoscopy in 5 years for surveillance                          based on clinical status at that time.                          - Patient has a contact number available for                          emergencies. The signs and symptoms of potential                          delayed complications were discussed with the                          patient. Return to normal activities tomorrow.                          Written discharge instructions were provided to                          the patient.   Pierce Rivera MD   6/7/2022 2:15:49 PM       Assessment:  1. Gastroenteritis    2. Abdominal bloating    3. Irritable bowel syndrome, unspecified type      Orders Placed This Encounter    ondansetron (ZOFRAN-ODT) 4 MG TbDL       Assessment & Plan        INFECTIOUS GASTROENTERITIS AND FUNCTIONAL DIARRHEA:  - Assessed recent diarrhea and stomach discomfort.  - Evaluated stool studies, which were all negative for infectious causes.  - Gastroenteritis likely cause of symptoms, given negative test results and gradual improvement.  - Decided on symptomatic management approach, given negative workup and improving condition.  - Patient should eat bland foods to allow stomach to heal.  - Patient to take fiber supplement daily while experiencing loose stools.  - Advised Imodium use up to 4 times daily for diarrhea.  - Clarified black stools after Pepto-Bismol use are a normal side effect.    HYPOKALEMIA:  - Reviewed recent lab results, noting slight iron deficiency and low potassium, likely due to diarrhea.  - Informed low potassium could be due to recent diarrhea.  - Educated on equivalence of 1 banana to 1 potassium pill and recommend consuming bananas to increase potassium levels naturally.  - Continued potassium supplement as prescribed by PCP.    IRON DEFICIENCY:  - Explained iron deficiency was very slight and could be addressed through dietary  changes through leafy greens.    NAUSEA:  - Started Zofran (ondansetron) as needed, up to 3 times daily for nausea.    FOLLOW-UP AND MONITORING:  - Contact the office if symptoms linger or worsen for further workup.  - Follow up in 5 years for next colonoscopy (due in 2027).          One teaspoonful of psyllium twice daily. It is used for diarrhea due to its water-holding effect in the intestines that may aid in bulking up the watery stool. It can also bind some toxins that may cause acute diarrhea. Psyllium products combined with laxatives should be avoided.   Documentation of stool output provides a baseline and helps direct replacement fluid therapy. Keep a diary of possible food triggers for diarrhea.   Its necessary to increase fluid intake, especially when experiencing diarrhea. Increased fluid intake and liquid meal replacements can replenish fluid loss.  You may use oral rehydration solutions or diluted juices, diluted sports drinks, clear broth, or decaffeinated tea to stay hydrated. Sugary, carbonated, caffeinated, or alcoholic drinks can worsen diarrhea.   BRAT diet of bananas, rice, applesauce, and toast is fine. However, return to normal diet as soon as you feel up to it.   Utilize Imodium AD as needed      OTC medications for bloating: Gas-X, Gaviscon, CharcoCaps, IB Suzan, Simethicone    Diet: Eat and drink more slowly to swallow less air. Limit fatty and spicy foods. Avoid caffeine, carbonated drinks, and artificial sweeteners. Avoid common gas-causing foods, such as beans, peas, lentils, cabbage, onions, broccoli, cauliflower, and whole grains. Try removing one food at a time from your diet to see if your gas improves.  Fiber: Fiber has many benefits, although too much fiber may increase the amount of gas in your intestines.  Exercise: Regular daily exercise often reduces symptoms in the stomach and intestines.  Antidiarrheal medicines: Over-the-counter loperamide may help with diarrhea but probably  not with stomach pain.  Probiotics: Probiotics are found in some over-the-counter supplements and yogurts. Common probiotics are Lactobacillus and Bifidobacterium.       KAYKAY SIMENTAL  Gastroenterology Department  [facility] [address] Office [phone]     This note was generated with the assistance of ambient listening technology. Verbal consent was obtained by the patient and accompanying visitor(s) for the recording of patient appointment to facilitate this note. I attest to having reviewed and edited the generated note for accuracy, though some syntax or spelling errors may persist. Please contact the author of this note for any clarification.                      [1]   Current Outpatient Medications on File Prior to Visit   Medication Sig Dispense Refill    albuterol (PROVENTIL/VENTOLIN HFA) 90 mcg/actuation inhaler Inhale 1-2 puffs into the lungs every 4 (four) hours as needed for Wheezing. 18 g 2    amLODIPine (NORVASC) 10 MG tablet Take 1 tablet (10 mg total) by mouth once daily. 90 tablet 3    aspirin (ECOTRIN) 81 MG EC tablet Take 81 mg by mouth once daily.      benzonatate (TESSALON) 200 MG capsule Take 1 capsule (200 mg total) by mouth every 8 (eight) hours as needed for Cough. 30 capsule 0    blood-glucose meter (BLOOD GLUCOSE MONITORING) kit Three times a day 1 each 0    citalopram (CELEXA) 10 MG tablet Take 1 tablet (10 mg total) by mouth once daily. 90 tablet 1    dulaglutide (TRULICITY) 3 mg/0.5 mL pen injector Inject 3 mg into the skin every 7 days.      ergocalciferol (ERGOCALCIFEROL) 50,000 unit Cap TAKE 1 CAPSULE BY MOUTH EVERY 7 DAYS 12 capsule 3    esomeprazole (NEXIUM) 40 MG capsule Take 1 capsule (40 mg total) by mouth before breakfast. 90 capsule 3    fluticasone propionate (FLONASE) 50 mcg/actuation nasal spray 1 spray (50 mcg total) by Each Nostril route once daily. 9.9 mL 0    fluticasone-salmeterol diskus inhaler 250-50 mcg Inhale 1 puff into the lungs 2 (two)  "times daily. Controller 60 each 6    gabapentin (NEURONTIN) 100 MG capsule Take 2 capsules (200 mg total) by mouth every evening. 180 capsule 3    insulin degludec (TRESIBA FLEXTOUCH U-100) 100 unit/mL (3 mL) insulin pen Inject 14 units at night .      lactulose (CHRONULAC) 20 gram/30 mL Soln Take 15 mLs (10 g total) by mouth 3 (three) times daily as needed (constipation). 500 mL 3    magnesium oxide (MAG-OX) 400 mg tablet Take 1 tablet (400 mg total) by mouth 2 (two) times daily. 180 tablet 3    montelukast (SINGULAIR) 10 mg tablet Take 10 mg by mouth every evening.      pen needle, diabetic (NOVOFINE 32) 32 gauge x 1/4" Ndle 4 injections per day 500 each 4    rosuvastatin (CRESTOR) 5 MG tablet Take 1 tablet (5 mg total) by mouth once daily. 90 tablet 3    traZODone (DESYREL) 50 MG tablet TAKE 1 TABLET BY MOUTH EVERY EVENING. 90 tablet 0    glucose 4 GM chewable tablet Take 4 tablets (16 g total) by mouth as needed for Low blood sugar (If having symptoms of blurry vision, palpitations, confusion, shakiness.  Please check sugars and if sugar below 70 please take 4 tablets and re-check sugar everry 15 minutes until sugars are above 70 and symptoms resolve.). 50 tablet 12    insulin aspart U-100 (NOVOLOG FLEXPEN U-100 INSULIN) 100 unit/mL (3 mL) InPn pen Inject 10 Units into the skin 3 (three) times daily with meals. TDD 60 units 75 mL 11     Current Facility-Administered Medications on File Prior to Visit   Medication Dose Route Frequency Provider Last Rate Last Admin    triamcinolone acetonide injection 20 mg  20 mg Intramuscular 1 time in Clinic/HOD Tani Pittman, LINSEYP         "

## 2025-07-08 ENCOUNTER — PATIENT MESSAGE (OUTPATIENT)
Dept: INTERNAL MEDICINE | Facility: CLINIC | Age: 75
End: 2025-07-08
Payer: MEDICARE

## 2025-07-08 DIAGNOSIS — F34.1 PERSISTENT DEPRESSIVE DISORDER: ICD-10-CM

## 2025-07-09 RX ORDER — CITALOPRAM 10 MG/1
10 TABLET ORAL DAILY
Qty: 90 TABLET | Refills: 1 | Status: SHIPPED | OUTPATIENT
Start: 2025-07-09

## 2025-07-09 NOTE — TELEPHONE ENCOUNTER
Care Due:                  Date            Visit Type   Department     Provider  --------------------------------------------------------------------------------                                EP -                              PRIMARY      [facility] INTERNAL  Last Visit: 06-      CARE ([facility])   MEDICINE       Evans Bermudez  Next Visit: None Scheduled  None         None Found                                                            Last  Test          Frequency    Reason                     Performed    Due Date  --------------------------------------------------------------------------------    HBA1C.......  6 months...  insulin..................  04- 09-    [facility] Embedded Care Due Messages. Reference number: [id number].   7/09/2025 2:02:20 PM CDT

## 2025-07-11 ENCOUNTER — OFFICE VISIT (OUTPATIENT)
Dept: INTERNAL MEDICINE | Facility: CLINIC | Age: 75
End: 2025-07-11
Payer: MEDICARE

## 2025-07-11 ENCOUNTER — LAB VISIT (OUTPATIENT)
Dept: LAB | Facility: HOSPITAL | Age: 75
End: 2025-07-11
Payer: MEDICARE

## 2025-07-11 ENCOUNTER — PATIENT MESSAGE (OUTPATIENT)
Dept: INTERNAL MEDICINE | Facility: CLINIC | Age: 75
End: 2025-07-11
Payer: MEDICARE

## 2025-07-11 VITALS
BODY MASS INDEX: 32.54 KG/M2 | DIASTOLIC BLOOD PRESSURE: 68 MMHG | HEART RATE: 87 BPM | SYSTOLIC BLOOD PRESSURE: 138 MMHG | WEIGHT: 183.63 LBS | HEIGHT: 63 IN | OXYGEN SATURATION: 98 %

## 2025-07-11 DIAGNOSIS — E87.6 HYPOKALEMIA: ICD-10-CM

## 2025-07-11 DIAGNOSIS — Z86.79 HISTORY OF DIASTOLIC DYSFUNCTION: ICD-10-CM

## 2025-07-11 DIAGNOSIS — I87.2 VENOUS INSUFFICIENCY: ICD-10-CM

## 2025-07-11 DIAGNOSIS — M79.89 SWELLING OF BOTH LOWER EXTREMITIES: ICD-10-CM

## 2025-07-11 DIAGNOSIS — I86.8 SPIDER VARICOSE VEIN: ICD-10-CM

## 2025-07-11 DIAGNOSIS — M79.89 SWELLING OF BOTH LOWER EXTREMITIES: Primary | ICD-10-CM

## 2025-07-11 DIAGNOSIS — I10 ESSENTIAL HYPERTENSION: ICD-10-CM

## 2025-07-11 LAB
ABSOLUTE EOSINOPHIL (OHS): 0.07 K/UL
ABSOLUTE MONOCYTE (OHS): 0.5 K/UL (ref 0.3–1)
ABSOLUTE NEUTROPHIL COUNT (OHS): 2.67 K/UL (ref 1.8–7.7)
ALBUMIN SERPL BCP-MCNC: 4.1 G/DL (ref 3.5–5.2)
ALP SERPL-CCNC: 107 UNIT/L (ref 40–150)
ALT SERPL W/O P-5'-P-CCNC: 11 UNIT/L (ref 10–44)
ANION GAP (OHS): 8 MMOL/L (ref 8–16)
AST SERPL-CCNC: 15 UNIT/L (ref 11–45)
BASOPHILS # BLD AUTO: 0.03 K/UL
BASOPHILS NFR BLD AUTO: 0.5 %
BILIRUB SERPL-MCNC: 0.5 MG/DL (ref 0.1–1)
BNP SERPL-MCNC: <10 PG/ML (ref 0–99)
BUN SERPL-MCNC: 8 MG/DL (ref 8–23)
CALCIUM SERPL-MCNC: 9.3 MG/DL (ref 8.7–10.5)
CHLORIDE SERPL-SCNC: 107 MMOL/L (ref 95–110)
CO2 SERPL-SCNC: 27 MMOL/L (ref 23–29)
CREAT SERPL-MCNC: 0.8 MG/DL (ref 0.5–1.4)
ERYTHROCYTE [DISTWIDTH] IN BLOOD BY AUTOMATED COUNT: 15.4 % (ref 11.5–14.5)
GFR SERPLBLD CREATININE-BSD FMLA CKD-EPI: >60 ML/MIN/1.73/M2
GLUCOSE SERPL-MCNC: 139 MG/DL (ref 70–110)
HCT VFR BLD AUTO: 39.8 % (ref 37–48.5)
HGB BLD-MCNC: 13.2 GM/DL (ref 12–16)
IMM GRANULOCYTES # BLD AUTO: 0.01 K/UL (ref 0–0.04)
IMM GRANULOCYTES NFR BLD AUTO: 0.2 % (ref 0–0.5)
LYMPHOCYTES # BLD AUTO: 2.58 K/UL (ref 1–4.8)
MCH RBC QN AUTO: 26.2 PG (ref 27–31)
MCHC RBC AUTO-ENTMCNC: 33.2 G/DL (ref 32–36)
MCV RBC AUTO: 79 FL (ref 82–98)
NUCLEATED RBC (/100WBC) (OHS): 0 /100 WBC
PLATELET # BLD AUTO: 324 K/UL (ref 150–450)
PMV BLD AUTO: 10 FL (ref 9.2–12.9)
POTASSIUM SERPL-SCNC: 3.7 MMOL/L (ref 3.5–5.1)
PROT SERPL-MCNC: 7.5 GM/DL (ref 6–8.4)
RBC # BLD AUTO: 5.03 M/UL (ref 4–5.4)
RELATIVE EOSINOPHIL (OHS): 1.2 %
RELATIVE LYMPHOCYTE (OHS): 44 % (ref 18–48)
RELATIVE MONOCYTE (OHS): 8.5 % (ref 4–15)
RELATIVE NEUTROPHIL (OHS): 45.6 % (ref 38–73)
SODIUM SERPL-SCNC: 142 MMOL/L (ref 136–145)
WBC # BLD AUTO: 5.86 K/UL (ref 3.9–12.7)

## 2025-07-11 PROCEDURE — 99999 PR PBB SHADOW E&M-EST. PATIENT-LVL V: CPT | Mod: PBBFAC,HCNC,, | Performed by: NURSE PRACTITIONER

## 2025-07-11 PROCEDURE — 83880 ASSAY OF NATRIURETIC PEPTIDE: CPT | Mod: HCNC

## 2025-07-11 PROCEDURE — 85025 COMPLETE CBC W/AUTO DIFF WBC: CPT | Mod: HCNC

## 2025-07-11 PROCEDURE — 36415 COLL VENOUS BLD VENIPUNCTURE: CPT | Mod: HCNC

## 2025-07-11 PROCEDURE — 82040 ASSAY OF SERUM ALBUMIN: CPT | Mod: HCNC

## 2025-07-11 NOTE — PROGRESS NOTES
INTERNAL MEDICINE CLINIC - SAME DAY APPOINTMENT  Progress Note    PRESENTING HISTORY     PCP: Smitha Turner MD    Chief Complaint/Reason for Visit:   No chief complaint on file.     History of Present Illness & ROS : Ms. Chelly Ortiz is a 74 y.o. female.    Same day apt.   Very pleasant lady.   Noticed swelling to both of her legs and ankles, no pain, for over the past 2 weeks. No recent travels.   Noticed the swelling is 'better in the morning after being in bed'.   No cough or SOB or chest wall discomforts  Has not been eating more 'salty' food.   Has been seen by Cardiology in the past (2022 by Dr. JAG Fisher). Missed to follow up.     Review of Systems:  Eyes: denies visual changes at this time denies floaters   ENT: no nasal congestion or sore throat  Respiratory: no cough or shortness of breath  Cardiovascular: no chest pain or palpitations  Gastrointestinal: no nausea or vomiting, no abdominal pain or change in bowel habits  Genitourinary: no hematuria or dysuria; denies frequency  Hematologic/Lymphatic: no easy bruising or lymphadenopathy  Musculoskeletal: no arthralgias or myalgias  Neurological: no seizures or tremors  Endocrine: no heat or cold intolerance    PAST HISTORY:     Past Medical History:   Diagnosis Date    Allergy     DDD (degenerative disc disease), lumbar     Diabetes mellitus     diet controlled    GERD (gastroesophageal reflux disease)     History of subconjunctival hemorrhage 2011    left eye    IBS (irritable bowel syndrome)     Obesity     MYRIAM (obstructive sleep apnea)     on CPAP    Rotator cuff impingement syndrome     Seasonal allergic conjunctivitis     Type 2 diabetes mellitus treated with insulin        Past Surgical History:   Procedure Laterality Date    CATARACT EXTRACTION W/  INTRAOCULAR LENS IMPLANT Right 10/03/2013        CATARACT EXTRACTION W/  INTRAOCULAR LENS IMPLANT Left 2022         SECTION      x2    CHOLECYSTECTOMY       COLONOSCOPY N/A 2018    Procedure: COLONOSCOPY;  Surgeon: Marie Mejia MD;  Location: [facility] ENDO;  Service: Endoscopy;  Laterality: N/A;    COLONOSCOPY N/A 2022    Procedure: COLONOSCOPY;  Surgeon: Pierce Rivera MD;  Location: [facility] ENDO;  Service: Endoscopy;  Laterality: N/A;    ENDOSCOPIC ULTRASOUND OF UPPER GASTROINTESTINAL TRACT N/A 10/09/2018    Procedure: ULTRASOUND, UPPER GI TRACT, ENDOSCOPIC;  Surgeon: Javy Simpson MD;  Location: [facility] ENDO;  Service: Endoscopy;  Laterality: N/A;    EXCISION OF LESION N/A 2019    Procedure: EXCISION, LESION VULVA;  Surgeon: Liv Odell MD;  Location: [facility] OR;  Service: OB/GYN;  Laterality: N/A;  latex allergy    EYE SURGERY      HYSTERECTOMY      ovaries intact, due to DUB    INTRAOCULAR PROSTHESES INSERTION Left 2022    Procedure: INSERTION, IOL PROSTHESIS;  Surgeon: Clarice Herzog MD;  Location: [facility] OR [address] [place] [address];  Service: Ophthalmology;  Laterality: Left;    PHACOEMULSIFICATION OF CATARACT Left 2022    Procedure: PHACOEMULSIFICATION, CATARACT;  Surgeon: Clarice Herzog MD;  Location: [facility] OR [address] [place] [address];  Service: Ophthalmology;  Laterality: Left;    TUBAL LIGATION         Family History   Problem Relation Name Age of Onset    Alzheimer's disease Mother      Heart disease Father      Diabetes Sister x4     Breast cancer Sister x4     Deep vein thrombosis Brother x1     Diabetes Daughter x2     Cancer Brother x1         Lung    Lung cancer Brother x1     Amblyopia Neg Hx      Blindness Neg Hx      Cataracts Neg Hx      Glaucoma Neg Hx      Hypertension Neg Hx      Macular degeneration Neg Hx      Retinal detachment Neg Hx      Strabismus Neg Hx      Stroke Neg Hx      Thyroid disease Neg Hx         Social History     Socioeconomic History    Marital status:    Tobacco Use    Smoking status: Former     Current packs/day: 0.00     Types: Cigarettes     Quit date: 2/15/1983     Years since quittin.4      Passive exposure: Current ()    Smokeless tobacco: Never   Substance and Sexual Activity    Alcohol use: No    Drug use: No    Sexual activity: Yes     Partners: Male     Social Drivers of Health     Financial Resource Strain: Low Risk  (4/1/2025)    Overall Financial Resource Strain (CARDIA)     Difficulty of Paying Living Expenses: Not hard at all   Food Insecurity: No Food Insecurity (4/1/2025)    Hunger Vital Sign     Worried About Running Out of Food in the Last Year: Never true     Ran Out of Food in the Last Year: Never true   Transportation Needs: No Transportation Needs (4/1/2025)    PRAPARE - Transportation     Lack of Transportation (Medical): No     Lack of Transportation (Non-Medical): No   Physical Activity: Inactive (4/1/2025)    Exercise Vital Sign     Days of Exercise per Week: 0 days     Minutes of Exercise per Session: 0 min   Stress: No Stress Concern Present (4/1/2025)    [employer] - Occupational Stress Questionnaire     Feeling of Stress : Only a little   Housing Stability: Low Risk  (4/1/2025)    Housing Stability Vital Sign     Unable to Pay for Housing in the Last Year: No     Number of Times Moved in the Last Year: 0     Homeless in the Last Year: No       MEDICATIONS & ALLERGIES:     Medications Ordered Prior to Encounter[1]     Review of patient's allergies indicates:   Allergen Reactions    Ace inhibitors Hives     \Cough    Latex, natural rubber Itching       Medications Reconciliation:   I have reconciled the patient's home medications with the patient/family. I have updated all changes.  Refer to After-Visit Medication List.    OBJECTIVE:     Vital Signs:  There were no vitals filed for this visit.  Wt Readings from Last 3 Encounters:   06/16/25 0957 82.8 kg (182 lb 8.7 oz)   04/23/25 1417 83.3 kg (183 lb 10.3 oz)   04/10/25 1409 83 kg (182 lb 15.7 oz)     There is no height or weight on file to calculate BMI.   Wt Readings from Last 3 Encounters:    07/11/25 83.3 kg (183 lb 10.3 oz)   06/16/25 82.8 kg (182 lb 8.7 oz)   04/23/25 83.3 kg (183 lb 10.3 oz)     Temp Readings from Last 3 Encounters:   03/06/25 98 °F (36.7 °C) (Oral)   02/03/25 97.7 °F (36.5 °C) (Oral)   11/21/24 98.4 °F (36.9 °C) (Oral)     BP Readings from Last 3 Encounters:   07/11/25 138/68   06/30/25 131/67   06/16/25 132/66     Pulse Readings from Last 3 Encounters:   07/11/25 87   06/30/25 75   06/16/25 81       Physical Exam:  General: Well developed, well nourished. No distress.  HEENT: Head is normocephalic, atraumatic  Eyes: Clear conjunctiva.  Neck: Supple, symmetrical neck; trachea midline.  Lungs: Clear to auscultation bilaterally and normal respiratory effort.  Cardiovascular: Heart with regular rate and rhythm. No murmurs, gallops or rubs  Extremities:   BLE edema. Pulses 2+ and symmetric.   Skin: Skin color, texture, turgor normal. No rashes.  Musculoskeletal: Normal gait.   Neurologic: Normal strength and tone. No focal numbness or weakness.   Psychiatric: Not depressed.      Laboratory  Lab Results   Component Value Date    WBC 6.19 06/16/2025    HGB 12.8 06/16/2025    HCT 39.6 06/16/2025     06/16/2025    CHOL 214 (H) 11/14/2024    TRIG 203 (H) 11/14/2024    HDL 60 11/14/2024    ALT 14 06/16/2025    AST 16 06/16/2025     06/16/2025    K 3.2 (L) 06/16/2025     06/16/2025    CREATININE 0.8 06/16/2025    BUN 8 06/16/2025    CO2 26 06/16/2025    TSH 2.402 11/14/2024    INR 0.9 04/19/2012    GLUF 105 10/27/2004    HGBA1C 7.3 (H) 04/01/2025       ASSESSMENT & PLAN:     Swelling of both lower extremities /   History of diastolic dysfunction/  Venous insufficiency  -     Comprehensive Metabolic Panel; Future; Expected date: 07/11/2025  -     CBC Auto Differential; Future; Expected date: 07/11/2025  -     BNP; Future; Expected date: 07/11/2025  -     Ankle Brachial Indices (SINTIA); Future  -     Ambulatory referral/consult to Cardiology; Future; Expected date:  07/18/2025    Spider varicose vein  -     Ankle Brachial Indices (SINTIA); Future    Hypokalemia  -     Comprehensive Metabolic Panel; Future; Expected date: 07/11/2025    Future Appointments   Date Time Provider Department Center   8/1/2025  1:00 PM APPOINTMENT LAB, MELISSA [facility]   8/8/2025  1:00 PM Bindu Eagle, EDUARDO, LINSEYP [facility]   9/29/2025  1:20 PM Michael Villasenor OD [facility]   2/3/2026  1:00 PM Susana Maldonado FNP-C [facility] FAM MED [facility]        Medication List            Accurate as of July 11, 2025  3:54 PM. If you have any questions, ask your nurse or doctor.                CONTINUE taking these medications      albuterol 90 mcg/actuation inhaler  Commonly known as: PROVENTIL/VENTOLIN HFA  Inhale 1-2 puffs into the lungs every 4 (four) hours as needed for Wheezing.     amLODIPine 10 MG tablet  Commonly known as: NORVASC  Take 1 tablet (10 mg total) by mouth once daily.     aspirin 81 MG EC tablet  Commonly known as: ECOTRIN     benzonatate 200 MG capsule  Commonly known as: TESSALON  Take 1 capsule (200 mg total) by mouth every 8 (eight) hours as needed for Cough.     blood-glucose meter kit  Commonly known as: BLOOD GLUCOSE MONITORING  Three times a day     citalopram 10 MG tablet  Commonly known as: CeleXA  Take 1 tablet (10 mg total) by mouth once daily.     ergocalciferol 50,000 unit Cap  Commonly known as: ERGOCALCIFEROL  TAKE 1 CAPSULE BY MOUTH EVERY 7 DAYS     esomeprazole 40 MG capsule  Commonly known as: NEXIUM  Take 1 capsule (40 mg total) by mouth before breakfast.     fluticasone propionate 50 mcg/actuation nasal spray  Commonly known as: FLONASE  1 spray (50 mcg total) by Each Nostril route once daily.     fluticasone-salmeterol 250-50 mcg/dose 250-50 mcg/dose diskus inhaler  Commonly known as: ADVAIR  Inhale 1 puff into the lungs 2 (two) times daily. Controller     gabapentin 100 MG capsule  Commonly known as: NEURONTIN  Take 2 capsules  "(200 mg total) by mouth every evening.     glucose 4 GM chewable tablet  Take 4 tablets (16 g total) by mouth as needed for Low blood sugar (If having symptoms of blurry vision, palpitations, confusion, shakiness.  Please check sugars and if sugar below 70 please take 4 tablets and re-check sugar everry 15 minutes until sugars are above 70 and symptoms resolve.).     insulin degludec 100 unit/mL (3 mL) insulin pen  Commonly known as: TRESIBA FLEXTOUCH U-100  Inject 14 units at night .     lactulose 20 gram/30 mL Soln  Commonly known as: CHRONULAC  Take 15 mLs (10 g total) by mouth 3 (three) times daily as needed (constipation).     magnesium oxide 400 mg (241.3 mg magnesium) tablet  Commonly known as: MAG-OX  Take 1 tablet (400 mg total) by mouth 2 (two) times daily.     montelukast 10 mg tablet  Commonly known as: SINGULAIR     NOVOFINE 32 32 gauge x 1/4" Ndle  Generic drug: pen needle, diabetic  4 injections per day     NovoLOG Flexpen U-100 Insulin 100 unit/mL (3 mL) Inpn pen  Generic drug: insulin aspart U-100  Inject 10 Units into the skin 3 (three) times daily with meals. TDD 60 units     ondansetron 4 MG Tbdl  Commonly known as: ZOFRAN-ODT  Take 1 tablet (4 mg total) by mouth every 8 (eight) hours as needed (nausea).     rosuvastatin 5 MG tablet  Commonly known as: CRESTOR  Take 1 tablet (5 mg total) by mouth once daily.     traZODone 50 MG tablet  Commonly known as: DESYREL  TAKE 1 TABLET BY MOUTH EVERY EVENING.     TRULICITY 3 mg/0.5 mL pen injector  Generic drug: dulaglutide  Inject 3 mg into the skin every 7 days.              Signing Physician:  GINETTE Cartwright         [1]   Current Outpatient Medications on File Prior to Visit   Medication Sig Dispense Refill    albuterol (PROVENTIL/VENTOLIN HFA) 90 mcg/actuation inhaler Inhale 1-2 puffs into the lungs every 4 (four) hours as needed for Wheezing. 18 g 2    amLODIPine (NORVASC) 10 MG tablet Take 1 tablet (10 mg total) by mouth once daily. 90 " tablet 3    aspirin (ECOTRIN) 81 MG EC tablet Take 81 mg by mouth once daily.      benzonatate (TESSALON) 200 MG capsule Take 1 capsule (200 mg total) by mouth every 8 (eight) hours as needed for Cough. 30 capsule 0    blood-glucose meter (BLOOD GLUCOSE MONITORING) kit Three times a day 1 each 0    citalopram (CELEXA) 10 MG tablet Take 1 tablet (10 mg total) by mouth once daily. 90 tablet 1    dulaglutide (TRULICITY) 3 mg/0.5 mL pen injector Inject 3 mg into the skin every 7 days.      ergocalciferol (ERGOCALCIFEROL) 50,000 unit Cap TAKE 1 CAPSULE BY MOUTH EVERY 7 DAYS 12 capsule 3    esomeprazole (NEXIUM) 40 MG capsule Take 1 capsule (40 mg total) by mouth before breakfast. 90 capsule 3    fluticasone propionate (FLONASE) 50 mcg/actuation nasal spray 1 spray (50 mcg total) by Each Nostril route once daily. 9.9 mL 0    fluticasone-salmeterol diskus inhaler 250-50 mcg Inhale 1 puff into the lungs 2 (two) times daily. Controller 60 each 6    gabapentin (NEURONTIN) 100 MG capsule Take 2 capsules (200 mg total) by mouth every evening. 180 capsule 3    glucose 4 GM chewable tablet Take 4 tablets (16 g total) by mouth as needed for Low blood sugar (If having symptoms of blurry vision, palpitations, confusion, shakiness.  Please check sugars and if sugar below 70 please take 4 tablets and re-check sugar everry 15 minutes until sugars are above 70 and symptoms resolve.). 50 tablet 12    insulin aspart U-100 (NOVOLOG FLEXPEN U-100 INSULIN) 100 unit/mL (3 mL) InPn pen Inject 10 Units into the skin 3 (three) times daily with meals. TDD 60 units 75 mL 11    insulin degludec (TRESIBA FLEXTOUCH U-100) 100 unit/mL (3 mL) insulin pen Inject 14 units at night .      lactulose (CHRONULAC) 20 gram/30 mL Soln Take 15 mLs (10 g total) by mouth 3 (three) times daily as needed (constipation). 500 mL 3    magnesium oxide (MAG-OX) 400 mg tablet Take 1 tablet (400 mg total) by mouth 2 (two) times daily. 180 tablet 3    montelukast  "(SINGULAIR) 10 mg tablet Take 10 mg by mouth every evening.      ondansetron (ZOFRAN-ODT) 4 MG TbDL Take 1 tablet (4 mg total) by mouth every 8 (eight) hours as needed (nausea). 30 tablet 0    pen needle, diabetic (NOVOFINE 32) 32 gauge x 1/4" Ndle 4 injections per day 500 each 4    rosuvastatin (CRESTOR) 5 MG tablet Take 1 tablet (5 mg total) by mouth once daily. 90 tablet 3    traZODone (DESYREL) 50 MG tablet TAKE 1 TABLET BY MOUTH EVERY EVENING. 90 tablet 0     Current Facility-Administered Medications on File Prior to Visit   Medication Dose Route Frequency Provider Last Rate Last Admin    triamcinolone acetonide injection 20 mg  20 mg Intramuscular 1 time in Clinic/HOD Tani Pittman, LINSEYP         "

## 2025-07-28 ENCOUNTER — HOSPITAL ENCOUNTER (OUTPATIENT)
Dept: CARDIOLOGY | Facility: HOSPITAL | Age: 75
Discharge: HOME | End: 2025-07-28
Attending: NURSE PRACTITIONER
Payer: MEDICARE

## 2025-07-28 DIAGNOSIS — I87.2 VENOUS INSUFFICIENCY: ICD-10-CM

## 2025-07-28 DIAGNOSIS — I86.8 SPIDER VARICOSE VEIN: ICD-10-CM

## 2025-07-28 DIAGNOSIS — M79.89 SWELLING OF BOTH LOWER EXTREMITIES: ICD-10-CM

## 2025-07-28 DIAGNOSIS — Z86.79 HISTORY OF DIASTOLIC DYSFUNCTION: ICD-10-CM

## 2025-07-28 DIAGNOSIS — E87.6 HYPOKALEMIA: ICD-10-CM

## 2025-07-28 PROCEDURE — 93922 UPR/L XTREMITY ART 2 LEVELS: CPT | Mod: 26,,, | Performed by: INTERNAL MEDICINE

## 2025-07-28 PROCEDURE — 93922 UPR/L XTREMITY ART 2 LEVELS: CPT

## 2025-07-29 ENCOUNTER — OFFICE VISIT (OUTPATIENT)
Dept: CARDIOLOGY | Facility: CLINIC | Age: 75
End: 2025-07-29
Payer: MEDICARE

## 2025-07-29 VITALS
WEIGHT: 180.75 LBS | HEART RATE: 69 BPM | HEIGHT: 63 IN | SYSTOLIC BLOOD PRESSURE: 111 MMHG | BODY MASS INDEX: 32.03 KG/M2 | DIASTOLIC BLOOD PRESSURE: 68 MMHG

## 2025-07-29 DIAGNOSIS — M79.89 SWELLING OF BOTH LOWER EXTREMITIES: Primary | ICD-10-CM

## 2025-07-29 DIAGNOSIS — I70.0 ABDOMINAL AORTIC ATHEROSCLEROSIS: ICD-10-CM

## 2025-07-29 DIAGNOSIS — E78.2 MIXED HYPERLIPIDEMIA: ICD-10-CM

## 2025-07-29 DIAGNOSIS — I25.10 CORONARY ARTERY CALCIFICATION SEEN ON CT SCAN: ICD-10-CM

## 2025-07-29 DIAGNOSIS — I10 ESSENTIAL HYPERTENSION: ICD-10-CM

## 2025-07-29 LAB
LEFT ABI: 1.2
LEFT ARM BP: 130 MMHG
LEFT DORSALIS PEDIS: 165 MMHG
LEFT POSTERIOR TIBIAL: 145 MMHG
RIGHT ABI: 1.14
RIGHT ARM BP: 138 MMHG
RIGHT DORSALIS PEDIS: 153 MMHG
RIGHT POSTERIOR TIBIAL: 158 MMHG

## 2025-07-29 PROCEDURE — 99204 OFFICE O/P NEW MOD 45 MIN: CPT | Mod: S$GLB,,, | Performed by: INTERNAL MEDICINE

## 2025-07-29 PROCEDURE — 1160F RVW MEDS BY RX/DR IN RCRD: CPT | Mod: CPTII,S$GLB,, | Performed by: INTERNAL MEDICINE

## 2025-07-29 PROCEDURE — 1101F PT FALLS ASSESS-DOCD LE1/YR: CPT | Mod: CPTII,S$GLB,, | Performed by: INTERNAL MEDICINE

## 2025-07-29 PROCEDURE — 3074F SYST BP LT 130 MM HG: CPT | Mod: CPTII,S$GLB,, | Performed by: INTERNAL MEDICINE

## 2025-07-29 PROCEDURE — 99999 PR PBB SHADOW E&M-EST. PATIENT-LVL V: CPT | Mod: PBBFAC,,, | Performed by: INTERNAL MEDICINE

## 2025-07-29 PROCEDURE — G2211 COMPLEX E/M VISIT ADD ON: HCPCS | Mod: S$GLB,,, | Performed by: INTERNAL MEDICINE

## 2025-07-29 PROCEDURE — 1126F AMNT PAIN NOTED NONE PRSNT: CPT | Mod: CPTII,S$GLB,, | Performed by: INTERNAL MEDICINE

## 2025-07-29 PROCEDURE — 1159F MED LIST DOCD IN RCRD: CPT | Mod: CPTII,S$GLB,, | Performed by: INTERNAL MEDICINE

## 2025-07-29 PROCEDURE — 3078F DIAST BP <80 MM HG: CPT | Mod: CPTII,S$GLB,, | Performed by: INTERNAL MEDICINE

## 2025-07-29 PROCEDURE — 3288F FALL RISK ASSESSMENT DOCD: CPT | Mod: CPTII,S$GLB,, | Performed by: INTERNAL MEDICINE

## 2025-07-29 PROCEDURE — 3072F LOW RISK FOR RETINOPATHY: CPT | Mod: CPTII,S$GLB,, | Performed by: INTERNAL MEDICINE

## 2025-07-29 RX ORDER — ROSUVASTATIN CALCIUM 10 MG/1
10 TABLET, COATED ORAL DAILY
Qty: 90 TABLET | Refills: 3 | Status: SHIPPED | OUTPATIENT
Start: 2025-07-29 | End: 2026-07-29

## 2025-07-29 NOTE — PROGRESS NOTES
Patient ID: Chelly Ortiz is a 74 y.o. female.    History of Present Illness    CHIEF COMPLAINT:  - Patient presents for initial cardiac evaluation by me, referred by her primary care physician for assessment of mild ankle swelling and to ensure her cardiac health is stable.    HPI:  Patient, a 74-year-old female, reports mild ankle swelling, described as minimal with slight puffiness, worsening in the afternoon. She also reports persistent discomfort in her chest area for months, described as a protruding sensation when reclined, intensifying with palpation. She consulted an urgent care physician last year due to excessive coughing and was prescribed azithromycin.    She is sedentary due to lower back pain limiting her mobility. She had previously engaged in walking for exercise but had to discontinue. She monitors BP at home, reporting an average of 135/80. Her diabetes is described as suboptimally controlled.    She denies anginal chest pain, dyspnea, palpitations, pain associated with the ankle swelling, cigarette smoking, and nocturnal symptoms.    MEDICAL HISTORY:  - CAD  - HTN  - HLD  - Diabetes  - Aortic atherosclerosis  - MYRIAM  - Obesity    MEDICATIONS:  - Aspirin 81 mg daily  - Amlodipine 10 mg daily  - Rosuvastatin 5 mg daily  - Furosemide 20 mg PRN  - Dulaglutide 3 mg weekly  - Insulin    SOCIAL HISTORY:  - Smoking: Denies current use         Physical Exam    Vitals: Pulse: 69. Blood pressure: 111/68.  Neck: All normal.  Cardiovascular: All normal.  Lungs: All normal.  Extremities: All normal.  LABS:  - CBC and CMP: July 2025, unremarkable  - BNP: July 2025, less than 10  - A1C: July 2025, 7.0  - Lipid panel: 2024, total cholesterol 214, HDL 60, , triglycerides 203  - TSH: 2024, normal  TEST RESULTS  (IMAGING REVIEWED DURING  CLINIC VISIT):  - Echo 08/2017: normal LV size/function, EF 60-65%, no significant valve disease  - CT chest 2022: normal heart size, mild coronary calcification, normal size  aorta, atherosclerosis present  - CXR 04/2025: normal  - SINTIA 07/2025: normal bilaterally  - ECG 11/2024: NSR, no Q waves or ST changes  - Holter 2018: NSR, no significant arrhythmia  - SINTIA: July 2025, normal bilaterally         Assessment & Plan    M79.89 Swelling of both lower extremities  I10 Essential hypertension  E78.2 Mixed hyperlipidemia  I70.0 Abdominal aortic atherosclerosis  I25.10 Coronary artery calcification seen on CT    Significant chronic condition to be followed longitudinally with following long term treatment plan.      SWELLING OF BOTH LOWER EXTREMITIES:  - Lower extremity swelling likely due to venous insufficiency rather than cardiac etiology.  - Discussed that increased activity can help mobilize blood from legs and reduce swelling.  - Patient to increase physical activity to help with leg swelling and overall health.    ESSENTIAL HYPERTENSION:  - Current blood pressure control acceptable on current regimen.  - Continued Amlodipine 10 mg daily for blood pressure, chronic medicine and seems unrelated to mild lower extremity edema.  - can always consider decreasing dose if lower extremity edema is bothersome or addition of spironolactone therapy given history of hypokalemia.     MIXED HYPERLIPIDEMIA:  - Optimized cholesterol management.  - Informed patient that current cholesterol levels are acceptable, but further reduction could decrease future risk of heart attack and stroke.  - Increased Rosuvastatin from 5 mg to 10 mg daily for cholesterol.    CORONARY ARTERY CALCIFICATION SEEN ON CT:  - Chest discomfort likely musculoskeletal, not cardiac-related.  - Ordered echocardiogram to further evaluate cardiac structure and function.  - Patient to continue efforts to control diabetes.         This note was generated with the assistance of ambient listening technology. Verbal consent was obtained by the patient and accompanying visitor(s) for the recording of patient appointment to facilitate this  note. I attest to having reviewed and edited the generated note for accuracy, though some syntax or spelling errors may persist. Please contact the author of this note for any clarification.

## 2025-07-31 ENCOUNTER — PATIENT MESSAGE (OUTPATIENT)
Dept: INTERNAL MEDICINE | Facility: CLINIC | Age: 75
End: 2025-07-31
Payer: MEDICARE

## 2025-08-07 NOTE — PROGRESS NOTES
CHIEF COMPLAINT: Type 2 Diabetes     HPI: Mrs. Chelly Ortiz is a 74 y.o. female who was diagnosed with Type 2 DM x 9 years.  Has PN, CKD 3, MYRIAM, DDD, obesity.  Has 4 siblings with diabetes   Past Medical History:   Diagnosis Date    Allergy     DDD (degenerative disc disease), lumbar     Diabetes mellitus     diet controlled    GERD (gastroesophageal reflux disease)     History of subconjunctival hemorrhage 12/02/2011    left eye    IBS (irritable bowel syndrome)     Obesity     MYRIAM (obstructive sleep apnea)     on CPAP    Rotator cuff impingement syndrome     Seasonal allergic conjunctivitis     Type 2 diabetes mellitus treated with insulin      Has bilateral shoulder pain.   A1c trending 7.9% to 7.3% to 7%  Lab Results   Component Value Date    HGBA1C 7.0 (H) 07/28/2025     Dealing with leg cramps  Had a fall in the past 7 months, meloxicam helped.  Injury (L) great toe bruising/pain.   F/u with podiatry     Has used novonordisk---pt assistance.  Last seen by me in me early 2025.   Being seen by me again today.    Enrolled with digital medicine, issues with meter/blood input   Injection (steroid) in shoulder-past month.  +mild 70s    PREVIOUS DIABETES MEDICATIONS TRIED  Glipizide  glimepiride  januvia  lantus   levemir  Metformin -diarrhea   trulicity- cost issue  Mounjaro     CURRENT DIABETIC MEDS: tresiba 14 units at night  ,  novolog 10 units w/ scale , trulcity 3 mg weekly   Self adjustment per scale  Cost issues with insulins in the past  Enrolled with assistance program     Pt is monitoring blood glucose readings 4 times a day a day .  Skips breakfast  On MDI injections 3 x a day  Self adjusting per scale  Testing 4 x a day max    Diabetes Management Status    Statin: Taking  ACE/ARB: Taking    Screening or Prevention Patient's value Goal Complete/Controlled?   HgA1C Testing and Control   Lab Results   Component Value Date    HGBA1C 7.0 (H) 07/28/2025      Annually/Less than 8% No   Lipid profile :  "11/14/2024 Annually Yes   LDL control Lab Results   Component Value Date    LDLCALC 113.4 11/14/2024    Annually/Less than 100 mg/dl  No   Nephropathy screening Lab Results   Component Value Date    LABMICR 10.0 06/18/2025     Lab Results   Component Value Date    PROTEINUA Negative 07/31/2024    Annually Yes   Blood pressure BP Readings from Last 1 Encounters:   07/29/25 111/68    Less than 140/90 Yes   Dilated retinal exam : 04/26/2024 Annually No   Foot exam   Most Recent Foot Exam Date: Not Found Annually Yes     REVIEW OF SYSTEMS  General: no weakness, fatigue, + weight fluctuations 1-3#   Eyes: no double or blurred vision, eye pain, or redness  Cardiovascular: no chest pain, palpitations, edema, or murmurs.   Respiratory: no cough or dyspnea.   GI: no heartburn, nausea, + changes in bowel patterns; good appetite. +IBS  Skin: no rashes, dryness, itching, or reactions at insulin injection sites.  Neuro: + numbness, tingling, tremors, or vertigo. +bilateral shoulder   Endocrine: no polyuria, polydipsia, polyphagia, heat or cold intolerance.     Vital Signs  BP (!) 140/76 (BP Location: Left arm, Patient Position: Sitting)   Pulse 76   Ht 5' 3" (1.6 m)   Wt 83 kg (182 lb 15.7 oz)   LMP 08/01/2000   SpO2 97%   BMI 32.41 kg/m²     Hemoglobin A1C   Date Value Ref Range Status   11/14/2024 7.9 (H) 4.0 - 5.6 % Final     Comment:     ADA Screening Guidelines:  5.7-6.4%  Consistent with prediabetes  >or=6.5%  Consistent with diabetes    High levels of fetal hemoglobin interfere with the HbA1C  assay. Heterozygous hemoglobin variants (HbS, HgC, etc)do  not significantly interfere with this assay.   However, presence of multiple variants may affect accuracy.     10/14/2024 8.3 (H) 4.0 - 5.6 % Final     Comment:     ADA Screening Guidelines:  5.7-6.4%  Consistent with prediabetes  >or=6.5%  Consistent with diabetes    High levels of fetal hemoglobin interfere with the HbA1C  assay. Heterozygous hemoglobin variants " (HbS, HgC, etc)do  not significantly interfere with this assay.   However, presence of multiple variants may affect accuracy.     07/20/2024 7.2 (H) 4.0 - 5.6 % Final     Comment:     ADA Screening Guidelines:  5.7-6.4%  Consistent with prediabetes  >or=6.5%  Consistent with diabetes    High levels of fetal hemoglobin interfere with the HbA1C  assay. Heterozygous hemoglobin variants (HbS, HgC, etc)do  not significantly interfere with this assay.   However, presence of multiple variants may affect accuracy.       Hemoglobin A1c   Date Value Ref Range Status   07/28/2025 7.0 (H) 4.0 - 5.6 % Final     Comment:     ADA Screening Guidelines:  5.7-6.4%  Consistent with prediabetes  >=6.5%  Consistent with diabetes    High levels of fetal hemoglobin interfere with the HbA1C  assay. Heterozygous hemoglobin variants (HbS, HgC, etc)do  not significantly interfere with this assay.   However, presence of multiple variants may affect accuracy.   04/01/2025 7.3 (H) 4.0 - 5.6 % Final     Comment:     ADA Screening Guidelines:  5.7-6.4%  Consistent with prediabetes  >=6.5%  Consistent with diabetes    High levels of fetal hemoglobin interfere with the HbA1C  assay. Heterozygous hemoglobin variants (HbS, HgC, etc)do  not significantly interfere with this assay.   However, presence of multiple variants may affect accuracy.        Chemistry        Component Value Date/Time     07/11/2025 1619     11/14/2024 1234    K 3.7 07/11/2025 1619    K 4.5 11/14/2024 1234     07/11/2025 1619     11/14/2024 1234    CO2 27 07/11/2025 1619    CO2 28 11/14/2024 1234    BUN 8 07/11/2025 1619    CREATININE 0.8 07/11/2025 1619     (H) 07/11/2025 1619     (H) 11/14/2024 1234        Component Value Date/Time    CALCIUM 9.3 07/11/2025 1619    CALCIUM 9.8 11/14/2024 1234    ALKPHOS 107 07/11/2025 1619    ALKPHOS 122 11/14/2024 1234    AST 15 07/11/2025 1619    AST 15 11/14/2024 1234    ALT 11 07/11/2025 1619    ALT 14  11/14/2024 1234    BILITOT 0.5 07/11/2025 1619    BILITOT 0.5 11/14/2024 1234    ESTGFRAFRICA >60.0 06/13/2022 0841    EGFRNONAA >60.0 06/13/2022 0841           Lab Results   Component Value Date    TSH 2.402 11/14/2024      Lab Results   Component Value Date    CHOL 214 (H) 11/14/2024    CHOL 218 (H) 07/20/2024    CHOL 222 (H) 04/23/2024     Lab Results   Component Value Date    HDL 60 11/14/2024    HDL 61 07/20/2024    HDL 54 04/23/2024     Lab Results   Component Value Date    LDLCALC 113.4 11/14/2024    LDLCALC 135.8 07/20/2024    LDLCALC 146.4 04/23/2024     Lab Results   Component Value Date    TRIG 203 (H) 11/14/2024    TRIG 106 07/20/2024    TRIG 108 04/23/2024     Lab Results   Component Value Date    CHOLHDL 28.0 11/14/2024    CHOLHDL 28.0 07/20/2024    CHOLHDL 24.3 04/23/2024         PHYSICAL EXAMINATION  Constitutional: Appears well, no distress  Neck: Supple, trachea midline.   Respiratory: No wheezes, even and unlabored.  Cardiovascular: RRR; no edema.   Lymph: deferred   Skin: warm and dry; no injection site reactions, +sl acanthosis nigracans observed.  Neuro:patient alert and cooperative, normal affect; steady gait.    Diabetes Foot Exam:   deferred    Assessment/Plan  1. Type II diabetes mellitus with neurological manifestations  Hemoglobin A1C next time   F/u in 4 months w/ me   A1c goal less than 7.5%   Below goal!!  Doing well   F/u with podiatry       2. Chronic obstructive pulmonary disease, unspecified COPD type  No recent steroids  F/u with pulm prn   Stable       3. Class 1 obesity due to excess calories with serious comorbidity and body mass index (BMI) of 33.0 to 33.9 in adult  Body mass index is 32.41 kg/m².  May increase insulin resistance  Losing weight  This condition increases insulin resistance  Encourage exercise 3-5 x a week, goal 150 minutes or more/week  If limited, encourage chair exercises -via youtube  Movement is key, walk after meals 15-30 minutes   Watch portions, protein   gm /day, carbs  gm /day, more fiber  Hydrate well, at least 64 oz, half body weight (lbs) in ounces per day  Look at resources per AVS w/ apps/websites        4. Essential hypertension  Pt is on arb or acei  Managed per pcp   Stable   Controlled bp helps against microalbuminuria, cardiovascular manifestations        5. Mixed hyperlipidemia  Pt is on statin  Goal less than 100 for ldl  Encourage pt to eat more whole grains, fiber  Eat more omega-3 via grilled/baked fish  Exercise more, 3-5 x a week at least 150 mins        6. MYRIAM (obstructive sleep apnea)  Pt is consistent w/ cpap  Stable   F/u with sleep clinic prn  Has all supplies  Being sure to clean cpap machine thoroughly -at least 1-2x a week  This helps bg, metabolism, and cardiovascular manifestations        7. Spondylosis of cervical region without myelopathy or radiculopathy  May increase insulin resistance   Affects exercise   No recent falls in the past 4 months

## 2025-08-08 ENCOUNTER — OFFICE VISIT (OUTPATIENT)
Dept: INTERNAL MEDICINE | Facility: CLINIC | Age: 75
End: 2025-08-08
Payer: MEDICARE

## 2025-08-08 VITALS
HEART RATE: 76 BPM | SYSTOLIC BLOOD PRESSURE: 140 MMHG | BODY MASS INDEX: 32.43 KG/M2 | WEIGHT: 183 LBS | HEIGHT: 63 IN | OXYGEN SATURATION: 97 % | DIASTOLIC BLOOD PRESSURE: 76 MMHG

## 2025-08-08 DIAGNOSIS — G47.33 OSA (OBSTRUCTIVE SLEEP APNEA): ICD-10-CM

## 2025-08-08 DIAGNOSIS — M47.812 SPONDYLOSIS OF CERVICAL REGION WITHOUT MYELOPATHY OR RADICULOPATHY: ICD-10-CM

## 2025-08-08 DIAGNOSIS — E66.811 CLASS 1 OBESITY DUE TO EXCESS CALORIES WITH SERIOUS COMORBIDITY AND BODY MASS INDEX (BMI) OF 33.0 TO 33.9 IN ADULT: ICD-10-CM

## 2025-08-08 DIAGNOSIS — E78.2 MIXED HYPERLIPIDEMIA: ICD-10-CM

## 2025-08-08 DIAGNOSIS — I10 ESSENTIAL HYPERTENSION: ICD-10-CM

## 2025-08-08 DIAGNOSIS — J44.9 CHRONIC OBSTRUCTIVE PULMONARY DISEASE, UNSPECIFIED COPD TYPE: ICD-10-CM

## 2025-08-08 DIAGNOSIS — E66.09 CLASS 1 OBESITY DUE TO EXCESS CALORIES WITH SERIOUS COMORBIDITY AND BODY MASS INDEX (BMI) OF 33.0 TO 33.9 IN ADULT: ICD-10-CM

## 2025-08-08 DIAGNOSIS — E11.49 TYPE II DIABETES MELLITUS WITH NEUROLOGICAL MANIFESTATIONS: Primary | ICD-10-CM

## 2025-08-08 PROCEDURE — 99999 PR PBB SHADOW E&M-EST. PATIENT-LVL V: CPT | Mod: PBBFAC,HCNC,, | Performed by: NURSE PRACTITIONER

## 2025-08-08 NOTE — PATIENT INSTRUCTIONS
Tresiba 14 units at night  Novolog 10 units before meals plus scale   180-230+2, 231-280+4, 281-330+6, 331-380+8, >380+10   Trulicity 3 mg weekly    [email or web address]  Eat fit trina  Myfitnesspal trina  [email or web address]   MySugr trina  Glucose guide trina  [email or web address]     Follow up in 4 months w/Irielle   A1c- 4 months

## 2025-08-27 ENCOUNTER — OFFICE VISIT (OUTPATIENT)
Dept: SPORTS MEDICINE | Facility: CLINIC | Age: 75
End: 2025-08-27
Payer: MEDICARE

## 2025-08-27 VITALS
HEIGHT: 63 IN | WEIGHT: 180.44 LBS | HEART RATE: 83 BPM | DIASTOLIC BLOOD PRESSURE: 79 MMHG | BODY MASS INDEX: 31.97 KG/M2 | SYSTOLIC BLOOD PRESSURE: 129 MMHG

## 2025-08-27 DIAGNOSIS — M75.102 ROTATOR CUFF TEAR ARTHROPATHY OF LEFT SHOULDER: Primary | ICD-10-CM

## 2025-08-27 DIAGNOSIS — M12.812 ROTATOR CUFF TEAR ARTHROPATHY OF LEFT SHOULDER: Primary | ICD-10-CM

## 2025-08-27 DIAGNOSIS — M12.811 ROTATOR CUFF ARTHROPATHY OF RIGHT SHOULDER: ICD-10-CM

## 2025-08-27 DIAGNOSIS — M75.121 NONTRAUMATIC COMPLETE TEAR OF RIGHT ROTATOR CUFF: ICD-10-CM

## 2025-08-27 DIAGNOSIS — M75.122 NONTRAUMATIC COMPLETE TEAR OF LEFT ROTATOR CUFF: ICD-10-CM

## 2025-08-27 PROCEDURE — 99999 PR PBB SHADOW E&M-EST. PATIENT-LVL IV: CPT | Mod: PBBFAC,HCNC,, | Performed by: PHYSICIAN ASSISTANT

## 2025-08-27 RX ORDER — TRIAMCINOLONE ACETONIDE 40 MG/ML
40 INJECTION, SUSPENSION INTRA-ARTICULAR; INTRAMUSCULAR
Status: DISCONTINUED | OUTPATIENT
Start: 2025-08-27 | End: 2025-08-27 | Stop reason: HOSPADM

## 2025-08-27 RX ADMIN — TRIAMCINOLONE ACETONIDE 40 MG: 40 INJECTION, SUSPENSION INTRA-ARTICULAR; INTRAMUSCULAR at 01:08

## 2025-08-29 ENCOUNTER — PATIENT MESSAGE (OUTPATIENT)
Dept: INTERNAL MEDICINE | Facility: CLINIC | Age: 75
End: 2025-08-29
Payer: MEDICARE

## 2025-09-05 ENCOUNTER — PATIENT MESSAGE (OUTPATIENT)
Dept: INTERNAL MEDICINE | Facility: CLINIC | Age: 75
End: 2025-09-05
Payer: MEDICARE

## (undated) DEVICE — SHIELD FOX W/GARTER

## (undated) DEVICE — SHIELD COLLAGEN 12HR CORNEAL

## (undated) DEVICE — SYR 10CC LUER LOCK

## (undated) DEVICE — GLOVE 7.5 PROTEXIS PI BLUE

## (undated) DEVICE — SUT 2-0 VICRYL / CT-1

## (undated) DEVICE — SEE MEDLINE ITEM 156955

## (undated) DEVICE — GLOVE BIOGEL ECLIPSE SZ 6.5

## (undated) DEVICE — Device

## (undated) DEVICE — CATH URETHRAL 16FR RED

## (undated) DEVICE — PAD EYE OVAL CNTOUR 1.62X2.62

## (undated) DEVICE — SEE MEDLINE ITEM 152622

## (undated) DEVICE — SEE MEDLINE ITEM 154981

## (undated) DEVICE — SUT VICRYL CTD 2-0 GI 27 SH

## (undated) DEVICE — NDL SPINAL 18GX3.5 SPINOCAN

## (undated) DEVICE — COVER OVERHEAD SURG LT BLUE

## (undated) DEVICE — SYR 30CC LUER LOCK

## (undated) DEVICE — ELECTRODE REM PLYHSV RETURN 9

## (undated) DEVICE — GOWN SURGICAL X-LARGE

## (undated) DEVICE — SYR 50CC LL

## (undated) DEVICE — DRAPE EYE POUCH FEN INCISE

## (undated) DEVICE — SYR 3CC 21 X 1 LUER LOCK

## (undated) DEVICE — CARTRIDGE LENS D

## (undated) DEVICE — DRAPE THREE-QTR REINF 53X77IN

## (undated) DEVICE — GLOVE PROTEXIS PI BLUE 5.5

## (undated) DEVICE — SUT 4-0 CHROMIC GUT / SH

## (undated) DEVICE — SEE MEDLINE ITEM 157116

## (undated) DEVICE — SEE MEDLINE ITEM 157117

## (undated) DEVICE — SOL IRR BSS OPHTH 500ML STRL

## (undated) DEVICE — SOL WATER STRL IRR 1000ML

## (undated) DEVICE — SOL BETADINE 5%

## (undated) DEVICE — DRESSING TRANS 4X4 TEGADERM

## (undated) DEVICE — HYDRODISSECTOR NUCLEUS 27GX7/8

## (undated) DEVICE — NDL HYPO REG 25G X 1 1/2

## (undated) DEVICE — KIT GREY EYE

## (undated) DEVICE — DRAPE SURGICAL STERI IRRG PCH

## (undated) DEVICE — SPONGE GAUZE 16PLY 4X4

## (undated) DEVICE — SEE MEDLINE ITEM 146355

## (undated) DEVICE — SEE MEDLINE ITEM 157131

## (undated) DEVICE — MANIFOLD 4 PORT

## (undated) DEVICE — PAD PREP 50/CA